# Patient Record
Sex: FEMALE | Employment: OTHER
[De-identification: names, ages, dates, MRNs, and addresses within clinical notes are randomized per-mention and may not be internally consistent; named-entity substitution may affect disease eponyms.]

---

## 2017-01-05 ENCOUNTER — RECORDS - HEALTHEAST (OUTPATIENT)
Dept: ADMINISTRATIVE | Facility: OTHER | Age: 82
End: 2017-01-05

## 2017-01-06 ENCOUNTER — COMMUNICATION - HEALTHEAST (OUTPATIENT)
Dept: INTERNAL MEDICINE | Facility: CLINIC | Age: 82
End: 2017-01-06

## 2017-01-06 ENCOUNTER — AMBULATORY - HEALTHEAST (OUTPATIENT)
Dept: INTERNAL MEDICINE | Facility: CLINIC | Age: 82
End: 2017-01-06

## 2017-01-11 ENCOUNTER — COMMUNICATION - HEALTHEAST (OUTPATIENT)
Dept: INTERNAL MEDICINE | Facility: CLINIC | Age: 82
End: 2017-01-11

## 2017-01-13 ENCOUNTER — RECORDS - HEALTHEAST (OUTPATIENT)
Dept: ADMINISTRATIVE | Facility: OTHER | Age: 82
End: 2017-01-13

## 2017-01-19 ENCOUNTER — RECORDS - HEALTHEAST (OUTPATIENT)
Dept: ADMINISTRATIVE | Facility: OTHER | Age: 82
End: 2017-01-19

## 2017-01-25 ENCOUNTER — AMBULATORY - HEALTHEAST (OUTPATIENT)
Dept: INTERNAL MEDICINE | Facility: CLINIC | Age: 82
End: 2017-01-25

## 2017-01-25 ENCOUNTER — COMMUNICATION - HEALTHEAST (OUTPATIENT)
Dept: INTERNAL MEDICINE | Facility: CLINIC | Age: 82
End: 2017-01-25

## 2017-01-25 DIAGNOSIS — R01.1 HEART MURMUR: ICD-10-CM

## 2017-01-30 ENCOUNTER — COMMUNICATION - HEALTHEAST (OUTPATIENT)
Dept: SCHEDULING | Facility: CLINIC | Age: 82
End: 2017-01-30

## 2017-01-30 ENCOUNTER — AMBULATORY - HEALTHEAST (OUTPATIENT)
Dept: INTERNAL MEDICINE | Facility: CLINIC | Age: 82
End: 2017-01-30

## 2017-02-02 ENCOUNTER — OFFICE VISIT - HEALTHEAST (OUTPATIENT)
Dept: CARDIOLOGY | Facility: CLINIC | Age: 82
End: 2017-02-02

## 2017-02-02 DIAGNOSIS — R06.09 DYSPNEA ON EXERTION: ICD-10-CM

## 2017-02-02 DIAGNOSIS — M79.89 PAIN AND SWELLING OF LEFT LOWER LEG: ICD-10-CM

## 2017-02-02 DIAGNOSIS — I71.40 ABDOMINAL AORTIC ANEURYSM (AAA) WITHOUT RUPTURE (H): ICD-10-CM

## 2017-02-02 DIAGNOSIS — J44.9 COPD (CHRONIC OBSTRUCTIVE PULMONARY DISEASE) (H): ICD-10-CM

## 2017-02-02 DIAGNOSIS — I73.9 PERIPHERAL ARTERIAL DISEASE (H): ICD-10-CM

## 2017-02-02 DIAGNOSIS — M79.662 PAIN AND SWELLING OF LEFT LOWER LEG: ICD-10-CM

## 2017-02-02 LAB
ATRIAL RATE - MUSE: 96 BPM
DIASTOLIC BLOOD PRESSURE - MUSE: NORMAL MMHG
INTERPRETATION ECG - MUSE: NORMAL
P AXIS - MUSE: 66 DEGREES
PR INTERVAL - MUSE: 118 MS
QRS DURATION - MUSE: 70 MS
QT - MUSE: 316 MS
QTC - MUSE: 399 MS
R AXIS - MUSE: 47 DEGREES
SYSTOLIC BLOOD PRESSURE - MUSE: NORMAL MMHG
T AXIS - MUSE: 54 DEGREES
VENTRICULAR RATE- MUSE: 96 BPM

## 2017-02-02 ASSESSMENT — MIFFLIN-ST. JEOR: SCORE: 1148.75

## 2017-02-03 ENCOUNTER — HOSPITAL ENCOUNTER (OUTPATIENT)
Dept: ULTRASOUND IMAGING | Facility: CLINIC | Age: 82
Discharge: HOME | End: 2017-02-03
Attending: INTERNAL MEDICINE

## 2017-02-03 DIAGNOSIS — J44.9 COPD (CHRONIC OBSTRUCTIVE PULMONARY DISEASE) (H): ICD-10-CM

## 2017-02-03 DIAGNOSIS — M79.662 PAIN AND SWELLING OF LEFT LOWER LEG: ICD-10-CM

## 2017-02-03 DIAGNOSIS — I73.9 PERIPHERAL ARTERIAL DISEASE (H): ICD-10-CM

## 2017-02-03 DIAGNOSIS — R06.09 OTHER FORMS OF DYSPNEA: ICD-10-CM

## 2017-02-03 DIAGNOSIS — M79.89 PAIN AND SWELLING OF LEFT LOWER LEG: ICD-10-CM

## 2017-02-03 DIAGNOSIS — I71.40 ABDOMINAL AORTIC ANEURYSM (AAA) WITHOUT RUPTURE (H): ICD-10-CM

## 2017-02-03 DIAGNOSIS — R06.09 DYSPNEA ON EXERTION: ICD-10-CM

## 2017-02-08 ENCOUNTER — COMMUNICATION - HEALTHEAST (OUTPATIENT)
Dept: INTERNAL MEDICINE | Facility: CLINIC | Age: 82
End: 2017-02-08

## 2017-02-09 ENCOUNTER — OFFICE VISIT - HEALTHEAST (OUTPATIENT)
Dept: INTERNAL MEDICINE | Facility: CLINIC | Age: 82
End: 2017-02-09

## 2017-02-09 ENCOUNTER — RECORDS - HEALTHEAST (OUTPATIENT)
Dept: GENERAL RADIOLOGY | Facility: CLINIC | Age: 82
End: 2017-02-09

## 2017-02-09 DIAGNOSIS — I48.91 ATRIAL FIBRILLATION WITH RAPID VENTRICULAR RESPONSE (H): ICD-10-CM

## 2017-02-09 DIAGNOSIS — I73.9 PERIPHERAL ARTERIAL DISEASE (H): ICD-10-CM

## 2017-02-09 DIAGNOSIS — J44.9 COPD (CHRONIC OBSTRUCTIVE PULMONARY DISEASE) (H): ICD-10-CM

## 2017-02-09 DIAGNOSIS — F25.0 SCHIZOAFFECTIVE DISORDER, BIPOLAR TYPE (H): ICD-10-CM

## 2017-02-09 DIAGNOSIS — R05.9 COUGH: ICD-10-CM

## 2017-02-09 DIAGNOSIS — I49.9 IRREGULAR HEART BEAT: ICD-10-CM

## 2017-02-09 ASSESSMENT — MIFFLIN-ST. JEOR: SCORE: 1135.14

## 2017-02-10 LAB
ATRIAL RATE - MUSE: 375 BPM
DIASTOLIC BLOOD PRESSURE - MUSE: NORMAL MMHG
INTERPRETATION ECG - MUSE: NORMAL
P AXIS - MUSE: NORMAL DEGREES
PR INTERVAL - MUSE: NORMAL MS
QRS DURATION - MUSE: 68 MS
QT - MUSE: 312 MS
QTC - MUSE: 418 MS
R AXIS - MUSE: -2 DEGREES
SYSTOLIC BLOOD PRESSURE - MUSE: NORMAL MMHG
T AXIS - MUSE: 28 DEGREES
VENTRICULAR RATE- MUSE: 108 BPM

## 2017-02-13 ENCOUNTER — AMBULATORY - HEALTHEAST (OUTPATIENT)
Dept: CARDIOLOGY | Facility: CLINIC | Age: 82
End: 2017-02-13

## 2017-02-13 DIAGNOSIS — R07.9 CHEST PAIN: ICD-10-CM

## 2017-02-20 ENCOUNTER — COMMUNICATION - HEALTHEAST (OUTPATIENT)
Dept: CARDIOLOGY | Facility: CLINIC | Age: 82
End: 2017-02-20

## 2017-02-22 ENCOUNTER — OFFICE VISIT - HEALTHEAST (OUTPATIENT)
Dept: INTERNAL MEDICINE | Facility: CLINIC | Age: 82
End: 2017-02-22

## 2017-02-22 DIAGNOSIS — J44.9 COPD (CHRONIC OBSTRUCTIVE PULMONARY DISEASE) (H): ICD-10-CM

## 2017-02-22 ASSESSMENT — MIFFLIN-ST. JEOR: SCORE: 1113.6

## 2017-02-23 ENCOUNTER — HOSPITAL ENCOUNTER (OUTPATIENT)
Dept: CARDIOLOGY | Facility: CLINIC | Age: 82
Discharge: HOME | End: 2017-02-23
Attending: INTERNAL MEDICINE

## 2017-02-23 ENCOUNTER — HOSPITAL ENCOUNTER (OUTPATIENT)
Dept: NUCLEAR MEDICINE | Facility: CLINIC | Age: 82
Discharge: HOME | End: 2017-02-23
Attending: INTERNAL MEDICINE

## 2017-02-23 DIAGNOSIS — R07.9 CHEST PAIN: ICD-10-CM

## 2017-02-23 LAB
CV STRESS CURRENT BP HE: NORMAL
CV STRESS CURRENT HR HE: 100
CV STRESS CURRENT HR HE: 101
CV STRESS CURRENT HR HE: 103
CV STRESS CURRENT HR HE: 104
CV STRESS CURRENT HR HE: 105
CV STRESS CURRENT HR HE: 107
CV STRESS CURRENT HR HE: 107
CV STRESS CURRENT HR HE: 109
CV STRESS CURRENT HR HE: 112
CV STRESS CURRENT HR HE: 112
CV STRESS CURRENT HR HE: 113
CV STRESS CURRENT HR HE: 114
CV STRESS CURRENT HR HE: 93
CV STRESS CURRENT HR HE: 94
CV STRESS CURRENT HR HE: 99
CV STRESS DEVIATION TIME HE: NORMAL
CV STRESS ECHO PERCENT HR HE: NORMAL
CV STRESS EXERCISE STAGE HE: NORMAL
CV STRESS FINAL RESTING BP HE: NORMAL
CV STRESS FINAL RESTING HR HE: 99
CV STRESS MAX HR HE: 114
CV STRESS MAX TREADMILL GRADE HE: 0
CV STRESS MAX TREADMILL SPEED HE: 0
CV STRESS PEAK DIA BP HE: NORMAL
CV STRESS PEAK SYS BP HE: NORMAL
CV STRESS PHASE HE: NORMAL
CV STRESS PROTOCOL HE: NORMAL
CV STRESS RESTING PT POSITION HE: NORMAL
CV STRESS ST DEVIATION AMOUNT HE: NORMAL
CV STRESS ST DEVIATION ELEVATION HE: NORMAL
CV STRESS ST EVELATION AMOUNT HE: NORMAL
CV STRESS TEST TYPE HE: NORMAL
CV STRESS TOTAL STAGE TIME MIN 1 HE: NORMAL
NUC STRESS EJECTION FRACTION: 70 %
STRESS ECHO BASELINE BP: NORMAL
STRESS ECHO BASELINE HR: 90
STRESS ECHO CALCULATED PERCENT HR: 81 %
STRESS ECHO LAST STRESS BP: NORMAL
STRESS ECHO LAST STRESS HR: 112

## 2017-03-02 ENCOUNTER — COMMUNICATION - HEALTHEAST (OUTPATIENT)
Dept: INTERNAL MEDICINE | Facility: CLINIC | Age: 82
End: 2017-03-02

## 2017-03-10 ENCOUNTER — OFFICE VISIT - HEALTHEAST (OUTPATIENT)
Dept: INTERNAL MEDICINE | Facility: CLINIC | Age: 82
End: 2017-03-10

## 2017-03-10 DIAGNOSIS — J44.9 COPD (CHRONIC OBSTRUCTIVE PULMONARY DISEASE) (H): ICD-10-CM

## 2017-03-10 ASSESSMENT — MIFFLIN-ST. JEOR: SCORE: 1118.14

## 2017-03-13 ENCOUNTER — OFFICE VISIT - HEALTHEAST (OUTPATIENT)
Dept: PULMONOLOGY | Facility: OTHER | Age: 82
End: 2017-03-13

## 2017-03-13 DIAGNOSIS — J96.11 CHRONIC RESPIRATORY FAILURE WITH HYPOXIA (H): ICD-10-CM

## 2017-03-13 DIAGNOSIS — I50.32 CHRONIC DIASTOLIC HEART FAILURE (H): ICD-10-CM

## 2017-03-13 DIAGNOSIS — J44.1 CHRONIC OBSTRUCTIVE PULMONARY DISEASE WITH ACUTE EXACERBATION (H): ICD-10-CM

## 2017-03-13 ASSESSMENT — MIFFLIN-ST. JEOR: SCORE: 1131.74

## 2017-03-18 ENCOUNTER — COMMUNICATION - HEALTHEAST (OUTPATIENT)
Dept: INTERNAL MEDICINE | Facility: CLINIC | Age: 82
End: 2017-03-18

## 2017-03-20 ENCOUNTER — COMMUNICATION - HEALTHEAST (OUTPATIENT)
Dept: INTERNAL MEDICINE | Facility: CLINIC | Age: 82
End: 2017-03-20

## 2017-03-27 ENCOUNTER — OFFICE VISIT - HEALTHEAST (OUTPATIENT)
Dept: INTERNAL MEDICINE | Facility: CLINIC | Age: 82
End: 2017-03-27

## 2017-03-27 DIAGNOSIS — I50.40 COMBINED SYSTOLIC AND DIASTOLIC CONGESTIVE HEART FAILURE, UNSPECIFIED CONGESTIVE HEART FAILURE CHRONICITY: ICD-10-CM

## 2017-03-27 ASSESSMENT — MIFFLIN-ST. JEOR: SCORE: 1136.28

## 2017-03-28 ENCOUNTER — RECORDS - HEALTHEAST (OUTPATIENT)
Dept: ADMINISTRATIVE | Facility: OTHER | Age: 82
End: 2017-03-28

## 2017-03-29 ENCOUNTER — RECORDS - HEALTHEAST (OUTPATIENT)
Dept: ADMINISTRATIVE | Facility: OTHER | Age: 82
End: 2017-03-29

## 2017-04-05 ENCOUNTER — OFFICE VISIT - HEALTHEAST (OUTPATIENT)
Dept: INTERNAL MEDICINE | Facility: CLINIC | Age: 82
End: 2017-04-05

## 2017-04-05 DIAGNOSIS — I15.2 HYPERTENSION DUE TO ENDOCRINE DISORDER: ICD-10-CM

## 2017-04-05 DIAGNOSIS — I50.9 CHF (CONGESTIVE HEART FAILURE) (H): ICD-10-CM

## 2017-04-05 DIAGNOSIS — J44.1 CHRONIC OBSTRUCTIVE PULMONARY DISEASE WITH ACUTE EXACERBATION (H): ICD-10-CM

## 2017-04-05 DIAGNOSIS — L03.90 CELLULITIS: ICD-10-CM

## 2017-04-05 ASSESSMENT — MIFFLIN-ST. JEOR: SCORE: 1149.89

## 2017-04-06 ENCOUNTER — RECORDS - HEALTHEAST (OUTPATIENT)
Dept: ADMINISTRATIVE | Facility: OTHER | Age: 82
End: 2017-04-06

## 2017-04-07 ENCOUNTER — COMMUNICATION - HEALTHEAST (OUTPATIENT)
Dept: INTERNAL MEDICINE | Facility: CLINIC | Age: 82
End: 2017-04-07

## 2017-04-07 ENCOUNTER — OFFICE VISIT - HEALTHEAST (OUTPATIENT)
Dept: PULMONOLOGY | Facility: OTHER | Age: 82
End: 2017-04-07

## 2017-04-07 DIAGNOSIS — J96.11 CHRONIC RESPIRATORY FAILURE WITH HYPOXIA (H): ICD-10-CM

## 2017-04-07 DIAGNOSIS — J41.0 SIMPLE CHRONIC BRONCHITIS (H): ICD-10-CM

## 2017-04-10 ENCOUNTER — RECORDS - HEALTHEAST (OUTPATIENT)
Dept: ADMINISTRATIVE | Facility: OTHER | Age: 82
End: 2017-04-10

## 2017-04-11 ENCOUNTER — RECORDS - HEALTHEAST (OUTPATIENT)
Dept: ADMINISTRATIVE | Facility: OTHER | Age: 82
End: 2017-04-11

## 2017-04-17 ENCOUNTER — COMMUNICATION - HEALTHEAST (OUTPATIENT)
Dept: INTERNAL MEDICINE | Facility: CLINIC | Age: 82
End: 2017-04-17

## 2017-04-27 ENCOUNTER — OFFICE VISIT - HEALTHEAST (OUTPATIENT)
Dept: INTERNAL MEDICINE | Facility: CLINIC | Age: 82
End: 2017-04-27

## 2017-04-27 DIAGNOSIS — J42 CHRONIC BRONCHITIS, UNSPECIFIED CHRONIC BRONCHITIS TYPE (H): ICD-10-CM

## 2017-04-27 ASSESSMENT — MIFFLIN-ST. JEOR: SCORE: 1154.42

## 2017-05-01 ENCOUNTER — COMMUNICATION - HEALTHEAST (OUTPATIENT)
Dept: INTERNAL MEDICINE | Facility: CLINIC | Age: 82
End: 2017-05-01

## 2017-05-03 ENCOUNTER — OFFICE VISIT - HEALTHEAST (OUTPATIENT)
Dept: CARDIOLOGY | Facility: CLINIC | Age: 82
End: 2017-05-03

## 2017-05-03 DIAGNOSIS — Z79.4 TYPE 2 DIABETES MELLITUS WITHOUT COMPLICATION, WITH LONG-TERM CURRENT USE OF INSULIN (H): ICD-10-CM

## 2017-05-03 DIAGNOSIS — E11.9 TYPE 2 DIABETES MELLITUS WITHOUT COMPLICATION, WITH LONG-TERM CURRENT USE OF INSULIN (H): ICD-10-CM

## 2017-05-03 DIAGNOSIS — I73.9 PERIPHERAL ARTERIAL DISEASE (H): ICD-10-CM

## 2017-05-03 DIAGNOSIS — I50.32 CHRONIC DIASTOLIC HEART FAILURE (H): ICD-10-CM

## 2017-05-03 DIAGNOSIS — I10 ESSENTIAL HYPERTENSION: ICD-10-CM

## 2017-05-03 DIAGNOSIS — J96.11 CHRONIC RESPIRATORY FAILURE WITH HYPOXIA (H): ICD-10-CM

## 2017-05-03 DIAGNOSIS — J41.0 SIMPLE CHRONIC BRONCHITIS (H): ICD-10-CM

## 2017-05-03 DIAGNOSIS — F25.0 SCHIZOAFFECTIVE DISORDER, BIPOLAR TYPE (H): ICD-10-CM

## 2017-05-03 ASSESSMENT — MIFFLIN-ST. JEOR: SCORE: 1145.35

## 2017-05-05 ENCOUNTER — RECORDS - HEALTHEAST (OUTPATIENT)
Dept: ADMINISTRATIVE | Facility: OTHER | Age: 82
End: 2017-05-05

## 2017-05-18 ENCOUNTER — COMMUNICATION - HEALTHEAST (OUTPATIENT)
Dept: INTERNAL MEDICINE | Facility: CLINIC | Age: 82
End: 2017-05-18

## 2017-06-06 ENCOUNTER — COMMUNICATION - HEALTHEAST (OUTPATIENT)
Dept: INTERNAL MEDICINE | Facility: CLINIC | Age: 82
End: 2017-06-06

## 2017-06-07 ENCOUNTER — COMMUNICATION - HEALTHEAST (OUTPATIENT)
Dept: INTERNAL MEDICINE | Facility: CLINIC | Age: 82
End: 2017-06-07

## 2017-06-07 ENCOUNTER — RECORDS - HEALTHEAST (OUTPATIENT)
Dept: ADMINISTRATIVE | Facility: OTHER | Age: 82
End: 2017-06-07

## 2017-06-08 ENCOUNTER — COMMUNICATION - HEALTHEAST (OUTPATIENT)
Dept: INTERNAL MEDICINE | Facility: CLINIC | Age: 82
End: 2017-06-08

## 2017-06-19 ENCOUNTER — COMMUNICATION - HEALTHEAST (OUTPATIENT)
Dept: CARDIOLOGY | Facility: CLINIC | Age: 82
End: 2017-06-19

## 2017-06-19 DIAGNOSIS — E78.5 HYPERLIPIDEMIA: ICD-10-CM

## 2017-06-27 ENCOUNTER — OFFICE VISIT - HEALTHEAST (OUTPATIENT)
Dept: INTERNAL MEDICINE | Facility: CLINIC | Age: 82
End: 2017-06-27

## 2017-06-27 ENCOUNTER — COMMUNICATION - HEALTHEAST (OUTPATIENT)
Dept: INTERNAL MEDICINE | Facility: CLINIC | Age: 82
End: 2017-06-27

## 2017-06-27 DIAGNOSIS — I50.9 CONGESTIVE HEART FAILURE, UNSPECIFIED CONGESTIVE HEART FAILURE CHRONICITY, UNSPECIFIED CONGESTIVE HEART FAILURE TYPE: ICD-10-CM

## 2017-06-27 LAB
CHOLEST SERPL-MCNC: 132 MG/DL
FASTING STATUS PATIENT QL REPORTED: YES
HBA1C MFR BLD: 6.4 % (ref 3.5–6)
HDLC SERPL-MCNC: 63 MG/DL
LDLC SERPL CALC-MCNC: 55 MG/DL
TRIGL SERPL-MCNC: 70 MG/DL

## 2017-06-27 ASSESSMENT — MIFFLIN-ST. JEOR: SCORE: 1113.6

## 2017-07-13 ENCOUNTER — HOSPITAL ENCOUNTER (OUTPATIENT)
Dept: MAMMOGRAPHY | Facility: CLINIC | Age: 82
Discharge: HOME | End: 2017-07-13
Attending: INTERNAL MEDICINE

## 2017-07-13 DIAGNOSIS — Z12.31 VISIT FOR SCREENING MAMMOGRAM: ICD-10-CM

## 2017-07-25 ENCOUNTER — COMMUNICATION - HEALTHEAST (OUTPATIENT)
Dept: INTERNAL MEDICINE | Facility: CLINIC | Age: 82
End: 2017-07-25

## 2017-07-28 ENCOUNTER — OFFICE VISIT - HEALTHEAST (OUTPATIENT)
Dept: INTERNAL MEDICINE | Facility: CLINIC | Age: 82
End: 2017-07-28

## 2017-07-28 DIAGNOSIS — Z00.00 ROUTINE GENERAL MEDICAL EXAMINATION AT A HEALTH CARE FACILITY: ICD-10-CM

## 2017-07-28 LAB
CHOLEST SERPL-MCNC: 138 MG/DL
FASTING STATUS PATIENT QL REPORTED: YES
HBA1C MFR BLD: 6.6 % (ref 3.5–6)
HDLC SERPL-MCNC: 64 MG/DL
LDLC SERPL CALC-MCNC: 59 MG/DL
TRIGL SERPL-MCNC: 77 MG/DL

## 2017-07-28 ASSESSMENT — MIFFLIN-ST. JEOR: SCORE: 1099.99

## 2017-07-31 ENCOUNTER — COMMUNICATION - HEALTHEAST (OUTPATIENT)
Dept: INTERNAL MEDICINE | Facility: CLINIC | Age: 82
End: 2017-07-31

## 2017-08-21 ENCOUNTER — COMMUNICATION - HEALTHEAST (OUTPATIENT)
Dept: INTERNAL MEDICINE | Facility: CLINIC | Age: 82
End: 2017-08-21

## 2017-08-29 ENCOUNTER — OFFICE VISIT - HEALTHEAST (OUTPATIENT)
Dept: INTERNAL MEDICINE | Facility: CLINIC | Age: 82
End: 2017-08-29

## 2017-08-29 DIAGNOSIS — R60.9 EDEMA: ICD-10-CM

## 2017-08-29 ASSESSMENT — MIFFLIN-ST. JEOR: SCORE: 1136.28

## 2017-09-13 ENCOUNTER — COMMUNICATION - HEALTHEAST (OUTPATIENT)
Dept: INTERNAL MEDICINE | Facility: CLINIC | Age: 82
End: 2017-09-13

## 2017-09-19 ENCOUNTER — RECORDS - HEALTHEAST (OUTPATIENT)
Dept: ADMINISTRATIVE | Facility: OTHER | Age: 82
End: 2017-09-19

## 2017-09-25 ENCOUNTER — HOSPITAL ENCOUNTER (OUTPATIENT)
Dept: CT IMAGING | Facility: CLINIC | Age: 82
Discharge: HOME | End: 2017-09-25
Attending: PHYSICIAN ASSISTANT

## 2017-09-25 DIAGNOSIS — M96.0 PSEUDARTHROSIS AFTER FUSION OR ARTHRODESIS: ICD-10-CM

## 2017-09-25 DIAGNOSIS — M54.50 LOW BACK PAIN: ICD-10-CM

## 2017-09-27 ENCOUNTER — OFFICE VISIT - HEALTHEAST (OUTPATIENT)
Dept: INTERNAL MEDICINE | Facility: CLINIC | Age: 82
End: 2017-09-27

## 2017-09-27 DIAGNOSIS — J41.0 SIMPLE CHRONIC BRONCHITIS (H): ICD-10-CM

## 2017-09-27 ASSESSMENT — MIFFLIN-ST. JEOR: SCORE: 1168.21

## 2017-10-12 ENCOUNTER — RECORDS - HEALTHEAST (OUTPATIENT)
Dept: ADMINISTRATIVE | Facility: OTHER | Age: 82
End: 2017-10-12

## 2017-10-26 ENCOUNTER — COMMUNICATION - HEALTHEAST (OUTPATIENT)
Dept: INTERNAL MEDICINE | Facility: CLINIC | Age: 82
End: 2017-10-26

## 2017-11-16 ENCOUNTER — OFFICE VISIT - HEALTHEAST (OUTPATIENT)
Dept: INTERNAL MEDICINE | Facility: CLINIC | Age: 82
End: 2017-11-16

## 2017-11-16 DIAGNOSIS — J44.9 COPD (CHRONIC OBSTRUCTIVE PULMONARY DISEASE) (H): ICD-10-CM

## 2017-11-16 ASSESSMENT — MIFFLIN-ST. JEOR: SCORE: 1140.82

## 2017-11-20 ENCOUNTER — COMMUNICATION - HEALTHEAST (OUTPATIENT)
Dept: INTERNAL MEDICINE | Facility: CLINIC | Age: 82
End: 2017-11-20

## 2017-11-29 ENCOUNTER — COMMUNICATION - HEALTHEAST (OUTPATIENT)
Dept: PULMONOLOGY | Facility: OTHER | Age: 82
End: 2017-11-29

## 2017-11-29 DIAGNOSIS — J41.0 SIMPLE CHRONIC BRONCHITIS (H): ICD-10-CM

## 2017-12-18 ENCOUNTER — COMMUNICATION - HEALTHEAST (OUTPATIENT)
Dept: PULMONOLOGY | Facility: OTHER | Age: 82
End: 2017-12-18

## 2017-12-18 DIAGNOSIS — J41.0 SIMPLE CHRONIC BRONCHITIS (H): ICD-10-CM

## 2017-12-20 ENCOUNTER — OFFICE VISIT - HEALTHEAST (OUTPATIENT)
Dept: PULMONOLOGY | Facility: OTHER | Age: 82
End: 2017-12-20

## 2017-12-20 DIAGNOSIS — J96.11 CHRONIC RESPIRATORY FAILURE WITH HYPOXIA (H): ICD-10-CM

## 2017-12-20 DIAGNOSIS — J44.1 COPD EXACERBATION (H): ICD-10-CM

## 2017-12-20 DIAGNOSIS — I50.32 CHRONIC DIASTOLIC HEART FAILURE (H): ICD-10-CM

## 2018-01-22 ENCOUNTER — OFFICE VISIT - HEALTHEAST (OUTPATIENT)
Dept: PULMONOLOGY | Facility: OTHER | Age: 83
End: 2018-01-22

## 2018-01-22 DIAGNOSIS — J41.0 SIMPLE CHRONIC BRONCHITIS (H): ICD-10-CM

## 2018-01-22 DIAGNOSIS — R06.09 DYSPNEA ON EXERTION: ICD-10-CM

## 2018-01-22 DIAGNOSIS — J96.11 CHRONIC RESPIRATORY FAILURE WITH HYPOXIA (H): ICD-10-CM

## 2018-01-23 ENCOUNTER — COMMUNICATION - HEALTHEAST (OUTPATIENT)
Dept: PULMONOLOGY | Facility: OTHER | Age: 83
End: 2018-01-23

## 2018-01-23 DIAGNOSIS — J44.9 COPD (CHRONIC OBSTRUCTIVE PULMONARY DISEASE) (H): ICD-10-CM

## 2018-01-29 ENCOUNTER — AMBULATORY - HEALTHEAST (OUTPATIENT)
Dept: PULMONOLOGY | Facility: OTHER | Age: 83
End: 2018-01-29

## 2018-02-01 ENCOUNTER — RECORDS - HEALTHEAST (OUTPATIENT)
Dept: ADMINISTRATIVE | Facility: OTHER | Age: 83
End: 2018-02-01

## 2018-02-01 ENCOUNTER — OFFICE VISIT - HEALTHEAST (OUTPATIENT)
Dept: INTERNAL MEDICINE | Facility: CLINIC | Age: 83
End: 2018-02-01

## 2018-02-01 DIAGNOSIS — E11.9 DIABETES MELLITUS, TYPE 2 (H): ICD-10-CM

## 2018-02-01 ASSESSMENT — MIFFLIN-ST. JEOR: SCORE: 1140.82

## 2018-02-07 ENCOUNTER — COMMUNICATION - HEALTHEAST (OUTPATIENT)
Dept: INTERNAL MEDICINE | Facility: CLINIC | Age: 83
End: 2018-02-07

## 2018-02-09 ENCOUNTER — COMMUNICATION - HEALTHEAST (OUTPATIENT)
Dept: INTERNAL MEDICINE | Facility: CLINIC | Age: 83
End: 2018-02-09

## 2018-02-13 ENCOUNTER — COMMUNICATION - HEALTHEAST (OUTPATIENT)
Dept: INTERNAL MEDICINE | Facility: CLINIC | Age: 83
End: 2018-02-13

## 2018-02-19 ENCOUNTER — COMMUNICATION - HEALTHEAST (OUTPATIENT)
Dept: PULMONOLOGY | Facility: OTHER | Age: 83
End: 2018-02-19

## 2018-02-19 DIAGNOSIS — J41.0 SIMPLE CHRONIC BRONCHITIS (H): ICD-10-CM

## 2018-02-27 ENCOUNTER — RECORDS - HEALTHEAST (OUTPATIENT)
Dept: ADMINISTRATIVE | Facility: OTHER | Age: 83
End: 2018-02-27

## 2018-02-28 ENCOUNTER — RECORDS - HEALTHEAST (OUTPATIENT)
Dept: ADMINISTRATIVE | Facility: OTHER | Age: 83
End: 2018-02-28

## 2018-02-28 ENCOUNTER — AMBULATORY - HEALTHEAST (OUTPATIENT)
Dept: CARDIOLOGY | Facility: CLINIC | Age: 83
End: 2018-02-28

## 2018-03-01 ENCOUNTER — COMMUNICATION - HEALTHEAST (OUTPATIENT)
Dept: INTERNAL MEDICINE | Facility: CLINIC | Age: 83
End: 2018-03-01

## 2018-03-05 ENCOUNTER — AMBULATORY - HEALTHEAST (OUTPATIENT)
Dept: PULMONOLOGY | Facility: OTHER | Age: 83
End: 2018-03-05

## 2018-03-05 ENCOUNTER — OFFICE VISIT - HEALTHEAST (OUTPATIENT)
Dept: CARDIOLOGY | Facility: CLINIC | Age: 83
End: 2018-03-05

## 2018-03-05 DIAGNOSIS — J96.11 CHRONIC RESPIRATORY FAILURE WITH HYPOXIA (H): ICD-10-CM

## 2018-03-05 DIAGNOSIS — Z79.4 TYPE 2 DIABETES MELLITUS WITHOUT COMPLICATION, WITH LONG-TERM CURRENT USE OF INSULIN (H): ICD-10-CM

## 2018-03-05 DIAGNOSIS — J41.0 SIMPLE CHRONIC BRONCHITIS (H): ICD-10-CM

## 2018-03-05 DIAGNOSIS — E11.9 TYPE 2 DIABETES MELLITUS WITHOUT COMPLICATION, WITH LONG-TERM CURRENT USE OF INSULIN (H): ICD-10-CM

## 2018-03-05 DIAGNOSIS — I27.20 PULMONARY HYPERTENSION (H): ICD-10-CM

## 2018-03-05 DIAGNOSIS — I50.32 CHRONIC DIASTOLIC HEART FAILURE (H): ICD-10-CM

## 2018-03-05 DIAGNOSIS — I10 ESSENTIAL HYPERTENSION: ICD-10-CM

## 2018-03-05 LAB
ANION GAP SERPL CALCULATED.3IONS-SCNC: 10 MMOL/L (ref 5–18)
ATRIAL RATE - MUSE: 96 BPM
BNP SERPL-MCNC: 51 PG/ML (ref 0–155)
BUN SERPL-MCNC: 10 MG/DL (ref 8–28)
CALCIUM SERPL-MCNC: 9 MG/DL (ref 8.5–10.5)
CHLORIDE BLD-SCNC: 100 MMOL/L (ref 98–107)
CO2 SERPL-SCNC: 30 MMOL/L (ref 22–31)
CREAT SERPL-MCNC: 0.67 MG/DL (ref 0.6–1.1)
DIASTOLIC BLOOD PRESSURE - MUSE: NORMAL MMHG
GFR SERPL CREATININE-BSD FRML MDRD: >60 ML/MIN/1.73M2
GLUCOSE BLD-MCNC: 109 MG/DL (ref 70–125)
INTERPRETATION ECG - MUSE: NORMAL
P AXIS - MUSE: 61 DEGREES
POTASSIUM BLD-SCNC: 4 MMOL/L (ref 3.5–5)
PR INTERVAL - MUSE: 128 MS
QRS DURATION - MUSE: 74 MS
QT - MUSE: 336 MS
QTC - MUSE: 424 MS
R AXIS - MUSE: 20 DEGREES
SODIUM SERPL-SCNC: 140 MMOL/L (ref 136–145)
SYSTOLIC BLOOD PRESSURE - MUSE: NORMAL MMHG
T AXIS - MUSE: 18 DEGREES
VENTRICULAR RATE- MUSE: 96 BPM

## 2018-03-05 ASSESSMENT — MIFFLIN-ST. JEOR: SCORE: 1179.36

## 2018-03-08 ENCOUNTER — OFFICE VISIT - HEALTHEAST (OUTPATIENT)
Dept: INTERNAL MEDICINE | Facility: CLINIC | Age: 83
End: 2018-03-08

## 2018-03-08 ENCOUNTER — HOSPITAL ENCOUNTER (OUTPATIENT)
Dept: CT IMAGING | Facility: CLINIC | Age: 83
Discharge: HOME | End: 2018-03-08
Attending: INTERNAL MEDICINE

## 2018-03-08 ENCOUNTER — COMMUNICATION - HEALTHEAST (OUTPATIENT)
Dept: INTERNAL MEDICINE | Facility: CLINIC | Age: 83
End: 2018-03-08

## 2018-03-08 DIAGNOSIS — E11.9 DIABETES MELLITUS, TYPE 2 (H): ICD-10-CM

## 2018-03-08 DIAGNOSIS — R60.1 ANASARCA: ICD-10-CM

## 2018-03-08 LAB
ALBUMIN SERPL-MCNC: 3.2 G/DL (ref 3.5–5)
ALBUMIN UR-MCNC: NEGATIVE MG/DL
ALP SERPL-CCNC: 102 U/L (ref 45–120)
ALT SERPL W P-5'-P-CCNC: 14 U/L (ref 0–45)
ANION GAP SERPL CALCULATED.3IONS-SCNC: 10 MMOL/L (ref 5–18)
APPEARANCE UR: CLEAR
AST SERPL W P-5'-P-CCNC: 22 U/L (ref 0–40)
BILIRUB SERPL-MCNC: 0.4 MG/DL (ref 0–1)
BILIRUB UR QL STRIP: NEGATIVE
BUN SERPL-MCNC: 14 MG/DL (ref 8–28)
CALCIUM SERPL-MCNC: 8.7 MG/DL (ref 8.5–10.5)
CHLORIDE BLD-SCNC: 106 MMOL/L (ref 98–107)
CHOLEST SERPL-MCNC: 142 MG/DL
CO2 SERPL-SCNC: 27 MMOL/L (ref 22–31)
COLOR UR AUTO: YELLOW
CREAT SERPL-MCNC: 0.66 MG/DL (ref 0.6–1.1)
CREAT UR-MCNC: 83.6 MG/DL
FASTING STATUS PATIENT QL REPORTED: NORMAL
GFR SERPL CREATININE-BSD FRML MDRD: >60 ML/MIN/1.73M2
GLUCOSE BLD-MCNC: 95 MG/DL (ref 70–125)
GLUCOSE UR STRIP-MCNC: NEGATIVE MG/DL
HBA1C MFR BLD: 6.5 % (ref 3.5–6)
HDLC SERPL-MCNC: 71 MG/DL
HGB UR QL STRIP: NEGATIVE
KETONES UR STRIP-MCNC: NEGATIVE MG/DL
LDLC SERPL CALC-MCNC: 52 MG/DL
LEUKOCYTE ESTERASE UR QL STRIP: NEGATIVE
MICROALBUMIN UR-MCNC: 0.5 MG/DL (ref 0–1.99)
MICROALBUMIN/CREAT UR: 6 MG/G
NITRATE UR QL: NEGATIVE
PH UR STRIP: 7 [PH] (ref 5–8)
POTASSIUM BLD-SCNC: 3.5 MMOL/L (ref 3.5–5)
PROT SERPL-MCNC: 6.3 G/DL (ref 6–8)
SODIUM SERPL-SCNC: 143 MMOL/L (ref 136–145)
SP GR UR STRIP: 1.02 (ref 1–1.03)
TRIGL SERPL-MCNC: 95 MG/DL
UROBILINOGEN UR STRIP-ACNC: NORMAL

## 2018-03-08 ASSESSMENT — MIFFLIN-ST. JEOR: SCORE: 1170.29

## 2018-03-12 ENCOUNTER — HOSPITAL ENCOUNTER (OUTPATIENT)
Dept: CARDIOLOGY | Facility: CLINIC | Age: 83
Discharge: HOME | End: 2018-03-12
Attending: INTERNAL MEDICINE

## 2018-03-12 DIAGNOSIS — I50.32 CHRONIC DIASTOLIC HEART FAILURE (H): ICD-10-CM

## 2018-03-12 DIAGNOSIS — I27.20 PULMONARY HYPERTENSION (H): ICD-10-CM

## 2018-03-12 LAB
AORTIC ROOT: 2.3 CM
BSA FOR ECHO PROCEDURE: 1.82 M2
CV BLOOD PRESSURE: NORMAL MMHG
CV ECHO HEIGHT: 62 IN
CV ECHO WEIGHT: 168 LBS
DOP CALC LVOT AREA: 2.54 CM2
DOP CALC LVOT DIAMETER: 1.8 CM
DOP CALC LVOT PEAK VEL: 101 CM/S
DOP CALC LVOT STROKE VOLUME: 53.7 CM3
DOP CALCLVOT PEAK VEL VTI: 21.1 CM
EJECTION FRACTION: 67 % (ref 55–75)
FRACTIONAL SHORTENING: 32.8 % (ref 28–44)
INTERVENTRICULAR SEPTUM IN END DIASTOLE: 1.08 CM (ref 0.6–0.9)
IVS/PW RATIO: 1.2
LA AREA 1: 16.4 CM2
LA AREA 2: 14.6 CM2
LEFT ATRIUM LENGTH: 4.74 CM
LEFT ATRIUM SIZE: 3.3 CM
LEFT ATRIUM TO AORTIC ROOT RATIO: 1.43 NO UNITS
LEFT ATRIUM VOLUME INDEX: 23.6 ML/M2
LEFT ATRIUM VOLUME: 42.9 ML
LEFT VENTRICLE CARDIAC INDEX: 2.8 L/MIN/M2
LEFT VENTRICLE CARDIAC OUTPUT: 5.2 L/MIN
LEFT VENTRICLE DIASTOLIC VOLUME INDEX: 25.3 CM3/M2 (ref 34–74)
LEFT VENTRICLE DIASTOLIC VOLUME: 46 CM3 (ref 46–106)
LEFT VENTRICLE HEART RATE: 96 BPM
LEFT VENTRICLE MASS INDEX: 86.6 G/M2
LEFT VENTRICLE SYSTOLIC VOLUME INDEX: 8.2 CM3/M2 (ref 11–31)
LEFT VENTRICLE SYSTOLIC VOLUME: 15 CM3 (ref 14–42)
LEFT VENTRICULAR INTERNAL DIMENSION IN DIASTOLE: 4.67 CM (ref 3.8–5.2)
LEFT VENTRICULAR INTERNAL DIMENSION IN SYSTOLE: 3.14 CM (ref 2.2–3.5)
LEFT VENTRICULAR MASS: 157.7 G
LEFT VENTRICULAR OUTFLOW TRACT MEAN GRADIENT: 2 MMHG
LEFT VENTRICULAR OUTFLOW TRACT MEAN VELOCITY: 68.3 CM/S
LEFT VENTRICULAR OUTFLOW TRACT PEAK GRADIENT: 4 MMHG
LEFT VENTRICULAR POSTERIOR WALL IN END DIASTOLE: 0.87 CM (ref 0.6–0.9)
LV STROKE VOLUME INDEX: 29.5 ML/M2
MITRAL VALVE E/A RATIO: 1
MV AVERAGE E/E' RATIO: 12.3 CM/S
MV DECELERATION TIME: 165 MS
MV E'TISSUE VEL-LAT: 7.7 CM/S
MV E'TISSUE VEL-MED: 5.85 CM/S
MV LATERAL E/E' RATIO: 10.8
MV MEDIAL E/E' RATIO: 14.3
MV PEAK A VELOCITY: 82.4 CM/S
MV PEAK E VELOCITY: 83.4 CM/S
NUC REST DIASTOLIC VOLUME INDEX: 2688 LBS
NUC REST SYSTOLIC VOLUME INDEX: 62 IN
RIGHT VENTRICULAR INTERNAL DIMENSION IN DYSTOLE: 3.06 CM
TRICUSPID REGURGITATION PEAK PRESSURE GRADIENT: 45.2 MMHG
TRICUSPID VALVE ANULAR PLANE SYSTOLIC EXCURSION: 1.9 CM
TRICUSPID VALVE PEAK REGURGITANT VELOCITY: 336 CM/S

## 2018-03-12 ASSESSMENT — MIFFLIN-ST. JEOR: SCORE: 1170.29

## 2018-03-13 ENCOUNTER — COMMUNICATION - HEALTHEAST (OUTPATIENT)
Dept: SURGERY | Facility: CLINIC | Age: 83
End: 2018-03-13

## 2018-03-13 ENCOUNTER — COMMUNICATION - HEALTHEAST (OUTPATIENT)
Dept: PULMONOLOGY | Facility: OTHER | Age: 83
End: 2018-03-13

## 2018-03-13 ENCOUNTER — COMMUNICATION - HEALTHEAST (OUTPATIENT)
Dept: CARDIOLOGY | Facility: CLINIC | Age: 83
End: 2018-03-13

## 2018-03-13 DIAGNOSIS — E78.5 HYPERLIPIDEMIA: ICD-10-CM

## 2018-03-13 DIAGNOSIS — J44.1 CHRONIC OBSTRUCTIVE PULMONARY DISEASE WITH ACUTE EXACERBATION (H): ICD-10-CM

## 2018-03-22 ENCOUNTER — COMMUNICATION - HEALTHEAST (OUTPATIENT)
Dept: INTERNAL MEDICINE | Facility: CLINIC | Age: 83
End: 2018-03-22

## 2018-04-05 ENCOUNTER — OFFICE VISIT - HEALTHEAST (OUTPATIENT)
Dept: INTERNAL MEDICINE | Facility: CLINIC | Age: 83
End: 2018-04-05

## 2018-04-05 DIAGNOSIS — E11.9 DIABETES MELLITUS, TYPE 2 (H): ICD-10-CM

## 2018-04-05 ASSESSMENT — MIFFLIN-ST. JEOR: SCORE: 1170.29

## 2018-04-14 ENCOUNTER — COMMUNICATION - HEALTHEAST (OUTPATIENT)
Dept: SURGERY | Facility: CLINIC | Age: 83
End: 2018-04-14

## 2018-04-16 ENCOUNTER — COMMUNICATION - HEALTHEAST (OUTPATIENT)
Dept: PULMONOLOGY | Facility: OTHER | Age: 83
End: 2018-04-16

## 2018-04-16 DIAGNOSIS — J41.0 SIMPLE CHRONIC BRONCHITIS (H): ICD-10-CM

## 2018-04-23 ENCOUNTER — OFFICE VISIT - HEALTHEAST (OUTPATIENT)
Dept: PODIATRY | Facility: CLINIC | Age: 83
End: 2018-04-23

## 2018-04-23 DIAGNOSIS — L57.0 KERATOMA: ICD-10-CM

## 2018-04-23 DIAGNOSIS — I89.0 LYMPHEDEMA: ICD-10-CM

## 2018-04-23 ASSESSMENT — MIFFLIN-ST. JEOR: SCORE: 1170.29

## 2018-04-26 ENCOUNTER — COMMUNICATION - HEALTHEAST (OUTPATIENT)
Dept: PULMONOLOGY | Facility: OTHER | Age: 83
End: 2018-04-26

## 2018-05-02 ENCOUNTER — COMMUNICATION - HEALTHEAST (OUTPATIENT)
Dept: INTERNAL MEDICINE | Facility: CLINIC | Age: 83
End: 2018-05-02

## 2018-05-02 ENCOUNTER — OFFICE VISIT - HEALTHEAST (OUTPATIENT)
Dept: INTERNAL MEDICINE | Facility: CLINIC | Age: 83
End: 2018-05-02

## 2018-05-02 ENCOUNTER — COMMUNICATION - HEALTHEAST (OUTPATIENT)
Dept: PULMONOLOGY | Facility: OTHER | Age: 83
End: 2018-05-02

## 2018-05-02 DIAGNOSIS — J96.11 CHRONIC RESPIRATORY FAILURE WITH HYPOXIA (H): ICD-10-CM

## 2018-05-02 DIAGNOSIS — E11.9 DIET-CONTROLLED TYPE 2 DIABETES MELLITUS (H): ICD-10-CM

## 2018-05-02 DIAGNOSIS — J44.1 CHRONIC OBSTRUCTIVE PULMONARY DISEASE WITH ACUTE EXACERBATION (H): ICD-10-CM

## 2018-05-02 DIAGNOSIS — J44.1 COPD EXACERBATION (H): ICD-10-CM

## 2018-05-02 DIAGNOSIS — I27.20 PULMONARY HYPERTENSION (H): ICD-10-CM

## 2018-05-02 DIAGNOSIS — J41.0 SIMPLE CHRONIC BRONCHITIS (H): ICD-10-CM

## 2018-05-02 LAB
FASTING STATUS PATIENT QL REPORTED: NO
GLUCOSE BLD-MCNC: 197 MG/DL (ref 74–125)

## 2018-05-02 ASSESSMENT — MIFFLIN-ST. JEOR: SCORE: 1172.57

## 2018-05-10 ENCOUNTER — COMMUNICATION - HEALTHEAST (OUTPATIENT)
Dept: INTERNAL MEDICINE | Facility: CLINIC | Age: 83
End: 2018-05-10

## 2018-05-16 ENCOUNTER — COMMUNICATION - HEALTHEAST (OUTPATIENT)
Dept: INTERNAL MEDICINE | Facility: CLINIC | Age: 83
End: 2018-05-16

## 2018-05-17 ENCOUNTER — COMMUNICATION - HEALTHEAST (OUTPATIENT)
Dept: INTERNAL MEDICINE | Facility: CLINIC | Age: 83
End: 2018-05-17

## 2018-05-21 ENCOUNTER — OFFICE VISIT - HEALTHEAST (OUTPATIENT)
Dept: INTERNAL MEDICINE | Facility: CLINIC | Age: 83
End: 2018-05-21

## 2018-05-21 DIAGNOSIS — E11.9 DIABETES MELLITUS, TYPE 2 (H): ICD-10-CM

## 2018-05-21 LAB
CHOLEST SERPL-MCNC: 133 MG/DL
FASTING STATUS PATIENT QL REPORTED: YES
FASTING STATUS PATIENT QL REPORTED: YES
GLUCOSE BLD-MCNC: 97 MG/DL (ref 70–125)
HBA1C MFR BLD: 7 % (ref 3.5–6)
HDLC SERPL-MCNC: 66 MG/DL
LDLC SERPL CALC-MCNC: 56 MG/DL
POTASSIUM BLD-SCNC: 4.2 MMOL/L (ref 3.5–5)
TRIGL SERPL-MCNC: 57 MG/DL

## 2018-05-21 ASSESSMENT — MIFFLIN-ST. JEOR: SCORE: 1131.74

## 2018-05-22 ENCOUNTER — COMMUNICATION - HEALTHEAST (OUTPATIENT)
Dept: INTERNAL MEDICINE | Facility: CLINIC | Age: 83
End: 2018-05-22

## 2018-06-01 ENCOUNTER — COMMUNICATION - HEALTHEAST (OUTPATIENT)
Dept: PULMONOLOGY | Facility: OTHER | Age: 83
End: 2018-06-01

## 2018-06-01 DIAGNOSIS — J41.0 SIMPLE CHRONIC BRONCHITIS (H): ICD-10-CM

## 2018-06-04 ENCOUNTER — OFFICE VISIT - HEALTHEAST (OUTPATIENT)
Dept: PULMONOLOGY | Facility: OTHER | Age: 83
End: 2018-06-04

## 2018-06-04 DIAGNOSIS — I27.20 PULMONARY HYPERTENSION (H): ICD-10-CM

## 2018-06-04 DIAGNOSIS — J44.1 COPD EXACERBATION (H): ICD-10-CM

## 2018-06-04 DIAGNOSIS — J96.21 ACUTE AND CHRONIC RESPIRATORY FAILURE WITH HYPOXIA (H): ICD-10-CM

## 2018-06-04 DIAGNOSIS — J41.0 SIMPLE CHRONIC BRONCHITIS (H): ICD-10-CM

## 2018-06-18 ENCOUNTER — COMMUNICATION - HEALTHEAST (OUTPATIENT)
Dept: INTERNAL MEDICINE | Facility: CLINIC | Age: 83
End: 2018-06-18

## 2018-06-19 ENCOUNTER — COMMUNICATION - HEALTHEAST (OUTPATIENT)
Dept: PULMONOLOGY | Facility: OTHER | Age: 83
End: 2018-06-19

## 2018-06-19 DIAGNOSIS — J44.9 COPD (CHRONIC OBSTRUCTIVE PULMONARY DISEASE) (H): ICD-10-CM

## 2018-06-23 ENCOUNTER — OFFICE VISIT - HEALTHEAST (OUTPATIENT)
Dept: FAMILY MEDICINE | Facility: CLINIC | Age: 83
End: 2018-06-23

## 2018-06-23 DIAGNOSIS — R31.9 HEMATURIA: ICD-10-CM

## 2018-06-23 DIAGNOSIS — N39.0 URINARY TRACT INFECTION: ICD-10-CM

## 2018-06-23 LAB
ALBUMIN UR-MCNC: ABNORMAL MG/DL
APPEARANCE UR: ABNORMAL
BACTERIA #/AREA URNS HPF: ABNORMAL HPF
BILIRUB UR QL STRIP: NEGATIVE
COLOR UR AUTO: YELLOW
GLUCOSE UR STRIP-MCNC: NEGATIVE MG/DL
HGB UR QL STRIP: ABNORMAL
KETONES UR STRIP-MCNC: NEGATIVE MG/DL
LEUKOCYTE ESTERASE UR QL STRIP: ABNORMAL
NITRATE UR QL: NEGATIVE
PH UR STRIP: 8.5 [PH] (ref 5–8)
RBC #/AREA URNS AUTO: >100 HPF
SP GR UR STRIP: 1.01 (ref 1–1.03)
SQUAMOUS #/AREA URNS AUTO: ABNORMAL LPF
UROBILINOGEN UR STRIP-ACNC: ABNORMAL
WBC #/AREA URNS AUTO: >100 HPF

## 2018-06-25 ENCOUNTER — HOSPITAL ENCOUNTER (OUTPATIENT)
Dept: RADIOLOGY | Facility: CLINIC | Age: 83
Discharge: HOME | End: 2018-06-25
Attending: INTERNAL MEDICINE

## 2018-06-25 ENCOUNTER — OFFICE VISIT - HEALTHEAST (OUTPATIENT)
Dept: INTERNAL MEDICINE | Facility: CLINIC | Age: 83
End: 2018-06-25

## 2018-06-25 DIAGNOSIS — J44.9 COPD (CHRONIC OBSTRUCTIVE PULMONARY DISEASE) (H): ICD-10-CM

## 2018-06-25 DIAGNOSIS — E11.9 DIABETES MELLITUS, TYPE 2 (H): ICD-10-CM

## 2018-06-25 DIAGNOSIS — R10.31 ABDOMINAL PAIN, RIGHT LOWER QUADRANT: ICD-10-CM

## 2018-06-25 LAB
BACTERIA SPEC CULT: ABNORMAL
CHOLEST SERPL-MCNC: 132 MG/DL
FASTING STATUS PATIENT QL REPORTED: YES
FASTING STATUS PATIENT QL REPORTED: YES
GLUCOSE BLD-MCNC: 103 MG/DL (ref 70–125)
HBA1C MFR BLD: 7.2 % (ref 3.5–6)
HDLC SERPL-MCNC: 62 MG/DL
LDLC SERPL CALC-MCNC: 56 MG/DL
TRIGL SERPL-MCNC: 71 MG/DL

## 2018-06-25 ASSESSMENT — MIFFLIN-ST. JEOR: SCORE: 1127.21

## 2018-06-26 ENCOUNTER — COMMUNICATION - HEALTHEAST (OUTPATIENT)
Dept: INTERNAL MEDICINE | Facility: CLINIC | Age: 83
End: 2018-06-26

## 2018-07-03 ENCOUNTER — OFFICE VISIT - HEALTHEAST (OUTPATIENT)
Dept: INTERNAL MEDICINE | Facility: CLINIC | Age: 83
End: 2018-07-03

## 2018-07-03 DIAGNOSIS — J44.9 CHRONIC OBSTRUCTIVE PULMONARY DISEASE, UNSPECIFIED COPD TYPE (H): ICD-10-CM

## 2018-07-03 ASSESSMENT — MIFFLIN-ST. JEOR: SCORE: 1131.74

## 2018-07-16 ENCOUNTER — COMMUNICATION - HEALTHEAST (OUTPATIENT)
Dept: INTERNAL MEDICINE | Facility: CLINIC | Age: 83
End: 2018-07-16

## 2018-07-16 ENCOUNTER — HOSPITAL ENCOUNTER (OUTPATIENT)
Dept: MAMMOGRAPHY | Facility: CLINIC | Age: 83
Discharge: HOME | End: 2018-07-16
Attending: INTERNAL MEDICINE

## 2018-07-16 DIAGNOSIS — Z12.31 VISIT FOR SCREENING MAMMOGRAM: ICD-10-CM

## 2018-07-18 ENCOUNTER — COMMUNICATION - HEALTHEAST (OUTPATIENT)
Dept: INTERNAL MEDICINE | Facility: CLINIC | Age: 83
End: 2018-07-18

## 2018-07-23 ENCOUNTER — COMMUNICATION - HEALTHEAST (OUTPATIENT)
Dept: INTERNAL MEDICINE | Facility: CLINIC | Age: 83
End: 2018-07-23

## 2018-07-23 ENCOUNTER — COMMUNICATION - HEALTHEAST (OUTPATIENT)
Dept: SCHEDULING | Facility: CLINIC | Age: 83
End: 2018-07-23

## 2018-07-24 ENCOUNTER — COMMUNICATION - HEALTHEAST (OUTPATIENT)
Dept: INTERNAL MEDICINE | Facility: CLINIC | Age: 83
End: 2018-07-24

## 2018-07-24 DIAGNOSIS — R60.9 EDEMA: ICD-10-CM

## 2018-07-27 ENCOUNTER — AMBULATORY - HEALTHEAST (OUTPATIENT)
Dept: INTERNAL MEDICINE | Facility: CLINIC | Age: 83
End: 2018-07-27

## 2018-07-30 ENCOUNTER — AMBULATORY - HEALTHEAST (OUTPATIENT)
Dept: INTERNAL MEDICINE | Facility: CLINIC | Age: 83
End: 2018-07-30

## 2018-07-30 ENCOUNTER — OFFICE VISIT - HEALTHEAST (OUTPATIENT)
Dept: INTERNAL MEDICINE | Facility: CLINIC | Age: 83
End: 2018-07-30

## 2018-07-30 ENCOUNTER — RECORDS - HEALTHEAST (OUTPATIENT)
Dept: GENERAL RADIOLOGY | Facility: CLINIC | Age: 83
End: 2018-07-30

## 2018-07-30 DIAGNOSIS — Z00.00 ROUTINE GENERAL MEDICAL EXAMINATION AT A HEALTH CARE FACILITY: ICD-10-CM

## 2018-07-30 DIAGNOSIS — M16.9 OSTEOARTHRITIS OF HIP: ICD-10-CM

## 2018-07-30 DIAGNOSIS — M25.551 HIP PAIN, RIGHT: ICD-10-CM

## 2018-07-30 DIAGNOSIS — M25.551 PAIN IN RIGHT HIP: ICD-10-CM

## 2018-07-30 DIAGNOSIS — L03.90 CELLULITIS: ICD-10-CM

## 2018-07-30 LAB
ALBUMIN SERPL-MCNC: 3.4 G/DL (ref 3.5–5)
ALBUMIN UR-MCNC: NEGATIVE MG/DL
ALP SERPL-CCNC: 94 U/L (ref 45–120)
ALT SERPL W P-5'-P-CCNC: 12 U/L (ref 0–45)
ANION GAP SERPL CALCULATED.3IONS-SCNC: 11 MMOL/L (ref 5–18)
APPEARANCE UR: CLEAR
AST SERPL W P-5'-P-CCNC: 20 U/L (ref 0–40)
BILIRUB SERPL-MCNC: 0.3 MG/DL (ref 0–1)
BILIRUB UR QL STRIP: NEGATIVE
BUN SERPL-MCNC: 11 MG/DL (ref 8–28)
CALCIUM SERPL-MCNC: 8.9 MG/DL (ref 8.5–10.5)
CHLORIDE BLD-SCNC: 99 MMOL/L (ref 98–107)
CHOLEST SERPL-MCNC: 140 MG/DL
CO2 SERPL-SCNC: 26 MMOL/L (ref 22–31)
COLOR UR AUTO: YELLOW
CREAT SERPL-MCNC: 0.6 MG/DL (ref 0.6–1.1)
ERYTHROCYTE [DISTWIDTH] IN BLOOD BY AUTOMATED COUNT: 15 % (ref 11–14.5)
FASTING STATUS PATIENT QL REPORTED: YES
GFR SERPL CREATININE-BSD FRML MDRD: >60 ML/MIN/1.73M2
GLUCOSE BLD-MCNC: 108 MG/DL (ref 70–125)
GLUCOSE UR STRIP-MCNC: NEGATIVE MG/DL
HBA1C MFR BLD: 6.9 % (ref 3.5–6)
HCT VFR BLD AUTO: 36.4 % (ref 35–47)
HDLC SERPL-MCNC: 60 MG/DL
HGB BLD-MCNC: 11.6 G/DL (ref 12–16)
HGB UR QL STRIP: NEGATIVE
KETONES UR STRIP-MCNC: NEGATIVE MG/DL
LDLC SERPL CALC-MCNC: 67 MG/DL
LEUKOCYTE ESTERASE UR QL STRIP: NEGATIVE
MCH RBC QN AUTO: 24.7 PG (ref 27–34)
MCHC RBC AUTO-ENTMCNC: 31.9 G/DL (ref 32–36)
MCV RBC AUTO: 77 FL (ref 80–100)
NITRATE UR QL: NEGATIVE
PH UR STRIP: 7.5 [PH] (ref 5–8)
PLATELET # BLD AUTO: 294 THOU/UL (ref 140–440)
PMV BLD AUTO: 7.9 FL (ref 7–10)
POTASSIUM BLD-SCNC: 4.1 MMOL/L (ref 3.5–5)
PROT SERPL-MCNC: 6.2 G/DL (ref 6–8)
RBC # BLD AUTO: 4.7 MILL/UL (ref 3.8–5.4)
SODIUM SERPL-SCNC: 136 MMOL/L (ref 136–145)
SP GR UR STRIP: 1.01 (ref 1–1.03)
TRIGL SERPL-MCNC: 65 MG/DL
TSH SERPL DL<=0.005 MIU/L-ACNC: 1.92 UIU/ML (ref 0.3–5)
UROBILINOGEN UR STRIP-ACNC: NORMAL
WBC: 5.7 THOU/UL (ref 4–11)

## 2018-07-30 ASSESSMENT — MIFFLIN-ST. JEOR: SCORE: 1149.89

## 2018-07-31 ENCOUNTER — COMMUNICATION - HEALTHEAST (OUTPATIENT)
Dept: INTERNAL MEDICINE | Facility: CLINIC | Age: 83
End: 2018-07-31

## 2018-08-06 ENCOUNTER — RECORDS - HEALTHEAST (OUTPATIENT)
Dept: ADMINISTRATIVE | Facility: OTHER | Age: 83
End: 2018-08-06

## 2018-08-14 ENCOUNTER — AMBULATORY - HEALTHEAST (OUTPATIENT)
Dept: LAB | Facility: CLINIC | Age: 83
End: 2018-08-14

## 2018-08-14 ENCOUNTER — OFFICE VISIT - HEALTHEAST (OUTPATIENT)
Dept: INFECTIOUS DISEASES | Facility: CLINIC | Age: 83
End: 2018-08-14

## 2018-08-14 DIAGNOSIS — I87.2 STASIS DERMATITIS OF BOTH LEGS: ICD-10-CM

## 2018-08-14 LAB
ANION GAP SERPL CALCULATED.3IONS-SCNC: 10 MMOL/L (ref 5–18)
BUN SERPL-MCNC: 15 MG/DL (ref 8–28)
C REACTIVE PROTEIN LHE: 0.9 MG/DL (ref 0–0.8)
CALCIUM SERPL-MCNC: 8.6 MG/DL (ref 8.5–10.5)
CHLORIDE BLD-SCNC: 93 MMOL/L (ref 98–107)
CO2 SERPL-SCNC: 26 MMOL/L (ref 22–31)
CREAT SERPL-MCNC: 0.7 MG/DL (ref 0.6–1.1)
GFR SERPL CREATININE-BSD FRML MDRD: >60 ML/MIN/1.73M2
GLUCOSE BLD-MCNC: 87 MG/DL (ref 70–125)
POTASSIUM BLD-SCNC: 5.3 MMOL/L (ref 3.5–5)
SODIUM SERPL-SCNC: 129 MMOL/L (ref 136–145)

## 2018-08-14 ASSESSMENT — MIFFLIN-ST. JEOR: SCORE: 1140.82

## 2018-08-15 ENCOUNTER — COMMUNICATION - HEALTHEAST (OUTPATIENT)
Dept: INTERNAL MEDICINE | Facility: CLINIC | Age: 83
End: 2018-08-15

## 2018-08-27 ENCOUNTER — COMMUNICATION - HEALTHEAST (OUTPATIENT)
Dept: INTERNAL MEDICINE | Facility: CLINIC | Age: 83
End: 2018-08-27

## 2018-08-27 ENCOUNTER — AMBULATORY - HEALTHEAST (OUTPATIENT)
Dept: INTERNAL MEDICINE | Facility: CLINIC | Age: 83
End: 2018-08-27

## 2018-08-27 ENCOUNTER — AMBULATORY - HEALTHEAST (OUTPATIENT)
Dept: LAB | Facility: CLINIC | Age: 83
End: 2018-08-27

## 2018-08-27 DIAGNOSIS — R35.0 URINARY FREQUENCY: ICD-10-CM

## 2018-08-27 LAB
ALBUMIN UR-MCNC: NEGATIVE MG/DL
APPEARANCE UR: ABNORMAL
BACTERIA #/AREA URNS HPF: ABNORMAL HPF
BILIRUB UR QL STRIP: NEGATIVE
COLOR UR AUTO: YELLOW
GLUCOSE UR STRIP-MCNC: NEGATIVE MG/DL
HGB UR QL STRIP: ABNORMAL
KETONES UR STRIP-MCNC: NEGATIVE MG/DL
LEUKOCYTE ESTERASE UR QL STRIP: ABNORMAL
NITRATE UR QL: NEGATIVE
PH UR STRIP: 7 [PH] (ref 5–8)
RBC #/AREA URNS AUTO: ABNORMAL HPF
SP GR UR STRIP: 1.01 (ref 1–1.03)
SQUAMOUS #/AREA URNS AUTO: ABNORMAL LPF
UROBILINOGEN UR STRIP-ACNC: ABNORMAL
WBC #/AREA URNS AUTO: ABNORMAL HPF
WBC CLUMPS #/AREA URNS HPF: PRESENT /[HPF]

## 2018-08-28 ENCOUNTER — COMMUNICATION - HEALTHEAST (OUTPATIENT)
Dept: INTERNAL MEDICINE | Facility: CLINIC | Age: 83
End: 2018-08-28

## 2018-08-29 ENCOUNTER — COMMUNICATION - HEALTHEAST (OUTPATIENT)
Dept: INTERNAL MEDICINE | Facility: CLINIC | Age: 83
End: 2018-08-29

## 2018-08-29 LAB — BACTERIA SPEC CULT: ABNORMAL

## 2018-08-30 ENCOUNTER — OFFICE VISIT - HEALTHEAST (OUTPATIENT)
Dept: INTERNAL MEDICINE | Facility: CLINIC | Age: 83
End: 2018-08-30

## 2018-08-30 DIAGNOSIS — J44.9 CHRONIC OBSTRUCTIVE PULMONARY DISEASE, UNSPECIFIED COPD TYPE (H): ICD-10-CM

## 2018-08-30 ASSESSMENT — MIFFLIN-ST. JEOR: SCORE: 1158.96

## 2018-09-15 ENCOUNTER — COMMUNICATION - HEALTHEAST (OUTPATIENT)
Dept: INTERNAL MEDICINE | Facility: CLINIC | Age: 83
End: 2018-09-15

## 2018-09-20 ENCOUNTER — OFFICE VISIT - HEALTHEAST (OUTPATIENT)
Dept: PULMONOLOGY | Facility: OTHER | Age: 83
End: 2018-09-20

## 2018-09-20 DIAGNOSIS — J44.9 COPD (CHRONIC OBSTRUCTIVE PULMONARY DISEASE) (H): ICD-10-CM

## 2018-09-28 ENCOUNTER — COMMUNICATION - HEALTHEAST (OUTPATIENT)
Dept: CARDIOLOGY | Facility: CLINIC | Age: 83
End: 2018-09-28

## 2018-10-01 ENCOUNTER — COMMUNICATION - HEALTHEAST (OUTPATIENT)
Dept: PULMONOLOGY | Facility: OTHER | Age: 83
End: 2018-10-01

## 2018-10-01 DIAGNOSIS — J44.9 COPD (CHRONIC OBSTRUCTIVE PULMONARY DISEASE) (H): ICD-10-CM

## 2018-10-02 ENCOUNTER — COMMUNICATION - HEALTHEAST (OUTPATIENT)
Dept: PULMONOLOGY | Facility: OTHER | Age: 83
End: 2018-10-02

## 2018-10-02 DIAGNOSIS — J44.9 COPD (CHRONIC OBSTRUCTIVE PULMONARY DISEASE) (H): ICD-10-CM

## 2018-10-08 ENCOUNTER — OFFICE VISIT - HEALTHEAST (OUTPATIENT)
Dept: VASCULAR SURGERY | Facility: CLINIC | Age: 83
End: 2018-10-08

## 2018-10-08 ENCOUNTER — COMMUNICATION - HEALTHEAST (OUTPATIENT)
Dept: INTERNAL MEDICINE | Facility: CLINIC | Age: 83
End: 2018-10-08

## 2018-10-08 DIAGNOSIS — J44.9 COPD (CHRONIC OBSTRUCTIVE PULMONARY DISEASE) (H): ICD-10-CM

## 2018-10-08 DIAGNOSIS — Z79.4 TYPE 2 DIABETES MELLITUS WITHOUT COMPLICATION, WITH LONG-TERM CURRENT USE OF INSULIN (H): ICD-10-CM

## 2018-10-08 DIAGNOSIS — E87.70 FLUID OVERLOAD: ICD-10-CM

## 2018-10-08 DIAGNOSIS — R60.9 DEPENDENT EDEMA: ICD-10-CM

## 2018-10-08 DIAGNOSIS — I73.9 PAD (PERIPHERAL ARTERY DISEASE) (H): ICD-10-CM

## 2018-10-08 DIAGNOSIS — I87.303 VENOUS HYPERTENSION OF BOTH LOWER EXTREMITIES: ICD-10-CM

## 2018-10-08 DIAGNOSIS — E11.9 TYPE 2 DIABETES MELLITUS WITHOUT COMPLICATION, WITH LONG-TERM CURRENT USE OF INSULIN (H): ICD-10-CM

## 2018-10-08 DIAGNOSIS — I89.0 SECONDARY LYMPHEDEMA: ICD-10-CM

## 2018-10-08 DIAGNOSIS — I50.9 HEART FAILURE (H): ICD-10-CM

## 2018-10-09 ENCOUNTER — COMMUNICATION - HEALTHEAST (OUTPATIENT)
Dept: VASCULAR SURGERY | Facility: CLINIC | Age: 83
End: 2018-10-09

## 2018-10-09 ENCOUNTER — AMBULATORY - HEALTHEAST (OUTPATIENT)
Dept: VASCULAR SURGERY | Facility: CLINIC | Age: 83
End: 2018-10-09

## 2018-10-11 ENCOUNTER — OFFICE VISIT - HEALTHEAST (OUTPATIENT)
Dept: INTERNAL MEDICINE | Facility: CLINIC | Age: 83
End: 2018-10-11

## 2018-10-11 DIAGNOSIS — Z23 NEED FOR INFLUENZA VACCINATION: ICD-10-CM

## 2018-10-11 DIAGNOSIS — E11.9 DIABETES MELLITUS, TYPE 2 (H): ICD-10-CM

## 2018-10-11 ASSESSMENT — MIFFLIN-ST. JEOR: SCORE: 1149.89

## 2018-10-12 ENCOUNTER — RECORDS - HEALTHEAST (OUTPATIENT)
Dept: VASCULAR ULTRASOUND | Facility: CLINIC | Age: 83
End: 2018-10-12

## 2018-10-12 ENCOUNTER — OFFICE VISIT - HEALTHEAST (OUTPATIENT)
Dept: VASCULAR SURGERY | Facility: CLINIC | Age: 83
End: 2018-10-12

## 2018-10-12 DIAGNOSIS — Z79.4 LONG TERM (CURRENT) USE OF INSULIN (H): ICD-10-CM

## 2018-10-12 DIAGNOSIS — I89.0 SECONDARY LYMPHEDEMA: ICD-10-CM

## 2018-10-12 DIAGNOSIS — I73.9 PERIPHERAL VASCULAR DISEASE, UNSPECIFIED (H): ICD-10-CM

## 2018-10-12 DIAGNOSIS — J44.9 CHRONIC OBSTRUCTIVE PULMONARY DISEASE, UNSPECIFIED (H): ICD-10-CM

## 2018-10-12 DIAGNOSIS — E87.70 FLUID OVERLOAD: ICD-10-CM

## 2018-10-12 DIAGNOSIS — R60.9 DEPENDENT EDEMA: ICD-10-CM

## 2018-10-12 DIAGNOSIS — I89.0 LYMPHEDEMA, NOT ELSEWHERE CLASSIFIED: ICD-10-CM

## 2018-10-12 DIAGNOSIS — E87.70 FLUID OVERLOAD, UNSPECIFIED: ICD-10-CM

## 2018-10-12 DIAGNOSIS — I87.303 CHRONIC VENOUS HYPERTENSION (IDIOPATHIC) WITHOUT COMPLICATIONS OF BILATERAL LOWER EXTREMITY: ICD-10-CM

## 2018-10-12 DIAGNOSIS — Z79.4 TYPE 2 DIABETES MELLITUS WITHOUT COMPLICATION, WITH LONG-TERM CURRENT USE OF INSULIN (H): ICD-10-CM

## 2018-10-12 DIAGNOSIS — I50.9 HEART FAILURE, UNSPECIFIED (H): ICD-10-CM

## 2018-10-12 DIAGNOSIS — E11.9 TYPE 2 DIABETES MELLITUS WITHOUT COMPLICATIONS (H): ICD-10-CM

## 2018-10-12 DIAGNOSIS — R60.9 EDEMA, UNSPECIFIED: ICD-10-CM

## 2018-10-12 DIAGNOSIS — I87.303 VENOUS HYPERTENSION OF BOTH LOWER EXTREMITIES: ICD-10-CM

## 2018-10-12 DIAGNOSIS — I50.9 HEART FAILURE (H): ICD-10-CM

## 2018-10-12 DIAGNOSIS — E11.9 TYPE 2 DIABETES MELLITUS WITHOUT COMPLICATION, WITH LONG-TERM CURRENT USE OF INSULIN (H): ICD-10-CM

## 2018-10-15 ENCOUNTER — COMMUNICATION - HEALTHEAST (OUTPATIENT)
Dept: INTERNAL MEDICINE | Facility: CLINIC | Age: 83
End: 2018-10-15

## 2018-10-16 ENCOUNTER — AMBULATORY - HEALTHEAST (OUTPATIENT)
Dept: VASCULAR SURGERY | Facility: CLINIC | Age: 83
End: 2018-10-16

## 2018-10-16 DIAGNOSIS — R60.9 DEPENDENT EDEMA: ICD-10-CM

## 2018-10-22 ENCOUNTER — OFFICE VISIT - HEALTHEAST (OUTPATIENT)
Dept: VASCULAR SURGERY | Facility: CLINIC | Age: 83
End: 2018-10-22

## 2018-10-22 DIAGNOSIS — I87.303 VENOUS HYPERTENSION OF BOTH LOWER EXTREMITIES: ICD-10-CM

## 2018-10-22 DIAGNOSIS — I89.0 SECONDARY LYMPHEDEMA: ICD-10-CM

## 2018-10-22 DIAGNOSIS — R60.9 DEPENDENT EDEMA: ICD-10-CM

## 2018-10-22 DIAGNOSIS — E11.9 TYPE 2 DIABETES MELLITUS WITHOUT COMPLICATION, WITH LONG-TERM CURRENT USE OF INSULIN (H): ICD-10-CM

## 2018-10-22 DIAGNOSIS — I50.32 CHRONIC DIASTOLIC HEART FAILURE (H): ICD-10-CM

## 2018-10-22 DIAGNOSIS — E87.70 FLUID OVERLOAD: ICD-10-CM

## 2018-10-22 DIAGNOSIS — Z79.4 TYPE 2 DIABETES MELLITUS WITHOUT COMPLICATION, WITH LONG-TERM CURRENT USE OF INSULIN (H): ICD-10-CM

## 2018-10-22 ASSESSMENT — MIFFLIN-ST. JEOR: SCORE: 1130.6

## 2018-10-23 ENCOUNTER — COMMUNICATION - HEALTHEAST (OUTPATIENT)
Dept: VASCULAR SURGERY | Facility: CLINIC | Age: 83
End: 2018-10-23

## 2018-10-26 ENCOUNTER — OFFICE VISIT - HEALTHEAST (OUTPATIENT)
Dept: PHYSICAL THERAPY | Facility: REHABILITATION | Age: 83
End: 2018-10-26

## 2018-10-26 DIAGNOSIS — R29.818 DIFFICULTY BALANCING: ICD-10-CM

## 2018-10-26 DIAGNOSIS — I10 ESSENTIAL HYPERTENSION: ICD-10-CM

## 2018-10-26 DIAGNOSIS — F33.9 RECURRENT MAJOR DEPRESSIVE EPISODES (H): ICD-10-CM

## 2018-10-26 DIAGNOSIS — R26.9 ABNORMALITY OF GAIT: ICD-10-CM

## 2018-10-26 DIAGNOSIS — I27.20 PULMONARY HYPERTENSION (H): ICD-10-CM

## 2018-10-26 DIAGNOSIS — Q07.00 ARNOLD-CHIARI MALFORMATION (H): ICD-10-CM

## 2018-10-26 DIAGNOSIS — I89.0 LYMPHEDEMA: ICD-10-CM

## 2018-10-26 DIAGNOSIS — I73.9 PERIPHERAL ARTERIAL DISEASE (H): ICD-10-CM

## 2018-10-26 DIAGNOSIS — R60.9 EDEMA: ICD-10-CM

## 2018-10-26 DIAGNOSIS — E11.9 DIET-CONTROLLED TYPE 2 DIABETES MELLITUS (H): ICD-10-CM

## 2018-10-26 DIAGNOSIS — M62.81 MUSCLE WEAKNESS (GENERALIZED): ICD-10-CM

## 2018-10-26 DIAGNOSIS — J44.9 CHRONIC OBSTRUCTIVE PULMONARY DISEASE, UNSPECIFIED COPD TYPE (H): ICD-10-CM

## 2018-10-26 DIAGNOSIS — I50.32 CHRONIC DIASTOLIC HEART FAILURE (H): ICD-10-CM

## 2018-10-26 DIAGNOSIS — F25.0 SCHIZOAFFECTIVE DISORDER, BIPOLAR TYPE (H): ICD-10-CM

## 2018-10-29 ENCOUNTER — COMMUNICATION - HEALTHEAST (OUTPATIENT)
Dept: PULMONOLOGY | Facility: OTHER | Age: 83
End: 2018-10-29

## 2018-10-29 DIAGNOSIS — J44.1 COPD EXACERBATION (H): ICD-10-CM

## 2018-10-30 ENCOUNTER — OFFICE VISIT - HEALTHEAST (OUTPATIENT)
Dept: PHYSICAL THERAPY | Facility: REHABILITATION | Age: 83
End: 2018-10-30

## 2018-10-30 DIAGNOSIS — F25.0 SCHIZOAFFECTIVE DISORDER, BIPOLAR TYPE (H): ICD-10-CM

## 2018-10-30 DIAGNOSIS — R26.9 ABNORMALITY OF GAIT: ICD-10-CM

## 2018-10-30 DIAGNOSIS — R60.9 EDEMA: ICD-10-CM

## 2018-10-30 DIAGNOSIS — I50.32 CHRONIC DIASTOLIC HEART FAILURE (H): ICD-10-CM

## 2018-10-30 DIAGNOSIS — I10 ESSENTIAL HYPERTENSION: ICD-10-CM

## 2018-10-30 DIAGNOSIS — I89.0 LYMPHEDEMA: ICD-10-CM

## 2018-10-30 DIAGNOSIS — I27.20 PULMONARY HYPERTENSION (H): ICD-10-CM

## 2018-10-30 DIAGNOSIS — M62.81 MUSCLE WEAKNESS (GENERALIZED): ICD-10-CM

## 2018-10-30 DIAGNOSIS — I73.9 PERIPHERAL ARTERIAL DISEASE (H): ICD-10-CM

## 2018-10-30 DIAGNOSIS — R29.818 DIFFICULTY BALANCING: ICD-10-CM

## 2018-10-30 DIAGNOSIS — Q07.00 ARNOLD-CHIARI MALFORMATION (H): ICD-10-CM

## 2018-10-30 DIAGNOSIS — J44.9 CHRONIC OBSTRUCTIVE PULMONARY DISEASE, UNSPECIFIED COPD TYPE (H): ICD-10-CM

## 2018-10-30 DIAGNOSIS — E11.9 DIET-CONTROLLED TYPE 2 DIABETES MELLITUS (H): ICD-10-CM

## 2018-10-30 DIAGNOSIS — F33.9 RECURRENT MAJOR DEPRESSIVE EPISODES (H): ICD-10-CM

## 2018-11-02 ENCOUNTER — OFFICE VISIT - HEALTHEAST (OUTPATIENT)
Dept: PHYSICAL THERAPY | Facility: REHABILITATION | Age: 83
End: 2018-11-02

## 2018-11-02 DIAGNOSIS — Q07.00 ARNOLD-CHIARI MALFORMATION (H): ICD-10-CM

## 2018-11-02 DIAGNOSIS — F25.0 SCHIZOAFFECTIVE DISORDER, BIPOLAR TYPE (H): ICD-10-CM

## 2018-11-02 DIAGNOSIS — I73.9 PERIPHERAL ARTERIAL DISEASE (H): ICD-10-CM

## 2018-11-02 DIAGNOSIS — I27.20 PULMONARY HYPERTENSION (H): ICD-10-CM

## 2018-11-02 DIAGNOSIS — R60.9 EDEMA: ICD-10-CM

## 2018-11-02 DIAGNOSIS — M62.81 MUSCLE WEAKNESS (GENERALIZED): ICD-10-CM

## 2018-11-02 DIAGNOSIS — I50.32 CHRONIC DIASTOLIC HEART FAILURE (H): ICD-10-CM

## 2018-11-02 DIAGNOSIS — F33.9 RECURRENT MAJOR DEPRESSIVE EPISODES (H): ICD-10-CM

## 2018-11-02 DIAGNOSIS — R26.9 ABNORMALITY OF GAIT: ICD-10-CM

## 2018-11-02 DIAGNOSIS — E11.9 DIET-CONTROLLED TYPE 2 DIABETES MELLITUS (H): ICD-10-CM

## 2018-11-02 DIAGNOSIS — I89.0 LYMPHEDEMA: ICD-10-CM

## 2018-11-02 DIAGNOSIS — R29.818 DIFFICULTY BALANCING: ICD-10-CM

## 2018-11-02 DIAGNOSIS — J44.9 CHRONIC OBSTRUCTIVE PULMONARY DISEASE, UNSPECIFIED COPD TYPE (H): ICD-10-CM

## 2018-11-02 DIAGNOSIS — I10 ESSENTIAL HYPERTENSION: ICD-10-CM

## 2018-11-06 ENCOUNTER — OFFICE VISIT - HEALTHEAST (OUTPATIENT)
Dept: PHYSICAL THERAPY | Facility: REHABILITATION | Age: 83
End: 2018-11-06

## 2018-11-06 DIAGNOSIS — I50.32 CHRONIC DIASTOLIC HEART FAILURE (H): ICD-10-CM

## 2018-11-06 DIAGNOSIS — M62.81 MUSCLE WEAKNESS (GENERALIZED): ICD-10-CM

## 2018-11-06 DIAGNOSIS — R60.9 EDEMA: ICD-10-CM

## 2018-11-06 DIAGNOSIS — R29.818 DIFFICULTY BALANCING: ICD-10-CM

## 2018-11-06 DIAGNOSIS — I73.9 PERIPHERAL ARTERIAL DISEASE (H): ICD-10-CM

## 2018-11-06 DIAGNOSIS — I27.20 PULMONARY HYPERTENSION (H): ICD-10-CM

## 2018-11-06 DIAGNOSIS — E11.9 DIET-CONTROLLED TYPE 2 DIABETES MELLITUS (H): ICD-10-CM

## 2018-11-06 DIAGNOSIS — R26.9 ABNORMALITY OF GAIT: ICD-10-CM

## 2018-11-06 DIAGNOSIS — I10 ESSENTIAL HYPERTENSION: ICD-10-CM

## 2018-11-06 DIAGNOSIS — F25.0 SCHIZOAFFECTIVE DISORDER, BIPOLAR TYPE (H): ICD-10-CM

## 2018-11-06 DIAGNOSIS — J44.9 CHRONIC OBSTRUCTIVE PULMONARY DISEASE, UNSPECIFIED COPD TYPE (H): ICD-10-CM

## 2018-11-06 DIAGNOSIS — I89.0 LYMPHEDEMA: ICD-10-CM

## 2018-11-06 DIAGNOSIS — Q07.00 ARNOLD-CHIARI MALFORMATION (H): ICD-10-CM

## 2018-11-06 DIAGNOSIS — F33.9 RECURRENT MAJOR DEPRESSIVE EPISODES (H): ICD-10-CM

## 2018-11-08 ENCOUNTER — COMMUNICATION - HEALTHEAST (OUTPATIENT)
Dept: PULMONOLOGY | Facility: OTHER | Age: 83
End: 2018-11-08

## 2018-11-08 ENCOUNTER — OFFICE VISIT - HEALTHEAST (OUTPATIENT)
Dept: FAMILY MEDICINE | Facility: CLINIC | Age: 83
End: 2018-11-08

## 2018-11-08 DIAGNOSIS — M79.669 CALF PAIN, UNSPECIFIED LATERALITY: ICD-10-CM

## 2018-11-08 DIAGNOSIS — R06.02 SOB (SHORTNESS OF BREATH): ICD-10-CM

## 2018-11-08 DIAGNOSIS — R00.0 TACHYCARDIA: ICD-10-CM

## 2018-11-11 ENCOUNTER — HOME CARE/HOSPICE - HEALTHEAST (OUTPATIENT)
Dept: HOME HEALTH SERVICES | Facility: HOME HEALTH | Age: 83
End: 2018-11-11

## 2018-11-12 ENCOUNTER — PATIENT OUTREACH (OUTPATIENT)
Dept: CARE COORDINATION | Facility: CLINIC | Age: 83
End: 2018-11-12

## 2018-11-12 ENCOUNTER — COMMUNICATION - HEALTHEAST (OUTPATIENT)
Dept: SCHEDULING | Facility: CLINIC | Age: 83
End: 2018-11-12

## 2018-11-12 NOTE — PROGRESS NOTES
Clinic Care Coordination Contact  Care Coordination Communication    Referral Source: IP Report    Clinical Data: Patient was hospitalized at   [facility]                                                                         Admission Date: 11/8/2018   Discharge Provider: Karlos Ruiz Discharge Date: 11/11/2018   Diet: diabetic diet and low fat, low cholesterol diet    Code Status: Full Code   Activity: activity as tolerated and Continuous oxygen 2-3 L           Condition at Discharge: Good      REASON FOR PRESENTATION(See Admission Note for Details)   Cough, shortness of breath, fever and left leg pain     PRINCIPAL & ACTIVE DISCHARGE DIAGNOSES      Community-acquired pneumonia left upper and lower lobes  Acute on chronic hypoxemic respiratory failure  DVT of axillary vein, acute left (H)  PAF (paroxysmal atrial fibrillation) (H)  Primary osteoarthritis of both knees  Acute on chronic diastolic heart failure (H)  Acute leukocytosis  Hypotension  Moderate centrilobular emphysema  Pulmonary hypertension  Chronic bilateral lymphedema  Peripheral arterial disease  Type 2 diabetes  Schizoaffective bipolar disorder  GERD      Home Care Contact:              Home Care Agency: [facility]              Contact name () and phone number: not assigned yet              Care Coordination contacted home care: No              Anticipated start of care date: TBD    Patient Contact:               Introduced self and role of care coordination.               Discharge instructions were reviewed with patient/caregiver.               Do you have any questions about your medications? no              Home care has contacted patient: No              Patient questions/concerns: none  Patient is doing well. Sleeping and eating with no concerns.  Her calf is sore where she has the blood clot.  Her breathing has improved. Using oxygen 24-7. Is coughing to clear the congestion.  No other concerns.  Is waiting to hear from  home care.   Plan: RN/SW Care Coordinator will await notification from home care staff informing RN/SW Care Coordinator of patients discharge plans/needs. RN/SW Care Coordinator will review chart and outreach to home care every 4 weeks and as needed.      Stephania Romeo,   [facility]  [email or web address]  [phone]

## 2018-11-13 ENCOUNTER — HOME CARE/HOSPICE - HEALTHEAST (OUTPATIENT)
Dept: HOME HEALTH SERVICES | Facility: HOME HEALTH | Age: 83
End: 2018-11-13

## 2018-11-13 ENCOUNTER — COMMUNICATION - HEALTHEAST (OUTPATIENT)
Dept: INTERNAL MEDICINE | Facility: CLINIC | Age: 83
End: 2018-11-13

## 2018-11-13 ENCOUNTER — COMMUNICATION - HEALTHEAST (OUTPATIENT)
Dept: CARE COORDINATION | Facility: CLINIC | Age: 83
End: 2018-11-13

## 2018-11-13 ENCOUNTER — AMBULATORY - HEALTHEAST (OUTPATIENT)
Dept: INTERNAL MEDICINE | Facility: CLINIC | Age: 83
End: 2018-11-13

## 2018-11-13 ENCOUNTER — OFFICE VISIT - HEALTHEAST (OUTPATIENT)
Dept: PHYSICAL THERAPY | Facility: REHABILITATION | Age: 83
End: 2018-11-13

## 2018-11-13 ENCOUNTER — AMBULATORY - HEALTHEAST (OUTPATIENT)
Dept: CARE COORDINATION | Facility: CLINIC | Age: 83
End: 2018-11-13

## 2018-11-13 DIAGNOSIS — F25.0 SCHIZOAFFECTIVE DISORDER, BIPOLAR TYPE (H): ICD-10-CM

## 2018-11-13 DIAGNOSIS — J44.9 CHRONIC OBSTRUCTIVE PULMONARY DISEASE, UNSPECIFIED COPD TYPE (H): ICD-10-CM

## 2018-11-13 DIAGNOSIS — R60.9 EDEMA: ICD-10-CM

## 2018-11-13 DIAGNOSIS — R26.9 ABNORMALITY OF GAIT: ICD-10-CM

## 2018-11-13 DIAGNOSIS — M62.81 MUSCLE WEAKNESS (GENERALIZED): ICD-10-CM

## 2018-11-13 DIAGNOSIS — E11.9 TYPE 2 DIABETES MELLITUS (H): ICD-10-CM

## 2018-11-13 DIAGNOSIS — I50.32 CHRONIC DIASTOLIC HEART FAILURE (H): ICD-10-CM

## 2018-11-13 DIAGNOSIS — E11.9 DIET-CONTROLLED TYPE 2 DIABETES MELLITUS (H): ICD-10-CM

## 2018-11-13 DIAGNOSIS — J44.9 COPD (CHRONIC OBSTRUCTIVE PULMONARY DISEASE) (H): ICD-10-CM

## 2018-11-13 DIAGNOSIS — I27.20 PULMONARY HYPERTENSION (H): ICD-10-CM

## 2018-11-13 DIAGNOSIS — Q07.00 ARNOLD-CHIARI MALFORMATION (H): ICD-10-CM

## 2018-11-13 DIAGNOSIS — I89.0 LYMPHEDEMA: ICD-10-CM

## 2018-11-13 DIAGNOSIS — F33.9 RECURRENT MAJOR DEPRESSIVE EPISODES (H): ICD-10-CM

## 2018-11-13 DIAGNOSIS — I73.9 PERIPHERAL ARTERIAL DISEASE (H): ICD-10-CM

## 2018-11-13 DIAGNOSIS — R29.818 DIFFICULTY BALANCING: ICD-10-CM

## 2018-11-13 DIAGNOSIS — I10 ESSENTIAL HYPERTENSION: ICD-10-CM

## 2018-11-14 ENCOUNTER — OFFICE VISIT - HEALTHEAST (OUTPATIENT)
Dept: INTERNAL MEDICINE | Facility: CLINIC | Age: 83
End: 2018-11-14

## 2018-11-14 ENCOUNTER — COMMUNICATION - HEALTHEAST (OUTPATIENT)
Dept: NURSING | Facility: CLINIC | Age: 83
End: 2018-11-14

## 2018-11-14 DIAGNOSIS — D63.8 ANEMIA, CHRONIC DISEASE: ICD-10-CM

## 2018-11-14 DIAGNOSIS — J44.9 CHRONIC OBSTRUCTIVE PULMONARY DISEASE, UNSPECIFIED COPD TYPE (H): ICD-10-CM

## 2018-11-14 DIAGNOSIS — I48.0 PAF (PAROXYSMAL ATRIAL FIBRILLATION) (H): ICD-10-CM

## 2018-11-14 DIAGNOSIS — J18.9 PNEUMONIA OF LEFT LUNG DUE TO INFECTIOUS ORGANISM, UNSPECIFIED PART OF LUNG: ICD-10-CM

## 2018-11-14 DIAGNOSIS — I82.492 DEEP VEIN THROMBOSIS (DVT) OF OTHER VEIN OF LEFT LOWER EXTREMITY, UNSPECIFIED CHRONICITY (H): ICD-10-CM

## 2018-11-14 ASSESSMENT — MIFFLIN-ST. JEOR: SCORE: 1172.57

## 2018-11-15 ENCOUNTER — HOME CARE/HOSPICE - HEALTHEAST (OUTPATIENT)
Dept: HOME HEALTH SERVICES | Facility: HOME HEALTH | Age: 83
End: 2018-11-15

## 2018-11-16 ENCOUNTER — OFFICE VISIT - HEALTHEAST (OUTPATIENT)
Dept: PHYSICAL THERAPY | Facility: REHABILITATION | Age: 83
End: 2018-11-16

## 2018-11-16 DIAGNOSIS — E11.9 DIET-CONTROLLED TYPE 2 DIABETES MELLITUS (H): ICD-10-CM

## 2018-11-16 DIAGNOSIS — R26.9 ABNORMALITY OF GAIT: ICD-10-CM

## 2018-11-16 DIAGNOSIS — Q07.00 ARNOLD-CHIARI MALFORMATION (H): ICD-10-CM

## 2018-11-16 DIAGNOSIS — J44.9 CHRONIC OBSTRUCTIVE PULMONARY DISEASE, UNSPECIFIED COPD TYPE (H): ICD-10-CM

## 2018-11-16 DIAGNOSIS — F33.9 RECURRENT MAJOR DEPRESSIVE EPISODES (H): ICD-10-CM

## 2018-11-16 DIAGNOSIS — R29.818 DIFFICULTY BALANCING: ICD-10-CM

## 2018-11-16 DIAGNOSIS — F25.0 SCHIZOAFFECTIVE DISORDER, BIPOLAR TYPE (H): ICD-10-CM

## 2018-11-16 DIAGNOSIS — M62.81 MUSCLE WEAKNESS (GENERALIZED): ICD-10-CM

## 2018-11-16 DIAGNOSIS — I27.20 PULMONARY HYPERTENSION (H): ICD-10-CM

## 2018-11-16 DIAGNOSIS — I89.0 LYMPHEDEMA: ICD-10-CM

## 2018-11-17 ENCOUNTER — HOME CARE/HOSPICE - HEALTHEAST (OUTPATIENT)
Dept: HOME HEALTH SERVICES | Facility: HOME HEALTH | Age: 83
End: 2018-11-17

## 2018-11-17 ENCOUNTER — COMMUNICATION - HEALTHEAST (OUTPATIENT)
Dept: CARE COORDINATION | Facility: CLINIC | Age: 83
End: 2018-11-17

## 2018-11-19 ENCOUNTER — COMMUNICATION - HEALTHEAST (OUTPATIENT)
Dept: INTERNAL MEDICINE | Facility: CLINIC | Age: 83
End: 2018-11-19

## 2018-11-19 ENCOUNTER — HOME CARE/HOSPICE - HEALTHEAST (OUTPATIENT)
Dept: HOME HEALTH SERVICES | Facility: HOME HEALTH | Age: 83
End: 2018-11-19

## 2018-11-19 ENCOUNTER — COMMUNICATION - HEALTHEAST (OUTPATIENT)
Dept: HOME HEALTH SERVICES | Facility: HOME HEALTH | Age: 83
End: 2018-11-19

## 2018-11-19 ENCOUNTER — AMBULATORY - HEALTHEAST (OUTPATIENT)
Dept: NURSING | Facility: CLINIC | Age: 83
End: 2018-11-19

## 2018-11-20 ENCOUNTER — HOME CARE/HOSPICE - HEALTHEAST (OUTPATIENT)
Dept: HOME HEALTH SERVICES | Facility: HOME HEALTH | Age: 83
End: 2018-11-20

## 2018-11-21 ENCOUNTER — COMMUNICATION - HEALTHEAST (OUTPATIENT)
Dept: INTERNAL MEDICINE | Facility: CLINIC | Age: 83
End: 2018-11-21

## 2018-11-21 ENCOUNTER — HOME CARE/HOSPICE - HEALTHEAST (OUTPATIENT)
Dept: HOME HEALTH SERVICES | Facility: HOME HEALTH | Age: 83
End: 2018-11-21

## 2018-11-23 ENCOUNTER — HOME CARE/HOSPICE - HEALTHEAST (OUTPATIENT)
Dept: HOME HEALTH SERVICES | Facility: HOME HEALTH | Age: 83
End: 2018-11-23

## 2018-12-03 ENCOUNTER — OFFICE VISIT - HEALTHEAST (OUTPATIENT)
Dept: GERIATRICS | Facility: CLINIC | Age: 83
End: 2018-12-03

## 2018-12-03 DIAGNOSIS — Z71.89 ADVANCED DIRECTIVES, COUNSELING/DISCUSSION: ICD-10-CM

## 2018-12-03 DIAGNOSIS — J18.9 PNEUMONIA OF LEFT LUNG DUE TO INFECTIOUS ORGANISM, UNSPECIFIED PART OF LUNG: ICD-10-CM

## 2018-12-03 DIAGNOSIS — L03.116 CELLULITIS OF LEFT LOWER EXTREMITY: ICD-10-CM

## 2018-12-04 ENCOUNTER — OFFICE VISIT - HEALTHEAST (OUTPATIENT)
Dept: GERIATRICS | Facility: CLINIC | Age: 83
End: 2018-12-04

## 2018-12-04 ENCOUNTER — COMMUNICATION - HEALTHEAST (OUTPATIENT)
Dept: SCHEDULING | Facility: CLINIC | Age: 83
End: 2018-12-04

## 2018-12-04 ENCOUNTER — PATIENT OUTREACH (OUTPATIENT)
Dept: CARE COORDINATION | Facility: CLINIC | Age: 83
End: 2018-12-04

## 2018-12-04 DIAGNOSIS — J18.9 PNEUMONIA OF LEFT LUNG DUE TO INFECTIOUS ORGANISM, UNSPECIFIED PART OF LUNG: ICD-10-CM

## 2018-12-04 DIAGNOSIS — I50.33 ACUTE ON CHRONIC DIASTOLIC HEART FAILURE (H): ICD-10-CM

## 2018-12-04 DIAGNOSIS — F25.0 SCHIZOAFFECTIVE DISORDER, BIPOLAR TYPE (H): ICD-10-CM

## 2018-12-04 DIAGNOSIS — L03.116 CELLULITIS OF LEFT LOWER EXTREMITY: ICD-10-CM

## 2018-12-04 DIAGNOSIS — J44.9 CHRONIC OBSTRUCTIVE PULMONARY DISEASE, UNSPECIFIED COPD TYPE (H): ICD-10-CM

## 2018-12-04 DIAGNOSIS — I48.20 CHRONIC ATRIAL FIBRILLATION (H): ICD-10-CM

## 2018-12-04 NOTE — PROGRESS NOTES
Clinic Care Coordination Contact    Situation: Patient chart reviewed by care coordinator.    Background: Patient was open to [facility] with CC following for discharge  Did chart review and patient is in TCU at [facility] following hospitalization.  From chart review: 1935     MRN: [id number]     CODE STATUS:  FULL CODE     FACILITY: [facility] [[id number]]        CHIEF COMPLAIN/REASON FOR VISIT:      Chief Complaint   Patient presents with     Review Of Multiple Medical Conditions         HISTORY OF PRESENT ILLNESS: Adalgisa Solano is a 83 y.o. female being seen for review of multiple medical conditions. She comes to the TCU from [facility] where she was a pt from 11/ 23 to 11/30 2018.PMH of left lower extremity DVT, ahe  presented with left leg erythema and was diagnosed with cellulitis.  She was  started on vancomycin and ceftriaxone which she later descalated to ceftriaxone by ID. She developed Afib RVR and acute diastolic CHF exacerbation in this admission and Cardizem dose increased from 180 to 120 mg two times a day. She developed acute shortness of breath with mildly elevated BNP and mild interstitial edema in CXR. Received IV Lasix and her symptoms significantly improved. She is on   Rivaroxabon  20 mg daily for anticoagulation. Currently finnishing oral keflex for cellulitus. Legs are edematous with dusky ana laura color, no scaley skin or open areas to legs. She does however, have a drying 50cent size area to the back of her left thigh. Reports falling asleep on her heating pad at home causing burn. Also with H/O Schizoaffective disorder, see med list, on seroquel as well as abilify, stable behaviors, staff reports a bit demanding.Uses 02, was 02 dependant at time.Seen in room with her spouse this am.       Assessment: Will ask [facility] for notification of discharge from TCU so CC can contact at that time.     Plan/Recommendations: Will do chart review in three weeks or will call  if notified of discharge from TCU.     Stephania Romeo,   [facility]  [email or web address]  [phone]

## 2018-12-04 NOTE — LETTER
[facility]   To:   [facility]          Please give to facility    From:  Stephania SMITH  Care Coordinator [phone]   [facility]   P: [phone]  [email or web address]   Patient Name:  Adalgisa Solano YOB: 1935   Admit date: 11-      *Information Needed:  Please contact me when the patient will discharge (or if they will move to long term care)- include the discharge date, disposition, and main diagnosis   - If the patient is discharged with home care services, please provide the name of the agency    Phone or Email with information  Thank you, Stephania

## 2018-12-10 ENCOUNTER — OFFICE VISIT - HEALTHEAST (OUTPATIENT)
Dept: GERIATRICS | Facility: CLINIC | Age: 83
End: 2018-12-10

## 2018-12-10 DIAGNOSIS — B37.2 CANDIDAL SKIN INFECTION: ICD-10-CM

## 2018-12-13 ENCOUNTER — OFFICE VISIT - HEALTHEAST (OUTPATIENT)
Dept: GERIATRICS | Facility: CLINIC | Age: 83
End: 2018-12-13

## 2018-12-13 DIAGNOSIS — I48.20 CHRONIC ATRIAL FIBRILLATION (H): ICD-10-CM

## 2018-12-13 DIAGNOSIS — E11.9 DIET-CONTROLLED TYPE 2 DIABETES MELLITUS (H): ICD-10-CM

## 2018-12-13 DIAGNOSIS — J96.11 CHRONIC RESPIRATORY FAILURE WITH HYPOXIA (H): ICD-10-CM

## 2018-12-13 DIAGNOSIS — I50.33 ACUTE ON CHRONIC DIASTOLIC HEART FAILURE (H): ICD-10-CM

## 2018-12-14 ENCOUNTER — RECORDS - HEALTHEAST (OUTPATIENT)
Dept: LAB | Facility: CLINIC | Age: 83
End: 2018-12-14

## 2018-12-14 ENCOUNTER — HOSPITAL ENCOUNTER (OUTPATIENT)
Dept: CT IMAGING | Facility: CLINIC | Age: 83
Discharge: HOME | End: 2018-12-14
Attending: INTERNAL MEDICINE

## 2018-12-14 ENCOUNTER — COMMUNICATION - HEALTHEAST (OUTPATIENT)
Dept: INTERNAL MEDICINE | Facility: CLINIC | Age: 83
End: 2018-12-14

## 2018-12-14 DIAGNOSIS — J18.9 PNEUMONIA OF LEFT LUNG DUE TO INFECTIOUS ORGANISM, UNSPECIFIED PART OF LUNG: ICD-10-CM

## 2018-12-17 ENCOUNTER — OFFICE VISIT - HEALTHEAST (OUTPATIENT)
Dept: GERIATRICS | Facility: CLINIC | Age: 83
End: 2018-12-17

## 2018-12-17 ENCOUNTER — RECORDS - HEALTHEAST (OUTPATIENT)
Dept: ADMINISTRATIVE | Facility: OTHER | Age: 83
End: 2018-12-17

## 2018-12-17 DIAGNOSIS — L03.116 CELLULITIS OF LEFT LOWER EXTREMITY: ICD-10-CM

## 2018-12-17 DIAGNOSIS — M17.12 PRIMARY OSTEOARTHRITIS OF LEFT KNEE: ICD-10-CM

## 2018-12-17 LAB
ANION GAP SERPL CALCULATED.3IONS-SCNC: 10 MMOL/L (ref 5–18)
BUN SERPL-MCNC: 10 MG/DL (ref 8–28)
CALCIUM SERPL-MCNC: 8.7 MG/DL (ref 8.5–10.5)
CHLORIDE BLD-SCNC: 89 MMOL/L (ref 98–107)
CO2 SERPL-SCNC: 27 MMOL/L (ref 22–31)
CREAT SERPL-MCNC: 0.58 MG/DL (ref 0.6–1.1)
GFR SERPL CREATININE-BSD FRML MDRD: >60 ML/MIN/1.73M2
GLUCOSE BLD-MCNC: 115 MG/DL (ref 70–125)
POTASSIUM BLD-SCNC: 3.9 MMOL/L (ref 3.5–5)
SODIUM SERPL-SCNC: 126 MMOL/L (ref 136–145)

## 2018-12-18 ENCOUNTER — RECORDS - HEALTHEAST (OUTPATIENT)
Dept: LAB | Facility: CLINIC | Age: 83
End: 2018-12-18

## 2018-12-18 ENCOUNTER — OFFICE VISIT - HEALTHEAST (OUTPATIENT)
Dept: GERIATRICS | Facility: CLINIC | Age: 83
End: 2018-12-18

## 2018-12-18 DIAGNOSIS — I48.20 CHRONIC ATRIAL FIBRILLATION (H): ICD-10-CM

## 2018-12-18 DIAGNOSIS — J96.11 CHRONIC RESPIRATORY FAILURE WITH HYPOXIA (H): ICD-10-CM

## 2018-12-18 DIAGNOSIS — Z79.4 TYPE 2 DIABETES MELLITUS WITHOUT COMPLICATION, WITH LONG-TERM CURRENT USE OF INSULIN (H): ICD-10-CM

## 2018-12-18 DIAGNOSIS — J18.9 PNEUMONIA OF LEFT LUNG DUE TO INFECTIOUS ORGANISM, UNSPECIFIED PART OF LUNG: ICD-10-CM

## 2018-12-18 DIAGNOSIS — I50.33 ACUTE ON CHRONIC DIASTOLIC HEART FAILURE (H): ICD-10-CM

## 2018-12-18 DIAGNOSIS — J41.0 SIMPLE CHRONIC BRONCHITIS (H): ICD-10-CM

## 2018-12-18 DIAGNOSIS — M17.0 PRIMARY OSTEOARTHRITIS OF BOTH KNEES: ICD-10-CM

## 2018-12-18 DIAGNOSIS — E11.9 TYPE 2 DIABETES MELLITUS WITHOUT COMPLICATION, WITH LONG-TERM CURRENT USE OF INSULIN (H): ICD-10-CM

## 2018-12-19 ENCOUNTER — RECORDS - HEALTHEAST (OUTPATIENT)
Dept: ADMINISTRATIVE | Facility: OTHER | Age: 83
End: 2018-12-19

## 2018-12-19 ENCOUNTER — COMMUNICATION - HEALTHEAST (OUTPATIENT)
Dept: INTERNAL MEDICINE | Facility: CLINIC | Age: 83
End: 2018-12-19

## 2018-12-19 LAB
ANION GAP SERPL CALCULATED.3IONS-SCNC: 12 MMOL/L (ref 5–18)
BUN SERPL-MCNC: 8 MG/DL (ref 8–28)
CALCIUM SERPL-MCNC: 9.3 MG/DL (ref 8.5–10.5)
CHLORIDE BLD-SCNC: 85 MMOL/L (ref 98–107)
CO2 SERPL-SCNC: 28 MMOL/L (ref 22–31)
CREAT SERPL-MCNC: 0.61 MG/DL (ref 0.6–1.1)
GFR SERPL CREATININE-BSD FRML MDRD: >60 ML/MIN/1.73M2
GLUCOSE BLD-MCNC: 102 MG/DL (ref 70–125)
POTASSIUM BLD-SCNC: 4.3 MMOL/L (ref 3.5–5)
SODIUM SERPL-SCNC: 125 MMOL/L (ref 136–145)

## 2018-12-20 ENCOUNTER — COMMUNICATION - HEALTHEAST (OUTPATIENT)
Dept: INTERNAL MEDICINE | Facility: CLINIC | Age: 83
End: 2018-12-20

## 2018-12-20 ENCOUNTER — PATIENT OUTREACH (OUTPATIENT)
Dept: CARE COORDINATION | Facility: CLINIC | Age: 83
End: 2018-12-20

## 2018-12-20 ENCOUNTER — COMMUNICATION - HEALTHEAST (OUTPATIENT)
Dept: GERIATRICS | Facility: CLINIC | Age: 83
End: 2018-12-20

## 2018-12-20 ENCOUNTER — AMBULATORY - HEALTHEAST (OUTPATIENT)
Dept: GERIATRICS | Facility: CLINIC | Age: 83
End: 2018-12-20

## 2018-12-20 DIAGNOSIS — J41.0 SIMPLE CHRONIC BRONCHITIS (H): ICD-10-CM

## 2018-12-20 DIAGNOSIS — J44.1 COPD EXACERBATION (H): ICD-10-CM

## 2018-12-20 DIAGNOSIS — I82.A12 DVT OF AXILLARY VEIN, ACUTE LEFT (H): ICD-10-CM

## 2018-12-20 DIAGNOSIS — I50.33 ACUTE ON CHRONIC DIASTOLIC HEART FAILURE (H): ICD-10-CM

## 2018-12-20 DIAGNOSIS — J44.9 COPD (CHRONIC OBSTRUCTIVE PULMONARY DISEASE) (H): ICD-10-CM

## 2018-12-20 DIAGNOSIS — I48.0 PAF (PAROXYSMAL ATRIAL FIBRILLATION) (H): ICD-10-CM

## 2018-12-21 ENCOUNTER — HOME CARE/HOSPICE - HEALTHEAST (OUTPATIENT)
Dept: HOME HEALTH SERVICES | Facility: HOME HEALTH | Age: 83
End: 2018-12-21

## 2018-12-21 ENCOUNTER — COMMUNICATION - HEALTHEAST (OUTPATIENT)
Dept: SCHEDULING | Facility: CLINIC | Age: 83
End: 2018-12-21

## 2018-12-21 ASSESSMENT — MIFFLIN-ST. JEOR
SCORE: 1118.14

## 2018-12-21 NOTE — PROGRESS NOTES
Clinic Care Coordination Contact  Care Coordination Communication    Referral Source: IP Report  Per chart review.  CC was following while patient was open to home care. She then went to hospital and discharged to TCU. Been discharged to home with home care.  Today she presented at [facility] for leg pain where she currently is.     Clinical Data: Facility:   [facility] [[id number]]   Code Status: FULL CODE  PCP: Shai Ellington MD   Phone: [phone]   Fax: [phone]        CHIEF COMPLAINT/REASON FOR VISIT:      Chief Complaint   Patient presents with     Discharge Summary         HISTORY COURSE:  Patient examined in the presence of her .  She has underlying history of COPD with hypoxic respiratory failure.  Currently stable no cough no congestion but remains on oxygen weaning attempt has been unsuccessful in the TCU and she will discharged home on her home oxygen regimen.  Has chronic lymphedema which has been refractory to treatment.  Recently switched from Lasix to Bumex  Lymphedema seems to have improved somewhat  She continues to have chronic back pain and hip pain which is limiting her ability to move she is moving over the walker.  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  Constipation remains a chronic issue with her.  Apparently she had a blood clot in her leg and her cellulitis has resolved she had a Doppler ultrasound done in the ER which revealed that the blood clot has resolved to she was told to follow-up with her regular physician to discuss follow-up issues  Discharge labs also show significant hyponatremia with a  sodium of 126  Medication review done with patient and .  We are suspecting that hyponatremia most likely induced by her psychotropic medications       Plan: CC will get hospital discharge notification and will assess needs at that time.    Stephania Romeo,   [facility]  [email or web address]  [phone]

## 2018-12-23 ASSESSMENT — MIFFLIN-ST. JEOR
SCORE: 1109.06
SCORE: 1109.06

## 2018-12-24 ASSESSMENT — MIFFLIN-ST. JEOR
SCORE: 1099.99
SCORE: 1099.99

## 2018-12-27 ENCOUNTER — AMBULATORY - HEALTHEAST (OUTPATIENT)
Dept: INTERNAL MEDICINE | Facility: CLINIC | Age: 83
End: 2018-12-27

## 2018-12-27 ENCOUNTER — COMMUNICATION - HEALTHEAST (OUTPATIENT)
Dept: INTERNAL MEDICINE | Facility: CLINIC | Age: 83
End: 2018-12-27

## 2018-12-30 ASSESSMENT — MIFFLIN-ST. JEOR
SCORE: 1102.26
SCORE: 1102.26

## 2018-12-31 ENCOUNTER — ANESTHESIA - HEALTHEAST (OUTPATIENT)
Dept: SURGERY | Facility: CLINIC | Age: 83
End: 2018-12-31

## 2018-12-31 ENCOUNTER — SURGERY - HEALTHEAST (OUTPATIENT)
Dept: SURGERY | Facility: CLINIC | Age: 83
End: 2018-12-31

## 2018-12-31 ASSESSMENT — MIFFLIN-ST. JEOR
SCORE: 1104.53
SCORE: 1104.53

## 2019-01-01 ASSESSMENT — MIFFLIN-ST. JEOR
SCORE: 1038.3
SCORE: 1038.3

## 2019-01-02 ENCOUNTER — ANESTHESIA - HEALTHEAST (OUTPATIENT)
Dept: SURGERY | Facility: CLINIC | Age: 84
End: 2019-01-02

## 2019-01-02 ENCOUNTER — SURGERY - HEALTHEAST (OUTPATIENT)
Dept: SURGERY | Facility: CLINIC | Age: 84
End: 2019-01-02

## 2019-01-02 ASSESSMENT — MIFFLIN-ST. JEOR
SCORE: 1045.56
SCORE: 1045.56

## 2019-01-03 ASSESSMENT — MIFFLIN-ST. JEOR
SCORE: 956.2
SCORE: 956.2

## 2019-01-04 ASSESSMENT — MIFFLIN-ST. JEOR
SCORE: 1084.12
SCORE: 1084.12

## 2019-01-05 ASSESSMENT — MIFFLIN-ST. JEOR
SCORE: 1078.67
SCORE: 1078.67

## 2019-01-07 ENCOUNTER — OFFICE VISIT - HEALTHEAST (OUTPATIENT)
Dept: GERIATRICS | Facility: CLINIC | Age: 84
End: 2019-01-07

## 2019-01-07 ENCOUNTER — PATIENT OUTREACH (OUTPATIENT)
Dept: CARE COORDINATION | Facility: CLINIC | Age: 84
End: 2019-01-07

## 2019-01-07 ENCOUNTER — COMMUNICATION - HEALTHEAST (OUTPATIENT)
Dept: SCHEDULING | Facility: CLINIC | Age: 84
End: 2019-01-07

## 2019-01-07 DIAGNOSIS — M62.81 GENERALIZED MUSCLE WEAKNESS: ICD-10-CM

## 2019-01-07 DIAGNOSIS — I48.20 CHRONIC ATRIAL FIBRILLATION (H): ICD-10-CM

## 2019-01-07 DIAGNOSIS — E11.9 DIET-CONTROLLED TYPE 2 DIABETES MELLITUS (H): ICD-10-CM

## 2019-01-07 DIAGNOSIS — S72.002A CLOSED FRACTURE OF NECK OF LEFT FEMUR, INITIAL ENCOUNTER (H): ICD-10-CM

## 2019-01-07 DIAGNOSIS — J96.22 ACUTE AND CHRONIC RESPIRATORY FAILURE WITH HYPERCAPNIA (H): ICD-10-CM

## 2019-01-07 DIAGNOSIS — F25.0 SCHIZOAFFECTIVE DISORDER, BIPOLAR TYPE (H): ICD-10-CM

## 2019-01-07 DIAGNOSIS — K21.9 GASTROESOPHAGEAL REFLUX DISEASE, ESOPHAGITIS PRESENCE NOT SPECIFIED: ICD-10-CM

## 2019-01-07 DIAGNOSIS — I10 ESSENTIAL HYPERTENSION: ICD-10-CM

## 2019-01-07 DIAGNOSIS — I50.41 ACUTE COMBINED SYSTOLIC AND DIASTOLIC CHF, NYHA CLASS 1 (H): ICD-10-CM

## 2019-01-07 DIAGNOSIS — I48.19 PERSISTENT ATRIAL FIBRILLATION (H): ICD-10-CM

## 2019-01-07 NOTE — PROGRESS NOTES
Clinic Care Coordination Contact  Care Coordination Transition Communication    Referral Source: IP Report    Clinical Data:   Discharge Date: 1/5/2019    Diet: Low sodium Code Status: DNR   Activity: Per rehab recommendations        Condition at Discharge: Stable      REASON FOR PRESENTATION(See Admission Note for Details)   Hip pain     PRINCIPAL DISCHARGE DIAGNOSIS   Left hip fracture/collapse of avascular necrosis  Acute on chronic respiratory failure with hypoxia  Acute on chronic congestive heart failure    Transition to Facility:              [facility]            Plan: RN/SW Care Coordinator will await notification from facility staff informing RN/SW Care Coordinator of patient's discharge plans/needs. RN/SW Care Coordinator will review chart and outreach to facility staff every 4 weeks and as needed.     Stephania Romeo,   [facility]  [email or web address]  [phone]

## 2019-01-08 ENCOUNTER — COMMUNICATION - HEALTHEAST (OUTPATIENT)
Dept: GERIATRICS | Facility: CLINIC | Age: 84
End: 2019-01-08

## 2019-01-08 ENCOUNTER — RECORDS - HEALTHEAST (OUTPATIENT)
Dept: LAB | Facility: CLINIC | Age: 84
End: 2019-01-08

## 2019-01-08 LAB
ANION GAP SERPL CALCULATED.3IONS-SCNC: 10 MMOL/L (ref 5–18)
BASOPHILS # BLD AUTO: 0 THOU/UL (ref 0–0.2)
BASOPHILS NFR BLD AUTO: 0 % (ref 0–2)
BUN SERPL-MCNC: 10 MG/DL (ref 8–28)
CALCIUM SERPL-MCNC: 8.4 MG/DL (ref 8.5–10.5)
CHLORIDE BLD-SCNC: 97 MMOL/L (ref 98–107)
CO2 SERPL-SCNC: 28 MMOL/L (ref 22–31)
CREAT SERPL-MCNC: 0.58 MG/DL (ref 0.6–1.1)
EOSINOPHIL # BLD AUTO: 0.1 THOU/UL (ref 0–0.4)
EOSINOPHIL NFR BLD AUTO: 0 % (ref 0–6)
ERYTHROCYTE [DISTWIDTH] IN BLOOD BY AUTOMATED COUNT: 19.1 % (ref 11–14.5)
GFR SERPL CREATININE-BSD FRML MDRD: >60 ML/MIN/1.73M2
GLUCOSE BLD-MCNC: 154 MG/DL (ref 70–125)
HCT VFR BLD AUTO: 27.9 % (ref 35–47)
HGB BLD-MCNC: 8.9 G/DL (ref 12–16)
LYMPHOCYTES # BLD AUTO: 1 THOU/UL (ref 0.8–4.4)
LYMPHOCYTES NFR BLD AUTO: 8 % (ref 20–40)
MAGNESIUM SERPL-MCNC: 1.7 MG/DL (ref 1.8–2.6)
MCH RBC QN AUTO: 25.9 PG (ref 27–34)
MCHC RBC AUTO-ENTMCNC: 31.9 G/DL (ref 32–36)
MCV RBC AUTO: 81 FL (ref 80–100)
MONOCYTES # BLD AUTO: 0.9 THOU/UL (ref 0–0.9)
MONOCYTES NFR BLD AUTO: 7 % (ref 2–10)
NEUTROPHILS # BLD AUTO: 10.2 THOU/UL (ref 2–7.7)
NEUTROPHILS NFR BLD AUTO: 85 % (ref 50–70)
PLATELET # BLD AUTO: 504 THOU/UL (ref 140–440)
PMV BLD AUTO: 10.3 FL (ref 8.5–12.5)
POTASSIUM BLD-SCNC: 3.9 MMOL/L (ref 3.5–5)
RBC # BLD AUTO: 3.43 MILL/UL (ref 3.8–5.4)
SODIUM SERPL-SCNC: 135 MMOL/L (ref 136–145)
WBC: 12.2 THOU/UL (ref 4–11)

## 2019-01-09 ENCOUNTER — RECORDS - HEALTHEAST (OUTPATIENT)
Dept: LAB | Facility: CLINIC | Age: 84
End: 2019-01-09

## 2019-01-09 ENCOUNTER — OFFICE VISIT - HEALTHEAST (OUTPATIENT)
Dept: GERIATRICS | Facility: CLINIC | Age: 84
End: 2019-01-09

## 2019-01-09 DIAGNOSIS — F25.0 SCHIZOAFFECTIVE DISORDER, BIPOLAR TYPE (H): ICD-10-CM

## 2019-01-09 DIAGNOSIS — I50.33 ACUTE ON CHRONIC DIASTOLIC CONGESTIVE HEART FAILURE (H): ICD-10-CM

## 2019-01-09 DIAGNOSIS — F33.9 RECURRENT MAJOR DEPRESSIVE EPISODES (H): ICD-10-CM

## 2019-01-09 DIAGNOSIS — I10 ESSENTIAL HYPERTENSION: ICD-10-CM

## 2019-01-09 DIAGNOSIS — J96.22 ACUTE AND CHRONIC RESPIRATORY FAILURE WITH HYPERCAPNIA (H): ICD-10-CM

## 2019-01-09 DIAGNOSIS — J44.1 COPD EXACERBATION (H): ICD-10-CM

## 2019-01-09 DIAGNOSIS — M62.81 GENERALIZED MUSCLE WEAKNESS: ICD-10-CM

## 2019-01-09 DIAGNOSIS — I48.20 CHRONIC ATRIAL FIBRILLATION (H): ICD-10-CM

## 2019-01-10 ENCOUNTER — COMMUNICATION - HEALTHEAST (OUTPATIENT)
Dept: GERIATRICS | Facility: CLINIC | Age: 84
End: 2019-01-10

## 2019-01-10 LAB
BASOPHILS # BLD AUTO: 0.1 THOU/UL (ref 0–0.2)
BASOPHILS NFR BLD AUTO: 0 % (ref 0–2)
EOSINOPHIL # BLD AUTO: 0.2 THOU/UL (ref 0–0.4)
EOSINOPHIL NFR BLD AUTO: 2 % (ref 0–6)
ERYTHROCYTE [DISTWIDTH] IN BLOOD BY AUTOMATED COUNT: 19.9 % (ref 11–14.5)
HCT VFR BLD AUTO: 28.4 % (ref 35–47)
HGB BLD-MCNC: 8.9 G/DL (ref 12–16)
LYMPHOCYTES # BLD AUTO: 1.5 THOU/UL (ref 0.8–4.4)
LYMPHOCYTES NFR BLD AUTO: 10 % (ref 20–40)
MCH RBC QN AUTO: 25.7 PG (ref 27–34)
MCHC RBC AUTO-ENTMCNC: 31.3 G/DL (ref 32–36)
MCV RBC AUTO: 82 FL (ref 80–100)
MONOCYTES # BLD AUTO: 1.2 THOU/UL (ref 0–0.9)
MONOCYTES NFR BLD AUTO: 8 % (ref 2–10)
NEUTROPHILS # BLD AUTO: 11.6 THOU/UL (ref 2–7.7)
NEUTROPHILS NFR BLD AUTO: 80 % (ref 50–70)
PLATELET # BLD AUTO: 544 THOU/UL (ref 140–440)
PMV BLD AUTO: 10 FL (ref 8.5–12.5)
RBC # BLD AUTO: 3.46 MILL/UL (ref 3.8–5.4)
WBC: 14.7 THOU/UL (ref 4–11)

## 2019-01-11 ENCOUNTER — OFFICE VISIT - HEALTHEAST (OUTPATIENT)
Dept: GERIATRICS | Facility: CLINIC | Age: 84
End: 2019-01-11

## 2019-01-11 ENCOUNTER — COMMUNICATION - HEALTHEAST (OUTPATIENT)
Dept: GERIATRICS | Facility: CLINIC | Age: 84
End: 2019-01-11

## 2019-01-11 ENCOUNTER — RECORDS - HEALTHEAST (OUTPATIENT)
Dept: LAB | Facility: CLINIC | Age: 84
End: 2019-01-11

## 2019-01-11 DIAGNOSIS — I50.32 CHRONIC DIASTOLIC CONGESTIVE HEART FAILURE (H): ICD-10-CM

## 2019-01-11 DIAGNOSIS — J44.1 COPD EXACERBATION (H): ICD-10-CM

## 2019-01-11 DIAGNOSIS — J18.9 PNEUMONIA OF LEFT LUNG DUE TO INFECTIOUS ORGANISM, UNSPECIFIED PART OF LUNG: ICD-10-CM

## 2019-01-11 DIAGNOSIS — E11.9 TYPE 2 DIABETES MELLITUS WITHOUT COMPLICATION, WITH LONG-TERM CURRENT USE OF INSULIN (H): ICD-10-CM

## 2019-01-11 DIAGNOSIS — J96.11 CHRONIC RESPIRATORY FAILURE WITH HYPOXIA (H): ICD-10-CM

## 2019-01-11 DIAGNOSIS — M62.81 GENERALIZED MUSCLE WEAKNESS: ICD-10-CM

## 2019-01-11 DIAGNOSIS — F25.0 SCHIZOAFFECTIVE DISORDER, BIPOLAR TYPE (H): ICD-10-CM

## 2019-01-11 DIAGNOSIS — S72.002A CLOSED FRACTURE OF NECK OF LEFT FEMUR, INITIAL ENCOUNTER (H): ICD-10-CM

## 2019-01-11 DIAGNOSIS — Z96.642 STATUS POST TOTAL HIP REPLACEMENT, LEFT: ICD-10-CM

## 2019-01-11 DIAGNOSIS — F33.9 RECURRENT MAJOR DEPRESSIVE EPISODES (H): ICD-10-CM

## 2019-01-11 DIAGNOSIS — I10 ESSENTIAL HYPERTENSION: ICD-10-CM

## 2019-01-11 DIAGNOSIS — I48.20 CHRONIC ATRIAL FIBRILLATION (H): ICD-10-CM

## 2019-01-11 DIAGNOSIS — Z79.4 TYPE 2 DIABETES MELLITUS WITHOUT COMPLICATION, WITH LONG-TERM CURRENT USE OF INSULIN (H): ICD-10-CM

## 2019-01-11 LAB
ALBUMIN UR-MCNC: NEGATIVE MG/DL
APPEARANCE UR: CLEAR
BILIRUB UR QL STRIP: NEGATIVE
COLOR UR AUTO: NORMAL
GLUCOSE UR STRIP-MCNC: NEGATIVE MG/DL
HGB UR QL STRIP: NEGATIVE
KETONES UR STRIP-MCNC: NEGATIVE MG/DL
LEUKOCYTE ESTERASE UR QL STRIP: NEGATIVE
NITRATE UR QL: NEGATIVE
PH UR STRIP: 6.5 [PH] (ref 4.5–8)
SP GR UR STRIP: 1 (ref 1–1.03)
UROBILINOGEN UR STRIP-ACNC: NORMAL

## 2019-01-12 LAB — BACTERIA SPEC CULT: NO GROWTH

## 2019-01-14 ENCOUNTER — RECORDS - HEALTHEAST (OUTPATIENT)
Dept: LAB | Facility: CLINIC | Age: 84
End: 2019-01-14

## 2019-01-14 ENCOUNTER — COMMUNICATION - HEALTHEAST (OUTPATIENT)
Dept: GERIATRICS | Facility: CLINIC | Age: 84
End: 2019-01-14

## 2019-01-14 LAB
BASOPHILS # BLD AUTO: 0.1 THOU/UL (ref 0–0.2)
BASOPHILS NFR BLD AUTO: 1 % (ref 0–2)
EOSINOPHIL # BLD AUTO: 0.2 THOU/UL (ref 0–0.4)
EOSINOPHIL NFR BLD AUTO: 2 % (ref 0–6)
ERYTHROCYTE [DISTWIDTH] IN BLOOD BY AUTOMATED COUNT: 20.5 % (ref 11–14.5)
HCT VFR BLD AUTO: 26 % (ref 35–47)
HGB BLD-MCNC: 7.9 G/DL (ref 12–16)
LYMPHOCYTES # BLD AUTO: 1.5 THOU/UL (ref 0.8–4.4)
LYMPHOCYTES NFR BLD AUTO: 17 % (ref 20–40)
MCH RBC QN AUTO: 25.2 PG (ref 27–34)
MCHC RBC AUTO-ENTMCNC: 30.4 G/DL (ref 32–36)
MCV RBC AUTO: 83 FL (ref 80–100)
MONOCYTES # BLD AUTO: 0.6 THOU/UL (ref 0–0.9)
MONOCYTES NFR BLD AUTO: 6 % (ref 2–10)
NEUTROPHILS # BLD AUTO: 6.7 THOU/UL (ref 2–7.7)
NEUTROPHILS NFR BLD AUTO: 75 % (ref 50–70)
OVALOCYTES: ABNORMAL
PLAT MORPH BLD: NORMAL
PLATELET # BLD AUTO: 436 THOU/UL (ref 140–440)
PMV BLD AUTO: 9.7 FL (ref 8.5–12.5)
POLYCHROMASIA BLD QL SMEAR: ABNORMAL
RBC # BLD AUTO: 3.14 MILL/UL (ref 3.8–5.4)
TARGETS BLD QL SMEAR: ABNORMAL
WBC: 9.1 THOU/UL (ref 4–11)

## 2019-01-15 ENCOUNTER — OFFICE VISIT - HEALTHEAST (OUTPATIENT)
Dept: GERIATRICS | Facility: CLINIC | Age: 84
End: 2019-01-15

## 2019-01-15 DIAGNOSIS — J96.11 CHRONIC RESPIRATORY FAILURE WITH HYPOXIA (H): ICD-10-CM

## 2019-01-15 DIAGNOSIS — Z96.642 STATUS POST TOTAL HIP REPLACEMENT, LEFT: ICD-10-CM

## 2019-01-15 DIAGNOSIS — J20.9 ACUTE BRONCHITIS, UNSPECIFIED ORGANISM: ICD-10-CM

## 2019-01-15 DIAGNOSIS — I48.19 PERSISTENT ATRIAL FIBRILLATION (H): ICD-10-CM

## 2019-01-15 DIAGNOSIS — M62.81 GENERALIZED MUSCLE WEAKNESS: ICD-10-CM

## 2019-01-15 DIAGNOSIS — E11.9 TYPE 2 DIABETES MELLITUS WITHOUT COMPLICATION, WITH LONG-TERM CURRENT USE OF INSULIN (H): ICD-10-CM

## 2019-01-15 DIAGNOSIS — J44.9 CHRONIC OBSTRUCTIVE PULMONARY DISEASE, UNSPECIFIED COPD TYPE (H): ICD-10-CM

## 2019-01-15 DIAGNOSIS — Z79.4 TYPE 2 DIABETES MELLITUS WITHOUT COMPLICATION, WITH LONG-TERM CURRENT USE OF INSULIN (H): ICD-10-CM

## 2019-01-15 DIAGNOSIS — F25.0 SCHIZOAFFECTIVE DISORDER, BIPOLAR TYPE (H): ICD-10-CM

## 2019-01-15 DIAGNOSIS — I50.32 CHRONIC DIASTOLIC CONGESTIVE HEART FAILURE (H): ICD-10-CM

## 2019-01-15 DIAGNOSIS — D62 ACUTE BLOOD LOSS ANEMIA: ICD-10-CM

## 2019-01-15 DIAGNOSIS — F33.9 RECURRENT MAJOR DEPRESSIVE EPISODES (H): ICD-10-CM

## 2019-01-15 DIAGNOSIS — S72.002A CLOSED FRACTURE OF NECK OF LEFT FEMUR, INITIAL ENCOUNTER (H): ICD-10-CM

## 2019-01-15 DIAGNOSIS — J44.1 COPD EXACERBATION (H): ICD-10-CM

## 2019-01-16 ENCOUNTER — RECORDS - HEALTHEAST (OUTPATIENT)
Dept: LAB | Facility: CLINIC | Age: 84
End: 2019-01-16

## 2019-01-17 ENCOUNTER — OFFICE VISIT - HEALTHEAST (OUTPATIENT)
Dept: GERIATRICS | Facility: CLINIC | Age: 84
End: 2019-01-17

## 2019-01-17 ENCOUNTER — RECORDS - HEALTHEAST (OUTPATIENT)
Dept: LAB | Facility: CLINIC | Age: 84
End: 2019-01-17

## 2019-01-17 ENCOUNTER — COMMUNICATION - HEALTHEAST (OUTPATIENT)
Dept: GERIATRICS | Facility: CLINIC | Age: 84
End: 2019-01-17

## 2019-01-17 DIAGNOSIS — E11.9 DIET-CONTROLLED TYPE 2 DIABETES MELLITUS (H): ICD-10-CM

## 2019-01-17 DIAGNOSIS — J41.0 SIMPLE CHRONIC BRONCHITIS (H): ICD-10-CM

## 2019-01-17 DIAGNOSIS — E11.9 TYPE 2 DIABETES MELLITUS WITHOUT COMPLICATION, WITH LONG-TERM CURRENT USE OF INSULIN (H): ICD-10-CM

## 2019-01-17 DIAGNOSIS — D62 ACUTE BLOOD LOSS ANEMIA: ICD-10-CM

## 2019-01-17 DIAGNOSIS — I48.19 PERSISTENT ATRIAL FIBRILLATION (H): ICD-10-CM

## 2019-01-17 DIAGNOSIS — J96.11 CHRONIC RESPIRATORY FAILURE WITH HYPOXIA (H): ICD-10-CM

## 2019-01-17 DIAGNOSIS — Z96.642 STATUS POST TOTAL HIP REPLACEMENT, LEFT: ICD-10-CM

## 2019-01-17 DIAGNOSIS — M62.81 GENERALIZED MUSCLE WEAKNESS: ICD-10-CM

## 2019-01-17 DIAGNOSIS — I50.41 ACUTE COMBINED SYSTOLIC AND DIASTOLIC CHF, NYHA CLASS 1 (H): ICD-10-CM

## 2019-01-17 DIAGNOSIS — Z79.4 TYPE 2 DIABETES MELLITUS WITHOUT COMPLICATION, WITH LONG-TERM CURRENT USE OF INSULIN (H): ICD-10-CM

## 2019-01-17 DIAGNOSIS — I48.20 CHRONIC ATRIAL FIBRILLATION (H): ICD-10-CM

## 2019-01-17 DIAGNOSIS — J20.9 ACUTE BRONCHITIS, UNSPECIFIED ORGANISM: ICD-10-CM

## 2019-01-17 LAB
HGB BLD-MCNC: 8.9 G/DL (ref 12–16)
INR PPP: 2.22 (ref 0.9–1.1)

## 2019-01-21 ENCOUNTER — HOME CARE/HOSPICE - HEALTHEAST (OUTPATIENT)
Dept: HOME HEALTH SERVICES | Facility: HOME HEALTH | Age: 84
End: 2019-01-21

## 2019-01-23 ENCOUNTER — COMMUNICATION - HEALTHEAST (OUTPATIENT)
Dept: GERIATRICS | Facility: CLINIC | Age: 84
End: 2019-01-23

## 2019-01-23 ENCOUNTER — AMBULATORY - HEALTHEAST (OUTPATIENT)
Dept: GERIATRICS | Facility: CLINIC | Age: 84
End: 2019-01-23

## 2019-01-24 ENCOUNTER — HOME CARE/HOSPICE - HEALTHEAST (OUTPATIENT)
Dept: HOME HEALTH SERVICES | Facility: HOME HEALTH | Age: 84
End: 2019-01-24

## 2019-01-24 ENCOUNTER — COMMUNICATION - HEALTHEAST (OUTPATIENT)
Dept: CARE COORDINATION | Facility: CLINIC | Age: 84
End: 2019-01-24

## 2019-01-28 ENCOUNTER — HOME CARE/HOSPICE - HEALTHEAST (OUTPATIENT)
Dept: HOME HEALTH SERVICES | Facility: HOME HEALTH | Age: 84
End: 2019-01-28

## 2019-01-28 ENCOUNTER — PATIENT OUTREACH (OUTPATIENT)
Dept: CARE COORDINATION | Facility: CLINIC | Age: 84
End: 2019-01-28

## 2019-01-29 ENCOUNTER — HOME CARE/HOSPICE - HEALTHEAST (OUTPATIENT)
Dept: HOME HEALTH SERVICES | Facility: HOME HEALTH | Age: 84
End: 2019-01-29

## 2019-01-29 ENCOUNTER — COMMUNICATION - HEALTHEAST (OUTPATIENT)
Dept: SCHEDULING | Facility: CLINIC | Age: 84
End: 2019-01-29

## 2019-01-29 NOTE — PROGRESS NOTES
Clinic Care Coordination Contact    Clinic Care Coordination Contact  OUTREACH    Referral Information:  Referral Source: Home Care    Primary Diagnosis: Orthopedic    Chief Complaint   Patient presents with     Clinic Care Coordination - Post Hospital     DC'd from [facility] on 1/22/19 to home with home care      Chart Review Please       Clinical Concerns:  Current Medical Concerns:  Doing ok since graduating from home care. Using cane outside of the house and walker inside.  Is doing her exercises that she learned during home care. She has not needed her oxygen for the last four days. Her oxygen level has been at 91 or 93. She does have oxygen at home if needed.    She has been having symptoms of A fib. Her heart rate was 142 then goes down to 110 and this has been occurring over the past two weeks.  She is unsure if she has a cardiologist.  CC will route to PCP for assessment.      She may want to get a new walker and will discuss at her PCP appointment in May.  The one she uses is one she bought at an estate sale. It has a seat, but the wheels are hard and it is large.     Has many appointments that make it hard for her to do her hobbies.  Her  assists her.     Current Behavioral Concerns: none identified.     Education Provided to patient: Reviewed role of care coordinator.   Pain  Pain (GOAL):: No  Health Maintenance Reviewed: Due/Overdue       Medication Management:  No concerns noted.      Functional Status:  Dependent ADLs:: Bathing, Ambulation-walker  Dependent IADLs:: Cleaning, Cooking, Laundry, Shopping, Meal Preparation, Medication Management, Transportation  Bed or wheelchair confined:: No  Mobility Status: Independent w/Device  Fallen 2 or more times in the past year?: No  Any fall with injury in the past year?: No    Living Situation:  Current living arrangement:: I live in a private home with spouse  Type of residence:: Private home - stairs    Diet/Exercise/Sleep:  Diet::  Diabetic diet, Low cholesterol, Low saturated fat  Inadequate nutrition (GOAL):: No  Food Insecurity: No  Tube Feeding: No  Exercise:: Currently not exercising  Inadequate activity/exercise (GOAL):: No  Significant changes in sleep pattern (GOAL): No    Transportation:  Transportation concerns (GOAL):: No  Transportation means:: Regular car, Family     Psychosocial:  Mental health DX:: Yes  Mental health DX how managed:: Medication  Mental health management concern (GOAL):: No  Informal Support system:: Family, Spouse     Financial/Insurance:   Financial/Insurance concerns (GOAL):: No  UCARE for Seniors, no financial concerns     Resources and Interventions:  Current Resources:   List of home care services:: Skilled Nursing, Home Health Aid, Physicial Therapy, Occupational Therapy( HC);   Community Resources: Home Care([facility])  Supplies used at home:: Oxygen Tubing/Supplies  Equipment Currently Used at Home: walker, rolling, tub bench    Advance Care Plan/Directive  Advanced Care Plans/Directives on file:: Yes  Type Advanced Care Plans/Directives: Advanced Directive - On File, POLST    Referrals Placed: None    Patient/Caregiver understanding: Patient is trying to do as much as she can around the house to help out so her  doesn't get overwhelmed.  CC will route encounter to PCP for review.  Routed to clinic SW to consider enrolling patient in  care guide services.     Outreach Frequency: monthly    Plan: No further outreach by CC.  Patient to call clinic for further assistance.    Stephania Romeo,   [facility]  [email or web address]  [phone]            Clinic Care Coordination Contact  [facility]/Voicemail    Referral Source: Home Care- Graduated from  Home Care on 2-22. Has had PCP OV and OV with  vascular since then.   Clinical Data: Care Coordinator Outreach  Outreach attempted x 1.  Left message on voicemail with call back information and requested return  call.  Plan:  Care Coordinator will try to reach patient again in 5-10 business days.  Social Autumn Redman  [facility]  [email or web address]  [phone]      Clinic Care Coordination Contact    Situation: Patient chart reviewed by care coordinator.    Background: Patient was discharged from hospital to home with home care. Had PCP office visit yesterday and has restarted on  home care on 1-29.    Assessment: Patient received post hospital call from [facility] and had PCP office visit. No need for CC outreach at this time.     Plan/Recommendations: CC will call when discharged from home care.   Notified  at  asking for contact when discharging.     Social Autumn Redman  [facility]  [email or web address]  [phone]    Clinic Care Coordination Contact    Situation: Patient chart reviewed by care coordinator.    Background: patient discharged from TCU to home with home care through .      Assessment: Was routed encounter to contact. Patient is currently in hospital with cellulitis.     Plan/Recommendations: Will call when notified of hospital discharge.     Social Autumn Redman  [facility]  [email or web address]  [phone]

## 2019-02-03 ENCOUNTER — HOME CARE/HOSPICE - HEALTHEAST (OUTPATIENT)
Dept: HOME HEALTH SERVICES | Facility: HOME HEALTH | Age: 84
End: 2019-02-03

## 2019-02-04 ENCOUNTER — COMMUNICATION - HEALTHEAST (OUTPATIENT)
Dept: CARE COORDINATION | Facility: CLINIC | Age: 84
End: 2019-02-04

## 2019-02-04 ENCOUNTER — OFFICE VISIT - HEALTHEAST (OUTPATIENT)
Dept: INTERNAL MEDICINE | Facility: CLINIC | Age: 84
End: 2019-02-04

## 2019-02-04 ENCOUNTER — HOME CARE/HOSPICE - HEALTHEAST (OUTPATIENT)
Dept: HOME HEALTH SERVICES | Facility: HOME HEALTH | Age: 84
End: 2019-02-04

## 2019-02-04 ENCOUNTER — COMMUNICATION - HEALTHEAST (OUTPATIENT)
Dept: HOME HEALTH SERVICES | Facility: HOME HEALTH | Age: 84
End: 2019-02-04

## 2019-02-04 DIAGNOSIS — J44.1 COPD EXACERBATION (H): ICD-10-CM

## 2019-02-04 LAB
ALBUMIN SERPL-MCNC: 3.2 G/DL (ref 3.5–5)
ALBUMIN UR-MCNC: NEGATIVE MG/DL
ALP SERPL-CCNC: 140 U/L (ref 45–120)
ALT SERPL W P-5'-P-CCNC: <9 U/L (ref 0–45)
ANION GAP SERPL CALCULATED.3IONS-SCNC: 12 MMOL/L (ref 5–18)
APPEARANCE UR: CLEAR
AST SERPL W P-5'-P-CCNC: 14 U/L (ref 0–40)
BILIRUB SERPL-MCNC: 0.4 MG/DL (ref 0–1)
BILIRUB UR QL STRIP: NEGATIVE
BUN SERPL-MCNC: 7 MG/DL (ref 8–28)
CALCIUM SERPL-MCNC: 8.8 MG/DL (ref 8.5–10.5)
CHLORIDE BLD-SCNC: 100 MMOL/L (ref 98–107)
CO2 SERPL-SCNC: 26 MMOL/L (ref 22–31)
COLOR UR AUTO: YELLOW
CREAT SERPL-MCNC: 0.7 MG/DL (ref 0.6–1.1)
ERYTHROCYTE [DISTWIDTH] IN BLOOD BY AUTOMATED COUNT: 18.9 % (ref 11–14.5)
GFR SERPL CREATININE-BSD FRML MDRD: >60 ML/MIN/1.73M2
GLUCOSE BLD-MCNC: 136 MG/DL (ref 70–125)
GLUCOSE UR STRIP-MCNC: NEGATIVE MG/DL
HCT VFR BLD AUTO: 34 % (ref 35–47)
HGB BLD-MCNC: 11.1 G/DL (ref 12–16)
HGB UR QL STRIP: NEGATIVE
KETONES UR STRIP-MCNC: NEGATIVE MG/DL
LEUKOCYTE ESTERASE UR QL STRIP: NEGATIVE
MCH RBC QN AUTO: 26.4 PG (ref 27–34)
MCHC RBC AUTO-ENTMCNC: 32.7 G/DL (ref 32–36)
MCV RBC AUTO: 81 FL (ref 80–100)
NITRATE UR QL: NEGATIVE
PH UR STRIP: 7 [PH] (ref 5–8)
PLATELET # BLD AUTO: 411 THOU/UL (ref 140–440)
PMV BLD AUTO: 7.4 FL (ref 7–10)
POTASSIUM BLD-SCNC: 4 MMOL/L (ref 3.5–5)
PROT SERPL-MCNC: 6.3 G/DL (ref 6–8)
RBC # BLD AUTO: 4.2 MILL/UL (ref 3.8–5.4)
SODIUM SERPL-SCNC: 138 MMOL/L (ref 136–145)
SP GR UR STRIP: 1.01 (ref 1–1.03)
UROBILINOGEN UR STRIP-ACNC: NORMAL
WBC: 7.8 THOU/UL (ref 4–11)

## 2019-02-04 ASSESSMENT — MIFFLIN-ST. JEOR: SCORE: 1099.99

## 2019-02-05 ENCOUNTER — HOME CARE/HOSPICE - HEALTHEAST (OUTPATIENT)
Dept: HOME HEALTH SERVICES | Facility: HOME HEALTH | Age: 84
End: 2019-02-05

## 2019-02-05 ENCOUNTER — COMMUNICATION - HEALTHEAST (OUTPATIENT)
Dept: INTERNAL MEDICINE | Facility: CLINIC | Age: 84
End: 2019-02-05

## 2019-02-05 ASSESSMENT — ACTIVITIES OF DAILY LIVING (ADL): DEPENDENT_IADLS:: CLEANING;COOKING;LAUNDRY;SHOPPING;MEAL PREPARATION;MEDICATION MANAGEMENT;TRANSPORTATION

## 2019-02-05 ASSESSMENT — MIFFLIN-ST. JEOR: SCORE: 1081.85

## 2019-02-06 ENCOUNTER — HOME CARE/HOSPICE - HEALTHEAST (OUTPATIENT)
Dept: HOME HEALTH SERVICES | Facility: HOME HEALTH | Age: 84
End: 2019-02-06

## 2019-02-06 ENCOUNTER — COMMUNICATION - HEALTHEAST (OUTPATIENT)
Dept: INTERNAL MEDICINE | Facility: CLINIC | Age: 84
End: 2019-02-06

## 2019-02-06 ENCOUNTER — COMMUNICATION - HEALTHEAST (OUTPATIENT)
Dept: HOME HEALTH SERVICES | Facility: HOME HEALTH | Age: 84
End: 2019-02-06

## 2019-02-08 ENCOUNTER — HOME CARE/HOSPICE - HEALTHEAST (OUTPATIENT)
Dept: HOME HEALTH SERVICES | Facility: HOME HEALTH | Age: 84
End: 2019-02-08

## 2019-02-11 ENCOUNTER — HOME CARE/HOSPICE - HEALTHEAST (OUTPATIENT)
Dept: HOME HEALTH SERVICES | Facility: HOME HEALTH | Age: 84
End: 2019-02-11

## 2019-02-13 ENCOUNTER — HOME CARE/HOSPICE - HEALTHEAST (OUTPATIENT)
Dept: HOME HEALTH SERVICES | Facility: HOME HEALTH | Age: 84
End: 2019-02-13

## 2019-02-14 ENCOUNTER — HOME CARE/HOSPICE - HEALTHEAST (OUTPATIENT)
Dept: HOME HEALTH SERVICES | Facility: HOME HEALTH | Age: 84
End: 2019-02-14

## 2019-02-18 ENCOUNTER — HOME CARE/HOSPICE - HEALTHEAST (OUTPATIENT)
Dept: HOME HEALTH SERVICES | Facility: HOME HEALTH | Age: 84
End: 2019-02-18

## 2019-02-19 ENCOUNTER — HOME CARE/HOSPICE - HEALTHEAST (OUTPATIENT)
Dept: HOME HEALTH SERVICES | Facility: HOME HEALTH | Age: 84
End: 2019-02-19

## 2019-02-21 ENCOUNTER — COMMUNICATION - HEALTHEAST (OUTPATIENT)
Dept: CARE COORDINATION | Facility: CLINIC | Age: 84
End: 2019-02-21

## 2019-02-21 ENCOUNTER — HOME CARE/HOSPICE - HEALTHEAST (OUTPATIENT)
Dept: HOME HEALTH SERVICES | Facility: HOME HEALTH | Age: 84
End: 2019-02-21

## 2019-02-22 ENCOUNTER — COMMUNICATION - HEALTHEAST (OUTPATIENT)
Dept: INTERNAL MEDICINE | Facility: CLINIC | Age: 84
End: 2019-02-22

## 2019-02-22 ENCOUNTER — AMBULATORY - HEALTHEAST (OUTPATIENT)
Dept: INTERNAL MEDICINE | Facility: CLINIC | Age: 84
End: 2019-02-22

## 2019-02-22 ENCOUNTER — HOME CARE/HOSPICE - HEALTHEAST (OUTPATIENT)
Dept: HOME HEALTH SERVICES | Facility: HOME HEALTH | Age: 84
End: 2019-02-22

## 2019-02-27 ENCOUNTER — COMMUNICATION - HEALTHEAST (OUTPATIENT)
Dept: INTERNAL MEDICINE | Facility: CLINIC | Age: 84
End: 2019-02-27

## 2019-03-01 ENCOUNTER — COMMUNICATION - HEALTHEAST (OUTPATIENT)
Dept: INTERNAL MEDICINE | Facility: CLINIC | Age: 84
End: 2019-03-01

## 2019-03-04 ENCOUNTER — OFFICE VISIT - HEALTHEAST (OUTPATIENT)
Dept: VASCULAR SURGERY | Facility: CLINIC | Age: 84
End: 2019-03-04

## 2019-03-04 ENCOUNTER — OFFICE VISIT - HEALTHEAST (OUTPATIENT)
Dept: PULMONOLOGY | Facility: OTHER | Age: 84
End: 2019-03-04

## 2019-03-04 DIAGNOSIS — E11.9 TYPE 2 DIABETES MELLITUS WITHOUT COMPLICATION, WITH LONG-TERM CURRENT USE OF INSULIN (H): ICD-10-CM

## 2019-03-04 DIAGNOSIS — R60.9 DEPENDENT EDEMA: ICD-10-CM

## 2019-03-04 DIAGNOSIS — I89.0 SECONDARY LYMPHEDEMA: ICD-10-CM

## 2019-03-04 DIAGNOSIS — I50.9 HEART FAILURE, UNSPECIFIED HF CHRONICITY, UNSPECIFIED HEART FAILURE TYPE (H): ICD-10-CM

## 2019-03-04 DIAGNOSIS — I50.32 CHRONIC DIASTOLIC HEART FAILURE (H): ICD-10-CM

## 2019-03-04 DIAGNOSIS — J44.1 COPD EXACERBATION (H): ICD-10-CM

## 2019-03-04 DIAGNOSIS — I87.303 VENOUS HYPERTENSION OF BOTH LOWER EXTREMITIES: ICD-10-CM

## 2019-03-04 DIAGNOSIS — Z86.718 HISTORY OF DVT (DEEP VEIN THROMBOSIS): ICD-10-CM

## 2019-03-04 DIAGNOSIS — J44.9 CHRONIC OBSTRUCTIVE PULMONARY DISEASE, UNSPECIFIED COPD TYPE (H): ICD-10-CM

## 2019-03-04 DIAGNOSIS — Z79.4 TYPE 2 DIABETES MELLITUS WITHOUT COMPLICATION, WITH LONG-TERM CURRENT USE OF INSULIN (H): ICD-10-CM

## 2019-03-04 DIAGNOSIS — J41.0 SIMPLE CHRONIC BRONCHITIS (H): ICD-10-CM

## 2019-03-08 ENCOUNTER — AMBULATORY - HEALTHEAST (OUTPATIENT)
Dept: INTERNAL MEDICINE | Facility: CLINIC | Age: 84
End: 2019-03-08

## 2019-03-08 ENCOUNTER — COMMUNICATION - HEALTHEAST (OUTPATIENT)
Dept: SCHEDULING | Facility: CLINIC | Age: 84
End: 2019-03-08

## 2019-03-08 DIAGNOSIS — R00.0 TACHYCARDIA: ICD-10-CM

## 2019-03-08 DIAGNOSIS — I49.9 IRREGULAR CARDIAC RHYTHM: ICD-10-CM

## 2019-03-08 ASSESSMENT — ACTIVITIES OF DAILY LIVING (ADL): DEPENDENT_IADLS:: CLEANING;COOKING;LAUNDRY;SHOPPING;MEAL PREPARATION;MEDICATION MANAGEMENT;TRANSPORTATION

## 2019-03-11 ENCOUNTER — COMMUNICATION - HEALTHEAST (OUTPATIENT)
Dept: INTERNAL MEDICINE | Facility: CLINIC | Age: 84
End: 2019-03-11

## 2019-03-11 DIAGNOSIS — I50.33 ACUTE ON CHRONIC DIASTOLIC HEART FAILURE (H): ICD-10-CM

## 2019-03-12 ENCOUNTER — COMMUNICATION - HEALTHEAST (OUTPATIENT)
Dept: INTERNAL MEDICINE | Facility: CLINIC | Age: 84
End: 2019-03-12

## 2019-03-15 ENCOUNTER — OFFICE VISIT - HEALTHEAST (OUTPATIENT)
Dept: VASCULAR SURGERY | Facility: CLINIC | Age: 84
End: 2019-03-15

## 2019-03-15 DIAGNOSIS — I89.0 SECONDARY LYMPHEDEMA: ICD-10-CM

## 2019-03-15 DIAGNOSIS — E11.9 TYPE 2 DIABETES MELLITUS WITHOUT COMPLICATION, WITH LONG-TERM CURRENT USE OF INSULIN (H): ICD-10-CM

## 2019-03-15 DIAGNOSIS — Z79.4 TYPE 2 DIABETES MELLITUS WITHOUT COMPLICATION, WITH LONG-TERM CURRENT USE OF INSULIN (H): ICD-10-CM

## 2019-03-15 DIAGNOSIS — I50.32 CHRONIC DIASTOLIC HEART FAILURE (H): ICD-10-CM

## 2019-03-15 DIAGNOSIS — R60.9 DEPENDENT EDEMA: ICD-10-CM

## 2019-03-15 DIAGNOSIS — Z86.718 HISTORY OF DVT (DEEP VEIN THROMBOSIS): ICD-10-CM

## 2019-03-15 DIAGNOSIS — E87.70 HYPERVOLEMIA, UNSPECIFIED HYPERVOLEMIA TYPE: ICD-10-CM

## 2019-03-15 DIAGNOSIS — I87.303 VENOUS HYPERTENSION OF BOTH LOWER EXTREMITIES: ICD-10-CM

## 2019-03-15 ASSESSMENT — MIFFLIN-ST. JEOR: SCORE: 1041.02

## 2019-03-25 ENCOUNTER — AMBULATORY - HEALTHEAST (OUTPATIENT)
Dept: INTERNAL MEDICINE | Facility: CLINIC | Age: 84
End: 2019-03-25

## 2019-03-25 ENCOUNTER — COMMUNICATION - HEALTHEAST (OUTPATIENT)
Dept: INTERNAL MEDICINE | Facility: CLINIC | Age: 84
End: 2019-03-25

## 2019-03-25 DIAGNOSIS — E53.8 VITAMIN B12 DEFICIENCY (NON ANEMIC): ICD-10-CM

## 2019-03-26 ENCOUNTER — AMBULATORY - HEALTHEAST (OUTPATIENT)
Dept: NURSING | Facility: CLINIC | Age: 84
End: 2019-03-26

## 2019-03-26 ENCOUNTER — COMMUNICATION - HEALTHEAST (OUTPATIENT)
Dept: INTERNAL MEDICINE | Facility: CLINIC | Age: 84
End: 2019-03-26

## 2019-03-26 ENCOUNTER — COMMUNICATION - HEALTHEAST (OUTPATIENT)
Dept: MEDSURG UNIT | Facility: CLINIC | Age: 84
End: 2019-03-26

## 2019-03-26 DIAGNOSIS — I50.32 CHRONIC DIASTOLIC CONGESTIVE HEART FAILURE (H): ICD-10-CM

## 2019-04-03 ENCOUNTER — COMMUNICATION - HEALTHEAST (OUTPATIENT)
Dept: PULMONOLOGY | Facility: OTHER | Age: 84
End: 2019-04-03

## 2019-04-03 DIAGNOSIS — J44.9 COPD (CHRONIC OBSTRUCTIVE PULMONARY DISEASE) (H): ICD-10-CM

## 2019-04-08 ENCOUNTER — OFFICE VISIT - HEALTHEAST (OUTPATIENT)
Dept: CARDIOLOGY | Facility: CLINIC | Age: 84
End: 2019-04-08

## 2019-04-08 DIAGNOSIS — I50.32 CHRONIC DIASTOLIC CONGESTIVE HEART FAILURE (H): ICD-10-CM

## 2019-04-08 DIAGNOSIS — J41.0 SIMPLE CHRONIC BRONCHITIS (H): ICD-10-CM

## 2019-04-08 DIAGNOSIS — L03.116 CELLULITIS OF LEFT LOWER EXTREMITY: ICD-10-CM

## 2019-04-08 DIAGNOSIS — I27.20 PULMONARY HYPERTENSION (H): ICD-10-CM

## 2019-04-08 DIAGNOSIS — I82.A12 DVT OF AXILLARY VEIN, ACUTE LEFT (H): ICD-10-CM

## 2019-04-08 DIAGNOSIS — I10 ESSENTIAL HYPERTENSION: ICD-10-CM

## 2019-04-08 DIAGNOSIS — I48.20 CHRONIC ATRIAL FIBRILLATION (H): ICD-10-CM

## 2019-04-08 DIAGNOSIS — S72.002A FRACTURE OF FEMORAL NECK, LEFT (H): ICD-10-CM

## 2019-04-08 LAB
BNP SERPL-MCNC: 131 PG/ML (ref 0–159)
HGB BLD-MCNC: 13.7 G/DL (ref 12–16)
POTASSIUM BLD-SCNC: 4.2 MMOL/L (ref 3.5–5)

## 2019-04-08 ASSESSMENT — MIFFLIN-ST. JEOR: SCORE: 1054.63

## 2019-04-10 ENCOUNTER — COMMUNICATION - HEALTHEAST (OUTPATIENT)
Dept: INTERNAL MEDICINE | Facility: CLINIC | Age: 84
End: 2019-04-10

## 2019-04-10 DIAGNOSIS — Z00.00 ROUTINE GENERAL MEDICAL EXAMINATION AT A HEALTH CARE FACILITY: ICD-10-CM

## 2019-04-12 ENCOUNTER — COMMUNICATION - HEALTHEAST (OUTPATIENT)
Dept: INTERNAL MEDICINE | Facility: CLINIC | Age: 84
End: 2019-04-12

## 2019-04-12 DIAGNOSIS — I50.33 ACUTE ON CHRONIC DIASTOLIC HEART FAILURE (H): ICD-10-CM

## 2019-04-16 ENCOUNTER — AMBULATORY - HEALTHEAST (OUTPATIENT)
Dept: PULMONOLOGY | Facility: OTHER | Age: 84
End: 2019-04-16

## 2019-04-16 ENCOUNTER — COMMUNICATION - HEALTHEAST (OUTPATIENT)
Dept: PULMONOLOGY | Facility: OTHER | Age: 84
End: 2019-04-16

## 2019-04-16 ENCOUNTER — OFFICE VISIT - HEALTHEAST (OUTPATIENT)
Dept: PULMONOLOGY | Facility: OTHER | Age: 84
End: 2019-04-16

## 2019-04-16 ENCOUNTER — HOSPITAL ENCOUNTER (OUTPATIENT)
Dept: RADIOLOGY | Facility: HOSPITAL | Age: 84
Discharge: HOME | End: 2019-04-16
Attending: INTERNAL MEDICINE

## 2019-04-16 DIAGNOSIS — J44.1 COPD EXACERBATION (H): ICD-10-CM

## 2019-04-16 DIAGNOSIS — J47.1 BRONCHIECTASIS WITH (ACUTE) EXACERBATION (H): ICD-10-CM

## 2019-04-16 LAB
BASOPHILS # BLD AUTO: 0.1 THOU/UL (ref 0–0.2)
BASOPHILS NFR BLD AUTO: 0 % (ref 0–2)
EOSINOPHIL # BLD AUTO: 0.2 THOU/UL (ref 0–0.4)
EOSINOPHIL NFR BLD AUTO: 2 % (ref 0–6)
ERYTHROCYTE [DISTWIDTH] IN BLOOD BY AUTOMATED COUNT: 16.4 % (ref 11–14.5)
HCT VFR BLD AUTO: 39.3 % (ref 35–47)
HGB BLD-MCNC: 12.6 G/DL (ref 12–16)
IGA SERPL-MCNC: 121 MG/DL (ref 65–400)
IGA SERPL-MCNC: 873 MG/DL
IGM SERPL-MCNC: 46 MG/DL (ref 60–280)
LYMPHOCYTES # BLD AUTO: 1.3 THOU/UL (ref 0.8–4.4)
LYMPHOCYTES NFR BLD AUTO: 10 % (ref 20–40)
MCH RBC QN AUTO: 26.9 PG (ref 27–34)
MCHC RBC AUTO-ENTMCNC: 32.1 G/DL (ref 32–36)
MCV RBC AUTO: 84 FL (ref 80–100)
MONOCYTES # BLD AUTO: 0.9 THOU/UL (ref 0–0.9)
MONOCYTES NFR BLD AUTO: 7 % (ref 2–10)
NEUTROPHILS # BLD AUTO: 10.3 THOU/UL (ref 2–7.7)
NEUTROPHILS NFR BLD AUTO: 81 % (ref 50–70)
PLATELET # BLD AUTO: 255 THOU/UL (ref 140–440)
PMV BLD AUTO: 10.6 FL (ref 8.5–12.5)
RBC # BLD AUTO: 4.68 MILL/UL (ref 3.8–5.4)
RHEUMATOID FACT SERPL-ACNC: <15 IU/ML (ref 0–30)
WBC: 12.9 THOU/UL (ref 4–11)

## 2019-04-16 ASSESSMENT — MIFFLIN-ST. JEOR: SCORE: 1050.1

## 2019-04-17 ENCOUNTER — AMBULATORY - HEALTHEAST (OUTPATIENT)
Dept: LAB | Facility: CLINIC | Age: 84
End: 2019-04-17

## 2019-04-17 DIAGNOSIS — J47.1 BRONCHIECTASIS WITH (ACUTE) EXACERBATION (H): ICD-10-CM

## 2019-04-18 ENCOUNTER — HOSPITAL ENCOUNTER (OUTPATIENT)
Dept: RESPIRATORY THERAPY | Facility: CLINIC | Age: 84
Discharge: HOME | End: 2019-04-18
Attending: INTERNAL MEDICINE

## 2019-04-19 LAB
IGG SERPL-MCNC: 781 MG/DL (ref 695–1620)
IGG1 SER-MCNC: 480 MG/DL (ref 300–856)
IGG2 SER-MCNC: 271 MG/DL (ref 158–761)
IGG3 SER-MCNC: 27 MG/DL (ref 24–192)
IGG4 SER-MCNC: 47 MG/DL (ref 11–86)

## 2019-04-20 LAB
BACTERIA SPEC CULT: ABNORMAL
BACTERIA SPEC CULT: ABNORMAL
GRAM STAIN RESULT: ABNORMAL

## 2019-04-25 ENCOUNTER — OFFICE VISIT - HEALTHEAST (OUTPATIENT)
Dept: PULMONOLOGY | Facility: OTHER | Age: 84
End: 2019-04-25

## 2019-04-25 DIAGNOSIS — J47.1 BRONCHIECTASIS WITH ACUTE EXACERBATION (H): ICD-10-CM

## 2019-04-25 DIAGNOSIS — J15.1 PNEUMONIA DUE TO PSEUDOMONAS SPECIES, UNSPECIFIED LATERALITY, UNSPECIFIED PART OF LUNG (H): ICD-10-CM

## 2019-04-26 ENCOUNTER — AMBULATORY - HEALTHEAST (OUTPATIENT)
Dept: NURSING | Facility: CLINIC | Age: 84
End: 2019-04-26

## 2019-05-06 ENCOUNTER — OFFICE VISIT - HEALTHEAST (OUTPATIENT)
Dept: INTERNAL MEDICINE | Facility: CLINIC | Age: 84
End: 2019-05-06

## 2019-05-06 DIAGNOSIS — J44.9 CHRONIC OBSTRUCTIVE PULMONARY DISEASE, UNSPECIFIED COPD TYPE (H): ICD-10-CM

## 2019-05-06 ASSESSMENT — MIFFLIN-ST. JEOR: SCORE: 1031.95

## 2019-05-07 ENCOUNTER — OFFICE VISIT - HEALTHEAST (OUTPATIENT)
Dept: FAMILY MEDICINE | Facility: CLINIC | Age: 84
End: 2019-05-07

## 2019-05-07 ENCOUNTER — COMMUNICATION - HEALTHEAST (OUTPATIENT)
Dept: SCHEDULING | Facility: CLINIC | Age: 84
End: 2019-05-07

## 2019-05-07 DIAGNOSIS — A46 ERYSIPELAS: ICD-10-CM

## 2019-05-08 LAB
BACTERIA SPEC CULT: ABNORMAL
BACTERIA SPEC CULT: ABNORMAL

## 2019-05-09 ENCOUNTER — COMMUNICATION - HEALTHEAST (OUTPATIENT)
Dept: INTERNAL MEDICINE | Facility: CLINIC | Age: 84
End: 2019-05-09

## 2019-05-09 DIAGNOSIS — I50.33 ACUTE ON CHRONIC DIASTOLIC HEART FAILURE (H): ICD-10-CM

## 2019-05-09 DIAGNOSIS — Z00.00 ROUTINE GENERAL MEDICAL EXAMINATION AT A HEALTH CARE FACILITY: ICD-10-CM

## 2019-05-14 ENCOUNTER — OFFICE VISIT - HEALTHEAST (OUTPATIENT)
Dept: INFECTIOUS DISEASES | Facility: CLINIC | Age: 84
End: 2019-05-14

## 2019-05-14 DIAGNOSIS — J44.0 CHRONIC OBSTRUCTIVE PULMONARY DISEASE WITH ACUTE LOWER RESPIRATORY INFECTION (H): ICD-10-CM

## 2019-05-14 ASSESSMENT — MIFFLIN-ST. JEOR: SCORE: 1044.2

## 2019-05-21 ENCOUNTER — COMMUNICATION - HEALTHEAST (OUTPATIENT)
Dept: INTERNAL MEDICINE | Facility: CLINIC | Age: 84
End: 2019-05-21

## 2019-05-21 DIAGNOSIS — I50.33 ACUTE ON CHRONIC DIASTOLIC HEART FAILURE (H): ICD-10-CM

## 2019-05-24 ENCOUNTER — COMMUNICATION - HEALTHEAST (OUTPATIENT)
Dept: INTERNAL MEDICINE | Facility: CLINIC | Age: 84
End: 2019-05-24

## 2019-05-24 ENCOUNTER — AMBULATORY - HEALTHEAST (OUTPATIENT)
Dept: INTERNAL MEDICINE | Facility: CLINIC | Age: 84
End: 2019-05-24

## 2019-05-24 ENCOUNTER — AMBULATORY - HEALTHEAST (OUTPATIENT)
Dept: NURSING | Facility: CLINIC | Age: 84
End: 2019-05-24

## 2019-05-24 DIAGNOSIS — E53.8 VITAMIN B12 DEFICIENCY (NON ANEMIC): ICD-10-CM

## 2019-05-28 ENCOUNTER — COMMUNICATION - HEALTHEAST (OUTPATIENT)
Dept: PULMONOLOGY | Facility: OTHER | Age: 84
End: 2019-05-28

## 2019-05-28 DIAGNOSIS — J47.1 BRONCHIECTASIS WITH ACUTE EXACERBATION (H): ICD-10-CM

## 2019-05-29 LAB
ACID FAST STN SPEC QL: NORMAL
BACTERIA SPEC CULT: NORMAL

## 2019-06-14 ENCOUNTER — OFFICE VISIT - HEALTHEAST (OUTPATIENT)
Dept: PULMONOLOGY | Facility: OTHER | Age: 84
End: 2019-06-14

## 2019-06-14 ENCOUNTER — RECORDS - HEALTHEAST (OUTPATIENT)
Dept: ADMINISTRATIVE | Facility: OTHER | Age: 84
End: 2019-06-14

## 2019-06-14 DIAGNOSIS — J47.9 BRONCHIECTASIS WITHOUT ACUTE EXACERBATION (H): ICD-10-CM

## 2019-06-14 DIAGNOSIS — J15.1 PNEUMONIA DUE TO PSEUDOMONAS SPECIES, UNSPECIFIED LATERALITY, UNSPECIFIED PART OF LUNG (H): ICD-10-CM

## 2019-06-14 ASSESSMENT — MIFFLIN-ST. JEOR: SCORE: 1041.02

## 2019-06-17 ENCOUNTER — COMMUNICATION - HEALTHEAST (OUTPATIENT)
Dept: INTERNAL MEDICINE | Facility: CLINIC | Age: 84
End: 2019-06-17

## 2019-06-17 DIAGNOSIS — Z00.00 ROUTINE GENERAL MEDICAL EXAMINATION AT A HEALTH CARE FACILITY: ICD-10-CM

## 2019-06-24 ENCOUNTER — AMBULATORY - HEALTHEAST (OUTPATIENT)
Dept: NURSING | Facility: CLINIC | Age: 84
End: 2019-06-24

## 2019-07-02 ENCOUNTER — RECORDS - HEALTHEAST (OUTPATIENT)
Dept: ADMINISTRATIVE | Facility: OTHER | Age: 84
End: 2019-07-02

## 2019-07-15 ENCOUNTER — COMMUNICATION - HEALTHEAST (OUTPATIENT)
Dept: PULMONOLOGY | Facility: OTHER | Age: 84
End: 2019-07-15

## 2019-07-18 ENCOUNTER — COMMUNICATION - HEALTHEAST (OUTPATIENT)
Dept: INTERNAL MEDICINE | Facility: CLINIC | Age: 84
End: 2019-07-18

## 2019-07-18 DIAGNOSIS — Z00.00 ROUTINE GENERAL MEDICAL EXAMINATION AT A HEALTH CARE FACILITY: ICD-10-CM

## 2019-07-24 ENCOUNTER — AMBULATORY - HEALTHEAST (OUTPATIENT)
Dept: NURSING | Facility: CLINIC | Age: 84
End: 2019-07-24

## 2019-07-26 ENCOUNTER — COMMUNICATION - HEALTHEAST (OUTPATIENT)
Dept: INTENSIVE CARE | Facility: CLINIC | Age: 84
End: 2019-07-26

## 2019-07-26 DIAGNOSIS — I48.0 PAF (PAROXYSMAL ATRIAL FIBRILLATION) (H): ICD-10-CM

## 2019-07-26 DIAGNOSIS — I50.33 ACUTE ON CHRONIC DIASTOLIC HEART FAILURE (H): ICD-10-CM

## 2019-08-02 ENCOUNTER — COMMUNICATION - HEALTHEAST (OUTPATIENT)
Dept: INTERNAL MEDICINE | Facility: CLINIC | Age: 84
End: 2019-08-02

## 2019-08-06 ENCOUNTER — OFFICE VISIT - HEALTHEAST (OUTPATIENT)
Dept: INTERNAL MEDICINE | Facility: CLINIC | Age: 84
End: 2019-08-06

## 2019-08-06 DIAGNOSIS — I10 ESSENTIAL HYPERTENSION: ICD-10-CM

## 2019-08-06 DIAGNOSIS — Q07.00 ARNOLD-CHIARI MALFORMATION (H): ICD-10-CM

## 2019-08-06 DIAGNOSIS — M87.00 AVASCULAR NECROSIS OF BONE (H): ICD-10-CM

## 2019-08-06 ASSESSMENT — MIFFLIN-ST. JEOR: SCORE: 1004.74

## 2019-08-08 ENCOUNTER — COMMUNICATION - HEALTHEAST (OUTPATIENT)
Dept: INTERNAL MEDICINE | Facility: CLINIC | Age: 84
End: 2019-08-08

## 2019-08-08 DIAGNOSIS — J44.9 COPD (CHRONIC OBSTRUCTIVE PULMONARY DISEASE) (H): ICD-10-CM

## 2019-08-13 ENCOUNTER — RECORDS - HEALTHEAST (OUTPATIENT)
Dept: ADMINISTRATIVE | Facility: OTHER | Age: 84
End: 2019-08-13

## 2019-08-14 ENCOUNTER — COMMUNICATION - HEALTHEAST (OUTPATIENT)
Dept: INTERNAL MEDICINE | Facility: CLINIC | Age: 84
End: 2019-08-14

## 2019-08-19 ENCOUNTER — COMMUNICATION - HEALTHEAST (OUTPATIENT)
Dept: INTERNAL MEDICINE | Facility: CLINIC | Age: 84
End: 2019-08-19

## 2019-08-19 DIAGNOSIS — Z00.00 ROUTINE GENERAL MEDICAL EXAMINATION AT A HEALTH CARE FACILITY: ICD-10-CM

## 2019-08-20 ASSESSMENT — MIFFLIN-ST. JEOR: SCORE: 1054.63

## 2019-08-21 ENCOUNTER — HOSPITAL ENCOUNTER (OUTPATIENT)
Dept: MAMMOGRAPHY | Facility: CLINIC | Age: 84
Discharge: HOME | End: 2019-08-21
Attending: INTERNAL MEDICINE

## 2019-08-21 DIAGNOSIS — Z12.31 VISIT FOR SCREENING MAMMOGRAM: ICD-10-CM

## 2019-08-22 ENCOUNTER — OFFICE VISIT - HEALTHEAST (OUTPATIENT)
Dept: INTERNAL MEDICINE | Facility: CLINIC | Age: 84
End: 2019-08-22

## 2019-08-22 DIAGNOSIS — Z01.818 PREOPERATIVE EXAMINATION: ICD-10-CM

## 2019-08-22 LAB
ALBUMIN SERPL-MCNC: 3.3 G/DL (ref 3.5–5)
ALBUMIN UR-MCNC: NEGATIVE MG/DL
ALP SERPL-CCNC: 112 U/L (ref 45–120)
ALT SERPL W P-5'-P-CCNC: 15 U/L (ref 0–45)
ANION GAP SERPL CALCULATED.3IONS-SCNC: 10 MMOL/L (ref 5–18)
APPEARANCE UR: CLEAR
AST SERPL W P-5'-P-CCNC: 17 U/L (ref 0–40)
BACTERIA #/AREA URNS HPF: ABNORMAL HPF
BILIRUB SERPL-MCNC: 0.3 MG/DL (ref 0–1)
BILIRUB UR QL STRIP: NEGATIVE
BUN SERPL-MCNC: 16 MG/DL (ref 8–28)
CALCIUM SERPL-MCNC: 9.1 MG/DL (ref 8.5–10.5)
CHLORIDE BLD-SCNC: 103 MMOL/L (ref 98–107)
CO2 SERPL-SCNC: 28 MMOL/L (ref 22–31)
COLOR UR AUTO: YELLOW
CREAT SERPL-MCNC: 0.82 MG/DL (ref 0.6–1.1)
ERYTHROCYTE [DISTWIDTH] IN BLOOD BY AUTOMATED COUNT: 13.3 % (ref 11–14.5)
GFR SERPL CREATININE-BSD FRML MDRD: >60 ML/MIN/1.73M2
GLUCOSE BLD-MCNC: 105 MG/DL (ref 70–125)
GLUCOSE UR STRIP-MCNC: NEGATIVE MG/DL
HCT VFR BLD AUTO: 43.5 % (ref 35–47)
HGB BLD-MCNC: 14.4 G/DL (ref 12–16)
HGB UR QL STRIP: ABNORMAL
KETONES UR STRIP-MCNC: NEGATIVE MG/DL
LEUKOCYTE ESTERASE UR QL STRIP: ABNORMAL
MCH RBC QN AUTO: 29 PG (ref 27–34)
MCHC RBC AUTO-ENTMCNC: 33 G/DL (ref 32–36)
MCV RBC AUTO: 88 FL (ref 80–100)
MUCOUS THREADS #/AREA URNS LPF: ABNORMAL LPF
NITRATE UR QL: NEGATIVE
PH UR STRIP: 7 [PH] (ref 5–8)
PLATELET # BLD AUTO: 286 THOU/UL (ref 140–440)
PMV BLD AUTO: 8.3 FL (ref 7–10)
POTASSIUM BLD-SCNC: 4.5 MMOL/L (ref 3.5–5)
PROT SERPL-MCNC: 6.5 G/DL (ref 6–8)
RBC # BLD AUTO: 4.96 MILL/UL (ref 3.8–5.4)
RBC #/AREA URNS AUTO: ABNORMAL HPF
SODIUM SERPL-SCNC: 141 MMOL/L (ref 136–145)
SP GR UR STRIP: 1.01 (ref 1–1.03)
SQUAMOUS #/AREA URNS AUTO: ABNORMAL LPF
UROBILINOGEN UR STRIP-ACNC: ABNORMAL
WBC #/AREA URNS AUTO: ABNORMAL HPF
WBC: 10 THOU/UL (ref 4–11)

## 2019-08-22 ASSESSMENT — MIFFLIN-ST. JEOR: SCORE: 1009.27

## 2019-08-23 ENCOUNTER — AMBULATORY - HEALTHEAST (OUTPATIENT)
Dept: NURSING | Facility: CLINIC | Age: 84
End: 2019-08-23

## 2019-08-23 ENCOUNTER — COMMUNICATION - HEALTHEAST (OUTPATIENT)
Dept: INTERNAL MEDICINE | Facility: CLINIC | Age: 84
End: 2019-08-23

## 2019-08-23 LAB
ATRIAL RATE - MUSE: 88 BPM
BACTERIA SPEC CULT: NO GROWTH
DIASTOLIC BLOOD PRESSURE - MUSE: NORMAL MMHG
INTERPRETATION ECG - MUSE: NORMAL
P AXIS - MUSE: NORMAL DEGREES
PR INTERVAL - MUSE: NORMAL MS
QRS DURATION - MUSE: 74 MS
QT - MUSE: 372 MS
QTC - MUSE: 445 MS
R AXIS - MUSE: 7 DEGREES
SYSTOLIC BLOOD PRESSURE - MUSE: NORMAL MMHG
T AXIS - MUSE: 0 DEGREES
VENTRICULAR RATE- MUSE: 86 BPM

## 2019-08-27 ENCOUNTER — RECORDS - HEALTHEAST (OUTPATIENT)
Dept: ADMINISTRATIVE | Facility: OTHER | Age: 84
End: 2019-08-27

## 2019-08-27 ENCOUNTER — COMMUNICATION - HEALTHEAST (OUTPATIENT)
Dept: INTERNAL MEDICINE | Facility: CLINIC | Age: 84
End: 2019-08-27

## 2019-08-27 DIAGNOSIS — I48.0 PAF (PAROXYSMAL ATRIAL FIBRILLATION) (H): ICD-10-CM

## 2019-08-27 DIAGNOSIS — I50.33 ACUTE ON CHRONIC DIASTOLIC HEART FAILURE (H): ICD-10-CM

## 2019-08-29 ENCOUNTER — SURGERY - HEALTHEAST (OUTPATIENT)
Dept: SURGERY | Facility: CLINIC | Age: 84
End: 2019-08-29

## 2019-08-29 ENCOUNTER — ANESTHESIA - HEALTHEAST (OUTPATIENT)
Dept: SURGERY | Facility: CLINIC | Age: 84
End: 2019-08-29

## 2019-08-29 ASSESSMENT — MIFFLIN-ST. JEOR: SCORE: 1009.27

## 2019-08-30 ENCOUNTER — COMMUNICATION - HEALTHEAST (OUTPATIENT)
Dept: PULMONOLOGY | Facility: OTHER | Age: 84
End: 2019-08-30

## 2019-08-30 DIAGNOSIS — J47.1 BRONCHIECTASIS WITH (ACUTE) EXACERBATION (H): ICD-10-CM

## 2019-09-11 LAB
ATRIAL RATE - MUSE: 97 BPM
DIASTOLIC BLOOD PRESSURE - MUSE: 74 MMHG
INTERPRETATION ECG - MUSE: NORMAL
P AXIS - MUSE: NORMAL DEGREES
PR INTERVAL - MUSE: NORMAL MS
QRS DURATION - MUSE: 66 MS
QT - MUSE: 326 MS
QTC - MUSE: 436 MS
R AXIS - MUSE: 75 DEGREES
SYSTOLIC BLOOD PRESSURE - MUSE: 156 MMHG
T AXIS - MUSE: 74 DEGREES
VENTRICULAR RATE- MUSE: 108 BPM

## 2019-09-12 ENCOUNTER — COMMUNICATION - HEALTHEAST (OUTPATIENT)
Dept: INTERNAL MEDICINE | Facility: CLINIC | Age: 84
End: 2019-09-12

## 2019-09-12 ENCOUNTER — COMMUNICATION - HEALTHEAST (OUTPATIENT)
Dept: CARE COORDINATION | Facility: CLINIC | Age: 84
End: 2019-09-12

## 2019-09-12 ASSESSMENT — MIFFLIN-ST. JEOR: SCORE: 1022.88

## 2019-09-16 ENCOUNTER — ANESTHESIA - HEALTHEAST (OUTPATIENT)
Dept: SURGERY | Facility: CLINIC | Age: 84
End: 2019-09-16

## 2019-09-16 ENCOUNTER — SURGERY - HEALTHEAST (OUTPATIENT)
Dept: SURGERY | Facility: CLINIC | Age: 84
End: 2019-09-16

## 2019-09-16 ASSESSMENT — MIFFLIN-ST. JEOR
SCORE: 1009.56
SCORE: 1009.56

## 2019-09-19 ENCOUNTER — COMMUNICATION - HEALTHEAST (OUTPATIENT)
Dept: PULMONOLOGY | Facility: OTHER | Age: 84
End: 2019-09-19

## 2019-09-19 ENCOUNTER — COMMUNICATION - HEALTHEAST (OUTPATIENT)
Dept: CARE COORDINATION | Facility: CLINIC | Age: 84
End: 2019-09-19

## 2019-09-19 ENCOUNTER — AMBULATORY - HEALTHEAST (OUTPATIENT)
Dept: CARE COORDINATION | Facility: CLINIC | Age: 84
End: 2019-09-19

## 2019-09-19 DIAGNOSIS — G89.4 CHRONIC PAIN SYNDROME: ICD-10-CM

## 2019-09-19 DIAGNOSIS — J44.9 CHRONIC OBSTRUCTIVE PULMONARY DISEASE (H): ICD-10-CM

## 2019-09-19 DIAGNOSIS — J44.9 COPD (CHRONIC OBSTRUCTIVE PULMONARY DISEASE) (H): ICD-10-CM

## 2019-09-19 DIAGNOSIS — I50.32 CHRONIC DIASTOLIC CONGESTIVE HEART FAILURE (H): ICD-10-CM

## 2019-09-23 ENCOUNTER — COMMUNICATION - HEALTHEAST (OUTPATIENT)
Dept: SCHEDULING | Facility: CLINIC | Age: 84
End: 2019-09-23

## 2019-09-23 ENCOUNTER — COMMUNICATION - HEALTHEAST (OUTPATIENT)
Dept: NURSING | Facility: CLINIC | Age: 84
End: 2019-09-23

## 2019-09-24 ENCOUNTER — AMBULATORY - HEALTHEAST (OUTPATIENT)
Dept: NURSING | Facility: CLINIC | Age: 84
End: 2019-09-24

## 2019-09-24 DIAGNOSIS — E53.8 VITAMIN B 12 DEFICIENCY: ICD-10-CM

## 2019-09-29 ENCOUNTER — COMMUNICATION - HEALTHEAST (OUTPATIENT)
Dept: INTERNAL MEDICINE | Facility: CLINIC | Age: 84
End: 2019-09-29

## 2019-09-29 DIAGNOSIS — Z00.00 ROUTINE GENERAL MEDICAL EXAMINATION AT A HEALTH CARE FACILITY: ICD-10-CM

## 2019-09-30 ENCOUNTER — COMMUNICATION - HEALTHEAST (OUTPATIENT)
Dept: INTERNAL MEDICINE | Facility: CLINIC | Age: 84
End: 2019-09-30

## 2019-09-30 ENCOUNTER — OFFICE VISIT - HEALTHEAST (OUTPATIENT)
Dept: INTERNAL MEDICINE | Facility: CLINIC | Age: 84
End: 2019-09-30

## 2019-09-30 DIAGNOSIS — I10 ESSENTIAL HYPERTENSION: ICD-10-CM

## 2019-09-30 LAB — HGB BLD-MCNC: 12.6 G/DL (ref 12–16)

## 2019-09-30 ASSESSMENT — MIFFLIN-ST. JEOR: SCORE: 1050.1

## 2019-10-01 ENCOUNTER — RECORDS - HEALTHEAST (OUTPATIENT)
Dept: ADMINISTRATIVE | Facility: OTHER | Age: 84
End: 2019-10-01

## 2019-10-04 ENCOUNTER — HOME CARE/HOSPICE - HEALTHEAST (OUTPATIENT)
Dept: HOME HEALTH SERVICES | Facility: HOME HEALTH | Age: 84
End: 2019-10-04

## 2019-10-06 ENCOUNTER — HOME CARE/HOSPICE - HEALTHEAST (OUTPATIENT)
Dept: HOME HEALTH SERVICES | Facility: HOME HEALTH | Age: 84
End: 2019-10-06

## 2019-10-07 ENCOUNTER — COMMUNICATION - HEALTHEAST (OUTPATIENT)
Dept: HOME HEALTH SERVICES | Facility: HOME HEALTH | Age: 84
End: 2019-10-07

## 2019-10-08 ENCOUNTER — RECORDS - HEALTHEAST (OUTPATIENT)
Dept: ADMINISTRATIVE | Facility: OTHER | Age: 84
End: 2019-10-08

## 2019-10-08 ENCOUNTER — COMMUNICATION - HEALTHEAST (OUTPATIENT)
Dept: CARE COORDINATION | Facility: CLINIC | Age: 84
End: 2019-10-08

## 2019-10-09 ENCOUNTER — OFFICE VISIT - HEALTHEAST (OUTPATIENT)
Dept: INTERNAL MEDICINE | Facility: CLINIC | Age: 84
End: 2019-10-09

## 2019-10-09 ENCOUNTER — HOME CARE/HOSPICE - HEALTHEAST (OUTPATIENT)
Dept: HOME HEALTH SERVICES | Facility: HOME HEALTH | Age: 84
End: 2019-10-09

## 2019-10-09 DIAGNOSIS — J18.9 PNEUMONIA DUE TO INFECTIOUS ORGANISM, UNSPECIFIED LATERALITY, UNSPECIFIED PART OF LUNG: ICD-10-CM

## 2019-10-24 ENCOUNTER — AMBULATORY - HEALTHEAST (OUTPATIENT)
Dept: INTERNAL MEDICINE | Facility: CLINIC | Age: 84
End: 2019-10-24

## 2019-10-24 ENCOUNTER — AMBULATORY - HEALTHEAST (OUTPATIENT)
Dept: NURSING | Facility: CLINIC | Age: 84
End: 2019-10-24

## 2019-10-24 DIAGNOSIS — E53.8 VITAMIN B12 DEFICIENCY (NON ANEMIC): ICD-10-CM

## 2019-10-25 ENCOUNTER — OFFICE VISIT - HEALTHEAST (OUTPATIENT)
Dept: PULMONOLOGY | Facility: OTHER | Age: 84
End: 2019-10-25

## 2019-10-25 DIAGNOSIS — J01.10 ACUTE NON-RECURRENT FRONTAL SINUSITIS: ICD-10-CM

## 2019-10-25 DIAGNOSIS — J41.0 SIMPLE CHRONIC BRONCHITIS (H): ICD-10-CM

## 2019-10-25 DIAGNOSIS — J96.11 CHRONIC RESPIRATORY FAILURE WITH HYPOXIA (H): ICD-10-CM

## 2019-10-30 ENCOUNTER — COMMUNICATION - HEALTHEAST (OUTPATIENT)
Dept: INTERNAL MEDICINE | Facility: CLINIC | Age: 84
End: 2019-10-30

## 2019-10-30 DIAGNOSIS — Z00.00 ROUTINE GENERAL MEDICAL EXAMINATION AT A HEALTH CARE FACILITY: ICD-10-CM

## 2019-11-06 ENCOUNTER — RECORDS - HEALTHEAST (OUTPATIENT)
Dept: ADMINISTRATIVE | Facility: OTHER | Age: 84
End: 2019-11-06

## 2019-11-25 ENCOUNTER — AMBULATORY - HEALTHEAST (OUTPATIENT)
Dept: NURSING | Facility: CLINIC | Age: 84
End: 2019-11-25

## 2019-12-09 ENCOUNTER — COMMUNICATION - HEALTHEAST (OUTPATIENT)
Dept: INTERNAL MEDICINE | Facility: CLINIC | Age: 84
End: 2019-12-09

## 2019-12-09 DIAGNOSIS — Z00.00 ROUTINE GENERAL MEDICAL EXAMINATION AT A HEALTH CARE FACILITY: ICD-10-CM

## 2019-12-10 ENCOUNTER — RECORDS - HEALTHEAST (OUTPATIENT)
Dept: ADMINISTRATIVE | Facility: OTHER | Age: 84
End: 2019-12-10

## 2019-12-11 ENCOUNTER — OFFICE VISIT - HEALTHEAST (OUTPATIENT)
Dept: PULMONOLOGY | Facility: OTHER | Age: 84
End: 2019-12-11

## 2019-12-11 DIAGNOSIS — J47.1 BRONCHIECTASIS WITH ACUTE EXACERBATION (H): ICD-10-CM

## 2019-12-11 DIAGNOSIS — J41.0 SIMPLE CHRONIC BRONCHITIS (H): ICD-10-CM

## 2019-12-23 ENCOUNTER — OFFICE VISIT - HEALTHEAST (OUTPATIENT)
Dept: INTERNAL MEDICINE | Facility: CLINIC | Age: 84
End: 2019-12-23

## 2019-12-23 DIAGNOSIS — J44.9 CHRONIC OBSTRUCTIVE PULMONARY DISEASE, UNSPECIFIED COPD TYPE (H): ICD-10-CM

## 2019-12-23 DIAGNOSIS — J47.1 BRONCHIECTASIS WITH ACUTE EXACERBATION (H): ICD-10-CM

## 2019-12-23 DIAGNOSIS — I10 ESSENTIAL HYPERTENSION: ICD-10-CM

## 2019-12-23 LAB
ALBUMIN SERPL-MCNC: 3.5 G/DL (ref 3.5–5)
ALP SERPL-CCNC: 104 U/L (ref 45–120)
ALT SERPL W P-5'-P-CCNC: 12 U/L (ref 0–45)
ANION GAP SERPL CALCULATED.3IONS-SCNC: 8 MMOL/L (ref 5–18)
AST SERPL W P-5'-P-CCNC: 15 U/L (ref 0–40)
BILIRUB SERPL-MCNC: 0.8 MG/DL (ref 0–1)
BUN SERPL-MCNC: 12 MG/DL (ref 8–28)
CALCIUM SERPL-MCNC: 9.1 MG/DL (ref 8.5–10.5)
CHLORIDE BLD-SCNC: 99 MMOL/L (ref 98–107)
CHOLEST SERPL-MCNC: 134 MG/DL
CO2 SERPL-SCNC: 30 MMOL/L (ref 22–31)
CREAT SERPL-MCNC: 0.73 MG/DL (ref 0.6–1.1)
FASTING STATUS PATIENT QL REPORTED: YES
GFR SERPL CREATININE-BSD FRML MDRD: >60 ML/MIN/1.73M2
GLUCOSE BLD-MCNC: 133 MG/DL (ref 70–125)
HDLC SERPL-MCNC: 57 MG/DL
IGM SERPL-MCNC: 57 MG/DL (ref 60–280)
LDLC SERPL CALC-MCNC: 63 MG/DL
POTASSIUM BLD-SCNC: 4.2 MMOL/L (ref 3.5–5)
PROT SERPL-MCNC: 6.5 G/DL (ref 6–8)
SODIUM SERPL-SCNC: 137 MMOL/L (ref 136–145)
TRIGL SERPL-MCNC: 70 MG/DL

## 2019-12-23 ASSESSMENT — PATIENT HEALTH QUESTIONNAIRE - PHQ9: SUM OF ALL RESPONSES TO PHQ QUESTIONS 1-9: 0

## 2019-12-24 ENCOUNTER — COMMUNICATION - HEALTHEAST (OUTPATIENT)
Dept: INTERNAL MEDICINE | Facility: CLINIC | Age: 84
End: 2019-12-24

## 2019-12-27 ENCOUNTER — AMBULATORY - HEALTHEAST (OUTPATIENT)
Dept: NURSING | Facility: CLINIC | Age: 84
End: 2019-12-27

## 2019-12-30 ENCOUNTER — COMMUNICATION - HEALTHEAST (OUTPATIENT)
Dept: INTERNAL MEDICINE | Facility: CLINIC | Age: 84
End: 2019-12-30

## 2019-12-30 DIAGNOSIS — J44.9 COPD (CHRONIC OBSTRUCTIVE PULMONARY DISEASE) (H): ICD-10-CM

## 2020-01-06 ENCOUNTER — COMMUNICATION - HEALTHEAST (OUTPATIENT)
Dept: INTERNAL MEDICINE | Facility: CLINIC | Age: 85
End: 2020-01-06

## 2020-01-06 DIAGNOSIS — Z00.00 ROUTINE GENERAL MEDICAL EXAMINATION AT A HEALTH CARE FACILITY: ICD-10-CM

## 2020-01-09 ENCOUNTER — COMMUNICATION - HEALTHEAST (OUTPATIENT)
Dept: PULMONOLOGY | Facility: OTHER | Age: 85
End: 2020-01-09

## 2020-01-09 DIAGNOSIS — J41.0 SIMPLE CHRONIC BRONCHITIS (H): ICD-10-CM

## 2020-01-16 ENCOUNTER — OFFICE VISIT - HEALTHEAST (OUTPATIENT)
Dept: PULMONOLOGY | Facility: OTHER | Age: 85
End: 2020-01-16

## 2020-01-16 DIAGNOSIS — J41.0 SIMPLE CHRONIC BRONCHITIS (H): ICD-10-CM

## 2020-01-16 DIAGNOSIS — J47.1 BRONCHIECTASIS WITH ACUTE EXACERBATION (H): ICD-10-CM

## 2020-01-24 ENCOUNTER — COMMUNICATION - HEALTHEAST (OUTPATIENT)
Dept: VASCULAR SURGERY | Facility: CLINIC | Age: 85
End: 2020-01-24

## 2020-01-26 ENCOUNTER — COMMUNICATION - HEALTHEAST (OUTPATIENT)
Dept: INTERNAL MEDICINE | Facility: CLINIC | Age: 85
End: 2020-01-26

## 2020-01-26 DIAGNOSIS — Z00.00 ROUTINE GENERAL MEDICAL EXAMINATION AT A HEALTH CARE FACILITY: ICD-10-CM

## 2020-01-27 ENCOUNTER — AMBULATORY - HEALTHEAST (OUTPATIENT)
Dept: NURSING | Facility: CLINIC | Age: 85
End: 2020-01-27

## 2020-01-27 ENCOUNTER — OFFICE VISIT - HEALTHEAST (OUTPATIENT)
Dept: INTERNAL MEDICINE | Facility: CLINIC | Age: 85
End: 2020-01-27

## 2020-01-27 ENCOUNTER — AMBULATORY - HEALTHEAST (OUTPATIENT)
Dept: CARE COORDINATION | Facility: CLINIC | Age: 85
End: 2020-01-27

## 2020-01-27 ENCOUNTER — COMMUNICATION - HEALTHEAST (OUTPATIENT)
Dept: NURSING | Facility: CLINIC | Age: 85
End: 2020-01-27

## 2020-01-27 DIAGNOSIS — I48.20 CHRONIC ATRIAL FIBRILLATION (H): ICD-10-CM

## 2020-01-27 DIAGNOSIS — F33.9 RECURRENT MAJOR DEPRESSIVE EPISODES (H): ICD-10-CM

## 2020-01-27 DIAGNOSIS — J44.9 CHRONIC OBSTRUCTIVE PULMONARY DISEASE (H): ICD-10-CM

## 2020-01-27 DIAGNOSIS — I10 HYPERTENSION: ICD-10-CM

## 2020-01-27 DIAGNOSIS — I50.32 CHRONIC DIASTOLIC CONGESTIVE HEART FAILURE (H): ICD-10-CM

## 2020-01-27 DIAGNOSIS — I10 ESSENTIAL HYPERTENSION: ICD-10-CM

## 2020-01-27 ASSESSMENT — MIFFLIN-ST. JEOR: SCORE: 1102.25

## 2020-01-29 ENCOUNTER — COMMUNICATION - HEALTHEAST (OUTPATIENT)
Dept: PULMONOLOGY | Facility: OTHER | Age: 85
End: 2020-01-29

## 2020-01-30 ENCOUNTER — COMMUNICATION - HEALTHEAST (OUTPATIENT)
Dept: NURSING | Facility: CLINIC | Age: 85
End: 2020-01-30

## 2020-01-31 ENCOUNTER — COMMUNICATION - HEALTHEAST (OUTPATIENT)
Dept: INTERNAL MEDICINE | Facility: CLINIC | Age: 85
End: 2020-01-31

## 2020-01-31 ENCOUNTER — AMBULATORY - HEALTHEAST (OUTPATIENT)
Dept: INTERNAL MEDICINE | Facility: CLINIC | Age: 85
End: 2020-01-31

## 2020-01-31 DIAGNOSIS — S80.12XA HEMATOMA OF LEFT LOWER EXTREMITY: ICD-10-CM

## 2020-02-10 ENCOUNTER — OFFICE VISIT - HEALTHEAST (OUTPATIENT)
Dept: GERIATRICS | Facility: CLINIC | Age: 85
End: 2020-02-10

## 2020-02-10 DIAGNOSIS — J96.11 CHRONIC RESPIRATORY FAILURE WITH HYPOXIA (H): ICD-10-CM

## 2020-02-10 DIAGNOSIS — F25.0 SCHIZOAFFECTIVE DISORDER, BIPOLAR TYPE (H): ICD-10-CM

## 2020-02-10 DIAGNOSIS — J41.1 MUCOPURULENT CHRONIC BRONCHITIS (H): ICD-10-CM

## 2020-02-10 DIAGNOSIS — I27.20 PULMONARY HYPERTENSION (H): ICD-10-CM

## 2020-02-10 DIAGNOSIS — M62.81 GENERALIZED MUSCLE WEAKNESS: ICD-10-CM

## 2020-02-10 DIAGNOSIS — I48.20 CHRONIC ATRIAL FIBRILLATION (H): ICD-10-CM

## 2020-02-10 DIAGNOSIS — E78.5 DYSLIPIDEMIA: ICD-10-CM

## 2020-02-10 DIAGNOSIS — I50.32 CHRONIC DIASTOLIC CONGESTIVE HEART FAILURE (H): ICD-10-CM

## 2020-02-10 DIAGNOSIS — L03.116 CELLULITIS OF LEFT LOWER EXTREMITY: ICD-10-CM

## 2020-02-10 DIAGNOSIS — J47.1 BRONCHIECTASIS WITH ACUTE EXACERBATION (H): ICD-10-CM

## 2020-02-10 DIAGNOSIS — M81.0 AGE-RELATED OSTEOPOROSIS WITHOUT CURRENT PATHOLOGICAL FRACTURE: ICD-10-CM

## 2020-02-10 DIAGNOSIS — T14.8XXA TRAUMATIC HEMATOMA: ICD-10-CM

## 2020-02-10 DIAGNOSIS — E11.9 TYPE 2 DIABETES MELLITUS WITHOUT COMPLICATION, WITH LONG-TERM CURRENT USE OF INSULIN (H): ICD-10-CM

## 2020-02-10 DIAGNOSIS — K21.9 GASTROESOPHAGEAL REFLUX DISEASE, ESOPHAGITIS PRESENCE NOT SPECIFIED: ICD-10-CM

## 2020-02-10 DIAGNOSIS — K59.01 SLOW TRANSIT CONSTIPATION: ICD-10-CM

## 2020-02-10 DIAGNOSIS — Z79.4 TYPE 2 DIABETES MELLITUS WITHOUT COMPLICATION, WITH LONG-TERM CURRENT USE OF INSULIN (H): ICD-10-CM

## 2020-02-10 DIAGNOSIS — E83.42 HYPOMAGNESEMIA: ICD-10-CM

## 2020-02-12 ENCOUNTER — RECORDS - HEALTHEAST (OUTPATIENT)
Dept: LAB | Facility: CLINIC | Age: 85
End: 2020-02-12

## 2020-02-12 ENCOUNTER — COMMUNICATION - HEALTHEAST (OUTPATIENT)
Dept: GERIATRICS | Facility: CLINIC | Age: 85
End: 2020-02-12

## 2020-02-12 LAB
ANION GAP SERPL CALCULATED.3IONS-SCNC: 7 MMOL/L (ref 5–18)
BASOPHILS # BLD AUTO: 0 THOU/UL (ref 0–0.2)
BASOPHILS NFR BLD AUTO: 0 % (ref 0–2)
BUN SERPL-MCNC: 13 MG/DL (ref 8–28)
CALCIUM SERPL-MCNC: 8.5 MG/DL (ref 8.5–10.5)
CHLORIDE BLD-SCNC: 92 MMOL/L (ref 98–107)
CO2 SERPL-SCNC: 35 MMOL/L (ref 22–31)
CREAT SERPL-MCNC: 0.66 MG/DL (ref 0.6–1.1)
EOSINOPHIL # BLD AUTO: 0.2 THOU/UL (ref 0–0.4)
EOSINOPHIL NFR BLD AUTO: 2 % (ref 0–6)
ERYTHROCYTE [DISTWIDTH] IN BLOOD BY AUTOMATED COUNT: 16.4 % (ref 11–14.5)
GFR SERPL CREATININE-BSD FRML MDRD: >60 ML/MIN/1.73M2
GLUCOSE BLD-MCNC: 92 MG/DL (ref 70–125)
HCT VFR BLD AUTO: 38.6 % (ref 35–47)
HGB BLD-MCNC: 12.1 G/DL (ref 12–16)
LYMPHOCYTES # BLD AUTO: 1.6 THOU/UL (ref 0.8–4.4)
LYMPHOCYTES NFR BLD AUTO: 12 % (ref 20–40)
MAGNESIUM SERPL-MCNC: 2 MG/DL (ref 1.8–2.6)
MCH RBC QN AUTO: 28.8 PG (ref 27–34)
MCHC RBC AUTO-ENTMCNC: 31.3 G/DL (ref 32–36)
MCV RBC AUTO: 92 FL (ref 80–100)
MONOCYTES # BLD AUTO: 1.1 THOU/UL (ref 0–0.9)
MONOCYTES NFR BLD AUTO: 9 % (ref 2–10)
NEUTROPHILS # BLD AUTO: 9.5 THOU/UL (ref 2–7.7)
NEUTROPHILS NFR BLD AUTO: 76 % (ref 50–70)
PLATELET # BLD AUTO: 376 THOU/UL (ref 140–440)
PMV BLD AUTO: 10 FL (ref 8.5–12.5)
POTASSIUM BLD-SCNC: 4.3 MMOL/L (ref 3.5–5)
RBC # BLD AUTO: 4.2 MILL/UL (ref 3.8–5.4)
SODIUM SERPL-SCNC: 134 MMOL/L (ref 136–145)
VIT B12 SERPL-MCNC: 958 PG/ML (ref 213–816)
WBC: 12.5 THOU/UL (ref 4–11)

## 2020-02-14 ENCOUNTER — OFFICE VISIT - HEALTHEAST (OUTPATIENT)
Dept: GERIATRICS | Facility: CLINIC | Age: 85
End: 2020-02-14

## 2020-02-14 DIAGNOSIS — M62.81 GENERALIZED MUSCLE WEAKNESS: ICD-10-CM

## 2020-02-14 DIAGNOSIS — J47.1 BRONCHIECTASIS WITH ACUTE EXACERBATION (H): ICD-10-CM

## 2020-02-14 DIAGNOSIS — J41.1 MUCOPURULENT CHRONIC BRONCHITIS (H): ICD-10-CM

## 2020-02-14 DIAGNOSIS — E11.9 TYPE 2 DIABETES MELLITUS WITHOUT COMPLICATION, WITH LONG-TERM CURRENT USE OF INSULIN (H): ICD-10-CM

## 2020-02-14 DIAGNOSIS — I50.32 CHRONIC DIASTOLIC CONGESTIVE HEART FAILURE (H): ICD-10-CM

## 2020-02-14 DIAGNOSIS — Z79.4 TYPE 2 DIABETES MELLITUS WITHOUT COMPLICATION, WITH LONG-TERM CURRENT USE OF INSULIN (H): ICD-10-CM

## 2020-02-14 DIAGNOSIS — F33.9 RECURRENT MAJOR DEPRESSIVE EPISODES (H): ICD-10-CM

## 2020-02-14 DIAGNOSIS — L03.116 CELLULITIS OF LEFT LOWER EXTREMITY: ICD-10-CM

## 2020-02-14 DIAGNOSIS — I48.20 CHRONIC ATRIAL FIBRILLATION (H): ICD-10-CM

## 2020-02-14 DIAGNOSIS — I10 ESSENTIAL HYPERTENSION: ICD-10-CM

## 2020-02-18 ENCOUNTER — OFFICE VISIT - HEALTHEAST (OUTPATIENT)
Dept: GERIATRICS | Facility: CLINIC | Age: 85
End: 2020-02-18

## 2020-02-18 DIAGNOSIS — F33.9 RECURRENT MAJOR DEPRESSIVE EPISODES (H): ICD-10-CM

## 2020-02-18 DIAGNOSIS — E11.9 TYPE 2 DIABETES MELLITUS WITHOUT COMPLICATION, WITH LONG-TERM CURRENT USE OF INSULIN (H): ICD-10-CM

## 2020-02-18 DIAGNOSIS — I48.20 CHRONIC ATRIAL FIBRILLATION (H): ICD-10-CM

## 2020-02-18 DIAGNOSIS — J41.1 MUCOPURULENT CHRONIC BRONCHITIS (H): ICD-10-CM

## 2020-02-18 DIAGNOSIS — I50.32 CHRONIC DIASTOLIC CONGESTIVE HEART FAILURE (H): ICD-10-CM

## 2020-02-18 DIAGNOSIS — T14.8XXA TRAUMATIC HEMATOMA: ICD-10-CM

## 2020-02-18 DIAGNOSIS — Z79.4 TYPE 2 DIABETES MELLITUS WITHOUT COMPLICATION, WITH LONG-TERM CURRENT USE OF INSULIN (H): ICD-10-CM

## 2020-02-18 DIAGNOSIS — M62.81 GENERALIZED MUSCLE WEAKNESS: ICD-10-CM

## 2020-02-18 DIAGNOSIS — J96.11 CHRONIC RESPIRATORY FAILURE WITH HYPOXIA (H): ICD-10-CM

## 2020-02-18 DIAGNOSIS — I10 ESSENTIAL HYPERTENSION: ICD-10-CM

## 2020-02-18 DIAGNOSIS — F25.0 SCHIZOAFFECTIVE DISORDER, BIPOLAR TYPE (H): ICD-10-CM

## 2020-02-19 ENCOUNTER — RECORDS - HEALTHEAST (OUTPATIENT)
Dept: LAB | Facility: CLINIC | Age: 85
End: 2020-02-19

## 2020-02-20 ENCOUNTER — OFFICE VISIT - HEALTHEAST (OUTPATIENT)
Dept: GERIATRICS | Facility: CLINIC | Age: 85
End: 2020-02-20

## 2020-02-20 ENCOUNTER — COMMUNICATION - HEALTHEAST (OUTPATIENT)
Dept: INTERNAL MEDICINE | Facility: CLINIC | Age: 85
End: 2020-02-20

## 2020-02-20 DIAGNOSIS — G89.4 CHRONIC PAIN SYNDROME: ICD-10-CM

## 2020-02-20 DIAGNOSIS — E11.9 TYPE 2 DIABETES MELLITUS WITHOUT COMPLICATION, WITH LONG-TERM CURRENT USE OF INSULIN (H): ICD-10-CM

## 2020-02-20 DIAGNOSIS — M62.81 GENERALIZED MUSCLE WEAKNESS: ICD-10-CM

## 2020-02-20 DIAGNOSIS — Z00.00 ROUTINE GENERAL MEDICAL EXAMINATION AT A HEALTH CARE FACILITY: ICD-10-CM

## 2020-02-20 DIAGNOSIS — I50.41 ACUTE COMBINED SYSTOLIC AND DIASTOLIC CHF, NYHA CLASS 1 (H): ICD-10-CM

## 2020-02-20 DIAGNOSIS — Z79.4 TYPE 2 DIABETES MELLITUS WITHOUT COMPLICATION, WITH LONG-TERM CURRENT USE OF INSULIN (H): ICD-10-CM

## 2020-02-20 DIAGNOSIS — I48.91 ATRIAL FIBRILLATION WITH RVR (H): ICD-10-CM

## 2020-02-20 DIAGNOSIS — T14.8XXA TRAUMATIC HEMATOMA: ICD-10-CM

## 2020-02-20 DIAGNOSIS — K21.9 GASTROESOPHAGEAL REFLUX DISEASE, ESOPHAGITIS PRESENCE NOT SPECIFIED: ICD-10-CM

## 2020-02-20 DIAGNOSIS — I50.32 CHRONIC DIASTOLIC CONGESTIVE HEART FAILURE (H): ICD-10-CM

## 2020-02-20 DIAGNOSIS — F25.0 SCHIZOAFFECTIVE DISORDER, BIPOLAR TYPE (H): ICD-10-CM

## 2020-02-20 DIAGNOSIS — F33.9 RECURRENT MAJOR DEPRESSIVE EPISODES (H): ICD-10-CM

## 2020-02-20 DIAGNOSIS — J47.1 BRONCHIECTASIS WITH ACUTE EXACERBATION (H): ICD-10-CM

## 2020-02-20 DIAGNOSIS — J96.11 CHRONIC RESPIRATORY FAILURE WITH HYPOXIA (H): ICD-10-CM

## 2020-02-20 DIAGNOSIS — L03.116 CELLULITIS OF LEFT LOWER EXTREMITY: ICD-10-CM

## 2020-02-21 ENCOUNTER — COMMUNICATION - HEALTHEAST (OUTPATIENT)
Dept: GERIATRICS | Facility: CLINIC | Age: 85
End: 2020-02-21

## 2020-02-21 ENCOUNTER — AMBULATORY - HEALTHEAST (OUTPATIENT)
Dept: GERIATRICS | Facility: CLINIC | Age: 85
End: 2020-02-21

## 2020-02-21 LAB
GAMMA INTERFERON BACKGROUND BLD IA-ACNC: 0.02 IU/ML
M TB IFN-G BLD-IMP: NEGATIVE
MITOGEN IGNF BCKGRD COR BLD-ACNC: 0 IU/ML
MITOGEN IGNF BCKGRD COR BLD-ACNC: 0.01 IU/ML
QTF INTERPRETATION: NORMAL
QTF MITOGEN - NIL: 1.46 IU/ML

## 2020-02-24 ENCOUNTER — COMMUNICATION - HEALTHEAST (OUTPATIENT)
Dept: INTERNAL MEDICINE | Facility: CLINIC | Age: 85
End: 2020-02-24

## 2020-02-24 ENCOUNTER — OFFICE VISIT - HEALTHEAST (OUTPATIENT)
Dept: INTERNAL MEDICINE | Facility: CLINIC | Age: 85
End: 2020-02-24

## 2020-02-24 DIAGNOSIS — J44.9 CHRONIC OBSTRUCTIVE PULMONARY DISEASE, UNSPECIFIED COPD TYPE (H): ICD-10-CM

## 2020-02-24 ASSESSMENT — MIFFLIN-ST. JEOR: SCORE: 1070.5

## 2020-02-26 ENCOUNTER — RECORDS - HEALTHEAST (OUTPATIENT)
Dept: ADMINISTRATIVE | Facility: OTHER | Age: 85
End: 2020-02-26

## 2020-02-26 ENCOUNTER — COMMUNICATION - HEALTHEAST (OUTPATIENT)
Dept: SCHEDULING | Facility: CLINIC | Age: 85
End: 2020-02-26

## 2020-03-03 ENCOUNTER — AMBULATORY - HEALTHEAST (OUTPATIENT)
Dept: INTERNAL MEDICINE | Facility: CLINIC | Age: 85
End: 2020-03-03

## 2020-03-03 ENCOUNTER — COMMUNICATION - HEALTHEAST (OUTPATIENT)
Dept: SCHEDULING | Facility: CLINIC | Age: 85
End: 2020-03-03

## 2020-03-03 ENCOUNTER — COMMUNICATION - HEALTHEAST (OUTPATIENT)
Dept: INTERNAL MEDICINE | Facility: CLINIC | Age: 85
End: 2020-03-03

## 2020-03-03 DIAGNOSIS — E53.8 VITAMIN B12 DEFICIENCY (NON ANEMIC): ICD-10-CM

## 2020-03-03 DIAGNOSIS — Z00.00 ROUTINE GENERAL MEDICAL EXAMINATION AT A HEALTH CARE FACILITY: ICD-10-CM

## 2020-03-10 ENCOUNTER — COMMUNICATION - HEALTHEAST (OUTPATIENT)
Dept: MEDSURG UNIT | Facility: CLINIC | Age: 85
End: 2020-03-10

## 2020-03-10 DIAGNOSIS — Z00.00 ROUTINE GENERAL MEDICAL EXAMINATION AT A HEALTH CARE FACILITY: ICD-10-CM

## 2020-03-12 ENCOUNTER — COMMUNICATION - HEALTHEAST (OUTPATIENT)
Dept: INTERNAL MEDICINE | Facility: CLINIC | Age: 85
End: 2020-03-12

## 2020-03-13 ENCOUNTER — AMBULATORY - HEALTHEAST (OUTPATIENT)
Dept: NURSING | Facility: CLINIC | Age: 85
End: 2020-03-13

## 2020-03-14 ENCOUNTER — COMMUNICATION - HEALTHEAST (OUTPATIENT)
Dept: INTERNAL MEDICINE | Facility: CLINIC | Age: 85
End: 2020-03-14

## 2020-03-14 DIAGNOSIS — E87.70 HYPERVOLEMIA, UNSPECIFIED HYPERVOLEMIA TYPE: ICD-10-CM

## 2020-03-16 ENCOUNTER — RECORDS - HEALTHEAST (OUTPATIENT)
Dept: ADMINISTRATIVE | Facility: OTHER | Age: 85
End: 2020-03-16

## 2020-03-17 ENCOUNTER — COMMUNICATION - HEALTHEAST (OUTPATIENT)
Dept: PULMONOLOGY | Facility: OTHER | Age: 85
End: 2020-03-17

## 2020-03-17 DIAGNOSIS — J44.1 COPD EXACERBATION (H): ICD-10-CM

## 2020-03-19 ENCOUNTER — COMMUNICATION - HEALTHEAST (OUTPATIENT)
Dept: INTERNAL MEDICINE | Facility: CLINIC | Age: 85
End: 2020-03-19

## 2020-03-20 ENCOUNTER — COMMUNICATION - HEALTHEAST (OUTPATIENT)
Dept: INTERNAL MEDICINE | Facility: CLINIC | Age: 85
End: 2020-03-20

## 2020-03-20 ENCOUNTER — OFFICE VISIT - HEALTHEAST (OUTPATIENT)
Dept: INTERNAL MEDICINE | Facility: CLINIC | Age: 85
End: 2020-03-20

## 2020-03-20 ENCOUNTER — COMMUNICATION - HEALTHEAST (OUTPATIENT)
Dept: CARDIOLOGY | Facility: CLINIC | Age: 85
End: 2020-03-20

## 2020-03-20 DIAGNOSIS — J44.9 CHRONIC OBSTRUCTIVE PULMONARY DISEASE, UNSPECIFIED COPD TYPE (H): ICD-10-CM

## 2020-03-23 ENCOUNTER — RECORDS - HEALTHEAST (OUTPATIENT)
Dept: ADMINISTRATIVE | Facility: OTHER | Age: 85
End: 2020-03-23

## 2020-03-27 ENCOUNTER — RECORDS - HEALTHEAST (OUTPATIENT)
Dept: ADMINISTRATIVE | Facility: OTHER | Age: 85
End: 2020-03-27

## 2020-04-11 ENCOUNTER — COMMUNICATION - HEALTHEAST (OUTPATIENT)
Dept: INTERNAL MEDICINE | Facility: CLINIC | Age: 85
End: 2020-04-11

## 2020-04-11 DIAGNOSIS — Z00.00 ROUTINE GENERAL MEDICAL EXAMINATION AT A HEALTH CARE FACILITY: ICD-10-CM

## 2020-04-19 ENCOUNTER — COMMUNICATION - HEALTHEAST (OUTPATIENT)
Dept: INTERNAL MEDICINE | Facility: CLINIC | Age: 85
End: 2020-04-19

## 2020-04-19 DIAGNOSIS — J96.11 CHRONIC RESPIRATORY FAILURE WITH HYPOXIA (H): ICD-10-CM

## 2020-04-20 ENCOUNTER — AMBULATORY - HEALTHEAST (OUTPATIENT)
Dept: NURSING | Facility: CLINIC | Age: 85
End: 2020-04-20

## 2020-04-27 ENCOUNTER — OFFICE VISIT - HEALTHEAST (OUTPATIENT)
Dept: INTERNAL MEDICINE | Facility: CLINIC | Age: 85
End: 2020-04-27

## 2020-04-27 DIAGNOSIS — J44.9 CHRONIC OBSTRUCTIVE PULMONARY DISEASE, UNSPECIFIED COPD TYPE (H): ICD-10-CM

## 2020-05-01 ENCOUNTER — OFFICE VISIT - HEALTHEAST (OUTPATIENT)
Dept: PULMONOLOGY | Facility: OTHER | Age: 85
End: 2020-05-01

## 2020-05-01 DIAGNOSIS — J47.9 BRONCHIECTASIS WITHOUT COMPLICATION (H): ICD-10-CM

## 2020-05-01 ASSESSMENT — MIFFLIN-ST. JEOR: SCORE: 1061.43

## 2020-05-12 ENCOUNTER — OFFICE VISIT - HEALTHEAST (OUTPATIENT)
Dept: INTERNAL MEDICINE | Facility: CLINIC | Age: 85
End: 2020-05-12

## 2020-05-12 ENCOUNTER — COMMUNICATION - HEALTHEAST (OUTPATIENT)
Dept: INTERNAL MEDICINE | Facility: CLINIC | Age: 85
End: 2020-05-12

## 2020-05-12 DIAGNOSIS — L03.90 CELLULITIS, UNSPECIFIED CELLULITIS SITE: ICD-10-CM

## 2020-05-17 ENCOUNTER — COMMUNICATION - HEALTHEAST (OUTPATIENT)
Dept: INTERNAL MEDICINE | Facility: CLINIC | Age: 85
End: 2020-05-17

## 2020-05-17 DIAGNOSIS — Z00.00 ROUTINE GENERAL MEDICAL EXAMINATION AT A HEALTH CARE FACILITY: ICD-10-CM

## 2020-05-18 ENCOUNTER — AMBULATORY - HEALTHEAST (OUTPATIENT)
Dept: NURSING | Facility: CLINIC | Age: 85
End: 2020-05-18

## 2020-05-22 ENCOUNTER — COMMUNICATION - HEALTHEAST (OUTPATIENT)
Dept: SCHEDULING | Facility: CLINIC | Age: 85
End: 2020-05-22

## 2020-05-27 ENCOUNTER — COMMUNICATION - HEALTHEAST (OUTPATIENT)
Dept: PULMONOLOGY | Facility: OTHER | Age: 85
End: 2020-05-27

## 2020-05-27 DIAGNOSIS — J44.1 COPD EXACERBATION (H): ICD-10-CM

## 2020-05-28 ENCOUNTER — RECORDS - HEALTHEAST (OUTPATIENT)
Dept: ADMINISTRATIVE | Facility: OTHER | Age: 85
End: 2020-05-28

## 2020-06-10 ENCOUNTER — COMMUNICATION - HEALTHEAST (OUTPATIENT)
Dept: INTERNAL MEDICINE | Facility: CLINIC | Age: 85
End: 2020-06-10

## 2020-06-10 DIAGNOSIS — I50.33 ACUTE ON CHRONIC DIASTOLIC HEART FAILURE (H): ICD-10-CM

## 2020-06-10 DIAGNOSIS — K21.9 GASTROESOPHAGEAL REFLUX DISEASE, ESOPHAGITIS PRESENCE NOT SPECIFIED: ICD-10-CM

## 2020-06-12 ENCOUNTER — RECORDS - HEALTHEAST (OUTPATIENT)
Dept: ADMINISTRATIVE | Facility: OTHER | Age: 85
End: 2020-06-12

## 2020-06-18 ENCOUNTER — COMMUNICATION - HEALTHEAST (OUTPATIENT)
Dept: INTERNAL MEDICINE | Facility: CLINIC | Age: 85
End: 2020-06-18

## 2020-06-18 DIAGNOSIS — Z00.00 ROUTINE GENERAL MEDICAL EXAMINATION AT A HEALTH CARE FACILITY: ICD-10-CM

## 2020-06-22 ENCOUNTER — AMBULATORY - HEALTHEAST (OUTPATIENT)
Dept: NURSING | Facility: CLINIC | Age: 85
End: 2020-06-22

## 2020-06-26 ENCOUNTER — COMMUNICATION - HEALTHEAST (OUTPATIENT)
Dept: PULMONOLOGY | Facility: OTHER | Age: 85
End: 2020-06-26

## 2020-06-26 DIAGNOSIS — J44.1 COPD EXACERBATION (H): ICD-10-CM

## 2020-06-29 ENCOUNTER — HOSPITAL ENCOUNTER (OUTPATIENT)
Dept: RADIOLOGY | Facility: CLINIC | Age: 85
Discharge: HOME | End: 2020-06-29
Attending: INTERNAL MEDICINE

## 2020-06-29 ENCOUNTER — OFFICE VISIT - HEALTHEAST (OUTPATIENT)
Dept: INTERNAL MEDICINE | Facility: CLINIC | Age: 85
End: 2020-06-29

## 2020-06-29 DIAGNOSIS — J44.9 CHRONIC OBSTRUCTIVE PULMONARY DISEASE, UNSPECIFIED COPD TYPE (H): ICD-10-CM

## 2020-06-29 DIAGNOSIS — E11.69 TYPE 2 DIABETES MELLITUS WITH OTHER SPECIFIED COMPLICATION, WITHOUT LONG-TERM CURRENT USE OF INSULIN (H): ICD-10-CM

## 2020-06-29 LAB
CHOLEST SERPL-MCNC: 133 MG/DL
FASTING STATUS PATIENT QL REPORTED: YES
FASTING STATUS PATIENT QL REPORTED: YES
GLUCOSE BLD-MCNC: 124 MG/DL (ref 70–125)
HBA1C MFR BLD: 7 % (ref 3.5–6)
HDLC SERPL-MCNC: 60 MG/DL
LDLC SERPL CALC-MCNC: 62 MG/DL
TRIGL SERPL-MCNC: 56 MG/DL

## 2020-06-29 ASSESSMENT — MIFFLIN-ST. JEOR: SCORE: 1097.72

## 2020-06-30 ENCOUNTER — COMMUNICATION - HEALTHEAST (OUTPATIENT)
Dept: INTERNAL MEDICINE | Facility: CLINIC | Age: 85
End: 2020-06-30

## 2020-07-01 ENCOUNTER — RECORDS - HEALTHEAST (OUTPATIENT)
Dept: ADMINISTRATIVE | Facility: OTHER | Age: 85
End: 2020-07-01

## 2020-07-18 ENCOUNTER — COMMUNICATION - HEALTHEAST (OUTPATIENT)
Dept: INTERNAL MEDICINE | Facility: CLINIC | Age: 85
End: 2020-07-18

## 2020-07-18 DIAGNOSIS — Z00.00 ROUTINE GENERAL MEDICAL EXAMINATION AT A HEALTH CARE FACILITY: ICD-10-CM

## 2020-07-23 ENCOUNTER — AMBULATORY - HEALTHEAST (OUTPATIENT)
Dept: NURSING | Facility: CLINIC | Age: 85
End: 2020-07-23

## 2020-08-24 ENCOUNTER — AMBULATORY - HEALTHEAST (OUTPATIENT)
Dept: NURSING | Facility: CLINIC | Age: 85
End: 2020-08-24

## 2020-08-26 ENCOUNTER — COMMUNICATION - HEALTHEAST (OUTPATIENT)
Dept: INTERNAL MEDICINE | Facility: CLINIC | Age: 85
End: 2020-08-26

## 2020-08-26 DIAGNOSIS — Z00.00 ROUTINE GENERAL MEDICAL EXAMINATION AT A HEALTH CARE FACILITY: ICD-10-CM

## 2020-09-02 ENCOUNTER — OFFICE VISIT - HEALTHEAST (OUTPATIENT)
Dept: PULMONOLOGY | Facility: OTHER | Age: 85
End: 2020-09-02

## 2020-09-02 DIAGNOSIS — J96.11 CHRONIC RESPIRATORY FAILURE WITH HYPOXIA (H): ICD-10-CM

## 2020-09-02 DIAGNOSIS — J41.1 MUCOPURULENT CHRONIC BRONCHITIS (H): ICD-10-CM

## 2020-09-02 ASSESSMENT — MIFFLIN-ST. JEOR: SCORE: 1065.97

## 2020-09-09 ENCOUNTER — COMMUNICATION - HEALTHEAST (OUTPATIENT)
Dept: INTERNAL MEDICINE | Facility: CLINIC | Age: 85
End: 2020-09-09

## 2020-09-09 DIAGNOSIS — I50.33 ACUTE ON CHRONIC DIASTOLIC HEART FAILURE (H): ICD-10-CM

## 2020-09-09 DIAGNOSIS — I48.0 PAF (PAROXYSMAL ATRIAL FIBRILLATION) (H): ICD-10-CM

## 2020-09-21 ENCOUNTER — RECORDS - HEALTHEAST (OUTPATIENT)
Dept: ADMINISTRATIVE | Facility: OTHER | Age: 85
End: 2020-09-21

## 2020-09-24 ENCOUNTER — HOSPITAL ENCOUNTER (OUTPATIENT)
Dept: MAMMOGRAPHY | Facility: CLINIC | Age: 85
Discharge: HOME | End: 2020-09-24
Attending: INTERNAL MEDICINE

## 2020-09-24 ENCOUNTER — AMBULATORY - HEALTHEAST (OUTPATIENT)
Dept: NURSING | Facility: CLINIC | Age: 85
End: 2020-09-24

## 2020-09-24 DIAGNOSIS — Z12.31 VISIT FOR SCREENING MAMMOGRAM: ICD-10-CM

## 2020-09-27 ENCOUNTER — COMMUNICATION - HEALTHEAST (OUTPATIENT)
Dept: INTERNAL MEDICINE | Facility: CLINIC | Age: 85
End: 2020-09-27

## 2020-09-27 DIAGNOSIS — Z00.00 ROUTINE GENERAL MEDICAL EXAMINATION AT A HEALTH CARE FACILITY: ICD-10-CM

## 2020-09-28 ENCOUNTER — COMMUNICATION - HEALTHEAST (OUTPATIENT)
Dept: INTERNAL MEDICINE | Facility: CLINIC | Age: 85
End: 2020-09-28

## 2020-09-28 DIAGNOSIS — J96.11 CHRONIC RESPIRATORY FAILURE WITH HYPOXIA (H): ICD-10-CM

## 2020-10-16 ENCOUNTER — COMMUNICATION - HEALTHEAST (OUTPATIENT)
Dept: PULMONOLOGY | Facility: OTHER | Age: 85
End: 2020-10-16

## 2020-10-20 ENCOUNTER — AMBULATORY - HEALTHEAST (OUTPATIENT)
Dept: PULMONOLOGY | Facility: OTHER | Age: 85
End: 2020-10-20

## 2020-10-20 DIAGNOSIS — J41.1 MUCOPURULENT CHRONIC BRONCHITIS (H): ICD-10-CM

## 2020-10-20 DIAGNOSIS — J44.1 COPD EXACERBATION (H): ICD-10-CM

## 2020-10-23 ENCOUNTER — AMBULATORY - HEALTHEAST (OUTPATIENT)
Dept: NURSING | Facility: CLINIC | Age: 85
End: 2020-10-23

## 2020-10-27 ENCOUNTER — COMMUNICATION - HEALTHEAST (OUTPATIENT)
Dept: PULMONOLOGY | Facility: OTHER | Age: 85
End: 2020-10-27

## 2020-10-27 DIAGNOSIS — J41.0 SIMPLE CHRONIC BRONCHITIS (H): ICD-10-CM

## 2020-10-29 ENCOUNTER — OFFICE VISIT - HEALTHEAST (OUTPATIENT)
Dept: INTERNAL MEDICINE | Facility: CLINIC | Age: 85
End: 2020-10-29

## 2020-10-29 ENCOUNTER — OFFICE VISIT - HEALTHEAST (OUTPATIENT)
Dept: CARDIOLOGY | Facility: CLINIC | Age: 85
End: 2020-10-29

## 2020-10-29 DIAGNOSIS — Z79.4 TYPE 2 DIABETES MELLITUS WITHOUT COMPLICATION, WITH LONG-TERM CURRENT USE OF INSULIN (H): ICD-10-CM

## 2020-10-29 DIAGNOSIS — I50.41 ACUTE COMBINED SYSTOLIC AND DIASTOLIC CHF, NYHA CLASS 1 (H): ICD-10-CM

## 2020-10-29 DIAGNOSIS — I10 ESSENTIAL HYPERTENSION: ICD-10-CM

## 2020-10-29 DIAGNOSIS — Z00.00 ROUTINE GENERAL MEDICAL EXAMINATION AT A HEALTH CARE FACILITY: ICD-10-CM

## 2020-10-29 DIAGNOSIS — E11.9 TYPE 2 DIABETES MELLITUS WITHOUT COMPLICATION, WITH LONG-TERM CURRENT USE OF INSULIN (H): ICD-10-CM

## 2020-10-29 DIAGNOSIS — E78.5 DYSLIPIDEMIA: ICD-10-CM

## 2020-10-29 DIAGNOSIS — I48.20 CHRONIC ATRIAL FIBRILLATION (H): ICD-10-CM

## 2020-10-29 DIAGNOSIS — I48.91 ATRIAL FIBRILLATION WITH RVR (H): ICD-10-CM

## 2020-10-29 DIAGNOSIS — I27.20 PULMONARY HYPERTENSION (H): ICD-10-CM

## 2020-10-29 ASSESSMENT — MIFFLIN-ST. JEOR
SCORE: 1084.11
SCORE: 1084.11

## 2020-11-12 ENCOUNTER — AMBULATORY - HEALTHEAST (OUTPATIENT)
Dept: PULMONOLOGY | Facility: OTHER | Age: 85
End: 2020-11-12

## 2020-11-12 DIAGNOSIS — J41.1 MUCOPURULENT CHRONIC BRONCHITIS (H): ICD-10-CM

## 2020-11-19 ENCOUNTER — COMMUNICATION - HEALTHEAST (OUTPATIENT)
Dept: PULMONOLOGY | Facility: OTHER | Age: 85
End: 2020-11-19

## 2020-11-19 DIAGNOSIS — J41.1 MUCOPURULENT CHRONIC BRONCHITIS (H): ICD-10-CM

## 2020-11-24 ENCOUNTER — AMBULATORY - HEALTHEAST (OUTPATIENT)
Dept: NURSING | Facility: CLINIC | Age: 85
End: 2020-11-24

## 2020-12-04 ENCOUNTER — COMMUNICATION - HEALTHEAST (OUTPATIENT)
Dept: INTERNAL MEDICINE | Facility: CLINIC | Age: 85
End: 2020-12-04

## 2020-12-04 ENCOUNTER — AMBULATORY - HEALTHEAST (OUTPATIENT)
Dept: INTERNAL MEDICINE | Facility: CLINIC | Age: 85
End: 2020-12-04

## 2020-12-04 DIAGNOSIS — D62 ANEMIA DUE TO BLOOD LOSS, ACUTE: ICD-10-CM

## 2020-12-07 ENCOUNTER — HOSPITAL ENCOUNTER (OUTPATIENT)
Dept: CARDIOLOGY | Facility: CLINIC | Age: 85
Discharge: HOME | End: 2020-12-07
Attending: INTERNAL MEDICINE

## 2020-12-07 DIAGNOSIS — I48.91 ATRIAL FIBRILLATION WITH RVR (H): ICD-10-CM

## 2020-12-07 LAB
AORTIC ROOT: 2.6 CM
BSA FOR ECHO PROCEDURE: 1.72 M2
CV ECHO HEIGHT: 62 IN
CV ECHO WEIGHT: 149 LBS
DOP CALC LVOT AREA: 2.83 CM2
DOP CALC LVOT DIAMETER: 1.9 CM
EJECTION FRACTION: 63 % (ref 55–75)
FRACTIONAL SHORTENING: 34.9 % (ref 28–44)
INTERVENTRICULAR SEPTUM IN END DIASTOLE: 1.06 CM (ref 0.6–0.9)
IVS/PW RATIO: 1.1
LA AREA 1: 27 CM2
LA AREA 2: 22.1 CM2
LEFT ATRIUM LENGTH: 6.56 CM
LEFT ATRIUM SIZE: 3.9 CM
LEFT ATRIUM TO AORTIC ROOT RATIO: 1.44 NO UNITS
LEFT ATRIUM VOLUME INDEX: 45 ML/M2
LEFT ATRIUM VOLUME: 77.3 ML
LEFT VENTRICLE DIASTOLIC VOLUME INDEX: 15.7 CM3/M2 (ref 29–61)
LEFT VENTRICLE DIASTOLIC VOLUME: 27 CM3 (ref 46–106)
LEFT VENTRICLE MASS INDEX: 74.4 G/M2
LEFT VENTRICLE SYSTOLIC VOLUME INDEX: 5.8 CM3/M2 (ref 8–24)
LEFT VENTRICLE SYSTOLIC VOLUME: 10 CM3 (ref 14–42)
LEFT VENTRICULAR INTERNAL DIMENSION IN DIASTOLE: 3.95 CM (ref 3.8–5.2)
LEFT VENTRICULAR INTERNAL DIMENSION IN SYSTOLE: 2.57 CM (ref 2.2–3.5)
LEFT VENTRICULAR MASS: 128 G
LEFT VENTRICULAR POSTERIOR WALL IN END DIASTOLE: 0.98 CM (ref 0.6–0.9)
NUC REST DIASTOLIC VOLUME INDEX: 2384 LBS
NUC REST SYSTOLIC VOLUME INDEX: 62 IN
TRICUSPID REGURGITATION PEAK PRESSURE GRADIENT: 49.6 MMHG
TRICUSPID VALVE PEAK REGURGITANT VELOCITY: 352 CM/S

## 2020-12-07 ASSESSMENT — MIFFLIN-ST. JEOR: SCORE: 1084.11

## 2020-12-21 ENCOUNTER — COMMUNICATION - HEALTHEAST (OUTPATIENT)
Dept: PULMONOLOGY | Facility: OTHER | Age: 85
End: 2020-12-21

## 2020-12-21 DIAGNOSIS — J41.1 MUCOPURULENT CHRONIC BRONCHITIS (H): ICD-10-CM

## 2020-12-22 ENCOUNTER — COMMUNICATION - HEALTHEAST (OUTPATIENT)
Dept: INTERNAL MEDICINE | Facility: CLINIC | Age: 85
End: 2020-12-22

## 2020-12-22 ENCOUNTER — AMBULATORY - HEALTHEAST (OUTPATIENT)
Dept: NURSING | Facility: CLINIC | Age: 85
End: 2020-12-22

## 2020-12-22 DIAGNOSIS — D62 ANEMIA DUE TO BLOOD LOSS, ACUTE: ICD-10-CM

## 2020-12-26 ENCOUNTER — COMMUNICATION - HEALTHEAST (OUTPATIENT)
Dept: PULMONOLOGY | Facility: OTHER | Age: 85
End: 2020-12-26

## 2020-12-26 DIAGNOSIS — J32.9 SINUSITIS: ICD-10-CM

## 2020-12-28 ENCOUNTER — OFFICE VISIT - HEALTHEAST (OUTPATIENT)
Dept: INTERNAL MEDICINE | Facility: CLINIC | Age: 85
End: 2020-12-28

## 2020-12-28 ENCOUNTER — COMMUNICATION - HEALTHEAST (OUTPATIENT)
Dept: INTERNAL MEDICINE | Facility: CLINIC | Age: 85
End: 2020-12-28

## 2020-12-28 DIAGNOSIS — Z00.00 ROUTINE GENERAL MEDICAL EXAMINATION AT A HEALTH CARE FACILITY: ICD-10-CM

## 2020-12-28 LAB
ALBUMIN SERPL-MCNC: 3.8 G/DL (ref 3.5–5)
ALBUMIN UR-MCNC: NEGATIVE MG/DL
ALP SERPL-CCNC: 99 U/L (ref 45–120)
ALT SERPL W P-5'-P-CCNC: 19 U/L (ref 0–45)
ANION GAP SERPL CALCULATED.3IONS-SCNC: 10 MMOL/L (ref 5–18)
APPEARANCE UR: CLEAR
AST SERPL W P-5'-P-CCNC: 18 U/L (ref 0–40)
BILIRUB SERPL-MCNC: 0.6 MG/DL (ref 0–1)
BILIRUB UR QL STRIP: NEGATIVE
BUN SERPL-MCNC: 15 MG/DL (ref 8–28)
CALCIUM SERPL-MCNC: 8.7 MG/DL (ref 8.5–10.5)
CHLORIDE BLD-SCNC: 95 MMOL/L (ref 98–107)
CHOLEST SERPL-MCNC: 134 MG/DL
CO2 SERPL-SCNC: 31 MMOL/L (ref 22–31)
COLOR UR AUTO: YELLOW
CREAT SERPL-MCNC: 0.72 MG/DL (ref 0.6–1.1)
ERYTHROCYTE [DISTWIDTH] IN BLOOD BY AUTOMATED COUNT: 12.2 % (ref 11–14.5)
FASTING STATUS PATIENT QL REPORTED: NORMAL
GFR SERPL CREATININE-BSD FRML MDRD: >60 ML/MIN/1.73M2
GLUCOSE BLD-MCNC: 188 MG/DL (ref 70–125)
GLUCOSE UR STRIP-MCNC: NEGATIVE MG/DL
HCT VFR BLD AUTO: 47.1 % (ref 35–47)
HDLC SERPL-MCNC: 73 MG/DL
HGB BLD-MCNC: 15.5 G/DL (ref 12–16)
HGB UR QL STRIP: NEGATIVE
KETONES UR STRIP-MCNC: NEGATIVE MG/DL
LDLC SERPL CALC-MCNC: 52 MG/DL
LEUKOCYTE ESTERASE UR QL STRIP: NEGATIVE
MCH RBC QN AUTO: 30.1 PG (ref 27–34)
MCHC RBC AUTO-ENTMCNC: 33 G/DL (ref 32–36)
MCV RBC AUTO: 91 FL (ref 80–100)
NITRATE UR QL: NEGATIVE
PH UR STRIP: 6.5 [PH] (ref 5–8)
PLATELET # BLD AUTO: 223 THOU/UL (ref 140–440)
PMV BLD AUTO: 8.5 FL (ref 7–10)
POTASSIUM BLD-SCNC: 4.6 MMOL/L (ref 3.5–5)
PROT SERPL-MCNC: 6.5 G/DL (ref 6–8)
RBC # BLD AUTO: 5.16 MILL/UL (ref 3.8–5.4)
SODIUM SERPL-SCNC: 136 MMOL/L (ref 136–145)
SP GR UR STRIP: 1.01 (ref 1–1.03)
TRIGL SERPL-MCNC: 46 MG/DL
TSH SERPL DL<=0.005 MIU/L-ACNC: 0.8 UIU/ML (ref 0.3–5)
UROBILINOGEN UR STRIP-ACNC: NORMAL
WBC: 11.4 THOU/UL (ref 4–11)

## 2020-12-28 ASSESSMENT — PATIENT HEALTH QUESTIONNAIRE - PHQ9: SUM OF ALL RESPONSES TO PHQ QUESTIONS 1-9: 0

## 2020-12-28 ASSESSMENT — MIFFLIN-ST. JEOR: SCORE: 1084.11

## 2020-12-30 ENCOUNTER — OFFICE VISIT - HEALTHEAST (OUTPATIENT)
Dept: PULMONOLOGY | Facility: OTHER | Age: 85
End: 2020-12-30

## 2020-12-30 DIAGNOSIS — J47.1 BRONCHIECTASIS WITH ACUTE EXACERBATION (H): ICD-10-CM

## 2020-12-30 DIAGNOSIS — J96.11 CHRONIC RESPIRATORY FAILURE WITH HYPOXIA (H): ICD-10-CM

## 2020-12-30 DIAGNOSIS — J41.1 MUCOPURULENT CHRONIC BRONCHITIS (H): ICD-10-CM

## 2020-12-30 ASSESSMENT — MIFFLIN-ST. JEOR: SCORE: 1075.04

## 2021-01-01 ENCOUNTER — OFFICE VISIT (OUTPATIENT)
Dept: INTERNAL MEDICINE | Facility: CLINIC | Age: 86
End: 2021-01-01
Payer: COMMERCIAL

## 2021-01-01 ENCOUNTER — VIRTUAL VISIT (OUTPATIENT)
Dept: PULMONOLOGY | Facility: OTHER | Age: 86
End: 2021-01-01
Payer: COMMERCIAL

## 2021-01-01 ENCOUNTER — ANESTHESIA (OUTPATIENT)
Dept: SURGERY | Facility: HOSPITAL | Age: 86
End: 2021-01-01
Payer: COMMERCIAL

## 2021-01-01 ENCOUNTER — HOSPITAL ENCOUNTER (EMERGENCY)
Facility: CLINIC | Age: 86
Discharge: HOME | End: 2021-08-07
Attending: EMERGENCY MEDICINE | Admitting: EMERGENCY MEDICINE
Payer: COMMERCIAL

## 2021-01-01 ENCOUNTER — AMBULATORY - HEALTHEAST (OUTPATIENT)
Dept: FAMILY MEDICINE | Facility: CLINIC | Age: 86
End: 2021-01-01

## 2021-01-01 ENCOUNTER — TELEPHONE (OUTPATIENT)
Dept: INTERNAL MEDICINE | Facility: CLINIC | Age: 86
End: 2021-01-01

## 2021-01-01 ENCOUNTER — TRANSFERRED RECORDS (OUTPATIENT)
Dept: HEALTH INFORMATION MANAGEMENT | Facility: CLINIC | Age: 86
End: 2021-01-01

## 2021-01-01 ENCOUNTER — HOSPITAL ENCOUNTER (OUTPATIENT)
Dept: NUCLEAR MEDICINE | Facility: CLINIC | Age: 86
End: 2021-12-22
Attending: INTERNAL MEDICINE
Payer: COMMERCIAL

## 2021-01-01 ENCOUNTER — RECORDS - HEALTHEAST (OUTPATIENT)
Dept: ADMINISTRATIVE | Facility: CLINIC | Age: 86
End: 2021-01-01

## 2021-01-01 ENCOUNTER — TELEPHONE (OUTPATIENT)
Dept: EDUCATION SERVICES | Facility: CLINIC | Age: 86
End: 2021-01-01

## 2021-01-01 ENCOUNTER — HOSPITAL ENCOUNTER (OUTPATIENT)
Dept: MAMMOGRAPHY | Facility: CLINIC | Age: 86
Discharge: HOME | End: 2021-10-27
Attending: INTERNAL MEDICINE | Admitting: INTERNAL MEDICINE
Payer: COMMERCIAL

## 2021-01-01 ENCOUNTER — APPOINTMENT (OUTPATIENT)
Dept: PHYSICAL THERAPY | Facility: CLINIC | Age: 86
DRG: 190 | End: 2021-01-01
Payer: COMMERCIAL

## 2021-01-01 ENCOUNTER — NURSE TRIAGE (OUTPATIENT)
Dept: NURSING | Facility: CLINIC | Age: 86
End: 2021-01-01

## 2021-01-01 ENCOUNTER — TELEPHONE (OUTPATIENT)
Dept: INTERNAL MEDICINE | Facility: CLINIC | Age: 86
End: 2021-01-01
Payer: COMMERCIAL

## 2021-01-01 ENCOUNTER — TELEPHONE (OUTPATIENT)
Dept: PULMONOLOGY | Facility: OTHER | Age: 86
End: 2021-01-01

## 2021-01-01 ENCOUNTER — LAB (OUTPATIENT)
Dept: LAB | Facility: CLINIC | Age: 86
End: 2021-01-01
Attending: SURGERY
Payer: COMMERCIAL

## 2021-01-01 ENCOUNTER — HEALTH MAINTENANCE LETTER (OUTPATIENT)
Age: 86
End: 2021-01-01

## 2021-01-01 ENCOUNTER — PATIENT OUTREACH (OUTPATIENT)
Dept: CARE COORDINATION | Facility: CLINIC | Age: 86
End: 2021-01-01
Payer: COMMERCIAL

## 2021-01-01 ENCOUNTER — APPOINTMENT (OUTPATIENT)
Dept: OCCUPATIONAL THERAPY | Facility: CLINIC | Age: 86
DRG: 190 | End: 2021-01-01
Payer: COMMERCIAL

## 2021-01-01 ENCOUNTER — APPOINTMENT (OUTPATIENT)
Dept: RADIOLOGY | Facility: CLINIC | Age: 86
DRG: 190 | End: 2021-01-01
Attending: EMERGENCY MEDICINE
Payer: COMMERCIAL

## 2021-01-01 ENCOUNTER — APPOINTMENT (OUTPATIENT)
Dept: RADIOLOGY | Facility: CLINIC | Age: 86
End: 2021-01-01
Payer: COMMERCIAL

## 2021-01-01 ENCOUNTER — HOSPITAL ENCOUNTER (OUTPATIENT)
Facility: HOSPITAL | Age: 86
Discharge: HOME | End: 2021-09-21
Attending: SURGERY | Admitting: SURGERY
Payer: COMMERCIAL

## 2021-01-01 ENCOUNTER — TELEPHONE (OUTPATIENT)
Dept: CARDIOLOGY | Facility: CLINIC | Age: 86
End: 2021-01-01
Payer: COMMERCIAL

## 2021-01-01 ENCOUNTER — RECORDS - HEALTHEAST (OUTPATIENT)
Dept: INTERNAL MEDICINE | Facility: CLINIC | Age: 86
End: 2021-01-01

## 2021-01-01 ENCOUNTER — OFFICE VISIT (OUTPATIENT)
Dept: PULMONOLOGY | Facility: OTHER | Age: 86
End: 2021-01-01
Payer: COMMERCIAL

## 2021-01-01 ENCOUNTER — OFFICE VISIT (OUTPATIENT)
Dept: CARDIOLOGY | Facility: CLINIC | Age: 86
End: 2021-01-01
Attending: INTERNAL MEDICINE
Payer: COMMERCIAL

## 2021-01-01 ENCOUNTER — COMMUNICATION - HEALTHEAST (OUTPATIENT)
Dept: SCHEDULING | Facility: CLINIC | Age: 86
End: 2021-01-01

## 2021-01-01 ENCOUNTER — HOSPITAL ENCOUNTER (INPATIENT)
Facility: CLINIC | Age: 86
LOS: 2 days | Discharge: HOME | DRG: 190 | End: 2021-11-05
Attending: EMERGENCY MEDICINE | Admitting: FAMILY MEDICINE
Payer: COMMERCIAL

## 2021-01-01 ENCOUNTER — ALLIED HEALTH/NURSE VISIT (OUTPATIENT)
Dept: EDUCATION SERVICES | Facility: CLINIC | Age: 86
End: 2021-01-01
Payer: COMMERCIAL

## 2021-01-01 ENCOUNTER — APPOINTMENT (OUTPATIENT)
Dept: ULTRASOUND IMAGING | Facility: CLINIC | Age: 86
DRG: 190 | End: 2021-01-01
Payer: COMMERCIAL

## 2021-01-01 ENCOUNTER — HOSPITAL ENCOUNTER (INPATIENT)
Facility: CLINIC | Age: 86
LOS: 2 days | Discharge: HOME | DRG: 190 | End: 2021-10-10
Attending: FAMILY MEDICINE | Admitting: FAMILY MEDICINE
Payer: COMMERCIAL

## 2021-01-01 ENCOUNTER — TELEPHONE (OUTPATIENT)
Dept: CARDIOLOGY | Facility: CLINIC | Age: 86
End: 2021-01-01

## 2021-01-01 ENCOUNTER — VIRTUAL VISIT (OUTPATIENT)
Dept: EDUCATION SERVICES | Facility: CLINIC | Age: 86
End: 2021-01-01
Attending: INTERNAL MEDICINE
Payer: COMMERCIAL

## 2021-01-01 ENCOUNTER — ANESTHESIA EVENT (OUTPATIENT)
Dept: SURGERY | Facility: HOSPITAL | Age: 86
End: 2021-01-01
Payer: COMMERCIAL

## 2021-01-01 ENCOUNTER — IMMUNIZATION (OUTPATIENT)
Dept: NURSING | Facility: CLINIC | Age: 86
End: 2021-01-01
Payer: COMMERCIAL

## 2021-01-01 ENCOUNTER — HOSPITAL ENCOUNTER (EMERGENCY)
Facility: CLINIC | Age: 86
Discharge: HOME | End: 2021-08-15
Admitting: EMERGENCY MEDICINE
Payer: COMMERCIAL

## 2021-01-01 ENCOUNTER — HOSPITAL ENCOUNTER (OUTPATIENT)
Dept: ULTRASOUND IMAGING | Facility: CLINIC | Age: 86
Discharge: HOME | End: 2021-07-14
Attending: NURSE PRACTITIONER | Admitting: NURSE PRACTITIONER
Payer: COMMERCIAL

## 2021-01-01 ENCOUNTER — NURSE TRIAGE (OUTPATIENT)
Dept: NURSING | Facility: CLINIC | Age: 86
End: 2021-01-01
Payer: COMMERCIAL

## 2021-01-01 ENCOUNTER — APPOINTMENT (OUTPATIENT)
Dept: CT IMAGING | Facility: CLINIC | Age: 86
DRG: 190 | End: 2021-01-01
Attending: EMERGENCY MEDICINE
Payer: COMMERCIAL

## 2021-01-01 VITALS
DIASTOLIC BLOOD PRESSURE: 72 MMHG | TEMPERATURE: 98 F | SYSTOLIC BLOOD PRESSURE: 119 MMHG | HEART RATE: 95 BPM | OXYGEN SATURATION: 95 % | BODY MASS INDEX: 27.07 KG/M2 | WEIGHT: 148 LBS | RESPIRATION RATE: 22 BRPM

## 2021-01-01 VITALS
DIASTOLIC BLOOD PRESSURE: 64 MMHG | HEIGHT: 62 IN | WEIGHT: 140 LBS | OXYGEN SATURATION: 90 % | HEART RATE: 99 BPM | BODY MASS INDEX: 25.76 KG/M2 | SYSTOLIC BLOOD PRESSURE: 110 MMHG

## 2021-01-01 VITALS
DIASTOLIC BLOOD PRESSURE: 67 MMHG | BODY MASS INDEX: 28.15 KG/M2 | OXYGEN SATURATION: 94 % | HEART RATE: 74 BPM | TEMPERATURE: 98.7 F | WEIGHT: 149 LBS | SYSTOLIC BLOOD PRESSURE: 103 MMHG | RESPIRATION RATE: 18 BRPM

## 2021-01-01 VITALS
TEMPERATURE: 98.1 F | HEIGHT: 62 IN | SYSTOLIC BLOOD PRESSURE: 142 MMHG | RESPIRATION RATE: 22 BRPM | WEIGHT: 150 LBS | OXYGEN SATURATION: 90 % | HEART RATE: 107 BPM | DIASTOLIC BLOOD PRESSURE: 88 MMHG | BODY MASS INDEX: 27.6 KG/M2

## 2021-01-01 VITALS
BODY MASS INDEX: 26.58 KG/M2 | TEMPERATURE: 97 F | SYSTOLIC BLOOD PRESSURE: 107 MMHG | HEART RATE: 76 BPM | OXYGEN SATURATION: 95 % | WEIGHT: 144.44 LBS | RESPIRATION RATE: 18 BRPM | HEIGHT: 62 IN | DIASTOLIC BLOOD PRESSURE: 58 MMHG

## 2021-01-01 VITALS
TEMPERATURE: 98.1 F | WEIGHT: 147 LBS | DIASTOLIC BLOOD PRESSURE: 61 MMHG | OXYGEN SATURATION: 95 % | RESPIRATION RATE: 23 BRPM | BODY MASS INDEX: 26.89 KG/M2 | HEART RATE: 91 BPM | SYSTOLIC BLOOD PRESSURE: 123 MMHG

## 2021-01-01 VITALS — RESPIRATION RATE: 20 BRPM | HEART RATE: 88 BPM | DIASTOLIC BLOOD PRESSURE: 58 MMHG | SYSTOLIC BLOOD PRESSURE: 106 MMHG

## 2021-01-01 VITALS
OXYGEN SATURATION: 93 % | SYSTOLIC BLOOD PRESSURE: 118 MMHG | BODY MASS INDEX: 26.13 KG/M2 | HEIGHT: 62 IN | DIASTOLIC BLOOD PRESSURE: 58 MMHG | WEIGHT: 142 LBS | HEART RATE: 91 BPM

## 2021-01-01 VITALS
BODY MASS INDEX: 28.13 KG/M2 | DIASTOLIC BLOOD PRESSURE: 56 MMHG | OXYGEN SATURATION: 90 % | HEIGHT: 61 IN | HEART RATE: 84 BPM | SYSTOLIC BLOOD PRESSURE: 110 MMHG | WEIGHT: 149 LBS

## 2021-01-01 VITALS
HEART RATE: 75 BPM | BODY MASS INDEX: 27.47 KG/M2 | SYSTOLIC BLOOD PRESSURE: 110 MMHG | DIASTOLIC BLOOD PRESSURE: 60 MMHG | OXYGEN SATURATION: 96 % | WEIGHT: 147.8 LBS

## 2021-01-01 VITALS
HEIGHT: 62 IN | SYSTOLIC BLOOD PRESSURE: 110 MMHG | OXYGEN SATURATION: 93 % | HEART RATE: 90 BPM | WEIGHT: 142 LBS | DIASTOLIC BLOOD PRESSURE: 58 MMHG | BODY MASS INDEX: 26.13 KG/M2

## 2021-01-01 VITALS
TEMPERATURE: 97 F | HEART RATE: 88 BPM | SYSTOLIC BLOOD PRESSURE: 120 MMHG | HEIGHT: 62 IN | DIASTOLIC BLOOD PRESSURE: 60 MMHG | WEIGHT: 153 LBS | BODY MASS INDEX: 28.16 KG/M2

## 2021-01-01 VITALS
BODY MASS INDEX: 27.42 KG/M2 | SYSTOLIC BLOOD PRESSURE: 106 MMHG | OXYGEN SATURATION: 94 % | DIASTOLIC BLOOD PRESSURE: 60 MMHG | WEIGHT: 149 LBS | HEART RATE: 90 BPM | HEIGHT: 62 IN

## 2021-01-01 VITALS
DIASTOLIC BLOOD PRESSURE: 77 MMHG | SYSTOLIC BLOOD PRESSURE: 132 MMHG | RESPIRATION RATE: 14 BRPM | HEART RATE: 97 BPM | WEIGHT: 149.25 LBS | BODY MASS INDEX: 27.3 KG/M2

## 2021-01-01 DIAGNOSIS — R07.89 ATYPICAL CHEST PAIN: ICD-10-CM

## 2021-01-01 DIAGNOSIS — J44.1 CHRONIC OBSTRUCTIVE PULMONARY DISEASE WITH ACUTE EXACERBATION (H): ICD-10-CM

## 2021-01-01 DIAGNOSIS — J42 CHRONIC BRONCHITIS (H): Primary | ICD-10-CM

## 2021-01-01 DIAGNOSIS — I10 ESSENTIAL HYPERTENSION: Primary | ICD-10-CM

## 2021-01-01 DIAGNOSIS — N30.00 ACUTE CYSTITIS WITHOUT HEMATURIA: ICD-10-CM

## 2021-01-01 DIAGNOSIS — E11.8 TYPE 2 DIABETES MELLITUS WITH COMPLICATION, WITHOUT LONG-TERM CURRENT USE OF INSULIN (H): Primary | ICD-10-CM

## 2021-01-01 DIAGNOSIS — R09.02 HYPOXIA: ICD-10-CM

## 2021-01-01 DIAGNOSIS — R06.09 DYSPNEA ON EXERTION: ICD-10-CM

## 2021-01-01 DIAGNOSIS — J44.1 COPD EXACERBATION (H): Primary | ICD-10-CM

## 2021-01-01 DIAGNOSIS — Z11.59 ENCOUNTER FOR SCREENING FOR OTHER VIRAL DISEASES: ICD-10-CM

## 2021-01-01 DIAGNOSIS — I50.41 ACUTE COMBINED SYSTOLIC AND DIASTOLIC CHF, NYHA CLASS 1 (H): ICD-10-CM

## 2021-01-01 DIAGNOSIS — M79.89 LEG SWELLING: ICD-10-CM

## 2021-01-01 DIAGNOSIS — J44.1 COPD EXACERBATION (H): ICD-10-CM

## 2021-01-01 DIAGNOSIS — R07.89 ATYPICAL CHEST PAIN: Primary | ICD-10-CM

## 2021-01-01 DIAGNOSIS — J96.21 ACUTE ON CHRONIC RESPIRATORY FAILURE WITH HYPOXIA (H): Primary | ICD-10-CM

## 2021-01-01 DIAGNOSIS — I10 ESSENTIAL HYPERTENSION: ICD-10-CM

## 2021-01-01 DIAGNOSIS — N64.59 OTHER SIGN AND SYMPTOM IN BREAST: ICD-10-CM

## 2021-01-01 DIAGNOSIS — K43.9 SPIGELIAN HERNIA: Primary | ICD-10-CM

## 2021-01-01 DIAGNOSIS — E87.1 HYPONATREMIA: ICD-10-CM

## 2021-01-01 DIAGNOSIS — I10 PRIMARY HYPERTENSION: ICD-10-CM

## 2021-01-01 DIAGNOSIS — R33.8 ACUTE URINARY RETENTION: ICD-10-CM

## 2021-01-01 DIAGNOSIS — R07.9 CHEST PAIN ON EXERTION: Primary | ICD-10-CM

## 2021-01-01 DIAGNOSIS — J43.1 PANLOBULAR EMPHYSEMA (H): ICD-10-CM

## 2021-01-01 DIAGNOSIS — E11.9 TYPE 2 DIABETES MELLITUS WITHOUT COMPLICATION, WITHOUT LONG-TERM CURRENT USE OF INSULIN (H): Primary | ICD-10-CM

## 2021-01-01 DIAGNOSIS — Z71.89 OTHER SPECIFIED COUNSELING: ICD-10-CM

## 2021-01-01 DIAGNOSIS — I50.41 ACUTE COMBINED SYSTOLIC AND DIASTOLIC CHF, NYHA CLASS 1 (H): Primary | ICD-10-CM

## 2021-01-01 DIAGNOSIS — I27.20 PULMONARY HYPERTENSION (H): ICD-10-CM

## 2021-01-01 DIAGNOSIS — E11.8 TYPE 2 DIABETES MELLITUS WITH COMPLICATION, WITHOUT LONG-TERM CURRENT USE OF INSULIN (H): ICD-10-CM

## 2021-01-01 DIAGNOSIS — J47.9 BRONCHIECTASIS (H): Primary | ICD-10-CM

## 2021-01-01 DIAGNOSIS — J47.1 BRONCHIECTASIS WITH ACUTE EXACERBATION (H): Primary | ICD-10-CM

## 2021-01-01 DIAGNOSIS — E11.9 TYPE 2 DIABETES MELLITUS WITHOUT COMPLICATION, WITHOUT LONG-TERM CURRENT USE OF INSULIN (H): ICD-10-CM

## 2021-01-01 DIAGNOSIS — R06.00 DYSPNEA, UNSPECIFIED TYPE: ICD-10-CM

## 2021-01-01 DIAGNOSIS — I50.32 CHRONIC HEART FAILURE WITH PRESERVED EJECTION FRACTION (H): Primary | ICD-10-CM

## 2021-01-01 DIAGNOSIS — I50.32 CHRONIC HEART FAILURE WITH PRESERVED EJECTION FRACTION (H): ICD-10-CM

## 2021-01-01 DIAGNOSIS — Z12.31 VISIT FOR SCREENING MAMMOGRAM: ICD-10-CM

## 2021-01-01 DIAGNOSIS — I48.91 ATRIAL FIBRILLATION WITH RVR (H): ICD-10-CM

## 2021-01-01 DIAGNOSIS — J96.11 CHRONIC RESPIRATORY FAILURE WITH HYPOXIA (H): ICD-10-CM

## 2021-01-01 DIAGNOSIS — J42 CHRONIC BRONCHITIS (H): ICD-10-CM

## 2021-01-01 DIAGNOSIS — I48.91 ATRIAL FIBRILLATION WITH RVR (H): Primary | ICD-10-CM

## 2021-01-01 DIAGNOSIS — Z01.818 PREOPERATIVE EXAMINATION: Primary | ICD-10-CM

## 2021-01-01 LAB
ALBUMIN SERPL-MCNC: 3.2 G/DL (ref 3.5–5)
ALBUMIN SERPL-MCNC: 3.3 G/DL (ref 3.5–5)
ALBUMIN SERPL-MCNC: 3.4 G/DL (ref 3.5–5)
ALBUMIN UR-MCNC: NEGATIVE MG/DL
ALBUMIN UR-MCNC: NEGATIVE MG/DL
ALP SERPL-CCNC: 72 U/L (ref 45–120)
ALP SERPL-CCNC: 82 U/L (ref 45–120)
ALP SERPL-CCNC: 88 U/L (ref 45–120)
ALT SERPL W P-5'-P-CCNC: 12 U/L (ref 0–45)
ALT SERPL W P-5'-P-CCNC: 15 U/L (ref 0–45)
ALT SERPL W P-5'-P-CCNC: 22 U/L (ref 0–45)
ANION GAP SERPL CALCULATED.3IONS-SCNC: 10 MMOL/L (ref 5–18)
ANION GAP SERPL CALCULATED.3IONS-SCNC: 11 MMOL/L (ref 5–18)
ANION GAP SERPL CALCULATED.3IONS-SCNC: 12 MMOL/L (ref 5–18)
ANION GAP SERPL CALCULATED.3IONS-SCNC: 5 MMOL/L (ref 5–18)
ANION GAP SERPL CALCULATED.3IONS-SCNC: 7 MMOL/L (ref 5–18)
ANION GAP SERPL CALCULATED.3IONS-SCNC: 7 MMOL/L (ref 5–18)
ANION GAP SERPL CALCULATED.3IONS-SCNC: 8 MMOL/L (ref 5–18)
ANION GAP SERPL CALCULATED.3IONS-SCNC: 9 MMOL/L (ref 5–18)
APPEARANCE UR: CLEAR
APPEARANCE UR: CLEAR
AST SERPL W P-5'-P-CCNC: 16 U/L (ref 0–40)
AST SERPL W P-5'-P-CCNC: 19 U/L (ref 0–40)
AST SERPL W P-5'-P-CCNC: 22 U/L (ref 0–40)
ATRIAL RATE - MUSE: 110 BPM
ATRIAL RATE - MUSE: 288 BPM
ATRIAL RATE - MUSE: 416 BPM
ATRIAL RATE - MUSE: 87 BPM
BACTERIA #/AREA URNS HPF: ABNORMAL /HPF
BACTERIA BLD CULT: NO GROWTH
BACTERIA BLD CULT: NO GROWTH
BACTERIA SPT CULT: ABNORMAL
BACTERIA UR CULT: ABNORMAL
BASE EXCESS BLDV CALC-SCNC: 14 MMOL/L
BASOPHILS # BLD AUTO: 0 10E3/UL (ref 0–0.2)
BASOPHILS # BLD AUTO: 0.1 10E3/UL (ref 0–0.2)
BASOPHILS NFR BLD AUTO: 0 %
BASOPHILS NFR BLD AUTO: 1 %
BILIRUB DIRECT SERPL-MCNC: 0.3 MG/DL
BILIRUB SERPL-MCNC: 0.5 MG/DL (ref 0–1)
BILIRUB SERPL-MCNC: 0.8 MG/DL (ref 0–1)
BILIRUB SERPL-MCNC: 1.2 MG/DL (ref 0–1)
BILIRUB UR QL STRIP: NEGATIVE
BILIRUB UR QL STRIP: NEGATIVE
BNP SERPL-MCNC: 117 PG/ML (ref 0–167)
BNP SERPL-MCNC: 153 PG/ML (ref 0–167)
BNP SERPL-MCNC: 274 PG/ML (ref 0–167)
BUN SERPL-MCNC: 10 MG/DL (ref 8–28)
BUN SERPL-MCNC: 10 MG/DL (ref 8–28)
BUN SERPL-MCNC: 11 MG/DL (ref 8–28)
BUN SERPL-MCNC: 12 MG/DL (ref 8–28)
BUN SERPL-MCNC: 13 MG/DL (ref 8–28)
BUN SERPL-MCNC: 14 MG/DL (ref 8–28)
BUN SERPL-MCNC: 15 MG/DL (ref 8–28)
BUN SERPL-MCNC: 18 MG/DL (ref 8–28)
C REACTIVE PROTEIN LHE: 10.5 MG/DL (ref 0–0.8)
C REACTIVE PROTEIN LHE: 12.3 MG/DL (ref 0–0.8)
CALCIUM SERPL-MCNC: 8.3 MG/DL (ref 8.5–10.5)
CALCIUM SERPL-MCNC: 8.6 MG/DL (ref 8.5–10.5)
CALCIUM SERPL-MCNC: 8.8 MG/DL (ref 8.5–10.5)
CALCIUM SERPL-MCNC: 8.8 MG/DL (ref 8.5–10.5)
CALCIUM SERPL-MCNC: 9 MG/DL (ref 8.5–10.5)
CHLORIDE BLD-SCNC: 101 MMOL/L (ref 98–107)
CHLORIDE BLD-SCNC: 91 MMOL/L (ref 98–107)
CHLORIDE BLD-SCNC: 94 MMOL/L (ref 98–107)
CHLORIDE BLD-SCNC: 94 MMOL/L (ref 98–107)
CHLORIDE BLD-SCNC: 95 MMOL/L (ref 98–107)
CHLORIDE BLD-SCNC: 95 MMOL/L (ref 98–107)
CHLORIDE BLD-SCNC: 96 MMOL/L (ref 98–107)
CHLORIDE BLD-SCNC: 96 MMOL/L (ref 98–107)
CHOLEST SERPL-MCNC: 137 MG/DL
CHOLEST SERPL-MCNC: 145 MG/DL
CO2 SERPL-SCNC: 24 MMOL/L (ref 22–31)
CO2 SERPL-SCNC: 27 MMOL/L (ref 22–31)
CO2 SERPL-SCNC: 29 MMOL/L (ref 22–31)
CO2 SERPL-SCNC: 32 MMOL/L (ref 22–31)
CO2 SERPL-SCNC: 33 MMOL/L (ref 22–31)
CO2 SERPL-SCNC: 37 MMOL/L (ref 22–31)
COLOR UR AUTO: ABNORMAL
COLOR UR AUTO: COLORLESS
CREAT SERPL-MCNC: 0.62 MG/DL (ref 0.6–1.1)
CREAT SERPL-MCNC: 0.63 MG/DL (ref 0.6–1.1)
CREAT SERPL-MCNC: 0.64 MG/DL (ref 0.6–1.1)
CREAT SERPL-MCNC: 0.67 MG/DL (ref 0.6–1.1)
CREAT SERPL-MCNC: 0.7 MG/DL (ref 0.6–1.1)
CREAT SERPL-MCNC: 0.74 MG/DL (ref 0.6–1.1)
CREAT SERPL-MCNC: 0.75 MG/DL (ref 0.6–1.1)
CREAT SERPL-MCNC: 0.75 MG/DL (ref 0.6–1.1)
DIASTOLIC BLOOD PRESSURE - MUSE: 61 MMHG
DIASTOLIC BLOOD PRESSURE - MUSE: 63 MMHG
DIASTOLIC BLOOD PRESSURE - MUSE: NORMAL MMHG
DIASTOLIC BLOOD PRESSURE - MUSE: NORMAL MMHG
EOSINOPHIL # BLD AUTO: 0 10E3/UL (ref 0–0.7)
EOSINOPHIL # BLD AUTO: 0.1 10E3/UL (ref 0–0.7)
EOSINOPHIL # BLD AUTO: 0.2 10E3/UL (ref 0–0.7)
EOSINOPHIL # BLD AUTO: 0.4 10E3/UL (ref 0–0.7)
EOSINOPHIL NFR BLD AUTO: 0 %
EOSINOPHIL NFR BLD AUTO: 1 %
EOSINOPHIL NFR BLD AUTO: 2 %
EOSINOPHIL NFR BLD AUTO: 4 %
ERYTHROCYTE [DISTWIDTH] IN BLOOD BY AUTOMATED COUNT: 15.2 % (ref 10–15)
ERYTHROCYTE [DISTWIDTH] IN BLOOD BY AUTOMATED COUNT: 15.3 % (ref 10–15)
ERYTHROCYTE [DISTWIDTH] IN BLOOD BY AUTOMATED COUNT: 15.4 % (ref 10–15)
ERYTHROCYTE [DISTWIDTH] IN BLOOD BY AUTOMATED COUNT: 15.4 % (ref 10–15)
ERYTHROCYTE [DISTWIDTH] IN BLOOD BY AUTOMATED COUNT: 15.5 % (ref 10–15)
ERYTHROCYTE [DISTWIDTH] IN BLOOD BY AUTOMATED COUNT: 15.6 % (ref 10–15)
ERYTHROCYTE [DISTWIDTH] IN BLOOD BY AUTOMATED COUNT: 15.9 % (ref 10–15)
ERYTHROCYTE [DISTWIDTH] IN BLOOD BY AUTOMATED COUNT: 16 % (ref 10–15)
FASTING STATUS PATIENT QL REPORTED: ABNORMAL
FASTING STATUS PATIENT QL REPORTED: NORMAL
FASTING STATUS PATIENT QL REPORTED: NORMAL
FASTING STATUS PATIENT QL REPORTED: YES
FLUAV AG SPEC QL IA: NEGATIVE
FLUBV AG SPEC QL IA: NEGATIVE
GFR SERPL CREATININE-BSD FRML MDRD: 74 ML/MIN/1.73M2
GFR SERPL CREATININE-BSD FRML MDRD: 74 ML/MIN/1.73M2
GFR SERPL CREATININE-BSD FRML MDRD: 75 ML/MIN/1.73M2
GFR SERPL CREATININE-BSD FRML MDRD: 80 ML/MIN/1.73M2
GFR SERPL CREATININE-BSD FRML MDRD: 82 ML/MIN/1.73M2
GFR SERPL CREATININE-BSD FRML MDRD: 83 ML/MIN/1.73M2
GFR SERPL CREATININE-BSD FRML MDRD: 83 ML/MIN/1.73M2
GFR SERPL CREATININE-BSD FRML MDRD: 84 ML/MIN/1.73M2
GLUCOSE BLD-MCNC: 100 MG/DL (ref 70–125)
GLUCOSE BLD-MCNC: 102 MG/DL (ref 70–125)
GLUCOSE BLD-MCNC: 124 MG/DL (ref 70–125)
GLUCOSE BLD-MCNC: 124 MG/DL (ref 70–125)
GLUCOSE BLD-MCNC: 126 MG/DL (ref 70–125)
GLUCOSE BLD-MCNC: 138 MG/DL (ref 70–125)
GLUCOSE BLD-MCNC: 139 MG/DL (ref 70–125)
GLUCOSE BLD-MCNC: 139 MG/DL (ref 70–125)
GLUCOSE BLD-MCNC: 165 MG/DL (ref 70–125)
GLUCOSE BLD-MCNC: 335 MG/DL (ref 70–125)
GLUCOSE BLDC GLUCOMTR-MCNC: 131 MG/DL (ref 70–99)
GLUCOSE BLDC GLUCOMTR-MCNC: 132 MG/DL (ref 70–99)
GLUCOSE BLDC GLUCOMTR-MCNC: 135 MG/DL (ref 70–99)
GLUCOSE BLDC GLUCOMTR-MCNC: 140 MG/DL (ref 70–99)
GLUCOSE BLDC GLUCOMTR-MCNC: 163 MG/DL (ref 70–99)
GLUCOSE BLDC GLUCOMTR-MCNC: 178 MG/DL (ref 70–99)
GLUCOSE BLDC GLUCOMTR-MCNC: 187 MG/DL (ref 70–99)
GLUCOSE BLDC GLUCOMTR-MCNC: 199 MG/DL (ref 70–99)
GLUCOSE BLDC GLUCOMTR-MCNC: 202 MG/DL (ref 70–99)
GLUCOSE BLDC GLUCOMTR-MCNC: 223 MG/DL (ref 70–99)
GLUCOSE BLDC GLUCOMTR-MCNC: 226 MG/DL (ref 70–99)
GLUCOSE BLDC GLUCOMTR-MCNC: 241 MG/DL (ref 70–99)
GLUCOSE BLDC GLUCOMTR-MCNC: 248 MG/DL (ref 70–99)
GLUCOSE BLDC GLUCOMTR-MCNC: 253 MG/DL (ref 70–99)
GLUCOSE UR STRIP-MCNC: NEGATIVE MG/DL
GLUCOSE UR STRIP-MCNC: NEGATIVE MG/DL
GRAM STAIN RESULT: ABNORMAL
HBA1C MFR BLD: 6.7 % (ref 0–5.6)
HBA1C MFR BLD: 7.1 %
HCO3 BLDV-SCNC: 33 MMOL/L (ref 24–30)
HCT VFR BLD AUTO: 38.7 % (ref 35–47)
HCT VFR BLD AUTO: 41.2 % (ref 35–47)
HCT VFR BLD AUTO: 42 % (ref 35–47)
HCT VFR BLD AUTO: 42.5 % (ref 35–47)
HCT VFR BLD AUTO: 43.2 % (ref 35–47)
HCT VFR BLD AUTO: 43.4 % (ref 35–47)
HCT VFR BLD AUTO: 44.2 % (ref 35–47)
HCT VFR BLD AUTO: 48.2 % (ref 35–47)
HDLC SERPL-MCNC: 65 MG/DL
HDLC SERPL-MCNC: 66 MG/DL
HGB BLD-MCNC: 12.5 G/DL (ref 11.7–15.7)
HGB BLD-MCNC: 13.7 G/DL (ref 11.7–15.7)
HGB BLD-MCNC: 13.7 G/DL (ref 11.7–15.7)
HGB BLD-MCNC: 13.9 G/DL (ref 11.7–15.7)
HGB BLD-MCNC: 14 G/DL (ref 11.7–15.7)
HGB BLD-MCNC: 14.1 G/DL (ref 11.7–15.7)
HGB BLD-MCNC: 14.5 G/DL (ref 11.7–15.7)
HGB BLD-MCNC: 15.4 G/DL (ref 11.7–15.7)
HGB UR QL STRIP: ABNORMAL
HGB UR QL STRIP: NEGATIVE
HOLD SPECIMEN: NORMAL
IMM GRANULOCYTES # BLD: 0 10E3/UL
IMM GRANULOCYTES # BLD: 0.1 10E3/UL
IMM GRANULOCYTES NFR BLD: 0 %
IMM GRANULOCYTES NFR BLD: 0 %
IMM GRANULOCYTES NFR BLD: 1 %
IMM GRANULOCYTES NFR BLD: 1 %
INR PPP: 3.68 (ref 0.85–1.15)
INTERPRETATION ECG - MUSE: NORMAL
KETONES UR STRIP-MCNC: NEGATIVE MG/DL
KETONES UR STRIP-MCNC: NEGATIVE MG/DL
LACTATE SERPL-SCNC: 0.7 MMOL/L (ref 0.7–2)
LACTATE SERPL-SCNC: 1.1 MMOL/L (ref 0.7–2)
LACTATE SERPL-SCNC: 1.6 MMOL/L (ref 0.7–2)
LDLC SERPL CALC-MCNC: 58 MG/DL
LDLC SERPL CALC-MCNC: 67 MG/DL
LEUKOCYTE ESTERASE UR QL STRIP: ABNORMAL
LEUKOCYTE ESTERASE UR QL STRIP: NEGATIVE
LYMPHOCYTES # BLD AUTO: 0.4 10E3/UL (ref 0.8–5.3)
LYMPHOCYTES # BLD AUTO: 0.6 10E3/UL (ref 0.8–5.3)
LYMPHOCYTES # BLD AUTO: 0.8 10E3/UL (ref 0.8–5.3)
LYMPHOCYTES # BLD AUTO: 1.1 10E3/UL (ref 0.8–5.3)
LYMPHOCYTES NFR BLD AUTO: 10 %
LYMPHOCYTES NFR BLD AUTO: 4 %
LYMPHOCYTES NFR BLD AUTO: 4 %
LYMPHOCYTES NFR BLD AUTO: 7 %
MAGNESIUM SERPL-MCNC: 1.5 MG/DL (ref 1.8–2.6)
MAGNESIUM SERPL-MCNC: 1.8 MG/DL (ref 1.8–2.6)
MAGNESIUM SERPL-MCNC: 1.9 MG/DL (ref 1.8–2.6)
MCH RBC QN AUTO: 28.3 PG (ref 26.5–33)
MCH RBC QN AUTO: 28.4 PG (ref 26.5–33)
MCH RBC QN AUTO: 28.5 PG (ref 26.5–33)
MCH RBC QN AUTO: 28.7 PG (ref 26.5–33)
MCH RBC QN AUTO: 28.7 PG (ref 26.5–33)
MCH RBC QN AUTO: 28.8 PG (ref 26.5–33)
MCH RBC QN AUTO: 28.8 PG (ref 26.5–33)
MCH RBC QN AUTO: 28.9 PG (ref 26.5–33)
MCHC RBC AUTO-ENTMCNC: 32 G/DL (ref 31.5–36.5)
MCHC RBC AUTO-ENTMCNC: 32.3 G/DL (ref 31.5–36.5)
MCHC RBC AUTO-ENTMCNC: 32.3 G/DL (ref 31.5–36.5)
MCHC RBC AUTO-ENTMCNC: 32.6 G/DL (ref 31.5–36.5)
MCHC RBC AUTO-ENTMCNC: 32.6 G/DL (ref 31.5–36.5)
MCHC RBC AUTO-ENTMCNC: 32.7 G/DL (ref 31.5–36.5)
MCHC RBC AUTO-ENTMCNC: 32.8 G/DL (ref 31.5–36.5)
MCHC RBC AUTO-ENTMCNC: 33.3 G/DL (ref 31.5–36.5)
MCV RBC AUTO: 86 FL (ref 78–100)
MCV RBC AUTO: 87 FL (ref 78–100)
MCV RBC AUTO: 88 FL (ref 78–100)
MCV RBC AUTO: 89 FL (ref 78–100)
MCV RBC AUTO: 90 FL (ref 78–100)
MONOCYTES # BLD AUTO: 0.2 10E3/UL (ref 0–1.3)
MONOCYTES # BLD AUTO: 0.8 10E3/UL (ref 0–1.3)
MONOCYTES # BLD AUTO: 0.8 10E3/UL (ref 0–1.3)
MONOCYTES # BLD AUTO: 0.9 10E3/UL (ref 0–1.3)
MONOCYTES NFR BLD AUTO: 2 %
MONOCYTES NFR BLD AUTO: 7 %
NEUTROPHILS # BLD AUTO: 10.6 10E3/UL (ref 1.6–8.3)
NEUTROPHILS # BLD AUTO: 10.7 10E3/UL (ref 1.6–8.3)
NEUTROPHILS # BLD AUTO: 8.6 10E3/UL (ref 1.6–8.3)
NEUTROPHILS # BLD AUTO: 9.6 10E3/UL (ref 1.6–8.3)
NEUTROPHILS NFR BLD AUTO: 77 %
NEUTROPHILS NFR BLD AUTO: 85 %
NEUTROPHILS NFR BLD AUTO: 87 %
NEUTROPHILS NFR BLD AUTO: 93 %
NITRATE UR QL: NEGATIVE
NITRATE UR QL: NEGATIVE
NRBC # BLD AUTO: 0 10E3/UL
NRBC BLD AUTO-RTO: 0 /100
OXYHGB MFR BLDV: 47.9 % (ref 70–75)
P AXIS - MUSE: 82 DEGREES
P AXIS - MUSE: NORMAL DEGREES
PCO2 BLDV: 75 MM HG (ref 35–50)
PH BLDV: 7.33 [PH] (ref 7.35–7.45)
PH UR STRIP: 6.5 [PH] (ref 5–7)
PH UR STRIP: 7.5 [PH] (ref 5–7)
PLATELET # BLD AUTO: 188 10E3/UL (ref 150–450)
PLATELET # BLD AUTO: 188 10E3/UL (ref 150–450)
PLATELET # BLD AUTO: 190 10E3/UL (ref 150–450)
PLATELET # BLD AUTO: 197 10E3/UL (ref 150–450)
PLATELET # BLD AUTO: 261 10E3/UL (ref 150–450)
PLATELET # BLD AUTO: 272 10E3/UL (ref 150–450)
PLATELET # BLD AUTO: 275 10E3/UL (ref 150–450)
PLATELET # BLD AUTO: 275 10E3/UL (ref 150–450)
PO2 BLDV: 29 MM HG (ref 25–47)
POTASSIUM BLD-SCNC: 3.8 MMOL/L (ref 3.5–5)
POTASSIUM BLD-SCNC: 3.9 MMOL/L (ref 3.5–5)
POTASSIUM BLD-SCNC: 4.4 MMOL/L (ref 3.5–5)
POTASSIUM BLD-SCNC: 4.5 MMOL/L (ref 3.5–5)
POTASSIUM BLD-SCNC: 4.5 MMOL/L (ref 3.5–5)
POTASSIUM BLD-SCNC: 4.7 MMOL/L (ref 3.5–5)
POTASSIUM BLD-SCNC: 4.7 MMOL/L (ref 3.5–5)
POTASSIUM BLD-SCNC: 4.9 MMOL/L (ref 3.5–5)
PR INTERVAL - MUSE: 208 MS
PR INTERVAL - MUSE: NORMAL MS
PROCALCITONIN SERPL-MCNC: 0.02 NG/ML (ref 0–0.49)
PROCALCITONIN SERPL-MCNC: 0.1 NG/ML (ref 0–0.49)
PROT SERPL-MCNC: 5.6 G/DL (ref 6–8)
PROT SERPL-MCNC: 6.1 G/DL (ref 6–8)
PROT SERPL-MCNC: 6.6 G/DL (ref 6–8)
QRS DURATION - MUSE: 68 MS
QRS DURATION - MUSE: 68 MS
QRS DURATION - MUSE: 70 MS
QRS DURATION - MUSE: 70 MS
QT - MUSE: 302 MS
QT - MUSE: 348 MS
QT - MUSE: 352 MS
QT - MUSE: 374 MS
QTC - MUSE: 412 MS
QTC - MUSE: 418 MS
QTC - MUSE: 428 MS
QTC - MUSE: 434 MS
R AXIS - MUSE: 102 DEGREES
R AXIS - MUSE: 103 DEGREES
R AXIS - MUSE: 105 DEGREES
R AXIS - MUSE: 109 DEGREES
RBC # BLD AUTO: 4.41 10E6/UL (ref 3.8–5.2)
RBC # BLD AUTO: 4.75 10E6/UL (ref 3.8–5.2)
RBC # BLD AUTO: 4.78 10E6/UL (ref 3.8–5.2)
RBC # BLD AUTO: 4.84 10E6/UL (ref 3.8–5.2)
RBC # BLD AUTO: 4.84 10E6/UL (ref 3.8–5.2)
RBC # BLD AUTO: 4.96 10E6/UL (ref 3.8–5.2)
RBC # BLD AUTO: 5.04 10E6/UL (ref 3.8–5.2)
RBC # BLD AUTO: 5.4 10E6/UL (ref 3.8–5.2)
RBC URINE: 0 /HPF
RBC URINE: 3 /HPF
SAO2 % BLDV: 48.9 % (ref 70–75)
SARS-COV-2 RNA RESP QL NAA+PROBE: NEGATIVE
SODIUM SERPL-SCNC: 130 MMOL/L (ref 136–145)
SODIUM SERPL-SCNC: 132 MMOL/L (ref 136–145)
SODIUM SERPL-SCNC: 132 MMOL/L (ref 136–145)
SODIUM SERPL-SCNC: 133 MMOL/L (ref 136–145)
SODIUM SERPL-SCNC: 135 MMOL/L (ref 136–145)
SP GR UR STRIP: 1 (ref 1–1.03)
SP GR UR STRIP: 1.01 (ref 1–1.03)
SQUAMOUS EPITHELIAL: 4 /HPF
STRESS ECHO TARGET HR: 136
SYSTOLIC BLOOD PRESSURE - MUSE: 114 MMHG
SYSTOLIC BLOOD PRESSURE - MUSE: 123 MMHG
SYSTOLIC BLOOD PRESSURE - MUSE: NORMAL MMHG
SYSTOLIC BLOOD PRESSURE - MUSE: NORMAL MMHG
T AXIS - MUSE: 1 DEGREES
T AXIS - MUSE: 13 DEGREES
T AXIS - MUSE: 25 DEGREES
T AXIS - MUSE: 35 DEGREES
TRIGL SERPL-MCNC: 64 MG/DL
TRIGL SERPL-MCNC: 65 MG/DL
TROPONIN I SERPL-MCNC: 0.01 NG/ML (ref 0–0.29)
TROPONIN I SERPL-MCNC: 0.01 NG/ML (ref 0–0.29)
TROPONIN I SERPL-MCNC: <0.01 NG/ML (ref 0–0.29)
TSH SERPL DL<=0.005 MIU/L-ACNC: 1.41 UIU/ML (ref 0.3–5)
UROBILINOGEN UR STRIP-MCNC: <2 MG/DL
UROBILINOGEN UR STRIP-MCNC: <2 MG/DL
VENTRICULAR RATE- MUSE: 112 BPM
VENTRICULAR RATE- MUSE: 81 BPM
VENTRICULAR RATE- MUSE: 87 BPM
VENTRICULAR RATE- MUSE: 89 BPM
WBC # BLD AUTO: 10.5 10E3/UL (ref 4–11)
WBC # BLD AUTO: 11 10E3/UL (ref 4–11)
WBC # BLD AUTO: 11.4 10E3/UL (ref 4–11)
WBC # BLD AUTO: 11.5 10E3/UL (ref 4–11)
WBC # BLD AUTO: 12.4 10E3/UL (ref 4–11)
WBC # BLD AUTO: 12.4 10E3/UL (ref 4–11)
WBC # BLD AUTO: 13.1 10E3/UL (ref 4–11)
WBC # BLD AUTO: 7.5 10E3/UL (ref 4–11)
WBC URINE: 105 /HPF
WBC URINE: <1 /HPF

## 2021-01-01 PROCEDURE — A9500 TC99M SESTAMIBI: HCPCS | Performed by: INTERNAL MEDICINE

## 2021-01-01 PROCEDURE — 51702 INSERT TEMP BLADDER CATH: CPT

## 2021-01-01 PROCEDURE — 93005 ELECTROCARDIOGRAM TRACING: CPT | Performed by: FAMILY MEDICINE

## 2021-01-01 PROCEDURE — 250N000009 HC RX 250: Performed by: STUDENT IN AN ORGANIZED HEALTH CARE EDUCATION/TRAINING PROGRAM

## 2021-01-01 PROCEDURE — 36415 COLL VENOUS BLD VENIPUNCTURE: CPT | Performed by: INTERNAL MEDICINE

## 2021-01-01 PROCEDURE — 272N000001 HC OR GENERAL SUPPLY STERILE: Performed by: SURGERY

## 2021-01-01 PROCEDURE — 80061 LIPID PANEL: CPT | Performed by: INTERNAL MEDICINE

## 2021-01-01 PROCEDURE — 36415 COLL VENOUS BLD VENIPUNCTURE: CPT | Performed by: EMERGENCY MEDICINE

## 2021-01-01 PROCEDURE — 99238 HOSP IP/OBS DSCHRG MGMT 30/<: CPT | Mod: GC

## 2021-01-01 PROCEDURE — 80053 COMPREHEN METABOLIC PANEL: CPT | Performed by: INTERNAL MEDICINE

## 2021-01-01 PROCEDURE — 0013A PR COVID VAC MODERNA 100 MCG/0.5 ML IM: CPT

## 2021-01-01 PROCEDURE — 83036 HEMOGLOBIN GLYCOSYLATED A1C: CPT | Performed by: INTERNAL MEDICINE

## 2021-01-01 PROCEDURE — 250N000009 HC RX 250: Performed by: FAMILY MEDICINE

## 2021-01-01 PROCEDURE — 258N000003 HC RX IP 258 OP 636: Performed by: ANESTHESIOLOGY

## 2021-01-01 PROCEDURE — 99223 1ST HOSP IP/OBS HIGH 75: CPT | Mod: GC | Performed by: STUDENT IN AN ORGANIZED HEALTH CARE EDUCATION/TRAINING PROGRAM

## 2021-01-01 PROCEDURE — 250N000013 HC RX MED GY IP 250 OP 250 PS 637: Performed by: STUDENT IN AN ORGANIZED HEALTH CARE EDUCATION/TRAINING PROGRAM

## 2021-01-01 PROCEDURE — 80048 BASIC METABOLIC PNL TOTAL CA: CPT | Performed by: STUDENT IN AN ORGANIZED HEALTH CARE EDUCATION/TRAINING PROGRAM

## 2021-01-01 PROCEDURE — 999N000157 HC STATISTIC RCP TIME EA 10 MIN

## 2021-01-01 PROCEDURE — 250N000009 HC RX 250: Performed by: PHYSICIAN ASSISTANT

## 2021-01-01 PROCEDURE — 71275 CT ANGIOGRAPHY CHEST: CPT

## 2021-01-01 PROCEDURE — 84443 ASSAY THYROID STIM HORMONE: CPT | Performed by: PHYSICIAN ASSISTANT

## 2021-01-01 PROCEDURE — 93005 ELECTROCARDIOGRAM TRACING: CPT

## 2021-01-01 PROCEDURE — 80048 BASIC METABOLIC PNL TOTAL CA: CPT | Performed by: EMERGENCY MEDICINE

## 2021-01-01 PROCEDURE — 85610 PROTHROMBIN TIME: CPT | Performed by: EMERGENCY MEDICINE

## 2021-01-01 PROCEDURE — 250N000011 HC RX IP 250 OP 636

## 2021-01-01 PROCEDURE — 86141 C-REACTIVE PROTEIN HS: CPT | Performed by: STUDENT IN AN ORGANIZED HEALTH CARE EDUCATION/TRAINING PROGRAM

## 2021-01-01 PROCEDURE — 83605 ASSAY OF LACTIC ACID: CPT | Performed by: FAMILY MEDICINE

## 2021-01-01 PROCEDURE — 77067 SCR MAMMO BI INCL CAD: CPT

## 2021-01-01 PROCEDURE — 36415 COLL VENOUS BLD VENIPUNCTURE: CPT | Performed by: STUDENT IN AN ORGANIZED HEALTH CARE EDUCATION/TRAINING PROGRAM

## 2021-01-01 PROCEDURE — 83880 ASSAY OF NATRIURETIC PEPTIDE: CPT | Performed by: EMERGENCY MEDICINE

## 2021-01-01 PROCEDURE — 83605 ASSAY OF LACTIC ACID: CPT | Performed by: STUDENT IN AN ORGANIZED HEALTH CARE EDUCATION/TRAINING PROGRAM

## 2021-01-01 PROCEDURE — 85018 HEMOGLOBIN: CPT | Performed by: STUDENT IN AN ORGANIZED HEALTH CARE EDUCATION/TRAINING PROGRAM

## 2021-01-01 PROCEDURE — 83735 ASSAY OF MAGNESIUM: CPT | Performed by: EMERGENCY MEDICINE

## 2021-01-01 PROCEDURE — 94640 AIRWAY INHALATION TREATMENT: CPT

## 2021-01-01 PROCEDURE — 78451 HT MUSCLE IMAGE SPECT SING: CPT

## 2021-01-01 PROCEDURE — 87635 SARS-COV-2 COVID-19 AMP PRB: CPT | Performed by: EMERGENCY MEDICINE

## 2021-01-01 PROCEDURE — 93005 ELECTROCARDIOGRAM TRACING: CPT | Performed by: EMERGENCY MEDICINE

## 2021-01-01 PROCEDURE — 250N000013 HC RX MED GY IP 250 OP 250 PS 637

## 2021-01-01 PROCEDURE — 99232 SBSQ HOSP IP/OBS MODERATE 35: CPT | Mod: GC | Performed by: STUDENT IN AN ORGANIZED HEALTH CARE EDUCATION/TRAINING PROGRAM

## 2021-01-01 PROCEDURE — 250N000013 HC RX MED GY IP 250 OP 250 PS 637: Performed by: ANESTHESIOLOGY

## 2021-01-01 PROCEDURE — 210N000002 HC R&B HEART CARE

## 2021-01-01 PROCEDURE — 96365 THER/PROPH/DIAG IV INF INIT: CPT

## 2021-01-01 PROCEDURE — 360N000075 HC SURGERY LEVEL 2, PER MIN: Performed by: SURGERY

## 2021-01-01 PROCEDURE — 370N000017 HC ANESTHESIA TECHNICAL FEE, PER MIN: Performed by: SURGERY

## 2021-01-01 PROCEDURE — G0108 DIAB MANAGE TRN  PER INDIV: HCPCS | Mod: AE | Performed by: FAMILY MEDICINE

## 2021-01-01 PROCEDURE — 97165 OT EVAL LOW COMPLEX 30 MIN: CPT | Mod: GO

## 2021-01-01 PROCEDURE — G0008 ADMIN INFLUENZA VIRUS VAC: HCPCS | Performed by: FAMILY MEDICINE

## 2021-01-01 PROCEDURE — 36415 COLL VENOUS BLD VENIPUNCTURE: CPT | Performed by: FAMILY MEDICINE

## 2021-01-01 PROCEDURE — 94799 UNLISTED PULMONARY SVC/PX: CPT

## 2021-01-01 PROCEDURE — 250N000009 HC RX 250: Performed by: EMERGENCY MEDICINE

## 2021-01-01 PROCEDURE — 250N000013 HC RX MED GY IP 250 OP 250 PS 637: Performed by: EMERGENCY MEDICINE

## 2021-01-01 PROCEDURE — 250N000011 HC RX IP 250 OP 636: Performed by: EMERGENCY MEDICINE

## 2021-01-01 PROCEDURE — 87086 URINE CULTURE/COLONY COUNT: CPT | Performed by: EMERGENCY MEDICINE

## 2021-01-01 PROCEDURE — 97110 THERAPEUTIC EXERCISES: CPT | Mod: GO

## 2021-01-01 PROCEDURE — 250N000011 HC RX IP 250 OP 636: Performed by: STUDENT IN AN ORGANIZED HEALTH CARE EDUCATION/TRAINING PROGRAM

## 2021-01-01 PROCEDURE — 94640 AIRWAY INHALATION TREATMENT: CPT | Mod: 76

## 2021-01-01 PROCEDURE — 82962 GLUCOSE BLOOD TEST: CPT

## 2021-01-01 PROCEDURE — 87804 INFLUENZA ASSAY W/OPTIC: CPT | Performed by: STUDENT IN AN ORGANIZED HEALTH CARE EDUCATION/TRAINING PROGRAM

## 2021-01-01 PROCEDURE — 250N000011 HC RX IP 250 OP 636: Performed by: ANESTHESIOLOGY

## 2021-01-01 PROCEDURE — 83880 ASSAY OF NATRIURETIC PEPTIDE: CPT | Performed by: PHYSICIAN ASSISTANT

## 2021-01-01 PROCEDURE — 96375 TX/PRO/DX INJ NEW DRUG ADDON: CPT | Mod: 59

## 2021-01-01 PROCEDURE — 99214 OFFICE O/P EST MOD 30 MIN: CPT | Performed by: INTERNAL MEDICINE

## 2021-01-01 PROCEDURE — C9803 HOPD COVID-19 SPEC COLLECT: HCPCS

## 2021-01-01 PROCEDURE — 99238 HOSP IP/OBS DSCHRG MGMT 30/<: CPT | Mod: GC | Performed by: FAMILY MEDICINE

## 2021-01-01 PROCEDURE — 97110 THERAPEUTIC EXERCISES: CPT | Mod: GP

## 2021-01-01 PROCEDURE — 96374 THER/PROPH/DIAG INJ IV PUSH: CPT

## 2021-01-01 PROCEDURE — 250N000009 HC RX 250

## 2021-01-01 PROCEDURE — 96375 TX/PRO/DX INJ NEW DRUG ADDON: CPT

## 2021-01-01 PROCEDURE — 82040 ASSAY OF SERUM ALBUMIN: CPT | Performed by: STUDENT IN AN ORGANIZED HEALTH CARE EDUCATION/TRAINING PROGRAM

## 2021-01-01 PROCEDURE — 93970 EXTREMITY STUDY: CPT

## 2021-01-01 PROCEDURE — 85025 COMPLETE CBC W/AUTO DIFF WBC: CPT | Performed by: EMERGENCY MEDICINE

## 2021-01-01 PROCEDURE — 78451 HT MUSCLE IMAGE SPECT SING: CPT | Mod: 26 | Performed by: INTERNAL MEDICINE

## 2021-01-01 PROCEDURE — 36415 COLL VENOUS BLD VENIPUNCTURE: CPT | Performed by: PHYSICIAN ASSISTANT

## 2021-01-01 PROCEDURE — 83605 ASSAY OF LACTIC ACID: CPT | Performed by: EMERGENCY MEDICINE

## 2021-01-01 PROCEDURE — 250N000012 HC RX MED GY IP 250 OP 636 PS 637: Performed by: STUDENT IN AN ORGANIZED HEALTH CARE EDUCATION/TRAINING PROGRAM

## 2021-01-01 PROCEDURE — 82040 ASSAY OF SERUM ALBUMIN: CPT | Performed by: EMERGENCY MEDICINE

## 2021-01-01 PROCEDURE — G0378 HOSPITAL OBSERVATION PER HR: HCPCS

## 2021-01-01 PROCEDURE — 250N000011 HC RX IP 250 OP 636: Performed by: FAMILY MEDICINE

## 2021-01-01 PROCEDURE — U0003 INFECTIOUS AGENT DETECTION BY NUCLEIC ACID (DNA OR RNA); SEVERE ACUTE RESPIRATORY SYNDROME CORONAVIRUS 2 (SARS-COV-2) (CORONAVIRUS DISEASE [COVID-19]), AMPLIFIED PROBE TECHNIQUE, MAKING USE OF HIGH THROUGHPUT TECHNOLOGIES AS DESCRIBED BY CMS-2020-01-R: HCPCS

## 2021-01-01 PROCEDURE — 71046 X-RAY EXAM CHEST 2 VIEWS: CPT

## 2021-01-01 PROCEDURE — U0005 INFEC AGEN DETEC AMPLI PROBE: HCPCS

## 2021-01-01 PROCEDURE — 99214 OFFICE O/P EST MOD 30 MIN: CPT | Mod: 95 | Performed by: INTERNAL MEDICINE

## 2021-01-01 PROCEDURE — 258N000003 HC RX IP 258 OP 636: Performed by: STUDENT IN AN ORGANIZED HEALTH CARE EDUCATION/TRAINING PROGRAM

## 2021-01-01 PROCEDURE — 250N000011 HC RX IP 250 OP 636: Performed by: PHYSICIAN ASSISTANT

## 2021-01-01 PROCEDURE — 84145 PROCALCITONIN (PCT): CPT | Performed by: FAMILY MEDICINE

## 2021-01-01 PROCEDURE — 85027 COMPLETE CBC AUTOMATED: CPT | Performed by: STUDENT IN AN ORGANIZED HEALTH CARE EDUCATION/TRAINING PROGRAM

## 2021-01-01 PROCEDURE — 120N000001 HC R&B MED SURG/OB

## 2021-01-01 PROCEDURE — 250N000013 HC RX MED GY IP 250 OP 250 PS 637: Performed by: FAMILY MEDICINE

## 2021-01-01 PROCEDURE — 81001 URINALYSIS AUTO W/SCOPE: CPT | Performed by: EMERGENCY MEDICINE

## 2021-01-01 PROCEDURE — 93018 CV STRESS TEST I&R ONLY: CPT | Performed by: INTERNAL MEDICINE

## 2021-01-01 PROCEDURE — 82947 ASSAY GLUCOSE BLOOD QUANT: CPT | Performed by: INTERNAL MEDICINE

## 2021-01-01 PROCEDURE — 99284 EMERGENCY DEPT VISIT MOD MDM: CPT | Mod: 25

## 2021-01-01 PROCEDURE — 83735 ASSAY OF MAGNESIUM: CPT | Performed by: PHYSICIAN ASSISTANT

## 2021-01-01 PROCEDURE — 97535 SELF CARE MNGMENT TRAINING: CPT | Mod: GO

## 2021-01-01 PROCEDURE — 87205 SMEAR GRAM STAIN: CPT | Performed by: STUDENT IN AN ORGANIZED HEALTH CARE EDUCATION/TRAINING PROGRAM

## 2021-01-01 PROCEDURE — C1781 MESH (IMPLANTABLE): HCPCS | Performed by: SURGERY

## 2021-01-01 PROCEDURE — 71045 X-RAY EXAM CHEST 1 VIEW: CPT

## 2021-01-01 PROCEDURE — 99214 OFFICE O/P EST MOD 30 MIN: CPT | Performed by: NURSE PRACTITIONER

## 2021-01-01 PROCEDURE — 710N000012 HC RECOVERY PHASE 2, PER MINUTE: Performed by: SURGERY

## 2021-01-01 PROCEDURE — 84484 ASSAY OF TROPONIN QUANT: CPT | Performed by: EMERGENCY MEDICINE

## 2021-01-01 PROCEDURE — 97162 PT EVAL MOD COMPLEX 30 MIN: CPT | Mod: GP

## 2021-01-01 PROCEDURE — 82805 BLOOD GASES W/O2 SATURATION: CPT | Performed by: PHYSICIAN ASSISTANT

## 2021-01-01 PROCEDURE — 83036 HEMOGLOBIN GLYCOSYLATED A1C: CPT | Performed by: STUDENT IN AN ORGANIZED HEALTH CARE EDUCATION/TRAINING PROGRAM

## 2021-01-01 PROCEDURE — 84484 ASSAY OF TROPONIN QUANT: CPT | Performed by: PHYSICIAN ASSISTANT

## 2021-01-01 PROCEDURE — 250N000009 HC RX 250: Performed by: ANESTHESIOLOGY

## 2021-01-01 PROCEDURE — 85027 COMPLETE CBC AUTOMATED: CPT | Performed by: INTERNAL MEDICINE

## 2021-01-01 PROCEDURE — 99215 OFFICE O/P EST HI 40 MIN: CPT | Performed by: INTERNAL MEDICINE

## 2021-01-01 PROCEDURE — 84145 PROCALCITONIN (PCT): CPT | Performed by: STUDENT IN AN ORGANIZED HEALTH CARE EDUCATION/TRAINING PROGRAM

## 2021-01-01 PROCEDURE — 99285 EMERGENCY DEPT VISIT HI MDM: CPT | Mod: 25

## 2021-01-01 PROCEDURE — 250N000011 HC RX IP 250 OP 636: Performed by: SURGERY

## 2021-01-01 PROCEDURE — 91301 PR COVID VAC MODERNA 100 MCG/0.5 ML IM: CPT

## 2021-01-01 PROCEDURE — 87181 SC STD AGAR DILUTION PER AGT: CPT | Performed by: STUDENT IN AN ORGANIZED HEALTH CARE EDUCATION/TRAINING PROGRAM

## 2021-01-01 PROCEDURE — 82374 ASSAY BLOOD CARBON DIOXIDE: CPT | Performed by: EMERGENCY MEDICINE

## 2021-01-01 PROCEDURE — 90662 IIV NO PRSV INCREASED AG IM: CPT | Performed by: FAMILY MEDICINE

## 2021-01-01 PROCEDURE — 87040 BLOOD CULTURE FOR BACTERIA: CPT | Performed by: EMERGENCY MEDICINE

## 2021-01-01 PROCEDURE — 51798 US URINE CAPACITY MEASURE: CPT

## 2021-01-01 PROCEDURE — 93005 ELECTROCARDIOGRAM TRACING: CPT | Performed by: PHYSICIAN ASSISTANT

## 2021-01-01 PROCEDURE — 999N000141 HC STATISTIC PRE-PROCEDURE NURSING ASSESSMENT: Performed by: SURGERY

## 2021-01-01 PROCEDURE — 97116 GAIT TRAINING THERAPY: CPT | Mod: GP

## 2021-01-01 PROCEDURE — 97530 THERAPEUTIC ACTIVITIES: CPT | Mod: GP

## 2021-01-01 PROCEDURE — 96372 THER/PROPH/DIAG INJ SC/IM: CPT | Performed by: STUDENT IN AN ORGANIZED HEALTH CARE EDUCATION/TRAINING PROGRAM

## 2021-01-01 PROCEDURE — 343N000001 HC RX 343: Performed by: INTERNAL MEDICINE

## 2021-01-01 PROCEDURE — 99222 1ST HOSP IP/OBS MODERATE 55: CPT | Mod: AI | Performed by: STUDENT IN AN ORGANIZED HEALTH CARE EDUCATION/TRAINING PROGRAM

## 2021-01-01 PROCEDURE — 87635 SARS-COV-2 COVID-19 AMP PRB: CPT | Performed by: PHYSICIAN ASSISTANT

## 2021-01-01 PROCEDURE — U0005 INFEC AGEN DETEC AMPLI PROBE: HCPCS | Performed by: EMERGENCY MEDICINE

## 2021-01-01 PROCEDURE — 94667 MNPJ CHEST WALL 1ST: CPT

## 2021-01-01 PROCEDURE — 250N000009 HC RX 250: Performed by: SURGERY

## 2021-01-01 PROCEDURE — 96376 TX/PRO/DX INJ SAME DRUG ADON: CPT

## 2021-01-01 PROCEDURE — 93971 EXTREMITY STUDY: CPT | Mod: LT

## 2021-01-01 PROCEDURE — 97802 MEDICAL NUTRITION INDIV IN: CPT | Mod: 95 | Performed by: DIETITIAN, REGISTERED

## 2021-01-01 DEVICE — BARD MESH, 2" X 4" (5 CM X 10 CM)
Type: IMPLANTABLE DEVICE | Site: ABDOMEN | Status: FUNCTIONAL
Brand: BARD

## 2021-01-01 RX ORDER — METHYLPREDNISOLONE SODIUM SUCCINATE 125 MG/2ML
125 INJECTION, POWDER, LYOPHILIZED, FOR SOLUTION INTRAMUSCULAR; INTRAVENOUS ONCE
Status: COMPLETED | OUTPATIENT
Start: 2021-01-01 | End: 2021-01-01

## 2021-01-01 RX ORDER — SPIRONOLACTONE 25 MG
12.5 TABLET ORAL EVERY MORNING
Status: DISCONTINUED | OUTPATIENT
Start: 2021-01-01 | End: 2021-01-01 | Stop reason: HOSPADM

## 2021-01-01 RX ORDER — ATORVASTATIN CALCIUM 10 MG/1
20 TABLET, FILM COATED ORAL EVERY EVENING
Status: DISCONTINUED | OUTPATIENT
Start: 2021-01-01 | End: 2021-01-01 | Stop reason: HOSPADM

## 2021-01-01 RX ORDER — DEXMEDETOMIDINE HYDROCHLORIDE 4 UG/ML
0.2-0.7 INJECTION, SOLUTION INTRAVENOUS CONTINUOUS
Status: DISCONTINUED | OUTPATIENT
Start: 2021-01-01 | End: 2021-01-01 | Stop reason: HOSPADM

## 2021-01-01 RX ORDER — PREDNISONE 20 MG/1
40 TABLET ORAL DAILY
Status: DISCONTINUED | OUTPATIENT
Start: 2021-01-01 | End: 2021-01-01

## 2021-01-01 RX ORDER — DOXYCYCLINE HYCLATE 50 MG/1
100 CAPSULE ORAL 2 TIMES DAILY
Status: DISCONTINUED | OUTPATIENT
Start: 2021-01-01 | End: 2021-01-01 | Stop reason: HOSPADM

## 2021-01-01 RX ORDER — ONDANSETRON 2 MG/ML
4 INJECTION INTRAMUSCULAR; INTRAVENOUS EVERY 30 MIN PRN
Status: DISCONTINUED | OUTPATIENT
Start: 2021-01-01 | End: 2021-01-01 | Stop reason: HOSPADM

## 2021-01-01 RX ORDER — CLOBETASOL PROPIONATE 0.5 MG/G
1 CREAM TOPICAL 2 TIMES DAILY PRN
COMMUNITY
Start: 2021-06-08 | End: 2022-01-01

## 2021-01-01 RX ORDER — DEXMEDETOMIDINE HYDROCHLORIDE 4 UG/ML
INJECTION, SOLUTION INTRAVENOUS
Status: COMPLETED
Start: 2021-01-01 | End: 2021-01-01

## 2021-01-01 RX ORDER — ALBUTEROL SULFATE 90 UG/1
2 AEROSOL, METERED RESPIRATORY (INHALATION) EVERY 4 HOURS PRN
Status: DISCONTINUED | OUTPATIENT
Start: 2021-01-01 | End: 2021-01-01 | Stop reason: HOSPADM

## 2021-01-01 RX ORDER — BACITRACIN ZINC 500 [USP'U]/G
OINTMENT TOPICAL
Status: ON HOLD | COMMUNITY
End: 2021-01-01

## 2021-01-01 RX ORDER — PROPOFOL 10 MG/ML
INJECTION, EMULSION INTRAVENOUS CONTINUOUS PRN
Status: DISCONTINUED | OUTPATIENT
Start: 2021-01-01 | End: 2021-01-01

## 2021-01-01 RX ORDER — ALBUTEROL SULFATE 5 MG/ML
2.5 SOLUTION RESPIRATORY (INHALATION) EVERY 6 HOURS PRN
Status: DISCONTINUED | OUTPATIENT
Start: 2021-01-01 | End: 2021-01-01

## 2021-01-01 RX ORDER — POLYETHYLENE GLYCOL 3350 17 G/17G
17 POWDER, FOR SOLUTION ORAL DAILY
Status: DISCONTINUED | OUTPATIENT
Start: 2021-01-01 | End: 2021-01-01 | Stop reason: HOSPADM

## 2021-01-01 RX ORDER — DILTIAZEM HYDROCHLORIDE 120 MG/1
120 CAPSULE, EXTENDED RELEASE ORAL EVERY MORNING
Qty: 90 CAPSULE | Refills: 1 | Status: SHIPPED | OUTPATIENT
Start: 2021-01-01 | End: 2022-01-01

## 2021-01-01 RX ORDER — PROCHLORPERAZINE 25 MG
12.5 SUPPOSITORY, RECTAL RECTAL EVERY 12 HOURS PRN
Status: DISCONTINUED | OUTPATIENT
Start: 2021-01-01 | End: 2021-01-01 | Stop reason: HOSPADM

## 2021-01-01 RX ORDER — ARIPIPRAZOLE 5 MG/1
5 TABLET ORAL EVERY EVENING
Status: DISCONTINUED | OUTPATIENT
Start: 2021-01-01 | End: 2021-01-01 | Stop reason: HOSPADM

## 2021-01-01 RX ORDER — ALBUTEROL SULFATE 0.83 MG/ML
2.5 SOLUTION RESPIRATORY (INHALATION) 4 TIMES DAILY PRN
Status: DISCONTINUED | OUTPATIENT
Start: 2021-01-01 | End: 2021-01-01

## 2021-01-01 RX ORDER — PREDNISONE 20 MG/1
40 TABLET ORAL DAILY
Status: DISCONTINUED | OUTPATIENT
Start: 2021-01-01 | End: 2021-01-01 | Stop reason: HOSPADM

## 2021-01-01 RX ORDER — SODIUM CHLORIDE FOR INHALATION 3 %
4 VIAL, NEBULIZER (ML) INHALATION 2 TIMES DAILY
Qty: 240 ML | Refills: 11 | Status: SHIPPED | OUTPATIENT
Start: 2021-01-01 | End: 2022-01-01

## 2021-01-01 RX ORDER — BUDESONIDE, GLYCOPYRROLATE, AND FORMOTEROL FUMARATE 160; 9; 4.8 UG/1; UG/1; UG/1
2 AEROSOL, METERED RESPIRATORY (INHALATION) 2 TIMES DAILY
Qty: 17.7 G | Refills: 3 | Status: ON HOLD | OUTPATIENT
Start: 2021-01-01 | End: 2022-01-01

## 2021-01-01 RX ORDER — NICOTINE POLACRILEX 4 MG
15-30 LOZENGE BUCCAL
Status: DISCONTINUED | OUTPATIENT
Start: 2021-01-01 | End: 2021-01-01 | Stop reason: HOSPADM

## 2021-01-01 RX ORDER — PREDNISONE 10 MG/1
TABLET ORAL
Qty: 15 TABLET | Refills: 0 | Status: ON HOLD | OUTPATIENT
Start: 2021-01-01 | End: 2021-01-01

## 2021-01-01 RX ORDER — PREDNISONE 10 MG/1
10 TABLET ORAL DAILY
Qty: 5 TABLET | Refills: 0 | Status: SHIPPED | OUTPATIENT
Start: 2021-01-01 | End: 2021-01-01

## 2021-01-01 RX ORDER — PANTOPRAZOLE SODIUM 20 MG/1
40 TABLET, DELAYED RELEASE ORAL EVERY MORNING
Status: DISCONTINUED | OUTPATIENT
Start: 2021-01-01 | End: 2021-01-01 | Stop reason: HOSPADM

## 2021-01-01 RX ORDER — ALBUTEROL SULFATE 0.83 MG/ML
SOLUTION RESPIRATORY (INHALATION)
Qty: 600 ML | Refills: 0 | Status: SHIPPED | OUTPATIENT
Start: 2021-01-01 | End: 2022-01-01

## 2021-01-01 RX ORDER — GABAPENTIN 300 MG/1
600 CAPSULE ORAL EVERY EVENING
Status: DISCONTINUED | OUTPATIENT
Start: 2021-01-01 | End: 2021-01-01 | Stop reason: HOSPADM

## 2021-01-01 RX ORDER — LANOLIN ALCOHOL/MO/W.PET/CERES
3 CREAM (GRAM) TOPICAL AT BEDTIME
Status: DISCONTINUED | OUTPATIENT
Start: 2021-01-01 | End: 2021-01-01 | Stop reason: HOSPADM

## 2021-01-01 RX ORDER — ACETAMINOPHEN 500 MG
500-1000 TABLET ORAL EVERY 6 HOURS PRN
Status: DISCONTINUED | OUTPATIENT
Start: 2021-01-01 | End: 2021-01-01 | Stop reason: HOSPADM

## 2021-01-01 RX ORDER — PREDNISONE 20 MG/1
40 TABLET ORAL DAILY
Qty: 6 TABLET | Refills: 0 | Status: SHIPPED | OUTPATIENT
Start: 2021-01-01 | End: 2021-01-01

## 2021-01-01 RX ORDER — ACETAMINOPHEN 325 MG/1
650 TABLET ORAL EVERY 4 HOURS PRN
Status: DISCONTINUED | OUTPATIENT
Start: 2021-01-01 | End: 2021-01-01 | Stop reason: HOSPADM

## 2021-01-01 RX ORDER — DILTIAZEM HYDROCHLORIDE 120 MG/1
120 CAPSULE, EXTENDED RELEASE ORAL EVERY MORNING
Status: DISCONTINUED | OUTPATIENT
Start: 2021-01-01 | End: 2021-01-01

## 2021-01-01 RX ORDER — FUROSEMIDE 20 MG
40 TABLET ORAL EVERY MORNING
COMMUNITY
End: 2021-01-01

## 2021-01-01 RX ORDER — LIDOCAINE 40 MG/G
CREAM TOPICAL
Status: DISCONTINUED | OUTPATIENT
Start: 2021-01-01 | End: 2021-01-01 | Stop reason: HOSPADM

## 2021-01-01 RX ORDER — BENZONATATE 100 MG/1
100 CAPSULE ORAL ONCE
Status: COMPLETED | OUTPATIENT
Start: 2021-01-01 | End: 2021-01-01

## 2021-01-01 RX ORDER — ACETAMINOPHEN 325 MG/1
975 TABLET ORAL EVERY 8 HOURS PRN
Status: DISCONTINUED | OUTPATIENT
Start: 2021-01-01 | End: 2021-01-01 | Stop reason: HOSPADM

## 2021-01-01 RX ORDER — FUROSEMIDE 20 MG
TABLET ORAL
Qty: 180 TABLET | Refills: 3 | Status: ON HOLD | OUTPATIENT
Start: 2021-01-01 | End: 2022-01-01

## 2021-01-01 RX ORDER — FUROSEMIDE 10 MG/ML
40 INJECTION INTRAMUSCULAR; INTRAVENOUS ONCE
Status: COMPLETED | OUTPATIENT
Start: 2021-01-01 | End: 2021-01-01

## 2021-01-01 RX ORDER — FENTANYL CITRATE 50 UG/ML
25 INJECTION, SOLUTION INTRAMUSCULAR; INTRAVENOUS EVERY 5 MIN PRN
Status: DISCONTINUED | OUTPATIENT
Start: 2021-01-01 | End: 2021-01-01 | Stop reason: HOSPADM

## 2021-01-01 RX ORDER — SPIRONOLACTONE 25 MG/1
TABLET ORAL
Qty: 45 TABLET | Refills: 3 | Status: SHIPPED | OUTPATIENT
Start: 2021-01-01

## 2021-01-01 RX ORDER — LIDOCAINE 4 G/G
1 PATCH TOPICAL
Status: ON HOLD | COMMUNITY
Start: 2021-06-14 | End: 2021-01-01

## 2021-01-01 RX ORDER — LANOLIN ALCOHOL/MO/W.PET/CERES
1 CREAM (GRAM) TOPICAL 2 TIMES DAILY
COMMUNITY
End: 2022-01-01

## 2021-01-01 RX ORDER — PREDNISONE 20 MG/1
20 TABLET ORAL DAILY
Qty: 5 TABLET | Refills: 0 | Status: SHIPPED | OUTPATIENT
Start: 2021-01-01 | End: 2021-01-01

## 2021-01-01 RX ORDER — ACETYLCYSTEINE 200 MG/ML
2 SOLUTION ORAL; RESPIRATORY (INHALATION) 4 TIMES DAILY
Status: DISCONTINUED | OUTPATIENT
Start: 2021-01-01 | End: 2021-01-01 | Stop reason: HOSPADM

## 2021-01-01 RX ORDER — DILTIAZEM HYDROCHLORIDE 120 MG/1
120 CAPSULE, EXTENDED RELEASE ORAL EVERY MORNING
Qty: 90 CAPSULE | Refills: 11 | Status: SHIPPED | OUTPATIENT
Start: 2021-01-01 | End: 2021-01-01

## 2021-01-01 RX ORDER — AZITHROMYCIN 500 MG/1
500 TABLET, FILM COATED ORAL DAILY
Qty: 3 TABLET | Refills: 0 | Status: SHIPPED | OUTPATIENT
Start: 2021-01-01 | End: 2021-01-01

## 2021-01-01 RX ORDER — PREDNISONE 10 MG/1
TABLET ORAL
Qty: 15 TABLET | Refills: 0 | Status: SHIPPED | OUTPATIENT
Start: 2021-01-01 | End: 2022-01-01

## 2021-01-01 RX ORDER — BLOOD-GLUCOSE METER
1 EACH MISCELLANEOUS DAILY
Qty: 1 KIT | Refills: 0 | Status: SHIPPED | OUTPATIENT
Start: 2021-01-01 | End: 2021-01-01

## 2021-01-01 RX ORDER — FUROSEMIDE 10 MG/ML
20 INJECTION INTRAMUSCULAR; INTRAVENOUS ONCE
Status: COMPLETED | OUTPATIENT
Start: 2021-01-01 | End: 2021-01-01

## 2021-01-01 RX ORDER — IPRATROPIUM BROMIDE AND ALBUTEROL SULFATE 2.5; .5 MG/3ML; MG/3ML
3 SOLUTION RESPIRATORY (INHALATION) ONCE
Status: COMPLETED | OUTPATIENT
Start: 2021-01-01 | End: 2021-01-01

## 2021-01-01 RX ORDER — CEFDINIR 300 MG/1
300 CAPSULE ORAL 2 TIMES DAILY
Qty: 14 CAPSULE | Refills: 0 | Status: SHIPPED | OUTPATIENT
Start: 2021-01-01 | End: 2021-01-01

## 2021-01-01 RX ORDER — BENZONATATE 100 MG/1
100 CAPSULE ORAL 3 TIMES DAILY PRN
Status: DISCONTINUED | OUTPATIENT
Start: 2021-01-01 | End: 2021-01-01 | Stop reason: HOSPADM

## 2021-01-01 RX ORDER — FUROSEMIDE 20 MG
40 TABLET ORAL EVERY MORNING
Qty: 60 TABLET | Refills: 11 | Status: SHIPPED | OUTPATIENT
Start: 2021-01-01 | End: 2021-01-01 | Stop reason: DRUGHIGH

## 2021-01-01 RX ORDER — ONDANSETRON 4 MG/1
4 TABLET, ORALLY DISINTEGRATING ORAL EVERY 30 MIN PRN
Status: DISCONTINUED | OUTPATIENT
Start: 2021-01-01 | End: 2021-01-01 | Stop reason: HOSPADM

## 2021-01-01 RX ORDER — UREA 10 %
500 LOTION (ML) TOPICAL EVERY MORNING
COMMUNITY

## 2021-01-01 RX ORDER — ATORVASTATIN CALCIUM 20 MG/1
20 TABLET, FILM COATED ORAL EVERY EVENING
COMMUNITY
Start: 2021-01-01 | End: 2021-01-01

## 2021-01-01 RX ORDER — SODIUM CHLORIDE SOLN NEBU 3% 3 %
NEBU SOLN INHALATION
Qty: 360 ML | Refills: 0 | OUTPATIENT
Start: 2021-01-01

## 2021-01-01 RX ORDER — GABAPENTIN 300 MG/1
600 CAPSULE ORAL EVERY EVENING
COMMUNITY
End: 2022-01-01

## 2021-01-01 RX ORDER — LANCETS 33 GAUGE
1 EACH MISCELLANEOUS DAILY
Qty: 100 EACH | Refills: 4 | Status: SHIPPED | OUTPATIENT
Start: 2021-01-01 | End: 2021-01-01

## 2021-01-01 RX ORDER — FLASH GLUCOSE SCANNING READER
EACH MISCELLANEOUS
Qty: 1 EACH | Refills: 0 | Status: SHIPPED | OUTPATIENT
Start: 2021-01-01 | End: 2021-01-01

## 2021-01-01 RX ORDER — MAGNESIUM OXIDE 400 MG/1
400 TABLET ORAL ONCE
Status: COMPLETED | OUTPATIENT
Start: 2021-01-01 | End: 2021-01-01

## 2021-01-01 RX ORDER — NALOXONE HYDROCHLORIDE 0.4 MG/ML
0.4 INJECTION, SOLUTION INTRAMUSCULAR; INTRAVENOUS; SUBCUTANEOUS
Status: DISCONTINUED | OUTPATIENT
Start: 2021-01-01 | End: 2021-01-01 | Stop reason: HOSPADM

## 2021-01-01 RX ORDER — CEPHALEXIN 500 MG/1
500 CAPSULE ORAL 4 TIMES DAILY
Qty: 20 CAPSULE | Refills: 0 | Status: SHIPPED | OUTPATIENT
Start: 2021-01-01 | End: 2021-01-01

## 2021-01-01 RX ORDER — DILTIAZEM HYDROCHLORIDE 120 MG/1
120 CAPSULE, EXTENDED RELEASE ORAL EVERY MORNING
COMMUNITY
Start: 2021-02-02 | End: 2021-01-01

## 2021-01-01 RX ORDER — DOXYCYCLINE 100 MG/1
100 CAPSULE ORAL 2 TIMES DAILY
Qty: 14 CAPSULE | Refills: 0 | Status: SHIPPED | OUTPATIENT
Start: 2021-01-01 | End: 2021-01-01

## 2021-01-01 RX ORDER — CLINDAMYCIN PHOSPHATE 900 MG/50ML
900 INJECTION, SOLUTION INTRAVENOUS
Status: DISCONTINUED | OUTPATIENT
Start: 2021-01-01 | End: 2021-01-01 | Stop reason: HOSPADM

## 2021-01-01 RX ORDER — NALOXONE HYDROCHLORIDE 0.4 MG/ML
0.2 INJECTION, SOLUTION INTRAMUSCULAR; INTRAVENOUS; SUBCUTANEOUS
Status: DISCONTINUED | OUTPATIENT
Start: 2021-01-01 | End: 2021-01-01 | Stop reason: HOSPADM

## 2021-01-01 RX ORDER — SODIUM CHLORIDE, SODIUM LACTATE, POTASSIUM CHLORIDE, CALCIUM CHLORIDE 600; 310; 30; 20 MG/100ML; MG/100ML; MG/100ML; MG/100ML
INJECTION, SOLUTION INTRAVENOUS CONTINUOUS
Status: DISCONTINUED | OUTPATIENT
Start: 2021-01-01 | End: 2021-01-01 | Stop reason: HOSPADM

## 2021-01-01 RX ORDER — IOPAMIDOL 755 MG/ML
100 INJECTION, SOLUTION INTRAVASCULAR ONCE
Status: COMPLETED | OUTPATIENT
Start: 2021-01-01 | End: 2021-01-01

## 2021-01-01 RX ORDER — ATORVASTATIN CALCIUM 20 MG/1
TABLET, FILM COATED ORAL
Qty: 90 TABLET | Refills: 3 | Status: SHIPPED | OUTPATIENT
Start: 2021-01-01

## 2021-01-01 RX ORDER — LIDOCAINE HYDROCHLORIDE 20 MG/ML
INJECTION, SOLUTION INFILTRATION; PERINEURAL PRN
Status: DISCONTINUED | OUTPATIENT
Start: 2021-01-01 | End: 2021-01-01

## 2021-01-01 RX ORDER — PREDNISONE 10 MG/1
TABLET ORAL
Qty: 15 TABLET | Refills: 0 | Status: SHIPPED | OUTPATIENT
Start: 2021-01-01 | End: 2021-01-01

## 2021-01-01 RX ORDER — ALBUTEROL SULFATE 0.83 MG/ML
2.5 SOLUTION RESPIRATORY (INHALATION) 4 TIMES DAILY PRN
COMMUNITY
Start: 2021-03-12 | End: 2021-01-01

## 2021-01-01 RX ORDER — VITAMIN B COMPLEX
1 TABLET ORAL EVERY MORNING
COMMUNITY

## 2021-01-01 RX ORDER — FLASH GLUCOSE SENSOR
1 KIT MISCELLANEOUS
Qty: 2 EACH | Refills: 5 | Status: SHIPPED | OUTPATIENT
Start: 2021-01-01 | End: 2021-01-01

## 2021-01-01 RX ORDER — FUROSEMIDE 20 MG
20 TABLET ORAL DAILY
COMMUNITY
End: 2021-01-01

## 2021-01-01 RX ORDER — HYDROMORPHONE HCL IN WATER/PF 6 MG/30 ML
.2-.4 PATIENT CONTROLLED ANALGESIA SYRINGE INTRAVENOUS EVERY 4 HOURS PRN
Status: DISCONTINUED | OUTPATIENT
Start: 2021-01-01 | End: 2021-01-01

## 2021-01-01 RX ORDER — DILTIAZEM HYDROCHLORIDE 120 MG/1
120 CAPSULE, EXTENDED RELEASE ORAL EVERY MORNING
Qty: 90 CAPSULE | Refills: 1 | Status: SHIPPED | OUTPATIENT
Start: 2021-01-01 | End: 2021-01-01

## 2021-01-01 RX ORDER — POLYETHYLENE GLYCOL 3350 17 G/17G
17 POWDER, FOR SOLUTION ORAL DAILY PRN
Status: DISCONTINUED | OUTPATIENT
Start: 2021-01-01 | End: 2021-01-01 | Stop reason: HOSPADM

## 2021-01-01 RX ORDER — IPRATROPIUM BROMIDE AND ALBUTEROL SULFATE 2.5; .5 MG/3ML; MG/3ML
3 SOLUTION RESPIRATORY (INHALATION)
Status: DISCONTINUED | OUTPATIENT
Start: 2021-01-01 | End: 2021-01-01 | Stop reason: HOSPADM

## 2021-01-01 RX ORDER — ACETYLCYSTEINE 200 MG/ML
2 SOLUTION ORAL; RESPIRATORY (INHALATION) EVERY 4 HOURS
Status: DISCONTINUED | OUTPATIENT
Start: 2021-01-01 | End: 2021-01-01

## 2021-01-01 RX ORDER — ALBUTEROL SULFATE 0.83 MG/ML
2.5 SOLUTION RESPIRATORY (INHALATION) EVERY 4 HOURS PRN
Status: DISCONTINUED | OUTPATIENT
Start: 2021-01-01 | End: 2021-01-01 | Stop reason: HOSPADM

## 2021-01-01 RX ORDER — ARIPIPRAZOLE 5 MG/1
TABLET ORAL
Qty: 90 TABLET | Refills: 3 | Status: SHIPPED | OUTPATIENT
Start: 2021-01-01

## 2021-01-01 RX ORDER — ARIPIPRAZOLE 5 MG/1
5 TABLET ORAL EVERY EVENING
COMMUNITY
Start: 2020-10-19 | End: 2021-01-01

## 2021-01-01 RX ORDER — IPRATROPIUM BROMIDE 21 UG/1
1 SPRAY, METERED NASAL EVERY MORNING
COMMUNITY
Start: 2021-05-30 | End: 2022-01-01

## 2021-01-01 RX ORDER — ONDANSETRON 2 MG/ML
INJECTION INTRAMUSCULAR; INTRAVENOUS PRN
Status: DISCONTINUED | OUTPATIENT
Start: 2021-01-01 | End: 2021-01-01

## 2021-01-01 RX ORDER — DEXTROSE MONOHYDRATE 25 G/50ML
25-50 INJECTION, SOLUTION INTRAVENOUS
Status: DISCONTINUED | OUTPATIENT
Start: 2021-01-01 | End: 2021-01-01 | Stop reason: HOSPADM

## 2021-01-01 RX ORDER — AZITHROMYCIN 500 MG/1
500 TABLET, FILM COATED ORAL DAILY
Status: DISCONTINUED | OUTPATIENT
Start: 2021-01-01 | End: 2021-01-01 | Stop reason: HOSPADM

## 2021-01-01 RX ORDER — ONDANSETRON 2 MG/ML
4 INJECTION INTRAMUSCULAR; INTRAVENOUS EVERY 6 HOURS PRN
Status: DISCONTINUED | OUTPATIENT
Start: 2021-01-01 | End: 2021-01-01 | Stop reason: HOSPADM

## 2021-01-01 RX ORDER — ALBUTEROL SULFATE 90 UG/1
2 AEROSOL, METERED RESPIRATORY (INHALATION) 4 TIMES DAILY PRN
Status: DISCONTINUED | OUTPATIENT
Start: 2021-01-01 | End: 2021-01-01

## 2021-01-01 RX ORDER — AZITHROMYCIN 250 MG/1
TABLET, FILM COATED ORAL
COMMUNITY
Start: 2021-01-01 | End: 2021-01-01

## 2021-01-01 RX ORDER — OXYCODONE HYDROCHLORIDE 5 MG/1
5 TABLET ORAL EVERY 4 HOURS PRN
Status: DISCONTINUED | OUTPATIENT
Start: 2021-01-01 | End: 2021-01-01 | Stop reason: HOSPADM

## 2021-01-01 RX ORDER — IPRATROPIUM BROMIDE AND ALBUTEROL SULFATE 2.5; .5 MG/3ML; MG/3ML
3 SOLUTION RESPIRATORY (INHALATION) EVERY 4 HOURS PRN
Status: DISCONTINUED | OUTPATIENT
Start: 2021-01-01 | End: 2021-01-01 | Stop reason: HOSPADM

## 2021-01-01 RX ORDER — FUROSEMIDE 40 MG
40 TABLET ORAL EVERY MORNING
Status: DISCONTINUED | OUTPATIENT
Start: 2021-01-01 | End: 2021-01-01 | Stop reason: HOSPADM

## 2021-01-01 RX ORDER — DILTIAZEM HYDROCHLORIDE 120 MG/1
120 CAPSULE, EXTENDED RELEASE ORAL EVERY MORNING
Status: DISCONTINUED | OUTPATIENT
Start: 2021-01-01 | End: 2021-01-01 | Stop reason: HOSPADM

## 2021-01-01 RX ORDER — IPRATROPIUM BROMIDE AND ALBUTEROL SULFATE 2.5; .5 MG/3ML; MG/3ML
3 SOLUTION RESPIRATORY (INHALATION)
Status: DISCONTINUED | OUTPATIENT
Start: 2021-01-01 | End: 2021-01-01

## 2021-01-01 RX ORDER — CLINDAMYCIN PHOSPHATE 900 MG/50ML
900 INJECTION, SOLUTION INTRAVENOUS SEE ADMIN INSTRUCTIONS
Status: DISCONTINUED | OUTPATIENT
Start: 2021-01-01 | End: 2021-01-01 | Stop reason: HOSPADM

## 2021-01-01 RX ORDER — ALBUTEROL SULFATE 0.83 MG/ML
5 SOLUTION RESPIRATORY (INHALATION) ONCE
Status: COMPLETED | OUTPATIENT
Start: 2021-01-01 | End: 2021-01-01

## 2021-01-01 RX ORDER — ACETAMINOPHEN 325 MG/1
975 TABLET ORAL ONCE
Status: COMPLETED | OUTPATIENT
Start: 2021-01-01 | End: 2021-01-01

## 2021-01-01 RX ORDER — CEFTRIAXONE 2 G/1
2 INJECTION, POWDER, FOR SOLUTION INTRAMUSCULAR; INTRAVENOUS EVERY 24 HOURS
Status: DISCONTINUED | OUTPATIENT
Start: 2021-01-01 | End: 2021-01-01

## 2021-01-01 RX ORDER — SPIRONOLACTONE 25 MG/1
12.5 TABLET ORAL EVERY MORNING
COMMUNITY
Start: 2021-02-02 | End: 2021-01-01

## 2021-01-01 RX ORDER — CEFTRIAXONE 1 G/1
1 INJECTION, POWDER, FOR SOLUTION INTRAMUSCULAR; INTRAVENOUS ONCE
Status: COMPLETED | OUTPATIENT
Start: 2021-01-01 | End: 2021-01-01

## 2021-01-01 RX ORDER — DOXYCYCLINE 100 MG/1
100 CAPSULE ORAL 2 TIMES DAILY
Qty: 14 CAPSULE | Refills: 0 | Status: SHIPPED | OUTPATIENT
Start: 2021-01-01 | End: 2022-01-01

## 2021-01-01 RX ORDER — PROCHLORPERAZINE MALEATE 5 MG
5 TABLET ORAL EVERY 6 HOURS PRN
Status: DISCONTINUED | OUTPATIENT
Start: 2021-01-01 | End: 2021-01-01 | Stop reason: HOSPADM

## 2021-01-01 RX ORDER — HYDROCODONE BITARTRATE AND ACETAMINOPHEN 5; 325 MG/1; MG/1
1 TABLET ORAL EVERY 6 HOURS PRN
Qty: 18 TABLET | Refills: 0 | Status: SHIPPED | OUTPATIENT
Start: 2021-01-01 | End: 2021-01-01

## 2021-01-01 RX ORDER — IPRATROPIUM BROMIDE 21 UG/1
1 SPRAY, METERED NASAL EVERY MORNING
Status: DISCONTINUED | OUTPATIENT
Start: 2021-01-01 | End: 2021-01-01 | Stop reason: HOSPADM

## 2021-01-01 RX ORDER — ACETAMINOPHEN 500 MG
500-1000 TABLET ORAL EVERY 6 HOURS PRN
COMMUNITY

## 2021-01-01 RX ORDER — FENTANYL CITRATE 50 UG/ML
INJECTION, SOLUTION INTRAMUSCULAR; INTRAVENOUS PRN
Status: DISCONTINUED | OUTPATIENT
Start: 2021-01-01 | End: 2021-01-01

## 2021-01-01 RX ORDER — AZITHROMYCIN 500 MG/5ML
500 INJECTION, POWDER, LYOPHILIZED, FOR SOLUTION INTRAVENOUS EVERY 24 HOURS
Status: COMPLETED | OUTPATIENT
Start: 2021-01-01 | End: 2021-01-01

## 2021-01-01 RX ORDER — FUROSEMIDE 10 MG/ML
40 INJECTION INTRAMUSCULAR; INTRAVENOUS ONCE
Status: DISCONTINUED | OUTPATIENT
Start: 2021-01-01 | End: 2021-01-01

## 2021-01-01 RX ORDER — ONDANSETRON 4 MG/1
4 TABLET, ORALLY DISINTEGRATING ORAL EVERY 6 HOURS PRN
Status: DISCONTINUED | OUTPATIENT
Start: 2021-01-01 | End: 2021-01-01 | Stop reason: HOSPADM

## 2021-01-01 RX ORDER — PREDNISONE 20 MG/1
TABLET ORAL
Qty: 8 TABLET | Refills: 0 | Status: SHIPPED | OUTPATIENT
Start: 2021-01-01 | End: 2021-01-01

## 2021-01-01 RX ORDER — ALBUTEROL SULFATE 90 UG/1
2 AEROSOL, METERED RESPIRATORY (INHALATION) EVERY 4 HOURS PRN
COMMUNITY
Start: 2020-11-12 | End: 2022-01-01

## 2021-01-01 RX ORDER — BLOOD SUGAR DIAGNOSTIC
STRIP MISCELLANEOUS
Qty: 50 STRIP | Refills: 11 | Status: SHIPPED | OUTPATIENT
Start: 2021-01-01 | End: 2021-01-01

## 2021-01-01 RX ORDER — ACETAMINOPHEN 325 MG/1
325 TABLET ORAL EVERY 4 HOURS PRN
Status: DISCONTINUED | OUTPATIENT
Start: 2021-01-01 | End: 2021-01-01 | Stop reason: ALTCHOICE

## 2021-01-01 RX ORDER — LIDOCAINE HYDROCHLORIDE AND EPINEPHRINE 10; 10 MG/ML; UG/ML
INJECTION, SOLUTION INFILTRATION; PERINEURAL PRN
Status: DISCONTINUED | OUTPATIENT
Start: 2021-01-01 | End: 2021-01-01 | Stop reason: HOSPADM

## 2021-01-01 RX ORDER — CLOBETASOL PROPIONATE 0.5 MG/G
1 CREAM TOPICAL 2 TIMES DAILY
Status: DISCONTINUED | OUTPATIENT
Start: 2021-01-01 | End: 2021-01-01 | Stop reason: HOSPADM

## 2021-01-01 RX ADMIN — PANTOPRAZOLE SODIUM 40 MG: 20 TABLET, DELAYED RELEASE ORAL at 08:27

## 2021-01-01 RX ADMIN — IPRATROPIUM BROMIDE AND ALBUTEROL SULFATE 3 ML: 2.5; .5 SOLUTION RESPIRATORY (INHALATION) at 09:58

## 2021-01-01 RX ADMIN — CLINDAMYCIN PHOSPHATE 900 MG: 900 INJECTION, SOLUTION INTRAVENOUS at 09:51

## 2021-01-01 RX ADMIN — IPRATROPIUM BROMIDE AND ALBUTEROL SULFATE 3 ML: .5; 3 SOLUTION RESPIRATORY (INHALATION) at 17:01

## 2021-01-01 RX ADMIN — ACETAMINOPHEN 975 MG: 325 TABLET ORAL at 00:59

## 2021-01-01 RX ADMIN — BENZOCAINE AND MENTHOL 1 LOZENGE: 15; 3.6 LOZENGE ORAL at 04:16

## 2021-01-01 RX ADMIN — FUROSEMIDE 20 MG: 10 INJECTION, SOLUTION INTRAMUSCULAR; INTRAVENOUS at 18:22

## 2021-01-01 RX ADMIN — SPIRONOLACTONE 12.5 MG: 25 TABLET ORAL at 10:04

## 2021-01-01 RX ADMIN — SPIRONOLACTONE 12.5 MG: 25 TABLET ORAL at 08:26

## 2021-01-01 RX ADMIN — IPRATROPIUM BROMIDE AND ALBUTEROL SULFATE 3 ML: 2.5; .5 SOLUTION RESPIRATORY (INHALATION) at 14:30

## 2021-01-01 RX ADMIN — IPRATROPIUM BROMIDE 1 SPRAY: 21 SPRAY, METERED NASAL at 08:28

## 2021-01-01 RX ADMIN — IPRATROPIUM BROMIDE AND ALBUTEROL SULFATE 3 ML: 2.5; .5 SOLUTION RESPIRATORY (INHALATION) at 23:48

## 2021-01-01 RX ADMIN — FUROSEMIDE 40 MG: 10 INJECTION, SOLUTION INTRAVENOUS at 10:04

## 2021-01-01 RX ADMIN — PREDNISONE 40 MG: 20 TABLET ORAL at 08:26

## 2021-01-01 RX ADMIN — METHYLPREDNISOLONE SODIUM SUCCINATE 125 MG: 125 INJECTION, POWDER, FOR SOLUTION INTRAMUSCULAR; INTRAVENOUS at 23:06

## 2021-01-01 RX ADMIN — GABAPENTIN 600 MG: 300 CAPSULE ORAL at 22:21

## 2021-01-01 RX ADMIN — DOXYCYCLINE HYCLATE 100 MG: 50 CAPSULE ORAL at 20:02

## 2021-01-01 RX ADMIN — POLYETHYLENE GLYCOL 3350 17 G: 17 POWDER, FOR SOLUTION ORAL at 08:36

## 2021-01-01 RX ADMIN — Medication 1 TABLET: at 09:42

## 2021-01-01 RX ADMIN — BENZONATATE 100 MG: 100 CAPSULE ORAL at 23:10

## 2021-01-01 RX ADMIN — MAGNESIUM OXIDE TAB 400 MG (241.3 MG ELEMENTAL MG) 400 MG: 400 (241.3 MG) TAB at 01:29

## 2021-01-01 RX ADMIN — SPIRONOLACTONE 12.5 MG: 25 TABLET ORAL at 08:40

## 2021-01-01 RX ADMIN — GABAPENTIN 600 MG: 300 CAPSULE ORAL at 20:19

## 2021-01-01 RX ADMIN — POLYETHYLENE GLYCOL 3350 17 G: 17 POWDER, FOR SOLUTION ORAL at 08:27

## 2021-01-01 RX ADMIN — ALBUTEROL SULFATE 5 MG: 2.5 SOLUTION RESPIRATORY (INHALATION) at 01:59

## 2021-01-01 RX ADMIN — INSULIN ASPART 1 UNITS: 100 INJECTION, SOLUTION INTRAVENOUS; SUBCUTANEOUS at 10:00

## 2021-01-01 RX ADMIN — DOXYCYCLINE HYCLATE 100 MG: 50 CAPSULE ORAL at 08:26

## 2021-01-01 RX ADMIN — ARIPIPRAZOLE 5 MG: 5 TABLET ORAL at 20:02

## 2021-01-01 RX ADMIN — PANTOPRAZOLE SODIUM 40 MG: 20 TABLET, DELAYED RELEASE ORAL at 08:40

## 2021-01-01 RX ADMIN — GABAPENTIN 600 MG: 300 CAPSULE ORAL at 21:07

## 2021-01-01 RX ADMIN — CEFTRIAXONE SODIUM 2 G: 2 INJECTION, POWDER, FOR SOLUTION INTRAMUSCULAR; INTRAVENOUS at 22:58

## 2021-01-01 RX ADMIN — ACETAMINOPHEN 1000 MG: 500 TABLET, FILM COATED ORAL at 14:02

## 2021-01-01 RX ADMIN — SODIUM CHLORIDE, POTASSIUM CHLORIDE, SODIUM LACTATE AND CALCIUM CHLORIDE: 600; 310; 30; 20 INJECTION, SOLUTION INTRAVENOUS at 09:52

## 2021-01-01 RX ADMIN — PANTOPRAZOLE SODIUM 40 MG: 20 TABLET, DELAYED RELEASE ORAL at 08:30

## 2021-01-01 RX ADMIN — AZITHROMYCIN MONOHYDRATE 500 MG: 500 TABLET ORAL at 16:24

## 2021-01-01 RX ADMIN — RIVAROXABAN 20 MG: 20 TABLET, FILM COATED ORAL at 18:03

## 2021-01-01 RX ADMIN — IPRATROPIUM BROMIDE AND ALBUTEROL SULFATE 3 ML: 2.5; .5 SOLUTION RESPIRATORY (INHALATION) at 17:18

## 2021-01-01 RX ADMIN — IPRATROPIUM BROMIDE AND ALBUTEROL SULFATE 3 ML: .5; 3 SOLUTION RESPIRATORY (INHALATION) at 18:43

## 2021-01-01 RX ADMIN — INSULIN ASPART 2 UNITS: 100 INJECTION, SOLUTION INTRAVENOUS; SUBCUTANEOUS at 22:22

## 2021-01-01 RX ADMIN — GUAIFENESIN 10 ML: 100 SOLUTION ORAL at 23:10

## 2021-01-01 RX ADMIN — PANTOPRAZOLE SODIUM 40 MG: 20 TABLET, DELAYED RELEASE ORAL at 10:25

## 2021-01-01 RX ADMIN — LIDOCAINE HYDROCHLORIDE 60 MG: 20 INJECTION, SOLUTION INFILTRATION; PERINEURAL at 10:25

## 2021-01-01 RX ADMIN — POLYETHYLENE GLYCOL 3350 17 G: 17 POWDER, FOR SOLUTION ORAL at 08:32

## 2021-01-01 RX ADMIN — DOXYCYCLINE HYCLATE 100 MG: 50 CAPSULE ORAL at 21:07

## 2021-01-01 RX ADMIN — Medication 1 TABLET: at 08:39

## 2021-01-01 RX ADMIN — IPRATROPIUM BROMIDE 1 SPRAY: 21 SPRAY, METERED NASAL at 08:32

## 2021-01-01 RX ADMIN — CLOBETASOL PROPIONATE 1 G: 0.5 CREAM TOPICAL at 10:24

## 2021-01-01 RX ADMIN — INSULIN ASPART 1 UNITS: 100 INJECTION, SOLUTION INTRAVENOUS; SUBCUTANEOUS at 01:04

## 2021-01-01 RX ADMIN — BENZONATATE 100 MG: 100 CAPSULE ORAL at 00:58

## 2021-01-01 RX ADMIN — ACETAMINOPHEN 975 MG: 325 TABLET ORAL at 15:35

## 2021-01-01 RX ADMIN — ACETAMINOPHEN 1000 MG: 500 TABLET, FILM COATED ORAL at 06:45

## 2021-01-01 RX ADMIN — IOPAMIDOL 75 ML: 755 INJECTION, SOLUTION INTRAVENOUS at 20:04

## 2021-01-01 RX ADMIN — DILTIAZEM HYDROCHLORIDE 120 MG: 120 CAPSULE, EXTENDED RELEASE ORAL at 09:46

## 2021-01-01 RX ADMIN — IPRATROPIUM BROMIDE AND ALBUTEROL SULFATE 3 ML: .5; 3 SOLUTION RESPIRATORY (INHALATION) at 15:47

## 2021-01-01 RX ADMIN — RIVAROXABAN 20 MG: 10 TABLET, FILM COATED ORAL at 17:30

## 2021-01-01 RX ADMIN — ACETAMINOPHEN 975 MG: 325 TABLET ORAL at 08:33

## 2021-01-01 RX ADMIN — AZITHROMYCIN 250 MG: 500 INJECTION, POWDER, LYOPHILIZED, FOR SOLUTION INTRAVENOUS at 00:04

## 2021-01-01 RX ADMIN — PANTOPRAZOLE SODIUM 40 MG: 20 TABLET, DELAYED RELEASE ORAL at 09:42

## 2021-01-01 RX ADMIN — IPRATROPIUM BROMIDE 1 SPRAY: 21 SPRAY, METERED NASAL at 12:29

## 2021-01-01 RX ADMIN — Medication 4 MCG: at 10:37

## 2021-01-01 RX ADMIN — AZITHROMYCIN 500 MG: 500 INJECTION, POWDER, LYOPHILIZED, FOR SOLUTION INTRAVENOUS at 00:58

## 2021-01-01 RX ADMIN — PREDNISONE 40 MG: 20 TABLET ORAL at 08:30

## 2021-01-01 RX ADMIN — ACETYLCYSTEINE 2 ML: 200 SOLUTION ORAL; RESPIRATORY (INHALATION) at 14:57

## 2021-01-01 RX ADMIN — CEFTRIAXONE 1 G: 1 INJECTION, POWDER, FOR SOLUTION INTRAMUSCULAR; INTRAVENOUS at 23:06

## 2021-01-01 RX ADMIN — ACETAMINOPHEN 1000 MG: 500 TABLET, FILM COATED ORAL at 09:52

## 2021-01-01 RX ADMIN — DOXYCYCLINE HYCLATE 100 MG: 50 CAPSULE ORAL at 08:30

## 2021-01-01 RX ADMIN — PROPOFOL 125 MCG/KG/MIN: 10 INJECTION, EMULSION INTRAVENOUS at 10:25

## 2021-01-01 RX ADMIN — ARIPIPRAZOLE 5 MG: 5 TABLET ORAL at 21:07

## 2021-01-01 RX ADMIN — RIVAROXABAN 20 MG: 10 TABLET, FILM COATED ORAL at 17:00

## 2021-01-01 RX ADMIN — INSULIN ASPART 1 UNITS: 100 INJECTION, SOLUTION INTRAVENOUS; SUBCUTANEOUS at 18:39

## 2021-01-01 RX ADMIN — PREDNISONE 40 MG: 20 TABLET ORAL at 12:57

## 2021-01-01 RX ADMIN — ONDANSETRON 4 MG: 2 INJECTION INTRAMUSCULAR; INTRAVENOUS at 11:14

## 2021-01-01 RX ADMIN — IPRATROPIUM BROMIDE AND ALBUTEROL SULFATE 3 ML: 2.5; .5 SOLUTION RESPIRATORY (INHALATION) at 10:16

## 2021-01-01 RX ADMIN — INFLUENZA A VIRUS A/VICTORIA/2570/2019 IVR-215 (H1N1) ANTIGEN (FORMALDEHYDE INACTIVATED), INFLUENZA A VIRUS A/TASMANIA/503/2020 IVR-221 (H3N2) ANTIGEN (FORMALDEHYDE INACTIVATED), INFLUENZA B VIRUS B/PHUKET/3073/2013 ANTIGEN (FORMALDEHYDE INACTIVATED), AND INFLUENZA B VIRUS B/WASHINGTON/02/2019 ANTIGEN (FORMALDEHYDE INACTIVATED) 0.7 ML: 60; 60; 60; 60 INJECTION, SUSPENSION INTRAMUSCULAR at 09:46

## 2021-01-01 RX ADMIN — ATORVASTATIN CALCIUM 20 MG: 10 TABLET, FILM COATED ORAL at 20:02

## 2021-01-01 RX ADMIN — Medication 3 MG: at 00:58

## 2021-01-01 RX ADMIN — SPIRONOLACTONE 12.5 MG: 25 TABLET ORAL at 10:26

## 2021-01-01 RX ADMIN — ACETYLCYSTEINE 2 ML: 200 SOLUTION ORAL; RESPIRATORY (INHALATION) at 19:38

## 2021-01-01 RX ADMIN — RIVAROXABAN 20 MG: 20 TABLET, FILM COATED ORAL at 04:30

## 2021-01-01 RX ADMIN — ACETAMINOPHEN 650 MG: 325 TABLET ORAL at 12:33

## 2021-01-01 RX ADMIN — ATORVASTATIN CALCIUM 20 MG: 10 TABLET, FILM COATED ORAL at 21:07

## 2021-01-01 RX ADMIN — FUROSEMIDE 40 MG: 40 TABLET ORAL at 09:42

## 2021-01-01 RX ADMIN — ACETAMINOPHEN 1000 MG: 500 TABLET, FILM COATED ORAL at 20:19

## 2021-01-01 RX ADMIN — IPRATROPIUM BROMIDE AND ALBUTEROL SULFATE 3 ML: .5; 3 SOLUTION RESPIRATORY (INHALATION) at 07:37

## 2021-01-01 RX ADMIN — FUROSEMIDE 40 MG: 10 INJECTION, SOLUTION INTRAVENOUS at 11:48

## 2021-01-01 RX ADMIN — DILTIAZEM HYDROCHLORIDE 120 MG: 120 CAPSULE, EXTENDED RELEASE ORAL at 10:04

## 2021-01-01 RX ADMIN — ACETYLCYSTEINE 2 ML: 200 SOLUTION ORAL; RESPIRATORY (INHALATION) at 15:47

## 2021-01-01 RX ADMIN — DEXMEDETOMIDINE HYDROCHLORIDE 0.1 MCG/KG/HR: 4 INJECTION, SOLUTION INTRAVENOUS at 10:37

## 2021-01-01 RX ADMIN — SPIRONOLACTONE 12.5 MG: 25 TABLET ORAL at 08:35

## 2021-01-01 RX ADMIN — IPRATROPIUM BROMIDE AND ALBUTEROL SULFATE 3 ML: .5; 2.5 SOLUTION RESPIRATORY (INHALATION) at 19:38

## 2021-01-01 RX ADMIN — PREDNISONE 40 MG: 20 TABLET ORAL at 14:13

## 2021-01-01 RX ADMIN — FENTANYL CITRATE 50 MCG: 50 INJECTION, SOLUTION INTRAMUSCULAR; INTRAVENOUS at 10:25

## 2021-01-01 RX ADMIN — DOXYCYCLINE HYCLATE 100 MG: 50 CAPSULE ORAL at 08:34

## 2021-01-01 RX ADMIN — METHYLPREDNISOLONE SODIUM SUCCINATE 125 MG: 125 INJECTION, POWDER, FOR SOLUTION INTRAMUSCULAR; INTRAVENOUS at 02:01

## 2021-01-01 RX ADMIN — GUAIFENESIN 10 ML: 100 SOLUTION ORAL at 05:45

## 2021-01-01 RX ADMIN — METHYLPREDNISOLONE SODIUM SUCCINATE 125 MG: 125 INJECTION, POWDER, FOR SOLUTION INTRAMUSCULAR; INTRAVENOUS at 17:58

## 2021-01-01 RX ADMIN — ONDANSETRON 4 MG: 2 INJECTION INTRAMUSCULAR; INTRAVENOUS at 23:56

## 2021-01-01 RX ADMIN — IPRATROPIUM BROMIDE 1 SPRAY: 21 SPRAY, METERED NASAL at 09:42

## 2021-01-01 RX ADMIN — DILTIAZEM HYDROCHLORIDE 120 MG: 120 CAPSULE, EXTENDED RELEASE ORAL at 10:26

## 2021-01-01 RX ADMIN — INSULIN ASPART 2 UNITS: 100 INJECTION, SOLUTION INTRAVENOUS; SUBCUTANEOUS at 13:16

## 2021-01-01 RX ADMIN — Medication 3 MG: at 23:09

## 2021-01-01 RX ADMIN — CLOBETASOL PROPIONATE 1 G: 0.5 CREAM TOPICAL at 09:47

## 2021-01-01 RX ADMIN — ARIPIPRAZOLE 5 MG: 5 TABLET ORAL at 20:19

## 2021-01-01 RX ADMIN — FUROSEMIDE 40 MG: 40 TABLET ORAL at 10:26

## 2021-01-01 RX ADMIN — FUROSEMIDE 40 MG: 10 INJECTION, SOLUTION INTRAMUSCULAR; INTRAVENOUS at 03:54

## 2021-01-01 RX ADMIN — IPRATROPIUM BROMIDE AND ALBUTEROL SULFATE 3 ML: .5; 2.5 SOLUTION RESPIRATORY (INHALATION) at 14:56

## 2021-01-01 RX ADMIN — Medication 8.8 MCI.: at 09:31

## 2021-01-01 RX ADMIN — ATORVASTATIN CALCIUM 20 MG: 10 TABLET, FILM COATED ORAL at 20:19

## 2021-01-01 RX ADMIN — ACETAMINOPHEN 975 MG: 325 TABLET ORAL at 22:09

## 2021-01-01 RX ADMIN — DILTIAZEM HYDROCHLORIDE 120 MG: 120 CAPSULE, EXTENDED RELEASE ORAL at 08:40

## 2021-01-01 ASSESSMENT — ACTIVITIES OF DAILY LIVING (ADL)
ADLS_ACUITY_SCORE: 6
ADLS_ACUITY_SCORE: 6
DIFFICULTY_EATING/SWALLOWING: OTHER (SEE COMMENTS)
ADLS_ACUITY_SCORE: 6
ADLS_ACUITY_SCORE: 6
ADLS_ACUITY_SCORE: 4
ADLS_ACUITY_SCORE: 6
WALKING_OR_CLIMBING_STAIRS_DIFFICULTY: YES
ADLS_ACUITY_SCORE: 6
ADLS_ACUITY_SCORE: 6
WEAR_GLASSES_OR_BLIND: NO
ADLS_ACUITY_SCORE: 6
WERE_AUXILIARY_AIDS_OFFERED?: NO
ADLS_ACUITY_SCORE: 6
ADLS_ACUITY_SCORE: 6
HEARING_DIFFICULTY_OR_DEAF: YES
ADLS_ACUITY_SCORE: 6
DRESSING/BATHING_DIFFICULTY: NO
PATIENT'S_PREFERRED_MEANS_OF_COMMUNICATION: VERBAL
ADLS_ACUITY_SCORE: 6
CONCENTRATING,_REMEMBERING_OR_MAKING_DECISIONS_DIFFICULTY: NO
USE_OF_HEARING_ASSISTIVE_DEVICES: BILATERAL HEARING AIDS
ADLS_ACUITY_SCORE: 6
DIFFICULTY_COMMUNICATING: OTHER (SEE COMMENTS)
ADLS_ACUITY_SCORE: 6
ADLS_ACUITY_SCORE: 6
WHICH_OF_THE_ABOVE_FUNCTIONAL_RISKS_HAD_A_RECENT_ONSET_OR_CHANGE?: AMBULATION;TRANSFERRING
FALL_HISTORY_WITHIN_LAST_SIX_MONTHS: NO
ADLS_ACUITY_SCORE: 6
DOING_ERRANDS_INDEPENDENTLY_DIFFICULTY: OTHER (SEE COMMENTS)
ADLS_ACUITY_SCORE: 4
ADLS_ACUITY_SCORE: 6
ADLS_ACUITY_SCORE: 6
ADLS_ACUITY_SCORE: 4
ADLS_ACUITY_SCORE: 6
DESCRIBE_HEARING_LOSS: BILATERAL HEARING LOSS
ADLS_ACUITY_SCORE: 6
ADLS_ACUITY_SCORE: 6
EQUIPMENT_CURRENTLY_USED_AT_HOME: CANE, STRAIGHT;WALKER, STANDARD
ADLS_ACUITY_SCORE: 6
DEPENDENT_IADLS:: CLEANING;COOKING;LAUNDRY;SHOPPING;MEAL PREPARATION;MEDICATION MANAGEMENT;TRANSPORTATION
ADLS_ACUITY_SCORE: 6
ADLS_ACUITY_SCORE: 4
ADLS_ACUITY_SCORE: 6
TOILETING_ISSUES: NO;YES

## 2021-01-01 ASSESSMENT — ENCOUNTER SYMPTOMS
WEAKNESS: 0
FATIGUE: 0
ABDOMINAL PAIN: 0
CHILLS: 1
FEVER: 0
MYALGIAS: 1
DIFFICULTY URINATING: 1
FLANK PAIN: 0
VOMITING: 0
SHORTNESS OF BREATH: 1
DYSURIA: 0
HEADACHES: 0
CHILLS: 0
COUGH: 0
CONFUSION: 0
DYSRHYTHMIAS: 1
DYSURIA: 0
FEVER: 0
WEAKNESS: 0
ABDOMINAL PAIN: 0
SHORTNESS OF BREATH: 1
BACK PAIN: 1
SORE THROAT: 0
COUGH: 1
HEMATURIA: 0
VOMITING: 0
NAUSEA: 0

## 2021-01-01 ASSESSMENT — COPD QUESTIONNAIRES: COPD: 1

## 2021-01-01 ASSESSMENT — MIFFLIN-ST. JEOR
SCORE: 1047.36
SCORE: 1076.17
SCORE: 1080.71
SCORE: 1063.41
SCORE: 1030.35
SCORE: 1068.24
SCORE: 1039.42

## 2021-01-01 ASSESSMENT — PATIENT HEALTH QUESTIONNAIRE - PHQ9: SUM OF ALL RESPONSES TO PHQ QUESTIONS 1-9: 0

## 2021-01-01 ASSESSMENT — LIFESTYLE VARIABLES: TOBACCO_USE: 1

## 2021-01-04 ENCOUNTER — COMMUNICATION - HEALTHEAST (OUTPATIENT)
Dept: INTERNAL MEDICINE | Facility: CLINIC | Age: 86
End: 2021-01-04

## 2021-01-04 DIAGNOSIS — J96.11 CHRONIC RESPIRATORY FAILURE WITH HYPOXIA (H): ICD-10-CM

## 2021-02-01 ENCOUNTER — COMMUNICATION - HEALTHEAST (OUTPATIENT)
Dept: ADMINISTRATIVE | Facility: CLINIC | Age: 86
End: 2021-02-01

## 2021-02-01 ENCOUNTER — AMBULATORY - HEALTHEAST (OUTPATIENT)
Dept: PULMONOLOGY | Facility: OTHER | Age: 86
End: 2021-02-01

## 2021-02-01 DIAGNOSIS — J41.0 SIMPLE CHRONIC BRONCHITIS (H): ICD-10-CM

## 2021-02-02 ENCOUNTER — COMMUNICATION - HEALTHEAST (OUTPATIENT)
Dept: INTERNAL MEDICINE | Facility: CLINIC | Age: 86
End: 2021-02-02

## 2021-02-02 DIAGNOSIS — I48.0 PAF (PAROXYSMAL ATRIAL FIBRILLATION) (H): ICD-10-CM

## 2021-02-02 DIAGNOSIS — I50.33 ACUTE ON CHRONIC DIASTOLIC HEART FAILURE (H): ICD-10-CM

## 2021-02-02 DIAGNOSIS — K21.9 GASTROESOPHAGEAL REFLUX DISEASE WITHOUT ESOPHAGITIS: ICD-10-CM

## 2021-02-02 DIAGNOSIS — Z00.00 ROUTINE GENERAL MEDICAL EXAMINATION AT A HEALTH CARE FACILITY: ICD-10-CM

## 2021-02-02 DIAGNOSIS — E87.70 HYPERVOLEMIA, UNSPECIFIED HYPERVOLEMIA TYPE: ICD-10-CM

## 2021-02-08 ENCOUNTER — COMMUNICATION - HEALTHEAST (OUTPATIENT)
Dept: ADMINISTRATIVE | Facility: CLINIC | Age: 86
End: 2021-02-08

## 2021-02-08 DIAGNOSIS — E88.810 METABOLIC SYNDROME: ICD-10-CM

## 2021-02-23 ENCOUNTER — OFFICE VISIT - HEALTHEAST (OUTPATIENT)
Dept: INTERNAL MEDICINE | Facility: CLINIC | Age: 86
End: 2021-02-23

## 2021-02-23 DIAGNOSIS — E11.69 TYPE 2 DIABETES MELLITUS WITH OTHER SPECIFIED COMPLICATION, WITHOUT LONG-TERM CURRENT USE OF INSULIN (H): ICD-10-CM

## 2021-02-23 LAB
CHOLEST SERPL-MCNC: 127 MG/DL
FASTING STATUS PATIENT QL REPORTED: NORMAL
FASTING STATUS PATIENT QL REPORTED: NORMAL
GLUCOSE BLD-MCNC: 96 MG/DL (ref 70–125)
HBA1C MFR BLD: 6.6 %
HDLC SERPL-MCNC: 68 MG/DL
LDLC SERPL CALC-MCNC: 50 MG/DL
TRIGL SERPL-MCNC: 44 MG/DL

## 2021-02-23 ASSESSMENT — MIFFLIN-ST. JEOR: SCORE: 1084.11

## 2021-02-25 ENCOUNTER — COMMUNICATION - HEALTHEAST (OUTPATIENT)
Dept: INTERNAL MEDICINE | Facility: CLINIC | Age: 86
End: 2021-02-25

## 2021-03-02 ENCOUNTER — COMMUNICATION - HEALTHEAST (OUTPATIENT)
Dept: CARDIOLOGY | Facility: CLINIC | Age: 86
End: 2021-03-02

## 2021-03-02 DIAGNOSIS — Z00.00 ROUTINE GENERAL MEDICAL EXAMINATION AT A HEALTH CARE FACILITY: ICD-10-CM

## 2021-03-07 ENCOUNTER — HEALTH MAINTENANCE LETTER (OUTPATIENT)
Age: 86
End: 2021-03-07

## 2021-03-12 ENCOUNTER — COMMUNICATION - HEALTHEAST (OUTPATIENT)
Dept: INTERNAL MEDICINE | Facility: CLINIC | Age: 86
End: 2021-03-12

## 2021-03-12 DIAGNOSIS — Z00.00 ROUTINE GENERAL MEDICAL EXAMINATION AT A HEALTH CARE FACILITY: ICD-10-CM

## 2021-03-22 ENCOUNTER — COMMUNICATION - HEALTHEAST (OUTPATIENT)
Dept: PULMONOLOGY | Facility: OTHER | Age: 86
End: 2021-03-22

## 2021-03-22 DIAGNOSIS — J41.1 MUCOPURULENT CHRONIC BRONCHITIS (H): ICD-10-CM

## 2021-03-28 ENCOUNTER — RECORDS - HEALTHEAST (OUTPATIENT)
Dept: ADMINISTRATIVE | Facility: OTHER | Age: 86
End: 2021-03-28

## 2021-03-31 ENCOUNTER — OFFICE VISIT - HEALTHEAST (OUTPATIENT)
Dept: PULMONOLOGY | Facility: OTHER | Age: 86
End: 2021-03-31

## 2021-03-31 DIAGNOSIS — J96.11 CHRONIC RESPIRATORY FAILURE WITH HYPOXIA (H): ICD-10-CM

## 2021-03-31 DIAGNOSIS — J41.1 MUCOPURULENT CHRONIC BRONCHITIS (H): ICD-10-CM

## 2021-03-31 ASSESSMENT — MIFFLIN-ST. JEOR: SCORE: 1084.11

## 2021-04-19 ENCOUNTER — COMMUNICATION - HEALTHEAST (OUTPATIENT)
Dept: INTERNAL MEDICINE | Facility: CLINIC | Age: 86
End: 2021-04-19

## 2021-04-19 DIAGNOSIS — J96.11 CHRONIC RESPIRATORY FAILURE WITH HYPOXIA (H): ICD-10-CM

## 2021-05-24 ENCOUNTER — RECORDS - HEALTHEAST (OUTPATIENT)
Dept: ADMINISTRATIVE | Facility: CLINIC | Age: 86
End: 2021-05-24

## 2021-05-25 ENCOUNTER — RECORDS - HEALTHEAST (OUTPATIENT)
Dept: ADMINISTRATIVE | Facility: CLINIC | Age: 86
End: 2021-05-25

## 2021-05-26 ENCOUNTER — RECORDS - HEALTHEAST (OUTPATIENT)
Dept: ADMINISTRATIVE | Facility: CLINIC | Age: 86
End: 2021-05-26

## 2021-05-26 VITALS — HEART RATE: 91 BPM

## 2021-05-26 VITALS — SYSTOLIC BLOOD PRESSURE: 105 MMHG | DIASTOLIC BLOOD PRESSURE: 74 MMHG

## 2021-05-26 ASSESSMENT — PATIENT HEALTH QUESTIONNAIRE - PHQ9: SUM OF ALL RESPONSES TO PHQ QUESTIONS 1-9: 0

## 2021-05-26 NOTE — PROGRESS NOTES
[facility] Daily Progress / Discharge Summary    Patient Name: Adalgisa Solano  Date: 3/22/2019  Referral Diagnosis: LE lymphedema  Referring provider: Mandy Torrez NP  Visit Diagnosis:     ICD-10-CM    1. Chronic obstructive pulmonary disease, unspecified COPD type (H) J44.9    2. Schizoaffective disorder, bipolar type (H) F25.0    3. Diet-controlled type 2 diabetes mellitus (H) E11.9    4. Muscle weakness (generalized) M62.81    5. Lymphedema I89.0    6. Pulmonary hypertension (H) I27.20    7. Recurrent major depressive episodes (H) F33.9    8. Abnormality of gait R26.9    9. Arnold-Chiari malformation (H) Q07.00    10. Difficulty balancing R29.818          Assessment:   patient seen 6 visits prior to transitioning to Home Care.  Discharged to Home Care.    Goal Status:  Goals progressing but not met as transitioning to Home Care  No Data Recorded  No Data Recorded    Plan / Patient Education         Subjective:           Objective:         Mora Osorio  3/22/2019

## 2021-05-27 VITALS — DIASTOLIC BLOOD PRESSURE: 72 MMHG | SYSTOLIC BLOOD PRESSURE: 122 MMHG

## 2021-05-27 ASSESSMENT — PATIENT HEALTH QUESTIONNAIRE - PHQ9: SUM OF ALL RESPONSES TO PHQ QUESTIONS 1-9: 0

## 2021-05-27 NOTE — TELEPHONE ENCOUNTER
Call from Adalgisa. States she has developed a dry cough and feels weaker than normal.  Would like to start her action plan.    Will prescribe doxycycline 100mg two times a day x 7 days and prednisone [id number], then 20x 5 days.

## 2021-05-27 NOTE — TELEPHONE ENCOUNTER
Call from laith. States she completed her action plan and is not feeling any better.  Continues to have cough with thick yellow phlegm, very tired, and states her tongue is red and sore, mouth is very dry.    Reviewed with Dr. Woo. Will have patient get cxr and be seen in clinic today at 1:30pm.

## 2021-05-27 NOTE — TELEPHONE ENCOUNTER
Per the message below from Dr. Ellington, order for B12 injection has been placed in a separate encounter for Dr. Ellington to review.  Dr. Ellington's assistant, Ivana, is checking to see when the last injection was given.  Silvia AZAR CMA/NEL....................1:26 PM

## 2021-05-27 NOTE — PROGRESS NOTES
[facility] Follow-up Note    Assessment & Plan        1. Chronic diastolic congestive heart failure (H) -acute on chronic with preserved ejection fraction of 70%, no signs or symptoms on exam currently today.  However she does have increased shortness of breath which could be due to viral bronchitis but I will check a BNP, do expect to be up some but is significantly elevated will need to change diuretics.  I will check a potassium level today as well since she is on Aldactone as well as potassium replacement.   2. Chronic atrial fibrillation (H) -permanent, asymptomatic and not valvular and on chronic anticoagulation with Xarelto given her CHADS 2 VASC score of 4.   3. Essential hypertension -under good control currently.   4. Pulmonary hypertension (H) -mild to moderate with estimated pressure of 54 mmHg.  Most likely WHO class III.   5. Simple chronic bronchitis (H) -on antibiotics and steroids and defer to primary.   6. DVT of axillary vein, acute left (H) -on chronic anticoagulation with Xarelto in any event.   7. Fracture of femoral neck, left (H) -status post hip surgery and doing fairly well.   8. Cellulitis of left lower extremity -chronic lymphedema which is actually looking very good and being followed by vascular clinic.     Plan  1.  Check potassium and address Aldactone if needed.  Check hemoglobin and address if abnormal and check BNP and if significantly elevated increase diuretic.  2.  Follow-up with me in 1 year or sooner if needed.    Subjective  CC: 83-year-old white female being seen in post hospital discharge follow-up today.  Since I seen her a year ago she has been hospitalized several times for cellulitis, left hip fracture, heart failure, atrial fibrillation, DVT.  She is living independently with her , doing a little bit of laundry at home, admits to some shortness of breath with a cough productive of dark sputum lately, denies any chest discomfort, palpitations,  PND, orthopnea or worsening peripheral edema.    Medications  Current Outpatient Medications   Medication Sig Note     acetaminophen (TYLENOL) 500 MG tablet Take 2 tablets (1,000 mg total) by mouth 3 (three) times a day. (Patient taking differently: Take 1,000 mg by mouth as needed.       )      albuterol (PROVENTIL) 2.5 mg /3 mL (0.083 %) nebulizer solution 3 ML INHALATION FOUR TIMES A DAY AS NEEDED USE BEFORE NEBULIZED SALINE. FOR SHORTNESS OF BREATH.      ARIPiprazole (ABILIFY) 5 MG tablet TAKE 1 TABLET EVERY NIGHT AT BEDTIME      aspirin 81 MG EC tablet Take 81 mg by mouth daily.      atorvastatin (LIPITOR) 20 MG tablet TAKE 1 TABLET (20 MG TOTAL) BY MOUTH BEDTIME.      calcium carbonate-vitamin D3 (CALCIUM WITH VITAMIN D) 600 mg(1,500mg) -400 unit per tablet Take 1 tablet by mouth 2 (two) times a day.      cholecalciferol, vitamin D3, 1,000 unit tablet Take 2,000 Units by mouth daily.       cyanocobalamin 1,000 mcg/mL injection Inject 1 mL (1,000 mcg total) into the shoulder, thigh, or buttocks every 30 (thirty) days.      dextromethorphan-guaiFENesin (ROBITUSSIN-DM)  mg/5 mL liquid Take 5 mL by mouth every 4 (four) hours as needed.      diltiazem (CARDIZEM CD) 120 MG 24 hr capsule TAKE 1 CAPSULE (120 MG TOTAL) BY MOUTH DAILY. 3/4/2019: Not sure which dose she should be taking     doxycycline (VIBRAMYCIN) 100 MG capsule Take 1 capsule (100 mg total) by mouth 2 (two) times a day for 7 days.      ferrous sulfate 325 (65 FE) MG tablet Take 1 tablet (325 mg total) by mouth daily with breakfast.      fluticasone-salmeterol (ADVAIR DISKUS) 250-50 mcg/dose DISKUS Inhale 1 puff 2 (two) times a day.      furosemide (LASIX) 40 MG tablet TAKE 1 TABLET (40 MG TOTAL) BY MOUTH 2 (TWO) TIMES A DAY AT 9AM AND 6PM.      magnesium oxide (MAG-OX) 400 mg (241.3 mg magnesium) tablet Take 1 tablet (400 mg total) by mouth daily.      omeprazole (PRILOSEC) 20 MG capsule Take 20 mg by mouth daily before breakfast.       "polyethylene glycol (MIRALAX) 17 gram packet Take 17 g by mouth daily.      potassium chloride (KLOR-CON) 10 MEQ CR tablet TAKE 1 TABLET (10 MEQ TOTAL) BY MOUTH DAILY.      predniSONE (DELTASONE) 20 MG tablet Take 2 tabs (40mg total) daily x 5 days, then 1 tab daily x 5 days..      PROAIR HFA 90 mcg/actuation inhaler INHALE 2 PUFFS EVERY 4 (FOUR) HOURS AS NEEDED FOR WHEEZING.      QUEtiapine (SEROQUEL) 25 MG tablet TAKE 2 TABLETS AT BEDTIME=50MG (Patient taking differently: TAKE 1 TABLETS AT BEDTIME=50MG)      sodium chloride 3 % nebulizer solution TAKE 4 ML BY NEBULIZATION 2 (TWO) TIMES A DAY. USING AFTER ALBUTEROL NEB. (Patient taking differently: Take 4 mL by nebulization 3 (three) times a day. Using after albuterol neb.      )      spironolactone (ALDACTONE) 25 MG tablet Take 0.5 tablets (12.5 mg total) by mouth daily.      tiotropium (SPIRIVA WITH HANDIHALER) 18 mcg inhalation capsule USE 1 CAPSULE VIA INHALER 1 X A DAY      XARELTO 20 mg Tab TAKE 1 TABLET (20 MG TOTAL) BY MOUTH DAILY WITH SUPPER.        Objective  /68 (Patient Site: Right Arm, Patient Position: Sitting, Cuff Size: Adult Large)   Pulse 100   Resp 18   Ht 5' 1\" (1.549 m)   Wt 146 lb (66.2 kg)   LMP  (LMP Unknown)   BMI 27.59 kg/m      General Appearance:    Alert, cooperative, no distress, appears stated age   Head:    Normocephalic, without obvious abnormality, atraumatic   Throat:   Lips, mucosa, and tongue normal; teeth and gums normal   Neck:   Supple, symmetrical, trachea midline, no adenopathy;        thyroid:  No enlargement/tenderness/nodules; no carotid    bruit or JVD   Back:     Symmetric, no curvature, ROM normal, no CVA tenderness   Lungs:    Diffuse expiratory wheezes bilaterally, respirations unlabored   Chest wall:    No tenderness or deformity   Heart:   Irregularly irregular S1 and S2 normal, no murmur, rub   or gallop   Abdomen:     Soft, non-tender, bowel sounds active all four quadrants,     no masses, no " organomegaly   Extremities:   Normal, atraumatic, no cyanosis, 1/4 bipedal pitting edema   Pulses:   2+ and symmetric all extremities   Skin:   Skin color, texture, turgor normal, no rashes or lesions     Results    Lab Results personally reviewed   Lab Results   Component Value Date    CHOL 140 07/30/2018    CHOL 132 06/25/2018     Lab Results   Component Value Date    HDL 60 07/30/2018    HDL 62 06/25/2018     Lab Results   Component Value Date    LDLCALC 67 07/30/2018    LDLCALC 56 06/25/2018     Lab Results   Component Value Date    TRIG 65 07/30/2018    TRIG 71 06/25/2018     Lab Results   Component Value Date    WBC 7.8 02/04/2019    HGB 11.1 (L) 02/04/2019    HCT 34.0 (L) 02/04/2019     02/04/2019     Lab Results   Component Value Date    CREATININE 0.70 02/04/2019    BUN 7 (L) 02/04/2019     02/04/2019    K 4.0 02/04/2019    CO2 26 02/04/2019     Review of Systems:   General: Weight Loss  Eyes: Visual Distubance  Ears/Nose/Throat: WNL  Lungs: Shortness of Breath  Heart: Shortness of Breath with activity, Irregular Heartbeat, Leg Swelling  Stomach: Constipation  Bladder: WNL  Muscle/Joints: Joint Pain, Muscle Weakness  Skin: WNL  Nervous System: WNL  Mental Health: WNL     Blood: Easy Bruising, Easy Bleeding

## 2021-05-27 NOTE — PATIENT INSTRUCTIONS - [facility]
Ms Adalgisa Solano,  I enjoyed visiting with you again today.  I am glad to hear you are doing as well as you are following all your medical issues recently but I am concerned with the worsening shortness of breath currently.  Per our conversation I will check the blood work looking to see if your potassium is good as well as if you are retaining water and contact you appropriately.  I will plan on seeing you if there are any issues sooner but plan in 1 year.  Francis Marquez

## 2021-05-27 NOTE — TELEPHONE ENCOUNTER
RN cannot approve Refill Request    RN can NOT refill this medication med is not covered by policy/route to provider. Last office visit: 2/4/2019 Shai Ellington MD Last Physical: 7/30/2018 Last MTM visit: Visit date not found Last visit same specialty: 2/4/2019 Shai Ellington MD.  Next visit within 3 mo: Visit date not found  Next physical within 3 mo: Visit date not found      April Byers, [facility] Triage/Med Refill 4/10/2019    Requested Prescriptions   Pending Prescriptions Disp Refills     XARELTO 20 mg Tab [Pharmacy Med Name: XARELTO 20MG] 30 tablet 0     Sig: TAKE 1 TABLET (20 MG TOTAL) BY MOUTH DAILY WITH SUPPER.       Apixaban/Rivaroxaban/Dabigatran Refill Protocol Failed - 4/10/2019  6:06 PM        Failed - Route to appropriate pool/provider     Last Anticoagulation Summary:           Passed - Renal function test in last year     Creatinine   Date Value Ref Range Status   02/04/2019 0.70 0.60 - 1.10 mg/dL Final             Passed - PCP or prescribing provider visit in past 12 months       Last office visit with prescriber/PCP: 2/4/2019 Shai Ellington MD OR same dept: 2/4/2019 Shai Ellington MD OR same specialty: 2/4/2019 Shai Ellington MD  Last physical: 7/30/2018 Last MTM visit: Visit date not found   Next visit within 3 mo: Visit date not found  Next physical within 3 mo: Visit date not found  Prescriber OR PCP: Shai Ellington MD  Last diagnosis associated with med order: There are no diagnoses linked to this encounter.  If protocol passes may refill for 12 months if within 3 months of last provider visit (or a total of 15 months).

## 2021-05-27 NOTE — TELEPHONE ENCOUNTER
Orders already chart- patient notified she should be getting injections every 30 days, may continue to go to [facility] if that's most convenient for her.

## 2021-05-27 NOTE — TELEPHONE ENCOUNTER
RN cannot approve Refill Request    RN can NOT refill this medication medication not on med list       . Last office visit: 2/4/2019 Shai Ellington MD Last Physical: 7/30/2018 Last MTM visit: Visit date not found Last visit same specialty: Visit date not found.  Next visit within 3 mo: Visit date not found  Next physical within 3 mo: Visit date not found      Shreya Martinez, [facility] Connection Triage/Med Refill 3/26/2019    Requested Prescriptions   Pending Prescriptions Disp Refills     furosemide (LASIX) 40 MG tablet [Pharmacy Med Name: FUROSEMIDE 40MG] 60 tablet 0     Sig: TAKE 1 TABLET (40 MG TOTAL) BY MOUTH 2 (TWO) TIMES A DAY AT 9AM AND 6PM.    There is no refill protocol information for this order

## 2021-05-27 NOTE — TELEPHONE ENCOUNTER
Patient came into the clinic to day as a walk in asking if she could get on the [facility] Schedule for a B-12 injection.     I see an order in, but an unusable order that was never used, could you please verify that b-12 in Needed, when the last dose was and sign orders for me. She will be getting on the schedule for tomorrow for her injection.     Thank you,     Anabel Chaves CMA  10:55 AM  3/25/2019

## 2021-05-27 NOTE — PROGRESS NOTES
Assessment and Plan:83 year old female with a history of severe COPD (FEV1 0.89 L, 51%; DLCO 53%), pulmonary HTN, HFpEF, DM, GERD, atrial fibrillation and DVT on anticoagulation, schizoaffective disorder, PAD, HTN, dyslipidemia, admitted  Jan 2019 with acute hypoxemic respiratory failure with evidence of pulmonary edema, Klebsiella and PSDs PNA (per 12/26 sputum Cx), and AECOPD.  She also appears to have some evidence of bronchiectasis per my review of her December 2018 CT scan.    4/16/2019: Patient began having increased cough productive for yellow sputum for the past couple of weeks this month.  She tried doxycycline and prednisone without relief.  She has not obtained her pulmonary function tests yet due to the current symptoms.  Chest x-ray checked today was negative for pneumonia I suspect that she is having a bronchiectasis exacerbation.  She is currently using a flutter valve about twice a day and hypertonic saline nebs twice daily.    Bronchiectasis:  -Increase flutter valve use to 4 times daily for now  -I prescribed Z-Bijan with 1 refill if necessary based on 12/26 2018 sputum cultures which show Pseudomonas that is pansensitive and Klebsiella that is resistant to penicillin and intermediately resistant to tetracycline  -Bronchiectasis workup labs sent from clinic today  -Patient to bring specimen cup home for sputum culture, not currently able to produce any in clinic    COPD:  -PFTs ordered last visit, post-poned 2/2 acute illnes  -Continue Spiriva, Advair, and albuterol as needed  -Continue home oxygen, adjust as needed for goal oxygen saturation of 88-92% (chronic CO2 retainer)     Cough and suspected nocturnal reflux aspiration:  -Continue omeprazole  -elevate her head of bed with a wedge pillow on her back  -Avoid for food or drink 3 hours before bedtime  -Avoid reflux trigger foods such as alcohol caffeine and spicy foods     Return to clinic as scheduled next week to follow-up on the  above        CCx: Increased shortness of breath past couple of months    HPI: Patient presented to clinic today for urgent follow-up after experiencing shortness of breath with increased cough productive for the yellow sputum mild with the past couple weeks, refractory to prednisone burst and course of doxycycline.  She denies fevers or chills.  She reports feeling congested.  She continues to use a flutter valve twice daily, hypertonic saline nebs twice daily, and albuterol nebs 3 times daily in addition to her other COPD medications    ROS:  A review of 12 organ systems was performed with pertinent positives and negatives noted in the HPI.      Current Meds:  Current Outpatient Medications   Medication Sig     acetaminophen (TYLENOL) 500 MG tablet Take 2 tablets (1,000 mg total) by mouth 3 (three) times a day. (Patient taking differently: Take 1,000 mg by mouth as needed.       )     albuterol (PROVENTIL) 2.5 mg /3 mL (0.083 %) nebulizer solution 3 ML INHALATION FOUR TIMES A DAY AS NEEDED USE BEFORE NEBULIZED SALINE. FOR SHORTNESS OF BREATH.     ARIPiprazole (ABILIFY) 5 MG tablet TAKE 1 TABLET EVERY NIGHT AT BEDTIME     aspirin 81 MG EC tablet Take 81 mg by mouth daily.     atorvastatin (LIPITOR) 20 MG tablet TAKE 1 TABLET (20 MG TOTAL) BY MOUTH BEDTIME.     azithromycin (ZITHROMAX) 250 MG tablet Take 1 tablet (250 mg total) by mouth daily for 5 days. Take 500 mg (2 x 250 mg tablets) on day 1 followed by 250 mg (1 tablet) on days 2-5.     calcium carbonate-vitamin D3 (CALCIUM WITH VITAMIN D) 600 mg(1,500mg) -400 unit per tablet Take 1 tablet by mouth 2 (two) times a day.     cholecalciferol, vitamin D3, 1,000 unit tablet Take 2,000 Units by mouth daily.      cyanocobalamin 1,000 mcg/mL injection Inject 1 mL (1,000 mcg total) into the shoulder, thigh, or buttocks every 30 (thirty) days.     dextromethorphan-guaiFENesin (ROBITUSSIN-DM)  mg/5 mL liquid Take 5 mL by mouth every 4 (four) hours as needed.      diltiazem (CARDIZEM CD) 120 MG 24 hr capsule TAKE 1 CAPSULE (120 MG TOTAL) BY MOUTH DAILY.     ferrous sulfate 325 (65 FE) MG tablet Take 1 tablet (325 mg total) by mouth daily with breakfast.     fluticasone-salmeterol (ADVAIR DISKUS) 250-50 mcg/dose DISKUS Inhale 1 puff 2 (two) times a day.     furosemide (LASIX) 40 MG tablet TAKE 1 TABLET (40 MG TOTAL) BY MOUTH 2 (TWO) TIMES A DAY AT 9AM AND 6PM.     magnesium oxide (MAG-OX) 400 mg (241.3 mg magnesium) tablet Take 1 tablet (400 mg total) by mouth daily.     omeprazole (PRILOSEC) 20 MG capsule Take 20 mg by mouth daily before breakfast.     polyethylene glycol (MIRALAX) 17 gram packet Take 17 g by mouth daily.     potassium chloride (KLOR-CON) 10 MEQ CR tablet TAKE 1 TABLET (10 MEQ TOTAL) BY MOUTH DAILY.     PROAIR HFA 90 mcg/actuation inhaler INHALE 2 PUFFS EVERY 4 (FOUR) HOURS AS NEEDED FOR WHEEZING.     QUEtiapine (SEROQUEL) 25 MG tablet TAKE 2 TABLETS AT BEDTIME=50MG (Patient taking differently: TAKE 1 TABLETS AT BEDTIME=50MG)     sodium chloride 3 % nebulizer solution TAKE 4 ML BY NEBULIZATION 2 (TWO) TIMES A DAY. USING AFTER ALBUTEROL NEB. (Patient taking differently: Take 4 mL by nebulization 3 (three) times a day. Using after albuterol neb.      )     spironolactone (ALDACTONE) 25 MG tablet Take 0.5 tablets (12.5 mg total) by mouth daily.     tiotropium (SPIRIVA WITH HANDIHALER) 18 mcg inhalation capsule USE 1 CAPSULE VIA INHALER 1 X A DAY     XARELTO 20 mg Tab TAKE 1 TABLET (20 MG TOTAL) BY MOUTH DAILY WITH SUPPER.       Labs:  No results found for this or any previous visit (from the past 72 hour(s)).    I have personally reviewed all pertinent imaging studies and PFT results unless otherwise noted.    Imaging studies:  Xr Chest 2 Views    Result Date: 4/16/2019  EXAM: XR CHEST 2 VIEWS LOCATION: [facility] DATE/TIME: 4/16/2019 1:00 PM INDICATION: sob COMPARISON: 01/28/2019 FINDINGS: The lungs are clear. There is no pleural effusion or  "pneumothorax. The cardiomediastinal silhouette is normal. The limited visualized portions of the upper abdomen are grossly normal. Note is made of a left humeral head bone island.     The lungs are clear.        Physical Exam:  /72   Pulse 87   Ht 5' 1\" (1.549 m)   Wt 145 lb (65.8 kg)   LMP  (LMP Unknown)   SpO2 90%   Breastfeeding? No   BMI 27.40 kg/m    General - Well nourished  Ears/Mouth -  OP pink moist, no thrush  Neck - no cervical lymphadenopathy  Lungs -right greater than left bibasilar rhonchi, no wheezes  CVS - regular rhythm with no murmurs, rubs or gallups  Abdomen - soft, NT, ND, NABS  Ext - no cyanosis, clubbing or edema  Skin - no rash  Psychology - alert and oriented, answers appropriate        Electronically signed by:    Sinan Woo MD  [facility]  "

## 2021-05-27 NOTE — TELEPHONE ENCOUNTER
Yes the vitamin B12 injection is needed 1000 mcg IM please check with Ivana my assistant as to when it was last given and many thanks.

## 2021-05-27 NOTE — TELEPHONE ENCOUNTER
Medication Request  Medication name: Vitamin B12 inj  Pharmacy Name and Location:  [facility]  Reason for request:  Pt questions how often she is getting Vitamin B12 inj , and does she come in clinic to get them, please place orders in Epic an chart & advise patient.   When did you use medication last?:  Today- Pt received injection today at [facility]  Okay to leave a detailed message: yes

## 2021-05-27 NOTE — PATIENT INSTRUCTIONS - HE
Increase flutter valve to 4 times daily for now    Take Z-pack (refill if needed)    If able to obtain sputum, send for culture    Blood tests today

## 2021-05-28 ENCOUNTER — COMMUNICATION - HEALTHEAST (OUTPATIENT)
Dept: SCHEDULING | Facility: CLINIC | Age: 86
End: 2021-05-28

## 2021-05-28 ENCOUNTER — RECORDS - HEALTHEAST (OUTPATIENT)
Dept: ADMINISTRATIVE | Facility: CLINIC | Age: 86
End: 2021-05-28

## 2021-05-28 ENCOUNTER — OFFICE VISIT - HEALTHEAST (OUTPATIENT)
Dept: FAMILY MEDICINE | Facility: CLINIC | Age: 86
End: 2021-05-28

## 2021-05-28 DIAGNOSIS — S81.811A LACERATION OF LEG, RIGHT, INITIAL ENCOUNTER: ICD-10-CM

## 2021-05-28 NOTE — PROGRESS NOTES
Office Visit - Follow up    Adalgisa Solano   83 y.o. female    Date of Visit: 5/6/2019    Chief Complaint   Patient presents with     COPD     Hypertension     Hip Pain       Subjective: COPD.    With hypertension and right hip pain.  Patient plans to see Dr. Kan Quesada for right hip pain may be related to osteoarthritis or avascular necrosis prior history of prednisone use for COPD and asthmatic bronchitis.    In the remote past she had been a heavy smoker no longer smokes now.    On April 25, 2019 she saw Dr. CHARITO segura for COPD and a deep-seated pneumonia.  Ultimately she plans to see Dr. Allen from infectious disease for pneumonia the patient is having pulmonary function tests on June 14, 2019.  She has just recently completed a 10-day course of ciprofloxacin.  Her O2 sats are 93% prior to getting pneumonia again her O2 sats on room air were 97% she is been off oxygen supplemental for 2 months.  She has been seen by her cardiologist Dr. Conrado Marquez from [facility] division of cardiology on April 8, 2019.    No blood in stool urine or sputum.  Medication list reviewed well-tolerated normal effects.    Allergies include amoxicillin levofloxacin penicillin she could tolerate the ciprofloxacin without side effect despite the fact she is sensitive to another quinolone specifically levofloxacin.  Her med list is lengthy and it was reviewed and reconciled today.    ROS: A comprehensive review of systems was performed and was otherwise negative    Medications:  Prior to Admission medications    Medication Sig Start Date End Date Taking? Authorizing Provider   acetaminophen (TYLENOL) 500 MG tablet Take 2 tablets (1,000 mg total) by mouth 3 (three) times a day.  Patient taking differently: Take 1,000 mg by mouth as needed.        1/3/19  Yes Mariana Toure PA-C   albuterol (PROVENTIL) 2.5 mg /3 mL (0.083 %) nebulizer solution 3 ML INHALATION FOUR TIMES A DAY AS NEEDED USE BEFORE NEBULIZED  SALINE. FOR SHORTNESS OF BREATH. 12/27/18  Yes Shai Ellington MD   ARIPiprazole (ABILIFY) 5 MG tablet TAKE 1 TABLET EVERY NIGHT AT BEDTIME 12/27/18  Yes Shai Ellington MD   atorvastatin (LIPITOR) 20 MG tablet TAKE 1 TABLET (20 MG TOTAL) BY MOUTH BEDTIME. 12/27/18  Yes Shai Ellington MD   calcium carbonate-vitamin D3 (CALCIUM WITH VITAMIN D) 600 mg(1,500mg) -400 unit per tablet Take 1 tablet by mouth 2 (two) times a day.   Yes PROVIDER, HISTORICAL   cholecalciferol, vitamin D3, 1,000 unit tablet Take 2,000 Units by mouth daily.    Yes PROVIDER, HISTORICAL   dextromethorphan-guaiFENesin (ROBITUSSIN-DM)  mg/5 mL liquid Take 5 mL by mouth every 4 (four) hours as needed. 2/1/19  Yes Jak Cardona MD   diltiazem (CARDIZEM CD) 120 MG 24 hr capsule TAKE 1 CAPSULE (120 MG TOTAL) BY MOUTH DAILY. 4/12/19  Yes Shai Ellington MD   ferrous sulfate 325 (65 FE) MG tablet Take 1 tablet (325 mg total) by mouth daily with breakfast. 2/1/19  Yes Jak Cardona MD   fluticasone-salmeterol (ADVAIR DISKUS) 250-50 mcg/dose DISKUS Inhale 1 puff 2 (two) times a day. 3/4/19  Yes Reece Woo MD   furosemide (LASIX) 40 MG tablet TAKE 1 TABLET (40 MG TOTAL) BY MOUTH 2 (TWO) TIMES A DAY AT 9AM AND 6PM. 3/26/19  Yes Shai Ellington MD   magnesium oxide (MAG-OX) 400 mg (241.3 mg magnesium) tablet Take 1 tablet (400 mg total) by mouth daily. 1/5/19  Yes Nilesh Dunn,    omeprazole (PRILOSEC) 20 MG capsule Take 20 mg by mouth daily before breakfast.   Yes PROVIDER, HISTORICAL   polyethylene glycol (MIRALAX) 17 gram packet Take 17 g by mouth daily.   Yes PROVIDER, HISTORICAL   potassium chloride (KLOR-CON) 10 MEQ CR tablet TAKE 1 TABLET (10 MEQ TOTAL) BY MOUTH DAILY. 4/12/19  Yes Shai Ellingotn MD   QUEtiapine (SEROQUEL) 25 MG tablet TAKE 2 TABLETS AT BEDTIME=50MG  Patient taking differently: TAKE 1 TABLETS AT BEDTIME=50MG 12/27/18  Yes Shai Ellington MD    spironolactone (ALDACTONE) 25 MG tablet Take 0.5 tablets (12.5 mg total) by mouth daily. 2/2/19  Yes Jak Cardona MD   tiotropium (SPIRIVA WITH HANDIHALER) 18 mcg inhalation capsule USE 1 CAPSULE VIA INHALER 1 X A DAY 3/4/19  Yes Reece Woo MD   XARELTO 20 mg Tab TAKE 1 TABLET (20 MG TOTAL) BY MOUTH DAILY WITH SUPPER. 4/11/19  Yes Shai Ellington MD   aspirin 81 MG EC tablet Take 81 mg by mouth daily.    PROVIDER, HISTORICAL   ciprofloxacin HCl (CIPRO) 500 MG tablet Take 1.5 tablets (750 mg total) by mouth 2 (two) times a day for 10 days. 4/25/19 5/5/19  Reece Woo MD   cyanocobalamin 1,000 mcg/mL injection Inject 1 mL (1,000 mcg total) into the shoulder, thigh, or buttocks every 30 (thirty) days. 3/2/19   Jak Cardona MD   OXYGEN-AIR DELIVERY SYSTEMS MIS Use 1-2 L As Directed as needed. [facility]    PROVIDER, HISTORICAL   PROAIR HFA 90 mcg/actuation inhaler INHALE 2 PUFFS EVERY 4 (FOUR) HOURS AS NEEDED FOR WHEEZING. 12/27/18   Shai Ellington MD   sodium chloride 3 % nebulizer solution TAKE 4 ML BY NEBULIZATION 2 (TWO) TIMES A DAY. USING AFTER ALBUTEROL NEB.  Patient taking differently: Take 4 mL by nebulization 3 (three) times a day. Using after albuterol neb.       12/27/18   Shai Ellington MD   azithromycin (ZITHROMAX) 250 MG tablet Take 250 mg by mouth. Take 500 mg (2 x 250 mg tablets) on day 1 followed by 250 mg (1 tablet) on days 2-5.  She is currently on day 4  5/6/19  PROVIDER, HISTORICAL       Allergies:   Allergies   Allergen Reactions     Amoxicillin Itching and Rash     Levofloxacin Itching and Rash     Penicillins Itching and Rash     Has tolerated ceftriaxone.       Immunizations:   Immunization History   Administered Date(s) Administered     DT (pediatric) 03/29/2004     Influenza high dose, seasonal 10/20/2015, 10/09/2016, 11/16/2017, 10/11/2018     Influenza,seasonal quad, PF, 36+MOS 11/20/2014     Pneumo Conj 13-V (2010&after) 10/20/2015      Pneumo Polysac 23-V 10/18/1999, 08/01/2005     Td,adult,historic,unspecified 03/29/2004     Tdap 05/14/2013       Exam Chest clear to auscultation and percussion.  Heart tones regular rhythm without murmur rub or gallop.  Abdomen soft nontender no organomegaly.  No peritoneal signs.  Extremities free of edema cyanosis or clubbing.  Neck veins nondistended no thyromegaly or scleral icterus noted, carotids full.  Skin warm and dry easily conversant good spirited.  Normal intelligence.  Neurologically intact no gross localizing findings.  Few scattered sibilant rhonchi on expiration and inspiration posterior and anteriorly but no rales audible no harsh rhonchi or upper airway sounds.    O2 sats 93% respiratory rate 20 unlabored pulse 96 and regular blood pressure was 100/58.  BMI elevated at 27.  Weight down 15 pounds from previous.  There is no leg edema but her lower extremities were tender.  There is no neck vein distention.    Assessment and Plan  COPD with complicating pneumonia followed by Dr. DOE Oglesby from pulmonary medicine as well as Dr. Allen from infectious disease.    Irregular heart tones ventricular response rate 96 recently seen by Dr. Marquez prior history of atrial fibrillation noted.    Right hip pain rule out avascular necrosis from steroid use +4 osteoarthritis bursitis.    Hypertension controlled..    Multiple drug allergies including amoxicillin and levofloxacin and penicillin.  Recently tolerated 10-day course of ciprofloxacin despite it being a quinolone and well-tolerated.    Time: total time spent with the patient was 25 minutes of which >50% was spent in counseling and coordination of care    The following high BMI interventions were performed this visit: encouragement to exercise    Shai Ellington MD    Patient Active Problem List   Diagnosis     Recurrent major depressive episodes (H)     Chronic obstructive pulmonary disease (H)     Lump Or Mass In Breast     Peripheral  arterial disease (H)     Type 2 diabetes mellitus without complication (H)     Hypertension     Schizoaffective disorder, bipolar type (H)     Arnold-Chiari malformation (H)     Chronic diastolic congestive heart failure (H)     Chronic respiratory failure with hypoxia (H)     Pulmonary hypertension (H)     DVT of axillary vein, acute left (H)     Primary osteoarthritis of both knees     Chronic atrial fibrillation (H)     Gastroesophageal reflux disease, esophagitis presence not specified     Stasis dermatitis of both legs     Candidal skin infection     Encounter for palliative care     Generalized muscle weakness     Drug-induced constipation     Chronic pain syndrome     Acute combined systolic and diastolic CHF, NYHA class 1 (H)     Fracture of femoral neck, left (H)     Hip pain, left     Slow transit constipation     Status post total hip replacement, left     Acute blood loss anemia     Acute bronchitis     Edema     Volume overload     Cellulitis of left lower extremity

## 2021-05-28 NOTE — TELEPHONE ENCOUNTER
Refill Approved    Rx renewed per Medication Renewal Policy. Medication was last renewed on 4/12/19.    Shreya Martinez, [facility] Triage/Med Refill 5/9/2019     Requested Prescriptions   Pending Prescriptions Disp Refills     potassium chloride (KLOR-CON) 10 MEQ CR tablet [Pharmacy Med Name: POTASSIUM CHLORIDE 10MEQ ER] 30 tablet 0     Sig: TAKE 1 TABLET (10 MEQ TOTAL) BY MOUTH DAILY.       Potassium Supplements Refill Protocol Passed - 5/9/2019 10:20 AM        Passed - PCP or prescribing provider visit in past 12 months       Last office visit with prescriber/PCP: 5/6/2019 Shai Ellington MD OR same dept: 5/6/2019 Shai Ellington MD OR same specialty: 5/6/2019 Shai Ellington MD  Last physical: 7/30/2018 Last MTM visit: Visit date not found   Next visit within 3 mo: Visit date not found  Next physical within 3 mo: Visit date not found  Prescriber OR PCP: Shai Ellington MD  Last diagnosis associated with med order: 1. Acute on chronic diastolic heart failure (H)  - potassium chloride (KLOR-CON) 10 MEQ CR tablet [Pharmacy Med Name: POTASSIUM CHLORIDE 10MEQ ER]; TAKE 1 TABLET (10 MEQ TOTAL) BY MOUTH DAILY.  Dispense: 30 tablet; Refill: 0    2. Routine general medical examination at a health care facility  - XARELTO 20 mg Tab [Pharmacy Med Name: XARELTO 20MG]; TAKE 1 TABLET (20 MG TOTAL) BY MOUTH DAILY WITH SUPPER.  Dispense: 30 tablet; Refill: 0    If protocol passes may refill for 12 months if within 3 months of last provider visit (or a total of 15 months).             Passed - Potassium level in last 12 months     Lab Results   Component Value Date    Potassium 4.2 04/08/2019             XARELTO 20 mg Tab [Pharmacy Med Name: XARELTO 20MG] 30 tablet 0     Sig: TAKE 1 TABLET (20 MG TOTAL) BY MOUTH DAILY WITH SUPPER.       Apixaban/Rivaroxaban/Dabigatran Refill Protocol Failed - 5/9/2019 10:20 AM        Failed - Route to appropriate pool/provider     Last Anticoagulation Summary:            Passed - Renal function test in last year     Creatinine   Date Value Ref Range Status   02/04/2019 0.70 0.60 - 1.10 mg/dL Final             Passed - PCP or prescribing provider visit in past 12 months       Last office visit with prescriber/PCP: 5/6/2019 Shai Ellington MD OR same dept: 5/6/2019 Shai Ellington MD OR same specialty: 5/6/2019 Shai Ellington MD  Last physical: 7/30/2018 Last MTM visit: Visit date not found   Next visit within 3 mo: Visit date not found  Next physical within 3 mo: Visit date not found  Prescriber OR PCP: Shai Ellington MD  Last diagnosis associated with med order: 1. Acute on chronic diastolic heart failure (H)  - potassium chloride (KLOR-CON) 10 MEQ CR tablet [Pharmacy Med Name: POTASSIUM CHLORIDE 10MEQ ER]; TAKE 1 TABLET (10 MEQ TOTAL) BY MOUTH DAILY.  Dispense: 30 tablet; Refill: 0    2. Routine general medical examination at a health care facility  - XARELTO 20 mg Tab [Pharmacy Med Name: XARELTO 20MG]; TAKE 1 TABLET (20 MG TOTAL) BY MOUTH DAILY WITH SUPPER.  Dispense: 30 tablet; Refill: 0    If protocol passes may refill for 12 months if within 3 months of last provider visit (or a total of 15 months).

## 2021-05-28 NOTE — PATIENT INSTRUCTIONS - HE
I don't think that there is a good way to prevent or eradicate the pseudomonas infection.     Moisés Allen

## 2021-05-28 NOTE — PROGRESS NOTES
"Infectious Disease Progress Note    ASSESSMENT: 1. Bad COPD but minimal bronchiectasis on CT 12/18  2. 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 Not surprising  3. IgG levels fine. Minimal decrement in IgM is not clinically significant.  4. Has L TKA 1/19 and did ok. Hopes to have R TKA later this year.    PLAN: Once people with COPD get Pseudomonas in LRT there is no good way to eradicate it. It is most tenacious once attached.  Recurrent courses of ciprol usually leads to resistance  At high risk for tendon rupture on cipro givne prior steroids/ frail skin and muscles.  I recommend no specific Rx.  Consider Kemal nebs every other month if she has frequent exacerbations but no generally too helpful.    Thanks,  Moisés Allen MD    ______________________________________________________________________    Subjective: COPD doing ok now.   On inhalers/ nebs, intermittent steroids.    Review of systems:  Patient denies fever, chills, cough, sputum, vomiting, diarrhea, dysuria, urgency, flank pain, headache, stiff neck, rash, swollen glands or pain at IV sites.  SH/FH/ Habits/ PMH reviewed and unchanged.  Objective:   /74   Temp 97.7  F (36.5  C) (Oral)   Ht 5' 1\" (1.549 m)   Wt 143 lb 11.2 oz (65.2 kg)   LMP  (LMP Unknown)   BMI 27.15 kg/m      Current Outpatient Medications on File Prior to Visit   Medication Sig Dispense Refill     acetaminophen (TYLENOL) 500 MG tablet Take 2 tablets (1,000 mg total) by mouth 3 (three) times a day. (Patient taking differently: Take 1,000 mg by mouth as needed.       )  0     albuterol (PROVENTIL) 2.5 mg /3 mL (0.083 %) nebulizer solution 3 ML INHALATION FOUR TIMES A DAY AS NEEDED USE BEFORE NEBULIZED SALINE. FOR SHORTNESS OF BREATH. 300 mL 11     ARIPiprazole (ABILIFY) 5 MG tablet TAKE 1 TABLET EVERY NIGHT AT BEDTIME 90 tablet 1     atorvastatin (LIPITOR) 20 MG tablet TAKE 1 TABLET (20 MG TOTAL) BY MOUTH BEDTIME. 90 tablet 3     calcium " carbonate-vitamin D3 (CALCIUM WITH VITAMIN D) 600 mg(1,500mg) -400 unit per tablet Take 1 tablet by mouth 2 (two) times a day.       cholecalciferol, vitamin D3, 1,000 unit tablet Take 2,000 Units by mouth daily.        cyanocobalamin 1,000 mcg/mL injection Inject 1 mL (1,000 mcg total) into the shoulder, thigh, or buttocks every 30 (thirty) days.  0     dextromethorphan-guaiFENesin (ROBITUSSIN-DM)  mg/5 mL liquid Take 5 mL by mouth every 4 (four) hours as needed.  0     diltiazem (CARDIZEM CD) 120 MG 24 hr capsule TAKE 1 CAPSULE (120 MG TOTAL) BY MOUTH DAILY. 30 capsule 0     ferrous sulfate 325 (65 FE) MG tablet Take 1 tablet (325 mg total) by mouth daily with breakfast.  0     fluticasone-salmeterol (ADVAIR DISKUS) 250-50 mcg/dose DISKUS Inhale 1 puff 2 (two) times a day. 60 each 11     furosemide (LASIX) 40 MG tablet TAKE 1 TABLET (40 MG TOTAL) BY MOUTH 2 (TWO) TIMES A DAY AT 9AM AND 6PM. 60 tablet 11     magnesium oxide (MAG-OX) 400 mg (241.3 mg magnesium) tablet Take 1 tablet (400 mg total) by mouth daily.  0     omeprazole (PRILOSEC) 20 MG capsule Take 20 mg by mouth daily before breakfast.       polyethylene glycol (MIRALAX) 17 gram packet Take 17 g by mouth daily.       potassium chloride (KLOR-CON) 10 MEQ CR tablet TAKE 1 TABLET (10 MEQ TOTAL) BY MOUTH DAILY. 90 tablet 3     PROAIR HFA 90 mcg/actuation inhaler INHALE 2 PUFFS EVERY 4 (FOUR) HOURS AS NEEDED FOR WHEEZING. 8.5 g 12     QUEtiapine (SEROQUEL) 25 MG tablet TAKE 2 TABLETS AT BEDTIME=50MG (Patient taking differently: TAKE 1 TABLETS AT BEDTIME=50MG) 180 tablet 1     sodium chloride 3 % nebulizer solution TAKE 4 ML BY NEBULIZATION 2 (TWO) TIMES A DAY. USING AFTER ALBUTEROL NEB. (Patient taking differently: Take 4 mL by nebulization 3 (three) times a day. Using after albuterol neb.      ) 360 mL 3     spironolactone (ALDACTONE) 25 MG tablet Take 0.5 tablets (12.5 mg total) by mouth daily. 15 tablet 0     tiotropium (SPIRIVA WITH HANDIHALER) 18  mcg inhalation capsule USE 1 CAPSULE VIA INHALER 1 X A DAY 90 capsule 11     XARELTO 20 mg Tab TAKE 1 TABLET (20 MG TOTAL) BY MOUTH DAILY WITH SUPPER. 30 tablet 0     aspirin 81 MG EC tablet Take 81 mg by mouth daily.       OXYGEN-AIR DELIVERY SYSTEMS [facility] [place] [facility] Use 1-2 L As Directed as needed. [facility]       Current Facility-Administered Medications on File Prior to Visit   Medication Dose Route Frequency Provider Last Rate Last Dose     cyanocobalamin injection 1,000 mcg  1,000 mcg Intramuscular Q30 Days Shai Ellington MD   1,000 mcg at 04/26/19 1359         Exam:pursed lip breathing  Respiratory: normal respiratory patter, no rales or rubs.: scattered rhochi. Wheezes.  ENT: TMs clear, oropharynx without exudate or thrush  CV: normal heart tones. No rub, murmur or S3. No JVD.  Abdmomen: soft, normal bowel sounds, non-tender, no masses or   organomegaly    Back: No spine or CVA tenderness  Neurologic: oriented x 3. Cranial nerves 2-12 intact. No focal weakness or sensory loss.   Skin: no rashes or petechiae  Nodes: no cervical, axillary or inguinal adenopathy                Lab Results   Component Value Date    ALT <9 02/04/2019    AST 14 02/04/2019    ALKPHOS 140 (H) 02/04/2019    BILITOT 0.4 02/04/2019       Imaging:CT chest 12/18 with minimal bronchectaiss.    Microbiology:sputum with candida a expected and pseudomonas in 12/18 and 4/19.    Patient Active Problem List   Diagnosis     Recurrent major depressive episodes (H)     Chronic obstructive pulmonary disease (H)     Lump Or Mass In Breast     Peripheral arterial disease (H)     Type 2 diabetes mellitus without complication (H)     Hypertension     Schizoaffective disorder, bipolar type (H)     Arnold-Chiari malformation (H)     Chronic diastolic congestive heart failure (H)     Chronic respiratory failure with hypoxia (H)     Pulmonary hypertension (H)     DVT of axillary vein, acute left (H)     Primary osteoarthritis of both knees     Cellulitis      Chronic atrial fibrillation (H)     Gastroesophageal reflux disease, esophagitis presence not specified     Stasis dermatitis of both legs     Candidal skin infection     Encounter for palliative care     Generalized muscle weakness     Drug-induced constipation     Chronic pain syndrome     Acute combined systolic and diastolic CHF, NYHA class 1 (H)     Fracture of femoral neck, left (H)     Hip pain, left     Slow transit constipation     Status post total hip replacement, left     Acute blood loss anemia     Acute bronchitis     Edema     Volume overload     Cellulitis of left lower extremity     Erysipelas

## 2021-05-28 NOTE — PROGRESS NOTES
"[facility]    CHIEF COMPLAINT/REASON FOR VISIT:  Chief Complaint   Patient presents with     other     inflammation of navel       HISTORY:      HPI: Adalgisa is a 83 y.o. female who  has a past medical history of Acute and chronic respiratory failure with hypoxia (H), Arm skin lesion, right, Arnold-Chiari malformation (H) (1998), Atrial fibrillation (H) (02/2017), Breast cyst, Breast lump, Cellulitis of left lower extremity (1/28/2019), Chronic diastolic heart failure (H), Chronic respiratory failure with hypoxia (H) (3/13/2017), COPD (chronic obstructive pulmonary disease) (H), COPD exacerbation (H), Depression, Diet-controlled type 2 diabetes mellitus (H), GERD (gastroesophageal reflux disease), Hyperlipidemia, Hypertension, Lumbar spinal stenosis, Peripheral arterial disease (H) (10/28/2015), Pulmonary hypertension (H) (3/5/2018), Recurrent major depressive episodes (H), Schizoaffective disorder, bipolar type (H) (6/11/2015), and Type 2 diabetes mellitus without complication (H) (8/2/2016). She also has no past medical history of History of anesthesia complications, History of transfusion, PONV (postoperative nausea and vomiting), or Sleep apnea. Adalgisa states that her health is fair at this time. She has been working with her doctor because she has a \"deep seated\" pneumonia on top of COPD. She is going to work with an infectious diseases doctor next week. Adalgisa was at her doctor's office just yesterday but last night she was taking her clothes off to get ready for bed. She could smell this terrible odor. She looked down at her umbilicus and states that it was seeping and draining. There was a yellow discharge coming from the umbilicus. She states that there has been a lump there for quite some time. It has been somewhat itchy, but otherwise no issues. Denies redness, streaking, warmth, bleeding. Adalgisa denies any other concerns including fevers/chills, cough or cold symptoms, " headaches, vision changes, chest pain/pressure, difficulty breathing, SOB, abdominal pain, nausea, vomiting, diarrhea, dysuria, increasing weakness, increasing pain.     Past Medical History:   Diagnosis Date     Acute and chronic respiratory failure with hypoxia (H)      Arm skin lesion, right      Arnold-Chiari malformation (H)      Atrial fibrillation (H) 2017     Breast cyst      Breast lump      Cellulitis of left lower extremity 2019     Chronic diastolic heart failure (H)     diastolic chf     Chronic respiratory failure with hypoxia (H) 3/13/2017     COPD (chronic obstructive pulmonary disease) (H)      COPD exacerbation (H)      Depression      Diet-controlled type 2 diabetes mellitus (H)      GERD (gastroesophageal reflux disease)      Hyperlipidemia      Hypertension      Lumbar spinal stenosis      Peripheral arterial disease (H) 10/28/2015     Pulmonary hypertension (H) 3/5/2018     Recurrent major depressive episodes (H)     Created by Conversion  Replacement Utility updated for latest IMO load     Schizoaffective disorder, bipolar type (H) 2015     Type 2 diabetes mellitus without complication (H) 2016             Family History   Problem Relation Age of Onset     Heart attack Mother      Heart attack Father      Social History     Socioeconomic History     Marital status:      Spouse name: None     Number of children: None     Years of education: None     Highest education level: None   Occupational History     None   Social Needs     Financial resource strain: None     Food insecurity:     Worry: None     Inability: None     Transportation needs:     Medical: None     Non-medical: None   Tobacco Use     Smoking status: Former Smoker     Last attempt to quit: 2006     Years since quittin.3     Smokeless tobacco: Never Used     Tobacco comment: quit    Substance and Sexual Activity     Alcohol use: No     Drug use: No     Sexual activity: None   Lifestyle      Physical activity:     Days per week: None     Minutes per session: None     Stress: None   Relationships     Social connections:     Talks on phone: None     Gets together: None     Attends Voodoo service: None     Active member of club or organization: None     Attends meetings of clubs or organizations: None     Relationship status: None     Intimate partner violence:     Fear of current or ex partner: None     Emotionally abused: None     Physically abused: None     Forced sexual activity: None   Other Topics Concern     None   Social History Narrative     None       REVIEW OF SYSTEM:  Per HPI    PHYSICAL EXAM:   General appearance: alert, appears stated age and cooperative  HEENT: Head is normocephalic with normal hair distribution. No evidence of trauma. Ears: Without lesions or deformity. No acute purulent discharge. Eyes: Conjunctivae pink with no scleral icterus or erythema. Nose: Normal mucosa and septum. Oropharnyx: mmm, no lesions present.  Lungs: respirations without effort  Extremities: extremities normal, atraumatic, no cyanosis or edema  Pulses: 2+ and symmetric  Skin: Skin color, texture, turgor normal. No rashes or lesions. Slight erythema to inner umbilicus.   Neurologic: Grossly normal   Psych: interacts well with caregivers, exhibits logical thought processes and connections, pleasant      LABS:   None.    ASSESSMENT:      ICD-10-CM    1. Erysipelas A46        PLAN:    Erysipelas  -Keep skin clean and dry.   -Clean umbilicus with soapy water and dry daily.   -Can use hydrogen peroxide to clean.   -Apply bacitracin or neosporin as needed for comfort.   -Follow up as needed.     Electronically signed by: Amairani Britton CNP

## 2021-05-28 NOTE — PROGRESS NOTES
Assessment and Plan:  83 year old female with a history of severe COPD (FEV1 0.89 L, 51%; DLCO 53%), pulmonary HTN, HFpEF, DM, GERD, atrial fibrillation and DVT on anticoagulation, schizoaffective disorder, PAD, HTN, dyslipidemia, admitted  Jan 2019 with acute hypoxemic respiratory failure with evidence of pulmonary edema, Klebsiella and PSDs PNA (per 12/26 sputum Cx), and AECOPD.  She also appears to have some evidence of bronchiectasis per my review of her December 2018 CT scan.     4/16/2019: Patient began having increased cough productive for yellow sputum for the past couple of weeks this month.  She tried doxycycline and prednisone without relief.  She has not obtained her pulmonary function tests yet due to the current symptoms.  Chest x-ray checked today was negative for pneumonia I suspect that she is having a bronchiectasis exacerbation.  She is currently using a flutter valve about twice a day and hypertonic saline nebs twice daily.    4/17/2009 sputum culture: Pansensitive Pseudomonas.  Preliminary fungal culture positive for Candida     Bronchiectasis exacerbation with recurrent Pseudomonas on cultures, possible colonization:  Since last week she has had some improvement which I believe is most likely due to increased flutter valve use.  She completed a course of azithromycin without significant improvement and is now already on a second Z-Bijan course.  -Continue flutter valve use to 4 times daily for now  -Change azithromycin to ciprofloxacin for 10 days.  The patient was cautioned to monitor for allergic reactions and stop the antibiotic immediately, take Benadryl, and present to a clinic or urgent care if she develops itching, rash, or other allergic symptoms.  She had a reaction with itching and a rash while hospitalized in 1999 and IV Levaquin at that time.  -Infectious disease clinic referral requested to evaluate for possible eradication or suppression of Pseudomonas with inhaled  antibiotic  -Bronchiectasis work-up labs normal aside from a low IgM which may be due to acute illness, will repeat in 2 months       COPD:  -PFTs  postponed due to acute illnesses, will obtain in 2 months  -Continue Spiriva, Advair, and albuterol as needed  -Continue home oxygen, adjust as needed for goal oxygen saturation of 88-92% (chronic CO2 retainer)     Cough and suspected nocturnal reflux aspiration:  -Continue omeprazole  -elevate her head of bed with a wedge pillow on her back  -Avoid for food or drink 3 hours before bedtime  -Avoid reflux trigger foods such as alcohol caffeine and spicy foods      Follow-up in our clinic in 2 months with PFTs    CCx: Shortness of breath cough    HPI: The patient completed a course of azithromycin after her last clinic visit a week ago without significant improvement.  She immediately started on a second course which she is currently taking.  She also has been increasing her flutter valve use to 4 times daily with a productive cough.  She has in recent days started noting some improvement in her dyspnea on exertion.  She denies hemoptysis, fevers or chills, or shortness of breath at rest    ROS:  A review of 12 organ systems was performed with pertinent positives and negatives noted in the HPI.      Current Meds:  Current Outpatient Medications   Medication Sig     acetaminophen (TYLENOL) 500 MG tablet Take 2 tablets (1,000 mg total) by mouth 3 (three) times a day. (Patient taking differently: Take 1,000 mg by mouth as needed.       )     albuterol (PROVENTIL) 2.5 mg /3 mL (0.083 %) nebulizer solution 3 ML INHALATION FOUR TIMES A DAY AS NEEDED USE BEFORE NEBULIZED SALINE. FOR SHORTNESS OF BREATH.     ARIPiprazole (ABILIFY) 5 MG tablet TAKE 1 TABLET EVERY NIGHT AT BEDTIME     aspirin 81 MG EC tablet Take 81 mg by mouth daily.     atorvastatin (LIPITOR) 20 MG tablet TAKE 1 TABLET (20 MG TOTAL) BY MOUTH BEDTIME.     azithromycin (ZITHROMAX) 250 MG tablet Take 250 mg by mouth.  Take 500 mg (2 x 250 mg tablets) on day 1 followed by 250 mg (1 tablet) on days 2-5.  She is currently on day 4     calcium carbonate-vitamin D3 (CALCIUM WITH VITAMIN D) 600 mg(1,500mg) -400 unit per tablet Take 1 tablet by mouth 2 (two) times a day.     cholecalciferol, vitamin D3, 1,000 unit tablet Take 2,000 Units by mouth daily.      cyanocobalamin 1,000 mcg/mL injection Inject 1 mL (1,000 mcg total) into the shoulder, thigh, or buttocks every 30 (thirty) days.     dextromethorphan-guaiFENesin (ROBITUSSIN-DM)  mg/5 mL liquid Take 5 mL by mouth every 4 (four) hours as needed.     diltiazem (CARDIZEM CD) 120 MG 24 hr capsule TAKE 1 CAPSULE (120 MG TOTAL) BY MOUTH DAILY.     ferrous sulfate 325 (65 FE) MG tablet Take 1 tablet (325 mg total) by mouth daily with breakfast.     fluticasone-salmeterol (ADVAIR DISKUS) 250-50 mcg/dose DISKUS Inhale 1 puff 2 (two) times a day.     furosemide (LASIX) 40 MG tablet TAKE 1 TABLET (40 MG TOTAL) BY MOUTH 2 (TWO) TIMES A DAY AT 9AM AND 6PM.     magnesium oxide (MAG-OX) 400 mg (241.3 mg magnesium) tablet Take 1 tablet (400 mg total) by mouth daily.     omeprazole (PRILOSEC) 20 MG capsule Take 20 mg by mouth daily before breakfast.     OXYGEN-AIR DELIVERY SYSTEMS [facility] Use 1-2 L As Directed as needed. [facility]     polyethylene glycol (MIRALAX) 17 gram packet Take 17 g by mouth daily.     potassium chloride (KLOR-CON) 10 MEQ CR tablet TAKE 1 TABLET (10 MEQ TOTAL) BY MOUTH DAILY.     PROAIR HFA 90 mcg/actuation inhaler INHALE 2 PUFFS EVERY 4 (FOUR) HOURS AS NEEDED FOR WHEEZING.     QUEtiapine (SEROQUEL) 25 MG tablet TAKE 2 TABLETS AT BEDTIME=50MG (Patient taking differently: TAKE 1 TABLETS AT BEDTIME=50MG)     sodium chloride 3 % nebulizer solution TAKE 4 ML BY NEBULIZATION 2 (TWO) TIMES A DAY. USING AFTER ALBUTEROL NEB. (Patient taking differently: Take 4 mL by nebulization 3 (three) times a day. Using after albuterol neb.      )     spironolactone (ALDACTONE) 25 MG tablet Take  0.5 tablets (12.5 mg total) by mouth daily.     tiotropium (SPIRIVA WITH HANDIHALER) 18 mcg inhalation capsule USE 1 CAPSULE VIA INHALER 1 X A DAY     XARELTO 20 mg Tab TAKE 1 TABLET (20 MG TOTAL) BY MOUTH DAILY WITH SUPPER.     ciprofloxacin HCl (CIPRO) 500 MG tablet Take 1.5 tablets (750 mg total) by mouth 2 (two) times a day for 10 days.       Labs:  No results found for this or any previous visit (from the past 72 hour(s)).    I have personally reviewed all pertinent imaging studies and PFT results unless otherwise noted.    Imaging studies:  No results found.      Physical Exam:  BP 96/62   Pulse 88   Resp 24   Wt 143 lb (64.9 kg)   LMP  (LMP Unknown)   SpO2 93% Comment: RA  BMI 27.02 kg/m    General - Well nourished  Ears/Mouth -  OP pink moist, no thrush  Neck - no cervical lymphadenopathy  Lungs - Clear to ausculation bilaterally   CVS - regular rhythm with no murmurs, rubs or gallups  Abdomen - soft, NT, ND, NABS  Ext - no cyanosis, clubbing or edema  Skin - no rash  Psychology - alert and oriented, answers appropriate        Electronically signed by:    Sinan Woo MD  [facility]

## 2021-05-28 NOTE — TELEPHONE ENCOUNTER
"Patient calling stating \"I have in an infection in my navel, I saw Dr. Piedra yesterday and it didn't come up but last night there is a discharge and there is a funny color and it smells funny\"    \"It's in my navel, kind of a yellowish-red/orange color to the skin, the drainage is kind of yellowish, I noticed it last night.\"    \"I have been putting hydrogen peroxide on it, it looks a little better\"    Denies fever, denies pain, \"its just itchy\"    Per protocol, patient should be seen in the office today. Care advice given, patient agreeable with the plan and will go to [facility] In clinic.     Reason for Disposition    Pus or cloudy fluid draining from wound    Protocols used: WOUND INFECTION-A-OH      "

## 2021-05-28 NOTE — TELEPHONE ENCOUNTER
RN cannot approve Refill Request    RN can NOT refill this medication med is not covered by policy/route to provider.       Shreya Martinez, [facility] Triage/Med Refill 5/9/2019    Requested Prescriptions   Pending Prescriptions Disp Refills     XARELTO 20 mg Tab [Pharmacy Med Name: XARELTO 20MG] 30 tablet 0     Sig: TAKE 1 TABLET (20 MG TOTAL) BY MOUTH DAILY WITH SUPPER.       Apixaban/Rivaroxaban/Dabigatran Refill Protocol Failed - 5/9/2019 10:20 AM        Failed - Route to appropriate pool/provider     Last Anticoagulation Summary:           Passed - Renal function test in last year     Creatinine   Date Value Ref Range Status   02/04/2019 0.70 0.60 - 1.10 mg/dL Final             Passed - PCP or prescribing provider visit in past 12 months       Last office visit with prescriber/PCP: 5/6/2019 Shai Ellington MD OR same dept: 5/6/2019 Shai Ellington MD OR same specialty: 5/6/2019 Shai Ellington MD  Last physical: 7/30/2018 Last MTM visit: Visit date not found   Next visit within 3 mo: Visit date not found  Next physical within 3 mo: Visit date not found  Prescriber OR PCP: Shai Ellington MD  Last diagnosis associated with med order: 1. Acute on chronic diastolic heart failure (H)  - potassium chloride (KLOR-CON) 10 MEQ CR tablet; TAKE 1 TABLET (10 MEQ TOTAL) BY MOUTH DAILY.  Dispense: 90 tablet; Refill: 3    2. Routine general medical examination at a health care facility  - XARELTO 20 mg Tab [Pharmacy Med Name: XARELTO 20MG]; TAKE 1 TABLET (20 MG TOTAL) BY MOUTH DAILY WITH SUPPER.  Dispense: 30 tablet; Refill: 0    If protocol passes may refill for 12 months if within 3 months of last provider visit (or a total of 15 months).           Signed Prescriptions Disp Refills    potassium chloride (KLOR-CON) 10 MEQ CR tablet 90 tablet 3     Sig: TAKE 1 TABLET (10 MEQ TOTAL) BY MOUTH DAILY.       Potassium Supplements Refill Protocol Passed - 5/9/2019 10:20 AM        Passed - PCP or prescribing  provider visit in past 12 months       Last office visit with prescriber/PCP: 5/6/2019 Shai Ellington MD OR same dept: 5/6/2019 Shai Ellington MD OR same specialty: 5/6/2019 Shai Ellington MD  Last physical: 7/30/2018 Last MTM visit: Visit date not found   Next visit within 3 mo: Visit date not found  Next physical within 3 mo: Visit date not found  Prescriber OR PCP: Shai Ellington MD  Last diagnosis associated with med order: 1. Acute on chronic diastolic heart failure (H)  - potassium chloride (KLOR-CON) 10 MEQ CR tablet; TAKE 1 TABLET (10 MEQ TOTAL) BY MOUTH DAILY.  Dispense: 90 tablet; Refill: 3    2. Routine general medical examination at a health care facility  - XARELTO 20 mg Tab [Pharmacy Med Name: XARELTO 20MG]; TAKE 1 TABLET (20 MG TOTAL) BY MOUTH DAILY WITH SUPPER.  Dispense: 30 tablet; Refill: 0    If protocol passes may refill for 12 months if within 3 months of last provider visit (or a total of 15 months).             Passed - Potassium level in last 12 months     Lab Results   Component Value Date    Potassium 4.2 04/08/2019

## 2021-05-28 NOTE — PATIENT INSTRUCTIONS - HE
Stop azithromycin    10 day course of twice daily Ciprofloxacin for pseudomonas pnuemonia    ** if you develop itching, rash, or other allergy symptoms, stop immediately, take Benadryl and have someone bring you to a doctor's office or urgent care     Continue flutter valve 4 times daily for now    Infectious Disease clinic referral to evaluate for pseudomonas eradication or suppression with nebulized antibiotics    Return to clinic in 2 months with repeat IgM blood test and PFTs then

## 2021-05-29 ENCOUNTER — RECORDS - HEALTHEAST (OUTPATIENT)
Dept: ADMINISTRATIVE | Facility: CLINIC | Age: 86
End: 2021-05-29

## 2021-05-29 NOTE — TELEPHONE ENCOUNTER
Call from Adalgisa.  Having another exacerbation of her bronchiectasis. Last took Cipro for 10 days but does not feel that did anything for her symptoms. Feels the doxycycline worked the best for her.    Will prescribe prednisone 40mg daily x 5 days and doxycycline 100mg two times a day x 7 days.    Patient instructed that if this medication is not helping her feel better, she will need to be seen in  Urgent care/ED for evaluation.

## 2021-05-29 NOTE — TELEPHONE ENCOUNTER
Refill Approved    Rx renewed per Medication Renewal Policy. Medication was last renewed on 4/12/19.    Shreya Martinez, [facility] Triage/Med Refill 5/22/2019     Requested Prescriptions   Pending Prescriptions Disp Refills     omeprazole (PRILOSEC) 20 MG capsule [Pharmacy Med Name: OMEPRAZOLE 20MG] 100 capsule 0     Sig: TAKE 1 CAPSULE DAILY.       GI Medications Refill Protocol Passed - 5/21/2019 11:16 AM        Passed - PCP or prescribing provider visit in last 12 or next 3 months.     Last office visit with prescriber/PCP: 5/6/2019 Shai Ellington MD OR same dept: 5/6/2019 Shai Ellington MD OR same specialty: 5/6/2019 Shai Ellington MD  Last physical: 7/30/2018 Last MTM visit: Visit date not found   Next visit within 3 mo: Visit date not found  Next physical within 3 mo: Visit date not found  Prescriber OR PCP: Shai Ellington MD  Last diagnosis associated with med order: 1. Acute on chronic diastolic heart failure (H)  - diltiazem (CARDIZEM CD) 120 MG 24 hr capsule [Pharmacy Med Name: DILTIAZEM HCL  MG]; TAKE 1 CAPSULE (120 MG TOTAL) BY MOUTH DAILY.  Dispense: 30 capsule; Refill: 0    If protocol passes may refill for 12 months if within 3 months of last provider visit (or a total of 15 months).             diltiazem (CARDIZEM CD) 120 MG 24 hr capsule [Pharmacy Med Name: DILTIAZEM HCL  MG] 30 capsule 0     Sig: TAKE 1 CAPSULE (120 MG TOTAL) BY MOUTH DAILY.       Calcium-Channel Blockers Protocol Passed - 5/21/2019 11:16 AM        Passed - PCP or prescribing provider visit in past 12 months or next 3 months     Last office visit with prescriber/PCP: 5/6/2019 Shai Ellington MD OR same dept: 5/6/2019 Shai Ellington MD OR same specialty: 5/6/2019 Shai Ellington MD  Last physical: 7/30/2018 Last MTM visit: Visit date not found   Next visit within 3 mo: Visit date not found  Next physical within 3 mo: Visit date not found  Prescriber OR PCP: Shai Ellington  MD  Last diagnosis associated with med order: 1. Acute on chronic diastolic heart failure (H)  - diltiazem (CARDIZEM CD) 120 MG 24 hr capsule [Pharmacy Med Name: DILTIAZEM HCL  MG]; TAKE 1 CAPSULE (120 MG TOTAL) BY MOUTH DAILY.  Dispense: 30 capsule; Refill: 0    If protocol passes may refill for 12 months if within 3 months of last provider visit (or a total of 15 months).             Passed - Blood pressure filed in past 12 months     BP Readings from Last 1 Encounters:   05/14/19 114/74

## 2021-05-29 NOTE — TELEPHONE ENCOUNTER
RN cannot approve Refill Request    RN can NOT refill this medication historical medication requested      Shreyadani Martinez, [facility] Triage/Med Refill 5/22/2019    Requested Prescriptions   Pending Prescriptions Disp Refills     omeprazole (PRILOSEC) 20 MG capsule [Pharmacy Med Name: OMEPRAZOLE 20MG] 90 capsule 3     Sig: TAKE 1 CAPSULE DAILY.       GI Medications Refill Protocol Passed - 5/21/2019 11:16 AM        Passed - PCP or prescribing provider visit in last 12 or next 3 months.     Last office visit with prescriber/PCP: 5/6/2019 Shai Ellington MD OR same dept: 5/6/2019 Shai Ellington MD OR same specialty: 5/6/2019 Shai Ellington MD  Last physical: 7/30/2018 Last MTM visit: Visit date not found   Next visit within 3 mo: Visit date not found  Next physical within 3 mo: Visit date not found  Prescriber OR PCP: Shai Ellington MD  Last diagnosis associated with med order: 1. Acute on chronic diastolic heart failure (H)  - diltiazem (CARDIZEM CD) 120 MG 24 hr capsule; TAKE 1 CAPSULE (120 MG TOTAL) BY MOUTH DAILY.  Dispense: 90 capsule; Refill: 3    If protocol passes may refill for 12 months if within 3 months of last provider visit (or a total of 15 months).           Signed Prescriptions Disp Refills    diltiazem (CARDIZEM CD) 120 MG 24 hr capsule 90 capsule 3     Sig: TAKE 1 CAPSULE (120 MG TOTAL) BY MOUTH DAILY.       Calcium-Channel Blockers Protocol Passed - 5/21/2019 11:16 AM        Passed - PCP or prescribing provider visit in past 12 months or next 3 months     Last office visit with prescriber/PCP: 5/6/2019 Shai Ellington MD OR same dept: 5/6/2019 Shai Ellington MD OR same specialty: 5/6/2019 Shai Ellington MD  Last physical: 7/30/2018 Last MTM visit: Visit date not found   Next visit within 3 mo: Visit date not found  Next physical within 3 mo: Visit date not found  Prescriber OR PCP: Shai Ellington MD  Last diagnosis associated with med order: 1. Acute  on chronic diastolic heart failure (H)  - diltiazem (CARDIZEM CD) 120 MG 24 hr capsule; TAKE 1 CAPSULE (120 MG TOTAL) BY MOUTH DAILY.  Dispense: 90 capsule; Refill: 3    If protocol passes may refill for 12 months if within 3 months of last provider visit (or a total of 15 months).             Passed - Blood pressure filed in past 12 months     BP Readings from Last 1 Encounters:   05/14/19 114/74

## 2021-05-29 NOTE — PATIENT INSTRUCTIONS - HE
Start tobramycin nebs two times a day for 30 days every other month    Continue all other treatments/interventions    Return to pulmonary clinic in 6 months

## 2021-05-29 NOTE — TELEPHONE ENCOUNTER
Current order is from April-June- did you want to extend order?    Her next OV here is on 08/05/19

## 2021-05-29 NOTE — TELEPHONE ENCOUNTER
Orders being requested: Standing order to get B12 vaccines  Reason service is needed/diagnosis: please add  When are orders needed by: asap  Where to send Orders: in chart  Okay to leave detailed message?  Yes

## 2021-05-29 NOTE — PROGRESS NOTES
Assessment:  83 year old female with a history of severe COPD (FEV1 0.89 L, 51%; DLCO 53%), pulmonary HTN, HFpEF, DM, GERD, atrial fibrillation and DVT on anticoagulation, schizoaffective disorder, PAD, HTN, dyslipidemia, admitted Jan 2019 with acute hypoxemic respiratory failure with evidence of pulmonary edema, Klebsiella and PSDs PNA (per 12/26 sputum Cx), and AECOPD.  She also has some mild bronchiectasis per review of her December 2018 CT scan with pseudomonas colonization as evidenced on 2 sputum cultures. She has had recurrent exacerbations of COPD/bronchiectasis, the most recent being in May 2019 that was treated with doxycyline & prednisone as an outpatient.       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      -Continue flutter valve use 3-4 times daily     -Infectious disease clinic input appreciate, do not recommend attempting PSDs eradication. Will start every other month tobramycin nebs per their recs to see if it can help reduce the frequency of her exacerbations    -Bronchiectasis work-up labs normal aside from a low IgM, likely due to acute illness at the time     COPD:    -FEV1 stable however DLCO notably decreased from 9.5/53% pred -> 7.05/39% pred between 4/2017 PFTs ([place]) and here today, likely 2/2 recurrent exacerbations & bronchiectasis w/PSDs colonization -> consider future repeat to eval for stabilization if exacerbations can be reduced  -Continue Spiriva, Advair, and albuterol as needed  -Continue home oxygen, adjust as needed for goal oxygen saturation of 88-92% (chronic CO2 retainer)     Cough and suspected nocturnal reflux aspiration:  -Continue omeprazole  -elevate her head of bed with a wedge pillow on her back  -Avoid for food or drink 3 hours before bedtime  -Avoid reflux trigger foods such as alcohol caffeine and spicy foods        Follow-up in our clinic in 6 months        CCx:  "shortness of breath, cough    HPI: Patient states that her breathing feels \"pretty good.\"  She is continuing to use the flutter valve 3-4 times daily which is keeping her sputum production down.  She has not needed oxygen since February.  She did have an exacerbation of shortness of breath in late May which was treated as an outpatient with doxycycline and prednisone.    ROS:  A review of 12 organ systems was performed with pertinent positives and negatives noted in the HPI.      Current Meds:  Current Outpatient Medications   Medication Sig     gabapentin (NEURONTIN) 300 MG capsule Take 300 mg by mouth 2 (two) times a day.     triamcinolone (KENALOG) 0.1 % cream Apply topically 2 (two) times a day.     acetaminophen (TYLENOL) 500 MG tablet Take 2 tablets (1,000 mg total) by mouth 3 (three) times a day. (Patient taking differently: Take 1,000 mg by mouth as needed.       )     albuterol (PROVENTIL) 2.5 mg /3 mL (0.083 %) nebulizer solution 3 ML INHALATION FOUR TIMES A DAY AS NEEDED USE BEFORE NEBULIZED SALINE. FOR SHORTNESS OF BREATH.     ARIPiprazole (ABILIFY) 5 MG tablet TAKE 1 TABLET EVERY NIGHT AT BEDTIME     aspirin 81 MG EC tablet Take 81 mg by mouth daily.     atorvastatin (LIPITOR) 20 MG tablet TAKE 1 TABLET (20 MG TOTAL) BY MOUTH BEDTIME.     calcium carbonate-vitamin D3 (CALCIUM WITH VITAMIN D) 600 mg(1,500mg) -400 unit per tablet Take 1 tablet by mouth 2 (two) times a day.     cholecalciferol, vitamin D3, 1,000 unit tablet Take 2,000 Units by mouth daily.      cyanocobalamin 1,000 mcg/mL injection Inject 1 mL (1,000 mcg total) into the shoulder, thigh, or buttocks every 30 (thirty) days.     dextromethorphan-guaiFENesin (ROBITUSSIN-DM)  mg/5 mL liquid Take 5 mL by mouth every 4 (four) hours as needed.     diltiazem (CARDIZEM CD) 120 MG 24 hr capsule TAKE 1 CAPSULE (120 MG TOTAL) BY MOUTH DAILY.     ferrous sulfate 325 (65 FE) MG tablet Take 1 tablet (325 mg total) by mouth daily with breakfast.     " "fluticasone-salmeterol (ADVAIR DISKUS) 250-50 mcg/dose DISKUS Inhale 1 puff 2 (two) times a day.     furosemide (LASIX) 40 MG tablet TAKE 1 TABLET (40 MG TOTAL) BY MOUTH 2 (TWO) TIMES A DAY AT 9AM AND 6PM.     magnesium oxide (MAG-OX) 400 mg (241.3 mg magnesium) tablet Take 1 tablet (400 mg total) by mouth daily.     omeprazole (PRILOSEC) 20 MG capsule TAKE 1 CAPSULE DAILY.     OXYGEN-AIR DELIVERY SYSTEMS MIS Use 1-2 L As Directed as needed. [employer]     polyethylene glycol (MIRALAX) 17 gram packet Take 17 g by mouth daily.     potassium chloride (KLOR-CON) 10 MEQ CR tablet TAKE 1 TABLET (10 MEQ TOTAL) BY MOUTH DAILY.     predniSONE (DELTASONE) 20 MG tablet Take 2 tabs (40mg total) daily x 5 days.     PROAIR HFA 90 mcg/actuation inhaler INHALE 2 PUFFS EVERY 4 (FOUR) HOURS AS NEEDED FOR WHEEZING.     QUEtiapine (SEROQUEL) 25 MG tablet TAKE 2 TABLETS AT BEDTIME=50MG (Patient taking differently: TAKE 1 TABLETS AT BEDTIME=50MG)     sodium chloride 3 % nebulizer solution TAKE 4 ML BY NEBULIZATION 2 (TWO) TIMES A DAY. USING AFTER ALBUTEROL NEB. (Patient taking differently: Take 4 mL by nebulization 3 (three) times a day. Using after albuterol neb.      )     spironolactone (ALDACTONE) 25 MG tablet Take 0.5 tablets (12.5 mg total) by mouth daily.     tiotropium (SPIRIVA WITH HANDIHALER) 18 mcg inhalation capsule USE 1 CAPSULE VIA INHALER 1 X A DAY     tobramycin, PF, (CALLUM) 300 mg/5 mL nebulizer solution Take 5 mL (300 mg total) by nebulization 2 (two) times a day. Two times a day every other month     XARELTO 20 mg Tab TAKE 1 TABLET (20 MG TOTAL) BY MOUTH DAILY WITH SUPPER.       Labs:  Recent Results (from the past 72 hour(s))   POCT hemoglobin   Result Value Ref Range    Hgb 12.6 7.0 g/dL       I have personally reviewed all pertinent imaging studies and PFT results unless otherwise noted.    Imaging studies:  No results found.      Physical Exam:  /62   Pulse 66   Resp 10   Ht 5' 1\" (1.549 m)   Wt 143 lb " (64.9 kg)   LMP  (LMP Unknown)   SpO2 93%   Breastfeeding? No   BMI 27.02 kg/m    General - Well nourished  Ears/Mouth -  OP pink moist, no thrush  Neck - no cervical lymphadenopathy  Lungs -decreased lung sounds throughout, no wheezes  CVS - regular rhythm with no murmurs, rubs or gallups  Abdomen - soft, NT, ND, NABS  Ext - no cyanosis, clubbing or edema  Skin - no rash  Psychology - alert and oriented, answers appropriate        Electronically signed by:    Sinan Woo MD  [facility]monary [facility] Critical [facility] Medicine

## 2021-05-30 ENCOUNTER — RECORDS - HEALTHEAST (OUTPATIENT)
Dept: ADMINISTRATIVE | Facility: CLINIC | Age: 86
End: 2021-05-30

## 2021-05-30 VITALS — HEIGHT: 61 IN | WEIGHT: 168 LBS | BODY MASS INDEX: 31.72 KG/M2

## 2021-05-30 VITALS — BODY MASS INDEX: 29.81 KG/M2 | WEIGHT: 162 LBS | HEIGHT: 62 IN

## 2021-05-30 VITALS — BODY MASS INDEX: 30.86 KG/M2 | WEIGHT: 163.3 LBS

## 2021-05-30 VITALS — HEIGHT: 61 IN | BODY MASS INDEX: 30.02 KG/M2 | WEIGHT: 159 LBS

## 2021-05-30 VITALS — WEIGHT: 163 LBS | BODY MASS INDEX: 30.78 KG/M2 | HEIGHT: 61 IN

## 2021-05-30 VITALS — HEIGHT: 61 IN | WEIGHT: 160 LBS | BODY MASS INDEX: 30.21 KG/M2

## 2021-05-30 VITALS — HEIGHT: 61 IN | WEIGHT: 164 LBS | BODY MASS INDEX: 30.96 KG/M2

## 2021-05-30 VITALS — WEIGHT: 165 LBS | HEIGHT: 62 IN | BODY MASS INDEX: 30.36 KG/M2

## 2021-05-30 VITALS — WEIGHT: 167 LBS | HEIGHT: 61 IN | BODY MASS INDEX: 31.53 KG/M2

## 2021-05-30 NOTE — TELEPHONE ENCOUNTER
RN cannot approve Refill Request    RN can NOT refill this medication med is not covered by policy/route to provider. Last office visit: 5/6/2019 Shai Ellington MD Last Physical: 7/30/2018 Last MTM visit: Visit date not found Last visit same specialty: Visit date not found.  Next visit within 3 mo: Visit date not found  Next physical within 3 mo: Visit date not found      Lizett Santiago, [facility] Triage/Med Refill 7/29/2019    Requested Prescriptions   Pending Prescriptions Disp Refills     furosemide (LASIX) 20 MG tablet [Pharmacy Med Name: FUROSEMIDE 20MG] 60 tablet 0     Sig: TAKE 1 TABLET (20 MG TOTAL) BY MOUTH 2 (TWO) TIMES A DAY AT 9AM AND 6PM.       There is no refill protocol information for this order

## 2021-05-30 NOTE — TELEPHONE ENCOUNTER
"Call from Adalgisa. States she is scheduled for surgery. Having a rt hip replacement.  Her surgeon asked her to call the pulmonologist and give them a \"heads up\" that this would be happening.    Adalgisa asking if she is ok to proceed with this surgery???      "

## 2021-05-30 NOTE — TELEPHONE ENCOUNTER
RN cannot approve Refill Request    RN can NOT refill this medication med is not covered by policy/route to provider.     Shreya Martinez, [facility] Triage/Med Refill 7/18/2019    Requested Prescriptions   Pending Prescriptions Disp Refills     XARELTO 20 mg tablet [Pharmacy Med Name: XARELTO 20MG] 30 tablet 0     Sig: TAKE 1 TABLET (20 MG TOTAL) BY MOUTH DAILY WITH SUPPER.       Apixaban/Rivaroxaban/Dabigatran Refill Protocol Failed - 7/18/2019 12:02 PM        Failed - Route to appropriate pool/provider     Last Anticoagulation Summary:           Passed - Renal function test in last year     Creatinine   Date Value Ref Range Status   02/04/2019 0.70 0.60 - 1.10 mg/dL Final             Passed - PCP or prescribing provider visit in past 12 months       Last office visit with prescriber/PCP: 5/6/2019 Shai Ellington MD OR same dept: 5/6/2019 Shai Ellington MD OR same specialty: 5/6/2019 Shai Ellington MD  Last physical: 7/30/2018 Last MTM visit: Visit date not found   Next visit within 3 mo: Visit date not found  Next physical within 3 mo: Visit date not found  Prescriber OR PCP: Shai Ellington MD  Last diagnosis associated with med order: 1. Routine general medical examination at a health care facility  - XARELTO 20 mg tablet [Pharmacy Med Name: XARELTO 20MG]; TAKE 1 TABLET (20 MG TOTAL) BY MOUTH DAILY WITH SUPPER.  Dispense: 30 tablet; Refill: 0    If protocol passes may refill for 12 months if within 3 months of last provider visit (or a total of 15 months).

## 2021-05-30 NOTE — TELEPHONE ENCOUNTER
Called and spoke with Adalgisa.     Per Dr. Woo:  She is of course at increased risk due to her underlying lung disease and frequent exacerbations, but not a contraindication if her symptoms are at baseline. She should have hasmukh-operative oxygen as well as q4h duo & metanebs, and continue her tobramycin nebs if they are bring taken that month. Pulmonary consultation during hospitalization should also be considered.

## 2021-05-31 ENCOUNTER — RECORDS - HEALTHEAST (OUTPATIENT)
Dept: ADMINISTRATIVE | Facility: CLINIC | Age: 86
End: 2021-05-31

## 2021-05-31 VITALS — BODY MASS INDEX: 31.15 KG/M2 | HEIGHT: 61 IN | WEIGHT: 165 LBS

## 2021-05-31 VITALS — WEIGHT: 166 LBS | BODY MASS INDEX: 31.34 KG/M2 | HEIGHT: 61 IN

## 2021-05-31 VITALS — BODY MASS INDEX: 31.89 KG/M2 | WEIGHT: 168.8 LBS

## 2021-05-31 VITALS — HEIGHT: 61 IN | BODY MASS INDEX: 30.96 KG/M2 | WEIGHT: 164 LBS

## 2021-05-31 VITALS — BODY MASS INDEX: 29.45 KG/M2 | HEIGHT: 61 IN | WEIGHT: 156 LBS

## 2021-05-31 VITALS — BODY MASS INDEX: 32.29 KG/M2 | WEIGHT: 171.04 LBS | HEIGHT: 61 IN

## 2021-05-31 VITALS — WEIGHT: 159 LBS | BODY MASS INDEX: 30.02 KG/M2 | HEIGHT: 61 IN

## 2021-05-31 VITALS — WEIGHT: 164.9 LBS | BODY MASS INDEX: 31.16 KG/M2

## 2021-05-31 NOTE — TELEPHONE ENCOUNTER
Did you want her to have any fasting labs?     Last lipids done July 2018    Lipid [place]       Ref Range & Units 7/30/18 0937    Cholesterol <=199 mg/dL 140     Triglycerides <=149 mg/dL 65     HDL Cholesterol >=50 mg/dL 60     LDL Calculated <=129 mg/dL 67     Patient Fasting > 8hrs?  Yes    Resulting Agency

## 2021-05-31 NOTE — PROGRESS NOTES
Office Visit - Physical    Adalgisa Solano   83 y.o. female    Date of Visit: 8/22/2019    Chief Complaint   Patient presents with     Pre-op Exam     Excision right arm lipoma  Dr. Andreas Holly [facility] 8/29/19    Right total hip arthroplasty Dr. Kan Quesada at [facility] 9/16/2019       Subjective: Preoperative examination in anticipation of surgery on August 29, 2019 for #1 excision right arm lipoma Dr. Andreas Luna [facility] on August 29, 2019 and right total hip arthroplasty on September 16, 2019 by Dr. Kan Quesada at [facility].    Disabling arthritis right hip prior successful left total hip arthroplasty by Dr. Lackey using a spinal block patient has underlying COPD and multiple drug allergies including amoxicillin levofloxacin and penicillin.    Currently a non-smoker and does not use alcohol.    ROS: A comprehensive review of systems was performed and was otherwise negative    Medications:   Prior to Admission medications    Medication Sig Start Date End Date Taking? Authorizing Provider   acetaminophen (TYLENOL) 500 MG tablet Take 2 tablets (1,000 mg total) by mouth 3 (three) times a day.  Patient taking differently: Take 1,000 mg by mouth as needed.        1/3/19  Yes Mariana Toure PA-C   albuterol (PROVENTIL) 2.5 mg /3 mL (0.083 %) nebulizer solution 3 ML INHALATION FOUR TIMES A DAY AS NEEDED USE BEFORE NEBULIZED SALINE. FOR SHORTNESS OF BREATH. 12/27/18  Yes Shai Ellington MD   ARIPiprazole (ABILIFY) 5 MG tablet TAKE 1 TABLET EVERY NIGHT AT BEDTIME 12/27/18  Yes Shai Ellington MD   atorvastatin (LIPITOR) 20 MG tablet TAKE 1 TABLET (20 MG TOTAL) BY MOUTH BEDTIME. 12/27/18  Yes Shai Ellington MD   calcium carbonate-vitamin D3 (CALCIUM WITH VITAMIN D) 600 mg(1,500mg) -400 unit per tablet Take 1 tablet by mouth 2 (two) times a day.   Yes PROVIDER, HISTORICAL   cholecalciferol, vitamin D3, 1,000 unit tablet Take 2,000 Units by mouth daily.    Yes PROVIDER, HISTORICAL    diltiazem (CARDIZEM CD) 120 MG 24 hr capsule TAKE 1 CAPSULE (120 MG TOTAL) BY MOUTH DAILY. 5/22/19  Yes Shai Ellington MD   ferrous sulfate 325 (65 FE) MG tablet Take 1 tablet (325 mg total) by mouth daily with breakfast. 2/1/19  Yes Jak Cardona MD   fluticasone-salmeterol (ADVAIR DISKUS) 250-50 mcg/dose DISKUS Inhale 1 puff 2 (two) times a day. 3/4/19  Yes Reece Woo MD   furosemide (LASIX) 20 MG tablet TAKE 1 TABLET (20 MG TOTAL) BY MOUTH 2 (TWO) TIMES A DAY AT 9AM AND 6PM. 7/29/19  Yes Shai Ellington MD   gabapentin (NEURONTIN) 300 MG capsule Take 300 mg by mouth 2 (two) times a day.   Yes PROVIDER, HISTORICAL   magnesium oxide (MAG-OX) 400 mg (241.3 mg magnesium) tablet Take 1 tablet (400 mg total) by mouth daily. 1/5/19  Yes Nilesh Dunn,    naltrexone (DEPADE) 50 mg tablet Take 25 mg by mouth 2 (two) times a day. 7/10/19  Yes PROVIDER, HISTORICAL   omeprazole (PRILOSEC) 20 MG capsule TAKE 1 CAPSULE DAILY. 5/22/19  Yes Shai Ellington MD   OXYGEN-AIR DELIVERY SYSTEMS MISC Use 1-2 L As Directed as needed. [employer]   Yes PROVIDER, HISTORICAL   polyethylene glycol (MIRALAX) 17 gram packet Take 17 g by mouth daily.   Yes PROVIDER, HISTORICAL   potassium chloride (KLOR-CON) 10 MEQ CR tablet TAKE 1 TABLET (10 MEQ TOTAL) BY MOUTH DAILY. 5/9/19  Yes Shai Ellington MD   PROAIR HFA 90 mcg/actuation inhaler INHALE 2 PUFFS EVERY 4 (FOUR) HOURS AS NEEDED FOR WHEEZING. 12/27/18  Yes Shai Ellington MD   QUEtiapine (SEROQUEL) 25 MG tablet TAKE 2 TABLETS AT BEDTIME=50MG  Patient taking differently: TAKE 1 TABLETS AT BEDTIME=50MG 12/27/18  Yes Shai Ellington MD   sodium chloride 3 % nebulizer solution Take 4 mL by nebulization 3 (three) times a day. Using after albuterol neb. 8/8/19  Yes Shai Ellington MD   spironolactone (ALDACTONE) 25 MG tablet Take 0.5 tablets (12.5 mg total) by mouth daily. 2/2/19  Yes Jak Cardona MD   tiotropium (SPIRIVA  WITH HANDIHALER) 18 mcg inhalation capsule USE 1 CAPSULE VIA INHALER 1 X A DAY 3/4/19  Yes Reece Woo MD   XARELTO 20 mg tablet TAKE 1 TABLET (20 MG TOTAL) BY MOUTH DAILY WITH SUPPER. 8/19/19  Yes Shai Ellington MD   clotrimazole-betamethasone (LOTRISONE) cream  8/20/19   PROVIDER, HISTORICAL   cyanocobalamin 1,000 mcg/mL injection Inject 1 mL (1,000 mcg total) into the shoulder, thigh, or buttocks every 30 (thirty) days. 3/2/19   Jak Cardona MD   dextromethorphan-guaiFENesin (ROBITUSSIN-DM)  mg/5 mL liquid Take 5 mL by mouth every 4 (four) hours as needed. 2/1/19   Jak Cardona MD   mometasone (ELOCON) 0.1 % cream  6/19/19   PROVIDER, HISTORICAL   triamcinolone (KENALOG) 0.1 % cream Apply topically 2 (two) times a day.    PROVIDER, HISTORICAL       Allergies:  Allergies   Allergen Reactions     Amoxicillin Itching and Rash     Levofloxacin Itching and Rash     Penicillins Itching and Rash     Has tolerated ceftriaxone.       Immunizations:   Immunization History   Administered Date(s) Administered     DT (pediatric) 03/29/2004     Influenza high dose, seasonal 10/20/2015, 10/09/2016, 11/16/2017, 10/11/2018     Influenza,seasonal quad, PF, 36+MOS 11/20/2014     Pneumo Conj 13-V (2010&after) 10/20/2015     Pneumo Polysac 23-V 10/18/1999, 08/01/2005     Td,adult,historic,unspecified 03/29/2004     Tdap 05/14/2013       Health Maintenance: Immunizations reviewed and up-to-date.  No recent trauma to either hip disabled from her right hip pain now uses a cane.    Past Medical History: COPD smoking-related currently a non-smoker.  Mammogram done July 16, 2018 allCLEAR.    Colonoscopy normal Dr. Bustos and colorectal surgery group March 14, 2013.    Intermittent asthmatic bronchitis flareups.  Azithromycin in the form of Z-Bijan has been helpful in the past.    History of hyperlipidemia but no history of MI or stroke no history of xanthoma or xanthelasma.    Prior history of anxiety  depressive reaction currently asymptomatic on Abilify and Seroquel    Vitamin B12 deficiency.  Pernicious anemia currently receiving cyanocobalamin 1000 mcg IM by injection monthly.    Past Surgical History: Left total knee arthroplasty 2016 with Dr. Kan Quesada of [facility] presiding.    Percutaneous interventional radiology treatment for PAD lower extremity.  Prior history of Arnold-Chiari malformation with neurosurgical intervention for widening of the foramen magnum without postoperative complications.    Hemiarthroplasty right knee.    Prior hand surgery.        Family History: Mother  heart disease age 80.    Father  of heart disease age 87.     twice first   of an abdominal aortic aneurysm rupture.  3 children all by .    Second  very supportive of this patient he is also patient of this examiner has been treated for prostate cancer.    Social History: Disabled from arthritis especially involving the right hip uses a cane for ambulation no recent falls.  Retired.    Exam Chest clear to auscultation and percussion.  Heart tones regular rhythm without murmur rub or gallop.  Abdomen soft nontender no organomegaly.  No peritoneal signs.  Extremities free of edema cyanosis or clubbing.  Neck veins nondistended no thyromegaly or scleral icterus noted, carotids full.  Skin warm and dry easily conversant good spirited.  Normal intelligence.  Neurologically intact no gross localizing findings.    110/58 pulse 88 respirations 20 unlabored O2 sats 93% on room air.  Weight up 1 pound BMI is good at 26.    Electrocardiogram showed atrial fibrillation and flutter previously seen by Dr. Conrado Marquez from [facility] Department of cardiology.  Rate is controlled at 86 no acute changes noted on electrocardiogram.    Assessment and Plan  Medically acceptable risk for anticipated surgery of right total hip arthroplasty as well as right arm lipoma  resected preoperatively check hemogram plus comprehensive metabolic profile urinalysis and EKG as noted above.  Multiple drug allergies including amoxicillin levofloxacin and penicillin.  Atrial fibrillation with controlled ventricular response rate.  84.  Currently on Xarelto for anti-clot measures.     Total time spent with the patient today was 40 minutes of which greater than 50% was spent in counseling and coordination of care.    Addendum the patient was recently hospitalized from September 10-11, 2019 for a bowel obstruction or ileus that has resolved.  I spoke to the patient by phone today and she said  she is ready for upcoming surgery without any new complaints and feels well from an intestinal standpoint with no signs or symptoms of intestinal obstruction.  Shai Ellington MD    Patient Active Problem List   Diagnosis     Recurrent major depressive episodes (H)     Chronic obstructive pulmonary disease (H)     Lump Or Mass In Breast     Peripheral arterial disease (H)     Type 2 diabetes mellitus without complication (H)     Hypertension     Schizoaffective disorder, bipolar type (H)     Arnold-Chiari malformation (H)     Chronic diastolic congestive heart failure (H)     Chronic respiratory failure with hypoxia (H)     Pulmonary hypertension (H)     DVT of axillary vein, acute left (H)     Primary osteoarthritis of both knees     Cellulitis     Chronic atrial fibrillation (H)     Gastroesophageal reflux disease, esophagitis presence not specified     Stasis dermatitis of both legs     Candidal skin infection     Encounter for palliative care     Generalized muscle weakness     Drug-induced constipation     Chronic pain syndrome     Acute combined systolic and diastolic CHF, NYHA class 1 (H)     Fracture of femoral neck, left (H)     Hip pain, left     Slow transit constipation     Status post total hip replacement, left     Acute blood loss anemia     Acute bronchitis     Edema     Volume overload      Cellulitis of left lower extremity     Erysipelas     Avascular necrosis of bone (H)

## 2021-05-31 NOTE — TELEPHONE ENCOUNTER
Orders being requested: labs  Reason service is needed/diagnosis: wondering if she can have fasting labs done prior to her pre-op appointment on 08/22/2019 at 2:40pm. States she's having labs done for pcp.  When are orders needed by: this Friday the latest. And please call her to schedule fasting lab appointment when pcp approves it. She prefers Tuesday - 08/20/2019   Where to send Orders: primary clinic  Okay to leave detailed message?  Yes

## 2021-05-31 NOTE — ANESTHESIA POSTPROCEDURE EVALUATION
Patient: Adalgisa Solano  #1 EXCISION RIGHT ARM LIPOMA  Anesthesia type: MAC    Patient location: Phase II Recovery  Last vitals:   Vitals Value Taken Time   /58 8/29/2019 11:45 AM   Temp 36.3  C (97.4  F) 8/29/2019 11:14 AM   Pulse 90 8/29/2019 11:52 AM   Resp 16 8/29/2019 11:45 AM   SpO2 89 % 8/29/2019 11:52 AM   Vitals shown include unvalidated device data.  Post vital signs: stable  Level of consciousness: awake and responds to simple questions  Post-anesthesia pain: pain controlled  Post-anesthesia nausea and vomiting: no  Pulmonary: unassisted, return to baseline  Cardiovascular: stable and blood pressure at baseline  Hydration: adequate  Anesthetic events: no    QCDR Measures:  ASA# 11 - Pham-op Cardiac Arrest: ASA11B - Patient did NOT experience unanticipated cardiac arrest  ASA# 12 - Pham-op Mortality Rate: ASA12B - Patient did NOT die  ASA# 13 - PACU Re-Intubation Rate: NA - No ETT / LMA used for case  ASA# 10 - Composite Anes Safety: ASA10A - No serious adverse event    Additional Notes:

## 2021-05-31 NOTE — TELEPHONE ENCOUNTER
Spoke with the patient and relayed message below from Dr. Ellington.  She verbalized understanding and is now scheduled to see Dr. Ellington next week Tuesday, 8/6/19 at 1 pm.  Patient had no further questions at this time.  Silvia AZAR CMA/NEL....................3:01 PM

## 2021-05-31 NOTE — ANESTHESIA CARE TRANSFER NOTE
Last vitals:   Vitals:    08/29/19 1114   BP: 96/60   Pulse: 90   Resp: 14   Temp:    SpO2: 97%     Patient's level of consciousness is awake  Spontaneous respirations: yes  Maintains airway independently: yes  Dentition unchanged: yes  Oropharynx: oropharynx clear of all foreign objects    QCDR Measures:  ASA# 20 - Surgical Safety Checklist: WHO surgical safety checklist completed prior to induction    PQRS# 430 - Adult PONV Prevention: 4558F - Pt received => 2 anti-emetic agents (different classes) preop & intraop  ASA# 8 - Peds PONV Prevention: NA - Not pediatric patient, not GA or 2 or more risk factors NOT present  PQRS# 424 - Pham-op Temp Management: NA - MAC anesthesia or case < 60 minutes  PQRS# 426 - PACU Transfer Protocol: - Transfer of care checklist used  ASA# 14 - Acute Post-op Pain: NA - Patient under age 10y or did not go to PACU

## 2021-05-31 NOTE — PROGRESS NOTES
Office Visit - Follow up    Adalgisa Solano   83 y.o. female    Date of Visit: 8/6/2019    Chief Complaint   Patient presents with     Arm Pain     right upper - lump       Subjective: Lump on arm upper arm where previous lipoma was removed 2 years ago by Dr. Andreas Luna.  It is now tender and has recurred.  The patient is allergic to amoxicillin levofloxacin and penicillin she has a prior history of COPD as well as Arnold-Chiari malformation with neurosurgical intervention.  As well as chronic atrial fibrillation which is rate control.  94.  And on Xarelto 20 mg daily for anti-clot effect.    No blood in stool or urine no hemoptysis medication list reviewed and reconciled.  Allergies to amoxicillin penicillin and levofloxacin noted.    ROS: A comprehensive review of systems was performed and was otherwise negative    Medications:  Prior to Admission medications    Medication Sig Start Date End Date Taking? Authorizing Provider   acetaminophen (TYLENOL) 500 MG tablet Take 2 tablets (1,000 mg total) by mouth 3 (three) times a day.  Patient taking differently: Take 1,000 mg by mouth as needed.        1/3/19  Yes Mariana Toure PA-C   albuterol (PROVENTIL) 2.5 mg /3 mL (0.083 %) nebulizer solution 3 ML INHALATION FOUR TIMES A DAY AS NEEDED USE BEFORE NEBULIZED SALINE. FOR SHORTNESS OF BREATH. 12/27/18  Yes Shai Ellington MD   ARIPiprazole (ABILIFY) 5 MG tablet TAKE 1 TABLET EVERY NIGHT AT BEDTIME 12/27/18  Yes Shai Ellington MD   atorvastatin (LIPITOR) 20 MG tablet TAKE 1 TABLET (20 MG TOTAL) BY MOUTH BEDTIME. 12/27/18  Yes Shai Ellington MD   calcium carbonate-vitamin D3 (CALCIUM WITH VITAMIN D) 600 mg(1,500mg) -400 unit per tablet Take 1 tablet by mouth 2 (two) times a day.   Yes PROVIDER, HISTORICAL   cholecalciferol, vitamin D3, 1,000 unit tablet Take 2,000 Units by mouth daily.    Yes PROVIDER, HISTORICAL   dextromethorphan-guaiFENesin (ROBITUSSIN-DM)  mg/5 mL liquid Take 5 mL  by mouth every 4 (four) hours as needed. 2/1/19  Yes Jak Cardona MD   diltiazem (CARDIZEM CD) 120 MG 24 hr capsule TAKE 1 CAPSULE (120 MG TOTAL) BY MOUTH DAILY. 5/22/19  Yes Shai Ellington MD   ferrous sulfate 325 (65 FE) MG tablet Take 1 tablet (325 mg total) by mouth daily with breakfast. 2/1/19  Yes Jak Cardona MD   fluticasone-salmeterol (ADVAIR DISKUS) 250-50 mcg/dose DISKUS Inhale 1 puff 2 (two) times a day. 3/4/19  Yes Reece Woo MD   furosemide (LASIX) 20 MG tablet TAKE 1 TABLET (20 MG TOTAL) BY MOUTH 2 (TWO) TIMES A DAY AT 9AM AND 6PM. 7/29/19  Yes Shai Ellington MD   gabapentin (NEURONTIN) 300 MG capsule Take 300 mg by mouth 2 (two) times a day.   Yes PROVIDER, HISTORICAL   magnesium oxide (MAG-OX) 400 mg (241.3 mg magnesium) tablet Take 1 tablet (400 mg total) by mouth daily. 1/5/19  Yes Nilesh Dunn,    omeprazole (PRILOSEC) 20 MG capsule TAKE 1 CAPSULE DAILY. 5/22/19  Yes Shai Ellington MD   OXYGEN-AIR DELIVERY SYSTEMS MISC Use 1-2 L As Directed as needed. [facility]   Yes PROVIDER, HISTORICAL   polyethylene glycol (MIRALAX) 17 gram packet Take 17 g by mouth daily.   Yes PROVIDER, HISTORICAL   potassium chloride (KLOR-CON) 10 MEQ CR tablet TAKE 1 TABLET (10 MEQ TOTAL) BY MOUTH DAILY. 5/9/19  Yes Shai Ellington MD   PROAIR HFA 90 mcg/actuation inhaler INHALE 2 PUFFS EVERY 4 (FOUR) HOURS AS NEEDED FOR WHEEZING. 12/27/18  Yes Shai Ellington MD   QUEtiapine (SEROQUEL) 25 MG tablet TAKE 2 TABLETS AT BEDTIME=50MG  Patient taking differently: TAKE 1 TABLETS AT BEDTIME=50MG 12/27/18  Yes Shai Ellington MD   sodium chloride 3 % nebulizer solution TAKE 4 ML BY NEBULIZATION 2 (TWO) TIMES A DAY. USING AFTER ALBUTEROL NEB.  Patient taking differently: Take 4 mL by nebulization 3 (three) times a day. Using after albuterol neb.       12/27/18  Yes Shai Ellington MD   spironolactone (ALDACTONE) 25 MG tablet Take 0.5 tablets (12.5 mg  total) by mouth daily. 2/2/19  Yes Jak Cardona MD   tiotropium (SPIRIVA WITH HANDIHALER) 18 mcg inhalation capsule USE 1 CAPSULE VIA INHALER 1 X A DAY 3/4/19  Yes Reece Woo MD   triamcinolone (KENALOG) 0.1 % cream Apply topically 2 (two) times a day.   Yes PROVIDER, HISTORICAL   XARELTO 20 mg tablet TAKE 1 TABLET (20 MG TOTAL) BY MOUTH DAILY WITH SUPPER. 7/18/19  Yes Shai Ellington MD   cyanocobalamin 1,000 mcg/mL injection Inject 1 mL (1,000 mcg total) into the shoulder, thigh, or buttocks every 30 (thirty) days. 3/2/19   Jak Cardona MD   naltrexone (DEPADE) 50 mg tablet Take 25 mg by mouth 2 (two) times a day. 7/10/19   PROVIDER, HISTORICAL   aspirin 81 MG EC tablet Take 81 mg by mouth daily.  8/6/19  PROVIDER, HISTORICAL   furosemide (LASIX) 40 MG tablet TAKE 1 TABLET (40 MG TOTAL) BY MOUTH 2 (TWO) TIMES A DAY AT 9AM AND 6PM. 3/26/19 8/6/19  Shai Ellington MD   predniSONE (DELTASONE) 20 MG tablet Take 2 tabs (40mg total) daily x 5 days. 5/28/19 8/6/19  Reece Woo MD       Allergies:   Allergies   Allergen Reactions     Amoxicillin Itching and Rash     Levofloxacin Itching and Rash     Penicillins Itching and Rash     Has tolerated ceftriaxone.       Immunizations:   Immunization History   Administered Date(s) Administered     DT (pediatric) 03/29/2004     Influenza high dose, seasonal 10/20/2015, 10/09/2016, 11/16/2017, 10/11/2018     Influenza,seasonal quad, PF, 36+MOS 11/20/2014     Pneumo Conj 13-V (2010&after) 10/20/2015     Pneumo Polysac 23-V 10/18/1999, 08/01/2005     Td,adult,historic,unspecified 03/29/2004     Tdap 05/14/2013       Exam Chest clear to auscultation and percussion.  Heart tones regular rhythm without murmur rub or gallop.  Abdomen soft nontender no organomegaly.  No peritoneal signs.  Extremities free of edema cyanosis or clubbing.  Neck veins nondistended no thyromegaly or scleral icterus noted, carotids full.  Skin warm and dry  easily conversant good spirited.  Normal intelligence.  Neurologically intact no gross localizing findings.  Scattered rales decreased breath sounds lipoma right upper extremity lateral aspect near the site of the prior incision for previous lipoma removed 2 years ago this area is not tender but has a consistency of a lipoma patient reassured.    108/60 pulse 94 regular O2 sats 91% respiratory rate 22 and unlabored BMI is 25.515 weight down 8 pounds from previous.  Support stockings in place no leg edema no neck vein distention.    Assessment and Plan  Lipoma right arm needs surgical resection because it is tender and recurrent.  Suggest Dr. Andreas Luna at [phone].    Multiple drug allergies including amoxicillin levofloxacin and penicillin.    Redundant: See colonoscopy report March 14, 2013 Dr. Bustos.  Negative mammogram July 16, 2018.    History of Arnold-Chiari malformation status post neurosurgical intervention.    Atrial fibrillation rate controlled.  94 on Xarelto 20 mg daily for anti-clot.    Time: total time spent with the patient was 25 minutes of which >50% was spent in counseling and coordination of care    The following high BMI interventions were performed this visit: encouragement to exercise    Shai Ellington MD    Patient Active Problem List   Diagnosis     Recurrent major depressive episodes (H)     Chronic obstructive pulmonary disease (H)     Lump Or Mass In Breast     Peripheral arterial disease (H)     Type 2 diabetes mellitus without complication (H)     Hypertension     Schizoaffective disorder, bipolar type (H)     Arnold-Chiari malformation (H)     Chronic diastolic congestive heart failure (H)     Chronic respiratory failure with hypoxia (H)     Pulmonary hypertension (H)     DVT of axillary vein, acute left (H)     Primary osteoarthritis of both knees     Cellulitis     Chronic atrial fibrillation (H)     Gastroesophageal reflux disease, esophagitis presence not specified      Stasis dermatitis of both legs     Candidal skin infection     Encounter for palliative care     Generalized muscle weakness     Drug-induced constipation     Chronic pain syndrome     Acute combined systolic and diastolic CHF, NYHA class 1 (H)     Fracture of femoral neck, left (H)     Hip pain, left     Slow transit constipation     Status post total hip replacement, left     Acute blood loss anemia     Acute bronchitis     Edema     Volume overload     Cellulitis of left lower extremity     Erysipelas     Avascular necrosis of bone (H)

## 2021-05-31 NOTE — TELEPHONE ENCOUNTER
Refill Approved    Rx renewed per Medication Renewal Policy. Medication was last renewed on 7/29/2019.  Last office visit: 8/22/2019 with PCP Dr TRACY Byers, [facility] Triage/Med Refill 8/28/2019     Requested Prescriptions   Pending Prescriptions Disp Refills     furosemide (LASIX) 20 MG tablet [Pharmacy Med Name: FUROSEMIDE 20MG] 60 tablet 0     Sig: TAKE 1 TABLET (20 MG TOTAL) BY MOUTH 2 (TWO) TIMES A DAY AT 9AM AND 6PM.       Diuretics/Combination Diuretics Refill Protocol  Passed - 8/27/2019  6:49 PM        Passed - Visit with PCP or prescribing provider visit in past 12 months     Last office visit with prescriber/PCP: 8/6/2019 Shai Ellington MD OR same dept: 8/6/2019 Shai Ellington MD OR same specialty: 8/6/2019 Shai Ellington MD  Last physical: 8/22/2019 Last MTM visit: Visit date not found   Next visit within 3 mo: Visit date not found  Next physical within 3 mo: Visit date not found  Prescriber OR PCP: Shai Ellington MD  Last diagnosis associated with med order: 1. Acute on chronic diastolic heart failure (H)  - furosemide (LASIX) 20 MG tablet [Pharmacy Med Name: FUROSEMIDE 20MG]; TAKE 1 TABLET (20 MG TOTAL) BY MOUTH 2 (TWO) TIMES A DAY AT 9AM AND 6PM.  Dispense: 60 tablet; Refill: 0    2. PAF (paroxysmal atrial fibrillation) (H)  - furosemide (LASIX) 20 MG tablet [Pharmacy Med Name: FUROSEMIDE 20MG]; TAKE 1 TABLET (20 MG TOTAL) BY MOUTH 2 (TWO) TIMES A DAY AT 9AM AND 6PM.  Dispense: 60 tablet; Refill: 0    If protocol passes may refill for 12 months if within 3 months of last provider visit (or a total of 15 months).             Passed - Serum Potassium in past 12 months      Lab Results   Component Value Date    Potassium 4.5 08/22/2019             Passed - Serum Sodium in past 12 months      Lab Results   Component Value Date    Sodium 141 08/22/2019             Passed - Blood pressure on file in past 12 months     BP Readings from Last 1 Encounters:    08/22/19 110/58             Passed - Serum Creatinine in past 12 months      Creatinine   Date Value Ref Range Status   08/22/2019 0.82 0.60 - 1.10 mg/dL Final

## 2021-05-31 NOTE — TELEPHONE ENCOUNTER
RN cannot approve Refill Request    RN can NOT refill this medication med is not covered by policy/route to provider. Last office visit: 8/6/2019 Shai Ellington MD Last Physical: 7/30/2018 Last MTM visit: Visit date not found Last visit same specialty: 8/6/2019 Shai Ellington MD.  Next visit within 3 mo: Visit date not found  Next physical within 3 mo: Visit date not found      Anabel Greer, [facility] Triage/Med Refill 8/8/2019    Requested Prescriptions   Pending Prescriptions Disp Refills     sodium chloride 3 % nebulizer solution [Pharmacy Med Name: SODIUM CHLOR NEB 3%] 360 mL 3     Sig: TAKE 4 ML BY NEBULIZATION 2 (TWO) TIMES A DAY. USING AFTER ALBUTEROL NEB.       There is no refill protocol information for this order

## 2021-05-31 NOTE — TELEPHONE ENCOUNTER
RN cannot approve Refill Request    RN can NOT refill this medication Protocol failed and NO refill given. Last office visit: 8/6/2019 Shai Ellington MD Last Physical: 7/30/2018 Last MTM visit: Visit date not found Last visit same specialty: 8/6/2019 Shai Ellington MD.  Next visit within 3 mo: Visit date not found  Next physical within 3 mo: Visit date not found      Luly Silva, [facility] Triage/Med Refill 8/19/2019    Requested Prescriptions   Pending Prescriptions Disp Refills     XARELTO 20 mg tablet [Pharmacy Med Name: XARELTO 20MG] 30 tablet 0     Sig: TAKE 1 TABLET (20 MG TOTAL) BY MOUTH DAILY WITH SUPPER.       Apixaban/Rivaroxaban/Dabigatran Refill Protocol Failed - 8/19/2019 10:28 AM        Failed - Route to appropriate pool/provider     Last Anticoagulation Summary:           Passed - Renal function test in last year     Creatinine   Date Value Ref Range Status   02/04/2019 0.70 0.60 - 1.10 mg/dL Final             Passed - PCP or prescribing provider visit in past 12 months       Last office visit with prescriber/PCP: 8/6/2019 Shai Ellington MD OR same dept: 8/6/2019 Shai Ellington MD OR same specialty: 8/6/2019 Shai Ellington MD  Last physical: 7/30/2018 Last MTM visit: Visit date not found   Next visit within 3 mo: Visit date not found  Next physical within 3 mo: Visit date not found  Prescriber OR PCP: Shai Ellington MD  Last diagnosis associated with med order: 1. Routine general medical examination at a health care facility  - XARELTO 20 mg tablet [Pharmacy Med Name: XARELTO 20MG]; TAKE 1 TABLET (20 MG TOTAL) BY MOUTH DAILY WITH SUPPER.  Dispense: 30 tablet; Refill: 0    If protocol passes may refill for 12 months if within 3 months of last provider visit (or a total of 15 months).

## 2021-05-31 NOTE — TELEPHONE ENCOUNTER
Call from Adalgisa. She has a bad cold and has now gone to her chest.  Asking to start her emergency medications. She is scheduled for surgery (hip) sept 16 and wants to get this cleared up before that.  No fever. Coughing up some phlegm but it is difficult to get up.  Taking all prescribed inhalers and using flutter valve.  Also, incidentally has noticed a 4 pound weight gain over the past week or so.  She is not sure why this is?.  No swelling noted in her extremities.     Will prescribe last action plan:  Prednisone 40mg daily x 5 days and doxycycline  100mg two times a day x 7 days.  Instructed to continue to monitor her weight daily and if still increasing, she should contact dr. Ellington.

## 2021-05-31 NOTE — TELEPHONE ENCOUNTER
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  This lump is a little painful where the other lump was not.  She is asking if she should go right to surgeon or see Dr. Fermin first?  Date of last appointment with primary care: 5/6/19  Okay to leave a detailed message: Yes

## 2021-06-01 ENCOUNTER — RECORDS - HEALTHEAST (OUTPATIENT)
Dept: CARE COORDINATION | Facility: CLINIC | Age: 86
End: 2021-06-01

## 2021-06-01 VITALS — HEIGHT: 62 IN | BODY MASS INDEX: 30.91 KG/M2 | WEIGHT: 168 LBS

## 2021-06-01 VITALS — BODY MASS INDEX: 30.74 KG/M2 | WEIGHT: 162.7 LBS

## 2021-06-01 VITALS — WEIGHT: 165 LBS | BODY MASS INDEX: 31.15 KG/M2 | HEIGHT: 61 IN

## 2021-06-01 VITALS — HEIGHT: 61 IN | WEIGHT: 172 LBS | BODY MASS INDEX: 32.47 KG/M2

## 2021-06-01 VITALS — WEIGHT: 168 LBS | HEIGHT: 62 IN | BODY MASS INDEX: 30.91 KG/M2

## 2021-06-01 VITALS — WEIGHT: 163 LBS | BODY MASS INDEX: 30.78 KG/M2 | HEIGHT: 61 IN

## 2021-06-01 VITALS — WEIGHT: 163 LBS | HEIGHT: 61 IN | BODY MASS INDEX: 30.78 KG/M2

## 2021-06-01 VITALS — HEIGHT: 61 IN | BODY MASS INDEX: 31.53 KG/M2 | WEIGHT: 167 LBS

## 2021-06-01 VITALS — HEIGHT: 62 IN | BODY MASS INDEX: 31.28 KG/M2 | WEIGHT: 170 LBS

## 2021-06-01 VITALS — WEIGHT: 165 LBS | HEIGHT: 61 IN | BODY MASS INDEX: 31.15 KG/M2

## 2021-06-01 VITALS — BODY MASS INDEX: 31.8 KG/M2 | WEIGHT: 168.3 LBS

## 2021-06-01 VITALS — WEIGHT: 168 LBS | BODY MASS INDEX: 30.91 KG/M2 | HEIGHT: 62 IN

## 2021-06-01 VITALS — HEIGHT: 61 IN | WEIGHT: 162 LBS | BODY MASS INDEX: 30.58 KG/M2

## 2021-06-01 NOTE — PROGRESS NOTES
"TCM DISCHARGE FOLLOW UP CALL    Discharge Date:  9/18/2019  Reason for hospital stay (discharge diagnosis)::  Right REGINA  Are you feeling better, the same or worse since your discharge?:  Patient is feeling worse  Why are you feeling worse?:  Pt states her hip pain is managed w/tramadol, a little sore but up walking around and doing her exercises \"a little bit.\"  States feels worse as she's having a COPD exacerbation and spoke w/pulm today - started doxycycline BID x 7 days and prednisone 40 mg x 5 days, this afternoon, per COPD action plan.  States she feels worse because of her COPD exacerbation, not her hip.  She is doing her nebs as prescribed, which help.  She is having BM's.    Do you feel like you have a plan in the event of a health emergency?: Yes (Call 911, or call pulmonologist, ortho)    As part of your discharge plan, were  home care services ordered for you?: No    Did you receive any new medications, or was there a change to your medications?: Yes    Are you taking those medications, or do you have any established regiment?:  RN reviewed discharge medications w/pt.  Pt states she is taking tramadol 1 tab q 6 hours, and stopped potassium chloride, as instructed.  Do you have any follow up visits scheduled with your PCP or Specialist?:  Yes, with Specialist  Who are you seeing and when is it scheduled?:  Ortho 10/1/19  Sorry to hear you're not doing better. Your PCP would like to see you for a follow-up visit. Can we help set that up for your today?: Yes    (RN) Patient transferred to [facility]? **If immediate concerns (e.g. patient is feeling worse and/or not taking new medications), send in basket message to PCP with quick summary of concern.: No (RN scheduled INF w/Dr. Ellington on 9/30/19)        Swati Ram RN Care Manager, Population Health     "

## 2021-06-01 NOTE — ANESTHESIA PREPROCEDURE EVALUATION
Anesthesia Evaluation      Patient summary reviewed     Airway   Mallampati: II  Neck ROM: full   Pulmonary    (+) COPD severe, shortness of breath, wheezes, rales, a smoker                         Cardiovascular   (+) hypertension well controlled, dysrhythmias, CHF, , PVD    Rhythm: irregular  Rate: normal,         Neuro/Psych    (+) depression, anxiety/panic attacks,     Endo/Other    (+) diabetes mellitus type 2, arthritis,      GI/Hepatic/Renal    (+) GERD well controlled,             Dental                         Anesthesia Plan  Planned anesthetic: spinal  Ketamine, magnesium  ASA 3     Anesthetic plan and risks discussed with: patient and spouse    Post-op plan: routine recovery  DNR/DNI status   DNR/DNI status discussed with patient and spouse.  Plan is for suspension of DNR

## 2021-06-01 NOTE — TELEPHONE ENCOUNTER
Probably best for me to see the patient again before anticipated surgery.  There are openings for me to see patients on the 23rd and 24 September this month.  Please call patient and check with Ivana my assistant as to best day and time for another preop check.  In light of recent hospitalization.  I probably should see the patient again before surgery in light of her recent hospitalization for small bowel obstruction or intestinal ileus.

## 2021-06-01 NOTE — TELEPHONE ENCOUNTER
Sounds as if this is probably par for the course and would push fluids increase hydration and use as of arthritis strength Tylenol 2 tablets 3 times a day on a scheduled basis for example 2 tablets at 8 AM 2 tablets at 2 PM and 2 tablets at 8 PM daily for the next 7 to 15 days to see if this seems to help with hydration and push activity as much as she is able to tolerate.  Please call patient for me.  Considering its only 1 week out from her surgery I believe she is doing quite well and none of the symptoms to me at this time are alarming.  She may also wish to confer with her surgeon.  Who did the surgery

## 2021-06-01 NOTE — ANESTHESIA PROCEDURE NOTES
Spinal Block    Patient location during procedure: OR  Start time: 9/16/2019 10:42 AM  End time: 9/16/2019 10:44 AM  Reason for block: primary anesthetic    Staffing:  Performing  Anesthesiologist: Romeo Leslie MD    Preanesthetic Checklist  Completed: patient identified, risks, benefits, and alternatives discussed, timeout performed, consent obtained, airway assessed, oxygen available, suction available, emergency drugs available and hand hygiene performed  Spinal Block  Patient position: sitting  Prep: ChloraPrep and site prepped and draped  Patient monitoring: heart rate, cardiac monitor, continuous pulse ox and blood pressure  Approach: midline  Location: L3-4  Injection technique: single-shot  Needle type: pencil-tip   Needle gauge: 24 G

## 2021-06-01 NOTE — PROGRESS NOTES
Post Discharge Medication Reconciliation Status: discharge medications reconciled, continue medications without change   Office Visit - Follow up    Adalgisa Solano   83 y.o. female    Date of Visit: 9/30/2019    Chief Complaint   Patient presents with     Hospital Visit Follow Up       Subjective: Hypertension with recent only having had 2 surgeries specifically right lateral shoulder likely lipoma removed a large by Dr. Andreas Luna around September 6, 2019 no postprocedure complications later in the month of September on 16 September had right total hip arthroplasty with Dr. Kan Quesada.    Patient tolerated both surgeries quite well she does use nasal cannula oxygen for COPD emphysema with asthmatic component and prior history of asthmatic bronchitis.    She denies paroxysmal nocturnal dyspnea she uses supplemental nasal cannula oxygen.  Dyspnea on exertion but sleeps in a recliner head is elevated there is some edema her legs are wrapped from chronic lymphedema.  No chest pain per se.    No blood in stool or urine medication list reviewed reconciled in the chart she is allergic to amoxicillin levofloxacin and penicillin.    Sensitive with tramadol with nausea and vomiting but no rash.    ROS: A comprehensive review of systems was performed and was otherwise negative    Medications:  Prior to Admission medications    Medication Sig Start Date End Date Taking? Authorizing Provider   acetaminophen (TYLENOL) 500 MG tablet Take 1,000 mg by mouth every 6 (six) hours as needed for pain.   Yes PROVIDER, HISTORICAL   albuterol (PROVENTIL) 2.5 mg /3 mL (0.083 %) nebulizer solution 3 ML INHALATION FOUR TIMES A DAY AS NEEDED USE BEFORE NEBULIZED SALINE. FOR SHORTNESS OF BREATH. 12/27/18  Yes Shai Ellington MD   ARIPiprazole (ABILIFY) 5 MG tablet TAKE 1 TABLET EVERY NIGHT AT BEDTIME 12/27/18  Yes Shai Ellington MD   atorvastatin (LIPITOR) 20 MG tablet TAKE 1 TABLET (20 MG TOTAL) BY MOUTH BEDTIME. 12/27/18   Yes Shai Ellington MD   calcium carbonate-vitamin D3 (CALCIUM WITH VITAMIN D) 600 mg(1,500mg) -400 unit per tablet Take 1 tablet by mouth 2 (two) times a day.   Yes PROVIDER, HISTORICAL   cholecalciferol, vitamin D3, 1,000 unit tablet Take 2,000 Units by mouth daily.    Yes PROVIDER, HISTORICAL   diltiazem (CARDIZEM CD) 120 MG 24 hr capsule TAKE 1 CAPSULE (120 MG TOTAL) BY MOUTH DAILY. 5/22/19  Yes Shai Ellington MD   ferrous sulfate 325 (65 FE) MG tablet Take 1 tablet (325 mg total) by mouth daily with breakfast. 2/1/19  Yes Jak Cardona MD   fluticasone-salmeterol (ADVAIR DISKUS) 250-50 mcg/dose DISKUS Inhale 1 puff 2 (two) times a day. 3/4/19  Yes Reece Woo MD   furosemide (LASIX) 20 MG tablet Take 1 tablet (20 mg total) by mouth 2 (two) times a day at 9am and 6pm. 8/28/19  Yes Shai Ellington MD   gabapentin (NEURONTIN) 300 MG capsule Take 600 mg by mouth at bedtime.          Yes PROVIDER, HISTORICAL   magnesium oxide (MAG-OX) 400 mg (241.3 mg magnesium) tablet Take 1 tablet (400 mg total) by mouth daily. 1/5/19  Yes Nilesh Dunn,    omeprazole (PRILOSEC) 20 MG capsule TAKE 1 CAPSULE DAILY. 5/22/19  Yes Shai Ellington MD   OXYGEN-AIR DELIVERY SYSTEMS [facility] Use 1-2 L As Directed as needed. [employer]   Yes PROVIDER, HISTORICAL   polyethylene glycol (MIRALAX) 17 gram packet Take 17 g by mouth daily.          Yes PROVIDER, HISTORICAL   PROAIR HFA 90 mcg/actuation inhaler INHALE 2 PUFFS EVERY 4 (FOUR) HOURS AS NEEDED FOR WHEEZING. 12/27/18  Yes Shai Ellington MD   QUEtiapine (SEROQUEL) 25 MG tablet Take 25 mg by mouth at bedtime.   Yes PROVIDER, HISTORICAL   sodium chloride 3 % nebulizer solution Take 4 mL by nebulization 3 (three) times a day. Using after albuterol neb.  Patient taking differently: Take 4 mL by nebulization 2 (two) times a day. Using after albuterol neb.       8/8/19  Yes Shai Ellington MD   spironolactone (ALDACTONE) 25 MG tablet  Take 0.5 tablets (12.5 mg total) by mouth daily. 2/2/19  Yes Jak Cardona MD   tiotropium (SPIRIVA WITH HANDIHALER) 18 mcg inhalation capsule USE 1 CAPSULE VIA INHALER 1 X A DAY 3/4/19  Yes Reece Woo MD   XARELTO 20 mg tablet TAKE 1 TABLET (20 MG TOTAL) BY MOUTH DAILY WITH SUPPER. 9/30/19  Yes Shai Ellington MD   cyanocobalamin 1,000 mcg/mL injection Inject 1 mL (1,000 mcg total) into the shoulder, thigh, or buttocks every 30 (thirty) days. 3/2/19   Jak Cardona MD   dextromethorphan-guaiFENesin (ROBITUSSIN-DM)  mg/5 mL liquid Take 5 mL by mouth every 4 (four) hours as needed. 2/1/19   Jak Cardona MD   emollient base (VANICREAM TOP) Apply 1 application topically daily as needed. Uses when takes of compression socks    PROVIDER, HISTORICAL   ketoconazole (NIZORAL) 2 % cream Apply 1 application topically 2 (two) times a day. To toe fungus    PROVIDER, HISTORICAL   predniSONE (DELTASONE) 20 MG tablet Take 2 tabs (40mg total) daily x 5 days. 9/19/19   Jun Juarez MD   senna-docusate (PERICOLACE) 8.6-50 mg tablet Take 1 tablet by mouth 2 (two) times a day as needed for constipation. 9/18/19   Parker Gordon PA-C   tobramycin, PF, (CALLUM) 300 mg/5 mL nebulizer solution Take 300 mg by nebulization 2 (two) times a day. Take for one month on and off for 1 month    PROVIDER, HISTORICAL   triamcinolone (KENALOG) 0.1 % cream Apply 1 application topically 2 (two) times a day as needed. To legs          PROVIDER, HISTORICAL   urea (CARMOL) 40 % Crea Apply 1 application topically 2 (two) times a day. To bottom of feet    PROVIDER, HISTORICAL   traMADol (ULTRAM) 50 mg tablet Take 1 tablet (50 mg total) by mouth every 6 (six) hours as needed for pain. 9/18/19 9/30/19  Parker Gordon PA-C       Allergies:   Allergies   Allergen Reactions     Amoxicillin Itching and Rash     Levofloxacin Itching and Rash     Penicillins Itching and Rash     Has tolerated ceftriaxone.        Immunizations:   Immunization History   Administered Date(s) Administered     DT (pediatric) 03/29/2004     Influenza high dose,seasonal,PF, 65+ yrs 10/20/2015, 10/09/2016, 11/16/2017, 10/11/2018     Influenza,seasonal quad, PF, =/> 6months 11/20/2014     Pneumo Conj 13-V (2010&after) 10/20/2015     Pneumo Polysac 23-V 10/18/1999, 08/01/2005     Td,adult,historic,unspecified 03/29/2004     Tdap 05/14/2013       Exam Chest clear to auscultation and percussion.  Heart tones regular rhythm without murmur rub or gallop.  Abdomen soft nontender no organomegaly.  No peritoneal signs.  Extremities free of edema cyanosis or clubbing.  Neck veins nondistended no thyromegaly or scleral icterus noted, carotids full.  Skin warm and dry easily conversant good spirited.  Normal intelligence.  Neurologically intact no gross localizing findings.  Right hip incision and right shoulder incisions were reviewed and are healing.    110/60 pulse 91-1 07 and irregular.  O2 sats 95% the patient's respiratory rate was 20 nonlabored.  History of chronic atrial fibrillation with rate controlled see above 91-1 07 the patient is maintained on Xarelto 20 mg daily for atrial fibrillation anti-clot protection.        Assessment and Plan  Atrial fibrillation with controlled ventricular response rate 91-1 07.  Continue Xarelto 20 mg daily.    Hypertension controlled.  110/60 check hemoglobin level today.    Diabetes mellitus type 2 with fasting office visit due in 3 months time will check then A1c blood sugar plus lipid panel.    Recent right total hip arthroplasty Dr. Kan Quesada successful no postprocedure complications.    Lipoma removed right lateral shoulder Dr. Andreas Luna with no postprocedure complications.  Wounds in both areas healing well.    Multiple drug allergies including amoxicillin levofloxacin penicillin.    COPD with long history of recurrent asthmatic bronchitis continue supplemental oxygen and other meds as  listed.    Bipolar schizoaffective disorder continue Abilify plus Seroquel same.  Return to clinic 3 months time fasting office visit with labs as alluded to above.    Time: total time spent with the patient was 25 minutes of which >50% was spent in counseling and coordination of care    The following high BMI interventions were performed this visit: encouragement to exercise    Shai Ellington MD    Patient Active Problem List   Diagnosis     Recurrent major depressive episodes (H)     Chronic obstructive pulmonary disease (H)     Lump Or Mass In Breast     Peripheral arterial disease (H)     Type 2 diabetes mellitus without complication (H)     Hypertension     Schizoaffective disorder, bipolar type (H)     Arnold-Chiari malformation (H)     Chronic diastolic congestive heart failure (H)     Chronic respiratory failure with hypoxia (H)     Pulmonary hypertension (H)     DVT of axillary vein, acute left (H)     Primary osteoarthritis of both knees     Cellulitis     Chronic atrial fibrillation (H)     Gastroesophageal reflux disease, esophagitis presence not specified     Stasis dermatitis of both legs     Candidal skin infection     Encounter for palliative care     Generalized muscle weakness     Drug-induced constipation     Chronic pain syndrome     Acute combined systolic and diastolic CHF, NYHA class 1 (H)     Fracture of femoral neck, left (H)     Hip pain, left     Slow transit constipation     Status post total hip replacement, left     Acute blood loss anemia     Acute bronchitis     Edema     Volume overload     Cellulitis of left lower extremity     Erysipelas     Avascular necrosis of bone (H)     Abdominal pain     SBO (small bowel obstruction) (H)     Status post right hip replacement     Chronic a-fib (H)

## 2021-06-01 NOTE — ANESTHESIA CARE TRANSFER NOTE
Last vitals:   Vitals:    09/16/19 1240   BP: 106/53   Pulse: 95   Resp: 18   Temp: 36.5  C (97.7  F)   SpO2: 98%     Patient's level of consciousness is drowsy  Spontaneous respirations: yes  Maintains airway independently: yes  Dentition unchanged: yes  Oropharynx: oropharynx clear of all foreign objects    QCDR Measures:  ASA# 20 - Surgical Safety Checklist: WHO surgical safety checklist completed prior to induction    PQRS# 430 - Adult PONV Prevention: 4558F - Pt received => 2 anti-emetic agents (different classes) preop & intraop  ASA# 8 - Peds PONV Prevention: NA - Not pediatric patient, not GA or 2 or more risk factors NOT present  PQRS# 424 - Pham-op Temp Management: 4559F - At least one body temp DOCUMENTED => 35.5C or 95.9F within required timeframe  PQRS# 426 - PACU Transfer Protocol: - Transfer of care checklist used  ASA# 14 - Acute Post-op Pain: ASA14B - Patient did NOT experience pain >= 7 out of 10

## 2021-06-01 NOTE — TELEPHONE ENCOUNTER
Provider Communication  Who is calling:  Ignacio Cooper NP / [facility]   Facility in which provider is associated:  [facility]  Reason for call:  The patient is scheduled for surgery on 9/16/2019 for right total hip replacement. Dr. Ellington did pre op on 8/22/2019. The patient was admitted on 9/10/2019 for abdominal pain and Ignacio Cooper NP / [facility] would like to know if the patient is still OK for surgery. Ignacio Cooper NP / [facility] would like a statement saying reviewed recent hospital visit and OK to proceed or provide further recommendations.  Urgency for return call:  end of day  Okay to leave detailed message?:  Yes with Ignacio Cooper NP / [facility] at [phone] or a staff message.

## 2021-06-01 NOTE — ANESTHESIA POSTPROCEDURE EVALUATION
Patient: Adalgisa Solano  RIGHT TOTAL HIP ARTHROPLASTY  Anesthesia type: spinal    Patient location: PACU  Last vitals:   Vitals Value Taken Time   /52 9/16/2019  1:40 PM   Temp 36.4  C (97.5  F) 9/16/2019  1:20 PM   Pulse 92 9/16/2019  1:40 PM   Resp 19 9/16/2019  1:40 PM   SpO2 95 % 9/16/2019  1:40 PM     Post vital signs: stable  Level of consciousness: awake and responds to simple questions  Post-anesthesia pain: pain controlled  Post-anesthesia nausea and vomiting: no  Pulmonary: unassisted, return to baseline  Cardiovascular: stable and blood pressure at baseline  Hydration: adequate  Anesthetic events: no    QCDR Measures:  ASA# 11 - Pham-op Cardiac Arrest: ASA11B - Patient did NOT experience unanticipated cardiac arrest  ASA# 12 - Pham-op Mortality Rate: ASA12B - Patient did NOT die  ASA# 13 - PACU Re-Intubation Rate: NA - No ETT / LMA used for case  ASA# 10 - Composite Anes Safety: ASA10A - No serious adverse event    Additional Notes:

## 2021-06-01 NOTE — TELEPHONE ENCOUNTER
RN cannot approve Refill Request    RN can NOT refill this medication med is not covered by policy/route to provider. Last office visit: 8/6/2019 Shai Ellington MD Last Physical: 8/22/2019 Last MTM visit: Visit date not found Last visit same specialty: 8/6/2019 Shai Ellington MD.  Next visit within 3 mo: Visit date not found  Next physical within 3 mo: Visit date not found      April HERIBERTO Byers, [facility] Triage/Med Refill 9/29/2019    Requested Prescriptions   Pending Prescriptions Disp Refills     XARELTO 20 mg tablet [Pharmacy Med Name: XARELTO 20MG] 30 tablet 0     Sig: TAKE 1 TABLET (20 MG TOTAL) BY MOUTH DAILY WITH SUPPER.       Apixaban/Rivaroxaban/Dabigatran Refill Protocol Failed - 9/29/2019  2:20 PM        Failed - Route to appropriate pool/provider     Last Anticoagulation Summary:           Passed - Renal function test in last year     Creatinine   Date Value Ref Range Status   09/17/2019 0.79 0.60 - 1.10 mg/dL Final             Passed - PCP or prescribing provider visit in past 12 months       Last office visit with prescriber/PCP: 8/6/2019 Shai Ellington MD OR same dept: 8/6/2019 Shai Ellington MD OR same specialty: 8/6/2019 Shai Ellington MD  Last physical: 8/22/2019 Last MTM visit: Visit date not found   Next visit within 3 mo: Visit date not found  Next physical within 3 mo: Visit date not found  Prescriber OR PCP: Shai Ellington MD  Last diagnosis associated with med order: 1. Routine general medical examination at a health care facility  - XARELTO 20 mg tablet [Pharmacy Med Name: XARELTO 20MG]; TAKE 1 TABLET (20 MG TOTAL) BY MOUTH DAILY WITH SUPPER.  Dispense: 30 tablet; Refill: 0    If protocol passes may refill for 12 months if within 3 months of last provider visit (or a total of 15 months).

## 2021-06-01 NOTE — PROGRESS NOTES
The clinic Community Health Worker cakked the patient today at the request of the PCP to discuss possible Clinic Care Coordination enrollment.  The service was described to the patient and immediate needs were discussed.  The patient declined enrollement at this time.  The PCP is encouraged to refer in the future if the patient's needs change.

## 2021-06-01 NOTE — TELEPHONE ENCOUNTER
"Call from laith. Just discharged yesterday from hospital after hip replacement. Today having \"flare\" of her COPD. Asking for medications to be started from her Action Plan.    Will send Rx for prednisone 40mg daily x 5 days and doxycycline 100mg two times a day x 7 days.  "

## 2021-06-01 NOTE — TELEPHONE ENCOUNTER
[facility] called and spoke with patient. She has been enrolled. The below message was read to patient and she understood.    Alaina Ramírez, CMA

## 2021-06-01 NOTE — TELEPHONE ENCOUNTER
Dr. Ellington,  Please review message below and advise if the patient is still cleared for surgery based on her recent hospitalization on 09/10/19.  Thank you.  Silvia AZAR CMA/NEL....................10:16 AM

## 2021-06-01 NOTE — TELEPHONE ENCOUNTER
Pt called in states she had surgery a week ago.  Pt states she has weakness since she had surgery.  Pt states the weakness started after the surgery,  Date of the surgery 9/162019.  Pt was knee replacement.  The Pt states the anesthesia was spinal.  No pain.  No fever.  No vomit.  No bleeding.  No painful urination, has little diarrhea.  Pt states she was told that she has anemia.  Pt Hgb on 9/18/19 was 10.6.  Pt states she is doing her normal activity without problem.  Care advice given per protocol.  Patient agrees with care advice given.   Agreed to call back if he has additional symptoms or questions.  The Pt request the PCP to send the message about her symptom.  Pt ask if this is normal after the surgery.    Please advise       Reason for Disposition    [1] Caller has NON-URGENT question AND [2] triager unable to answer question    Protocols used: POST-OP SYMPTOMS AND [id number]

## 2021-06-02 ENCOUNTER — RECORDS - HEALTHEAST (OUTPATIENT)
Dept: ADMINISTRATIVE | Facility: CLINIC | Age: 86
End: 2021-06-02

## 2021-06-02 VITALS — WEIGHT: 146 LBS | BODY MASS INDEX: 27.56 KG/M2 | HEIGHT: 61 IN

## 2021-06-02 VITALS — WEIGHT: 156 LBS | HEIGHT: 61 IN | BODY MASS INDEX: 29.45 KG/M2

## 2021-06-02 VITALS — WEIGHT: 157 LBS | BODY MASS INDEX: 29.66 KG/M2

## 2021-06-02 VITALS — WEIGHT: 160 LBS | BODY MASS INDEX: 29.99 KG/M2

## 2021-06-02 VITALS — WEIGHT: 172 LBS | BODY MASS INDEX: 32.47 KG/M2 | HEIGHT: 61 IN

## 2021-06-02 VITALS — HEIGHT: 61 IN | WEIGHT: 169 LBS | BODY MASS INDEX: 31.91 KG/M2

## 2021-06-02 VITALS — WEIGHT: 160 LBS | BODY MASS INDEX: 30.23 KG/M2

## 2021-06-02 VITALS — BODY MASS INDEX: 29.66 KG/M2 | WEIGHT: 157 LBS

## 2021-06-02 VITALS — HEIGHT: 61 IN | BODY MASS INDEX: 31.53 KG/M2 | WEIGHT: 167 LBS

## 2021-06-02 VITALS — BODY MASS INDEX: 27 KG/M2 | HEIGHT: 61 IN | WEIGHT: 143 LBS

## 2021-06-02 VITALS
WEIGHT: 151.3 LBS | WEIGHT: 151.3 LBS | BODY MASS INDEX: 28.57 KG/M2 | BODY MASS INDEX: 28.57 KG/M2 | HEIGHT: 61 IN | HEIGHT: 61 IN

## 2021-06-02 VITALS — BODY MASS INDEX: 29.63 KG/M2 | WEIGHT: 161 LBS | HEIGHT: 62 IN

## 2021-06-02 VITALS — BODY MASS INDEX: 30.49 KG/M2 | WEIGHT: 164 LBS

## 2021-06-02 VITALS — WEIGHT: 173.3 LBS | BODY MASS INDEX: 32.74 KG/M2

## 2021-06-02 VITALS — WEIGHT: 155 LBS | BODY MASS INDEX: 29.29 KG/M2

## 2021-06-02 VITALS — BODY MASS INDEX: 28.91 KG/M2 | WEIGHT: 153 LBS

## 2021-06-02 VITALS — WEIGHT: 177 LBS | BODY MASS INDEX: 33.44 KG/M2

## 2021-06-02 VITALS — HEIGHT: 61 IN | BODY MASS INDEX: 28.7 KG/M2 | WEIGHT: 152 LBS

## 2021-06-02 VITALS — BODY MASS INDEX: 28.63 KG/M2 | WEIGHT: 151.5 LBS

## 2021-06-02 NOTE — PROGRESS NOTES
Assessment/Plan:        Diagnoses and all orders for this visit:    Chronic respiratory failure with hypoxia (H)    Simple chronic bronchitis (H)  -     fluticasone-umeclidinium-vilanterol (TRELEGY ELLIPTA) 100-62.5-25 mcg DsDv inhaler; Inhale 1 Inhalation daily.  Dispense: 60 each; Refill: 11  -     predniSONE (DELTASONE) 10 mg tablet; Take 10 mg by mouth daily.  Dispense: 5 tablet; Refill: 0    Acute non-recurrent frontal sinusitis  -     predniSONE (DELTASONE) 10 mg tablet; Take 10 mg by mouth daily.  Dispense: 5 tablet; Refill: 0  -     ipratropium (ATROVENT) 0.03 % nasal spray; 1 spray each nostril once per day  Dispense: 30 mL; Refill: 12    83-year-old female with chronic bronchitis, bronchiectasis, respiratory failure with hypoxia here for hospital follow-up with acute sinus stuffiness.  This is leading to a lot of postnasal drip going down into her chest.    See patient instructions.  We will consolidate her inhaler therapy, treat proactively for her sinusitis.  I acknowledged the issues with osteoporosis and prednisone so we will use a very low dose.  She is pretty frail and will be very important to keep her out of the hospital- this is the indication for using steroids for sinusitis as it is making her chest congestion worse.  Her hypoxia has improved which is good news.    May have to consider vest therapy in the future.  She is currently using her flutter valve.  One new problem  2 old problems          Subjective:    Patient ID: Adalgisa Solano is a 83 y.o. female.  83-year-old female here for follow-up of hospital stay for pneumonia and COPD exacerbation.  No clear causative factor.  She is had many hospitalizations for a variety of causes.  She is now feeling better with antibiotics, steroids and time.  She is still having some symptoms localizing to her chest.  No pain.  She is having some sinus stuffiness.  It is started since he left the hospital.  She can breathe her nose but is having a  lot of postnasal drip.  Some sinus fullness, no pain.    She needed oxygen after hospital stay which has improved since then.          Review of Systems 12 point review systems is negative except as above.  /62   Pulse (!) 104   Resp 24   Wt 144 lb 8 oz (65.5 kg)   LMP  (LMP Unknown)   SpO2 91% Comment: RA  BMI 27.08 kg/m      Objective:    Physical Exam   Constitutional: She is oriented to person, place, and time. She appears well-developed and well-nourished. No distress.   HENT:   Head: Normocephalic and atraumatic.   Eyes: Pupils are equal, round, and reactive to light. Conjunctivae are normal. Right eye exhibits no discharge. Left eye exhibits no discharge. No scleral icterus.   Cardiovascular: Normal rate, regular rhythm and normal heart sounds. Exam reveals no gallop.   No murmur heard.  Pulmonary/Chest: Effort normal. No respiratory distress. She has wheezes. She has rales.   Abdominal: Soft. Musculoskeletal:         General: Edema (Stable to last visit) present. No deformity.     Neurological: She is alert and oriented to person, place, and time. No cranial nerve deficit. Coordination normal.   Skin: Skin is warm and dry. She is not diaphoretic. No erythema.   Psychiatric: She has a normal mood and affect. Her behavior is normal. Thought content normal.   Nursing note and vitals reviewed.          Current Outpatient Medications on File Prior to Visit   Medication Sig Dispense Refill     acetaminophen (TYLENOL) 500 MG tablet Take 1,000 mg by mouth every 6 (six) hours as needed for pain.       albuterol (PROVENTIL) 2.5 mg /3 mL (0.083 %) nebulizer solution 3 ML INHALATION FOUR TIMES A DAY AS NEEDED USE BEFORE NEBULIZED SALINE. FOR SHORTNESS OF BREATH. 300 mL 11     ARIPiprazole (ABILIFY) 5 MG tablet TAKE 1 TABLET EVERY NIGHT AT BEDTIME 90 tablet 1     atorvastatin (LIPITOR) 20 MG tablet TAKE 1 TABLET (20 MG TOTAL) BY MOUTH BEDTIME. 90 tablet 3     calcium carbonate-vitamin D3 (CALCIUM WITH VITAMIN  D) 600 mg(1,500mg) -400 unit per tablet Take 1 tablet by mouth 2 (two) times a day.       cholecalciferol, vitamin D3, 1,000 unit tablet Take 2,000 Units by mouth daily.        cyanocobalamin 1,000 mcg/mL injection Inject 1 mL (1,000 mcg total) into the shoulder, thigh, or buttocks every 30 (thirty) days.  0     diltiazem (CARDIZEM CD) 120 MG 24 hr capsule TAKE 1 CAPSULE (120 MG TOTAL) BY MOUTH DAILY. 90 capsule 3     emollient base (VANICREAM TOP) Apply 1 application topically daily as needed. Uses when takes of compression socks       ferrous sulfate 325 (65 FE) MG tablet Take 1 tablet (325 mg total) by mouth daily with breakfast.  0     fluticasone-salmeterol (ADVAIR DISKUS) 250-50 mcg/dose DISKUS Inhale 1 puff 2 (two) times a day. 60 each 11     furosemide (LASIX) 20 MG tablet Take 1 tablet (20 mg total) by mouth 2 (two) times a day at 9am and 6pm. 180 tablet 3     gabapentin (NEURONTIN) 300 MG capsule Take 600 mg by mouth at bedtime.              ketoconazole (NIZORAL) 2 % cream Apply 1 application topically 2 (two) times a day. To toe fungus       magnesium oxide (MAG-OX) 400 mg (241.3 mg magnesium) tablet Take 1 tablet (400 mg total) by mouth daily.  0     omeprazole (PRILOSEC) 20 MG capsule TAKE 1 CAPSULE DAILY. 90 capsule 3     OXYGEN-AIR DELIVERY SYSTEMS [facility] Use 1-2 L As Directed as needed. [employer]       polyethylene glycol (MIRALAX) 17 gram packet Take 17 g by mouth daily.              PROAIR HFA 90 mcg/actuation inhaler INHALE 2 PUFFS EVERY 4 (FOUR) HOURS AS NEEDED FOR WHEEZING. 8.5 g 12     senna-docusate (PERICOLACE) 8.6-50 mg tablet Take 1 tablet by mouth 2 (two) times a day as needed for constipation.  0     sodium chloride 3 % nebulizer solution Take 4 mL by nebulization 3 (three) times a day. Using after albuterol neb. (Patient taking differently: Take 4 mL by nebulization 2 (two) times a day. Using after albuterol neb.      ) 360 mL 5     spironolactone (ALDACTONE) 25 MG tablet Take 0.5 tablets  (12.5 mg total) by mouth daily. 15 tablet 0     tiotropium (SPIRIVA WITH HANDIHALER) 18 mcg inhalation capsule USE 1 CAPSULE VIA INHALER 1 X A DAY 90 capsule 11     triamcinolone (KENALOG) 0.1 % cream Apply 1 application topically 2 (two) times a day as needed. To legs             urea (CARMOL) 40 % Crea Apply 1 application topically 2 (two) times a day. To bottom of feet       XARELTO 20 mg tablet TAKE 1 TABLET (20 MG TOTAL) BY MOUTH DAILY WITH SUPPER. 30 tablet 0     tobramycin, PF, (CALLUM) 300 mg/5 mL nebulizer solution Take 300 mg by nebulization 2 (two) times a day. Take for one month on and off for 1 month       Current Facility-Administered Medications on File Prior to Visit   Medication Dose Route Frequency Provider Last Rate Last Dose     cyanocobalamin injection 1,000 mcg  1,000 mcg Intramuscular Q30 Days Shai Ellington MD   1,000 mcg at 10/24/19 1025     /62   Pulse (!) 104   Resp 24   Wt 144 lb 8 oz (65.5 kg)   LMP  (LMP Unknown)   SpO2 91% Comment: RA  BMI 27.08 kg/m      Medical History  Active Ambulatory (Non-Hospital) Problems    Diagnosis     Atrial fibrillation with RVR (H)     Status post right hip replacement     SBO (small bowel obstruction) (H)     Abdominal pain     Avascular necrosis of bone (H)     Slow transit constipation     Acute combined systolic and diastolic CHF, NYHA class 1 (H)     Generalized muscle weakness     Drug-induced constipation     Chronic pain syndrome     Stasis dermatitis of both legs     Chronic atrial fibrillation     Gastroesophageal reflux disease, esophagitis presence not specified     Primary osteoarthritis of both knees     Pulmonary hypertension (H)     Chronic respiratory failure with hypoxia (H)     Chronic diastolic congestive heart failure (H)     Type 2 diabetes mellitus without complication (H)     Hypertension     Peripheral arterial disease (H)     Schizoaffective disorder, bipolar type (H)     Recurrent major depressive episodes (H)      Chronic obstructive pulmonary disease (H)     Arnold-Chiari malformation (H)     Past Medical History:   Diagnosis Date     Acute and chronic respiratory failure with hypoxia (H)      Acute blood loss anemia 1/15/2019     Acute bronchitis 1/15/2019     Anemia      Anxiety      Arm skin lesion, right      Arnold-Chiari malformation (H) 1998     Arthritis      Atrial fibrillation (H) 02/2017     Avascular necrosis of bone (H)      Breast cyst      Breast lump      Candidal skin infection      Cellulitis of left lower extremity 1/28/2019     CHF (congestive heart failure) (H)      Chronic bronchitis (H)      Chronic diastolic heart failure (H)      Chronic pain syndrome      Chronic respiratory failure with hypoxia (H) 3/13/2017     COPD (chronic obstructive pulmonary disease) (H)      COPD exacerbation (H)      Depression      Diet-controlled type 2 diabetes mellitus (H)      Drug induced constipation      DVT of axillary vein, acute (H)      DVT of axillary vein, acute left (H)      Edema      Encounter for palliative care      Erysipelas      Fracture of femoral neck, left (H)      Generalized muscle weakness      GERD (gastroesophageal reflux disease)      History of blood clots      Hyperlipidemia      Hypertension      Left hip pain      Lumbar spinal stenosis      PAD (peripheral artery disease) (H)      Peripheral arterial disease (H) 10/28/2015     Psoriasis      Pulmonary hypertension (H) 3/5/2018     Recurrent major depressive episodes (H)      Schizoaffective disorder, bipolar type (H) 6/11/2015     Slow transit constipation      Stasis dermatitis of both legs      Status post total hip replacement, 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     Type 2 diabetes mellitus without complication (H) 8/2/2016     Vitamin B12 deficiency      Volume overload         Surgical History  She  has a past surgical history that includes Breast cyst aspiration (Left, 1994); Cholecystectomy; Lumbar fusion (N/A, 11/17/2014); Back surgery;  Laparoscopic incisional / umbilical / ventral hernia repair (Left, 3/27/2015);  section (X3); Joint replacement (Right); Eye surgery; pr total knee arthroplasty (Left, 10/7/2016); Cervical spine surgery; pr total hip arthroplasty (Left, 2019); pr femoral fx, open tx (Left, 2019); Hand surgery; Colonoscopy; IR for PAD LOWER EXTREMITY; Arm skin lesion biopsy / excision (Right, 2019); and pr total hip arthroplasty (Right, 2019).         Social history reviewed: Lives at home.  Here with her .   Allergies  Allergies   Allergen Reactions     Tramadol Nausea Only     Amoxicillin Itching and Rash     Levofloxacin Itching and Rash     Penicillins Itching and Rash     Has tolerated ceftriaxone.    Family history reviewed: no changes from previous                          Data Review - imaging, labs, and ekgs listed below were reviewed by me.  Chest XRay and chest CT images and EKG tracings interpreted personally.   CT chest reviewed and interpreted by me: Bronchiectasis present.  No pulmonary embolism.  Multiple areas of infiltrate consistent with infection.    Labs reviewed: Sputum culture with Klebsiella pneumonia-pansensitive    EKG results reviewed: Atrial fibrillation with RVR.

## 2021-06-02 NOTE — TELEPHONE ENCOUNTER
Patient is requesting home care start for Wednesday 10/9 rather than today. Please give VO if okay with delay in start per pt request.     Thanks,  Katherine Benton, PT

## 2021-06-02 NOTE — PROGRESS NOTES
TCM DISCHARGE FOLLOW UP CALL    Discharge Date:  10/6/2019  Reason for hospital stay (discharge diagnosis)::  HCAP  Are you feeling better, the same or worse since your discharge?:  Patient is feeling better (Denies chills, worsening SOB, CP, cough. O2 on cont at 1.5L. Sputum is yellowish/white. Nebs help wheezing. )  Do you feel like you have a plan in the event of a health emergency?: Yes ()    As part of your discharge plan, were  home care services ordered for you?: Yes    Have you seen them yet, or are they scheduled to visit?: No (Pt declined home care)    Did you receive any new medications, or was there a change to your medications?: Yes    Are you taking those medications, or do you have any established regiment?:  Pt is taking Cefdinir BID  Do you have any follow up visits scheduled with your PCP or Specialist?:  Yes, with PCP and Yes, with Specialist  (RN) Is PCP appt scheduled soon enough (within 14 days of discharge date)?: Yes (10/9)    Who are you seeing and when is it scheduled?:  Pulm 10/25

## 2021-06-02 NOTE — PATIENT INSTRUCTIONS - HE
Medication Plan    Stop  Advair  Spiriva    Start  Trelegy once daily    Prednisone 10 mg daily for 5 days    Atrovent nasal spray once daily until your sinuses improve    I will speak with my colleagues about the vest.    Aerobika 2-4 times as you can.    Oxygen Plan  Goal is 89% or greater at rest.  If it is above this, you don't need.

## 2021-06-02 NOTE — PROGRESS NOTES
Hospital discharge follow up call to pt, no answer.  Left VM message reminding pt of appt on 10/9. RN will attempt call back at another time.

## 2021-06-02 NOTE — PROGRESS NOTES
[facility] Follow Up Note    Adalgisa Solano   83 y.o. female    Date of Visit: 10/9/2019    Chief Complaint   Patient presents with     Hospital Visit Follow Up     Subjective  This is an 83-year-old patient of Dr. Shai Ellington.  She comes in today to follow-up on recent hospitalization with a diagnosis of pneumonia.  She was discharged on October 6.  She is still on her antibiotic.  She is feeling better since discharge with clearing of the cough and gradual improvement in strength.  Breathing is pretty much back to baseline.  She does have COPD and is on oxygen on a regular basis.  She has multiple other medical issues and these have apparently been stable.  She is here today with her .  She is on her usual regimen of oxygen.    ROS A comprehensive review of systems was performed and was otherwise negative    Medications, allergies, and problem list were reviewed and updated    Exam  General Appearance:   Initial blood pressure is 132/60.  O2 saturation is 95%.  Weight is 142 pounds and BMI is 26.61.    Lungs are clear today.    Heart is in an stable rhythm with a rate in the 80s.    No new edema.    The patient is alert and oriented x3.      Assessment/Plan  1. Pneumonia due to infectious organism, unspecified laterality, unspecified part of lung       Pneumonia.  Doing well since discharge.  I discussed this with the patient and her .  She will complete her antibiotic and follow-up with her own physician as previously scheduled.  She will come back if she has any recurring symptoms.      Karlos Salguero MD      Current Outpatient Medications on File Prior to Visit   Medication Sig     acetaminophen (TYLENOL) 500 MG tablet Take 1,000 mg by mouth every 6 (six) hours as needed for pain.     albuterol (PROVENTIL) 2.5 mg /3 mL (0.083 %) nebulizer solution 3 ML INHALATION FOUR TIMES A DAY AS NEEDED USE BEFORE NEBULIZED SALINE. FOR SHORTNESS OF BREATH.     ARIPiprazole  (ABILIFY) 5 MG tablet TAKE 1 TABLET EVERY NIGHT AT BEDTIME     atorvastatin (LIPITOR) 20 MG tablet TAKE 1 TABLET (20 MG TOTAL) BY MOUTH BEDTIME.     calcium carbonate-vitamin D3 (CALCIUM WITH VITAMIN D) 600 mg(1,500mg) -400 unit per tablet Take 1 tablet by mouth 2 (two) times a day.     cefdinir (OMNICEF) 300 MG capsule Take 1 capsule (300 mg total) by mouth 2 (two) times a day for 5 days.     cholecalciferol, vitamin D3, 1,000 unit tablet Take 2,000 Units by mouth daily.      cyanocobalamin 1,000 mcg/mL injection Inject 1 mL (1,000 mcg total) into the shoulder, thigh, or buttocks every 30 (thirty) days.     diltiazem (CARDIZEM CD) 120 MG 24 hr capsule TAKE 1 CAPSULE (120 MG TOTAL) BY MOUTH DAILY.     emollient base (VANICREAM TOP) Apply 1 application topically daily as needed. Uses when takes of compression socks     ferrous sulfate 325 (65 FE) MG tablet Take 1 tablet (325 mg total) by mouth daily with breakfast.     fluticasone-salmeterol (ADVAIR DISKUS) 250-50 mcg/dose DISKUS Inhale 1 puff 2 (two) times a day.     furosemide (LASIX) 20 MG tablet Take 1 tablet (20 mg total) by mouth 2 (two) times a day at 9am and 6pm.     gabapentin (NEURONTIN) 300 MG capsule Take 600 mg by mouth at bedtime.            ketoconazole (NIZORAL) 2 % cream Apply 1 application topically 2 (two) times a day. To toe fungus     magnesium oxide (MAG-OX) 400 mg (241.3 mg magnesium) tablet Take 1 tablet (400 mg total) by mouth daily.     omeprazole (PRILOSEC) 20 MG capsule TAKE 1 CAPSULE DAILY.     OXYGEN-AIR DELIVERY SYSTEMS [facility] Use 1-2 L As Directed as needed. [employer]     polyethylene glycol (MIRALAX) 17 gram packet Take 17 g by mouth daily.            PROAIR HFA 90 mcg/actuation inhaler INHALE 2 PUFFS EVERY 4 (FOUR) HOURS AS NEEDED FOR WHEEZING.     senna-docusate (PERICOLACE) 8.6-50 mg tablet Take 1 tablet by mouth 2 (two) times a day as needed for constipation.     sodium chloride 3 % nebulizer solution Take 4 mL by nebulization 3  (three) times a day. Using after albuterol neb. (Patient taking differently: Take 4 mL by nebulization 2 (two) times a day. Using after albuterol neb.      )     spironolactone (ALDACTONE) 25 MG tablet Take 0.5 tablets (12.5 mg total) by mouth daily.     tiotropium (SPIRIVA WITH HANDIHALER) 18 mcg inhalation capsule USE 1 CAPSULE VIA INHALER 1 X A DAY     tobramycin, PF, (CALLUM) 300 mg/5 mL nebulizer solution Take 300 mg by nebulization 2 (two) times a day. Take for one month on and off for 1 month     triamcinolone (KENALOG) 0.1 % cream Apply 1 application topically 2 (two) times a day as needed. To legs           urea (CARMOL) 40 % Crea Apply 1 application topically 2 (two) times a day. To bottom of feet     XARELTO 20 mg tablet TAKE 1 TABLET (20 MG TOTAL) BY MOUTH DAILY WITH SUPPER.     No current facility-administered medications on file prior to visit.      Allergies   Allergen Reactions     Tramadol Nausea Only     Amoxicillin Itching and Rash     Levofloxacin Itching and Rash     Penicillins Itching and Rash     Has tolerated ceftriaxone.     Social History     Tobacco Use     Smoking status: Former Smoker     Last attempt to quit: 2006     Years since quittin.7     Smokeless tobacco: Never Used     Tobacco comment: quit 2006   Substance Use Topics     Alcohol use: No     Drug use: No

## 2021-06-02 NOTE — TELEPHONE ENCOUNTER
RN cannot approve Refill Request    RN can NOT refill this medication med is not covered by policy/route to provider. Last office visit: 9/30/2019 Shai Ellington MD Last Physical: 8/22/2019 Last MTM visit: Visit date not found Last visit same specialty: 10/9/2019 Karlos Salguero MD.  Next visit within 3 mo: Visit date not found  Next physical within 3 mo: Visit date not found      Lizett Santiago, [facility] Triage/Med Refill 10/30/2019    Requested Prescriptions   Pending Prescriptions Disp Refills     XARELTO 20 mg tablet [Pharmacy Med Name: XARELTO 20MG] 30 tablet 0     Sig: TAKE 1 TABLET (20 MG TOTAL) BY MOUTH DAILY WITH SUPPER.       Apixaban/Rivaroxaban/Dabigatran Refill Protocol Failed - 10/30/2019  2:05 PM        Failed - Route to appropriate pool/provider     Last Anticoagulation Summary:           Passed - Renal function test in last year     Creatinine   Date Value Ref Range Status   10/04/2019 0.65 0.60 - 1.10 mg/dL Final             Passed - PCP or prescribing provider visit in past 12 months       Last office visit with prescriber/PCP: 9/30/2019 Shai Ellington MD OR same dept: 10/9/2019 Karlos Salguero MD OR same specialty: 10/9/2019 Karlos Salguero MD  Last physical: 8/22/2019 Last MTM visit: Visit date not found   Next visit within 3 mo: Visit date not found  Next physical within 3 mo: Visit date not found  Prescriber OR PCP: Shai Ellington MD  Last diagnosis associated with med order: 1. Routine general medical examination at a health care facility  - XARELTO 20 mg tablet [Pharmacy Med Name: XARELTO 20MG]; TAKE 1 TABLET (20 MG TOTAL) BY MOUTH DAILY WITH SUPPER.  Dispense: 30 tablet; Refill: 0    If protocol passes may refill for 12 months if within 3 months of last provider visit (or a total of 15 months).

## 2021-06-03 VITALS
DIASTOLIC BLOOD PRESSURE: 62 MMHG | BODY MASS INDEX: 27.08 KG/M2 | HEART RATE: 104 BPM | SYSTOLIC BLOOD PRESSURE: 108 MMHG | OXYGEN SATURATION: 91 % | RESPIRATION RATE: 24 BRPM | WEIGHT: 144.5 LBS

## 2021-06-03 VITALS
DIASTOLIC BLOOD PRESSURE: 60 MMHG | OXYGEN SATURATION: 95 % | HEART RATE: 81 BPM | BODY MASS INDEX: 26.61 KG/M2 | SYSTOLIC BLOOD PRESSURE: 132 MMHG | WEIGHT: 142 LBS

## 2021-06-03 VITALS — HEIGHT: 61 IN | BODY MASS INDEX: 27.38 KG/M2 | WEIGHT: 145 LBS

## 2021-06-03 VITALS — BODY MASS INDEX: 27.4 KG/M2 | WEIGHT: 145 LBS

## 2021-06-03 VITALS — HEIGHT: 61 IN | WEIGHT: 136.06 LBS | BODY MASS INDEX: 25.69 KG/M2

## 2021-06-03 VITALS
OXYGEN SATURATION: 95 % | HEART RATE: 98 BPM | BODY MASS INDEX: 27.38 KG/M2 | WEIGHT: 145 LBS | SYSTOLIC BLOOD PRESSURE: 110 MMHG | HEIGHT: 61 IN | DIASTOLIC BLOOD PRESSURE: 60 MMHG

## 2021-06-03 VITALS — WEIGHT: 135 LBS | BODY MASS INDEX: 25.49 KG/M2 | HEIGHT: 61 IN

## 2021-06-03 VITALS — WEIGHT: 136 LBS | HEIGHT: 61 IN | BODY MASS INDEX: 25.68 KG/M2

## 2021-06-03 VITALS — BODY MASS INDEX: 27.13 KG/M2 | WEIGHT: 143.7 LBS | HEIGHT: 61 IN

## 2021-06-03 VITALS — HEIGHT: 61 IN | BODY MASS INDEX: 26.62 KG/M2 | WEIGHT: 141 LBS

## 2021-06-03 VITALS — WEIGHT: 143 LBS | BODY MASS INDEX: 27 KG/M2 | HEIGHT: 61 IN

## 2021-06-03 VITALS — BODY MASS INDEX: 25.68 KG/M2 | HEIGHT: 61 IN | WEIGHT: 136 LBS

## 2021-06-03 VITALS — WEIGHT: 143 LBS | BODY MASS INDEX: 27.02 KG/M2

## 2021-06-04 VITALS
TEMPERATURE: 97.5 F | SYSTOLIC BLOOD PRESSURE: 126 MMHG | WEIGHT: 149 LBS | BODY MASS INDEX: 28.15 KG/M2 | HEART RATE: 95 BPM | RESPIRATION RATE: 18 BRPM | OXYGEN SATURATION: 94 % | DIASTOLIC BLOOD PRESSURE: 61 MMHG

## 2021-06-04 VITALS
RESPIRATION RATE: 20 BRPM | BODY MASS INDEX: 30.8 KG/M2 | SYSTOLIC BLOOD PRESSURE: 142 MMHG | WEIGHT: 163 LBS | HEART RATE: 96 BPM | DIASTOLIC BLOOD PRESSURE: 60 MMHG | TEMPERATURE: 98.2 F | OXYGEN SATURATION: 96 %

## 2021-06-04 VITALS
DIASTOLIC BLOOD PRESSURE: 60 MMHG | WEIGHT: 146 LBS | HEART RATE: 80 BPM | SYSTOLIC BLOOD PRESSURE: 110 MMHG | OXYGEN SATURATION: 95 % | HEIGHT: 62 IN | BODY MASS INDEX: 26.87 KG/M2

## 2021-06-04 VITALS
BODY MASS INDEX: 29.1 KG/M2 | WEIGHT: 154 LBS | OXYGEN SATURATION: 93 % | HEART RATE: 106 BPM | RESPIRATION RATE: 16 BRPM | DIASTOLIC BLOOD PRESSURE: 63 MMHG | SYSTOLIC BLOOD PRESSURE: 126 MMHG | TEMPERATURE: 98.3 F

## 2021-06-04 VITALS
DIASTOLIC BLOOD PRESSURE: 52 MMHG | HEART RATE: 76 BPM | WEIGHT: 153 LBS | OXYGEN SATURATION: 94 % | SYSTOLIC BLOOD PRESSURE: 100 MMHG | BODY MASS INDEX: 28.16 KG/M2 | HEIGHT: 62 IN

## 2021-06-04 VITALS
HEART RATE: 88 BPM | BODY MASS INDEX: 28.15 KG/M2 | OXYGEN SATURATION: 92 % | RESPIRATION RATE: 24 BRPM | DIASTOLIC BLOOD PRESSURE: 56 MMHG | SYSTOLIC BLOOD PRESSURE: 106 MMHG | WEIGHT: 150.2 LBS

## 2021-06-04 VITALS
SYSTOLIC BLOOD PRESSURE: 108 MMHG | HEART RATE: 100 BPM | WEIGHT: 149.7 LBS | OXYGEN SATURATION: 93 % | RESPIRATION RATE: 24 BRPM | BODY MASS INDEX: 28.06 KG/M2 | DIASTOLIC BLOOD PRESSURE: 60 MMHG

## 2021-06-04 VITALS
BODY MASS INDEX: 26.5 KG/M2 | SYSTOLIC BLOOD PRESSURE: 108 MMHG | DIASTOLIC BLOOD PRESSURE: 70 MMHG | HEIGHT: 62 IN | HEART RATE: 75 BPM | WEIGHT: 144 LBS

## 2021-06-04 VITALS
HEIGHT: 62 IN | SYSTOLIC BLOOD PRESSURE: 134 MMHG | BODY MASS INDEX: 27.97 KG/M2 | WEIGHT: 152 LBS | DIASTOLIC BLOOD PRESSURE: 78 MMHG | OXYGEN SATURATION: 87 % | HEART RATE: 98 BPM

## 2021-06-04 VITALS
OXYGEN SATURATION: 90 % | DIASTOLIC BLOOD PRESSURE: 68 MMHG | WEIGHT: 143 LBS | BODY MASS INDEX: 26.8 KG/M2 | HEART RATE: 90 BPM | SYSTOLIC BLOOD PRESSURE: 124 MMHG

## 2021-06-04 VITALS — WEIGHT: 145 LBS | HEIGHT: 62 IN | BODY MASS INDEX: 26.68 KG/M2

## 2021-06-04 VITALS
WEIGHT: 162 LBS | TEMPERATURE: 97.5 F | OXYGEN SATURATION: 93 % | RESPIRATION RATE: 18 BRPM | DIASTOLIC BLOOD PRESSURE: 59 MMHG | HEART RATE: 104 BPM | BODY MASS INDEX: 30.61 KG/M2 | SYSTOLIC BLOOD PRESSURE: 118 MMHG

## 2021-06-04 NOTE — TELEPHONE ENCOUNTER
RN cannot approve Refill Request    RN can NOT refill this medication med is not covered by policy/route to provider. Last office visit: 9/30/2019 Shai Ellington MD Last Physical: 8/22/2019 Last MTM visit: Visit date not found Last visit same specialty: 10/9/2019 Karlos Salguero MD.  Next visit within 3 mo: Visit date not found  Next physical within 3 mo: Visit date not found      Lizett Santiago, [facility] Triage/Med Refill 12/10/2019    Requested Prescriptions   Pending Prescriptions Disp Refills     XARELTO 20 mg tablet [Pharmacy Med Name: XARELTO 20MG] 30 tablet 0     Sig: TAKE 1 TABLET (20 MG TOTAL) BY MOUTH DAILY WITH SUPPER.       Apixaban/Rivaroxaban/Dabigatran Refill Protocol Failed - 12/9/2019  3:53 PM        Failed - Route to appropriate pool/provider     Last Anticoagulation Summary:           Passed - Renal function test in last year     Creatinine   Date Value Ref Range Status   10/04/2019 0.65 0.60 - 1.10 mg/dL Final             Passed - PCP or prescribing provider visit in past 12 months       Last office visit with prescriber/PCP: 9/30/2019 Shai Ellington MD OR same dept: 10/9/2019 Karlos Salguero MD OR same specialty: 10/9/2019 Karlos Salguero MD  Last physical: 8/22/2019 Last MTM visit: Visit date not found   Next visit within 3 mo: Visit date not found  Next physical within 3 mo: Visit date not found  Prescriber OR PCP: Shai Ellington MD  Last diagnosis associated with med order: 1. Routine general medical examination at a health care facility  - XARELTO 20 mg tablet [Pharmacy Med Name: XARELTO 20MG]; TAKE 1 TABLET (20 MG TOTAL) BY MOUTH DAILY WITH SUPPER.  Dispense: 30 tablet; Refill: 0    If protocol passes may refill for 12 months if within 3 months of last provider visit (or a total of 15 months).

## 2021-06-04 NOTE — PROGRESS NOTES
Office Visit - Follow up    Adalgisa Solano   84 y.o. female    Date of Visit: 12/23/2019    Chief Complaint   Patient presents with     COPD     trying to wean off of her oxygen     Hypertension     Follow-up     fasting       Subjective: Hypertension with COPD and chronic atrial fibrillation.    The patient denies any new problems today she actually feels better she is 84 years of age and has had many years of COPD starting off as asthmatic bronchitis.  She is able to walk now without supplemental oxygen.  Her legs are sore from chronic lymphedema but the edema is down she avoids salt in her diet.    No chest pain slightly short of breath.    No blood in stool urine or sputum.  Medication list reviewed well-tolerated normal effects and reconciled.  Multiple drug allergies are noted including tramadol amoxicillin levofloxacin and penicillin.    She does not use alcohol nor does she smoke.    ROS: A comprehensive review of systems was performed and was otherwise negative    Medications:  Prior to Admission medications    Medication Sig Start Date End Date Taking? Authorizing Provider   acetaminophen (TYLENOL) 500 MG tablet Take 1,000 mg by mouth every 6 (six) hours as needed for pain.   Yes PROVIDER, HISTORICAL   albuterol (PROVENTIL) 2.5 mg /3 mL (0.083 %) nebulizer solution 3 ML INHALATION FOUR TIMES A DAY AS NEEDED USE BEFORE NEBULIZED SALINE. FOR SHORTNESS OF BREATH. 12/27/18  Yes Shai Ellington MD   ARIPiprazole (ABILIFY) 5 MG tablet TAKE 1 TABLET EVERY NIGHT AT BEDTIME 12/27/18  Yes Shai Ellington MD   atorvastatin (LIPITOR) 20 MG tablet TAKE 1 TABLET (20 MG TOTAL) BY MOUTH BEDTIME. 12/27/18  Yes Shai Ellington MD   azithromycin (ZITHROMAX) 250 MG tablet Take 1 tablet (250 mg total) by mouth 3 (three) times a week. 12/11/19 12/10/20 Yes Jun Juarez MD   calcium carbonate-vitamin D3 (CALCIUM WITH VITAMIN D) 600 mg(1,500mg) -400 unit per tablet Take 1 tablet by mouth 2 (two)  times a day.   Yes PROVIDER, HISTORICAL   cholecalciferol, vitamin D3, 1,000 unit tablet Take 2,000 Units by mouth daily.    Yes PROVIDER, HISTORICAL   cyanocobalamin 1,000 mcg/mL injection Inject 1 mL (1,000 mcg total) into the shoulder, thigh, or buttocks every 30 (thirty) days. 3/2/19  Yes Jak Cardona MD   diltiazem (CARDIZEM CD) 120 MG 24 hr capsule TAKE 1 CAPSULE (120 MG TOTAL) BY MOUTH DAILY. 5/22/19  Yes Shai Ellington MD   emollient base (VANICREAM TOP) Apply 1 application topically daily as needed. Uses when takes of compression socks   Yes PROVIDER, HISTORICAL   ferrous sulfate 325 (65 FE) MG tablet Take 1 tablet (325 mg total) by mouth daily with breakfast. 2/1/19  Yes Jak Cardona MD   fluticasone-umeclidinium-vilanterol (TRELEGY ELLIPTA) 100-62.5-25 mcg DsDv inhaler Inhale 1 Inhalation daily. 10/25/19  Yes Jun Juarez MD   furosemide (LASIX) 20 MG tablet Take 1 tablet (20 mg total) by mouth 2 (two) times a day at 9am and 6pm. 8/28/19  Yes Shai Ellington MD   gabapentin (NEURONTIN) 300 MG capsule Take 600 mg by mouth at bedtime.          Yes PROVIDER, HISTORICAL   magnesium oxide (MAG-OX) 400 mg (241.3 mg magnesium) tablet Take 1 tablet (400 mg total) by mouth daily. 1/5/19  Yes Nilesh Dunn,    omeprazole (PRILOSEC) 20 MG capsule TAKE 1 CAPSULE DAILY. 5/22/19  Yes Shai Ellington MD   OXYGEN-AIR DELIVERY SYSTEMS [facility] Use 1-2 L As Directed as needed. [facility]   Yes PROVIDER, HISTORICAL   polyethylene glycol (MIRALAX) 17 gram packet Take 17 g by mouth daily.          Yes PROVIDER, HISTORICAL   predniSONE (DELTASONE) 10 mg tablet Take 2 tabs daily for 5 days then 1 tab daily for 5 days. 12/11/19  Yes Jun Juarez MD   PROAIR HFA 90 mcg/actuation inhaler INHALE 2 PUFFS EVERY 4 (FOUR) HOURS AS NEEDED FOR WHEEZING. 12/27/18  Yes Shai Ellington MD   sodium chloride 3 % nebulizer solution Take 4 mL by nebulization 3 (three) times a day.  Using after albuterol neb.  Patient taking differently: Take 4 mL by nebulization 2 (two) times a day. Using after albuterol neb.       8/8/19  Yes Shai Ellington MD   spironolactone (ALDACTONE) 25 MG tablet Take 0.5 tablets (12.5 mg total) by mouth daily. 2/2/19  Yes Jak Cardona MD   triamcinolone (KENALOG) 0.1 % cream Apply 1 application topically 2 (two) times a day as needed. To legs         Yes PROVIDER, HISTORICAL   urea (CARMOL) 40 % Crea Apply 1 application topically 2 (two) times a day. To bottom of feet   Yes PROVIDER, HISTORICAL   XARELTO 20 mg tablet TAKE 1 TABLET (20 MG TOTAL) BY MOUTH DAILY WITH SUPPER. 12/10/19  Yes Shai Ellington MD       Allergies:   Allergies   Allergen Reactions     Tramadol Nausea Only     Amoxicillin Itching and Rash     Levofloxacin Itching and Rash     Penicillins Itching and Rash     Has tolerated ceftriaxone.       Immunizations:   Immunization History   Administered Date(s) Administered     DT (pediatric) 03/29/2004     Influenza high dose,seasonal,PF, 65+ yrs 10/20/2015, 10/09/2016, 11/16/2017, 10/11/2018     Influenza,seasonal quad, PF, =/> 6months 11/20/2014     Pneumo Conj 13-V (2010&after) 10/20/2015     Pneumo Polysac 23-V 10/18/1999, 08/01/2005     Td,adult,historic,unspecified 03/29/2004     Tdap 05/14/2013       Exam Chest clear to auscultation and percussion.  Heart tones regular rhythm without murmur rub or gallop.  Abdomen soft nontender no organomegaly.  No peritoneal signs.  Extremities free of edema cyanosis or clubbing.  Neck veins nondistended no thyromegaly or scleral icterus noted, carotids full.  Skin warm and dry easily conversant good spirited.  Normal intelligence.  Neurologically intact no gross localizing findings.    Weight 143 pounds down 7 pounds from last visit no leg edema no neck vein distention pulse 90 O2 sats 88% later 90% no oxygen.  Blood pressure 124/68 her color is better she is not in acute distress there is no  tachypnea no central acrocyanosis noted.    Assessment and Plan  Hypertension with COPD and chronic atrial fibrillation.  Multiple drug allergies.    Hypertension is controlled and stable.  124/68.    COPD is improved no leg edema no neck vein distention no evidence for right heart failure stable.    Atrial fibrillation with controlled ventricular response rate 90.  Stable.    Check comprehensive metabolic profile today patient is on high risk medication use including furosemide plus atorvastatin.  Lipid panel is pending linked to hypertension and chronic multiple disease states.  Seemingly stable and improved.    Time: total time spent with the patient was 25 minutes of which >50% was spent in counseling and coordination of care    The following high BMI interventions were performed this visit: encouragement to exercise    Shai Ellington MD    Patient Active Problem List   Diagnosis     Recurrent major depressive episodes (H)     Chronic obstructive pulmonary disease (H)     Peripheral arterial disease (H)     Type 2 diabetes mellitus without complication (H)     Hypertension     Schizoaffective disorder, bipolar type (H)     Arnold-Chiari malformation (H)     Chronic diastolic congestive heart failure (H)     Chronic respiratory failure with hypoxia (H)     Pulmonary hypertension (H)     Primary osteoarthritis of both knees     Chronic atrial fibrillation     Gastroesophageal reflux disease, esophagitis presence not specified     Stasis dermatitis of both legs     Generalized muscle weakness     Drug-induced constipation     Chronic pain syndrome     Acute combined systolic and diastolic CHF, NYHA class 1 (H)     Slow transit constipation     Avascular necrosis of bone (H)     Abdominal pain     SBO (small bowel obstruction) (H)     Status post right hip replacement     Atrial fibrillation with RVR (H)

## 2021-06-04 NOTE — TELEPHONE ENCOUNTER
Refill Approved    Rx renewed per Medication Renewal Policy. Medication was last renewed on 12/27/18.    Shreya Martinez, [facility] Triage/Med Refill 1/2/2020     Requested Prescriptions   Pending Prescriptions Disp Refills     PROAIR HFA 90 mcg/actuation inhaler [Pharmacy Med Name: PROAIR HFA AER] 8.5 g 12     Sig: INHALE 2 PUFFS EVERY 4 (FOUR) HOURS AS NEEDED FOR WHEEZING.       Albuterol/Levalbuterol Refill Protocol Passed - 12/30/2019  1:07 PM        Passed - PCP or prescribing provider visit in last year     Last office visit with prescriber/PCP: 12/23/2019 Shai Ellington MD OR same dept: 12/23/2019 Shai Ellington MD OR same specialty: 12/23/2019 Shai Ellington MD Last physical: 8/22/2019       Next appt within 3 mo: Visit date not found  Next physical within 3 mo: Visit date not found  Prescriber OR PCP: Shai Ellington MD  Last diagnosis associated with med order: 1. COPD (chronic obstructive pulmonary disease) (H)  - PROAIR HFA 90 mcg/actuation inhaler [Pharmacy Med Name: PROAIR HFA AER]; INHALE 2 PUFFS EVERY 4 (FOUR) HOURS AS NEEDED FOR WHEEZING.  Dispense: 8.5 g; Refill: 12    If protocol passes may refill for 6 months if within 3 months of last provider visit (or a total of 9 months). If patient requesting >1 inhaler per month refill x 6 months and have patient make appointment with provider.

## 2021-06-04 NOTE — PROGRESS NOTES
Assessment/Plan:        Diagnoses and all orders for this visit:    Simple chronic bronchitis (H)  -     predniSONE (DELTASONE) 10 mg tablet; Take 2 tabs daily for 5 days then 1 tab daily for 5 days.  Dispense: 15 tablet; Refill: 0  -     azithromycin (ZITHROMAX) 250 MG tablet; Take 1 tablet (250 mg total) by mouth 3 (three) times a week.  Dispense: 12 tablet; Refill: 11    Bronchiectasis with acute exacerbation (H)    83-year-old female with chronic bronchitis, bronchiectasis, respiratory failure with hypoxia here for bronchiectasis/bronchitis exacerbation.    Prednisone taper  Azithromycin 3 times weekly  Will have to consider vest therapy.    Patient has daily productive cough lasting greater than 6 months.  No skilled caregiver available to provide manual CPT.  Aerobika failed to effectively mobilize secretions.  CT performed on 10/3/2019 confirms Bronchiectasis.             Subjective:    Patient ID: Adalgisa Solano is a 84 y.o. female.  83-year-old female here for follow-up of chronic bronchitis and bronchiectasis.  She has had 5 exacerbations in the last year and has been in the hospital multiple times.  She is having an exacerbation.  It started 2 days ago.  Cold went down into her chest. No other clear provocative factor. Symptoms have worsened.  Also bringing up more sputum and wheezing. No fevers or hemoptysis.  She is back on her oxygen.          Review of Systems 12 point review systems is negative except as above.  /56   Pulse 88   Resp 24   Wt 150 lb 3.2 oz (68.1 kg)   LMP  (LMP Unknown)   SpO2 92% Comment: 2 L pulsing  BMI 28.15 kg/m      Objective:    Physical Exam   Constitutional: She is oriented to person, place, and time. She appears well-developed and well-nourished. No distress.   HENT:   Head: Normocephalic and atraumatic.   Eyes: Pupils are equal, round, and reactive to light. Conjunctivae are normal. Right eye exhibits no discharge. Left eye exhibits no discharge. No  scleral icterus.   Cardiovascular: Normal rate, regular rhythm and normal heart sounds. Exam reveals no gallop.   No murmur heard.  Pulmonary/Chest: Effort normal. No respiratory distress. She has wheezes. She has rales.   Abdominal: Soft.   Musculoskeletal:         General: Edema (Stable to last visit) present. No deformity.   Neurological: She is alert and oriented to person, place, and time. No cranial nerve deficit. Coordination normal.   Skin: Skin is warm and dry. She is not diaphoretic. No erythema.   Psychiatric: She has a normal mood and affect. Her behavior is normal. Thought content normal.   Nursing note and vitals reviewed.          Current Outpatient Medications on File Prior to Visit   Medication Sig Dispense Refill     acetaminophen (TYLENOL) 500 MG tablet Take 1,000 mg by mouth every 6 (six) hours as needed for pain.       albuterol (PROVENTIL) 2.5 mg /3 mL (0.083 %) nebulizer solution 3 ML INHALATION FOUR TIMES A DAY AS NEEDED USE BEFORE NEBULIZED SALINE. FOR SHORTNESS OF BREATH. 300 mL 11     ARIPiprazole (ABILIFY) 5 MG tablet TAKE 1 TABLET EVERY NIGHT AT BEDTIME 90 tablet 1     atorvastatin (LIPITOR) 20 MG tablet TAKE 1 TABLET (20 MG TOTAL) BY MOUTH BEDTIME. 90 tablet 3     calcium carbonate-vitamin D3 (CALCIUM WITH VITAMIN D) 600 mg(1,500mg) -400 unit per tablet Take 1 tablet by mouth 2 (two) times a day.       cholecalciferol, vitamin D3, 1,000 unit tablet Take 2,000 Units by mouth daily.        cyanocobalamin 1,000 mcg/mL injection Inject 1 mL (1,000 mcg total) into the shoulder, thigh, or buttocks every 30 (thirty) days.  0     diltiazem (CARDIZEM CD) 120 MG 24 hr capsule TAKE 1 CAPSULE (120 MG TOTAL) BY MOUTH DAILY. 90 capsule 3     emollient base (VANICREAM TOP) Apply 1 application topically daily as needed. Uses when takes of compression socks       ferrous sulfate 325 (65 FE) MG tablet Take 1 tablet (325 mg total) by mouth daily with breakfast.  0     fluticasone-umeclidinium-vilanterol  (TRELEGY ELLIPTA) 100-62.5-25 mcg DsDv inhaler Inhale 1 Inhalation daily. 60 each 11     furosemide (LASIX) 20 MG tablet Take 1 tablet (20 mg total) by mouth 2 (two) times a day at 9am and 6pm. 180 tablet 3     gabapentin (NEURONTIN) 300 MG capsule Take 600 mg by mouth at bedtime.              magnesium oxide (MAG-OX) 400 mg (241.3 mg magnesium) tablet Take 1 tablet (400 mg total) by mouth daily.  0     omeprazole (PRILOSEC) 20 MG capsule TAKE 1 CAPSULE DAILY. 90 capsule 3     OXYGEN-AIR DELIVERY SYSTEMS [facility] Use 1-2 L As Directed as needed. [facility]       polyethylene glycol (MIRALAX) 17 gram packet Take 17 g by mouth daily.              PROAIR HFA 90 mcg/actuation inhaler INHALE 2 PUFFS EVERY 4 (FOUR) HOURS AS NEEDED FOR WHEEZING. 8.5 g 12     sodium chloride 3 % nebulizer solution Take 4 mL by nebulization 3 (three) times a day. Using after albuterol neb. (Patient taking differently: Take 4 mL by nebulization 2 (two) times a day. Using after albuterol neb.      ) 360 mL 5     spironolactone (ALDACTONE) 25 MG tablet Take 0.5 tablets (12.5 mg total) by mouth daily. 15 tablet 0     triamcinolone (KENALOG) 0.1 % cream Apply 1 application topically 2 (two) times a day as needed. To legs             urea (CARMOL) 40 % Crea Apply 1 application topically 2 (two) times a day. To bottom of feet       XARELTO 20 mg tablet TAKE 1 TABLET (20 MG TOTAL) BY MOUTH DAILY WITH SUPPER. 30 tablet 0     [DISCONTINUED] ipratropium (ATROVENT) 0.03 % nasal spray 1 spray each nostril once per day 30 mL 12     [DISCONTINUED] ketoconazole (NIZORAL) 2 % cream Apply 1 application topically 2 (two) times a day. To toe fungus       [DISCONTINUED] predniSONE (DELTASONE) 10 mg tablet Take 10 mg by mouth daily. 5 tablet 0     [DISCONTINUED] senna-docusate (PERICOLACE) 8.6-50 mg tablet Take 1 tablet by mouth 2 (two) times a day as needed for constipation.  0     [DISCONTINUED] tobramycin, PF, (CALLUM) 300 mg/5 mL nebulizer solution Take 300 mg by  nebulization 2 (two) times a day. Take for one month on and off for 1 month       Current Facility-Administered Medications on File Prior to Visit   Medication Dose Route Frequency Provider Last Rate Last Dose     cyanocobalamin injection 1,000 mcg  1,000 mcg Intramuscular Q30 Days Shai Ellington MD   1,000 mcg at 11/25/19 1006     /56   Pulse 88   Resp 24   Wt 150 lb 3.2 oz (68.1 kg)   LMP  (LMP Unknown)   SpO2 92% Comment: 2 L pulsing  BMI 28.15 kg/m      Medical History  Active Ambulatory (Non-Hospital) Problems    Diagnosis     Atrial fibrillation with RVR (H)     Status post right hip replacement     SBO (small bowel obstruction) (H)     Abdominal pain     Avascular necrosis of bone (H)     Slow transit constipation     Acute combined systolic and diastolic CHF, NYHA class 1 (H)     Generalized muscle weakness     Drug-induced constipation     Chronic pain syndrome     Stasis dermatitis of both legs     Chronic atrial fibrillation     Gastroesophageal reflux disease, esophagitis presence not specified     Primary osteoarthritis of both knees     Pulmonary hypertension (H)     Chronic respiratory failure with hypoxia (H)     Chronic diastolic congestive heart failure (H)     Type 2 diabetes mellitus without complication (H)     Hypertension     Peripheral arterial disease (H)     Schizoaffective disorder, bipolar type (H)     Recurrent major depressive episodes (H)     Chronic obstructive pulmonary disease (H)     Arnold-Chiari malformation (H)     Past Medical History:   Diagnosis Date     Acute and chronic respiratory failure with hypoxia (H)      Acute blood loss anemia 1/15/2019     Acute bronchitis 1/15/2019     Anemia      Anxiety      Arm skin lesion, right      Arnold-Chiari malformation (H) 1998     Arthritis      Atrial fibrillation (H) 02/2017     Avascular necrosis of bone (H)      Breast cyst      Breast lump      Candidal skin infection      Cellulitis of left lower extremity  2019     CHF (congestive heart failure) (H)      Chronic bronchitis (H)      Chronic diastolic heart failure (H)      Chronic pain syndrome      Chronic respiratory failure with hypoxia (H) 3/13/2017     COPD (chronic obstructive pulmonary disease) (H)      COPD exacerbation (H)      Depression      Diet-controlled type 2 diabetes mellitus (H)      Drug induced constipation      DVT of axillary vein, acute (H)      DVT of axillary vein, acute left (H)      Edema      Encounter for palliative care      Erysipelas      Fracture of femoral neck, left (H)      Generalized muscle weakness      GERD (gastroesophageal reflux disease)      History of blood clots      Hyperlipidemia      Hypertension      Left hip pain      Lumbar spinal stenosis      PAD (peripheral artery disease) (H)      Peripheral arterial disease (H) 10/28/2015     Psoriasis      Pulmonary hypertension (H) 3/5/2018     Recurrent major depressive episodes (H)      Schizoaffective disorder, bipolar type (H) 2015     Slow transit constipation      Stasis dermatitis of both legs      Status post total hip replacement, 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     Type 2 diabetes mellitus without complication (H) 2016     Vitamin B12 deficiency      Volume overload         Surgical History  She  has a past surgical history that includes Breast cyst aspiration (Left, ); Cholecystectomy; Lumbar fusion (N/A, 2014); Back surgery; Laparoscopic incisional / umbilical / ventral hernia repair (Left, 3/27/2015);  section (X3); Joint replacement (Right); Eye surgery; pr total knee arthroplasty (Left, 10/7/2016); Cervical spine surgery; pr total hip arthroplasty (Left, 2019); pr femoral fx, open tx (Left, 2019); Hand surgery; Colonoscopy; IR for PAD LOWER EXTREMITY; Arm skin lesion biopsy / excision (Right, 2019); and pr total hip arthroplasty (Right, 2019).         Social history reviewed: Lives at home.  Here with her .    Allergies  Allergies   Allergen Reactions     Tramadol Nausea Only     Amoxicillin Itching and Rash     Levofloxacin Itching and Rash     Penicillins Itching and Rash     Has tolerated ceftriaxone.    Family history reviewed: no changes from previous                          Data Review - imaging, labs, and ekgs listed below were reviewed by me.  Chest XRay and chest CT images and EKG tracings interpreted personally.   CT chest reviewed and interpreted by me: Bronchiectasis present.  No pulmonary embolism.  Multiple areas of infiltrate consistent with infection.

## 2021-06-05 ENCOUNTER — RECORDS - HEALTHEAST (OUTPATIENT)
Dept: MRI IMAGING | Facility: CLINIC | Age: 86
End: 2021-06-05

## 2021-06-05 VITALS
WEIGHT: 149 LBS | DIASTOLIC BLOOD PRESSURE: 60 MMHG | BODY MASS INDEX: 27.42 KG/M2 | HEART RATE: 76 BPM | HEIGHT: 62 IN | TEMPERATURE: 97.2 F | SYSTOLIC BLOOD PRESSURE: 116 MMHG | OXYGEN SATURATION: 88 %

## 2021-06-05 VITALS
DIASTOLIC BLOOD PRESSURE: 68 MMHG | HEART RATE: 90 BPM | HEIGHT: 62 IN | WEIGHT: 149 LBS | OXYGEN SATURATION: 93 % | BODY MASS INDEX: 27.42 KG/M2 | SYSTOLIC BLOOD PRESSURE: 134 MMHG

## 2021-06-05 VITALS
WEIGHT: 149 LBS | DIASTOLIC BLOOD PRESSURE: 68 MMHG | BODY MASS INDEX: 27.42 KG/M2 | RESPIRATION RATE: 14 BRPM | SYSTOLIC BLOOD PRESSURE: 108 MMHG | HEART RATE: 88 BPM | HEIGHT: 62 IN

## 2021-06-05 VITALS
SYSTOLIC BLOOD PRESSURE: 120 MMHG | HEART RATE: 98 BPM | HEIGHT: 62 IN | OXYGEN SATURATION: 92 % | BODY MASS INDEX: 27.42 KG/M2 | DIASTOLIC BLOOD PRESSURE: 68 MMHG | WEIGHT: 149 LBS | TEMPERATURE: 96.8 F

## 2021-06-05 VITALS — HEIGHT: 62 IN | BODY MASS INDEX: 27.05 KG/M2 | WEIGHT: 147 LBS

## 2021-06-05 VITALS — BODY MASS INDEX: 27.42 KG/M2 | WEIGHT: 149 LBS | HEIGHT: 62 IN

## 2021-06-05 VITALS — WEIGHT: 149 LBS | BODY MASS INDEX: 27.42 KG/M2 | HEIGHT: 62 IN

## 2021-06-05 DIAGNOSIS — M54.12 BRACHIAL NEURITIS: ICD-10-CM

## 2021-06-05 NOTE — TELEPHONE ENCOUNTER
RN cannot approve Refill Request    RN can NOT refill this medication No associated diagnosis. Provider please review. . Last office visit: 12/23/2019 hSai Ellington MD Last Physical: 8/22/2019 Last MTM visit: Visit date not found Last visit same specialty: 12/23/2019 Shai Ellington MD.  Next visit within 3 mo: Visit date not found  Next physical within 3 mo: Visit date not found      Catherine Rodrigues, [facility] Triage/Med Refill 1/26/2020    Requested Prescriptions   Pending Prescriptions Disp Refills     atorvastatin (LIPITOR) 20 MG tablet [Pharmacy Med Name: ATORVASTATIN 20MG] 90 tablet 3     Sig: TAKE 1 TABLET (20 MG TOTAL) BY MOUTH BEDTIME.       Statins Refill Protocol (Hmg CoA Reductase Inhibitors) Passed - 1/26/2020 10:20 AM        Passed - PCP or prescribing provider visit in past 12 months      Last office visit with prescriber/PCP: 12/23/2019 Shai Ellington MD OR same dept: 12/23/2019 Shai Ellington MD OR same specialty: 12/23/2019 Shai Ellington MD  Last physical: 8/22/2019 Last MTM visit: Visit date not found   Next visit within 3 mo: Visit date not found  Next physical within 3 mo: Visit date not found  Prescriber OR PCP: Shai Ellington MD  Last diagnosis associated with med order: There are no diagnoses linked to this encounter.  If protocol passes may refill for 12 months if within 3 months of last provider visit (or a total of 15 months).

## 2021-06-05 NOTE — PROGRESS NOTES
[facility]    DATE: 2/10/2020    NAME:  Adalgisa Solano             :  1935  MRN: [id number]  CODE STATUS:  Full code    VISIT TYPE: Problem Visit (hospital f/u)     FACILITY:  [facility] [[id number]]       CHIEF COMPLAIN/REASON FOR VISIT:    Chief Complaint   Patient presents with     Problem Visit     hospital f/u               HISTORY OF PRESENT ILLNESS: Adalgisa Solano is a 84 y.o. female who was admitted - for fall and developed large, painful, hematoma of left leg. She was unable to bear weight on her leg. Her xarelto was held and surgery consulted. They attempted aspiration but were unsuccessful. MRI confirmed hematoma and also treated for cellulitis with keflex. Her blood cultures were negative. She was also treated for acute on chronic COPD with prednisone therapy, percussion therapy. She was treated for hyponatremia with lasix. Her xarelto was restarted after being held or 7 days. She was discharged to TCU for further rehab.  She has PMH of h/o DVT, persistent atrial fibrillation,  COPD, normocytic anemia, Type 2 DM, HTN, Depression, schizoaffective disorder, HLD, neuropathy, GERD, chronic respiratory failure on 1LNC, CHF, avascular necrosis of Left hip, chronic lymphedema. Prior to this she lived with her .     Today Ms. Solano is seen in her room alone today for hospital follow up today. She reports that she was having trouble urinating yesterday. She asked them to scan her bladder and says there was not much in there. She was concerned because she did urinate much yesterday and thought she was retaining but she guesses she just didn't have a lot to go. She did urinate more during the night and so far today so thinks this is better. She is not having any burning or pain or anything. She is not having any fevers or chills. Her bowels are not moving well either. She is not sure why. She reports she acquired the  hematoma on her leg because she and her  were falling and he accidentally kicked her leg. She says her pain in her leg is overall well controlled but did need to ask for a pain pill during the night. She says it is much worse when she is active though. The redness is somewhat improved and they did outline it at the hospital ad this is improving. She does get shortness of breath at times but feels this is about her baseline. She is using her vest twice a day but she says the nurses reported not having an order for this. She says she does it for about 10-20min. Her cough is ok and does bring stuff up at times. She wears her oxygen as needed at home, so usually 1-2 L with activity and she has a portable. She says she does not want her miralax scheduled because she wants to be able to decide if she wants it. She does not do duonebs at home she does albuterol twice a day followed by hypertonic saline. However after further review of meds and discussion she decides to leave the duonebs 4 times a day as they are working. She would like the saline nebs if needed though for congestion. She is using the oxycodone occasionally but trying to cut back. She has not tried ice or heat on her leg and it is very sensitive to touch. We discussed the benefits of heat and ice for pain and sensation as well as the heat would help the blood be broken down and reabsorbed. She says she is willing to try this. We did discuss her bowel regimen and will change per her request. She does have some swelling in her right leg as well as the significant swelling in left and we discussed wearing a compression stocking on the right and then as improved and can tolerate may consider ace wrap on left. We discussed extending her keflex as she is to finish tomorrow and still has erythema and warmth to the leg, although it is improving there are still signs of cellulitis. We also discussed that her code status was DNR last time she was at [facility]  but she does clarify today she wants to be full code.     REVIEW OF SYSTEMS:  PROBLEMS AND REVIEW OF SYSTEMS:   Review of Systems  Today on ROS:   Currently, no fever, chills, or rigors. Decreased vision and hearing. Denies any chest pain, headaches, palpitations, lightheadedness, dizziness. Appetite is good. Denies any GERD symptoms. Denies any difficulty with swallowing, nausea, or vomiting.  positive for chronic o2 1-2 LNC at home as needed, chest percussion therapy, left leg pain, shortness of breath, congested cough, urinary hesitancy, constipation      Allergies   Allergen Reactions     Tramadol Nausea Only     Amoxicillin Itching and Rash     Levofloxacin Itching and Rash     Penicillins Itching and Rash     Has tolerated ceftriaxone.     Current Outpatient Medications   Medication Sig     senna (SENOKOT) 8.6 mg tablet Take 1 tablet by mouth 2 (two) times a day as needed for constipation.     sodium chloride 3 % nebulizer solution Take 3 mL by nebulization as needed.     acetaminophen (TYLENOL) 500 MG tablet Take 1,000 mg by mouth every 6 (six) hours as needed for pain.     ARIPiprazole (ABILIFY) 5 MG tablet TAKE 1 TABLET EVERY NIGHT AT BEDTIME     atorvastatin (LIPITOR) 20 MG tablet TAKE 1 TABLET (20 MG TOTAL) BY MOUTH BEDTIME.     azithromycin (ZITHROMAX) 250 MG tablet Take 250 mg by mouth every third day.     benzonatate (TESSALON) 100 MG capsule Take 1 capsule (100 mg total) by mouth 3 (three) times a day as needed for cough.     calcium carbonate-vitamin D3 (CALCIUM WITH VITAMIN D) 600 mg(1,500mg) -400 unit per tablet Take 1 tablet by mouth 2 (two) times a day.     cephalexin (KEFLEX) 500 MG capsule Take 1 capsule (500 mg total) by mouth 3 (three) times a day at 6am, 2pm and 10pm for 4 days. (Patient taking differently: Take 500 mg by mouth 3 (three) times a day at 6am, 2pm and 10pm. Extended until 2/13)     cholecalciferol, vitamin D3, 1,000 unit tablet Take 2,000 Units by mouth daily.       cyanocobalamin 1,000 mcg/mL injection Inject 1 mL (1,000 mcg total) into the shoulder, thigh, or buttocks every 30 (thirty) days.     diltiazem (CARDIZEM CD) 120 MG 24 hr capsule TAKE 1 CAPSULE (120 MG TOTAL) BY MOUTH DAILY.     emollient base (VANICREAM TOP) Apply 1 application topically daily as needed. Uses when takes of compression socks     ferrous sulfate 325 (65 FE) MG tablet Take 1 tablet (325 mg total) by mouth daily with breakfast.     fluticasone-umeclidinium-vilanterol (TRELEGY ELLIPTA) 100-62.5-25 mcg DsDv inhaler Inhale 1 Inhalation daily.     furosemide (LASIX) 20 MG tablet Take 1 tablet (20 mg total) by mouth 2 (two) times a day at 9am and 6pm.     gabapentin (NEURONTIN) 300 MG capsule Take 600 mg by mouth at bedtime.            ipratropium (ATROVENT) 0.03 % nasal spray 1 spray into each nostril daily.     ipratropium-albuterol (DUO-NEB) 0.5-2.5 mg/3 mL nebulizer Take 3 mL by nebulization 4 (four) times a day.     magnesium oxide (MAG-OX) 400 mg (241.3 mg magnesium) tablet Take 1 tablet (400 mg total) by mouth daily.     omeprazole (PRILOSEC) 20 MG capsule Take 20 mg by mouth daily before breakfast.     oxyCODONE (ROXICODONE) 5 MG immediate release tablet Take 1 tablet (5 mg total) by mouth every 6 (six) hours as needed for pain.     OXYGEN-AIR DELIVERY SYSTEMS [facility] Use 1-2 L As Directed as needed. [employer]     polyethylene glycol (MIRALAX) 17 gram packet Take 17 g by mouth daily as needed.      predniSONE (DELTASONE) 10 mg tablet Take 3 tabs daily for 3 days, then 2 tabs daily for 3 days, then 1 tab daily until gone.     PROAIR HFA 90 mcg/actuation inhaler INHALE 2 PUFFS EVERY 4 (FOUR) HOURS AS NEEDED FOR WHEEZING.     rivaroxaban ANTICOAGULANT (XARELTO) 20 mg tablet Take 1 tablet (20 mg total) by mouth daily with supper.     spironolactone (ALDACTONE) 25 MG tablet Take 0.5 tablets (12.5 mg total) by mouth daily.     triamcinolone (KENALOG) 0.1 % cream Apply 1 application topically 2 (two) times a  day as needed. To legs           urea (CARMOL) 40 % Crea Apply 1 application topically 2 (two) times a day as needed. To bottom of feet      Past Medical History:    Past Medical History:   Diagnosis Date     Acute and chronic respiratory failure with hypoxia (H)      Acute blood loss anemia 1/15/2019     Acute bronchitis 1/15/2019     Anemia     pernicious anemia     Anxiety      Arm skin lesion, right      Arnold-Chiari malformation (H) 1998     Arthritis      Atrial fibrillation (H) 02/2017     Avascular necrosis of bone (H)      Breast cyst      Breast lump      Candidal skin infection      Cellulitis of left lower extremity 1/28/2019     CHF (congestive heart failure) (H)     diastolic and systolic     Chronic bronchitis (H)     & acute     Chronic diastolic heart failure (H)     diastolic chf     Chronic pain syndrome      Chronic respiratory failure with hypoxia (H) 3/13/2017     COPD (chronic obstructive pulmonary disease) (H)      COPD exacerbation (H)      Depression      Diet-controlled type 2 diabetes mellitus (H)      Drug induced constipation      DVT of axillary vein, acute (H)     left     DVT of axillary vein, acute left (H)      Edema      Encounter for palliative care      Erysipelas      Fracture of femoral neck, left (H)      Generalized muscle weakness      GERD (gastroesophageal reflux disease)      History of blood clots      Hyperlipidemia      Hypertension      Left hip pain      Lumbar spinal stenosis      PAD (peripheral artery disease) (H)      Peripheral arterial disease (H) 10/28/2015     Psoriasis     arms      Pulmonary hypertension (H) 3/5/2018     Recurrent major depressive episodes (H)     Created by Conversion  Replacement Utility updated for latest IMO load     Schizoaffective disorder, bipolar type (H) 6/11/2015     Slow transit constipation      Stasis dermatitis of both legs      Status post total hip replacement, 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     Type 2 diabetes mellitus without  complication (H) 8/2/2016     Vitamin B12 deficiency      Volume overload            PHYSICAL EXAMINATION  Vitals:    02/09/20 1926   BP: 118/59   Pulse: (!) 104   Resp: 18   Temp: 97.5  F (36.4  C)   SpO2: 93%   Weight: 162 lb (73.5 kg)       Today on physical exam:     GENERAL: Awake, Alert, oriented x3, not in any form of acute distress, answers questions appropriately, follows simple commands, conversant  HEENT: Head is normocephalic with normal hair distribution. No evidence of trauma. Ears: No acute purulent discharge. Eyes: Conjunctivae pink with no scleral jaundice. Nose: Normal mucosa and septum. NECK: Supple with no cervical or supraclavicular lymphadenopathy. Trachea is midline.   CHEST: No tenderness or deformity, no crepitus  LUNG: dim with scattered rhonchi, few expiratory wheezes bilaterally to auscultation. Congested cough, 2LNC, chest percussion therapy vest in place  BACK: No kyphosis of the thoracic spine. Symmetric, no curvature, ROM normal, no CVA tenderness, no spinal tenderness   CVS: irregularly irregular rhythm, there are no murmurs, rubs, gallops, or heaves,  2+ pulses symmetric in all extremities.  ABDOMEN: Rounded and soft, nontender to palpation, no masses, no organomegaly, good bowel sounds, no rebound or guarding, no peritoneal signs.   EXTREMITIES: 3+ LLE pitting leg edema with large hematoma present surrounded by erythema, warmth, no open areas, no drainage, smaller in size than outlined markings, 1-2+ rle  SKIN: Warm and dry  NEUROLOGICAL: The patient is oriented to person, place and time. weakness            LABS:   Recent Results (from the past 168 hour(s))   POCT Glucose   Result Value Ref Range    Glucose 128 70 - 139 mg/dL   Sodium, Random Urine   Result Value Ref Range    Sodium, Urine <20 mmol/L   Creatinine, Urine, Random   Result Value Ref Range    Creatinine, Urine 30.1 mg/dL   Osmolality, Random, Urine   Result Value Ref Range    Osmolality, Urine 201 (L) 300 - 900  mOsm/kg   POCT Glucose   Result Value Ref Range    Glucose 165 (H) 70 - 139 mg/dL   POCT Glucose   Result Value Ref Range    Glucose 202 (H) 70 - 139 mg/dL   Magnesium   Result Value Ref Range    Magnesium 1.9 1.8 - 2.6 mg/dL   HM2(CBC w/o Differential)   Result Value Ref Range    WBC 8.8 4.0 - 11.0 thou/uL    RBC 3.59 (L) 3.80 - 5.40 mill/uL    Hemoglobin 10.1 (L) 12.0 - 16.0 g/dL    Hematocrit 31.3 (L) 35.0 - 47.0 %    MCV 87 80 - 100 fL    MCH 28.1 27.0 - 34.0 pg    MCHC 32.3 32.0 - 36.0 g/dL    RDW 14.6 (H) 11.0 - 14.5 %    Platelets 322 140 - 440 thou/uL    MPV 9.5 8.5 - 12.5 fL   Basic Metabolic Panel   Result Value Ref Range    Sodium 132 (L) 136 - 145 mmol/L    Potassium 4.7 3.5 - 5.0 mmol/L    Chloride 97 (L) 98 - 107 mmol/L    CO2 25 22 - 31 mmol/L    Anion Gap, Calculation 10 5 - 18 mmol/L    Glucose 138 (H) 70 - 125 mg/dL    Calcium 8.3 (L) 8.5 - 10.5 mg/dL    BUN 17 8 - 28 mg/dL    Creatinine 0.71 0.60 - 1.10 mg/dL    GFR MDRD Af Amer >60 >60 mL/min/1.73m2    GFR MDRD Non Af Amer >60 >60 mL/min/1.73m2   POCT Glucose   Result Value Ref Range    Glucose 127 70 - 139 mg/dL   POCT Glucose   Result Value Ref Range    Glucose 167 (H) 70 - 139 mg/dL   POCT Glucose   Result Value Ref Range    Glucose 213 (H) 70 - 139 mg/dL   POCT Glucose   Result Value Ref Range    Glucose 234 (H) 70 - 139 mg/dL   Basic Metabolic Panel   Result Value Ref Range    Sodium 132 (L) 136 - 145 mmol/L    Potassium 4.7 3.5 - 5.0 mmol/L    Chloride 97 (L) 98 - 107 mmol/L    CO2 30 22 - 31 mmol/L    Anion Gap, Calculation 5 5 - 18 mmol/L    Glucose 138 (H) 70 - 125 mg/dL    Calcium 8.3 (L) 8.5 - 10.5 mg/dL    BUN 16 8 - 28 mg/dL    Creatinine 0.70 0.60 - 1.10 mg/dL    GFR MDRD Af Amer >60 >60 mL/min/1.73m2    GFR MDRD Non Af Amer >60 >60 mL/min/1.73m2   HM2(CBC w/o Differential)   Result Value Ref Range    WBC 7.9 4.0 - 11.0 thou/uL    RBC 3.50 (L) 3.80 - 5.40 mill/uL    Hemoglobin 9.9 (L) 12.0 - 16.0 g/dL    Hematocrit 30.5 (L) 35.0 -  47.0 %    MCV 87 80 - 100 fL    MCH 28.3 27.0 - 34.0 pg    MCHC 32.5 32.0 - 36.0 g/dL    RDW 14.8 (H) 11.0 - 14.5 %    Platelets 317 140 - 440 thou/uL    MPV 9.5 8.5 - 12.5 fL   POCT Glucose   Result Value Ref Range    Glucose 115 70 - 139 mg/dL   POCT Glucose   Result Value Ref Range    Glucose 128 70 - 139 mg/dL   POCT Glucose   Result Value Ref Range    Glucose 223 (H) 70 - 139 mg/dL   POCT Glucose   Result Value Ref Range    Glucose 195 (H) 70 - 139 mg/dL   Basic Metabolic Panel   Result Value Ref Range    Sodium 137 136 - 145 mmol/L    Potassium 4.4 3.5 - 5.0 mmol/L    Chloride 99 98 - 107 mmol/L    CO2 29 22 - 31 mmol/L    Anion Gap, Calculation 9 5 - 18 mmol/L    Glucose 116 70 - 125 mg/dL    Calcium 8.5 8.5 - 10.5 mg/dL    BUN 15 8 - 28 mg/dL    Creatinine 0.69 0.60 - 1.10 mg/dL    GFR MDRD Af Amer >60 >60 mL/min/1.73m2    GFR MDRD Non Af Amer >60 >60 mL/min/1.73m2   POCT Glucose   Result Value Ref Range    Glucose 102 70 - 139 mg/dL   POCT Glucose   Result Value Ref Range    Glucose 131 70 - 139 mg/dL     Results for orders placed or performed during the hospital encounter of 01/31/20   Basic Metabolic Panel   Result Value Ref Range    Sodium 137 136 - 145 mmol/L    Potassium 4.4 3.5 - 5.0 mmol/L    Chloride 99 98 - 107 mmol/L    CO2 29 22 - 31 mmol/L    Anion Gap, Calculation 9 5 - 18 mmol/L    Glucose 116 70 - 125 mg/dL    Calcium 8.5 8.5 - 10.5 mg/dL    BUN 15 8 - 28 mg/dL    Creatinine 0.69 0.60 - 1.10 mg/dL    GFR MDRD Af Amer >60 >60 mL/min/1.73m2    GFR MDRD Non Af Amer >60 >60 mL/min/1.73m2         Lab Results   Component Value Date    WBC 7.9 02/06/2020    HGB 9.9 (L) 02/06/2020    HCT 30.5 (L) 02/06/2020    MCV 87 02/06/2020     02/06/2020       Lab Results   Component Value Date    [id number] 337 01/31/2019     Lab Results   Component Value Date    HGBA1C 6.8 (H) 11/09/2018     Lab Results   Component Value Date    INR 1.14 (H) 01/31/2020    INR 2.36 (H) 01/26/2020    INR 1.09 09/16/2019      Vitamin D, Total (25-Hydroxy)   Date Value Ref Range Status   01/04/2019 39.6 30.0 - 80.0 ng/mL Final     Lab Results   Component Value Date    TSH 0.71 02/04/2020           ASSESSMENT/PLAN:    Traumatic hematoma LLE, LLE cellulitis: on keflex until 2/11, will extend until 2/13 and evaluate later this week for further extension. Erythema and warmth improved from markings but continues to show signs of cellulitis. No open areas or drainage. Pain controlled on tylenol, oxycodone. Discussed with nursing to use tylenol as 1st line, oxycodone as 2nd line, and to encourage warm packs, ice prn. Patient Declined to schedule at this time. Educated on importance of these adjunct therapies. Elevate left leg as much as possible. hm1 on 2/12. Applying triamcinolone cream topically to lle.   Acute on chronic hypoxic respiratory failure, Bronchiectasis, COPD: On trelegy daily, duonebs four times a day (albuterol two times a day and hypertonic saline two times a day at home), proair prn, will add hypertonic saline prn. Prednisone taper, benzonatate three times a day prn. Wears o2 prn at home 1-2LNC with activity and at night. Shortness of breath and cough at baseline. Ordered chest percussion therapy vest two times a day for 20min set at 40 for pressure per home settings.  Follows with pulmonology. On azithromycin q72h prophylactically. Ordered IS q1h while awake. Discussed with nursing clarifications on o2 and chest percussion therapy and when to notify provider of worsening condition.   Chronic diastolic and systolic CHF, Pulmonary hypertension: Daily [id number]. On lasix two times a day, spironolactone. Shortness of breath at baseline, 1-2+ rle, 3+ lle. Will order david hose to rle, once improved lle will recommend ace wrapping. Keep elevated when possible. Low sodium diet, no fluid restriction. Monitor weights.   H/o DVT: On xarelto-held for one week for hematoma but now resumed. Will monitor hemoglobin and size of  hematoma with resumption.   HLD: On lipitor.   Chronic Atrial Fibrillation: Rate controlled 90-100s. On cardizem, xarelto. Some rates >100, usually after neb treatments or cpt. Monitor for now, denies chest pain or palpitations.   Constipation: On miralax daily but changed to daily prn per her request, will add senna to two times a day prn.   Iron deficiency anemia, acute blood loss anemia:   On iron daily, hg ordered for 2/12. Hg 9.9 on 2/6.   GERD: On omeprazole. No recent concerns.   Hypokalemia: previously on kcl, no supplement on med orders now. On 2/7 k was 4.4. ordered bmp for 2/12.   Hypomagnesemia: On mag ox. Mg level 1.9 on 2/5. No recent concerns, no need to recheck on chronically.   Schizoaffective disorder, bipolar type: On abilify. Follows with psych. No concerns today. No longer see seroquel on med list, monitor for behavioral concerns.   Osteoporosis: On calcium, vitamin d.   Type 2 DM: controlled with diet. Monitoring on labs and stable.   Vitamin b 12 deficiency: on b12 supplement.   Chronic pain: on tylenol, oxycodone prn, gabapentin. Stable today.     Per therapy eval today    Electronically signed by: Faby Ku NP    Total floor/unit time spent 45 min with >50% time spent on counseling and coordination of care. Counseling regarding copd management, hematoma and cellulitis managemenet. Coordinated care with nursing for copd management, pain management.

## 2021-06-05 NOTE — TELEPHONE ENCOUNTER
CHW called patient regarding CCC enrollment she didn't feel she needed CCC support at this time.     Patient did state that since removing the wrap that the orthopedic doctor put on which was too tight she has experiences some swelling. She states that it is now up to mid thigh level and she is concerned because she is off of her Xerelto at this time due to fall.   She is also asking if she would benefit from seeing Vascular Clinic, she does not have a wound just the swelling.    Please advise

## 2021-06-05 NOTE — PROGRESS NOTES
The clinic Community Health Worker talked with the patient today at the request of the PCP to discuss possible Clinic Care Coordination enrollment.  The service was described to the patient and immediate needs were discussed.  The patient declined enrollement at this time.  The PCP is encouraged to refer in the future if the patient's needs change.

## 2021-06-05 NOTE — TELEPHONE ENCOUNTER
Referral Request  Type of referral: vascular clinic   Who s requesting: Patient  Why the request: patient states for her legs .  Have you been seen for this request: N/A  Does patient have a preference on a group/provider? None   Okay to leave a detailed message?  No

## 2021-06-05 NOTE — PROGRESS NOTES
Office Visit - Follow up    Adalgisa Solano   84 y.o. female    Date of Visit: 1/27/2020    Chief Complaint   Patient presents with     Fall     ER follow up  1/24/2020 and 1/26/2020  Hematoma left lower extrem.     Medication Question Or Clarification     Xarelto  when to resume       Subjective: Hypertension.    Follow-up after emergency room visit January 24, 2020 and January 26, 2020 for a hematoma left lower leg anterolateral aspect after she fell with her  on icy steps outside their home.  He felt he was leading away his leg kicked her in the shin on the left side causing a hematoma Xarelto was on board now on hold.  The hematoma on her leg expanded.  She was seen and evaluated in the emergency room twice on 24 January 2020 and also on 26 January 2020 the working diagnosis traumatic hematoma left lower extremity.  CT scan was done as well as x-ray were no fractures.  Orthopedics were consulted Dr. Kan Llanos presdewey.    No blood in stool no blood in urine denies chest pain shortness of breath medication list reviewed well-tolerated normal effects reconciled.  Allergies include tramadol amoxicillin levofloxacin and penicillin.  She is a non-smoker he does not abuse alcohol.    ROS: A comprehensive review of systems was performed and was otherwise negative    Medications:  Prior to Admission medications    Medication Sig Start Date End Date Taking? Authorizing Provider   acetaminophen (TYLENOL) 500 MG tablet Take 1,000 mg by mouth every 6 (six) hours as needed for pain.   Yes PROVIDER, HISTORICAL   albuterol (PROVENTIL) 2.5 mg /3 mL (0.083 %) nebulizer solution 3 ML INHALATION FOUR TIMES A DAY AS NEEDED USE BEFORE NEBULIZED SALINE. FOR SHORTNESS OF BREATH. 12/27/18  Yes Shai Ellington MD   ARIPiprazole (ABILIFY) 5 MG tablet TAKE 1 TABLET EVERY NIGHT AT BEDTIME 12/27/18  Yes Shai Ellington MD   atorvastatin (LIPITOR) 20 MG tablet TAKE 1 TABLET (20 MG TOTAL) BY MOUTH BEDTIME. 1/27/20   Yes Shai Ellington MD   azithromycin (ZITHROMAX) 250 MG tablet Take 1 tablet (250 mg total) by mouth 3 (three) times a week. 12/11/19 12/10/20 Yes Jun Juarez MD   calcium carbonate-vitamin D3 (CALCIUM WITH VITAMIN D) 600 mg(1,500mg) -400 unit per tablet Take 1 tablet by mouth 2 (two) times a day.   Yes PROVIDER, HISTORICAL   cholecalciferol, vitamin D3, 1,000 unit tablet Take 2,000 Units by mouth daily.    Yes PROVIDER, HISTORICAL   diltiazem (CARDIZEM CD) 120 MG 24 hr capsule TAKE 1 CAPSULE (120 MG TOTAL) BY MOUTH DAILY. 5/22/19  Yes Shai Ellington MD   ferrous sulfate 325 (65 FE) MG tablet Take 1 tablet (325 mg total) by mouth daily with breakfast. 2/1/19  Yes Jak Cardona MD   fluticasone-umeclidinium-vilanterol (TRELEGY ELLIPTA) 100-62.5-25 mcg DsDv inhaler Inhale 1 Inhalation daily. 10/25/19  Yes Jun Juarez MD   furosemide (LASIX) 20 MG tablet Take 1 tablet (20 mg total) by mouth 2 (two) times a day at 9am and 6pm. 8/28/19  Yes Shai Ellington MD   gabapentin (NEURONTIN) 300 MG capsule Take 600 mg by mouth at bedtime.          Yes PROVIDER, HISTORICAL   magnesium oxide (MAG-OX) 400 mg (241.3 mg magnesium) tablet Take 1 tablet (400 mg total) by mouth daily. 1/5/19  Yes Nilesh Dunn,    omeprazole (PRILOSEC) 20 MG capsule TAKE 1 CAPSULE DAILY. 5/22/19  Yes Shai Ellington MD   oxyCODONE (ROXICODONE) 5 MG immediate release tablet Take 1 tablet (5 mg total) by mouth every 6 (six) hours as needed for pain. 1/26/20  Yes Karlos Gagnon, PAFidelinaC   OXYGEN-AIR DELIVERY SYSTEMS [facility]ISC Use 1-2 L As Directed as needed. [facility]   Yes PROVIDER, HISTORICAL   polyethylene glycol (MIRALAX) 17 gram packet Take 17 g by mouth daily.          Yes PROVIDER, HISTORICAL   PROAIR HFA 90 mcg/actuation inhaler INHALE 2 PUFFS EVERY 4 (FOUR) HOURS AS NEEDED FOR WHEEZING. 1/2/20  Yes Shai Ellington MD   sodium chloride 3 % nebulizer solution Take 4 mL by nebulization 3  (three) times a day. Using after albuterol neb.  Patient taking differently: Take 4 mL by nebulization 2 (two) times a day. Using after albuterol neb.       8/8/19  Yes Shai Ellington MD   spironolactone (ALDACTONE) 25 MG tablet Take 0.5 tablets (12.5 mg total) by mouth daily. 2/2/19  Yes Jak Cardona MD   oxyCODONE (ROXICODONE) 5 MG immediate release tablet Take 1 tablet (5 mg total) by mouth every 6 (six) hours as needed for pain. 1/24/20 1/27/20 Yes Bárbara Dobbins MD   cyanocobalamin 1,000 mcg/mL injection Inject 1 mL (1,000 mcg total) into the shoulder, thigh, or buttocks every 30 (thirty) days. 3/2/19   Jak Cardona MD   emollient base (VANICREAM TOP) Apply 1 application topically daily as needed. Uses when takes of compression socks    PROVIDER, HISTORICAL   predniSONE (DELTASONE) 10 mg tablet Take 2 tabs daily for 5 days then 1 tab daily for 5 days. 1/9/20   Jun Juarez MD   triamcinolone (KENALOG) 0.1 % cream Apply 1 application topically 2 (two) times a day as needed. To legs          PROVIDER, HISTORICAL   urea (CARMOL) 40 % Crea Apply 1 application topically 2 (two) times a day. To bottom of feet    PROVIDER, HISTORICAL   XARELTO 20 mg tablet TAKE 1 TABLET (20 MG TOTAL) BY MOUTH DAILY WITH SUPPER. 1/7/20   Shai Ellington MD   atorvastatin (LIPITOR) 20 MG tablet TAKE 1 TABLET (20 MG TOTAL) BY MOUTH BEDTIME. 12/27/18 1/27/20  Shai Ellington MD       Allergies:   Allergies   Allergen Reactions     Tramadol Nausea Only     Amoxicillin Itching and Rash     Levofloxacin Itching and Rash     Penicillins Itching and Rash     Has tolerated ceftriaxone.       Immunizations:   Immunization History   Administered Date(s) Administered     DT (pediatric) 03/29/2004     Influenza high dose,seasonal,PF, 65+ yrs 10/20/2015, 10/09/2016, 11/16/2017, 10/11/2018     Influenza,seasonal quad, PF, =/> 6months 11/20/2014     Pneumo Conj 13-V (2010&after) 10/20/2015     Pneumo  Polysac 23-V 10/18/1999, 08/01/2005     Td,adult,historic,unspecified 03/29/2004     Tdap 05/14/2013       Exam Chest clear to auscultation and percussion.  Heart tones regular rhythm without murmur rub or gallop.  Abdomen soft nontender no organomegaly.  No peritoneal signs.  Extremities free of edema cyanosis or clubbing.  Neck veins nondistended no thyromegaly or scleral icterus noted, carotids full.  Skin warm and dry easily conversant good spirited.  Normal intelligence.  Neurologically intact no gross localizing findings.  Not in acute distress conversant.  BMI elevated at 28 weight 253 pounds.    100/52 pulse 76 with 1 L flowing 94% O2 sats.  No central acrocyanosis no tachypnea no clubbing.  Accompanied today by her  Anabel who is very supportive.    Assessment and Plan  Hypertension controlled.  100/52 stable.    Traumatic hematoma left lower extremity left anterior lateral aspect of the tibia area on Xarelto currently on hold okay to resume Xarelto if not actively bleeding and leg is not expanding it is decreasing in size and the hematoma is being resorbed okay to restart Xarelto 10 days from initial injury on February 5, 2020.    Multiple drug allergies as listed above reviewed.    COPD with bronchial breath sounds scattered no rales.  Stable.    History of chronic atrial fibrillation on Xarelto because of the hematoma left leg in the non-reversibility of Xarelto will restart Xarelto in 10 days from initial injury on approximately February 5, 2020 watch for expansion and swelling in the left lower extremity closely.  If bleeding occurs stop Xarelto.    Time: total time spent with the patient was 25 minutes of which >50% was spent in counseling and coordination of care    The following high BMI interventions were performed this visit: encouragement to exercise    Shai Ellington MD    Patient Active Problem List   Diagnosis     Recurrent major depressive episodes (H)     Chronic obstructive  pulmonary disease (H)     Peripheral arterial disease (H)     Type 2 diabetes mellitus without complication (H)     Hypertension     Schizoaffective disorder, bipolar type (H)     Arnold-Chiari malformation (H)     Chronic diastolic congestive heart failure (H)     Chronic respiratory failure with hypoxia (H)     Pulmonary hypertension (H)     Primary osteoarthritis of both knees     Chronic atrial fibrillation     Gastroesophageal reflux disease, esophagitis presence not specified     Stasis dermatitis of both legs     Generalized muscle weakness     Drug-induced constipation     Chronic pain syndrome     Acute combined systolic and diastolic CHF, NYHA class 1 (H)     Slow transit constipation     Avascular necrosis of bone (H)     Abdominal pain     SBO (small bowel obstruction) (H)     Status post right hip replacement     Atrial fibrillation with RVR (H)     Bronchiectasis with acute exacerbation (H)

## 2021-06-05 NOTE — TELEPHONE ENCOUNTER
"Call from Adalgisa.  States she is having some second thoughts re: using the vest.  Her  is concerned about the rods and screws in her back.  Thinking that this may knock them lose?  Asking for Dr. Juarez to call to discuss.    Per Dr. Juarez:  \"overall risk is very low if she starts slowly and gently.  If there is concern she can hold off from using\".      She stated she would give the vest a try.  "

## 2021-06-05 NOTE — PROGRESS NOTES
Scheduled Follow Up Call: Attempt 1 Community Health Worker called and left a message for the patient. If the patient is returning my call, please transfer the patient to Lyle at ext. [phone].

## 2021-06-05 NOTE — TELEPHONE ENCOUNTER
Spoke with the patient and relayed message below from Dr. Ellington.  She verbalized understanding and had no further questions at this time.    Referral has been placed for Dr. Ellington to review per message below.  Silvia AZAR, CELINE/CMT....................3:44 PM

## 2021-06-05 NOTE — TELEPHONE ENCOUNTER
Pt called stating that she fell on her steps this morning and she has a huge lump on her L leg. She has a hx of lymphedema. Pt has not been seen since 3/2019. Writer informed her to contact PCP or go to the ED. She agreed with this plan.

## 2021-06-05 NOTE — TELEPHONE ENCOUNTER
Call from Adalgisa. States she is having a COPD exacerbation and needs to be put back on her medications.    Has been taking Azithromycin M-W-F.     Will prescribe action plan of prednisone 20mg daily x 5 days, then 10mg daily x 5 days.  Doxycycline 100mg two times a day x 7 days. She will stop her azithromycin while taking the doxy and then resume M-W-F when completed.  Adalgisa has scheduled office visit with Dr. Juarez next week.

## 2021-06-05 NOTE — PROGRESS NOTES
Assessment/Plan:        Diagnoses and all orders for this visit:    Simple chronic bronchitis (H)    Bronchiectasis with acute exacerbation (H)    83-year-old female with chronic bronchitis, bronchiectasis, respiratory failure with hypoxia here for bronchiectasis/bronchitis exacerbation.    Complete prednisone  Azithromycin 3 times weekly  Vest therapy  Continue inhalers    Patient has daily productive cough lasting greater than 6 months.  No skilled caregiver available to provide manual CPT.  Aerobika failed to effectively mobilize secretions.  CT performed on 10/3/2019 confirms Bronchiectasis.             Subjective:    Patient ID: Adalgisa Solano is a 84 y.o. female.  83-year-old female here for follow-up of chronic bronchitis and bronchiectasis.  She has had 5 exacerbations in the last year and has been in the hospital multiple times.  She is having an exacerbation.  It started 7 days ago.  No clear provocative factor. Symptoms have improved with treatment.  Also bringing up more sputum and wheezing. No fevers or hemoptysis. She is not on oxygen now.    She is having some post nasal drip.  These symptoms do not seem to go along with her respiratory issues.          Review of Systems 1Constitutional positive for fatigue, GI negative for reflux  /60   Pulse 100   Resp 24   Wt 149 lb 11.2 oz (67.9 kg)   LMP  (LMP Unknown)   SpO2 93% Comment: 1 L pulsing  BMI 28.06 kg/m      Objective:    Physical Exam   Constitutional: She is oriented to person, place, and time. She appears well-developed and well-nourished. No distress.   HENT:   Head: Normocephalic and atraumatic.   Eyes: Pupils are equal, round, and reactive to light. Conjunctivae are normal. Right eye exhibits no discharge. Left eye exhibits no discharge. No scleral icterus.   Cardiovascular: Normal rate, regular rhythm and normal heart sounds. Exam reveals no gallop.   No murmur heard.  Pulmonary/Chest: Effort normal. No respiratory distress.  She has wheezes. She has rales.   Abdominal: Soft.   Musculoskeletal:         General: Edema (Stable to last visit) present. No deformity.   Neurological: She is alert and oriented to person, place, and time. No cranial nerve deficit. Coordination normal.   Skin: Skin is warm and dry. She is not diaphoretic. No erythema.   Psychiatric: She has a normal mood and affect. Her behavior is normal. Thought content normal.   Nursing note and vitals reviewed.          Current Outpatient Medications on File Prior to Visit   Medication Sig Dispense Refill     acetaminophen (TYLENOL) 500 MG tablet Take 1,000 mg by mouth every 6 (six) hours as needed for pain.       albuterol (PROVENTIL) 2.5 mg /3 mL (0.083 %) nebulizer solution 3 ML INHALATION FOUR TIMES A DAY AS NEEDED USE BEFORE NEBULIZED SALINE. FOR SHORTNESS OF BREATH. 300 mL 11     ARIPiprazole (ABILIFY) 5 MG tablet TAKE 1 TABLET EVERY NIGHT AT BEDTIME 90 tablet 1     atorvastatin (LIPITOR) 20 MG tablet TAKE 1 TABLET (20 MG TOTAL) BY MOUTH BEDTIME. 90 tablet 3     azithromycin (ZITHROMAX) 250 MG tablet Take 1 tablet (250 mg total) by mouth 3 (three) times a week. 12 tablet 11     calcium carbonate-vitamin D3 (CALCIUM WITH VITAMIN D) 600 mg(1,500mg) -400 unit per tablet Take 1 tablet by mouth 2 (two) times a day.       cholecalciferol, vitamin D3, 1,000 unit tablet Take 2,000 Units by mouth daily.        cyanocobalamin 1,000 mcg/mL injection Inject 1 mL (1,000 mcg total) into the shoulder, thigh, or buttocks every 30 (thirty) days.  0     diltiazem (CARDIZEM CD) 120 MG 24 hr capsule TAKE 1 CAPSULE (120 MG TOTAL) BY MOUTH DAILY. 90 capsule 3     doxycycline (VIBRAMYCIN) 100 MG capsule Take 1 capsule (100 mg total) by mouth 2 (two) times a day for 7 days. 14 capsule 0     emollient base (VANICREAM TOP) Apply 1 application topically daily as needed. Uses when takes of compression socks       ferrous sulfate 325 (65 FE) MG tablet Take 1 tablet (325 mg total) by mouth daily  with breakfast.  0     fluticasone-umeclidinium-vilanterol (TRELEGY ELLIPTA) 100-62.5-25 mcg DsDv inhaler Inhale 1 Inhalation daily. 60 each 11     furosemide (LASIX) 20 MG tablet Take 1 tablet (20 mg total) by mouth 2 (two) times a day at 9am and 6pm. 180 tablet 3     gabapentin (NEURONTIN) 300 MG capsule Take 600 mg by mouth at bedtime.              magnesium oxide (MAG-OX) 400 mg (241.3 mg magnesium) tablet Take 1 tablet (400 mg total) by mouth daily.  0     omeprazole (PRILOSEC) 20 MG capsule TAKE 1 CAPSULE DAILY. 90 capsule 3     OXYGEN-AIR DELIVERY SYSTEMS [facility] 1-2 L As Directed as needed. [facility]       polyethylene glycol (MIRALAX) 17 gram packet Take 17 g by mouth daily.              predniSONE (DELTASONE) 10 mg tablet Take 2 tabs daily for 5 days then 1 tab daily for 5 days. 15 tablet 0     PROAIR HFA 90 mcg/actuation inhaler INHALE 2 PUFFS EVERY 4 (FOUR) HOURS AS NEEDED FOR WHEEZING. 8.5 g 5     sodium chloride 3 % nebulizer solution Take 4 mL by nebulization 3 (three) times a day. Using after albuterol neb. (Patient taking differently: Take 4 mL by nebulization 2 (two) times a day. Using after albuterol neb.      ) 360 mL 5     spironolactone (ALDACTONE) 25 MG tablet Take 0.5 tablets (12.5 mg total) by mouth daily. 15 tablet 0     triamcinolone (KENALOG) 0.1 % cream Apply 1 application topically 2 (two) times a day as needed. To legs             urea (CARMOL) 40 % Crea Apply 1 application topically 2 (two) times a day. To bottom of feet       XARELTO 20 mg tablet TAKE 1 TABLET (20 MG TOTAL) BY MOUTH DAILY WITH SUPPER. 30 tablet 0     Current Facility-Administered Medications on File Prior to Visit   Medication Dose Route Frequency Provider Last Rate Last Dose     cyanocobalamin injection 1,000 mcg  1,000 mcg Intramuscular Q30 Days Shai Ellington MD   1,000 mcg at 12/27/19 1004     /60   Pulse 100   Resp 24   Wt 149 lb 11.2 oz (67.9 kg)   LMP  (LMP Unknown)   SpO2 93% Comment: 1 L  pulsing  BMI 28.06 kg/m      Medical History  Active Ambulatory (Non-Hospital) Problems    Diagnosis     Atrial fibrillation with RVR (H)     Status post right hip replacement     SBO (small bowel obstruction) (H)     Abdominal pain     Avascular necrosis of bone (H)     Slow transit constipation     Acute combined systolic and diastolic CHF, NYHA class 1 (H)     Generalized muscle weakness     Drug-induced constipation     Chronic pain syndrome     Stasis dermatitis of both legs     Chronic atrial fibrillation     Gastroesophageal reflux disease, esophagitis presence not specified     Primary osteoarthritis of both knees     Pulmonary hypertension (H)     Chronic respiratory failure with hypoxia (H)     Chronic diastolic congestive heart failure (H)     Type 2 diabetes mellitus without complication (H)     Hypertension     Peripheral arterial disease (H)     Schizoaffective disorder, bipolar type (H)     Recurrent major depressive episodes (H)     Chronic obstructive pulmonary disease (H)     Arnold-Chiari malformation (H)     Past Medical History:   Diagnosis Date     Acute and chronic respiratory failure with hypoxia (H)      Acute blood loss anemia 1/15/2019     Acute bronchitis 1/15/2019     Anemia      Anxiety      Arm skin lesion, right      Arnold-Chiari malformation (H) 1998     Arthritis      Atrial fibrillation (H) 02/2017     Avascular necrosis of bone (H)      Breast cyst      Breast lump      Candidal skin infection      Cellulitis of left lower extremity 1/28/2019     CHF (congestive heart failure) (H)      Chronic bronchitis (H)      Chronic diastolic heart failure (H)      Chronic pain syndrome      Chronic respiratory failure with hypoxia (H) 3/13/2017     COPD (chronic obstructive pulmonary disease) (H)      COPD exacerbation (H)      Depression      Diet-controlled type 2 diabetes mellitus (H)      Drug induced constipation      DVT of axillary vein, acute (H)      DVT of axillary vein, acute  left (H)      Edema      Encounter for palliative care      Erysipelas      Fracture of femoral neck, left (H)      Generalized muscle weakness      GERD (gastroesophageal reflux disease)      History of blood clots      Hyperlipidemia      Hypertension      Left hip pain      Lumbar spinal stenosis      PAD (peripheral artery disease) (H)      Peripheral arterial disease (H) 10/28/2015     Psoriasis      Pulmonary hypertension (H) 3/5/2018     Recurrent major depressive episodes (H)      Schizoaffective disorder, bipolar type (H) 2015     Slow transit constipation      Stasis dermatitis of both legs      Status post total hip replacement, 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     Type 2 diabetes mellitus without complication (H) 2016     Vitamin B12 deficiency      Volume overload         Surgical History  She  has a past surgical history that includes Breast cyst aspiration (Left, ); Cholecystectomy; Lumbar fusion (N/A, 2014); Back surgery; Laparoscopic incisional / umbilical / ventral hernia repair (Left, 3/27/2015);  section (X3); Joint replacement (Right); Eye surgery; pr total knee arthroplasty (Left, 10/7/2016); Cervical spine surgery; pr total hip arthroplasty (Left, 2019); pr femoral fx, open tx (Left, 2019); Hand surgery; Colonoscopy; IR for PAD LOWER EXTREMITY; Arm skin lesion biopsy / excision (Right, 2019); and pr total hip arthroplasty (Right, 2019).         Social history reviewed: Lives at home.  Here with her .   Allergies  Allergies   Allergen Reactions     Tramadol Nausea Only     Amoxicillin Itching and Rash     Levofloxacin Itching and Rash     Penicillins Itching and Rash     Has tolerated ceftriaxone.    Family history reviewed: no changes from previous                          Data Review - none new

## 2021-06-05 NOTE — TELEPHONE ENCOUNTER
RN cannot approve Refill Request    RN can NOT refill this medication med is not covered by policy/route to provider. Last office visit: 12/23/2019 Shai Ellington MD Last Physical: 8/22/2019 Last MTM visit: Visit date not found Last visit same specialty: 12/23/2019 Shai Ellington MD.  Next visit within 3 mo: Visit date not found  Next physical within 3 mo: Visit date not found      Lizett Santiago, [facility] Triage/Med Refill 1/7/2020    Requested Prescriptions   Pending Prescriptions Disp Refills     XARELTO 20 mg tablet [Pharmacy Med Name: XARELTO 20MG] 30 tablet 0     Sig: TAKE 1 TABLET (20 MG TOTAL) BY MOUTH DAILY WITH SUPPER.       Apixaban/Rivaroxaban/Dabigatran Refill Protocol Failed - 1/6/2020 11:15 AM        Failed - Route to appropriate pool/provider     Last Anticoagulation Summary:           Passed - Renal function test in last year     Creatinine   Date Value Ref Range Status   12/23/2019 0.73 0.60 - 1.10 mg/dL Final             Passed - PCP or prescribing provider visit in past 12 months       Last office visit with prescriber/PCP: 12/23/2019 Shai Ellington MD OR same dept: 12/23/2019 Shai Ellington MD OR same specialty: 12/23/2019 Shai Ellington MD  Last physical: 8/22/2019 Last MTM visit: Visit date not found   Next visit within 3 mo: Visit date not found  Next physical within 3 mo: Visit date not found  Prescriber OR PCP: Shai Ellington MD  Last diagnosis associated with med order: 1. Routine general medical examination at a health care facility  - XARELTO 20 mg tablet [Pharmacy Med Name: XARELTO 20MG]; TAKE 1 TABLET (20 MG TOTAL) BY MOUTH DAILY WITH SUPPER.  Dispense: 30 tablet; Refill: 0    If protocol passes may refill for 12 months if within 3 months of last provider visit (or a total of 15 months).

## 2021-06-06 NOTE — TELEPHONE ENCOUNTER
Okay for cephalexin 500 mg capsules number 40 capsules take 1 capsule 4 times a day with 1 additional refill.  Please call patient and pharmacy.  Please asked patient to keep leg elevated as much as possible push fluids rest and use for pain or discomfort arthritis strength Tylenol 2 tablets 3 times a day.  Thanks for calling patient and her pharmacy.

## 2021-06-06 NOTE — TELEPHONE ENCOUNTER
[facility] Nurse Triage Telephone Note      Provider: STEVEN Velasquez  Facility: [facility]    Facility Type: TCU    Caller: Anastasia  Call Back Number:  [phone]    Allergies: Tramadol; Amoxicillin; Levofloxacin; and Penicillins    Reason for call: Nurse reporting Heme 1, BMP, Mg, and B12 levels.  Notable meds:  B12 1000mcg IM Q month, Prednisone taper, magnesium oxide 400mg daily.  Patient is supposed to get B12 injection at the end of February.      Verbal Order/Direction given by Provider: Hold B12 injection for February, but may resume in March.  Check Heme 1 on 2/17/20.      Provider giving order: STEVEN Velasquez    Verbal order given to: Yakelin Santiago RN

## 2021-06-06 NOTE — TELEPHONE ENCOUNTER
RN cannot approve Refill Request    RN can NOT refill this medication medication not on med list.      Shreya Martinez, [facility] Triage/Med Refill 2/24/2020    Requested Prescriptions   Pending Prescriptions Disp Refills     albuterol (PROVENTIL) 2.5 mg /3 mL (0.083 %) nebulizer solution [Pharmacy Med Name: ALBUTEROL SULFATE (2.5 MG/3ML) 0.083%] 300 mL 10     Sig: 3 ML INHALATION FOUR TIMES A DAY AS NEEDED USE BEFORE NEBULIZED SALINE. FOR SHORTNESS OF BREATH.       Albuterol/Levalbuterol Refill Protocol Passed - 2/20/2020  5:33 PM        Passed - PCP or prescribing provider visit in last year     Last office visit with prescriber/PCP: 1/27/2020 Shai Ellington MD OR same dept: 1/27/2020 Shai Ellington MD OR same specialty: 1/27/2020 Shai Ellington MD Last physical: 8/22/2019       Next appt within 3 mo: Visit date not found  Next physical within 3 mo: Visit date not found  Prescriber OR PCP: Shai Ellington MD  Last diagnosis associated with med order: There are no diagnoses linked to this encounter.  If protocol passes may refill for 6 months if within 3 months of last provider visit (or a total of 9 months). If patient requesting >1 inhaler per month refill x 6 months and have patient make appointment with provider.

## 2021-06-06 NOTE — TELEPHONE ENCOUNTER
RN cannot approve Refill Request    RN can NOT refill this medication med is not covered by policy/route to provider     . Last office visit: 2/24/2020 Shai Ellington MD Last Physical: 8/22/2019 Last MTM visit: Visit date not found Last visit same specialty: Visit date not found.  Next visit within 3 mo: Visit date not found  Next physical within 3 mo: Visit date not found      Shreya Martinez, [facility] Triage/Med Refill 3/14/2020    Requested Prescriptions   Pending Prescriptions Disp Refills     XARELTO 20 mg tablet [Pharmacy Med Name: XARELTO 20MG] 30 tablet 0     Sig: TAKE 1 TABLET (20 MG TOTAL) BY MOUTH DAILY WITH SUPPER.       There is no refill protocol information for this order

## 2021-06-06 NOTE — TELEPHONE ENCOUNTER
Refill Approved    Rx renewed per Medication Renewal Policy. Medication was last renewed on 2/2/19.    Shreya Martinez, [facility] Triage/Med Refill 3/16/2020     Requested Prescriptions   Pending Prescriptions Disp Refills     spironolactone (ALDACTONE) 25 MG tablet [Pharmacy Med Name: SPIRONOLACT 25MG] 45 tablet PRN     Sig: TAKE 1/2 TABLET (12.5 MG TOTAL) BY MOUTH DAILY.       Diuretics/Combination Diuretics Refill Protocol  Passed - 3/14/2020  1:11 PM        Passed - Visit with PCP or prescribing provider visit in past 12 months     Last office visit with prescriber/PCP: 2/24/2020 Shai Ellington MD OR same dept: 2/24/2020 Shai Ellington MD OR same specialty: 2/24/2020 Shai Ellington MD  Last physical: 8/22/2019 Last MTM visit: Visit date not found   Next visit within 3 mo: Visit date not found  Next physical within 3 mo: Visit date not found  Prescriber OR PCP: Shai Ellington MD  Last diagnosis associated with med order: 1. Hypervolemia, unspecified hypervolemia type  - spironolactone (ALDACTONE) 25 MG tablet [Pharmacy Med Name: SPIRONOLACT 25MG]; TAKE 1/2 TABLET (12.5 MG TOTAL) BY MOUTH DAILY.  Dispense: 45 tablet; Refill: PRN    If protocol passes may refill for 12 months if within 3 months of last provider visit (or a total of 15 months).             Passed - Serum Potassium in past 12 months      Lab Results   Component Value Date    Potassium 4.3 02/12/2020             Passed - Serum Sodium in past 12 months      Lab Results   Component Value Date    Sodium 134 (L) 02/12/2020    Sodium, Urine <20 02/04/2020             Passed - Blood pressure on file in past 12 months     BP Readings from Last 1 Encounters:   02/24/20 110/60             Passed - Serum Creatinine in past 12 months      Creatinine   Date Value Ref Range Status   02/12/2020 0.66 0.60 - 1.10 mg/dL Final

## 2021-06-06 NOTE — TELEPHONE ENCOUNTER
Call from Adalgisa. States her sats have been dipping to 83% on RA.  When she puts her oxygen on at 1/2 LPM her sats come up to 95%.  She feeling more short of breath, but no other symptoms.  No fevers.  Is feeling like this is just starting and wants to get a jump on things before it gets worse.    Will start her action plan: prednisone 20mg daily x 5 days, then 10mg daily x 5 days and doxycycline 100mg two times a day x 7 days.

## 2021-06-06 NOTE — TELEPHONE ENCOUNTER
Dr. Ellington,  Patient was last seen in clinic on 2/24/2020 and had her last B12 injection at the [facility] at 1/27/2020.  If you would like the patient to continue with the B12 injections, the patient will need an order.  Please advise.  Thank you.  Silvia AZAR, CELINE/CMT....................11:10 AM

## 2021-06-06 NOTE — TELEPHONE ENCOUNTER
Spoke with the patient and relayed message below from Dr. Ellington.  Patient verbalized understanding and had no further questions at this time.      Order has been set up for Dr. Ellington to review per message below.  Silvia AZAR, CELINE/CMT....................11:59 AM

## 2021-06-06 NOTE — TELEPHONE ENCOUNTER
Orders being requested: B12  Reason service is needed/diagnosis: Calling as was not advised of next injection due.  Usually gets every month.  Please add order to patients chart and call her back so she can schedule this if the plan os still to get every month.  When are orders needed by: ASAP  Where to send Orders: Put in patient's chart please  Okay to leave detailed message?  Yes

## 2021-06-06 NOTE — TELEPHONE ENCOUNTER
Orders being requested: Physical therapy  1 times a week for 1 week, 2 times a week for 3 weeks. For strengthening both legs gait balance transfers and  stairs training.   Reason service is needed/diagnosis: hematoma of left lower leg  When are orders needed by: asap  Where to send Orders: Phone:  [phone]  Okay to leave detailed message?  Yes

## 2021-06-06 NOTE — PROGRESS NOTES
[facility]    DATE: 2020    NAME:  Adalgisa Solano             :  1935  MRN: [id number]  CODE STATUS:  Full code    VISIT TYPE: Review Of Multiple Medical Conditions     FACILITY:  [facility] [[id number]]       CHIEF COMPLAIN/REASON FOR VISIT:    Chief Complaint   Patient presents with     Review Of Multiple Medical Conditions               HISTORY OF PRESENT ILLNESS: Adalgisa Solano is a 84 y.o. female who was admitted - for fall and developed large, painful, hematoma of left leg. She was unable to bear weight on her leg. Her xarelto was held and surgery consulted. They attempted aspiration but were unsuccessful. MRI confirmed hematoma and also treated for cellulitis with keflex. Her blood cultures were negative. She was also treated for acute on chronic COPD with prednisone therapy, percussion therapy. She was treated for hyponatremia with lasix. Her xarelto was restarted after being held or 7 days. She was discharged to TCU for further rehab.  She has PMH of h/o DVT, persistent atrial fibrillation,  COPD, normocytic anemia, Type 2 DM, HTN, Depression, schizoaffective disorder, HLD, neuropathy, GERD, chronic respiratory failure on 1LNC, CHF, avascular necrosis of Left hip, chronic lymphedema. Prior to this she lived with her .     Today Ms. Solano is seen for follow up visit today. Per nursing staff prednisone order  but requesting clarification if needed to be extended. Staff also requesting oxycodone script and nystatin powder for rash in abdominal fold. On exam she is seen in her wheelchair. She says her leg is doing much better and pain is subsiding. She does not want to take the oxycodone anymore. She feels her pain is well controlled without it. She says her bowels are fine. She does not feel she has been wheezing recently and her breathing seems better. She does keep coughing up some yellow stuff at  times and is asking to make sure she is on her antibiotic every 3 days. She says she has a care conference tomorrow and is hoping to return home on thursday. She says they had her scheduled for Saturday so it would only be a couple days early. She denies fevers or other concerns today.     REVIEW OF SYSTEMS:  PROBLEMS AND REVIEW OF SYSTEMS:   Review of Systems  Today on ROS:   Currently, no fever, chills, or rigors. Decreased vision and hearing. Denies any chest pain, headaches, palpitations, lightheadedness, dizziness. Appetite is good. Denies any GERD symptoms. Denies any difficulty with swallowing, nausea, or vomiting.  positive for chronic o2 1-2 LNC at home as needed, chest percussion therapy, left leg pain improving, shortness of breath, congested cough improving, urinary hesitancy, constipation improved      Allergies   Allergen Reactions     Tramadol Nausea Only     Amoxicillin Itching and Rash     Levofloxacin Itching and Rash     Penicillins Itching and Rash     Has tolerated ceftriaxone.     Current Outpatient Medications   Medication Sig     acetaminophen (TYLENOL) 500 MG tablet Take 1,000 mg by mouth every 6 (six) hours as needed for pain.     ARIPiprazole (ABILIFY) 5 MG tablet TAKE 1 TABLET EVERY NIGHT AT BEDTIME     atorvastatin (LIPITOR) 20 MG tablet TAKE 1 TABLET (20 MG TOTAL) BY MOUTH BEDTIME.     azithromycin (ZITHROMAX) 250 MG tablet Take 250 mg by mouth every third day.     benzonatate (TESSALON) 100 MG capsule Take 1 capsule (100 mg total) by mouth 3 (three) times a day as needed for cough.     calcium carbonate-vitamin D3 (CALCIUM WITH VITAMIN D) 600 mg(1,500mg) -400 unit per tablet Take 1 tablet by mouth 2 (two) times a day.     cholecalciferol, vitamin D3, 1,000 unit tablet Take 2,000 Units by mouth daily.      cyanocobalamin 1,000 mcg/mL injection Inject 1 mL (1,000 mcg total) into the shoulder, thigh, or buttocks every 30 (thirty) days.     diltiazem (CARDIZEM CD) 120 MG 24 hr capsule TAKE  1 CAPSULE (120 MG TOTAL) BY MOUTH DAILY.     emollient base (VANICREAM TOP) Apply 1 application topically daily as needed. Uses when takes of compression socks     ferrous sulfate 325 (65 FE) MG tablet Take 1 tablet (325 mg total) by mouth daily with breakfast.     fluticasone-umeclidinium-vilanterol (TRELEGY ELLIPTA) 100-62.5-25 mcg DsDv inhaler Inhale 1 Inhalation daily.     furosemide (LASIX) 20 MG tablet Take 1 tablet (20 mg total) by mouth 2 (two) times a day at 9am and 6pm.     gabapentin (NEURONTIN) 300 MG capsule Take 600 mg by mouth at bedtime.            ipratropium (ATROVENT) 0.03 % nasal spray 1 spray into each nostril daily.     ipratropium-albuterol (DUO-NEB) 0.5-2.5 mg/3 mL nebulizer Take 3 mL by nebulization 4 (four) times a day.     magnesium oxide (MAG-OX) 400 mg (241.3 mg magnesium) tablet Take 1 tablet (400 mg total) by mouth daily.     omeprazole (PRILOSEC) 20 MG capsule Take 20 mg by mouth daily before breakfast.     OXYGEN-AIR DELIVERY SYSTEMS [facility] [place] 1-2 L As Directed as needed. [employer]     polyethylene glycol (MIRALAX) 17 gram packet Take 17 g by mouth daily as needed.      PROAIR HFA 90 mcg/actuation inhaler INHALE 2 PUFFS EVERY 4 (FOUR) HOURS AS NEEDED FOR WHEEZING.     rivaroxaban ANTICOAGULANT (XARELTO) 20 mg tablet Take 1 tablet (20 mg total) by mouth daily with supper.     senna (SENOKOT) 8.6 mg tablet Take 1 tablet by mouth 2 (two) times a day as needed for constipation.     sodium chloride 3 % nebulizer solution Take 3 mL by nebulization as needed.     spironolactone (ALDACTONE) 25 MG tablet Take 0.5 tablets (12.5 mg total) by mouth daily.     triamcinolone (KENALOG) 0.1 % cream Apply 1 application topically 2 (two) times a day as needed. To legs           urea (CARMOL) 40 % Crea Apply 1 application topically 2 (two) times a day as needed. To bottom of feet      Past Medical History:    Past Medical History:   Diagnosis Date     Acute and chronic respiratory failure with hypoxia (H)       Acute blood loss anemia 1/15/2019     Acute bronchitis 1/15/2019     Anemia     pernicious anemia     Anxiety      Arm skin lesion, right      Arnold-Chiari malformation (H) 1998     Arthritis      Atrial fibrillation (H) 02/2017     Avascular necrosis of bone (H)      Breast cyst      Breast lump      Candidal skin infection      Cellulitis of left lower extremity 1/28/2019     CHF (congestive heart failure) (H)     diastolic and systolic     Chronic bronchitis (H)     & acute     Chronic diastolic heart failure (H)     diastolic chf     Chronic pain syndrome      Chronic respiratory failure with hypoxia (H) 3/13/2017     COPD (chronic obstructive pulmonary disease) (H)      COPD exacerbation (H)      Depression      Diet-controlled type 2 diabetes mellitus (H)      Drug induced constipation      DVT of axillary vein, acute (H)     left     DVT of axillary vein, acute left (H)      Edema      Encounter for palliative care      Erysipelas      Fracture of femoral neck, left (H)      Generalized muscle weakness      GERD (gastroesophageal reflux disease)      History of blood clots      Hyperlipidemia      Hypertension      Left hip pain      Lumbar spinal stenosis      PAD (peripheral artery disease) (H)      Peripheral arterial disease (H) 10/28/2015     Psoriasis     arms      Pulmonary hypertension (H) 3/5/2018     Recurrent major depressive episodes (H)     Created by Conversion  Replacement Utility updated for latest IMO load     Schizoaffective disorder, bipolar type (H) 6/11/2015     Slow transit constipation      Stasis dermatitis of both legs      Status post total hip replacement, 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 No evidence of trauma. Ears: No acute purulent discharge. Eyes: Conjunctivae pink with no scleral jaundice. Nose: Normal mucosa and septum. NECK: Supple with no cervical or supraclavicular lymphadenopathy. Trachea is midline.   CHEST: No tenderness or deformity, no crepitus  LUNG: dim with scattered rhonchi bilaterally to auscultation. Congested cough improving, 2LNC intermittently, chest percussion therapy vest in place  BACK: No kyphosis of the thoracic spine. Symmetric, no curvature, ROM normal, no CVA tenderness, no spinal tenderness   CVS: irregularly irregular rhythm, there are no murmurs, rubs, gallops, or heaves,  2+ pulses symmetric in all extremities.  ABDOMEN: Rounded and soft, nontender to palpation, no masses, no organomegaly, good bowel sounds, no rebound or guarding, no peritoneal signs.   EXTREMITIES: 1-2+ LLE pitting leg edema with small-moderate sized hematoma present surrounded by erythema, warmth, no open areas, no drainage, smaller in size than outlined markings, 1+ rle  SKIN: Warm and dry  NEUROLOGICAL: The patient is oriented to person, place and time.             LABS:   Recent Results (from the past 168 hour(s))   Basic Metabolic Panel   Result Value Ref Range    Sodium 134 (L) 136 - 145 mmol/L    Potassium 4.3 3.5 - 5.0 mmol/L    Chloride 92 (L) 98 - 107 mmol/L    CO2 35 (H) 22 - 31 mmol/L    Anion Gap, Calculation 7 5 - 18 mmol/L    Glucose 92 70 - 125 mg/dL    Calcium 8.5 8.5 - 10.5 mg/dL    BUN 13 8 - 28 mg/dL    Creatinine 0.66 0.60 - 1.10 mg/dL    GFR MDRD Af Amer >60 >60 mL/min/1.73m2    GFR MDRD Non Af Amer >60 >60 mL/min/1.73m2   HM1 (CBC with Diff)   Result Value Ref Range    WBC 12.5 (H) 4.0 - 11.0 thou/uL    RBC 4.20 3.80 - 5.40 mill/uL    Hemoglobin 12.1 12.0 - 16.0 g/dL     Hematocrit 38.6 35.0 - 47.0 %    MCV 92 80 - 100 fL    MCH 28.8 27.0 - 34.0 pg    MCHC 31.3 (L) 32.0 - 36.0 g/dL    RDW 16.4 (H) 11.0 - 14.5 %    Platelets 376 140 - 440 thou/uL    MPV 10.0 8.5 - 12.5 fL    Neutrophils % 76 (H) 50 - 70 %    Lymphocytes % 12 (L) 20 - 40 %    Monocytes % 9 2 - 10 %    Eosinophils % 2 0 - 6 %    Basophils % 0 0 - 2 %    Neutrophils Absolute 9.5 (H) 2.0 - 7.7 thou/uL    Lymphocytes Absolute 1.6 0.8 - 4.4 thou/uL    Monocytes Absolute 1.1 (H) 0.0 - 0.9 thou/uL    Eosinophils Absolute 0.2 0.0 - 0.4 thou/uL    Basophils Absolute 0.0 0.0 - 0.2 thou/uL   Magnesium   Result Value Ref Range    Magnesium 2.0 1.8 - 2.6 mg/dL   Vitamin B12   Result Value Ref Range    Vitamin B-12 958 (H) 213 - 816 pg/mL     Results for orders placed or performed in visit on 02/12/20   Basic Metabolic Panel   Result Value Ref Range    Sodium 134 (L) 136 - 145 mmol/L    Potassium 4.3 3.5 - 5.0 mmol/L    Chloride 92 (L) 98 - 107 mmol/L    CO2 35 (H) 22 - 31 mmol/L    Anion Gap, Calculation 7 5 - 18 mmol/L    Glucose 92 70 - 125 mg/dL    Calcium 8.5 8.5 - 10.5 mg/dL    BUN 13 8 - 28 mg/dL    Creatinine 0.66 0.60 - 1.10 mg/dL    GFR MDRD Af Amer >60 >60 mL/min/1.73m2    GFR MDRD Non Af Amer >60 >60 mL/min/1.73m2         Lab Results   Component Value Date    WBC 12.5 (H) 02/12/2020    HGB 12.1 02/12/2020    HCT 38.6 02/12/2020    MCV 92 02/12/2020     02/12/2020       Lab Results   Component Value Date    [id number] 958 (H) 02/12/2020     Lab Results   Component Value Date    HGBA1C 6.8 (H) 11/09/2018     Lab Results   Component Value Date    INR 1.14 (H) 01/31/2020    INR 2.36 (H) 01/26/2020    INR 1.09 09/16/2019     Vitamin D, Total (25-Hydroxy)   Date Value Ref Range Status   01/04/2019 39.6 30.0 - 80.0 ng/mL Final     Lab Results   Component Value Date    TSH 0.71 02/04/2020           ASSESSMENT/PLAN:    Traumatic hematoma LLE, LLE cellulitis: completed keflex on 2/13. Erythema, edema and pain improving  gradually. No open areas or drainage. Pain controlled on tylenol, requesting to stop oxycodone will dc today. encouraged warm packs, ice prn. Elevate left leg as much as possible.  triamcinolone cream topically to lle. Wearing gentle compression tubigrips on lle, compression stocking to rle. Much improved and planning to return home soon.   Acute on chronic hypoxic respiratory failure, Bronchiectasis, COPD: On trelegy daily, duonebs four times a day (albuterol two times a day and hypertonic saline two times a day at home), proair prn, will add hypertonic saline prn. Prednisone taper completed. benzonatate three times a day prn. Wears o2 prn at home 1-2LNC with activity and at night. chest percussion therapy vest two times a day for 20min set at 40 for pressure per home settings.  Follows with pulmonology. On azithromycin q72h prophylactically.  IS q1h while awake. Shortness of breath and cough improving.  No wheezing no further prednisone needed.   Chronic diastolic and systolic CHF, Pulmonary hypertension: Daily [id number]. On lasix two times a day, spironolactone. Shortness of breath at baseline, 1+ rle, 2+ lle. Compression stocking rle, tg shape for LLE. Keep elevated when possible. Low sodium diet, no fluid restriction. Monitor weights.   H/o DVT: On xarelto.   HLD: On lipitor.   Chronic Atrial Fibrillation: Rate controlled 70-100s. On cardizem, xarelto. Monitor for now, denies chest pain or palpitations. No recent concerns. Sometimes in 100s after nebs but overall controlled.   Constipation: On miralax daily prn, senna to two times a day prn.   Iron deficiency anemia, acute blood loss anemia:   On iron daily, hg ordered for 2/12. Hg 9.9 on 2/6. Hg now back to wnl 12.1 on 2/12.   GERD: On omeprazole. No recent concerns.   Hypokalemia: previously on kcl, no supplement on med orders now. On 2/7 k was 4.4. ordered bmp for 2/12-stable  Hypomagnesemia: On mag ox. Mg level 1.9 on 2/5. No recent  concerns, no need to recheck on chronically.   Schizoaffective disorder, bipolar type: On abilify. Follows with psych. No concerns today. No longer see seroquel on med list, monitor for behavioral concerns.   Osteoporosis: On calcium, vitamin d.   Type 2 DM: controlled with diet. Monitoring on labs and stable.   Vitamin b 12 deficiency: on b12 supplement.   Chronic pain: on tylenol, oxycodone prn, gabapentin. Stable today.   Candidal intertrigo: ordered nystatin powder two times a day until resolved.     Per therapy firm LCD 2/21, care conference tomorrow 2/19. Requesting to discharge on 2/20. Mod ind for toileting, sba for dressing, max for teds, ambulates 200 feet with sba, slums 25/30. Returning to [facility] with spouse.     Electronically signed by: Faby Ku NP

## 2021-06-06 NOTE — PROGRESS NOTES
Office Visit - Follow up    Adalgisa Solano   84 y.o. female    Date of Visit: 2/24/2020    Chief Complaint   Patient presents with     Hospital Visit Follow Up     1/31-2/7/2020  then TCU till Thursday 2/20/20       Subjective: PD.    Hospital follow-up from January 31 through February 7, 2024 left lower extremity cellulitis after a fall.    Also acute exacerbation of COPD.  An oscillating vibratory vest is really helped her.  She was seen then after hospitalization at the [facility].  Discharge ultimately on 20 February 2020.  Getting home care not working on balance.  Expectorating more sputum and feels better it seems brighter more alert.    We answered questions today regarding vitamin B12 whether should be administered as an injection 1000 mcg every month or as a pill oral form 1000 mcg daily.  Either would be permissible for me for the patient to use but the sure is way to get vitamin B12 into her system is intramuscular 1000 mcg vitamin B12 for cyanocobalamin every month.  She agrees.   here and is very supportive and agrees with plan.    No blood in sputum stool or urine no chest pain shortness of breath medication list reviewed reconciled in the chart she is a non-smoker she has allergies to tramadol amoxicillin levofloxacin and penicillin she does not use alcohol to excess.  Medication list reviewed reconciled.    ROS: A comprehensive review of systems was performed and was otherwise negative    Medications:  Prior to Admission medications    Medication Sig Start Date End Date Taking? Authorizing Provider   ARIPiprazole (ABILIFY) 5 MG tablet TAKE 1 TABLET EVERY NIGHT AT BEDTIME 12/27/18  Yes Shai Ellington MD   atorvastatin (LIPITOR) 20 MG tablet TAKE 1 TABLET (20 MG TOTAL) BY MOUTH BEDTIME. 1/27/20  Yes Shai Ellington MD   azithromycin (ZITHROMAX) 250 MG tablet Take 250 mg by mouth every third day.   Yes PROVIDER, HISTORICAL   calcium carbonate-vitamin D3 (CALCIUM WITH  VITAMIN D) 600 mg(1,500mg) -400 unit per tablet Take 1 tablet by mouth 2 (two) times a day.   Yes PROVIDER, HISTORICAL   cholecalciferol, vitamin D3, 1,000 unit tablet Take 2,000 Units by mouth daily.    Yes PROVIDER, HISTORICAL   diltiazem (CARDIZEM CD) 120 MG 24 hr capsule TAKE 1 CAPSULE (120 MG TOTAL) BY MOUTH DAILY. 5/22/19  Yes Shai Ellington MD   ferrous sulfate 325 (65 FE) MG tablet Take 1 tablet (325 mg total) by mouth daily with breakfast. 2/1/19  Yes Jak Cardona MD   fluticasone-umeclidinium-vilanterol (TRELEGY ELLIPTA) 100-62.5-25 mcg DsDv inhaler Inhale 1 Inhalation daily. 10/25/19  Yes Jun Juarez MD   furosemide (LASIX) 20 MG tablet Take 1 tablet (20 mg total) by mouth 2 (two) times a day at 9am and 6pm. 8/28/19  Yes Shai Ellington MD   gabapentin (NEURONTIN) 300 MG capsule Take 600 mg by mouth at bedtime.          Yes PROVIDER, HISTORICAL   ipratropium (ATROVENT) 0.03 % nasal spray 1 spray into each nostril daily.   Yes PROVIDER, HISTORICAL   ipratropium-albuterol (DUO-NEB) 0.5-2.5 mg/3 mL nebulizer Take 3 mL by nebulization 4 (four) times a day. 2/7/20  Yes Nilesh Dunn, DO   magnesium oxide (MAG-OX) 400 mg (241.3 mg magnesium) tablet Take 1 tablet (400 mg total) by mouth daily. 1/5/19  Yes Nilesh Dunn, DO   omeprazole (PRILOSEC) 20 MG capsule Take 20 mg by mouth daily before breakfast.   Yes PROVIDER, HISTORICAL   OXYGEN-AIR DELIVERY SYSTEMS [facility] [place] 1-2 L As Directed as needed. [employer]   Yes PROVIDER, HISTORICAL   polyethylene glycol (MIRALAX) 17 gram packet Take 17 g by mouth daily as needed.    Yes PROVIDER, HISTORICAL   rivaroxaban ANTICOAGULANT (XARELTO) 20 mg tablet Take 1 tablet (20 mg total) by mouth daily with supper. 2/8/20  Yes Nilesh Dunn, DO   sodium chloride 3 % nebulizer solution Take 3 mL by nebulization as needed.   Yes PROVIDER, HISTORICAL   spironolactone (ALDACTONE) 25 MG tablet Take 0.5 tablets (12.5 mg total)  by mouth daily. 2/2/19  Yes Jak Cardona MD   urea (CARMOL) 40 % Crea Apply 1 application topically 2 (two) times a day as needed. To bottom of feet    Yes PROVIDER, HISTORICAL   acetaminophen (TYLENOL) 500 MG tablet Take 1,000 mg by mouth every 6 (six) hours as needed for pain.    PROVIDER, HISTORICAL   albuterol (PROVENTIL) 2.5 mg /3 mL (0.083 %) nebulizer solution 3 ML INHALATION FOUR TIMES A DAY AS NEEDED USE BEFORE NEBULIZED SALINE. FOR SHORTNESS OF BREATH. 2/24/20   Shai Ellington MD   cyanocobalamin 1,000 mcg/mL injection Inject 1 mL (1,000 mcg total) into the shoulder, thigh, or buttocks every 30 (thirty) days. 3/2/19   Jak Cardona MD   emollient base (VANICREAM TOP) Apply 1 application topically daily as needed. Uses when takes of compression socks    PROVIDER, HISTORICAL   PROAIR HFA 90 mcg/actuation inhaler INHALE 2 PUFFS EVERY 4 (FOUR) HOURS AS NEEDED FOR WHEEZING. 1/2/20   Shai Ellington MD   senna (SENOKOT) 8.6 mg tablet Take 1 tablet by mouth 2 (two) times a day as needed for constipation.    PROVIDER, HISTORICAL   triamcinolone (KENALOG) 0.1 % cream Apply 1 application topically 2 (two) times a day as needed. To legs          PROVIDER, HISTORICAL   benzonatate (TESSALON) 100 MG capsule Take 1 capsule (100 mg total) by mouth 3 (three) times a day as needed for cough. 2/7/20 2/24/20  Nilesh Dunn,        Allergies:   Allergies   Allergen Reactions     Tramadol Nausea Only     Amoxicillin Itching and Rash     Levofloxacin Itching and Rash     Penicillins Itching and Rash     Has tolerated ceftriaxone.       Immunizations:   Immunization History   Administered Date(s) Administered     DT (pediatric) 03/29/2004     Influenza high dose,seasonal,PF, 65+ yrs 10/20/2015, 10/09/2016, 11/16/2017, 10/11/2018     Influenza, inj, historic,unspecified 11/01/2019     Influenza,seasonal quad, PF, =/> 6months 11/20/2014     Pneumo Conj 13-V (2010&after) 10/20/2015     Pneumo  Polysac 23-V 10/18/1999, 08/01/2005     Td,adult,historic,unspecified 03/29/2004     Tdap 05/14/2013       Exam Chest clear to auscultation and percussion.  Heart tones regular rhythm without murmur rub or gallop.  Abdomen soft nontender no organomegaly.  No peritoneal signs.  Extremities free of edema cyanosis or clubbing.  Neck veins nondistended no thyromegaly or scleral icterus noted, carotids full.  Skin warm and dry easily conversant good spirited.  Normal intelligence.  Neurologically intact no gross localizing findings.  Brighter more alert appears stronger in the face weight down 7 pounds.  BMI 27 110/60 pulse 80 range 73-93 respiratory rate 20 and unlabored O2 sats room air 95%.    No central or acrocyanosis noted.  No clubbing no tachypnea.  No edema no neck vein distention left leg still swollen with resolving cellulitis infrapatellar around the entire left lower leg there is no lymphangitis and she is not toxic appearing or acutely ill.  Not encephalopathic answers all questions easily easily conversant good spirited smiling.    Assessment and Plan  COPD with recent exacerbation improved.  Helped most by the oscillating vibratory vest.  Continue same.    Cellulitis left lower extremity slow to resolve no evidence for lymphangitis or progression.    Hypertension controlled.  110/60 stable.    Diabetes mellitus type 2 needs fasting labs with next office visit in 1 to 4 months time.    Chronic atrial fibrillation with rate controlled variable 73-93 average 80 continue Cardizem CD for rate control along with Xarelto 20 mg daily.  RTC in 3 to 4 months time same meds and cares.    Time: total time spent with the patient was 25 minutes of which >50% was spent in counseling and coordination of care    The following high BMI interventions were performed this visit: encouragement to exercise    Shai Ellington MD    Patient Active Problem List   Diagnosis     Recurrent major depressive episodes (H)      Chronic obstructive pulmonary disease (H)     Peripheral arterial disease (H)     Type 2 diabetes mellitus without complication (H)     Hypertension     Schizoaffective disorder, bipolar type (H)     Arnold-Chiari malformation (H)     Chronic diastolic congestive heart failure (H)     Chronic respiratory failure with hypoxia (H)     Pulmonary hypertension (H)     Primary osteoarthritis of both knees     Chronic atrial fibrillation     Gastroesophageal reflux disease, esophagitis presence not specified     Stasis dermatitis of both legs     Generalized muscle weakness     Drug-induced constipation     Chronic pain syndrome     Acute combined systolic and diastolic CHF, NYHA class 1 (H)     Slow transit constipation     Cellulitis of left lower extremity     Atrial fibrillation with RVR (H)     Bronchiectasis with acute exacerbation (H)     Traumatic hematoma     Dyslipidemia     Hypomagnesemia     Osteoporosis

## 2021-06-06 NOTE — PROGRESS NOTES
[facility] Patient Outreach:     Discharge Date::  2/20/20      Reason for TCU stay (discharge diagnosis)::  Fall with left leg hematoma, acute on chronic COPD      Are you feeling better, the same or worse since your discharge?:  Patient is feeling better          As part of your discharge plan, did they discuss home care with you?: Yes        Have your seen them yet, or are they scheduled to visit?: Yes                Do you have any follow up visits scheduled with your PCP or Specialist?:  Yes, with PCP (also saw ortho 2/26/20.  )      (RN) Is it scheduled soon enough (3-5 days)?: Yes

## 2021-06-06 NOTE — TELEPHONE ENCOUNTER
Left detailed message for Tong with [employer], relaying message below from Dr. Ellington.  Asked that he had no further questions at this time.  Silvia AZAR, CELINE/CMT....................11:44 AM

## 2021-06-06 NOTE — PROGRESS NOTES
[facility]    DATE: 2020    NAME:  Adalgisa Solano             :  1935  MRN: [id number]  CODE STATUS:  Full code    VISIT TYPE: Review Of Multiple Medical Conditions     FACILITY:  [facility] [[id number]]       CHIEF COMPLAIN/REASON FOR VISIT:    Chief Complaint   Patient presents with     Review Of Multiple Medical Conditions               HISTORY OF PRESENT ILLNESS: Adalgisa Solano is a 84 y.o. female who was admitted - for fall and developed large, painful, hematoma of left leg. She was unable to bear weight on her leg. Her xarelto was held and surgery consulted. They attempted aspiration but were unsuccessful. MRI confirmed hematoma and also treated for cellulitis with keflex. Her blood cultures were negative. She was also treated for acute on chronic COPD with prednisone therapy, percussion therapy. She was treated for hyponatremia with lasix. Her xarelto was restarted after being held or 7 days. She was discharged to TCU for further rehab.  She has PMH of h/o DVT, persistent atrial fibrillation,  COPD, normocytic anemia, Type 2 DM, HTN, Depression, schizoaffective disorder, HLD, neuropathy, GERD, chronic respiratory failure on 1LNC, CHF, avascular necrosis of Left hip, chronic lymphedema. Prior to this she lived with her .     Today Ms. Solano is seen for follow up visit today. She says her left leg swelling and size of the hematoma is going down. She is asking for clarification if she finished her antibiotic yet. She doesn't recall them giving it to her today. She also is wondering how her labs were yesterday. She says her pain is still unbearable at times and still needing to use the oxycodone at times. She is trying to wean off though as she can. She says she is eating fine. Her breathing is doing ok. She is using her vest and thinks she is getting some stuff up with that. She does not feel her breathing is  worse and has not had any fevers recently. We discussed she completed her keflex yesterday and labs are stable. If she develops further redness, swelling, warmth or fever to let staff know. She says therapy is going ok. She does say she has a compression stocking for her right leg and then is trying a sleeve sock on her left leg. She wore it the last two days and it was not too painful. She denies any other new concerns today.     REVIEW OF SYSTEMS:  PROBLEMS AND REVIEW OF SYSTEMS:   Review of Systems  Today on ROS:   Currently, no fever, chills, or rigors. Decreased vision and hearing. Denies any chest pain, headaches, palpitations, lightheadedness, dizziness. Appetite is good. Denies any GERD symptoms. Denies any difficulty with swallowing, nausea, or vomiting.  positive for chronic o2 1-2 LNC at home as needed, chest percussion therapy, left leg pain, shortness of breath, congested cough, urinary hesitancy, constipation improved      Allergies   Allergen Reactions     Tramadol Nausea Only     Amoxicillin Itching and Rash     Levofloxacin Itching and Rash     Penicillins Itching and Rash     Has tolerated ceftriaxone.     Current Outpatient Medications   Medication Sig     acetaminophen (TYLENOL) 500 MG tablet Take 1,000 mg by mouth every 6 (six) hours as needed for pain.     ARIPiprazole (ABILIFY) 5 MG tablet TAKE 1 TABLET EVERY NIGHT AT BEDTIME     atorvastatin (LIPITOR) 20 MG tablet TAKE 1 TABLET (20 MG TOTAL) BY MOUTH BEDTIME.     azithromycin (ZITHROMAX) 250 MG tablet Take 250 mg by mouth every third day.     benzonatate (TESSALON) 100 MG capsule Take 1 capsule (100 mg total) by mouth 3 (three) times a day as needed for cough.     calcium carbonate-vitamin D3 (CALCIUM WITH VITAMIN D) 600 mg(1,500mg) -400 unit per tablet Take 1 tablet by mouth 2 (two) times a day.     cholecalciferol, vitamin D3, 1,000 unit tablet Take 2,000 Units by mouth daily.      cyanocobalamin 1,000 mcg/mL injection Inject 1 mL (1,000  mcg total) into the shoulder, thigh, or buttocks every 30 (thirty) days.     diltiazem (CARDIZEM CD) 120 MG 24 hr capsule TAKE 1 CAPSULE (120 MG TOTAL) BY MOUTH DAILY.     emollient base (VANICREAM TOP) Apply 1 application topically daily as needed. Uses when takes of compression socks     ferrous sulfate 325 (65 FE) MG tablet Take 1 tablet (325 mg total) by mouth daily with breakfast.     fluticasone-umeclidinium-vilanterol (TRELEGY ELLIPTA) 100-62.5-25 mcg DsDv inhaler Inhale 1 Inhalation daily.     furosemide (LASIX) 20 MG tablet Take 1 tablet (20 mg total) by mouth 2 (two) times a day at 9am and 6pm.     gabapentin (NEURONTIN) 300 MG capsule Take 600 mg by mouth at bedtime.            ipratropium (ATROVENT) 0.03 % nasal spray 1 spray into each nostril daily.     ipratropium-albuterol (DUO-NEB) 0.5-2.5 mg/3 mL nebulizer Take 3 mL by nebulization 4 (four) times a day.     magnesium oxide (MAG-OX) 400 mg (241.3 mg magnesium) tablet Take 1 tablet (400 mg total) by mouth daily.     omeprazole (PRILOSEC) 20 MG capsule Take 20 mg by mouth daily before breakfast.     oxyCODONE (ROXICODONE) 5 MG immediate release tablet Take 1 tablet (5 mg total) by mouth every 6 (six) hours as needed for pain.     OXYGEN-AIR DELIVERY SYSTEMS [facility] [place]2 [place] As Directed as needed. [employer]     polyethylene glycol (MIRALAX) 17 gram packet Take 17 g by mouth daily as needed.      predniSONE (DELTASONE) 10 mg tablet Take 3 tabs daily for 3 days, then 2 tabs daily for 3 days, then 1 tab daily until gone.     PROAIR HFA 90 mcg/actuation inhaler INHALE 2 PUFFS EVERY 4 (FOUR) HOURS AS NEEDED FOR WHEEZING.     rivaroxaban ANTICOAGULANT (XARELTO) 20 mg tablet Take 1 tablet (20 mg total) by mouth daily with supper.     senna (SENOKOT) 8.6 mg tablet Take 1 tablet by mouth 2 (two) times a day as needed for constipation.     sodium chloride 3 % nebulizer solution Take 3 mL by nebulization as needed.     spironolactone (ALDACTONE) 25 MG tablet Take 0.5  tablets (12.5 mg total) by mouth daily.     triamcinolone (KENALOG) 0.1 % cream Apply 1 application topically 2 (two) times a day as needed. To legs           urea (CARMOL) 40 % Crea Apply 1 application topically 2 (two) times a day as needed. To bottom of feet      Past Medical History:    Past Medical History:   Diagnosis Date     Acute and chronic respiratory failure with hypoxia (H)      Acute blood loss anemia 1/15/2019     Acute bronchitis 1/15/2019     Anemia     pernicious anemia     Anxiety      Arm skin lesion, right      Arnold-Chiari malformation (H) 1998     Arthritis      Atrial fibrillation (H) 02/2017     Avascular necrosis of bone (H)      Breast cyst      Breast lump      Candidal skin infection      Cellulitis of left lower extremity 1/28/2019     CHF (congestive heart failure) (H)     diastolic and systolic     Chronic bronchitis (H)     & acute     Chronic diastolic heart failure (H)     diastolic chf     Chronic pain syndrome      Chronic respiratory failure with hypoxia (H) 3/13/2017     COPD (chronic obstructive pulmonary disease) (H)      COPD exacerbation (H)      Depression      Diet-controlled type 2 diabetes mellitus (H)      Drug induced constipation      DVT of axillary vein, acute (H)     left     DVT of axillary vein, acute left (H)      Edema      Encounter for palliative care      Erysipelas      Fracture of femoral neck, left (H)      Generalized muscle weakness      GERD (gastroesophageal reflux disease)      History of blood clots      Hyperlipidemia      Hypertension      Left hip pain      Lumbar spinal stenosis      PAD (peripheral artery disease) (H)      Peripheral arterial disease (H) 10/28/2015     Psoriasis     arms      Pulmonary hypertension (H) 3/5/2018     Recurrent major depressive episodes (H)     Created by Conversion  Replacement Utility updated for latest IMO load     Schizoaffective disorder, bipolar type (H) 6/11/2015     Slow transit constipation      Stasis  dermatitis of both legs      Status post total hip replacement, 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 No evidence of trauma. Ears: No acute purulent discharge. Eyes: Conjunctivae pink with no scleral jaundice. Nose: Normal mucosa and septum. NECK: Supple with no cervical or supraclavicular lymphadenopathy. Trachea is midline.   CHEST: No tenderness or deformity, no crepitus  LUNG: dim with scattered rhonchi, few expiratory wheezes bilaterally to auscultation. Congested cough improving, 2LNC, chest percussion therapy vest in place  BACK: No kyphosis of the thoracic spine. Symmetric, no curvature, ROM normal, no CVA tenderness, no spinal tenderness   CVS: irregularly irregular rhythm, there are no murmurs, rubs, gallops, or heaves,  2+ pulses symmetric in all extremities.  ABDOMEN: Rounded and soft, nontender to palpation, no masses, no organomegaly, good bowel sounds, no rebound or guarding, no peritoneal signs.   EXTREMITIES: 2+ LLE pitting leg edema with moderate sized hematoma present surrounded by erythema, warmth, no open areas, no drainage, smaller in size than outlined markings, 1-2+ rle  SKIN: Warm and dry  NEUROLOGICAL: The patient is oriented to person, place and time.             LABS:   Recent Results (from the past 168 hour(s))   Basic Metabolic Panel   Result Value Ref Range    Sodium 134 (L) 136 - 145 mmol/L    Potassium 4.3 3.5 - 5.0 mmol/L    Chloride 92 (L) 98 - 107 mmol/L    CO2 35 (H) 22 - 31 mmol/L    Anion Gap, Calculation  7 5 - 18 mmol/L    Glucose 92 70 - 125 mg/dL    Calcium 8.5 8.5 - 10.5 mg/dL    BUN 13 8 - 28 mg/dL    Creatinine 0.66 0.60 - 1.10 mg/dL    GFR MDRD Af Amer >60 >60 mL/min/1.73m2    GFR MDRD Non Af Amer >60 >60 mL/min/1.73m2   HM1 (CBC with Diff)   Result Value Ref Range    WBC 12.5 (H) 4.0 - 11.0 thou/uL    RBC 4.20 3.80 - 5.40 mill/uL    Hemoglobin 12.1 12.0 - 16.0 g/dL    Hematocrit 38.6 35.0 - 47.0 %    MCV 92 80 - 100 fL    MCH 28.8 27.0 - 34.0 pg    MCHC 31.3 (L) 32.0 - 36.0 g/dL    RDW 16.4 (H) 11.0 - 14.5 %    Platelets 376 140 - 440 thou/uL    MPV 10.0 8.5 - 12.5 fL    Neutrophils % 76 (H) 50 - 70 %    Lymphocytes % 12 (L) 20 - 40 %    Monocytes % 9 2 - 10 %    Eosinophils % 2 0 - 6 %    Basophils % 0 0 - 2 %    Neutrophils Absolute 9.5 (H) 2.0 - 7.7 thou/uL    Lymphocytes Absolute 1.6 0.8 - 4.4 thou/uL    Monocytes Absolute 1.1 (H) 0.0 - 0.9 thou/uL    Eosinophils Absolute 0.2 0.0 - 0.4 thou/uL    Basophils Absolute 0.0 0.0 - 0.2 thou/uL   Magnesium   Result Value Ref Range    Magnesium 2.0 1.8 - 2.6 mg/dL   Vitamin B12   Result Value Ref Range    Vitamin B-12 958 (H) 213 - 816 pg/mL     Results for orders placed or performed in visit on 02/12/20   Basic Metabolic Panel   Result Value Ref Range    Sodium 134 (L) 136 - 145 mmol/L    Potassium 4.3 3.5 - 5.0 mmol/L    Chloride 92 (L) 98 - 107 mmol/L    CO2 35 (H) 22 - 31 mmol/L    Anion Gap, Calculation 7 5 - 18 mmol/L    Glucose 92 70 - 125 mg/dL    Calcium 8.5 8.5 - 10.5 mg/dL    BUN 13 8 - 28 mg/dL    Creatinine 0.66 0.60 - 1.10 mg/dL    GFR MDRD Af Amer >60 >60 mL/min/1.73m2    GFR MDRD Non Af Amer >60 >60 mL/min/1.73m2         Lab Results   Component Value Date    WBC 12.5 (H) 02/12/2020    HGB 12.1 02/12/2020    HCT 38.6 02/12/2020    MCV 92 02/12/2020     02/12/2020       Lab Results   Component Value Date    ZKZXTKQP18 958 (H) 02/12/2020     Lab Results   Component Value Date    HGBA1C 6.8 (H) 11/09/2018     Lab Results   Component Value Date     INR 1.14 (H) 01/31/2020    INR 2.36 (H) 01/26/2020    INR 1.09 09/16/2019     Vitamin D, Total (25-Hydroxy)   Date Value Ref Range Status   01/04/2019 39.6 30.0 - 80.0 ng/mL Final     Lab Results   Component Value Date    TSH 0.71 02/04/2020           ASSESSMENT/PLAN:    Traumatic hematoma LLE, LLE cellulitis: completed keflex on 2/13. Erythema, edema and pain improving gradually. No open areas or drainage. Pain controlled on tylenol, oxycodone prn. encouraged warm packs, ice prn. Elevate left leg as much as possible.  triamcinolone cream topically to lle. Wearing gentle compression tubigrips on lle, compression stocking to rle. Continue to notify if develops temp >100.5, develops worsening erythema, edema. Still has wbc 12.5, may be s/t prednisone taper. Monitor for further signs of infection.   Acute on chronic hypoxic respiratory failure, Bronchiectasis, COPD: On trelegy daily, duonebs four times a day (albuterol two times a day and hypertonic saline two times a day at home), proair prn, will add hypertonic saline prn. Prednisone taper, benzonatate three times a day prn. Wears o2 prn at home 1-2LNC with activity and at night. chest percussion therapy vest two times a day for 20min set at 40 for pressure per home settings.  Follows with pulmonology. On azithromycin q72h prophylactically.  IS q1h while awake. Shortness of breath and cough improving.    Chronic diastolic and systolic CHF, Pulmonary hypertension: Daily [phone]. On lasix two times a day, spironolactone. Shortness of breath at baseline, 1+ rle, 2+ lle. Compression stocking rle, tg shape for LLE. Keep elevated when possible. Low sodium diet, no fluid restriction. Monitor weights.   H/o DVT: On xarelto. .   HLD: On lipitor.   Chronic Atrial Fibrillation: Rate controlled 90s. On cardizem, xarelto. Monitor for now, denies chest pain or palpitations. No recent concerns.   Constipation: On miralax daily prn, senna to two times a day prn.    Iron deficiency anemia, acute blood loss anemia:   On iron daily, hg ordered for 2/12. Hg 9.9 on 2/6. Hg now back to wnl 12.1 on 2/12.   GERD: On omeprazole. No recent concerns.   Hypokalemia: previously on kcl, no supplement on med orders now. On 2/7 k was 4.4. ordered bmp for 2/12-  Hypomagnesemia: On mag ox. Mg level 1.9 on 2/5. No recent concerns, no need to recheck on chronically.   Schizoaffective disorder, bipolar type: On abilify. Follows with psych. No concerns today. No longer see seroquel on med list, monitor for behavioral concerns.   Osteoporosis: On calcium, vitamin d.   Type 2 DM: controlled with diet. Monitoring on labs and stable.   Vitamin b 12 deficiency: on b12 supplement.   Chronic pain: on tylenol, oxycodone prn, gabapentin. Stable today.     Per therapy soft LCD 2/21     Electronically signed by: Faby Ku NP

## 2021-06-06 NOTE — TELEPHONE ENCOUNTER
Orders being requested: continue skilled nursing visits. 1 x a week for 3 weeks  Reason service is needed/diagnosis: left lower leg cellulitis  When are orders needed by: asap  Where to send Orders: Phone:  [phone]  Okay to leave detailed message?  Yes

## 2021-06-06 NOTE — PROGRESS NOTES
[facility]    DATE: 2020    NAME:  Adalgisa Solano             :  1935  MRN: [id number]  CODE STATUS:  Full code    VISIT TYPE: Discharge Summary     FACILITY:  [facility] [[id number]]       CHIEF COMPLAIN/REASON FOR VISIT:    Chief Complaint   Patient presents with     Discharge Summary               HISTORY OF PRESENT ILLNESS: Adalgisa Solano is a 84 y.o. female who was admitted - for fall and developed large, painful, hematoma of left leg. She was unable to bear weight on her leg. Her xarelto was held and surgery consulted. They attempted aspiration but were unsuccessful. MRI confirmed hematoma and also treated for cellulitis with keflex. Her blood cultures were negative. She was also treated for acute on chronic COPD with prednisone therapy, percussion therapy. She was treated for hyponatremia with lasix. Her xarelto was restarted after being held or 7 days. She was discharged to TCU for further rehab.  She has PMH of h/o DVT, persistent atrial fibrillation,  COPD, normocytic anemia, Type 2 DM, HTN, Depression, schizoaffective disorder, HLD, neuropathy, GERD, chronic respiratory failure on 1LNC, CHF, avascular necrosis of Left hip, chronic lymphedema. Prior to this she lived with her .     TCU course:   Ms. Solano has made progress with therapy and is ambulating 200 feet with stand by assist. She is stand by for dressing except for her david hose. She is independent for toileting. She has progressed well in therapy. Her pain is much improved and has been weaned off the oxycodone. She is well controlled on the tylenol. Her leg swelling is improving and left leg hematoma improving in size. She completed prednisone taper and antibiotics. Her respiratory status has returned to baseline. Her weights were down overall since admission but this was felt to be related to improved hematoma and edema. Her hemoglobin is much  improved now 9-12. Her other labs were stable. She will be returning home today with  PT, OT, CHIRAG, RN. She will f/u with PCP in 5-7 days. She will f/u with PCP in 5-7 days.     REVIEW OF SYSTEMS:  PROBLEMS AND REVIEW OF SYSTEMS:   Review of Systems  Today on ROS:   Currently, no fever, chills, or rigors. Decreased vision and hearing. Denies any chest pain, headaches, palpitations, lightheadedness, dizziness. Appetite is good. Denies any GERD symptoms. Denies any difficulty with swallowing, nausea, or vomiting.  positive for chronic o2 1-2 LNC at home as needed, chest percussion therapy, left leg pain much improved, shortness of breath, congested cough improving, urinary hesitancy, no constipation      Allergies   Allergen Reactions     Tramadol Nausea Only     Amoxicillin Itching and Rash     Levofloxacin Itching and Rash     Penicillins Itching and Rash     Has tolerated ceftriaxone.     Current Outpatient Medications   Medication Sig     acetaminophen (TYLENOL) 500 MG tablet Take 1,000 mg by mouth every 6 (six) hours as needed for pain.     ARIPiprazole (ABILIFY) 5 MG tablet TAKE 1 TABLET EVERY NIGHT AT BEDTIME     atorvastatin (LIPITOR) 20 MG tablet TAKE 1 TABLET (20 MG TOTAL) BY MOUTH BEDTIME.     azithromycin (ZITHROMAX) 250 MG tablet Take 250 mg by mouth every third day.     benzonatate (TESSALON) 100 MG capsule Take 1 capsule (100 mg total) by mouth 3 (three) times a day as needed for cough.     calcium carbonate-vitamin D3 (CALCIUM WITH VITAMIN D) 600 mg(1,500mg) -400 unit per tablet Take 1 tablet by mouth 2 (two) times a day.     cholecalciferol, vitamin D3, 1,000 unit tablet Take 2,000 Units by mouth daily.      cyanocobalamin 1,000 mcg/mL injection Inject 1 mL (1,000 mcg total) into the shoulder, thigh, or buttocks every 30 (thirty) days.     diltiazem (CARDIZEM CD) 120 MG 24 hr capsule TAKE 1 CAPSULE (120 MG TOTAL) BY MOUTH DAILY.     emollient base (VANICREAM TOP) Apply 1 application topically daily  as needed. Uses when takes of compression socks     ferrous sulfate 325 (65 FE) MG tablet Take 1 tablet (325 mg total) by mouth daily with breakfast.     fluticasone-umeclidinium-vilanterol (TRELEGY ELLIPTA) 100-62.5-25 mcg DsDv inhaler Inhale 1 Inhalation daily.     furosemide (LASIX) 20 MG tablet Take 1 tablet (20 mg total) by mouth 2 (two) times a day at 9am and 6pm.     gabapentin (NEURONTIN) 300 MG capsule Take 600 mg by mouth at bedtime.            ipratropium (ATROVENT) 0.03 % nasal spray 1 spray into each nostril daily.     ipratropium-albuterol (DUO-NEB) 0.5-2.5 mg/3 mL nebulizer Take 3 mL by nebulization 4 (four) times a day.     magnesium oxide (MAG-OX) 400 mg (241.3 mg magnesium) tablet Take 1 tablet (400 mg total) by mouth daily.     omeprazole (PRILOSEC) 20 MG capsule Take 20 mg by mouth daily before breakfast.     OXYGEN-AIR DELIVERY SYSTEMS [facility] Use 1-2 L As Directed as needed. [employer]     polyethylene glycol (MIRALAX) 17 gram packet Take 17 g by mouth daily as needed.      PROAIR HFA 90 mcg/actuation inhaler INHALE 2 PUFFS EVERY 4 (FOUR) HOURS AS NEEDED FOR WHEEZING.     rivaroxaban ANTICOAGULANT (XARELTO) 20 mg tablet Take 1 tablet (20 mg total) by mouth daily with supper.     senna (SENOKOT) 8.6 mg tablet Take 1 tablet by mouth 2 (two) times a day as needed for constipation.     sodium chloride 3 % nebulizer solution Take 3 mL by nebulization as needed.     spironolactone (ALDACTONE) 25 MG tablet Take 0.5 tablets (12.5 mg total) by mouth daily.     triamcinolone (KENALOG) 0.1 % cream Apply 1 application topically 2 (two) times a day as needed. To legs           urea (CARMOL) 40 % Crea Apply 1 application topically 2 (two) times a day as needed. To bottom of feet      Past Medical History:    Past Medical History:   Diagnosis Date     Acute and chronic respiratory failure with hypoxia (H)      Acute blood loss anemia 1/15/2019     Acute bronchitis 1/15/2019     Anemia     pernicious anemia      Anxiety      Arm skin lesion, right      Arnold-Chiari malformation (H) 1998     Arthritis      Atrial fibrillation (H) 02/2017     Avascular necrosis of bone (H)      Breast cyst      Breast lump      Candidal skin infection      Cellulitis of left lower extremity 1/28/2019     CHF (congestive heart failure) (H)     diastolic and systolic     Chronic bronchitis (H)     & acute     Chronic diastolic heart failure (H)     diastolic chf     Chronic pain syndrome      Chronic respiratory failure with hypoxia (H) 3/13/2017     COPD (chronic obstructive pulmonary disease) (H)      COPD exacerbation (H)      Depression      Diet-controlled type 2 diabetes mellitus (H)      Drug induced constipation      DVT of axillary vein, acute (H)     left     DVT of axillary vein, acute left (H)      Edema      Encounter for palliative care      Erysipelas      Fracture of femoral neck, left (H)      Generalized muscle weakness      GERD (gastroesophageal reflux disease)      History of blood clots      Hyperlipidemia      Hypertension      Left hip pain      Lumbar spinal stenosis      PAD (peripheral artery disease) (H)      Peripheral arterial disease (H) 10/28/2015     Psoriasis     arms      Pulmonary hypertension (H) 3/5/2018     Recurrent major depressive episodes (H)     Created by Conversion  Replacement Utility updated for latest IMO load     Schizoaffective disorder, bipolar type (H) 6/11/2015     Slow transit constipation      Stasis dermatitis of both legs      Status post total hip replacement, 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     Type 2 diabetes mellitus without complication (H) 8/2/2016     Vitamin B12 deficiency      Volume overload            PHYSICAL EXAMINATION  Vitals:    02/20/20 0700   BP: 126/61   Pulse: 95   Resp: 18   Temp: 97.5  F (36.4  C)   SpO2: 94%   Weight: 149 lb (67.6 kg)       Today on physical exam:     GENERAL: Awake, Alert, oriented x3, not in any form of acute distress, answers questions appropriately, follows  simple commands, conversant  HEENT: Head is normocephalic with normal hair distribution. No evidence of trauma. Ears: No acute purulent discharge. Eyes: Conjunctivae pink with no scleral jaundice. Nose: Normal mucosa and septum. NECK: Supple with no cervical or supraclavicular lymphadenopathy. Trachea is midline.   CHEST: No tenderness or deformity, no crepitus  LUNG: dim but clear today bilaterally to auscultation. Congested cough improving, 2LNC intermittently, chest percussion therapy vest in place  BACK: No kyphosis of the thoracic spine. Symmetric, no curvature, ROM normal, no CVA tenderness, no spinal tenderness   CVS: irregularly irregular rhythm, there are no murmurs, rubs, gallops, or heaves,  2+ pulses symmetric in all extremities.  ABDOMEN: Rounded and soft, nontender to palpation, no masses, no organomegaly, good bowel sounds, no rebound or guarding, no peritoneal signs.   EXTREMITIES: 1-2+ LLE pitting leg edema with small-moderate sized hematoma present surrounded by erythema, warmth, no open areas, no drainage, smaller in size than outlined markings, 1+ rle  SKIN: Warm and dry  NEUROLOGICAL: The patient is oriented to person, place and time.             LABS:   No results found for this or any previous visit (from the past 168 hour(s)).  Results for orders placed or performed in visit on 02/12/20   Basic Metabolic Panel   Result Value Ref Range    Sodium 134 (L) 136 - 145 mmol/L    Potassium 4.3 3.5 - 5.0 mmol/L    Chloride 92 (L) 98 - 107 mmol/L    CO2 35 (H) 22 - 31 mmol/L    Anion Gap, Calculation 7 5 - 18 mmol/L    Glucose 92 70 - 125 mg/dL    Calcium 8.5 8.5 - 10.5 mg/dL    BUN 13 8 - 28 mg/dL    Creatinine 0.66 0.60 - 1.10 mg/dL    GFR MDRD Af Amer >60 >60 mL/min/1.73m2    GFR MDRD Non Af Amer >60 >60 mL/min/1.73m2         Lab Results   Component Value Date    WBC 12.5 (H) 02/12/2020    HGB 12.1 02/12/2020    HCT 38.6 02/12/2020    MCV 92 02/12/2020     02/12/2020       Lab Results    Component Value Date    [id number] 958 (H) 02/12/2020     Lab Results   Component Value Date    HGBA1C 6.8 (H) 11/09/2018     Lab Results   Component Value Date    INR 1.14 (H) 01/31/2020    INR 2.36 (H) 01/26/2020    INR 1.09 09/16/2019     Vitamin D, Total (25-Hydroxy)   Date Value Ref Range Status   01/04/2019 39.6 30.0 - 80.0 ng/mL Final     Lab Results   Component Value Date    TSH 0.71 02/04/2020           ASSESSMENT/PLAN:    Traumatic hematoma LLE, LLE cellulitis: completed keflex on 2/13. Erythema, edema and pain improving gradually. No open areas or drainage. Pain controlled on tylenol. Elevate left leg as much as possible.  triamcinolone cream topically to lle. Wearing gentle compression tubigrips on lle, compression stocking to rle. Returning home soon and encouraged to follow up with primary care provider. Explained when to notify primary care provider.   Acute on chronic hypoxic respiratory failure, Bronchiectasis, COPD: On trelegy daily, duonebs four times a day (albuterol two times a day and hypertonic saline two times a day at home), proair prn,  hypertonic saline prn. Prednisone taper completed. benzonatate three times a day prn. Wears o2 prn at home 1-2LNC with activity and at night. chest percussion therapy vest two times a day for 20min set at 40 for pressure per home settings.  Follows with pulmonology. On azithromycin q72h prophylactically.  IS q1h while awake. Shortness of breath and cough improving.  Stable today, lungs dim but clear.   Chronic diastolic and systolic CHF, Pulmonary hypertension: Daily [id number]. On lasix two times a day, spironolactone. Shortness of breath at baseline, 1+ rle, 2+ lle. Compression stocking rle, tg shape for LLE. Keep elevated when possible. Low sodium diet, no fluid restriction. Weights trending down but felt to be due to significant increase in edema and hematoma since admission.   H/o DVT: On xarelto.   HLD: On lipitor.   Chronic  Atrial Fibrillation: Rate controlled 70-90s. On cardizem, xarelto. Monitor for now, denies chest pain or palpitations. No recent concerns.   Constipation: On miralax daily prn, senna to two times a day prn.   Iron deficiency anemia, acute blood loss anemia:   On iron daily, hg ordered for 2/12. Hg 9.9 on 2/6. Hg now back to wnl 12.1 on 2/12.   GERD: On omeprazole. No recent concerns.   Hypokalemia: previously on kcl, no supplement on med orders now. On 2/7 k was 4.4. ordered bmp for 2/12-stable  Hypomagnesemia: On mag ox. Mg level 1.9 on 2/5. No recent concerns, no need to recheck on chronically.   Schizoaffective disorder, bipolar type: On abilify. Follows with psych. No concerns today. No longer see seroquel on med list, monitor for behavioral concerns.   Osteoporosis: On calcium, vitamin d.   Type 2 DM: controlled with diet. Monitoring on labs and stable.   Vitamin b 12 deficiency: on b12 supplement.   Chronic pain: on tylenol, gabapentin. Stable today.   Candidal intertrigo: nystatin powder two times a day until resolved.         Electronically signed by: Faby Ku NP     Total floor/unit time spent 35 min with 25 min spent on counseling and coordination of care. Counseling regarding discharge instructions, med orders for discharge, primary care follow up, when to notify primary care provider of worsening condition. Coordinated care with nursing, therapy,  for discharge planning, home health services, assisted living orders, primary care follow up.        Please evaluate Adalgisa Solano for admission to Home Health.    Face to Face Attestation and Initial Plan of Care    The face-to-face encounter occurred on date: 2/20/20  Face to Face encounter was with: Faby Ku    Please provide brief clinical summary of reason for visit and need for home care. Deconditioning after hospital course for copd exacerbation, traumatic hematoma    Please identify which of the following home health  "disciplines the patient will need AND describe the skilled services that you would like the home health agency to perform: SKILLED NURSING (RN): complex med management, PHYSICAL THERAPY: strength training and gait training, OCCUPATIONAL THERAPY: ADLs and home safety and HOME HEALTH AIDE    Homebound Status (describe the functional limitations that support this patient is confined to his/her home. Medicaid recipients are not required to be homebound.):assistive device needed:  4WW    Name of physician who will be responsible for the ongoing home health plan of care (CMS requires the referring physician to provide the specific name of the community physician instead of a title, such as \"PCP\"): Shai Ellington MD    Requested Start of Care Date: Within 48 hours    Other information to assist the home health agency in developing the initial Plan of Care:    I certify that services are/were furnished while this patient was under the care of a physician and that a physician or an allowed non-physician practitioner (NPP), had a face-to-face encounter that meets the physician face-to-face encounter requirements. The encounter was in whole, or in part, related to the primary reason for home health. The patient is confined to his/her home and needs intermittent skilled nursing, physical therapy, speech-language pathology, or the continued need for occupational therapy. A plan of care has been established by a physician and is periodically reviewed by a physician.      Post Discharge Medication Reconciliation Status: discharge medications reconciled, continue medications without change                       "

## 2021-06-06 NOTE — TELEPHONE ENCOUNTER
Called and relayed all below orders to Shaylee. Prescription set up and sent to PCP to sign.     Alaina Rmaírez, CMA

## 2021-06-06 NOTE — TELEPHONE ENCOUNTER
"Saw Dr. Ellington on Monday. Calling with update.   Pt states she has a hematoma on her left leg.   Saw orthopedic provider today and he was concerned about cellulitis in left leg.   States she has redness that starts 4 inches down from her knee.  Swollen from hematoma. Pt states she was at [facility] and dx with the hematoma.   Pt states redness is decreasing, area is getting smaller.  Wound is slightly warm to the touch. No opening, no drainage. No change in the last few days.   Swelling is going down, not getting worse. No worsening sx since Monday, states she is doing better. \"I am doing quite well\"   Has a nurse coming on Friday, states she will have the nurse evaluate it on Friday as well.     Rn will route update to Dr. Ellignton.    Catherine Rodrigues, RN   [facility] RN Triage    Reason for Disposition    [1] Follow-up call from patient regarding patient's clinical status AND [2] information NON-URGENT    Protocols used: PCP CALL - NO TRIAGE-A-      "

## 2021-06-06 NOTE — TELEPHONE ENCOUNTER
Please enter an order for me for monthly B12 injections for this patient and the doses 1000 mcg IM monthly indefinitely and the diagnosis is pernicious anemia.  Please call patient and pharmacy and set this up and I will cosign.

## 2021-06-06 NOTE — TELEPHONE ENCOUNTER
Symptom  Describe your symptoms:     Cellulitis Flare up Left Leg  Warm  Swollen  Worsening symptoms.    Any pain: yes    New/Ongoing: Ongoing     How long have you been having symptoms: 5  Day(s)    Have you been seen for this:  No     Appointment offered?:   Patient declines and Homecare     Triage offered?: No, caller not with patient     Home remedies tried:   Tubie   Elevate leg    Requested Pharmacy:   [facility] - [place], [place] - [address]     Okay to leave a detailed message?   Yes     Requesting a script be sent for Keflex or advise if a appointment is needed, recently seen 02/24/2020, however her leg wasn't examined at time of visit.

## 2021-06-06 NOTE — TELEPHONE ENCOUNTER
[facility] from Dr. [facility].  Asked that she call with any further questions.  Silvia AZAR, CELINE/NEL....................1:18 PM

## 2021-06-06 NOTE — TELEPHONE ENCOUNTER
Dr. Ellington,  Please review the following: Drug interaction noted between Cardizem CD and Xarelto.  Patient taking both these drugs are at a higher risk of having increased bleeding.   Does the doctor want patient on both medications?    Okay for orders requested below?  Silvia AZAR CMA/NEL....................11:50 AM

## 2021-06-06 NOTE — TELEPHONE ENCOUNTER
Orders being requested: Skilled Nursing, once a week for three weeks  Reason service is needed/diagnosis: General assessment and education  When are orders needed by: End of this day  Where to send Orders: Phone:  Verbal order is ok  Okay to leave detailed message?  Yes      Second issue:    Drug interaction noted between Cardizem CD and Xarelto.  Patient taking both these drugs are at a higher risk of having increased bleeding.   Does the doctor want patient on both medications?

## 2021-06-07 ENCOUNTER — COMMUNICATION - HEALTHEAST (OUTPATIENT)
Dept: PULMONOLOGY | Facility: OTHER | Age: 86
End: 2021-06-07

## 2021-06-07 ENCOUNTER — COMMUNICATION - HEALTHEAST (OUTPATIENT)
Dept: SCHEDULING | Facility: CLINIC | Age: 86
End: 2021-06-07

## 2021-06-07 NOTE — TELEPHONE ENCOUNTER
RN cannot approve Refill Request    RN can NOT refill this medication med is not covered by policy/route to provider. Last office visit: 2/24/2020 Shai Ellington MD Last Physical: 8/22/2019 Last MTM visit: Visit date not found Last visit same specialty: 2/24/2020 Shai Ellington MD.  Next visit within 3 mo: Visit date not found  Next physical within 3 mo: Visit date not found      April Byers, [facility] Triage/Med Refill 4/19/2020    Requested Prescriptions   Pending Prescriptions Disp Refills     NEBUSAL 3 % nebulizer solution [Pharmacy Med Name: NEBUSAL NEB 3%] 360 mL 4     Sig: TAKE 4 ML BY NEBULIZATION 3 (THREE) TIMES A DAY. USING AFTER ALBUTEROL NEB.       There is no refill protocol information for this order

## 2021-06-07 NOTE — PROGRESS NOTES
"Adalgisa Solano is a 84 y.o. female who is being evaluated via a billable telephone visit.      The patient has been notified of following:     \"This telephone visit will be conducted via a call between you and your physician/provider. We have found that certain health care needs can be provided without the need for a physical exam.  This service lets us provide the care you need with a short phone conversation.  If a prescription is necessary we can send it directly to your pharmacy.  If lab work is needed we can place an order for that and you can then stop by our lab to have the test done at a later time.    Telephone visits are billed at different rates depending on your insurance coverage. During this emergency period, for some insurers they may be billed the same as an in-person visit.  Please reach out to your insurance provider with any questions.    If during the course of the call the physician/provider feels a telephone visit is not appropriate, you will not be charged for this service.\"    Patient has given verbal consent to a Telephone visit? Yes    Patient would like to receive their AVS by AVS Preference: Mail a copy.    Additional provider notes:   She is doing very well.  Vest every day and inhaler, azithromycin  She did have an exacerbation 6 weeks ago, unclear cause.    We discussed covid precautions.    Follow up 4 months    Phone call duration: 5 minutes  Jun Juarez MD  "

## 2021-06-07 NOTE — PROGRESS NOTES
"Adalgisa Solano is a 84 y.o. female who is being evaluated via a billable telephone visit.      The patient has been notified of following:     \"This telephone visit will be conducted via a call between you and your physician/provider. We have found that certain health care needs can be provided without the need for a physical exam.  This service lets us provide the care you need with a short phone conversation.  If a prescription is necessary we can send it directly to your pharmacy.  If lab work is needed we can place an order for that and you can then stop by our lab to have the test done at a later time.    If during the course of the call the physician/provider feels a telephone visit is not appropriate, you will not be charged for this service.\"         Adalgisa Solano complains of    Chief Complaint   Patient presents with     Follow-up     Questions about heart rate and home PT        I have reviewed and updated the patient's Past Medical History, Social History, Family History and Medication List.    ALLERGIES  Tramadol; Amoxicillin; Levofloxacin; and Penicillins    Additional provider notes: Patient is concerned regarding increased heart rate with flaring of the pulmonary vast.    She has underlying COPD.    The patient denies any fever chills or night sweats.  She is interesting to know if she can play cards with neighbors next door who are not sick or ill with a fever or any symptoms off coronavirus infection.    Her  has had a low-grade temp to 99 degrees yesterday then it normalized today.  Otherwise he feels well.    The patient herself feels well she does wear a pulmonary vest and after wearing it she wonders if the heart rate goes up yesterday the highest heart rate was 148 bpm today this morning 84 bpm.  The patient otherwise is feeling quite well she denies any blood in the stool urine or sputum.    Medication list reviewed and reconciled.    She is a non-smoker she does not use " alcohol to excess.  Previously she had been seen by Dr. Conrado Marquez from [facility] Department of cardiology and I suggested she may want to contact him regarding tachycardia.    I also encouraged her to continue using a pulmonary vest to enhance pulmonary toilet and to use inpatient or in-house services from home health care for pulmonary rehabilitation and pulmonary physical therapy like percussion drainage.  At least once a day for the pulmonary vessels and percussion drainage with an outside source coming in as long as they are not ill.  I advised her to avoid contact and being in groups of greater than 10.    Assessment/Plan:  COPD stable.  Advise continuation of pulmonary vest and percussion drainage with home health care services outside as long as they are well.    Avoid contact with people who are sick and avoid being in groups of greater than 10.  For the evaluation of tachycardia I suggested she evaluate this and contact Dr. Conrado Marquez from the division of cardiology [facility] here in [place].  All other meds and cares same stable.  The patient was invited to call back for telephone visit any time or to come to the office once this covid-19 has subsided.      Phone call duration:  11 minutes    Mica Talavera CMA

## 2021-06-07 NOTE — TELEPHONE ENCOUNTER
"Who is calling:  Chantelmarli  Reason for Call:  She has some \"contra-indication\" on some of patient's current medications.    Doxycycline interaction with Calcium with Vitamin D  Doxycycline interaction with Ferrous Sulfate  Doxycycline interaction with Magnesium Oxide    The interaction is there could be a decrease in the doxycycline effectiveness.      Does the doctor want any medication changes?    Date of last appointment with primary care: Today  Okay to leave a detailed message: Yes      "

## 2021-06-07 NOTE — TELEPHONE ENCOUNTER
----- Message from Hyun Alba sent at 3/20/2020  9:22 AM CDT -----      Caller: Patient  Primary cardiologist: Dr. Marquez  Detailed reason for call: Patient stated that her heart rate has been going up, she stated that at one point it was up to 148. Her baseline was 111-117 but now it is down to 80. Please advise    Best phone number: [phone]  Best time to contact: today  Ok to leave a detailed message? yes  Device? No    Additional Info:          Called back patient to address concerns. She has been dealing with a COPD exacerbation recently and is on doxycycline, nebs and steroids. She noted yesterday that when checking her O2 sats on her pulse oximeter, her HR has been bouncing around between . She notices it especially with activity. She also endorses shortness of breath with activity and sounded hoarse with conversation. She denies palpitations or typical chest pains but does endorse some chest discomfort due to coughing. She stated that she had stopped taking her nebs due to her high HR. Advised her to please take her nebs to help her breathing as ordered and directed by the doctor who ordered them. Expressed that his HR response is likely due to due to COPD exacerbation and the medications she is on for this. Her HR has been between 70-80 this morning and she confirms she is taking her Diltiazem 120 mg 24 hr capsule and her Xarelto for her chronic Afib. Informed her that message would be sent to Dr. Marquez but he is out of the office this week and will get back to me as he is able. Advised patient to seek out medical attention in the ER if HR remains consistently about 120-130 and she has symptoms of shortness of breath, palpitations or chest pains. She verbalized understanding. -[facility]        Adalgisa Schwab calls in with higher HR yesterday throughout the day on her pulse oximeter. She is stable today 70-80's bpm. She is on steroids, nebs and doxycycline. I explained it is likely in  response to her current COPD exacerbation and the medications she is taking. Advised her to be well, take her medications as prescribed and to seek out medical attention if HR is consistently elevated over 120-130 and she has symptoms of shortness of breath, chest pain or palpitations. Any other recommendations besides what I have outlined above for Adalgisa?   Thank you!  Mal

## 2021-06-07 NOTE — PROGRESS NOTES
Patient consents to receive outdoor care: Yes    Upon arrival, patient instructed to proceed to designated location, place vehicle in park, turn off, and remove keys  and Patient receiving an immunization or injection. Instructed patient to notify healthcare personnel if they are having an adverse reaction.    If we are unable to safely and ergonomically able to provide care- is the patient able to safely able to get out of car and transfer to a chair? Yes      Patient would like to receive their AVS Not needed. .    
n/a

## 2021-06-07 NOTE — TELEPHONE ENCOUNTER
===View-only below this line===  ----- Message -----  From: Anabel Marquez MD  Sent: 3/20/2020   7:26 PM CDT  To: Yolanda Juarez RN    Agree,and if no better next week to ed.        Called Adalgisa and updated on response from [facility]. She states that she has been diagnosed with bronchitis and is being treated for that. She does not have a temperature so she is trying not to be too concerned. She states that her HR has been stable and under 100. She will call back if she has any issues with afib with RVR or other cardiac concerns. -[facility]

## 2021-06-07 NOTE — PROGRESS NOTES
"Adalgisa Solano is a 84 y.o. female who is being evaluated via a billable telephone visit.      The patient has been notified of following:     \"This telephone visit will be conducted via a call between you and your physician/provider. We have found that certain health care needs can be provided without the need for a physical exam.  This service lets us provide the care you need with a short phone conversation.  If a prescription is necessary we can send it directly to your pharmacy.  If lab work is needed we can place an order for that and you can then stop by our lab to have the test done at a later time.    Telephone visits are billed at different rates depending on your insurance coverage. During this emergency period, for some insurers they may be billed the same as an in-person visit.  Please reach out to your insurance provider with any questions.    If during the course of the call the physician/provider feels a telephone visit is not appropriate, you will not be charged for this service.\"    Patient has given verbal consent to a Telephone visit? Yes    Patient would like to receive their AVS by snail mail    Additional provider notes: COPD.    O2 sats this morning 97% using her thumb.    Breathing easy.  No fever chills night sweats no signs or symptoms of coronavirus infection.    No blood in stool urine or sputum medication list reviewed well-tolerated normal effects reconciled.    Allergies tramadol amoxicillin levofloxacin and penicillin.    The patient previously had been a smoker no longer a smoker prior history of asthmatic bronchitis recurrent.  Seen previously by pulmonary medicine specialist.    Hypertension controlled outside readings reviewed.  History of atrial fibrillation as well and patient is taking diltiazem for this plus Xarelto.  The patient has had no thromboembolic complications of late.  Thought omeprazole was related to ranitidine so quit it but totally different drugs and " omeprazole or Prilosec would be safer acid reflux although she is currently asymptomatic in this regard then I would not take it either I said.    The patient also discussed her leg edema weight is down 4 more pounds using a loop diuretic furosemide with good results.  Denies paroxysmal nocturnal dyspnea no leg edema.    Assessment/Plan:  COPD stable.    Multiple drug allergies.    Dyspepsia okay for omeprazole do not use Zantac.    Congestive heart failure with weight down 4 more pounds no leg edema denies PND.  Stable RTC with telephone visit 1 month.      Phone call duration:  11 minutes    Leana Batista, [facility]

## 2021-06-07 NOTE — TELEPHONE ENCOUNTER
FYI - Status Update  Who is Calling: Home Care  Update: elevated pulse varies between  other vitals were within parameters incidentally started on doxycycline 100mg two times a day for 7 days and prednisone 10mg 2 tabs for 5 days, 1 tablet for 5 days thereafter   Okay to leave a detailed message?:  Yes

## 2021-06-07 NOTE — TELEPHONE ENCOUNTER
RN cannot approve Refill Request    RN can NOT refill this medication med is not covered by policy/route to provider. Last office visit: 2/24/2020 Shai Ellington MD Last Physical: 8/22/2019 Last MTM visit: Visit date not found Last visit same specialty: 2/24/2020 Shai Ellington MD.  Next visit within 3 mo: Visit date not found  Next physical within 3 mo: Visit date not found      Allie Coe, [facility] Triage/Med Refill 4/12/2020    Requested Prescriptions   Pending Prescriptions Disp Refills     XARELTO 20 mg tablet [Pharmacy Med Name: XARELTO 20MG] 30 tablet 0     Sig: TAKE 1 TABLET (20 MG TOTAL) BY MOUTH DAILY WITH SUPPER.       Apixaban/Rivaroxaban/Dabigatran Refill Protocol Failed - 4/11/2020  9:07 AM        Failed - Route to appropriate pool/provider     Last Anticoagulation Summary:           Passed - Renal function test in last year     Creatinine   Date Value Ref Range Status   02/12/2020 0.66 0.60 - 1.10 mg/dL Final             Passed - PCP or prescribing provider visit in past 12 months       Last office visit with prescriber/PCP: 2/24/2020 Shai Ellington MD OR same dept: Visit date not found OR same specialty: 2/24/2020 Shai Ellington MD  Last physical: 8/22/2019 Last MTM visit: Visit date not found   Next visit within 3 mo: Visit date not found  Next physical within 3 mo: Visit date not found  Prescriber OR PCP: Shai Ellington MD  Last diagnosis associated with med order: 1. Routine general medical examination at a health care facility  - XARELTO 20 mg tablet [Pharmacy Med Name: XARELTO 20MG]; TAKE 1 TABLET (20 MG TOTAL) BY MOUTH DAILY WITH SUPPER.  Dispense: 30 tablet; Refill: 0    If protocol passes may refill for 12 months if within 3 months of last provider visit (or a total of 15 months).

## 2021-06-08 ENCOUNTER — OFFICE VISIT - HEALTHEAST (OUTPATIENT)
Dept: INTERNAL MEDICINE | Facility: CLINIC | Age: 86
End: 2021-06-08

## 2021-06-08 DIAGNOSIS — J44.9 CHRONIC OBSTRUCTIVE PULMONARY DISEASE, UNSPECIFIED COPD TYPE (H): ICD-10-CM

## 2021-06-08 ASSESSMENT — PATIENT HEALTH QUESTIONNAIRE - PHQ9: SUM OF ALL RESPONSES TO PHQ QUESTIONS 1-9: 0

## 2021-06-08 ASSESSMENT — MIFFLIN-ST. JEOR: SCORE: 1102.25

## 2021-06-08 NOTE — TELEPHONE ENCOUNTER
Call from Adalgisa. States she has been having decreased sats lately.  Has been using her oxygen at 1 LPM and her sats have been dipping to 80-81% range.  She can get them back up fairly quickly with some deep breathing to mid 90% range.  Has had some increase shortness of breath, coughing up clear phlegm, using her vest and neb treatments which do help her. No fevers    Will prescribe her action plan to start today.  Also instructed that she could increase her oxygen as needed to help her maintain her oxygen levels >88%.

## 2021-06-08 NOTE — TELEPHONE ENCOUNTER
Patient calling.    She would like to leave a message for Dr. Ellington.  She is reporting the infection in   Her leg   Is 100% healed after taking Keflex for 10 days.    She also reports she saw Dr. Vences   At [facility] yesterday, and he told patient she does not have cellulitis, but has Lymphedema.    BUTCH Donnelly RN  [facility] Triage/refill nurse

## 2021-06-08 NOTE — TELEPHONE ENCOUNTER
RN cannot approve Refill Request    RN can NOT refill this medication med is not covered by policy/route to provider. Last office visit: 2/24/2020 Shai Ellington MD Last Physical: 8/22/2019 Last MTM visit: Visit date not found Last visit same specialty: 2/24/2020 Shai Ellington MD.  Next visit within 3 mo: Visit date not found  Next physical within 3 mo: Visit date not found      April Byers, [facility] Triage/Med Refill 5/17/2020    Requested Prescriptions   Pending Prescriptions Disp Refills     XARELTO 20 mg tablet [Pharmacy Med Name: XARELTO 20MG] 30 tablet 0     Sig: TAKE 1 TABLET (20 MG TOTAL) BY MOUTH DAILY WITH SUPPER.       Apixaban/Rivaroxaban/Dabigatran Refill Protocol Failed - 5/17/2020  8:38 AM        Failed - Route to appropriate pool/provider     Last Anticoagulation Summary:           Passed - Renal function test in last year     Creatinine   Date Value Ref Range Status   02/12/2020 0.66 0.60 - 1.10 mg/dL Final             Passed - PCP or prescribing provider visit in past 12 months       Last office visit with prescriber/PCP: 2/24/2020 Shai Ellington MD OR same dept: Visit date not found OR same specialty: 2/24/2020 Shai Ellington MD  Last physical: 8/22/2019 Last MTM visit: Visit date not found   Next visit within 3 mo: Visit date not found  Next physical within 3 mo: Visit date not found  Prescriber OR PCP: Shai Ellington MD  Last diagnosis associated with med order: 1. Routine general medical examination at a health care facility  - XARELTO 20 mg tablet [Pharmacy Med Name: XARELTO 20MG]; TAKE 1 TABLET (20 MG TOTAL) BY MOUTH DAILY WITH SUPPER.  Dispense: 30 tablet; Refill: 0    If protocol passes may refill for 12 months if within 3 months of last provider visit (or a total of 15 months).

## 2021-06-08 NOTE — PROGRESS NOTES
"Adalgisa Solano is a 84 y.o. female who is being evaluated via a billable telephone visit.      The patient has been notified of following:     \"This telephone visit will be conducted via a call between you and your physician/provider. We have found that certain health care needs can be provided without the need for a physical exam.  This service lets us provide the care you need with a short phone conversation.  If a prescription is necessary we can send it directly to your pharmacy.  If lab work is needed we can place an order for that and you can then stop by our lab to have the test done at a later time.    Telephone visits are billed at different rates depending on your insurance coverage. During this emergency period, for some insurers they may be billed the same as an in-person visit.  Please reach out to your insurance provider with any questions.    If during the course of the call the physician/provider feels a telephone visit is not appropriate, you will not be charged for this service.\"    Patient has given verbal consent to a Telephone visit? Yes    What phone number would you like to be contacted at? [phone]    Patient would like to receive their AVS by snail mail    Additional provider notes: Cellulitis left lower extremity recurrent.    Despite allergy to amoxicillin and penicillin she can take cephalexin quite easily and quite well and has done so previously without any untoward effects.    There is a rash in her left leg with some isolated papules similar to when she has had cellulitis previously no sign of shingles or painful rash.  Known sign of pruritus are nonpruritic.    No blood in stool or urine denies chest pain she is chronically short of breath.  COPD with history of asthmatic bronchitis.    Medication list reviewed reconciled in the chart generally well-tolerated.    Allergies are multiple including tramadol amoxicillin levofloxacin and penicillin.  No recent antecedent injury " trauma or bite to the left leg.    Assessment/Plan:  Cellulitis left lower extremity recurrent.  Cephalexin 500 mg 4 times a day for 10 days.    Multiple drug allergies including penicillin amoxicillin levofloxacin and tramadol.    COPD with chronic shortness of breath stable.      Phone call duration:  11 minutes    Leana Batista CMA

## 2021-06-08 NOTE — PROGRESS NOTES
[facility] Heart Care Note    Assessment/Plan:    Adalgisa Solano is a 81 y.o. old female with:  1. Dyspnea and tachycardia since knee surgery in setting of worsening of chronic bronchitis, on asp 325mg bid and swelling and pain in left leg.  We discussed several possible sources for her symptoms: COPD; GERD with reflux into lungs on asp bid; DVT/PE; diastolic heart failure (noted normal LVEF on recent echo).  Will start with ECG, blood tests and usg of lower extremities.  She will reach out to orthopedist regarding when can lower aspirin  2. Possible infrarenal aortic aneurysm - repeat USG   3. PAD - stable on asp and atorvastatin, no claudication        Dr. Chris Sarah  [facility] HEART [facility]  [phone]  ______________________________________________________________________    Subjective:    I had the opportunity to see Adalgisa Solano at the [facility] Heart Care Clinic. Adalgisa Solano is a 81 y.o. female with a known history of PAD s/p left common iliac PTA 1/16 and COPD on oxygen, recent knee surgery in 10/16 with swelling slowly improving here due to abn echo.  Pt on two aspirin a day recommended by orthopedic surgeon after knee surgery.  Since the surgery her HR has been high above 100 to 120, and requires more oxygen.  This prompted the echo revealing normal RV/LV function (could not see all the wall of the heart).  No chest pain or pressure, unless can't breath on heavy exertion.  Can not lay flat due to dyspnea and sleeps in a chair or recliner due to cough and dyspnea on laying flat, but when she has bronchitis.   Incarerated bowel in August 2016 required extensive surgery.  She had severe pain in her left knee prompted surgery.  Her left leg swelling has improved but still present, pain is present in left lower leg and thigh.      ______________________________________________________________________      Review of Systems:   General: WNL  Eyes: WNL  Ears/Nose/Throat: Hearing  Loss  Lungs: Cough, Shortness of Breath, Snoring, Wheezing  Heart: Shortness of Breath with activity, Irregular Heartbeat, Leg Swelling  Stomach: WNL  Bladder: WNL  Muscle/Joints: WNL  Skin: Rash  Nervous System: WNL  Mental Health: WNL     Blood: WNL    Problem List:  Patient Active Problem List   Diagnosis     Budd-Chiari Syndrome     Cataract     Urine Tests Nonspecific Abnormal Findings     Abdominal Pain     Hyperlipidemia     Recurrent major depressive episodes     Pneumonia     Chronic obstructive pulmonary disease     Penicillin Reaction     Reactions To Sulfa Drugs     Osteopenia     Lump Or Mass In Breast     Lumbar radiculopathy     Edema     Hernia     Lumbago     S/P inguinal hernia repair     Claudication of left lower extremity     Peripheral arterial disease     Incarcerated ventral hernia     Simple chronic bronchitis     Type 2 diabetes mellitus without complication     Hypertension     Schizoaffective disorder, bipolar type     Hypertension 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 140/90     Hematuria     Osteoarthritis of left knee     Medical History:  Past Medical History:   Diagnosis Date     Arm skin lesion, right      Arnold-Chiari malformation      Breast lump      Claudication of left lower extremity      COPD (chronic obstructive pulmonary disease)      Depression      GERD (gastroesophageal reflux disease)      Hyperlipidemia      Lumbago      Lumbar spinal stenosis      Osteopenia      Osteopenia      PAD (peripheral artery disease)      Shortness of breath      Simple chronic bronchitis 2016     Surgical History:  Past Surgical History:   Procedure Laterality Date     BACK SURGERY       BREAST CYST ASPIRATION Left       SECTION       CHOLECYSTECTOMY       EYE SURGERY      bilateral cataracts     femoral angiogram Left 10/28/15     JOINT REPLACEMENT      knee - partial     LAPAROSCOPIC INCISIONAL / UMBILICAL / VENTRAL HERNIA REPAIR Left  3/27/2015    Procedure: ATTEMPTED LAPAROSCOPIC ABDOMINA/FLANK INCISION REPAIR;  Surgeon: Jose Lakhani MD;  Location: [facility] OR;  Service:      LUMBAR FUSION N/A 11/17/2014    Procedure: POSTERIOR FUSION / DECOMPRESSION L3-S1 WITH PELVIC FIXATION BILATERAL ;  Surgeon: Rajan Mares MD;  Location: [facility] OR;  Service:      posterior cervical repair of budd chiari malformation       MI TOTAL KNEE ARTHROPLASTY Left 10/7/2016    Procedure: TOTAL KNEE ARTHROPLASTY, LEFT;  Surgeon: Kan Quesada MD;  Location: [facility] OR;  Service: Orthopedics     Social History:  No pertinent social hx related to patient's chief complaint other than above in subjective        Family History:  No pertinent family hx related to patient's chief complaint other than above in subjective      Allergies:  Allergies   Allergen Reactions     Amoxicillin Itching and Rash     Levofloxacin Itching and Rash     Penicillins Itching and Rash     Sulfa (Sulfonamide Antibiotics) Itching and Rash       Medications:  Current Outpatient Prescriptions   Medication Sig Dispense Refill     acetaminophen (TYLENOL) 325 MG tablet Take 650 mg by mouth every 6 (six) hours as needed for pain.       albuterol (PROVENTIL HFA;VENTOLIN HFA) 90 mcg/actuation inhaler Inhale 2 puffs every 6 (six) hours as needed for wheezing.       ARIPiprazole (ABILIFY) 5 MG tablet Take 5 mg by mouth bedtime.       aspirin 325 MG tablet Take 1 tablet (325 mg total) by mouth 2 (two) times a day with meals.  0     atorvastatin (LIPITOR) 20 MG tablet Take 1 tablet (20 mg total) by mouth bedtime. 90 tablet 1     azithromycin (ZITHROMAX Z-LUPE) 250 MG tablet Take 2 tablets (500 mg) on  Day 1,  followed by 1 tablet (250 mg) once daily on Days 2 through 5. 6 tablet 0     calcium carbonate-vitamin D3 (CALCIUM WITH VITAMIN D) 600 mg(1,500mg) -400 unit per tablet Take 1 tablet by mouth 2 (two) times a day.       cholecalciferol, vitamin D3, 1,000 unit tablet Take 2,000  "Units by mouth daily.        clarithromycin (BIAXIN) 500 MG tablet Take 500 mg by mouth once as needed. If  Needed prior to dental procedure       furosemide (LASIX) 20 MG tablet Take 1 tablet (20 mg total) by mouth daily. 30 tablet 0     ipratropium-albuterol (DUONEB) 0.5-2.5 mg/3 mL nebulizer Inhale 3 mL 4 (four) times a day as needed (chronic bronchitis).        omeprazole (PRILOSEC) 20 MG capsule TAKE 1 CAPSULE DAILY. 100 capsule 0     peg 400-hypromellose-glycerin (VISINE TEARS) 1-0.2-0.2 % Drop Administer 1 drop to both eyes as needed.       predniSONE (DELTASONE) 10 MG tablet Take 3 tablets (30 mg total) by mouth daily for 5 days. 15 tablet 0     psyllium (METAMUCIL) 3.4 gram packet Take 1 packet by mouth daily. Takes 1 teaspoon at home       QUEtiapine (SEROQUEL) 25 MG tablet Take 50 mg by mouth bedtime.        SPIRIVA WITH HANDIHALER 18 mcg inhalation capsule use 1 capsule via inhaler 1 x a day 90 capsule 0     SYMBICORT 160-4.5 mcg/actuation inhaler 2 puffs 2 (two) times a day.       fluocinonide (LIDEX) 0.05 % cream Apply 1 application topically 3 (three) times a day.       fluticasone-salmeterol (ADVAIR) 500-50 mcg/dose DISKUS Inhale 1 puff 2 (two) times a day.       tiotropium (SPIRIVA) 18 mcg inhalation capsule Place 18 mcg into inhaler and inhale daily.       No current facility-administered medications for this visit.        Objective:   Vital signs:  Visit Vitals     /66 (Patient Site: Left Arm, Patient Position: Sitting, Cuff Size: Adult Large)     Pulse 100     Resp 16     Ht 5' 1.5\" (1.562 m)     Wt 165 lb (74.8 kg)     BMI 30.67 kg/m2         Physical Exam:    GENERAL APPEARANCE: Alert, cooperative and in no acute distress.  HEENT: No scleral icterus. No Xanthelasma. Oral mucuos membranes pink and moist.  NECK: JVP<6cm. No Hepatojugular reflux. Thyroid not visualized  CHEST: clear to auscultation  CARDIOVASCULAR: S1, S2 without murmur ,clicks or rubs. Brachial, radial and posterior tibial " pulses are intact and symetric. No carotid bruits noted.    ABDOMEN: Nontender. BS+. No bruits.  EXTREMITIES: No cyanosis, clubbing or edema.    Lab Results:  LIPIDS:  Lab Results   Component Value Date    CHOL 136 06/09/2016    CHOL 144 06/01/2015    CHOL 141 05/29/2014     Lab Results   Component Value Date    HDL 69 06/09/2016    HDL 67 06/01/2015    HDL 72 05/29/2014     Lab Results   Component Value Date    LDLCALC 56 06/09/2016    LDLCALC 67 06/01/2015    LDLCALC 61.4 05/29/2014     Lab Results   Component Value Date    TRIG 54 06/09/2016    TRIG 51 06/01/2015    TRIG 38 05/29/2014     No components found for: CHOLHDL    BMP:  Lab Results   Component Value Date    CREATININE 0.59 (L) 10/09/2016    BUN 12 09/13/2016     09/13/2016    K 5.0 10/08/2016    CL 99 09/13/2016    CO2 31 09/13/2016     echo   Normal left ventricular size. Normal left ventricular systolic function. Apical segments are   not well seen. Cannot exclude left ventricular   thrombus. Normal left ventricular wall thickness. The ejection fraction is visually estimated   at 65%.   The right ventricle is normal in size and function.   Moderate left atrial dilatation.   Mild right atrial dilatation.   Mild mitral regurgitation.   Mild tricuspid regurgitation    CTA   IMPRESSION:   CONCLUSION:  1. No pulmonary embolus.  2. No acute airspace opacities.  3. Mildly enlarged right hilar lymph node. This is nonspecific and may be reactive in nature.    Chris Sarah MD  [facility]  [phone]

## 2021-06-08 NOTE — PROGRESS NOTES
[facility] Clinic Follow Up Note    Adalgisa Solano   81 y.o. female    Date of Visit: 2/9/2017    Chief Complaint   Patient presents with     Cough     dark yellow phlegm, SOB, tired, wheezing     Subjective  Adalgisa is a patient Dr. Shai Ellington, who is presenting to clinic with worsening weakness and shortness of breath with cough.    She has severe underlying COPD, oxygen dependent on Symbicort, Spiriva and DuoNeb nebs at home.    Approximately one month ago she she developed a cough fatigue and slightly increasing shortness of breath.  She thought she caught a cold from other family members that also had a cold that she was exposed to.  She had increasing cough and shortness of breath over 2 weeks, eventually called on February 1 for Z-Bijan and a course of prednisone, just mild improvement with that.  She has been off these medications for the past 4 days, and is now worsening.    She's been using her nebulizer more often.    Cough is minimally productive, no hemoptysis.  No fever.  Significant fatigue and shortness of breath even at rest.  Wearing oxygen at home at 2 L.    Coming in today she was 89-90% on 2 L, put on 3 L and was 95% sat.    No chest pain.  No palpitations.  She denies a history of atrial fibrillation.    She does have peripheral vascular disease and is on aspirin and Lipitor.  I did not see documentation of ischemic heart disease.    She had a cardiac echo last month that showed ejection fraction of 65% and mild mitral regurgitation.    Earlier this month she had a lower extremity ultrasound was negative for DVT.    October of last year she had a chest CT scan that was negative for nodule or mass.  Last chest x-ray was November of last year without clear infiltrate.    Patient states that she's been told she's had crackles in her right lower lobe previous lung infection in the past.    Patient also has psychiatric diagnosis with a diagnosis of bipolar disorder on  multiple psychiatric medications.  She also is multiple allergies including Levaquin and amoxicillin and sulfa.    Previous smoker.    PMHx:    Past Medical History:   Diagnosis Date     Arm skin lesion, right      Arnold-Chiari malformation      Breast lump      Claudication of left lower extremity      COPD (chronic obstructive pulmonary disease)      Depression      GERD (gastroesophageal reflux disease)      Hyperlipidemia      Lumbago      Lumbar spinal stenosis      Osteopenia      Osteopenia      PAD (peripheral artery disease)      Shortness of breath      Simple chronic bronchitis 2016     PSHx:    Past Surgical History:   Procedure Laterality Date     BACK SURGERY       BREAST CYST ASPIRATION Left       SECTION       CHOLECYSTECTOMY       EYE SURGERY      bilateral cataracts     femoral angiogram Left 10/28/15     JOINT REPLACEMENT      knee - partial     LAPAROSCOPIC INCISIONAL / UMBILICAL / VENTRAL HERNIA REPAIR Left 3/27/2015    Procedure: ATTEMPTED LAPAROSCOPIC ABDOMINA/FLANK INCISION REPAIR;  Surgeon: Jose Lakhani MD;  Location: [facility];  Service:      LUMBAR FUSION N/A 2014    Procedure: POSTERIOR FUSION / DECOMPRESSION L3-S1 WITH PELVIC FIXATION BILATERAL ;  Surgeon: Rajan Mares MD;  Location: [facility] OR;  Service:      posterior cervical repair of budd chiari malformation       ME TOTAL KNEE ARTHROPLASTY Left 10/7/2016    Procedure: TOTAL KNEE ARTHROPLASTY, LEFT;  Surgeon: Kan Quesada MD;  Location: [facility];  Service: Orthopedics     Immunizations:   Immunization History   Administered Date(s) Administered     DT (pediatric) 2004     Influenza high dose, seasonal 10/09/2016     Influenza,seasonal quad, PF, 36+MOS 2014     Pneumo Polysac 23-V 10/18/1999, 2005     Td, historic 2004     Tdap 2013       ROS A comprehensive review of systems was performed and was otherwise negative    Medications, allergies,  "and problem list were reviewed and updated    Exam  Visit Vitals     /72     Pulse (!) 111     Temp 98.7  F (37.1  C)     Ht 5' 1.5\" (1.562 m)     Wt 162 lb (73.5 kg)     SpO2 95%     BMI 30.11 kg/m2     Frail elderly woman.  No cyanosis.  She is alert and oriented ×3.  Appears globally weak, but can climb onto the exam table.  Mild pharyngitis without exudate.  No oral lesions.  No JVD.  No cervical or subclavicular adenopathy.  Lungs show decreased breath sounds throughout, there is some coarser crackles in the right base.  No significant wheezing, but reduced respiratory excursion.  Heart is tachycardic and irregular did not hear murmur or rubs.  Abdomen is nontender, mildly overweight.  Trace to +1 lower extremity edema bilaterally, somewhat more on the left.    She has the scar from her left total knee replacement from October of last year.  No calf tenderness.    Assessment/Plan  1. Irregular heart beat  New-onset atrial fibrillation with rapid ventricular rate in a patient with a COPD exacerbation.    No history of stroke.  I discussed risk of stroke with atrial fibrillation.  I also discussed risk of bleeding with anticoagulation.  I would anticipate need for anticoagulation.  - Electrocardiogram Perform and Read    Patient will be admitted to the hospital, anticipate need for IV diltiazem and telemetry monitoring.    2. Cough  Consistent with COPD exacerbation, likely precipitated by a cold one month ago.  She did not respond well to azithromycin and prednisone last week.    I reviewed chest x-ray images myself.  There was some haziness in the right base, possible infiltrate versus old scarring.  Mildly hyperinflated lungs.  Some increased vascular markings, possibly suggestive of some vascular congestion.    - XR Chest PA and Lateral; Future    3. COPD (chronic obstructive pulmonary disease)  COPD exacerbation.  Anticipate need for steroids.    Continue duo nebs, Symbicort and Spiriva.  " Oxygen.    Her drug allergies will make antibiotic selection difficult, could retry azithromycin.  Could consider ceftriaxone with warnings about possible cross reaction with a penicillin allergy.    She is listed as full code.    4. Atrial fibrillation with rapid ventricular response  As above    I did discuss case with Dr. Trent Wagner in the ER.  Patient brought to the ER.    5. Peripheral arterial disease  Aspirin and Lipitor.    6. Schizoaffective disorder, bipolar type  Appears stable and not in exacerbation.  Multiple psychiatric medications.    No Follow-up on file.   There are no Patient Instructions on file for this visit.  Alberto Whitaker MD  Total time with patient over 40 minutes and over 50% coord care.  Time all face to face.      Current Outpatient Prescriptions   Medication Sig Dispense Refill     acetaminophen (TYLENOL) 325 MG tablet Take 650 mg by mouth every 6 (six) hours as needed for pain.       albuterol (PROVENTIL HFA;VENTOLIN HFA) 90 mcg/actuation inhaler Inhale 2 puffs every 6 (six) hours as needed for wheezing.       ARIPiprazole (ABILIFY) 5 MG tablet Take 5 mg by mouth bedtime.       aspirin 81 MG EC tablet Take 81 mg by mouth daily.       atorvastatin (LIPITOR) 20 MG tablet Take 1 tablet (20 mg total) by mouth bedtime. 90 tablet 1     calcium carbonate-vitamin D3 (CALCIUM WITH VITAMIN D) 600 mg(1,500mg) -400 unit per tablet Take 1 tablet by mouth 2 (two) times a day.       cholecalciferol, vitamin D3, 1,000 unit tablet Take 2,000 Units by mouth daily.        clarithromycin (BIAXIN) 500 MG tablet Take 500 mg by mouth once as needed. If  Needed prior to dental procedure       furosemide (LASIX) 20 MG tablet Take 1 tablet (20 mg total) by mouth daily. 30 tablet 0     ipratropium-albuterol (DUONEB) 0.5-2.5 mg/3 mL nebulizer Inhale 3 mL 4 (four) times a day as needed (chronic bronchitis).        omeprazole (PRILOSEC) 20 MG capsule TAKE 1 CAPSULE DAILY. 100 capsule 0     peg  400-hypromellose-glycerin (VISINE TEARS) 1-0.2-0.2 % Drop Administer 1 drop to both eyes as needed.       psyllium (METAMUCIL) 3.4 gram packet Take 1 packet by mouth daily. Takes 1 teaspoon at home       QUEtiapine (SEROQUEL) 25 MG tablet Take 50 mg by mouth bedtime.        SPIRIVA WITH HANDIHALER 18 mcg inhalation capsule use 1 capsule via inhaler 1 x a day 90 capsule 0     SYMBICORT 160-4.5 mcg/actuation inhaler 2 puffs 2 (two) times a day.       fluocinonide (LIDEX) 0.05 % cream Apply 1 application topically 3 (three) times a day.       No current facility-administered medications for this visit.      Allergies   Allergen Reactions     Amoxicillin Itching and Rash     Levofloxacin Itching and Rash     Penicillins Itching and Rash     Sulfa (Sulfonamide Antibiotics) Itching and Rash     Social History   Substance Use Topics     Smoking status: Former Smoker     Quit date: 1/1/2006     Smokeless tobacco: Never Used      Comment: quit 2006     Alcohol use No

## 2021-06-08 NOTE — TELEPHONE ENCOUNTER
Refills Approved x 2    Rx renewed per Medication Renewal Policy. Medication was last renewed on 02/01/2020 for omeprazole and 05/22/2019 for diltiazem.  Last office visit was on 05/12/2020 with PCP.    Chloé Gannon, [facility] Triage/Med Refill 6/11/2020     Requested Prescriptions   Pending Prescriptions Disp Refills     omeprazole (PRILOSEC) 20 MG capsule [Pharmacy Med Name: OMEPRAZOLE 20MG] 90 capsule 2     Sig: TAKE 1 CAPSULE DAILY.       GI Medications Refill Protocol Passed - 6/10/2020  9:29 AM        Passed - PCP or prescribing provider visit in last 12 or next 3 months.     Last office visit with prescriber/PCP: 2/24/2020 Shai Ellington MD OR same dept: 2/24/2020 Shai Ellington MD OR same specialty: 2/24/2020 Shai Ellington MD  Last physical: 8/22/2019 Last MTM visit: Visit date not found   Next visit within 3 mo: Visit date not found  Next physical within 3 mo: Visit date not found  Prescriber OR PCP: Shai Ellington MD  Last diagnosis associated with med order: 1. Acute on chronic diastolic heart failure (H)  - diltiazem (CARDIZEM CD) 120 MG 24 hr capsule [Pharmacy Med Name: DILTIAZEM 120MG ER]; TAKE 1 CAPSULE (120 MG TOTAL) BY MOUTH DAILY.  Dispense: 90 capsule; Refill: 2    If protocol passes may refill for 12 months if within 3 months of last provider visit (or a total of 15 months).                diltiazem (CARDIZEM CD) 120 MG 24 hr capsule [Pharmacy Med Name: DILTIAZEM 120MG ER] 90 capsule 2     Sig: TAKE 1 CAPSULE (120 MG TOTAL) BY MOUTH DAILY.       Calcium-Channel Blockers Protocol Passed - 6/10/2020  9:29 AM        Passed - PCP or prescribing provider visit in past 12 months or next 3 months     Last office visit with prescriber/PCP: 2/24/2020 Shai Ellington MD OR same dept: 2/24/2020 Shai Ellington MD OR same specialty: 2/24/2020 Shai Ellington MD  Last physical: 8/22/2019 Last MTM visit: Visit date not found   Next visit within 3 mo: Visit date  not found  Next physical within 3 mo: Visit date not found  Prescriber OR PCP: Shai Ellington MD  Last diagnosis associated with med order: 1. Acute on chronic diastolic heart failure (H)  - diltiazem (CARDIZEM CD) 120 MG 24 hr capsule [Pharmacy Med Name: DILTIAZEM 120MG ER]; TAKE 1 CAPSULE (120 MG TOTAL) BY MOUTH DAILY.  Dispense: 90 capsule; Refill: 2    If protocol passes may refill for 12 months if within 3 months of last provider visit (or a total of 15 months).             Passed - Blood pressure filed in past 12 months     BP Readings from Last 1 Encounters:   05/12/20 105/74

## 2021-06-08 NOTE — TELEPHONE ENCOUNTER
Who is calling:  Patient  Reason for Call:  Patient has a reoccurring rash on her leg, symptoms of cellulitis, and would like to get a prescription for this. Patient did apply Triamcinolon 1% on leg this morning. Patient would like care team to contact her this afternoon. Patient us unavailable between 10am -12 pm.    Date of last appointment with primary care: NA  Okay to leave a detailed message: No

## 2021-06-08 NOTE — PROGRESS NOTES
Patient consents to receive outdoor care: Yes    Upon arrival, patient instructed to proceed to designated location, place vehicle in park, turn off, and remove keys  and Patient receiving an immunization or injection. Instructed patient to notify healthcare personnel if they are having an adverse reaction.    If we are unable to safely and ergonomically able to provide care- is the patient able to safely able to get out of car and transfer to a chair? Yes    Patient would like to receive their AVS not needed.

## 2021-06-09 NOTE — TELEPHONE ENCOUNTER
RN cannot approve Refill Request    RN can NOT refill this medication med is not covered by policy/route to provider. Last office visit: 6/29/2020 Shai Ellington MD Last Physical: 8/22/2019 Last MTM visit: Visit date not found Last visit same specialty: 6/29/2020 Shai Ellington MD.  Next visit within 3 mo: Visit date not found  Next physical within 3 mo: Visit date not found      Allie Coe, [facility] Triage/Med Refill 7/18/2020    Requested Prescriptions   Pending Prescriptions Disp Refills     XARELTO 20 mg tablet [Pharmacy Med Name: XARELTO 20MG] 30 tablet 0     Sig: TAKE 1 TABLET (20 MG TOTAL) BY MOUTH DAILY WITH SUPPER.       Apixaban/Rivaroxaban/Dabigatran Refill Protocol Failed - 7/18/2020  9:16 AM        Failed - Route to appropriate pool/provider     Last Anticoagulation Summary:           Passed - Renal function test in last year     Creatinine   Date Value Ref Range Status   02/12/2020 0.66 0.60 - 1.10 mg/dL Final             Passed - PCP or prescribing provider visit in past 12 months       Last office visit with prescriber/PCP: 6/29/2020 Shai Ellington MD OR same dept: 6/29/2020 Shai Ellington MD OR same specialty: 6/29/2020 Shai Ellington MD  Last physical: 8/22/2019 Last MTM visit: Visit date not found   Next visit within 3 mo: Visit date not found  Next physical within 3 mo: Visit date not found  Prescriber OR PCP: Shai Ellington MD  Last diagnosis associated with med order: 1. Routine general medical examination at a health care facility  - XARELTO 20 mg tablet [Pharmacy Med Name: XARELTO 20MG]; TAKE 1 TABLET (20 MG TOTAL) BY MOUTH DAILY WITH SUPPER.  Dispense: 30 tablet; Refill: 0    If protocol passes may refill for 12 months if within 3 months of last provider visit (or a total of 15 months).

## 2021-06-09 NOTE — PROGRESS NOTES
Office Visit - Follow up    Adalgisa Solano   81 y.o. female    Date of Visit: 2/22/2017    Chief Complaint   Patient presents with     Hospital Visit Follow Up     A. [facility]     COPD     Medication Questions     Aspirin dose       Subjective: D.  Follow-up atrial fibrillation questions regarding aspirin dosage best dose 81 mg daily.  History of atrial fibrillation with rapid ventricular response pulse not to the 84 blood pressure excellent see below 122/66.  He seen today in hospital follow-up for atrial fibrillation with rapid ventricular response.    Some disagreements with her  posing an added stress to the patient.    No blood in stool or urine or sputum medication list reviewed generally well-tolerated breathing easier.  Long history of smoking and resultant COPD with asthmatic bronchitis.  Symptoms have subsided.    ROS: A comprehensive review of systems was performed and was otherwise negative    Medications:  Prior to Admission medications    Medication Sig Start Date End Date Taking? Authorizing Provider   acetaminophen (TYLENOL) 325 MG tablet Take 650 mg by mouth every 6 (six) hours as needed for pain.   Yes PROVIDER, HISTORICAL   albuterol (PROVENTIL HFA;VENTOLIN HFA) 90 mcg/actuation inhaler Inhale 2 puffs every 6 (six) hours as needed for wheezing.   Yes PROVIDER, HISTORICAL   ARIPiprazole (ABILIFY) 5 MG tablet Take 5 mg by mouth bedtime.   Yes PROVIDER, HISTORICAL   aspirin 81 MG EC tablet Take 81 mg by mouth daily.   Yes PROVIDER, HISTORICAL   calcium carbonate-vitamin D3 (CALCIUM WITH VITAMIN D) 600 mg(1,500mg) -400 unit per tablet Take 1 tablet by mouth 2 (two) times a day.   Yes PROVIDER, HISTORICAL   cholecalciferol, vitamin D3, 1,000 unit tablet Take 2,000 Units by mouth daily.    Yes PROVIDER, HISTORICAL   diltiazem (CARDIZEM CD) 120 MG 24 hr capsule Take 1 capsule (120 mg total) by mouth daily. 2/14/17  Yes Shai Ellington MD   furosemide (LASIX) 20 MG tablet Take 1 tablet (20  mg total) by mouth daily. 2/8/17  Yes Shai Ellington MD   ipratropium-albuterol (DUONEB) 0.5-2.5 mg/3 mL nebulizer Inhale 3 mL 4 (four) times a day as needed (chronic bronchitis).  11/30/15  Yes PROVIDER, HISTORICAL   omeprazole (PRILOSEC) 20 MG capsule TAKE 1 CAPSULE DAILY. 2/8/17  Yes Shai Ellington MD   QUEtiapine (SEROQUEL) 25 MG tablet Take 25-50 mg by mouth bedtime.  8/10/16  Yes PROVIDER, HISTORICAL   SYMBICORT 160-4.5 mcg/actuation inhaler Inhale 2 puffs 2 (two) times a day.  1/25/17  Yes PROVIDER, HISTORICAL   tiotropium (SPIRIVA) 18 mcg inhalation capsule Place 18 mcg into inhaler and inhale daily.   Yes PROVIDER, HISTORICAL   atorvastatin (LIPITOR) 20 MG tablet Take 1 tablet (20 mg total) by mouth bedtime. 12/12/16   Chris Sarah MD   clarithromycin (BIAXIN) 500 MG tablet Take 500 mg by mouth once as needed. If  Needed prior to dental procedure    PROVIDER, HISTORICAL   peg 400-hypromellose-glycerin (VISINE TEARS) 1-0.2-0.2 % Drop Administer 1 drop to both eyes as needed.    PROVIDER, HISTORICAL   predniSONE (DELTASONE) 10 MG tablet Take 3 tablets (30 mg total) by mouth daily with breakfast for 10 days. 2/14/17 2/24/17  Shai Ellington MD   predniSONE (DELTASONE) 20 MG tablet Take 1 tablet (20 mg total) by mouth daily with breakfast for 10 days. 2/16/17 2/26/17  Shai Ellington MD   psyllium (METAMUCIL) 3.4 gram packet Take 1 packet by mouth daily. Takes 1 teaspoon at home    PROVIDER, HISTORICAL   predniSONE (DELTASONE) 10 MG tablet Take 1 tablet (10 mg total) by mouth daily with breakfast for 10 days. 2/19/17 2/22/17  Shai Ellington MD       Allergies:   Allergies   Allergen Reactions     Amoxicillin Itching and Rash     Levofloxacin Itching and Rash     Penicillins Itching and Rash     Sulfa (Sulfonamide Antibiotics) Itching and Rash       Immunizations:   Immunization History   Administered Date(s) Administered     DT (pediatric) 03/29/2004     Influenza high dose,  seasonal 10/09/2016     Influenza,seasonal quad, PF, 36+MOS 11/20/2014     Pneumo Polysac 23-V 10/18/1999, 08/01/2005     Td, historic 03/29/2004     Tdap 05/14/2013       Exam Chest clear to auscultation and percussion.  Heart tones regular rhythm without murmur rub or gallop.  Abdomen soft nontender no organomegaly.  No peritoneal signs.  Extremities free of edema cyanosis or clubbing.  Neck veins nondistended no thyromegaly or scleral icterus noted, carotids full.  Skin warm and dry easily conversant good spirited.  Normal intelligence.  Neurologically intact no gross localizing findings.    Assessment and Plan  COPD with recent flare better off antibiotics and prednisone now as needed prednisone antibiotics okay for patient to self initiate.    Atrial fibrillation current recommended aspirin dose 81 mg daily.    Hypertension control.    Multiple drug allergies.  Amoxicillin levofloxacin penicillin sulfa    At it emotional stress dealing with  today.    Time: total time spent with the patient was 25 minutes of which >50% was spent in counseling and coordination of care    The following high BMI interventions were performed this visit: encouragement to exercise    Shai Ellington MD    Patient Active Problem List   Diagnosis     Budd-Chiari syndrome     Cataract     Urine Tests Nonspecific Abnormal Findings     Abdominal Pain     Hyperlipidemia     Recurrent major depressive episodes     Pneumonia     Chronic obstructive pulmonary disease     Penicillin Reaction     Reactions To Sulfa Drugs     Osteopenia     Lump Or Mass In Breast     Lumbar radiculopathy     Edema     Hernia     Lumbago     S/P inguinal hernia repair     Claudication of left lower extremity     Peripheral arterial disease     Incarcerated ventral hernia     Simple chronic bronchitis     Type 2 diabetes mellitus without complication     Hypertension     Schizoaffective disorder, bipolar type     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

## 2021-06-09 NOTE — PROGRESS NOTES
[facility] Follow Up Note    Adalgisa Solano   81 y.o. female    Date of Visit: 4/5/2017    Chief Complaint   Patient presents with     Hospital Visit Follow Up     was at [facility]  This is a 81-year-old lady who is a patient of Dr. Shai Ellington.  She saw him last week and I have reviewed that note.  Subsequent to that she was seen at the [facility] and admitted to the hospital with an exacerbation of her shortness of breath.  I have had an opportunity to review there discharge summary.  They felt that her respiratory failure was due primarily to an exacerbation of her chronic lung disease.  They also thought that there was some minor congestive heart failure.  In addition she has had some ongoing cellulitis in the lower extremities and is been on 2 antibiotics.  She finished her doxycycline today and has more time on her cephalexin.  She was also on prednisone and is in the process of tapering off of this.  She does tell me she is feeling better.  Her breathing is improved and the swelling in the legs is diminished.  She is having no chest pain.  She is a little more energetic and is able to walk with her portable oxygen.  She reports no other new changes.  Again, I have reviewed the medical records.    ROS A comprehensive review of systems was performed and was otherwise negative    Medications, allergies, and problem list were reviewed and updated    Exam  General Appearance:   On examination today her blood pressure is 132/60.  Weight is 167 pounds and height is 61 inches.  BMI is 31.55.    No neck vein distention.    Lungs: Some scattered wheezes but breath sounds are good and she is exchanging air well.  She is on oxygen.    Heart rhythm is stable with a rate of 88 and no ectopy.    There is still some erythema in the lower extremities but skin temperature is normal.  She still has 1-2+ edema in both lower extremities.  They are not tender to touch.    The patient  is alert and oriented ×3.      Assessment/Plan  1. Chronic obstructive pulmonary disease with acute exacerbation     2. Hypertension due to endocrine disorder     3. Cellulitis     4. CHF (congestive heart failure)       Recent exacerbation of COPD with hospitalization at [place].  She will continue her current combination of nebulizer and inhaler treatments.  She will continue to taper the prednisone.  She has a follow-up with male in about 2 weeks.    The cellulitis appears to be responding to the antibiotics.  She will finish these.    Blood pressure is under good control continue current medication.    No indication of any continuing congestive heart failure.    She does have a follow-up with Dr. Mitchell in about 3 weeks.  Total time of this office visit was 25 minutes with greater than 50% of the time spent in care coordination and patient counseling.  Body Mass Index was not assessed due to The patient was in for hospital follow-up..    Karlos Salguero MD      Current Outpatient Prescriptions on File Prior to Visit   Medication Sig     albuterol (PROVENTIL HFA;VENTOLIN HFA) 90 mcg/actuation inhaler Inhale 2 puffs every 6 (six) hours as needed for wheezing or shortness of breath.      albuterol (PROVENTIL) 2.5 mg /3 mL (0.083 %) nebulizer solution Take 3 mL (2.5 mg total) by nebulization every 6 (six) hours as needed for wheezing.     ARIPiprazole (ABILIFY) 5 MG tablet Take 5 mg by mouth bedtime.     aspirin 81 MG EC tablet Take 81 mg by mouth daily.     atorvastatin (LIPITOR) 20 MG tablet Take 1 tablet (20 mg total) by mouth bedtime.     calcium carbonate-vitamin D3 (CALCIUM WITH VITAMIN D) 600 mg(1,500mg) -400 unit per tablet Take 1 tablet by mouth 2 (two) times a day.     cephalexin (KEFLEX) 500 MG capsule Take 1 capsule (500 mg total) by mouth 4 (four) times a day for 10 days.     cholecalciferol, vitamin D3, 1,000 unit tablet Take 2,000 Units by mouth daily.      diltiazem (CARDIZEM CD) 120 MG 24 hr  capsule Take 1 capsule (120 mg total) by mouth daily.     fluticasone-salmeterol (ADVAIR DISKUS) 100-50 mcg/dose DISKUS Inhale 1 puff 2 (two) times a day.     ipratropium-albuterol (DUONEB) 0.5-2.5 mg/3 mL nebulizer Inhale 3 mL 4 (four) times a day as needed (chronic bronchitis).      omeprazole (PRILOSEC) 20 MG capsule TAKE 1 CAPSULE DAILY.     peg 400-hypromellose-glycerin (VISINE TEARS) 1-0.2-0.2 % Drop Administer 1 drop to both eyes as needed.     psyllium (METAMUCIL) 3.4 gram packet Take 1 packet by mouth daily. Takes 1 teaspoon at home     QUEtiapine (SEROQUEL) 25 MG tablet Take 25-50 mg by mouth bedtime.      SPIRIVA WITH HANDIHALER 18 mcg inhalation capsule use 1 capsule via inhaler 1 x a day     triamcinolone (KENALOG) 0.1 % cream Apply 1 application topically as needed.     acetaminophen (TYLENOL) 325 MG tablet Take 650 mg by mouth every 6 (six) hours as needed for pain.     clarithromycin (BIAXIN) 500 MG tablet Take 500 mg by mouth once as needed. If  Needed prior to dental procedure     furosemide (LASIX) 20 MG tablet Take 1 tablet (20 mg total) by mouth daily. (Patient taking differently: Take 2 tablet (20 mg total) by mouth daily.)     guaiFENesin-dextromethorphan (MUCINEX DM) 600-30 mg Tb12 Take 1 tablet by mouth daily.     No current facility-administered medications on file prior to visit.      Allergies   Allergen Reactions     Amoxicillin Itching and Rash     Levofloxacin Itching and Rash     Penicillins Itching and Rash     Sulfa (Sulfonamide Antibiotics) Itching and Rash     Social History   Substance Use Topics     Smoking status: Former Smoker     Quit date: 1/1/2006     Smokeless tobacco: Never Used      Comment: quit 2006     Alcohol use No

## 2021-06-09 NOTE — PROGRESS NOTES
Patient oxygen saturation on RA at rest is 88%.    After ambulating 50ft on 1LPM oxygen saturation is 87%.  After ambulating 100ft on 2LPM oxygen saturation is 88%.  After ambulating 300ft on 3LPM oxygen saturation is 91%.    Patient is ambulatory within his/her home.     ...........................Sy Joe YANES 3/13/2017 10:05 AM

## 2021-06-09 NOTE — PROGRESS NOTES
Office Visit - Follow up    Adalgisa Solano   81 y.o. female    Date of Visit: 3/27/2017    Chief Complaint   Patient presents with     COPD     Shortness of Breath     Edema      also red       Subjective: COPD short of breath congestive heart failure.  Legs are swollen.  Left lower extremity is reddened more so than the right lower extremity the patient denies fever chills or night sweats.    Azithromycin in the form of Z-Bijan does not help her.    Her windpipe cannot be clear to mucus.  There had questions regarding [facility] consultation which I endorsed at [phone].  Prior hospitalizations here at [facility] at least twice within the last 30-60 days.  The patient denies paroxysmal nocturnal dyspnea no chest pain.  Medication list reviewed generally well-tolerated there are allergies to amoxicillin levofloxacin penicillin and sulfa.  With penicillin a rash that developed no anaphylactoid reactions.    No blood in stool or urine or sputum.  Accompanied by her  Anabel today.    ROS: A comprehensive review of systems was performed and was otherwise negative    Medications:  Prior to Admission medications    Medication Sig Start Date End Date Taking? Authorizing Provider   albuterol (PROVENTIL) 2.5 mg /3 mL (0.083 %) nebulizer solution Take 3 mL (2.5 mg total) by nebulization every 6 (six) hours as needed for wheezing. 3/13/17  Yes Jun Juarez MD   ARIPiprazole (ABILIFY) 5 MG tablet Take 5 mg by mouth bedtime.   Yes PROVIDER, HISTORICAL   aspirin 81 MG EC tablet Take 81 mg by mouth daily.   Yes PROVIDER, HISTORICAL   atorvastatin (LIPITOR) 20 MG tablet Take 1 tablet (20 mg total) by mouth bedtime. 12/12/16  Yes Chris Sarah MD   calcium carbonate-vitamin D3 (CALCIUM WITH VITAMIN D) 600 mg(1,500mg) -400 unit per tablet Take 1 tablet by mouth 2 (two) times a day.   Yes PROVIDER, HISTORICAL   cholecalciferol, vitamin D3, 1,000 unit tablet Take 2,000 Units by  mouth daily.    Yes PROVIDER, HISTORICAL   diltiazem (CARDIZEM CD) 120 MG 24 hr capsule Take 1 capsule (120 mg total) by mouth daily. 2/14/17  Yes Shai Ellington MD   fluticasone-salmeterol (ADVAIR DISKUS) 100-50 mcg/dose DISKUS Inhale 1 puff 2 (two) times a day. 3/13/17  Yes Jun Juarez MD   furosemide (LASIX) 20 MG tablet Take 1 tablet (20 mg total) by mouth daily. 3/18/17  Yes Shai Ellington MD   guaiFENesin-dextromethorphan (MUCINEX DM) 600-30 mg Tb12 Take 1 tablet by mouth daily.   Yes PROVIDER, HISTORICAL   ipratropium-albuterol (DUONEB) 0.5-2.5 mg/3 mL nebulizer Inhale 3 mL 4 (four) times a day as needed (chronic bronchitis).  11/30/15  Yes PROVIDER, HISTORICAL   omeprazole (PRILOSEC) 20 MG capsule TAKE 1 CAPSULE DAILY. 2/8/17  Yes Shai Ellington MD   QUEtiapine (SEROQUEL) 25 MG tablet Take 25-50 mg by mouth bedtime.  8/10/16  Yes PROVIDER, HISTORICAL   SPIRIVA WITH HANDIHALER 18 mcg inhalation capsule use 1 capsule via inhaler 1 x a day 3/18/17  Yes Shai Ellington MD   triamcinolone (KENALOG) 0.1 % cream Apply 1 application topically as needed.   Yes PROVIDER, HISTORICAL   acetaminophen (TYLENOL) 325 MG tablet Take 650 mg by mouth every 6 (six) hours as needed for pain.    PROVIDER, HISTORICAL   albuterol (PROVENTIL HFA;VENTOLIN HFA) 90 mcg/actuation inhaler Inhale 2 puffs every 6 (six) hours as needed for wheezing or shortness of breath.     PROVIDER, HISTORICAL   cephalexin (KEFLEX) 500 MG capsule Take 1 capsule (500 mg total) by mouth 4 (four) times a day for 10 days. 3/27/17 4/6/17  Shai Ellington MD   clarithromycin (BIAXIN) 500 MG tablet Take 500 mg by mouth once as needed. If  Needed prior to dental procedure    PROVIDER, HISTORICAL   peg 400-hypromellose-glycerin (VISINE TEARS) 1-0.2-0.2 % Drop Administer 1 drop to both eyes as needed.    PROVIDER, HISTORICAL   predniSONE (DELTASONE) 10 MG tablet Take 3 tablets (30 mg total) by mouth daily for 5 days. 3/27/17  4/1/17  Shai Ellington MD   psyllium (METAMUCIL) 3.4 gram packet Take 1 packet by mouth daily. Takes 1 teaspoon at home    PROVIDER, HISTORICAL   tiotropium (SPIRIVA) 18 mcg inhalation capsule Place 18 mcg into inhaler and inhale daily.  3/27/17  PROVIDER, HISTORICAL       Allergies:   Allergies   Allergen Reactions     Amoxicillin Itching and Rash     Levofloxacin Itching and Rash     Penicillins Itching and Rash     Sulfa (Sulfonamide Antibiotics) Itching and Rash       Immunizations:   Immunization History   Administered Date(s) Administered     DT (pediatric) 03/29/2004     Influenza high dose, seasonal 10/09/2016     Influenza,seasonal quad, PF, 36+MOS 11/20/2014     Pneumo Polysac 23-V 10/18/1999, 08/01/2005     Td, historic 03/29/2004     Tdap 05/14/2013       Exam lower extremities are both edematous the left side is quite reddened consistent with cellulitis the neck veins are nondistended the lungs revealed diminished breath sounds scattered rales dry heart tones regular rhythm abdomen benign she is obese BMI is 31 not in acute distress not toxic not acutely ill or cyanotic there is no tachypnea no central acrocyanosis no tachycardia.    Assessment and Plan  CHF was cellulitis.  Increase furosemide to 40 mg daily advised salt restriction and diet plus leg elevation in addition try prednisone 30 mg daily for 5 day.  Plus cephalexin 500 mg 4 times a day for 10 day.  Patient anticipates consultation at the [facility].    Multiple drug allergies including penicillin without anaphylactoid reaction to penicillin or any other drugs listed just rash.    Benign ultrasound of breasts done June 29, 2016 allCLEAR mammogram dated June 27, 2016 allCLEAR but incomplete ultrasound further substantiated benign nature of breast of bilateral breasts.    Time: total time spent with the patient was 25 minutes of which >50% was spent in counseling and coordination of care    The following high BMI interventions were  performed this visit: encouragement to exercise    Shai Ellington MD    Patient Active Problem List   Diagnosis     Recurrent major depressive episodes     Chronic obstructive pulmonary disease     Lump Or Mass In Breast     Peripheral arterial disease     Type 2 diabetes mellitus without complication     Hypertension     Schizoaffective disorder, bipolar type     Arnold-Chiari malformation     Chronic diastolic heart failure     Chronic respiratory failure with hypoxia

## 2021-06-09 NOTE — TELEPHONE ENCOUNTER
RN cannot approve Refill Request    RN can NOT refill this medication Protocol failed and NO refill given.       Shreya Martinez, [facility] Triage/Med Refill 6/18/2020    Requested Prescriptions   Pending Prescriptions Disp Refills     XARELTO 20 mg tablet [Pharmacy Med Name: XARELTO 20MG] 30 tablet 0     Sig: TAKE 1 TABLET (20 MG TOTAL) BY MOUTH DAILY WITH SUPPER.       Apixaban/Rivaroxaban/Dabigatran Refill Protocol Failed - 6/18/2020  4:30 PM        Failed - Route to appropriate pool/provider     Last Anticoagulation Summary:           Passed - Renal function test in last year     Creatinine   Date Value Ref Range Status   02/12/2020 0.66 0.60 - 1.10 mg/dL Final             Passed - PCP or prescribing provider visit in past 12 months       Last office visit with prescriber/PCP: 2/24/2020 Shai Ellington MD OR same dept: 2/24/2020 Shai Ellington MD OR same specialty: 2/24/2020 Shai Ellington MD  Last physical: 8/22/2019 Last MTM visit: Visit date not found   Next visit within 3 mo: Visit date not found  Next physical within 3 mo: Visit date not found  Prescriber OR PCP: Shai Ellington MD  Last diagnosis associated with med order: 1. Routine general medical examination at a health care facility  - XARELTO 20 mg tablet [Pharmacy Med Name: XARELTO 20MG]; TAKE 1 TABLET (20 MG TOTAL) BY MOUTH DAILY WITH SUPPER.  Dispense: 30 tablet; Refill: 0    If protocol passes may refill for 12 months if within 3 months of last provider visit (or a total of 15 months).

## 2021-06-09 NOTE — PROGRESS NOTES
Assessment/Plan:        Diagnoses and all orders for this visit:    Mucopurulent chronic bronchitis    Chronic diastolic heart failure    Chronic respiratory failure with hypoxia    81F with protracted COPD exacerbation in setting of afib, strep pneumo pneumonia, recent readmission.  It is a possibility that previous strep was only partially treated due to resistance but ceftin should cover this completely.  Her persistent sinus symptoms could be part of her flare.  With her chronic swelling it seems unlikely that heart failure is a driving issue, but she may end up needing a maintenance diuretic.    Medication Plan  Complete your antibiotic course.    STOP  symbicort  duonebs (this doubles up spiriva)    Restart  advair twice daily  Albuterol nebulizer up to 3-4 times daily as needed.        Subjective:    Patient ID: Adalgisa Solano is a 81 y.o. female.    HPI Comments: Chronic bronchitis - still coughing up sputum.  It has become less yellow than previous.  She has been taking her inhalers.  She is concerned that the symbicort doesn't seem to be working.  She prefers the advair diskus.  She had strep pneumonia in Feb and hasn't been able to clear things since.    Chronic diastolic heart failure - She has chronic leg swelling but no real changes recently or in setting of recent admission.  She does not take lasix. Her BNP was not elevated at past admission.    Afib with RVR - has been an issue in the past but according to the patient and her  was not the issues on this admission.    Chronic hypoxic respiratory failure - 2 lpm, continuous.  She reports being off oxygen while on ASA 2x daily post ortho surgery and then being back on oxygen afterwards.  Tolerating oxygen ok.  No runny nose, but fairly stuffed up        Review of Systems   Constitutional: Positive for activity change, chills and fatigue.   HENT: Positive for congestion, postnasal drip, rhinorrhea, sinus pressure and sneezing.    Eyes:  "Negative.    Respiratory: Positive for cough, chest tightness, shortness of breath and wheezing.    Cardiovascular: Positive for leg swelling.   Gastrointestinal: Negative.    Endocrine: Negative.    Genitourinary: Negative.    Musculoskeletal: Negative.    Skin: Negative.    Allergic/Immunologic: Negative.    Neurological: Positive for weakness.   Hematological: Negative.    Psychiatric/Behavioral: Negative.            Visit Vitals     /56     Pulse (!) 105     Ht 5' 1\" (1.549 m)     Wt 163 lb (73.9 kg)     SpO2 94%  Comment: 2LPM     BMI 30.8 kg/m2       Objective:    Physical Exam   Constitutional: She is oriented to person, place, and time. She appears well-developed and well-nourished. No distress.   HENT:   Head: Normocephalic and atraumatic.   Nose: Nose normal.   Mouth/Throat: Oropharynx is clear and moist. No oropharyngeal exudate.   Eyes: Conjunctivae are normal. Pupils are equal, round, and reactive to light. Right eye exhibits no discharge. Left eye exhibits no discharge. No scleral icterus.   Neck: Normal range of motion. No JVD present. No tracheal deviation present. No thyromegaly present.   Cardiovascular: Normal rate, regular rhythm and normal heart sounds.  Exam reveals no gallop.    No murmur heard.  Pulmonary/Chest: Effort normal and breath sounds normal. No stridor. No respiratory distress. She has no wheezes.   Abdominal: Soft. She exhibits no distension. There is no tenderness.   Musculoskeletal: She exhibits no edema or deformity.   Lymphadenopathy:     She has no cervical adenopathy.   Neurological: She is alert and oriented to person, place, and time. No cranial nerve deficit. Coordination normal.   Skin: Skin is warm and dry. She is not diaphoretic. No erythema.   Psychiatric: She has a normal mood and affect. Her behavior is normal. Thought content normal.   Nursing note and vitals reviewed.          Current Outpatient Prescriptions on File Prior to Visit   Medication Sig Dispense " Refill     acetaminophen (TYLENOL) 325 MG tablet Take 650 mg by mouth every 6 (six) hours as needed for pain.       albuterol (PROVENTIL HFA;VENTOLIN HFA) 90 mcg/actuation inhaler Inhale 2 puffs every 6 (six) hours as needed for wheezing or shortness of breath.        ARIPiprazole (ABILIFY) 5 MG tablet Take 5 mg by mouth bedtime.       aspirin 81 MG EC tablet Take 81 mg by mouth daily.       atorvastatin (LIPITOR) 20 MG tablet Take 1 tablet (20 mg total) by mouth bedtime. 90 tablet 1     calcium carbonate-vitamin D3 (CALCIUM WITH VITAMIN D) 600 mg(1,500mg) -400 unit per tablet Take 1 tablet by mouth 2 (two) times a day.       cefuroxime (CEFTIN) 500 MG tablet Take 1 tablet (500 mg total) by mouth 2 (two) times a day for 10 days. 14 tablet 0     cholecalciferol, vitamin D3, 1,000 unit tablet Take 2,000 Units by mouth daily.        clarithromycin (BIAXIN) 500 MG tablet Take 500 mg by mouth once as needed. If  Needed prior to dental procedure       diltiazem (CARDIZEM CD) 120 MG 24 hr capsule Take 1 capsule (120 mg total) by mouth daily. 30 capsule 12     furosemide (LASIX) 20 MG tablet Take 1 tablet (20 mg total) by mouth daily. 30 tablet 0     guaiFENesin-dextromethorphan (MUCINEX DM) 600-30 mg Tb12 Take 1 tablet by mouth daily.       ipratropium-albuterol (DUONEB) 0.5-2.5 mg/3 mL nebulizer Inhale 3 mL 4 (four) times a day as needed (chronic bronchitis).        omeprazole (PRILOSEC) 20 MG capsule TAKE 1 CAPSULE DAILY. 100 capsule 0     peg 400-hypromellose-glycerin (VISINE TEARS) 1-0.2-0.2 % Drop Administer 1 drop to both eyes as needed.       psyllium (METAMUCIL) 3.4 gram packet Take 1 packet by mouth daily. Takes 1 teaspoon at home       QUEtiapine (SEROQUEL) 25 MG tablet Take 25-50 mg by mouth bedtime.        SYMBICORT 160-4.5 mcg/actuation inhaler Inhale 2 puffs 2 (two) times a day.        tiotropium (SPIRIVA) 18 mcg inhalation capsule Place 18 mcg into inhaler and inhale daily.       triamcinolone (KENALOG) 0.1  % cream Apply 1 application topically as needed.       No current facility-administered medications on file prior to visit.      There were no vitals taken for this visit.    Medical History  Active Ambulatory (Non-Hospital) Problems    Diagnosis     COPD exacerbation     Chronic diastolic heart failure     Type 2 diabetes mellitus without complication     Hypertension     Peripheral arterial disease     Schizoaffective disorder, bipolar type     Lump Or Mass In Breast     Recurrent major depressive episodes     Chronic obstructive pulmonary disease     Arnold-Chiari malformation     Past Medical History:   Diagnosis Date     Arm skin lesion, right      Arnold-Chiari malformation      Breast lump      Chronic diastolic heart failure      COPD (chronic obstructive pulmonary disease)      Depression      GERD (gastroesophageal reflux disease)      Hyperlipidemia      Hypertension      Lumbar spinal stenosis      PAD (peripheral artery disease)         Surgical History  She  has a past surgical history that includes Breast cyst aspiration (Left, ); Cholecystectomy; Lumbar fusion (N/A, 2014); Back surgery; Laparoscopic incisional / umbilical / ventral hernia repair (Left, 3/27/2015);  section; Joint replacement; Eye surgery; total knee arthroplasty (Left, 10/7/2016); and Cervical spine surgery.       Social History  Reviewed, and she  reports that she quit smoking about 11 years ago. She has never used smokeless tobacco. She reports that she does not drink alcohol or use illicit drugs.       Allergies  Allergies   Allergen Reactions     Amoxicillin Itching and Rash     Levofloxacin Itching and Rash     Penicillins Itching and Rash     Sulfa (Sulfonamide Antibiotics) Itching and Rash    Family History  Reviewed, and family history includes Heart attack in her father and mother.                            Data Review - imaging, labs, and ekgs listed below were reviewed by me.  Chest XRay and chest CT  images and EKG tracings interpreted personally.     Past Labs  Admission on 03/03/2017, Discharged on 03/07/2017   Component Date Value     VENTRICULAR RATE 03/03/2017 109      ATRIAL RATE 03/03/2017 109      P-R INTERVAL 03/03/2017 120      QRS DURATION 03/03/2017 72      Q-T INTERVAL 03/03/2017 294      QTC CALCULATION (BEZET) 03/03/2017 395      P Axis 03/03/2017 69      R AXIS 03/03/2017 45      T AXIS 03/03/2017 40      MUSE DIAGNOSIS 03/03/2017                      Value:Sinus tachycardia  Possible Left atrial enlargement  Borderline ECG  No previous ECGs available  Confirmed by KARINA MEJÍA MD LOC:[facility] ([id number]) on 3/3/2017 2:32:08 PM       BNP 03/03/2017 86      Troponin I 03/03/2017 0.02      Sodium 03/03/2017 137      Potassium 03/03/2017 3.9      Chloride 03/03/2017 102      CO2 03/03/2017 29      Anion Gap, Calculation 03/03/2017 6      Glucose 03/03/2017 146*     Calcium 03/03/2017 8.4*     BUN 03/03/2017 8      Creatinine 03/03/2017 0.61      GFR MDRD Af Amer 03/03/2017 >60      GFR MDRD Non Af Amer 03/03/2017 >60      WBC 03/03/2017 9.3      RBC 03/03/2017 4.46      Hemoglobin 03/03/2017 11.5*     Hematocrit 03/03/2017 36.7      MCV 03/03/2017 82      MCH 03/03/2017 25.8*     MCHC 03/03/2017 31.3*     RDW 03/03/2017 16.8*     Platelets 03/03/2017 211      MPV 03/03/2017 10.1      Neutrophils % 03/03/2017 85*     Lymphocytes % 03/03/2017 7*     Monocytes % 03/03/2017 7      Eosinophils % 03/03/2017 1      Basophils % 03/03/2017 0      Neutrophils Absolute 03/03/2017 7.8*     Lymphocytes Absolute 03/03/2017 0.6*     Monocytes Absolute 03/03/2017 0.7      Eosinophils Absolute 03/03/2017 0.1      Basophils Absolute 03/03/2017 0.0      Glucose, POC 03/03/2017 215      Sodium 03/04/2017 138      Potassium 03/04/2017 3.8      Chloride 03/04/2017 104      CO2 03/04/2017 27      Anion Gap, Calculation 03/04/2017 7      Glucose 03/04/2017 132*     Calcium 03/04/2017 8.4*     BUN 03/04/2017 8      Creatinine  03/04/2017 0.52*     GFR MDRD Af Amer 03/04/2017 >60      GFR MDRD Non Af Amer 03/04/2017 >60      Hemoglobin 03/04/2017 10.5*     Glucose, POC 03/05/2017 133      WBC 03/07/2017 7.6      RBC 03/07/2017 4.59      Hemoglobin 03/07/2017 11.8*     Hematocrit 03/07/2017 36.5      MCV 03/07/2017 80      MCH 03/07/2017 25.7*     MCHC 03/07/2017 32.3      RDW 03/07/2017 16.1*     Platelets 03/07/2017 260      MPV 03/07/2017 10.0      Sodium 03/07/2017 137      Potassium 03/07/2017 4.3      Chloride 03/07/2017 102      CO2 03/07/2017 30      Anion Gap, Calculation 03/07/2017 5      Glucose 03/07/2017 133*     Calcium 03/07/2017 8.6      BUN 03/07/2017 11      Creatinine 03/07/2017 0.61      GFR MDRD Af Amer 03/07/2017 >60      GFR MDRD Non Af Amer 03/07/2017 >60    Hospital Outpatient Visit on 02/23/2017   Component Date Value     Pharmacologic Protocol  02/23/2017 Lexiscan      Test Type 02/23/2017 Pharmacological      Baseline HR 02/23/2017 90      Baseline BP 02/23/2017 150/79      Last Stress HR 02/23/2017 112      Last Stress BP 02/23/2017 101/80      PERCENT HR 02/23/2017 85%      ST Deviation Elevation 02/23/2017 aVL 0.2mm      Deviation Time 02/23/2017 II -0.6mm      ST Elevation Amount 02/23/2017 aVR 0.7mm      ST Deviation Amount he 02/23/2017 II -1.2mm      Final Resting BP 02/23/2017 145/76      Final Resting HR  02/23/2017 99      Max Treadmill Speed 02/23/2017 0.0      Max Treadmill Grade 02/23/2017 0.0      Peak Systolic BP 02/23/2017 150/79      Peak Diastolic BP 02/23/2017 101/80      Max HR 02/23/2017 114      Stress Phase 02/23/2017 Resting      Stress Resting Pt Positi* 02/23/2017 MANUAL EVENT      Current HR 02/23/2017 109      Current BP 02/23/2017 101/80      Stress Phase 02/23/2017 Stress      Stage Minute 02/23/2017 EXE 00:00      Exercise Stage 02/23/2017 STAGE 2      Current HR 02/23/2017 94      Current BP 02/23/2017 150/79      Stress Phase 02/23/2017 Stress      Stage Minute 02/23/2017 EXE  01:00      Exercise Stage 02/23/2017 STAGE 3      Current HR 02/23/2017 93      Current BP 02/23/2017 150/79      Stress Phase 02/23/2017 Stress      Stage Minute 02/23/2017 EXE 01:51      Exercise Stage 02/23/2017 STAGE 3      Current HR 02/23/2017 105      Current BP 02/23/2017 150/79      Stress Phase 02/23/2017 Stress      Stage Minute 02/23/2017 EXE 02:00      Exercise Stage 02/23/2017 STAGE 4      Current HR 02/23/2017 109      Current BP 02/23/2017 150/79      Stress Phase 02/23/2017 Stress      Stage Minute 02/23/2017 EXE 03:00      Exercise Stage 02/23/2017 STAGE 5      Current HR 02/23/2017 114      Current BP 02/23/2017 150/79      Stress Phase 02/23/2017 Stress      Stage Minute 02/23/2017 EXE 03:06      Exercise Stage 02/23/2017 STAGE 5      Current HR 02/23/2017 113      Current BP 02/23/2017 101/80      Stress Phase 02/23/2017 Stress      Stage Minute 02/23/2017 EXE 04:00      Exercise Stage 02/23/2017 STAGE 6      Current HR 02/23/2017 112      Current BP 02/23/2017 101/80      Stress Phase 02/23/2017 Stress      Stage Minute 02/23/2017 EXE 04:00      Exercise Stage 02/23/2017 STAGE 6      Current HR 02/23/2017 112      Current BP 02/23/2017 101/80      Stress Phase 02/23/2017 Recovery      Stage Minute 02/23/2017 REC 00:22      Exercise Stage 02/23/2017 Recovery      Current HR 02/23/2017 109      Current BP 02/23/2017 101/80      Stress Phase 02/23/2017 Recovery      Stage Minute 02/23/2017 REC 00:24      Exercise Stage 02/23/2017 Recovery      Current HR 02/23/2017 109      Current BP 02/23/2017 143/66      Stress Phase 02/23/2017 Recovery      Stage Minute 02/23/2017 REC 00:59      Exercise Stage 02/23/2017 Recovery      Current HR 02/23/2017 107      Current BP 02/23/2017 143/66      Stress Phase 02/23/2017 Recovery      Stage Minute 02/23/2017 REC 01:01      Exercise Stage 02/23/2017 Recovery      Current HR 02/23/2017 107      Current BP 02/23/2017 150/79      Stress Phase 02/23/2017 Recovery       Stage Minute 02/23/2017 REC 01:59      Exercise Stage 02/23/2017 Recovery      Current HR 02/23/2017 104      Current BP 02/23/2017 150/79      Stress Phase 02/23/2017 Recovery      Stage Minute 02/23/2017 REC 02:26      Exercise Stage 02/23/2017 Recovery      Current HR 02/23/2017 103      Current BP 02/23/2017 145/76      Stress Phase 02/23/2017 Recovery      Stage Minute 02/23/2017 REC 02:59      Exercise Stage 02/23/2017 Recovery      Current HR 02/23/2017 101      Current BP 02/23/2017 145/76      Stress Phase 02/23/2017 Recovery      Stage Minute 02/23/2017 REC 03:59      Exercise Stage 02/23/2017 Recovery      Current HR 02/23/2017 100      Current BP 02/23/2017 145/76      Stress Phase 02/23/2017 Recovery      Stage Minute 02/23/2017 REC 04:27      Exercise Stage 02/23/2017 Recovery      Current HR 02/23/2017 99      Current BP 02/23/2017 145/76      Calculated Percent HR 02/23/2017 81      Left Ventricular EF 02/23/2017 70    Admission on 02/09/2017, Discharged on 02/14/2017   No results displayed because visit has over 200 results.      Office Visit on 02/09/2017   Component Date Value     VENTRICULAR RATE 02/09/2017 108      ATRIAL RATE 02/09/2017 375      QRS DURATION 02/09/2017 68      Q-T INTERVAL 02/09/2017 312      QTC CALCULATION (BEZET) 02/09/2017 418      R AXIS 02/09/2017 -2      T AXIS 02/09/2017 28      MUSE DIAGNOSIS 02/09/2017                      Value:Atrial fibrillation with rapid ventricular response with premature ventricular or aberrantly conducted complexes  Abnormal ECG  When compared with ECG of 02-FEB-2017 10:11,  Atrial fibrillation has replaced Sinus rhythm  Confirmed by ANABEL MILLAN MD LOC:[facility] ([id number]) on 2/10/2017 2:56:14 PM     Office Visit on 02/02/2017   Component Date Value     Sodium 02/02/2017 136      Potassium 02/02/2017 4.2      Chloride 02/02/2017 103      CO2 02/02/2017 25      Anion Gap, Calculation 02/02/2017 8      Glucose 02/02/2017 151*     Calcium  02/02/2017 8.4*     BUN 02/02/2017 10      Creatinine 02/02/2017 0.68      GFR MDRD Af Amer 02/02/2017 >60      GFR MDRD Non Af Amer 02/02/2017 >60      D-Dimer, Quant 02/02/2017 1.86*     BNP 02/02/2017 99      VENTRICULAR RATE 02/02/2017 96      ATRIAL RATE 02/02/2017 96      P-R INTERVAL 02/02/2017 118      QRS DURATION 02/02/2017 70      Q-T INTERVAL 02/02/2017 316      QTC CALCULATION (BEZET) 02/02/2017 399      P Axis 02/02/2017 66      R AXIS 02/02/2017 47      T AXIS 02/02/2017 54      MUSE DIAGNOSIS 02/02/2017                      Value:Sinus rhythm with Premature atrial complexes with short OK  Possible Left atrial enlargement  Nonspecific ST and T wave abnormality  Abnormal ECG  When compared with ECG of 03-AUG-2016 12:39,  Premature atrial complexes are now Present  Confirmed by PURA WONG, COLEMAN LOC: ([id number]) on 2/2/2017 5:00:16 PM       WBC 02/02/2017 10.3      RBC 02/02/2017 4.33      Hemoglobin 02/02/2017 11.2*     Hematocrit 02/02/2017 35.3      MCV 02/02/2017 82      MCH 02/02/2017 25.9*     MCHC 02/02/2017 31.7*     RDW 02/02/2017 15.9*     Platelets 02/02/2017 241      MPV 02/02/2017 10.8      Neutrophils % 02/02/2017 84*     Lymphocytes % 02/02/2017 8*     Monocytes % 02/02/2017 7      Eosinophils % 02/02/2017 1      Basophils % 02/02/2017 0      Neutrophils Absolute 02/02/2017 8.6*     Lymphocytes Absolute 02/02/2017 0.8      Monocytes Absolute 02/02/2017 0.7      Eosinophils Absolute 02/02/2017 0.1      Basophils Absolute 02/02/2017 0.0        Past Imaging  Xr Chest Ap Portable    Result Date: 3/3/2017  XR CHEST AP PORTABLE 3/3/2017 10:38 AM INDICATION: Short of breath COMPARISON: 02/09/2017. FINDINGS: Again noted is mild diffuse interstitial prominence is has improved slightly from the prior study. There is no pneumothorax. The heart size is normal. There is no pneumothorax or pleural effusion. Probable emphysema in the upper lobes.    Xr Chest Ap Portable    Result Date: 2/9/2017  XR CHEST  AP PORTABLE 2/9/2017 5:29 PM INDICATION: dyspnea COMPARISON: 2/9/2017 FINDINGS: Slight increase in interstitial prominence suggests minimal edema. No focal pneumonia. Borderline cardiomegaly stable.    Xr Chest Pa And Lateral    Result Date: 2/10/2017  XR CHEST PA AND LATERAL2/9/2017 3:57 PMINDICATION: Cough for 1 month.  COPD.COMPARISON: 11/4/2016.FINDINGS: Mildly increased basilar opacities to prior which may be infectious/inflammatory given history of cough. Recommend 6-8 week follow-up to resolution. Additionally, there is perihilar airway thickening suggesting infectious/inflammatory airways disease or bronchitis. Stable mildly prominent cardiac silhouette. Aortic atherosclerosis. Surgical fixation of lumbar spine. Degenerative change of  the spine.This report was electronically interpreted by: Dr. Prasanth High DO ON 02/10/2017 at 07:23    Us Abdominal Aorta    Result Date: 2/3/2017  US ABDOMINAL AORTA 2/3/2017 9:06 AM INDICATION: Peripheral arterial disease, leg pain. Screening for abdominal aortic aneurysm. TECHNIQUE: Ultrasound using gray-scale, two-dimensional images. COMPARISON: CT abdomen and pelvis, 8/2/2016. FINDINGS: There is scattered atheromatous plaque in the abdominal aorta. MEASUREMENTS: AP and transverse diameters. Abdominal Aorta: Proximal: 2.4 x 2.4 cm Mid: 1.7 x 1.9 cm Distal: 1.4 x 1.4 cm Right Common Iliac Artery: 0.8 cm Left Common Iliac Artery: 0.8 cm     CONCLUSION: 1.  No evidence for abdominal aortic aneurysm.    Cta Chest Pe Run    Result Date: 2/9/2017  CTA CHEST PE RUN 2/9/2017 8:20 PM INDICATION: Chest pain, acute, PE suspected TECHNIQUE: Helical acquisition through the chest was performed during the arterial phase of contrast enhancement using IV contrast. 2D and 3D reconstructions were performed by the CT technologist. Dose reduction techniques were used. IV CONTRAST: Iohexol (Omni) 80mL COMPARISON: 10/9/2016 FINDINGS: ANGIOGRAM CHEST: Negative for pulmonary emboli. Negative  thoracic aorta for age. LUNGS AND PLEURA: Prominent diffuse emphysema. New inflammatory airspace nodules present predominantly within right lower lobe, and to a lesser extent, the left lower lobe and left lingula; findings are consistent with an atypical pneumonia. MEDIASTINUM: Negative, no significant lymphadenopathy. LIMITED UPPER ABDOMEN: Negative. MUSCULOSKELETAL: Degenerative spurring involving mid and inferior thoracic spine.     CONCLUSION: 1.  No pulmonary emboli identified. 2.  Atypical bilateral pneumonia.    Us Venous Legs Bilateral    Result Date: 2/3/2017  US VENOUS LEGS BILATERAL 2/3/2017 9:06 AM INDICATION: Leg pain and swelling. Recent left total knee arthroplasty. TECHNIQUE: Routine exam with compression, augmentation, and duplex utilizing 2D gray-scale imaging, Doppler interrogation with color-flow and spectral waveform analysis. COMPARISON: None. FINDINGS: The common femoral, femoral, popliteal, and segmentally visualized calf veins were evaluated. There is no evidence for lower extremity deep venous thrombosis. There is normal flow and compressibility in the femoral, popliteal, and posterior tibial veins bilaterally. The right and left common femoral veins demonstrate symmetric waveforms. No popliteal fossa fluid collections identified.     CONCLUSION: 1.  No evidence for lower extremity DVT.    Us Venous Leg Left    Result Date: 2/9/2017  US VENOUS LEG LEFT 2/9/2017 5:46 PM INDICATION: AFIB WITH RVR, LEG SWELLING TECHNIQUE: Routine exam without and with compression, augmentation, and duplex utilizing 2D gray-scale imaging, Doppler interrogation with color-flow and spectral waveform analysis. COMPARISON: None. FINDINGS: The common femoral, femoral, popliteal, and segmentally visualized calf veins were evaluated. The opposite CFV was also included in the evaluation. Left leg veins are negative for deep venous thrombosis. No popliteal cysts.     CONCLUSION: 1.  Left leg veins are negative for  DVT.    Nm Pharmacologic Stress Test    Result Date: 2/23/2017    The exercise nuclear stress test is negative for inducible myocardial ischemia or infarction.   The left ventricular ejection fraction is greater than 70%.   When compared to the images of 10/22/2014, there has been no significant change.

## 2021-06-09 NOTE — PROGRESS NOTES
Office Visit - Follow up    Adalgisa Solano   81 y.o. female    Date of Visit: 3/10/2017    Chief Complaint   Patient presents with     Hospital Visit Follow Up     COPD   SOB       Subjective: COPD hospital follow-up.    Upcoming appointment slated for March 27 should be kept for follow-up.  Patient is a non-smoker.  It was emphasized to her that losing weight may help pulmonary function current BMI is 30+.  The patient also should exercise more she feels short of breath she feels anxious she is on Abilify.    No blood in stool or urine or sputum.    Medication list is reviewed antibiotic specifically Ceftin seems to be helping she is on a 10 day course.    Ultrasound and mammographic study of the breasts done June 29, 2016 with right-sided ultrasound all clear benign.    Medication list reviewed generally well-tolerated she feels the septum is helping her on the 10 day course 500 mg twice a day no diarrhea.   upcoming  ROS: A comprehensive review of systems was performed and was otherwise negative    Medications:  Prior to Admission medications    Medication Sig Start Date End Date Taking? Authorizing Provider   acetaminophen (TYLENOL) 325 MG tablet Take 650 mg by mouth every 6 (six) hours as needed for pain.   Yes PROVIDER, HISTORICAL   ARIPiprazole (ABILIFY) 5 MG tablet Take 5 mg by mouth bedtime.   Yes PROVIDER, HISTORICAL   aspirin 81 MG EC tablet Take 81 mg by mouth daily.   Yes PROVIDER, HISTORICAL   atorvastatin (LIPITOR) 20 MG tablet Take 1 tablet (20 mg total) by mouth bedtime. 12/12/16  Yes Chris Sarah MD   calcium carbonate-vitamin D3 (CALCIUM WITH VITAMIN D) 600 mg(1,500mg) -400 unit per tablet Take 1 tablet by mouth 2 (two) times a day.   Yes PROVIDER, HISTORICAL   cefuroxime (CEFTIN) 500 MG tablet Take 1 tablet (500 mg total) by mouth 2 (two) times a day for 10 days. 3/7/17 3/17/17 Yes Shai Ellington MD   cholecalciferol, vitamin D3, 1,000 unit tablet Take 2,000 Units by mouth  daily.    Yes PROVIDER, HISTORICAL   diltiazem (CARDIZEM CD) 120 MG 24 hr capsule Take 1 capsule (120 mg total) by mouth daily. 2/14/17  Yes Shai Ellington MD   furosemide (LASIX) 20 MG tablet Take 1 tablet (20 mg total) by mouth daily. 2/8/17  Yes Shai Ellington MD   guaiFENesin-dextromethorphan (MUCINEX DM) 600-30 mg Tb12 Take 1 tablet by mouth daily.   Yes PROVIDER, HISTORICAL   ipratropium-albuterol (DUONEB) 0.5-2.5 mg/3 mL nebulizer Inhale 3 mL 4 (four) times a day as needed (chronic bronchitis).  11/30/15  Yes PROVIDER, HISTORICAL   omeprazole (PRILOSEC) 20 MG capsule TAKE 1 CAPSULE DAILY. 2/8/17  Yes Shai Ellington MD   peg 400-hypromellose-glycerin (VISINE TEARS) 1-0.2-0.2 % Drop Administer 1 drop to both eyes as needed.   Yes PROVIDER, HISTORICAL   psyllium (METAMUCIL) 3.4 gram packet Take 1 packet by mouth daily. Takes 1 teaspoon at home   Yes PROVIDER, HISTORICAL   QUEtiapine (SEROQUEL) 25 MG tablet Take 25-50 mg by mouth bedtime.  8/10/16  Yes PROVIDER, HISTORICAL   SYMBICORT 160-4.5 mcg/actuation inhaler Inhale 2 puffs 2 (two) times a day.  1/25/17  Yes PROVIDER, HISTORICAL   tiotropium (SPIRIVA) 18 mcg inhalation capsule Place 18 mcg into inhaler and inhale daily.   Yes PROVIDER, HISTORICAL   triamcinolone (KENALOG) 0.1 % cream Apply 1 application topically as needed.   Yes PROVIDER, HISTORICAL   albuterol (PROVENTIL HFA;VENTOLIN HFA) 90 mcg/actuation inhaler Inhale 2 puffs every 6 (six) hours as needed for wheezing or shortness of breath.     PROVIDER, HISTORICAL   clarithromycin (BIAXIN) 500 MG tablet Take 500 mg by mouth once as needed. If  Needed prior to dental procedure    PROVIDER, HISTORICAL       Allergies:   Allergies   Allergen Reactions     Amoxicillin Itching and Rash     Levofloxacin Itching and Rash     Penicillins Itching and Rash     Sulfa (Sulfonamide Antibiotics) Itching and Rash       Immunizations:   Immunization History   Administered Date(s) Administered     DT  (pediatric) 03/29/2004     Influenza high dose, seasonal 10/09/2016     Influenza,seasonal quad, PF, 36+MOS 11/20/2014     Pneumo Polysac 23-V 10/18/1999, 08/01/2005     Td, historic 03/29/2004     Tdap 05/14/2013       Exam chest revealed squeaky rales scattered throughout especially posteriorly diminished breath sounds heart tones regular rhythm abdomen benign obese extremities free of edema no acral cyanosis clubbing or edema noted skin warm and dry lymph negative neuro negative the patient's affect is slightly flat.    Allergies include penicillin levofloxacin and sulfa.    Assessment and Plan  COPD with multiple drug allergies and history of hypertension and schizoaffective disorder.  Also prior surgery for Arnold-Chiari malformation.  Diabetes mellitus type 2 without complication with next office visit we will check A1c plus lipid panel urine for microalbumin and fasting blood sugar on March 27, 2017.    Time: total time spent with the patient was 25 minutes of which >50% was spent in counseling and coordination of care    The following high BMI interventions were performed this visit: encouragement to exercise    Shai Ellington MD    Patient Active Problem List   Diagnosis     Recurrent major depressive episodes     Chronic obstructive pulmonary disease     Lump Or Mass In Breast     Peripheral arterial disease     Type 2 diabetes mellitus without complication     Hypertension     Schizoaffective disorder, bipolar type     COPD exacerbation     Arnold-Chiari malformation     Chronic diastolic heart failure

## 2021-06-09 NOTE — TELEPHONE ENCOUNTER
Call from Adalgisa.  States she has been having some increased shortness of breath last few days and her phlegm has really increased.  She is doing her exercises to get it out, but asking to start her action plan.  States the action plan really helps her when in this situation.    Also states bp has been low.  States yesterday her readings were 96/, 98/ , 103/.  Our last records show bp of 105/74 here.  Instructed that if she is concerned about the bp readings, she should contact Dr. Ellington.  States she has crescencio there on Monday and will continue to watch them.      Will send prescriptions for her action plan to her pharmacy.  Doxycycline x 7 days, prednisone 20mg x 5 days, 10mg x 5 days

## 2021-06-09 NOTE — PROGRESS NOTES
Assessment/Plan:        Diagnoses and all orders for this visit:    Simple chronic bronchitis    Chronic respiratory failure with hypoxia    81F with protracted COPD exacerbation in setting of afib, strep pneumo pneumonia, multiple readmissions despite treatment with broad-spectrum cephalosporin.  Now improved with treatment at [facility] with doxycycline and prednisone.    Her PFTs are still pending we have been waiting for her to recover from her acute illness.  There is a plan presently to get this done at [facility] and so she will ask them to send those results up.  She is currently doing her albuterol nebulizers on a scheduled basis.  She is hesitant to decrease these without discussion with [facility] first.    Medication Plan  Spiriva once daily  Advair twice daily  Albuterol inhaler or nebulizer as needed    Oxygen Plan - your oxygen level at rest is normal. Oxygen goal is >88% (finger number)  You do not need to wear oxygen at rest.  Continue 2 lpm with walking.     She will follow-up with me based on the results of her evaluation in [place].  I am unclear whether she is having an evaluation with the pulmonary clinic or simply a broad general testing session with consultation with an internal medicine doctor.  We discussed the importance of having a single primary pulmonologist directing care - either here or [facility] is fine - which ever works best for her.  She will follow-up with me after the session as needed.        Total time greater than 25 minutes, greater than 50% counseling and coordination of care.  Extensive moderate time spent discussing records and hospital stay at [facility].  Subjective:    Patient ID: Adalgisa Solano is a 81 y.o. female.    HPI Comments: 81-year-old female here for follow-up.  This is my second visit with her.  The last was approximately 3 weeks ago after a hospitalization.  At that point she had recently had Streptococcus pneumoniae pneumonia, had completed her antibiotics and was recovering.  " We had reconciled her inhalers to some degree.    Later in March she was admitted to [facility] for what appears to be an overnight stay.  She was treated with Lasix, steroids and doxycycline and feels like she is much more improved with that treatment than previous efforts at [facility].  She is returning to [facility] in several weeks for a \"battery of tests.\"    She reports that there were some nodules on her chest imaging.  Is available to me from [facility] I do not see this information.      ----From previous   chronic bronchitis - still coughing up sputum.  It has become less yellow than previous.  She has been taking her inhalers.  She is concerned that the symbicort doesn't seem to be working.  She prefers the advair diskus.  She had strep pneumonia in Feb and hasn't been able to clear things since.    Chronic diastolic heart failure - She has chronic leg swelling but no real changes recently or in setting of recent admission.  She does not take lasix. Her BNP was not elevated at past admission.    Afib with RVR - has been an issue in the past but according to the patient and her  was not the issues on this admission.    Chronic hypoxic respiratory failure - 2 lpm, continuous.  She reports being off oxygen while on ASA 2x daily post ortho surgery and then being back on oxygen afterwards.  Tolerating oxygen ok.  No runny nose, but fairly stuffed up        Review of Systems    review of systems positive for activity change chills fatigue congestion nasal discharge sinus pressure postnasal drip chest tightness cough shortness of breath stridor or wheezing.  Remainder of the 14 point review of systems was negative.        /60  Pulse 80  Resp 20  Wt 163 lb 4.8 oz (74.1 kg)  SpO2 95% Comment: 2 L pulsing  BMI 30.86 kg/m2    Objective:    Physical Exam   Constitutional: She is oriented to person, place, and time. She appears well-developed and well-nourished. No distress.   HENT:   Head: Normocephalic and " atraumatic.   Eyes: Conjunctivae are normal. Pupils are equal, round, and reactive to light. Right eye exhibits no discharge. Left eye exhibits no discharge. No scleral icterus.   Cardiovascular: Normal rate, regular rhythm and normal heart sounds.  Exam reveals no gallop.    No murmur heard.  Pulmonary/Chest: Effort normal and breath sounds normal. No respiratory distress. She has no wheezes.   Abdominal: Soft. She exhibits no distension. There is no tenderness.   Musculoskeletal: She exhibits edema. She exhibits no deformity.   Neurological: She is alert and oriented to person, place, and time. No cranial nerve deficit. Coordination normal.   Skin: Skin is warm and dry. She is not diaphoretic. No erythema.   Psychiatric: She has a normal mood and affect. Her behavior is normal. Thought content normal.   Nursing note and vitals reviewed.          Current Outpatient Prescriptions on File Prior to Visit   Medication Sig Dispense Refill     acetaminophen (TYLENOL) 325 MG tablet Take 650 mg by mouth every 6 (six) hours as needed for pain.       albuterol (PROVENTIL HFA;VENTOLIN HFA) 90 mcg/actuation inhaler Inhale 2 puffs every 6 (six) hours as needed for wheezing or shortness of breath.        albuterol (PROVENTIL) 2.5 mg /3 mL (0.083 %) nebulizer solution Take 3 mL (2.5 mg total) by nebulization every 6 (six) hours as needed for wheezing. 75 mL 12     ARIPiprazole (ABILIFY) 5 MG tablet Take 5 mg by mouth bedtime.       aspirin 81 MG EC tablet Take 81 mg by mouth daily.       atorvastatin (LIPITOR) 20 MG tablet Take 1 tablet (20 mg total) by mouth bedtime. 90 tablet 1     calcium carbonate-vitamin D3 (CALCIUM WITH VITAMIN D) 600 mg(1,500mg) -400 unit per tablet Take 1 tablet by mouth 2 (two) times a day.       cephalexin (KEFLEX) 500 MG capsule Take 1 capsule (500 mg total) by mouth 4 (four) times a day for 10 days. 40 capsule 0     cholecalciferol, vitamin D3, 1,000 unit tablet Take 2,000 Units by mouth daily.         clarithromycin (BIAXIN) 500 MG tablet Take 500 mg by mouth once as needed. If  Needed prior to dental procedure       diltiazem (CARDIZEM CD) 120 MG 24 hr capsule Take 1 capsule (120 mg total) by mouth daily. 30 capsule 12     fluticasone-salmeterol (ADVAIR DISKUS) 100-50 mcg/dose DISKUS Inhale 1 puff 2 (two) times a day. 1 each 11     guaiFENesin-dextromethorphan (MUCINEX DM) 600-30 mg Tb12 Take 1 tablet by mouth daily.       ipratropium-albuterol (DUONEB) 0.5-2.5 mg/3 mL nebulizer Inhale 3 mL 4 (four) times a day as needed (chronic bronchitis).        omeprazole (PRILOSEC) 20 MG capsule TAKE 1 CAPSULE DAILY. 100 capsule 0     peg 400-hypromellose-glycerin (VISINE TEARS) 1-0.2-0.2 % Drop Administer 1 drop to both eyes as needed.       psyllium (METAMUCIL) 3.4 gram packet Take 1 packet by mouth daily. Takes 1 teaspoon at home       QUEtiapine (SEROQUEL) 25 MG tablet Take 25-50 mg by mouth bedtime.        SPIRIVA WITH HANDIHALER 18 mcg inhalation capsule use 1 capsule via inhaler 1 x a day 90 capsule 0     triamcinolone (KENALOG) 0.1 % cream Apply 1 application topically as needed.       [DISCONTINUED] furosemide (LASIX) 20 MG tablet Take 1 tablet (20 mg total) by mouth daily. (Patient taking differently: Take 2 tablet (20 mg total) by mouth daily.) 30 tablet 0     No current facility-administered medications on file prior to visit.      /60  Pulse 80  Resp 20  Wt 163 lb 4.8 oz (74.1 kg)  SpO2 95% Comment: 2 L pulsing  BMI 30.86 kg/m2    Medical History  Active Ambulatory (Non-Hospital) Problems    Diagnosis     Chronic respiratory failure with hypoxia     Chronic diastolic heart failure     Type 2 diabetes mellitus without complication     Hypertension     Peripheral arterial disease     Schizoaffective disorder, bipolar type     Lump Or Mass In Breast     Recurrent major depressive episodes     Chronic obstructive pulmonary disease     Arnold-Chiari malformation     Past Medical History:   Diagnosis Date      Arm skin lesion, right      Arnold-Chiari malformation      Breast lump      Chronic diastolic heart failure      COPD (chronic obstructive pulmonary disease)      Depression      GERD (gastroesophageal reflux disease)      Hyperlipidemia      Hypertension      Lumbar spinal stenosis      PAD (peripheral artery disease)         Surgical History  She  has a past surgical history that includes Breast cyst aspiration (Left, ); Cholecystectomy; Lumbar fusion (N/A, 2014); Back surgery; Laparoscopic incisional / umbilical / ventral hernia repair (Left, 3/27/2015);  section; Joint replacement; Eye surgery; total knee arthroplasty (Left, 10/7/2016); and Cervical spine surgery.            Allergies  Allergies   Allergen Reactions     Amoxicillin Itching and Rash     Levofloxacin Itching and Rash     Penicillins Itching and Rash     Sulfa (Sulfonamide Antibiotics) Itching and Rash                              Data Review - imaging, labs, and ekgs listed below were reviewed by me.  Chest XRay and chest CT images and EKG tracings interpreted personally.       Past Imaging  Xr Chest Ap Portable    Result Date: 3/3/2017  XR CHEST AP PORTABLE 3/3/2017 10:38 AM INDICATION: Short of breath COMPARISON: 2017. FINDINGS: Again noted is mild diffuse interstitial prominence is has improved slightly from the prior study. There is no pneumothorax. The heart size is normal. There is no pneumothorax or pleural effusion. Probable emphysema in the upper lobes.    External records from male reviewed summarized below:  Admitted for dyspnea and hypoxic respiratory failure.  She responded to inpatient treatment with steroids and antibiotics, inhaler education and bronchial hygiene.  There is no change in her Lasix dose.  She was discharged the next day.

## 2021-06-09 NOTE — PROGRESS NOTES
Office Visit - Follow up    Adalgisa Solano   84 y.o. female    Date of Visit: 6/29/2020    Chief Complaint   Patient presents with     Follow-up       Subjective: Diabetes mellitus type 2.    Blood sugars reviewed stable.    COPD exacerbation on prednisone and doxycycline through Dr. Jun Juarez pulmonary medicine specialist.  The patient appreciates his care as to why.    Feels better.  O2 sats this morning at rest 87% after walking.    Dyspnea only with exertion.  No dyspnea at rest and denies paroxysmal nocturnal dyspnea.  Weight is down 12 pounds from previous and no evidence for leg edema no neck vein distention.    Short of breath with activity.    No blood in stool or urine or sputum medication list reviewed.  No rest dyspnea.  Medication list reviewed reconciled.    Allergies include tramadol amoxicillin levofloxacin and penicillin and levofloxacin or Levaquin.    Fasting for today's appointment for follow-up diabetes mellitus type 2 may be exacerbated by prednisone therapy for COPD exacerbation.        ROS: A comprehensive review of systems was performed and was otherwise negative    Medications:  Prior to Admission medications    Medication Sig Start Date End Date Taking? Authorizing Provider   acetaminophen (TYLENOL) 500 MG tablet Take 1,000 mg by mouth every 6 (six) hours as needed for pain.   Yes PROVIDER, HISTORICAL   albuterol (PROVENTIL) 2.5 mg /3 mL (0.083 %) nebulizer solution 3 ML INHALATION FOUR TIMES A DAY AS NEEDED USE BEFORE NEBULIZED SALINE. FOR SHORTNESS OF BREATH. 2/24/20  Yes Shai Ellington MD   ARIPiprazole (ABILIFY) 5 MG tablet TAKE 1 TABLET EVERY NIGHT AT BEDTIME 12/27/18  Yes Shai Ellington MD   atorvastatin (LIPITOR) 20 MG tablet TAKE 1 TABLET (20 MG TOTAL) BY MOUTH BEDTIME. 1/27/20  Yes Shai Ellington MD   azithromycin (ZITHROMAX) 250 MG tablet Take 250 mg by mouth every third day.   Yes PROVIDER, HISTORICAL   calcium carbonate-vitamin D3 (CALCIUM WITH  VITAMIN D) 600 mg(1,500mg) -400 unit per tablet Take 1 tablet by mouth 2 (two) times a day.   Yes PROVIDER, HISTORICAL   cholecalciferol, vitamin D3, 1,000 unit tablet Take 2,000 Units by mouth daily.    Yes PROVIDER, HISTORICAL   cyanocobalamin 1,000 mcg/mL injection Inject 1 mL (1,000 mcg total) into the shoulder, thigh, or buttocks every 30 (thirty) days. 3/2/19  Yes Jak Cardona MD   diltiazem (CARDIZEM CD) 120 MG 24 hr capsule TAKE 1 CAPSULE (120 MG TOTAL) BY MOUTH DAILY. 6/11/20  Yes Shai Ellington MD   doxycycline (VIBRAMYCIN) 100 MG capsule Take 1 capsule (100 mg total) by mouth 2 (two) times a day for 7 days. 6/26/20 7/3/20 Yes Jun Juarez MD   emollient base (VANICREAM TOP) Apply 1 application topically daily as needed. Uses when takes of compression socks   Yes PROVIDER, HISTORICAL   ferrous sulfate 325 (65 FE) MG tablet Take 1 tablet (325 mg total) by mouth daily with breakfast. 2/1/19  Yes Jak Cardona MD   fluticasone-umeclidinium-vilanterol (TRELEGY ELLIPTA) 100-62.5-25 mcg DsDv inhaler Inhale 1 Inhalation daily. 10/25/19  Yes Jun Juarez MD   furosemide (LASIX) 20 MG tablet Take 1 tablet (20 mg total) by mouth 2 (two) times a day at 9am and 6pm. 8/28/19  Yes Shai Ellington MD   gabapentin (NEURONTIN) 300 MG capsule Take 600 mg by mouth at bedtime.          Yes PROVIDER, HISTORICAL   ipratropium (ATROVENT) 0.03 % nasal spray 1 spray into each nostril daily.   Yes PROVIDER, HISTORICAL   ipratropium-albuterol (DUO-NEB) 0.5-2.5 mg/3 mL nebulizer Take 3 mL by nebulization 4 (four) times a day. 2/7/20  Yes Nilesh Dunn,    magnesium oxide (MAG-OX) 400 mg (241.3 mg magnesium) tablet Take 1 tablet (400 mg total) by mouth daily. 1/5/19  Yes Nilesh Dunn DO NEBUSAL 3 % nebulizer solution TAKE 4 ML BY NEBULIZATION 3 (THREE) TIMES A DAY. USING AFTER ALBUTEROL NEB. 4/20/20  Yes Jackie Crump MD   omeprazole (PRILOSEC) 20 MG capsule  TAKE 1 CAPSULE DAILY. 6/11/20  Yes Shai Ellington MD   OXYGEN-AIR DELIVERY SYSTEMS [facility] Use 1-2 L As Directed as needed. [employer]   Yes PROVIDER, HISTORICAL   polyethylene glycol (MIRALAX) 17 gram packet Take 17 g by mouth daily as needed.    Yes PROVIDER, HISTORICAL   predniSONE (DELTASONE) 10 mg tablet Take 2 tabs daily x 5 days, then 1 tab daily x 5 days. 6/26/20  Yes Jun Juarez MD   PROAIR HFA 90 mcg/actuation inhaler INHALE 2 PUFFS EVERY 4 (FOUR) HOURS AS NEEDED FOR WHEEZING. 1/2/20  Yes Shai Ellington MD   QUEtiapine (SEROQUEL) 25 MG tablet 1 tablet at bedtime. 4/29/20  Yes PROVIDER, HISTORICAL   spironolactone (ALDACTONE) 25 MG tablet TAKE 1/2 TABLET (12.5 MG TOTAL) BY MOUTH DAILY. 3/16/20  Yes Shai Ellington MD   triamcinolone (KENALOG) 0.1 % cream Apply 1 application topically 2 (two) times a day as needed. To legs         Yes PROVIDER, HISTORICAL   urea (CARMOL) 40 % Crea Apply 1 application topically 2 (two) times a day as needed. To bottom of feet    Yes PROVIDER, HISTORICAL   XARELTO 20 mg tablet TAKE 1 TABLET (20 MG TOTAL) BY MOUTH DAILY WITH SUPPER. 6/18/20  Yes Shai Ellington MD       Allergies:   Allergies   Allergen Reactions     Tramadol Nausea Only     Amoxicillin Itching and Rash     Levofloxacin Itching and Rash     Penicillins Itching and Rash     Has tolerated ceftriaxone.       Immunizations:   Immunization History   Administered Date(s) Administered     DT (pediatric) 03/29/2004     Influenza high dose,seasonal,PF, 65+ yrs 10/20/2015, 10/09/2016, 11/16/2017, 10/11/2018     Influenza, inj, historic,unspecified 11/01/2019     Influenza,seasonal quad, PF, =/> 6months 11/20/2014     Pneumo Conj 13-V (2010&after) 10/20/2015     Pneumo Polysac 23-V 10/18/1999, 08/01/2005     Td,adult,historic,unspecified 03/29/2004     Tdap 05/14/2013       Exam Chest clear to auscultation and percussion.  Heart tones regular rhythm without murmur rub or gallop.  Abdomen soft  nontender no organomegaly.  No peritoneal signs.  Extremities free of edema cyanosis or clubbing.  Neck veins nondistended no thyromegaly or scleral icterus noted, carotids full.  Skin warm and dry easily conversant good spirited.  Normal intelligence.  Neurologically intact no gross localizing findings.  Few scattered sibilant rhonchi noted on expiration.  Diffusely.  O2 sats 87% pulse 98 blood pressure excellent at 136/74 weight down 12 pounds from previous.  Not cachectic no leg edema no neck vein distention.  Nasal O2 running.  Portable machine.    Assessment and Plan  COPD and mild exacerbation on prednisone and doxycycline.  Chest x-ray today.  Followed by Dr. Jun Juarez pulmonary medicine specialist.  Urged close follow-up with him.    Diabetes mellitus type 2 fasting check A1c blood sugar lipid panel today.    Multiple drug allergies listed see above.    Hypertension controlled.    History of atrial fibrillation and congestive heart failure and bronchiectasis contributing to windedness.  Discussed reassured.  RTC 4 months time.    Time: total time spent with the patient was 25 minutes of which >50% was spent in counseling and coordination of care    The following high BMI interventions were performed this visit: encouragement to exercise    hSai Ellington MD    Patient Active Problem List   Diagnosis     Recurrent major depressive episodes (H)     Chronic obstructive pulmonary disease (H)     Peripheral arterial disease (H)     Type 2 diabetes mellitus without complication (H)     Hypertension     Schizoaffective disorder, bipolar type (H)     Arnold-Chiari malformation (H)     Chronic diastolic congestive heart failure (H)     Chronic respiratory failure with hypoxia (H)     Pulmonary hypertension (H)     Primary osteoarthritis of both knees     Chronic atrial fibrillation (H)     Gastroesophageal reflux disease, esophagitis presence not specified     Stasis dermatitis of both legs     Generalized  muscle weakness     Drug-induced constipation     Chronic pain syndrome     Acute combined systolic and diastolic CHF, NYHA class 1 (H)     Slow transit constipation     Cellulitis of left lower extremity     Atrial fibrillation with RVR (H)     Bronchiectasis without complication (H)     Traumatic hematoma     Dyslipidemia     Hypomagnesemia     Osteoporosis

## 2021-06-10 NOTE — PROGRESS NOTES
[facility] Follow-up Note    Assessment & Plan        1. Chronic diastolic heart failure -acute on chronic with preserved ejection fraction.  No significant signs or symptoms currently.  Continue current dose of furosemide and if retaining more water will need to increase dosage.     2. Chronic respiratory failure with hypoxia -due to obesity as well as COPD and being seen by [facility] pulmonary care as well as [facility] pulmonary medicine.     3. Schizoaffective disorder, bipolar type -defer to primary.     4. Essential hypertension-recheck blood pressure myself and it was under good control.     5. Type 2 diabetes mellitus without complication, with long-term current use of insulin -defer to primary.     6. Simple chronic bronchitis -on antibiotics and steroids.     7. Peripheral arterial disease -patient had intervention with balloon angioplasty of the left lower extremity. At risk for cardiovascular stenosis and a stress test as outpatient showed no ischemia, no scar good ejection fraction of 70% .   8. Atrial fibrillation with RVR-asymptomatic, paroxysmal, possibly persistent, and most likely not valvular based an echocardiogram. Her CHADS 2 VASC score is 3 based on hypertension and age and sex. Therefore started anticoagulation.  She converted to sinus rhythm and is currently off anticoagulation which was discontinued by primary on discharge from hospital in February.  Defer anticoagulation to primary.    Plan  1.Continue pulmonary workup with [place] and [facility] in [facility] lung care.  2.  Follow-up with me in 1 year or sooner if needed.  3.  Will inquire with primary as to why patient not on Coumadin.    Subjective  CC: 81-year-old white female being seen in post hospital discharge follow-up.  Since then seen her she was admitted for heart failure with preserved ejection fraction and has had further recurrent bronchitis.  He still short of breath with a cough productive of light  green sputum.  She does have bipedal edema.  There is no chest discomfort, PND, orthopnea, syncope or dizziness.    Medications  Current Outpatient Prescriptions   Medication Sig Note     acetaminophen (TYLENOL) 325 MG tablet Take 650 mg by mouth every 6 (six) hours as needed for pain.      albuterol (PROVENTIL HFA;VENTOLIN HFA) 90 mcg/actuation inhaler Inhale 2 puffs every 6 (six) hours as needed for wheezing or shortness of breath.       albuterol (PROVENTIL) 2.5 mg /3 mL (0.083 %) nebulizer solution Take 3 mL (2.5 mg total) by nebulization every 6 (six) hours as needed for wheezing.      ARIPiprazole (ABILIFY) 5 MG tablet Take 5 mg by mouth bedtime.      aspirin 81 MG EC tablet Take 81 mg by mouth daily.      atorvastatin (LIPITOR) 20 MG tablet Take 1 tablet (20 mg total) by mouth bedtime.      calcium carbonate-vitamin D3 (CALCIUM WITH VITAMIN D) 600 mg(1,500mg) -400 unit per tablet Take 1 tablet by mouth 2 (two) times a day.      cholecalciferol, vitamin D3, 1,000 unit tablet Take 2,000 Units by mouth daily.       clarithromycin (BIAXIN) 500 MG tablet Take 500 mg by mouth once as needed. If  Needed prior to dental procedure      diltiazem (CARDIZEM CD) 120 MG 24 hr capsule Take 1 capsule (120 mg total) by mouth daily.      doxycycline (VIBRA-TABS) 100 MG tablet Take 1 tablet (100 mg total) by mouth 2 (two) times a day for 10 days.      fluticasone-salmeterol (ADVAIR DISKUS) 100-50 mcg/dose DISKUS Inhale 1 puff 2 (two) times a day.      furosemide (LASIX) 20 MG tablet Take 1 tablet (20 mg total) by mouth daily.      guaiFENesin-dextromethorphan (MUCINEX DM) 600-30 mg Tb12 Take 1 tablet by mouth daily.      omeprazole (PRILOSEC) 20 MG capsule TAKE 1 CAPSULE DAILY.      peg 400-hypromellose-glycerin (VISINE TEARS) 1-0.2-0.2 % Drop Administer 1 drop to both eyes as needed.      predniSONE (DELTASONE) 10 mg tablet Take 1 tablet (10 mg total) by mouth daily for 7 days.      psyllium (METAMUCIL) 3.4 gram packet Take 1  "packet by mouth daily. Takes 1 teaspoon at home      QUEtiapine (SEROQUEL) 25 MG tablet Take 25-50 mg by mouth bedtime.       sodium chloride 3 % nebulizer solution 4 (four) times a day. 5/3/2017: Received from: External Pharmacy     tiotropium (SPIRIVA WITH HANDIHALER) 18 mcg inhalation capsule use 1 capsule via inhaler 1 x a day      triamcinolone (KENALOG) 0.1 % cream Apply 1 application topically as needed.        Objective  /74  Pulse 96  Resp 16  Ht 5' 1\" (1.549 m)  Wt 166 lb (75.3 kg)  BMI 31.37 kg/m2    General Appearance:    Alert, cooperative, no distress, appears stated age   Head:    Normocephalic, without obvious abnormality, atraumatic   Throat:   Lips, mucosa, and tongue normal; teeth and gums normal   Neck:   Supple, symmetrical, trachea midline, no adenopathy;        thyroid:  No enlargement/tenderness/nodules; no carotid    bruit or JVD   Back:     Symmetric, no curvature, ROM normal, no CVA tenderness   Lungs:     Clear to auscultation bilaterally, occasional wheeze, respirations unlabored   Chest wall:    No tenderness or deformity   Heart:    Regular rate and rhythm, S1 and S2 normal, no murmur, rub   or gallop   Abdomen:     Soft, non-tender, bowel sounds active all four quadrants,     no masses, no organomegaly   Extremities:   Normal, atraumatic, no cyanosis, 1/4 bipedal edema    Pulses:   2+ and symmetric all extremities   Skin:   Skin color, texture, turgor normal, no rashes or lesions     Results    Lab Results personally reviewed   Lab Results   Component Value Date    CHOL 136 06/09/2016    CHOL 144 06/01/2015     Lab Results   Component Value Date    HDL 69 06/09/2016    HDL 67 06/01/2015     Lab Results   Component Value Date    LDLCALC 56 06/09/2016    LDLCALC 67 06/01/2015     Lab Results   Component Value Date    TRIG 54 06/09/2016    TRIG 51 06/01/2015     Lab Results   Component Value Date    WBC 7.6 03/07/2017    HGB 11.8 (L) 03/07/2017    HCT 36.5 03/07/2017    PLT " 260 03/07/2017     Lab Results   Component Value Date    CREATININE 0.61 03/07/2017    BUN 11 03/07/2017     03/07/2017    K 4.3 03/07/2017    CO2 30 03/07/2017     Review of Systems:   General: Weight Gain  Eyes: WNL  Ears/Nose/Throat: WNL  Lungs: Cough, Shortness of Breath, Wheezing  Heart: Shortness of Breath with activity, Leg Swelling  Stomach: WNL  Bladder: WNL  Muscle/Joints: WNL  Skin: WNL  Nervous System: WNL  Mental Health: Anxiety     Blood: WNL

## 2021-06-10 NOTE — PROGRESS NOTES
Office Visit - Follow up    Adalgisa Solano   81 y.o. female    Date of Visit: 4/27/2017    Chief Complaint   Patient presents with     COPD     Hypertension     Nasal Congestion     head congestion       Subjective: COPD with hypertension and head congestion not fasting for today's appointment.    Discussed furosemide therapy needs refill of same.    Seen at the [facility] by the pulmonary section there Dr. Edmondson done on March 28, 2017 she will be returning there soon.  Diabetic workup is due as not most recently checked although she is not fasting today.    No blood in stool or urine.  Medication list reviewed generally well-tolerated.  The patient is 81 years of age and she will be rechecked in 2 months time presumably fasting for fasting labs.    No blood in stool or urine or sputum she feels she may be getting a head cold or upper respiratory infection.  Medication list reviewed generally well-tolerated.  No hemoptysis.  Not a smoker quit smoking in 2000-06-11 years prior.  COPD primary diagnosis.    ROS: A comprehensive review of systems was performed and was otherwise negative    Medications:  Prior to Admission medications    Medication Sig Start Date End Date Taking? Authorizing Provider   acetaminophen (TYLENOL) 325 MG tablet Take 650 mg by mouth every 6 (six) hours as needed for pain.   Yes PROVIDER, HISTORICAL   albuterol (PROVENTIL HFA;VENTOLIN HFA) 90 mcg/actuation inhaler Inhale 2 puffs every 6 (six) hours as needed for wheezing or shortness of breath.    Yes PROVIDER, HISTORICAL   albuterol (PROVENTIL) 2.5 mg /3 mL (0.083 %) nebulizer solution Take 3 mL (2.5 mg total) by nebulization every 6 (six) hours as needed for wheezing. 3/13/17  Yes Jun Juarez MD   ARIPiprazole (ABILIFY) 5 MG tablet Take 5 mg by mouth bedtime.   Yes PROVIDER, HISTORICAL   aspirin 81 MG EC tablet Take 81 mg by mouth daily.   Yes PROVIDER, HISTORICAL   atorvastatin (LIPITOR) 20 MG tablet Take 1 tablet (20 mg  total) by mouth bedtime. 12/12/16  Yes Chris Sarah MD   calcium carbonate-vitamin D3 (CALCIUM WITH VITAMIN D) 600 mg(1,500mg) -400 unit per tablet Take 1 tablet by mouth 2 (two) times a day.   Yes PROVIDER, HISTORICAL   cholecalciferol, vitamin D3, 1,000 unit tablet Take 2,000 Units by mouth daily.    Yes PROVIDER, HISTORICAL   diltiazem (CARDIZEM CD) 120 MG 24 hr capsule Take 1 capsule (120 mg total) by mouth daily. 2/14/17  Yes Shai Ellington MD   fluticasone-salmeterol (ADVAIR DISKUS) 100-50 mcg/dose DISKUS Inhale 1 puff 2 (two) times a day. 3/13/17  Yes Jun Juarez MD   furosemide (LASIX) 20 MG tablet Take 1 tablet (20 mg total) by mouth daily. 4/17/17  Yes Shai Ellington MD   ipratropium-albuterol (DUONEB) 0.5-2.5 mg/3 mL nebulizer Inhale 3 mL 4 (four) times a day as needed (chronic bronchitis).  11/30/15  Yes PROVIDER, HISTORICAL   omeprazole (PRILOSEC) 20 MG capsule TAKE 1 CAPSULE DAILY. 2/8/17  Yes Shai Ellington MD   peg 400-hypromellose-glycerin (VISINE TEARS) 1-0.2-0.2 % Drop Administer 1 drop to both eyes as needed.   Yes PROVIDER, HISTORICAL   psyllium (METAMUCIL) 3.4 gram packet Take 1 packet by mouth daily. Takes 1 teaspoon at home   Yes PROVIDER, HISTORICAL   QUEtiapine (SEROQUEL) 25 MG tablet Take 25-50 mg by mouth bedtime.  8/10/16  Yes PROVIDER, HISTORICAL   tiotropium (SPIRIVA WITH HANDIHALER) 18 mcg inhalation capsule use 1 capsule via inhaler 1 x a day 4/7/17  Yes Jun Juarez MD   clarithromycin (BIAXIN) 500 MG tablet Take 500 mg by mouth once as needed. If  Needed prior to dental procedure    PROVIDER, HISTORICAL   triamcinolone (KENALOG) 0.1 % cream Apply 1 application topically as needed.    PROVIDER, HISTORICAL   guaiFENesin-dextromethorphan (MUCINEX DM) 600-30 mg Tb12 Take 1 tablet by mouth daily.  4/27/17  PROVIDER, HISTORICAL       Allergies:   Allergies   Allergen Reactions     Amoxicillin Itching and Rash     Levofloxacin Itching and Rash      Penicillins Itching and Rash     Sulfa (Sulfonamide Antibiotics) Itching and Rash       Immunizations:   Immunization History   Administered Date(s) Administered     DT (pediatric) 03/29/2004     Influenza high dose, seasonal 10/09/2016     Influenza,seasonal quad, PF, 36+MOS 11/20/2014     Pneumo Polysac 23-V 10/18/1999, 08/01/2005     Td, historic 03/29/2004     Tdap 05/14/2013       Exam Chest clear to auscultation and percussion.  Heart tones regular rhythm without murmur rub or gallop.  Abdomen soft nontender no organomegaly.  No peritoneal signs.  Extremities free of edema cyanosis or clubbing.  Neck veins nondistended no thyromegaly or scleral icterus noted, carotids full.  Skin warm and dry easily conversant good spirited.  Normal intelligence.  Neurologically intact no gross localizing findings.  Scattered rhonchi and upper airway sounds anteriorly and posteriorly heard no rales.  No wheezing.  No central acrocyanosis no clubbing.  Not in acute distress not toxic or tachypneic.    Assessment and Plan  COPD with hypertension and obesity BMI 32.  Diabetes mellitus type 2 with multiple drug allergies including amoxicillin levofloxacin penicillin and sulfa.    Arnold-Chiari malformation treated surgically.  Neurosurgery.  Stable    See below for obesity to TC 2 months time for fasting office visit with labs.  Addendum mammogram negative with ultrasound right breast June 29, 2016.  Redundant colon seen on colonoscopy dated March 14, 2013 colorectal surgery group presiding Dr. Reyes.    Time: total time spent with the patient was 25 minutes of which >50% was spent in counseling and coordination of care    The following high BMI interventions were performed this visit: encouragement to exercise    Shai Ellington MD    Patient Active Problem List   Diagnosis     Recurrent major depressive episodes     Chronic obstructive pulmonary disease     Lump Or Mass In Breast     Peripheral arterial disease      Type 2 diabetes mellitus without complication     Hypertension     Schizoaffective disorder, bipolar type     Arnold-Chiari malformation     Chronic diastolic heart failure     Chronic respiratory failure with hypoxia

## 2021-06-11 ENCOUNTER — COMMUNICATION - HEALTHEAST (OUTPATIENT)
Dept: MULTI SPECIALTY CLINIC | Facility: CLINIC | Age: 86
End: 2021-06-11

## 2021-06-11 NOTE — PROGRESS NOTES
Office Visit - Follow up    Adalgisa Solano   81 y.o. female    Date of Visit: 6/27/2017    Chief Complaint   Patient presents with     COPD     Hypertension       Subjective: COPD CHF diabetes mellitus type 2.  If Lasix at higher dose 60 mg daily does not work the [facility] cardiology team suggested Spironolactone.  Potassium level is to be checked today and ordered.    Multiple drug allergies are noted including amoxicillin levofloxacin penicillin and sulfa.    Overall the patient feels better less short of breath her weight is down 7 pounds.  She avoids salt in her diet she denies paroxysmal nocturnal dyspnea.  The patient was advised to seek out cardiologist here locally in the [facility] part of our system [facility] to determine if she needs a more potent anticoagulant than aspirin low-dose 81 mg which she currently is on.    The patient denies blood in stool or urine no chest pain shortness of breath or paroxysmal nocturnal dyspnea.  She denies orthopnea.    Medication list reviewed generally well-tolerated.    ROS: A comprehensive review of systems was performed and was otherwise negative    Medications:  Prior to Admission medications    Medication Sig Start Date End Date Taking? Authorizing Provider   acetaminophen (TYLENOL) 325 MG tablet Take 650 mg by mouth every 6 (six) hours as needed for pain.   Yes PROVIDER, HISTORICAL   albuterol (PROVENTIL) 2.5 mg /3 mL (0.083 %) nebulizer solution Take 3 mL (2.5 mg total) by nebulization every 6 (six) hours as needed for wheezing. 3/13/17  Yes Jun Juarez MD   ARIPiprazole (ABILIFY) 5 MG tablet Take 5 mg by mouth bedtime.   Yes PROVIDER, HISTORICAL   aspirin 81 MG EC tablet Take 81 mg by mouth daily.   Yes PROVIDER, HISTORICAL   atorvastatin (LIPITOR) 20 MG tablet Take 1 tablet (20 mg total) by mouth bedtime. 6/19/17  Yes Chris Sarah MD   calcium carbonate-vitamin D3 (CALCIUM WITH VITAMIN D) 600 mg(1,500mg) -400 unit per tablet Take  1 tablet by mouth 2 (two) times a day.   Yes PROVIDER, HISTORICAL   cholecalciferol, vitamin D3, 1,000 unit tablet Take 2,000 Units by mouth daily.    Yes PROVIDER, HISTORICAL   diltiazem (CARDIZEM CD) 120 MG 24 hr capsule Take 1 capsule (120 mg total) by mouth daily. 2/14/17  Yes Shai Ellington MD   fluticasone-salmeterol (ADVAIR DISKUS) 100-50 mcg/dose DISKUS Inhale 1 puff 2 (two) times a day. 3/13/17  Yes Jun Juarez MD   furosemide (LASIX) 20 MG tablet Take 1 tablet (20 mg total) by mouth daily.  Patient taking differently: three tablets daily  total of 60mg daily 6/6/17  Yes Shai Ellington MD   omeprazole (PRILOSEC) 20 MG capsule TAKE 1 CAPSULE DAILY. 5/18/17  Yes Shai Ellington MD   psyllium (METAMUCIL) 3.4 gram packet Take 1 packet by mouth daily. Takes 1 teaspoon at home   Yes PROVIDER, HISTORICAL   QUEtiapine (SEROQUEL) 25 MG tablet Take 25-50 mg by mouth bedtime.  8/10/16  Yes PROVIDER, HISTORICAL   sodium chloride 3 % nebulizer solution 4 (four) times a day. 4/19/17  Yes PROVIDER, HISTORICAL   tiotropium (SPIRIVA WITH HANDIHALER) 18 mcg inhalation capsule use 1 capsule via inhaler 1 x a day 4/7/17  Yes Jun Juarez MD   albuterol (PROVENTIL HFA;VENTOLIN HFA) 90 mcg/actuation inhaler Inhale 2 puffs every 6 (six) hours as needed for wheezing or shortness of breath.     PROVIDER, HISTORICAL   clarithromycin (BIAXIN) 500 MG tablet Take 500 mg by mouth once as needed. If  Needed prior to dental procedure    PROVIDER, HISTORICAL   guaiFENesin-dextromethorphan (MUCINEX DM) 600-30 mg Tb12 Take 1 tablet by mouth daily.    PROVIDER, HISTORICAL   peg 400-hypromellose-glycerin (VISINE TEARS) 1-0.2-0.2 % Drop Administer 1 drop to both eyes as needed.    PROVIDER, HISTORICAL   potassium chloride (KLOR-CON) 10 MEQ CR tablet Take by mouth 3 (three) times a day. 6/19/17   PROVIDER, HISTORICAL   triamcinolone (KENALOG) 0.1 % cream Apply 1 application topically as needed.  6/27/17   PROVIDER, HISTORICAL       Allergies:   Allergies   Allergen Reactions     Amoxicillin Itching and Rash     Levofloxacin Itching and Rash     Penicillins Itching and Rash       Immunizations:   Immunization History   Administered Date(s) Administered     DT (pediatric) 03/29/2004     Influenza high dose, seasonal 10/09/2016     Influenza,seasonal quad, PF, 36+MOS 11/20/2014     Pneumo Polysac 23-V 10/18/1999, 08/01/2005     Td, historic 03/29/2004     Tdap 05/14/2013       Exam Chest clear to auscultation and percussion.  Heart tones regular rhythm without murmur rub or gallop.  Abdomen soft nontender no organomegaly.  No peritoneal signs.  Extremities free of edema cyanosis or clubbing.  Neck veins nondistended no thyromegaly or scleral icterus noted, carotids full.  Skin warm and dry easily conversant good spirited.  Normal intelligence.  Neurologically intact no gross localizing findings.    Assessment and Plan  Diabetes mellitus type 2 with COPD and CHF hypertension.  Check potassium level today plus A1c blood sugar urine for microalbumin and lipid panel.  RTC 1 month for the next 3 months.  Encourage salt restriction and diet.  Note also patient on potassium supplement 10 mEq each 3 times a day in addition to the new furosemide dose of 20 mg 3 daily.  Overall better.    Time: total time spent with the patient was 40 minutes of which >50% was spent in counseling and coordination of care    The following high BMI interventions were performed this visit: encouragement to exercise    Shai Ellington MD    Patient Active Problem List   Diagnosis     Recurrent major depressive episodes     Chronic obstructive pulmonary disease     Lump Or Mass In Breast     Peripheral arterial disease     Type 2 diabetes mellitus without complication     Hypertension     Schizoaffective disorder, bipolar type     Arnold-Chiari malformation     Chronic diastolic heart failure     Chronic respiratory failure with hypoxia

## 2021-06-11 NOTE — TELEPHONE ENCOUNTER
RN cannot approve Refill Request    RN can NOT refill this medication med is not covered by policy/route to provider. Last office visit: 6/29/2020 Shai Ellington MD Last Physical: 8/22/2019 Last MTM visit: Visit date not found Last visit same specialty: 6/29/2020 Shai Ellington MD.  Next visit within 3 mo: Visit date not found  Next physical within 3 mo: Visit date not found      Allie Coe, [facility] Triage/Med Refill 8/29/2020    Requested Prescriptions   Pending Prescriptions Disp Refills     XARELTO 20 mg tablet [Pharmacy Med Name: XARELTO 20MG] 30 tablet 0     Sig: TAKE 1 TABLET (20 MG TOTAL) BY MOUTH DAILY WITH SUPPER.       Apixaban/Rivaroxaban/Dabigatran Refill Protocol Failed - 8/26/2020  1:10 PM        Failed - Route to appropriate pool/provider     Last Anticoagulation Summary:           Passed - Renal function test in last year     Creatinine   Date Value Ref Range Status   02/12/2020 0.66 0.60 - 1.10 mg/dL Final             Passed - PCP or prescribing provider visit in past 12 months       Last office visit with prescriber/PCP: 6/29/2020 Shai Ellington MD OR same dept: 6/29/2020 Shai Ellington MD OR same specialty: 6/29/2020 Shai Ellington MD  Last physical: 8/22/2019 Last MTM visit: Visit date not found   Next visit within 3 mo: Visit date not found  Next physical within 3 mo: Visit date not found  Prescriber OR PCP: Shai Ellington MD  Last diagnosis associated with med order: 1. Routine general medical examination at a health care facility  - XARELTO 20 mg tablet [Pharmacy Med Name: XARELTO 20MG]; TAKE 1 TABLET (20 MG TOTAL) BY MOUTH DAILY WITH SUPPER.  Dispense: 30 tablet; Refill: 0    If protocol passes may refill for 12 months if within 3 months of last provider visit (or a total of 15 months).

## 2021-06-11 NOTE — PROGRESS NOTES
"Adalgisa Solano is a 84 y.o. female who is being evaluated via a billable telephone visit.      The patient has been notified of following:     \"This telephone visit will be conducted via a call between you and your physician/provider. We have found that certain health care needs can be provided without the need for a physical exam.  This service lets us provide the care you need with a short phone conversation.  If a prescription is necessary we can send it directly to your pharmacy.  If lab work is needed we can place an order for that and you can then stop by our lab to have the test done at a later time.    Telephone visits are billed at different rates depending on your insurance coverage.  Please reach out to your insurance provider with any questions.    If during the course of the call the physician/provider feels a telephone visit is not appropriate, you will not be charged for this service.\"    Patient has given verbal consent to a Telephone visit? Yes    What phone number would you like to be contacted at? [phone]    Patient would like to receive their AVS by AVS Preference: [place].    84F with history of copd and bronchiectasis.    Trelegy, vest, oxygen, graded exercises.  No heartburn or sinus issues.    She is having occasional exacerbations.  There is no clear provocative factor.  No evident seasonal issues, allergy issues, heartburn or reflux.  She feels stress may play a role.    Currently on triple inhaler therapy, vest therapy, sinus medications, suppressive azithromycin.    Overall she is okay with how she is doing.    Action plan pharmacy for her to keep at home.    I will do a remote visit with her in December.    7 minutes spent on this call.  "

## 2021-06-11 NOTE — TELEPHONE ENCOUNTER
Refill Approved    Rx renewed per Medication Renewal Policy. Medication was last renewed on 8/28/2019 with 3 refills.  Last office visit: 6/29/2020 with PCP Dr TRACY Ellington.      April Byers, [facility] Triage/Med Refill 9/10/2020     Requested Prescriptions   Pending Prescriptions Disp Refills     furosemide (LASIX) 20 MG tablet [Pharmacy Med Name: FUROSEMIDE 20MG] 180 tablet 2     Sig: TAKE 1 TABLET (20 MG TOTAL) BY MOUTH 2 (TWO) TIMES A DAY AT 9AM AND 6PM.       Diuretics/Combination Diuretics Refill Protocol  Passed - 9/9/2020  8:41 AM        Passed - Visit with PCP or prescribing provider visit in past 12 months     Last office visit with prescriber/PCP: 6/29/2020 Shai Ellington MD OR same dept: 6/29/2020 Shai Ellington MD OR same specialty: 6/29/2020 Shai Ellington MD  Last physical: 8/22/2019 Last MTM visit: Visit date not found   Next visit within 3 mo: Visit date not found  Next physical within 3 mo: Visit date not found  Prescriber OR PCP: Shai Ellington MD  Last diagnosis associated with med order: 1. Acute on chronic diastolic heart failure (H)  - furosemide (LASIX) 20 MG tablet [Pharmacy Med Name: FUROSEMIDE 20MG]; Take 1 tablet (20 mg total) by mouth 2 (two) times a day at 9am and 6pm.  Dispense: 180 tablet; Refill: 2    2. PAF (paroxysmal atrial fibrillation) (H)  - furosemide (LASIX) 20 MG tablet [Pharmacy Med Name: FUROSEMIDE 20MG]; Take 1 tablet (20 mg total) by mouth 2 (two) times a day at 9am and 6pm.  Dispense: 180 tablet; Refill: 2    If protocol passes may refill for 12 months if within 3 months of last provider visit (or a total of 15 months).             Passed - Serum Potassium in past 12 months      Lab Results   Component Value Date    Potassium 4.3 02/12/2020             Passed - Serum Sodium in past 12 months      Lab Results   Component Value Date    Sodium 134 (L) 02/12/2020    Sodium, Urine <20 02/04/2020             Passed - Blood pressure on file in  past 12 months     BP Readings from Last 1 Encounters:   06/29/20 134/78             Passed - Serum Creatinine in past 12 months      Creatinine   Date Value Ref Range Status   02/12/2020 0.66 0.60 - 1.10 mg/dL Final

## 2021-06-11 NOTE — TELEPHONE ENCOUNTER
RN cannot approve Refill Request    RN can NOT refill this medication med is not covered by policy/route to provider. Last office visit: 6/29/2020 Shai Ellington MD Last Physical: 8/22/2019 Last MTM visit: Visit date not found Last visit same specialty: 6/29/2020 Shai Ellington MD.  Next visit within 3 mo: Visit date not found  Next physical within 3 mo: Visit date not found      Allie Coe, [facility] Triage/Med Refill 9/27/2020    Requested Prescriptions   Pending Prescriptions Disp Refills     XARELTO 20 mg tablet [Pharmacy Med Name: XARELTO 20MG] 30 tablet 0     Sig: TAKE 1 TABLET (20 MG TOTAL) BY MOUTH DAILY WITH SUPPER.       Apixaban/Rivaroxaban/Dabigatran Refill Protocol Failed - 9/27/2020  8:53 AM        Failed - Route to appropriate pool/provider     Last Anticoagulation Summary:           Passed - Renal function test in last year     Creatinine   Date Value Ref Range Status   02/12/2020 0.66 0.60 - 1.10 mg/dL Final             Passed - PCP or prescribing provider visit in past 12 months       Last office visit with prescriber/PCP: 6/29/2020 Shai Ellington MD OR same dept: 6/29/2020 Shai Ellington MD OR same specialty: 6/29/2020 Shai Ellington MD  Last physical: 8/22/2019 Last MTM visit: Visit date not found   Next visit within 3 mo: Visit date not found  Next physical within 3 mo: Visit date not found  Prescriber OR PCP: Shai Ellington MD  Last diagnosis associated with med order: 1. Routine general medical examination at a health care facility  - XARELTO 20 mg tablet [Pharmacy Med Name: XARELTO 20MG]; TAKE 1 TABLET (20 MG TOTAL) BY MOUTH DAILY WITH SUPPER.  Dispense: 30 tablet; Refill: 0    If protocol passes may refill for 12 months if within 3 months of last provider visit (or a total of 15 months).

## 2021-06-11 NOTE — TELEPHONE ENCOUNTER
RN cannot approve Refill Request    RN can NOT refill this medication med is not covered by policy/route to provider. Last office visit: Visit date not found Last Physical: Visit date not found Last MTM visit: Visit date not found Last visit same specialty: 6/29/2020 Shai Ellington MD.  Next visit within 3 mo: Visit date not found  Next physical within 3 mo: Visit date not found      April Byers, [facility] Triage/Med Refill 9/29/2020    Requested Prescriptions   Pending Prescriptions Disp Refills     NEBUSAL 3 % nebulizer solution [Pharmacy Med Name: NEBUSAL NEB 3%] 360 mL 3     Sig: TAKE 4 ML BY NEBULIZATION 3 (THREE) TIMES A DAY. USING AFTER ALBUTEROL NEB.       There is no refill protocol information for this order

## 2021-06-12 ENCOUNTER — COMMUNICATION - HEALTHEAST (OUTPATIENT)
Dept: SCHEDULING | Facility: CLINIC | Age: 86
End: 2021-06-12

## 2021-06-12 NOTE — PATIENT INSTRUCTIONS - HE
Ms Adalgisa Solano,  I enjoyed visiting with you again today.  I am glad to hear you are doing well.  Per our conversation let us recheck the ultrasound of the heart.  I will plan on seeing you 1 year or sooner if needed.  Francis Marquez

## 2021-06-12 NOTE — PROGRESS NOTES
Office Visit - Follow up    Adalgisa Solano   84 y.o. female    Date of Visit: 10/29/2020    Chief Complaint   Patient presents with     Follow-up     4 mo       Subjective: Hypertension.    Diabetes mellitus type 2 not fasting refused lab testing today.    COPD awaiting for a visit from Dr. Jun Juarez her pulmonologist.    Psychiatry wants us to refill her Abilify 5 mg daily keeps her steady and even in her emotions and thoughts sleeping well.    How often epidural spinal injections every 3 to 4 months for chronic lumbar disc disease back brace prescription written for her today.    No blood in stool or urine no chest pain she is chronically short of breath she has had a history of being a heavy smoker for some period of time medication list reviewed reconciled in the chart she does not smoke currently she does not abuse alcohol.  Map last mammogram done September 24, 2020 allCLEAR.    ROS: A comprehensive review of systems was performed and was otherwise negative    Medications:  Prior to Admission medications    Medication Sig Start Date End Date Taking? Authorizing Provider   acetaminophen (TYLENOL) 500 MG tablet Take 1,000 mg by mouth every 6 (six) hours as needed for pain.   Yes PROVIDER, HISTORICAL   albuterol (PROVENTIL) 2.5 mg /3 mL (0.083 %) nebulizer solution 3 ML INHALATION FOUR TIMES A DAY AS NEEDED USE BEFORE NEBULIZED SALINE. FOR SHORTNESS OF BREATH. 2/24/20  Yes Shai Ellington MD   ARIPiprazole (ABILIFY) 5 MG tablet TAKE 1 TABLET EVERY NIGHT AT BEDTIME 12/27/18  Yes Shai Ellington MD   atorvastatin (LIPITOR) 20 MG tablet TAKE 1 TABLET (20 MG TOTAL) BY MOUTH BEDTIME. 1/27/20  Yes Shai Ellington MD   azithromycin (ZITHROMAX) 250 MG tablet Take 250 mg by mouth every third day.   Yes PROVIDER, HISTORICAL   betamethasone, augmented, (DIPROLENE-AF) 0.05 % cream 2 (two) times a day. 10/26/20  Yes PROVIDER, HISTORICAL   calcium carbonate-vitamin D3 (CALCIUM WITH VITAMIN D) 600  mg(1,500mg) -400 unit per tablet Take 1 tablet by mouth 2 (two) times a day.   Yes PROVIDER, HISTORICAL   cholecalciferol, vitamin D3, 1,000 unit tablet Take 2,000 Units by mouth daily.    Yes PROVIDER, HISTORICAL   cyanocobalamin 1,000 mcg/mL injection Inject 1 mL (1,000 mcg total) into the shoulder, thigh, or buttocks every 30 (thirty) days. 3/2/19  Yes Jak Cardona MD   diltiazem (CARDIZEM CD) 120 MG 24 hr capsule TAKE 1 CAPSULE (120 MG TOTAL) BY MOUTH DAILY. 6/11/20  Yes Shai Ellington MD   ferrous sulfate 325 (65 FE) MG tablet Take 1 tablet (325 mg total) by mouth daily with breakfast. 2/1/19  Yes Jak Cardona MD   furosemide (LASIX) 20 MG tablet TAKE 1 TABLET (20 MG TOTAL) BY MOUTH 2 (TWO) TIMES A DAY AT 9AM AND 6PM. 9/10/20  Yes Shai Ellington MD   gabapentin (NEURONTIN) 300 MG capsule Take 600 mg by mouth at bedtime.          Yes PROVIDER, HISTORICAL   ipratropium (ATROVENT) 0.03 % nasal spray 1 spray into each nostril daily.   Yes PROVIDER, HISTORICAL   ketoconazole (NIZORAL) 2 % cream 2 (two) times a day. 10/26/20  Yes PROVIDER, HISTORICAL   magnesium oxide (MAG-OX) 400 mg (241.3 mg magnesium) tablet Take 1 tablet (400 mg total) by mouth daily. 1/5/19  Yes Nilesh Dunn DO   NEBUSAL 3 % nebulizer solution TAKE 4 ML BY NEBULIZATION 3 (THREE) TIMES A DAY. USING AFTER ALBUTEROL NEB. 9/30/20  Yes Shai Ellington MD   omeprazole (PRILOSEC) 20 MG capsule TAKE 1 CAPSULE DAILY. 6/11/20  Yes Shai Ellington MD   OXYGEN-AIR DELIVERY SYSTEMS [facility] Use 1-2 L As Directed as needed. [employer]   Yes PROVIDER, HISTORICAL   polyethylene glycol (MIRALAX) 17 gram packet Take 17 g by mouth daily as needed.    Yes PROVIDER, HISTORICAL   PROAIR HFA 90 mcg/actuation inhaler INHALE 2 PUFFS EVERY 4 (FOUR) HOURS AS NEEDED FOR WHEEZING. 1/2/20  Yes Shai Ellington MD   QUEtiapine (SEROQUEL) 25 MG tablet 1 tablet at bedtime. 4/29/20  Yes PROVIDER, HISTORICAL   spironolactone (ALDACTONE)  25 MG tablet TAKE 1/2 TABLET (12.5 MG TOTAL) BY MOUTH DAILY. 3/16/20  Yes Shai Ellington MD   TRELEGY ELLIPTA 100-62.5-25 mcg DsDv inhaler INHALE 1 INHALATION DAILY. 10/27/20  Yes Jun Juarez MD   triamcinolone (KENALOG) 0.1 % cream Apply 1 application topically 2 (two) times a day as needed. To legs         Yes PROVIDER, HISTORICAL   urea (CARMOL) 40 % Crea Apply 1 application topically 2 (two) times a day as needed. To bottom of feet    Yes PROVIDER, HISTORICAL   XARELTO 20 mg tablet TAKE 1 TABLET (20 MG TOTAL) BY MOUTH DAILY WITH SUPPER. 9/28/20  Yes Shai Ellington MD   predniSONE (DELTASONE) 10 mg tablet Take two tablets daily for 5 days and then 1 tablet daily for 5 days.. 10/20/20   Jun Juarez MD       Allergies:   Allergies   Allergen Reactions     Tramadol Nausea Only     Amoxicillin Itching and Rash     Levofloxacin Itching and Rash     Penicillins Itching and Rash     Has tolerated ceftriaxone.       Immunizations:   Immunization History   Administered Date(s) Administered     DT (pediatric) 03/29/2004     INFLUENZA,SEASONAL QUAD, PF, =/> 6months 11/20/2014     Influenza high dose,seasonal,PF, 65+ yrs 10/20/2015, 10/09/2016, 11/16/2017, 10/11/2018     Influenza, inj, historic,unspecified 11/01/2019     Pneumo Conj 13-V (2010&after) 10/20/2015     Pneumo Polysac 23-V 10/18/1999, 08/01/2005     Td,adult,historic,unspecified 03/29/2004     Tdap 05/14/2013       Exam Chest clear to auscultation and percussion.  Heart tones regular rhythm without murmur rub or gallop.  Abdomen soft nontender no organomegaly.  No peritoneal signs.  Extremities free of edema cyanosis or clubbing.  Neck veins nondistended no thyromegaly or scleral icterus noted, carotids full.  Skin warm and dry easily conversant good spirited.  Normal intelligence.  Neurologically intact no gross localizing findings.    134/68 pulse 98 temperature 97.5 O2 sats room air 93% there are some coarse rhonchi heard  on chest auscultation anteriorly no rales.  Respiratory rate 20 BMI 27+.  Not in acute distress easily conversant good spirited.    Assessment and Plan  Diabetes mellitus type 2 not fasting today we will check A1c blood sugar lipid panel with next office visit in 4 months fasting.    Mental illness needs refill of Abilify okay with me 5 mg daily psychiatrist is retiring.    Chronic lumbar disc disease with degenerative disc disease and osteoarthritis.  Back brace prescription written and okay for epidural spinal injection every 3 to 4 months per [facility].    Overweight discussed BMI 27+.    COPD with coarse rhonchi relatively asymptomatic no recent COVID-19 infection encourage close follow-up with pulmonologist Dr. Jun Juarez.    Schizoaffective disorder Abilify to be continued psychiatrist is retiring.    Time: total time spent with the patient was 25 minutes of which >50% was spent in counseling and coordination of care    The following high BMI interventions were performed this visit: encouragement to exercise    Shai Ellington MD    Patient Active Problem List   Diagnosis     Recurrent major depressive episodes (H)     Chronic obstructive pulmonary disease (H)     Peripheral arterial disease (H)     Type 2 diabetes mellitus without complication (H)     Hypertension     Schizoaffective disorder, bipolar type (H)     Arnold-Chiari malformation (H)     Chronic diastolic congestive heart failure (H)     Chronic respiratory failure with hypoxia (H)     Pulmonary hypertension (H)     Primary osteoarthritis of both knees     Chronic atrial fibrillation (H)     Gastroesophageal reflux disease, esophagitis presence not specified     Stasis dermatitis of both legs     Generalized muscle weakness     Drug-induced constipation     Chronic pain syndrome     Acute combined systolic and diastolic CHF, NYHA class 1 (H)     Slow transit constipation     Cellulitis of left lower extremity      Atrial fibrillation with RVR (H)     Bronchiectasis without complication (H)     Traumatic hematoma     Dyslipidemia     Hypomagnesemia     Osteoporosis

## 2021-06-12 NOTE — PROGRESS NOTES
Office Visit - Follow up    Adalgisa Solano   81 y.o. female    Date of Visit: 8/29/2017    Chief Complaint   Patient presents with     Back Pain     lower left     COPD     Leg Swelling     more on left       Subjective: Edema lower extremities left side more prominently than right patient has had a left total knee arthroplasty with Dr. Kan landers [facility].  Low back pain left side hurts for 1-1/2-3 weeks.  Uses oxygen at night for underlying COPD.  History of asthmatic bronchitis COPD formerly a smoker.  Lower back pain on the left side no antecedent injury no associated rash no radiation to the leg like sciatica.  Symptoms of low back pain are subsiding.    She denies blood in stool or urine no fever chills night sweats.    Medication list reviewed generally well-tolerated.    Mammogram allCLEAR July 13, 2017 benign.    Normal colonoscopy with colorectal surgery group  and Juli date of exam for colonoscopy March 14, 2013.    ROS: A comprehensive review of systems was performed and was otherwise negative    Medications:  Prior to Admission medications    Medication Sig Start Date End Date Taking? Authorizing Provider   albuterol (PROVENTIL) 2.5 mg /3 mL (0.083 %) nebulizer solution Take 2.5 mg by nebulization 2 (two) times a day.   Yes PROVIDER, HISTORICAL   ARIPiprazole (ABILIFY) 5 MG tablet Take 5 mg by mouth bedtime.   Yes PROVIDER, HISTORICAL   aspirin 81 MG EC tablet Take 81 mg by mouth daily.   Yes PROVIDER, HISTORICAL   atorvastatin (LIPITOR) 20 MG tablet Take 1 tablet (20 mg total) by mouth bedtime. 6/19/17  Yes Chris Sarah MD   calcium carbonate-vitamin D3 (CALCIUM WITH VITAMIN D) 600 mg(1,500mg) -400 unit per tablet Take 1 tablet by mouth 2 (two) times a day.   Yes PROVIDER, HISTORICAL   cholecalciferol, vitamin D3, 1,000 unit tablet Take 1,000 Units by mouth daily.    Yes PROVIDER, HISTORICAL   diltiazem (CARDIZEM CD) 120 MG 24 hr capsule Take 1 capsule (120 mg  total) by mouth daily. 2/14/17  Yes Shai Ellington MD   fluticasone-salmeterol (ADVAIR DISKUS) 100-50 mcg/dose DISKUS Inhale 1 puff 2 (two) times a day. 3/13/17  Yes Jun Juarez MD   furosemide (LASIX) 20 MG tablet Take 60 mg by mouth daily.   Yes PROVIDER, HISTORICAL   omeprazole (PRILOSEC) 20 MG capsule TAKE 1 CAPSULE DAILY. 8/21/17  Yes Shai Ellington MD   potassium chloride (KLOR-CON) 10 MEQ CR tablet Take 10 mEq by mouth 3 (three) times a day.  6/19/17  Yes PROVIDER, HISTORICAL   psyllium (METAMUCIL) 3.4 gram packet Take 1 packet by mouth daily.    Yes PROVIDER, HISTORICAL   QUEtiapine (SEROQUEL) 25 MG tablet Take 50 mg by mouth at bedtime.  8/10/16  Yes PROVIDER, HISTORICAL   sodium chloride 3 % nebulizer solution Take 3 mL by nebulization 2 (two) times a day. Using after albuterol neb. 4/19/17  Yes PROVIDER, HISTORICAL   tiotropium (SPIRIVA) 18 mcg inhalation capsule Place 18 mcg into inhaler and inhale Daily after lunch.   Yes PROVIDER, HISTORICAL   triamcinolone (KENALOG) 0.1 % ointment Apply topically 2 (two) times a day.   Yes PROVIDER, HISTORICAL   acetaminophen (TYLENOL) 325 MG tablet Take 650 mg by mouth every 6 (six) hours as needed for pain.    PROVIDER, HISTORICAL   albuterol (PROVENTIL HFA;VENTOLIN HFA) 90 mcg/actuation inhaler Inhale 2 puffs every 6 (six) hours as needed for wheezing or shortness of breath.     PROVIDER, HISTORICAL   clarithromycin (BIAXIN) 500 MG tablet Take 500 mg by mouth once as needed. If  Needed prior to dental procedure    PROVIDER, HISTORICAL   doxycycline (VIBRA-TABS) 100 MG tablet Take 1 tablet (100 mg total) by mouth 2 (two) times a day for 10 days. 8/29/17 9/8/17  Shai Ellington MD   peg 400-hypromellose-glycerin (VISINE TEARS) 1-0.2-0.2 % Drop Administer 1 drop to both eyes as needed.    PROVIDER, HISTORICAL   azithromycin (ZITHROMAX) 500 MG tablet Take 1 tablet (500 mg total) by mouth daily. 7/24/17 8/29/17  Uma Lynn MD   predniSONE  (DELTASONE) 10 mg tablet Take 3 tablets (30 mg total) by mouth daily with breakfast. 30mg QD 2 days, 20mg QD 2 days, 10mg QD 2 days 7/24/17 8/29/17  Uma Lynn MD       Allergies:   Allergies   Allergen Reactions     Amoxicillin Itching and Rash     Levofloxacin Itching and Rash     Penicillins Itching and Rash       Immunizations:   Immunization History   Administered Date(s) Administered     DT (pediatric) 03/29/2004     Influenza high dose, seasonal 10/09/2016     Influenza,seasonal quad, PF, 36+MOS 11/20/2014     Pneumo Polysac 23-V 10/18/1999, 08/01/2005     Td, historic 03/29/2004     Tdap 05/14/2013       Exam Chest clear to auscultation and percussion.  Heart tones regular rhythm without murmur rub or gallop.  Abdomen soft nontender no organomegaly.  No peritoneal signs.  Extremities free of edema cyanosis or clubbing.  Neck veins nondistended no thyromegaly or scleral icterus noted, carotids full.  Skin warm and dry easily conversant good spirited.  Normal intelligence.  Neurologically intact no gross localizing findings.  Swollen both lower extremities and lower extremity distribution around the ankles more prominently on the left there is some minimal skin breakdown and shallow ulcers noted over the skin of the left leg.  There is a no evidence for lymphangitis she has not toxic appearing but there is evidence for cellulitis involving the left leg lower extremity aspect anterior lateral and medial.    Assessment and Plan  Edema.  Cellulitis.  Left lower extremity.  Suggest elevation salt restriction same meds including furosemide 60 mg daily plus add doxycycline 100 mg twice a day for 10 days.  Continue same recommendations topically for salves as per the recommendation of dermatology group.    Obesity BMI 31 see below.    Borderline hypoxemia O2 sats 91%.  Without chest pain shortness of breath hemoptysis or pleurisy.    Type 2 diabetes mellitus with hypertension and COPD with prior history of an  exacerbation diminished breath sounds and scattered sibilant squeaks noted.    Time: total time spent with the patient was 25 minutes of which >50% was spent in counseling and coordination of care    The following low BMI interventions were performed this visit: prescribed diet education and special diet education    Shai Ellington MD    Patient Active Problem List   Diagnosis     Recurrent major depressive episodes     Chronic obstructive pulmonary disease     Lump Or Mass In Breast     Peripheral arterial disease     Type 2 diabetes mellitus without complication     Hypertension     Schizoaffective disorder, bipolar type     Arnold-Chiari malformation     Chronic diastolic heart failure     Chronic respiratory failure with hypoxia     Pneumonia     COPD exacerbation     Chronic CHF     Respiratory failure

## 2021-06-12 NOTE — PROGRESS NOTES
Assessment and Plan:   General medical exam annual wellness visit.    Short of breath wheezing at times after hospital follow-up.  Short hospital day stay July 23 through 25 July 2017 for pneumonia.  Underlying COPD.  Previously had been a heavy smoker not of late.  Stay of hospitalization at [facility].  Feels better now.    Mammogram negative July 13, 2017 last colonoscopy allCLEAR with colorectal surgery group March 14, 2013 Dr. Ramsey presiding.    1. Routine general medical examination at a health care facility  General medical examination annual wellness visit.  History of type 2 diabetes mellitus  - Glycosylated Hemoglobin A1c  - Glucose  - Microalbumin, Random Urine  - Lipid Cascade  - Thyroid Stimulating Hormone (TSH)  - Hemoglobin      The patient's current medical problems were reviewed.    I have had an Advance Directives discussion with the patient.  The following health maintenance schedule was reviewed with the patient and provided in printed form in the after visit summary:   Health Maintenance   Topic Date Due     ASTHMA CONTROL TEST  1935     DIABETES FOLLOW-UP  1935     ASTHMA FOLLOW-UP  1937     DIABETES FOOT EXAM  11/07/1947     DIABETES OPHTHALMOLOGY EXAM  11/07/1947     DIABETES URINE MICROALBUMIN  11/07/1947     ZOSTER VACCINE  11/07/1997     PNEUMOCOCCAL CONJUGATE VACCINE FOR ADULTS (PCV13 OR PREVNAR)  11/07/2002     DXA SCAN  08/12/2016     INFLUENZA VACCINE RULE BASED (1) 08/01/2017     DIABETES HEMOGLOBIN A1C  01/22/2018     DEPRESSION FOLLOW UP  01/28/2018     FALL RISK ASSESSMENT  07/28/2018     ADVANCE DIRECTIVES DISCUSSED WITH PATIENT  01/27/2020     TD 18+ HE  05/14/2023     PNEUMOCOCCAL POLYSACCHARIDE VACCINE AGE 65 AND OVER  Completed        Subjective:   Chief Complaint: Adalgisa Solano is an 81 y.o. female here for an Annual Wellness visit.   HPI: 81-year-old female with recent hospital stay for pneumonia at [facility] for July 23 - July 25,  .  Feels better now.  History of type 2 diabetes mellitus.    Left total knee arthroplasty 2016 Dr. Kan landers presiding for osteoarthritis bone-on-bone.    Asthmatic bronchitis for many years off and on.    Currently a non-smoker previously been a heavy smoker.  No excess alcohol allergies include amoxicillin levofloxacin penicillin and sulfa.    Mammogram allCLEAR 2017 colonoscopy normal 2013 Dr. Bustos presiding colorectal surgery group.    Multiple drug allergies including amoxicillin levofloxacin penicillin and sulfa.    Immunizations reviewed and up-to-date.    Percutaneous interventional radiology treatment for peripheral artery disease lower extremity successful.    COPD with periodic exacerbations.  Asthmatic bronchitis.  Z-Bijan previously.    Hyperlipidemia.    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    Hemiarthroplasty right knee total knee arthroplasty done recently on the left side Dr. Omer daily presiding see above.    Prior hand surgery    Prior percutaneous intervention for peripheral artery disease.    Mother  age 80 heart disease.    Father  age 87 heart disease.  Patient has been  twice first   of abdominal aortic aneurysm rupture current  also patient of this examiner treated for prostate cancer.  3 children by  all well.    Enjoys outdoor living.    Review of Systems:    Please see above.  The rest of the review of systems are negative for all systems.    Patient Care Team:  Shai Ellington MD as PCP - General     Patient Active Problem List   Diagnosis     Recurrent major depressive episodes     Chronic obstructive pulmonary disease     Lump Or Mass In Breast     Peripheral arterial disease     Type 2 diabetes mellitus without complication     Hypertension     Schizoaffective disorder, bipolar type     Arnold-Chiari malformation     Chronic  diastolic heart failure     Chronic respiratory failure with hypoxia     Pneumonia     COPD exacerbation     Chronic CHF     Respiratory failure     Past Medical History:   Diagnosis Date     Arm skin lesion, right      Arnold-Chiari malformation      Breast lump      Chronic diastolic heart failure     diastolic chf     COPD (chronic obstructive pulmonary disease)      Depression      GERD (gastroesophageal reflux disease)      Hyperlipidemia      Hypertension      Lumbar spinal stenosis      PAD (peripheral artery disease)       Past Surgical History:   Procedure Laterality Date     BACK SURGERY       BREAST CYST ASPIRATION Left      CERVICAL SPINE SURGERY      for arnold chiari malformation      SECTION       CHOLECYSTECTOMY       EYE SURGERY      bilateral cataracts     JOINT REPLACEMENT      knee - partial     LAPAROSCOPIC INCISIONAL / UMBILICAL / VENTRAL HERNIA REPAIR Left 3/27/2015    Procedure: ATTEMPTED LAPAROSCOPIC ABDOMINA/FLANK INCISION REPAIR;  Surgeon: Jose Lakhani MD;  Location: [facility];  Service:      LUMBAR FUSION N/A 2014    Procedure: POSTERIOR FUSION / DECOMPRESSION L3-S1 WITH PELVIC FIXATION BILATERAL ;  Surgeon: Rajan Mares MD;  Location: [facility] Main OR;  Service:      NE TOTAL KNEE ARTHROPLASTY Left 10/7/2016    Procedure: TOTAL KNEE ARTHROPLASTY, LEFT;  Surgeon: Kan Quesada MD;  Location: [facility];  Service: Orthopedics      Family History   Problem Relation Age of Onset     Heart attack Mother      Heart attack Father       Social History     Social History     Marital status:      Spouse name: N/A     Number of children: N/A     Years of education: N/A     Occupational History     Not on file.     Social History Main Topics     Smoking status: Former Smoker     Quit date: 2006     Smokeless tobacco: Never Used      Comment: quit      Alcohol use No     Drug use: No     Sexual activity: Not on file     Other Topics  Concern     Not on file     Social History Narrative      Current Outpatient Prescriptions   Medication Sig Dispense Refill     albuterol (PROVENTIL) 2.5 mg /3 mL (0.083 %) nebulizer solution Take 2.5 mg by nebulization 2 (two) times a day.       ARIPiprazole (ABILIFY) 5 MG tablet Take 5 mg by mouth bedtime.       aspirin 81 MG EC tablet Take 81 mg by mouth daily.       atorvastatin (LIPITOR) 20 MG tablet Take 1 tablet (20 mg total) by mouth bedtime. 90 tablet 2     azithromycin (ZITHROMAX) 500 MG tablet Take 1 tablet (500 mg total) by mouth daily. 6 tablet 0     calcium carbonate-vitamin D3 (CALCIUM WITH VITAMIN D) 600 mg(1,500mg) -400 unit per tablet Take 1 tablet by mouth 2 (two) times a day.       cholecalciferol, vitamin D3, 1,000 unit tablet Take 1,000 Units by mouth daily.        diltiazem (CARDIZEM CD) 120 MG 24 hr capsule Take 1 capsule (120 mg total) by mouth daily. 30 capsule 12     fluticasone-salmeterol (ADVAIR DISKUS) 100-50 mcg/dose DISKUS Inhale 1 puff 2 (two) times a day. 1 each 11     furosemide (LASIX) 20 MG tablet Take 60 mg by mouth daily.       potassium chloride (KLOR-CON) 10 MEQ CR tablet Take 10 mEq by mouth 3 (three) times a day.        predniSONE (DELTASONE) 10 mg tablet Take 3 tablets (30 mg total) by mouth daily with breakfast. 30mg QD 2 days, 20mg QD 2 days, 10mg QD 2 days 12 tablet 0     psyllium (METAMUCIL) 3.4 gram packet Take 1 packet by mouth daily.        QUEtiapine (SEROQUEL) 25 MG tablet Take 50 mg by mouth at bedtime.        sodium chloride 3 % nebulizer solution Take 3 mL by nebulization 2 (two) times a day. Using after albuterol neb.       tiotropium (SPIRIVA) 18 mcg inhalation capsule Place 18 mcg into inhaler and inhale Daily after lunch.       acetaminophen (TYLENOL) 325 MG tablet Take 650 mg by mouth every 6 (six) hours as needed for pain.       albuterol (PROVENTIL HFA;VENTOLIN HFA) 90 mcg/actuation inhaler Inhale 2 puffs every 6 (six) hours as needed for wheezing or  "shortness of breath.        clarithromycin (BIAXIN) 500 MG tablet Take 500 mg by mouth once as needed. If  Needed prior to dental procedure       peg 400-hypromellose-glycerin (VISINE TEARS) 1-0.2-0.2 % Drop Administer 1 drop to both eyes as needed.       No current facility-administered medications for this visit.       Objective:   Vital Signs:   Visit Vitals     /75     Pulse 72     Ht 5' 1\" (1.549 m)     Wt 156 lb (70.8 kg)     SpO2 93%     BMI 29.48 kg/m2        VisionScreening:  No exam data present     PHYSICAL EXAM  Chest clear to auscultation and percussion.  Heart tones regular rhythm without murmur rub or gallop.  Abdomen soft nontender no organomegaly.  No peritoneal signs.  Extremities free of edema cyanosis or clubbing.  Neck veins nondistended no thyromegaly or scleral icterus noted, carotids full.  Skin warm and dry easily conversant good spirited.  Normal intelligence.  Neurologically intact no gross localizing findings.  Patient wished to defer breast pelvic and rectal exam no Pap smear.  The patient's examination was otherwise unremarkable centripetal obesity is noted with weight of 156 pounds down 2 pounds from last visit O2 sats 93% BMI elevated at 29.48 skin negative lymph negative neuro negative psych normal easily conversing good spirited neatly attired oral pharyngeal mucous membranes are dry good pulses noted in all 4 extremities mild redness noted left anterior tibial surface nothing to suggest cellulitis lymphangitis or deep vein thrombosis.  Rest of examination was negative including head eyes ears nose and throat carotids.  No thyromegaly.    Assessment Results 7/28/2017   Activities of Daily Living No help needed   Instrumental Activities of Daily Living No help needed   Get Up and Go Score Less than 12 seconds   Mini Cog Total Score 5   Some recent data might be hidden     A Mini-Cog score of 0-2 suggests the possibility of dementia, score of 3-5 suggests no " dementia    Identified Health Risks:     Patient's advanced directive was discussed and I am comfortable with the patient's wishes.

## 2021-06-13 NOTE — PROGRESS NOTES
Office Visit - Follow up    Adalgisa Solano   81 y.o. female    Date of Visit: 9/27/2017    Chief Complaint   Patient presents with     Follow-up       Subjective: COPD and exacerbation history of diabetes mellitus and hypertension.  Left lower extremity swelling with edema cellulitis.  Here in follow-up.  Recent CT scan lumbar spine showed issues with her back.  There may be a problem here with a andrea or support device.  Place surgically.    No blood in stool or urine or sputum.  Medication list reviewed generally well-tolerated.  Long history of COPD bronchitis with flares.  Asthmatic bronchitis.    Mammogram negative July 13, 2017.  Colonoscopy with Dr. Bustos of colorectal surgery group normal on March 14 2013 ROS: A comprehensive review of systems was performed and was otherwise negative    Medications:  Prior to Admission medications    Medication Sig Start Date End Date Taking? Authorizing Provider   acetaminophen (TYLENOL) 325 MG tablet Take 650 mg by mouth every 6 (six) hours as needed for pain.   Yes PROVIDER, HISTORICAL   albuterol (PROVENTIL) 2.5 mg /3 mL (0.083 %) nebulizer solution Take 2.5 mg by nebulization 2 (two) times a day.   Yes PROVIDER, HISTORICAL   ARIPiprazole (ABILIFY) 5 MG tablet Take 5 mg by mouth bedtime.   Yes PROVIDER, HISTORICAL   aspirin 81 MG EC tablet Take 81 mg by mouth daily.   Yes PROVIDER, HISTORICAL   atorvastatin (LIPITOR) 20 MG tablet Take 1 tablet (20 mg total) by mouth bedtime. 6/19/17  Yes Chris Sarah MD   calcium carbonate-vitamin D3 (CALCIUM WITH VITAMIN D) 600 mg(1,500mg) -400 unit per tablet Take 1 tablet by mouth 2 (two) times a day.   Yes PROVIDER, HISTORICAL   cholecalciferol, vitamin D3, 1,000 unit tablet Take 2,000 Units by mouth daily.    Yes PROVIDER, HISTORICAL   diltiazem (CARDIZEM CD) 120 MG 24 hr capsule Take 1 capsule (120 mg total) by mouth daily. 2/14/17  Yes Shai Ellington MD   fluticasone-salmeterol (ADVAIR DISKUS) 100-50 mcg/dose  DISKUS Inhale 1 puff 2 (two) times a day. 3/13/17  Yes Jun Juarez MD   furosemide (LASIX) 20 MG tablet Take 60 mg by mouth daily.   Yes PROVIDER, HISTORICAL   omeprazole (PRILOSEC) 20 MG capsule TAKE 1 CAPSULE DAILY. 8/21/17  Yes Shai Ellington MD   peg 400-hypromellose-glycerin (VISINE TEARS) 1-0.2-0.2 % Drop Administer 1 drop to both eyes as needed.   Yes PROVIDER, HISTORICAL   potassium chloride (KLOR-CON) 10 MEQ CR tablet Take 10 mEq by mouth 3 (three) times a day.  6/19/17  Yes PROVIDER, HISTORICAL   PROAIR HFA 90 mcg/actuation inhaler Inhale two (2) puff(s) every 4 to 6 hours as needed 9/13/17  Yes Shai Ellington MD   psyllium (METAMUCIL) 3.4 gram packet Take 1 packet by mouth daily.    Yes PROVIDER, HISTORICAL   QUEtiapine (SEROQUEL) 25 MG tablet Take 50 mg by mouth at bedtime.  8/10/16  Yes PROVIDER, HISTORICAL   tiotropium (SPIRIVA) 18 mcg inhalation capsule Place 18 mcg into inhaler and inhale Daily after lunch.   Yes PROVIDER, HISTORICAL   triamcinolone (KENALOG) 0.1 % ointment Apply topically 2 (two) times a day.   Yes PROVIDER, HISTORICAL   clarithromycin (BIAXIN) 500 MG tablet Take 500 mg by mouth once as needed. If  Needed prior to dental procedure    PROVIDER, HISTORICAL   predniSONE (DELTASONE) 10 mg tablet Take 3 tablets (30 mg total) by mouth daily for 5 days. 9/27/17 10/2/17  Shai Ellington MD   sodium chloride 3 % nebulizer solution Take 3 mL by nebulization 2 (two) times a day. Using after albuterol neb. 4/19/17   PROVIDER, HISTORICAL   sulfamethoxazole-trimethoprim (SEPTRA DS) 800-160 mg per tablet Take 1 tablet by mouth 2 (two) times a day for 10 days. 9/27/17 10/7/17  Shai Ellington MD       Allergies:   Allergies   Allergen Reactions     Amoxicillin Itching and Rash     Levofloxacin Itching and Rash     Penicillins Itching and Rash       Immunizations:   Immunization History   Administered Date(s) Administered     DT (pediatric) 03/29/2004     Influenza high  dose, seasonal 10/09/2016     Influenza,seasonal quad, PF, 36+MOS 11/20/2014     Pneumo Polysac 23-V 10/18/1999, 08/01/2005     Td, historic 03/29/2004     Tdap 05/14/2013       Exam scattered rhonchi anteriorly posteriorly especially noted on expiration.  No rales diminished breath sounds noted as well abdomen centripetal obesity extremities free of edema neck veins nondistended no thyromegaly or scleral icterus heart tones regular rhythm without murmur rub or gallop there is lower extremity edema consistent with lymphedema and superficial redness.  There is no evidence for lymphangitis or toxicity the patient does not appear acutely ill she does have a chronic wet sounding cough.    Assessment and Plan  COPD with mild flare.  Push fluids rest prednisone 30 mg daily for 5 days plus Septra DS 1 twice a day for 10 days no refills.    Cellulitis lower extremities with chronic lymphedema.    Low back pain followed by spine specialty group.    Hypertension control.    Diabetes mellitus type 2 related to insulin resistance and obesity.  We will check diabetic labs with next office visit fasting 1 month including A1c blood sugar urine for microalbumin and lipid panel.  Suggest salt restriction and diet plus leg elevation and support stockings on in the morning off at night.    Time: total time spent with the patient was 25 minutes of which >50% was spent in counseling and coordination of care    The following high BMI interventions were performed this visit: encouragement to exercise    Shai Ellington MD    Patient Active Problem List   Diagnosis     Recurrent major depressive episodes     Chronic obstructive pulmonary disease     Lump Or Mass In Breast     Peripheral arterial disease     Type 2 diabetes mellitus without complication     Hypertension     Schizoaffective disorder, bipolar type     Arnold-Chiari malformation     Chronic diastolic heart failure     Chronic respiratory failure with hypoxia     Pneumonia      COPD exacerbation     Chronic CHF     Respiratory failure

## 2021-06-13 NOTE — TELEPHONE ENCOUNTER
Phone call from Adalgisa.  Needs to start her action plan again.  States she did feel better after using her plan last month.  Just starting to feel poorly now again.  She is coughing with yellowish plegm, no fever, back on her oxygen again.    Will start her action plan again and schedule a visit with Dr. Juarez.

## 2021-06-13 NOTE — TELEPHONE ENCOUNTER
"Phone call from patient. Needs to start her COPD action plan.   States she has increased \"thickness\" in her chest.  Coughing up more phlegm - yellowish in color.  No fever. Has been using her oxygen all the time, so says it is difficult to tell if she is more short of breath. If she does not wear her oxygen, she is definitely short of breath.    Will start her action plan:  Doxycycline 100mg two times a day x 7 days and prednisone 20mg daily x 5 days, then 10 mg daily x 5 more days.  "

## 2021-06-14 ENCOUNTER — AMBULATORY - HEALTHEAST (OUTPATIENT)
Dept: CARE COORDINATION | Facility: CLINIC | Age: 86
End: 2021-06-14

## 2021-06-14 ENCOUNTER — COMMUNICATION - HEALTHEAST (OUTPATIENT)
Dept: NURSING | Facility: CLINIC | Age: 86
End: 2021-06-14

## 2021-06-14 DIAGNOSIS — J44.1 COPD EXACERBATION (H): ICD-10-CM

## 2021-06-14 NOTE — TELEPHONE ENCOUNTER
Pt rcvd letter from [facility] that RX prescription for home delivery if they order 90 day supply they will only need to play for 60 days.    Pt wants to make sure that it is okay with Dr. Ellington that she orders 90 day supply via [facility] insurance.

## 2021-06-14 NOTE — PROGRESS NOTES
"Adalgisa Solano is a 85 y.o. female who is being evaluated via a billable video visit.      The patient has been notified of following:     \"This video visit will be conducted via a call between you and your physician/provider. We have found that certain health care needs can be provided without the need for an in-person physical exam.  This service lets us provide the care you need with a video conversation.  If a prescription is necessary we can send it directly to your pharmacy.  If lab work is needed we can place an order for that and you can then stop by our lab to have the test done at a later time.    Video visits are billed at different rates depending on your insurance coverage. Please reach out to your insurance provider with any questions.    If during the course of the call the physician/provider feels a video visit is not appropriate, you will not be charged for this service.\"    Patient has given verbal consent to a Video visit? Yes  How would you like to obtain your AVS? AVS Preference: MyChart.  If dropped by the video visit, the video invitation should be sent to: Send to e-mail at: [email or web address]  Will anyone else be joining your video visit? No        Video Start Time: 10:00 AM    Additional provider notes: GENERAL: Healthy, alert and no distress  EYES: Eyes grossly normal to inspection. No discharge or erythema, or obvious scleral/conjunctival abnormalities.  HENT: normal voice  RESP: No audible wheeze, cough, or visible cyanosis.  No visible retractions or increased work of breathing.    NEURO: Cranial nerves grossly intact. Mentation and speech appropriate for age.  PSYCH: Mentation appears normal, affect normal/bright, judgement and insight intact, normal speech and appearance well-groomed      Video-Visit Details    Type of service:  Video Visit    Video End Time (time video stopped): 1015  Originating Location (pt. Location): Home    Distant Location (provider location):  [facility]" "[facility]     Platform used for Video Visit: Solo Juarez MD     Assessment/Plan:        Diagnoses and all orders for this visit:    Bronchiectasis with acute exacerbation (H)  -     doxycycline (VIBRAMYCIN) 100 MG capsule; Take 1 capsule (100 mg total) by mouth 2 (two) times a day for 7 days.  Dispense: 14 capsule; Refill: 1    Chronic respiratory failure with hypoxia (H)    Mucopurulent chronic bronchitis (H)  -     predniSONE (DELTASONE) 10 mg tablet; Take 2 tabs daily x 5 days, THEN 1 tab daily x 5 days.  Dispense: 15 tablet; Refill: 1  -     doxycycline (VIBRAMYCIN) 100 MG capsule; Take 1 capsule (100 mg total) by mouth 2 (two) times a day for 7 days.  Dispense: 14 capsule; Refill: 1    85-year-old female with chronic bronchitis, bronchiectasis, respiratory failure with hypoxia here after bronchiectasis/bronchitis exacerbation.  I think that she has a good chance for oxygen requirement improving to where she does not need it all day.    Complete prednisone  Vest therapy  Continue inhalers              Subjective:    Patient ID: Adalgisa Solano is a 85 y.o. female.  85-year-old female here for follow-up of chronic bronchitis and bronchiectasis.     Unfortunately she has had multiple exacerbations since our last visit.  No identifiable risk factors including sinus infections, reflux, allergy or weather exposures.  She feels that stress is her biggest risk factor.  Like many patients she has had a stressful year related to Covid as well as helping care for her .  She has had 3 exacerbations in the last 3 months.  They always improved with prednisone and antibiotics.    She is now on oxygen.  Requiring about a liter and a half to keep her saturations above 88%.  This is only been ongoing since her most recent exacerbation.          Review of Systems 1Constitutional positive for fatigue, GI negative for reflux  Ht 5' 2\" (1.575 m)   Wt 147 lb (66.7 kg)   LMP  (LMP " Unknown)   Breastfeeding No   BMI 26.89 kg/m              Current Outpatient Medications on File Prior to Visit   Medication Sig Dispense Refill     acetaminophen (TYLENOL) 500 MG tablet Take 1,000 mg by mouth every 6 (six) hours as needed for pain.       albuterol (PROAIR HFA;PROVENTIL HFA;VENTOLIN HFA) 90 mcg/actuation inhaler Inhale 2 puffs every 6 (six) hours as needed for wheezing. 3 Inhaler 3     albuterol (PROVENTIL) 2.5 mg /3 mL (0.083 %) nebulizer solution 3 ML INHALATION FOUR TIMES A DAY AS NEEDED USE BEFORE NEBULIZED SALINE. FOR SHORTNESS OF BREATH. 300 mL 10     ARIPiprazole (ABILIFY) 5 MG tablet TAKE 1 TABLET EVERY NIGHT AT BEDTIME 90 tablet 1     atorvastatin (LIPITOR) 20 MG tablet TAKE 1 TABLET (20 MG TOTAL) BY MOUTH BEDTIME. 90 tablet 3     betamethasone, augmented, (DIPROLENE-AF) 0.05 % cream 2 (two) times a day.       calcium carbonate-vitamin D3 (CALCIUM WITH VITAMIN D) 600 mg(1,500mg) -400 unit per tablet Take 1 tablet by mouth 2 (two) times a day.       cholecalciferol, vitamin D3, 1,000 unit tablet Take 2,000 Units by mouth daily.        cyanocobalamin 1,000 mcg/mL injection Inject 1 mL (1,000 mcg total) into the shoulder, thigh, or buttocks every 30 (thirty) days.  0     diltiazem (CARDIZEM CD) 120 MG 24 hr capsule TAKE 1 CAPSULE (120 MG TOTAL) BY MOUTH DAILY. 90 capsule 3     ferrous sulfate 325 (65 FE) MG tablet Take 1 tablet (325 mg total) by mouth daily with breakfast.  0     furosemide (LASIX) 20 MG tablet TAKE 1 TABLET (20 MG TOTAL) BY MOUTH 2 (TWO) TIMES A DAY AT 9AM AND 6PM. 180 tablet 3     gabapentin (NEURONTIN) 300 MG capsule Take 600 mg by mouth at bedtime.              ipratropium (ATROVENT) 0.03 % nasal spray 1 SPRAY EACH NOSTRIL ONCE PER DAY 30 mL 11     ketoconazole (NIZORAL) 2 % cream 2 (two) times a day.       NEBUSAL 3 % nebulizer solution TAKE 4 ML BY NEBULIZATION 3 (THREE) TIMES A DAY. USING AFTER ALBUTEROL NEB. 360 mL 3     omeprazole (PRILOSEC) 20 MG capsule TAKE 1  "CAPSULE DAILY. 90 capsule 3     OXYGEN-AIR DELIVERY SYSTEMS [facility] Use 1-2 L As Directed as needed. [employer]       polyethylene glycol (MIRALAX) 17 gram packet Take 17 g by mouth daily as needed.        predniSONE (DELTASONE) 10 mg tablet Take 2 tabs daily x 5 days, THEN 1 tab daily x 5 days. 15 tablet 0     PROAIR HFA 90 mcg/actuation inhaler INHALE 2 PUFFS EVERY 4 (FOUR) HOURS AS NEEDED FOR WHEEZING. 8.5 g 5     rivaroxaban ANTICOAGULANT (XARELTO) 20 mg tablet Take 1 tablet (20 mg total) by mouth daily with supper. 90 tablet 4     spironolactone (ALDACTONE) 25 MG tablet TAKE 1/2 TABLET (12.5 MG TOTAL) BY MOUTH DAILY. 45 tablet 3     TRELEGY ELLIPTA 100-62.5-25 mcg DsDv inhaler INHALE 1 INHALATION DAILY. 60 each 10     triamcinolone (KENALOG) 0.1 % cream Apply 1 application topically 2 (two) times a day as needed. To legs             urea (CARMOL) 40 % Crea Apply 1 application topically 2 (two) times a day as needed. To bottom of feet        Current Facility-Administered Medications on File Prior to Visit   Medication Dose Route Frequency Provider Last Rate Last Admin     cyanocobalamin injection 1,000 mcg  1,000 mcg Intramuscular Q30 Days Joce Whelan MD   1,000 mcg at 12/22/20 1026     Ht 5' 2\" (1.575 m)   Wt 147 lb (66.7 kg)   LMP  (LMP Unknown)   Breastfeeding No   BMI 26.89 kg/m      Medical History  Active Ambulatory (Non-Hospital) Problems    Diagnosis     Traumatic hematoma     Dyslipidemia     Hypomagnesemia     Osteoporosis     Bronchiectasis without complication (H)     Cellulitis of left lower extremity     Slow transit constipation     Acute combined systolic and diastolic CHF, NYHA class 1 (H)     Generalized muscle weakness     Drug-induced constipation     Chronic pain syndrome     Stasis dermatitis of both legs     Chronic atrial fibrillation (H)     Gastroesophageal reflux disease, esophagitis presence not specified     Primary osteoarthritis of both knees     Pulmonary hypertension (H) "     Chronic respiratory failure with hypoxia (H)     Chronic diastolic congestive heart failure (H)     Type 2 diabetes mellitus without complication (H)     Hypertension     Peripheral arterial disease (H)     Schizoaffective disorder, bipolar type (H)     Recurrent major depressive episodes (H)     Chronic obstructive pulmonary disease (H)     Arnold-Chiari malformation (H)     Past Medical History:   Diagnosis Date     Acute and chronic respiratory failure with hypoxia (H)      Acute blood loss anemia 1/15/2019     Acute bronchitis 1/15/2019     Anemia      Anxiety      Arm skin lesion, right      Arnold-Chiari malformation (H) 1998     Arthritis      Atrial fibrillation (H) 02/2017     Avascular necrosis of bone (H)      Breast cyst      Breast lump      Candidal skin infection      Cellulitis of left lower extremity 1/28/2019     CHF (congestive heart failure) (H)      Chronic bronchitis (H)      Chronic diastolic heart failure (H)      Chronic pain syndrome      Chronic respiratory failure with hypoxia (H) 3/13/2017     COPD (chronic obstructive pulmonary disease) (H)      COPD exacerbation (H)      Depression      Diet-controlled type 2 diabetes mellitus (H)      Drug induced constipation      DVT of axillary vein, acute (H)      DVT of axillary vein, acute left (H)      Edema      Encounter for palliative care      Erysipelas      Fracture of femoral neck, left (H)      Generalized muscle weakness      GERD (gastroesophageal reflux disease)      History of blood clots      Hyperlipidemia      Hypertension      Left hip pain      Lumbar spinal stenosis      PAD (peripheral artery disease) (H)      Peripheral arterial disease (H) 10/28/2015     Psoriasis      Pulmonary hypertension (H) 3/5/2018     Recurrent major depressive episodes (H)      Schizoaffective disorder, bipolar type (H) 6/11/2015     Slow transit constipation      Stasis dermatitis of both legs      Status post total hip replacement, 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     Type 2 diabetes mellitus without complication (H) 2016     Vitamin B12 deficiency      Volume overload         Surgical History  She  has a past surgical history that includes Breast cyst aspiration (Left, ); Cholecystectomy; Lumbar fusion (N/A, 2014); Back surgery; Laparoscopic incisional / umbilical / ventral hernia repair (Left, 3/27/2015);  section (X3); Joint replacement (Right); Eye surgery; pr total knee arthroplasty (Left, 10/7/2016); Cervical spine surgery; pr total hip arthroplasty (Left, 2019); pr femoral fx, open tx (Left, 2019); Hand surgery; Colonoscopy; IR for PAD LOWER EXTREMITY; Arm skin lesion biopsy / excision (Right, 2019); pr total hip arthroplasty (Right, 2019); and US Aspiration Hematoma Seroma or Fluid Collection (2020).       Social history: She lives at home with her .   Allergies  Allergies   Allergen Reactions     Tramadol Nausea Only     Amoxicillin Itching and Rash     Levofloxacin Itching and Rash     Penicillins Itching and Rash     Has tolerated ceftriaxone.    Family history reviewed: no changes from previous                          Data Review -labs reviewed.  Slightly elevated random glucose.  Normal CO2 level.  Cardiac tests reviewed: TTE.  Normal left and right ventricular function.  Suggestion of moderate pulmonary hypertension.  No change since 2018.

## 2021-06-14 NOTE — PROGRESS NOTES
Assessment and Plan:   Annual wellness visit  Patient has been advised of split billing requirements and indicates understanding: Yes  1. Routine general medical examination at a health care facility  Annual wellness visit and physical exam.  Not fasting today  - HM2(CBC w/o Differential)  - Comprehensive Metabolic Panel  - Lipid Cascade  - Thyroid Stimulating Hormone (TSH)  - Urinalysis-UC if Indicated     The patient's current medical problems were reviewed.    I have had an Advance Directives discussion with the patient.  The following health maintenance schedule was reviewed with the patient and provided in printed form in the after visit summary:   Health Maintenance   Topic Date Due     HEART FAILURE ACTION PLAN  1935     COPD ACTION PLAN  1935     DIABETIC EYE EXAM  1935     ZOSTER VACCINES (1 of 2) 11/07/1987     MICROALBUMIN  03/08/2019     DIABETIC FOOT EXAM  05/21/2019     INFLUENZA VACCINE RULE BASED (1) 08/01/2020     BMP  08/12/2020     A1C  12/29/2020     ALT  02/01/2021     CBC  02/12/2021     LIPID  06/29/2021     MEDICARE ANNUAL WELLNESS VISIT  12/28/2021     FALL RISK ASSESSMENT  12/28/2021     TD 18+ HE  05/14/2023     ADVANCE CARE PLANNING  09/11/2024     DEXA SCAN  08/12/2029     TSH  Completed     SPIROMETRY  Completed     Pneumococcal Vaccine: 65+ Years  Completed     DEPRESSION ACTION PLAN  Addressed     Pneumococcal Vaccine: Pediatrics (0 to 5 Years) and At-Risk Patients (6 to 64 Years)  Aged Out        Subjective:   Chief Complaint: Adalgisa Solano is an 85 y.o. female here for an Annual Wellness visit.   HPI: Annual wellness visit and physical exam for this 85-year-old female with chronic airways obstructive disease.  Asthma.    Multiple drug allergies including tramadol amoxicillin levofloxacin penicillin.    Currently a non-smoker.    No excess alcohol.  Mammogram allCLEAR September 24, 2020    Colonoscopy dated March 2013 allCLEAR.    Immunizations reviewed and  up-to-date.    Has had prior right hip and left hip arthroplasty with spinal block.    Removal of lipoma right arm successful without anesthetic complications.  Immunizations reviewed and up-to-date.  No anesthetic complications with any prior surgeries.    COPD smoking-related currently a non-smoker.    Intermittent asthmatic bronchitis flareups.  Z-Bijan helpful with short course of prednisone.    Hyperlipidemia by history but no history of target organ damage related to same.    Anxiety with depression treated with Abilify and Seroquel previously.    Pernicious anemia with monthly B12 injections given 1000 mcg IM.  Briefly discussed the equal potency of oral vitamin B12 1000 mcg by mouth daily OTC.    Left total hip arthroplasty 2016.    Percutaneous interventional radiology treatment for PAD.    Arnold-Chiari neurosurgical intervention 9 4 widening of the foramen magnum.  Without postoperative complications.    Hemiarthroplasty right knee prior right hand surgery.    Mother  heart disease 80.    Father  heart disease age 87.     twice first   abdominal aortic aneurysm rupture 3 children all by .  Second  very supportive.    Disabling arthritis has bothered her uses a cane.  Retired now.  No anesthetic complications with any prior surgery.  No family history of malignant hyperthermia associate with general anesthesia.    Review of Systems:    Please see above.  The rest of the review of systems are negative for all systems.    Patient Care Team:  Shai Ellington MD as PCP - General  Shai Ellington MD as Assigned PCP  Jun Juarez MD as Assigned Pulmonology Provider  Anabel Marquez MD as Assigned Heart and Vascular Provider     Patient Active Problem List   Diagnosis     Recurrent major depressive episodes (H)     Chronic obstructive pulmonary disease (H)     Peripheral arterial disease (H)     Type 2 diabetes mellitus without  complication (H)     Hypertension     Schizoaffective disorder, bipolar type (H)     Arnold-Chiari malformation (H)     Chronic diastolic congestive heart failure (H)     Chronic respiratory failure with hypoxia (H)     Pulmonary hypertension (H)     Primary osteoarthritis of both knees     Chronic atrial fibrillation (H)     Gastroesophageal reflux disease, esophagitis presence not specified     Stasis dermatitis of both legs     Generalized muscle weakness     Drug-induced constipation     Chronic pain syndrome     Acute combined systolic and diastolic CHF, NYHA class 1 (H)     Slow transit constipation     Cellulitis of left lower extremity     Bronchiectasis without complication (H)     Traumatic hematoma     Dyslipidemia     Hypomagnesemia     Osteoporosis     Past Medical History:   Diagnosis Date     Acute and chronic respiratory failure with hypoxia (H)      Acute blood loss anemia 1/15/2019     Acute bronchitis 1/15/2019     Anemia     pernicious anemia     Anxiety      Arm skin lesion, right      Arnold-Chiari malformation (H) 1998     Arthritis      Atrial fibrillation (H) 02/2017     Avascular necrosis of bone (H)      Breast cyst      Breast lump      Candidal skin infection      Cellulitis of left lower extremity 1/28/2019     CHF (congestive heart failure) (H)     diastolic and systolic     Chronic bronchitis (H)     & acute     Chronic diastolic heart failure (H)     diastolic chf     Chronic pain syndrome      Chronic respiratory failure with hypoxia (H) 3/13/2017     COPD (chronic obstructive pulmonary disease) (H)      COPD exacerbation (H)      Depression      Diet-controlled type 2 diabetes mellitus (H)      Drug induced constipation      DVT of axillary vein, acute (H)     left     DVT of axillary vein, acute left (H)      Edema      Encounter for palliative care      Erysipelas      Fracture of femoral neck, left (H)      Generalized muscle weakness      GERD (gastroesophageal reflux disease)       History of blood clots      Hyperlipidemia      Hypertension      Left hip pain      Lumbar spinal stenosis      PAD (peripheral artery disease) (H)      Peripheral arterial disease (H) 10/28/2015     Psoriasis     arms      Pulmonary hypertension (H) 3/5/2018     Recurrent major depressive episodes (H)     Created by Conversion  Replacement Utility updated for latest IMO load     Schizoaffective disorder, bipolar type (H) 2015     Slow transit constipation      Stasis dermatitis of both legs      Status post total hip replacement, 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     Type 2 diabetes mellitus without complication (H) 2016     Vitamin B12 deficiency      Volume overload       Past Surgical History:   Procedure Laterality Date     ARM SKIN LESION BIOPSY / EXCISION Right 2019    Procedure: EXCISION RIGHT ARM LIPOMA;  Surgeon: Andreas Holly MD;  Location: [facility] OR;  Service: General     BACK SURGERY       BREAST CYST ASPIRATION Left      CERVICAL SPINE SURGERY      for arnold chiari malformation      SECTION  X3     CHOLECYSTECTOMY       COLONOSCOPY       EYE SURGERY      bilateral cataracts     HAND SURGERY       IR for PAD LOWER EXTREMITY       JOINT REPLACEMENT Right     knee - partial     LAPAROSCOPIC INCISIONAL / UMBILICAL / VENTRAL HERNIA REPAIR Left 3/27/2015    Procedure: ATTEMPTED LAPAROSCOPIC ABDOMINA/FLANK INCISION REPAIR;  Surgeon: Jose Lakhani MD;  Location: [facility];  Service:      LUMBAR FUSION N/A 2014    Procedure: POSTERIOR FUSION / DECOMPRESSION L3-S1 WITH PELVIC FIXATION BILATERAL ;  Surgeon: Rajan Mares MD;  Location: [facility] OR;  Service:      SC FEMORAL FX, OPEN TX Left 2019    Procedure: OPEN REDUCTION INTERNAL FIXATION, FRACTURE LEFT HIP, PERIPROSTHETIC FRACTURE;  Surgeon: Kevin Lackey MD;  Location: [facility] OR;  Service: Orthopedics     SC TOTAL HIP ARTHROPLASTY Left 2019    Procedure: LEFT TOTAL HIP ARTHROPLASTY;   Surgeon: Kevin Lackey MD;  Location: [facility] OR;  Service: Orthopedics     KS TOTAL HIP ARTHROPLASTY Right 9/16/2019    Procedure: RIGHT TOTAL HIP ARTHROPLASTY;  Surgeon: Kan Quesada MD;  Location: [facility] OR;  Service: Orthopedics     KS TOTAL KNEE ARTHROPLASTY Left 10/7/2016    Procedure: TOTAL KNEE ARTHROPLASTY, LEFT;  Surgeon: Kan Quesada MD;  Location: [facility] OR;  Service: Orthopedics      ASPIRATION HEMATOMA SEROMA OR FLUID COLLECTION  2/6/2020      Family History   Problem Relation Age of Onset     Heart attack Mother      Heart attack Father       Social History     Socioeconomic History     Marital status:      Spouse name: Not on file     Number of children: Not on file     Years of education: Not on file     Highest education level: Not on file   Occupational History     Not on file   Social Needs     Financial resource strain: Not on file     Food insecurity     Worry: Not on file     Inability: Not on file     Transportation needs     Medical: Not on file     Non-medical: Not on file   Tobacco Use     Smoking status: Former Smoker     Quit date: 1/1/2006     Years since quitting: 15.0     Smokeless tobacco: Never Used     Tobacco comment: quit 2006   Substance and Sexual Activity     Alcohol use: No     Drug use: No     Sexual activity: Not on file   Lifestyle     Physical activity     Days per week: Not on file     Minutes per session: Not on file     Stress: Not on file   Relationships     Social connections     Talks on phone: Not on file     Gets together: Not on file     Attends Taoism service: Not on file     Active member of club or organization: Not on file     Attends meetings of clubs or organizations: Not on file     Relationship status: Not on file     Intimate partner violence     Fear of current or ex partner: Not on file     Emotionally abused: Not on file     Physically abused: Not on file     Forced sexual activity: Not on file   Other Topics  Concern     Not on file   Social History Narrative     Not on file      Current Outpatient Medications   Medication Sig Dispense Refill     albuterol (PROAIR HFA;PROVENTIL HFA;VENTOLIN HFA) 90 mcg/actuation inhaler Inhale 2 puffs every 6 (six) hours as needed for wheezing. 3 Inhaler 3     albuterol (PROVENTIL) 2.5 mg /3 mL (0.083 %) nebulizer solution 3 ML INHALATION FOUR TIMES A DAY AS NEEDED USE BEFORE NEBULIZED SALINE. FOR SHORTNESS OF BREATH. 300 mL 10     ARIPiprazole (ABILIFY) 5 MG tablet TAKE 1 TABLET EVERY NIGHT AT BEDTIME 90 tablet 1     atorvastatin (LIPITOR) 20 MG tablet TAKE 1 TABLET (20 MG TOTAL) BY MOUTH BEDTIME. 90 tablet 3     calcium carbonate-vitamin D3 (CALCIUM WITH VITAMIN D) 600 mg(1,500mg) -400 unit per tablet Take 1 tablet by mouth 2 (two) times a day.       cholecalciferol, vitamin D3, 1,000 unit tablet Take 2,000 Units by mouth daily.        cyanocobalamin 1,000 mcg/mL injection Inject 1 mL (1,000 mcg total) into the shoulder, thigh, or buttocks every 30 (thirty) days.  0     diltiazem (CARDIZEM CD) 120 MG 24 hr capsule TAKE 1 CAPSULE (120 MG TOTAL) BY MOUTH DAILY. 90 capsule 3     doxycycline (VIBRAMYCIN) 100 MG capsule Take 1 capsule (100 mg total) by mouth 2 (two) times a day for 7 days. 14 capsule 0     ferrous sulfate 325 (65 FE) MG tablet Take 1 tablet (325 mg total) by mouth daily with breakfast.  0     furosemide (LASIX) 20 MG tablet TAKE 1 TABLET (20 MG TOTAL) BY MOUTH 2 (TWO) TIMES A DAY AT 9AM AND 6PM. 180 tablet 3     gabapentin (NEURONTIN) 300 MG capsule Take 600 mg by mouth at bedtime.              ipratropium (ATROVENT) 0.03 % nasal spray 1 SPRAY EACH NOSTRIL ONCE PER DAY 30 mL 11     ketoconazole (NIZORAL) 2 % cream 2 (two) times a day.       NEBUSAL 3 % nebulizer solution TAKE 4 ML BY NEBULIZATION 3 (THREE) TIMES A DAY. USING AFTER ALBUTEROL NEB. 360 mL 3     omeprazole (PRILOSEC) 20 MG capsule TAKE 1 CAPSULE DAILY. 90 capsule 3     OXYGEN-AIR DELIVERY SYSTEMS [facility] Use 1-2 L  "As Directed as needed. Valeria       predniSONE (DELTASONE) 10 mg tablet Take 2 tabs daily x 5 days, THEN 1 tab daily x 5 days. 15 tablet 0     PROAIR HFA 90 mcg/actuation inhaler INHALE 2 PUFFS EVERY 4 (FOUR) HOURS AS NEEDED FOR WHEEZING. 8.5 g 5     rivaroxaban ANTICOAGULANT (XARELTO) 20 mg tablet Take 1 tablet (20 mg total) by mouth daily with supper. 90 tablet 4     spironolactone (ALDACTONE) 25 MG tablet TAKE 1/2 TABLET (12.5 MG TOTAL) BY MOUTH DAILY. 45 tablet 3     TRELEGY ELLIPTA 100-62.5-25 mcg DsDv inhaler INHALE 1 INHALATION DAILY. 60 each 10     urea (CARMOL) 40 % Crea Apply 1 application topically 2 (two) times a day as needed. To bottom of feet        acetaminophen (TYLENOL) 500 MG tablet Take 1,000 mg by mouth every 6 (six) hours as needed for pain.       betamethasone, augmented, (DIPROLENE-AF) 0.05 % cream 2 (two) times a day.       polyethylene glycol (MIRALAX) 17 gram packet Take 17 g by mouth daily as needed.        triamcinolone (KENALOG) 0.1 % cream Apply 1 application topically 2 (two) times a day as needed. To legs             Current Facility-Administered Medications   Medication Dose Route Frequency Provider Last Rate Last Admin     cyanocobalamin injection 1,000 mcg  1,000 mcg Intramuscular Q30 Days Joce Whelan MD   1,000 mcg at 12/22/20 1026      Objective:   Vital Signs:   Visit Vitals  /60 (Patient Site: Right Arm, Patient Position: Sitting)   Pulse 76   Temp 97.2  F (36.2  C)   Ht 5' 2\" (1.575 m)   Wt 149 lb (67.6 kg)   LMP  (LMP Unknown)   SpO2 (!) 88% Comment: on 2l   BMI 27.25 kg/m           VisionScreening:  No exam data present     PHYSICAL EXAM  Chest clear to auscultation and percussion.  Heart tones regular rhythm without murmur rub or gallop.  Abdomen soft nontender no organomegaly.  No peritoneal signs.  Extremities free of edema cyanosis or clubbing.  Neck veins nondistended no thyromegaly or scleral icterus noted, carotids full.  Skin warm and dry easily " conversant good spirited.  Normal intelligence.  Neurologically intact no gross localizing findings.  Few expiratory rhonchi noted on examination no rales no wheezes.  Not in acute distress not toxic easily conversant good spirited bilateral hearing aids in place.  Eyes ears nose mouth negative uses a cane for ambulation and continuous nasal cannula O2 set at 2 L/min with O2 sats today 88%.    Slight pallor noted easily conversant no leg edema or neck vein distention back straight no severe spine tenderness noted no rales audible on chest auscultation posteriorly.    62 inches tall 149 is the weight stable from last visit BMI 27+ blood pressure 116/60 pulse 76 regular O2 sats 88% on 2 L nasal cannula.  Temperature this morning 97.2 degrees.    Assessment Results 12/28/2020   Activities of Daily Living No help needed   Instrumental Activities of Daily Living 1 - Full function   Get Up and Go Score Less than 12 seconds   Mini Cog Total Score 5   Some recent data might be hidden     A Mini-Cog score of 0-2 suggests the possibility of dementia, score of 3-5 suggests no dementia    Identified Health Risks:     The patient was provided with suggestions to help her develop a healthy physical lifestyle.   The patient was counseled and encouraged to consider modifying their diet and eating habits. She was provided with information on recommended healthy diet options.  The patient reports that she has difficulty with instrumental activities of daily living.  She was provided with a list of local organizations that provide support services and advised to make a follow up appointment in 12 weeks to address this further.   The patient was provided with written information regarding signs of hearing loss.  Patient's advanced directive was discussed and I am comfortable with the patient's wishes.

## 2021-06-14 NOTE — PROGRESS NOTES
Assessment/Plan:        Diagnoses and all orders for this visit:    COPD exacerbation    Chronic diastolic heart failure    Chronic respiratory failure with hypoxia    82 year old female with a history of complicated COPD here with exacerbation.    Continue your antibiotics and prednisone until completed.    Call us if you have severe cough that needs a stronger cough suppressant OR if your symptoms come back after prednisone is done.    I don't think you will need longer antibiotics.    If you get in trouble over the weekend, just call [phone] and say I need to call you.  I'll be there 7am-7pm.    Continue taking diuretics and monitoring weight.    Continue using oxygen as needed.        Subjective:    Patient ID: Adalgisa Solano is a 82 y.o. female.    HPI Comments: 82F here with exacerbation of COPD.  It started last week.  Slowly worsened.  Cough, chest congestion. No clear provocative factor.  It has improved with antibiotics and prednisone.  Symptoms localize to the chest and throat.  Pertinent positives include a lost voice pertinent negatives include no hemoptysis or fever.  Because of worsening symptoms she contact our clinic and was started on prednisone and Bactrim.    She has some stress related to this because she has lots of holiday plans.    She continues to use oxygen as needed.             Review of Systems review of systems for constitutional positive for chills fatigue and fever.  GI positive for reflux.  The remainder of these 2 systems are negative.        /72  Pulse 96  Resp 24  Wt 164 lb 14.4 oz (74.8 kg)  LMP  (LMP Unknown)  SpO2 92% Comment: RA  BMI 31.16 kg/m2    Objective:    Physical Exam   Constitutional: She is oriented to person, place, and time. She appears well-developed and well-nourished. No distress.   HENT:   Head: Normocephalic and atraumatic.   Eyes: Conjunctivae are normal. Pupils are equal, round, and reactive to light. Right eye exhibits no discharge.  Left eye exhibits no discharge. No scleral icterus.   Cardiovascular: Normal rate, regular rhythm and normal heart sounds.  Exam reveals no gallop.    No murmur heard.  Pulmonary/Chest: Effort normal. No respiratory distress. She has wheezes.   Abdominal: Soft.   Musculoskeletal: She exhibits edema (Improved relative to last visit). She exhibits no deformity.   Neurological: She is alert and oriented to person, place, and time. No cranial nerve deficit. Coordination normal.   Skin: Skin is warm and dry. She is not diaphoretic. No erythema.   Psychiatric: She has a normal mood and affect. Her behavior is normal. Thought content normal.   Nursing note and vitals reviewed.          Current Outpatient Prescriptions on File Prior to Visit   Medication Sig Dispense Refill     acetaminophen (TYLENOL) 325 MG tablet Take 650 mg by mouth every 6 (six) hours as needed for pain.       albuterol (PROVENTIL) 2.5 mg /3 mL (0.083 %) nebulizer solution Take 2.5 mg by nebulization 2 (two) times a day.       ARIPiprazole (ABILIFY) 5 MG tablet Take 5 mg by mouth bedtime.       aspirin 81 MG EC tablet Take 81 mg by mouth daily.       atorvastatin (LIPITOR) 20 MG tablet Take 1 tablet (20 mg total) by mouth bedtime. 90 tablet 2     calcium carbonate-vitamin D3 (CALCIUM WITH VITAMIN D) 600 mg(1,500mg) -400 unit per tablet Take 1 tablet by mouth 2 (two) times a day.       cholecalciferol, vitamin D3, 1,000 unit tablet Take 2,000 Units by mouth daily.        diltiazem (CARDIZEM CD) 120 MG 24 hr capsule Take 1 capsule (120 mg total) by mouth daily. 30 capsule 12     fluticasone-salmeterol (ADVAIR DISKUS) 100-50 mcg/dose DISKUS Inhale 1 puff 2 (two) times a day. 1 each 11     furosemide (LASIX) 20 MG tablet Take 60 mg by mouth daily.       omeprazole (PRILOSEC) 20 MG capsule TAKE 1 CAPSULE DAILY. 100 capsule 0     peg 400-hypromellose-glycerin (VISINE TEARS) 1-0.2-0.2 % Drop Administer 1 drop to both eyes as needed.       potassium chloride  (KLOR-CON) 10 MEQ CR tablet Take 10 mEq by mouth 3 (three) times a day.        predniSONE (DELTASONE) 20 MG tablet Take 1 tablet (20 mg total) by mouth daily for 7 days. 7 tablet 0     PROAIR HFA 90 mcg/actuation inhaler Inhale two (2) puff(s) every 4 to 6 hours as needed 17 g 0     psyllium (METAMUCIL) 3.4 gram packet Take 1 packet by mouth daily.        QUEtiapine (SEROQUEL) 25 MG tablet Take 50 mg by mouth at bedtime.        sodium chloride 3 % nebulizer solution Take 3 mL by nebulization 2 (two) times a day. Using after albuterol neb.       sulfamethoxazole-trimethoprim (SEPTRA DS) 800-160 mg per tablet Take 1 tablet by mouth 2 (two) times a day for 7 days. 14 tablet 0     tiotropium (SPIRIVA) 18 mcg inhalation capsule Place 18 mcg into inhaler and inhale Daily after lunch.       triamcinolone (KENALOG) 0.1 % ointment Apply topically 2 (two) times a day.       No current facility-administered medications on file prior to visit.      /72  Pulse 96  Resp 24  Wt 164 lb 14.4 oz (74.8 kg)  LMP  (LMP Unknown)  SpO2 92% Comment: RA  BMI 31.16 kg/m2    Medical History  Active Ambulatory (Non-Hospital) Problems    Diagnosis     Respiratory failure     Pneumonia     COPD exacerbation     Chronic CHF     Chronic respiratory failure with hypoxia     Chronic diastolic heart failure     Type 2 diabetes mellitus without complication     Hypertension     Peripheral arterial disease     Schizoaffective disorder, bipolar type     Lump Or Mass In Breast     Recurrent major depressive episodes     Chronic obstructive pulmonary disease     Arnold-Chiari malformation     Past Medical History:   Diagnosis Date     Arm skin lesion, right      Arnold-Chiari malformation 1998     Breast lump      Chronic diastolic heart failure      COPD (chronic obstructive pulmonary disease)      Depression      GERD (gastroesophageal reflux disease)      Hyperlipidemia      Hypertension      Lumbar spinal stenosis      PAD (peripheral  artery disease)         Surgical History  She  has a past surgical history that includes Breast cyst aspiration (Left, ); Cholecystectomy; Lumbar fusion (N/A, 2014); Back surgery; Laparoscopic incisional / umbilical / ventral hernia repair (Left, 3/27/2015);  section; Joint replacement; Eye surgery; pr total knee arthroplasty (Left, 10/7/2016); and Cervical spine surgery.         Social history reviewed: She has holiday plans with her family.   Allergies  Allergies   Allergen Reactions     Amoxicillin Itching and Rash     Levofloxacin Itching and Rash     Penicillins Itching and Rash                              Data Review - imaging, labs, and ekgs listed below were reviewed by me.  Chest XRay and chest CT images and EKG tracings interpreted personally.       Past Imaging  No results found.

## 2021-06-14 NOTE — PROGRESS NOTES
Office Visit - Follow up    Adalgisa Solano   82 y.o. female    Date of Visit: 11/16/2017    Chief Complaint   Patient presents with     Cough     follow  up     Leg Swelling       Subjective: COPD with asthmatic bronchitis exacerbation.    Perhaps better after course of prednisone and sulfa as Septra DS plus Robitussin-DM.    Dependent edema lower extremity wears compressive stockings these seem to help with swelling.    Temperature today 97.9 O2 sats 89%.  Previously seen by pulmonologist who said that satisfactory O2 sats were between 88 and 92%.  The patient wears supplemental nasal O2 at home at night.    The patient denies blood in stool or urine or sputum medication list reviewed well-tolerated no ill effects.  Cough is less breathing is slightly better although she does hear some expiratory sounds like rhonchi.  Or whistling.    ROS: A comprehensive review of systems was performed and was otherwise negative    Medications:  Prior to Admission medications    Medication Sig Start Date End Date Taking? Authorizing Provider   acetaminophen (TYLENOL) 325 MG tablet Take 650 mg by mouth every 6 (six) hours as needed for pain.   Yes PROVIDER, HISTORICAL   ARIPiprazole (ABILIFY) 5 MG tablet Take 5 mg by mouth bedtime.   Yes PROVIDER, HISTORICAL   aspirin 81 MG EC tablet Take 81 mg by mouth daily.   Yes PROVIDER, HISTORICAL   atorvastatin (LIPITOR) 20 MG tablet Take 1 tablet (20 mg total) by mouth bedtime. 6/19/17  Yes Chris Sarah MD   calcium carbonate-vitamin D3 (CALCIUM WITH VITAMIN D) 600 mg(1,500mg) -400 unit per tablet Take 1 tablet by mouth 2 (two) times a day.   Yes PROVIDER, HISTORICAL   cholecalciferol, vitamin D3, 1,000 unit tablet Take 2,000 Units by mouth daily.    Yes PROVIDER, HISTORICAL   diltiazem (CARDIZEM CD) 120 MG 24 hr capsule Take 1 capsule (120 mg total) by mouth daily. 2/14/17  Yes Shai Ellington MD   fluticasone-salmeterol (ADVAIR DISKUS) 100-50 mcg/dose DISKUS Inhale 1  puff 2 (two) times a day. 3/13/17  Yes Jun Juarez MD   furosemide (LASIX) 20 MG tablet Take 60 mg by mouth daily.   Yes PROVIDER, HISTORICAL   omeprazole (PRILOSEC) 20 MG capsule TAKE 1 CAPSULE DAILY. 8/21/17  Yes Shai Ellington MD   potassium chloride (KLOR-CON) 10 MEQ CR tablet Take 10 mEq by mouth 3 (three) times a day.  6/19/17  Yes PROVIDER, HISTORICAL   psyllium (METAMUCIL) 3.4 gram packet Take 1 packet by mouth daily.    Yes PROVIDER, HISTORICAL   QUEtiapine (SEROQUEL) 25 MG tablet Take 50 mg by mouth at bedtime.  8/10/16  Yes PROVIDER, HISTORICAL   tiotropium (SPIRIVA) 18 mcg inhalation capsule Place 18 mcg into inhaler and inhale Daily after lunch.   Yes PROVIDER, HISTORICAL   triamcinolone (KENALOG) 0.1 % ointment Apply topically 2 (two) times a day.   Yes PROVIDER, HISTORICAL   albuterol (PROVENTIL) 2.5 mg /3 mL (0.083 %) nebulizer solution Take 2.5 mg by nebulization 2 (two) times a day.    PROVIDER, HISTORICAL   peg 400-hypromellose-glycerin (VISINE TEARS) 1-0.2-0.2 % Drop Administer 1 drop to both eyes as needed.    PROVIDER, HISTORICAL   PROAIR HFA 90 mcg/actuation inhaler Inhale two (2) puff(s) every 4 to 6 hours as needed 9/13/17   Shai Ellington MD   sodium chloride 3 % nebulizer solution Take 3 mL by nebulization 2 (two) times a day. Using after albuterol neb. 4/19/17   PROVIDER, HISTORICAL   clarithromycin (BIAXIN) 500 MG tablet Take 500 mg by mouth once as needed. If  Needed prior to dental procedure  11/16/17  PROVIDER, HISTORICAL   predniSONE (DELTASONE) 10 mg tablet Take 3 tablets (30 mg total) by mouth daily. 10/26/17 11/16/17  Joce Whelan MD       Allergies:   Allergies   Allergen Reactions     Amoxicillin Itching and Rash     Levofloxacin Itching and Rash     Penicillins Itching and Rash       Immunizations:   Immunization History   Administered Date(s) Administered     DT (pediatric) 03/29/2004     Influenza high dose, seasonal 10/09/2016      Influenza,seasonal quad, PF, 36+MOS 11/20/2014     Pneumo Polysac 23-V 10/18/1999, 08/01/2005     Td,adult,historic,unspecified 03/29/2004     Tdap 05/14/2013       Exam Chest clear to auscultation and percussion.  Heart tones regular rhythm without murmur rub or gallop.  Abdomen soft nontender no organomegaly.  No peritoneal signs.  Extremities free of edema cyanosis or clubbing.  Neck veins nondistended no thyromegaly or scleral icterus noted, carotids full.  Skin warm and dry easily conversant good spirited.  Normal intelligence.  Neurologically intact no gross localizing findings.  Sibilant rhonchi audible on expiration.  Breath sounds are abnormal no rales diminished breath sounds noted upper lung fields.  No shortness of breath at rest no neck vein distention there is dependent edema support stockings are in place color good.  O2 sats 89% temperature 97.9 F.    Assessment and Plan  COPD with exacerbation.  Discussed the ups and downs of this illness through time.  Patient understands from prior experience.  Flu shot today.  DC prednisone and Septra at this time.    Dependent edema.  Right heart failure questionable continue compressive stockings Lasix as is 60 mg daily and salt avoidance and diet with periods of rest.    Diabetes mellitus type 2 with next office visit in 1 month's time will check A1c plus blood sugar urine for microalbumin and a bit panel.    Multiple drug allergies including amoxicillin levofloxacin and penicillin.  RTC 1 month.    Time: total time spent with the patient was 25 minutes of which >50% was spent in counseling and coordination of care    The following high BMI interventions were performed this visit: encouragement to exercise    Shai Ellington MD    Patient Active Problem List   Diagnosis     Recurrent major depressive episodes     Chronic obstructive pulmonary disease     Lump Or Mass In Breast     Peripheral arterial disease     Type 2 diabetes mellitus without  complication     Hypertension     Schizoaffective disorder, bipolar type     Arnold-Chiari malformation     Chronic diastolic heart failure     Chronic respiratory failure with hypoxia     Pneumonia     COPD exacerbation     Chronic CHF     Respiratory failure

## 2021-06-15 NOTE — PROGRESS NOTES
Assessment/Plan:        Diagnoses and all orders for this visit:    Simple chronic bronchitis    Dyspnea on exertion    Chronic respiratory failure with hypoxia    82 year old female with a history of complicated COPD here with persistent shortness of breath after exacerbation.  Most likely this is related to deconditioning and loss of skeletal muscle mass in the setting of her COPD as well as less activity during her prolonged exacerbation.    Medication Plan  Albuterol neb twice daily  3% Sodium Chloride twice daily  Advair twice daily  Spiriva once daily    Just give me a call if you have any questions or need any refills.    Pulmonary Rehab is a program to help with strength and independence.  There is one at [facility].    Pulmonary rehabilitation on the [employer] website is a good place for information.    Continue oxygen and current prescription.              Subjective:    Patient ID: Adalgisa Solano is a 82 y.o. female.    HPI Comments: 82-year-old female here for follow-up of COPD.  Her exacerbation symptoms have improved.  However she still has persistent shortness of breath and is actually worsened.  Is worse with exertion.  Is better with rest.  Symptoms were noticeable over the last month or so.  No other provocative or palliative factors.  Symptoms localized to the chest.  Pertinent positives include some sputum production, greatest in the morning.  Pertinent negatives include no fever or hemoptysis.    She is taking her nebulizers regularly.  She feels like her 3% saline really helps.          Review of Systems review of systems positive for activity change congestion chest tightness cough shortness of breath wheezing anxiousness sleep disturbance easy bruising tremors joint pain excessive thirst cold intolerance leg swelling palpitations light sensitivity.  The remainder of 14 system review of systems negative.      /66  Pulse 96  Resp 20  Wt 168 lb  12.8 oz (76.6 kg)  LMP  (LMP Unknown)  SpO2 90% Comment: RA  BMI 31.89 kg/m2    Objective:    Physical Exam   Constitutional: She is oriented to person, place, and time. She appears well-developed and well-nourished. No distress.   HENT:   Head: Normocephalic and atraumatic.   Eyes: Conjunctivae are normal. Pupils are equal, round, and reactive to light. Right eye exhibits no discharge. Left eye exhibits no discharge. No scleral icterus.   Cardiovascular: Normal rate, regular rhythm and normal heart sounds.  Exam reveals no gallop.    No murmur heard.  Pulmonary/Chest: Effort normal. No respiratory distress. She has wheezes (Improved since last visit).   Abdominal: Soft.   Musculoskeletal: She exhibits edema (Stable to last visit). She exhibits no deformity.   Neurological: She is alert and oriented to person, place, and time. No cranial nerve deficit. Coordination normal.   Skin: Skin is warm and dry. She is not diaphoretic. No erythema.   Psychiatric: She has a normal mood and affect. Her behavior is normal. Thought content normal.   Nursing note and vitals reviewed.          Current Outpatient Prescriptions on File Prior to Visit   Medication Sig Dispense Refill     acetaminophen (TYLENOL) 325 MG tablet Take 650 mg by mouth every 6 (six) hours as needed for pain.       albuterol (PROVENTIL) 2.5 mg /3 mL (0.083 %) nebulizer solution Take 2.5 mg by nebulization 2 (two) times a day.       ARIPiprazole (ABILIFY) 5 MG tablet Take 5 mg by mouth bedtime.       aspirin 81 MG EC tablet Take 81 mg by mouth daily.       atorvastatin (LIPITOR) 20 MG tablet Take 1 tablet (20 mg total) by mouth bedtime. 90 tablet 2     calcium carbonate-vitamin D3 (CALCIUM WITH VITAMIN D) 600 mg(1,500mg) -400 unit per tablet Take 1 tablet by mouth 2 (two) times a day.       cholecalciferol, vitamin D3, 1,000 unit tablet Take 2,000 Units by mouth daily.        diltiazem (CARDIZEM CD) 120 MG 24 hr capsule Take 1 capsule (120 mg total) by  mouth daily. 30 capsule 12     fluticasone-salmeterol (ADVAIR DISKUS) 100-50 mcg/dose DISKUS Inhale 1 puff 2 (two) times a day. 1 each 11     furosemide (LASIX) 20 MG tablet Take 60 mg by mouth daily.       omeprazole (PRILOSEC) 20 MG capsule TAKE 1 CAPSULE DAILY. 100 capsule 0     peg 400-hypromellose-glycerin (VISINE TEARS) 1-0.2-0.2 % Drop Administer 1 drop to both eyes as needed.       potassium chloride (KLOR-CON) 10 MEQ CR tablet Take 10 mEq by mouth 3 (three) times a day.        PROAIR HFA 90 mcg/actuation inhaler Inhale two (2) puff(s) every 4 to 6 hours as needed 17 g 0     psyllium (METAMUCIL) 3.4 gram packet Take 1 packet by mouth daily.        QUEtiapine (SEROQUEL) 25 MG tablet Take 50 mg by mouth at bedtime.        sodium chloride 3 % nebulizer solution Take 3 mL by nebulization 2 (two) times a day. Using after albuterol neb.       tiotropium (SPIRIVA) 18 mcg inhalation capsule Place 18 mcg into inhaler and inhale Daily after lunch.       triamcinolone (KENALOG) 0.1 % ointment Apply topically 2 (two) times a day.       No current facility-administered medications on file prior to visit.      /66  Pulse 96  Resp 20  Wt 168 lb 12.8 oz (76.6 kg)  LMP  (LMP Unknown)  SpO2 90% Comment: RA  BMI 31.89 kg/m2    Medical History  Active Ambulatory (Non-Hospital) Problems    Diagnosis     Respiratory failure     Pneumonia     COPD exacerbation     Chronic CHF     Chronic respiratory failure with hypoxia     Chronic diastolic heart failure     Type 2 diabetes mellitus without complication     Hypertension     Peripheral arterial disease     Schizoaffective disorder, bipolar type     Lump Or Mass In Breast     Recurrent major depressive episodes     Chronic obstructive pulmonary disease     Arnold-Chiari malformation     Past Medical History:   Diagnosis Date     Arm skin lesion, right      Arnold-Chiari malformation 1998     Breast lump      Chronic diastolic heart failure      COPD (chronic obstructive  pulmonary disease)      Depression      GERD (gastroesophageal reflux disease)      Hyperlipidemia      Hypertension      Lumbar spinal stenosis      PAD (peripheral artery disease)         Surgical History  She  has a past surgical history that includes Breast cyst aspiration (Left, ); Cholecystectomy; Lumbar fusion (N/A, 2014); Back surgery; Laparoscopic incisional / umbilical / ventral hernia repair (Left, 3/27/2015);  section; Joint replacement; Eye surgery; pr total knee arthroplasty (Left, 10/7/2016); and Cervical spine surgery.         Social history reviewed: Lives at home.  Here with her .   Allergies  Allergies   Allergen Reactions     Amoxicillin Itching and Rash     Levofloxacin Itching and Rash     Penicillins Itching and Rash                              Data Review - imaging, labs, and ekgs listed below were reviewed by me.  Chest XRay and chest CT images and EKG tracings interpreted personally.       Past Imaging  No results found.

## 2021-06-15 NOTE — TELEPHONE ENCOUNTER
Adalgisa has an allergy to Levaquin so Dr. Ellington is prescribing Septra DS #14 one two times a day - patient notified Leana Batista, CMA

## 2021-06-15 NOTE — TELEPHONE ENCOUNTER
Dima for ciprofloxacin 500 mg tablets number 14 tablets take 1 tablet twice daily.  Please call patient and her pharmacy.

## 2021-06-15 NOTE — TELEPHONE ENCOUNTER
Pt is calling requesting     Yesterday she was unable to urinate.  She is certain that she has an infection.  When she is able to urinate it is very smelly and cloudy.    She states she has had infections before and knows her body requesting RX is sent to [email or web address]    Requesting is a call when RX is sent to [email or web address].

## 2021-06-15 NOTE — PROGRESS NOTES
"    Assessment & Plan     Diabetes mellitus type 2 latest blood sugar 188 on December 28, 2020 recheck blood sugar A1c lipid panel today otherwise stable.    Pernicious anemia with vitamin B12 deficiency.  Okay for 1000 mcg vitamin B12 orally daily.    No toxicity with beer C vitamins as they are water-soluble.    COPD O2 dependent denies any increased symptoms of chest pain or shortness of breath.    Hypertension controlled 120/68 no target organ damage related to same.    Review of external notes as documented in note  25 minutes spent on the date of the encounter doing chart review, patient visit and documentation        BMI:   Estimated body mass index is 27.25 kg/m  as calculated from the following:    Height as of this encounter: 5' 2\" (1.575 m).    Weight as of this encounter: 149 lb (67.6 kg).   I have had an Advance Directives discussion with the patient.    No follow-ups on file.    Shai Ellington MD  [facility]   Adalgisa Solano is 85 y.o. and presents today for the following health issues   HPI       Hypertension Follow-up      Do you check your blood pressure regularly outside of the clinic? Yes     Are you following a low salt diet? No    Are your blood pressures ever more than 140 on the top number (systolic) OR more       than 90 on the bottom number (diastolic), for example 140/90? No      How many servings of fruits and vegetables do you eat daily?  0-1    On average, how many sweetened beverages do you drink each day (Examples: soda, juice, sweet tea, etc.  Do NOT count diet or artificially sweetened beverages)?   1    How many days per week do you exercise enough to make your heart beat faster? 3 or less    How many minutes a day do you exercise enough to make your heart beat faster? 9 or less    How many days per week do you miss taking your medication? 0        Review of Systems  No blood in stool urine or sputum.  Medication list reviewed " "reconciled in the chart.      Objective    /68 (Patient Site: Right Arm, Patient Position: Sitting)   Pulse 98   Temp 96.8  F (36  C)   Ht 5' 2\" (1.575 m)   Wt 149 lb (67.6 kg)   LMP  (LMP Unknown)   SpO2 92% Comment: on 2L  BMI 27.25 kg/m    Body mass index is 27.25 kg/m .  Physical Exam  Chest clear to auscultation and percussion.  Heart tones regular rhythm without murmur rub or gallop.  Abdomen soft nontender no organomegaly.  No peritoneal signs.  Extremities free of edema cyanosis or clubbing.  Neck veins nondistended no thyromegaly or scleral icterus noted, carotids full.  Skin warm and dry easily conversant good spirited.  Normal intelligence.  Neurologically intact no gross localizing findings.  Diminished breath sounds on chest auscultation posteriorly with occasional scattered rhonchi no wheezes no rales.  Diminished breath sounds.  No carotid bruits noted no leg edema wear support stockings effectively.  Avoid salt leg elevation.  Nasal O2 running.    Vital signs are stable afebrile O2 sats 92% on 2 L nasal cannula.  Not in acute distress no tachypnea no central acrocyanosis mild pallor noted chronically ill slow in movement required assistance going from chair to exam table vice versa.              "

## 2021-06-15 NOTE — PROGRESS NOTES
Office Visit - Follow up    Adalgisa Solano   82 y.o. female    Date of Visit: 2/1/2018    Chief Complaint   Patient presents with     COPD     Hypertension     Leg Pain     tender to touch and tight in groin area when walking       Subjective: Diabetes mellitus type 2.    History of COPD and hypertension.  She gets discomfort in her groin when walking.  Seen by Dr. Juarez for airways obstructive disease and history of asthmatic bronchitis.  COPD.  Using a cane not able to wear compression stockings once a excuse out of this which was written today to [facility].  The patient's expense for compression stocking should be refunded.  The patient's legs are sore there is slightly pink or red the patient attributes her leg discomfort and swelling to chronic low back problems.  Through Dr. Mares her consulting orthopedic spine specialist he has advise removal of a andrea and repositioning the lower back.    No blood in stool or urine no chest pain short of breath with activity medication list reviewed well-tolerated.  Allergies include amoxicillin levofloxacin and penicillin.    ROS: A comprehensive review of systems was performed and was otherwise negative    Medications:  Prior to Admission medications    Medication Sig Start Date End Date Taking? Authorizing Provider   acetaminophen (TYLENOL) 325 MG tablet Take 650 mg by mouth every 6 (six) hours as needed for pain.   Yes PROVIDER, HISTORICAL   albuterol (PROVENTIL) 2.5 mg /3 mL (0.083 %) nebulizer solution Take 2.5 mg by nebulization 2 (two) times a day.   Yes PROVIDER, HISTORICAL   ARIPiprazole (ABILIFY) 5 MG tablet Take 5 mg by mouth bedtime.   Yes PROVIDER, HISTORICAL   aspirin 81 MG EC tablet Take 81 mg by mouth daily.   Yes PROVIDER, HISTORICAL   atorvastatin (LIPITOR) 20 MG tablet Take 1 tablet (20 mg total) by mouth bedtime. 6/19/17  Yes Chris Sarah MD   calcium carbonate-vitamin D3 (CALCIUM WITH VITAMIN D) 600 mg(1,500mg) -400 unit per tablet  Take 1 tablet by mouth 2 (two) times a day.   Yes PROVIDER, HISTORICAL   diltiazem (CARDIZEM CD) 120 MG 24 hr capsule Take 1 capsule (120 mg total) by mouth daily. 2/14/17  Yes Shai Ellington MD   fluticasone-salmeterol (ADVAIR DISKUS) 100-50 mcg/dose DISKUS Inhale 1 puff 2 (two) times a day. 3/13/17  Yes Jun Juarez MD   furosemide (LASIX) 20 MG tablet Take 60 mg by mouth daily.   Yes PROVIDER, HISTORICAL   omeprazole (PRILOSEC) 20 MG capsule TAKE 1 CAPSULE DAILY. 11/20/17  Yes Shai Ellington MD   potassium chloride (KLOR-CON) 10 MEQ CR tablet Take 10 mEq by mouth 3 (three) times a day.  6/19/17  Yes PROVIDER, HISTORICAL   PROAIR HFA 90 mcg/actuation inhaler Inhale two (2) puff(s) every 4 to 6 hours as needed 9/13/17  Yes Shai Ellington MD   sodium chloride 3 % nebulizer solution Take 3 mL by nebulization 2 (two) times a day. Using after albuterol neb. 4/19/17  Yes PROVIDER, HISTORICAL   tiotropium (SPIRIVA) 18 mcg inhalation capsule Place 18 mcg into inhaler and inhale Daily after lunch.   Yes PROVIDER, HISTORICAL   triamcinolone (KENALOG) 0.1 % ointment Apply topically 2 (two) times a day.   Yes PROVIDER, HISTORICAL   cholecalciferol, vitamin D3, 1,000 unit tablet Take 2,000 Units by mouth daily.     PROVIDER, HISTORICAL   psyllium (METAMUCIL) 3.4 gram packet Take 1 packet by mouth daily.     PROVIDER, HISTORICAL   QUEtiapine (SEROQUEL) 25 MG tablet Take 50 mg by mouth at bedtime.  8/10/16   PROVIDER, HISTORICAL   peg 400-hypromellose-glycerin (VISINE TEARS) 1-0.2-0.2 % Drop Administer 1 drop to both eyes as needed.  2/1/18  PROVIDER, HISTORICAL       Allergies:   Allergies   Allergen Reactions     Amoxicillin Itching and Rash     Levofloxacin Itching and Rash     Penicillins Itching and Rash       Immunizations:   Immunization History   Administered Date(s) Administered     DT (pediatric) 03/29/2004     Influenza high dose, seasonal 10/09/2016, 11/16/2017     Influenza,seasonal quad,  PF, 36+MOS 11/20/2014     Pneumo Polysac 23-V 10/18/1999, 08/01/2005     Td,adult,historic,unspecified 03/29/2004     Tdap 05/14/2013       Exam Chest clear to auscultation and percussion.  Heart tones regular rhythm without murmur rub or gallop.  Abdomen soft nontender no organomegaly.  No peritoneal signs.  Extremities free of edema cyanosis or clubbing.  Neck veins nondistended no thyromegaly or scleral icterus noted, carotids full.  Skin warm and dry easily conversant good spirited.  Normal intelligence.  Neurologically intact no gross localizing findings.  There is a reddish pink discoloration with swelling in both lower extremities slightly more red on the left compared to the right no neck vein distention no thyromegaly or scleral icterus noted    Assessment and Plan  Dependent edema lower extremities with diabetes mellitus type 2 and hypertension.  Chronic airways obstructive disease followed by Dr. Juarez.  If surgery is attempted in the lower back for removal or repositioning of a andrea by Dr. Mares the patient will need a preoperative examination by this examiner as well as Dr. Jun Juarez from pulmonary medicine.    Diabetes mellitus type 2 needs fasting office visit with labs in 1 month to include A1c blood sugar urine for microalbumin and lipid panel.    Obesity BMI 31+.  O2 sats today 95% room air.  Blood pressure well controlled.  122/70.  No history of target organ damage related to same.  No history of MI or stroke.  RTC 1 month.  Fasting office visit with labs.    Time: total time spent with the patient was 25 minutes of which >50% was spent in counseling and coordination of care    The following high BMI interventions were performed this visit: encouragement to exercise    Shai Ellington MD    Patient Active Problem List   Diagnosis     Recurrent major depressive episodes     Chronic obstructive pulmonary disease     Lump Or Mass In Breast     Peripheral arterial disease     Type 2  diabetes mellitus without complication     Hypertension     Schizoaffective disorder, bipolar type     Arnold-Chiari malformation     Chronic diastolic heart failure     Chronic respiratory failure with hypoxia

## 2021-06-15 NOTE — TELEPHONE ENCOUNTER
Refill Approved    Rx renewed per Medication Renewal Policy. Medication was last renewed on 2/24/20.    Rob Cm, [facility] Triage/Med Refill 3/12/2021     Requested Prescriptions   Pending Prescriptions Disp Refills     albuterol (PROVENTIL) 2.5 mg /3 mL (0.083 %) nebulizer solution [Pharmacy Med Name: ALBUTEROL SULFATE (2.5 MG/3ML) 0.083%] 300 mL 9     Sig: 3 ML INHALATION FOUR TIMES A DAY AS NEEDED USE BEFORE NEBULIZED SALINE. FOR SHORTNESS OF BREATH.       Albuterol/Levalbuterol Refill Protocol Passed - 3/12/2021 10:03 AM        Passed - PCP or prescribing provider visit in last year     Last office visit with prescriber/PCP: 2/23/2021 Shai Ellington MD OR same dept: 2/23/2021 Shai Ellington MD OR same specialty: 2/23/2021 Shai Ellington MD Last physical: 12/28/2020       Next appt within 3 mo: Visit date not found  Next physical within 3 mo: Visit date not found  Prescriber OR PCP: Shai Ellington MD  Last diagnosis associated with med order: 1. Routine general medical examination at a health care facility  - albuterol (PROVENTIL) 2.5 mg /3 mL (0.083 %) nebulizer solution [Pharmacy Med Name: ALBUTEROL SULFATE (2.5 MG/3ML) 0.083%]; 3 ML INHALATION FOUR TIMES A DAY AS NEEDED USE BEFORE NEBULIZED SALINE. FOR SHORTNESS OF BREATH.  Dispense: 300 mL; Refill: 9    If protocol passes may refill for 6 months if within 3 months of last provider visit (or a total of 9 months). If patient requesting >1 inhaler per month refill x 6 months and have patient make appointment with provider.

## 2021-06-15 NOTE — PROGRESS NOTES
Call from laith.  Wanted to let Dr. Juarez know that she found out from her insurance that pulmonary rehab would cost her a $15 copay each visit.  That is not affordable for her, so she will not be keeping these appointments.

## 2021-06-16 ENCOUNTER — RECORDS - HEALTHEAST (OUTPATIENT)
Dept: ADMINISTRATIVE | Facility: OTHER | Age: 86
End: 2021-06-16

## 2021-06-16 PROBLEM — L03.116 CELLULITIS OF LEFT LOWER EXTREMITY: Status: ACTIVE | Noted: 2019-01-28

## 2021-06-16 PROBLEM — K59.03 DRUG-INDUCED CONSTIPATION: Status: ACTIVE | Noted: 2018-12-21

## 2021-06-16 PROBLEM — J96.21 ACUTE ON CHRONIC RESPIRATORY FAILURE WITH HYPOXIA (H): Status: ACTIVE | Noted: 2021-06-11

## 2021-06-16 PROBLEM — I50.41 ACUTE COMBINED SYSTOLIC AND DIASTOLIC CHF, NYHA CLASS 1 (H): Status: ACTIVE | Noted: 2018-12-22

## 2021-06-16 PROBLEM — M81.0 OSTEOPOROSIS: Status: ACTIVE | Noted: 2020-02-11

## 2021-06-16 PROBLEM — G89.4 CHRONIC PAIN SYNDROME: Status: ACTIVE | Noted: 2018-12-21

## 2021-06-16 PROBLEM — M17.0 PRIMARY OSTEOARTHRITIS OF BOTH KNEES: Status: ACTIVE | Noted: 2018-11-11

## 2021-06-16 PROBLEM — M62.81 GENERALIZED MUSCLE WEAKNESS: Status: ACTIVE | Noted: 2018-12-21

## 2021-06-16 PROBLEM — T14.8XXA TRAUMATIC HEMATOMA: Status: ACTIVE | Noted: 2020-02-11

## 2021-06-16 NOTE — TELEPHONE ENCOUNTER
RN cannot approve Refill Request    RN can NOT refill this medication med is not covered by policy/route to provider. Last office visit: 2/23/2021 Shai Ellington MD Last Physical: 12/28/2020 Last MTM visit: Visit date not found Last visit same specialty: 2/23/2021 Shai Ellington MD.  Next visit within 3 mo: Visit date not found  Next physical within 3 mo: Visit date not found      Rob Cm, [facility] Triage/Med Refill 4/19/2021    Requested Prescriptions   Pending Prescriptions Disp Refills     NEBUSAL 3 % nebulizer solution [Pharmacy Med Name: NEBUSAL NEB 3%] 360 mL 1     Sig: TAKE 4 ML BY NEBULIZATION 3 (THREE) TIMES A DAY. USING AFTER ALBUTEROL NEB.       There is no refill protocol information for this order

## 2021-06-16 NOTE — PROGRESS NOTES
Adalgisa stopped into clinic after her heart clinic appointment asking for more medications.  She finished her action plan about one week ago and was feeling better, but now feeling she is getting worse. Coughing up thick yellow phlegm and somewhat more sob again.  Will repeat her prednisone (prednisone 20mg daily x 7 days). Instructed that if this is not helpful, will need to schedule office visit with Dr. Juarez.

## 2021-06-16 NOTE — TELEPHONE ENCOUNTER
"Phone call from Adalgisa. States she is having another exacerbation.  Has increased phlegm and shortness of breath.  No fever.  She is using her vest two times a day. Phlegm is light yellow in color. Also states she has some ankle swelling.  Encouraged to contact cardiology regarding the ankle swelling as she states she is on lasix for this prescribed by cards.  Will send in prescription for her \"action plan\" to get started for the shortness of breath and increased phlegm. States she has follow up appointment scheduled with  for the end of this month.  "

## 2021-06-16 NOTE — PROGRESS NOTES
Office Visit - Follow up    Adalgisa Solano   82 y.o. female    Date of Visit: 3/8/2018    Chief Complaint   Patient presents with     Edema     legs,ankles and feet     Shortness of Breath     Diabetes     fasting     Hypertension       Subjective: Diabetes mellitus type 2 and emergency room follow-up plus anasarca with leg pain.  Pain seems to be getting better COPD hypertension and diabetes.  Ultrasound of the heart is slated for Monday.  Swelling in the legs ankles and feet.  Short of breath has been seen by Dr. Juarez from pulmonary medicine and Dr. Marquez on March 5 from cardiology at [facility].  4 times a year she is on prednisone for COPD with exacerbation.  For 7 days she is use prednisone 20 mg daily.  She uses [facility].  No blood in stool or urine O2 sats 93% allergies include amoxicillin levofloxacin and penicillin.  We did discuss salt restriction and diet leg elevation support stockings she feels more distended in her abdomen as well.    ROS: A comprehensive review of systems was performed and was otherwise negative    Medications:  Prior to Admission medications    Medication Sig Start Date End Date Taking? Authorizing Provider   acetaminophen (TYLENOL) 325 MG tablet Take 650 mg by mouth every 6 (six) hours as needed for pain.   Yes PROVIDER, HISTORICAL   albuterol (PROVENTIL) 2.5 mg /3 mL (0.083 %) nebulizer solution Take 2.5 mg by nebulization 2 (two) times a day.   Yes PROVIDER, HISTORICAL   ARIPiprazole (ABILIFY) 5 MG tablet Take 5 mg by mouth bedtime.   Yes PROVIDER, HISTORICAL   aspirin 81 MG EC tablet Take 81 mg by mouth daily.   Yes PROVIDER, HISTORICAL   atorvastatin (LIPITOR) 20 MG tablet Take 1 tablet (20 mg total) by mouth bedtime. 6/19/17  Yes Chris Sarah MD   calcium carbonate-vitamin D3 (CALCIUM WITH VITAMIN D) 600 mg(1,500mg) -400 unit per tablet Take 1 tablet by mouth 2 (two) times a day.   Yes PROVIDER, HISTORICAL   cholecalciferol, vitamin D3, 1,000  unit tablet Take 2,000 Units by mouth daily.    Yes PROVIDER, HISTORICAL   diltiazem (CARDIZEM CD) 120 MG 24 hr capsule Take 1 capsule (120 mg total) by mouth daily. 2/14/17  Yes Shai Ellington MD   fluticasone-salmeterol (ADVAIR DISKUS) 100-50 mcg/dose DISKUS Inhale 1 puff 2 (two) times a day. 3/13/17  Yes Jun Juarez MD   furosemide (LASIX) 20 MG tablet Take 60 mg by mouth daily.   Yes PROVIDER, HISTORICAL   omeprazole (PRILOSEC) 20 MG capsule TAKE 1 CAPSULE DAILY. 2/13/18  Yes Shai Ellington MD   potassium chloride (KLOR-CON) 10 MEQ CR tablet Take 10 mEq by mouth 3 (three) times a day.  6/19/17  Yes PROVIDER, HISTORICAL   predniSONE (DELTASONE) 20 MG tablet Take 1 tablet (20 mg total) by mouth daily for 7 days. 3/5/18 3/12/18 Yes Jun Juarez MD   PROAIR HFA 90 mcg/actuation inhaler Inhale two (2) puff(s) every 4 to 6 hours as needed 3/1/18  Yes Shai Ellington MD   psyllium (METAMUCIL) 3.4 gram packet Take 1 packet by mouth daily.    Yes PROVIDER, HISTORICAL   QUEtiapine (SEROQUEL) 25 MG tablet Take 50 mg by mouth at bedtime.  8/10/16  Yes PROVIDER, HISTORICAL   sodium chloride 3 % nebulizer solution Take 3 mL by nebulization 2 (two) times a day. Using after albuterol neb. 4/19/17  Yes PROVIDER, HISTORICAL   tiotropium (SPIRIVA) 18 mcg inhalation capsule Place 18 mcg into inhaler and inhale Daily after lunch.   Yes PROVIDER, HISTORICAL   triamcinolone (KENALOG) 0.1 % ointment Apply topically 2 (two) times a day.    PROVIDER, HISTORICAL       Allergies:   Allergies   Allergen Reactions     Amoxicillin Itching and Rash     Levofloxacin Itching and Rash     Penicillins Itching and Rash       Immunizations:   Immunization History   Administered Date(s) Administered     DT (pediatric) 03/29/2004     Influenza high dose, seasonal 10/09/2016, 11/16/2017     Influenza,seasonal quad, PF, 36+MOS 11/20/2014     Pneumo Polysac 23-V 10/18/1999, 08/01/2005     Td,adult,historic,unspecified  03/29/2004     Tdap 05/14/2013       Exam Chest clear to auscultation and percussion.  Heart tones regular rhythm without murmur rub or gallop.  Abdomen soft nontender no organomegaly.  No peritoneal signs.  Extremities free of edema cyanosis or clubbing.  Neck veins nondistended no thyromegaly or scleral icterus noted, carotids full.  Skin warm and dry easily conversant good spirited.  Normal intelligence.  Neurologically intact no gross localizing findings.  Abdominal ascites noted leg edema noted no blood in stool or urine medication list reviewed.  Allergy to amoxicillin levofloxacin and penicillin.    Assessment and Plan  Diabetes mellitus type 2 check comprehensive metabolic profile plus urinalysis and A1c lipid panel urine for microalbumin today.    Anasarca check CT scan of abdomen.  Echocardiogram is pending may be related to cardiac versus renal versus hepatic.  Or low oncotic pressure.  Or use of medications that are known to exacerbate edema like prednisone and diltiazem discussed in detail with the patient patient may be a candidate for a different loop diuretic and/or Aldactone and or proximal and therapy given cautiously.    Obesity with insulin resistance BMI 31.    COPD may be right heart failure contributing to edema.    Multiple drug allergies including amoxicillin levofloxacin and penicillin RTC 1 month's time.  Reemphasized salt restriction and diet same meds including furosemide 60 mg daily for now.  Encourage close follow-up with cardiology and pulmonary medicine.  Discussed the situation with the patient thoroughly along with her  last with here and supportive.    Time: total time spent with the patient was 40 minutes of which >50% was spent in counseling and coordination of care    The following high BMI interventions were performed this visit: encouragement to exercise    Shai Ellington MD    Patient Active Problem List   Diagnosis     Recurrent major depressive episodes      Chronic obstructive pulmonary disease     Lump Or Mass In Breast     Peripheral arterial disease     Type 2 diabetes mellitus without complication     Hypertension     Schizoaffective disorder, bipolar type     Arnold-Chiari malformation     Chronic diastolic heart failure     Chronic respiratory failure with hypoxia     Pulmonary hypertension

## 2021-06-16 NOTE — PROGRESS NOTES
[facility] Follow-up Note    Assessment & Plan        1. Chronic diastolic heart failure -patient is here today with increased peripheral bipedal edema.  She has had this chronically, BNP is of a normal as has been echocardiogram in terms of right ventricular left ventricular wall motion and no significant tricuspid regurgitation.  There is mild pulmonary hypertension.  I will recheck BMP and renal profile today, I will also recheck echo.  I am not sure that I can blame peripheral edema on the basis of heart and if these tests look normal will defer to primary to make sure there are low liver abnormalities, significant proteinuria, or renal issues thus the reason to check renal profile.   2. Type 2 diabetes mellitus without complication, with long-term current use of insulin -defer to primary.   3. Simple chronic bronchitis -chronic and defer to primary.   4. Chronic respiratory failure with hypoxia -I assume COPD is causing some pulmonary hypertension.   5. 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  Defer to pulmonary.   6. Essential hypertension -under good control currently.     Plan  1.  Check renal profile and BNP today.  Addressed with heart failure meds if abnormal.  2.  Check echocardiogram looking for tricuspid regurgitation and pulmonic pressures and defer to pulmonary hypertension expert if found.  3.  Follow-up with me in 1 year or sooner if needed.    Subjective  CC: 82-year-old white female here for yearly follow-up today.  She is here mainly due to increased bipedal edema.  There is no shortness of breath, PND, orthopnea, syncope, dizziness, chest discomfort, palpitations.    Medications  Current Outpatient Prescriptions   Medication Sig     acetaminophen (TYLENOL) 325 MG tablet Take 650 mg by mouth every 6 (six) hours as needed for pain.     albuterol (PROVENTIL) 2.5 mg /3 mL (0.083 %) nebulizer solution Take 2.5 mg by nebulization 2 (two) times a day.     ARIPiprazole  "(ABILIFY) 5 MG tablet Take 5 mg by mouth bedtime.     aspirin 81 MG EC tablet Take 81 mg by mouth daily.     atorvastatin (LIPITOR) 20 MG tablet Take 1 tablet (20 mg total) by mouth bedtime.     calcium carbonate-vitamin D3 (CALCIUM WITH VITAMIN D) 600 mg(1,500mg) -400 unit per tablet Take 1 tablet by mouth 2 (two) times a day.     cholecalciferol, vitamin D3, 1,000 unit tablet Take 2,000 Units by mouth daily.      diltiazem (CARDIZEM CD) 120 MG 24 hr capsule Take 1 capsule (120 mg total) by mouth daily.     fluticasone-salmeterol (ADVAIR DISKUS) 100-50 mcg/dose DISKUS Inhale 1 puff 2 (two) times a day.     furosemide (LASIX) 20 MG tablet Take 60 mg by mouth daily.     omeprazole (PRILOSEC) 20 MG capsule TAKE 1 CAPSULE DAILY.     potassium chloride (KLOR-CON) 10 MEQ CR tablet Take 10 mEq by mouth 3 (three) times a day.      PROAIR HFA 90 mcg/actuation inhaler Inhale two (2) puff(s) every 4 to 6 hours as needed     psyllium (METAMUCIL) 3.4 gram packet Take 1 packet by mouth daily.      QUEtiapine (SEROQUEL) 25 MG tablet Take 50 mg by mouth at bedtime.      sodium chloride 3 % nebulizer solution Take 3 mL by nebulization 2 (two) times a day. Using after albuterol neb.     tiotropium (SPIRIVA) 18 mcg inhalation capsule Place 18 mcg into inhaler and inhale Daily after lunch.     triamcinolone (KENALOG) 0.1 % ointment Apply topically 2 (two) times a day.       Objective  /72 (Patient Site: Right Arm, Patient Position: Sitting, Cuff Size: Adult Regular)  Pulse 100  Resp 18  Ht 5' 2\" (1.575 m)  Wt 170 lb (77.1 kg)  LMP  (LMP Unknown)  BMI 31.09 kg/m2    General Appearance:    Alert, cooperative, no distress, appears stated age   Head:    Normocephalic, without obvious abnormality, atraumatic   Throat:   Lips, mucosa, and tongue normal; teeth and gums normal   Neck:   Supple, symmetrical, trachea midline, no adenopathy;        thyroid:  No enlargement/tenderness/nodules; no carotid    bruit or JVD   Back:     " Symmetric, no curvature, ROM normal, no CVA tenderness   Lungs:     Clear to auscultation bilaterally, respirations unlabored   Chest wall:    No tenderness or deformity   Heart:    Regular rate and rhythm, S1 and S2 normal, no murmur, rub   or gallop   Abdomen:     Soft, non-tender, bowel sounds active all four quadrants,     no masses, no organomegaly   Extremities:   Normal, atraumatic, no cyanosis, 1-2/4 bipedal pitting edema   Pulses:   2+ and symmetric all extremities   Skin:   Skin color, texture, turgor normal, no rashes or lesions     Results    Lab Results personally reviewed   Lab Results   Component Value Date    CHOL 138 07/28/2017    CHOL 132 06/27/2017     Lab Results   Component Value Date    HDL 64 07/28/2017    HDL 63 06/27/2017     Lab Results   Component Value Date    LDLCALC 59 07/28/2017    LDLCALC 55 06/27/2017     Lab Results   Component Value Date    TRIG 77 07/28/2017    TRIG 70 06/27/2017     Lab Results   Component Value Date    WBC 8.9 07/23/2017    HGB 13.3 07/28/2017    HCT 32.7 (L) 07/23/2017     07/24/2017     Lab Results   Component Value Date    CREATININE 0.67 07/23/2017    BUN 11 07/23/2017     07/23/2017    K 3.8 07/23/2017    CO2 26 07/23/2017     Reviewed electrocardiogram sinus tachycardia with possible left atrial enlargement and no acute changes.    Review of Systems:   General: Weight Gain  Eyes: Visual Distubance  Ears/Nose/Throat: WNL  Lungs: Cough, Shortness of Breath, Wheezing  Heart: Shortness of Breath with activity, Leg Swelling  Stomach: WNL  Bladder: WNL  Muscle/Joints: Joint Pain, Muscle Weakness, Muscle Pain  Skin: WNL  Nervous System: WNL  Mental Health: WNL     Blood: WNL

## 2021-06-16 NOTE — PROGRESS NOTES
"Adalgisa Solano is a 85 y.o. female who is being evaluated via a billable video visit.      How would you like to obtain your AVS? MyChart.  If dropped from the video visit, the video invitation should be resent by: Send to e-mail at: [email or web address]  Will anyone else be joining your video visit? No      Video Start Time: 955        Video-Visit Details    Type of service:  Video Visit    Video End Time (time video stopped): 1010  Originating Location (pt. Location): Home    Distant Location (provider location):  [facility]     Platform used for Video Visit: [facility]     Assessment and plan: 85-year-old woman with difficult to control bronchiectasis, chronic hypoxic respiratory failure. She is doing reasonably well and has been able to stay out of the hospital but unfortunately has had recurrent flares. Provocative factor is not clear. She has understandable concerns about multiple courses of prednisone.    The burden of time from her respiratory treatments is high.    Recommendations  Continue supplemental oxygen with goal saturation of 89% or greater at rest. Use with activity as needed to prevent shortness of breath  Action plan with prednisone 20 for 5 days and 10 for 5 days  Extend doxycycline course to 2 weeks    Continue Trelegy, nebulizers, vest. We discussed decreasing the length of her vest time or going down to once a day if she tolerates.    Chief complaint: Bronchiectasis    HPI: 85-year-old woman with bronchiectasis with recurrent flares, chronic hypoxic respiratory failure here for follow-up. She has had two flares since her last visit. She denies sinus issues, reflux or heartburn, other exposures that cause issues. She and her  are fully vaccinated and are socially distancing. She works around the house and stays active.    History reviewed    On exam  Ht 5' 2\" (1.575 m)   Wt 149 lb (67.6 kg)   LMP  (LMP Unknown)   BMI 27.25 kg/m    Calm  No anxiety  No " tremor  Normal sclera  Normal pattern of breathing, no extrinsic muscle use  Normal voice

## 2021-06-16 NOTE — TELEPHONE ENCOUNTER
Telephone Encounter by Gerda Lundy RN at 1/11/2019  1:42 PM     Author: Gerda Lundy RN Service: -- Author Type: Registered Nurse    Filed: 1/11/2019  1:44 PM Encounter Date: 1/11/2019 Status: Signed    : Gerda Lundy RN (Registered Nurse)       [facility] Nurse Triage Telephone Note      Provider: STEVEN Velaqsuez  Facility: [facility]    Facility Type: U    Caller: Rachel   Call Back Number:  [phone]    Allergies: Amoxicillin; Levofloxacin; and Penicillins    Reason for call:      Verbal Order/Direction given by Provider: d/t pt's increasing WBC Azithromycin 500mg x1 then 250mg daily for 4 doses     Provider giving order: STEVEN Velasquez    Verbal order given to: Rachel Lundy RN

## 2021-06-17 NOTE — PROGRESS NOTES
Office Visit - Follow up    Adalgisa Solano   82 y.o. female    Date of Visit: 4/5/2018    Chief Complaint   Patient presents with     Back Pain     Hypertension     Edema     follow up feet still swollen     COPD       Subjective: Diabetes mellitus type II with hypertension and COPD.    One-month follow-up extreme short of breath and edema lower extremities.  Not much help with furosemide for the edema.  Laboratory tests for diabetes mellitus type 2 last done March 8, 2018.    Epidural spinal injection lumbar spine for back pain.  Osteoporosis diagnosis.  Seen by therapist.  40 oh recommended.  The patient was advised regarding salt restriction diet leg elevation and support stockings.  She is reluctant to use support stockings as they are so hard to get on.    No blood in stool or urine no chest pain but chronically short of breath.  Medication list reviewed well-tolerated allergy to penicillin levofloxacin and and amoxicillin.  She is currently a non-smoker but previously heavy smoker.  COPD.    ROS: A comprehensive review of systems was performed and was otherwise negative    Medications:  Prior to Admission medications    Medication Sig Start Date End Date Taking? Authorizing Provider   acetaminophen (TYLENOL) 325 MG tablet Take 650 mg by mouth every 6 (six) hours as needed for pain.   Yes PROVIDER, HISTORICAL   ADVAIR DISKUS 100-50 mcg/dose DISKUS Inhale 1 puff 2 (two) times a day. 3/13/18  Yes Jun Juarez MD   albuterol (PROVENTIL) 2.5 mg /3 mL (0.083 %) nebulizer solution Take 2.5 mg by nebulization 2 (two) times a day.   Yes PROVIDER, HISTORICAL   ARIPiprazole (ABILIFY) 5 MG tablet Take 5 mg by mouth bedtime.   Yes PROVIDER, HISTORICAL   aspirin 81 MG EC tablet Take 81 mg by mouth daily.   Yes PROVIDER, HISTORICAL   atorvastatin (LIPITOR) 20 MG tablet Take 1 tablet (20 mg total) by mouth bedtime. 3/13/18  Yes Anabel Marquez MD   calcium carbonate-vitamin D3 (CALCIUM WITH VITAMIN D) 600  mg(1,500mg) -400 unit per tablet Take 1 tablet by mouth 2 (two) times a day.   Yes PROVIDER, HISTORICAL   cholecalciferol, vitamin D3, 1,000 unit tablet Take 2,000 Units by mouth daily.    Yes PROVIDER, HISTORICAL   diltiazem (CARDIZEM CD) 120 MG 24 hr capsule Take 1 capsule (120 mg total) by mouth daily. 3/13/18  Yes Shai Ellington MD   furosemide (LASIX) 20 MG tablet Take 60 mg by mouth daily.   Yes PROVIDER, HISTORICAL   omeprazole (PRILOSEC) 20 MG capsule TAKE 1 CAPSULE DAILY. 2/13/18  Yes Shai Ellington MD   potassium chloride (KLOR-CON) 10 MEQ CR tablet Take 10 mEq by mouth 3 (three) times a day.  6/19/17  Yes PROVIDER, HISTORICAL   psyllium (METAMUCIL) 3.4 gram packet Take 1 packet by mouth daily.    Yes PROVIDER, HISTORICAL   QUEtiapine (SEROQUEL) 25 MG tablet Take 50 mg by mouth at bedtime.  8/10/16  Yes PROVIDER, HISTORICAL   sodium chloride 3 % nebulizer solution Take 3 mL by nebulization 2 (two) times a day. Using after albuterol neb. 4/19/17  Yes PROVIDER, HISTORICAL   tiotropium (SPIRIVA) 18 mcg inhalation capsule Place 18 mcg into inhaler and inhale Daily after lunch.   Yes PROVIDER, HISTORICAL   PROAIR HFA 90 mcg/actuation inhaler Inhale two (2) puff(s) every 4 to 6 hours as needed 3/1/18   Shai Ellington MD   triamcinolone (KENALOG) 0.1 % ointment Apply topically 2 (two) times a day.    PROVIDER, HISTORICAL       Allergies:   Allergies   Allergen Reactions     Amoxicillin Itching and Rash     Levofloxacin Itching and Rash     Penicillins Itching and Rash       Immunizations:   Immunization History   Administered Date(s) Administered     DT (pediatric) 03/29/2004     Influenza high dose, seasonal 10/09/2016, 11/16/2017     Influenza,seasonal quad, PF, 36+MOS 11/20/2014     Pneumo Polysac 23-V 10/18/1999, 08/01/2005     Td,adult,historic,unspecified 03/29/2004     Tdap 05/14/2013       Exam Chest clear to auscultation and percussion.  Heart tones regular rhythm without murmur rub or  gallop.  Abdomen soft nontender no organomegaly.  No peritoneal signs.  Extremities free of edema cyanosis or clubbing.  Neck veins nondistended no thyromegaly or scleral icterus noted, carotids full.  Skin warm and dry easily conversant good spirited.  Normal intelligence.  Neurologically intact no gross localizing findings.  O2 sats today 92% pulse 91 blood pressure excellent 122/62.  BMI elevated at 31.  Weight stable 168 pounds.  No change from previous.    Assessment and Plan  Diabetes mellitus type 2 with insulin resistance secondary to obesity.  BMI 31.    Multiple drug allergies as listed including penicillin and levofloxacin and amoxicillin.    Epidural spinal injection.  Lumbar back pain.    Chronic lymphedema not responsive to furosemide therapy okay to stop.  Suggest leg elevation salt restriction and support stockings.    COPD smoking related.  Currently a non-smoker.  Chronically short of breath.    Hypertension controlled.  122/62 no history of MI or stroke.    Borderline hypoxemia.  Off oxygen currently.    Hyperlipidemia on statin therapy no history of MI or stroke no history of xanthoma or xanthelasma.    Return to clinic 6 weeks time for office visit with fasting labs to include A1c blood sugar urine for microalbumin and lipid panel.    Time: total time spent with the patient was 25 minutes of which >50% was spent in counseling and coordination of care    The following high BMI interventions were performed this visit: encouragement to exercise    Shai Ellington MD    Patient Active Problem List   Diagnosis     Recurrent major depressive episodes     Chronic obstructive pulmonary disease     Lump Or Mass In Breast     Peripheral arterial disease     Type 2 diabetes mellitus without complication     Hypertension     Schizoaffective disorder, bipolar type     Arnold-Chiari malformation     Chronic diastolic heart failure     Chronic respiratory failure with hypoxia     Pulmonary hypertension

## 2021-06-17 NOTE — PATIENT INSTRUCTIONS - HE
Patient Instructions by Amairani Britton CNP at 5/7/2019 12:00 PM     Author: Amairani Britton CNP Service: -- Author Type: Nurse Practitioner    Filed: 5/7/2019 12:16 PM Encounter Date: 5/7/2019 Status: Signed    : Amairani Britton CNP (Nurse Practitioner)       Patient Education     Cellulitis  Cellulitis is an infection of the deep layers of skin. A break in the skin, such as a cut or scratch, can let bacteria under the skin. If the bacteria get to deep layers of the skin, it can be serious. If not treated, cellulitis can get into the bloodstream and lymph nodes. The infection can then spread throughout the body. This causes serious illness.  Cellulitis causes the affected skin to become red, swollen, warm, and sore. The reddened areas have a visible border. An open sore may leak fluid (pus). You may have a fever, chills, and pain.  Cellulitis is treated with antibiotics taken for 7 to 10 days. An open sore may be cleaned and covered with cool wet gauze. Symptoms should get better 1 to 2 days after treatment is started. Make sure to take all the antibiotics for the full number of days until they are gone. Keep taking the medicine even if your symptoms go away.  Home care  Follow these tips:    Limit the use of the part of your body with cellulitis.     If the infection is on your leg, keep your leg raised while sitting. This will help to reduce swelling.    Take all of the antibiotic medicine exactly as directed until it is gone. Do not miss any doses, especially during the first 7 days. Dont stop taking the medicine when your symptoms get better.    Keep the affected area clean and dry.    Wash your hands with soap and warm water before and after touching your skin. Anyone else who touches your skin should also wash his or her hands. Don't share towels.  Follow-up care  Follow up with your healthcare provider, or as advised. If your infection does not go away on the first antibiotic, your healthcare  provider will prescribe a different one.  When to seek medical advice  Call your healthcare provider right away if any of these occur:    Red areas that spread    Swelling or pain that gets worse    Fluid leaking from the skin (pus)    Fever higher of 100.4  F (38.0  C) or higher after 2 days on antibiotics  Date Last Reviewed: 9/1/2016 2000-2017 [employer]. [address] [place] [address]. All rights reserved. This information is not intended as a substitute for professional medical care. Always follow your healthcare professional's instructions.

## 2021-06-17 NOTE — PROGRESS NOTES
Admission History & Physical  Adalgisa Solano, 1935, [id number]    [facility]  Shai Ellington MD, [phone]    Extended Emergency Contact Information  Primary Emergency Contact: Anabel Solano  Address: [address] [place] [address] [facility]  Home Phone: [phone]  Relation: Spouse  Secondary Emergency Contact: DurhamKarlos   [place]  Mobile Phone: [phone]  Relation: Child     Assessment and Plan:   Assessment: Keratoma left foot  Plan: Debrided the hyperkeratotic lesion.  I recommended over-the-counter inserts  Active Problems:    * No active hospital problems. *      Chief Complaint:  Painful callus both feet     HPI:    Adalgisa Solano is a 82 y.o. old female who presented to the clinic today complaining of a painful callus on the bottom of both feet particularly underneath the small toe.  She indicated that she has had this problem for several years.  She has tried shoe modification.  She stated that the pain is aggravated with weightbearing and ambulation.  She denies any redness, swelling, drainage or bleeding.  The pain is relieved with nonweightbearing.  History is provided by patient    Medical History  Active Ambulatory (Non-Hospital) Problems    Diagnosis     Pulmonary hypertension     Chronic respiratory failure with hypoxia     Chronic diastolic heart failure     Type 2 diabetes mellitus without complication     Hypertension     Peripheral arterial disease     Schizoaffective disorder, bipolar type     Lump Or Mass In Breast     Recurrent major depressive episodes     Chronic obstructive pulmonary disease     Arnold-Chiari malformation     Past Medical History:   Diagnosis Date     Arm skin lesion, right      Arnold-Chiari malformation 1998     Breast lump      Chronic diastolic heart failure      COPD (chronic obstructive pulmonary disease)      Depression      GERD (gastroesophageal reflux disease)       Hyperlipidemia      Hypertension      Lumbar spinal stenosis      PAD (peripheral artery disease)      Patient Active Problem List    Diagnosis Date Noted     Pulmonary hypertension 2018     Chronic respiratory failure with hypoxia 2017     Chronic diastolic heart failure      Type 2 diabetes mellitus without complication 2016     Hypertension 2016     Peripheral arterial disease 10/28/2015     Schizoaffective disorder, bipolar type 2015     Lump Or Mass In Breast      Recurrent major depressive episodes      Chronic obstructive pulmonary disease      Arnold-Chiari malformation 1998     Surgical History  She  has a past surgical history that includes Breast cyst aspiration (Left, ); Cholecystectomy; Lumbar fusion (N/A, 2014); Back surgery; Laparoscopic incisional / umbilical / ventral hernia repair (Left, 3/27/2015);  section; Joint replacement; Eye surgery; pr total knee arthroplasty (Left, 10/7/2016); and Cervical spine surgery.   Past Surgical History:   Procedure Laterality Date     BACK SURGERY       BREAST CYST ASPIRATION Left      CERVICAL SPINE SURGERY      for arnold chiari malformation      SECTION       CHOLECYSTECTOMY       EYE SURGERY      bilateral cataracts     JOINT REPLACEMENT      knee - partial     LAPAROSCOPIC INCISIONAL / UMBILICAL / VENTRAL HERNIA REPAIR Left 3/27/2015    Procedure: ATTEMPTED LAPAROSCOPIC ABDOMINA/FLANK INCISION REPAIR;  Surgeon: Jose Lakhani MD;  Location: [facility];  Service:      LUMBAR FUSION N/A 2014    Procedure: POSTERIOR FUSION / DECOMPRESSION L3-S1 WITH PELVIC FIXATION BILATERAL ;  Surgeon: Rajan Mares MD;  Location: [facility] OR;  Service:      KS TOTAL KNEE ARTHROPLASTY Left 10/7/2016    Procedure: TOTAL KNEE ARTHROPLASTY, LEFT;  Surgeon: Kan Quesada MD;  Location: [facility] OR;  Service: Orthopedics    Social History  Reviewed, and she  reports that she quit smoking  "about 12 years ago. She has never used smokeless tobacco. She reports that she does not drink alcohol or use illicit drugs.  Social History   Substance Use Topics     Smoking status: Former Smoker     Quit date: 1/1/2006     Smokeless tobacco: Never Used      Comment: quit 2006     Alcohol use No      Allergies  Allergies   Allergen Reactions     Amoxicillin Itching and Rash     Levofloxacin Itching and Rash     Penicillins Itching and Rash    Family History  Reviewed, and family history includes Heart attack in her father and mother.   Psychosocial Needs  Social History     Social History Narrative     Additional psychosocial needs reviewed per nursing assessment.       Prior to Admission Medications     (Not in a hospital admission)        Review of Systems - Negative     Pulse 99  Resp 16  Ht 5' 2\" (1.575 m)  Wt 168 lb (76.2 kg)  LMP  (LMP Unknown)  SpO2 (!) 87%  BMI 30.73 kg/m2      Objective findings: General: The patient is alert and in no acute distress     Integument: Nails bilateral feet are normal length. There is a small hyperkeratotic lesion sub-fifth metatarsal head left foot.       Vascular: DP and PT pulses +2/4 bilateral feet.  Capillary refill less than 2 seconds bilateral feet.  There is brawny edema both lower extremities.     Neurologic: Negative clonus, negative Babinski bilateral feet.     Musculoskeletal: Range of motion within normal limits bilateral feet.     Assessment: Keratoma left foot, chronic lymphedema both lower extremities     Plan: I debrided the small lesion.  I recommended the patient try over-the-counter shoe inserts to give her some moderate relief of the discomfort as result of the small hyperkeratotic lesion.  She is to return to the clinic as needed.  "

## 2021-06-17 NOTE — PROGRESS NOTES
OFFICE VISIT NOTE    Subjective:   Chief Complaint:  Hospital Visit Follow Up (feeling better besides developing hoariness in voice that started today )    82-year-old woman is in for follow-up regarding recent hospitalization for a COPD exacerbation.  She still taking prednisone and tapering it.  Current dosage is 30 mg daily.  She is a diabetic has been controlling her sugars in the past with diet alone.  He denies any polyuria or polydipsia.  Minor sore throat and dryness.  No pleurisy.  She feels each day she is a little better.    Current Outpatient Prescriptions   Medication Sig     acetaminophen (TYLENOL) 325 MG tablet Take 650 mg by mouth every 6 (six) hours as needed for pain.     ADVAIR DISKUS 100-50 mcg/dose DISKUS Inhale 1 puff 2 (two) times a day.     albuterol (PROAIR HFA;PROVENTIL HFA;VENTOLIN HFA) 90 mcg/actuation inhaler Inhale 2 puffs every 4 (four) hours as needed for wheezing.     albuterol (PROVENTIL) 2.5 mg /3 mL (0.083 %) nebulizer solution Take 2.5 mg by nebulization 2 (two) times a day.     ARIPiprazole (ABILIFY) 5 MG tablet Take 5 mg by mouth bedtime.     aspirin 81 MG EC tablet Take 81 mg by mouth daily.     atorvastatin (LIPITOR) 20 MG tablet Take 20 mg by mouth at bedtime.     calcium carbonate-vitamin D3 (CALCIUM WITH VITAMIN D) 600 mg(1,500mg) -400 unit per tablet Take 1 tablet by mouth 2 (two) times a day.     cholecalciferol, vitamin D3, 1,000 unit tablet Take 2,000 Units by mouth daily.      dextromethorphan-guaiFENesin (ROBITUSSIN-DM)  mg/5 mL liquid Take 10 mL by mouth 3 (three) times a day as needed.     diltiazem (CARDIZEM CD) 120 MG 24 hr capsule Take 120 mg by mouth daily.     fluticasone-salmeterol (ADVAIR) 100-50 mcg/dose DISKUS Inhale 1 puff 2 (two) times a day.     furosemide (LASIX) 20 MG tablet Take 20 mg by mouth daily.      omeprazole (PRILOSEC) 20 MG capsule Take 20 mg by mouth daily before breakfast.     potassium chloride (KLOR-CON) 10 MEQ CR tablet Take 10  "mEq by mouth 2 (two) times a day.      predniSONE (DELTASONE) 10 mg tablet Take 4 tab oral QDay x 2 days then 3 tab QDay x 3 days then 2 tab QDay x 3 days then 1 tab QDay x 3 days then stop.     psyllium (METAMUCIL) 3.4 gram packet Take 1 packet by mouth daily.      QUEtiapine (SEROQUEL) 25 MG tablet Take 50 mg by mouth at bedtime.      sodium chloride 3 % nebulizer solution Take 3 mL by nebulization 2 (two) times a day. Using after albuterol neb.     SPIRIVA WITH HANDIHALER 18 mcg inhalation capsule use 1 capsule via inhaler 1 x a day     triamcinolone (KENALOG) 0.1 % ointment Apply 1 application topically 2 (two) times a day as needed.        PSFHx: Tobacco Status:  She  reports that she quit smoking about 12 years ago. She has never used smokeless tobacco.    Review of Systems:  A comprehensive review of systems is negative except for the comments above    Objective:    /82 (Patient Site: Left Arm, Patient Position: Sitting, Cuff Size: Adult Regular)  Pulse 90  Ht 5' 1\" (1.549 m)  Wt 172 lb (78 kg)  LMP  (LMP Unknown)  SpO2 92%  BMI 32.5 kg/m2  GENERAL: No acute distress.  Blood pressure is 126/62.  Pulse is 84.  Oxygen saturation 92% on room air.  No cyanosis.  Respiratory rate is about 14-16 and regular.  There is no labored breathing.  1-2+ edema of the lower legs.  Lungs have some rhonchi and wheezing.  Right side is worse than left.  No signs of any consolidation.  Heart exam shows a heart rate in the 80s-90.  No S3 gallop.  No JVD.  No ascites.  Mentally sharp and alert.    Assessment & Plan   Adalgisa Solano is a 82 y.o. female.    COPD exacerbation.  Slowly improving.  She continues with her prednisone taper.  I will check a random sugar to make sure the sugar is not too high on the prednisone.  Otherwise, medications will be the same.  She has an appointment to see her primary physician, Dr. Ellington in about 3 weeks.    Diagnoses and all orders for this visit:    COPD " exacerbation    Diet-controlled type 2 diabetes mellitus  -     Glucose    Pulmonary hypertension        The following high BMI interventions were performed this visit: encouragement to exercise    Geoff Tinsley MD  Transcription using voice recognition software, may contain typographical errors.

## 2021-06-17 NOTE — PATIENT INSTRUCTIONS - HE
Patient Instructions by Mandy Torrez NP at 3/4/2019  1:40 PM     Author: Mandy Torrez NP Service: -- Author Type: Nurse Practitioner    Filed: 3/4/2019  2:17 PM Encounter Date: 3/4/2019 Status: Addendum    : Mandy Torrez NP (Nurse Practitioner)    Related Notes: Original Note by Mandy Torrez NP (Nurse Practitioner) filed at 3/4/2019  2:17 PM       1-2 times per day  Remove the wraps  Wash the legs with soap and water  Apply lotion  Apply tubular compression  Apply short stretch    Elevate periodically throughout the day    Continue to walk and exercise as tolerated    Continue taking diuretic as prescribed    When the swelling is back down we will get you sized for new compression stockings    Comprilan Wrapping Instructions(short stretch Bandaging)          1. Before bandaging, carefully      Apply lotion into the skin.   Avoid any open sores  You may be instructed to use cotton padding,stockinette, or tubigrip as a first layer.    2.  Begin with two turns of the        short-stretch bandage(like a [place])       (Comprilan ) foot     No bandage tension...Do not pull it tight.        3.  Take the bandage into a         extended figure  eight          4. The bandage runs in figures of       eight 2-3 times around the upper       ankle and the foot.      5. Bandage the rest of the lower leg      In a circular method up to the knee.  Between the individual turns of bandage is especially important. The bandage should  be smoothed down well and creasing avoided.      The Comprilan bandages should NOT be placed in the washer or dryer but washed by hand and hung to dry.  Compression bandages (and compression garments - see below) used in the management of lymphedema should be properly washed on a regular basis, so skin cells and oils wont become trapped in the fibers of the bandages and damage the integrity of the textile. Compression bandages may be machine or hand washed; machine wash is  generally the preferred method. Once the bandages go through the spin cycle they are easy to hang and will dry much faster.  Daily washing is recommended, especially if lotions or creams are being used. If the bandages are machine washed it is recommended to place the unrolled bandages in a mesh laundry bag in order to protect the fabric during the washing cycle (the gentle cycle should be utilized).  Bandages are best washed in warm water (between 108 - 140oF); if the bandages are very dirty, they may be boil-washed up to 203oF.  It is best to have more than one set of bandages - one to wear, one to wash. The sets should be applied alternately to allow the build in elasticity of the bandages to recover and to prolong their effectiveness.  Tips for hand washing procedures:  1. Start by filling a bowl, bucket, sink, or small tub with water.   2. The compression bandages should be dipped gently into the water to dampen.   3. Add a small amount of washing solution (see below).   4. Let the compression bandages soak for a few minutes.   5. For better cleaning, gently rub the fibers of the compression bandages together without stretching them excessively.   6. Then, empty the tub and refill with water - dip or rinse the clean compression bandages thoroughly to rid the bandages of residual salts and oils from perspiration.   7. Gently squeeze the compression bandages to remove excess water.   8. Refer to the drying options below

## 2021-06-18 NOTE — PATIENT INSTRUCTIONS - HE
Patient Instructions by Shai Ellington MD at 12/28/2020 11:20 AM     Author: Shai Ellington MD Service: -- Author Type: Physician    Filed: 12/28/2020 11:45 AM Encounter Date: 12/28/2020 Status: Signed    : Shai Ellington MD (Physician)         Patient Education     Your Health Risk Assessment indicates you feel you are not in good physical health.    A healthy lifestyle helps keep the body fit and the mind alert. It helps protect you from disease, helps you fight disease, and helps prevent chronic disease (disease that doesn't go away) from getting worse. This is important as you get older and begin to notice twinges in muscles and joints and a decline in the strength and stamina you once took for granted. A healthy lifestyle includes good healthcare, good nutrition, weight control, recreation, and regular exercise. Avoid harmful substances and do what you can to keep safe. Another part of a healthy lifestyle is stay mentally active and socially involved.    Good healthcare     Have a wellness visit every year.     If you have new symptoms, let us know right away. Don't wait until the next checkup.     Take medicines exactly as prescribed and keep your medicines in a safe place. Tell us if your medicine causes problems.   Healthy diet and weight control     Eat 3 or 4 small, nutritious, low-fat, high-fiber meals a day. Include a variety of fruits, vegetables, and whole-grain foods.     Make sure you get enough calcium in your diet. Calcium, vitamin D, and exercise help prevent osteoporosis (bone thinning).     If you live alone, try eating with others when you can. That way you get a good meal and have company while you eat it.     Try to keep a healthy weight. If you eat more calories than your body uses for energy, it will be stored as fat and you will gain weight.     Recreation   Recreation is not limited to sports and team events. It includes any activity that provides relaxation,  interest, enjoyment, and exercise. Recreation provides an outlet for physical, mental, and social energy. It can give a sense of worth and achievement. It can help you stay healthy.       Patient Education   Understanding USDA MyPlate  The USDA (US Department of Agriculture) has guidelines to help you make healthy food choices. These are called MyPlate. MyPlate shows the food groups that make up healthy meals using the image of a place setting. Before you eat, think about the healthiest choices for what to put onto your plate or into your cup or bowl. To learn more about building a healthy plate, visit www.choosemyplate.gov.       The Food Groups    Fruits: Any fruit or 100% fruit juice counts as part of the Fruit Group. Fruits may be fresh, canned, frozen, or dried, and may be whole, cut-up, or pureed. Make half your plate fruits and vegetables.    Vegetables: Any vegetable or 100% vegetable juice counts as a member of the Vegetable Group. Vegetables may be fresh, frozen, canned, or dried. They can be served raw or cooked and may be whole, cut-up, or mashed. Make half your plate fruits and vegetables.     Grains: All foods made from grains are part of the Grains Group. These include wheat, rice, oats, cornmeal, and barley such as bread, pasta, oatmeal, cereal, tortillas, and grits. Grains should be no more than a quarter of your plate. At least half of your grains should be whole grains.    Protein: This group includes meat, poultry, seafood, beans and peas, eggs, processed soy products (like tofu), nuts (including nut butters), and seeds. Make protein choices no more than a quarter of your plate. Meat and poultry choices should be lean or low fat.    Dairy: All fluid milk products and foods made from milk that contain calcium, like yogurt and cheese are part of the Dairy Group. (Foods that have little calcium, such as cream, butter, and cream cheese, are not part of the group.) Most dairy choices should be low-fat  or fat-free.    Oils: These are fats that are liquid at room temperature. They include canola, corn, olive, soybean, and sunflower oil. Foods that are mainly oil include mayonnaise, certain salad dressings, and soft margarines. You should have only 5 to 7 teaspoons of oils a day. You probably already get this much from the food you eat.  Use [employer] to Help Build Your Meals  The SuperTracker can help you plan and track your meals and activity. You can look up individual foods to see or compare their nutritional value. You can get guidelines for what and how much you should eat. You can compare your food choices. And you can assess personal physical activities and see ways you can improve. Go to www.[employer].gov/supertracker/.    4287-0952 [employer]. [address] [place] [address]. All rights reserved. This information is not intended as a substitute for professional medical care. Always follow your healthcare professional's instructions.           Patient Education   Instrumental Activities of Daily Living  Your Health Risk Assessment indicates you have difficulties with instrumental activities of daily living which include laundry, housekeeping, banking, shopping, using the telephone, food preparation, transportation, or taking your own medications. Please make a follow up appointment for us to address this issue in more detail.    [employer] has resources available on the following website: https://www.healthEachpal.org/caregivers.html     Also, here is a local agency that provides help with meals and other assistance:   [facility] Line: [phone]     Patient Education   Signs of Hearing Loss  Hearing loss is a problem shared by many people. In fact, it is one of the most common health conditions, particularly as people age. Most people over age 65 have some hearing loss, and by age 80, almost everyone does. Because hearing loss usually occurs slowly over the years, you may  not realize your hearing ability has gotten worse.       Have your hearing checked  Contact your [facility] care provider if you:    Have to strain to hear normal conversation.    Have to watch other peoples faces very carefully to follow what theyre saying.    Need to ask people to repeat what theyve said.    Often misunderstand what people are saying.    Turn the volume of the television or radio up so high that others complain.    Feel that people are mumbling when theyre talking to you.    Find that the effort to hear leaves you feeling tired and irritated.    Notice, when using the phone, that you hear better with 1 ear than the other.    1931-2724 [employer]. [address] [place] [address], [place] [address]. All rights reserved. This information is not intended as a substitute for professional medical care. Always follow your healthcare professional's instructions.           Advance Directive  Patients advance directive was discussed and I am comfortable with the patients wishes.  Patient Education   Personalized Prevention Plan  You are due for the preventive services outlined below.  Your care team is available to assist you in scheduling these services.  If you have already completed any of these items, please share that information with your care team to update in your medical record.  Health Maintenance   Topic Date Due   ? HEART FAILURE ACTION PLAN  1935   ? COPD ACTION PLAN  1935   ? DIABETIC EYE EXAM  1935   ? ZOSTER VACCINES (1 of 2) 11/07/1987   ? MICROALBUMIN  03/08/2019   ? DIABETIC FOOT EXAM  05/21/2019   ? INFLUENZA VACCINE RULE BASED (1) 08/01/2020   ? BMP  08/12/2020   ? A1C  12/29/2020   ? ALT  02/01/2021   ? CBC  02/12/2021   ? LIPID  06/29/2021   ? MEDICARE ANNUAL WELLNESS VISIT  12/28/2021   ? FALL RISK ASSESSMENT  12/28/2021   ? TD 18+ HE  05/14/2023   ? ADVANCE CARE PLANNING  09/11/2024   ? DEXA SCAN  08/12/2029   ? TSH  Completed   ? SPIROMETRY  Completed   ?  Pneumococcal Vaccine: 65+ Years  Completed   ? DEPRESSION ACTION PLAN  Addressed   ? Pneumococcal Vaccine: Pediatrics (0 to 5 Years) and At-Risk Patients (6 to 64 Years)  Aged Out

## 2021-06-18 NOTE — PROGRESS NOTES
Office Visit - Follow up    Adalgisa Solano   82 y.o. female    Date of Visit: 6/25/2018    Chief Complaint   Patient presents with     COPD     Groin Pain     right       Subjective: Diabetes mellitus type 2 with COPD and right groin pain abdominal pain.    Diabetes mellitus type 2.  Latest A1c blood sugar lipid panel reviewed.  She is fasting for today's appointment.  She denies polyuria or polydipsia no symptoms of peripheral neuropathy.    Right groin pain with extension to the anterior surface of her thigh anterior medial surface there is no definite bulge she wonders about a strain she knows that it interferes with walking and ambulation.    COPD worse.  Patient is already on antibiotics for urinary tract infection recently diagnosed at urgent care.  This is Ceftin R3 100 mg twice a day.  Prednisone dose 5 mg daily for 14 days.  The patient also uses albuterol nebulizer.    Mammogram allCLEAR benign July 13, 2017 colonoscopy normal March 14, 2013 with colorectal surgery group presiding Dr. Bustos.  No blood in stool or urine no melena medication list reviewed well-tolerated normal effects.    ROS: A comprehensive review of systems was performed and was otherwise negative    Medications:  Prior to Admission medications    Medication Sig Start Date End Date Taking? Authorizing Provider   acetaminophen (TYLENOL) 325 MG tablet Take 650 mg by mouth every 6 (six) hours as needed for pain.   Yes PROVIDER, HISTORICAL   albuterol (PROVENTIL) 2.5 mg /3 mL (0.083 %) nebulizer solution Take 2.5 mg by nebulization 2 (two) times a day.   Yes PROVIDER, HISTORICAL   ARIPiprazole (ABILIFY) 5 MG tablet Take 5 mg by mouth bedtime.   Yes PROVIDER, HISTORICAL   aspirin 81 MG EC tablet Take 81 mg by mouth daily.   Yes PROVIDER, HISTORICAL   atorvastatin (LIPITOR) 20 MG tablet Take 20 mg by mouth at bedtime.   Yes PROVIDER, HISTORICAL   calcium carbonate-vitamin D3 (CALCIUM WITH VITAMIN D) 600 mg(1,500mg) -400 unit per tablet  Take 1 tablet by mouth 2 (two) times a day.   Yes PROVIDER, HISTORICAL   cefdinir (OMNICEF) 300 MG capsule Take 1 capsule (300 mg total) by mouth 2 (two) times a day for 7 days. 6/23/18 6/30/18 Yes Johnathan Mcgee MD   cholecalciferol, vitamin D3, 1,000 unit tablet Take 2,000 Units by mouth daily.    Yes PROVIDER, HISTORICAL   dextromethorphan-guaiFENesin (ROBITUSSIN-DM)  mg/5 mL liquid Take 10 mL by mouth 3 (three) times a day as needed.   Yes PROVIDER, HISTORICAL   diltiazem (CARDIZEM CD) 120 MG 24 hr capsule Take 1 capsule (120 mg total) by mouth daily. 6/18/18  Yes Shai Ellington MD   fluticasone-salmeterol (ADVAIR DISKUS) 250-50 mcg/dose DISKUS Inhale 1 puff 2 (two) times a day. 6/4/18  Yes Jun Juarez MD   furosemide (LASIX) 20 MG tablet Take 20 mg by mouth daily.    Yes PROVIDER, HISTORICAL   gabapentin (NEURONTIN) 100 MG capsule Take 100 mg by mouth 3 (three) times a day. 5/21/18  Yes Shai Ellington MD   omeprazole (PRILOSEC) 20 MG capsule TAKE 1 CAPSULE DAILY. 5/17/18  Yes Shai Ellington MD   potassium chloride (KLOR-CON) 10 MEQ CR tablet Take 10 mEq by mouth daily.  6/19/17  Yes PROVIDER, HISTORICAL   predniSONE (DELTASONE) 5 MG tablet Take 1 tablet (5 mg total) by mouth daily for 14 days. 6/19/18 7/3/18 Yes Jun Juarez MD   psyllium (METAMUCIL) 3.4 gram packet Take 1 packet by mouth daily.    Yes PROVIDER, HISTORICAL   QUEtiapine (SEROQUEL) 25 MG tablet Take 50 mg by mouth at bedtime.  8/10/16  Yes PROVIDER, HISTORICAL   sodium chloride 3 % nebulizer solution Take 3 mL by nebulization 2 (two) times a day. Using after albuterol neb. 4/19/17  Yes PROVIDER, HISTORICAL   SPIRIVA WITH HANDIHALER 18 mcg inhalation capsule use 1 capsule via inhaler 1 x a day 5/2/18  Yes Jun Juarez MD   predniSONE (DELTASONE) 20 MG tablet Take 2 tabs daily for 5 days then 1 tab daily for 5 days. 6/4/18 6/25/18 Yes Jun Juarez MD   albuterol (PROAIR HFA;PROVENTIL  HFA;VENTOLIN HFA) 90 mcg/actuation inhaler Inhale 2 puffs every 4 (four) hours as needed for wheezing.    PROVIDER, HISTORICAL   triamcinolone (KENALOG) 0.1 % ointment Apply 1 application topically 2 (two) times a day as needed.     PROVIDER, HISTORICAL       Allergies:   Allergies   Allergen Reactions     Amoxicillin Itching and Rash     Levofloxacin Itching and Rash     Penicillins Itching and Rash       Immunizations:   Immunization History   Administered Date(s) Administered     DT (pediatric) 03/29/2004     Influenza high dose, seasonal 10/20/2015, 10/09/2016, 11/16/2017     Influenza,seasonal quad, PF, 36+MOS 11/20/2014     Pneumo Conj 13-V (2010&after) 10/20/2015     Pneumo Polysac 23-V 10/18/1999, 08/01/2005     Td,adult,historic,unspecified 03/29/2004     Tdap 05/14/2013       Exam Chest clear to auscultation and percussion.  Heart tones regular rhythm without murmur rub or gallop.  Abdomen soft nontender no organomegaly.  No peritoneal signs.  Extremities free of edema cyanosis or clubbing.  Neck veins nondistended no thyromegaly or scleral icterus noted, carotids full.  Skin warm and dry easily conversant good spirited.  Normal intelligence.  Neurologically intact no gross localizing findings.  Prescription for squeaky rales noted anteriorly and posteriorly chest.  The patient's O2 sats 91% blood pressure 120/60 pulse 93 and regular.  Weight down 6 pounds.  Legs are edematous.    Assessment and Plan  Diabetes mellitus type 2 check A1c blood sugar lipid panel today.    COPD with mild exacerbation continue same meds and cares.  Antibiotic prescribed recently for UTI Ceftin are 300 mg twice a day may be effective for any infective organisms in the bronchial tubes.  Already on prednisone and albuterol inhaler.  Patient recently seen by Dr. Juarez from pulmonary medicine on June 4 or 14, 2018.    Right groin pain may be inguinal hernia with entrapment or incarceration suggest general surgery consultation.   Last colonoscopy normal March 14, 2013.    Multiple drug allergies including penicillin levofloxacin and amoxicillin.  Ceftin R which patient currently is on his well-tolerated.  Urinary tract infection recently diagnosed June 23, 2018 on Ceftin R.  Continue same RTC 1 week.    Time: total time spent with the patient was 40 minutes of which >50% was spent in counseling and coordination of care    The following high BMI interventions were performed this visit: encouragement to exercise    Shai Ellington MD    Patient Active Problem List   Diagnosis     Recurrent major depressive episodes (H)     Chronic obstructive pulmonary disease (H)     Lump Or Mass In Breast     Peripheral arterial disease (H)     Type 2 diabetes mellitus without complication (H)     Hypertension     Schizoaffective disorder, bipolar type (H)     Arnold-Chiari malformation (H)     Chronic diastolic heart failure (H)     Chronic respiratory failure with hypoxia (H)     Pulmonary hypertension     COPD (chronic obstructive pulmonary disease) (H)     COPD exacerbation (H)     Acute and chronic respiratory failure with hypoxia (H)     Diet-controlled type 2 diabetes mellitus (H)

## 2021-06-18 NOTE — PROGRESS NOTES
CC: vaginal bleeding    HPI    Patient is here for having seen small blood in the toilet with urination yesterday. She noted 4 episodes. Other than feeling feverish, she denied pain with urination, increased urinary frequency nor urgency. NO abnormal vaginal discharge, vaginal itching nor burning. No new cough, chest pain, shortness of breath, abdominal pain. No hx of renal calculus. She uses 1 L home oxygen during the day, and 1-2 L at night. Currently she is using 1 L. She walked from the parking lot to clinic and said this kind of walking can drop her oxygen level. She said her breathing is at her baseline.     ROS: Pertinent ROS noted in HPI.     Allergies   Allergen Reactions     Amoxicillin Itching and Rash     Levofloxacin Itching and Rash     Penicillins Itching and Rash       Patient Active Problem List   Diagnosis     Recurrent major depressive episodes (H)     Chronic obstructive pulmonary disease (H)     Lump Or Mass In Breast     Peripheral arterial disease (H)     Type 2 diabetes mellitus without complication (H)     Hypertension     Schizoaffective disorder, bipolar type (H)     Arnold-Chiari malformation (H)     Chronic diastolic heart failure (H)     Chronic respiratory failure with hypoxia (H)     Pulmonary hypertension     COPD (chronic obstructive pulmonary disease) (H)     COPD exacerbation (H)     Acute and chronic respiratory failure with hypoxia (H)     Diet-controlled type 2 diabetes mellitus (H)       Family History   Problem Relation Age of Onset     Heart attack Mother      Heart attack Father        Social History     Social History     Marital status:      Spouse name: N/A     Number of children: N/A     Years of education: N/A     Occupational History     Not on file.     Social History Main Topics     Smoking status: Former Smoker     Quit date: 1/1/2006     Smokeless tobacco: Never Used      Comment: quit 2006     Alcohol use No     Drug use: No     Sexual activity: Not on  file     Other Topics Concern     Not on file     Social History Narrative     Past Surgical History:   Procedure Laterality Date     BACK SURGERY       BREAST CYST ASPIRATION Left      CERVICAL SPINE SURGERY      for arnold chiari malformation      SECTION       CHOLECYSTECTOMY       EYE SURGERY      bilateral cataracts     JOINT REPLACEMENT      knee - partial     LAPAROSCOPIC INCISIONAL / UMBILICAL / VENTRAL HERNIA REPAIR Left 3/27/2015    Procedure: ATTEMPTED LAPAROSCOPIC ABDOMINA/FLANK INCISION REPAIR;  Surgeon: Jose Lakhani MD;  Location: [facility];  Service:      LUMBAR FUSION N/A 2014    Procedure: POSTERIOR FUSION / DECOMPRESSION L3-S1 WITH PELVIC FIXATION BILATERAL ;  Surgeon: Rajan Mares MD;  Location: [facility] OR;  Service:      [place] TOTAL KNEE ARTHROPLASTY Left 10/7/2016    Procedure: TOTAL KNEE ARTHROPLASTY, LEFT;  Surgeon: Kan Quesada MD;  Location: [facility];  Service: Orthopedics         Objective:      Vitals:    18 1034 18 1049   BP: 106/60    Patient Site: Right Arm    Patient Position: Sitting    Cuff Size: Adult Regular    Pulse: (!) 111 (!) 112   Resp: 18    Temp: 99.4  F (37.4  C)    TempSrc: Oral    SpO2: (!) 83% (!) 89%   Weight: 162 lb 11.2 oz (73.8 kg)      Repeat pulse before discharge = 105    Gen: well appearing  Oropharynx:moist mucus membranes  CV: RRR, no M, R, G  Pulm: CTAB, normal effort  Abd: normal bowel sounds, soft, no pain, no mass  MKS: normal inspection of back, normal palpation of back, no CVA tenderness.  : normal external genitalia, no vaginal discharge, no vaginal bleeding, cervix unremarkable, exam done in presence of Chantel.    Recent Results (from the past 24 hour(s))   Urinalysis-UC if Indicated   Result Value Ref Range    Color, UA Yellow Colorless, Yellow, Straw, Light Yellow    Clarity, UA Cloudy (!) Clear    Glucose, UA Negative Negative    Bilirubin, UA Negative Negative    Ketones, UA Negative  Negative    Specific Mona, UA 1.015 1.005 - 1.030    Blood, UA Large (!) Negative    pH, UA 8.5 (H) 5.0 - 8.0    Protein,  mg/dL (!) Negative mg/dL    Urobilinogen, UA 0.2 E.U./dL 0.2 E.U./dL, 1.0 E.U./dL    Nitrite, UA Negative Negative    Leukocytes, UA Large (!) Negative    Bacteria, UA Moderate (!) None Seen hpf    RBC, UA >100 (!) None Seen, 0-2 hpf    WBC, UA >100 (!) None Seen, 0-5 hpf    Squam Epithel, UA None Seen None Seen, 0-5 lpf         Urinary tract infection - no evidence of pyelo on exam. Will treat outpatient, with advice for close f/u. She said she has an appt with PCP on Monday.  -     cefdinir (OMNICEF) 300 MG capsule; Take 1 capsule (300 mg total) by mouth 2 (two) times a day for 7 days.    Hematuria  -     Urinalysis-UC if Indicated  -     Culture, Urine        Close follow up as directed.

## 2021-06-18 NOTE — LETTER
Letter by Faby Ku NP at      Author: Faby Ku NP Service: -- Author Type: --    Filed:  Encounter Date: 2019 Status: (Other)         Patient: Adalgisa Solano   MR Number: [id number]   YOB: 1935   Date of Visit: 2019                 [facility]    DATE: 2019    NAME:  Adalgisa Solano             :  1935  MRN: [id number]  CODE STATUS:  DNR    VISIT TYPE: Problem Visit (leukocytosis)     FACILITY:  [facility] [[id number]]       CHIEF COMPLAIN/REASON FOR VISIT:    Chief Complaint   Patient presents with   ? Problem Visit     leukocytosis               HISTORY OF PRESENT ILLNESS: Adalgisa Solano is a 83 y.o. female who was admitted - for left leg pain and  unable to care for her at home. She was found to have left femoral neck fracture on CT. She was treated for acute on chronic CHF and severe cOPD exacerbation. She was diuresed with IV lasix and transitioned to PO. She was followed by pulmonology and cardiology for stabilization prior to surgery. Her xarelto was held and bridged with lovenox until surgery was performed on . Postop on  she had increased shortness of breath and weakness and lasix was increased. She was put on prednisone taper for copd exacerbation. She did have sputum cultures positive for pseudomonas and klebsiella during stay and completed doxycycline on . She was started on cefepime on  and completed 7 days. She also had acute hyponatremia that improved with fluid status stabilization. She has PMH of DVT 1 month prior to hospital stay on xarelto, PAF, COPD, chronic respiratory failure on 1LNC, CHF, avascular necrosis of Left hip, chronic lymphedema, recurrent pneumonias. Prior to this she lived with her .     Today Ms. Solano states she is still coughing a lot. She feels very congested and feels her shortness of breath is not improving much. She  denies any fevers or chills. She says she is not having any urinary symptoms or other signs of infection. She does say she gets pneumonia frequently. She is sleeping ok and has no other concerns. She says she just needs to keep getting her nebs on schedule and that helps a lot. She does feel like she might have pneumonia at this time with how congested she is. She is coughing up yellow phlegm. Her  Francis is at bedside and says she does get pneumonia frequently and her left side usually is worse than her right. He says they were wondering why she was having all these blood tests and urine tests. He says the nurse last night told them that she had an elevated red blood cell count and they didn't understand this. Discussed her lab results and orders from last few days and both were appreciative and all questions answered.     REVIEW OF SYSTEMS:  PROBLEMS AND REVIEW OF SYSTEMS:   Review of Systems  Today on ROS:   Currently, no fever, chills, or rigors. Decreased vision and hearing. Denies any chest pain, headaches, palpitations, lightheadedness, dizziness. Appetite is good. Denies any GERD symptoms. Denies any difficulty with swallowing, nausea, or vomiting.   Denies any urinary symptoms. No active bleeding. No rash. positive for chronic o2 1-2 LNC at home, left leg and back pain, shortness of breath, congested cough, lymphedema and wraps      Allergies   Allergen Reactions   ? Amoxicillin Itching and Rash   ? Levofloxacin Itching and Rash   ? Penicillins Itching and Rash     Has tolerated ceftriaxone.     Current Outpatient Medications   Medication Sig   ? acetaminophen (TYLENOL) 500 MG tablet Take 2 tablets (1,000 mg total) by mouth 3 (three) times a day.   ? ADVAIR DISKUS 250-50 mcg/dose DISKUS INHALE 1 PUFF 2 (TWO) TIMES A DAY.   ? albuterol (PROVENTIL) 2.5 mg /3 mL (0.083 %) nebulizer solution 3 ML INHALATION FOUR TIMES A DAY AS NEEDED USE BEFORE NEBULIZED SALINE. FOR SHORTNESS OF BREATH.   ? ARIPiprazole  (ABILIFY) 5 MG tablet TAKE 1 TABLET EVERY NIGHT AT BEDTIME   ? aspirin 81 MG EC tablet Take 81 mg by mouth daily.   ? atorvastatin (LIPITOR) 20 MG tablet TAKE 1 TABLET (20 MG TOTAL) BY MOUTH BEDTIME.   ? calcium carbonate-vitamin D3 (CALCIUM WITH VITAMIN D) 600 mg(1,500mg) -400 unit per tablet Take 1 tablet by mouth 2 (two) times a day.   ? cholecalciferol, vitamin D3, 1,000 unit tablet Take 2,000 Units by mouth daily.    ? diltiazem (CARDIZEM CD) 180 MG 24 hr capsule Take 1 capsule (180 mg total) by mouth 2 (two) times a day.   ? docusate sodium (COLACE) 100 MG capsule Take 200 mg by mouth daily.   ? ferrous sulfate 325 (65 FE) MG tablet Take 1 tablet by mouth daily with breakfast.   ? furosemide (LASIX) 40 MG tablet Take 1 tablet (40 mg total) by mouth 2 (two) times a day at 9am and 6pm.   ? guaiFENesin ER (MUCINEX) 600 mg 12 hr tablet Take 1,200 mg by mouth 2 (two) times a day.   ? HYDROmorphone (DILAUDID) 2 MG tablet Take 0.5-1 tablets (1-2 mg total) by mouth every 6 (six) hours as needed.   ? ipratropium-albuterol (DUO-NEB) 0.5-2.5 mg/3 mL nebulizer Take 3 mL by nebulization every 4 (four) hours as needed (shortness of breath and wheezing). (Patient taking differently: Take 3 mL by nebulization 3 (three) times a day.       )   ? lidocaine 4 % patch Place 1 patch on the skin daily. Remove and discard patch with 12 hours or as directed by MD.   ? magnesium oxide (MAG-OX) 400 mg (241.3 mg magnesium) tablet Take 1 tablet (400 mg total) by mouth daily.   ? omeprazole (PRILOSEC) 20 MG capsule Take 20 mg by mouth daily before breakfast.   ? polyethylene glycol (MIRALAX) 17 gram packet Take 17 g by mouth daily.   ? potassium chloride (KLOR-CON) 10 MEQ CR tablet TAKE 1 TABLET (10 MEQ TOTAL) BY MOUTH DAILY.   ? PROAIR HFA 90 mcg/actuation inhaler INHALE 2 PUFFS EVERY 4 (FOUR) HOURS AS NEEDED FOR WHEEZING.   ? QUEtiapine (SEROQUEL) 25 MG tablet TAKE 2 TABLETS AT BEDTIME=50MG   ? rivaroxaban 15 mg Tab Take 1 tablet (15 mg  total) by mouth 2 (two) times a day with meals for 21 days. Change to Xarelto 20 mg daily on 1/25/2019   ? sodium chloride 3 % nebulizer solution TAKE 4 ML BY NEBULIZATION 2 (TWO) TIMES A DAY. USING AFTER ALBUTEROL NEB. (Patient taking differently: Take 4 mL by nebulization 3 (three) times a day. Using after albuterol neb.      )   ? SPIRIVA WITH HANDIHALER 18 mcg inhalation capsule USE 1 CAPSULE VIA INHALER 1 X A DAY (Patient taking differently: daily prn)     Past Medical History:    Past Medical History:   Diagnosis Date   ? Acute and chronic respiratory failure with hypoxia (H)    ? Arm skin lesion, right    ? Arnold-Chiari malformation (H) 1998   ? Atrial fibrillation (H) 02/2017   ? Breast cyst    ? Breast lump    ? Chronic diastolic heart failure (H)     diastolic chf   ? Chronic respiratory failure with hypoxia (H) 3/13/2017   ? COPD (chronic obstructive pulmonary disease) (H)    ? COPD exacerbation (H)    ? Depression    ? Diet-controlled type 2 diabetes mellitus (H)    ? GERD (gastroesophageal reflux disease)    ? Hyperlipidemia    ? Hypertension    ? Lumbar spinal stenosis    ? Peripheral arterial disease (H) 10/28/2015   ? Pulmonary hypertension (H) 3/5/2018   ? Recurrent major depressive episodes (H)     Created by Conversion  Replacement Utility updated for latest IMO load   ? Schizoaffective disorder, bipolar type (H) 6/11/2015   ? Type 2 diabetes mellitus without complication (H) 8/2/2016           PHYSICAL EXAMINATION  Vitals:    01/10/19 2157   BP: 143/57   Pulse: (!) 108   Resp: 24   Temp: 97.7  F (36.5  C)   SpO2: 96%   Weight: 157 lb (71.2 kg)       Today on physical exam:     GENERAL: Awake, Alert, oriented x3, not in any form of acute distress, answers questions appropriately, follows simple commands, conversant  HEENT: Head is normocephalic with normal hair distribution. No evidence of trauma. Ears: No acute purulent discharge. Eyes: Conjunctivae pink with no scleral jaundice. Nose: Normal  mucosa and septum. NECK: Supple with no cervical or supraclavicular lymphadenopathy. Trachea is midline.   CHEST: No tenderness or deformity, no crepitus  LUNG: dim with scattered rhonchi, fine crackles to bases, Left worse than right to auscultation with good chest expansion. normal AP diameter. Congested cough, productive with yellow sputum  BACK: No kyphosis of the thoracic spine. Symmetric, no curvature, ROM normal, no CVA tenderness, no spinal tenderness   CVS: irregularly irregular rhythm, there are no murmurs, rubs, gallops, or heaves,  2+ pulses symmetric in all extremities.  ABDOMEN: Rounded and soft, nontender to palpation, no masses, no organomegaly, good bowel sounds, no rebound or guarding, no peritoneal signs.   EXTREMITIES: 2+ LLE pedal edema, 1+ rle, lymphedema wraps, left hip incision c/d/i  SKIN: Warm and dry  NEUROLOGICAL: The patient is oriented to person, place and time.             LABS:   Recent Results (from the past 168 hour(s))   POCT Glucose   Result Value Ref Range    Glucose,  mg/dL   POCT Glucose   Result Value Ref Range    Glucose,  mg/dL   Basic Metabolic Panel   Result Value Ref Range    Sodium 136 136 - 145 mmol/L    Potassium 3.9 3.5 - 5.0 mmol/L    Chloride 98 98 - 107 mmol/L    CO2 31 22 - 31 mmol/L    Anion Gap, Calculation 7 5 - 18 mmol/L    Glucose 119 70 - 125 mg/dL    Calcium 8.1 (L) 8.5 - 10.5 mg/dL    BUN 10 8 - 28 mg/dL    Creatinine 0.57 (L) 0.60 - 1.10 mg/dL    GFR MDRD Af Amer >60 >60 mL/min/1.73m2    GFR MDRD Non Af Amer >60 >60 mL/min/1.73m2   Magnesium   Result Value Ref Range    Magnesium 1.6 (L) 1.8 - 2.6 mg/dL   POCT Glucose   Result Value Ref Range    Glucose,  mg/dL   POCT Glucose   Result Value Ref Range    Glucose,  mg/dL   Crossmatch   Result Value Ref Range    Crossmatch Compatible     Blood Expiration Date [id number]     Unit Type A Pos     Unit Number [id number]     Status Released     Component Red Blood Cells      PRODUCT CODE A1034S60     Blood Type 6200     CODING SYSTEM [id number]    Basic Metabolic Panel   Result Value Ref Range    Sodium 135 (L) 136 - 145 mmol/L    Potassium 3.9 3.5 - 5.0 mmol/L    Chloride 97 (L) 98 - 107 mmol/L    CO2 28 22 - 31 mmol/L    Anion Gap, Calculation 10 5 - 18 mmol/L    Glucose 154 (H) 70 - 125 mg/dL    Calcium 8.4 (L) 8.5 - 10.5 mg/dL    BUN 10 8 - 28 mg/dL    Creatinine 0.58 (L) 0.60 - 1.10 mg/dL    GFR MDRD Af Amer >60 >60 mL/min/1.73m2    GFR MDRD Non Af Amer >60 >60 mL/min/1.73m2   Magnesium   Result Value Ref Range    Magnesium 1.7 (L) 1.8 - 2.6 mg/dL   HM1 (CBC with Diff)   Result Value Ref Range    WBC 12.2 (H) 4.0 - 11.0 thou/uL    RBC 3.43 (L) 3.80 - 5.40 mill/uL    Hemoglobin 8.9 (L) 12.0 - 16.0 g/dL    Hematocrit 27.9 (L) 35.0 - 47.0 %    MCV 81 80 - 100 fL    MCH 25.9 (L) 27.0 - 34.0 pg    MCHC 31.9 (L) 32.0 - 36.0 g/dL    RDW 19.1 (H) 11.0 - 14.5 %    Platelets 504 (H) 140 - 440 thou/uL    MPV 10.3 8.5 - 12.5 fL    Neutrophils % 85 (H) 50 - 70 %    Lymphocytes % 8 (L) 20 - 40 %    Monocytes % 7 2 - 10 %    Eosinophils % 0 0 - 6 %    Basophils % 0 0 - 2 %    Neutrophils Absolute 10.2 (H) 2.0 - 7.7 thou/uL    Lymphocytes Absolute 1.0 0.8 - 4.4 thou/uL    Monocytes Absolute 0.9 0.0 - 0.9 thou/uL    Eosinophils Absolute 0.1 0.0 - 0.4 thou/uL    Basophils Absolute 0.0 0.0 - 0.2 thou/uL   HM1 (CBC with Diff)   Result Value Ref Range    WBC 14.7 (H) 4.0 - 11.0 thou/uL    RBC 3.46 (L) 3.80 - 5.40 mill/uL    Hemoglobin 8.9 (L) 12.0 - 16.0 g/dL    Hematocrit 28.4 (L) 35.0 - 47.0 %    MCV 82 80 - 100 fL    MCH 25.7 (L) 27.0 - 34.0 pg    MCHC 31.3 (L) 32.0 - 36.0 g/dL    RDW 19.9 (H) 11.0 - 14.5 %    Platelets 544 (H) 140 - 440 thou/uL    MPV 10.0 8.5 - 12.5 fL    Neutrophils % 80 (H) 50 - 70 %    Lymphocytes % 10 (L) 20 - 40 %    Monocytes % 8 2 - 10 %    Eosinophils % 2 0 - 6 %    Basophils % 0 0 - 2 %    Neutrophils Absolute 11.6 (H) 2.0 - 7.7 thou/uL    Lymphocytes Absolute 1.5 0.8 - 4.4  thou/uL    Monocytes Absolute 1.2 (H) 0.0 - 0.9 thou/uL    Eosinophils Absolute 0.2 0.0 - 0.4 thou/uL    Basophils Absolute 0.1 0.0 - 0.2 thou/uL   Urinalysis   Result Value Ref Range    Color, UA Straw Colorless, Yellow, Straw, Light Yellow    Clarity, UA Clear Clear    Glucose, UA Negative Negative    Bilirubin, UA Negative Negative    Ketones, UA Negative Negative    Specific Gravity, UA 1.003 1.001 - 1.030    Blood, UA Negative Negative    pH, UA 6.5 4.5 - 8.0    Protein, UA Negative Negative mg/dL    Urobilinogen, UA <2.0 E.U./dL <2.0 E.U./dL, 2.0 E.U./dL    Nitrite, UA Negative Negative    Leukocytes, UA Negative Negative     Results for orders placed or performed in visit on 01/08/19   Basic Metabolic Panel   Result Value Ref Range    Sodium 135 (L) 136 - 145 mmol/L    Potassium 3.9 3.5 - 5.0 mmol/L    Chloride 97 (L) 98 - 107 mmol/L    CO2 28 22 - 31 mmol/L    Anion Gap, Calculation 10 5 - 18 mmol/L    Glucose 154 (H) 70 - 125 mg/dL    Calcium 8.4 (L) 8.5 - 10.5 mg/dL    BUN 10 8 - 28 mg/dL    Creatinine 0.58 (L) 0.60 - 1.10 mg/dL    GFR MDRD Af Amer >60 >60 mL/min/1.73m2    GFR MDRD Non Af Amer >60 >60 mL/min/1.73m2         Lab Results   Component Value Date    WBC 14.7 (H) 01/10/2019    HGB 8.9 (L) 01/10/2019    HCT 28.4 (L) 01/10/2019    MCV 82 01/10/2019     (H) 01/10/2019       No results found for: [id number]  Lab Results   Component Value Date    HGBA1C 6.8 (H) 11/09/2018     Lab Results   Component Value Date    INR 1.63 (H) 11/23/2018    INR 1.19 (H) 11/08/2018    INR 0.95 02/14/2017     Vitamin D, Total (25-Hydroxy)   Date Value Ref Range Status   01/04/2019 39.6 30.0 - 80.0 ng/mL Final     Lab Results   Component Value Date    TSH 1.33 11/08/2018           ASSESSMENT/PLAN:    1. Left femoral neck fracture, s/p REGINA: Incision c/d/i. Tylenol three times a day, lidoderm patch, dilaudid prn. Pain controlled. F/u with ortho Dr. Lackey 1/23. PT, OT. 2+ lle, 1+ rle. Lymphedema wraps.   2. COPD: On  advair two times a day, albuterol four times a day prn.  changed duonebs and 3% saline nebs to three times a day and prn on 1/7 but hypertonic saline order was missed, just started today. spiriva prn per her request. Educated her on long v short acting meds, but insisting cannot take spiriva while on duonebs. Prednisone taper. proair prn. Completed doxycycline on 12/28. mucinex x 7 days and will order cxr if temp >100.5. WBC 12.2 on 1/8, will recheck on 1/10-14.7. CXR today due to persistent congestion, rales, shortness of breath, cough, hypoxia, leukocytosis.   3. Chronic CHF: Daily [id number]. On lasix two times a day. Shortness of breath stable. Edema 2+ lle, 1+ rle, lymphedema wraps. Stable. Monitor.   4. H/o DVT: On xarelto.   5. HLD: On aspirin, lipitor  6. PAF: Rate controlled 80s. On cardizem, xarelto.   7. Constipation: On colace daily, miralax daily  8. Iron deficiency anemia: started iron for hg of 8.9 on 1/8. Will recheck on 1/10-8.9. Continue iron for now.   9. GERD: On omeprazole.   10. Hypokalemia: On kcl. K 3.9 on 1/8.   11. Hypomagnesemia: On mag ox. MG 1.7 on 1/8. Stable.   12. Schizoaffective disorder, bipolar type: On seroquel and abilify. Follows with psych.   13. Osteoporosis: On calcium, vitamin d.   14. Type 2 DM: controlled with diet. Monitoring on labs and stable.   15. Leukocytosis: WBC 11-12-14.6. UA ordered yesterday, CXR ordered today. Repeat hm1 on 1/14.     Per therapy soft LCD 1/21, mod for dressing, ambulates 40 feet with cga.     Electronically signed by: Faby Ku NP    Total 35  minutes of which 55% was spent in counseling and coordination of care of the above plan    Time spent in interview and examination of patient, review of available records, and discussion with nursing staff. Continue care plan, efforts at therapy, and monitor nutritional status.

## 2021-06-18 NOTE — LETTER
Letter by Trish Ferreira RN at      Author: Trish Ferreira RN Service: -- Author Type: --    Filed:  Encounter Date: 2/4/2019 Status: (Other)       Adalgisa Solano  [address] [facility] [address]      February 4, 2019       Dear Adalgisa Solano,  Thank you for your time in discussing our [employer] [facility] Monitoring Program today.  If you have questions about the program, or decide in the future you wish to participate, please call [phone] and one of the RN Care Managers will be happy to assist you.      Thank you,  Trish Ferreira, RN

## 2021-06-18 NOTE — LETTER
Letter by Faby Ku NP at      Author: Faby Ku NP Service: -- Author Type: --    Filed:  Encounter Date: 1/15/2019 Status: (Other)         Patient: Adalgisa Solano   MR Number: [id number]   YOB: 1935   Date of Visit: 1/15/2019                 [facility]    DATE: 1/15/2019    NAME:  Adalgisa Solano             :  1935  MRN: [id number]  CODE STATUS:  DNR    VISIT TYPE: Problem Visit (cough)     FACILITY:  [facility] [[id number]]       CHIEF COMPLAIN/REASON FOR VISIT:    Chief Complaint   Patient presents with   ? Problem Visit     cough               HISTORY OF PRESENT ILLNESS: Adalgisa Solano is a 83 y.o. female who was admitted - for left leg pain and  unable to care for her at home. She was found to have left femoral neck fracture on CT. She was treated for acute on chronic CHF and severe cOPD exacerbation. She was diuresed with IV lasix and transitioned to PO. She was followed by pulmonology and cardiology for stabilization prior to surgery. Her xarelto was held and bridged with lovenox until surgery was performed on . Postop on  she had increased shortness of breath and weakness and lasix was increased. She was put on prednisone taper for copd exacerbation. She did have sputum cultures positive for pseudomonas and klebsiella during stay and completed doxycycline on . She was started on cefepime on  and completed 7 days. She also had acute hyponatremia that improved with fluid status stabilization. She has PMH of DVT 1 month prior to hospital stay on xarelto, PAF, COPD, chronic respiratory failure on 1LNC, CHF, avascular necrosis of Left hip, chronic lymphedema, recurrent pneumonias. Prior to this she lived with her .     Today Ms. Solano states she is still having significant cough and congestion. She is using the nebulizers frequently and this helps some. She thinks she is  a little better since being on the azithromycin but says it usually takes a double dose of the azithromycin or doxycycline to clear her bronchitis. She says she saw the results of the chest xray and was glad it wasn't a pneumonia. She is on her chronic dose of oxygen and is not needing to increase it with therapy. She thinks therapy is going quite well and she feels she can move her left leg much more than before. Her pain is improved as well. She denies any concerns with her incision. She thinks the swelling in her hip is going down and the swelling in her lower legs is stable for her. She has her wraps on. Her bowels have not moved for a while and she thinks she needs more stool softeners. Her appetite is good and she is eating lots of protein. She requests updates on her labs results and plan for management. This was dicussed with her at length. She is agreeable to increasing iron for low hemoglobin, checking stools for blood, rechecking labs on Thursday. She has not noted in any blood in her stool but has not gone for a few days. She denies any blood in urine or sputum. She is not having any abdominal pain or nausea. She denies any dizziness or feeling fatigued from low hemoglobin.     REVIEW OF SYSTEMS:  PROBLEMS AND REVIEW OF SYSTEMS:   Review of Systems  Today on ROS:   Currently, no fever, chills, or rigors. Decreased vision and hearing. Denies any chest pain, headaches, palpitations, lightheadedness, dizziness. Appetite is good. Denies any GERD symptoms. Denies any difficulty with swallowing, nausea, or vomiting.   Denies any urinary symptoms. No active bleeding. No rash. positive for chronic o2 1-2 LNC at home, left leg and back pain, shortness of breath, congested cough-improving, lymphedema and wraps, constipation      Allergies   Allergen Reactions   ? Amoxicillin Itching and Rash   ? Levofloxacin Itching and Rash   ? Penicillins Itching and Rash     Has tolerated ceftriaxone.     Current Outpatient  Medications   Medication Sig   ? acetaminophen (TYLENOL) 500 MG tablet Take 2 tablets (1,000 mg total) by mouth 3 (three) times a day.   ? ADVAIR DISKUS 250-50 mcg/dose DISKUS INHALE 1 PUFF 2 (TWO) TIMES A DAY.   ? albuterol (PROVENTIL) 2.5 mg /3 mL (0.083 %) nebulizer solution 3 ML INHALATION FOUR TIMES A DAY AS NEEDED USE BEFORE NEBULIZED SALINE. FOR SHORTNESS OF BREATH.   ? ARIPiprazole (ABILIFY) 5 MG tablet TAKE 1 TABLET EVERY NIGHT AT BEDTIME   ? aspirin 81 MG EC tablet Take 81 mg by mouth daily.   ? atorvastatin (LIPITOR) 20 MG tablet TAKE 1 TABLET (20 MG TOTAL) BY MOUTH BEDTIME.   ? calcium carbonate-vitamin D3 (CALCIUM WITH VITAMIN D) 600 mg(1,500mg) -400 unit per tablet Take 1 tablet by mouth 2 (two) times a day.   ? cholecalciferol, vitamin D3, 1,000 unit tablet Take 2,000 Units by mouth daily.    ? diltiazem (CARDIZEM CD) 180 MG 24 hr capsule Take 1 capsule (180 mg total) by mouth 2 (two) times a day.   ? docusate sodium (COLACE) 100 MG capsule Take 200 mg by mouth daily.   ? ferrous sulfate 325 (65 FE) MG tablet Take 1 tablet by mouth 2 (two) times a day.          ? furosemide (LASIX) 40 MG tablet Take 1 tablet (40 mg total) by mouth 2 (two) times a day at 9am and 6pm.   ? guaiFENesin ER (MUCINEX) 600 mg 12 hr tablet Take 1,200 mg by mouth 2 (two) times a day.   ? HYDROmorphone (DILAUDID) 2 MG tablet Take 0.5-1 tablets (1-2 mg total) by mouth every 6 (six) hours as needed.   ? ipratropium-albuterol (DUO-NEB) 0.5-2.5 mg/3 mL nebulizer Take 3 mL by nebulization every 4 (four) hours as needed (shortness of breath and wheezing). (Patient taking differently: Take 3 mL by nebulization 3 (three) times a day.       )   ? lidocaine 4 % patch Place 1 patch on the skin daily. Remove and discard patch with 12 hours or as directed by MD.   ? magnesium oxide (MAG-OX) 400 mg (241.3 mg magnesium) tablet Take 1 tablet (400 mg total) by mouth daily.   ? omeprazole (PRILOSEC) 20 MG capsule Take 20 mg by mouth daily before  breakfast.   ? polyethylene glycol (MIRALAX) 17 gram packet Take 17 g by mouth daily.   ? potassium chloride (KLOR-CON) 10 MEQ CR tablet TAKE 1 TABLET (10 MEQ TOTAL) BY MOUTH DAILY.   ? PROAIR HFA 90 mcg/actuation inhaler INHALE 2 PUFFS EVERY 4 (FOUR) HOURS AS NEEDED FOR WHEEZING.   ? QUEtiapine (SEROQUEL) 25 MG tablet TAKE 2 TABLETS AT BEDTIME=50MG   ? rivaroxaban 15 mg Tab Take 1 tablet (15 mg total) by mouth 2 (two) times a day with meals for 21 days. Change to Xarelto 20 mg daily on 1/25/2019   ? sodium chloride 3 % nebulizer solution TAKE 4 ML BY NEBULIZATION 2 (TWO) TIMES A DAY. USING AFTER ALBUTEROL NEB. (Patient taking differently: Take 4 mL by nebulization 3 (three) times a day. Using after albuterol neb.      )   ? SPIRIVA WITH HANDIHALER 18 mcg inhalation capsule USE 1 CAPSULE VIA INHALER 1 X A DAY (Patient taking differently: daily prn)     Past Medical History:    Past Medical History:   Diagnosis Date   ? Acute and chronic respiratory failure with hypoxia (H)    ? Arm skin lesion, right    ? Arnold-Chiari malformation (H) 1998   ? Atrial fibrillation (H) 02/2017   ? Breast cyst    ? Breast lump    ? Chronic diastolic heart failure (H)     diastolic chf   ? Chronic respiratory failure with hypoxia (H) 3/13/2017   ? COPD (chronic obstructive pulmonary disease) (H)    ? COPD exacerbation (H)    ? Depression    ? Diet-controlled type 2 diabetes mellitus (H)    ? GERD (gastroesophageal reflux disease)    ? Hyperlipidemia    ? Hypertension    ? Lumbar spinal stenosis    ? Peripheral arterial disease (H) 10/28/2015   ? Pulmonary hypertension (H) 3/5/2018   ? Recurrent major depressive episodes (H)     Created by Conversion  Replacement Utility updated for latest IMO load   ? Schizoaffective disorder, bipolar type (H) 6/11/2015   ? Type 2 diabetes mellitus without complication (H) 8/2/2016           PHYSICAL EXAMINATION  Vitals:    01/14/19 2130   BP: 103/60   Pulse: 94   Resp: 24   Temp: 97.7  F (36.5  C)    SpO2: 96%   Weight: 155 lb (70.3 kg)       Today on physical exam:     GENERAL: Awake, Alert, oriented x3, not in any form of acute distress, answers questions appropriately, follows simple commands, conversant  HEENT: Head is normocephalic with normal hair distribution. No evidence of trauma. Ears: No acute purulent discharge. Eyes: Conjunctivae pink with no scleral jaundice. Nose: Normal mucosa and septum. NECK: Supple with no cervical or supraclavicular lymphadenopathy. Trachea is midline.   CHEST: No tenderness or deformity, no crepitus  LUNG: dim with scattered rhonchi, fine crackles to bases, Left worse than right to auscultation with good chest expansion. normal AP diameter. Congested cough, productive with yellow sputum  BACK: No kyphosis of the thoracic spine. Symmetric, no curvature, ROM normal, no CVA tenderness, no spinal tenderness   CVS: irregularly irregular rhythm, there are no murmurs, rubs, gallops, or heaves,  2+ pulses symmetric in all extremities.  ABDOMEN: Rounded and soft, nontender to palpation, no masses, no organomegaly, good bowel sounds, no rebound or guarding, no peritoneal signs.   EXTREMITIES: 2+ LLE pedal edema, 1-2+ rle, lymphedema wraps, left hip incision c/d/i  SKIN: Warm and dry  NEUROLOGICAL: The patient is oriented to person, place and time.             LABS:   Recent Results (from the past 168 hour(s))   HM1 (CBC with Diff)   Result Value Ref Range    WBC 14.7 (H) 4.0 - 11.0 thou/uL    RBC 3.46 (L) 3.80 - 5.40 mill/uL    Hemoglobin 8.9 (L) 12.0 - 16.0 g/dL    Hematocrit 28.4 (L) 35.0 - 47.0 %    MCV 82 80 - 100 fL    MCH 25.7 (L) 27.0 - 34.0 pg    MCHC 31.3 (L) 32.0 - 36.0 g/dL    RDW 19.9 (H) 11.0 - 14.5 %    Platelets 544 (H) 140 - 440 thou/uL    MPV 10.0 8.5 - 12.5 fL    Neutrophils % 80 (H) 50 - 70 %    Lymphocytes % 10 (L) 20 - 40 %    Monocytes % 8 2 - 10 %    Eosinophils % 2 0 - 6 %    Basophils % 0 0 - 2 %    Neutrophils Absolute 11.6 (H) 2.0 - 7.7 thou/uL     Lymphocytes Absolute 1.5 0.8 - 4.4 thou/uL    Monocytes Absolute 1.2 (H) 0.0 - 0.9 thou/uL    Eosinophils Absolute 0.2 0.0 - 0.4 thou/uL    Basophils Absolute 0.1 0.0 - 0.2 thou/uL   Urinalysis   Result Value Ref Range    Color, UA Straw Colorless, Yellow, Straw, Light Yellow    Clarity, UA Clear Clear    Glucose, UA Negative Negative    Bilirubin, UA Negative Negative    Ketones, UA Negative Negative    Specific Gravity, UA 1.003 1.001 - 1.030    Blood, UA Negative Negative    pH, UA 6.5 4.5 - 8.0    Protein, UA Negative Negative mg/dL    Urobilinogen, UA <2.0 E.U./dL <2.0 E.U./dL, 2.0 E.U./dL    Nitrite, UA Negative Negative    Leukocytes, UA Negative Negative   Culture, Urine   Result Value Ref Range    Culture No Growth    HM1 (CBC with Diff)   Result Value Ref Range    WBC 9.1 4.0 - 11.0 thou/uL    RBC 3.14 (L) 3.80 - 5.40 mill/uL    Hemoglobin 7.9 (L) 12.0 - 16.0 g/dL    Hematocrit 26.0 (L) 35.0 - 47.0 %    MCV 83 80 - 100 fL    MCH 25.2 (L) 27.0 - 34.0 pg    MCHC 30.4 (L) 32.0 - 36.0 g/dL    RDW 20.5 (H) 11.0 - 14.5 %    Platelets 436 140 - 440 thou/uL    MPV 9.7 8.5 - 12.5 fL    Neutrophils % 75 (H) 50 - 70 %    Lymphocytes % 17 (L) 20 - 40 %    Monocytes % 6 2 - 10 %    Eosinophils % 2 0 - 6 %    Basophils % 1 0 - 2 %    Neutrophils Absolute 6.7 2.0 - 7.7 thou/uL    Lymphocytes Absolute 1.5 0.8 - 4.4 thou/uL    Monocytes Absolute 0.6 0.0 - 0.9 thou/uL    Eosinophils Absolute 0.2 0.0 - 0.4 thou/uL    Basophils Absolute 0.1 0.0 - 0.2 thou/uL   Manual Differential   Result Value Ref Range    Platelet Estimate Normal Normal    Ovalocytes 1+ (!) Negative    Polychromasia 1+ (!) Negative    Target Cells 1+ (!) Negative     Results for orders placed or performed in visit on 01/08/19   Basic Metabolic Panel   Result Value Ref Range    Sodium 135 (L) 136 - 145 mmol/L    Potassium 3.9 3.5 - 5.0 mmol/L    Chloride 97 (L) 98 - 107 mmol/L    CO2 28 22 - 31 mmol/L    Anion Gap, Calculation 10 5 - 18 mmol/L    Glucose 154  (H) 70 - 125 mg/dL    Calcium 8.4 (L) 8.5 - 10.5 mg/dL    BUN 10 8 - 28 mg/dL    Creatinine 0.58 (L) 0.60 - 1.10 mg/dL    GFR MDRD Af Amer >60 >60 mL/min/1.73m2    GFR MDRD Non Af Amer >60 >60 mL/min/1.73m2         Lab Results   Component Value Date    WBC 9.1 01/14/2019    HGB 7.9 (L) 01/14/2019    HCT 26.0 (L) 01/14/2019    MCV 83 01/14/2019     01/14/2019       No results found for: [id number]  Lab Results   Component Value Date    HGBA1C 6.8 (H) 11/09/2018     Lab Results   Component Value Date    INR 1.63 (H) 11/23/2018    INR 1.19 (H) 11/08/2018    INR 0.95 02/14/2017     Vitamin D, Total (25-Hydroxy)   Date Value Ref Range Status   01/04/2019 39.6 30.0 - 80.0 ng/mL Final     Lab Results   Component Value Date    TSH 1.33 11/08/2018           ASSESSMENT/PLAN:    1. Left femoral neck fracture, s/p REGINA: Incision c/d/i. Tylenol three times a day, lidoderm patch, dilaudid prn. Pain controlled. F/u with ortho Dr. Lackey 1/23. PT, OT. 2+ lle, 1-2+ rle. Lymphedema wraps. Improved mobility. PT, OT.   2. COPD: On advair two times a day, albuterol four times a day prn.  duonebs and 3% saline nebs three times a day, saline was missed for first few days. spiriva prn per her request. Educated her on long v short acting meds, but insisting cannot take spiriva while on duonebs. Prednisone taper. proair prn. Completed doxycycline on 12/28. mucinex x 7 days and there was no improvement in congestion, dyspnea, cough. Leukocytosis so CXR done and showed Bronchitis changes but no infiltrate. Started on azithromycin, however minimal improvement. Will increase azithromycin to 500mg daily x 5 more days.    3. Chronic CHF: Daily [id number]. On lasix two times a day. Shortness of breath stable. Edema 2+ lle, 1+ rle, lymphedema wraps. Stable. Monitor.   4. H/o DVT: On xarelto.   5. HLD: On aspirin, lipitor  6. PAF: Rate controlled 90s. On cardizem, xarelto.   7. Constipation: On colace daily, miralax  daily-will change colace to senna docusate two times a day and additional miralax daily prn. Will need 3 stool samples.   8. Iron deficiency anemia: started iron for hg of 8.9 on 1/8. Will recheck on 1/10-8.9. Continue iron for now. Hg 7.9 on 1/14. Increased iron to two times a day and check on 1/17. Occult stools x 3. On xarelto, may need to consider holding for short time. No abdominal pain or overt bleeding. No blood in urine or sputum. Vitals are stable so no need for blood transfusion at this time.   9. GERD: On omeprazole.   10. Hypokalemia: On kcl. K 3.9 on 1/8.   11. Hypomagnesemia: On mag ox. MG 1.7 on 1/8. Stable.   12. Schizoaffective disorder, bipolar type: On seroquel and abilify. Follows with psych.   13. Osteoporosis: On calcium, vitamin d.   14. Type 2 DM: controlled with diet. Monitoring on labs and stable.   15. Leukocytosis: WBC 11-12-14.6-9.1. Improved since starting azithromycin.     Per therapy soft LCD 1/21,sba for dressing, sup for toileting, ambulates 150 feet with cga. 24 on slums.     Electronically signed by: Faby Ku NP    Total 35  minutes of which 55% was spent in counseling and coordination of care of the above plan    Time spent in interview and examination of patient, review of available records, and discussion with nursing staff. Continue care plan, efforts at therapy, and monitor nutritional status.

## 2021-06-18 NOTE — PROGRESS NOTES
Assessment/Plan:        Diagnoses and all orders for this visit:    COPD exacerbation  -     fluticasone-salmeterol (ADVAIR DISKUS) 250-50 mcg/dose DISKUS; Inhale 1 puff 2 (two) times a day.  Dispense: 60 each; Refill: 11    Simple chronic bronchitis  -     fluticasone-salmeterol (ADVAIR DISKUS) 250-50 mcg/dose DISKUS; Inhale 1 puff 2 (two) times a day.  Dispense: 60 each; Refill: 11  -     predniSONE (DELTASONE) 20 MG tablet; Take 2 tabs daily for 5 days then 1 tab daily for 5 days.  Dispense: 7 tablet; Refill: 0    Acute and chronic respiratory failure with hypoxia    Pulmonary hypertension      82 year old female with a history of complicated COPD here with multiple exacerbations.  Phenotypically this is different from the last one.  There is no clear trigger that I can intervene on.  She is on a reasonable inhaler regimen.  Given that she is having a tough spring, there is a possibility that she needs a little bit more inhaled steroid because of allergens.  Will increase her Advair dose.  Because of her persistent symptoms and wheezing even after 3 or 4 days on steroids I will increase her steroid pulse.  She is on more oxygen than usual.  She will continue oxygen at rest for now for goal saturations as below.    Comorbidities that can complicate her presentation include reflux, obstructive sleep apnea and sinusitis and there is no evidence of these worsening.    Her lower extremity edema is significant but it seems there has not been any change.  If she does not have any improvement despite her inhaler changes in her action plan I would be concerned about comorbid cardiac issues.  We would have to consider increasing her dose of Lasix again.  I agree with having her follow-up with cardiology when convenient.  If there is any consideration for left heart cath she should have a right heart cath during the procedure.    Medication Plan  Albuterol neb twice daily  3% Sodium Chloride 1-2 times daily  Advair twice  daily -250/50  Spiriva once daily    Oxygen Goals  89% or higher at rest.  Same with activity.  2 lpm at night is fine at night.    Medication Plan  Prednisone 2 tabs daily for 5 days then 1 tab daily for 5 days.  We increased the strength of your advair.          Subjective:    Patient ID: Adalgisa Solano is a 82 y.o. female.    HPI Comments:     82-year-old female with a history of complex COPD with recurrent exacerbations here for follow-up.  Since her last visit she had one hospitalization for exacerbation associated with fever.  She recently had recurrence of symptoms with some increased phlegm and shortness of breath.  She began her action plan on Friday and initially improved but now is feeling a bit worse.  No clear provocative factors- no respiratory infection, no history of allergy issues, no exposures.  Symptoms localized to her chest.  Pertinent positives include small amount of throat clearing and increased blowing her nose but minimal.  Pertinent negatives include no change in reflux symptoms treated with Prilosec, no apneic events, no significant snoring, no sinusitis.    She is on 1 L/min oxygen with exertion but started wearing it all the time.    She is very significant lower extremity edema which is chronic for her.  She was trialed on increased Lasix for a year and did not have any improvement.      Review of Systems review of systems positive for activity change congestion chest tightness cough shortness of breath wheezing  easy bruising tremors joint pain excessive thirst chills leg swelling palpitationsThe remainder of 12 system review of systems negative.      /68  Pulse 100  Resp 20  Wt 168 lb 4.8 oz (76.3 kg)  LMP  (LMP Unknown)  SpO2 92% Comment: 1 L pulsing  BMI 31.8 kg/m2    Objective:    Physical Exam   Constitutional: She is oriented to person, place, and time. She appears well-developed and well-nourished. No distress.   HENT:   Head: Normocephalic and atraumatic.    Eyes: Conjunctivae are normal. Pupils are equal, round, and reactive to light. Right eye exhibits no discharge. Left eye exhibits no discharge. No scleral icterus.   Cardiovascular: Normal rate, regular rhythm and normal heart sounds.  Exam reveals no gallop.    No murmur heard.  Pulmonary/Chest: Effort normal. No respiratory distress. She has wheezes. She has rales.   Abdominal: Soft.   Musculoskeletal: She exhibits edema (Stable to last visit). She exhibits no deformity.   Neurological: She is alert and oriented to person, place, and time. No cranial nerve deficit. Coordination normal.   Skin: Skin is warm and dry. She is not diaphoretic. No erythema.   Psychiatric: She has a normal mood and affect. Her behavior is normal. Thought content normal.   Nursing note and vitals reviewed.          Current Outpatient Prescriptions on File Prior to Visit   Medication Sig Dispense Refill     acetaminophen (TYLENOL) 325 MG tablet Take 650 mg by mouth every 6 (six) hours as needed for pain.       ADVAIR DISKUS 100-50 mcg/dose DISKUS Inhale 1 puff 2 (two) times a day. 60 each 11     albuterol (PROAIR HFA;PROVENTIL HFA;VENTOLIN HFA) 90 mcg/actuation inhaler Inhale 2 puffs every 4 (four) hours as needed for wheezing.       albuterol (PROVENTIL) 2.5 mg /3 mL (0.083 %) nebulizer solution Take 2.5 mg by nebulization 2 (two) times a day.       ARIPiprazole (ABILIFY) 5 MG tablet Take 5 mg by mouth bedtime.       aspirin 81 MG EC tablet Take 81 mg by mouth daily.       atorvastatin (LIPITOR) 20 MG tablet Take 20 mg by mouth at bedtime.       calcium carbonate-vitamin D3 (CALCIUM WITH VITAMIN D) 600 mg(1,500mg) -400 unit per tablet Take 1 tablet by mouth 2 (two) times a day.       cholecalciferol, vitamin D3, 1,000 unit tablet Take 2,000 Units by mouth daily.        dextromethorphan-guaiFENesin (ROBITUSSIN-DM)  mg/5 mL liquid Take 10 mL by mouth 3 (three) times a day as needed.       diltiazem (CARDIZEM CD) 120 MG 24 hr capsule  Take 1 capsule (120 mg total) by mouth daily. 30 capsule 0     doxycycline (VIBRAMYCIN) 100 MG capsule Take 1 capsule (100 mg total) by mouth 2 (two) times a day for 7 days. 14 capsule 0     furosemide (LASIX) 20 MG tablet Take 20 mg by mouth daily.        gabapentin (NEURONTIN) 100 MG capsule Take 100 mg by mouth 3 (three) times a day. 90 capsule 2     omeprazole (PRILOSEC) 20 MG capsule TAKE 1 CAPSULE DAILY. 100 capsule 0     potassium chloride (KLOR-CON) 10 MEQ CR tablet Take 10 mEq by mouth daily.        predniSONE (DELTASONE) 20 MG tablet Take 1 tablet (20 mg total) by mouth daily for 7 days. 7 tablet 0     psyllium (METAMUCIL) 3.4 gram packet Take 1 packet by mouth daily.        QUEtiapine (SEROQUEL) 25 MG tablet Take 50 mg by mouth at bedtime.        sodium chloride 3 % nebulizer solution Take 3 mL by nebulization 2 (two) times a day. Using after albuterol neb.       SPIRIVA WITH HANDIHALER 18 mcg inhalation capsule use 1 capsule via inhaler 1 x a day 90 capsule 11     triamcinolone (KENALOG) 0.1 % ointment Apply 1 application topically 2 (two) times a day as needed.        No current facility-administered medications on file prior to visit.      /68  Pulse 100  Resp 20  Wt 168 lb 4.8 oz (76.3 kg)  LMP  (LMP Unknown)  SpO2 92% Comment: 1 L pulsing  BMI 31.8 kg/m2    Medical History  Active Ambulatory (Non-Hospital) Problems    Diagnosis     COPD (chronic obstructive pulmonary disease)     COPD exacerbation     Acute and chronic respiratory failure with hypoxia     Diet-controlled type 2 diabetes mellitus     Pulmonary hypertension     Chronic respiratory failure with hypoxia     Chronic diastolic heart failure     Type 2 diabetes mellitus without complication     Hypertension     Peripheral arterial disease     Schizoaffective disorder, bipolar type     Lump Or Mass In Breast     Recurrent major depressive episodes     Chronic obstructive pulmonary disease     Arnold-Chiari malformation     Past  Medical History:   Diagnosis Date     Arm skin lesion, right      Arnold-Chiari malformation      Breast lump      Chronic diastolic heart failure      COPD (chronic obstructive pulmonary disease)      Depression      GERD (gastroesophageal reflux disease)      Hyperlipidemia      Hypertension      Lumbar spinal stenosis      PAD (peripheral artery disease)         Surgical History  She  has a past surgical history that includes Breast cyst aspiration (Left, ); Cholecystectomy; Lumbar fusion (N/A, 2014); Back surgery; Laparoscopic incisional / umbilical / ventral hernia repair (Left, 3/27/2015);  section; Joint replacement; Eye surgery; pr total knee arthroplasty (Left, 10/7/2016); and Cervical spine surgery.         Social history reviewed: Lives at home.  Here with her .   Allergies  Allergies   Allergen Reactions     Amoxicillin Itching and Rash     Levofloxacin Itching and Rash     Penicillins Itching and Rash    Family history reviewed and no changes from previous.  Cardiovascular disease in the family.                          Data Review - imaging, labs, and ekgs listed below were reviewed by me.  Chest XRay and chest CT images and EKG tracings interpreted personally.     BNP results reviewed, none recent.  Highest was in 2017 at 297.  Most recent chemistry panel with normal range bicarb.  BUN 11 creatinine 0.58  Most recent CBC with hemoglobin 10.4, white count 7.9, platelet 286.    Past Imaging chest x-ray from late April reviewed and interpreted by me.  No evidence of pleural effusion.  No significant infiltrates.  Xr Chest 2 Views    Result Date: 2018  XR CHEST 2 VIEWS 2018 4:39 PM INDICATION: sob and fever COMPARISON: CTA chest 2017. FINDINGS: Mild prominence of the interstitial markings appears to be a chronic finding. Mild nodular infiltrates of the lower lungs on CTA chest 2017 are not reliably visualized by radiographic technique. Heart size  is normal. No pleural effusion or pneumothorax. Chronic bone island in the left humeral head.    Echocardiogram result reviewed from March.  Normal RV function.  Normal LV function.  E to E prime between 10 and 11.

## 2021-06-18 NOTE — PROGRESS NOTES
Office Visit - Follow up    Adalgisa Solano   82 y.o. female    Date of Visit: 5/21/2018    Chief Complaint   Patient presents with     COPD     Constipation     Medication Question Or Clarification     Potasium dose     Foot Swelling     ankles     Extremity Weakness     right thigh     Herpes Zoster     ?       Subjective: Diabetes mellitus type 2.    Potassium level pending patient has shingles left foot.  Painful.  Wants to know about a cream.    Gabapentin will be tried 100 mg 3 times a day.    Ankle swelling weakness right thigh.  Constipated.  Hospital stay short of breath with COPD exacerbation April 26-28 reviewed.  Breathing slightly better.    No blood in stool or urine or sputum    Medication list reviewed generally well-tolerated.    Allergy amoxicillin levofloxacin and penicillin.    ROS: A comprehensive review of systems was performed and was otherwise negative    Medications:  Prior to Admission medications    Medication Sig Start Date End Date Taking? Authorizing Provider   acetaminophen (TYLENOL) 325 MG tablet Take 650 mg by mouth every 6 (six) hours as needed for pain.   Yes PROVIDER, HISTORICAL   ADVAIR DISKUS 100-50 mcg/dose DISKUS Inhale 1 puff 2 (two) times a day. 5/2/18  Yes Jun Juarez MD   albuterol (PROAIR HFA;PROVENTIL HFA;VENTOLIN HFA) 90 mcg/actuation inhaler Inhale 2 puffs every 4 (four) hours as needed for wheezing.   Yes PROVIDER, HISTORICAL   albuterol (PROVENTIL) 2.5 mg /3 mL (0.083 %) nebulizer solution Take 2.5 mg by nebulization 2 (two) times a day.   Yes PROVIDER, HISTORICAL   ARIPiprazole (ABILIFY) 5 MG tablet Take 5 mg by mouth bedtime.   Yes PROVIDER, HISTORICAL   aspirin 81 MG EC tablet Take 81 mg by mouth daily.   Yes PROVIDER, HISTORICAL   atorvastatin (LIPITOR) 20 MG tablet Take 20 mg by mouth at bedtime.   Yes PROVIDER, HISTORICAL   calcium carbonate-vitamin D3 (CALCIUM WITH VITAMIN D) 600 mg(1,500mg) -400 unit per tablet Take 1 tablet by mouth 2 (two)  times a day.   Yes PROVIDER, HISTORICAL   cholecalciferol, vitamin D3, 1,000 unit tablet Take 2,000 Units by mouth daily.    Yes PROVIDER, HISTORICAL   diltiazem (CARDIZEM CD) 120 MG 24 hr capsule Take 1 capsule (120 mg total) by mouth daily. 5/17/18  Yes Shai Ellington MD   furosemide (LASIX) 20 MG tablet Take 20 mg by mouth daily.    Yes PROVIDER, HISTORICAL   omeprazole (PRILOSEC) 20 MG capsule TAKE 1 CAPSULE DAILY. 5/17/18  Yes Shai Ellington MD   potassium chloride (KLOR-CON) 10 MEQ CR tablet Take 10 mEq by mouth daily.  6/19/17  Yes PROVIDER, HISTORICAL   psyllium (METAMUCIL) 3.4 gram packet Take 1 packet by mouth daily.    Yes PROVIDER, HISTORICAL   QUEtiapine (SEROQUEL) 25 MG tablet Take 50 mg by mouth at bedtime.  8/10/16  Yes PROVIDER, HISTORICAL   sodium chloride 3 % nebulizer solution Take 3 mL by nebulization 2 (two) times a day. Using after albuterol neb. 4/19/17  Yes PROVIDER, HISTORICAL   SPIRIVA WITH HANDIHALER 18 mcg inhalation capsule use 1 capsule via inhaler 1 x a day 5/2/18  Yes Jun Juarez MD   dextromethorphan-guaiFENesin (ROBITUSSIN-DM)  mg/5 mL liquid Take 10 mL by mouth 3 (three) times a day as needed.    PROVIDER, HISTORICAL   gabapentin (NEURONTIN) 100 MG capsule Take 100 mg by mouth 3 (three) times a day. 5/21/18   Shai Ellington MD   triamcinolone (KENALOG) 0.1 % ointment Apply 1 application topically 2 (two) times a day as needed.     PROVIDER, HISTORICAL   fluticasone-salmeterol (ADVAIR) 100-50 mcg/dose DISKUS Inhale 1 puff 2 (two) times a day.  5/21/18  PROVIDER, HISTORICAL   predniSONE (DELTASONE) 10 mg tablet Take 4 tab oral QDay x 2 days then 3 tab QDay x 3 days then 2 tab QDay x 3 days then 1 tab QDay x 3 days then stop. 4/28/18 5/21/18  Karlos Ruiz MD       Allergies:   Allergies   Allergen Reactions     Amoxicillin Itching and Rash     Levofloxacin Itching and Rash     Penicillins Itching and Rash       Immunizations:   Immunization  History   Administered Date(s) Administered     DT (pediatric) 03/29/2004     Influenza high dose, seasonal 10/09/2016, 11/16/2017     Influenza,seasonal quad, PF, 36+MOS 11/20/2014     Pneumo Polysac 23-V 10/18/1999, 08/01/2005     Td,adult,historic,unspecified 03/29/2004     Tdap 05/14/2013       Exam Chest clear to auscultation and percussion.  Heart tones regular rhythm without murmur rub or gallop.  Abdomen soft nontender no organomegaly.  No peritoneal signs.  Extremities free of edema cyanosis or clubbing.  Neck veins nondistended no thyromegaly or scleral icterus noted, carotids full.  Skin warm and dry easily conversant good spirited.  Normal intelligence.  Neurologically intact no gross localizing findings.  Scabbing over her left foot lateral aspect distribution L5-S1 nerve rootlet left side shingles.  No sign of secondary infection.  Mild redness noted left leg consistent with chronic cellulitis.  Venous insufficiency and dependent edema.    Assessment and Plan  Diabetes mellitus type 2.  Check potassium and blood sugar A1c and lipid panel today.    Shingles left L5-S1 nerve rootlet distribution.  With postherpetic neuralgia.  Trial of gabapentin 100 mg 3 times a day.    Multiple drug allergies including amoxicillin levofloxacin and penicillin.    COPD severe with intermittent hospital stays most recently April 26 2 the 28th 2018.  O2 sats today 92%.  Previously heavy smoker.    Hypertension controlled.    Time: total time spent with the patient was 25 minutes of which >50% was spent in counseling and coordination of care    The following high BMI interventions were performed this visit: encouragement to exercise    Shai Ellington MD    Patient Active Problem List   Diagnosis     Recurrent major depressive episodes     Chronic obstructive pulmonary disease     Lump Or Mass In Breast     Peripheral arterial disease     Type 2 diabetes mellitus without complication     Hypertension     Schizoaffective  disorder, bipolar type     Arnold-Chiari malformation     Chronic diastolic heart failure     Chronic respiratory failure with hypoxia     Pulmonary hypertension     COPD (chronic obstructive pulmonary disease)     COPD exacerbation     Acute and chronic respiratory failure with hypoxia     Diet-controlled type 2 diabetes mellitus

## 2021-06-18 NOTE — LETTER
Letter by Faby Ku NP at      Author: Faby Ku NP Service: -- Author Type: --    Filed:  Encounter Date: 2019 Status: (Other)         Patient: Adalgisa Solano   MR Number: [id number]   YOB: 1935   Date of Visit: 2019                 [facility]    DATE: 2019    NAME:  Adalgisa Solano             :  1935  MRN: [id number]  CODE STATUS:  DNR    VISIT TYPE: Problem Visit     FACILITY:  [facility] [[id number]]       CHIEF COMPLAIN/REASON FOR VISIT:    Chief Complaint   Patient presents with   ? Problem Visit               HISTORY OF PRESENT ILLNESS: Adalgisa Solano is a 83 y.o. female who was admitted - for left leg pain and  unable to care for her at home. She was found to have left femoral neck fracture on CT. She was treated for acute on chronic CHF and severe cOPD exacerbation. She was diuresed with IV lasix and transitioned to PO. She was followed by pulmonology and cardiology for stabilization prior to surgery. Her xarelto was held and bridged with lovenox until surgery was performed on . Postop on  she had increased shortness of breath and weakness and lasix was increased. She was put on prednisone taper for copd exacerbation. She did have sputum cultures positive for pseudomonas and klebsiella during stay and completed doxycycline on . She was started on cefepime on  and completed 7 days. She also had acute hyponatremia that improved with fluid status stabilization. She has PMH of DVT 1 month prior to hospital stay on xarelto, PAF, COPD, chronic respiratory failure on 1LNC, CHF, avascular necrosis of Left hip, chronic lymphedema, recurrent pneumonias. Prior to this she lived with her .     Today Ms. Solano states she feels her breathing is doing much better and pretty much back to baseline. She is on her home dose of oxygen and not needing to increase it with  activity. She says her cough is now much more dry and not having anything to cough up. She feels her shortness of breath is back to baseline. She says her leg is hurting more today than usual and thinks it might be from taking herself to the bathroom during the night without help. She says she was waiting a long time and finally just decided to go by herself. She did not fall but she did put pressure on that leg and now it is a little sore. She says otherwise her bowels are moving fine. She thinks she only needs nebs three times a day now. She is looking forward to going home on Tuesday.     REVIEW OF SYSTEMS:  PROBLEMS AND REVIEW OF SYSTEMS:   Review of Systems  Today on ROS:   Currently, no fever, chills, or rigors. Decreased vision and hearing. Denies any chest pain, headaches, palpitations, lightheadedness, dizziness. Appetite is good. Denies any GERD symptoms. Denies any difficulty with swallowing, nausea, or vomiting.   Denies any urinary symptoms. No active bleeding. No rash. positive for chronic o2 1-2 LNC at home, left leg and back pain, shortness of breath, dry cough, lymphedema and wraps      Allergies   Allergen Reactions   ? Amoxicillin Itching and Rash   ? Levofloxacin Itching and Rash   ? Penicillins Itching and Rash     Has tolerated ceftriaxone.     Current Outpatient Medications   Medication Sig   ? acetaminophen (TYLENOL) 500 MG tablet Take 2 tablets (1,000 mg total) by mouth 3 (three) times a day.   ? ADVAIR DISKUS 250-50 mcg/dose DISKUS INHALE 1 PUFF 2 (TWO) TIMES A DAY.   ? albuterol (PROVENTIL) 2.5 mg /3 mL (0.083 %) nebulizer solution 3 ML INHALATION FOUR TIMES A DAY AS NEEDED USE BEFORE NEBULIZED SALINE. FOR SHORTNESS OF BREATH.   ? ARIPiprazole (ABILIFY) 5 MG tablet TAKE 1 TABLET EVERY NIGHT AT BEDTIME   ? aspirin 81 MG EC tablet Take 81 mg by mouth daily.   ? atorvastatin (LIPITOR) 20 MG tablet TAKE 1 TABLET (20 MG TOTAL) BY MOUTH BEDTIME.   ? calcium carbonate-vitamin D3 (CALCIUM WITH  VITAMIN D) 600 mg(1,500mg) -400 unit per tablet Take 1 tablet by mouth 2 (two) times a day.   ? cholecalciferol, vitamin D3, 1,000 unit tablet Take 2,000 Units by mouth daily.    ? diltiazem (CARDIZEM CD) 180 MG 24 hr capsule Take 1 capsule (180 mg total) by mouth 2 (two) times a day.   ? docusate sodium (COLACE) 100 MG capsule Take 200 mg by mouth daily.   ? ferrous sulfate 325 (65 FE) MG tablet Take 1 tablet by mouth 2 (two) times a day.          ? furosemide (LASIX) 40 MG tablet Take 1 tablet (40 mg total) by mouth 2 (two) times a day at 9am and 6pm.   ? HYDROmorphone (DILAUDID) 2 MG tablet Take 0.5-1 tablets (1-2 mg total) by mouth every 6 (six) hours as needed.   ? ipratropium-albuterol (DUO-NEB) 0.5-2.5 mg/3 mL nebulizer Take 3 mL by nebulization every 4 (four) hours as needed (shortness of breath and wheezing). (Patient taking differently: Take 3 mL by nebulization 3 (three) times a day.       )   ? lidocaine 4 % patch Place 1 patch on the skin daily. Remove and discard patch with 12 hours or as directed by MD.   ? magnesium oxide (MAG-OX) 400 mg (241.3 mg magnesium) tablet Take 1 tablet (400 mg total) by mouth daily.   ? omeprazole (PRILOSEC) 20 MG capsule Take 20 mg by mouth daily before breakfast.   ? polyethylene glycol (MIRALAX) 17 gram packet Take 17 g by mouth daily.   ? potassium chloride (KLOR-CON) 10 MEQ CR tablet TAKE 1 TABLET (10 MEQ TOTAL) BY MOUTH DAILY.   ? PROAIR HFA 90 mcg/actuation inhaler INHALE 2 PUFFS EVERY 4 (FOUR) HOURS AS NEEDED FOR WHEEZING.   ? QUEtiapine (SEROQUEL) 25 MG tablet TAKE 2 TABLETS AT BEDTIME=50MG   ? rivaroxaban 15 mg Tab Take 1 tablet (15 mg total) by mouth 2 (two) times a day with meals for 21 days. Change to Xarelto 20 mg daily on 1/25/2019   ? sodium chloride 3 % nebulizer solution TAKE 4 ML BY NEBULIZATION 2 (TWO) TIMES A DAY. USING AFTER ALBUTEROL NEB. (Patient taking differently: Take 4 mL by nebulization 3 (three) times a day. Using after albuterol neb.      )    ? SPIRIVA WITH HANDIHALER 18 mcg inhalation capsule USE 1 CAPSULE VIA INHALER 1 X A DAY (Patient taking differently: daily prn)     Past Medical History:    Past Medical History:   Diagnosis Date   ? Acute and chronic respiratory failure with hypoxia (H)    ? Arm skin lesion, right    ? Arnold-Chiari malformation (H) 1998   ? Atrial fibrillation (H) 02/2017   ? Breast cyst    ? Breast lump    ? Chronic diastolic heart failure (H)     diastolic chf   ? Chronic respiratory failure with hypoxia (H) 3/13/2017   ? COPD (chronic obstructive pulmonary disease) (H)    ? COPD exacerbation (H)    ? Depression    ? Diet-controlled type 2 diabetes mellitus (H)    ? GERD (gastroesophageal reflux disease)    ? Hyperlipidemia    ? Hypertension    ? Lumbar spinal stenosis    ? Peripheral arterial disease (H) 10/28/2015   ? Pulmonary hypertension (H) 3/5/2018   ? Recurrent major depressive episodes (H)     Created by Conversion  Replacement Utility updated for latest IMO load   ? Schizoaffective disorder, bipolar type (H) 6/11/2015   ? Type 2 diabetes mellitus without complication (H) 8/2/2016           PHYSICAL EXAMINATION  Vitals:    01/16/19 2047   BP: 129/65   Pulse: 99   Resp: 22   Temp: 96.7  F (35.9  C)   SpO2: 93%   Weight: 160 lb (72.6 kg)       Today on physical exam:     GENERAL: Awake, Alert, oriented x3, not in any form of acute distress, answers questions appropriately, follows simple commands, conversant  HEENT: Head is normocephalic with normal hair distribution. No evidence of trauma. Ears: No acute purulent discharge. Eyes: Conjunctivae pink with no scleral jaundice. Nose: Normal mucosa and septum. NECK: Supple with no cervical or supraclavicular lymphadenopathy. Trachea is midline.   CHEST: No tenderness or deformity, no crepitus  LUNG: dim with slight expiratory wheeze but no crackles or rhonchi today to auscultation with good chest expansion. normal AP diameter. Dry cough  BACK: No kyphosis of the thoracic  spine. Symmetric, no curvature, ROM normal, no CVA tenderness, no spinal tenderness   CVS: irregularly irregular rhythm, there are no murmurs, rubs, gallops, or heaves,  2+ pulses symmetric in all extremities.  ABDOMEN: Rounded and soft, nontender to palpation, no masses, no organomegaly, good bowel sounds, no rebound or guarding, no peritoneal signs.   EXTREMITIES: 2+ LLE pedal edema, 1-2+ rle, lymphedema wraps, left hip incision c/d/i  SKIN: Warm and dry  NEUROLOGICAL: The patient is oriented to person, place and time.             LABS:   Recent Results (from the past 168 hour(s))   Urinalysis   Result Value Ref Range    Color, UA Straw Colorless, Yellow, Straw, Light Yellow    Clarity, UA Clear Clear    Glucose, UA Negative Negative    Bilirubin, UA Negative Negative    Ketones, UA Negative Negative    Specific Gravity, UA 1.003 1.001 - 1.030    Blood, UA Negative Negative    pH, UA 6.5 4.5 - 8.0    Protein, UA Negative Negative mg/dL    Urobilinogen, UA <2.0 E.U./dL <2.0 E.U./dL, 2.0 E.U./dL    Nitrite, UA Negative Negative    Leukocytes, UA Negative Negative   Culture, Urine   Result Value Ref Range    Culture No Growth    HM1 (CBC with Diff)   Result Value Ref Range    WBC 9.1 4.0 - 11.0 thou/uL    RBC 3.14 (L) 3.80 - 5.40 mill/uL    Hemoglobin 7.9 (L) 12.0 - 16.0 g/dL    Hematocrit 26.0 (L) 35.0 - 47.0 %    MCV 83 80 - 100 fL    MCH 25.2 (L) 27.0 - 34.0 pg    MCHC 30.4 (L) 32.0 - 36.0 g/dL    RDW 20.5 (H) 11.0 - 14.5 %    Platelets 436 140 - 440 thou/uL    MPV 9.7 8.5 - 12.5 fL    Neutrophils % 75 (H) 50 - 70 %    Lymphocytes % 17 (L) 20 - 40 %    Monocytes % 6 2 - 10 %    Eosinophils % 2 0 - 6 %    Basophils % 1 0 - 2 %    Neutrophils Absolute 6.7 2.0 - 7.7 thou/uL    Lymphocytes Absolute 1.5 0.8 - 4.4 thou/uL    Monocytes Absolute 0.6 0.0 - 0.9 thou/uL    Eosinophils Absolute 0.2 0.0 - 0.4 thou/uL    Basophils Absolute 0.1 0.0 - 0.2 thou/uL   Manual Differential   Result Value Ref Range    Platelet Estimate  Normal Normal    Ovalocytes 1+ (!) Negative    Polychromasia 1+ (!) Negative    Target Cells 1+ (!) Negative   INR   Result Value Ref Range    INR 2.22 (H) 0.90 - 1.10     Results for orders placed or performed in visit on 01/08/19   Basic Metabolic Panel   Result Value Ref Range    Sodium 135 (L) 136 - 145 mmol/L    Potassium 3.9 3.5 - 5.0 mmol/L    Chloride 97 (L) 98 - 107 mmol/L    CO2 28 22 - 31 mmol/L    Anion Gap, Calculation 10 5 - 18 mmol/L    Glucose 154 (H) 70 - 125 mg/dL    Calcium 8.4 (L) 8.5 - 10.5 mg/dL    BUN 10 8 - 28 mg/dL    Creatinine 0.58 (L) 0.60 - 1.10 mg/dL    GFR MDRD Af Amer >60 >60 mL/min/1.73m2    GFR MDRD Non Af Amer >60 >60 mL/min/1.73m2         Lab Results   Component Value Date    WBC 9.1 01/14/2019    HGB 7.9 (L) 01/14/2019    HCT 26.0 (L) 01/14/2019    MCV 83 01/14/2019     01/14/2019       No results found for: [id number]  Lab Results   Component Value Date    HGBA1C 6.8 (H) 11/09/2018     Lab Results   Component Value Date    INR 2.22 (H) 01/17/2019    INR 1.63 (H) 11/23/2018    INR 1.19 (H) 11/08/2018     Vitamin D, Total (25-Hydroxy)   Date Value Ref Range Status   01/04/2019 39.6 30.0 - 80.0 ng/mL Final     Lab Results   Component Value Date    TSH 1.33 11/08/2018           ASSESSMENT/PLAN:    1. Left femoral neck fracture, s/p REGINA: Incision c/d/i. Tylenol three times a day, lidoderm patch, dilaudid prn. Pain controlled. F/u with ortho Dr. Lackey 1/23. PT, OT. 2+ lle, 1-2+ rle. Lymphedema wraps. Improved mobility. PT, OT.   2. COPD: On advair two times a day, albuterol four times a day prn.  duonebs and 3% saline nebs three times a day, saline was missed for first few days. spiriva prn per her request. Educated her on long v short acting meds, but insisting cannot take spiriva while on duonebs. Prednisone taper. proair prn. Completed doxycycline on 12/28. Completed mucinex x 7 days. Leukocytosis so CXR done and showed Bronchitis changes but no infiltrate. Started on  azithromycin, but minor improvement so increased dose and now much improved and congestion cleared.    3. Chronic CHF: Daily [id number]. On lasix two times a day. Shortness of breath stable. Edema 2+ lle, 1+ rle, lymphedema wraps. Stable. Monitor.   4. H/o DVT: On xarelto.   5. HLD: On aspirin, lipitor  6. PAF: Rate controlled 90s. On cardizem, xarelto.   7. Constipation: On senna docusate two times a day and miralax daily with miralax daily prn.   8. Iron deficiency anemia: started iron for hg of 8.9 on 1/8. Will recheck on 1/10-8.9. Continue iron for now. Hg 7.9 on 1/14. Increased iron to two times a day and check on 1/17. Occult stools x 3. On xarelto, may need to consider holding for short time. No abdominal pain or overt bleeding. No blood in urine or sputum. Vitals are stable. Results pending on occult stools.    9. GERD: On omeprazole.   10. Hypokalemia: On kcl. K 3.9 on 1/8.   11. Hypomagnesemia: On mag ox. MG 1.7 on 1/8. Stable.   12. Schizoaffective disorder, bipolar type: On seroquel and abilify. Follows with psych.   13. Osteoporosis: On calcium, vitamin d.   14. Type 2 DM: controlled with diet. Monitoring on labs and stable.   15. Leukocytosis: WBC 11-12-14.6-9.1. Improved since starting azithromycin.     Per therapy firm LCD 1/21, sup/mod ind for dressing, sup for toileting, ambulates 150 feet with cga. 24 on slums.  PT, OT. Care conference today.     Electronically signed by: Faby Ku NP    Total 25  minutes of which 55% was spent in counseling and coordination of care of the above plan    Time spent in interview and examination of patient, review of available records, and discussion with nursing staff. Continue care plan, efforts at therapy, and monitor nutritional status.

## 2021-06-18 NOTE — LETTER
Letter by Shai Ellington MD at      Author: Shai Ellington MD Service: -- Author Type: --    Filed:  Encounter Date: 2/5/2019 Status: (Other)       Adalgisa Solano  [address]  [facility] [address]             February 5, 2019         Dear Ms. Solano,    Below are the results from your recent visit:    Resulted Orders   HM2(CBC w/o Differential)   Result Value Ref Range    WBC 7.8 4.0 - 11.0 thou/uL    RBC 4.20 3.80 - 5.40 mill/uL    Hemoglobin 11.1 (L) 12.0 - 16.0 g/dL    Hematocrit 34.0 (L) 35.0 - 47.0 %    MCV 81 80 - 100 fL    MCH 26.4 (L) 27.0 - 34.0 pg    MCHC 32.7 32.0 - 36.0 g/dL    RDW 18.9 (H) 11.0 - 14.5 %    Platelets 411 140 - 440 thou/uL    MPV 7.4 7.0 - 10.0 fL   Urinalysis-UC if Indicated   Result Value Ref Range    Color, UA Yellow Colorless, Yellow, Straw, Light Yellow    Clarity, UA Clear Clear    Glucose, UA Negative Negative    Bilirubin, UA Negative Negative    Ketones, UA Negative Negative    Specific Gravity, UA 1.015 1.005 - 1.030    Blood, UA Negative Negative    pH, UA 7.0 5.0 - 8.0    Protein, UA Negative Negative mg/dL    Urobilinogen, UA 0.2 E.U./dL 0.2 E.U./dL, 1.0 E.U./dL    Nitrite, UA Negative Negative    Leukocytes, UA Negative Negative    Narrative    Microscopic not indicated  UC not indicated       All very good results on the urinalysis with mild anemia but improved on the hemogram.  Normal white blood count and platelet count now noted    Please call with questions or contact us using [employer].    Sincerely,        Electronically signed by Shai Ellington MD

## 2021-06-18 NOTE — LETTER
Letter by Shai Ellington MD at      Author: Shai Ellington MD Service: -- Author Type: --    Filed:  Encounter Date: 2/5/2019 Status: (Other)       Adalgisa Solano  [address]  [facility], [place] [address]             February 5, 2019         Dear Ms. Solano,    Below are the results from your recent visit:    Resulted Orders   HM2(CBC w/o Differential)   Result Value Ref Range    WBC 7.8 4.0 - 11.0 thou/uL    RBC 4.20 3.80 - 5.40 mill/uL    Hemoglobin 11.1 (L) 12.0 - 16.0 g/dL    Hematocrit 34.0 (L) 35.0 - 47.0 %    MCV 81 80 - 100 fL    MCH 26.4 (L) 27.0 - 34.0 pg    MCHC 32.7 32.0 - 36.0 g/dL    RDW 18.9 (H) 11.0 - 14.5 %    Platelets 411 140 - 440 thou/uL    MPV 7.4 7.0 - 10.0 fL   Comprehensive Metabolic Panel   Result Value Ref Range    Sodium 138 136 - 145 mmol/L    Potassium 4.0 3.5 - 5.0 mmol/L    Chloride 100 98 - 107 mmol/L    CO2 26 22 - 31 mmol/L    Anion Gap, Calculation 12 5 - 18 mmol/L    Glucose 136 (H) 70 - 125 mg/dL    BUN 7 (L) 8 - 28 mg/dL    Creatinine 0.70 0.60 - 1.10 mg/dL    GFR MDRD Af Amer >60 >60 mL/min/1.73m2    GFR MDRD Non Af Amer >60 >60 mL/min/1.73m2    Bilirubin, Total 0.4 0.0 - 1.0 mg/dL    Calcium 8.8 8.5 - 10.5 mg/dL    Protein, Total 6.3 6.0 - 8.0 g/dL    Albumin 3.2 (L) 3.5 - 5.0 g/dL    Alkaline Phosphatase 140 (H) 45 - 120 U/L    AST 14 0 - 40 U/L    ALT <9 0 - 45 U/L    Narrative    Fasting Glucose reference range is 70-99 mg/dL per  American Diabetes Association (ADA) guidelines.   Urinalysis-UC if Indicated   Result Value Ref Range    Color, UA Yellow Colorless, Yellow, Straw, Light Yellow    Clarity, UA Clear Clear    Glucose, UA Negative Negative    Bilirubin, UA Negative Negative    Ketones, UA Negative Negative    Specific Gravity, UA 1.015 1.005 - 1.030    Blood, UA Negative Negative    pH, UA 7.0 5.0 - 8.0    Protein, UA Negative Negative mg/dL    Urobilinogen, UA 0.2 E.U./dL 0.2 E.U./dL, 1.0 E.U./dL    Nitrite, UA Negative Negative    Leukocytes, UA  Negative Negative    Narrative    Microscopic not indicated  UC not indicated       All very good results  ------  All very good results on the urinalysis with mild anemia but improved on the hemogram.  Normal white blood count and platelet count now noted    Please call with questions or contact us using [employer].    Sincerely,        Electronically signed by Shai Ellington MD

## 2021-06-18 NOTE — LETTER
Letter by Faby Ku NP at      Author: Faby Ku NP Service: -- Author Type: --    Filed:  Encounter Date: 2019 Status: (Other)         Patient: Adalgisa Solano   MR Number: [id number]   YOB: 1935   Date of Visit: 2019                 [facility]    DATE: 2019    NAME:  Adalgisa Solano             :  1935  MRN: [id number]  CODE STATUS:  DNR    VISIT TYPE: Problem Visit (hospital f/u)     FACILITY:  [facility] [[id number]]       CHIEF COMPLAIN/REASON FOR VISIT:    Chief Complaint   Patient presents with   ? Problem Visit     hospital f/u               HISTORY OF PRESENT ILLNESS: Adalgisa Solano is a 83 y.o. female who was admitted - for left leg pain and  unable to care for her at home. She was found to have left femoral neck fracture on CT. She was treated for acute on chronic CHF and severe cOPD exacerbation. She was diuresed with IV lasix and transitioned to PO. She was followed by pulmonology and cardiology for stabilization prior to surgery. Her xarelto was held and bridged with lovenox until surgery was performed on . Postop on  she had increased shortness of breath and weakness and lasix was increased. She was put on prednisone taper for copd exacerbation. She did have sputum cultures positive for pseudomonas and klebsiella during stay and completed doxycycline on . She was started on cefepime on  and completed 7 days. She also had acute hyponatremia that improved with fluid status stabilization. She has PMH of DVT 1 month prior to hospital stay on xarelto, PAF, COPD, chronic respiratory failure on 1LNC, CHF, avascular necrosis of Left hip, chronic lymphedema, recurrent pneumonias. Prior to this she lived with her .     Today Ms. Solano states she has some concerns about her meds. She says she takes miralax instead of metamucil and would like this changed. She  also doesn't take the spiriva if she is on the duonebs because it is the same medication and she thinks she will overdose on this. She does this at home as well. She reports that she also puts triamcinolone cream on her legs under her leg wraps to help with itching and preventing cellulitis. She says her primary told her to do this. She does lymphedema wraps on her legs at home, well her  does this for her. She would like to continue this here. She wants the duonebs and hypertonic saline nebs three times a day scheduled and then as needed. She says at home she wears oxygen 1-2 L all the time. She has a portable pack and has had that for about 2 years. She says she has had the oxygen for about 3-4 years. She did not ambulate with any sort of device prior to this hospital stay. She was completely independent. She hopes to return home the same way. She says she is using a walker now though. She feels her breathing is about back to baseline but still coughing more than normal for her. She thinks she will be fine though once she gets her nebs the way she wants them. She denies any fevers or chills at all. She did not sleep last night very well, but wants to see how it goes the next few nights. She is eating ok, but definitely not as good as she usually does at home. She denies any nausea, vomiting indigestion. She has not had a bm for a couple days but did have one in hospital that was soft, nearly liquid and now nothing. She does feel a little bloated. She thinks she will be fine once she gets back on her miralax. She does have pian in back and hip from surgery but she feels it is controlled with pain meds. She cannot see her incision so not sure if any issues with it. She denies any numbness or tingling in legs. She has leg soreness from walking but says she has the least amount of pain when she is resting in ed. She denies any side effects from the dilaudid.     REVIEW OF SYSTEMS:  PROBLEMS AND REVIEW OF  SYSTEMS:   Review of Systems  Today on ROS:   Currently, no fever, chills, or rigors. Decreased vision and hearing. Denies any chest pain, headaches, palpitations, lightheadedness, dizziness. Appetite is good. Denies any GERD symptoms. Denies any difficulty with swallowing, nausea, or vomiting.   Denies any urinary symptoms. No active bleeding. No rash.positive for chronic o2 1-2 LNC at home, left leg and back pain, shortness of breath, congested cough, constipation, lymphedema and wraps, insomnia      Allergies   Allergen Reactions   ? Amoxicillin Itching and Rash   ? Levofloxacin Itching and Rash   ? Penicillins Itching and Rash     Has tolerated ceftriaxone.     Current Outpatient Medications   Medication Sig   ? polyethylene glycol (MIRALAX) 17 gram packet Take 17 g by mouth daily.   ? acetaminophen (TYLENOL) 500 MG tablet Take 2 tablets (1,000 mg total) by mouth 3 (three) times a day.   ? ADVAIR DISKUS 250-50 mcg/dose DISKUS INHALE 1 PUFF 2 (TWO) TIMES A DAY.   ? albuterol (PROVENTIL) 2.5 mg /3 mL (0.083 %) nebulizer solution 3 ML INHALATION FOUR TIMES A DAY AS NEEDED USE BEFORE NEBULIZED SALINE. FOR SHORTNESS OF BREATH.   ? ARIPiprazole (ABILIFY) 5 MG tablet TAKE 1 TABLET EVERY NIGHT AT BEDTIME   ? aspirin 81 MG EC tablet Take 81 mg by mouth daily.   ? atorvastatin (LIPITOR) 20 MG tablet TAKE 1 TABLET (20 MG TOTAL) BY MOUTH BEDTIME.   ? calcium carbonate-vitamin D3 (CALCIUM WITH VITAMIN D) 600 mg(1,500mg) -400 unit per tablet Take 1 tablet by mouth 2 (two) times a day.   ? cholecalciferol, vitamin D3, 1,000 unit tablet Take 2,000 Units by mouth daily.    ? diltiazem (CARDIZEM CD) 180 MG 24 hr capsule Take 1 capsule (180 mg total) by mouth 2 (two) times a day.   ? docusate sodium (COLACE) 100 MG capsule Take 200 mg by mouth daily.   ? ferrous sulfate 325 (65 FE) MG tablet Take 1 tablet by mouth daily with breakfast.   ? furosemide (LASIX) 40 MG tablet Take 1 tablet (40 mg total) by mouth 2 (two) times a day at  9am and 6pm.   ? HYDROmorphone (DILAUDID) 2 MG tablet Take 0.5-1 tablets (1-2 mg total) by mouth every 6 (six) hours as needed.   ? ipratropium-albuterol (DUO-NEB) 0.5-2.5 mg/3 mL nebulizer Take 3 mL by nebulization every 4 (four) hours as needed (shortness of breath and wheezing). (Patient taking differently: Take 3 mL by nebulization 3 (three) times a day.       )   ? lidocaine 4 % patch Place 1 patch on the skin daily. Remove and discard patch with 12 hours or as directed by MD.   ? magnesium oxide (MAG-OX) 400 mg (241.3 mg magnesium) tablet Take 1 tablet (400 mg total) by mouth daily.   ? omeprazole (PRILOSEC) 20 MG capsule Take 20 mg by mouth daily before breakfast.   ? potassium chloride (KLOR-CON) 10 MEQ CR tablet TAKE 1 TABLET (10 MEQ TOTAL) BY MOUTH DAILY.   ? predniSONE (DELTASONE) 10 mg tablet Take 10 mg by mouth daily with breakfast for 3 days.   ? PROAIR HFA 90 mcg/actuation inhaler INHALE 2 PUFFS EVERY 4 (FOUR) HOURS AS NEEDED FOR WHEEZING.   ? QUEtiapine (SEROQUEL) 25 MG tablet TAKE 2 TABLETS AT BEDTIME=50MG   ? rivaroxaban 15 mg Tab Take 1 tablet (15 mg total) by mouth 2 (two) times a day with meals for 21 days. Change to Xarelto 20 mg daily on 1/25/2019   ? sodium chloride 3 % nebulizer solution TAKE 4 ML BY NEBULIZATION 2 (TWO) TIMES A DAY. USING AFTER ALBUTEROL NEB. (Patient taking differently: Take 4 mL by nebulization 3 (three) times a day. Using after albuterol neb.      )   ? SPIRIVA WITH HANDIHALER 18 mcg inhalation capsule USE 1 CAPSULE VIA INHALER 1 X A DAY (Patient taking differently: daily prn)     Past Medical History:    Past Medical History:   Diagnosis Date   ? Acute and chronic respiratory failure with hypoxia (H)    ? Arm skin lesion, right    ? Arnold-Chiari malformation (H) 1998   ? Atrial fibrillation (H) 02/2017   ? Breast cyst    ? Breast lump    ? Chronic diastolic heart failure (H)     diastolic chf   ? Chronic respiratory failure with hypoxia (H) 3/13/2017   ? COPD (chronic  obstructive pulmonary disease) (H)    ? COPD exacerbation (H)    ? Depression    ? Diet-controlled type 2 diabetes mellitus (H)    ? GERD (gastroesophageal reflux disease)    ? Hyperlipidemia    ? Hypertension    ? Lumbar spinal stenosis    ? Peripheral arterial disease (H) 10/28/2015   ? Pulmonary hypertension (H) 3/5/2018   ? Recurrent major depressive episodes (H)     Created by Conversion  Replacement Utility updated for latest IMO load   ? Schizoaffective disorder, bipolar type (H) 6/11/2015   ? Type 2 diabetes mellitus without complication (H) 8/2/2016           PHYSICAL EXAMINATION  Vitals:    01/06/19 2136   BP: 124/60   Pulse: 80   Resp: 22   Temp: 97.7  F (36.5  C)   SpO2: 94%   Weight: 157 lb (71.2 kg)       Today on physical exam:     GENERAL: Awake, Alert, oriented x3, not in any form of acute distress, answers questions appropriately, follows simple commands, conversant  HEENT: Head is normocephalic with normal hair distribution. No evidence of trauma. Ears: No acute purulent discharge. Eyes: Conjunctivae pink with no scleral jaundice. Nose: Normal mucosa and septum. NECK: Supple with no cervical or supraclavicular lymphadenopathy. Trachea is midline.   CHEST: No tenderness or deformity, no crepitus  LUNG: dim with scattered rhonchi to auscultation with good chest expansion. normal AP diameter. Congested cough  BACK: No kyphosis of the thoracic spine. Symmetric, no curvature, ROM normal, no CVA tenderness, no spinal tenderness   CVS: irregularly irregular rhythm, there are no murmurs, rubs, gallops, or heaves,  2+ pulses symmetric in all extremities.  ABDOMEN: Rounded and soft, nontender to palpation, mildly distended, no masses, no organomegaly, good bowel sounds, no rebound or guarding, no peritoneal signs.   EXTREMITIES: 2+ LLE pedal edema, 1+ rle, lymphedema wraps, left hip incision c/d/i  SKIN: Warm and dry  NEUROLOGICAL: The patient is oriented to person, place and time.             LABS:    Recent Results (from the past 168 hour(s))   POCT Glucose   Result Value Ref Range    Glucose,  mg/dL   POCT Glucose   Result Value Ref Range    Glucose,  mg/dL   Basic Metabolic Panel   Result Value Ref Range    Sodium 139 136 - 145 mmol/L    Potassium 3.8 3.5 - 5.0 mmol/L    Chloride 104 98 - 107 mmol/L    CO2 30 22 - 31 mmol/L    Anion Gap, Calculation 5 5 - 18 mmol/L    Glucose 125 70 - 125 mg/dL    Calcium 7.9 (L) 8.5 - 10.5 mg/dL    BUN 15 8 - 28 mg/dL    Creatinine 0.52 (L) 0.60 - 1.10 mg/dL    GFR MDRD Af Amer >60 >60 mL/min/1.73m2    GFR MDRD Non Af Amer >60 >60 mL/min/1.73m2   Hemoglobin - Daily x 2   Result Value Ref Range    Hemoglobin 9.7 (L) 12.0 - 16.0 g/dL   POCT Glucose   Result Value Ref Range    Glucose,  mg/dL   POCT Glucose   Result Value Ref Range    Glucose,  mg/dL   POCT Glucose   Result Value Ref Range    Glucose,  mg/dL   POCT Glucose   Result Value Ref Range    Glucose,  mg/dL   Crossmatch   Result Value Ref Range    Crossmatch Compatible     Blood Expiration Date [id number]     Unit Type A Pos     Unit Number [id number]     Status Transfused     Component Red Blood Cells     PRODUCT CODE H9315Y58     Issue Date and Time [id number]     Blood Type 6200     CODING SYSTEM [id number]    Basic Metabolic Panel   Result Value Ref Range    Sodium 136 136 - 145 mmol/L    Potassium 4.1 3.5 - 5.0 mmol/L    Chloride 101 98 - 107 mmol/L    CO2 31 22 - 31 mmol/L    Anion Gap, Calculation 4 (L) 5 - 18 mmol/L    Glucose 113 70 - 125 mg/dL    Calcium 7.9 (L) 8.5 - 10.5 mg/dL    BUN 15 8 - 28 mg/dL    Creatinine 0.48 (L) 0.60 - 1.10 mg/dL    GFR MDRD Af Amer >60 >60 mL/min/1.73m2    GFR MDRD Non Af Amer >60 >60 mL/min/1.73m2   Hemoglobin - Daily x 2   Result Value Ref Range    Hemoglobin 9.4 (L) 12.0 - 16.0 g/dL   Vitamin D, Total (25-Hydroxy)   Result Value Ref Range    Vitamin D, Total (25-Hydroxy) 39.6 30.0 - 80.0 ng/mL   POCT Glucose   Result Value Ref  Range    Glucose,  mg/dL   POCT Glucose   Result Value Ref Range    Glucose,  mg/dL   POCT Glucose   Result Value Ref Range    Glucose,  mg/dL   POCT Glucose   Result Value Ref Range    Glucose,  mg/dL   Basic Metabolic Panel   Result Value Ref Range    Sodium 136 136 - 145 mmol/L    Potassium 3.9 3.5 - 5.0 mmol/L    Chloride 98 98 - 107 mmol/L    CO2 31 22 - 31 mmol/L    Anion Gap, Calculation 7 5 - 18 mmol/L    Glucose 119 70 - 125 mg/dL    Calcium 8.1 (L) 8.5 - 10.5 mg/dL    BUN 10 8 - 28 mg/dL    Creatinine 0.57 (L) 0.60 - 1.10 mg/dL    GFR MDRD Af Amer >60 >60 mL/min/1.73m2    GFR MDRD Non Af Amer >60 >60 mL/min/1.73m2   Magnesium   Result Value Ref Range    Magnesium 1.6 (L) 1.8 - 2.6 mg/dL   POCT Glucose   Result Value Ref Range    Glucose,  mg/dL   POCT Glucose   Result Value Ref Range    Glucose,  mg/dL   Crossmatch   Result Value Ref Range    Crossmatch Compatible     Blood Expiration Date [id number]     Unit Type A Pos     Unit Number [id number]     Status Released     Component Red Blood Cells     PRODUCT CODE [id number]     Blood Type 6200     CODING SYSTEM [id number]    Basic Metabolic Panel   Result Value Ref Range    Sodium 135 (L) 136 - 145 mmol/L    Potassium 3.9 3.5 - 5.0 mmol/L    Chloride 97 (L) 98 - 107 mmol/L    CO2 28 22 - 31 mmol/L    Anion Gap, Calculation 10 5 - 18 mmol/L    Glucose 154 (H) 70 - 125 mg/dL    Calcium 8.4 (L) 8.5 - 10.5 mg/dL    BUN 10 8 - 28 mg/dL    Creatinine 0.58 (L) 0.60 - 1.10 mg/dL    GFR MDRD Af Amer >60 >60 mL/min/1.73m2    GFR MDRD Non Af Amer >60 >60 mL/min/1.73m2   Magnesium   Result Value Ref Range    Magnesium 1.7 (L) 1.8 - 2.6 mg/dL   HM1 (CBC with Diff)   Result Value Ref Range    WBC 12.2 (H) 4.0 - 11.0 thou/uL    RBC 3.43 (L) 3.80 - 5.40 mill/uL    Hemoglobin 8.9 (L) 12.0 - 16.0 g/dL    Hematocrit 27.9 (L) 35.0 - 47.0 %    MCV 81 80 - 100 fL    MCH 25.9 (L) 27.0 - 34.0 pg    MCHC 31.9 (L) 32.0 - 36.0 g/dL    RDW  19.1 (H) 11.0 - 14.5 %    Platelets 504 (H) 140 - 440 thou/uL    MPV 10.3 8.5 - 12.5 fL    Neutrophils % 85 (H) 50 - 70 %    Lymphocytes % 8 (L) 20 - 40 %    Monocytes % 7 2 - 10 %    Eosinophils % 0 0 - 6 %    Basophils % 0 0 - 2 %    Neutrophils Absolute 10.2 (H) 2.0 - 7.7 thou/uL    Lymphocytes Absolute 1.0 0.8 - 4.4 thou/uL    Monocytes Absolute 0.9 0.0 - 0.9 thou/uL    Eosinophils Absolute 0.1 0.0 - 0.4 thou/uL    Basophils Absolute 0.0 0.0 - 0.2 thou/uL     Results for orders placed or performed during the hospital encounter of 12/21/18   Basic Metabolic Panel   Result Value Ref Range    Sodium 136 136 - 145 mmol/L    Potassium 3.9 3.5 - 5.0 mmol/L    Chloride 98 98 - 107 mmol/L    CO2 31 22 - 31 mmol/L    Anion Gap, Calculation 7 5 - 18 mmol/L    Glucose 119 70 - 125 mg/dL    Calcium 8.1 (L) 8.5 - 10.5 mg/dL    BUN 10 8 - 28 mg/dL    Creatinine 0.57 (L) 0.60 - 1.10 mg/dL    GFR MDRD Af Amer >60 >60 mL/min/1.73m2    GFR MDRD Non Af Amer >60 >60 mL/min/1.73m2         Lab Results   Component Value Date    WBC 12.2 (H) 01/08/2019    HGB 8.9 (L) 01/08/2019    HCT 27.9 (L) 01/08/2019    MCV 81 01/08/2019     (H) 01/08/2019       No results found for: [id number]  Lab Results   Component Value Date    HGBA1C 6.8 (H) 11/09/2018     Lab Results   Component Value Date    INR 1.63 (H) 11/23/2018    INR 1.19 (H) 11/08/2018    INR 0.95 02/14/2017     Vitamin D, Total (25-Hydroxy)   Date Value Ref Range Status   01/04/2019 39.6 30.0 - 80.0 ng/mL Final     Lab Results   Component Value Date    TSH 1.33 11/08/2018           ASSESSMENT/PLAN:    1. Left femoral neck fracture, s/p REGINA: Incision c/d/i. Tylenol three times a day, lidoderm patch, dilaudid prn. Pain controlled. F/u with ortho Dr. Lackey in 2 weeks.   2. COPD: On advair two times a day, albuterol four times a day prn. Will change duonebs and 3% saline nebs to three times a day and prn per her request for congested cough. Changed spiriva to prn. Educated her on  long v short acting meds, but insisting cannot take spiriva while on duonebs. Prednisone taper. proair prn. Completed doxycycline on 12/28.   3. Chronic CHF: Daily [id number]. On lasix two times a day. Shortness of breath stable. Edema 2+ lle, 1+ rle, lymphedema wraps. Stable. Monitor.   4. H/o DVT: On xarelto.   5. HLD: On aspirin, lipitor  6. PAF: Rate controlled 80s. On cardizem, xarelto.   7. Constipation: On colace daily, will dc metamucil and add miralax per her request.   8. Iron deficiency anemia: On iron.   9. GERD: On omeprazole.   10. Hypokalemia: On kcl.   11. Hypomagnesemia: On mag ox.   12. Schizoaffective disorder, bipolar type: On seroquel and abilify. Follows with psych.   13. Osteoporosis: On calcium, vitamin d.     Bmp and cbc were ordered for today by hospital, mg level 1/9    Per therapy eval today  Electronically signed by: Faby Ku NP    Total 45  minutes of which 55% was spent in counseling and coordination of care of the above plan    Time spent in interview and examination of patient, review of available records, and discussion with nursing staff. Continue care plan, efforts at therapy, and monitor nutritional status.

## 2021-06-18 NOTE — LETTER
Letter by Faby Ku NP at      Author: Faby Ku NP Service: -- Author Type: --    Filed:  Encounter Date: 2019 Status: (Other)         Patient: Adalgisa Solano   MR Number: [id number]   YOB: 1935   Date of Visit: 2019                 [facility]    DATE: 2019    NAME:  Adalgisa Solano             :  1935  MRN: [id number]  CODE STATUS:  DNR    VISIT TYPE: Problem Visit     FACILITY:  [facility] [[id number]]       CHIEF COMPLAIN/REASON FOR VISIT:    Chief Complaint   Patient presents with   ? Problem Visit               HISTORY OF PRESENT ILLNESS: Adalgisa Solano is a 83 y.o. female who was admitted - for left leg pain and  unable to care for her at home. She was found to have left femoral neck fracture on CT. She was treated for acute on chronic CHF and severe cOPD exacerbation. She was diuresed with IV lasix and transitioned to PO. She was followed by pulmonology and cardiology for stabilization prior to surgery. Her xarelto was held and bridged with lovenox until surgery was performed on . Postop on  she had increased shortness of breath and weakness and lasix was increased. She was put on prednisone taper for copd exacerbation. She did have sputum cultures positive for pseudomonas and klebsiella during stay and completed doxycycline on . She was started on cefepime on  and completed 7 days. She also had acute hyponatremia that improved with fluid status stabilization. She has PMH of DVT 1 month prior to hospital stay on xarelto, PAF, COPD, chronic respiratory failure on 1LNC, CHF, avascular necrosis of Left hip, chronic lymphedema, recurrent pneumonias. Prior to this she lived with her .     Today Ms. Solano states she is still coughing a lot and very congested. She says she doesn't believe they have been giving her the hypertonic saline nebs and she is not sure  why. She says her bowels are moving again and she slept a little better last night. She denies any fevers just the persistent congestion and cough. She has not heard wheezing recently. She did have a heart rate in 110s yesterday but says this was after a breathing treatment. Per staff she remains very congested but state the hypertonic saline nebs are not ordered to be scheduled. Reviewed chart and discussed with nursing that were ordered three times a day on 1/7, order processing issue and will start today. Instructed nursing to notify if temp >100.5 and will do repeat chest xray. Discussed with laith extending prednisone and adding mucinex to help with congestion as her exacerbation is not resolving.     REVIEW OF SYSTEMS:  PROBLEMS AND REVIEW OF SYSTEMS:   Review of Systems  Today on ROS:   Currently, no fever, chills, or rigors. Decreased vision and hearing. Denies any chest pain, headaches, palpitations, lightheadedness, dizziness. Appetite is good. Denies any GERD symptoms. Denies any difficulty with swallowing, nausea, or vomiting.   Denies any urinary symptoms. No active bleeding. No rash.positive for chronic o2 1-2 LNC at home, left leg and back pain, shortness of breath, congested cough, lymphedema and wraps      Allergies   Allergen Reactions   ? Amoxicillin Itching and Rash   ? Levofloxacin Itching and Rash   ? Penicillins Itching and Rash     Has tolerated ceftriaxone.     Current Outpatient Medications   Medication Sig   ? guaiFENesin ER (MUCINEX) 600 mg 12 hr tablet Take 1,200 mg by mouth 2 (two) times a day.   ? acetaminophen (TYLENOL) 500 MG tablet Take 2 tablets (1,000 mg total) by mouth 3 (three) times a day.   ? ADVAIR DISKUS 250-50 mcg/dose DISKUS INHALE 1 PUFF 2 (TWO) TIMES A DAY.   ? albuterol (PROVENTIL) 2.5 mg /3 mL (0.083 %) nebulizer solution 3 ML INHALATION FOUR TIMES A DAY AS NEEDED USE BEFORE NEBULIZED SALINE. FOR SHORTNESS OF BREATH.   ? ARIPiprazole (ABILIFY) 5 MG tablet TAKE 1  TABLET EVERY NIGHT AT BEDTIME   ? aspirin 81 MG EC tablet Take 81 mg by mouth daily.   ? atorvastatin (LIPITOR) 20 MG tablet TAKE 1 TABLET (20 MG TOTAL) BY MOUTH BEDTIME.   ? calcium carbonate-vitamin D3 (CALCIUM WITH VITAMIN D) 600 mg(1,500mg) -400 unit per tablet Take 1 tablet by mouth 2 (two) times a day.   ? cholecalciferol, vitamin D3, 1,000 unit tablet Take 2,000 Units by mouth daily.    ? diltiazem (CARDIZEM CD) 180 MG 24 hr capsule Take 1 capsule (180 mg total) by mouth 2 (two) times a day.   ? docusate sodium (COLACE) 100 MG capsule Take 200 mg by mouth daily.   ? ferrous sulfate 325 (65 FE) MG tablet Take 1 tablet by mouth daily with breakfast.   ? furosemide (LASIX) 40 MG tablet Take 1 tablet (40 mg total) by mouth 2 (two) times a day at 9am and 6pm.   ? HYDROmorphone (DILAUDID) 2 MG tablet Take 0.5-1 tablets (1-2 mg total) by mouth every 6 (six) hours as needed.   ? ipratropium-albuterol (DUO-NEB) 0.5-2.5 mg/3 mL nebulizer Take 3 mL by nebulization every 4 (four) hours as needed (shortness of breath and wheezing). (Patient taking differently: Take 3 mL by nebulization 3 (three) times a day.       )   ? lidocaine 4 % patch Place 1 patch on the skin daily. Remove and discard patch with 12 hours or as directed by MD.   ? magnesium oxide (MAG-OX) 400 mg (241.3 mg magnesium) tablet Take 1 tablet (400 mg total) by mouth daily.   ? omeprazole (PRILOSEC) 20 MG capsule Take 20 mg by mouth daily before breakfast.   ? polyethylene glycol (MIRALAX) 17 gram packet Take 17 g by mouth daily.   ? potassium chloride (KLOR-CON) 10 MEQ CR tablet TAKE 1 TABLET (10 MEQ TOTAL) BY MOUTH DAILY.   ? predniSONE (DELTASONE) 10 mg tablet Take 10 mg by mouth daily with breakfast for 3 days.   ? PROAIR HFA 90 mcg/actuation inhaler INHALE 2 PUFFS EVERY 4 (FOUR) HOURS AS NEEDED FOR WHEEZING.   ? QUEtiapine (SEROQUEL) 25 MG tablet TAKE 2 TABLETS AT BEDTIME=50MG   ? rivaroxaban 15 mg Tab Take 1 tablet (15 mg total) by mouth 2 (two) times  a day with meals for 21 days. Change to Xarelto 20 mg daily on 1/25/2019   ? sodium chloride 3 % nebulizer solution TAKE 4 ML BY NEBULIZATION 2 (TWO) TIMES A DAY. USING AFTER ALBUTEROL NEB. (Patient taking differently: Take 4 mL by nebulization 3 (three) times a day. Using after albuterol neb.      )   ? SPIRIVA WITH HANDIHALER 18 mcg inhalation capsule USE 1 CAPSULE VIA INHALER 1 X A DAY (Patient taking differently: daily prn)     Past Medical History:    Past Medical History:   Diagnosis Date   ? Acute and chronic respiratory failure with hypoxia (H)    ? Arm skin lesion, right    ? Arnold-Chiari malformation (H) 1998   ? Atrial fibrillation (H) 02/2017   ? Breast cyst    ? Breast lump    ? Chronic diastolic heart failure (H)     diastolic chf   ? Chronic respiratory failure with hypoxia (H) 3/13/2017   ? COPD (chronic obstructive pulmonary disease) (H)    ? COPD exacerbation (H)    ? Depression    ? Diet-controlled type 2 diabetes mellitus (H)    ? GERD (gastroesophageal reflux disease)    ? Hyperlipidemia    ? Hypertension    ? Lumbar spinal stenosis    ? Peripheral arterial disease (H) 10/28/2015   ? Pulmonary hypertension (H) 3/5/2018   ? Recurrent major depressive episodes (H)     Created by Conversion  Replacement Utility updated for latest IMO load   ? Schizoaffective disorder, bipolar type (H) 6/11/2015   ? Type 2 diabetes mellitus without complication (H) 8/2/2016           PHYSICAL EXAMINATION  Vitals:    01/08/19 2139   BP: 141/80   Pulse: 98   Resp: 24   Temp: 97.2  F (36.2  C)   SpO2: 97%   Weight: 153 lb (69.4 kg)       Today on physical exam:     GENERAL: Awake, Alert, oriented x3, not in any form of acute distress, answers questions appropriately, follows simple commands, conversant  HEENT: Head is normocephalic with normal hair distribution. No evidence of trauma. Ears: No acute purulent discharge. Eyes: Conjunctivae pink with no scleral jaundice. Nose: Normal mucosa and septum. NECK: Supple with  no cervical or supraclavicular lymphadenopathy. Trachea is midline.   CHEST: No tenderness or deformity, no crepitus  LUNG: dim with scattered rhonchi, fine crackles to bases to auscultation with good chest expansion. normal AP diameter. Congested cough  BACK: No kyphosis of the thoracic spine. Symmetric, no curvature, ROM normal, no CVA tenderness, no spinal tenderness   CVS: irregularly irregular rhythm, there are no murmurs, rubs, gallops, or heaves,  2+ pulses symmetric in all extremities.  ABDOMEN: Rounded and soft, nontender to palpation, no masses, no organomegaly, good bowel sounds, no rebound or guarding, no peritoneal signs.   EXTREMITIES: 2+ LLE pedal edema, 1+ rle, lymphedema wraps, left hip incision c/d/i  SKIN: Warm and dry  NEUROLOGICAL: The patient is oriented to person, place and time.             LABS:   Recent Results (from the past 168 hour(s))   POCT Glucose   Result Value Ref Range    Glucose,  mg/dL   Basic Metabolic Panel   Result Value Ref Range    Sodium 139 136 - 145 mmol/L    Potassium 3.8 3.5 - 5.0 mmol/L    Chloride 104 98 - 107 mmol/L    CO2 30 22 - 31 mmol/L    Anion Gap, Calculation 5 5 - 18 mmol/L    Glucose 125 70 - 125 mg/dL    Calcium 7.9 (L) 8.5 - 10.5 mg/dL    BUN 15 8 - 28 mg/dL    Creatinine 0.52 (L) 0.60 - 1.10 mg/dL    GFR MDRD Af Amer >60 >60 mL/min/1.73m2    GFR MDRD Non Af Amer >60 >60 mL/min/1.73m2   Hemoglobin - Daily x 2   Result Value Ref Range    Hemoglobin 9.7 (L) 12.0 - 16.0 g/dL   POCT Glucose   Result Value Ref Range    Glucose,  mg/dL   POCT Glucose   Result Value Ref Range    Glucose,  mg/dL   POCT Glucose   Result Value Ref Range    Glucose,  mg/dL   POCT Glucose   Result Value Ref Range    Glucose,  mg/dL   Crossmatch   Result Value Ref Range    Crossmatch Compatible     Blood Expiration Date [id number]     Unit Type A Pos     Unit Number [id number]     Status Transfused     Component Red Blood Cells     PRODUCT CODE  J1174N88     Issue Date and Time [id number]     Blood Type 6200     CODING SYSTEM [id number]    Basic Metabolic Panel   Result Value Ref Range    Sodium 136 136 - 145 mmol/L    Potassium 4.1 3.5 - 5.0 mmol/L    Chloride 101 98 - 107 mmol/L    CO2 31 22 - 31 mmol/L    Anion Gap, Calculation 4 (L) 5 - 18 mmol/L    Glucose 113 70 - 125 mg/dL    Calcium 7.9 (L) 8.5 - 10.5 mg/dL    BUN 15 8 - 28 mg/dL    Creatinine 0.48 (L) 0.60 - 1.10 mg/dL    GFR MDRD Af Amer >60 >60 mL/min/1.73m2    GFR MDRD Non Af Amer >60 >60 mL/min/1.73m2   Hemoglobin - Daily x 2   Result Value Ref Range    Hemoglobin 9.4 (L) 12.0 - 16.0 g/dL   Vitamin D, Total (25-Hydroxy)   Result Value Ref Range    Vitamin D, Total (25-Hydroxy) 39.6 30.0 - 80.0 ng/mL   POCT Glucose   Result Value Ref Range    Glucose,  mg/dL   POCT Glucose   Result Value Ref Range    Glucose,  mg/dL   POCT Glucose   Result Value Ref Range    Glucose,  mg/dL   POCT Glucose   Result Value Ref Range    Glucose,  mg/dL   Basic Metabolic Panel   Result Value Ref Range    Sodium 136 136 - 145 mmol/L    Potassium 3.9 3.5 - 5.0 mmol/L    Chloride 98 98 - 107 mmol/L    CO2 31 22 - 31 mmol/L    Anion Gap, Calculation 7 5 - 18 mmol/L    Glucose 119 70 - 125 mg/dL    Calcium 8.1 (L) 8.5 - 10.5 mg/dL    BUN 10 8 - 28 mg/dL    Creatinine 0.57 (L) 0.60 - 1.10 mg/dL    GFR MDRD Af Amer >60 >60 mL/min/1.73m2    GFR MDRD Non Af Amer >60 >60 mL/min/1.73m2   Magnesium   Result Value Ref Range    Magnesium 1.6 (L) 1.8 - 2.6 mg/dL   POCT Glucose   Result Value Ref Range    Glucose,  mg/dL   POCT Glucose   Result Value Ref Range    Glucose,  mg/dL   Crossmatch   Result Value Ref Range    Crossmatch Compatible     Blood Expiration Date [id number]     Unit Type A Pos     Unit Number [id number]     Status Released     Component Red Blood Cells     PRODUCT CODE [id number]     Blood Type 6200     CODING SYSTEM [id number]    Basic Metabolic Panel   Result Value  Ref Range    Sodium 135 (L) 136 - 145 mmol/L    Potassium 3.9 3.5 - 5.0 mmol/L    Chloride 97 (L) 98 - 107 mmol/L    CO2 28 22 - 31 mmol/L    Anion Gap, Calculation 10 5 - 18 mmol/L    Glucose 154 (H) 70 - 125 mg/dL    Calcium 8.4 (L) 8.5 - 10.5 mg/dL    BUN 10 8 - 28 mg/dL    Creatinine 0.58 (L) 0.60 - 1.10 mg/dL    GFR MDRD Af Amer >60 >60 mL/min/1.73m2    GFR MDRD Non Af Amer >60 >60 mL/min/1.73m2   Magnesium   Result Value Ref Range    Magnesium 1.7 (L) 1.8 - 2.6 mg/dL   HM1 (CBC with Diff)   Result Value Ref Range    WBC 12.2 (H) 4.0 - 11.0 thou/uL    RBC 3.43 (L) 3.80 - 5.40 mill/uL    Hemoglobin 8.9 (L) 12.0 - 16.0 g/dL    Hematocrit 27.9 (L) 35.0 - 47.0 %    MCV 81 80 - 100 fL    MCH 25.9 (L) 27.0 - 34.0 pg    MCHC 31.9 (L) 32.0 - 36.0 g/dL    RDW 19.1 (H) 11.0 - 14.5 %    Platelets 504 (H) 140 - 440 thou/uL    MPV 10.3 8.5 - 12.5 fL    Neutrophils % 85 (H) 50 - 70 %    Lymphocytes % 8 (L) 20 - 40 %    Monocytes % 7 2 - 10 %    Eosinophils % 0 0 - 6 %    Basophils % 0 0 - 2 %    Neutrophils Absolute 10.2 (H) 2.0 - 7.7 thou/uL    Lymphocytes Absolute 1.0 0.8 - 4.4 thou/uL    Monocytes Absolute 0.9 0.0 - 0.9 thou/uL    Eosinophils Absolute 0.1 0.0 - 0.4 thou/uL    Basophils Absolute 0.0 0.0 - 0.2 thou/uL     Results for orders placed or performed in visit on 01/08/19   Basic Metabolic Panel   Result Value Ref Range    Sodium 135 (L) 136 - 145 mmol/L    Potassium 3.9 3.5 - 5.0 mmol/L    Chloride 97 (L) 98 - 107 mmol/L    CO2 28 22 - 31 mmol/L    Anion Gap, Calculation 10 5 - 18 mmol/L    Glucose 154 (H) 70 - 125 mg/dL    Calcium 8.4 (L) 8.5 - 10.5 mg/dL    BUN 10 8 - 28 mg/dL    Creatinine 0.58 (L) 0.60 - 1.10 mg/dL    GFR MDRD Af Amer >60 >60 mL/min/1.73m2    GFR MDRD Non Af Amer >60 >60 mL/min/1.73m2         Lab Results   Component Value Date    WBC 12.2 (H) 01/08/2019    HGB 8.9 (L) 01/08/2019    HCT 27.9 (L) 01/08/2019    MCV 81 01/08/2019     (H) 01/08/2019       No results found for:  [id number]  Lab Results   Component Value Date    HGBA1C 6.8 (H) 11/09/2018     Lab Results   Component Value Date    INR 1.63 (H) 11/23/2018    INR 1.19 (H) 11/08/2018    INR 0.95 02/14/2017     Vitamin D, Total (25-Hydroxy)   Date Value Ref Range Status   01/04/2019 39.6 30.0 - 80.0 ng/mL Final     Lab Results   Component Value Date    TSH 1.33 11/08/2018           ASSESSMENT/PLAN:    1. Left femoral neck fracture, s/p REGINA: Incision c/d/i. Tylenol three times a day, lidoderm patch, dilaudid prn. Pain controlled. F/u with ortho Dr. Lackey in 2 weeks. PT, OT. 2+ lle, 1+ rle. Lymphedema wraps.   2. COPD: On advair two times a day, albuterol four times a day prn.  changed duonebs and 3% saline nebs to three times a day and prn on 1/7 but hypertonic saline order was missed, just started today. spiriva prn per her request. Educated her on long v short acting meds, but insisting cannot take spiriva while on duonebs. Prednisone taper-extended another 5 days today. proair prn. Completed doxycycline on 12/28. Added mucinex x 7 days and will order cxr if temp >100.5. WBC 12.2 on 1/8, will recheck on 1/10.   3. Chronic CHF: Daily [id number]. On lasix two times a day. Shortness of breath stable. Edema 2+ lle, 1+ rle, lymphedema wraps. Stable. Monitor.   4. H/o DVT: On xarelto.   5. HLD: On aspirin, lipitor  6. PAF: Rate controlled 80s. On cardizem, xarelto.   7. Constipation: On colace daily, miralax daily  8. Iron deficiency anemia: started iron for hg of 8.9 on 1/8. Will recheck on 1/10.   9. GERD: On omeprazole.   10. Hypokalemia: On kcl. K 3.9 on 1/8.   11. Hypomagnesemia: On mag ox. MG 1.7 on 1/8. Stable.   12. Schizoaffective disorder, bipolar type: On seroquel and abilify. Follows with psych.   13. Osteoporosis: On calcium, vitamin d.   14. Type 2 DM: controlled with diet. Monitoring on labs and stable.     Per therapy soft LCD 1/21, mod for dressing, ambulates 40 feet with cga.     Electronically signed by:  Faby Ku NP    Total 35  minutes of which 55% was spent in counseling and coordination of care of the above plan    Time spent in interview and examination of patient, review of available records, and discussion with nursing staff. Continue care plan, efforts at therapy, and monitor nutritional status.

## 2021-06-19 NOTE — LETTER
Letter by Moisés Allen MD at      Author: Moisés Allen MD Service: -- Author Type: --    Filed:  Encounter Date: 5/14/2019 Status: (Other)         May 14, 2019     Shai Ellington MD  [address] [place] [address]    Patient: Adalgisa Solano   MR Number: [id number]   YOB: 1935   Date of Visit: 5/14/2019     Dear Dr. Chandana MD:    Thank you for referring Adalgisa Solano to me for evaluation. Below are the relevant portions of my assessment and plan of care.    If you have questions, please do not hesitate to call me. I look forward to following Adalgisa along with you.    Sincerely,        Moisés Allen MD        CC  Reece Woo MD    Progress Notes:

## 2021-06-19 NOTE — PROGRESS NOTES
Assessment and Plan:   Annual wellness visit.    1. Routine general medical examination at a health care facility  Annual wellness visit.  - HM2(CBC w/o Differential)  - Comprehensive Metabolic Panel  - Lipid Cascade  - Glycosylated Hemoglobin A1c  - Thyroid Stimulating Hormone (TSH)  - Urinalysis-UC if Indicated    2. Cellulitis  Problematic cellulitis right lower extremity for some period of time and left lower extremity as well worse on the left than the right today.  Infectious disease consultation pending  - Ambulatory referral to Infectious Disease    3. Hip pain, right  Severe pain right hip unable to get on exam table.  X-ray pending no antecedent trauma.  - XR Hip Right 2 or More VWS; Future     The patient's current medical problems were reviewed.    I have had an Advance Directives discussion with the patient.  The following health maintenance schedule was reviewed with the patient and provided in printed form in the after visit summary:   Health Maintenance   Topic Date Due     DIABETES OPHTHALMOLOGY EXAM  11/07/1945     ZOSTER VACCINE  11/07/1997     DXA SCAN  08/12/2016     INFLUENZA VACCINE RULE BASED (1) 08/01/2018     DEPRESSION FOLLOW UP  10/05/2018     DIABETES FOLLOW-UP  11/21/2018     DIABETES HEMOGLOBIN A1C  12/25/2018     DIABETES URINE MICROALBUMIN  03/08/2019     FALL RISK ASSESSMENT  04/05/2019     DIABETES FOOT EXAM  05/21/2019     ADVANCE DIRECTIVES DISCUSSED WITH PATIENT  07/28/2022     TD 18+ HE  05/14/2023     PNEUMOCOCCAL POLYSACCHARIDE VACCINE AGE 65 AND OVER  Completed     PNEUMOCOCCAL CONJUGATE VACCINE FOR ADULTS (PCV13 OR PREVNAR)  Completed        Subjective:   Chief Complaint: Adalgisa Solano is an 82 y.o. female here for an Annual Wellness visit.   HPI: Annual wellness visit.    Leg swelling plus especially left.  Right leg weakness and pain in right hip.  ID consult and x-ray of right hip pending.    82 years of age with history of type 2 diabetes.  Other multiple  medical problems see below.    Mammogram recently done 2018 [facility].  Colonoscopy normal with Dr. [facility] 2013.    Non-smoker no excess alcohol allergies listed include amoxicillin levofloxacin and penicillin.    History of type 2 diabetes and history of COPD with asthmatic bronchitis recurrent.  Azithromycin in the form of Z-Bijan helpful for the latter.    Left total knee arthroplasty done 2016 osteoarthritis severe Dr. Kan Quesada presiding.    Immunizations reviewed and up-to-date.    Percutaneous interventional radiology treatment for PAD lower extremity.    Hyperlipidemia and no history of MI or stroke.  No history of xanthoma or xanthelasma.    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    Hemiarthroplasty right knee.  Prior hand surgery.    Mother  age 80 heart disease.    Father  age 87 heart disease.    Patient has been  twice first first   of abdominal aortic aneurysm rupture.  Second  also patient of this examiner treated for prostate cancer.    3 children well all by .    Review of Systems:    Please see above.  The rest of the review of systems are negative for all systems.    Patient Care Team:  Shai Ellington MD as PCP - General     Patient Active Problem List   Diagnosis     Recurrent major depressive episodes (H)     Chronic obstructive pulmonary disease (H)     Lump Or Mass In Breast     Peripheral arterial disease (H)     Type 2 diabetes mellitus without complication (H)     Hypertension     Schizoaffective disorder, bipolar type (H)     Arnold-Chiari malformation (H)     Chronic diastolic heart failure (H)     Chronic respiratory failure with hypoxia (H)     Pulmonary hypertension     COPD (chronic obstructive pulmonary disease) (H)     COPD exacerbation (H)     Acute and chronic respiratory failure with hypoxia (H)     Diet-controlled type 2  diabetes mellitus (H)     Past Medical History:   Diagnosis Date     Arm skin lesion, right      Arnold-Chiari malformation (H)      Breast cyst      Breast lump      Chronic diastolic heart failure (H)     diastolic chf     COPD (chronic obstructive pulmonary disease) (H)      Depression      GERD (gastroesophageal reflux disease)      Hyperlipidemia      Hypertension      Lumbar spinal stenosis      PAD (peripheral artery disease) (H)       Past Surgical History:   Procedure Laterality Date     BACK SURGERY       BREAST CYST ASPIRATION Left      CERVICAL SPINE SURGERY      for arnold chiari malformation      SECTION       CHOLECYSTECTOMY       EYE SURGERY      bilateral cataracts     JOINT REPLACEMENT      knee - partial     LAPAROSCOPIC INCISIONAL / UMBILICAL / VENTRAL HERNIA REPAIR Left 3/27/2015    Procedure: ATTEMPTED LAPAROSCOPIC ABDOMINA/FLANK INCISION REPAIR;  Surgeon: Jose Lakhani MD;  Location: [facility];  Service:      LUMBAR FUSION N/A 2014    Procedure: POSTERIOR FUSION / DECOMPRESSION L3-S1 WITH PELVIC FIXATION BILATERAL ;  Surgeon: Rajan Mares MD;  Location: [facility] OR;  Service:      UT TOTAL KNEE ARTHROPLASTY Left 10/7/2016    Procedure: TOTAL KNEE ARTHROPLASTY, LEFT;  Surgeon: Kan Quesada MD;  Location: [facility] OR;  Service: Orthopedics      Family History   Problem Relation Age of Onset     Heart attack Mother      Heart attack Father       Social History     Social History     Marital status:      Spouse name: N/A     Number of children: N/A     Years of education: N/A     Occupational History     Not on file.     Social History Main Topics     Smoking status: Former Smoker     Quit date: 2006     Smokeless tobacco: Never Used      Comment: quit 2006     Alcohol use No     Drug use: No     Sexual activity: Not on file     Other Topics Concern     Not on file     Social History Narrative      Current Outpatient Prescriptions    Medication Sig Dispense Refill     acetaminophen (TYLENOL) 325 MG tablet Take 650 mg by mouth every 6 (six) hours as needed for pain.       albuterol (PROVENTIL) 2.5 mg /3 mL (0.083 %) nebulizer solution 3 ML INHALATION Four times a day As Needed Use before nebulized saline. For shortness of breath. 300 mL 0     ARIPiprazole (ABILIFY) 5 MG tablet Take 5 mg by mouth bedtime.       aspirin 81 MG EC tablet Take 81 mg by mouth daily.       atorvastatin (LIPITOR) 20 MG tablet Take 20 mg by mouth at bedtime.       calcium carbonate-vitamin D3 (CALCIUM WITH VITAMIN D) 600 mg(1,500mg) -400 unit per tablet Take 1 tablet by mouth 2 (two) times a day.       cholecalciferol, vitamin D3, 1,000 unit tablet Take 2,000 Units by mouth daily.        diltiazem (CARDIZEM CD) 120 MG 24 hr capsule Take 1 capsule (120 mg total) by mouth daily. 30 capsule 0     doxycycline (VIBRA-TABS) 100 MG tablet Take 1 tablet (100 mg total) by mouth 2 (two) times a day for 10 days. 20 tablet 1     fluticasone-salmeterol (ADVAIR DISKUS) 250-50 mcg/dose DISKUS Inhale 1 puff 2 (two) times a day. 60 each 11     furosemide (LASIX) 20 MG tablet Take three (3) tablets by mouth daily Increase 6/19/17 from 40mg daily. 270 tablet 0     omeprazole (PRILOSEC) 20 MG capsule TAKE 1 CAPSULE DAILY. 100 capsule 0     potassium chloride (KLOR-CON) 10 MEQ CR tablet Take one (1) tablet by mouth three times daily 300 tablet 0     psyllium (METAMUCIL) 3.4 gram packet Take 1 packet by mouth daily.        QUEtiapine (SEROQUEL) 25 MG tablet Take 50 mg by mouth at bedtime.        sodium chloride 3 % nebulizer solution Take 3 mL by nebulization 2 (two) times a day. Using after albuterol neb.       SPIRIVA WITH HANDIHALER 18 mcg inhalation capsule use 1 capsule via inhaler 1 x a day 90 capsule 11     triamcinolone (KENALOG) 0.1 % ointment Apply 1 application topically 2 (two) times a day as needed.        albuterol (PROAIR HFA;PROVENTIL HFA;VENTOLIN HFA) 90 mcg/actuation  "inhaler Inhale 2 puffs every 4 (four) hours as needed for wheezing.       dextromethorphan-guaiFENesin (ROBITUSSIN-DM)  mg/5 mL liquid Take 10 mL by mouth 3 (three) times a day as needed.       No current facility-administered medications for this visit.       Objective:   Vital Signs:   Visit Vitals     /62 (Patient Site: Right Arm, Patient Position: Sitting)     Pulse 89     Ht 5' 1\" (1.549 m)     Wt 167 lb (75.8 kg)     LMP  (LMP Unknown)     SpO2 94%     BMI 31.55 kg/m2        VisionScreening:  No exam data present     PHYSICAL EXAM  Chest clear to auscultation and percussion.  Heart tones regular rhythm without murmur rub or gallop.  Abdomen soft nontender no organomegaly.  No peritoneal signs.  Extremities free of edema cyanosis or clubbing.  Neck veins nondistended no thyromegaly or scleral icterus noted, carotids full.  Skin warm and dry easily conversant good spirited.  Normal intelligence.  Neurologically intact no gross localizing findings.  Painful right hip and groin area x-ray of same pending.  Prior history of osteoarthritis with both knee surgeries noted as alluded to above.  Skin negative lymph negative neuro negative psych normal the patient's skin does show erythema redness especially left lower extremity with swelling.  Problematic cellulitis for some period of time infectious disease consultation pending good pulses noted in all 4 extremities eyes ears nose throat negative normocephalic no carotid bruits abdomen centripetal obesity otherwise negative.  Nontender no peritoneal signs.  Bowel sounds present but hypoactive.    Assessment Results 7/30/2018   Activities of Daily Living 1 - Full function   Instrumental Activities of Daily Living 1 - Full function   Get Up and Go Score Less than 12 seconds   Mini Cog Total Score 5   Some recent data might be hidden     A Mini-Cog score of 0-2 suggests the possibility of dementia, score of 3-5 suggests no dementia    Identified Health Risks: "     The patient was provided with suggestions to help her develop a healthy lifestyle.   The patient reports that she has difficulty with activities of daily living. I have asked that the patient make a follow up appointment in 4 weeks where this issue will be further evaluated and addressed.  The patient reports that she has difficulty with instrumental activities of daily living.  She was provided with a list of local organizations that provide support services and advised to make a follow up appointment in 8 weeks to address this further.   Patient's advanced directive was discussed and I have discussed my concerns regarding the patient's wishes.

## 2021-06-19 NOTE — PROGRESS NOTES
Office Visit - Follow up    Adalgisa Solano   82 y.o. female    Date of Visit: 7/3/2018    Chief Complaint   Patient presents with     Follow-up     Still has pain in right leg / Still SOB, took last Prednisone today       Subjective: COPD one-week follow-up.    Diabetes mellitus type 2.  Seen by Dr. Ashford for groin pain.  Attributable to the back no hernia.    Extra dose of Lasix tried for a week breathing is slightly better.    No blood in stool or urine no blood in heme hemoptysis.  Medication list reviewed well-tolerated normal effects.  Weight up 1 pound 163 O2 sats today 92%.  Pulse 99 blood pressure 120/68.    ROS: A comprehensive review of systems was performed and was otherwise negative    Medications:  Prior to Admission medications    Medication Sig Start Date End Date Taking? Authorizing Provider   acetaminophen (TYLENOL) 325 MG tablet Take 650 mg by mouth every 6 (six) hours as needed for pain.   Yes PROVIDER, HISTORICAL   albuterol (PROAIR HFA;PROVENTIL HFA;VENTOLIN HFA) 90 mcg/actuation inhaler Inhale 2 puffs every 4 (four) hours as needed for wheezing.   Yes PROVIDER, HISTORICAL   albuterol (PROVENTIL) 2.5 mg /3 mL (0.083 %) nebulizer solution Take 2.5 mg by nebulization 2 (two) times a day.   Yes PROVIDER, HISTORICAL   ARIPiprazole (ABILIFY) 5 MG tablet Take 5 mg by mouth bedtime.   Yes PROVIDER, HISTORICAL   aspirin 81 MG EC tablet Take 81 mg by mouth daily.   Yes PROVIDER, HISTORICAL   atorvastatin (LIPITOR) 20 MG tablet Take 20 mg by mouth at bedtime.   Yes PROVIDER, HISTORICAL   calcium carbonate-vitamin D3 (CALCIUM WITH VITAMIN D) 600 mg(1,500mg) -400 unit per tablet Take 1 tablet by mouth 2 (two) times a day.   Yes PROVIDER, HISTORICAL   cholecalciferol, vitamin D3, 1,000 unit tablet Take 2,000 Units by mouth daily.    Yes PROVIDER, HISTORICAL   dextromethorphan-guaiFENesin (ROBITUSSIN-DM)  mg/5 mL liquid Take 10 mL by mouth 3 (three) times a day as needed.   Yes PROVIDER,  HISTORICAL   diltiazem (CARDIZEM CD) 120 MG 24 hr capsule Take 1 capsule (120 mg total) by mouth daily. 6/18/18  Yes Shai Ellington MD   fluticasone-salmeterol (ADVAIR DISKUS) 250-50 mcg/dose DISKUS Inhale 1 puff 2 (two) times a day. 6/4/18  Yes Jun Juarez MD   furosemide (LASIX) 20 MG tablet Take 20 mg by mouth daily.    Yes PROVIDER, HISTORICAL   gabapentin (NEURONTIN) 100 MG capsule Take 100 mg by mouth 3 (three) times a day. 5/21/18  Yes Shai Ellington MD   omeprazole (PRILOSEC) 20 MG capsule TAKE 1 CAPSULE DAILY. 5/17/18  Yes Shai Ellington MD   potassium chloride (KLOR-CON) 10 MEQ CR tablet Take 10 mEq by mouth daily.  6/19/17  Yes PROVIDER, HISTORICAL   psyllium (METAMUCIL) 3.4 gram packet Take 1 packet by mouth daily.    Yes PROVIDER, HISTORICAL   QUEtiapine (SEROQUEL) 25 MG tablet Take 50 mg by mouth at bedtime.  8/10/16  Yes PROVIDER, HISTORICAL   sodium chloride 3 % nebulizer solution Take 3 mL by nebulization 2 (two) times a day. Using after albuterol neb. 4/19/17  Yes PROVIDER, HISTORICAL   SPIRIVA WITH HANDIHALER 18 mcg inhalation capsule use 1 capsule via inhaler 1 x a day 5/2/18  Yes Jun Juarez MD   triamcinolone (KENALOG) 0.1 % ointment Apply 1 application topically 2 (two) times a day as needed.    Yes PROVIDER, HISTORICAL   predniSONE (DELTASONE) 5 MG tablet Take 1 tablet (5 mg total) by mouth daily for 14 days. 6/19/18 7/3/18  Jun Juarez MD       Allergies:   Allergies   Allergen Reactions     Amoxicillin Itching and Rash     Levofloxacin Itching and Rash     Penicillins Itching and Rash       Immunizations:   Immunization History   Administered Date(s) Administered     DT (pediatric) 03/29/2004     Influenza high dose, seasonal 10/20/2015, 10/09/2016, 11/16/2017     Influenza,seasonal quad, PF, 36+MOS 11/20/2014     Pneumo Conj 13-V (2010&after) 10/20/2015     Pneumo Polysac 23-V 10/18/1999, 08/01/2005     Td,adult,historic,unspecified  03/29/2004     Tdap 05/14/2013       Exam Chest clear to auscultation and percussion.  Heart tones regular rhythm without murmur rub or gallop.  Abdomen soft nontender no organomegaly.  No peritoneal signs.  Extremities free of edema cyanosis or clubbing.  Neck veins nondistended no thyromegaly or scleral icterus noted, carotids full.  Skin warm and dry easily conversant good spirited.  Normal intelligence.  Neurologically intact no gross localizing findings.  Few squeaky rales right base posteriorly breath sounds seem improved.  O2 sats 92% on room on nasal oxygen 2 L.  Pulse 99 blood pressure 120/68.  Respiratory rate 20 and unlabored slight pallor noted.  Not in acute distress no acrocyanosis or central cyanosis.    Multiple drug allergies including penicillin amoxicillin and levofloxacin.    Assessment and Plan  COPD severe followed by pulmonologist as well.  Suggest chest vest for vibration and loosening of morning secretions after nebulizer treatment.    Multiple drug allergies.    Hypertension control.  128/68.    Arnold Chiari malformation.  CNS.  Status post surgery for same.    Type 2 diabetes mellitus needs recheck A1c blood sugar lipid panel with next office visit.  Fasting    Time: total time spent with the patient was 25 minutes of which >50% was spent in counseling and coordination of care    The following high BMI interventions were performed this visit: encouragement to exercise    Shai Ellington MD    Patient Active Problem List   Diagnosis     Recurrent major depressive episodes (H)     Chronic obstructive pulmonary disease (H)     Lump Or Mass In Breast     Peripheral arterial disease (H)     Type 2 diabetes mellitus without complication (H)     Hypertension     Schizoaffective disorder, bipolar type (H)     Arnold-Chiari malformation (H)     Chronic diastolic heart failure (H)     Chronic respiratory failure with hypoxia (H)     Pulmonary hypertension     COPD (chronic obstructive pulmonary  disease) (H)     COPD exacerbation (H)     Acute and chronic respiratory failure with hypoxia (H)     Diet-controlled type 2 diabetes mellitus (H)

## 2021-06-19 NOTE — LETTER
Letter by Reece Woo MD at      Author: Reece Woo MD Service: -- Author Type: --    Filed:  Encounter Date: 6/14/2019 Status: (Other)         Shai Ellington MD  [address] [place] [address]                                  June 16, 2019    Patient: Adalgisa Solano   MR Number: [id number]   YOB: 1935   Date of Visit: 6/14/2019     Dear Dr. Chandana MD:    Thank you for referring Adalgisa Solano to me for evaluation. Below are the relevant portions of my assessment and plan of care.    If you have questions, please do not hesitate to call me. I look forward to following Adalgisa along with you.    Sincerely,        Reece Woo MD          CC  No Recipients  Reece Woo MD  6/16/2019  8:41 AM  Sign at close encounter  Assessment:  83 year old female with a history of severe COPD (FEV1 0.89 L, 51%; DLCO 53%), pulmonary HTN, HFpEF, DM, GERD, atrial fibrillation and DVT on anticoagulation, schizoaffective disorder, PAD, HTN, dyslipidemia, admitted Jan 2019 with acute hypoxemic respiratory failure with evidence of pulmonary edema, Klebsiella and PSDs PNA (per 12/26 sputum Cx), and AECOPD.  She also has some mild bronchiectasis per review of her December 2018 CT scan with pseudomonas colonization as evidenced on 2 sputum cultures. She has had recurrent exacerbations of COPD/bronchiectasis, the most recent being in May 2019 that was treated with doxycyline & prednisone as an outpatient.       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      -Continue flutter valve use 3-4 times daily     -Infectious disease clinic input appreciate, do not recommend attempting PSDs eradication. Will start every other month tobramycin nebs per their recs to see if it can help reduce the frequency of her exacerbations    -Bronchiectasis  "work-up labs normal aside from a low IgM, likely due to acute illness at the time     COPD:    -FEV1 stable however DLCO notably decreased from 9.5/53% pred -> 7.05/39% pred between 4/2017 PFTs ([place]) and here today, likely 2/2 recurrent exacerbations & bronchiectasis w/PSDs colonization -> consider future repeat to eval for stabilization if exacerbations can be reduced  -Continue Spiriva, Advair, and albuterol as needed  -Continue home oxygen, adjust as needed for goal oxygen saturation of 88-92% (chronic CO2 retainer)     Cough and suspected nocturnal reflux aspiration:  -Continue omeprazole  -elevate her head of bed with a wedge pillow on her back  -Avoid for food or drink 3 hours before bedtime  -Avoid reflux trigger foods such as alcohol caffeine and spicy foods        Follow-up in our clinic in 6 months        CCx: shortness of breath, cough    HPI: Patient states that her breathing feels \"pretty good.\"  She is continuing to use the flutter valve 3-4 times daily which is keeping her sputum production down.  She has not needed oxygen since February.  She did have an exacerbation of shortness of breath in late May which was treated as an outpatient with doxycycline and prednisone.    ROS:  A review of 12 organ systems was performed with pertinent positives and negatives noted in the HPI.      Current Meds:  Current Outpatient Medications   Medication Sig   ? gabapentin (NEURONTIN) 300 MG capsule Take 300 mg by mouth 2 (two) times a day.   ? triamcinolone (KENALOG) 0.1 % cream Apply topically 2 (two) times a day.   ? acetaminophen (TYLENOL) 500 MG tablet Take 2 tablets (1,000 mg total) by mouth 3 (three) times a day. (Patient taking differently: Take 1,000 mg by mouth as needed.       )   ? albuterol (PROVENTIL) 2.5 mg /3 mL (0.083 %) nebulizer solution 3 ML INHALATION FOUR TIMES A DAY AS NEEDED USE BEFORE NEBULIZED SALINE. FOR SHORTNESS OF BREATH.   ? ARIPiprazole (ABILIFY) 5 MG tablet TAKE 1 TABLET EVERY NIGHT " AT BEDTIME   ? aspirin 81 MG EC tablet Take 81 mg by mouth daily.   ? atorvastatin (LIPITOR) 20 MG tablet TAKE 1 TABLET (20 MG TOTAL) BY MOUTH BEDTIME.   ? calcium carbonate-vitamin D3 (CALCIUM WITH VITAMIN D) 600 mg(1,500mg) -400 unit per tablet Take 1 tablet by mouth 2 (two) times a day.   ? cholecalciferol, vitamin D3, 1,000 unit tablet Take 2,000 Units by mouth daily.    ? cyanocobalamin 1,000 mcg/mL injection Inject 1 mL (1,000 mcg total) into the shoulder, thigh, or buttocks every 30 (thirty) days.   ? dextromethorphan-guaiFENesin (ROBITUSSIN-DM)  mg/5 mL liquid Take 5 mL by mouth every 4 (four) hours as needed.   ? diltiazem (CARDIZEM CD) 120 MG 24 hr capsule TAKE 1 CAPSULE (120 MG TOTAL) BY MOUTH DAILY.   ? ferrous sulfate 325 (65 FE) MG tablet Take 1 tablet (325 mg total) by mouth daily with breakfast.   ? fluticasone-salmeterol (ADVAIR DISKUS) 250-50 mcg/dose DISKUS Inhale 1 puff 2 (two) times a day.   ? furosemide (LASIX) 40 MG tablet TAKE 1 TABLET (40 MG TOTAL) BY MOUTH 2 (TWO) TIMES A DAY AT 9AM AND 6PM.   ? magnesium oxide (MAG-OX) 400 mg (241.3 mg magnesium) tablet Take 1 tablet (400 mg total) by mouth daily.   ? omeprazole (PRILOSEC) 20 MG capsule TAKE 1 CAPSULE DAILY.   ? OXYGEN-AIR DELIVERY SYSTEMS [facility] Use 1-2 L As Directed as needed. [facility]   ? polyethylene glycol (MIRALAX) 17 gram packet Take 17 g by mouth daily.   ? potassium chloride (KLOR-CON) 10 MEQ CR tablet TAKE 1 TABLET (10 MEQ TOTAL) BY MOUTH DAILY.   ? predniSONE (DELTASONE) 20 MG tablet Take 2 tabs (40mg total) daily x 5 days.   ? PROAIR HFA 90 mcg/actuation inhaler INHALE 2 PUFFS EVERY 4 (FOUR) HOURS AS NEEDED FOR WHEEZING.   ? QUEtiapine (SEROQUEL) 25 MG tablet TAKE 2 TABLETS AT BEDTIME=50MG (Patient taking differently: TAKE 1 TABLETS AT BEDTIME=50MG)   ? sodium chloride 3 % nebulizer solution TAKE 4 ML BY NEBULIZATION 2 (TWO) TIMES A DAY. USING AFTER ALBUTEROL NEB. (Patient taking differently: Take 4 mL by nebulization 3  "(three) times a day. Using after albuterol neb.      )   ? spironolactone (ALDACTONE) 25 MG tablet Take 0.5 tablets (12.5 mg total) by mouth daily.   ? tiotropium (SPIRIVA WITH HANDIHALER) 18 mcg inhalation capsule USE 1 CAPSULE VIA INHALER 1 X A DAY   ? tobramycin, PF, (CALLUM) 300 mg/5 mL nebulizer solution Take 5 mL (300 mg total) by nebulization 2 (two) times a day. Two times a day every other month   ? XARELTO 20 mg Tab TAKE 1 TABLET (20 MG TOTAL) BY MOUTH DAILY WITH SUPPER.       Labs:  Recent Results (from the past 72 hour(s))   POCT hemoglobin   Result Value Ref Range    Hgb 12.6 7.0 g/dL       I have personally reviewed all pertinent imaging studies and PFT results unless otherwise noted.    Imaging studies:  No results found.      Physical Exam:  /62   Pulse 66   Resp 10   Ht 5' 1\" (1.549 m)   Wt 143 lb (64.9 kg)   LMP  (LMP Unknown)   SpO2 93%   Breastfeeding? No   BMI 27.02 kg/m     General - Well nourished  Ears/Mouth -  OP pink moist, no thrush  Neck - no cervical lymphadenopathy  Lungs -decreased lung sounds throughout, no wheezes  CVS - regular rhythm with no murmurs, rubs or gallups  Abdomen - soft, NT, ND, NABS  Ext - no cyanosis, clubbing or edema  Skin - no rash  Psychology - alert and oriented, answers appropriate        Electronically signed by:    Sinan Woo MD  [facility]       "

## 2021-06-19 NOTE — PROGRESS NOTES
Consultation - Infectious Disease  Adalgisa Solano,  1935, MRN [id number]    [facility]  Cellulitis [L03.90]    PCP: Shai Ellington MD, [phone]   Code status:  [unfilled]       Extended Emergency Contact Information  Primary Emergency Contact: Anabel Solano  Address: [address]  Home Phone: [phone]  Relation: Spouse  Secondary Emergency Contact: Karlos Durham   [place]  Mobile Phone: [phone]  Relation: Child       Assessment and Plan       Assessment:  1. COPD with some pulm hypertension  2. 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  3. Started Naprosyn 500 mg two times a day a week ago by Ortho  4. Severe leg edema on Lasix 20 mg three times a day but legs still very swollen  5. Chronic stasis dermatitis of lower 1/3 of legs x several years. I do not think this is cellulitis.    Recommendations:  1. Get a creatinine now to be sure not rising on Naprosyn.  2. Needs more Lasix to get edema down per descretion of Dr. Ellington.  3. Add 0.1 % TMC cream to red areas on legs two times a day - Rx sent in.  4. DC naprosyn and use Tylenolo for DJD if creat is up or Naprosyn aggravates fluid retention.    Thanks for asking me to participate in this patients care.  Moisés Allen       Chief Complaint Red legs       HPI   We have been requested by Dr Ellington  to evaluate Adalgisa SHANNON Alyciacarrie for cellulitis vs stasis dermatitis.  Red lower legs x years. Stable no fever. No response to doxy. Severe edema. On lasix 20 mg three times a day but not doing the job at controlling edema.  No fever  Has a host of medical problems. .     Medical History  Active Ambulatory (Non-Hospital) Problems    Diagnosis     COPD (chronic obstructive pulmonary disease) (H)     COPD exacerbation (H)     Acute and chronic respiratory failure with hypoxia (H)     Diet-controlled type 2 diabetes mellitus (H)     Pulmonary hypertension     Chronic  respiratory failure with hypoxia (H)     Chronic diastolic heart failure (H)     Type 2 diabetes mellitus without complication (H)     Hypertension     Peripheral arterial disease (H)     Schizoaffective disorder, bipolar type (H)     Lump Or Mass In Breast     Recurrent major depressive episodes (H)     Chronic obstructive pulmonary disease (H)     Arnold-Chiari malformation (H)     Past Medical History:   Diagnosis Date     Arm skin lesion, right      Arnold-Chiari malformation (H)      Breast cyst      Breast lump      Chronic diastolic heart failure (H)      COPD (chronic obstructive pulmonary disease) (H)      Depression      GERD (gastroesophageal reflux disease)      Hyperlipidemia      Hypertension      Lumbar spinal stenosis      PAD (peripheral artery disease) (H)      PMH reviewed, updated, w/o changes. Surgical History  She  has a past surgical history that includes Breast cyst aspiration (Left, ); Cholecystectomy; Lumbar fusion (N/A, 2014); Back surgery; Laparoscopic incisional / umbilical / ventral hernia repair (Left, 3/27/2015);  section; Joint replacement; Eye surgery; pr total knee arthroplasty (Left, 10/7/2016); and Cervical spine surgery.   Social History  Reviewed, and she  reports that she quit smoking about 12 years ago. She has never used smokeless tobacco. She reports that she does not drink alcohol or use illicit drugs.   Allergies  Allergies   Allergen Reactions     Amoxicillin Itching and Rash     Levofloxacin Itching and Rash     Penicillins Itching and Rash    Family History  Reviewed, and family history includes Heart attack in her father and mother.        Prior to Admission Medications     (Not in a hospital admission)       Review of Systems:  Review of systems:  Patient denies fever, chills, cough, sputum, vomiting, diarrhea, dysuria, urgency, flank pain, headache, stiff neck, rash, swollen glands or pain at IV sites.    Social hx, family hx reviewed and  "unchanged.   Physical Exam:  /70  Pulse 92  Temp 97.4  F (36.3  C)  Resp 9 Comment: 1 LTR o2  Ht 5' 1\" (1.549 m)  Wt 165 lb (74.8 kg)  LMP  (LMP Unknown)  SpO2 94%  BMI 31.18 kg/m2  Skin: no rash  Nodes: no cervical, axillary or inguinal adenopathy  Resp: distant BS. Using nasal O2 continuous   CV: remote heart tones. Increased P2.  Abd: very obese.  Legs: massive edema. Diffuse redness 1/3 way up shins but not warm. No pustules.  Neurologic: oriented x 3. Cranial nerves 2-12 intact. No focal weakness or sensory loss.          Pertinent Labs  Lab Results:personally reviewed.   Lab Results   Component Value Date     07/30/2018     04/26/2018     03/08/2018    K 4.1 07/30/2018    K 4.2 05/21/2018    K 4.1 04/26/2018    CO2 26 07/30/2018    CO2 25 04/26/2018    CO2 27 03/08/2018    BUN 11 07/30/2018    BUN 11 04/26/2018    BUN 14 03/08/2018    CREATININE 0.60 07/30/2018    CREATININE 0.58 (L) 04/26/2018    CREATININE 0.66 03/08/2018    CALCIUM 8.9 07/30/2018    CALCIUM 7.8 (L) 04/26/2018    CALCIUM 8.7 03/08/2018     Lab Results   Component Value Date    WBC 5.7 07/30/2018    WBC 7.9 04/26/2018    WBC 8.9 07/23/2017    WBC 7.0 06/01/2015    WBC 7.7 03/26/2015    WBC 7.3 11/10/2014    HGB 11.6 (L) 07/30/2018    HGB 10.4 (L) 04/26/2018    HGB 13.3 07/28/2017    HCT 36.4 07/30/2018    HCT 32.4 (L) 04/26/2018    HCT 32.7 (L) 07/23/2017    MCV 77 (L) 07/30/2018    MCV 79 (L) 04/26/2018    MCV 77 (L) 07/23/2017     07/30/2018     04/28/2018     04/26/2018     Lab Results   Component Value Date    ALT 12 07/30/2018    AST 20 07/30/2018    ALKPHOS 94 07/30/2018    BILITOT 0.3 07/30/2018        Pertinent Radiology  Radiology Results:echo: mild pulm HTN.  Doppler venous: no DVT. Edema seen.  Microbiology:none                 "

## 2021-06-19 NOTE — LETTER
Letter by Shai Ellington MD at      Author: Shai Ellington MD Service: -- Author Type: --    Filed:  Encounter Date: 8/23/2019 Status: (Other)         Adalgisa Solano  [address]  [facility] [address]             August 23, 2019         Dear Ms. Solano,    Below are the results from your recent visit:    Resulted Orders   HM2(CBC w/o Differential)   Result Value Ref Range    WBC 10.0 4.0 - 11.0 thou/uL    RBC 4.96 3.80 - 5.40 mill/uL    Hemoglobin 14.4 12.0 - 16.0 g/dL    Hematocrit 43.5 35.0 - 47.0 %    MCV 88 80 - 100 fL    MCH 29.0 27.0 - 34.0 pg    MCHC 33.0 32.0 - 36.0 g/dL    RDW 13.3 11.0 - 14.5 %    Platelets 286 140 - 440 thou/uL    MPV 8.3 7.0 - 10.0 fL   Comprehensive Metabolic Panel   Result Value Ref Range    Sodium 141 136 - 145 mmol/L    Potassium 4.5 3.5 - 5.0 mmol/L    Chloride 103 98 - 107 mmol/L    CO2 28 22 - 31 mmol/L    Anion Gap, Calculation 10 5 - 18 mmol/L    Glucose 105 70 - 125 mg/dL    BUN 16 8 - 28 mg/dL    Creatinine 0.82 0.60 - 1.10 mg/dL    GFR MDRD Af Amer >60 >60 mL/min/1.73m2    GFR MDRD Non Af Amer >60 >60 mL/min/1.73m2    Bilirubin, Total 0.3 0.0 - 1.0 mg/dL    Calcium 9.1 8.5 - 10.5 mg/dL    Protein, Total 6.5 6.0 - 8.0 g/dL    Albumin 3.3 (L) 3.5 - 5.0 g/dL    Alkaline Phosphatase 112 45 - 120 U/L    AST 17 0 - 40 U/L    ALT 15 0 - 45 U/L    Narrative    Fasting Glucose reference range is 70-99 mg/dL per  American Diabetes Association (ADA) guidelines.   Urinalysis-UC if Indicated   Result Value Ref Range    Color, UA Yellow Colorless, Yellow, Straw, Light Yellow    Clarity, UA Clear Clear    Glucose, UA Negative Negative    Bilirubin, UA Negative Negative    Ketones, UA Negative Negative    Specific Gravity, UA 1.010 1.005 - 1.030    Blood, UA Trace (!) Negative    pH, UA 7.0 5.0 - 8.0    Protein, UA Negative Negative mg/dL    Urobilinogen, UA 0.2 E.U./dL 0.2 E.U./dL, 1.0 E.U./dL    Nitrite, UA Negative Negative    Leukocytes, UA Small (!) Negative     Bacteria, UA Few (!) None Seen hpf    RBC, UA None Seen None Seen, 0-2 hpf    WBC, UA 0-5 None Seen, 0-5 hpf    Squam Epithel, UA 0-5 None Seen, 0-5 lpf    Mucus, UA Few (!) None Seen lpf    Narrative    Urine Culture ordered based on [facility] criteria       All very good results and preop    Please call with questions or contact us using [employer].    Sincerely,        Electronically signed by Shai Ellington MD

## 2021-06-19 NOTE — LETTER
Letter by Reece Woo MD at      Author: Reece Woo MD Service: -- Author Type: --    Filed:  Encounter Date: 6/14/2019 Status: (Other)         Geoff Tinsley MD  [address] [place] [id number]                                  June 16, 2019    Patient: Adalgisa Solano   MR Number: [id number]   YOB: 1935   Date of Visit: 6/14/2019     Dear Dr. Laureano MD:    Thank you for referring Adalgisa Solano to me for evaluation. Below are the relevant portions of my assessment and plan of care.    If you have questions, please do not hesitate to call me. I look forward to following Adalgisa along with you.    Sincerely,        Reece Woo MD          CC  MD Chilo Horn Theodore Joseph, MD  6/16/2019  8:41 AM  Sign at close encounter  Assessment:  83 year old female with a history of severe COPD (FEV1 0.89 L, 51%; DLCO 53%), pulmonary HTN, HFpEF, DM, GERD, atrial fibrillation and DVT on anticoagulation, schizoaffective disorder, PAD, HTN, dyslipidemia, admitted Jan 2019 with acute hypoxemic respiratory failure with evidence of pulmonary edema, Klebsiella and PSDs PNA (per 12/26 sputum Cx), and AECOPD.  She also has some mild bronchiectasis per review of her December 2018 CT scan with pseudomonas colonization as evidenced on 2 sputum cultures. She has had recurrent exacerbations of COPD/bronchiectasis, the most recent being in May 2019 that was treated with doxycyline & prednisone as an outpatient.       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      -Continue flutter valve use 3-4 times daily     -Infectious disease clinic input appreciate, do not recommend attempting PSDs eradication. Will start every other month tobramycin nebs per their recs to see if it can help reduce the frequency of her exacerbations     "-Bronchiectasis work-up labs normal aside from a low IgM, likely due to acute illness at the time     COPD:    -FEV1 stable however DLCO notably decreased from 9.5/53% pred -> 7.05/39% pred between 4/2017 PFTs ([place]) and here today, likely 2/2 recurrent exacerbations & bronchiectasis w/PSDs colonization -> consider future repeat to eval for stabilization if exacerbations can be reduced  -Continue Spiriva, Advair, and albuterol as needed  -Continue home oxygen, adjust as needed for goal oxygen saturation of 88-92% (chronic CO2 retainer)     Cough and suspected nocturnal reflux aspiration:  -Continue omeprazole  -elevate her head of bed with a wedge pillow on her back  -Avoid for food or drink 3 hours before bedtime  -Avoid reflux trigger foods such as alcohol caffeine and spicy foods        Follow-up in our clinic in 6 months        CCx: shortness of breath, cough    HPI: Patient states that her breathing feels \"pretty good.\"  She is continuing to use the flutter valve 3-4 times daily which is keeping her sputum production down.  She has not needed oxygen since February.  She did have an exacerbation of shortness of breath in late May which was treated as an outpatient with doxycycline and prednisone.    ROS:  A review of 12 organ systems was performed with pertinent positives and negatives noted in the HPI.      Current Meds:  Current Outpatient Medications   Medication Sig   ? gabapentin (NEURONTIN) 300 MG capsule Take 300 mg by mouth 2 (two) times a day.   ? triamcinolone (KENALOG) 0.1 % cream Apply topically 2 (two) times a day.   ? acetaminophen (TYLENOL) 500 MG tablet Take 2 tablets (1,000 mg total) by mouth 3 (three) times a day. (Patient taking differently: Take 1,000 mg by mouth as needed.       )   ? albuterol (PROVENTIL) 2.5 mg /3 mL (0.083 %) nebulizer solution 3 ML INHALATION FOUR TIMES A DAY AS NEEDED USE BEFORE NEBULIZED SALINE. FOR SHORTNESS OF BREATH.   ? ARIPiprazole (ABILIFY) 5 MG tablet TAKE 1 " TABLET EVERY NIGHT AT BEDTIME   ? aspirin 81 MG EC tablet Take 81 mg by mouth daily.   ? atorvastatin (LIPITOR) 20 MG tablet TAKE 1 TABLET (20 MG TOTAL) BY MOUTH BEDTIME.   ? calcium carbonate-vitamin D3 (CALCIUM WITH VITAMIN D) 600 mg(1,500mg) -400 unit per tablet Take 1 tablet by mouth 2 (two) times a day.   ? cholecalciferol, vitamin D3, 1,000 unit tablet Take 2,000 Units by mouth daily.    ? cyanocobalamin 1,000 mcg/mL injection Inject 1 mL (1,000 mcg total) into the shoulder, thigh, or buttocks every 30 (thirty) days.   ? dextromethorphan-guaiFENesin (ROBITUSSIN-DM)  mg/5 mL liquid Take 5 mL by mouth every 4 (four) hours as needed.   ? diltiazem (CARDIZEM CD) 120 MG 24 hr capsule TAKE 1 CAPSULE (120 MG TOTAL) BY MOUTH DAILY.   ? ferrous sulfate 325 (65 FE) MG tablet Take 1 tablet (325 mg total) by mouth daily with breakfast.   ? fluticasone-salmeterol (ADVAIR DISKUS) 250-50 mcg/dose DISKUS Inhale 1 puff 2 (two) times a day.   ? furosemide (LASIX) 40 MG tablet TAKE 1 TABLET (40 MG TOTAL) BY MOUTH 2 (TWO) TIMES A DAY AT 9AM AND 6PM.   ? magnesium oxide (MAG-OX) 400 mg (241.3 mg magnesium) tablet Take 1 tablet (400 mg total) by mouth daily.   ? omeprazole (PRILOSEC) 20 MG capsule TAKE 1 CAPSULE DAILY.   ? OXYGEN-[place]IR [place]IVERY SYSTEM[place] [facility]2 L As Directed as needed. [facility]   ? polyethylene glycol (MIRALAX) 17 gram packet Take 17 g by mouth daily.   ? potassium chloride (KLOR-CON) 10 MEQ CR tablet TAKE 1 TABLET (10 MEQ TOTAL) BY MOUTH DAILY.   ? predniSONE (DELTASONE) 20 MG tablet Take 2 tabs (40mg total) daily x 5 days.   ? PROAIR HFA 90 mcg/actuation inhaler INHALE 2 PUFFS EVERY 4 (FOUR) HOURS AS NEEDED FOR WHEEZING.   ? QUEtiapine (SEROQUEL) 25 MG tablet TAKE 2 TABLETS AT BEDTIME=50MG (Patient taking differently: TAKE 1 TABLETS AT BEDTIME=50MG)   ? sodium chloride 3 % nebulizer solution TAKE 4 ML BY NEBULIZATION 2 (TWO) TIMES A DAY. USING AFTER ALBUTEROL NEB. (Patient taking differently: Take 4 mL by  "nebulization 3 (three) times a day. Using after albuterol neb.      )   ? spironolactone (ALDACTONE) 25 MG tablet Take 0.5 tablets (12.5 mg total) by mouth daily.   ? tiotropium (SPIRIVA WITH HANDIHALER) 18 mcg inhalation capsule USE 1 CAPSULE VIA INHALER 1 X A DAY   ? tobramycin, PF, (CALLUM) 300 mg/5 mL nebulizer solution Take 5 mL (300 mg total) by nebulization 2 (two) times a day. Two times a day every other month   ? XARELTO 20 mg Tab TAKE 1 TABLET (20 MG TOTAL) BY MOUTH DAILY WITH SUPPER.       Labs:  Recent Results (from the past 72 hour(s))   POCT hemoglobin   Result Value Ref Range    Hgb 12.6 7.0 g/dL       I have personally reviewed all pertinent imaging studies and PFT results unless otherwise noted.    Imaging studies:  No results found.      Physical Exam:  /62   Pulse 66   Resp 10   Ht 5' 1\" (1.549 m)   Wt 143 lb (64.9 kg)   LMP  (LMP Unknown)   SpO2 93%   Breastfeeding? No   BMI 27.02 kg/m     General - Well nourished  Ears/Mouth -  OP pink moist, no thrush  Neck - no cervical lymphadenopathy  Lungs -decreased lung sounds throughout, no wheezes  CVS - regular rhythm with no murmurs, rubs or gallups  Abdomen - soft, NT, ND, NABS  Ext - no cyanosis, clubbing or edema  Skin - no rash  Psychology - alert and oriented, answers appropriate        Electronically signed by:    Sinan Woo MD  [facility] Pulmonary and Critical Care Medicine       "

## 2021-06-19 NOTE — LETTER
Letter by Reece Woo MD at      Author: Reece Woo MD Service: -- Author Type: --    Filed:  Encounter Date: 4/25/2019 Status: (Other)         Shai Ellington MD  [address]  [address] [place] [address]                                  April 25, 2019    Patient: Adalgisa Solano   MR Number: [id number]   YOB: 1935   Date of Visit: 4/25/2019     Dear Dr. Chandana MD:    Thank you for referring Adalgisa Solano to me for evaluation. Below are the relevant portions of my assessment and plan of care.    If you have questions, please do not hesitate to call me. I look forward to following Adalgisa along with you.    Sincerely,        Reece Woo MD          CC  MD Chilo Horn Theodore Joseph, MD  4/25/2019  5:16 PM  Sign at close encounter  Assessment and Plan:  83 year old female with a history of severe COPD (FEV1 0.89 L, 51%; DLCO 53%), pulmonary HTN, HFpEF, DM, GERD, atrial fibrillation and DVT on anticoagulation, schizoaffective disorder, PAD, HTN, dyslipidemia, admitted  Jan 2019 with acute hypoxemic respiratory failure with evidence of pulmonary edema, Klebsiella and PSDs PNA (per 12/26 sputum Cx), and AECOPD.  She also appears to have some evidence of bronchiectasis per my review of her December 2018 CT scan.     4/16/2019: Patient began having increased cough productive for yellow sputum for the past couple of weeks this month.  She tried doxycycline and prednisone without relief.  She has not obtained her pulmonary function tests yet due to the current symptoms.  Chest x-ray checked today was negative for pneumonia I suspect that she is having a bronchiectasis exacerbation.  She is currently using a flutter valve about twice a day and hypertonic saline nebs twice daily.    4/17/2009 sputum culture: Pansensitive Pseudomonas.  Preliminary fungal culture positive for Candida     Bronchiectasis exacerbation with recurrent  Pseudomonas on cultures, possible colonization:  Since last week she has had some improvement which I believe is most likely due to increased flutter valve use.  She completed a course of azithromycin without significant improvement and is now already on a second Z-Bijan course.  -Continue flutter valve use to 4 times daily for now  -Change azithromycin to ciprofloxacin for 10 days.  The patient was cautioned to monitor for allergic reactions and stop the antibiotic immediately, take Benadryl, and present to a clinic or urgent care if she develops itching, rash, or other allergic symptoms.  She had a reaction with itching and a rash while hospitalized in 1999 and IV Levaquin at that time.  -Infectious disease clinic referral requested to evaluate for possible eradication or suppression of Pseudomonas with inhaled antibiotic  -Bronchiectasis work-up labs normal aside from a low IgM which may be due to acute illness, will repeat in 2 months       COPD:  -PFTs   postponed due to acute illnesses, will obtain in 2 months  -Continue Spiriva, Advair, and albuterol as needed  -Continue home oxygen, adjust as needed for goal oxygen saturation of 88-92% (chronic CO2 retainer)     Cough and suspected nocturnal reflux aspiration:  -Continue omeprazole  -elevate her head of bed with a wedge pillow on her back  -Avoid for food or drink 3 hours before bedtime  -Avoid reflux trigger foods such as alcohol caffeine and spicy foods      Follow-up in our clinic in 2 months with PFTs    CCx: Shortness of breath cough    HPI: The patient completed a course of azithromycin after her last clinic visit a week ago without significant improvement.  She immediately started on a second course which she is currently taking.  She also has been increasing her flutter valve use to 4 times daily with a productive cough.  She has in recent days started noting some improvement in her dyspnea on exertion.  She denies hemoptysis, fevers or chills, or  shortness of breath at rest    ROS:  A review of 12 organ systems was performed with pertinent positives and negatives noted in the HPI.      Current Meds:  Current Outpatient Medications   Medication Sig   ? acetaminophen (TYLENOL) 500 MG tablet Take 2 tablets (1,000 mg total) by mouth 3 (three) times a day. (Patient taking differently: Take 1,000 mg by mouth as needed.       )   ? albuterol (PROVENTIL) 2.5 mg /3 mL (0.083 %) nebulizer solution 3 ML INHALATION FOUR TIMES A DAY AS NEEDED USE BEFORE NEBULIZED SALINE. FOR SHORTNESS OF BREATH.   ? ARIPiprazole (ABILIFY) 5 MG tablet TAKE 1 TABLET EVERY NIGHT AT BEDTIME   ? aspirin 81 MG EC tablet Take 81 mg by mouth daily.   ? atorvastatin (LIPITOR) 20 MG tablet TAKE 1 TABLET (20 MG TOTAL) BY MOUTH BEDTIME.   ? azithromycin (ZITHROMAX) 250 MG tablet Take 250 mg by mouth. Take 500 mg (2 x 250 mg tablets) on day 1 followed by 250 mg (1 tablet) on days 2-5.  She is currently on day 4   ? calcium carbonate-vitamin D3 (CALCIUM WITH VITAMIN D) 600 mg(1,500mg) -400 unit per tablet Take 1 tablet by mouth 2 (two) times a day.   ? cholecalciferol, vitamin D3, 1,000 unit tablet Take 2,000 Units by mouth daily.    ? cyanocobalamin 1,000 mcg/mL injection Inject 1 mL (1,000 mcg total) into the shoulder, thigh, or buttocks every 30 (thirty) days.   ? dextromethorphan-guaiFENesin (ROBITUSSIN-DM)  mg/5 mL liquid Take 5 mL by mouth every 4 (four) hours as needed.   ? diltiazem (CARDIZEM CD) 120 MG 24 hr capsule TAKE 1 CAPSULE (120 MG TOTAL) BY MOUTH DAILY.   ? ferrous sulfate 325 (65 FE) MG tablet Take 1 tablet (325 mg total) by mouth daily with breakfast.   ? fluticasone-salmeterol (ADVAIR DISKUS) 250-50 mcg/dose DISKUS Inhale 1 puff 2 (two) times a day.   ? furosemide (LASIX) 40 MG tablet TAKE 1 TABLET (40 MG TOTAL) BY MOUTH 2 (TWO) TIMES A DAY AT 9AM AND 6PM.   ? magnesium oxide (MAG-OX) 400 mg (241.3 mg magnesium) tablet Take 1 tablet (400 mg total) by mouth daily.   ?  omeprazole (PRILOSEC) 20 MG capsule Take 20 mg by mouth daily before breakfast.   ? OXYGEN-AIR DELIVERY SYSTEMS [facility] [place] 1-2 L As Directed as needed. [employer]   ? polyethylene glycol (MIRALAX) 17 gram packet Take 17 g by mouth daily.   ? potassium chloride (KLOR-CON) 10 MEQ CR tablet TAKE 1 TABLET (10 MEQ TOTAL) BY MOUTH DAILY.   ? PROAIR HFA 90 mcg/actuation inhaler INHALE 2 PUFFS EVERY 4 (FOUR) HOURS AS NEEDED FOR WHEEZING.   ? QUEtiapine (SEROQUEL) 25 MG tablet TAKE 2 TABLETS AT BEDTIME=50MG (Patient taking differently: TAKE 1 TABLETS AT BEDTIME=50MG)   ? sodium chloride 3 % nebulizer solution TAKE 4 ML BY NEBULIZATION 2 (TWO) TIMES A DAY. USING AFTER ALBUTEROL NEB. (Patient taking differently: Take 4 mL by nebulization 3 (three) times a day. Using after albuterol neb.      )   ? spironolactone (ALDACTONE) 25 MG tablet Take 0.5 tablets (12.5 mg total) by mouth daily.   ? tiotropium (SPIRIVA WITH HANDIHALER) 18 mcg inhalation capsule USE 1 CAPSULE VIA INHALER 1 X A DAY   ? XARELTO 20 mg Tab TAKE 1 TABLET (20 MG TOTAL) BY MOUTH DAILY WITH SUPPER.   ? ciprofloxacin HCl (CIPRO) 500 MG tablet Take 1.5 tablets (750 mg total) by mouth 2 (two) times a day for 10 days.       Labs:  No results found for this or any previous visit (from the past 72 hour(s)).    I have personally reviewed all pertinent imaging studies and PFT results unless otherwise noted.    Imaging studies:  No results found.      Physical Exam:  BP 96/62   Pulse 88   Resp 24   Wt 143 lb (64.9 kg)   LMP  (LMP Unknown)   SpO2 93% Comment: RA  BMI 27.02 kg/m     General - Well nourished  Ears/Mouth -  OP pink moist, no thrush  Neck - no cervical lymphadenopathy  Lungs - Clear to ausculation bilaterally   CVS - regular rhythm with no murmurs, rubs or gallups  Abdomen - soft, NT, ND, NABS  Ext - no cyanosis, clubbing or edema  Skin - no rash  Psychology - alert and oriented, answers appropriate        Electronically signed by:    Sinan Woo MD  [facility]  [facility] Pulmonary and Critical Care Medicine

## 2021-06-19 NOTE — LETTER
Letter by Shai Ellington MD at      Author: Shai Ellington MD Service: -- Author Type: --    Filed:  Encounter Date: 9/30/2019 Status: Signed         Adalgisa ORTEGA Russ  [address] [facility] [address]             September 30, 2019         Dear Ms. Solano,    Below are the results from your recent visit:    Resulted Orders   Hemoglobin   Result Value Ref Range    Hemoglobin 12.6 12.0 - 16.0 g/dL       All very good results    Please call with questions or contact us using [employer].    Sincerely,        Electronically signed by Shai Ellington MD

## 2021-06-20 ENCOUNTER — HEALTH MAINTENANCE LETTER (OUTPATIENT)
Age: 86
End: 2021-06-20

## 2021-06-20 NOTE — LETTER
Letter by Faby Ku NP at      Author: Faby Ku NP Service: -- Author Type: --    Filed:  Encounter Date: 2020 Status: (Other)         Patient: Adalgisa Solano   MR Number: [id number]   YOB: 1935   Date of Visit: 2020                 [facility]    DATE: 2020    NAME:  Adalgisa Solano             :  1935  MRN: [id number]  CODE STATUS:  Full code    VISIT TYPE: Review Of Multiple Medical Conditions     FACILITY:  [facility] [[id number]]       CHIEF COMPLAIN/REASON FOR VISIT:    Chief Complaint   Patient presents with   ? Review Of Multiple Medical Conditions               HISTORY OF PRESENT ILLNESS: Adalgisa Solano is a 84 y.o. female who was admitted - for fall and developed large, painful, hematoma of left leg. She was unable to bear weight on her leg. Her xarelto was held and surgery consulted. They attempted aspiration but were unsuccessful. MRI confirmed hematoma and also treated for cellulitis with keflex. Her blood cultures were negative. She was also treated for acute on chronic COPD with prednisone therapy, percussion therapy. She was treated for hyponatremia with lasix. Her xarelto was restarted after being held or 7 days. She was discharged to TCU for further rehab.  She has PMH of h/o DVT, persistent atrial fibrillation,  COPD, normocytic anemia, Type 2 DM, HTN, Depression, schizoaffective disorder, HLD, neuropathy, GERD, chronic respiratory failure on 1LNC, CHF, avascular necrosis of Left hip, chronic lymphedema. Prior to this she lived with her .     Today Ms. Solano is seen for follow up visit today. She says her left leg swelling and size of the hematoma is going down. She is asking for clarification if she finished her antibiotic yet. She doesn't recall them giving it to her today. She also is wondering how her labs were yesterday. She says her pain is still unbearable at  times and still needing to use the oxycodone at times. She is trying to wean off though as she can. She says she is eating fine. Her breathing is doing ok. She is using her vest and thinks she is getting some stuff up with that. She does not feel her breathing is worse and has not had any fevers recently. We discussed she completed her keflex yesterday and labs are stable. If she develops further redness, swelling, warmth or fever to let staff know. She says therapy is going ok. She does say she has a compression stocking for her right leg and then is trying a sleeve sock on her left leg. She wore it the last two days and it was not too painful. She denies any other new concerns today.     REVIEW OF SYSTEMS:  PROBLEMS AND REVIEW OF SYSTEMS:   Review of Systems  Today on ROS:   Currently, no fever, chills, or rigors. Decreased vision and hearing. Denies any chest pain, headaches, palpitations, lightheadedness, dizziness. Appetite is good. Denies any GERD symptoms. Denies any difficulty with swallowing, nausea, or vomiting.  positive for chronic o2 1-2 LNC at home as needed, chest percussion therapy, left leg pain, shortness of breath, congested cough, urinary hesitancy, constipation improved      Allergies   Allergen Reactions   ? Tramadol Nausea Only   ? Amoxicillin Itching and Rash   ? Levofloxacin Itching and Rash   ? Penicillins Itching and Rash     Has tolerated ceftriaxone.     Current Outpatient Medications   Medication Sig   ? acetaminophen (TYLENOL) 500 MG tablet Take 1,000 mg by mouth every 6 (six) hours as needed for pain.   ? ARIPiprazole (ABILIFY) 5 MG tablet TAKE 1 TABLET EVERY NIGHT AT BEDTIME   ? atorvastatin (LIPITOR) 20 MG tablet TAKE 1 TABLET (20 MG TOTAL) BY MOUTH BEDTIME.   ? azithromycin (ZITHROMAX) 250 MG tablet Take 250 mg by mouth every third day.   ? benzonatate (TESSALON) 100 MG capsule Take 1 capsule (100 mg total) by mouth 3 (three) times a day as needed for cough.   ? calcium  carbonate-vitamin D3 (CALCIUM WITH VITAMIN D) 600 mg(1,500mg) -400 unit per tablet Take 1 tablet by mouth 2 (two) times a day.   ? cholecalciferol, vitamin D3, 1,000 unit tablet Take 2,000 Units by mouth daily.    ? cyanocobalamin 1,000 mcg/mL injection Inject 1 mL (1,000 mcg total) into the shoulder, thigh, or buttocks every 30 (thirty) days.   ? diltiazem (CARDIZEM CD) 120 MG 24 hr capsule TAKE 1 CAPSULE (120 MG TOTAL) BY MOUTH DAILY.   ? emollient base (VANICREAM TOP) Apply 1 application topically daily as needed. Uses when takes of compression socks   ? ferrous sulfate 325 (65 FE) MG tablet Take 1 tablet (325 mg total) by mouth daily with breakfast.   ? fluticasone-umeclidinium-vilanterol (TRELEGY ELLIPTA) 100-62.5-25 mcg DsDv inhaler Inhale 1 Inhalation daily.   ? furosemide (LASIX) 20 MG tablet Take 1 tablet (20 mg total) by mouth 2 (two) times a day at 9am and 6pm.   ? gabapentin (NEURONTIN) 300 MG capsule Take 600 mg by mouth at bedtime.          ? ipratropium (ATROVENT) 0.03 % nasal spray 1 spray into each nostril daily.   ? ipratropium-albuterol (DUO-NEB) 0.5-2.5 mg/3 mL nebulizer Take 3 mL by nebulization 4 (four) times a day.   ? magnesium oxide (MAG-OX) 400 mg (241.3 mg magnesium) tablet Take 1 tablet (400 mg total) by mouth daily.   ? omeprazole (PRILOSEC) 20 MG capsule Take 20 mg by mouth daily before breakfast.   ? oxyCODONE (ROXICODONE) 5 MG immediate release tablet Take 1 tablet (5 mg total) by mouth every 6 (six) hours as needed for pain.   ? OXYGEN-AIR DELIVERY SYSTEMS [facility] Use 1-2 L As Directed as needed. [employer]   ? polyethylene glycol (MIRALAX) 17 gram packet Take 17 g by mouth daily as needed.    ? predniSONE (DELTASONE) 10 mg tablet Take 3 tabs daily for 3 days, then 2 tabs daily for 3 days, then 1 tab daily until gone.   ? PROAIR HFA 90 mcg/actuation inhaler INHALE 2 PUFFS EVERY 4 (FOUR) HOURS AS NEEDED FOR WHEEZING.   ? rivaroxaban ANTICOAGULANT (XARELTO) 20 mg tablet Take 1 tablet (20 mg  total) by mouth daily with supper.   ? senna (SENOKOT) 8.6 mg tablet Take 1 tablet by mouth 2 (two) times a day as needed for constipation.   ? sodium chloride 3 % nebulizer solution Take 3 mL by nebulization as needed.   ? spironolactone (ALDACTONE) 25 MG tablet Take 0.5 tablets (12.5 mg total) by mouth daily.   ? triamcinolone (KENALOG) 0.1 % cream Apply 1 application topically 2 (two) times a day as needed. To legs         ? urea (CARMOL) 40 % Crea Apply 1 application topically 2 (two) times a day as needed. To bottom of feet      Past Medical History:    Past Medical History:   Diagnosis Date   ? Acute and chronic respiratory failure with hypoxia (H)    ? Acute blood loss anemia 1/15/2019   ? Acute bronchitis 1/15/2019   ? Anemia     pernicious anemia   ? Anxiety    ? Arm skin lesion, right    ? Arnold-Chiari malformation (H) 1998   ? Arthritis    ? Atrial fibrillation (H) 02/2017   ? Avascular necrosis of bone (H)    ? Breast cyst    ? Breast lump    ? Candidal skin infection    ? Cellulitis of left lower extremity 1/28/2019   ? CHF (congestive heart failure) (H)     diastolic and systolic   ? Chronic bronchitis (H)     & acute   ? Chronic diastolic heart failure (H)     diastolic chf   ? Chronic pain syndrome    ? Chronic respiratory failure with hypoxia (H) 3/13/2017   ? COPD (chronic obstructive pulmonary disease) (H)    ? COPD exacerbation (H)    ? Depression    ? Diet-controlled type 2 diabetes mellitus (H)    ? Drug induced constipation    ? DVT of axillary vein, acute (H)     left   ? DVT of axillary vein, acute left (H)    ? Edema    ? Encounter for palliative care    ? Erysipelas    ? Fracture of femoral neck, left (H)    ? Generalized muscle weakness    ? GERD (gastroesophageal reflux disease)    ? History of blood clots    ? Hyperlipidemia    ? Hypertension    ? Left hip pain    ? Lumbar spinal stenosis    ? PAD (peripheral artery disease) (H)    ? Peripheral arterial disease (H) 10/28/2015   ?  Psoriasis     arms    ? Pulmonary hypertension (H) 3/5/2018   ? Recurrent major depressive episodes (H)     Created by Conversion  Replacement Utility updated for latest IMO load   ? Schizoaffective disorder, bipolar type (H) 6/11/2015   ? Slow transit constipation    ? Stasis dermatitis of both legs    ? 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 Type 2 diabetes mellitus without complication (H) 8/2/2016   ? Vitamin B12 deficiency    ? Volume overload            PHYSICAL EXAMINATION  Vitals:    02/13/20 2201   BP: 142/60   Pulse: 96   Resp: 20   Temp: 98.2  F (36.8  C)   SpO2: 96%   Weight: 163 lb (73.9 kg)       Today on physical exam:     GENERAL: Awake, Alert, oriented x3, not in any form of acute distress, answers questions appropriately, follows simple commands, conversant  HEENT: Head is normocephalic with normal hair distribution. No evidence of trauma. Ears: No acute purulent discharge. Eyes: Conjunctivae pink with no scleral jaundice. Nose: Normal mucosa and septum. NECK: Supple with no cervical or supraclavicular lymphadenopathy. Trachea is midline.   CHEST: No tenderness or deformity, no crepitus  LUNG: dim with scattered rhonchi, few expiratory wheezes bilaterally to auscultation. Congested cough improving, 2LNC, chest percussion therapy vest in place  BACK: No kyphosis of the thoracic spine. Symmetric, no curvature, ROM normal, no CVA tenderness, no spinal tenderness   CVS: irregularly irregular rhythm, there are no murmurs, rubs, gallops, or heaves,  2+ pulses symmetric in all extremities.  ABDOMEN: Rounded and soft, nontender to palpation, no masses, no organomegaly, good bowel sounds, no rebound or guarding, no peritoneal signs.   EXTREMITIES: 2+ LLE pitting leg edema with moderate sized hematoma present surrounded by erythema, warmth, no open areas, no drainage, smaller in size than outlined markings, 1-2+ rle  SKIN: Warm and dry  NEUROLOGICAL: The patient is oriented to person, place and  time.             LABS:   Recent Results (from the past 168 hour(s))   Basic Metabolic Panel   Result Value Ref Range    Sodium 134 (L) 136 - 145 mmol/L    Potassium 4.3 3.5 - 5.0 mmol/L    Chloride 92 (L) 98 - 107 mmol/L    CO2 35 (H) 22 - 31 mmol/L    Anion Gap, Calculation 7 5 - 18 mmol/L    Glucose 92 70 - 125 mg/dL    Calcium 8.5 8.5 - 10.5 mg/dL    BUN 13 8 - 28 mg/dL    Creatinine 0.66 0.60 - 1.10 mg/dL    GFR MDRD Af Amer >60 >60 mL/min/1.73m2    GFR MDRD Non Af Amer >60 >60 mL/min/1.73m2   HM1 (CBC with Diff)   Result Value Ref Range    WBC 12.5 (H) 4.0 - 11.0 thou/uL    RBC 4.20 3.80 - 5.40 mill/uL    Hemoglobin 12.1 12.0 - 16.0 g/dL    Hematocrit 38.6 35.0 - 47.0 %    MCV 92 80 - 100 fL    MCH 28.8 27.0 - 34.0 pg    MCHC 31.3 (L) 32.0 - 36.0 g/dL    RDW 16.4 (H) 11.0 - 14.5 %    Platelets 376 140 - 440 thou/uL    MPV 10.0 8.5 - 12.5 fL    Neutrophils % 76 (H) 50 - 70 %    Lymphocytes % 12 (L) 20 - 40 %    Monocytes % 9 2 - 10 %    Eosinophils % 2 0 - 6 %    Basophils % 0 0 - 2 %    Neutrophils Absolute 9.5 (H) 2.0 - 7.7 thou/uL    Lymphocytes Absolute 1.6 0.8 - 4.4 thou/uL    Monocytes Absolute 1.1 (H) 0.0 - 0.9 thou/uL    Eosinophils Absolute 0.2 0.0 - 0.4 thou/uL    Basophils Absolute 0.0 0.0 - 0.2 thou/uL   Magnesium   Result Value Ref Range    Magnesium 2.0 1.8 - 2.6 mg/dL   Vitamin B12   Result Value Ref Range    Vitamin B-12 958 (H) 213 - 816 pg/mL     Results for orders placed or performed in visit on 02/12/20   Basic Metabolic Panel   Result Value Ref Range    Sodium 134 (L) 136 - 145 mmol/L    Potassium 4.3 3.5 - 5.0 mmol/L    Chloride 92 (L) 98 - 107 mmol/L    CO2 35 (H) 22 - 31 mmol/L    Anion Gap, Calculation 7 5 - 18 mmol/L    Glucose 92 70 - 125 mg/dL    Calcium 8.5 8.5 - 10.5 mg/dL    BUN 13 8 - 28 mg/dL    Creatinine 0.66 0.60 - 1.10 mg/dL    GFR MDRD Af Amer >60 >60 mL/min/1.73m2    GFR MDRD Non Af Amer >60 >60 mL/min/1.73m2         Lab Results   Component Value Date    WBC 12.5 (H)  02/12/2020    HGB 12.1 02/12/2020    HCT 38.6 02/12/2020    MCV 92 02/12/2020     02/12/2020       Lab Results   Component Value Date    IGRYKIJA27 958 (H) 02/12/2020     Lab Results   Component Value Date    HGBA1C 6.8 (H) 11/09/2018     Lab Results   Component Value Date    INR 1.14 (H) 01/31/2020    INR 2.36 (H) 01/26/2020    INR 1.09 09/16/2019     Vitamin D, Total (25-Hydroxy)   Date Value Ref Range Status   01/04/2019 39.6 30.0 - 80.0 ng/mL Final     Lab Results   Component Value Date    TSH 0.71 02/04/2020           ASSESSMENT/PLAN:    Traumatic hematoma LLE, LLE cellulitis: completed keflex on 2/13. Erythema, edema and pain improving gradually. No open areas or drainage. Pain controlled on tylenol, oxycodone prn. encouraged warm packs, ice prn. Elevate left leg as much as possible.  triamcinolone cream topically to lle. Wearing gentle compression tubigrips on lle, compression stocking to rle. Continue to notify if develops temp >100.5, develops worsening erythema, edema. Still has wbc 12.5, may be s/t prednisone taper. Monitor for further signs of infection.   Acute on chronic hypoxic respiratory failure, Bronchiectasis, COPD: On trelegy daily, duonebs four times a day (albuterol two times a day and hypertonic saline two times a day at home), proair prn, will add hypertonic saline prn. Prednisone taper, benzonatate three times a day prn. Wears o2 prn at home 1-2LNC with activity and at night. chest percussion therapy vest two times a day for 20min set at 40 for pressure per home settings.  Follows with pulmonology. On azithromycin q72h prophylactically.  IS q1h while awake. Shortness of breath and cough improving.    Chronic diastolic and systolic CHF, Pulmonary hypertension: Daily [id number]. On lasix two times a day, spironolactone. Shortness of breath at baseline, 1+ rle, 2+ lle. Compression stocking rle, tg shape for LLE. Keep elevated when possible. Low sodium diet, no fluid  restriction. Monitor weights.   H/o DVT: On xarelto. .   HLD: On lipitor.   Chronic Atrial Fibrillation: Rate controlled 90s. On cardizem, xarelto. Monitor for now, denies chest pain or palpitations. No recent concerns.   Constipation: On miralax daily prn, senna to two times a day prn.   Iron deficiency anemia, acute blood loss anemia:   On iron daily, hg ordered for 2/12. Hg 9.9 on 2/6. Hg now back to wnl 12.1 on 2/12.   GERD: On omeprazole. No recent concerns.   Hypokalemia: previously on kcl, no supplement on med orders now. On 2/7 k was 4.4. ordered bmp for 2/12-  Hypomagnesemia: On mag ox. Mg level 1.9 on 2/5. No recent concerns, no need to recheck on chronically.   Schizoaffective disorder, bipolar type: On abilify. Follows with psych. No concerns today. No longer see seroquel on med list, monitor for behavioral concerns.   Osteoporosis: On calcium, vitamin d.   Type 2 DM: controlled with diet. Monitoring on labs and stable.   Vitamin b 12 deficiency: on b12 supplement.   Chronic pain: on tylenol, oxycodone prn, gabapentin. Stable today.     Per therapy soft LCD 2/21     Electronically signed by: Faby Ku NP

## 2021-06-20 NOTE — PROGRESS NOTES
[facility] Consult Note    Date of Service:  10/8/2018    Requesting Provider: Dr. Karine Hermosillo    Chief Complaint: BLE swelling and weeping    History of Present Illness: Adalgisa Solano is being seen at the [facility] today regarding BLE swelling and weeping. They arrive to the clinic today alone. The patient reports that she has had swelling in the legs for 1.5 years; this previously would wax and wane improving with elevation; now the swelling remains the same. This was around the same time as when she was hospitalized for CHF. She was started on Lasix 20mg daily 3-4 years ago; she was later seen at [facility] and they increased this to 60mg daily. She recently saw a dermatologist who told her that the lasix was not doing anything for her swelling and she stopped taking this 2-3 weeks ago. Has not noticed any weight increase but her breathing has gotten worse; she just saw pulmonology and was told this is a copd exacerbation; she was started on doxycyline and prednisone taper; will be done with these tomorrow. Was previously using urea based cream to the legs and heels as recommended by her dermatologist. Reports pain of 3/10 when laying flat in bed with legs elevated; if she sits and sleeps there is no pain; she is not sure if this is from her back issues (back surgery; broken andrea in the back) or her hips; currently using apap and naproxen for pain. Has used compression stockings 15-20mmHG as compression in the past; these are 5 months in age. Denies any fevers, chills, or generalized ill feeling; felt chilly with the copd exacerbation. Denies history of cancer. Sleeps in a bed with legs elevated. Lives in a house with her ;  is in good health; no pets. Former smoker; smoked for 50 year or more; quit 2006; smoked 1 ppd. Pt still has their uterus and ovaries, they deny any abnormal vaginal bleeding. Denies history of DVT,  cellulitis and vein procedures. Positive history of joint replacement bilateral knee replacements; left full; right partial. Last Echo was done 3/2018 LVEF 67%. Recent abdominal pelvic ct scan this was negative for proximal obstruction.  Last duplex u/s was done 7/2017 this was negative for DVT; she has had several u/s of the legs this is very painful for her all negative for DVT per her report. Had CTA with runoff done 2015; showed blockages in the left leg; had angioplasty; denies stenting. No recent ABIs.    [facility]  08/31/2015     EXAM: CTA ABDOMEN, PELVIS, AND BILATERAL LOWER EXTREMITY RUNOFF     INDICATION: Rule out thrombosis/embolism of the bilateral lower extremity arteries.     TECHNIQUE: Helical acquisition through the abdomen, pelvis, and bilateral lower extremities was performed during the arterial phase of contrast enhancement following the administration of intravenous contrast. 2D and 3D reconstructions performed by the   CT technologist.     COMPARISON: No pertinent comparison study is available for review.      CONTRAST: 120 mL Omni 350     FINDINGS:     CTA ABDOMEN AND PELVIS: The visualized distal segment of the ascending thoracic aortic has normal caliber with mild to moderate amount of calcified atherosclerotic disease with a mural thrombus/noncalcified atherosclerotic plaque along the medial wall of   the distal descending thoracic aorta. Mild to moderate atherosclerotic disease of the abdominal aorta with normal caliber. No evidence of aneurysm. Celiac artery and SMA are widely patent. Bilateral main renal arteries are patent. There are 2 accessory   left renal arteries, one is superior and the other one inferior to the origin of the left main renal artery. There is a pretty sizable superior right accessory renal artery. Focal high-grade stenosis of the origin of the left common iliac artery with   poststenotic dilatation. The left external iliac artery and internal iliac arteries  are widely patent. The right common iliac artery, external iliac artery and internal iliac arteries are patent.     CTA RIGHT LEG: The CFA, PFA, SFA, and popliteal arteries are patent. Normal three-vessel runoff.     CTA LEFT LEG: The CFA, PFA, SFA, and popliteal artery are patent. There is definite 2 vessel runoff in the left leg via the peroneal and posterior tibial arteries. The left anterior tibial artery opacifies to the level of the distal calf. However below   that the left anterior tibial artery could not be adequately assessed due to lack of contrast opacification.     ABDOMEN AND PELVIS: Liver and spleen are normal. Status post cholecystectomy. Bilateral adrenal glands are normal. Pancreas is normal. IVC has normal size and contour. No abdominal adenopathy. Urinary bladder is normal. Uterus is normal size. No adnexal   mass. No dilated loops of large or small bowel. No pericolonic fat stranding. No pelvic adenopathy.     LUNG BASES: Negative.     No destructive osseous lesions. Degenerative changes of the spine. Lower lumbar posterior fixation with hardware is noted.     IMPRESSION:   IMPRESSION:  1.  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 If the patient is symptomatic in the left lower extremity this can be further evaluated with DSA and treated endovascularly.  2.  Bilateral common femoral, SFA, and popliteal arteries are widely patent. Three-vessel runoff in the right leg. Two-vessel runoff in the left leg. The distal left anterior tibial artery is not adequately assessed due to lack of contrast opacification   at the ankle and possibly may be occluded distally.  Echo Complete   Order# [id number]   Reading physician: Deni Mon MD Ordering physician: Anabel Marquez MD Study date: 3/12/18   Patient Information   Patient Name MRN Sex              Age   Adalgisa Solano [id number] Female 1935 (82 y.o.)   Indications   Pulmonary hypertension   Dx:  Chronic diastolic heart failure (H) [I50.32 (ICD-10-CM)]; Pulmonary hypertension [I27.20 (ICD-10-CM)]   Summary     When compared to the previous study dated 2/11/2017, no significant change.    Left ventricle ejection fraction is normal. The calculated left ventricular ejection fraction is 67%.    Normal left ventricular size.    Normal right ventricular size and systolic function.    No hemodynamically significant valvular heart abnormalities.    Mild pulmonary hypertension present.     CT ABDOMEN PELVIS WO ORAL WO IV CONTRAST  3/8/2018 2:20 PM     INDICATION: Leg swelling and abdominal pain.  TECHNIQUE: Routine CT abdomen and pelvis without oral or IV contrast. Multiplanar reformation images (MPR). Dose reduction techniques were used.  COMPARISON: Chest CTA 02/09/2017, abdominal ultrasound 12/10/2012     FINDINGS:  LUNG BASES: Mild coronary artery calcifications.     ABDOMEN: Prior cholecystectomy. The pancreas is fatty replaced. There is a punctate calcification within the pancreatic head (series 2 axial image 39, coronal image 46) that is unchanged. On the coronal images this seems to be to the left of the distal   common bile duct. It measures at most  2 mm.     Noncontrast images of the liver, spleen, adrenal glands and left kidney are unremarkable. There is a 3 cm low dense lesion in the midportion of the right kidney with a density of 6 Hounsfield units suggestive of a cyst. Aorta is heavily calcified. Small   bowel loops are unremarkable.      PELVIS: Beam hardening artifact from the posterior spinal fusion apparatus. No distal ureteral or bladder calculi. No free fluid or mass. Pelvic organs are normal. Sigmoid and ascending colon are quite redundant. Notable amount retained stool in the   right and transverse colon. Appendix not identified.     MUSCULOSKELETAL: Bones are demineralized. L3-S1 posterior spinal fusion apparatus with transpedicular screws. No suspicious lesions.     IMPRESSION:    CONCLUSION:  1.  No abnormalities are seen to explain abdominal pain or leg swelling. Specifically, no evidence of a pelvic mass, free fluid or inflammatory changes.  2.  Quite redundant colon with a notable amount of retained stool in the right and transverse colon.  3.  Prior cholecystectomy with a punctate calcification within the pancreatic head. This is unchanged and on the coronal reformats seems to be within the pancreatic parenchyma rather than the distal common bile duct.  4.  Patient has mild coronary artery calcifications and a 3 cm right renal cyst.    US VENOUS LEGS BILATERAL  7/22/2017 3:01 PM     INDICATION: leg swelling  TECHNIQUE: Routine exam with compression, augmentation, and duplex utilizing 2D gray-scale imaging, Doppler interrogation with color-flow and spectral waveform analysis.  COMPARISON: None.     FINDINGS: The common femoral, femoral, popliteal, and segmentally visualized calf veins were evaluated.     Right leg veins are negative for deep venous thrombosis.  Left leg veins are negative for deep venous thrombosis.     No popliteal cysts.     IMPRESSION:   CONCLUSION:  1.  Bilateral leg veins are negative for DVT.    Review of Systems:   Constitutional:  negative  for fever, chills or night sweats  EENTM: negative for glasses;  negative [place]  GI:  negative for nausea/vomiting;  negative for constipation  negative diarrhea;  negative for fecal incontinence negative weight loss  :  negative dysuria, negative incontinence  MS: patient minimally ambulatory;  does use assistive devices; cane  Cardiac:  positive chf  Respiratory:  positive shortness of breath; +copd; +supplemental oxygen via nasal cannula at all times  Endocrine:  positive diabetes; does not check fs; diet controlled  Psych:  positive depression/anxiety    Past Medical History:    Past Medical History:   Diagnosis Date     Acute and chronic respiratory failure with hypoxia (H)      Arm skin lesion, right      Arnold-Chiari  malformation (H)      Breast cyst      Breast lump      Chronic diastolic heart failure (H)     diastolic chf     Chronic obstructive pulmonary disease (H)     Created by Conversion      Chronic respiratory failure with hypoxia (H) 3/13/2017     COPD (chronic obstructive pulmonary disease) (H)      COPD exacerbation (H)      Depression      Diet-controlled type 2 diabetes mellitus (H)      GERD (gastroesophageal reflux disease)      Hyperlipidemia      Hypertension      Lumbar spinal stenosis      Lump or mass in breast     Created by Conversion      PAD (peripheral artery disease) (H)      Peripheral arterial disease (H) 10/28/2015     Pulmonary hypertension (H) 3/5/2018     Recurrent major depressive episodes (H)     Created by Conversion  Replacement Utility updated for latest IMO load     Schizoaffective disorder, bipolar type (H) 2015     Type 2 diabetes mellitus without complication (H) 2016        Surgical History:   Past Surgical History:   Procedure Laterality Date     BACK SURGERY       BREAST CYST ASPIRATION Left      CERVICAL SPINE SURGERY      for arnold chiari malformation      SECTION       CHOLECYSTECTOMY       EYE SURGERY      bilateral cataracts     JOINT REPLACEMENT      knee - partial     LAPAROSCOPIC INCISIONAL / UMBILICAL / VENTRAL HERNIA REPAIR Left 3/27/2015    Procedure: ATTEMPTED LAPAROSCOPIC ABDOMINA/FLANK INCISION REPAIR;  Surgeon: Jose Lakhani MD;  Location: [facility];  Service:      LUMBAR FUSION N/A 2014    Procedure: POSTERIOR FUSION / DECOMPRESSION L3-S1 WITH PELVIC FIXATION BILATERAL ;  Surgeon: Rajan Mares MD;  Location: [facility] OR;  Service:      DC TOTAL KNEE ARTHROPLASTY Left 10/7/2016    Procedure: TOTAL KNEE ARTHROPLASTY, LEFT;  Surgeon: Kan Quesada MD;  Location: [facility];  Service: Orthopedics        Medications:    Current Outpatient Prescriptions:      acetaminophen (TYLENOL) 325 MG tablet, Take 650 mg by  mouth every 6 (six) hours as needed for pain., Disp: , Rfl:      albuterol (PROAIR HFA;PROVENTIL HFA;VENTOLIN HFA) 90 mcg/actuation inhaler, Inhale 2 puffs every 4 (four) hours as needed for wheezing., Disp: 1 Inhaler, Rfl: 12     albuterol (PROVENTIL) 2.5 mg /3 mL (0.083 %) nebulizer solution, 3 ML INHALATION Four times a day As Needed Use before nebulized saline. For shortness of breath., Disp: 300 mL, Rfl: 11     ARIPiprazole (ABILIFY) 5 MG tablet, Take 5 mg by mouth bedtime., Disp: , Rfl:      aspirin 81 MG EC tablet, Take 81 mg by mouth daily., Disp: , Rfl:      atorvastatin (LIPITOR) 20 MG tablet, Take 20 mg by mouth at bedtime., Disp: , Rfl:      calcium carbonate-vitamin D3 (CALCIUM WITH VITAMIN D) 600 mg(1,500mg) -400 unit per tablet, Take 1 tablet by mouth 2 (two) times a day., Disp: , Rfl:      cholecalciferol, vitamin D3, 1,000 unit tablet, Take 2,000 Units by mouth daily. , Disp: , Rfl:      diltiazem (CARDIZEM CD) 120 MG 24 hr capsule, Take 1 capsule (120 mg total) by mouth daily., Disp: 30 capsule, Rfl: 0     doxycycline (VIBRAMYCIN) 100 MG capsule, Take 1 capsule (100 mg total) by mouth 2 (two) times a day for 7 days., Disp: 14 capsule, Rfl: 0     fluticasone-salmeterol (ADVAIR DISKUS) 250-50 mcg/dose DISKUS, Inhale 1 puff 2 (two) times a day., Disp: 60 each, Rfl: 11     guaiFENesin ER (MUCINEX) 600 mg 12 hr tablet, Take 1,200 mg by mouth 2 (two) times a day., Disp: , Rfl:      naproxen (NAPROSYN) 500 MG tablet, Take 500 mg by mouth 2 (two) times a day with meals., Disp: , Rfl:      omeprazole (PRILOSEC) 20 MG capsule, TAKE 1 CAPSULE DAILY., Disp: 100 capsule, Rfl: 0     potassium chloride (KLOR-CON) 10 MEQ CR tablet, Take one (1) tablet by mouth three times daily (Patient taking differently: one tablet daily), Disp: 300 tablet, Rfl: 0     predniSONE (DELTASONE) 20 MG tablet, Take 2 tabs (40mg total) daily x 5 days, then 1 tab daily for 5 days, Disp: 15 tablet, Rfl: 0     psyllium (METAMUCIL) 3.4  gram packet, Take 1 packet by mouth daily. , Disp: , Rfl:      QUEtiapine (SEROQUEL) 25 MG tablet, Take 50 mg by mouth at bedtime. , Disp: , Rfl:      sodium chloride 3 % nebulizer solution, Take 4 mL by nebulization 2 (two) times a day. Using after albuterol neb., Disp: 360 mL, Rfl: 3     SPIRIVA WITH HANDIHALER 18 mcg inhalation capsule, use 1 capsule via inhaler 1 x a day, Disp: 90 capsule, Rfl: 11    Allergies:   Allergies   Allergen Reactions     Amoxicillin Itching and Rash     Levofloxacin Itching and Rash     Penicillins Itching and Rash       Family history:   Family History   Problem Relation Age of Onset     Heart attack Mother      Heart attack Father         Social History:   Social History     Social History     Marital status:      Spouse name: N/A     Number of children: N/A     Years of education: N/A     Occupational History     Not on file.     Social History Main Topics     Smoking status: Former Smoker     Quit date: 1/1/2006     Smokeless tobacco: Never Used      Comment: quit 2006     Alcohol use No     Drug use: No     Sexual activity: Not on file     Other Topics Concern     Not on file     Social History Narrative        Physical Exam  Vitals: Blood pressure 134/86, pulse 88, temperature 98.2  F (36.8  C), weight 177 lb (80.3 kg), not currently breastfeeding.  General: This is a 82 y.o. female who appears their reported age, not in acute distress  Head: normocephalic, Atraumatic; not wearing glasses; non-icteric sclera; no exudate; mild hearing loss   Respiratory: expiratory wheezes throughout all lung fields; unlabored breathing; no cough wearing supplemental oxygen via nasal cannula  Cardiac: Irregularly irregular, Rate and Rhythm, no murmurs appreciated   Skin: Uniformly warm and dry  Psych: Alert and oriented x4; calm and cooperative throughout exam  Abdomen: Normal bowel sounds. Soft, symmetric, no guarding or rigidity, nontender with palpation.  No organomegaly or masses  palpated.     Extremities: BLE with +3 pitting edema from the mid shin to the feet; no active weeping, the tissues are inflammed but not warm to touch and not painful; nails are painted; well trimmed; webbing clear; strength testing revealed 3/4 to BLEs.    Sensation: Intact to pinprick and light touch throughout lower extremities bilaterally     Peripheral Vascular: normal dorsalis pedis, posterior tibial pulses to bilateral feet , using a handheld doppler these were strong easily found and biphasic in nature.  Good capillary refill. No unusual venous distention. Positive for hemosiderin deposition or hyperpigmentation and fibrosis or scarring  Negative for spider veins and telangiectasias      Circumferential volume measures:    Vasc Edema 10/8/2018   Right just above MTP 23.5   Right Ankle 29   Right Widest Calf 41.5   Right Thigh Up 10cm 51.4   Left - just above MTP 22.8   Left Ankle 31.3   Left Widest Calf 43.4   Left Thigh Up 10cm 49       Ulceration(s)/Wound(s):          Laboratory studies:   No results found for: SEDRATE  Lab Results   Component Value Date    CREATININE 0.70 08/14/2018     Lab Results   Component Value Date    HGBA1C 6.9 (H) 07/30/2018     Lab Results   Component Value Date    BUN 15 08/14/2018     Lab Results   Component Value Date    ALBUMIN 3.4 (L) 07/30/2018     No results found for: [id number]          Impression:   Fluid overload  Dependent edema  Venous hypertension bilaterally  Venous insufficiency  Secondary lymphedema  Congestive heart failure  Leg pain with history of angioplasty to the LLE   Copd with supplemental oxygen dependence    Assessment/Plan:  1. Excisional debridement of all the ulcer(s) was not recommended today as there were no overt ulcerations present.     2. Edema. We spoke at length regarding their swelling, potential causes, and treatment options. Answered all questions. Their swelling is likely multifactorial including but not limited to the following: their  weight, dependency of the limbs for most hours of the day, reduced activity with reduced calf pump mechanism, congestive heart failure, venous hypertension, valvular incompetence, side effect of certain medications and history of joint replacements. I have left a message with Dr. Ellington's office regarding her self stopping the lasix; I feel that she needs to be back on this; will await his response and dosage; was originally on 20mg daily most recently was on 60mg daily.  I would like to first reduce their swelling down using 2 layer lite compression wraps due to her history of smoking and PAD and then transition them into compression garments; such as compression stockings or velcro wraps. Will change this weekly at the clinic until she can get seen at lymphedema therapy. Will update her IVONNE to ensure she is safe for 20-30mmHG compression and see if her leg pain is from claudication or back related. Will refer her to lymphedema therapy; to work on the tissue quality; reduce risk of infection and reduce swelling with massage and bandaging techniques; will then need new compression stockings once completed. The patient should continue to elevate their legs periodically throughout the day.       3. Treatment: lotion weekly at clinic until she can get seen at lymphedema therapy; no overt ulcerations    4. Offloading: na    5. Nutrition: weight stable last albumin done 3/2018 was just below markos; focus on protein; no nutritional labs today; will continue to monitor    6. Irregular heartbeat: possibly due to the fluid overload and chf exacerbation since being off the lasix; will let Dr. Ellington know about this; he returns to clinic tomorrow afternoon and is scheduled to see patient on Friday; pt was asymptomatic    Patient to return to clinic in 1 week(s) for re-evaluation. They were instructed to call the clinic sooner with any further questions or concerns. Answered all questions.    Mandy Torrez DNP, RN, CNP,  [facility]  [phone]      This note was electronically signed by Mandy Torrez

## 2021-06-20 NOTE — PROGRESS NOTES
Pt thought 2 layer wrap feel down and that her toes needed to be wrapped. 2 layer wrap was in place and did not need to be rewrapped.

## 2021-06-20 NOTE — LETTER
Letter by Shai Ellington MD at      Author: Shai Ellington MD Service: -- Author Type: --    Filed:  Encounter Date: 12/24/2019 Status: Signed         Adalgisa Solano  [address]  [facility] [address]             December 24, 2019         Dear Ms. Solano,    Below are the results from your recent visit:    Resulted Orders   Comprehensive Metabolic Panel   Result Value Ref Range    Sodium 137 136 - 145 mmol/L    Potassium 4.2 3.5 - 5.0 mmol/L    Chloride 99 98 - 107 mmol/L    CO2 30 22 - 31 mmol/L    Anion Gap, Calculation 8 5 - 18 mmol/L    Glucose 133 (H) 70 - 125 mg/dL    BUN 12 8 - 28 mg/dL    Creatinine 0.73 0.60 - 1.10 mg/dL    GFR MDRD Af Amer >60 >60 mL/min/1.73m2    GFR MDRD Non Af Amer >60 >60 mL/min/1.73m2    Bilirubin, Total 0.8 0.0 - 1.0 mg/dL    Calcium 9.1 8.5 - 10.5 mg/dL    Protein, Total 6.5 6.0 - 8.0 g/dL    Albumin 3.5 3.5 - 5.0 g/dL    Alkaline Phosphatase 104 45 - 120 U/L    AST 15 0 - 40 U/L    ALT 12 0 - 45 U/L    Narrative    Fasting Glucose reference range is 70-99 mg/dL per  American Diabetes Association (ADA) guidelines.   Lipid Cascade   Result Value Ref Range    Cholesterol 134 <=199 mg/dL    Triglycerides 70 <=149 mg/dL    HDL Cholesterol 57 >=50 mg/dL    LDL Calculated 63 <=129 mg/dL    Patient Fasting > 8hrs? Yes         All very good results and lets do fasting blood sugar and A1c w/ next office visit fasting     Please call with questions or contact us using [employer].    Sincerely,        Electronically signed by Shai Ellington MD

## 2021-06-20 NOTE — PROGRESS NOTES
Assessment/Plan:        There are no diagnoses linked to this encounter.  82 year old female with a history of COPD, pulmonary HTN, obesity, KAYLEE, GERD, PAD, arnold chiari malformation, depression, schizoaffective disorder amongst other medical issues, here for a follow-up visit and with ongoing LE Edema. Has not had an exacerbation since her last visit and her major complaint has been hip pain. For the present we would recommend;    1. COPD: Her symptoms have been stable since her last visit at which time her combination LABA/ICS dose was increased and she has continued to be compliant with her LAMA. Additionally, she has been compliant with her hypertonic saline nebs that she uses in combination with her flutter valve therapy.    Continue Fluticasone/Salmeterol 250/50 1 puff two times a day. She was reminded to gargle after using this.    Continue Tiotropium 2 puffs daily.    As needed albuterol for rescue. We have refilled a prescription for her Proair.    She also has albuterol nebs ordered as needed.    Continue supplemental oxygen to maintain saturations >88%. She remains in the low 90's at rest on RA and can keep this off when her saturations are thus.    Continue hypertonic saline with flutter valve for expectoration of secretions.    She had some questions about the [facility] with regards to stem cell transplant for lung function improvement. I explained to her that I had no direct experience with this place and that as best as I knew, stem cell transplant for lung regeneration was not mainstream therapy recommended by any guidelines. I could thus not recommend this to her.   2. LE Edema:Has been followed by Dr. Juarez for this. While this has not worsened significantly, there has been no significant change with diuretics. Her BNP has previously been unremarkable and a TTE in March 2018 did not reveal any significant new abnormalities. I recommended that she be evaluated at the Lymphedema clinic  and as such have placed a consult.  3. Follow-up:3 months.     Karine Hermosillo  Pulmonary and Critical Care  [id number]    Subjective:    Patient ID: Aadlgisa Solano is a 82 y.o. female.    HPI Comments:     82-year-old female with a history of complex COPD with recurrent exacerbations here for follow-up. She usually follows with Dr. Juarez and had a recent follow-up after an exacerbation earlier this summer. Her dose of Advair was increased at that visit and she states that since then, her symptoms have been stable. The major impediment to her activity has been her hip pain which has worsened.  She continues compliance with her Advair, spiriva and hypertonic saline nebs that she uses in conjunction with a flutter valve. She also wondered if it would be better for her to use a vest instead. Additionally, since her last exacerbation, her COPD have been well controlled. She almost never uses her rescue inhaler and states that her oxygen saturations at rest remain in the low 90's. She does use her supplemental oxygen with activity as her saturations drop to the low 80's. Her lower extremity edema is unchanged (despite titrating her diuretics) and she denies fevers, chills, night sweats, weight loss, chest pain or chest tightness.   Her CAT today is 3+4+4+4+4+5+4+1=28      Review of Systems   Pertinent positives alluded to in the HPI. Remainder of 10 point ROS is negative.         /66  Pulse 88  Resp 28  Wt 173 lb 4.8 oz (78.6 kg)  LMP  (LMP Unknown)  SpO2 97% Comment: 1 L pulsing  BMI 32.74 kg/m2    Objective:    Physical Exam   Constitutional: She is oriented to person, place, and time. She appears well-developed and well-nourished. No distress.   HENT:   Head: Normocephalic and atraumatic.   Eyes: Conjunctivae are normal. Pupils are equal, round, and reactive to light. Right eye exhibits no discharge. Left eye exhibits no discharge. No scleral icterus.   Cardiovascular: Normal rate, regular rhythm and  normal heart sounds.  Exam reveals no gallop.    No murmur heard.  Pulmonary/Chest: Effort normal. No respiratory distress. She has wheezes. She has rales.   Abdominal: Soft.   Musculoskeletal: She exhibits edema (Stable to last visit). She exhibits no deformity.   Neurological: She is alert and oriented to person, place, and time. No cranial nerve deficit. Coordination normal.   Skin: Skin is warm and dry. She is not diaphoretic. No erythema.   Psychiatric: She has a normal mood and affect. Her behavior is normal. Thought content normal.   Nursing note and vitals reviewed.          Current Outpatient Prescriptions on File Prior to Visit   Medication Sig Dispense Refill     acetaminophen (TYLENOL) 325 MG tablet Take 650 mg by mouth every 6 (six) hours as needed for pain.       albuterol (PROAIR HFA;PROVENTIL HFA;VENTOLIN HFA) 90 mcg/actuation inhaler Inhale 2 puffs every 4 (four) hours as needed for wheezing.       albuterol (PROVENTIL) 2.5 mg /3 mL (0.083 %) nebulizer solution 3 ML INHALATION Four times a day As Needed Use before nebulized saline. For shortness of breath. 300 mL 0     ARIPiprazole (ABILIFY) 5 MG tablet Take 5 mg by mouth bedtime.       aspirin 81 MG EC tablet Take 81 mg by mouth daily.       atorvastatin (LIPITOR) 20 MG tablet Take 20 mg by mouth at bedtime.       calcium carbonate-vitamin D3 (CALCIUM WITH VITAMIN D) 600 mg(1,500mg) -400 unit per tablet Take 1 tablet by mouth 2 (two) times a day.       cholecalciferol, vitamin D3, 1,000 unit tablet Take 2,000 Units by mouth daily.        diltiazem (CARDIZEM CD) 120 MG 24 hr capsule Take 1 capsule (120 mg total) by mouth daily. 30 capsule 0     fluticasone-salmeterol (ADVAIR DISKUS) 250-50 mcg/dose DISKUS Inhale 1 puff 2 (two) times a day. 60 each 11     furosemide (LASIX) 20 MG tablet Take three (3) tablets by mouth daily Increase 6/19/17 from 40mg daily. 270 tablet 0     guaiFENesin ER (MUCINEX) 600 mg 12 hr tablet Take 1,200 mg by mouth 2 (two)  times a day.       naproxen (NAPROSYN) 500 MG tablet Take 500 mg by mouth 2 (two) times a day with meals.       omeprazole (PRILOSEC) 20 MG capsule TAKE 1 CAPSULE DAILY. 100 capsule 0     potassium chloride (KLOR-CON) 10 MEQ CR tablet Take one (1) tablet by mouth three times daily (Patient taking differently: one tablet daily) 300 tablet 0     psyllium (METAMUCIL) 3.4 gram packet Take 1 packet by mouth daily.        QUEtiapine (SEROQUEL) 25 MG tablet Take 50 mg by mouth at bedtime.        sodium chloride 3 % nebulizer solution Take 3 mL by nebulization 2 (two) times a day. Using after albuterol neb.       SPIRIVA WITH HANDIHALER 18 mcg inhalation capsule use 1 capsule via inhaler 1 x a day 90 capsule 11     triamcinolone (KENALOG) 0.1 % cream APPLY TO RASH ON LOWER LEGS two times a day 60 g 5     No current facility-administered medications on file prior to visit.      /66  Pulse 88  Resp 28  Wt 173 lb 4.8 oz (78.6 kg)  LMP  (LMP Unknown)  SpO2 97% Comment: 1 L pulsing  BMI 32.74 kg/m2    Medical History  Active Ambulatory (Non-Hospital) Problems    Diagnosis     COPD (chronic obstructive pulmonary disease) (H)     COPD exacerbation (H)     Acute and chronic respiratory failure with hypoxia (H)     Diet-controlled type 2 diabetes mellitus (H)     Pulmonary hypertension     Chronic respiratory failure with hypoxia (H)     Chronic diastolic heart failure (H)     Type 2 diabetes mellitus without complication (H)     Hypertension     Peripheral arterial disease (H)     Schizoaffective disorder, bipolar type (H)     Lump Or Mass In Breast     Recurrent major depressive episodes (H)     Chronic obstructive pulmonary disease (H)     Arnold-Chiari malformation (H)     Past Medical History:   Diagnosis Date     Arm skin lesion, right      Arnold-Chiari malformation (H) 1998     Breast cyst      Breast lump      Chronic diastolic heart failure (H)      COPD (chronic obstructive pulmonary disease) (H)       Depression      GERD (gastroesophageal reflux disease)      Hyperlipidemia      Hypertension      Lumbar spinal stenosis      PAD (peripheral artery disease) (H)         Surgical History  She  has a past surgical history that includes Breast cyst aspiration (Left, ); Cholecystectomy; Lumbar fusion (N/A, 2014); Back surgery; Laparoscopic incisional / umbilical / ventral hernia repair (Left, 3/27/2015);  section; Joint replacement; Eye surgery; pr total knee arthroplasty (Left, 10/7/2016); and Cervical spine surgery.         Social history reviewed: Lives at home.  Here with her .   Allergies  Allergies   Allergen Reactions     Amoxicillin Itching and Rash     Levofloxacin Itching and Rash     Penicillins Itching and Rash    Family history reviewed and no changes from previous.  Cardiovascular disease in the family.                          Data Review - imaging, labs, and ekgs listed below were reviewed by me.  Chest XRay and chest CT images and EKG tracings interpreted personally.

## 2021-06-20 NOTE — LETTER
Letter by Faby Ku NP at      Author: Faby Ku NP Service: -- Author Type: --    Filed:  Encounter Date: 2020 Status: (Other)         Patient: Adalgisa Solano   MR Number: [id number]   YOB: 1935   Date of Visit: 2020                 [facility]    DATE: 2020    NAME:  Adalgisa Solano             :  1935  MRN: [id number]  CODE STATUS:  Full code    VISIT TYPE: Review Of Multiple Medical Conditions     FACILITY:  [facility] [[id number]]       CHIEF COMPLAIN/REASON FOR VISIT:    Chief Complaint   Patient presents with   ? Review Of Multiple Medical Conditions               HISTORY OF PRESENT ILLNESS: Adalgisa Solano is a 84 y.o. female who was admitted - for fall and developed large, painful, hematoma of left leg. She was unable to bear weight on her leg. Her xarelto was held and surgery consulted. They attempted aspiration but were unsuccessful. MRI confirmed hematoma and also treated for cellulitis with keflex. Her blood cultures were negative. She was also treated for acute on chronic COPD with prednisone therapy, percussion therapy. She was treated for hyponatremia with lasix. Her xarelto was restarted after being held or 7 days. She was discharged to TCU for further rehab.  She has PMH of h/o DVT, persistent atrial fibrillation,  COPD, normocytic anemia, Type 2 DM, HTN, Depression, schizoaffective disorder, HLD, neuropathy, GERD, chronic respiratory failure on 1LNC, CHF, avascular necrosis of Left hip, chronic lymphedema. Prior to this she lived with her .     Today Ms. Solano is seen for follow up visit today. Per nursing staff prednisone order  but requesting clarification if needed to be extended. Staff also requesting oxycodone script and nystatin powder for rash in abdominal fold. On exam she is seen in her wheelchair. She says her leg is doing much better and pain is  subsiding. She does not want to take the oxycodone anymore. She feels her pain is well controlled without it. She says her bowels are fine. She does not feel she has been wheezing recently and her breathing seems better. She does keep coughing up some yellow stuff at times and is asking to make sure she is on her antibiotic every 3 days. She says she has a care conference tomorrow and is hoping to return home on thursday. She says they had her scheduled for Saturday so it would only be a couple days early. She denies fevers or other concerns today.     REVIEW OF SYSTEMS:  PROBLEMS AND REVIEW OF SYSTEMS:   Review of Systems  Today on ROS:   Currently, no fever, chills, or rigors. Decreased vision and hearing. Denies any chest pain, headaches, palpitations, lightheadedness, dizziness. Appetite is good. Denies any GERD symptoms. Denies any difficulty with swallowing, nausea, or vomiting.  positive for chronic o2 1-2 LNC at home as needed, chest percussion therapy, left leg pain improving, shortness of breath, congested cough improving, urinary hesitancy, constipation improved      Allergies   Allergen Reactions   ? Tramadol Nausea Only   ? Amoxicillin Itching and Rash   ? Levofloxacin Itching and Rash   ? Penicillins Itching and Rash     Has tolerated ceftriaxone.     Current Outpatient Medications   Medication Sig   ? acetaminophen (TYLENOL) 500 MG tablet Take 1,000 mg by mouth every 6 (six) hours as needed for pain.   ? ARIPiprazole (ABILIFY) 5 MG tablet TAKE 1 TABLET EVERY NIGHT AT BEDTIME   ? atorvastatin (LIPITOR) 20 MG tablet TAKE 1 TABLET (20 MG TOTAL) BY MOUTH BEDTIME.   ? azithromycin (ZITHROMAX) 250 MG tablet Take 250 mg by mouth every third day.   ? benzonatate (TESSALON) 100 MG capsule Take 1 capsule (100 mg total) by mouth 3 (three) times a day as needed for cough.   ? calcium carbonate-vitamin D3 (CALCIUM WITH VITAMIN D) 600 mg(1,500mg) -400 unit per tablet Take 1 tablet by mouth 2 (two) times a day.   ?  cholecalciferol, vitamin D3, 1,000 unit tablet Take 2,000 Units by mouth daily.    ? cyanocobalamin 1,000 mcg/mL injection Inject 1 mL (1,000 mcg total) into the shoulder, thigh, or buttocks every 30 (thirty) days.   ? diltiazem (CARDIZEM CD) 120 MG 24 hr capsule TAKE 1 CAPSULE (120 MG TOTAL) BY MOUTH DAILY.   ? emollient base (VANICREAM TOP) Apply 1 application topically daily as needed. Uses when takes of compression socks   ? ferrous sulfate 325 (65 FE) MG tablet Take 1 tablet (325 mg total) by mouth daily with breakfast.   ? fluticasone-umeclidinium-vilanterol (TRELEGY ELLIPTA) 100-62.5-25 mcg DsDv inhaler Inhale 1 Inhalation daily.   ? furosemide (LASIX) 20 MG tablet Take 1 tablet (20 mg total) by mouth 2 (two) times a day at 9am and 6pm.   ? gabapentin (NEURONTIN) 300 MG capsule Take 600 mg by mouth at bedtime.          ? ipratropium (ATROVENT) 0.03 % nasal spray 1 spray into each nostril daily.   ? ipratropium-albuterol (DUO-NEB) 0.5-2.5 mg/3 mL nebulizer Take 3 mL by nebulization 4 (four) times a day.   ? magnesium oxide (MAG-OX) 400 mg (241.3 mg magnesium) tablet Take 1 tablet (400 mg total) by mouth daily.   ? omeprazole (PRILOSEC) 20 MG capsule Take 20 mg by mouth daily before breakfast.   ? OXYGEN-AIR DELIVERY SYSTEMS [place] Use 1-2 L As Directed as needed. [employer]   ? polyethylene glycol (MIRALAX) 17 gram packet Take 17 g by mouth daily as needed.    ? PROAIR HFA 90 mcg/actuation inhaler INHALE 2 PUFFS EVERY 4 (FOUR) HOURS AS NEEDED FOR WHEEZING.   ? rivaroxaban ANTICOAGULANT (XARELTO) 20 mg tablet Take 1 tablet (20 mg total) by mouth daily with supper.   ? senna (SENOKOT) 8.6 mg tablet Take 1 tablet by mouth 2 (two) times a day as needed for constipation.   ? sodium chloride 3 % nebulizer solution Take 3 mL by nebulization as needed.   ? spironolactone (ALDACTONE) 25 MG tablet Take 0.5 tablets (12.5 mg total) by mouth daily.   ? triamcinolone (KENALOG) 0.1 % cream Apply 1 application topically 2 (two)  times a day as needed. To legs         ? urea (CARMOL) 40 % Crea Apply 1 application topically 2 (two) times a day as needed. To bottom of feet      Past Medical History:    Past Medical History:   Diagnosis Date   ? Acute and chronic respiratory failure with hypoxia (H)    ? Acute blood loss anemia 1/15/2019   ? Acute bronchitis 1/15/2019   ? Anemia     pernicious anemia   ? Anxiety    ? Arm skin lesion, right    ? Arnold-Chiari malformation (H) 1998   ? Arthritis    ? Atrial fibrillation (H) 02/2017   ? Avascular necrosis of bone (H)    ? Breast cyst    ? Breast lump    ? Candidal skin infection    ? Cellulitis of left lower extremity 1/28/2019   ? CHF (congestive heart failure) (H)     diastolic and systolic   ? Chronic bronchitis (H)     & acute   ? Chronic diastolic heart failure (H)     diastolic chf   ? Chronic pain syndrome    ? Chronic respiratory failure with hypoxia (H) 3/13/2017   ? COPD (chronic obstructive pulmonary disease) (H)    ? COPD exacerbation (H)    ? Depression    ? Diet-controlled type 2 diabetes mellitus (H)    ? Drug induced constipation    ? DVT of axillary vein, acute (H)     left   ? DVT of axillary vein, acute left (H)    ? Edema    ? Encounter for palliative care    ? Erysipelas    ? Fracture of femoral neck, left (H)    ? Generalized muscle weakness    ? GERD (gastroesophageal reflux disease)    ? History of blood clots    ? Hyperlipidemia    ? Hypertension    ? Left hip pain    ? Lumbar spinal stenosis    ? PAD (peripheral artery disease) (H)    ? Peripheral arterial disease (H) 10/28/2015   ? Psoriasis     arms    ? Pulmonary hypertension (H) 3/5/2018   ? Recurrent major depressive episodes (H)     Created by Conversion  Replacement Utility updated for latest IMO load   ? Schizoaffective disorder, bipolar type (H) 6/11/2015   ? Slow transit constipation    ? Stasis dermatitis of both legs    ? 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 Type 2 diabetes mellitus without  complication (H) 8/2/2016   ? Vitamin B12 deficiency    ? Volume overload            PHYSICAL EXAMINATION  Vitals:    02/17/20 1845   BP: 126/63   Pulse: (!) 106   Resp: 16   Temp: 98.3  F (36.8  C)   SpO2: 93%   Weight: 154 lb (69.9 kg)       Today on physical exam:     GENERAL: Awake, Alert, oriented x3, not in any form of acute distress, answers questions appropriately, follows simple commands, conversant  HEENT: Head is normocephalic with normal hair distribution. No evidence of trauma. Ears: No acute purulent discharge. Eyes: Conjunctivae pink with no scleral jaundice. Nose: Normal mucosa and septum. NECK: Supple with no cervical or supraclavicular lymphadenopathy. Trachea is midline.   CHEST: No tenderness or deformity, no crepitus  LUNG: dim with scattered rhonchi bilaterally to auscultation. Congested cough improving, 2LNC intermittently, chest percussion therapy vest in place  BACK: No kyphosis of the thoracic spine. Symmetric, no curvature, ROM normal, no CVA tenderness, no spinal tenderness   CVS: irregularly irregular rhythm, there are no murmurs, rubs, gallops, or heaves,  2+ pulses symmetric in all extremities.  ABDOMEN: Rounded and soft, nontender to palpation, no masses, no organomegaly, good bowel sounds, no rebound or guarding, no peritoneal signs.   EXTREMITIES: 1-2+ LLE pitting leg edema with small-moderate sized hematoma present surrounded by erythema, warmth, no open areas, no drainage, smaller in size than outlined markings, 1+ rle  SKIN: Warm and dry  NEUROLOGICAL: The patient is oriented to person, place and time.             LABS:   Recent Results (from the past 168 hour(s))   Basic Metabolic Panel   Result Value Ref Range    Sodium 134 (L) 136 - 145 mmol/L    Potassium 4.3 3.5 - 5.0 mmol/L    Chloride 92 (L) 98 - 107 mmol/L    CO2 35 (H) 22 - 31 mmol/L    Anion Gap, Calculation 7 5 - 18 mmol/L    Glucose 92 70 - 125 mg/dL    Calcium 8.5 8.5 - 10.5 mg/dL    BUN 13 8 - 28 mg/dL    Creatinine  0.66 0.60 - 1.10 mg/dL    GFR MDRD Af Amer >60 >60 mL/min/1.73m2    GFR MDRD Non Af Amer >60 >60 mL/min/1.73m2   HM1 (CBC with Diff)   Result Value Ref Range    WBC 12.5 (H) 4.0 - 11.0 thou/uL    RBC 4.20 3.80 - 5.40 mill/uL    Hemoglobin 12.1 12.0 - 16.0 g/dL    Hematocrit 38.6 35.0 - 47.0 %    MCV 92 80 - 100 fL    MCH 28.8 27.0 - 34.0 pg    MCHC 31.3 (L) 32.0 - 36.0 g/dL    RDW 16.4 (H) 11.0 - 14.5 %    Platelets 376 140 - 440 thou/uL    MPV 10.0 8.5 - 12.5 fL    Neutrophils % 76 (H) 50 - 70 %    Lymphocytes % 12 (L) 20 - 40 %    Monocytes % 9 2 - 10 %    Eosinophils % 2 0 - 6 %    Basophils % 0 0 - 2 %    Neutrophils Absolute 9.5 (H) 2.0 - 7.7 thou/uL    Lymphocytes Absolute 1.6 0.8 - 4.4 thou/uL    Monocytes Absolute 1.1 (H) 0.0 - 0.9 thou/uL    Eosinophils Absolute 0.2 0.0 - 0.4 thou/uL    Basophils Absolute 0.0 0.0 - 0.2 thou/uL   Magnesium   Result Value Ref Range    Magnesium 2.0 1.8 - 2.6 mg/dL   Vitamin B12   Result Value Ref Range    Vitamin B-12 958 (H) 213 - 816 pg/mL     Results for orders placed or performed in visit on 02/12/20   Basic Metabolic Panel   Result Value Ref Range    Sodium 134 (L) 136 - 145 mmol/L    Potassium 4.3 3.5 - 5.0 mmol/L    Chloride 92 (L) 98 - 107 mmol/L    CO2 35 (H) 22 - 31 mmol/L    Anion Gap, Calculation 7 5 - 18 mmol/L    Glucose 92 70 - 125 mg/dL    Calcium 8.5 8.5 - 10.5 mg/dL    BUN 13 8 - 28 mg/dL    Creatinine 0.66 0.60 - 1.10 mg/dL    GFR MDRD Af Amer >60 >60 mL/min/1.73m2    GFR MDRD Non Af Amer >60 >60 mL/min/1.73m2         Lab Results   Component Value Date    WBC 12.5 (H) 02/12/2020    HGB 12.1 02/12/2020    HCT 38.6 02/12/2020    MCV 92 02/12/2020     02/12/2020       Lab Results   Component Value Date    [id number] 958 (H) 02/12/2020     Lab Results   Component Value Date    HGBA1C 6.8 (H) 11/09/2018     Lab Results   Component Value Date    INR 1.14 (H) 01/31/2020    INR 2.36 (H) 01/26/2020    INR 1.09 09/16/2019     Vitamin D, Total (25-Hydroxy)    Date Value Ref Range Status   01/04/2019 39.6 30.0 - 80.0 ng/mL Final     Lab Results   Component Value Date    TSH 0.71 02/04/2020           ASSESSMENT/PLAN:    Traumatic hematoma LLE, LLE cellulitis: completed keflex on 2/13. Erythema, edema and pain improving gradually. No open areas or drainage. Pain controlled on tylenol, requesting to stop oxycodone will dc today. encouraged warm packs, ice prn. Elevate left leg as much as possible.  triamcinolone cream topically to lle. Wearing gentle compression tubigrips on lle, compression stocking to rle. Much improved and planning to return home soon.   Acute on chronic hypoxic respiratory failure, Bronchiectasis, COPD: On trelegy daily, duonebs four times a day (albuterol two times a day and hypertonic saline two times a day at home), proair prn, will add hypertonic saline prn. Prednisone taper completed. benzonatate three times a day prn. Wears o2 prn at home 1-2LNC with activity and at night. chest percussion therapy vest two times a day for 20min set at 40 for pressure per home settings.  Follows with pulmonology. On azithromycin q72h prophylactically.  IS q1h while awake. Shortness of breath and cough improving.  No wheezing no further prednisone needed.   Chronic diastolic and systolic CHF, Pulmonary hypertension: Daily [id number]. On lasix two times a day, spironolactone. Shortness of breath at baseline, 1+ rle, 2+ lle. Compression stocking rle, tg shape for LLE. Keep elevated when possible. Low sodium diet, no fluid restriction. Monitor weights.   H/o DVT: On xarelto.   HLD: On lipitor.   Chronic Atrial Fibrillation: Rate controlled 70-100s. On cardizem, xarelto. Monitor for now, denies chest pain or palpitations. No recent concerns. Sometimes in 100s after nebs but overall controlled.   Constipation: On miralax daily prn, senna to two times a day prn.   Iron deficiency anemia, acute blood loss anemia:   On iron daily, hg ordered for 2/12. Hg  9.9 on 2/6. Hg now back to wnl 12.1 on 2/12.   GERD: On omeprazole. No recent concerns.   Hypokalemia: previously on kcl, no supplement on med orders now. On 2/7 k was 4.4. ordered bmp for 2/12-stable  Hypomagnesemia: On mag ox. Mg level 1.9 on 2/5. No recent concerns, no need to recheck on chronically.   Schizoaffective disorder, bipolar type: On abilify. Follows with psych. No concerns today. No longer see seroquel on med list, monitor for behavioral concerns.   Osteoporosis: On calcium, vitamin d.   Type 2 DM: controlled with diet. Monitoring on labs and stable.   Vitamin b 12 deficiency: on b12 supplement.   Chronic pain: on tylenol, oxycodone prn, gabapentin. Stable today.   Candidal intertrigo: ordered nystatin powder two times a day until resolved.     Per therapy firm LCD 2/21, care conference tomorrow 2/19. Requesting to discharge on 2/20. Mod ind for toileting, sba for dressing, max for teds, ambulates 200 feet with sba, slums 25/30. Returning to [facility] with spouse.     Electronically signed by: Faby Ku NP

## 2021-06-20 NOTE — PROGRESS NOTES
Office Visit - Follow up    Adalgisa Solano   82 y.o. female    Date of Visit: 8/30/2018    Chief Complaint   Patient presents with     COPD     Hypertension       Subjective: COPD and exacerbation.    Improved.  Multiple drug allergies noted including amoxicillin levofloxacin and penicillin.    Feels better overall.  Wants to go to a lung [place] okay by me.  Also seen being seen by pulmonary medicine specialist now [facility] in our system.  The patient notes less cough less short of breath no blood in stool urinary sputum medication list reviewed well-tolerated normal effects.  Weight up 4 pounds.    ROS: A comprehensive review of systems was performed and was otherwise negative    Medications:  Prior to Admission medications    Medication Sig Start Date End Date Taking? Authorizing Provider   albuterol (PROVENTIL) 2.5 mg /3 mL (0.083 %) nebulizer solution 3 ML INHALATION Four times a day As Needed Use before nebulized saline. For shortness of breath. 7/19/18  Yes Shai Ellington MD   ARIPiprazole (ABILIFY) 5 MG tablet Take 5 mg by mouth bedtime.   Yes PROVIDER, HISTORICAL   aspirin 81 MG EC tablet Take 81 mg by mouth daily.   Yes PROVIDER, HISTORICAL   atorvastatin (LIPITOR) 20 MG tablet Take 20 mg by mouth at bedtime.   Yes PROVIDER, HISTORICAL   calcium carbonate-vitamin D3 (CALCIUM WITH VITAMIN D) 600 mg(1,500mg) -400 unit per tablet Take 1 tablet by mouth 2 (two) times a day.   Yes PROVIDER, HISTORICAL   cholecalciferol, vitamin D3, 1,000 unit tablet Take 2,000 Units by mouth daily.    Yes PROVIDER, HISTORICAL   ciprofloxacin HCl (CIPRO) 250 MG tablet Take 1 tablet (250 mg total) by mouth 2 (two) times a day for 7 days. 8/29/18 9/5/18 Yes Shai Ellington MD   diltiazem (CARDIZEM CD) 120 MG 24 hr capsule Take 1 capsule (120 mg total) by mouth daily. 8/15/18  Yes Shai Ellington MD   fluticasone-salmeterol (ADVAIR DISKUS) 250-50 mcg/dose DISKUS Inhale 1 puff 2 (two) times a  day. 6/4/18  Yes Jun Juarez MD   furosemide (LASIX) 20 MG tablet Take three (3) tablets by mouth daily Increase 6/19/17 from 40mg daily. 7/16/18  Yes Shai Ellington MD   guaiFENesin ER (MUCINEX) 600 mg 12 hr tablet Take 1,200 mg by mouth 2 (two) times a day.   Yes PROVIDER, HISTORICAL   naproxen (NAPROSYN) 500 MG tablet Take 500 mg by mouth 2 (two) times a day with meals.   Yes PROVIDER, HISTORICAL   omeprazole (PRILOSEC) 20 MG capsule TAKE 1 CAPSULE DAILY. 8/15/18  Yes Shai Ellington MD   potassium chloride (KLOR-CON) 10 MEQ CR tablet Take one (1) tablet by mouth three times daily  Patient taking differently: one tablet daily 7/16/18  Yes Shai Ellington MD   psyllium (METAMUCIL) 3.4 gram packet Take 1 packet by mouth daily.    Yes PROVIDER, HISTORICAL   QUEtiapine (SEROQUEL) 25 MG tablet Take 50 mg by mouth at bedtime.  8/10/16  Yes PROVIDER, HISTORICAL   sodium chloride 3 % nebulizer solution Take 3 mL by nebulization 2 (two) times a day. Using after albuterol neb. 4/19/17  Yes PROVIDER, HISTORICAL   SPIRIVA WITH HANDIHALER 18 mcg inhalation capsule use 1 capsule via inhaler 1 x a day 5/2/18  Yes Jun Juarez MD   triamcinolone (KENALOG) 0.1 % cream APPLY TO RASH ON LOWER LEGS two times a day 8/14/18  Yes Moisés Allen MD   acetaminophen (TYLENOL) 325 MG tablet Take 650 mg by mouth every 6 (six) hours as needed for pain.    PROVIDER, HISTORICAL   albuterol (PROAIR HFA;PROVENTIL HFA;VENTOLIN HFA) 90 mcg/actuation inhaler Inhale 2 puffs every 4 (four) hours as needed for wheezing.    PROVIDER, HISTORICAL   triamcinolone (KENALOG) 0.1 % ointment Apply 1 application topically 2 (two) times a day as needed.   8/30/18  PROVIDER, HISTORICAL       Allergies:   Allergies   Allergen Reactions     Amoxicillin Itching and Rash     Levofloxacin Itching and Rash     Penicillins Itching and Rash       Immunizations:   Immunization History   Administered Date(s) Administered     DT  (pediatric) 03/29/2004     Influenza high dose, seasonal 10/20/2015, 10/09/2016, 11/16/2017     Influenza,seasonal quad, PF, 36+MOS 11/20/2014     Pneumo Conj 13-V (2010&after) 10/20/2015     Pneumo Polysac 23-V 10/18/1999, 08/01/2005     Td,adult,historic,unspecified 03/29/2004     Tdap 05/14/2013       Exam Chest clear to auscultation and percussion.  Heart tones regular rhythm without murmur rub or gallop.  Abdomen soft nontender no organomegaly.  No peritoneal signs.  Extremities free of edema cyanosis or clubbing.  Neck veins nondistended no thyromegaly or scleral icterus noted, carotids full.  Skin warm and dry easily conversant good spirited.  Normal intelligence.  Neurologically intact no gross localizing findings.  Upper airway sounds or rhonchi or audible.  O2 sats well on nasal prong oxygen 2 L 95% pulse 95 respiratory rate 24 unlabored blood pressure 132/74 BMI 32.  Weight up 4 pounds edema irritation and lower extremities less with topical creams.    Assessment and Plan  COPD and exacerbation improved.    Multiple drug allergies.    Obesity BMI 32.    Hypertension controlled.    Hypoxemia improved with nasal cannula O2 at 2 L/min.  Same meds and cares encourage close follow-up with pulmonary medicine specialist Dr. Juarez at all in our system and/or the lung [place].  RTC 6 weeks time flu vaccine then.    Time: total time spent with the patient was 25 minutes of which >50% was spent in counseling and coordination of care    The following high BMI interventions were performed this visit: encouragement to exercise    Shai Ellington MD    Patient Active Problem List   Diagnosis     Recurrent major depressive episodes (H)     Chronic obstructive pulmonary disease (H)     Lump Or Mass In Breast     Peripheral arterial disease (H)     Type 2 diabetes mellitus without complication (H)     Hypertension     Schizoaffective disorder, bipolar type (H)     Arnold-Chiari malformation (H)     Chronic diastolic  heart failure (H)     Chronic respiratory failure with hypoxia (H)     Pulmonary hypertension     COPD (chronic obstructive pulmonary disease) (H)     COPD exacerbation (H)     Acute and chronic respiratory failure with hypoxia (H)     Diet-controlled type 2 diabetes mellitus (H)

## 2021-06-20 NOTE — PROGRESS NOTES
Office Visit - Follow up    Adalgisa Solano   82 y.o. female    Date of Visit: 10/11/2018    Chief Complaint   Patient presents with     COPD     Hypertension       Subjective: Diabetes mellitus type 2 with underlying hypertension and COPD.    Left hip bothers her from the groin outward to the lateral aspect and the IT band.    Seen at [facility] [place] on October 8, 2018.  Ankle brachial indices study is planned for tomorrow.  The patient has been seen by [place] orthopedist Dr. Piersno.  Naprosyn was prescribed along with acetaminophen by him for presumed diagnosis of osteoarthritis left hip severe.  I did suggest that the only way to relieve pain in that left hip is to have a left total hip arthroplasty.  The patient denies hemoptysis no blood in stool or urine medication list reviewed well-tolerated.  Flu shot given left deltoid today.    Multiple drug allergies noted including amoxicillin levofloxacin and penicillin.    When I asked her what was the best thing we did for her to help her with her symptoms is lymphedema treatment with wraps daily also weight is down 6 pounds.    Mammogram allCLEAR July 16, 2018 last colonoscopy dated March 14, 2013.  The patient uses [facility].  Overall she feels well.  She was told that she may have an irregularity in her heart rhythm.  When she was at the [facility].    ROS: A comprehensive review of systems was performed and was otherwise negative    Medications:  Prior to Admission medications    Medication Sig Start Date End Date Taking? Authorizing Provider   acetaminophen (TYLENOL) 500 MG tablet Take 500 mg by mouth every 6 (six) hours as needed for pain (two tablets three times daily).   Yes PROVIDER, HISTORICAL   albuterol (PROAIR HFA;PROVENTIL HFA;VENTOLIN HFA) 90 mcg/actuation inhaler Inhale 2 puffs every 4 (four) hours as needed for wheezing. 9/20/18  Yes Karine Hermosillo MD   albuterol (PROVENTIL) 2.5 mg /3 mL (0.083 %) nebulizer  solution 3 ML INHALATION Four times a day As Needed Use before nebulized saline. For shortness of breath. 10/1/18  Yes Karine Hermosillo MD   ARIPiprazole (ABILIFY) 5 MG tablet Take 5 mg by mouth bedtime.   Yes PROVIDER, HISTORICAL   aspirin 81 MG EC tablet Take 81 mg by mouth daily.   Yes PROVIDER, HISTORICAL   atorvastatin (LIPITOR) 20 MG tablet Take 20 mg by mouth at bedtime.   Yes PROVIDER, HISTORICAL   calcium carbonate-vitamin D3 (CALCIUM WITH VITAMIN D) 600 mg(1,500mg) -400 unit per tablet Take 1 tablet by mouth 2 (two) times a day.   Yes PROVIDER, HISTORICAL   cholecalciferol, vitamin D3, 1,000 unit tablet Take 2,000 Units by mouth daily.    Yes PROVIDER, HISTORICAL   diltiazem (CARDIZEM CD) 120 MG 24 hr capsule Take 1 capsule (120 mg total) by mouth daily. 9/16/18  Yes Shai Ellington MD   fluticasone-salmeterol (ADVAIR DISKUS) 250-50 mcg/dose DISKUS Inhale 1 puff 2 (two) times a day. 6/4/18  Yes Jun Juarez MD   naproxen (NAPROSYN) 500 MG tablet Take 500 mg by mouth 2 (two) times a day with meals.   Yes PROVIDER, HISTORICAL   omeprazole (PRILOSEC) 20 MG capsule TAKE 1 CAPSULE DAILY. 8/15/18  Yes Shai Ellington MD   potassium chloride (KLOR-CON) 10 MEQ CR tablet Take one (1) tablet by mouth three times daily  Patient taking differently: one tablet three times daily 7/16/18  Yes Shai Ellington MD   psyllium (METAMUCIL) 3.4 gram packet Take 1 packet by mouth daily.    Yes PROVIDER, HISTORICAL   QUEtiapine (SEROQUEL) 25 MG tablet Take 50 mg by mouth at bedtime.  8/10/16  Yes PROVIDER, HISTORICAL   SPIRIVA WITH HANDIHALER 18 mcg inhalation capsule use 1 capsule via inhaler 1 x a day 5/2/18  Yes Jun Juarez MD   guaiFENesin ER (MUCINEX) 600 mg 12 hr tablet Take 1,200 mg by mouth 2 (two) times a day.    PROVIDER, HISTORICAL   predniSONE (DELTASONE) 20 MG tablet Take 2 tabs (40mg total) daily x 5 days, then 1 tab daily for 5 days 10/2/18   Karine Hermosillo MD   sodium  chloride 3 % nebulizer solution Take 4 mL by nebulization 2 (two) times a day. Using after albuterol neb. 10/1/18   Karine Hermosillo MD   acetaminophen (TYLENOL) 325 MG tablet Take 650 mg by mouth every 6 (six) hours as needed for pain.  10/11/18  PROVIDER, HISTORICAL       Allergies:   Allergies   Allergen Reactions     Amoxicillin Itching and Rash     Levofloxacin Itching and Rash     Penicillins Itching and Rash       Immunizations:   Immunization History   Administered Date(s) Administered     DT (pediatric) 03/29/2004     Influenza high dose, seasonal 10/20/2015, 10/09/2016, 11/16/2017, 10/11/2018     Influenza,seasonal quad, PF, 36+MOS 11/20/2014     Pneumo Conj 13-V (2010&after) 10/20/2015     Pneumo Polysac 23-V 10/18/1999, 08/01/2005     Td,adult,historic,unspecified 03/29/2004     Tdap 05/14/2013       Exam Chest clear to auscultation and percussion.  Heart tones regular rhythm without murmur rub or gallop.  Abdomen soft nontender no organomegaly.  No peritoneal signs.  Extremities free of edema cyanosis or clubbing.  Neck veins nondistended no thyromegaly or scleral icterus noted, carotids full.  Skin warm and dry easily conversant good spirited.  Normal intelligence.  Neurologically intact no gross localizing findings.  The patient has an irregularly irregular rhythm on aspirin no other anticoagulants and not on warfarin.  We did discuss.    Assessment and Plan  Diabetes mellitus type 2 with hypertension and COPD.  Multiple drug allergies noted including amoxicillin levofloxacin and penicillin.    Flu shot given today.    Obesity BMI 31+.    COPD with nasal O2 dependence.  O2 sats today 97% respiratory rate 18 and unlabored pulse 99 blood pressure 126/60.    Heart tones irregularly irregular sounds like atrial fibrillation I offered the patient cardiac consultation at this time and EKG patient declined RTC in 4-6 weeks time continue aspirin.  Rate seems controlled for ventricular response.   99.    Time: total time spent with the patient was 25 minutes of which >50% was spent in counseling and coordination of care    The following high BMI interventions were performed this visit: encouragement to exercise    Shai Ellington MD    Patient Active Problem List   Diagnosis     Recurrent major depressive episodes (H)     Chronic obstructive pulmonary disease (H)     Lump Or Mass In Breast     Peripheral arterial disease (H)     Type 2 diabetes mellitus without complication (H)     Hypertension     Schizoaffective disorder, bipolar type (H)     Arnold-Chiari malformation (H)     Chronic diastolic heart failure (H)     Chronic respiratory failure with hypoxia (H)     Pulmonary hypertension (H)     COPD (chronic obstructive pulmonary disease) (H)     COPD exacerbation (H)     Acute and chronic respiratory failure with hypoxia (H)     Diet-controlled type 2 diabetes mellitus (H)

## 2021-06-20 NOTE — LETTER
Letter by Faby Ku NP at      Author: Faby Ku NP Service: -- Author Type: --    Filed:  Encounter Date: 2/10/2020 Status: (Other)         Patient: Adalgisa Solano   MR Number: [id number]   YOB: 1935   Date of Visit: 2/10/2020                 [facility]    DATE: 2/10/2020    NAME:  Adalgisa Solano             :  1935  MRN: [id number]  CODE STATUS:  Full code    VISIT TYPE: Problem Visit (hospital f/u)     FACILITY:  [facility] [[id number]]       CHIEF COMPLAIN/REASON FOR VISIT:    Chief Complaint   Patient presents with   ? Problem Visit     hospital f/u               HISTORY OF PRESENT ILLNESS: Adalgisa Solano is a 84 y.o. female who was admitted - for fall and developed large, painful, hematoma of left leg. She was unable to bear weight on her leg. Her xarelto was held and surgery consulted. They attempted aspiration but were unsuccessful. MRI confirmed hematoma and also treated for cellulitis with keflex. Her blood cultures were negative. She was also treated for acute on chronic COPD with prednisone therapy, percussion therapy. She was treated for hyponatremia with lasix. Her xarelto was restarted after being held or 7 days. She was discharged to TCU for further rehab.  She has PMH of h/o DVT, persistent atrial fibrillation,  COPD, normocytic anemia, Type 2 DM, HTN, Depression, schizoaffective disorder, HLD, neuropathy, GERD, chronic respiratory failure on 1LNC, CHF, avascular necrosis of Left hip, chronic lymphedema. Prior to this she lived with her .     Today Ms. Solano is seen in her room alone today for hospital follow up today. She reports that she was having trouble urinating yesterday. She asked them to scan her bladder and says there was not much in there. She was concerned because she did urinate much yesterday and thought she was retaining but she guesses she just didn't have a lot to  go. She did urinate more during the night and so far today so thinks this is better. She is not having any burning or pain or anything. She is not having any fevers or chills. Her bowels are not moving well either. She is not sure why. She reports she acquired the hematoma on her leg because she and her  were falling and he accidentally kicked her leg. She says her pain in her leg is overall well controlled but did need to ask for a pain pill during the night. She says it is much worse when she is active though. The redness is somewhat improved and they did outline it at the hospital ad this is improving. She does get shortness of breath at times but feels this is about her baseline. She is using her vest twice a day but she says the nurses reported not having an order for this. She says she does it for about 10-20min. Her cough is ok and does bring stuff up at times. She wears her oxygen as needed at home, so usually 1-2 L with activity and she has a portable. She says she does not want her miralax scheduled because she wants to be able to decide if she wants it. She does not do duonebs at home she does albuterol twice a day followed by hypertonic saline. However after further review of meds and discussion she decides to leave the duonebs 4 times a day as they are working. She would like the saline nebs if needed though for congestion. She is using the oxycodone occasionally but trying to cut back. She has not tried ice or heat on her leg and it is very sensitive to touch. We discussed the benefits of heat and ice for pain and sensation as well as the heat would help the blood be broken down and reabsorbed. She says she is willing to try this. We did discuss her bowel regimen and will change per her request. She does have some swelling in her right leg as well as the significant swelling in left and we discussed wearing a compression stocking on the right and then as improved and can tolerate may consider  ace wrap on left. We discussed extending her keflex as she is to finish tomorrow and still has erythema and warmth to the leg, although it is improving there are still signs of cellulitis. We also discussed that her code status was DNR last time she was at [facility] but she does clarify today she wants to be full code.     REVIEW OF SYSTEMS:  PROBLEMS AND REVIEW OF SYSTEMS:   Review of Systems  Today on ROS:   Currently, no fever, chills, or rigors. Decreased vision and hearing. Denies any chest pain, headaches, palpitations, lightheadedness, dizziness. Appetite is good. Denies any GERD symptoms. Denies any difficulty with swallowing, nausea, or vomiting.  positive for chronic o2 1-2 LNC at home as needed, chest percussion therapy, left leg pain, shortness of breath, congested cough, urinary hesitancy, constipation      Allergies   Allergen Reactions   ? Tramadol Nausea Only   ? Amoxicillin Itching and Rash   ? Levofloxacin Itching and Rash   ? Penicillins Itching and Rash     Has tolerated ceftriaxone.     Current Outpatient Medications   Medication Sig   ? senna (SENOKOT) 8.6 mg tablet Take 1 tablet by mouth 2 (two) times a day as needed for constipation.   ? sodium chloride 3 % nebulizer solution Take 3 mL by nebulization as needed.   ? acetaminophen (TYLENOL) 500 MG tablet Take 1,000 mg by mouth every 6 (six) hours as needed for pain.   ? ARIPiprazole (ABILIFY) 5 MG tablet TAKE 1 TABLET EVERY NIGHT AT BEDTIME   ? atorvastatin (LIPITOR) 20 MG tablet TAKE 1 TABLET (20 MG TOTAL) BY MOUTH BEDTIME.   ? azithromycin (ZITHROMAX) 250 MG tablet Take 250 mg by mouth every third day.   ? benzonatate (TESSALON) 100 MG capsule Take 1 capsule (100 mg total) by mouth 3 (three) times a day as needed for cough.   ? calcium carbonate-vitamin D3 (CALCIUM WITH VITAMIN D) 600 mg(1,500mg) -400 unit per tablet Take 1 tablet by mouth 2 (two) times a day.   ? cephalexin (KEFLEX) 500 MG capsule Take 1 capsule (500 mg total) by mouth 3  (three) times a day at 6am, 2pm and 10pm for 4 days. (Patient taking differently: Take 500 mg by mouth 3 (three) times a day at 6am, 2pm and 10pm. Extended until 2/13)   ? cholecalciferol, vitamin D3, 1,000 unit tablet Take 2,000 Units by mouth daily.    ? cyanocobalamin 1,000 mcg/mL injection Inject 1 mL (1,000 mcg total) into the shoulder, thigh, or buttocks every 30 (thirty) days.   ? diltiazem (CARDIZEM CD) 120 MG 24 hr capsule TAKE 1 CAPSULE (120 MG TOTAL) BY MOUTH DAILY.   ? emollient base (VANICREAM TOP) Apply 1 application topically daily as needed. Uses when takes of compression socks   ? ferrous sulfate 325 (65 FE) MG tablet Take 1 tablet (325 mg total) by mouth daily with breakfast.   ? fluticasone-umeclidinium-vilanterol (TRELEGY ELLIPTA) 100-62.5-25 mcg DsDv inhaler Inhale 1 Inhalation daily.   ? furosemide (LASIX) 20 MG tablet Take 1 tablet (20 mg total) by mouth 2 (two) times a day at 9am and 6pm.   ? gabapentin (NEURONTIN) 300 MG capsule Take 600 mg by mouth at bedtime.          ? ipratropium (ATROVENT) 0.03 % nasal spray 1 spray into each nostril daily.   ? ipratropium-albuterol (DUO-NEB) 0.5-2.5 mg/3 mL nebulizer Take 3 mL by nebulization 4 (four) times a day.   ? magnesium oxide (MAG-OX) 400 mg (241.3 mg magnesium) tablet Take 1 tablet (400 mg total) by mouth daily.   ? omeprazole (PRILOSEC) 20 MG capsule Take 20 mg by mouth daily before breakfast.   ? oxyCODONE (ROXICODONE) 5 MG immediate release tablet Take 1 tablet (5 mg total) by mouth every 6 (six) hours as needed for pain.   ? OXYGEN-AIR DELIVERY SYSTEMS [facility] Use 1-2 L As Directed as needed. [employer]   ? polyethylene glycol (MIRALAX) 17 gram packet Take 17 g by mouth daily as needed.    ? predniSONE (DELTASONE) 10 mg tablet Take 3 tabs daily for 3 days, then 2 tabs daily for 3 days, then 1 tab daily until gone.   ? PROAIR HFA 90 mcg/actuation inhaler INHALE 2 PUFFS EVERY 4 (FOUR) HOURS AS NEEDED FOR WHEEZING.   ? rivaroxaban ANTICOAGULANT  (XARELTO) 20 mg tablet Take 1 tablet (20 mg total) by mouth daily with supper.   ? spironolactone (ALDACTONE) 25 MG tablet Take 0.5 tablets (12.5 mg total) by mouth daily.   ? triamcinolone (KENALOG) 0.1 % cream Apply 1 application topically 2 (two) times a day as needed. To legs         ? urea (CARMOL) 40 % Crea Apply 1 application topically 2 (two) times a day as needed. To bottom of feet      Past Medical History:    Past Medical History:   Diagnosis Date   ? Acute and chronic respiratory failure with hypoxia (H)    ? Acute blood loss anemia 1/15/2019   ? Acute bronchitis 1/15/2019   ? Anemia     pernicious anemia   ? Anxiety    ? Arm skin lesion, right    ? Arnold-Chiari malformation (H) 1998   ? Arthritis    ? Atrial fibrillation (H) 02/2017   ? Avascular necrosis of bone (H)    ? Breast cyst    ? Breast lump    ? Candidal skin infection    ? Cellulitis of left lower extremity 1/28/2019   ? CHF (congestive heart failure) (H)     diastolic and systolic   ? Chronic bronchitis (H)     & acute   ? Chronic diastolic heart failure (H)     diastolic chf   ? Chronic pain syndrome    ? Chronic respiratory failure with hypoxia (H) 3/13/2017   ? COPD (chronic obstructive pulmonary disease) (H)    ? COPD exacerbation (H)    ? Depression    ? Diet-controlled type 2 diabetes mellitus (H)    ? Drug induced constipation    ? DVT of axillary vein, acute (H)     left   ? DVT of axillary vein, acute left (H)    ? Edema    ? Encounter for palliative care    ? Erysipelas    ? Fracture of femoral neck, left (H)    ? Generalized muscle weakness    ? GERD (gastroesophageal reflux disease)    ? History of blood clots    ? Hyperlipidemia    ? Hypertension    ? Left hip pain    ? Lumbar spinal stenosis    ? PAD (peripheral artery disease) (H)    ? Peripheral arterial disease (H) 10/28/2015   ? Psoriasis     arms    ? Pulmonary hypertension (H) 3/5/2018   ? Recurrent major depressive episodes (H)     Created by Conversion  Replacement  Utility updated for latest IMO load   ? Schizoaffective disorder, bipolar type (H) 6/11/2015   ? Slow transit constipation    ? Stasis dermatitis of both legs    ? 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 Type 2 diabetes mellitus without complication (H) 8/2/2016   ? Vitamin B12 deficiency    ? Volume overload            PHYSICAL EXAMINATION  Vitals:    02/09/20 1926   BP: 118/59   Pulse: (!) 104   Resp: 18   Temp: 97.5  F (36.4  C)   SpO2: 93%   Weight: 162 lb (73.5 kg)       Today on physical exam:     GENERAL: Awake, Alert, oriented x3, not in any form of acute distress, answers questions appropriately, follows simple commands, conversant  HEENT: Head is normocephalic with normal hair distribution. No evidence of trauma. Ears: No acute purulent discharge. Eyes: Conjunctivae pink with no scleral jaundice. Nose: Normal mucosa and septum. NECK: Supple with no cervical or supraclavicular lymphadenopathy. Trachea is midline.   CHEST: No tenderness or deformity, no crepitus  LUNG: dim with scattered rhonchi, few expiratory wheezes bilaterally to auscultation. Congested cough, 2LNC, chest percussion therapy vest in place  BACK: No kyphosis of the thoracic spine. Symmetric, no curvature, ROM normal, no CVA tenderness, no spinal tenderness   CVS: irregularly irregular rhythm, there are no murmurs, rubs, gallops, or heaves,  2+ pulses symmetric in all extremities.  ABDOMEN: Rounded and soft, nontender to palpation, no masses, no organomegaly, good bowel sounds, no rebound or guarding, no peritoneal signs.   EXTREMITIES: 3+ LLE pitting leg edema with large hematoma present surrounded by erythema, warmth, no open areas, no drainage, smaller in size than outlined markings, 1-2+ rle  SKIN: Warm and dry  NEUROLOGICAL: The patient is oriented to person, place and time. weakness            LABS:   Recent Results (from the past 168 hour(s))   POCT Glucose   Result Value Ref Range    Glucose 128 70 - 139 mg/dL   Sodium,  Random Urine   Result Value Ref Range    Sodium, Urine <20 mmol/L   Creatinine, Urine, Random   Result Value Ref Range    Creatinine, Urine 30.1 mg/dL   Osmolality, Random, Urine   Result Value Ref Range    Osmolality, Urine 201 (L) 300 - 900 mOsm/kg   POCT Glucose   Result Value Ref Range    Glucose 165 (H) 70 - 139 mg/dL   POCT Glucose   Result Value Ref Range    Glucose 202 (H) 70 - 139 mg/dL   Magnesium   Result Value Ref Range    Magnesium 1.9 1.8 - 2.6 mg/dL   HM2(CBC w/o Differential)   Result Value Ref Range    WBC 8.8 4.0 - 11.0 thou/uL    RBC 3.59 (L) 3.80 - 5.40 mill/uL    Hemoglobin 10.1 (L) 12.0 - 16.0 g/dL    Hematocrit 31.3 (L) 35.0 - 47.0 %    MCV 87 80 - 100 fL    MCH 28.1 27.0 - 34.0 pg    MCHC 32.3 32.0 - 36.0 g/dL    RDW 14.6 (H) 11.0 - 14.5 %    Platelets 322 140 - 440 thou/uL    MPV 9.5 8.5 - 12.5 fL   Basic Metabolic Panel   Result Value Ref Range    Sodium 132 (L) 136 - 145 mmol/L    Potassium 4.7 3.5 - 5.0 mmol/L    Chloride 97 (L) 98 - 107 mmol/L    CO2 25 22 - 31 mmol/L    Anion Gap, Calculation 10 5 - 18 mmol/L    Glucose 138 (H) 70 - 125 mg/dL    Calcium 8.3 (L) 8.5 - 10.5 mg/dL    BUN 17 8 - 28 mg/dL    Creatinine 0.71 0.60 - 1.10 mg/dL    GFR MDRD Af Amer >60 >60 mL/min/1.73m2    GFR MDRD Non Af Amer >60 >60 mL/min/1.73m2   POCT Glucose   Result Value Ref Range    Glucose 127 70 - 139 mg/dL   POCT Glucose   Result Value Ref Range    Glucose 167 (H) 70 - 139 mg/dL   POCT Glucose   Result Value Ref Range    Glucose 213 (H) 70 - 139 mg/dL   POCT Glucose   Result Value Ref Range    Glucose 234 (H) 70 - 139 mg/dL   Basic Metabolic Panel   Result Value Ref Range    Sodium 132 (L) 136 - 145 mmol/L    Potassium 4.7 3.5 - 5.0 mmol/L    Chloride 97 (L) 98 - 107 mmol/L    CO2 30 22 - 31 mmol/L    Anion Gap, Calculation 5 5 - 18 mmol/L    Glucose 138 (H) 70 - 125 mg/dL    Calcium 8.3 (L) 8.5 - 10.5 mg/dL    BUN 16 8 - 28 mg/dL    Creatinine 0.70 0.60 - 1.10 mg/dL    GFR MDRD Af Amer >60 >60  mL/min/1.73m2    GFR MDRD Non Af Amer >60 >60 mL/min/1.73m2   HM2(CBC w/o Differential)   Result Value Ref Range    WBC 7.9 4.0 - 11.0 thou/uL    RBC 3.50 (L) 3.80 - 5.40 mill/uL    Hemoglobin 9.9 (L) 12.0 - 16.0 g/dL    Hematocrit 30.5 (L) 35.0 - 47.0 %    MCV 87 80 - 100 fL    MCH 28.3 27.0 - 34.0 pg    MCHC 32.5 32.0 - 36.0 g/dL    RDW 14.8 (H) 11.0 - 14.5 %    Platelets 317 140 - 440 thou/uL    MPV 9.5 8.5 - 12.5 fL   POCT Glucose   Result Value Ref Range    Glucose 115 70 - 139 mg/dL   POCT Glucose   Result Value Ref Range    Glucose 128 70 - 139 mg/dL   POCT Glucose   Result Value Ref Range    Glucose 223 (H) 70 - 139 mg/dL   POCT Glucose   Result Value Ref Range    Glucose 195 (H) 70 - 139 mg/dL   Basic Metabolic Panel   Result Value Ref Range    Sodium 137 136 - 145 mmol/L    Potassium 4.4 3.5 - 5.0 mmol/L    Chloride 99 98 - 107 mmol/L    CO2 29 22 - 31 mmol/L    Anion Gap, Calculation 9 5 - 18 mmol/L    Glucose 116 70 - 125 mg/dL    Calcium 8.5 8.5 - 10.5 mg/dL    BUN 15 8 - 28 mg/dL    Creatinine 0.69 0.60 - 1.10 mg/dL    GFR MDRD Af Amer >60 >60 mL/min/1.73m2    GFR MDRD Non Af Amer >60 >60 mL/min/1.73m2   POCT Glucose   Result Value Ref Range    Glucose 102 70 - 139 mg/dL   POCT Glucose   Result Value Ref Range    Glucose 131 70 - 139 mg/dL     Results for orders placed or performed during the hospital encounter of 01/31/20   Basic Metabolic Panel   Result Value Ref Range    Sodium 137 136 - 145 mmol/L    Potassium 4.4 3.5 - 5.0 mmol/L    Chloride 99 98 - 107 mmol/L    CO2 29 22 - 31 mmol/L    Anion Gap, Calculation 9 5 - 18 mmol/L    Glucose 116 70 - 125 mg/dL    Calcium 8.5 8.5 - 10.5 mg/dL    BUN 15 8 - 28 mg/dL    Creatinine 0.69 0.60 - 1.10 mg/dL    GFR MDRD Af Amer >60 >60 mL/min/1.73m2    GFR MDRD Non Af Amer >60 >60 mL/min/1.73m2         Lab Results   Component Value Date    WBC 7.9 02/06/2020    HGB 9.9 (L) 02/06/2020    HCT 30.5 (L) 02/06/2020    MCV 87 02/06/2020     02/06/2020        Lab Results   Component Value Date    HVPMNHAU26 337 01/31/2019     Lab Results   Component Value Date    HGBA1C 6.8 (H) 11/09/2018     Lab Results   Component Value Date    INR 1.14 (H) 01/31/2020    INR 2.36 (H) 01/26/2020    INR 1.09 09/16/2019     Vitamin D, Total (25-Hydroxy)   Date Value Ref Range Status   01/04/2019 39.6 30.0 - 80.0 ng/mL Final     Lab Results   Component Value Date    TSH 0.71 02/04/2020           ASSESSMENT/PLAN:    Traumatic hematoma LLE, LLE cellulitis: on keflex until 2/11, will extend until 2/13 and evaluate later this week for further extension. Erythema and warmth improved from markings but continues to show signs of cellulitis. No open areas or drainage. Pain controlled on tylenol, oxycodone. Discussed with nursing to use tylenol as 1st line, oxycodone as 2nd line, and to encourage warm packs, ice prn. Patient Declined to schedule at this time. Educated on importance of these adjunct therapies. Elevate left leg as much as possible. hm1 on 2/12. Applying triamcinolone cream topically to lle.   Acute on chronic hypoxic respiratory failure, Bronchiectasis, COPD: On trelegy daily, duonebs four times a day (albuterol two times a day and hypertonic saline two times a day at home), proair prn, will add hypertonic saline prn. Prednisone taper, benzonatate three times a day prn. Wears o2 prn at home 1-2LNC with activity and at night. Shortness of breath and cough at baseline. Ordered chest percussion therapy vest two times a day for 20min set at 40 for pressure per home settings.  Follows with pulmonology. On azithromycin q72h prophylactically. Ordered IS q1h while awake. Discussed with nursing clarifications on o2 and chest percussion therapy and when to notify provider of worsening condition.   Chronic diastolic and systolic CHF, Pulmonary hypertension: Daily [id number]. On lasix two times a day, spironolactone. Shortness of breath at baseline, 1-2+ rle, 3+ lle. Will order david  hose to rle, once improved lle will recommend ace wrapping. Keep elevated when possible. Low sodium diet, no fluid restriction. Monitor weights.   H/o DVT: On xarelto-held for one week for hematoma but now resumed. Will monitor hemoglobin and size of hematoma with resumption.   HLD: On lipitor.   Chronic Atrial Fibrillation: Rate controlled 90-100s. On cardizem, xarelto. Some rates >100, usually after neb treatments or cpt. Monitor for now, denies chest pain or palpitations.   Constipation: On miralax daily but changed to daily prn per her request, will add senna to two times a day prn.   Iron deficiency anemia, acute blood loss anemia:   On iron daily, hg ordered for 2/12. Hg 9.9 on 2/6.   GERD: On omeprazole. No recent concerns.   Hypokalemia: previously on kcl, no supplement on med orders now. On 2/7 k was 4.4. ordered bmp for 2/12.   Hypomagnesemia: On mag ox. Mg level 1.9 on 2/5. No recent concerns, no need to recheck on chronically.   Schizoaffective disorder, bipolar type: On abilify. Follows with psych. No concerns today. No longer see seroquel on med list, monitor for behavioral concerns.   Osteoporosis: On calcium, vitamin d.   Type 2 DM: controlled with diet. Monitoring on labs and stable.   Vitamin b 12 deficiency: on b12 supplement.   Chronic pain: on tylenol, oxycodone prn, gabapentin. Stable today.     Per therapy eval today    Electronically signed by: Faby Ku NP    Total floor/unit time spent 45 min with >50% time spent on counseling and coordination of care. Counseling regarding copd management, hematoma and cellulitis managemenet. Coordinated care with nursing for copd management, pain management.

## 2021-06-20 NOTE — LETTER
Letter by Faby Ku NP at      Author: Faby Ku NP Service: -- Author Type: --    Filed:  Encounter Date: 2020 Status: (Other)         Patient: Adalgisa Solano   MR Number: [id number]   YOB: 1935   Date of Visit: 2020                 [facility]    DATE: 2020    NAME:  Adalgisa Solano             :  1935  MRN: [id number]  CODE STATUS:  Full code    VISIT TYPE: Discharge Summary     FACILITY:  [facility] [[id number]]       CHIEF COMPLAIN/REASON FOR VISIT:    Chief Complaint   Patient presents with   ? Discharge Summary               HISTORY OF PRESENT ILLNESS: Adalgisa Solano is a 84 y.o. female who was admitted - for fall and developed large, painful, hematoma of left leg. She was unable to bear weight on her leg. Her xarelto was held and surgery consulted. They attempted aspiration but were unsuccessful. MRI confirmed hematoma and also treated for cellulitis with keflex. Her blood cultures were negative. She was also treated for acute on chronic COPD with prednisone therapy, percussion therapy. She was treated for hyponatremia with lasix. Her xarelto was restarted after being held or 7 days. She was discharged to TCU for further rehab.  She has PMH of h/o DVT, persistent atrial fibrillation,  COPD, normocytic anemia, Type 2 DM, HTN, Depression, schizoaffective disorder, HLD, neuropathy, GERD, chronic respiratory failure on 1LNC, CHF, avascular necrosis of Left hip, chronic lymphedema. Prior to this she lived with her .     TCU course:   Ms. Solano has made progress with therapy and is ambulating 200 feet with stand by assist. She is stand by for dressing except for her david hose. She is independent for toileting. She has progressed well in therapy. Her pain is much improved and has been weaned off the oxycodone. She is well controlled on the tylenol. Her leg swelling is improving and left  leg hematoma improving in size. She completed prednisone taper and antibiotics. Her respiratory status has returned to baseline. Her weights were down overall since admission but this was felt to be related to improved hematoma and edema. Her hemoglobin is much improved now 9-12. Her other labs were stable. She will be returning home today with  PT, OT, CHIRAG, RN. She will f/u with PCP in 5-7 days. She will f/u with PCP in 5-7 days.     REVIEW OF SYSTEMS:  PROBLEMS AND REVIEW OF SYSTEMS:   Review of Systems  Today on ROS:   Currently, no fever, chills, or rigors. Decreased vision and hearing. Denies any chest pain, headaches, palpitations, lightheadedness, dizziness. Appetite is good. Denies any GERD symptoms. Denies any difficulty with swallowing, nausea, or vomiting.  positive for chronic o2 1-2 LNC at home as needed, chest percussion therapy, left leg pain much improved, shortness of breath, congested cough improving, urinary hesitancy, no constipation      Allergies   Allergen Reactions   ? Tramadol Nausea Only   ? Amoxicillin Itching and Rash   ? Levofloxacin Itching and Rash   ? Penicillins Itching and Rash     Has tolerated ceftriaxone.     Current Outpatient Medications   Medication Sig   ? acetaminophen (TYLENOL) 500 MG tablet Take 1,000 mg by mouth every 6 (six) hours as needed for pain.   ? ARIPiprazole (ABILIFY) 5 MG tablet TAKE 1 TABLET EVERY NIGHT AT BEDTIME   ? atorvastatin (LIPITOR) 20 MG tablet TAKE 1 TABLET (20 MG TOTAL) BY MOUTH BEDTIME.   ? azithromycin (ZITHROMAX) 250 MG tablet Take 250 mg by mouth every third day.   ? benzonatate (TESSALON) 100 MG capsule Take 1 capsule (100 mg total) by mouth 3 (three) times a day as needed for cough.   ? calcium carbonate-vitamin D3 (CALCIUM WITH VITAMIN D) 600 mg(1,500mg) -400 unit per tablet Take 1 tablet by mouth 2 (two) times a day.   ? cholecalciferol, vitamin D3, 1,000 unit tablet Take 2,000 Units by mouth daily.    ? cyanocobalamin 1,000 mcg/mL  injection Inject 1 mL (1,000 mcg total) into the shoulder, thigh, or buttocks every 30 (thirty) days.   ? diltiazem (CARDIZEM CD) 120 MG 24 hr capsule TAKE 1 CAPSULE (120 MG TOTAL) BY MOUTH DAILY.   ? emollient base (VANICREAM TOP) Apply 1 application topically daily as needed. Uses when takes of compression socks   ? ferrous sulfate 325 (65 FE) MG tablet Take 1 tablet (325 mg total) by mouth daily with breakfast.   ? fluticasone-umeclidinium-vilanterol (TRELEGY ELLIPTA) 100-62.5-25 mcg DsDv inhaler Inhale 1 Inhalation daily.   ? furosemide (LASIX) 20 MG tablet Take 1 tablet (20 mg total) by mouth 2 (two) times a day at 9am and 6pm.   ? gabapentin (NEURONTIN) 300 MG capsule Take 600 mg by mouth at bedtime.          ? ipratropium (ATROVENT) 0.03 % nasal spray 1 spray into each nostril daily.   ? ipratropium-albuterol (DUO-NEB) 0.5-2.5 mg/3 mL nebulizer Take 3 mL by nebulization 4 (four) times a day.   ? magnesium oxide (MAG-OX) 400 mg (241.3 mg magnesium) tablet Take 1 tablet (400 mg total) by mouth daily.   ? omeprazole (PRILOSEC) 20 MG capsule Take 20 mg by mouth daily before breakfast.   ? OXYGEN-AIR DELIVERY SYSTEMS [facility] Use 1-2 L As Directed as needed. [employer]   ? polyethylene glycol (MIRALAX) 17 gram packet Take 17 g by mouth daily as needed.    ? PROAIR HFA 90 mcg/actuation inhaler INHALE 2 PUFFS EVERY 4 (FOUR) HOURS AS NEEDED FOR WHEEZING.   ? rivaroxaban ANTICOAGULANT (XARELTO) 20 mg tablet Take 1 tablet (20 mg total) by mouth daily with supper.   ? senna (SENOKOT) 8.6 mg tablet Take 1 tablet by mouth 2 (two) times a day as needed for constipation.   ? sodium chloride 3 % nebulizer solution Take 3 mL by nebulization as needed.   ? spironolactone (ALDACTONE) 25 MG tablet Take 0.5 tablets (12.5 mg total) by mouth daily.   ? triamcinolone (KENALOG) 0.1 % cream Apply 1 application topically 2 (two) times a day as needed. To legs         ? urea (CARMOL) 40 % Crea Apply 1 application topically 2 (two) times a day  as needed. To bottom of feet      Past Medical History:    Past Medical History:   Diagnosis Date   ? Acute and chronic respiratory failure with hypoxia (H)    ? Acute blood loss anemia 1/15/2019   ? Acute bronchitis 1/15/2019   ? Anemia     pernicious anemia   ? Anxiety    ? Arm skin lesion, right    ? Arnold-Chiari malformation (H) 1998   ? Arthritis    ? Atrial fibrillation (H) 02/2017   ? Avascular necrosis of bone (H)    ? Breast cyst    ? Breast lump    ? Candidal skin infection    ? Cellulitis of left lower extremity 1/28/2019   ? CHF (congestive heart failure) (H)     diastolic and systolic   ? Chronic bronchitis (H)     & acute   ? Chronic diastolic heart failure (H)     diastolic chf   ? Chronic pain syndrome    ? Chronic respiratory failure with hypoxia (H) 3/13/2017   ? COPD (chronic obstructive pulmonary disease) (H)    ? COPD exacerbation (H)    ? Depression    ? Diet-controlled type 2 diabetes mellitus (H)    ? Drug induced constipation    ? DVT of axillary vein, acute (H)     left   ? DVT of axillary vein, acute left (H)    ? Edema    ? Encounter for palliative care    ? Erysipelas    ? Fracture of femoral neck, left (H)    ? Generalized muscle weakness    ? GERD (gastroesophageal reflux disease)    ? History of blood clots    ? Hyperlipidemia    ? Hypertension    ? Left hip pain    ? Lumbar spinal stenosis    ? PAD (peripheral artery disease) (H)    ? Peripheral arterial disease (H) 10/28/2015   ? Psoriasis     arms    ? Pulmonary hypertension (H) 3/5/2018   ? Recurrent major depressive episodes (H)     Created by Conversion  Replacement Utility updated for latest IMO load   ? Schizoaffective disorder, bipolar type (H) 6/11/2015   ? Slow transit constipation    ? Stasis dermatitis of both legs    ? 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 Type 2 diabetes mellitus without complication (H) 8/2/2016   ? Vitamin B12 deficiency    ? Volume overload            PHYSICAL EXAMINATION  Vitals:     02/20/20 0700   BP: 126/61   Pulse: 95   Resp: 18   Temp: 97.5  F (36.4  C)   SpO2: 94%   Weight: 149 lb (67.6 kg)       Today on physical exam:     GENERAL: Awake, Alert, oriented x3, not in any form of acute distress, answers questions appropriately, follows simple commands, conversant  HEENT: Head is normocephalic with normal hair distribution. No evidence of trauma. Ears: No acute purulent discharge. Eyes: Conjunctivae pink with no scleral jaundice. Nose: Normal mucosa and septum. NECK: Supple with no cervical or supraclavicular lymphadenopathy. Trachea is midline.   CHEST: No tenderness or deformity, no crepitus  LUNG: dim but clear today bilaterally to auscultation. Congested cough improving, 2LNC intermittently, chest percussion therapy vest in place  BACK: No kyphosis of the thoracic spine. Symmetric, no curvature, ROM normal, no CVA tenderness, no spinal tenderness   CVS: irregularly irregular rhythm, there are no murmurs, rubs, gallops, or heaves,  2+ pulses symmetric in all extremities.  ABDOMEN: Rounded and soft, nontender to palpation, no masses, no organomegaly, good bowel sounds, no rebound or guarding, no peritoneal signs.   EXTREMITIES: 1-2+ LLE pitting leg edema with small-moderate sized hematoma present surrounded by erythema, warmth, no open areas, no drainage, smaller in size than outlined markings, 1+ rle  SKIN: Warm and dry  NEUROLOGICAL: The patient is oriented to person, place and time.             LABS:   No results found for this or any previous visit (from the past 168 hour(s)).  Results for orders placed or performed in visit on 02/12/20   Basic Metabolic Panel   Result Value Ref Range    Sodium 134 (L) 136 - 145 mmol/L    Potassium 4.3 3.5 - 5.0 mmol/L    Chloride 92 (L) 98 - 107 mmol/L    CO2 35 (H) 22 - 31 mmol/L    Anion Gap, Calculation 7 5 - 18 mmol/L    Glucose 92 70 - 125 mg/dL    Calcium 8.5 8.5 - 10.5 mg/dL    BUN 13 8 - 28 mg/dL    Creatinine 0.66 0.60 - 1.10 mg/dL    GFR MDRD  Af Amer >60 >60 mL/min/1.73m2    GFR MDRD Non Af Amer >60 >60 mL/min/1.73m2         Lab Results   Component Value Date    WBC 12.5 (H) 02/12/2020    HGB 12.1 02/12/2020    HCT 38.6 02/12/2020    MCV 92 02/12/2020     02/12/2020       Lab Results   Component Value Date    TLEFMFOU42 958 (H) 02/12/2020     Lab Results   Component Value Date    HGBA1C 6.8 (H) 11/09/2018     Lab Results   Component Value Date    INR 1.14 (H) 01/31/2020    INR 2.36 (H) 01/26/2020    INR 1.09 09/16/2019     Vitamin D, Total (25-Hydroxy)   Date Value Ref Range Status   01/04/2019 39.6 30.0 - 80.0 ng/mL Final     Lab Results   Component Value Date    TSH 0.71 02/04/2020           ASSESSMENT/PLAN:    Traumatic hematoma LLE, LLE cellulitis: completed keflex on 2/13. Erythema, edema and pain improving gradually. No open areas or drainage. Pain controlled on tylenol. Elevate left leg as much as possible.  triamcinolone cream topically to lle. Wearing gentle compression tubigrips on lle, compression stocking to rle. Returning home soon and encouraged to follow up with primary care provider. Explained when to notify primary care provider.   Acute on chronic hypoxic respiratory failure, Bronchiectasis, COPD: On trelegy daily, duonebs four times a day (albuterol two times a day and hypertonic saline two times a day at home), proair prn,  hypertonic saline prn. Prednisone taper completed. benzonatate three times a day prn. Wears o2 prn at home 1-2LNC with activity and at night. chest percussion therapy vest two times a day for 20min set at 40 for pressure per home settings.  Follows with pulmonology. On azithromycin q72h prophylactically.  IS q1h while awake. Shortness of breath and cough improving.  Stable today, lungs dim but clear.   Chronic diastolic and systolic CHF, Pulmonary hypertension: Daily [id number]. On lasix two times a day, spironolactone. Shortness of breath at baseline, 1+ rle, 2+ lle. Compression stocking  rle, tg shape for LLE. Keep elevated when possible. Low sodium diet, no fluid restriction. Weights trending down but felt to be due to significant increase in edema and hematoma since admission.   H/o DVT: On xarelto.   HLD: On lipitor.   Chronic Atrial Fibrillation: Rate controlled 70-90s. On cardizem, xarelto. Monitor for now, denies chest pain or palpitations. No recent concerns.   Constipation: On miralax daily prn, senna to two times a day prn.   Iron deficiency anemia, acute blood loss anemia:   On iron daily, hg ordered for 2/12. Hg 9.9 on 2/6. Hg now back to wnl 12.1 on 2/12.   GERD: On omeprazole. No recent concerns.   Hypokalemia: previously on kcl, no supplement on med orders now. On 2/7 k was 4.4. ordered bmp for 2/12-stable  Hypomagnesemia: On mag ox. Mg level 1.9 on 2/5. No recent concerns, no need to recheck on chronically.   Schizoaffective disorder, bipolar type: On abilify. Follows with psych. No concerns today. No longer see seroquel on med list, monitor for behavioral concerns.   Osteoporosis: On calcium, vitamin d.   Type 2 DM: controlled with diet. Monitoring on labs and stable.   Vitamin b 12 deficiency: on b12 supplement.   Chronic pain: on tylenol, gabapentin. Stable today.   Candidal intertrigo: nystatin powder two times a day until resolved.         Electronically signed by: Faby Ku NP     Total floor/unit time spent 35 min with 25 min spent on counseling and coordination of care. Counseling regarding discharge instructions, med orders for discharge, primary care follow up, when to notify primary care provider of worsening condition. Coordinated care with nursing, therapy,  for discharge planning, home health services, assisted living orders, primary care follow up.        Please evaluate Adalgisa Solano for admission to Home Health.    Face to Face Attestation and Initial Plan of Care    The face-to-face encounter occurred on date: 2/20/20  Face to Face  "encounter was with: Faby Ku    Please provide brief clinical summary of reason for visit and need for home care. Deconditioning after hospital course for copd exacerbation, traumatic hematoma    Please identify which of the following home health disciplines the patient will need AND describe the skilled services that you would like the home health agency to perform: SKILLED NURSING (RN): complex med management, PHYSICAL THERAPY: strength training and gait training, OCCUPATIONAL THERAPY: ADLs and home safety and HOME HEALTH AIDE    Homebound Status (describe the functional limitations that support this patient is confined to his/her home. Medicaid recipients are not required to be homebound.):assistive device needed:  4WW    Name of physician who will be responsible for the ongoing home health plan of care (CMS requires the referring physician to provide the specific name of the community physician instead of a title, such as \"PCP\"): Shai Ellington MD    Requested Start of Care Date: Within 48 hours    Other information to assist the home health agency in developing the initial Plan of Care:    I certify that services are/were furnished while this patient was under the care of a physician and that a physician or an allowed non-physician practitioner (NPP), had a face-to-face encounter that meets the physician face-to-face encounter requirements. The encounter was in whole, or in part, related to the primary reason for home health. The patient is confined to his/her home and needs intermittent skilled nursing, physical therapy, speech-language pathology, or the continued need for occupational therapy. A plan of care has been established by a physician and is periodically reviewed by a physician.      Post Discharge Medication Reconciliation Status: discharge medications reconciled, continue medications without change                              "

## 2021-06-20 NOTE — LETTER
Letter by Shai Ellington MD at      Author: Shai Ellington MD Service: -- Author Type: --    Filed:  Encounter Date: 6/30/2020 Status: (Other)         Adalgisa Solano  [address] [facility] [address]             June 30, 2020         Dear Ms. Solano,    Below are the results from your recent visit:    Resulted Orders   Glycosylated Hemoglobin A1c   Result Value Ref Range    Hemoglobin A1c 7.0 (H) 3.5 - 6.0 %   Glucose   Result Value Ref Range    Glucose 124 70 - 125 mg/dL    Patient Fasting > 8hrs? Yes     Narrative    Fasting Glucose reference range is 70-99 mg/dL per  American Diabetes Association (ADA) guidelines.   Lipid Cascade   Result Value Ref Range    Cholesterol 133 <=199 mg/dL    Triglycerides 56 <=149 mg/dL    HDL Cholesterol 60 >=50 mg/dL    LDL Calculated 62 <=129 mg/dL    Patient Fasting > 8hrs? Yes         All very good results     Please call with questions or contact us using [employer].    Sincerely,        Electronically signed by Shai Ellington MD

## 2021-06-21 NOTE — PROGRESS NOTES
[facility] Daily Progress     Patient Name: Adalgisa Solano  Date: 11/13/2018  Visit #: 5  PTA visit #:  4  Referral Diagnosis: [unfilled]  Referring provider: Mandy Torrez NP  Visit Diagnosis:     ICD-10-CM    1. Chronic obstructive pulmonary disease, unspecified COPD type (H) J44.9    2. Essential hypertension I10    3. Schizoaffective disorder, bipolar type (H) F25.0    4. Edema R60.9    5. Diet-controlled type 2 diabetes mellitus (H) E11.9    6. Muscle weakness (generalized) M62.81    7. Lymphedema I89.0    8. Pulmonary hypertension (H) I27.20    9. Recurrent major depressive episodes (H) F33.9    10. Abnormality of gait R26.9    11. Arnold-Chiari malformation (H) Q07.00    12. Difficulty balancing R29.818    13. Peripheral arterial disease (H) I73.9    14. Chronic diastolic heart failure (H) I50.32          Assessment:   Pt with recent DVT in L LE. (see subjective)   Patient is benefitting from skilled physical therapy and is making steady progress toward functional goals.  Patient is appropriate to continue with skilled physical therapy intervention, as indicated by initial plan of care.    Goal Status:  Pt. will demonstrate/verbalize independence in self-management of condition in : 12 weeks    Patient will have a decreased volume in : bilateral;by 5%;to decrease risk of infection;for better fit of clothing;for ease of movement;in 6 weeks  Patient will have decreased fibrosis, scar tissue for improved lymphatic mobilitiy : in 4 weeks  Pt will: decreased LLIS by 8 points to demonstrate decreased impact of lymphedema on function      Plan / Patient Education:     Continue with initial plan of care.  Progress with home program as tolerated.   Modify bandages as needed.  Pt instructed to leave the bandages on for 23/24 hours, remove then re wrap.  Pt instructed to remove the bandages if she experiences shortness of breath. She is to contact her pulmonary MD if this occurs.  Pt to see  her PCP (Dr. Costa' partner) tomorrow. Pt was instructed to make she she is released to continue lymphedema treatment.    Subjective:     Pain Ratin at night when trying to sleep.  Pt reports she was hospitalized for 4 days last week. She was released on . She reports she has a DVT in her L LE and has pneumonia and Atrial fib.  Pt did have her R LE wrapped when in the hospital.    Objective:     Caregiver present: Yes,  Les    Observation of swelling: unchanged increased on the L    Volume measurements taken:  No      Skin condition is:  Red, dry and flaky.Two small open areas on her L shin are healed. L LE red, shiney    Compression: Bandages on R LE, no compression on LE      Medication for infection:  Yes    O2-95%  HR-96 BPM (HR increases when going from supine to sit)  Treatment Today     TREATMENT MINUTES COMMENTS   Evaluation     Self-care/ Home management 30 Medical compression bandages: To R LE only due to DVT in L  LE   Applied Eucerin lotion  Ortho stockinette  2 rolls of Comprilan x pattern   Manual therapy  Not done today due to increased shortness of breath and recent hospitalization. Pt will see her internist tomorrow.    Neuromuscular Re-education     Therapeutic Activity     Therapeutic Exercises     Gait training     Modality__________________                Total 30    Blank areas are intentional and mean the treatment did not include these items.       Bertha Calderon,JAIME  2018

## 2021-06-21 NOTE — PROGRESS NOTES
Office Visit - Follow Up   Adalgisa Solano   83 y.o. female    Date of Visit: 11/14/2018    Chief Complaint   Patient presents with     Hospital Visit Follow Up     pt is looking for a referral for therapy of lymphadema-pt states that she has a blood clot in her left leg and wants to know when she can wrap it again-pt has been on blood thinner since last thursday-pt states that she has aFib-Patient would like an order for a CT chest to be done in 1 month        Assessment and Plan   1. PAF (paroxysmal atrial fibrillation) (H)  Now that she is on xarelto for DVT  I would recommend LiIfelong use for PAF. Still has irregular rhythm today  Pt agrees . No complications with it . Heart rate is high but her diltiazem was just increased to 180mg 2 days ago  . She is seeing cardiology in a few weeks and it could be increased if necessary to improve rate control then      2. Pneumonia of left lung due to infectious organism, unspecified part of lung  Patient is feeling much better is speaking in normal sentences.  Does not have labored breathing.  Can taper down her O2 to 2 L.  And over the next few weeks when she sees pulmonary they can see if she is eligible for discontinuing it during the day.  Although since it is helping her long-term it probably is a good idea to have continuous O2.  I do agree with the size of the infiltrates she had a repeat CT scan would be warranted to make sure there is no malignancy behind the infiltrates.  Can have another CT in 4-6 weeks.  - CT Chest With Contrast; Future    3. Deep vein thrombosis (DVT) of other vein of left lower extremity, unspecified chronicity (H)  She has considerable pain erythema and swelling of the left leg from the DVT.  I do not think there is any cellulitis.  Most of the skin changes are thrombophlebitic she can have the lymphedema wraps put on as that really helped her right side.  She would actually benefit from a compression stocking but it would probably  would have to be custom made.  For low back to the lymphedema clinic which she was previously seeing was made.  Advised to do leg elevation.  She is requesting something for pain for the phlebitis pain.  We will give her a limited amount of hydrocodone.  He can take that at night to help her sleep she does have a History of constipation.  NSAIDs are contraindicated in her because of her anticoagulation. She can add miralax to prevent constipation.   - Ambulatory referral to Vascular Center    4. Anemia, chronic disease  Always ran a little  Low but last level is a little lower . She cannot tolerate oral iron . Will add on some other labs . She should have hemogram rechecked in 3-4 weeks . I suspect anemia of chronic disease . It is too early to check today   - Methylmalonic Acid,Serum or Plasma (Vitamin B12 Status)  - Folate, Serum  - Iron  - Iron and Transferrin Iron Binding Capacity    5. Chronic obstructive pulmonary disease, unspecified COPD type (H)         History of Present Illness   This 83 y.o. old   Chief Complaint   Patient presents with     Hospital Visit Follow Up     pt is looking for a referral for therapy of lymphadema-pt states that she has a blood clot in her left leg and wants to know when she can wrap it again-pt has been on blood thinner since last thursday-pt states that she has aFib-Patient would like an order for a CT chest to be done in 1 month   83-year-old female past medical history significant for COPD, type 2 diabetes diet controlled, chronic diastolic heart failure, paroxysmal A. fib, pulmonary hypertension, chronic nocturnal oxygen 2-3 L, chronic lower extremity lymphedema, peripheral arterial disease, hypertension, GERD, constipation, schizoaffective or bipolar disorder and spinal stenosis.  Seen at vascular clinic 10/8/18 and patient had self stopped her Lasix 60 mg 3 times daily.  Advised to resume this.  Seen by primary 10/11 and was noted to have an irregular heart rhythm.  EKG  "and cardiac consultation were recommended but patient declined.  on 10/29 contacted [facility] pulmonary clinic complaining of a sore throat and productive cough.  Empirically started on doxycycline and prednisone taper.  11/3 seen in the [facility] ED for left lower extremity pain.  X-ray was negative for fracture.  Discharged home with pain medication.  11/6 she completed her doxycycline and steroid taper and the following day began to note increased shortness of breath. Seen in clinic 11/8 complaining of leg pain, shortness of breath, fever and tachycardia.  Directed to the emergency room. in the ED and chest CT PE run showed a left upper and lower lobe infiltrates in addition to her chronic emphysema.  No PE.  EKG showed A. fib with RVR and subsequent ultrasound the left lower extremity did show a DVT.  Cardiology was consulted.  Her chronic diltiazem dose was increased to 180 mg daily.  Echocardiogram was stable from previous changes.  Patient agreed to Xarelto for anticoagulation.     Patient was treated with ceftriaxone and a azithromycin.  Had gradual improvement in her respiratory status and was able to get down to her baseline 2-3 L of oxygen.    Discharged on Omnicef and a azithromycin.  Evaluated by PT OT and    Review of Systems: A comprehensive review of systems was negative except as noted.     Medications, Allergies and Problem List   Reviewed, reconciled and updated     Physical Exam   General Appearance:       /66   Pulse (!) 101   Ht 5' 1\" (1.549 m)   Wt 172 lb (78 kg)   LMP  (LMP Unknown)   SpO2 97% Comment: pt on 3L of O2  BMI 32.50 kg/m      General appearance - alert, well appearing, and in no distress  Mental status - alert, oriented to person, place, and time  Neck - supple, no significant adenopathy  Lymphatics - no palpable lymphadenopathy, no hepatosplenomegaly  Chest - clear to auscultation, symmetric air entry , bilaterla coarse rales   Heart - normal rate, irregular rhythm, " normal S1, S2, no murmurs, rubs, clicks or gallops  Musculoskeletal - no joint tenderness, deformity or swelling  Extremities - right in wraps , left calf and foot swollen , tender and has erythematous changes with no skin break down or significant warmth                                                                                      Additional Information   Current Outpatient Medications   Medication Sig Dispense Refill     acetaminophen (TYLENOL) 500 MG tablet Take 500 mg by mouth every 6 (six) hours as needed for pain (two tablets three times daily).       albuterol (PROAIR HFA;PROVENTIL HFA;VENTOLIN HFA) 90 mcg/actuation inhaler Inhale 2 puffs every 4 (four) hours as needed for wheezing. 1 Inhaler 12     albuterol (PROVENTIL) 2.5 mg /3 mL (0.083 %) nebulizer solution 3 ML INHALATION Four times a day As Needed Use before nebulized saline. For shortness of breath. (Patient taking differently: Take 2.5 mg by nebulization every 6 (six) hours as needed for wheezing. 3 ML INHALATION Four times a day As Needed Use before nebulized saline. For shortness of breath.) 300 mL 11     ARIPiprazole (ABILIFY) 5 MG tablet Take 5 mg by mouth bedtime.       aspirin 81 MG EC tablet Take 81 mg by mouth daily.       atorvastatin (LIPITOR) 20 MG tablet Take 20 mg by mouth at bedtime.       azithromycin (ZITHROMAX) 500 MG tablet Take 1 tablet (500 mg total) by mouth daily for 4 days. 4 tablet 0     calcium carbonate-vitamin D3 (CALCIUM WITH VITAMIN D) 600 mg(1,500mg) -400 unit per tablet Take 1 tablet by mouth 2 (two) times a day.       cefdinir (OMNICEF) 300 MG capsule Take 1 capsule (300 mg total) by mouth 2 (two) times a day for 7 days. 14 capsule 0     cholecalciferol, vitamin D3, 1,000 unit tablet Take 2,000 Units by mouth daily.        diltiazem (CARDIZEM CD) 180 MG 24 hr capsule Take 1 capsule (180 mg total) by mouth daily. 30 capsule 0     fluticasone-salmeterol (ADVAIR DISKUS) 250-50 mcg/dose DISKUS Inhale 1 puff 2 (two)  times a day. 60 each 11     furosemide (LASIX) 20 MG tablet Take 1 tablet (20 mg total) by mouth 2 (two) times a day at 9am and 6pm. 60 tablet 0     omeprazole (PRILOSEC) 20 MG capsule TAKE 1 CAPSULE DAILY. 100 capsule 0     OXYGEN-AIR DELIVERY SYSTEMS MISC 3 L into each nostril continuous. Pheumonia/ respiratory distress.       potassium chloride (K-DUR,KLOR-CON) 10 MEQ tablet Take 1 tablet (10 mEq total) by mouth daily. 30 tablet 0     psyllium (METAMUCIL) 3.4 gram packet Take 1 packet by mouth daily.        QUEtiapine (SEROQUEL) 25 MG tablet Take 50 mg by mouth at bedtime.        rivaroxaban 20 mg Tab Take 1 tablet (20 mg total) by mouth daily with supper. 30 tablet 0     sodium chloride 3 % nebulizer solution Take 4 mL by nebulization 2 (two) times a day. Using after albuterol neb. (Patient taking differently: Take 4 mL by nebulization 2 (two) times a day as needed. Using after albuterol neb.) 360 mL 3     tiotropium (SPIRIVA) 18 mcg inhalation capsule Place 18 mcg into inhaler and inhale daily.       No current facility-administered medications for this visit.      Allergies   Allergen Reactions     Amoxicillin Itching and Rash     Levofloxacin Itching and Rash     Penicillins Itching and Rash     Has tolerated ceftriaxone.     Social History     Tobacco Use     Smoking status: Former Smoker     Last attempt to quit: 2006     Years since quittin.8     Smokeless tobacco: Never Used     Tobacco comment: quit 2006   Substance Use Topics     Alcohol use: No     Drug use: No       Time: total time spent with the patient was 25 minutes of which >50% was spent in counseling and coordination of care     Misty Vieyra MD

## 2021-06-21 NOTE — PROGRESS NOTES
Follow up Vascular Visit       Date of Service:10/22/2018    Date Last Seen: 10/8/2018; 10/9/2018    Chief Complaint: BLE swelling     History:   Past Medical History:   Diagnosis Date     Acute and chronic respiratory failure with hypoxia (H)      Arm skin lesion, right      Arnold-Chiari malformation (H) 1998     Breast cyst      Breast lump      Chronic diastolic heart failure (H)     diastolic chf     Chronic obstructive pulmonary disease (H)     Created by Conversion      Chronic respiratory failure with hypoxia (H) 3/13/2017     COPD (chronic obstructive pulmonary disease) (H)      COPD exacerbation (H)      Depression      Diet-controlled type 2 diabetes mellitus (H)      GERD (gastroesophageal reflux disease)      Hyperlipidemia      Hypertension      Lumbar spinal stenosis      Lump or mass in breast     Created by Conversion      PAD (peripheral artery disease) (H)      Peripheral arterial disease (H) 10/28/2015     Pulmonary hypertension (H) 3/5/2018     Recurrent major depressive episodes (H)     Created by Conversion  Replacement Utility updated for latest IMO load     Schizoaffective disorder, bipolar type (H) 6/11/2015     Type 2 diabetes mellitus without complication (H) 8/2/2016       Pt [facility] with regards to their BLE swelling. Arrives alone today. Had nurse visit 7 days ago; contacted Dr. Ellington's office; got her restarted on her lasix, taking 60mg three times a day; has been on this for 11 days doing well with this.  We wrapped her legs with 2 layer tolerated well;and had her completed an IVONNE this was normal, results were previously d/w her and answered all questions. She is scheduled for lymphedema therapy in 5 days. Denies fevers, chills, numbness or tingling in the legs. Doing well. No cp, no shortness of breath.    Allergies: Amoxicillin; Levofloxacin; and Penicillins    Physical Exam:    /76  Pulse 88  Temp 97.6  F  "(36.4  C) (Oral)   Resp 16  Ht 5' 1.5\" (1.562 m) Comment: per pt report  Wt 161 lb (73 kg) Comment: per pt report  LMP  (LMP Unknown)  BMI 29.93 kg/m2    General:  Patient presents to clinic in no apparent distress.  Head: normocephalic atraumatic  Psychiatric:  Alert and oriented x3.   Respiratory: unlabored breathing; no cough  Integumentary:  Skin is uniformly warm, dry and pink.    Extremities: BLE with +1 pitting edema from the mid shin to the feet; this is improved; see measures below; no active weeping, the tissues are inflammed but not warm to touch and not painful; nails are painted; well trimmed; webbing clear; strength testing revealed 3/4 to BLEs.  Good capillary refill. No unusual venous distention. Positive for hemosiderin deposition or hyperpigmentation and fibrosis or scarring  Negative for spider veins and telangiectasias        Circumferential volume measures:    Vasc Edema 10/8/2018 10/12/2018 10/16/2018 10/22/2018   Right just above MTP 23.5 23.5 23 20.1   Right Ankle 29 26.4 24.5 22.5   Right Widest Calf 41.5 35.5 39 33.7   Right Thigh Up 10cm 51.4 47 - 47   Left - just above MTP 22.8 22.6 22.4 20.5   Left Ankle 31.3 29 26.5 23.9   Left Widest Calf 43.4 37.5 45 34.5   Left Thigh Up 10cm 49 46 - 46.5       Ulceration(s)/Wound(s):           Lab Values    No results found for: SEDRATE  Lab Results   Component Value Date    CREATININE 0.70 08/14/2018     Lab Results   Component Value Date    HGBA1C 6.9 (H) 07/30/2018     Lab Results   Component Value Date    BUN 15 08/14/2018     Lab Results   Component Value Date    ALBUMIN 3.4 (L) 07/30/2018     No results found for: [id number]          Impression:  1. Dependent edema     2. Secondary lymphedema     3. Venous hypertension of both lower extremities     4. Fluid overload     5. Type 2 diabetes mellitus without complication, with long-term current use of insulin (H)     6. Chronic diastolic heart failure (H)            Assessment/Plan:         "  1. Debridement: na           2. Edema: responding nicely to the wraps; IVONNE was normal; continue 2 layer regulars bilaterally today;  The compression wraps were applied today in clinic to both legs. Continue on the lasix as prescribed by her PCP. Will start lymphedema therapy next week. Will eventually get her into compression stockings; will have to order through [employer] [facility]; will give her a catalogue today; PT/OT can help her ordering when her measures have stabilized .           3.  Wound treatment: lotion           4. Nutrition: focus on protein           5. Offloading: na     Patient will follow up with me in 6 weeks for reevaluation. They were instructed to call the clinic sooner with any signs or symptoms of infection or any further questions/concerns. Answered all questions.    Mandy Torrez DNP, RN, CNP, [facility]  [phone]        This note was electronically signed by Mandy Torrez

## 2021-06-21 NOTE — PROGRESS NOTES
Assessment:       Shortness of breath  Tachycardia  Calf pain/swelling    Medical Decision Making  Initial concern for pulmonary embolism due to complaints of leg pain, shortness of breath, and tachycardia. Symptoms may also be due to pneumonia, ongoing COPD exacerbation, of acute on chronic congestive heart failure.      Plan:       1. Sent to [facility] ED for further workup of possible pulmonary embolism versus COPD exacerbation versus pneumonia versus acute over chronic congestive heart failure.  2. Spoke with provider from [facility] ED over the phone. Provided patient report and reason for transfer. Confirmed plan with the patient, and they agreed with plan. Answered all questions.  3. Explained to patient that we would normally call for EMS to transfer her to the emergency room in case of worsening condition on route. The patient and her  declined EMS and elected to drive over by private vehicle.      Subjective:       Adalgisa Solano is a 83 y.o. female with history of congestive heart failure and COPD, here for evaluation of shortness of breath. Onset was yesterday. Patient was recently treated for COPD exacerbation with doxycycline and a short course of oral steroids. Her medications were completed yesterday when her symptoms worsened rapidly. She has been using 1L of her at home oxygen tank all day with some improvement. She usually only wears her oxygen at night. Patient also notes that she was seen in the emergency room on 11/3 for acute leg pain. They did not perform an ultrasound to evaluate for possible DVT. Associated symptoms include a cough with green sputum production. Patient denies hemoptysis and history of DVT.    The following portions of the patient's history were reviewed and updated as appropriate: allergies, current medications and problem list.    Review of Systems  Pertinent items are noted in HPI.     Allergies  Allergies   Allergen Reactions     Amoxicillin Itching and  Rash     Levofloxacin Itching and Rash     Penicillins Itching and Rash     Family History   Problem Relation Age of Onset     Heart attack Mother      Heart attack Father      Social History     Social History     Marital status:      Spouse name: N/A     Number of children: N/A     Years of education: N/A     Social History Main Topics     Smoking status: Former Smoker     Quit date: 1/1/2006     Smokeless tobacco: Never Used      Comment: quit 2006     Alcohol use No     Drug use: No     Sexual activity: Not Asked     Other Topics Concern     None     Social History Narrative         Objective:       /60  Pulse (!) 120  Temp 98.9  F (37.2  C) (Oral)   Resp 24  Wt 164 lb (74.4 kg)  LMP  (LMP Unknown)  SpO2 92%  BMI 30.49 kg/m2  General appearance: pausing during sentences to take a breath, alert, appears stated age, cooperative, mild distress and non-toxic  Head: Normocephalic, without obvious abnormality, atraumatic  Ears: TM's intact with mucoid fluid and bulging bilaterally, no erythema; external ears normal  Nose: no discharge  Throat: lips, mucosa, and tongue normal; teeth and gums normal  Neck: no adenopathy and supple, symmetrical, trachea midline  Lungs: rhonchi heard bilaterally with experitory wheezing  Heart: tachycardia, normal S1 and S2

## 2021-06-21 NOTE — PROGRESS NOTES
[facility]   Lymphedema Initial Evaluation      Patient Name: Adalgisa Solano  Date of evaluation: 10/26/2018  Referral Diagnosis: PAD (peripheral artery disease) (H); lymphedema LE  Referring provider: Mandy Torrez NP  Visit Diagnosis:     ICD-10-CM    1. Lymphedema I89.0    2. Chronic obstructive pulmonary disease, unspecified COPD type (H) J44.9    3. Essential hypertension I10    4. Peripheral arterial disease (H) I73.9    5. Schizoaffective disorder, bipolar type (H) F25.0    6. Edema R60.9    7. Diet-controlled type 2 diabetes mellitus (H) E11.9    8. Muscle weakness (generalized) M62.81    9. Pulmonary hypertension (H) I27.20    10. Recurrent major depressive episodes (H) F33.9    11. Abnormality of gait R26.9    12. Arnold-Chiari malformation (H) Q07.00    13. Difficulty balancing R29.818    14. Chronic diastolic heart failure (H) I50.32        Assessment:      Pt. would be a good candidate for edema management and to develop a home management program.    Goals:   Pt. will demonstrate/verbalize independence in self-management of condition in : 12 weeks  Patient will have a decreased volume in : bilateral;by 5%;to decrease risk of infection;for better fit of clothing;for ease of movement;in 6 weeks  Patient will have decreased fibrosis, scar tissue for improved lymphatic mobilitiy : in 4 weeks  Pt will: decreased LLIS by 8 points to demonstrate decreased impact of lymphedema on function    Patient's expectations/goals are realistic.    Barriers to Learning or Achieving Goals:  Chronicity of the problem.  Co-morbidities or other medical factors.  h/o back pain  Age.    H/o hip pain, COPD with pulmonary HTN and oxygen dependence, h/o heart failure  H/o depression     Lymphedema Category:  VI - Stage 2 with Mod-High Functional Demand       Plan / Patient Instructions:      Plan of Care:   Authorization / Certification Start Date: 10/26/18  Authorization / Certification End Date:  19  Authorization / Certification Number of Visits: up to 15 visits  Communication with: Referral Source;Patient Caregiver  Patient Related Instruction: Nature of Condition;Treatment plan and rationale;Self Care instruction;Basis of treatment;Body mechanics;Posture;Precautions;Next steps;Expected outcome  Times per Week: 2-3x/week  Number of Weeks: 4-8 weeks  Number of Visits: up to 15  Discharge Planning: independent in home proram  Therapeutic Exercise: ROM;Stretching;Strengthening;Lymphedema  Neuromuscular Reeducation: posture;balance/proprioception;TNE;core  Manual Therapy: soft tissue mobilization;myofascial release;lymphatic drainage massage  Functional Training (ADL's): skin care;self care;lymphedema precautions;bandage/garment wear and care;ADL's  Gait Training: to decrease venous HTN    Plan for next visit: progress with instruction in self bandaging of B LEs may benefit from size E Tetragrip or may benefit from non elasticized stockinette and use of foam roll under short stretch bandages, may instruct  in bandaging, MLD and self MLD, exercises for edema, strengthening/conditioning and balance     Subjective:         Social information:   Living Situation:single family home  With    Occupation:retired   Work Status:NA   Equipment Available: None, SE cane and has 4 ww    History of Present Illness:    Adalgisa is a 82 y.o. female who presents to therapy today with complaints of worsening LE edema 3/2017 after CHF exacerbation and multiple surgeries. Date of onset/duration of symptoms is 3/2017. Onset was gradual. Symptoms are getting worse. She reports history of similar mild LE edema intermittent prior to 3/2017. She describes their previous level of function as limited with standing>4', siting>2'; walking >15'; ; h/o cellulitis > 1 year ago LEs    Pain Ratin-10/ worse  Also with h/o back pain and B hips due to arthritis      Functional limitations are described as occurring with:    patient with chronic back and B hip pain which also limits activity  ascending and descending stairs or curbs  balance  bending  lifting  sitting >4'  sleeping  standing >4'  walking >15'    Patient reports benefit from:  elevation and compression wraps       Objective:      Patient Outcome Measures :    Lymphedema Life Impact Score (%): 57   Scores range from %, where a score of 20% represents the least impact on the individual's life (minimal physical, psychosocial and functional concerns).     Right Lower Extremity Total Estimated Volume (cm3): 6583.35  Left Lower Extremity Total Estimated Volume (cm3): 6722.14  Lower Extremity Swelling Comparison (%): 2.11 %       Examination  1. Lymphedema     2. Chronic obstructive pulmonary disease, unspecified COPD type (H)     3. Essential hypertension     4. Peripheral arterial disease (H)     5. Schizoaffective disorder, bipolar type (H)     6. Edema     7. Diet-controlled type 2 diabetes mellitus (H)     8. Muscle weakness (generalized)     9. Pulmonary hypertension (H)     10. Recurrent major depressive episodes (H)     11. Abnormality of gait     12. Arnold-Chiari malformation (H)     13. Difficulty balancing     14. Chronic diastolic heart failure (H)       Involved side: Bilateral  Posture Observation:      General sitting posture is  fair.    Surgery: 2014 back surgery; 2017 L TKA; 2012; partial KA R  Precautions/Restrictions: suplimental O2  Involved area: Bilateral Leg  Edema: Pitting at grade, 2+, 3+, Moderate and Stemmers sign  positive  Fibrosis: moderate  Temperature: Normal  Sensation: Normal  Skin color: Reddened  Skin texture: Dry and flaky  Scars: B TKA scars  Wounds: 1.5cm x 1cm superficial wound with scant drainage L distal tibia  ROM:  WFL; tightness L>R ankle DF  Gait: slow pace    ABN signed for bandaging supplies:    Size F tetragrip  8cm short stretch x 2  10cm short stretch x 2      Treatment Today   10/26/2018  TREATMENT MINUTES COMMENTS    Evaluation 30    Self-care/ Home management 25 Removed 2 layer compression bandaging due to discomfort today.    insttructed patient to trial sleeping in bed without elevating legs instead of transitioning to chair with legs elevated  on stool    Initiated instruction in self GCB of legs: size F Tetra- followed by 8cm and 10cm and issued written instructions     Instructed in GCB precautions and trial continuous until return next session to further trial self GCB or have  assist.   Manual therapy  Long sweeping strokes of thighs proximally x 10x   Neuromuscular Re-education     Therapeutic Activity     Therapeutic Exercises 15 Initiated Edema exs for LE edema including diaphragmatic breathing, cervical rotation, shoulder rolls, trunk lateral side bends, alternating hip flexion, LAQs, APs all 5x each full sequence 1x/day but to do diaphragmatic breathing and APs throughout day.  Also instructed in safe progressive walking program using AD which allows improved gait   Gait training     Modality__________________                Total 70    Blank areas are intentional and mean the treatment did not include these items.         PT Evaluation Code: (Please list factors)  Patient History/Comorbidities: depression, chronic pain and multiple comorbidities including CHF, COPD, wounds  Examination: edema, fibrosis, wounds, errythema, decreased ROM/strength/balance  Clinical Presentation: worsening   Clinical Decision Making: moderate complexity    Patient History/  Comorbidities Examination  (body structures and functions, activity limitations, and/or participation restrictions) Clinical Presentation Clinical Decision Making (Complexity)   No documented Comorbidities or personal factors 1-2 Elements Stable and/or uncomplicated Low   1-2 documented comorbidities or personal factor 3 Elements Evolving clinical presentation with changing characteristics Moderate   3-4 documented comorbidities or personal factors 4 or  more Unstable and unpredictable High     ARE authorization submitted.    Mora Osorio  10/26/2018  9:39 AM

## 2021-06-21 NOTE — PROGRESS NOTES
TCM DISCHARGE FOLLOW UP CALL    Discharge Date:  11/11/2018  Reason for hospital stay (discharge diagnosis)::  CAP, DVT  Are you feeling better, the same or worse since your discharge?:  Patient is feeling better (feeling stronger, Still has MEYER. O2 at 3L. Productive cough, yellow/green. Denies fever)  Do you feel like you have a plan in the event of a health emergency?: Yes (would call Pulm for respiratory issues. )    (RN) Patient provided with information to call us in the event of a health emergency: Yes (Informed pt of 24 hr nurse triage availability)    As part of your discharge plan, were  home care services ordered for you?: Yes    Have you seen them yet, or are they scheduled to visit?: Yes    Do you have any follow up visits scheduled with your PCP or Specialist?:  Yes, with PCP  (RN) Is PCP appt scheduled soon enough (within 14 days of discharge date)?: Yes    RN NOTES::  Needs to get a new oximeter. Good family support

## 2021-06-21 NOTE — PROGRESS NOTES
[facility] Daily Progress     Patient Name: Adalgisa Solano  Date: 11/2/2018  Visit #: 3  PTA visit #:  2  Referral Diagnosis: [unfilled]  Referring provider: Mandy Torrez NP  Visit Diagnosis:     ICD-10-CM    1. Chronic obstructive pulmonary disease, unspecified COPD type (H) J44.9    2. Essential hypertension I10    3. Schizoaffective disorder, bipolar type (H) F25.0    4. Edema R60.9    5. Diet-controlled type 2 diabetes mellitus (H) E11.9    6. Muscle weakness (generalized) M62.81    7. Lymphedema I89.0    8. Pulmonary hypertension (H) I27.20    9. Recurrent major depressive episodes (H) F33.9    10. Abnormality of gait R26.9    11. Arnold-Chiari malformation (H) Q07.00    12. Difficulty balancing R29.818    13. Peripheral arterial disease (H) I73.9    14. Chronic diastolic heart failure (H) I50.32          Assessment:   Pt has been compliant with her home program.  Patient is benefitting from skilled physical therapy and is making steady progress toward functional goals.  Patient is appropriate to continue with skilled physical therapy intervention, as indicated by initial plan of care.    Goal Status:  Pt. will demonstrate/verbalize independence in self-management of condition in : 12 weeks  Patient will have a decreased volume in : bilateral;by 5%;to decrease risk of infection;for better fit of clothing;for ease of movement;in 6 weeks  Patient will have decreased fibrosis, scar tissue for improved lymphatic mobilitiy : in 4 weeks  Pt will: decreased LLIS by 8 points to demonstrate decreased impact of lymphedema on function    Plan / Patient Education:     Continue with initial plan of care.  Progress with home program as tolerated.   Modify bandages as needed.  Pt instructed to leave the bandages on for 23/24 hours, remove then re wrap.  Pt was instructed to watch the open area on her L LE for infection.    Subjective:   Pt reports she kept the bandages on for 23 hours, showered and  her  re wrapped.  Pt states the last 2 nights have been really good. She has been splitting the time between her bed and the chair.  Pt has been walking around her home with her SEC  Pt states her L LE is weak.  Pain Ratin at night when trying to sleep.        Objective:     Caregiver present: Yes,  Les    Observation of swelling: unchanged.     Volume measurements taken:  No      Skin condition is:  Red, dry and flaky.Two small open areas on L lower anterior shin    Compression: Bandages      Medication for infection:  No not for LEs    Treatment Today     TREATMENT MINUTES COMMENTS   Evaluation     Self-care/ Home management 25 Medical compression bandages:  Washed LEs  Applied Eucerin lotion  Ortho stockinette  2 rolls of Comprilan x pattern   Manual therapy 30 MLD to neck, B axilla, trunk/abdomen, B inguinals, and B LEs   Neuromuscular Re-education     Therapeutic Activity     Therapeutic Exercises     Gait training     Modality__________________                Total 55    Blank areas are intentional and mean the treatment did not include these items.       Bertha Calderon,JAIME  2018

## 2021-06-21 NOTE — PROGRESS NOTES
Phone assessment completed.  Patient had home care start over this weekend and will have RN, PT/OT for lymphedema therapy, HHA and SW.  Patient comfortable with the resources in the home for now. Discussed to have her PCP know when home care is done if there are more needs that [facility] can assist with.  Patient verbalized understanding.

## 2021-06-21 NOTE — PROGRESS NOTES
Follow up Vascular Visit       Date of Service:10/12/2018    Date Last Seen: 10/8/2018; 10/9/2018    Chief Complaint: BLE swelling     History:   Past Medical History:   Diagnosis Date     Acute and chronic respiratory failure with hypoxia (H)      Arm skin lesion, right      Arnold-Chiari malformation (H) 1998     Breast cyst      Breast lump      Chronic diastolic heart failure (H)     diastolic chf     Chronic obstructive pulmonary disease (H)     Created by Conversion      Chronic respiratory failure with hypoxia (H) 3/13/2017     COPD (chronic obstructive pulmonary disease) (H)      COPD exacerbation (H)      Depression      Diet-controlled type 2 diabetes mellitus (H)      GERD (gastroesophageal reflux disease)      Hyperlipidemia      Hypertension      Lumbar spinal stenosis      Lump or mass in breast     Created by Conversion      PAD (peripheral artery disease) (H)      Peripheral arterial disease (H) 10/28/2015     Pulmonary hypertension (H) 3/5/2018     Recurrent major depressive episodes (H)     Created by Conversion  Replacement Utility updated for latest IMO load     Schizoaffective disorder, bipolar type (H) 6/11/2015     Type 2 diabetes mellitus without complication (H) 8/2/2016       Pt returns to the [facility] with regards to their BLE swelling. Arrives with  Francis today. We saw her 5 days ago; contacted Dr. Ellington's office; got her restarted on her lasix, taking 60mg three times a day; has been on this for 4 days doing well with this. Noted increased urination just on the first day. We wrapped her legs with 2 layer lites and had her completed an IVONNE today; this was normal, results were d/w her and answered all questions. She is scheduled for lymphedema therapy in 2 weeks. Denies fevers, chills, numbness or tingling in the legs. Doing well. No cp, no shortness of breath.    Allergies: Amoxicillin; Levofloxacin; and Penicillins    Physical  Exam:    /76 (Patient Position: Sitting)  Pulse 82  Temp 97.9  F (36.6  C) (Oral)   Resp 18  LMP  (LMP Unknown)    General:  Patient presents to clinic in no apparent distress.  Head: normocephalic atraumatic  Psychiatric:  Alert and oriented x3.   Respiratory: unlabored breathing; no cough  Integumentary:  Skin is uniformly warm, dry and pink.    Extremities: BLE with +2 pitting edema from the mid shin to the feet; this is improved; see measures below; no active weeping, the tissues are inflammed but not warm to touch and not painful; nails are painted; well trimmed; webbing clear; strength testing revealed 3/4 to BLEs.  Good capillary refill. No unusual venous distention. Positive for hemosiderin deposition or hyperpigmentation and fibrosis or scarring  Negative for spider veins and telangiectasias        Circumferential volume measures:    Vasc Edema 10/8/2018 10/12/2018   Right just above MTP 23.5 23.5   Right Ankle 29 26.4   Right Widest Calf 41.5 35.5   Right Thigh Up 10cm 51.4 47   Left - just above MTP 22.8 22.6   Left Ankle 31.3 29   Left Widest Calf 43.4 37.5   Left Thigh Up 10cm 49 46       Ulceration(s)/Wound(s):           Lab Values    No results found for: SEDRATE  Lab Results   Component Value Date    CREATININE 0.70 08/14/2018     Lab Results   Component Value Date    HGBA1C 6.9 (H) 07/30/2018     Lab Results   Component Value Date    BUN 15 08/14/2018     Lab Results   Component Value Date    ALBUMIN 3.4 (L) 07/30/2018     No results found for: [id number]          Impression:  1. Secondary lymphedema     2. Venous hypertension of both lower extremities     3. Fluid overload     4. Heart failure (H)     5. Dependent edema     6. Type 2 diabetes mellitus without complication, with long-term current use of insulin (H)            Assessment/Plan:          1. Debridement: na           2. Edema: responding nicely to the wraps; IVONNE was normal; will go to 2 layer regulars bilaterally today; will  see in 10 days per pt request; explained to her that if the wraps shift down 1 inch she needs to call in sooner. The compression wraps were applied today in clinic to both legs. Continue on the lasix as prescribed by her PCP. Will start lymphedema therapy is a few weeks. Will eventually get her into compression stockings; will have to order through [facility].           3.  Wound treatment: lotion           4. Nutrition: focus on protein           5. Offloading: na     Patient will follow up with me in 2 weeks for reevaluation. They were instructed to call the clinic sooner with any signs or symptoms of infection or any further questions/concerns. Answered all questions.    Mandy Torrez DNP, RN, CNP, [facility]  [phone]        This note was electronically signed by Mandy Torrez

## 2021-06-21 NOTE — PROGRESS NOTES
[facility] Daily Progress     Patient Name: Adalgisa Solano  Date: 11/6/2018  Visit #: 4  PTA visit #:  3  Referral Diagnosis: [unfilled]  Referring provider: Mandy Torrez NP  Visit Diagnosis:     ICD-10-CM    1. Chronic obstructive pulmonary disease, unspecified COPD type (H) J44.9    2. Essential hypertension I10    3. Schizoaffective disorder, bipolar type (H) F25.0    4. Edema R60.9    5. Diet-controlled type 2 diabetes mellitus (H) E11.9    6. Muscle weakness (generalized) M62.81    7. Lymphedema I89.0    8. Pulmonary hypertension (H) I27.20    9. Recurrent major depressive episodes (H) F33.9    10. Abnormality of gait R26.9    11. Arnold-Chiari malformation (H) Q07.00    12. Difficulty balancing R29.818    13. Peripheral arterial disease (H) I73.9    14. Chronic diastolic heart failure (H) I50.32          Assessment:   Pt has been compliant with her home program.  helps with the bandaages.  Patient is benefitting from skilled physical therapy and is making steady progress toward functional goals.  Patient is appropriate to continue with skilled physical therapy intervention, as indicated by initial plan of care.    Goal Status:  Pt. will demonstrate/verbalize independence in self-management of condition in : 12 weeks  Patient will have a decreased volume in : bilateral;by 5%;to decrease risk of infection;for better fit of clothing;for ease of movement;in 6 weeks  Patient will have decreased fibrosis, scar tissue for improved lymphatic mobilitiy : in 4 weeks  Pt will: decreased LLIS by 8 points to demonstrate decreased impact of lymphedema on function    Plan / Patient Education:     Continue with initial plan of care.  Progress with home program as tolerated.   Modify bandages as needed.  Pt instructed to leave the bandages on for 23/24 hours, remove then re wrap.  Pt was instructed to watch the open area on her L LE for infection.  Pt instructed to remove the bandages if she  experiences shortness of breath. She is to contact her pulmonary MD if this occurs.    Subjective:     Pain Ratin at night when trying to sleep.  Pt reports she went to the ED for leg pain on Saturday. Pt states the pain starts  in her L groin and goes to her L knee. Pt states it's sore but not as bad today. This was diagnosed as multiple issues.    Pt's  has been re wrapping.   Pt reports that she has been having trouble with her breathing. Pt has been going without the oxygen.   Pt tried the new stocking which she feels is to tight.      Objective:     Caregiver present: Yes,  Les    Observation of swelling: unchanged.     Volume measurements taken:  No      Skin condition is:  Red, dry and flaky.Two small open areas on her L shin are healing. Pt states that area is tender.    Compression: Pt took bandages off early this morning, To clinic with Comperm G      Medication for infection:  No not for LEs    O2-92%  HR- BPM (HR increases when going from supine to sit)  Treatment Today     TREATMENT MINUTES COMMENTS   Evaluation     Self-care/ Home management 25 Medical compression bandages:  Washed LEs  Applied Eucerin lotion  Ortho stockinette  2 rolls of Comprilan x pattern   Manual therapy 30 MLD to neck, B axilla, trunk/abdomen, B inguinals, and B LEs   Neuromuscular Re-education     Therapeutic Activity     Therapeutic Exercises 5 Balance drill: NBOS at kitchen couter   Gait training     Modality__________________                Total 60    Blank areas are intentional and mean the treatment did not include these items.       Bertha Calderon,JAIME  2018

## 2021-06-21 NOTE — LETTER
Letter by Shai Ellington MD at      Author: Shai Ellington MD Service: -- Author Type: --    Filed:  Encounter Date: 2/25/2021 Status: (Other)         Adalgisa Solano  [address] [facility], [place], operated [facility] [id number]             February 25, 2021         Dear Ms. Solano,    Below are the results from your recent visit:    Resulted Orders   Glycosylated Hemoglobin A1c   Result Value Ref Range    Hemoglobin A1c 6.6 (H) <=5.6 %   Glucose   Result Value Ref Range    Glucose 96 70 - 125 mg/dL    Patient Fasting > 8hrs? Unknown     Narrative    Fasting Glucose reference range is 70-99 mg/dL per  American Diabetes Association (ADA) guidelines.   Lipid Cascade   Result Value Ref Range    Cholesterol 127 <=199 mg/dL    Triglycerides 44 <=149 mg/dL    HDL Cholesterol 68 >=50 mg/dL    LDL Calculated 50 <=129 mg/dL    Patient Fasting > 8hrs? Unknown        All very good results    Please call with questions or contact us using [email or web address].    Sincerely,        Electronically signed by Shai Ellington MD

## 2021-06-21 NOTE — PROGRESS NOTES
"[facility] Daily Progress     Patient Name: Adalgisa Solano  Date: 10/30/2018  Visit #: 2  PTA visit #:  1  Referral Diagnosis: [unfilled]  Referring provider: Mandy Torrez NP  Visit Diagnosis:     ICD-10-CM    1. Chronic obstructive pulmonary disease, unspecified COPD type (H) J44.9    2. Essential hypertension I10    3. Peripheral arterial disease (H) I73.9    4. Schizoaffective disorder, bipolar type (H) F25.0    5. Edema R60.9    6. Diet-controlled type 2 diabetes mellitus (H) E11.9    7. Muscle weakness (generalized) M62.81    8. Lymphedema I89.0    9. Pulmonary hypertension (H) I27.20    10. Recurrent major depressive episodes (H) F33.9    11. Abnormality of gait R26.9    12. Arnold-Chiari malformation (H) Q07.00    13. Difficulty balancing R29.818    14. Chronic diastolic heart failure (H) I50.32          Assessment:     Patient is benefitting from skilled physical therapy and is making steady progress toward functional goals.  Patient is appropriate to continue with skilled physical therapy intervention, as indicated by initial plan of care.    Goal Status:  Pt. will demonstrate/verbalize independence in self-management of condition in : 12 weeks  Patient will have a decreased volume in : bilateral;by 5%;to decrease risk of infection;for better fit of clothing;for ease of movement;in 6 weeks  Patient will have decreased fibrosis, scar tissue for improved lymphatic mobilitiy : in 4 weeks  Pt will: decreased LLIS by 8 points to demonstrate decreased impact of lymphedema on function    Plan / Patient Education:     Continue with initial plan of care.  Progress with home program as tolerated.   Modify bandages as needed.  Pt instructed to leave the bandages on for 23/24 hours, remove then re wrap.    Subjective:   \" I've done the exercises.\" Pt is also doing exercises from previous PT. Pt has been walking in her home with her cane.  Pt states the bandages have slipped. Pt kept the " bandages on over the weekend.  Pt states that it is difficult to sleep in her bed.    Pain Ratin at night when trying to sleep.        Objective:     Caregiver present: Yes,  Les    Observation of swelling: unchanged.     Volume measurements taken:  No      Skin condition is:  Red, dry and flaky.    Compression: Bandages      Medication for infection:  No     Treatment Today     TREATMENT MINUTES COMMENTS   Evaluation     Self-care/ Home management 25 Medical compression bandages:  Washed LEs  Applied Eucerin lotion  Ortho stockinette  2 rolls of Comprilan x pattern   Manual therapy 30 MLD to neck, B axilla, trunk/abdomen, B inguinals, and B LEs   Neuromuscular Re-education     Therapeutic Activity     Therapeutic Exercises     Gait training     Modality__________________                Total 55    Blank areas are intentional and mean the treatment did not include these items.       Bertha Calderon,JAIME  10/30/2018

## 2021-06-21 NOTE — PROGRESS NOTES
The Clinic Care Guide called the patient  today at the request of the PCP to discuss possible clinic care coordination enrollment. Clinic care coordination was described to the patient and immediate needs were discussed. The patient agreed to enrollment in clinic care coordination and future appointments were scheduled for an RN care coordination assessment and an enrollment visit with the primary care physician and care guide. The patient was provided with contact information for the clinic care guide.    PCAM date 11/19/18

## 2021-06-21 NOTE — PROGRESS NOTES
[facility] Daily Progress     Patient Name: Adalgisa Solano  Date: 11/16/2018  Visit #: 6  PTA visit #:  4  Initial Evalulation: 10/26/2018  Insurance:  [employer]: authorized for 16 visits through 12/17/2018  Referral Diagnosis: lymphedema  Referring provider: Mandy Torrez NP  Visit Diagnosis:     ICD-10-CM    1. Chronic obstructive pulmonary disease, unspecified COPD type (H) J44.9    2. Schizoaffective disorder, bipolar type (H) F25.0    3. Diet-controlled type 2 diabetes mellitus (H) E11.9    4. Muscle weakness (generalized) M62.81    5. Lymphedema I89.0    6. Pulmonary hypertension (H) I27.20    7. Recurrent major depressive episodes (H) F33.9    8. Abnormality of gait R26.9    9. Arnold-Chiari malformation (H) Q07.00    10. Difficulty balancing R29.818          Assessment:     Patient is benefitting from skilled physical therapy and is making steady progress toward functional goals.  Patient is appropriate to continue with skilled physical therapy intervention, as indicated by initial plan of care.    Goal Status:  Pt. will demonstrate/verbalize independence in self-management of condition in : 12 weeks    Patient will have a decreased volume in : bilateral;by 5%;to decrease risk of infection;for better fit of clothing;for ease of movement;in 6 weeks  Patient will have decreased fibrosis, scar tissue for improved lymphatic mobilitiy : in 4 weeks  Pt will: decreased LLIS by 8 points to demonstrate decreased impact of lymphedema on function      Plan / Patient Education:     Continue with initial plan of care.    Subjective:     Pain Rating: 3-8 L LE; increased with walking; seen by Dr. Waldrop and cleared for GCB of L LE s/p DVT; currently therapeutic    Patient stating she is scheduled for Home Care including PT and OT and would like to also receive lymphedema home care if provided.        Objective:     Instructed pt to follow up with home care lymphedema option and call to update.   Patiient would be appropriate to continue with OP PT to address lymphedema and deconditioning if no home therapy available.    Caregiver present: Yes,  Les     Observation of swelling:  increased on the L     Volume measurements taken:  No        Skin condition is:  Red, dry and flaky.Two small open areas on her L shin are healed. L LE red, shiney; R LE with superficial appearing scratch     Compression: Bandages on R LE, no compression on LE        Medication for infection:  Yes; for pneumonia       Treatment Today   11/16/2018  TREATMENT MINUTES COMMENTS   Evaluation     Self-care/ Home management 15 Educated pt and  in need to launder current short stretch bandages as noted very limited elasticity which limits effectiveness.  Further instructed pt and  in self GCB of B lower legs with goal of continuous GCB ok to leave on up to 2 days if feeling ok and not loosening.  Remove for skin cares/shower and laundering of bandages up to 1 hour.  Stockinette, 8cm short stretch spiraled 3x around feet then figure 8 up distal lower leg, 10cm short stretch figure 8 malleoli to below knees.   Manual therapy 30 Performed MLD trunk and B LEs stimulating all regional LNs and clearing through B ipsilateral inguinal axillary pathways especially clearing medial knee bundles and instructed pt in clearing of thighs.   Neuromuscular Re-education     Therapeutic Activity     Therapeutic Exercises 15 Instructed patient in standing minisquats x 10 and in sit<>stand exs x 10.  Educated in benefit of progressive walking program using 4ww with focus on upright trunk.  Further educated in diaphragmatic breathing and ankle pumps.   Gait training     Modality__________________                Total 60    Blank areas are intentional and mean the treatment did not include these items.       Mora Osorio  11/16/2018

## 2021-06-21 NOTE — LETTER
Letter by Shai Ellingotn MD at      Author: Shai Ellington MD Service: -- Author Type: --    Filed:  Encounter Date: 12/28/2020 Status: (Other)         Adalgisa Solano  [address]  [facility] [address]             December 28, 2020         Dear Ms. Solano,    Below are the results from your recent visit:    Resulted Orders   HM2(CBC w/o Differential)   Result Value Ref Range    WBC 11.4 (H) 4.0 - 11.0 thou/uL    RBC 5.16 3.80 - 5.40 mill/uL    Hemoglobin 15.5 12.0 - 16.0 g/dL    Hematocrit 47.1 (H) 35.0 - 47.0 %    MCV 91 80 - 100 fL    MCH 30.1 27.0 - 34.0 pg    MCHC 33.0 32.0 - 36.0 g/dL    RDW 12.2 11.0 - 14.5 %    Platelets 223 140 - 440 thou/uL    MPV 8.5 7.0 - 10.0 fL   Comprehensive Metabolic Panel   Result Value Ref Range    Sodium 136 136 - 145 mmol/L    Potassium 4.6 3.5 - 5.0 mmol/L    Chloride 95 (L) 98 - 107 mmol/L    CO2 31 22 - 31 mmol/L    Anion Gap, Calculation 10 5 - 18 mmol/L    Glucose 188 (H) 70 - 125 mg/dL    BUN 15 8 - 28 mg/dL    Creatinine 0.72 0.60 - 1.10 mg/dL    GFR MDRD Af Amer >60 >60 mL/min/1.73m2    GFR MDRD Non Af Amer >60 >60 mL/min/1.73m2    Bilirubin, Total 0.6 0.0 - 1.0 mg/dL    Calcium 8.7 8.5 - 10.5 mg/dL    Protein, Total 6.5 6.0 - 8.0 g/dL    Albumin 3.8 3.5 - 5.0 g/dL    Alkaline Phosphatase 99 45 - 120 U/L    AST 18 0 - 40 U/L    ALT 19 0 - 45 U/L    Narrative    Fasting Glucose reference range is 70-99 mg/dL per  American Diabetes Association (ADA) guidelines.   Lipid Cascade   Result Value Ref Range    Cholesterol 134 <=199 mg/dL    Triglycerides 46 <=149 mg/dL    HDL Cholesterol 73 >=50 mg/dL    LDL Calculated 52 <=129 mg/dL    Patient Fasting > 8hrs? Unknown    Thyroid Stimulating Hormone (TSH)   Result Value Ref Range    TSH 0.80 0.30 - 5.00 uIU/mL   Urinalysis-UC if Indicated   Result Value Ref Range    Color, UA Yellow Colorless, Yellow, Straw, Light Yellow    Clarity, UA Clear Clear    Glucose, UA Negative Negative    Bilirubin, UA Negative  Negative    Ketones, UA Negative Negative    Specific Gravity, UA 1.015 1.005 - 1.030    Blood, UA Negative Negative    pH, UA 6.5 5.0 - 8.0    Protein, UA Negative Negative mg/dL    Urobilinogen, UA 0.2 E.U./dL 0.2 E.U./dL, 1.0 E.U./dL    Nitrite, UA Negative Negative    Leukocytes, UA Negative Negative    Narrative    Microscopic not indicated  UC not indicated       All very good results but blood sugar is a bit too high.     Suggest fasting blood sugar with next office visit.     All other labs are quite good.    Please call with questions or contact us using [facility].    Sincerely,        Electronically signed by Shai Ellington MD

## 2021-06-21 NOTE — PROGRESS NOTES
Nurse Visit    Chief Complaint: Patient presents to clinic for assessment and treatment of their swelling    Dressing on Arrival: 2 layer     Patient came in today for 2 layer re-wrap per order from Mandy Torrez CNP. Wraps had fallen approximatley 2.5 inches. Patient was wrapped on Friday 10/12/18 and notice they had fallen last night 10/15/18. Patient rated pain 0 out of 10. Removed wraps and cleansed skin with Remedy skin cleanser and applied lotion to intact skin.  Re wrapped 2 layer compression wrap to bilateral legs. Patient tolerated well. Patient is understanding on when to call if the wraps fall down again.     Allergies:   Allergies   Allergen Reactions     Amoxicillin Itching and Rash     Levofloxacin Itching and Rash     Penicillins Itching and Rash       Medications:   Current Outpatient Prescriptions:      acetaminophen (TYLENOL) 500 MG tablet, Take 500 mg by mouth every 6 (six) hours as needed for pain (two tablets three times daily)., Disp: , Rfl:      albuterol (PROAIR HFA;PROVENTIL HFA;VENTOLIN HFA) 90 mcg/actuation inhaler, Inhale 2 puffs every 4 (four) hours as needed for wheezing., Disp: 1 Inhaler, Rfl: 12     albuterol (PROVENTIL) 2.5 mg /3 mL (0.083 %) nebulizer solution, 3 ML INHALATION Four times a day As Needed Use before nebulized saline. For shortness of breath., Disp: 300 mL, Rfl: 11     ARIPiprazole (ABILIFY) 5 MG tablet, Take 5 mg by mouth bedtime., Disp: , Rfl:      aspirin 81 MG EC tablet, Take 81 mg by mouth daily., Disp: , Rfl:      atorvastatin (LIPITOR) 20 MG tablet, Take 20 mg by mouth at bedtime., Disp: , Rfl:      calcium carbonate-vitamin D3 (CALCIUM WITH VITAMIN D) 600 mg(1,500mg) -400 unit per tablet, Take 1 tablet by mouth 2 (two) times a day., Disp: , Rfl:      cholecalciferol, vitamin D3, 1,000 unit tablet, Take 2,000 Units by mouth daily. , Disp: , Rfl:      diltiazem (CARDIZEM CD) 120 MG 24 hr capsule, Take 1 capsule (120 mg total) by mouth daily., Disp: 30 capsule,  Rfl: 0     fluticasone-salmeterol (ADVAIR DISKUS) 250-50 mcg/dose DISKUS, Inhale 1 puff 2 (two) times a day., Disp: 60 each, Rfl: 11     furosemide (LASIX) 20 MG tablet, Take three (3) tablets by mouth daily Increase 6/19/17 from 40mg daily., Disp: 270 tablet, Rfl: 0     guaiFENesin ER (MUCINEX) 600 mg 12 hr tablet, Take 1,200 mg by mouth 2 (two) times a day., Disp: , Rfl:      naproxen (NAPROSYN) 500 MG tablet, Take 500 mg by mouth 2 (two) times a day with meals., Disp: , Rfl:      omeprazole (PRILOSEC) 20 MG capsule, TAKE 1 CAPSULE DAILY., Disp: 100 capsule, Rfl: 0     potassium chloride (KLOR-CON) 10 MEQ CR tablet, Take one (1) tablet by mouth three times daily (Patient taking differently: one tablet three times daily), Disp: 300 tablet, Rfl: 0     predniSONE (DELTASONE) 20 MG tablet, Take 2 tabs (40mg total) daily x 5 days, then 1 tab daily for 5 days, Disp: 15 tablet, Rfl: 0     psyllium (METAMUCIL) 3.4 gram packet, Take 1 packet by mouth daily. , Disp: , Rfl:      QUEtiapine (SEROQUEL) 25 MG tablet, Take 50 mg by mouth at bedtime. , Disp: , Rfl:      sodium chloride 3 % nebulizer solution, Take 4 mL by nebulization 2 (two) times a day. Using after albuterol neb., Disp: 360 mL, Rfl: 3     SPIRIVA WITH HANDIHALER 18 mcg inhalation capsule, use 1 capsule via inhaler 1 x a day, Disp: 90 capsule, Rfl: 11    Vital Signs: /64  Pulse 80  Temp 98.2  F (36.8  C) (Oral)   Resp 16  LMP  (LMP Unknown)      Assessment:    General:  Patient presents to clinic in no apparent distress.  Psychiatric:  Alert and oriented .   Lower extremity:  edema is present.    Integumentary:  Skin is uniformly warm, dry and pink.    Wound size:     Undermining NA.    The periwoundskin is NA      Vasc Edema 10/8/2018 10/12/2018 10/16/2018   Right just above MTP 23.5 23.5 23   Right Ankle 29 26.4 24.5   Right Widest Calf 41.5 35.5 39   Right Thigh Up 10cm 51.4 47 -   Left - just above MTP 22.8 22.6 22.4   Left Ankle 31.3 29 26.5    Left Widest Calf 43.4 37.5 45   Left Thigh Up 10cm 49 46 -       Plan:         1. Patient will follow up on 1 week until therapy begins     Cleansed with: Remedy skin cleanser    Protected skin with: Remedy Skin Repair    Dressings Applied: NA    Compression Applied to the Left Le-Layer Coban Lite    Compression Applied to the Right Le-Layer Coban Lite    Offloading applied: None    Trial Products: no  Provider notified regarding concerns: no  Treatment Changes: no    Educational Barriers: none  Taught Regarding: Acitivity, Compliance, Compression and Hygiene  Teaching Method: Exaplanation and DC sheet

## 2021-06-22 NOTE — ANESTHESIA PROCEDURE NOTES
Spinal Block    Patient location during procedure: OR  Start time: 1/2/2019 5:31 PM  End time: 1/2/2019 5:33 PM  Reason for block: primary anesthetic    Staffing:  Performing  Anesthesiologist: Prasanth Pickens MD    Preanesthetic Checklist  Completed: patient identified, risks, benefits, and alternatives discussed, timeout performed, consent obtained, at patient's request, airway assessed, oxygen available, suction available, emergency drugs available and hand hygiene performed  Spinal Block  Patient position: sitting  Prep: ChloraPrep  Patient monitoring: blood pressure, continuous pulse ox, cardiac monitor and heart rate  Approach: left paramedian  Location: L3-4  Injection technique: single-shot  Needle type: pencil-tip   Needle gauge: 24 G      Additional Notes:  Chart reviewed and patient examined prior to procedure.    Procedure and its risks, including, but not limited to headache, nerve damage, bleeding, infection, back pain, low blood pressure, cardiorespiratory arrest, and block failure were discussed with patient.    Opportunity for questions was given, and informed consent was obtained prior to procedure.      Strict sterile technique was used throughout, including hand foam, Chloroprep, sterile drape, sterile gloves, mask, and hat.    Skin and subcutaneous tissue was infiltrated with 2 ml lidocaine 1%.  Procedure required 1 attempt(s). Uncomplicated.      Prasanth Pickens MD

## 2021-06-22 NOTE — ANESTHESIA PREPROCEDURE EVALUATION
Anesthesia Evaluation      Patient summary reviewed   No history of anesthetic complications     Airway   Mallampati: II  Neck ROM: full   Pulmonary - negative ROS and normal exam    breath sounds clear to auscultation  (+) pneumonia, COPD moderate, shortness of breath,                          Cardiovascular - negative ROS and normal exam  (+) hypertension, dysrhythmias, CHF, ,     Rhythm: irregular  Rate: abnormal,         Neuro/Psych - negative ROS     Endo/Other - negative ROS   (+) diabetes mellitus,      GI/Hepatic/Renal - negative ROS   (+) GERD well controlled,             Dental    (+) poor dentition                       Anesthesia Plan  Planned anesthetic: spinal    Chart reviewed. Patient examined.    Spinal block procedure and its risks, including, but not limited to headache, nerve damage, bleeding, infection, back pain, low blood pressure, cardiorespiratory arrest, and block failure were discussed fully with patient.  Opportunity for questions given.   Discussed DNR status. Plan to suspend DNR during periop period.  Informed consent obtained. Desires to proceed.  Routine sedation meds.       ASA 3     Anesthetic plan and risks discussed with: patient, spouse and child/children

## 2021-06-22 NOTE — TELEPHONE ENCOUNTER
Clinic Care Coordination Contact  Care Coordination Transition Communication    Referral Source: IP Report    Clinical Data:   Discharge Date: 1/5/2019    Diet: Low sodium Code Status: DNR   Activity: Per rehab recommendations        Condition at Discharge: Stable      REASON FOR PRESENTATION(See Admission Note for Details)   Hip pain     PRINCIPAL DISCHARGE DIAGNOSIS   Left hip fracture/collapse of avascular necrosis  Acute on chronic respiratory failure with hypoxia  Acute on chronic congestive heart failure    Transition to Facility:              [facility]            Plan: RN/SW Care Coordinator will await notification from facility staff informing RN/SW Care Coordinator of patient's discharge plans/needs. RN/SW Care Coordinator will review chart and outreach to facility staff every 4 weeks and as needed.     Stephania Romeo,   [facility]  [email or web address]  [phone]

## 2021-06-22 NOTE — PROGRESS NOTES
[facility]      NAME:  Adalgisa Solano             :  1935    MRN: [id number]    CODE STATUS:  FULL CODE    FACILITY: [facility] [[id number]]       CHIEF COMPLAIN/REASON FOR VISIT:  Chief Complaint   Patient presents with     Problem Visit       HISTORY OF PRESENT ILLNESS: Adalgisa Solano is a 83 y.o. female being seen for review of multiple medical conditions.She is scheduled to leave for ortho apt due to increased left knee pain and is having some pain this am. I have requested nursing get her pain medication, she is on oxycodone 5 mg po Q4, and this is a request to give early so she will be comfortable for transfer. She is seen finishing up with therapy this am, d/t pain had a session that was slow and not progressive today.  She comes to the TCU from [facility] where she was a pt from  to 2018.PMH of left lower extremity DVT, ahe  presented with left leg erythema and was diagnosed with cellulitis.  She was  started on vancomycin and ceftriaxone which she later descalated to ceftriaxone by ID. She developed Afib RVR and acute diastolic CHF exacerbation in this admission and Cardizem dose increased from 180 to 120 mg two times a day. She developed acute shortness of breath with mildly elevated BNP and mild interstitial edema in CXR. Received IV Lasix and her symptoms significantly improved. She is on   Rivaroxabon  20 mg daily for anticoagulation.  H/O Schizoaffective disorder, see med list, on seroquel as well as abilify, stable behaviors, staff reports a bit demanding.Uses 02, was 02 dependant at time, 02 sats stable.Seen while ambulating with therapy today.       Allergies   Allergen Reactions     Amoxicillin Itching and Rash     Levofloxacin Itching and Rash     Penicillins Itching and Rash     Has tolerated ceftriaxone.   :     Current Outpatient Medications   Medication Sig     acetaminophen (TYLENOL) 500 MG tablet Take 1,000 mg by mouth  every 6 (six) hours as needed for pain (two tablets three times daily) .           albuterol (PROAIR HFA;PROVENTIL HFA;VENTOLIN HFA) 90 mcg/actuation inhaler Inhale 2 puffs every 4 (four) hours as needed for wheezing.     albuterol (PROVENTIL) 2.5 mg /3 mL (0.083 %) nebulizer solution 3 ML INHALATION Four times a day As Needed Use before nebulized saline. For shortness of breath. (Patient taking differently: Take 2.5 mg by nebulization every 6 (six) hours as needed for wheezing. 3 ML INHALATION Four times a day As Needed Use before nebulized saline. For shortness of breath.)     ARIPiprazole (ABILIFY) 5 MG tablet Take 5 mg by mouth bedtime.     aspirin 81 MG EC tablet Take 81 mg by mouth daily.     atorvastatin (LIPITOR) 20 MG tablet Take 20 mg by mouth at bedtime.     bumetanide (BUMEX) 2 MG tablet Take 2 mg by mouth daily.     calcium carbonate-vitamin D3 (CALCIUM WITH VITAMIN D) 600 mg(1,500mg) -400 unit per tablet Take 1 tablet by mouth 2 (two) times a day.     cholecalciferol, vitamin D3, 1,000 unit tablet Take 2,000 Units by mouth daily.      diltiazem (CARDIZEM CD) 120 MG 24 hr capsule Take 1 capsule (120 mg total) by mouth 2 (two) times a day.     docusate sodium (COLACE) 100 MG capsule Take 200 mg by mouth daily.     fluticasone-salmeterol (ADVAIR DISKUS) 250-50 mcg/dose DISKUS Inhale 1 puff 2 (two) times a day.     HYDROcodone-acetaminophen 5-325 mg per tablet Take 1 tablet by mouth every 4 (four) hours as needed for pain.     omeprazole (PRILOSEC) 20 MG capsule TAKE 1 CAPSULE DAILY.     OXYGEN-AIR DELIVERY SYSTEMS MISC 3 L into each nostril continuous. Pheumonia/ respiratory distress.     potassium chloride (K-DUR,KLOR-CON) 10 MEQ tablet Take 1 tablet (10 mEq total) by mouth daily.     psyllium (METAMUCIL) 3.4 gram packet Take 1 packet by mouth daily.     QUEtiapine (SEROQUEL) 25 MG tablet Take 50 mg by mouth at bedtime.      rivaroxaban 20 mg Tab Take 1 tablet (20 mg total) by mouth daily with supper.      senna (SENOKOT) 8.6 mg tablet Take 1 tablet by mouth daily.     sodium chloride 3 % nebulizer solution Take 4 mL by nebulization 2 (two) times a day. Using after albuterol neb. (Patient taking differently: Take 4 mL by nebulization 2 (two) times a day as needed. Using after albuterol neb.)     tiotropium (SPIRIVA) 18 mcg inhalation capsule Place 18 mcg into inhaler and inhale daily.         REVIEW OF SYSTEMS:    Currently, no fever, chills, or rigors. Does not have any visual or hearing problems. Denies any chest pain, headaches, palpitations, lightheadedness, dizziness, shortness of breath, or cough. Appetite is good. Denies any GERD symptoms. Denies any difficulty with swallowing, nausea, or vomiting.  Denies any abdominal pain, diarrhea or constipation. Denies any urinary symptoms. No insomnia. No active bleeding. Reports groin rash.       PHYSICAL EXAMINATION:  Vitals:    12/17/18 1943   BP: 122/60   Pulse: 97   Temp: 98.2  F (36.8  C)   Weight: 160 lb (72.6 kg)         GENERAL: Awake, Alert, oriented x3, not in any form of acute distress, answers questions appropriately, follows simple commands, conversant  HEENT: Head is normocephalic with normal hair distribution. No evidence of trauma. Ears: No acute purulent discharge. Eyes: Conjunctivae pink with no scleral jaundice. Nose: Normal mucosa and septum.   CHEST: No tenderness or deformity, no crepitus  LUNG: Clear to auscultation with good chest expansion. There are no crackles or wheezes, normal AP diameter.  BACK: No kyphosis of the thoracic spine. Symmetric, no curvature, ROM normal, no CVA tenderness, no spinal tenderness   CVS: There is good S1  S2, there are no murmurs, rubs, gallops, or heaves, rhythm is irregular.  ABDOMEN: Globular and soft, nontender to palpation, non distended, no masses, no organomegaly, good bowel sounds, no rebound or guarding, no peritoneal signs.   EXTREMITIES: Atraumatic. Full range of motion on both upper and lower  extremities,left k nee painful there is no tenderness to palpation, + pedal edema, no cyanosis or clubbing, no calf tenderness, normal cap refill, no joint swelling.  SKIN: See HPI  NEUROLOGICAL: The patient is oriented to person, place and time. Strength and sensation are grossly intact. Face is symmetric.Calm, w/o behaviors.                    LABS:    Lab Results   Component Value Date    WBC 7.7 11/29/2018    HGB 9.6 (L) 11/29/2018    HCT 30.4 (L) 11/29/2018    MCV 80 11/29/2018     11/29/2018       Results for orders placed or performed in visit on 12/17/18   Basic Metabolic Panel   Result Value Ref Range    Sodium 126 (L) 136 - 145 mmol/L    Potassium 3.9 3.5 - 5.0 mmol/L    Chloride 89 (L) 98 - 107 mmol/L    CO2 27 22 - 31 mmol/L    Anion Gap, Calculation 10 5 - 18 mmol/L    Glucose 115 70 - 125 mg/dL    Calcium 8.7 8.5 - 10.5 mg/dL    BUN 10 8 - 28 mg/dL    Creatinine 0.58 (L) 0.60 - 1.10 mg/dL    GFR MDRD Af Amer >60 >60 mL/min/1.73m2    GFR MDRD Non Af Amer >60 >60 mL/min/1.73m2           Lab Results   Component Value Date    HGBA1C 6.8 (H) 11/09/2018     No results found for: [id number]  No results found for: [id number]    ASSESSMENT/PLAN:  1. Cellulitis of left lower extremity    2. Primary osteoarthritis of left knee      1. Admitted for cellulitis, resolving but still with leg wraps d/t edema, no erythema or drainage noted to legs.    2. Knee pain, left side: to see ortho in one hour, did ok extra prn oxycodone due to pain and apt, discomfort during transfer to apt.  MCS will follow throughout TCU stay and actively participate in the POC of treatment.    Electronically signed by:  Ivana Landrum CNP  This progress note was completed using Dragon software and there may be grammatical errors.      15 minutes spent of which greater than 50 % was face to face communication with the patient and spouse  about above plan of care
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
## 2021-06-22 NOTE — ANESTHESIA POSTPROCEDURE EVALUATION
Patient: Adalgisa Solano  LEFT TOTAL HIP ARTHROPLASTY  Anesthesia type: spinal    Patient location: PACU  Last vitals:   Vitals:    01/02/19 2250   BP: 121/71   Pulse: (!) 103   Resp: 18   Temp: 36.5  C (97.7  F)   SpO2: 95%     Post vital signs: stable  Level of consciousness: awake and responds to simple questions  Post-anesthesia pain: pain controlled  Post-anesthesia nausea and vomiting: no  Pulmonary: unassisted, return to baseline  Cardiovascular: stable and blood pressure at baseline  Hydration: adequate  Anesthetic events: no    QCDR Measures:  ASA# 11 - Pham-op Cardiac Arrest: ASA11B - Patient did NOT experience unanticipated cardiac arrest  ASA# 12 - Pham-op Mortality Rate: ASA12B - Patient did NOT die  ASA# 13 - PACU Re-Intubation Rate: ASA13B - Patient did NOT require a new airway mgmt  ASA# 10 - Composite Anes Safety: ASA10A - No serious adverse event    Additional Notes:

## 2021-06-22 NOTE — PROGRESS NOTES
[facility]    DATE: 2019    NAME:  Adalgisa Solano             :  1935  MRN: [id number]  CODE STATUS:  DNR    VISIT TYPE: Problem Visit (hospital f/u)     FACILITY:  [facility] [[id number]]       CHIEF COMPLAIN/REASON FOR VISIT:    Chief Complaint   Patient presents with     Problem Visit     hospital f/u               HISTORY OF PRESENT ILLNESS: Adalgisa Solano is a 83 y.o. female who was admitted - for left leg pain and  unable to care for her at home. She was found to have left femoral neck fracture on CT. She was treated for acute on chronic CHF and severe cOPD exacerbation. She was diuresed with IV lasix and transitioned to PO. She was followed by pulmonology and cardiology for stabilization prior to surgery. Her xarelto was held and bridged with lovenox until surgery was performed on . Postop on  she had increased shortness of breath and weakness and lasix was increased. She was put on prednisone taper for copd exacerbation. She did have sputum cultures positive for pseudomonas and klebsiella during stay and completed doxycycline on . She was started on cefepime on  and completed 7 days. She also had acute hyponatremia that improved with fluid status stabilization. She has PMH of DVT 1 month prior to hospital stay on xarelto, PAF, COPD, chronic respiratory failure on 1LNC, CHF, avascular necrosis of Left hip, chronic lymphedema, recurrent pneumonias. Prior to this she lived with her .     Today Ms. Solano states she has some concerns about her meds. She says she takes miralax instead of metamucil and would like this changed. She also doesn't take the spiriva if she is on the duonebs because it is the same medication and she thinks she will overdose on this. She does this at home as well. She reports that she also puts triamcinolone cream on her legs under her leg wraps to help with itching  and preventing cellulitis. She says her primary told her to do this. She does lymphedema wraps on her legs at home, well her  does this for her. She would like to continue this here. She wants the duonebs and hypertonic saline nebs three times a day scheduled and then as needed. She says at home she wears oxygen 1-2 L all the time. She has a portable pack and has had that for about 2 years. She says she has had the oxygen for about 3-4 years. She did not ambulate with any sort of device prior to this hospital stay. She was completely independent. She hopes to return home the same way. She says she is using a walker now though. She feels her breathing is about back to baseline but still coughing more than normal for her. She thinks she will be fine though once she gets her nebs the way she wants them. She denies any fevers or chills at all. She did not sleep last night very well, but wants to see how it goes the next few nights. She is eating ok, but definitely not as good as she usually does at home. She denies any nausea, vomiting indigestion. She has not had a bm for a couple days but did have one in hospital that was soft, nearly liquid and now nothing. She does feel a little bloated. She thinks she will be fine once she gets back on her miralax. She does have pian in back and hip from surgery but she feels it is controlled with pain meds. She cannot see her incision so not sure if any issues with it. She denies any numbness or tingling in legs. She has leg soreness from walking but says she has the least amount of pain when she is resting in ed. She denies any side effects from the dilaudid.     REVIEW OF SYSTEMS:  PROBLEMS AND REVIEW OF SYSTEMS:   Review of Systems  Today on ROS:   Currently, no fever, chills, or rigors. Decreased vision and hearing. Denies any chest pain, headaches, palpitations, lightheadedness, dizziness. Appetite is good. Denies any GERD symptoms. Denies any difficulty with  swallowing, nausea, or vomiting.   Denies any urinary symptoms. No active bleeding. No rash.positive for chronic o2 1-2 LNC at home, left leg and back pain, shortness of breath, congested cough, constipation, lymphedema and wraps, insomnia      Allergies   Allergen Reactions     Amoxicillin Itching and Rash     Levofloxacin Itching and Rash     Penicillins Itching and Rash     Has tolerated ceftriaxone.     Current Outpatient Medications   Medication Sig     polyethylene glycol (MIRALAX) 17 gram packet Take 17 g by mouth daily.     acetaminophen (TYLENOL) 500 MG tablet Take 2 tablets (1,000 mg total) by mouth 3 (three) times a day.     ADVAIR DISKUS 250-50 mcg/dose DISKUS INHALE 1 PUFF 2 (TWO) TIMES A DAY.     albuterol (PROVENTIL) 2.5 mg /3 mL (0.083 %) nebulizer solution 3 ML INHALATION FOUR TIMES A DAY AS NEEDED USE BEFORE NEBULIZED SALINE. FOR SHORTNESS OF BREATH.     ARIPiprazole (ABILIFY) 5 MG tablet TAKE 1 TABLET EVERY NIGHT AT BEDTIME     aspirin 81 MG EC tablet Take 81 mg by mouth daily.     atorvastatin (LIPITOR) 20 MG tablet TAKE 1 TABLET (20 MG TOTAL) BY MOUTH BEDTIME.     calcium carbonate-vitamin D3 (CALCIUM WITH VITAMIN D) 600 mg(1,500mg) -400 unit per tablet Take 1 tablet by mouth 2 (two) times a day.     cholecalciferol, vitamin D3, 1,000 unit tablet Take 2,000 Units by mouth daily.      diltiazem (CARDIZEM CD) 180 MG 24 hr capsule Take 1 capsule (180 mg total) by mouth 2 (two) times a day.     docusate sodium (COLACE) 100 MG capsule Take 200 mg by mouth daily.     ferrous sulfate 325 (65 FE) MG tablet Take 1 tablet by mouth daily with breakfast.     furosemide (LASIX) 40 MG tablet Take 1 tablet (40 mg total) by mouth 2 (two) times a day at 9am and 6pm.     HYDROmorphone (DILAUDID) 2 MG tablet Take 0.5-1 tablets (1-2 mg total) by mouth every 6 (six) hours as needed.     ipratropium-albuterol (DUO-NEB) 0.5-2.5 mg/3 mL nebulizer Take 3 mL by nebulization every 4 (four) hours as needed (shortness of  breath and wheezing). (Patient taking differently: Take 3 mL by nebulization 3 (three) times a day.       )     lidocaine 4 % patch Place 1 patch on the skin daily. Remove and discard patch with 12 hours or as directed by MD.     magnesium oxide (MAG-OX) 400 mg (241.3 mg magnesium) tablet Take 1 tablet (400 mg total) by mouth daily.     omeprazole (PRILOSEC) 20 MG capsule Take 20 mg by mouth daily before breakfast.     potassium chloride (KLOR-CON) 10 MEQ CR tablet TAKE 1 TABLET (10 MEQ TOTAL) BY MOUTH DAILY.     predniSONE (DELTASONE) 10 mg tablet Take 10 mg by mouth daily with breakfast for 3 days.     PROAIR HFA 90 mcg/actuation inhaler INHALE 2 PUFFS EVERY 4 (FOUR) HOURS AS NEEDED FOR WHEEZING.     QUEtiapine (SEROQUEL) 25 MG tablet TAKE 2 TABLETS AT BEDTIME=50MG     rivaroxaban 15 mg Tab Take 1 tablet (15 mg total) by mouth 2 (two) times a day with meals for 21 days. Change to Xarelto 20 mg daily on 1/25/2019     sodium chloride 3 % nebulizer solution TAKE 4 ML BY NEBULIZATION 2 (TWO) TIMES A DAY. USING AFTER ALBUTEROL NEB. (Patient taking differently: Take 4 mL by nebulization 3 (three) times a day. Using after albuterol neb.      )     SPIRIVA WITH HANDIHALER 18 mcg inhalation capsule USE 1 CAPSULE VIA INHALER 1 X A DAY (Patient taking differently: daily prn)     Past Medical History:    Past Medical History:   Diagnosis Date     Acute and chronic respiratory failure with hypoxia (H)      Arm skin lesion, right      Arnold-Chiari malformation (H) 1998     Atrial fibrillation (H) 02/2017     Breast cyst      Breast lump      Chronic diastolic heart failure (H)     diastolic chf     Chronic respiratory failure with hypoxia (H) 3/13/2017     COPD (chronic obstructive pulmonary disease) (H)      COPD exacerbation (H)      Depression      Diet-controlled type 2 diabetes mellitus (H)      GERD (gastroesophageal reflux disease)      Hyperlipidemia      Hypertension      Lumbar spinal stenosis      Peripheral  arterial disease (H) 10/28/2015     Pulmonary hypertension (H) 3/5/2018     Recurrent major depressive episodes (H)     Created by Conversion  Replacement Utility updated for latest IMO load     Schizoaffective disorder, bipolar type (H) 6/11/2015     Type 2 diabetes mellitus without complication (H) 8/2/2016           PHYSICAL EXAMINATION  Vitals:    01/06/19 2136   BP: 124/60   Pulse: 80   Resp: 22   Temp: 97.7  F (36.5  C)   SpO2: 94%   Weight: 157 lb (71.2 kg)       Today on physical exam:     GENERAL: Awake, Alert, oriented x3, not in any form of acute distress, answers questions appropriately, follows simple commands, conversant  HEENT: Head is normocephalic with normal hair distribution. No evidence of trauma. Ears: No acute purulent discharge. Eyes: Conjunctivae pink with no scleral jaundice. Nose: Normal mucosa and septum. NECK: Supple with no cervical or supraclavicular lymphadenopathy. Trachea is midline.   CHEST: No tenderness or deformity, no crepitus  LUNG: dim with scattered rhonchi to auscultation with good chest expansion. normal AP diameter. Congested cough  BACK: No kyphosis of the thoracic spine. Symmetric, no curvature, ROM normal, no CVA tenderness, no spinal tenderness   CVS: irregularly irregular rhythm, there are no murmurs, rubs, gallops, or heaves,  2+ pulses symmetric in all extremities.  ABDOMEN: Rounded and soft, nontender to palpation, mildly distended, no masses, no organomegaly, good bowel sounds, no rebound or guarding, no peritoneal signs.   EXTREMITIES: 2+ LLE pedal edema, 1+ rle, lymphedema wraps, left hip incision c/d/i  SKIN: Warm and dry  NEUROLOGICAL: The patient is oriented to person, place and time.             LABS:   Recent Results (from the past 168 hour(s))   POCT Glucose   Result Value Ref Range    Glucose,  mg/dL   POCT Glucose   Result Value Ref Range    Glucose,  mg/dL   Basic Metabolic Panel   Result Value Ref Range    Sodium 139 136 - 145 mmol/L     Potassium 3.8 3.5 - 5.0 mmol/L    Chloride 104 98 - 107 mmol/L    CO2 30 22 - 31 mmol/L    Anion Gap, Calculation 5 5 - 18 mmol/L    Glucose 125 70 - 125 mg/dL    Calcium 7.9 (L) 8.5 - 10.5 mg/dL    BUN 15 8 - 28 mg/dL    Creatinine 0.52 (L) 0.60 - 1.10 mg/dL    GFR MDRD Af Amer >60 >60 mL/min/1.73m2    GFR MDRD Non Af Amer >60 >60 mL/min/1.73m2   Hemoglobin - Daily x 2   Result Value Ref Range    Hemoglobin 9.7 (L) 12.0 - 16.0 g/dL   POCT Glucose   Result Value Ref Range    Glucose,  mg/dL   POCT Glucose   Result Value Ref Range    Glucose,  mg/dL   POCT Glucose   Result Value Ref Range    Glucose,  mg/dL   POCT Glucose   Result Value Ref Range    Glucose,  mg/dL   Crossmatch   Result Value Ref Range    Crossmatch Compatible     Blood Expiration Date [id number]     Unit Type A Pos     Unit Number [id number]     Status Transfused     Component Red Blood Cells     PRODUCT CODE F1391H05     Issue Date and Time [id number]     Blood Type 6200     CODING SYSTEM [id number]    Basic Metabolic Panel   Result Value Ref Range    Sodium 136 136 - 145 mmol/L    Potassium 4.1 3.5 - 5.0 mmol/L    Chloride 101 98 - 107 mmol/L    CO2 31 22 - 31 mmol/L    Anion Gap, Calculation 4 (L) 5 - 18 mmol/L    Glucose 113 70 - 125 mg/dL    Calcium 7.9 (L) 8.5 - 10.5 mg/dL    BUN 15 8 - 28 mg/dL    Creatinine 0.48 (L) 0.60 - 1.10 mg/dL    GFR MDRD Af Amer >60 >60 mL/min/1.73m2    GFR MDRD Non Af Amer >60 >60 mL/min/1.73m2   Hemoglobin - Daily x 2   Result Value Ref Range    Hemoglobin 9.4 (L) 12.0 - 16.0 g/dL   Vitamin D, Total (25-Hydroxy)   Result Value Ref Range    Vitamin D, Total (25-Hydroxy) 39.6 30.0 - 80.0 ng/mL   POCT Glucose   Result Value Ref Range    Glucose,  mg/dL   POCT Glucose   Result Value Ref Range    Glucose,  mg/dL   POCT Glucose   Result Value Ref Range    Glucose,  mg/dL   POCT Glucose   Result Value Ref Range    Glucose,  mg/dL   Basic Metabolic Panel    Result Value Ref Range    Sodium 136 136 - 145 mmol/L    Potassium 3.9 3.5 - 5.0 mmol/L    Chloride 98 98 - 107 mmol/L    CO2 31 22 - 31 mmol/L    Anion Gap, Calculation 7 5 - 18 mmol/L    Glucose 119 70 - 125 mg/dL    Calcium 8.1 (L) 8.5 - 10.5 mg/dL    BUN 10 8 - 28 mg/dL    Creatinine 0.57 (L) 0.60 - 1.10 mg/dL    GFR MDRD Af Amer >60 >60 mL/min/1.73m2    GFR MDRD Non Af Amer >60 >60 mL/min/1.73m2   Magnesium   Result Value Ref Range    Magnesium 1.6 (L) 1.8 - 2.6 mg/dL   POCT Glucose   Result Value Ref Range    Glucose,  mg/dL   POCT Glucose   Result Value Ref Range    Glucose,  mg/dL   Crossmatch   Result Value Ref Range    Crossmatch Compatible     Blood Expiration Date [id number]     Unit Type A Pos     Unit Number [id number]     Status Released     Component Red Blood Cells     PRODUCT CODE [id number]     Blood Type 6200     CODING SYSTEM [id number]    Basic Metabolic Panel   Result Value Ref Range    Sodium 135 (L) 136 - 145 mmol/L    Potassium 3.9 3.5 - 5.0 mmol/L    Chloride 97 (L) 98 - 107 mmol/L    CO2 28 22 - 31 mmol/L    Anion Gap, Calculation 10 5 - 18 mmol/L    Glucose 154 (H) 70 - 125 mg/dL    Calcium 8.4 (L) 8.5 - 10.5 mg/dL    BUN 10 8 - 28 mg/dL    Creatinine 0.58 (L) 0.60 - 1.10 mg/dL    GFR MDRD Af Amer >60 >60 mL/min/1.73m2    GFR MDRD Non Af Amer >60 >60 mL/min/1.73m2   Magnesium   Result Value Ref Range    Magnesium 1.7 (L) 1.8 - 2.6 mg/dL   HM1 (CBC with Diff)   Result Value Ref Range    WBC 12.2 (H) 4.0 - 11.0 thou/uL    RBC 3.43 (L) 3.80 - 5.40 mill/uL    Hemoglobin 8.9 (L) 12.0 - 16.0 g/dL    Hematocrit 27.9 (L) 35.0 - 47.0 %    MCV 81 80 - 100 fL    MCH 25.9 (L) 27.0 - 34.0 pg    MCHC 31.9 (L) 32.0 - 36.0 g/dL    RDW 19.1 (H) 11.0 - 14.5 %    Platelets 504 (H) 140 - 440 thou/uL    MPV 10.3 8.5 - 12.5 fL    Neutrophils % 85 (H) 50 - 70 %    Lymphocytes % 8 (L) 20 - 40 %    Monocytes % 7 2 - 10 %    Eosinophils % 0 0 - 6 %    Basophils % 0 0 - 2 %    Neutrophils  Absolute 10.2 (H) 2.0 - 7.7 thou/uL    Lymphocytes Absolute 1.0 0.8 - 4.4 thou/uL    Monocytes Absolute 0.9 0.0 - 0.9 thou/uL    Eosinophils Absolute 0.1 0.0 - 0.4 thou/uL    Basophils Absolute 0.0 0.0 - 0.2 thou/uL     Results for orders placed or performed during the hospital encounter of 12/21/18   Basic Metabolic Panel   Result Value Ref Range    Sodium 136 136 - 145 mmol/L    Potassium 3.9 3.5 - 5.0 mmol/L    Chloride 98 98 - 107 mmol/L    CO2 31 22 - 31 mmol/L    Anion Gap, Calculation 7 5 - 18 mmol/L    Glucose 119 70 - 125 mg/dL    Calcium 8.1 (L) 8.5 - 10.5 mg/dL    BUN 10 8 - 28 mg/dL    Creatinine 0.57 (L) 0.60 - 1.10 mg/dL    GFR MDRD Af Amer >60 >60 mL/min/1.73m2    GFR MDRD Non Af Amer >60 >60 mL/min/1.73m2         Lab Results   Component Value Date    WBC 12.2 (H) 01/08/2019    HGB 8.9 (L) 01/08/2019    HCT 27.9 (L) 01/08/2019    MCV 81 01/08/2019     (H) 01/08/2019       No results found for: [id number]  Lab Results   Component Value Date    HGBA1C 6.8 (H) 11/09/2018     Lab Results   Component Value Date    INR 1.63 (H) 11/23/2018    INR 1.19 (H) 11/08/2018    INR 0.95 02/14/2017     Vitamin D, Total (25-Hydroxy)   Date Value Ref Range Status   01/04/2019 39.6 30.0 - 80.0 ng/mL Final     Lab Results   Component Value Date    TSH 1.33 11/08/2018           ASSESSMENT/PLAN:    1. Left femoral neck fracture, s/p REGINA: Incision c/d/i. Tylenol three times a day, lidoderm patch, dilaudid prn. Pain controlled. F/u with ortho Dr. Lackey in 2 weeks.   2. COPD: On advair two times a day, albuterol four times a day prn. Will change duonebs and 3% saline nebs to three times a day and prn per her request for congested cough. Changed spiriva to prn. Educated her on long v short acting meds, but insisting cannot take spiriva while on duonebs. Prednisone taper. proair prn. Completed doxycycline on 12/28.   3. Chronic CHF: Daily [id number]. On lasix two times a day. Shortness of breath stable. Edema 2+  lle, 1+ rle, lymphedema wraps. Stable. Monitor.   4. H/o DVT: On xarelto.   5. HLD: On aspirin, lipitor  6. PAF: Rate controlled 80s. On cardizem, xarelto.   7. Constipation: On colace daily, will dc metamucil and add miralax per her request.   8. Iron deficiency anemia: On iron.   9. GERD: On omeprazole.   10. Hypokalemia: On kcl.   11. Hypomagnesemia: On mag ox.   12. Schizoaffective disorder, bipolar type: On seroquel and abilify. Follows with psych.   13. Osteoporosis: On calcium, vitamin d.     Bmp and cbc were ordered for today by hospital, mg level 1/9    Per therapy eval today  Electronically signed by: Faby Ku NP    Total 45  minutes of which 55% was spent in counseling and coordination of care of the above plan    Time spent in interview and examination of patient, review of available records, and discussion with nursing staff. Continue care plan, efforts at therapy, and monitor nutritional status.

## 2021-06-22 NOTE — ANESTHESIA CARE TRANSFER NOTE
Last vitals:   Vitals:    01/02/19 2059   BP: 149/67   Pulse: 86   Resp: 17   Temp: 36.8  C (98.2  F)   SpO2: 98%     Patient's level of consciousness is awake and drowsy  Spontaneous respirations: yes  Maintains airway independently: yes  Dentition unchanged: yes  Oropharynx: oropharynx clear of all foreign objects    QCDR Measures:  ASA# 20 - Surgical Safety Checklist: WHO surgical safety checklist completed prior to induction    PQRS# 430 - Adult PONV Prevention: [id number] - Pt did NOT receive => 2 anti-emetic agents  ASA# 8 - Peds PONV Prevention: NA - Not pediatric patient, not GA or 2 or more risk factors NOT present  PQRS# 424 - Pham-op Temp Management: 4559F - At least one body temp DOCUMENTED => 35.5C or 95.9F within required timeframe  PQRS# 426 - PACU Transfer Protocol: - Transfer of care checklist used  ASA# 14 - Acute Post-op Pain: ASA14B - Patient did NOT experience pain >= 7 out of 10

## 2021-06-22 NOTE — PROGRESS NOTES
[facility]    Facility:   [facility] [[id number]]   Code Status: FULL CODE  PCP: Shai Ellington MD   Phone: [phone]   Fax: [phone]      CHIEF COMPLAINT/REASON FOR VISIT:  Chief Complaint   Patient presents with     Discharge Summary       HISTORY COURSE:  Patient examined in the presence of her .  She has underlying history of COPD with hypoxic respiratory failure.  Currently stable no cough no congestion but remains on oxygen weaning attempt has been unsuccessful in the TCU and she will discharged home on her home oxygen regimen.  Has chronic lymphedema which has been refractory to treatment.  Recently switched from Lasix to Bumex  Lymphedema seems to have improved somewhat  She continues to have chronic back pain and hip pain which is limiting her ability to move she is moving over the walker.  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  Constipation remains a chronic issue with her.  Apparently she had a blood clot in her leg and her cellulitis has resolved she had a Doppler ultrasound done in the ER which revealed that the blood clot has resolved to she was told to follow-up with her regular physician to discuss follow-up issues  Discharge labs also show significant hyponatremia with a sodium of 126  Medication review done with patient and .  We are suspecting that hyponatremia most likely induced by her psychotropic medications    Review of Systems   Constitutional: Negative.  Negative for fever, chills,HAS  activity change, appetite change and fatigue.   HENT: Negative for congestion and facial  swelling.    Eyes: Negative for photophobia, redness and visual disturbance.   Respiratory: Negative for cough and chest tightness.    Cardiovascular: Negative for chest pain, palpitations and has leg swelling.   Gastrointestinal: Negative for nausea, diarrhea, constipation, blood in stool and abdominal distention.   Genitourinary: Negative.    Musculoskeletal: Negative.  Was having more hip pain and has chronic back pain  Skin: Negative.    Neurological: Negative for dizziness, tremors, syncope, weakness, light-headedness and headaches.   Hematological: Does not bruise/bleed easily.   Psychiatric/Behavioral: Negative.  anxious        Physical Exam   Weight is 160 pounds blood pressure 123/72 pulse 80  Constitutional: Oriented to person, place, and time and appears well-developed. On o2  HEENT:  Normocephalic and atraumatic.  Eyes: Conjunctivae and EOM are normal. Pupils are equal, round, and reactive to light. No discharge.  No scleral icterus. Nose normal. Mouth/Throat: Oropharynx is clear and moist. No oropharyngeal exudate.    NECK: Normal range of motion. Neck supple. No JVD present. No tracheal deviation present. No thyromegaly present.   CARDIOVASCULAR: Normal rate, regular rhythm and intact distal pulses.  Exam reveals no gallop and no friction rub.  Systolic murmur present.  PULMONARY: Effort normal and breath sounds normal. No respiratory distress.No Wheezing or rales.  ABDOMEN: Soft. Bowel sounds are normal. No distension and no mass.  There is no tenderness. There is no rebound and no guarding. No HSM.  MUSCULOSKELETAL: Normal range of motion. Has leg edema and no tenderness. Mild kyphosis, no tenderness.  LYMPH NODES: Has no cervical, supraclavicular, axillary and groin adenopathy.   NEUROLOGICAL: Alert and oriented to person, place, and time. No cranial nerve deficit.  Normal muscle tone. Coordination normal.   GENITOURINARY: Deferred exam.  SKIN: Skin is warm and dry. No rash noted. No erythema. No  pallor.   EXTREMITIES: No cyanosis, no clubbing, no edema. No Deformity.  PSYCHIATRIC: Normal mood, affect and behavior.    MEDICATION LIST:  Updated Medication list, printed and signed at discharge, please refer to that final medication list from the [facility] for accuracy.    Current Outpatient Medications   Medication Sig     acetaminophen (TYLENOL) 500 MG tablet Take 1,000 mg by mouth every 6 (six) hours as needed for pain (two tablets three times daily) .           albuterol (PROAIR HFA;PROVENTIL HFA;VENTOLIN HFA) 90 mcg/actuation inhaler Inhale 2 puffs every 4 (four) hours as needed for wheezing.     albuterol (PROVENTIL) 2.5 mg /3 mL (0.083 %) nebulizer solution 3 ML INHALATION Four times a day As Needed Use before nebulized saline. For shortness of breath. (Patient taking differently: Take 2.5 mg by nebulization every 6 (six) hours as needed for wheezing. 3 ML INHALATION Four times a day As Needed Use before nebulized saline. For shortness of breath.)     ARIPiprazole (ABILIFY) 5 MG tablet Take 5 mg by mouth bedtime.     aspirin 81 MG EC tablet Take 81 mg by mouth daily.     atorvastatin (LIPITOR) 20 MG tablet Take 20 mg by mouth at bedtime.     bumetanide (BUMEX) 2 MG tablet Take 2 mg by mouth daily.     calcium carbonate-vitamin D3 (CALCIUM WITH VITAMIN D) 600 mg(1,500mg) -400 unit per tablet Take 1 tablet by mouth 2 (two) times a day.     cholecalciferol, vitamin D3, 1,000 unit tablet Take 2,000 Units by mouth daily.      diltiazem (CARDIZEM CD) 120 MG 24 hr capsule Take 1 capsule (120 mg total) by mouth 2 (two) times a day.     docusate sodium (COLACE) 100 MG capsule Take 200 mg by mouth daily.     fluticasone-salmeterol (ADVAIR DISKUS) 250-50 mcg/dose DISKUS Inhale 1 puff 2 (two) times a day.     HYDROcodone-acetaminophen 5-325 mg per tablet Take 1 tablet by mouth every 4 (four) hours as needed for pain.     omeprazole (PRILOSEC) 20 MG capsule TAKE 1 CAPSULE DAILY.     OXYGEN-AIR DELIVERY  SYSTEMS MISC 3 L into each nostril continuous. Pheumonia/ respiratory distress.     potassium chloride (K-DUR,KLOR-CON) 10 MEQ tablet Take 1 tablet (10 mEq total) by mouth daily.     psyllium (METAMUCIL) 3.4 gram packet Take 1 packet by mouth daily.     QUEtiapine (SEROQUEL) 25 MG tablet Take 50 mg by mouth at bedtime.      rivaroxaban 20 mg Tab Take 1 tablet (20 mg total) by mouth daily with supper.     senna (SENOKOT) 8.6 mg tablet Take 1 tablet by mouth daily.     sodium chloride 3 % nebulizer solution Take 4 mL by nebulization 2 (two) times a day. Using after albuterol neb. (Patient taking differently: Take 4 mL by nebulization 2 (two) times a day as needed. Using after albuterol neb.)     tiotropium (SPIRIVA) 18 mcg inhalation capsule Place 18 mcg into inhaler and inhale daily.       DISCHARGE DIAGNOSIS:    ICD-10-CM    1. Chronic atrial fibrillation (H) I48.2    2. Pneumonia of left lung due to infectious organism, unspecified part of lung J18.9    3. Acute on chronic diastolic heart failure (H) I50.33    4. Chronic respiratory failure with hypoxia (H) J96.11    5. Type 2 diabetes mellitus without complication, with long-term current use of insulin (H) E11.9     Z79.4    6. Simple chronic bronchitis (H) J41.0    7. Primary osteoarthritis of both knees M17.0        MEDICAL EQUIPMENT NEEDS:  Walker ; home 02    DISCHARGE PLAN/FACE TO FACE:  I certify that services are/were furnished while this patient was under the care of a physician and that a physician or an allowed non-physician practitioner (NPP), had a face-to-face encounter that meets the physician face-to-face encounter requirements. The encounter was in whole, or in part, related to the primary reason for home health. The patient is confined to his/her home and needs intermittent skilled nursing, physical therapy, speech-language pathology, or the continued need for occupational therapy. A plan of care has been established by a physician and is  periodically reviewed by a physician.  Date of Face-to-Face Encounter: 12/18/18    I certify that, based on my findings, the following services are medically necessary home health services:  PT; OT;SW;RN;DOMINICKA    My clinical findings support the need for the above skilled services because: (Please write a brief narrative summary that describes what the RN, PT, SLP, or other services will be doing in the home. A list of diagnoses in this section does not meet the CMS requirements.)  WEAKNESS; HYPOXIA; HIP PAIN    This patient is homebound because: (Please write a brief narrative summary describing the functional limitations as to why this patient is homebound and specifically what makes this patient homebound.)  WEAKNESS    The patient is, or has been, under my care and I have initiated the establishment of the plan of care. This patient will be followed by a physician who will periodically review the plan of care.    Schedule follow up visit with primary care provider within 7 days to reestablish care.  Total time spent was 40 minutes, more than 25min of it was in face-to-face counseling regarding disease state, treatment, side effects, documentation, review of clinical data and coordination of care  Care plan and medication review done with patient and her .  Labs were revisited and I want to reweigh done on the patient as well as a recheck BMP for sodium is stable only then she can go home.   present at her bedside was updated about her current care plan also.  Nursing updated  Patient initially agreeable to have another BMP done in the TCU and then await this result before going home but now she does not want to wait so we are recommending she go home and follow-up closely with her primary care physician for recheck BMP.  She has also refused a weight check despite multiple requests knowing very well that she is on diuretics.    Electronically signed by: JESSICA Beckford

## 2021-06-22 NOTE — PROGRESS NOTES
[facility]      Code Status:  FULL CODE  Visit Type: H & P     Facility:  [facility] [[id number]]           History of Present Illness: Adalgisa Solano is a 83 y.o. female who was admitted to the TCU and examined in the presence of nursing.  Patient has an underlying history of chronic back pain along with chronic lymphedema who presented with lower extremity pain which has been getting worse.  She was admitted and it was noted that she had developed cellulitis on top of her chronic lymphedema in addition she was noted to have acute thrombus in her calf with thrombophlebitis cellulitis and fever no PE was noted.  She has chronic COPD and is on 3 L of oxygen she was noted to have left upper and left lower lobe infiltrates concerning for pneumonia.  She was admitted in the hospital and was seen by multiple specialist  She was started on broad-spectrum antibiotics initially and later on switched to ceftriaxone by infectious disease.  While in the hospital she developed A. fib with rapid ventricular response.  She also developed acute diastolic CHF exacerbation and she and her Cardizem dosage was increased to control her heart rates.  In addition she received IV Lasix for management of her congestive heart failure exacerbation.  Discharge to the TCU for strengthening and rehab a patient's verbal report she has chronic back pain issues and is on narcotics apparently she has a andrea in her spine and that has broken and she needs surgery but she is not a candidate in light of her ongoing hypoxic respiratory failure and medical instability.  She is not very happy with her stay in the TCU with multiple complaints which were addressed most of them related to her medications she is not sure if she is getting her meds on a timely fashion and she is getting the care she needs she was initially pushing for discharge home but now wants to stay.  Her pain pills were reviewed with her she  is on Tylenol in addition she also takes Norco every 4 hours as needed she does not want them scheduled for now    Past Medical History:   Diagnosis Date     Acute and chronic respiratory failure with hypoxia (H)      Arm skin lesion, right      Arnold-Chiari malformation (H)      Atrial fibrillation (H) 2017     Breast cyst      Breast lump      Chronic diastolic heart failure (H)     diastolic chf     Chronic respiratory failure with hypoxia (H) 3/13/2017     COPD (chronic obstructive pulmonary disease) (H)      COPD exacerbation (H)      Depression      Diet-controlled type 2 diabetes mellitus (H)      GERD (gastroesophageal reflux disease)      Hyperlipidemia      Hypertension      Lumbar spinal stenosis      Peripheral arterial disease (H) 10/28/2015     Pulmonary hypertension (H) 3/5/2018     Recurrent major depressive episodes (H)     Created by Conversion  Replacement Utility updated for latest IMO load     Schizoaffective disorder, bipolar type (H) 2015     Type 2 diabetes mellitus without complication (H) 2016     Past Surgical History:   Procedure Laterality Date     BACK SURGERY       BREAST CYST ASPIRATION Left      CERVICAL SPINE SURGERY      for arnold chiari malformation      SECTION       CHOLECYSTECTOMY       EYE SURGERY      bilateral cataracts     JOINT REPLACEMENT      knee - partial     LAPAROSCOPIC INCISIONAL / UMBILICAL / VENTRAL HERNIA REPAIR Left 3/27/2015    Procedure: ATTEMPTED LAPAROSCOPIC ABDOMINA/FLANK INCISION REPAIR;  Surgeon: Jose Lakhani MD;  Location: [facility] OR;  Service:      LUMBAR FUSION N/A 2014    Procedure: POSTERIOR FUSION / DECOMPRESSION L3-S1 WITH PELVIC FIXATION BILATERAL ;  Surgeon: Rajan Mares MD;  Location: [facility] Main OR;  Service:      NM TOTAL KNEE ARTHROPLASTY Left 10/7/2016    Procedure: TOTAL KNEE ARTHROPLASTY, LEFT;  Surgeon: Kan Quesada MD;  Location: [facility] OR;  Service: Orthopedics     Family  History   Problem Relation Age of Onset     Heart attack Mother      Heart attack Father      Social History     Socioeconomic History     Marital status:      Spouse name: Not on file     Number of children: Not on file     Years of education: Not on file     Highest education level: Not on file   Social Needs     Financial resource strain: Not on file     Food insecurity - worry: Not on file     Food insecurity - inability: Not on file     Transportation needs - medical: Not on file     Transportation needs - non-medical: Not on file   Occupational History     Not on file   Tobacco Use     Smoking status: Former Smoker     Last attempt to quit: 2006     Years since quittin.9     Smokeless tobacco: Never Used     Tobacco comment: quit 2006   Substance and Sexual Activity     Alcohol use: No     Drug use: No     Sexual activity: Not on file   Other Topics Concern     Not on file   Social History Narrative     Not on file   lives at home with   Current Outpatient Medications   Medication Sig Dispense Refill     acetaminophen (TYLENOL) 500 MG tablet Take 1,000 mg by mouth every 6 (six) hours as needed for pain (two tablets three times daily) .             albuterol (PROAIR HFA;PROVENTIL HFA;VENTOLIN HFA) 90 mcg/actuation inhaler Inhale 2 puffs every 4 (four) hours as needed for wheezing. 1 Inhaler 12     albuterol (PROVENTIL) 2.5 mg /3 mL (0.083 %) nebulizer solution 3 ML INHALATION Four times a day As Needed Use before nebulized saline. For shortness of breath. (Patient taking differently: Take 2.5 mg by nebulization every 6 (six) hours as needed for wheezing. 3 ML INHALATION Four times a day As Needed Use before nebulized saline. For shortness of breath.) 300 mL 11     ARIPiprazole (ABILIFY) 5 MG tablet Take 5 mg by mouth bedtime.       aspirin 81 MG EC tablet Take 81 mg by mouth daily.       atorvastatin (LIPITOR) 20 MG tablet Take 20 mg by mouth at bedtime.       calcium carbonate-vitamin D3  (CALCIUM WITH VITAMIN D) 600 mg(1,500mg) -400 unit per tablet Take 1 tablet by mouth 2 (two) times a day.       cephalexin (KEFLEX) 500 MG capsule Take 1 capsule (500 mg total) by mouth 3 (three) times a day for 6 days.  0     cholecalciferol, vitamin D3, 1,000 unit tablet Take 2,000 Units by mouth daily.        diltiazem (CARDIZEM CD) 120 MG 24 hr capsule Take 1 capsule (120 mg total) by mouth 2 (two) times a day.       docusate sodium (COLACE) 100 MG capsule Take 200 mg by mouth daily.       fluticasone-salmeterol (ADVAIR DISKUS) 250-50 mcg/dose DISKUS Inhale 1 puff 2 (two) times a day. 60 each 11     furosemide (LASIX) 20 MG tablet Take 1 tablet (20 mg total) by mouth 2 (two) times a day at 9am and 6pm. 60 tablet 0     HYDROcodone-acetaminophen 5-325 mg per tablet Take 1 tablet by mouth every 4 (four) hours as needed for pain. 20 tablet 0     omeprazole (PRILOSEC) 20 MG capsule TAKE 1 CAPSULE DAILY. 100 capsule 0     OXYGEN-AIR DELIVERY SYSTEMS MISC 3 L into each nostril continuous. Pheumonia/ respiratory distress.       potassium chloride (K-DUR,KLOR-CON) 10 MEQ tablet Take 1 tablet (10 mEq total) by mouth daily. 30 tablet 0     QUEtiapine (SEROQUEL) 25 MG tablet Take 50 mg by mouth at bedtime.        rivaroxaban 20 mg Tab Take 1 tablet (20 mg total) by mouth daily with supper. 30 tablet 0     senna (SENOKOT) 8.6 mg tablet Take 1 tablet by mouth daily.       sodium chloride 3 % nebulizer solution Take 4 mL by nebulization 2 (two) times a day. Using after albuterol neb. (Patient taking differently: Take 4 mL by nebulization 2 (two) times a day as needed. Using after albuterol neb.) 360 mL 3     tiotropium (SPIRIVA) 18 mcg inhalation capsule Place 18 mcg into inhaler and inhale daily.       triamcinolone (KENALOG) 0.1 % cream Apply to red area on lowe extremities. 30 g 0     No current facility-administered medications for this visit.      Allergies   Allergen Reactions     Amoxicillin Itching and Rash      Levofloxacin Itching and Rash     Penicillins Itching and Rash     Has tolerated ceftriaxone.         Review of Systems:    Constitutional: Negative.  Negative for fever, chills, she has activity change, appetite change and fatigue.   HENT: Negative for congestion and facial swelling.    Eyes: Negative for photophobia, redness and visual disturbance.   Respiratory: Negative for cough and chest tightness.    Cardiovascular: Negative for chest pain, palpitations and has b/l leg swelling.   Gastrointestinal: Negative for nausea, diarrhea, constipation, blood in stool and abdominal distention.   Genitourinary: Negative.    Musculoskeletal: Negative.  weak and has chronic back pain  Skin: Negative.    Neurological: Negative for dizziness, tremors, syncope, weakness, light-headedness and headaches.   Hematological: Does not bruise/bleed easily.   Psychiatric/Behavioral: Negative.    Blood pressure 124/62 pulse 72 temp 97; weight is 167 pounds    Physical Exam:    GENERAL: no acute distress. Cooperative in conversation. On O2 3l  HEENT: pupils are equal, round and reactive. Oral mucosa is moist and intact.  RESP:Chest symmetric. Regular respiratory rate. No stridor.coarse breath sounds  CVS: S1S2; irregular heart rate  ABD: Nondistended, soft.  EXTREMITIES: 2-3+ lower extremity edema. Erythema b/l le L>R  NEURO: non focal. Alert and oriented x3.   PSYCH: within normal limits. No depression or anxiety.  SKIN: warm dry intact     Labs:    Recent Results (from the past 240 hour(s))   Procalcitonin   Result Value Ref Range    Procalcitonin 0.20 0.00 - 0.49 ng/mL   BNP(B-type Natriuretic Peptide)   Result Value Ref Range     (H) 0 - 159 pg/mL   ECG 12 lead MUSE   Result Value Ref Range    SYSTOLIC BLOOD PRESSURE  mmHg    DIASTOLIC BLOOD PRESSURE  mmHg    VENTRICULAR RATE 88 BPM    ATRIAL RATE 105 BPM    P-R INTERVAL  ms    QRS DURATION 76 ms    Q-T INTERVAL 358 ms    QTC CALCULATION (BEZET) 433 ms    P Axis  degrees    R  AXIS 71 degrees    T AXIS 16 degrees    MUSE DIAGNOSIS       Atrial fibrillation  Low voltage QRS  Abnormal ECG  When compared with ECG of 23-NOV-2018 11:14,  Nonspecific T wave abnormality no longer evident in Lateral leads  Confirmed by RIRI LEE MD LOC: ([id number]) on 11/28/2018 3:36:32 PM     Troponin I   Result Value Ref Range    Troponin I <0.01 0.00 - 0.29 ng/mL   Renal Function Profile   Result Value Ref Range    Albumin 2.7 (L) 3.5 - 5.0 g/dL    Calcium 8.3 (L) 8.5 - 10.5 mg/dL    Phosphorus 3.3 2.5 - 4.5 mg/dL    Glucose 156 (H) 70 - 125 mg/dL    BUN 8 8 - 28 mg/dL    Creatinine 0.57 (L) 0.60 - 1.10 mg/dL    Sodium 138 136 - 145 mmol/L    Potassium 3.5 3.5 - 5.0 mmol/L    Chloride 100 98 - 107 mmol/L    CO2 31 22 - 31 mmol/L    Anion Gap, Calculation 7 5 - 18 mmol/L    GFR MDRD Af Amer >60 >60 mL/min/1.73m2    GFR MDRD Non Af Amer >60 >60 mL/min/1.73m2   HM1 (CBC with Diff)   Result Value Ref Range    WBC 3.2 (L) 4.0 - 11.0 thou/uL    RBC 3.88 3.80 - 5.40 mill/uL    Hemoglobin 9.8 (L) 12.0 - 16.0 g/dL    Hematocrit 30.9 (L) 35.0 - 47.0 %    MCV 80 80 - 100 fL    MCH 25.3 (L) 27.0 - 34.0 pg    MCHC 31.7 (L) 32.0 - 36.0 g/dL    RDW 16.9 (H) 11.0 - 14.5 %    Platelets 350 140 - 440 thou/uL    MPV 9.6 8.5 - 12.5 fL    Neutrophils % 79 (H) 50 - 70 %    Lymphocytes % 18 (L) 20 - 40 %    Monocytes % 3 2 - 10 %    Eosinophils % 0 0 - 6 %    Basophils % 0 0 - 2 %    Neutrophils Absolute 2.5 2.0 - 7.7 thou/uL    Lymphocytes Absolute 0.6 (L) 0.8 - 4.4 thou/uL    Monocytes Absolute 0.1 0.0 - 0.9 thou/uL    Eosinophils Absolute 0.0 0.0 - 0.4 thou/uL    Basophils Absolute 0.0 0.0 - 0.2 thou/uL   Procalcitonin   Result Value Ref Range    Procalcitonin 0.08 0.00 - 0.49 ng/mL   Renal Function Profile   Result Value Ref Range    Albumin 2.8 (L) 3.5 - 5.0 g/dL    Calcium 8.6 8.5 - 10.5 mg/dL    Phosphorus 3.0 2.5 - 4.5 mg/dL    Glucose 123 70 - 125 mg/dL    BUN 12 8 - 28 mg/dL    Creatinine 0.61 0.60 - 1.10 mg/dL    Sodium  140 136 - 145 mmol/L    Potassium 3.5 3.5 - 5.0 mmol/L    Chloride 102 98 - 107 mmol/L    CO2 33 (H) 22 - 31 mmol/L    Anion Gap, Calculation 5 5 - 18 mmol/L    GFR MDRD Af Amer >60 >60 mL/min/1.73m2    GFR MDRD Non Af Amer >60 >60 mL/min/1.73m2   HM1 (CBC with Diff)   Result Value Ref Range    WBC 7.7 4.0 - 11.0 thou/uL    RBC 3.82 3.80 - 5.40 mill/uL    Hemoglobin 9.6 (L) 12.0 - 16.0 g/dL    Hematocrit 30.4 (L) 35.0 - 47.0 %    MCV 80 80 - 100 fL    MCH 25.1 (L) 27.0 - 34.0 pg    MCHC 31.6 (L) 32.0 - 36.0 g/dL    RDW 16.7 (H) 11.0 - 14.5 %    Platelets 390 140 - 440 thou/uL    MPV 9.4 8.5 - 12.5 fL    Neutrophils % 72 (H) 50 - 70 %    Lymphocytes % 18 (L) 20 - 40 %    Monocytes % 10 2 - 10 %    Eosinophils % 0 0 - 6 %    Basophils % 0 0 - 2 %    Neutrophils Absolute 5.5 2.0 - 7.7 thou/uL    Lymphocytes Absolute 1.4 0.8 - 4.4 thou/uL    Monocytes Absolute 0.8 0.0 - 0.9 thou/uL    Eosinophils Absolute 0.0 0.0 - 0.4 thou/uL    Basophils Absolute 0.0 0.0 - 0.2 thou/uL     Xr Chest 1 View Portable    Result Date: 11/27/2018  XR CHEST 1 VIEW PORTABLE 11/27/2018 4:22 PM INDICATION: Sob COMPARISON: 1/23/2018 FINDINGS: Stress Mild diffuse interstitial prominence again identified and may relate to fibrosis or edema. There is new focal opacity at left lung base with left hemidiaphragm obscured consistent with left lower lobe pneumonia. Mild cardiomegaly unchanged. NOTE: ABNORMAL REPORT THE DICTATION ABOVE DESCRIBES AN ABNORMALITY FOR WHICH FOLLOW-UP IS NEEDED.     Xr Chest 1 View Portable    Result Date: 11/23/2018  XR CHEST 1 VIEW PORTABLE 11/23/2018 12:11 PM INDICATION: Fever COMPARISON: None. FINDINGS: There are increased interstitial markings most pronounced in the upper lobes that may represent chronic interstitial disease versus mild redistribution of pulmonary vasculature with pulmonary venous hypertension. No pneumothorax or pleural effusion. No acute airspace consolidation. Heart size is normal for portable  technique.    Cta Chest Pe Run    Result Date: 11/8/2018  CTA CHEST PE RUN 11/8/2018 3:58 PM INDICATION: Short of breath TECHNIQUE: Helical acquisition through the chest was performed during the arterial phase of contrast enhancement using IV contrast. 2D and 3D reconstructions were performed by the CT technologist. Dose reduction techniques were used. IV CONTRAST: Iohexol (Omni) 100 mL COMPARISON: CTA chest exam 02/09/2017 FINDINGS: ANGIOGRAM CHEST: Pulmonary arteries are normal caliber and negative for pulmonary emboli. Normal caliber thoracic aorta with no dissection or aneurysm. LUNGS AND PLEURA: Advanced centrilobular emphysema. Reticulonodular infiltrates within the left upper lobe have progressed. There is also a new airspace infiltrate within the left lower lobe. Infiltrate/atelectasis within the right lower lobe with minimal change. No effusions. MEDIASTINUM: Cardiomegaly. Atherosclerotic disease thoracic aorta. Right hilar adenopathy has not changed. Coronary artery calcifications. LIMITED UPPER ABDOMEN: Moderate stool within the visualized colon. MUSCULOSKELETAL: Hypertrophic changes thoracic spine.     CONCLUSION: 1.  No evidence for pulmonary emboli. 2.  Moderate centrilobular emphysema. 3.  New infiltrates within the left upper and left lower lobes concerning for pneumonia. 4.  Chronic reticulonodular infiltrates both lungs. 5.  Stable right hilar adenopathy.    Us Venous Leg Left    Result Date: 11/8/2018  US VENOUS LEG LEFT 11/8/2018 4:39 PM INDICATION: Left leg pain and swelling TECHNIQUE: Routine exam without and with compression, augmentation, and duplex utilizing 2D gray-scale imaging, Doppler interrogation with color-flow and spectral waveform analysis. COMPARISON: Bilateral lower extremity ultrasound exam 07/22/2017 FINDINGS: The common femoral, femoral, popliteal, and segmentally visualized calf veins were evaluated. The opposite CFV was also included in the evaluation. There is a thrombus  identified within a vein of the upper outer left calf, possibly the soleal vein versus an intramuscular branch communicating with the posterior tibial vein. The rest of the deep venous system is patent. No popliteal cysts.     CONCLUSION: 1.  Acute thrombus in the calf, possibly in the soleal vein versus intramuscular branch communicating with the posterior tibial vein. 2.  The rest of the deep venous system is patent. 3.  Results discussed with Dr. Bárbara Dobbins on 11/08/2018 at 5:50 PM.    Assessment/Plan:    Left lower extremity DVT  Sepsis/left lower extremity cellulitis currently was on ceftriaxone in the hospital and has been discharged on Keflex 3 times a day for 6 more days upon discharge seems to be improving slowly  Chronic lymphedema bilateral lower extremities  Acute on chronic diastolic CHF exacerbation given IV diuretics in the hospital monitor weights closely.  She has been discharged on Lasix 20 mg twice a day.  Monitor weights closely  Atrial fibrillation with a rapid ventricular response with heart rates which were elevated to 180s in the hospital.  Evaluated by cardiology and now on a higher dose of diltiazem.  She is currently discharged on diltiazem 120 mg twice daily monitor heart rates in the TCU.  History of chronic back pain not a surgical candidate as per her  I had a detailed discussion with her she is on Tylenol and Norco for pain.  Apparently saw her spine surgeon but not a candidate for surgery in light of an ongoing hypoxic respiratory failure she is looking at a pain pump but does not want to be on narcotics she does not want Tylenol schedule either.  She has had an injection done in her spine with not much improvement so she is not interested in pursuing that option either but this is what is limiting her ability to ambulate as per her.  Hypoxic respiratory failure currently on baseline 2-3 L of oxygen by nasal cannula  Dyslipidemia continue with her statin.  Normocytic anemia  monitor hemoglobins.  Bipolar disorder continue with her psych meds.  Debilitation discharge to the TCU for strengthening and rehab somewhat upset and emotional today because of multiple issues with her care not going as she requested it to be.  Currently requesting a full CODE STATUS and an early discharge home  Care plan was reviewed with the patient and her concerns were answered she was very upset initially and wanted to discharge home quickly  Subsequently has been came in and requested to meet with me in the presence of the patient his major concerns were also addressed including discharge planning  In addition talking about lymphedema treatments and wraps and weights were discussed.  He has been told conflicting advice about wrapping the legs versus leaving them unwrapped  Patient and  also were upset because they were told that she did not have a portable oxygen tank and she felt that she was isolated in her room which was distressing to her  We did talk about pain management issues she does not want any Tylenol or narcotics scheduled for her.  She told me she had a broken andrea in her back due to which she needs to have anterior and posterior fixation done on her spine  We did talk about hypoxia CHF and COPD making her a very poor candidate and she understands she does not want to go for a spine injection.  She does not want any aggressive narcotics given to her either.  We did talk about getting epidural injections and she has had that in the past but has not had a good response so she does not want to pursue that either  Were hoping therapy will help her with her back pain issues this has been limiting her ability to ambulate quite a bit  Total time spent was 50 minutes, more than half of it was in face-to-face counseling regarding disease state, treatment, side effects and management.  She was also upset that her Silvadene treatment is not being applied I did review notes and this has been  discontinued because her wound has healed.   also has brought several of her medications including her psych meds in the TCU.  They felt that her medications were not being delivered to her on time and he was advised to be very careful with medications and take some of them home with him    Electronically signed by: JESSICA Beckford  This progress note was completed using Dragon software and there may be grammatical errors.

## 2021-06-22 NOTE — PROGRESS NOTES
[facility] Patient Outreach:     Discharge Date::  12/19/18      Reason for TCU stay (discharge diagnosis)::  Chronic back pain, chronic lymphedema, cellulitis, pneumonia, COPD, CHF      Are you feeling better, the same or worse since your discharge?:  Patient is feeling the same          As part of your discharge plan, did they discuss home care with you?: Yes        Have your seen them yet, or are they scheduled to visit?: Yes                Do you have any follow up visits scheduled with your PCP or Specialist?:  Yes, with PCP      (RN) Is it scheduled soon enough (3-5 days)?: No        (RN) Is the patient okay with moving appointment up (if RN feels appropriate)?: No    I'm glad to hear you're doing well and we want you to continue to do well. Your PCP would like to see you for a follow-up visit. Can we help set that up for your today?: No (Patient has an appt with PCP on 12/28/18.  )        (RN) Provided patient the PCP's phone number to call if they have any questions or concerns?: No (Patient's  knows the number.  )

## 2021-06-22 NOTE — TELEPHONE ENCOUNTER
[facility] Nurse Triage Telephone Note      Provider: STEVEN Velasquez  Facility: [facility]    Facility Type: TCU    Caller: Kylee  Call Back Number:  [phone]    Allergies: Amoxicillin; Levofloxacin; and Penicillins    Reason for call: Lab results:    Mag 1.7 (1.6) on Mag Ox 400qd.,    Sodium 136 - 145 mmol/L 135 Abnormally low     Potassium 3.5 - 5.0 mmol/L 3.9    Chloride 98 - 107 mmol/L 97 Abnormally low     CO2 22 - 31 mmol/L 28    Anion Gap, Calculation 5 - 18 mmol/L 10    Glucose 70 - 125 mg/dL 154 Abnormally high     Calcium 8.5 - 10.5 mg/dL 8.4 Abnormally low     BUN 8 - 28 mg/dL 10    Creatinine 0.60 - 1.10 mg/dL 0.58 Abnormally low     GFR MDRD Af Amer >60 mL/min/1.73m2 >60    GFR MDRD Non Af Amer >60 mL/min/1.73m2 >60       WBC 12.2, Hgb 8.9, (9.4, 9.7), Plt 504    Verbal Order/Direction given by Provider: Fe Sulf 325mg daily, Next Heme 1 1/10.    Provider giving order: STEVEN Velasquez    Verbal order given to: Kylee Lackey RN

## 2021-06-22 NOTE — PROGRESS NOTES
[facility]      NAME:  Adalgisa Solano             :  1935    MRN: [id number]    CODE STATUS:  FULL CODE    FACILITY: ANSWER ROOMING ACTIVITY QUESTION       CHIEF COMPLAIN/REASON FOR VISIT:  No chief complaint on file.      HISTORY OF PRESENT ILLNESS: Adalgisa Solano is a 83 y.o. female being seen for review of multiple medical conditions. She comes to the TCU from [facility] where she was a pt from  to 2018.PMH of left lower extremity DVT, ahe  presented with left leg erythema and was diagnosed with cellulitis.  She was  started on vancomycin and ceftriaxone which she later descalated to ceftriaxone by ID. She developed Afib RVR and acute diastolic CHF exacerbation in this admission and Cardizem dose increased from 180 to 120 mg two times a day. She developed acute shortness of breath with mildly elevated BNP and mild interstitial edema in CXR. Received IV Lasix and her symptoms significantly improved. She is on   Rivaroxabon  20 mg daily for anticoagulation. Currently finnishing oral keflex for cellulitus. H/O Schizoaffective disorder, see med list, on seroquel as well as abilify, stable behaviors, staff reports a bit demanding.Uses 02, was 02 dependant at time.Seen while ambulating with therapy today. We stopped to discussed her groin rash , unable to assess as she is fully dressed. Per nursing staff groin is red, no drainage.        Allergies   Allergen Reactions     Amoxicillin Itching and Rash     Levofloxacin Itching and Rash     Penicillins Itching and Rash     Has tolerated ceftriaxone.   :     Current Outpatient Medications   Medication Sig     acetaminophen (TYLENOL) 500 MG tablet Take 1,000 mg by mouth every 6 (six) hours as needed for pain (two tablets three times daily) .           albuterol (PROAIR HFA;PROVENTIL HFA;VENTOLIN HFA) 90 mcg/actuation inhaler Inhale 2 puffs every 4 (four) hours as needed for wheezing.     albuterol (PROVENTIL) 2.5 mg  /3 mL (0.083 %) nebulizer solution 3 ML INHALATION Four times a day As Needed Use before nebulized saline. For shortness of breath. (Patient taking differently: Take 2.5 mg by nebulization every 6 (six) hours as needed for wheezing. 3 ML INHALATION Four times a day As Needed Use before nebulized saline. For shortness of breath.)     ARIPiprazole (ABILIFY) 5 MG tablet Take 5 mg by mouth bedtime.     aspirin 81 MG EC tablet Take 81 mg by mouth daily.     atorvastatin (LIPITOR) 20 MG tablet Take 20 mg by mouth at bedtime.     calcium carbonate-vitamin D3 (CALCIUM WITH VITAMIN D) 600 mg(1,500mg) -400 unit per tablet Take 1 tablet by mouth 2 (two) times a day.     cholecalciferol, vitamin D3, 1,000 unit tablet Take 2,000 Units by mouth daily.      diltiazem (CARDIZEM CD) 120 MG 24 hr capsule Take 1 capsule (120 mg total) by mouth 2 (two) times a day.     docusate sodium (COLACE) 100 MG capsule Take 200 mg by mouth daily.     fluticasone-salmeterol (ADVAIR DISKUS) 250-50 mcg/dose DISKUS Inhale 1 puff 2 (two) times a day.     furosemide (LASIX) 20 MG tablet Take 1 tablet (20 mg total) by mouth 2 (two) times a day at 9am and 6pm.     HYDROcodone-acetaminophen 5-325 mg per tablet Take 1 tablet by mouth every 4 (four) hours as needed for pain.     omeprazole (PRILOSEC) 20 MG capsule TAKE 1 CAPSULE DAILY.     OXYGEN-AIR DELIVERY SYSTEMS MISC 3 L into each nostril continuous. Pheumonia/ respiratory distress.     potassium chloride (K-DUR,KLOR-CON) 10 MEQ tablet Take 1 tablet (10 mEq total) by mouth daily.     QUEtiapine (SEROQUEL) 25 MG tablet Take 50 mg by mouth at bedtime.      rivaroxaban 20 mg Tab Take 1 tablet (20 mg total) by mouth daily with supper.     senna (SENOKOT) 8.6 mg tablet Take 1 tablet by mouth daily.     sodium chloride 3 % nebulizer solution Take 4 mL by nebulization 2 (two) times a day. Using after albuterol neb. (Patient taking differently: Take 4 mL by nebulization 2 (two) times a day as needed. Using  after albuterol neb.)     tiotropium (SPIRIVA) 18 mcg inhalation capsule Place 18 mcg into inhaler and inhale daily.     triamcinolone (KENALOG) 0.1 % cream Apply to red area on lowe extremities.         REVIEW OF SYSTEMS:    Currently, no fever, chills, or rigors. Does not have any visual or hearing problems. Denies any chest pain, headaches, palpitations, lightheadedness, dizziness, shortness of breath, or cough. Appetite is good. Denies any GERD symptoms. Denies any difficulty with swallowing, nausea, or vomiting.  Denies any abdominal pain, diarrhea or constipation. Denies any urinary symptoms. No insomnia. No active bleeding. Reports groin rash.       PHYSICAL EXAMINATION:  There were no vitals filed for this visit.      GENERAL: Awake, Alert, oriented x3, not in any form of acute distress, answers questions appropriately, follows simple commands, conversant  HEENT: Head is normocephalic with normal hair distribution. No evidence of trauma. Ears: No acute purulent discharge. Eyes: Conjunctivae pink with no scleral jaundice. Nose: Normal mucosa and septum.   CHEST: No tenderness or deformity, no crepitus  LUNG: Clear to auscultation with good chest expansion. There are no crackles or wheezes, normal AP diameter.  BACK: No kyphosis of the thoracic spine. Symmetric, no curvature, ROM normal, no CVA tenderness, no spinal tenderness   CVS: There is good S1  S2, there are no murmurs, rubs, gallops, or heaves, rhythm is irregular.  ABDOMEN: Globular and soft, nontender to palpation, non distended, no masses, no organomegaly, good bowel sounds, no rebound or guarding, no peritoneal signs.   EXTREMITIES: Atraumatic. Full range of motion on both upper and lower extremities, there is no tenderness to palpation, + pedal edema, no cyanosis or clubbing, no calf tenderness, normal cap refill, no joint swelling.  SKIN: See HPI  NEUROLOGICAL: The patient is oriented to person, place and time. Strength and sensation are grossly  intact. Face is symmetric.Calm, w/o behaviors.                    LABS:    Lab Results   Component Value Date    WBC 7.7 11/29/2018    HGB 9.6 (L) 11/29/2018    HCT 30.4 (L) 11/29/2018    MCV 80 11/29/2018     11/29/2018       Results for orders placed or performed during the hospital encounter of 11/23/18   Basic metabolic panel   Result Value Ref Range    Sodium 134 (L) 136 - 145 mmol/L    Potassium 3.5 3.5 - 5.0 mmol/L    Chloride 100 98 - 107 mmol/L    CO2 26 22 - 31 mmol/L    Anion Gap, Calculation 8 5 - 18 mmol/L    Glucose 124 70 - 125 mg/dL    Calcium 8.0 (L) 8.5 - 10.5 mg/dL    BUN 8 8 - 28 mg/dL    Creatinine 0.58 (L) 0.60 - 1.10 mg/dL    GFR MDRD Af Amer >60 >60 mL/min/1.73m2    GFR MDRD Non Af Amer >60 >60 mL/min/1.73m2           Lab Results   Component Value Date    HGBA1C 6.8 (H) 11/09/2018     No results found for: [id number]  No results found for: [id number]    ASSESSMENT/PLAN:  1. Candidal skin infection      1. Nystatin powder to groin after bathing two times a day x 7 days then may use prn.    MCS will follow throughout TCU stay and actively participate in the POC of treatment.    Electronically signed by:  Ivana Landrum CNP  This progress note was completed using Dragon software and there may be grammatical errors.      15 minutes spent of which greater than 50 % was face to face communication with the patient and spouse  about above plan of care

## 2021-06-22 NOTE — PROGRESS NOTES
[facility]      Code Status:  FULL CODE  Visit Type: Review Of Multiple Medical Conditions     Facility:  [facility] [[id number]]           History of Present Illness: Adalgisa Solano is a 83 y.o. female who was admitted to the TCU and examined today at her request.  She has chronic COPD on his 3 L of oxygen but now is on 2 L of oxygen.  She told me she plans to wean herself further at home.  Shortness of breath is at baseline  Noticing significant improvement in strength and feels she is ready to go home  She has redness in her legs which is improving however she is noticing a lot of edema and feels that is getting worse  She is not sure if she is ready for the wraps to be put back on her legs  She has a history of CHF and remains on diuretics she is not sure of them working for her.  Requesting better management  She is exhibiting some intermittent confusion I am not sure if this is hypoxia as per staff they gave her scheduled anticoagulant medication patient denied receiving it and insisted she get another dose staff is concerned if that she was given extra dosage  She continues to voice significant back pain.  I have again offered to schedule her narcotics or her Tylenol for her but she is adamant that she does not want them to be scheduled this is a major source of discontent for her as she feels her pills are not offered to her in a timely fashion    Past Medical History:   Diagnosis Date     Acute and chronic respiratory failure with hypoxia (H)      Arm skin lesion, right      Arnold-Chiari malformation (H) 1998     Atrial fibrillation (H) 02/2017     Breast cyst      Breast lump      Chronic diastolic heart failure (H)     diastolic chf     Chronic respiratory failure with hypoxia (H) 3/13/2017     COPD (chronic obstructive pulmonary disease) (H)      COPD exacerbation (H)      Depression      Diet-controlled type 2 diabetes mellitus (H)      GERD  (gastroesophageal reflux disease)      Hyperlipidemia      Hypertension      Lumbar spinal stenosis      Peripheral arterial disease (H) 10/28/2015     Pulmonary hypertension (H) 3/5/2018     Recurrent major depressive episodes (H)     Created by Conversion  Replacement Utility updated for latest IMO load     Schizoaffective disorder, bipolar type (H) 2015     Type 2 diabetes mellitus without complication (H) 2016     Past Surgical History:   Procedure Laterality Date     BACK SURGERY       BREAST CYST ASPIRATION Left      CERVICAL SPINE SURGERY      for arnold chiari malformation      SECTION       CHOLECYSTECTOMY       EYE SURGERY      bilateral cataracts     JOINT REPLACEMENT      knee - partial     LAPAROSCOPIC INCISIONAL / UMBILICAL / VENTRAL HERNIA REPAIR Left 3/27/2015    Procedure: ATTEMPTED LAPAROSCOPIC ABDOMINA/FLANK INCISION REPAIR;  Surgeon: Jose Lakhani MD;  Location: [facility] OR;  Service:      LUMBAR FUSION N/A 2014    Procedure: POSTERIOR FUSION / DECOMPRESSION L3-S1 WITH PELVIC FIXATION BILATERAL ;  Surgeon: Rajan Mares MD;  Location: [facility] OR;  Service:      HI TOTAL KNEE ARTHROPLASTY Left 10/7/2016    Procedure: TOTAL KNEE ARTHROPLASTY, LEFT;  Surgeon: Kan Quesada MD;  Location: [facility] OR;  Service: Orthopedics     Family History   Problem Relation Age of Onset     Heart attack Mother      Heart attack Father      Social History     Socioeconomic History     Marital status:      Spouse name: Not on file     Number of children: Not on file     Years of education: Not on file     Highest education level: Not on file   Social Needs     Financial resource strain: Not on file     Food insecurity - worry: Not on file     Food insecurity - inability: Not on file     Transportation needs - medical: Not on file     Transportation needs - non-medical: Not on file   Occupational History     Not on file   Tobacco Use     Smoking status:  Former Smoker     Last attempt to quit: 2006     Years since quittin.9     Smokeless tobacco: Never Used     Tobacco comment: quit 2006   Substance and Sexual Activity     Alcohol use: No     Drug use: No     Sexual activity: Not on file   Other Topics Concern     Not on file   Social History Narrative     Not on file   lives at home with   Current Outpatient Medications   Medication Sig Dispense Refill     acetaminophen (TYLENOL) 500 MG tablet Take 1,000 mg by mouth every 6 (six) hours as needed for pain (two tablets three times daily) .             albuterol (PROAIR HFA;PROVENTIL HFA;VENTOLIN HFA) 90 mcg/actuation inhaler Inhale 2 puffs every 4 (four) hours as needed for wheezing. 1 Inhaler 12     albuterol (PROVENTIL) 2.5 mg /3 mL (0.083 %) nebulizer solution 3 ML INHALATION Four times a day As Needed Use before nebulized saline. For shortness of breath. (Patient taking differently: Take 2.5 mg by nebulization every 6 (six) hours as needed for wheezing. 3 ML INHALATION Four times a day As Needed Use before nebulized saline. For shortness of breath.) 300 mL 11     ARIPiprazole (ABILIFY) 5 MG tablet Take 5 mg by mouth bedtime.       aspirin 81 MG EC tablet Take 81 mg by mouth daily.       atorvastatin (LIPITOR) 20 MG tablet Take 20 mg by mouth at bedtime.       calcium carbonate-vitamin D3 (CALCIUM WITH VITAMIN D) 600 mg(1,500mg) -400 unit per tablet Take 1 tablet by mouth 2 (two) times a day.       cholecalciferol, vitamin D3, 1,000 unit tablet Take 2,000 Units by mouth daily.        diltiazem (CARDIZEM CD) 120 MG 24 hr capsule Take 1 capsule (120 mg total) by mouth 2 (two) times a day.       docusate sodium (COLACE) 100 MG capsule Take 200 mg by mouth daily.       fluticasone-salmeterol (ADVAIR DISKUS) 250-50 mcg/dose DISKUS Inhale 1 puff 2 (two) times a day. 60 each 11     furosemide (LASIX) 20 MG tablet Take 1 tablet (20 mg total) by mouth 2 (two) times a day at 9am and 6pm. 60 tablet 0      HYDROcodone-acetaminophen 5-325 mg per tablet Take 1 tablet by mouth every 4 (four) hours as needed for pain. 20 tablet 0     omeprazole (PRILOSEC) 20 MG capsule TAKE 1 CAPSULE DAILY. 100 capsule 0     OXYGEN-AIR DELIVERY SYSTEMS MISC 3 L into each nostril continuous. Pheumonia/ respiratory distress.       potassium chloride (K-DUR,KLOR-CON) 10 MEQ tablet Take 1 tablet (10 mEq total) by mouth daily. 30 tablet 0     QUEtiapine (SEROQUEL) 25 MG tablet Take 50 mg by mouth at bedtime.        rivaroxaban 20 mg Tab Take 1 tablet (20 mg total) by mouth daily with supper. 30 tablet 0     senna (SENOKOT) 8.6 mg tablet Take 1 tablet by mouth daily.       sodium chloride 3 % nebulizer solution Take 4 mL by nebulization 2 (two) times a day. Using after albuterol neb. (Patient taking differently: Take 4 mL by nebulization 2 (two) times a day as needed. Using after albuterol neb.) 360 mL 3     tiotropium (SPIRIVA) 18 mcg inhalation capsule Place 18 mcg into inhaler and inhale daily.       triamcinolone (KENALOG) 0.1 % cream Apply to red area on lowe extremities. 30 g 0     No current facility-administered medications for this visit.      Allergies   Allergen Reactions     Amoxicillin Itching and Rash     Levofloxacin Itching and Rash     Penicillins Itching and Rash     Has tolerated ceftriaxone.         Review of Systems:    Constitutional: Negative.  Negative for fever, chills, she has activity change, appetite change and fatigue.   Remains on oxygen and wants to wean off  HENT: Negative for congestion and facial swelling.    Eyes: Negative for photophobia, redness and visual disturbance.   Respiratory: Negative for cough and chest tightness.    Cardiovascular: Negative for chest pain, palpitations and has b/l leg swelling. As per patient this has worsened significantly  Gastrointestinal: Negative for nausea, diarrhea, constipation, blood in stool and abdominal distention.   Genitourinary: Negative.    Musculoskeletal:  Negative.  weak and has chronic back pain.  Concerned about timely administration of meds  Skin: Negative.    Neurological: Negative for dizziness, tremors, syncope, weakness, light-headedness and headaches.   Hematological: Does not bruise/bleed easily.   Psychiatric/Behavioral: Negative.      Physical Exam:    Weight is 160 pounds; blood pressure 146/69 temp 98 pulse 82  GENERAL: no acute distress. Cooperative in conversation. On O2 3l  HEENT: pupils are equal, round and reactive. Oral mucosa is moist and intact.  RESP:Chest symmetric. Regular respiratory rate. No stridor.coarse breath sounds  CVS: S1S2; irregular heart rate  ABD: Nondistended, soft.  EXTREMITIES: 3+ lower extremity edema. Erythema b/l le L>R  NEURO: non focal. Alert and oriented x3.   PSYCH: within normal limits. No depression or anxiety.  SKIN: warm dry intact     Labs:   Results for orders placed or performed during the hospital encounter of 11/23/18   Basic metabolic panel   Result Value Ref Range    Sodium 134 (L) 136 - 145 mmol/L    Potassium 3.5 3.5 - 5.0 mmol/L    Chloride 100 98 - 107 mmol/L    CO2 26 22 - 31 mmol/L    Anion Gap, Calculation 8 5 - 18 mmol/L    Glucose 124 70 - 125 mg/dL    Calcium 8.0 (L) 8.5 - 10.5 mg/dL    BUN 8 8 - 28 mg/dL    Creatinine 0.58 (L) 0.60 - 1.10 mg/dL    GFR MDRD Af Amer >60 >60 mL/min/1.73m2    GFR MDRD Non Af Amer >60 >60 mL/min/1.73m2         Assessment/Plan:    Left lower extremity DVT  Sepsis/left lower extremity cellulitis currently resolved  Acute on chronic back pain.  Medication mismanagement with potential for toxicity due to doubling on her anticoagulant dose.  Hypoxic respiratory failure patient requesting a desire to be weaned down  Chronic back pain  Diabetes diet-controlled  Lymphedema with worsening concerns no recent weights available for review  Nursing and I had a detailed discussion about keeping track of her medications properly and letting patient also know when she has gotten her  medications or doubling down her medication does not happen again  It is suspected that she got a double dose of Xarelto  Staff advised to start weaning her I have counseled patient she should not start weaning herself off oxygen at her own discretion  Staff will initiate weaning and also educate her regarding need for oxygen  Re-weights requested as no recent weights available  She is on Lasix with not much improvement in lymphedema will discontinue her Lasix  Initiate Bumex 2 mg daily and monitor response to that  Staff advised to start wraps.  Recheck BMP  Due to malnourishment concerns will start her on protein powder supplementation daily also    Electronically signed by: JESSICA Beckford  This progress note was completed using Dragon software and there may be grammatical errors.

## 2021-06-22 NOTE — ANESTHESIA PREPROCEDURE EVALUATION
Anesthesia Evaluation   Pt receiving lovenox, 1 mg/kg SQ q 12 hours which requires 24 hour interval before neuraxial block is safe. She is not a good candidate for GA/ETT given her pulmonary status.   Anesthesia Plan   Discussed with Dr. Lackey and patient, and they both understand and agree to delay surgery until Wednesday.    Romeo Leslie MD

## 2021-06-23 NOTE — TELEPHONE ENCOUNTER
[facility] Nurse Triage Telephone Note      Provider: STEVEN Velasquez  Facility: [facility]    Facility Type: TCU    Caller: Kylee   Call Back Number:  [phone]    Allergies: Amoxicillin; Levofloxacin; and Penicillins    Reason for call: Hgb drawn today which is improving. INR drawn on accident, not on coumadin      Verbal Order/Direction given by Provider: NNO    Provider giving order: STEVEN Velasquez    Verbal order given to: Pm floor nurse      Gerda Lundy RN

## 2021-06-23 NOTE — PROGRESS NOTES
[facility] Patient Outreach:     Discharge Date::  1/22/19      Reason for TCU stay (discharge diagnosis)::  Left femoral neck fx, CHF, COPD exacerbation, paroxysmal A-fib, Hx of DVT      Are you feeling better, the same or worse since your discharge?:  Patient is feeling better          As part of your discharge plan, did they discuss home care with you?: Yes        Have your seen them yet, or are they scheduled to visit?: Yes                Do you have any follow up visits scheduled with your PCP or Specialist?:  Yes, with PCP      (RN) Is it scheduled soon enough (3-5 days)?: No        (RN) Is the patient okay with moving appointment up (if RN feels appropriate)?: No

## 2021-06-23 NOTE — TELEPHONE ENCOUNTER
[facility] Nurse Triage Telephone Note      Provider: STEVEN Velasquez  Facility: [facility]    Facility Type: TCU    Caller: Kylee  Call Back Number:  [phone]    Allergies: Amoxicillin; Levofloxacin; and Penicillins    Reason for call: Nurse calling to report Heme 1 results.  VSS.  No c/o dizziness, weakness, or lethargy.  Currently on ferrous sulfate 325mg daily.       Verbal Order/Direction given by Provider: Increase ferrous sulfate to 325mg two times a day.  Check Hgb on 1/17/19.      Provider giving order: STEVEN Velasquez    Verbal order given to: Grecia Santiago RN

## 2021-06-23 NOTE — PROGRESS NOTES
TCM DISCHARGE FOLLOW UP CALL    Discharge Date:  2/1/2019  Reason for hospital stay (discharge diagnosis)::  CHF, erythema LE  Are you feeling better, the same or worse since your discharge?:  Patient is feeling the same  Do you feel like you have a plan in the event of a health emergency?: Yes (Talks with her . She calls specialist for specific illness. Reminded pt not to wait too long to call if feeling ill)    As part of your discharge plan, were  home care services ordered for you?: Yes    Have you seen them yet, or are they scheduled to visit?: Yes    Do you have any follow up visits scheduled with your PCP or Specialist?:  Yes, with PCP (2/1)  (RN) Is PCP appt scheduled soon enough (within 14 days of discharge date)?: Yes    RN NOTES::  Offered enrollment in [employer] CHF tele-monitoring, pt declined. Reviewed S/S of worsening CHF and when to call the doctor. Pt has agreed to start weighing daily.

## 2021-06-23 NOTE — TELEPHONE ENCOUNTER
Request for Orders    Who s Requesting: Home Care Physical Therapist    Orders being requested: Home Care PT 3w3, Nursing Eval, OT eval    Where to send Orders: Please reply within Epic for verbal okay.

## 2021-06-23 NOTE — TELEPHONE ENCOUNTER
Dr. Ellington or covering MD:    Opened up Rose[facility] to home care today:  Requesting orders for:    Skilled nursing 2x per week for 3 weeks, and 2 PRN visits    PT and OT eval    HHA 2x per week for 3 weeks for ADL assist.      Please also review severe medication interactions ran in Epic:  Drug-Drug: XARELTO, rivaroxaban and diclofenac sodium   Concurrent use of apixaban(1), betrixaban(7), edoxaban(5), or rivaroxaban(2) with NSAIDs may result   Drug-Drug: XARELTO, diltiazem, rivaroxaban and diltiazem   Concurrent use of an agent that is both an inhibitor of P-gp and a moderate inhibitor of CY may result in elevated levels of and clinical effects of rivaroxaban, including an increased     Thanks,   Sandy Contreras RN

## 2021-06-23 NOTE — PROGRESS NOTES
[facility]    DATE: 2019    NAME:  Adalgisa Solano             :  1935  MRN: [id number]  CODE STATUS:  DNR    VISIT TYPE: Problem Visit     FACILITY:  [facility] [[id number]]       CHIEF COMPLAIN/REASON FOR VISIT:    Chief Complaint   Patient presents with     Problem Visit               HISTORY OF PRESENT ILLNESS: Adalgisa Solano is a 83 y.o. female who was admitted - for left leg pain and  unable to care for her at home. She was found to have left femoral neck fracture on CT. She was treated for acute on chronic CHF and severe cOPD exacerbation. She was diuresed with IV lasix and transitioned to PO. She was followed by pulmonology and cardiology for stabilization prior to surgery. Her xarelto was held and bridged with lovenox until surgery was performed on . Postop on  she had increased shortness of breath and weakness and lasix was increased. She was put on prednisone taper for copd exacerbation. She did have sputum cultures positive for pseudomonas and klebsiella during stay and completed doxycycline on . She was started on cefepime on  and completed 7 days. She also had acute hyponatremia that improved with fluid status stabilization. She has PMH of DVT 1 month prior to hospital stay on xarelto, PAF, COPD, chronic respiratory failure on 1LNC, CHF, avascular necrosis of Left hip, chronic lymphedema, recurrent pneumonias. Prior to this she lived with her .     Today Ms. Solano states she is still coughing a lot and very congested. She says she doesn't believe they have been giving her the hypertonic saline nebs and she is not sure why. She says her bowels are moving again and she slept a little better last night. She denies any fevers just the persistent congestion and cough. She has not heard wheezing recently. She did have a heart rate in 110s yesterday but says this was after a breathing  treatment. Per staff she remains very congested but state the hypertonic saline nebs are not ordered to be scheduled. Reviewed chart and discussed with nursing that were ordered three times a day on 1/7, order processing issue and will start today. Instructed nursing to notify if temp >100.5 and will do repeat chest xray. Discussed with laith extending prednisone and adding mucinex to help with congestion as her exacerbation is not resolving.     REVIEW OF SYSTEMS:  PROBLEMS AND REVIEW OF SYSTEMS:   Review of Systems  Today on ROS:   Currently, no fever, chills, or rigors. Decreased vision and hearing. Denies any chest pain, headaches, palpitations, lightheadedness, dizziness. Appetite is good. Denies any GERD symptoms. Denies any difficulty with swallowing, nausea, or vomiting.   Denies any urinary symptoms. No active bleeding. No rash.positive for chronic o2 1-2 LNC at home, left leg and back pain, shortness of breath, congested cough, lymphedema and wraps      Allergies   Allergen Reactions     Amoxicillin Itching and Rash     Levofloxacin Itching and Rash     Penicillins Itching and Rash     Has tolerated ceftriaxone.     Current Outpatient Medications   Medication Sig     guaiFENesin ER (MUCINEX) 600 mg 12 hr tablet Take 1,200 mg by mouth 2 (two) times a day.     acetaminophen (TYLENOL) 500 MG tablet Take 2 tablets (1,000 mg total) by mouth 3 (three) times a day.     ADVAIR DISKUS 250-50 mcg/dose DISKUS INHALE 1 PUFF 2 (TWO) TIMES A DAY.     albuterol (PROVENTIL) 2.5 mg /3 mL (0.083 %) nebulizer solution 3 ML INHALATION FOUR TIMES A DAY AS NEEDED USE BEFORE NEBULIZED SALINE. FOR SHORTNESS OF BREATH.     ARIPiprazole (ABILIFY) 5 MG tablet TAKE 1 TABLET EVERY NIGHT AT BEDTIME     aspirin 81 MG EC tablet Take 81 mg by mouth daily.     atorvastatin (LIPITOR) 20 MG tablet TAKE 1 TABLET (20 MG TOTAL) BY MOUTH BEDTIME.     calcium carbonate-vitamin D3 (CALCIUM WITH VITAMIN D) 600 mg(1,500mg) -400 unit per tablet  Take 1 tablet by mouth 2 (two) times a day.     cholecalciferol, vitamin D3, 1,000 unit tablet Take 2,000 Units by mouth daily.      diltiazem (CARDIZEM CD) 180 MG 24 hr capsule Take 1 capsule (180 mg total) by mouth 2 (two) times a day.     docusate sodium (COLACE) 100 MG capsule Take 200 mg by mouth daily.     ferrous sulfate 325 (65 FE) MG tablet Take 1 tablet by mouth daily with breakfast.     furosemide (LASIX) 40 MG tablet Take 1 tablet (40 mg total) by mouth 2 (two) times a day at 9am and 6pm.     HYDROmorphone (DILAUDID) 2 MG tablet Take 0.5-1 tablets (1-2 mg total) by mouth every 6 (six) hours as needed.     ipratropium-albuterol (DUO-NEB) 0.5-2.5 mg/3 mL nebulizer Take 3 mL by nebulization every 4 (four) hours as needed (shortness of breath and wheezing). (Patient taking differently: Take 3 mL by nebulization 3 (three) times a day.       )     lidocaine 4 % patch Place 1 patch on the skin daily. Remove and discard patch with 12 hours or as directed by MD.     magnesium oxide (MAG-OX) 400 mg (241.3 mg magnesium) tablet Take 1 tablet (400 mg total) by mouth daily.     omeprazole (PRILOSEC) 20 MG capsule Take 20 mg by mouth daily before breakfast.     polyethylene glycol (MIRALAX) 17 gram packet Take 17 g by mouth daily.     potassium chloride (KLOR-CON) 10 MEQ CR tablet TAKE 1 TABLET (10 MEQ TOTAL) BY MOUTH DAILY.     predniSONE (DELTASONE) 10 mg tablet Take 10 mg by mouth daily with breakfast for 3 days.     PROAIR HFA 90 mcg/actuation inhaler INHALE 2 PUFFS EVERY 4 (FOUR) HOURS AS NEEDED FOR WHEEZING.     QUEtiapine (SEROQUEL) 25 MG tablet TAKE 2 TABLETS AT BEDTIME=50MG     rivaroxaban 15 mg Tab Take 1 tablet (15 mg total) by mouth 2 (two) times a day with meals for 21 days. Change to Xarelto 20 mg daily on 1/25/2019     sodium chloride 3 % nebulizer solution TAKE 4 ML BY NEBULIZATION 2 (TWO) TIMES A DAY. USING AFTER ALBUTEROL NEB. (Patient taking differently: Take 4 mL by nebulization 3 (three) times a  day. Using after albuterol neb.      )     SPIRIVA WITH HANDIHALER 18 mcg inhalation capsule USE 1 CAPSULE VIA INHALER 1 X A DAY (Patient taking differently: daily prn)     Past Medical History:    Past Medical History:   Diagnosis Date     Acute and chronic respiratory failure with hypoxia (H)      Arm skin lesion, right      Arnold-Chiari malformation (H) 1998     Atrial fibrillation (H) 02/2017     Breast cyst      Breast lump      Chronic diastolic heart failure (H)     diastolic chf     Chronic respiratory failure with hypoxia (H) 3/13/2017     COPD (chronic obstructive pulmonary disease) (H)      COPD exacerbation (H)      Depression      Diet-controlled type 2 diabetes mellitus (H)      GERD (gastroesophageal reflux disease)      Hyperlipidemia      Hypertension      Lumbar spinal stenosis      Peripheral arterial disease (H) 10/28/2015     Pulmonary hypertension (H) 3/5/2018     Recurrent major depressive episodes (H)     Created by Conversion  Replacement Utility updated for latest IMO load     Schizoaffective disorder, bipolar type (H) 6/11/2015     Type 2 diabetes mellitus without complication (H) 8/2/2016           PHYSICAL EXAMINATION  Vitals:    01/08/19 2139   BP: 141/80   Pulse: 98   Resp: 24   Temp: 97.2  F (36.2  C)   SpO2: 97%   Weight: 153 lb (69.4 kg)       Today on physical exam:     GENERAL: Awake, Alert, oriented x3, not in any form of acute distress, answers questions appropriately, follows simple commands, conversant  HEENT: Head is normocephalic with normal hair distribution. No evidence of trauma. Ears: No acute purulent discharge. Eyes: Conjunctivae pink with no scleral jaundice. Nose: Normal mucosa and septum. NECK: Supple with no cervical or supraclavicular lymphadenopathy. Trachea is midline.   CHEST: No tenderness or deformity, no crepitus  LUNG: dim with scattered rhonchi, fine crackles to bases to auscultation with good chest expansion. normal AP diameter. Congested cough  BACK: No  kyphosis of the thoracic spine. Symmetric, no curvature, ROM normal, no CVA tenderness, no spinal tenderness   CVS: irregularly irregular rhythm, there are no murmurs, rubs, gallops, or heaves,  2+ pulses symmetric in all extremities.  ABDOMEN: Rounded and soft, nontender to palpation, no masses, no organomegaly, good bowel sounds, no rebound or guarding, no peritoneal signs.   EXTREMITIES: 2+ LLE pedal edema, 1+ rle, lymphedema wraps, left hip incision c/d/i  SKIN: Warm and dry  NEUROLOGICAL: The patient is oriented to person, place and time.             LABS:   Recent Results (from the past 168 hour(s))   POCT Glucose   Result Value Ref Range    Glucose,  mg/dL   Basic Metabolic Panel   Result Value Ref Range    Sodium 139 136 - 145 mmol/L    Potassium 3.8 3.5 - 5.0 mmol/L    Chloride 104 98 - 107 mmol/L    CO2 30 22 - 31 mmol/L    Anion Gap, Calculation 5 5 - 18 mmol/L    Glucose 125 70 - 125 mg/dL    Calcium 7.9 (L) 8.5 - 10.5 mg/dL    BUN 15 8 - 28 mg/dL    Creatinine 0.52 (L) 0.60 - 1.10 mg/dL    GFR MDRD Af Amer >60 >60 mL/min/1.73m2    GFR MDRD Non Af Amer >60 >60 mL/min/1.73m2   Hemoglobin - Daily x 2   Result Value Ref Range    Hemoglobin 9.7 (L) 12.0 - 16.0 g/dL   POCT Glucose   Result Value Ref Range    Glucose,  mg/dL   POCT Glucose   Result Value Ref Range    Glucose,  mg/dL   POCT Glucose   Result Value Ref Range    Glucose,  mg/dL   POCT Glucose   Result Value Ref Range    Glucose,  mg/dL   Crossmatch   Result Value Ref Range    Crossmatch Compatible     Blood Expiration Date [id number]     Unit Type A Pos     Unit Number [id number]     Status Transfused     Component Red Blood Cells     PRODUCT CODE R5991L26     Issue Date and Time [id number]     Blood Type 6200     CODING SYSTEM [id number]    Basic Metabolic Panel   Result Value Ref Range    Sodium 136 136 - 145 mmol/L    Potassium 4.1 3.5 - 5.0 mmol/L    Chloride 101 98 - 107 mmol/L    CO2 31 22 - 31  mmol/L    Anion Gap, Calculation 4 (L) 5 - 18 mmol/L    Glucose 113 70 - 125 mg/dL    Calcium 7.9 (L) 8.5 - 10.5 mg/dL    BUN 15 8 - 28 mg/dL    Creatinine 0.48 (L) 0.60 - 1.10 mg/dL    GFR MDRD Af Amer >60 >60 mL/min/1.73m2    GFR MDRD Non Af Amer >60 >60 mL/min/1.73m2   Hemoglobin - Daily x 2   Result Value Ref Range    Hemoglobin 9.4 (L) 12.0 - 16.0 g/dL   Vitamin D, Total (25-Hydroxy)   Result Value Ref Range    Vitamin D, Total (25-Hydroxy) 39.6 30.0 - 80.0 ng/mL   POCT Glucose   Result Value Ref Range    Glucose,  mg/dL   POCT Glucose   Result Value Ref Range    Glucose,  mg/dL   POCT Glucose   Result Value Ref Range    Glucose,  mg/dL   POCT Glucose   Result Value Ref Range    Glucose,  mg/dL   Basic Metabolic Panel   Result Value Ref Range    Sodium 136 136 - 145 mmol/L    Potassium 3.9 3.5 - 5.0 mmol/L    Chloride 98 98 - 107 mmol/L    CO2 31 22 - 31 mmol/L    Anion Gap, Calculation 7 5 - 18 mmol/L    Glucose 119 70 - 125 mg/dL    Calcium 8.1 (L) 8.5 - 10.5 mg/dL    BUN 10 8 - 28 mg/dL    Creatinine 0.57 (L) 0.60 - 1.10 mg/dL    GFR MDRD Af Amer >60 >60 mL/min/1.73m2    GFR MDRD Non Af Amer >60 >60 mL/min/1.73m2   Magnesium   Result Value Ref Range    Magnesium 1.6 (L) 1.8 - 2.6 mg/dL   POCT Glucose   Result Value Ref Range    Glucose,  mg/dL   POCT Glucose   Result Value Ref Range    Glucose,  mg/dL   Crossmatch   Result Value Ref Range    Crossmatch Compatible     Blood Expiration Date [id number]     Unit Type A Pos     Unit Number [id number]     Status Released     Component Red Blood Cells     PRODUCT CODE [id number]     Blood Type 6200     CODING SYSTEM [id number]    Basic Metabolic Panel   Result Value Ref Range    Sodium 135 (L) 136 - 145 mmol/L    Potassium 3.9 3.5 - 5.0 mmol/L    Chloride 97 (L) 98 - 107 mmol/L    CO2 28 22 - 31 mmol/L    Anion Gap, Calculation 10 5 - 18 mmol/L    Glucose 154 (H) 70 - 125 mg/dL    Calcium 8.4 (L) 8.5 - 10.5 mg/dL    BUN  10 8 - 28 mg/dL    Creatinine 0.58 (L) 0.60 - 1.10 mg/dL    GFR MDRD Af Amer >60 >60 mL/min/1.73m2    GFR MDRD Non Af Amer >60 >60 mL/min/1.73m2   Magnesium   Result Value Ref Range    Magnesium 1.7 (L) 1.8 - 2.6 mg/dL   HM1 (CBC with Diff)   Result Value Ref Range    WBC 12.2 (H) 4.0 - 11.0 thou/uL    RBC 3.43 (L) 3.80 - 5.40 mill/uL    Hemoglobin 8.9 (L) 12.0 - 16.0 g/dL    Hematocrit 27.9 (L) 35.0 - 47.0 %    MCV 81 80 - 100 fL    MCH 25.9 (L) 27.0 - 34.0 pg    MCHC 31.9 (L) 32.0 - 36.0 g/dL    RDW 19.1 (H) 11.0 - 14.5 %    Platelets 504 (H) 140 - 440 thou/uL    MPV 10.3 8.5 - 12.5 fL    Neutrophils % 85 (H) 50 - 70 %    Lymphocytes % 8 (L) 20 - 40 %    Monocytes % 7 2 - 10 %    Eosinophils % 0 0 - 6 %    Basophils % 0 0 - 2 %    Neutrophils Absolute 10.2 (H) 2.0 - 7.7 thou/uL    Lymphocytes Absolute 1.0 0.8 - 4.4 thou/uL    Monocytes Absolute 0.9 0.0 - 0.9 thou/uL    Eosinophils Absolute 0.1 0.0 - 0.4 thou/uL    Basophils Absolute 0.0 0.0 - 0.2 thou/uL     Results for orders placed or performed in visit on 01/08/19   Basic Metabolic Panel   Result Value Ref Range    Sodium 135 (L) 136 - 145 mmol/L    Potassium 3.9 3.5 - 5.0 mmol/L    Chloride 97 (L) 98 - 107 mmol/L    CO2 28 22 - 31 mmol/L    Anion Gap, Calculation 10 5 - 18 mmol/L    Glucose 154 (H) 70 - 125 mg/dL    Calcium 8.4 (L) 8.5 - 10.5 mg/dL    BUN 10 8 - 28 mg/dL    Creatinine 0.58 (L) 0.60 - 1.10 mg/dL    GFR MDRD Af Amer >60 >60 mL/min/1.73m2    GFR MDRD Non Af Amer >60 >60 mL/min/1.73m2         Lab Results   Component Value Date    WBC 12.2 (H) 01/08/2019    HGB 8.9 (L) 01/08/2019    HCT 27.9 (L) 01/08/2019    MCV 81 01/08/2019     (H) 01/08/2019       No results found for: [id number]  Lab Results   Component Value Date    HGBA1C 6.8 (H) 11/09/2018     Lab Results   Component Value Date    INR 1.63 (H) 11/23/2018    INR 1.19 (H) 11/08/2018    INR 0.95 02/14/2017     Vitamin D, Total (25-Hydroxy)   Date Value Ref Range Status   01/04/2019 39.6  30.0 - 80.0 ng/mL Final     Lab Results   Component Value Date    TSH 1.33 11/08/2018           ASSESSMENT/PLAN:    1. Left femoral neck fracture, s/p REGINA: Incision c/d/i. Tylenol three times a day, lidoderm patch, dilaudid prn. Pain controlled. F/u with ortho Dr. Lackey in 2 weeks. PT, OT. 2+ lle, 1+ rle. Lymphedema wraps.   2. COPD: On advair two times a day, albuterol four times a day prn.  changed duonebs and 3% saline nebs to three times a day and prn on 1/7 but hypertonic saline order was missed, just started today. spiriva prn per her request. Educated her on long v short acting meds, but insisting cannot take spiriva while on duonebs. Prednisone taper-extended another 5 days today. proair prn. Completed doxycycline on 12/28. Added mucinex x 7 days and will order cxr if temp >100.5. WBC 12.2 on 1/8, will recheck on 1/10.   3. Chronic CHF: Daily [id number]. On lasix two times a day. Shortness of breath stable. Edema 2+ lle, 1+ rle, lymphedema wraps. Stable. Monitor.   4. H/o DVT: On xarelto.   5. HLD: On aspirin, lipitor  6. PAF: Rate controlled 80s. On cardizem, xarelto.   7. Constipation: On colace daily, miralax daily  8. Iron deficiency anemia: started iron for hg of 8.9 on 1/8. Will recheck on 1/10.   9. GERD: On omeprazole.   10. Hypokalemia: On kcl. K 3.9 on 1/8.   11. Hypomagnesemia: On mag ox. MG 1.7 on 1/8. Stable.   12. Schizoaffective disorder, bipolar type: On seroquel and abilify. Follows with psych.   13. Osteoporosis: On calcium, vitamin d.   14. Type 2 DM: controlled with diet. Monitoring on labs and stable.     Per therapy soft LCD 1/21, mod for dressing, ambulates 40 feet with cga.     Electronically signed by: Faby Ku NP    Total 35  minutes of which 55% was spent in counseling and coordination of care of the above plan    Time spent in interview and examination of patient, review of available records, and discussion with nursing staff. Continue care plan, efforts at  therapy, and monitor nutritional status.

## 2021-06-23 NOTE — PROGRESS NOTES
[facility]    DATE: 2019    NAME:  Adalgisa Solano             :  1935  MRN: [id number]  CODE STATUS:  DNR    VISIT TYPE: Problem Visit     FACILITY:  [facility] [[id number]]       CHIEF COMPLAIN/REASON FOR VISIT:    Chief Complaint   Patient presents with     Problem Visit               HISTORY OF PRESENT ILLNESS: Adalgisa Solano is a 83 y.o. female who was admitted - for left leg pain and  unable to care for her at home. She was found to have left femoral neck fracture on CT. She was treated for acute on chronic CHF and severe cOPD exacerbation. She was diuresed with IV lasix and transitioned to PO. She was followed by pulmonology and cardiology for stabilization prior to surgery. Her xarelto was held and bridged with lovenox until surgery was performed on . Postop on  she had increased shortness of breath and weakness and lasix was increased. She was put on prednisone taper for copd exacerbation. She did have sputum cultures positive for pseudomonas and klebsiella during stay and completed doxycycline on . She was started on cefepime on  and completed 7 days. She also had acute hyponatremia that improved with fluid status stabilization. She has PMH of DVT 1 month prior to hospital stay on xarelto, PAF, COPD, chronic respiratory failure on 1LNC, CHF, avascular necrosis of Left hip, chronic lymphedema, recurrent pneumonias. Prior to this she lived with her .     Today Ms. Solano states she feels her breathing is doing much better and pretty much back to baseline. She is on her home dose of oxygen and not needing to increase it with activity. She says her cough is now much more dry and not having anything to cough up. She feels her shortness of breath is back to baseline. She says her leg is hurting more today than usual and thinks it might be from taking herself to the bathroom during the  night without help. She says she was waiting a long time and finally just decided to go by herself. She did not fall but she did put pressure on that leg and now it is a little sore. She says otherwise her bowels are moving fine. She thinks she only needs nebs three times a day now. She is looking forward to going home on Tuesday.     REVIEW OF SYSTEMS:  PROBLEMS AND REVIEW OF SYSTEMS:   Review of Systems  Today on ROS:   Currently, no fever, chills, or rigors. Decreased vision and hearing. Denies any chest pain, headaches, palpitations, lightheadedness, dizziness. Appetite is good. Denies any GERD symptoms. Denies any difficulty with swallowing, nausea, or vomiting.   Denies any urinary symptoms. No active bleeding. No rash. positive for chronic o2 1-2 LNC at home, left leg and back pain, shortness of breath, dry cough, lymphedema and wraps      Allergies   Allergen Reactions     Amoxicillin Itching and Rash     Levofloxacin Itching and Rash     Penicillins Itching and Rash     Has tolerated ceftriaxone.     Current Outpatient Medications   Medication Sig     acetaminophen (TYLENOL) 500 MG tablet Take 2 tablets (1,000 mg total) by mouth 3 (three) times a day.     ADVAIR DISKUS 250-50 mcg/dose DISKUS INHALE 1 PUFF 2 (TWO) TIMES A DAY.     albuterol (PROVENTIL) 2.5 mg /3 mL (0.083 %) nebulizer solution 3 ML INHALATION FOUR TIMES A DAY AS NEEDED USE BEFORE NEBULIZED SALINE. FOR SHORTNESS OF BREATH.     ARIPiprazole (ABILIFY) 5 MG tablet TAKE 1 TABLET EVERY NIGHT AT BEDTIME     aspirin 81 MG EC tablet Take 81 mg by mouth daily.     atorvastatin (LIPITOR) 20 MG tablet TAKE 1 TABLET (20 MG TOTAL) BY MOUTH BEDTIME.     calcium carbonate-vitamin D3 (CALCIUM WITH VITAMIN D) 600 mg(1,500mg) -400 unit per tablet Take 1 tablet by mouth 2 (two) times a day.     cholecalciferol, vitamin D3, 1,000 unit tablet Take 2,000 Units by mouth daily.      diltiazem (CARDIZEM CD) 180 MG 24 hr capsule Take 1 capsule (180 mg total) by mouth  2 (two) times a day.     docusate sodium (COLACE) 100 MG capsule Take 200 mg by mouth daily.     ferrous sulfate 325 (65 FE) MG tablet Take 1 tablet by mouth 2 (two) times a day.            furosemide (LASIX) 40 MG tablet Take 1 tablet (40 mg total) by mouth 2 (two) times a day at 9am and 6pm.     HYDROmorphone (DILAUDID) 2 MG tablet Take 0.5-1 tablets (1-2 mg total) by mouth every 6 (six) hours as needed.     ipratropium-albuterol (DUO-NEB) 0.5-2.5 mg/3 mL nebulizer Take 3 mL by nebulization every 4 (four) hours as needed (shortness of breath and wheezing). (Patient taking differently: Take 3 mL by nebulization 3 (three) times a day.       )     lidocaine 4 % patch Place 1 patch on the skin daily. Remove and discard patch with 12 hours or as directed by MD.     magnesium oxide (MAG-OX) 400 mg (241.3 mg magnesium) tablet Take 1 tablet (400 mg total) by mouth daily.     omeprazole (PRILOSEC) 20 MG capsule Take 20 mg by mouth daily before breakfast.     polyethylene glycol (MIRALAX) 17 gram packet Take 17 g by mouth daily.     potassium chloride (KLOR-CON) 10 MEQ CR tablet TAKE 1 TABLET (10 MEQ TOTAL) BY MOUTH DAILY.     PROAIR HFA 90 mcg/actuation inhaler INHALE 2 PUFFS EVERY 4 (FOUR) HOURS AS NEEDED FOR WHEEZING.     QUEtiapine (SEROQUEL) 25 MG tablet TAKE 2 TABLETS AT BEDTIME=50MG     rivaroxaban 15 mg Tab Take 1 tablet (15 mg total) by mouth 2 (two) times a day with meals for 21 days. Change to Xarelto 20 mg daily on 1/25/2019     sodium chloride 3 % nebulizer solution TAKE 4 ML BY NEBULIZATION 2 (TWO) TIMES A DAY. USING AFTER ALBUTEROL NEB. (Patient taking differently: Take 4 mL by nebulization 3 (three) times a day. Using after albuterol neb.      )     SPIRIVA WITH HANDIHALER 18 mcg inhalation capsule USE 1 CAPSULE VIA INHALER 1 X A DAY (Patient taking differently: daily prn)     Past Medical History:    Past Medical History:   Diagnosis Date     Acute and chronic respiratory failure with hypoxia (H)      Arm  skin lesion, right      Arnold-Chiari malformation (H) 1998     Atrial fibrillation (H) 02/2017     Breast cyst      Breast lump      Chronic diastolic heart failure (H)     diastolic chf     Chronic respiratory failure with hypoxia (H) 3/13/2017     COPD (chronic obstructive pulmonary disease) (H)      COPD exacerbation (H)      Depression      Diet-controlled type 2 diabetes mellitus (H)      GERD (gastroesophageal reflux disease)      Hyperlipidemia      Hypertension      Lumbar spinal stenosis      Peripheral arterial disease (H) 10/28/2015     Pulmonary hypertension (H) 3/5/2018     Recurrent major depressive episodes (H)     Created by Conversion  Replacement Utility updated for latest IMO load     Schizoaffective disorder, bipolar type (H) 6/11/2015     Type 2 diabetes mellitus without complication (H) 8/2/2016           PHYSICAL EXAMINATION  Vitals:    01/16/19 2047   BP: 129/65   Pulse: 99   Resp: 22   Temp: 96.7  F (35.9  C)   SpO2: 93%   Weight: 160 lb (72.6 kg)       Today on physical exam:     GENERAL: Awake, Alert, oriented x3, not in any form of acute distress, answers questions appropriately, follows simple commands, conversant  HEENT: Head is normocephalic with normal hair distribution. No evidence of trauma. Ears: No acute purulent discharge. Eyes: Conjunctivae pink with no scleral jaundice. Nose: Normal mucosa and septum. NECK: Supple with no cervical or supraclavicular lymphadenopathy. Trachea is midline.   CHEST: No tenderness or deformity, no crepitus  LUNG: dim with slight expiratory wheeze but no crackles or rhonchi today to auscultation with good chest expansion. normal AP diameter. Dry cough  BACK: No kyphosis of the thoracic spine. Symmetric, no curvature, ROM normal, no CVA tenderness, no spinal tenderness   CVS: irregularly irregular rhythm, there are no murmurs, rubs, gallops, or heaves,  2+ pulses symmetric in all extremities.  ABDOMEN: Rounded and soft, nontender to palpation, no  masses, no organomegaly, good bowel sounds, no rebound or guarding, no peritoneal signs.   EXTREMITIES: 2+ LLE pedal edema, 1-2+ rle, lymphedema wraps, left hip incision c/d/i  SKIN: Warm and dry  NEUROLOGICAL: The patient is oriented to person, place and time.             LABS:   Recent Results (from the past 168 hour(s))   Urinalysis   Result Value Ref Range    Color, UA Straw Colorless, Yellow, Straw, Light Yellow    Clarity, UA Clear Clear    Glucose, UA Negative Negative    Bilirubin, UA Negative Negative    Ketones, UA Negative Negative    Specific Gravity, UA 1.003 1.001 - 1.030    Blood, UA Negative Negative    pH, UA 6.5 4.5 - 8.0    Protein, UA Negative Negative mg/dL    Urobilinogen, UA <2.0 E.U./dL <2.0 E.U./dL, 2.0 E.U./dL    Nitrite, UA Negative Negative    Leukocytes, UA Negative Negative   Culture, Urine   Result Value Ref Range    Culture No Growth    HM1 (CBC with Diff)   Result Value Ref Range    WBC 9.1 4.0 - 11.0 thou/uL    RBC 3.14 (L) 3.80 - 5.40 mill/uL    Hemoglobin 7.9 (L) 12.0 - 16.0 g/dL    Hematocrit 26.0 (L) 35.0 - 47.0 %    MCV 83 80 - 100 fL    MCH 25.2 (L) 27.0 - 34.0 pg    MCHC 30.4 (L) 32.0 - 36.0 g/dL    RDW 20.5 (H) 11.0 - 14.5 %    Platelets 436 140 - 440 thou/uL    MPV 9.7 8.5 - 12.5 fL    Neutrophils % 75 (H) 50 - 70 %    Lymphocytes % 17 (L) 20 - 40 %    Monocytes % 6 2 - 10 %    Eosinophils % 2 0 - 6 %    Basophils % 1 0 - 2 %    Neutrophils Absolute 6.7 2.0 - 7.7 thou/uL    Lymphocytes Absolute 1.5 0.8 - 4.4 thou/uL    Monocytes Absolute 0.6 0.0 - 0.9 thou/uL    Eosinophils Absolute 0.2 0.0 - 0.4 thou/uL    Basophils Absolute 0.1 0.0 - 0.2 thou/uL   Manual Differential   Result Value Ref Range    Platelet Estimate Normal Normal    Ovalocytes 1+ (!) Negative    Polychromasia 1+ (!) Negative    Target Cells 1+ (!) Negative   INR   Result Value Ref Range    INR 2.22 (H) 0.90 - 1.10     Results for orders placed or performed in visit on 01/08/19   Basic Metabolic Panel   Result  Value Ref Range    Sodium 135 (L) 136 - 145 mmol/L    Potassium 3.9 3.5 - 5.0 mmol/L    Chloride 97 (L) 98 - 107 mmol/L    CO2 28 22 - 31 mmol/L    Anion Gap, Calculation 10 5 - 18 mmol/L    Glucose 154 (H) 70 - 125 mg/dL    Calcium 8.4 (L) 8.5 - 10.5 mg/dL    BUN 10 8 - 28 mg/dL    Creatinine 0.58 (L) 0.60 - 1.10 mg/dL    GFR MDRD Af Amer >60 >60 mL/min/1.73m2    GFR MDRD Non Af Amer >60 >60 mL/min/1.73m2         Lab Results   Component Value Date    WBC 9.1 01/14/2019    HGB 7.9 (L) 01/14/2019    HCT 26.0 (L) 01/14/2019    MCV 83 01/14/2019     01/14/2019       No results found for: [id number]  Lab Results   Component Value Date    HGBA1C 6.8 (H) 11/09/2018     Lab Results   Component Value Date    INR 2.22 (H) 01/17/2019    INR 1.63 (H) 11/23/2018    INR 1.19 (H) 11/08/2018     Vitamin D, Total (25-Hydroxy)   Date Value Ref Range Status   01/04/2019 39.6 30.0 - 80.0 ng/mL Final     Lab Results   Component Value Date    TSH 1.33 11/08/2018           ASSESSMENT/PLAN:    1. Left femoral neck fracture, s/p REGINA: Incision c/d/i. Tylenol three times a day, lidoderm patch, dilaudid prn. Pain controlled. F/u with ortho Dr. Lackey 1/23. PT, OT. 2+ lle, 1-2+ rle. Lymphedema wraps. Improved mobility. PT, OT.   2. COPD: On advair two times a day, albuterol four times a day prn.  duonebs and 3% saline nebs three times a day, saline was missed for first few days. spiriva prn per her request. Educated her on long v short acting meds, but insisting cannot take spiriva while on duonebs. Prednisone taper. proair prn. Completed doxycycline on 12/28. Completed mucinex x 7 days. Leukocytosis so CXR done and showed Bronchitis changes but no infiltrate. Started on azithromycin, but minor improvement so increased dose and now much improved and congestion cleared.    3. Chronic CHF: Daily [id number]. On lasix two times a day. Shortness of breath stable. Edema 2+ lle, 1+ rle, lymphedema wraps.  Stable. Monitor.   4. H/o DVT: On xarelto.   5. HLD: On aspirin, lipitor  6. PAF: Rate controlled 90s. On cardizem, xarelto.   7. Constipation: On senna docusate two times a day and miralax daily with miralax daily prn.   8. Iron deficiency anemia: started iron for hg of 8.9 on 1/8. Will recheck on 1/10-8.9. Continue iron for now. Hg 7.9 on 1/14. Increased iron to two times a day and check on 1/17. Occult stools x 3. On xarelto, may need to consider holding for short time. No abdominal pain or overt bleeding. No blood in urine or sputum. Vitals are stable. Results pending on occult stools.    9. GERD: On omeprazole.   10. Hypokalemia: On kcl. K 3.9 on 1/8.   11. Hypomagnesemia: On mag ox. MG 1.7 on 1/8. Stable.   12. Schizoaffective disorder, bipolar type: On seroquel and abilify. Follows with psych.   13. Osteoporosis: On calcium, vitamin d.   14. Type 2 DM: controlled with diet. Monitoring on labs and stable.   15. Leukocytosis: WBC 11-12-14.6-9.1. Improved since starting azithromycin.     Per therapy firm LCD 1/21, sup/mod ind for dressing, sup for toileting, ambulates 150 feet with cga. 24 on slums.  PT, OT. Care conference today.     Electronically signed by: Fayb Ku NP    Total 25  minutes of which 55% was spent in counseling and coordination of care of the above plan    Time spent in interview and examination of patient, review of available records, and discussion with nursing staff. Continue care plan, efforts at therapy, and monitor nutritional status.

## 2021-06-23 NOTE — TELEPHONE ENCOUNTER
Orders being requested: lift chair   Reason service is needed/diagnosis: recent hip replacement and lymphedema   When are orders needed by: now   Where to send Orders: Patient does not have a preference in DME company  Okay to leave detailed message?  Yes

## 2021-06-23 NOTE — PROGRESS NOTES
[facility]    DATE: 1/15/2019    NAME:  Adalgisa Solano             :  1935  MRN: [id number]  CODE STATUS:  DNR    VISIT TYPE: Problem Visit (cough)     FACILITY:  [facility] [[id number]]       CHIEF COMPLAIN/REASON FOR VISIT:    Chief Complaint   Patient presents with     Problem Visit     cough               HISTORY OF PRESENT ILLNESS: Adalgisa Solano is a 83 y.o. female who was admitted - for left leg pain and  unable to care for her at home. She was found to have left femoral neck fracture on CT. She was treated for acute on chronic CHF and severe cOPD exacerbation. She was diuresed with IV lasix and transitioned to PO. She was followed by pulmonology and cardiology for stabilization prior to surgery. Her xarelto was held and bridged with lovenox until surgery was performed on . Postop on  she had increased shortness of breath and weakness and lasix was increased. She was put on prednisone taper for copd exacerbation. She did have sputum cultures positive for pseudomonas and klebsiella during stay and completed doxycycline on . She was started on cefepime on  and completed 7 days. She also had acute hyponatremia that improved with fluid status stabilization. She has PMH of DVT 1 month prior to hospital stay on xarelto, PAF, COPD, chronic respiratory failure on 1LNC, CHF, avascular necrosis of Left hip, chronic lymphedema, recurrent pneumonias. Prior to this she lived with her .     Today Ms. Solano states she is still having significant cough and congestion. She is using the nebulizers frequently and this helps some. She thinks she is a little better since being on the azithromycin but says it usually takes a double dose of the azithromycin or doxycycline to clear her bronchitis. She says she saw the results of the chest xray and was glad it wasn't a pneumonia. She is on her chronic dose of  oxygen and is not needing to increase it with therapy. She thinks therapy is going quite well and she feels she can move her left leg much more than before. Her pain is improved as well. She denies any concerns with her incision. She thinks the swelling in her hip is going down and the swelling in her lower legs is stable for her. She has her wraps on. Her bowels have not moved for a while and she thinks she needs more stool softeners. Her appetite is good and she is eating lots of protein. She requests updates on her labs results and plan for management. This was dicussed with her at length. She is agreeable to increasing iron for low hemoglobin, checking stools for blood, rechecking labs on Thursday. She has not noted in any blood in her stool but has not gone for a few days. She denies any blood in urine or sputum. She is not having any abdominal pain or nausea. She denies any dizziness or feeling fatigued from low hemoglobin.     REVIEW OF SYSTEMS:  PROBLEMS AND REVIEW OF SYSTEMS:   Review of Systems  Today on ROS:   Currently, no fever, chills, or rigors. Decreased vision and hearing. Denies any chest pain, headaches, palpitations, lightheadedness, dizziness. Appetite is good. Denies any GERD symptoms. Denies any difficulty with swallowing, nausea, or vomiting.   Denies any urinary symptoms. No active bleeding. No rash. positive for chronic o2 1-2 LNC at home, left leg and back pain, shortness of breath, congested cough-improving, lymphedema and wraps, constipation      Allergies   Allergen Reactions     Amoxicillin Itching and Rash     Levofloxacin Itching and Rash     Penicillins Itching and Rash     Has tolerated ceftriaxone.     Current Outpatient Medications   Medication Sig     acetaminophen (TYLENOL) 500 MG tablet Take 2 tablets (1,000 mg total) by mouth 3 (three) times a day.     ADVAIR DISKUS 250-50 mcg/dose DISKUS INHALE 1 PUFF 2 (TWO) TIMES A DAY.     albuterol (PROVENTIL) 2.5 mg /3 mL (0.083 %)  nebulizer solution 3 ML INHALATION FOUR TIMES A DAY AS NEEDED USE BEFORE NEBULIZED SALINE. FOR SHORTNESS OF BREATH.     ARIPiprazole (ABILIFY) 5 MG tablet TAKE 1 TABLET EVERY NIGHT AT BEDTIME     aspirin 81 MG EC tablet Take 81 mg by mouth daily.     atorvastatin (LIPITOR) 20 MG tablet TAKE 1 TABLET (20 MG TOTAL) BY MOUTH BEDTIME.     calcium carbonate-vitamin D3 (CALCIUM WITH VITAMIN D) 600 mg(1,500mg) -400 unit per tablet Take 1 tablet by mouth 2 (two) times a day.     cholecalciferol, vitamin D3, 1,000 unit tablet Take 2,000 Units by mouth daily.      diltiazem (CARDIZEM CD) 180 MG 24 hr capsule Take 1 capsule (180 mg total) by mouth 2 (two) times a day.     docusate sodium (COLACE) 100 MG capsule Take 200 mg by mouth daily.     ferrous sulfate 325 (65 FE) MG tablet Take 1 tablet by mouth 2 (two) times a day.            furosemide (LASIX) 40 MG tablet Take 1 tablet (40 mg total) by mouth 2 (two) times a day at 9am and 6pm.     guaiFENesin ER (MUCINEX) 600 mg 12 hr tablet Take 1,200 mg by mouth 2 (two) times a day.     HYDROmorphone (DILAUDID) 2 MG tablet Take 0.5-1 tablets (1-2 mg total) by mouth every 6 (six) hours as needed.     ipratropium-albuterol (DUO-NEB) 0.5-2.5 mg/3 mL nebulizer Take 3 mL by nebulization every 4 (four) hours as needed (shortness of breath and wheezing). (Patient taking differently: Take 3 mL by nebulization 3 (three) times a day.       )     lidocaine 4 % patch Place 1 patch on the skin daily. Remove and discard patch with 12 hours or as directed by MD.     magnesium oxide (MAG-OX) 400 mg (241.3 mg magnesium) tablet Take 1 tablet (400 mg total) by mouth daily.     omeprazole (PRILOSEC) 20 MG capsule Take 20 mg by mouth daily before breakfast.     polyethylene glycol (MIRALAX) 17 gram packet Take 17 g by mouth daily.     potassium chloride (KLOR-CON) 10 MEQ CR tablet TAKE 1 TABLET (10 MEQ TOTAL) BY MOUTH DAILY.     PROAIR HFA 90 mcg/actuation inhaler INHALE 2 PUFFS EVERY 4 (FOUR) HOURS AS  NEEDED FOR WHEEZING.     QUEtiapine (SEROQUEL) 25 MG tablet TAKE 2 TABLETS AT BEDTIME=50MG     rivaroxaban 15 mg Tab Take 1 tablet (15 mg total) by mouth 2 (two) times a day with meals for 21 days. Change to Xarelto 20 mg daily on 1/25/2019     sodium chloride 3 % nebulizer solution TAKE 4 ML BY NEBULIZATION 2 (TWO) TIMES A DAY. USING AFTER ALBUTEROL NEB. (Patient taking differently: Take 4 mL by nebulization 3 (three) times a day. Using after albuterol neb.      )     SPIRIVA WITH HANDIHALER 18 mcg inhalation capsule USE 1 CAPSULE VIA INHALER 1 X A DAY (Patient taking differently: daily prn)     Past Medical History:    Past Medical History:   Diagnosis Date     Acute and chronic respiratory failure with hypoxia (H)      Arm skin lesion, right      Arnold-Chiari malformation (H) 1998     Atrial fibrillation (H) 02/2017     Breast cyst      Breast lump      Chronic diastolic heart failure (H)     diastolic chf     Chronic respiratory failure with hypoxia (H) 3/13/2017     COPD (chronic obstructive pulmonary disease) (H)      COPD exacerbation (H)      Depression      Diet-controlled type 2 diabetes mellitus (H)      GERD (gastroesophageal reflux disease)      Hyperlipidemia      Hypertension      Lumbar spinal stenosis      Peripheral arterial disease (H) 10/28/2015     Pulmonary hypertension (H) 3/5/2018     Recurrent major depressive episodes (H)     Created by Conversion  Replacement Utility updated for latest IMO load     Schizoaffective disorder, bipolar type (H) 6/11/2015     Type 2 diabetes mellitus without complication (H) 8/2/2016           PHYSICAL EXAMINATION  Vitals:    01/14/19 2130   BP: 103/60   Pulse: 94   Resp: 24   Temp: 97.7  F (36.5  C)   SpO2: 96%   Weight: 155 lb (70.3 kg)       Today on physical exam:     GENERAL: Awake, Alert, oriented x3, not in any form of acute distress, answers questions appropriately, follows simple commands, conversant  HEENT: Head is normocephalic with normal hair  distribution. No evidence of trauma. Ears: No acute purulent discharge. Eyes: Conjunctivae pink with no scleral jaundice. Nose: Normal mucosa and septum. NECK: Supple with no cervical or supraclavicular lymphadenopathy. Trachea is midline.   CHEST: No tenderness or deformity, no crepitus  LUNG: dim with scattered rhonchi, fine crackles to bases, Left worse than right to auscultation with good chest expansion. normal AP diameter. Congested cough, productive with yellow sputum  BACK: No kyphosis of the thoracic spine. Symmetric, no curvature, ROM normal, no CVA tenderness, no spinal tenderness   CVS: irregularly irregular rhythm, there are no murmurs, rubs, gallops, or heaves,  2+ pulses symmetric in all extremities.  ABDOMEN: Rounded and soft, nontender to palpation, no masses, no organomegaly, good bowel sounds, no rebound or guarding, no peritoneal signs.   EXTREMITIES: 2+ LLE pedal edema, 1-2+ rle, lymphedema wraps, left hip incision c/d/i  SKIN: Warm and dry  NEUROLOGICAL: The patient is oriented to person, place and time.             LABS:   Recent Results (from the past 168 hour(s))   HM1 (CBC with Diff)   Result Value Ref Range    WBC 14.7 (H) 4.0 - 11.0 thou/uL    RBC 3.46 (L) 3.80 - 5.40 mill/uL    Hemoglobin 8.9 (L) 12.0 - 16.0 g/dL    Hematocrit 28.4 (L) 35.0 - 47.0 %    MCV 82 80 - 100 fL    MCH 25.7 (L) 27.0 - 34.0 pg    MCHC 31.3 (L) 32.0 - 36.0 g/dL    RDW 19.9 (H) 11.0 - 14.5 %    Platelets 544 (H) 140 - 440 thou/uL    MPV 10.0 8.5 - 12.5 fL    Neutrophils % 80 (H) 50 - 70 %    Lymphocytes % 10 (L) 20 - 40 %    Monocytes % 8 2 - 10 %    Eosinophils % 2 0 - 6 %    Basophils % 0 0 - 2 %    Neutrophils Absolute 11.6 (H) 2.0 - 7.7 thou/uL    Lymphocytes Absolute 1.5 0.8 - 4.4 thou/uL    Monocytes Absolute 1.2 (H) 0.0 - 0.9 thou/uL    Eosinophils Absolute 0.2 0.0 - 0.4 thou/uL    Basophils Absolute 0.1 0.0 - 0.2 thou/uL   Urinalysis   Result Value Ref Range    Color, UA Straw Colorless, Yellow, Straw,  Light Yellow    Clarity, UA Clear Clear    Glucose, UA Negative Negative    Bilirubin, UA Negative Negative    Ketones, UA Negative Negative    Specific Gravity, UA 1.003 1.001 - 1.030    Blood, UA Negative Negative    pH, UA 6.5 4.5 - 8.0    Protein, UA Negative Negative mg/dL    Urobilinogen, UA <2.0 E.U./dL <2.0 E.U./dL, 2.0 E.U./dL    Nitrite, UA Negative Negative    Leukocytes, UA Negative Negative   Culture, Urine   Result Value Ref Range    Culture No Growth    HM1 (CBC with Diff)   Result Value Ref Range    WBC 9.1 4.0 - 11.0 thou/uL    RBC 3.14 (L) 3.80 - 5.40 mill/uL    Hemoglobin 7.9 (L) 12.0 - 16.0 g/dL    Hematocrit 26.0 (L) 35.0 - 47.0 %    MCV 83 80 - 100 fL    MCH 25.2 (L) 27.0 - 34.0 pg    MCHC 30.4 (L) 32.0 - 36.0 g/dL    RDW 20.5 (H) 11.0 - 14.5 %    Platelets 436 140 - 440 thou/uL    MPV 9.7 8.5 - 12.5 fL    Neutrophils % 75 (H) 50 - 70 %    Lymphocytes % 17 (L) 20 - 40 %    Monocytes % 6 2 - 10 %    Eosinophils % 2 0 - 6 %    Basophils % 1 0 - 2 %    Neutrophils Absolute 6.7 2.0 - 7.7 thou/uL    Lymphocytes Absolute 1.5 0.8 - 4.4 thou/uL    Monocytes Absolute 0.6 0.0 - 0.9 thou/uL    Eosinophils Absolute 0.2 0.0 - 0.4 thou/uL    Basophils Absolute 0.1 0.0 - 0.2 thou/uL   Manual Differential   Result Value Ref Range    Platelet Estimate Normal Normal    Ovalocytes 1+ (!) Negative    Polychromasia 1+ (!) Negative    Target Cells 1+ (!) Negative     Results for orders placed or performed in visit on 01/08/19   Basic Metabolic Panel   Result Value Ref Range    Sodium 135 (L) 136 - 145 mmol/L    Potassium 3.9 3.5 - 5.0 mmol/L    Chloride 97 (L) 98 - 107 mmol/L    CO2 28 22 - 31 mmol/L    Anion Gap, Calculation 10 5 - 18 mmol/L    Glucose 154 (H) 70 - 125 mg/dL    Calcium 8.4 (L) 8.5 - 10.5 mg/dL    BUN 10 8 - 28 mg/dL    Creatinine 0.58 (L) 0.60 - 1.10 mg/dL    GFR MDRD Af Amer >60 >60 mL/min/1.73m2    GFR MDRD Non Af Amer >60 >60 mL/min/1.73m2         Lab Results   Component Value Date    WBC 9.1  01/14/2019    HGB 7.9 (L) 01/14/2019    HCT 26.0 (L) 01/14/2019    MCV 83 01/14/2019     01/14/2019       No results found for: [id number]  Lab Results   Component Value Date    HGBA1C 6.8 (H) 11/09/2018     Lab Results   Component Value Date    INR 1.63 (H) 11/23/2018    INR 1.19 (H) 11/08/2018    INR 0.95 02/14/2017     Vitamin D, Total (25-Hydroxy)   Date Value Ref Range Status   01/04/2019 39.6 30.0 - 80.0 ng/mL Final     Lab Results   Component Value Date    TSH 1.33 11/08/2018           ASSESSMENT/PLAN:    1. Left femoral neck fracture, s/p REGINA: Incision c/d/i. Tylenol three times a day, lidoderm patch, dilaudid prn. Pain controlled. F/u with ortho Dr. Lackey 1/23. PT, OT. 2+ lle, 1-2+ rle. Lymphedema wraps. Improved mobility. PT, OT.   2. COPD: On advair two times a day, albuterol four times a day prn.  duonebs and 3% saline nebs three times a day, saline was missed for first few days. spiriva prn per her request. Educated her on long v short acting meds, but insisting cannot take spiriva while on duonebs. Prednisone taper. proair prn. Completed doxycycline on 12/28. mucinex x 7 days and there was no improvement in congestion, dyspnea, cough. Leukocytosis so CXR done and showed Bronchitis changes but no infiltrate. Started on azithromycin, however minimal improvement. Will increase azithromycin to 500mg daily x 5 more days.    3. Chronic CHF: Daily [id number]. On lasix two times a day. Shortness of breath stable. Edema 2+ lle, 1+ rle, lymphedema wraps. Stable. Monitor.   4. H/o DVT: On xarelto.   5. HLD: On aspirin, lipitor  6. PAF: Rate controlled 90s. On cardizem, xarelto.   7. Constipation: On colace daily, miralax daily-will change colace to senna docusate two times a day and additional miralax daily prn. Will need 3 stool samples.   8. Iron deficiency anemia: started iron for hg of 8.9 on 1/8. Will recheck on 1/10-8.9. Continue iron for now. Hg 7.9 on 1/14. Increased iron  to two times a day and check on 1/17. Occult stools x 3. On xarelto, may need to consider holding for short time. No abdominal pain or overt bleeding. No blood in urine or sputum. Vitals are stable so no need for blood transfusion at this time.   9. GERD: On omeprazole.   10. Hypokalemia: On kcl. K 3.9 on 1/8.   11. Hypomagnesemia: On mag ox. MG 1.7 on 1/8. Stable.   12. Schizoaffective disorder, bipolar type: On seroquel and abilify. Follows with psych.   13. Osteoporosis: On calcium, vitamin d.   14. Type 2 DM: controlled with diet. Monitoring on labs and stable.   15. Leukocytosis: WBC 11-12-14.6-9.1. Improved since starting azithromycin.     Per therapy soft LCD 1/21,sba for dressing, sup for toileting, ambulates 150 feet with cga. 24 on slums.     Electronically signed by: Faby Ku NP    Total 35  minutes of which 55% was spent in counseling and coordination of care of the above plan    Time spent in interview and examination of patient, review of available records, and discussion with nursing staff. Continue care plan, efforts at therapy, and monitor nutritional status.

## 2021-06-23 NOTE — TELEPHONE ENCOUNTER
Please check with the provider or doctor who wrote the Xarelto.  I do not believe I wrote the Xarelto.  Thank you for the information about the drug drug interactions and potential for harm here.

## 2021-06-23 NOTE — TELEPHONE ENCOUNTER
Request for Orders    Who s Requesting: Home Care Registered Nurse    Orders being requested:   Skilled nursing 2 x week for 3 weeks.  CP assess, pain, skin,  edema, s/sx of infection  3PRN for issues/concerns with medications, s/sx of infection, labs, oasis data collection    Where to send Orders: [facility]    Resuming care after hospitalization.      Feel free to call with any questions/concerns  Justyna Jay RN  [facility]  [phone]

## 2021-06-23 NOTE — TELEPHONE ENCOUNTER
Dima Murray for Orders requested below?  Please advise.  Thank you.  Silvia AZAR, CELINE/NEL....................8:02 AM

## 2021-06-23 NOTE — PROGRESS NOTES
[facility]    DATE: 2019    NAME:  Adalgisa Solano             :  1935  MRN: [id number]  CODE STATUS:  DNR    VISIT TYPE: Problem Visit (leukocytosis)     FACILITY:  [facility] [[id number]]       CHIEF COMPLAIN/REASON FOR VISIT:    Chief Complaint   Patient presents with     Problem Visit     leukocytosis               HISTORY OF PRESENT ILLNESS: Adalgisa Solano is a 83 y.o. female who was admitted - for left leg pain and  unable to care for her at home. She was found to have left femoral neck fracture on CT. She was treated for acute on chronic CHF and severe cOPD exacerbation. She was diuresed with IV lasix and transitioned to PO. She was followed by pulmonology and cardiology for stabilization prior to surgery. Her xarelto was held and bridged with lovenox until surgery was performed on . Postop on  she had increased shortness of breath and weakness and lasix was increased. She was put on prednisone taper for copd exacerbation. She did have sputum cultures positive for pseudomonas and klebsiella during stay and completed doxycycline on . She was started on cefepime on  and completed 7 days. She also had acute hyponatremia that improved with fluid status stabilization. She has PMH of DVT 1 month prior to hospital stay on xarelto, PAF, COPD, chronic respiratory failure on 1LNC, CHF, avascular necrosis of Left hip, chronic lymphedema, recurrent pneumonias. Prior to this she lived with her .     Today Ms. Solano states she is still coughing a lot. She feels very congested and feels her shortness of breath is not improving much. She denies any fevers or chills. She says she is not having any urinary symptoms or other signs of infection. She does say she gets pneumonia frequently. She is sleeping ok and has no other concerns. She says she just needs to keep getting her nebs on schedule and that  helps a lot. She does feel like she might have pneumonia at this time with how congested she is. She is coughing up yellow phlegm. Her  Francis is at bedside and says she does get pneumonia frequently and her left side usually is worse than her right. He says they were wondering why she was having all these blood tests and urine tests. He says the nurse last night told them that she had an elevated red blood cell count and they didn't understand this. Discussed her lab results and orders from last few days and both were appreciative and all questions answered.     REVIEW OF SYSTEMS:  PROBLEMS AND REVIEW OF SYSTEMS:   Review of Systems  Today on ROS:   Currently, no fever, chills, or rigors. Decreased vision and hearing. Denies any chest pain, headaches, palpitations, lightheadedness, dizziness. Appetite is good. Denies any GERD symptoms. Denies any difficulty with swallowing, nausea, or vomiting.   Denies any urinary symptoms. No active bleeding. No rash. positive for chronic o2 1-2 LNC at home, left leg and back pain, shortness of breath, congested cough, lymphedema and wraps      Allergies   Allergen Reactions     Amoxicillin Itching and Rash     Levofloxacin Itching and Rash     Penicillins Itching and Rash     Has tolerated ceftriaxone.     Current Outpatient Medications   Medication Sig     acetaminophen (TYLENOL) 500 MG tablet Take 2 tablets (1,000 mg total) by mouth 3 (three) times a day.     ADVAIR DISKUS 250-50 mcg/dose DISKUS INHALE 1 PUFF 2 (TWO) TIMES A DAY.     albuterol (PROVENTIL) 2.5 mg /3 mL (0.083 %) nebulizer solution 3 ML INHALATION FOUR TIMES A DAY AS NEEDED USE BEFORE NEBULIZED SALINE. FOR SHORTNESS OF BREATH.     ARIPiprazole (ABILIFY) 5 MG tablet TAKE 1 TABLET EVERY NIGHT AT BEDTIME     aspirin 81 MG EC tablet Take 81 mg by mouth daily.     atorvastatin (LIPITOR) 20 MG tablet TAKE 1 TABLET (20 MG TOTAL) BY MOUTH BEDTIME.     calcium carbonate-vitamin D3 (CALCIUM WITH VITAMIN D) 600  mg(1,500mg) -400 unit per tablet Take 1 tablet by mouth 2 (two) times a day.     cholecalciferol, vitamin D3, 1,000 unit tablet Take 2,000 Units by mouth daily.      diltiazem (CARDIZEM CD) 180 MG 24 hr capsule Take 1 capsule (180 mg total) by mouth 2 (two) times a day.     docusate sodium (COLACE) 100 MG capsule Take 200 mg by mouth daily.     ferrous sulfate 325 (65 FE) MG tablet Take 1 tablet by mouth daily with breakfast.     furosemide (LASIX) 40 MG tablet Take 1 tablet (40 mg total) by mouth 2 (two) times a day at 9am and 6pm.     guaiFENesin ER (MUCINEX) 600 mg 12 hr tablet Take 1,200 mg by mouth 2 (two) times a day.     HYDROmorphone (DILAUDID) 2 MG tablet Take 0.5-1 tablets (1-2 mg total) by mouth every 6 (six) hours as needed.     ipratropium-albuterol (DUO-NEB) 0.5-2.5 mg/3 mL nebulizer Take 3 mL by nebulization every 4 (four) hours as needed (shortness of breath and wheezing). (Patient taking differently: Take 3 mL by nebulization 3 (three) times a day.       )     lidocaine 4 % patch Place 1 patch on the skin daily. Remove and discard patch with 12 hours or as directed by MD.     magnesium oxide (MAG-OX) 400 mg (241.3 mg magnesium) tablet Take 1 tablet (400 mg total) by mouth daily.     omeprazole (PRILOSEC) 20 MG capsule Take 20 mg by mouth daily before breakfast.     polyethylene glycol (MIRALAX) 17 gram packet Take 17 g by mouth daily.     potassium chloride (KLOR-CON) 10 MEQ CR tablet TAKE 1 TABLET (10 MEQ TOTAL) BY MOUTH DAILY.     PROAIR HFA 90 mcg/actuation inhaler INHALE 2 PUFFS EVERY 4 (FOUR) HOURS AS NEEDED FOR WHEEZING.     QUEtiapine (SEROQUEL) 25 MG tablet TAKE 2 TABLETS AT BEDTIME=50MG     rivaroxaban 15 mg Tab Take 1 tablet (15 mg total) by mouth 2 (two) times a day with meals for 21 days. Change to Xarelto 20 mg daily on 1/25/2019     sodium chloride 3 % nebulizer solution TAKE 4 ML BY NEBULIZATION 2 (TWO) TIMES A DAY. USING AFTER ALBUTEROL NEB. (Patient taking differently: Take 4 mL by  nebulization 3 (three) times a day. Using after albuterol neb.      )     SPIRIVA WITH HANDIHALER 18 mcg inhalation capsule USE 1 CAPSULE VIA INHALER 1 X A DAY (Patient taking differently: daily prn)     Past Medical History:    Past Medical History:   Diagnosis Date     Acute and chronic respiratory failure with hypoxia (H)      Arm skin lesion, right      Arnold-Chiari malformation (H) 1998     Atrial fibrillation (H) 02/2017     Breast cyst      Breast lump      Chronic diastolic heart failure (H)     diastolic chf     Chronic respiratory failure with hypoxia (H) 3/13/2017     COPD (chronic obstructive pulmonary disease) (H)      COPD exacerbation (H)      Depression      Diet-controlled type 2 diabetes mellitus (H)      GERD (gastroesophageal reflux disease)      Hyperlipidemia      Hypertension      Lumbar spinal stenosis      Peripheral arterial disease (H) 10/28/2015     Pulmonary hypertension (H) 3/5/2018     Recurrent major depressive episodes (H)     Created by Conversion  Replacement Utility updated for latest IMO load     Schizoaffective disorder, bipolar type (H) 6/11/2015     Type 2 diabetes mellitus without complication (H) 8/2/2016           PHYSICAL EXAMINATION  Vitals:    01/10/19 2157   BP: 143/57   Pulse: (!) 108   Resp: 24   Temp: 97.7  F (36.5  C)   SpO2: 96%   Weight: 157 lb (71.2 kg)       Today on physical exam:     GENERAL: Awake, Alert, oriented x3, not in any form of acute distress, answers questions appropriately, follows simple commands, conversant  HEENT: Head is normocephalic with normal hair distribution. No evidence of trauma. Ears: No acute purulent discharge. Eyes: Conjunctivae pink with no scleral jaundice. Nose: Normal mucosa and septum. NECK: Supple with no cervical or supraclavicular lymphadenopathy. Trachea is midline.   CHEST: No tenderness or deformity, no crepitus  LUNG: dim with scattered rhonchi, fine crackles to bases, Left worse than right to auscultation with good  chest expansion. normal AP diameter. Congested cough, productive with yellow sputum  BACK: No kyphosis of the thoracic spine. Symmetric, no curvature, ROM normal, no CVA tenderness, no spinal tenderness   CVS: irregularly irregular rhythm, there are no murmurs, rubs, gallops, or heaves,  2+ pulses symmetric in all extremities.  ABDOMEN: Rounded and soft, nontender to palpation, no masses, no organomegaly, good bowel sounds, no rebound or guarding, no peritoneal signs.   EXTREMITIES: 2+ LLE pedal edema, 1+ rle, lymphedema wraps, left hip incision c/d/i  SKIN: Warm and dry  NEUROLOGICAL: The patient is oriented to person, place and time.             LABS:   Recent Results (from the past 168 hour(s))   POCT Glucose   Result Value Ref Range    Glucose,  mg/dL   POCT Glucose   Result Value Ref Range    Glucose,  mg/dL   Basic Metabolic Panel   Result Value Ref Range    Sodium 136 136 - 145 mmol/L    Potassium 3.9 3.5 - 5.0 mmol/L    Chloride 98 98 - 107 mmol/L    CO2 31 22 - 31 mmol/L    Anion Gap, Calculation 7 5 - 18 mmol/L    Glucose 119 70 - 125 mg/dL    Calcium 8.1 (L) 8.5 - 10.5 mg/dL    BUN 10 8 - 28 mg/dL    Creatinine 0.57 (L) 0.60 - 1.10 mg/dL    GFR MDRD Af Amer >60 >60 mL/min/1.73m2    GFR MDRD Non Af Amer >60 >60 mL/min/1.73m2   Magnesium   Result Value Ref Range    Magnesium 1.6 (L) 1.8 - 2.6 mg/dL   POCT Glucose   Result Value Ref Range    Glucose,  mg/dL   POCT Glucose   Result Value Ref Range    Glucose,  mg/dL   Crossmatch   Result Value Ref Range    Crossmatch Compatible     Blood Expiration Date [id number]     Unit Type A Pos     Unit Number [id number]     Status Released     Component Red Blood Cells     PRODUCT CODE E9950O04     Blood Type 6200     CODING SYSTEM [id number]    Basic Metabolic Panel   Result Value Ref Range    Sodium 135 (L) 136 - 145 mmol/L    Potassium 3.9 3.5 - 5.0 mmol/L    Chloride 97 (L) 98 - 107 mmol/L    CO2 28 22 - 31 mmol/L    Anion Gap,  Calculation 10 5 - 18 mmol/L    Glucose 154 (H) 70 - 125 mg/dL    Calcium 8.4 (L) 8.5 - 10.5 mg/dL    BUN 10 8 - 28 mg/dL    Creatinine 0.58 (L) 0.60 - 1.10 mg/dL    GFR MDRD Af Amer >60 >60 mL/min/1.73m2    GFR MDRD Non Af Amer >60 >60 mL/min/1.73m2   Magnesium   Result Value Ref Range    Magnesium 1.7 (L) 1.8 - 2.6 mg/dL   HM1 (CBC with Diff)   Result Value Ref Range    WBC 12.2 (H) 4.0 - 11.0 thou/uL    RBC 3.43 (L) 3.80 - 5.40 mill/uL    Hemoglobin 8.9 (L) 12.0 - 16.0 g/dL    Hematocrit 27.9 (L) 35.0 - 47.0 %    MCV 81 80 - 100 fL    MCH 25.9 (L) 27.0 - 34.0 pg    MCHC 31.9 (L) 32.0 - 36.0 g/dL    RDW 19.1 (H) 11.0 - 14.5 %    Platelets 504 (H) 140 - 440 thou/uL    MPV 10.3 8.5 - 12.5 fL    Neutrophils % 85 (H) 50 - 70 %    Lymphocytes % 8 (L) 20 - 40 %    Monocytes % 7 2 - 10 %    Eosinophils % 0 0 - 6 %    Basophils % 0 0 - 2 %    Neutrophils Absolute 10.2 (H) 2.0 - 7.7 thou/uL    Lymphocytes Absolute 1.0 0.8 - 4.4 thou/uL    Monocytes Absolute 0.9 0.0 - 0.9 thou/uL    Eosinophils Absolute 0.1 0.0 - 0.4 thou/uL    Basophils Absolute 0.0 0.0 - 0.2 thou/uL   HM1 (CBC with Diff)   Result Value Ref Range    WBC 14.7 (H) 4.0 - 11.0 thou/uL    RBC 3.46 (L) 3.80 - 5.40 mill/uL    Hemoglobin 8.9 (L) 12.0 - 16.0 g/dL    Hematocrit 28.4 (L) 35.0 - 47.0 %    MCV 82 80 - 100 fL    MCH 25.7 (L) 27.0 - 34.0 pg    MCHC 31.3 (L) 32.0 - 36.0 g/dL    RDW 19.9 (H) 11.0 - 14.5 %    Platelets 544 (H) 140 - 440 thou/uL    MPV 10.0 8.5 - 12.5 fL    Neutrophils % 80 (H) 50 - 70 %    Lymphocytes % 10 (L) 20 - 40 %    Monocytes % 8 2 - 10 %    Eosinophils % 2 0 - 6 %    Basophils % 0 0 - 2 %    Neutrophils Absolute 11.6 (H) 2.0 - 7.7 thou/uL    Lymphocytes Absolute 1.5 0.8 - 4.4 thou/uL    Monocytes Absolute 1.2 (H) 0.0 - 0.9 thou/uL    Eosinophils Absolute 0.2 0.0 - 0.4 thou/uL    Basophils Absolute 0.1 0.0 - 0.2 thou/uL   Urinalysis   Result Value Ref Range    Color, UA Straw Colorless, Yellow, Straw, Light Yellow    Clarity, UA  Clear Clear    Glucose, UA Negative Negative    Bilirubin, UA Negative Negative    Ketones, UA Negative Negative    Specific Gravity, UA 1.003 1.001 - 1.030    Blood, UA Negative Negative    pH, UA 6.5 4.5 - 8.0    Protein, UA Negative Negative mg/dL    Urobilinogen, UA <2.0 E.U./dL <2.0 E.U./dL, 2.0 E.U./dL    Nitrite, UA Negative Negative    Leukocytes, UA Negative Negative     Results for orders placed or performed in visit on 01/08/19   Basic Metabolic Panel   Result Value Ref Range    Sodium 135 (L) 136 - 145 mmol/L    Potassium 3.9 3.5 - 5.0 mmol/L    Chloride 97 (L) 98 - 107 mmol/L    CO2 28 22 - 31 mmol/L    Anion Gap, Calculation 10 5 - 18 mmol/L    Glucose 154 (H) 70 - 125 mg/dL    Calcium 8.4 (L) 8.5 - 10.5 mg/dL    BUN 10 8 - 28 mg/dL    Creatinine 0.58 (L) 0.60 - 1.10 mg/dL    GFR MDRD Af Amer >60 >60 mL/min/1.73m2    GFR MDRD Non Af Amer >60 >60 mL/min/1.73m2         Lab Results   Component Value Date    WBC 14.7 (H) 01/10/2019    HGB 8.9 (L) 01/10/2019    HCT 28.4 (L) 01/10/2019    MCV 82 01/10/2019     (H) 01/10/2019       No results found for: [id number]  Lab Results   Component Value Date    HGBA1C 6.8 (H) 11/09/2018     Lab Results   Component Value Date    INR 1.63 (H) 11/23/2018    INR 1.19 (H) 11/08/2018    INR 0.95 02/14/2017     Vitamin D, Total (25-Hydroxy)   Date Value Ref Range Status   01/04/2019 39.6 30.0 - 80.0 ng/mL Final     Lab Results   Component Value Date    TSH 1.33 11/08/2018           ASSESSMENT/PLAN:    1. Left femoral neck fracture, s/p REGINA: Incision c/d/i. Tylenol three times a day, lidoderm patch, dilaudid prn. Pain controlled. F/u with ortho Dr. Lackey 1/23. PT, OT. 2+ lle, 1+ rle. Lymphedema wraps.   2. COPD: On advair two times a day, albuterol four times a day prn.  changed duonebs and 3% saline nebs to three times a day and prn on 1/7 but hypertonic saline order was missed, just started today. spiriva prn per her request. Educated her on long v short acting meds,  but insisting cannot take spiriva while on duonebs. Prednisone taper. proair prn. Completed doxycycline on 12/28. mucinex x 7 days and will order cxr if temp >100.5. WBC 12.2 on 1/8, will recheck on 1/10-14.7. CXR today due to persistent congestion, rales, shortness of breath, cough, hypoxia, leukocytosis.   3. Chronic CHF: Daily [id number]. On lasix two times a day. Shortness of breath stable. Edema 2+ lle, 1+ rle, lymphedema wraps. Stable. Monitor.   4. H/o DVT: On xarelto.   5. HLD: On aspirin, lipitor  6. PAF: Rate controlled 80s. On cardizem, xarelto.   7. Constipation: On colace daily, miralax daily  8. Iron deficiency anemia: started iron for hg of 8.9 on 1/8. Will recheck on 1/10-8.9. Continue iron for now.   9. GERD: On omeprazole.   10. Hypokalemia: On kcl. K 3.9 on 1/8.   11. Hypomagnesemia: On mag ox. MG 1.7 on 1/8. Stable.   12. Schizoaffective disorder, bipolar type: On seroquel and abilify. Follows with psych.   13. Osteoporosis: On calcium, vitamin d.   14. Type 2 DM: controlled with diet. Monitoring on labs and stable.   15. Leukocytosis: WBC 11-12-14.6. UA ordered yesterday, CXR ordered today. Repeat hm1 on 1/14.     Per therapy soft LCD 1/21, mod for dressing, ambulates 40 feet with cga.     Electronically signed by: Faby Ku NP    Total 35  minutes of which 55% was spent in counseling and coordination of care of the above plan    Time spent in interview and examination of patient, review of available records, and discussion with nursing staff. Continue care plan, efforts at therapy, and monitor nutritional status.

## 2021-06-23 NOTE — TELEPHONE ENCOUNTER
Please also review severe medication interactions ran in Epic:  Drug-Drug: XARELTO, rivaroxaban and diclofenac sodium   Concurrent use of apixaban(1), betrixaban(7), edoxaban(5), or rivaroxaban(2) with NSAIDs may result   Drug-Drug: XARELTO, diltiazem, rivaroxaban and diltiazem   Concurrent use of an agent that is both an inhibitor of P-gp and a moderate inhibitor of CY may result in elevated levels of and clinical effects of rivaroxaban, including an increased

## 2021-06-23 NOTE — TELEPHONE ENCOUNTER
[facility] Nurse Triage Telephone Note      Provider: STEVEN Velasquez  Facility: [facility]    Facility Type: TCU    Caller: Grecia  Call Back Number:  [phone]    Allergies: Amoxicillin; Levofloxacin; and Penicillins    Reason for call: Pt had a CBC today showing an increased WBC of 14.7, hgb same at 8.9   No complaints at this time    Verbal Order/Direction given by Provider: straight cath UA/UC, Hem1 mondays.     Provider giving order: STEVEN Velasquez    Verbal order given to: Andreas Lundy RN

## 2021-06-23 NOTE — TELEPHONE ENCOUNTER
Clinic Care Coordination Contact    Situation: Patient chart reviewed by care coordinator.    Background: Patient was discharged from hospital to home with home care. Had PCP office visit yesterday and has restarted on  home care on 1-29.    Assessment: Patient received post hospital call from [facility] and had PCP office visit. No need for CC outreach at this time.     Plan/Recommendations: CC will call when discharged from home care.   Notified  at  asking for contact when discharging.     Social Autumn Redman  [facility]  [email or web address]  [phone]    Clinic Care Coordination Contact    Situation: Patient chart reviewed by care coordinator.    Background: patient discharged from TCU to home with home care through .      Assessment: Was routed encounter to contact. Patient is currently in hospital with cellulitis.     Plan/Recommendations: Will call when notified of hospital discharge.     Social Autumn Redman  [facility]  [email or web address]  [phone]

## 2021-06-23 NOTE — PROGRESS NOTES
Office Visit - Follow up    Adalgisa Solano   83 y.o. female    Date of Visit: 2/4/2019    Chief Complaint   Patient presents with     Hospital Visit Follow Up     Cellulitis     COPD       Subjective: COPD with exacerbation.    Recent hospital stay from January 28, 2019 till Friday last February 1, 2019.    Cellulitis left lower extremity with congestive heart failure and COPD exacerbation.  She has an appointment for hip follow-up on Wednesday, February 20, 2019.  Laboratory test today needed include hemogram plus basic metabolic profile.  She has had a left hip replacement she has both lower extremities wrapped she is in a wheelchair.  She feels stronger better than she has for a while.  She is accompanied today by her very supportive  Anabel.  She denies any blood in stool urine or sputum.  No chest pain she is chronically short of breath and has had asthmatic bronchitis COPD for some period of time she is no longer a smoker.  Her medication list is reviewed and generally well-tolerated.  Problem list reviewed in detail as well.  During her hospital stay she did have an episode of atrial fibrillation that complicated her illness.  She has appointments in follow-up at pulmonary medicine as well as cardiology.    ROS: A comprehensive review of systems was performed and was otherwise negative    Medications:  Prior to Admission medications    Medication Sig Start Date End Date Taking? Authorizing Provider   acetaminophen (TYLENOL) 500 MG tablet Take 2 tablets (1,000 mg total) by mouth 3 (three) times a day. 1/3/19  Yes Mariana Toure PA-C   ADVAIR DISKUS 250-50 mcg/dose DISKUS INHALE 1 PUFF 2 (TWO) TIMES A DAY. 12/27/18  Yes Shai Ellington MD   albuterol (PROVENTIL) 2.5 mg /3 mL (0.083 %) nebulizer solution 3 ML INHALATION FOUR TIMES A DAY AS NEEDED USE BEFORE NEBULIZED SALINE. FOR SHORTNESS OF BREATH. 12/27/18  Yes Shai Ellington MD   ARIPiprazole (ABILIFY) 5 MG tablet TAKE 1  TABLET EVERY NIGHT AT BEDTIME 12/27/18  Yes Shai Ellington MD   aspirin 81 MG EC tablet Take 81 mg by mouth daily.   Yes PROVIDER, HISTORICAL   atorvastatin (LIPITOR) 20 MG tablet TAKE 1 TABLET (20 MG TOTAL) BY MOUTH BEDTIME. 12/27/18  Yes Shai Ellington MD   calcium carbonate-vitamin D3 (CALCIUM WITH VITAMIN D) 600 mg(1,500mg) -400 unit per tablet Take 1 tablet by mouth 2 (two) times a day.   Yes PROVIDER, HISTORICAL   cephalexin (KEFLEX) 500 MG capsule Take 1 capsule (500 mg total) by mouth 4 (four) times a day for 10 days. 2/1/19 2/11/19 Yes Jak Cardona MD   cholecalciferol, vitamin D3, 1,000 unit tablet Take 2,000 Units by mouth daily.    Yes PROVIDER, HISTORICAL   diltiazem (CARDIZEM CD) 180 MG 24 hr capsule Take 1 capsule (180 mg total) by mouth 2 (two) times a day. 1/4/19  Yes Miracle Goncalves MD   ferrous sulfate 325 (65 FE) MG tablet Take 1 tablet (325 mg total) by mouth daily with breakfast. 2/1/19  Yes Jak Cardona MD   furosemide (LASIX) 20 MG tablet Take 40 mg by mouth 2 (two) times a day.   Yes PROVIDER, HISTORICAL   HYDROmorphone (DILAUDID) 2 MG tablet Take 0.5-1 tablets (1-2 mg total) by mouth every 6 (six) hours as needed. 1/3/19  Yes Mariana Toure PA-C   magnesium oxide (MAG-OX) 400 mg (241.3 mg magnesium) tablet Take 1 tablet (400 mg total) by mouth daily. 1/5/19  Yes Nilesh Dunn DO   omeprazole (PRILOSEC) 20 MG capsule Take 20 mg by mouth daily before breakfast.   Yes PROVIDER, HISTORICAL   polyethylene glycol (MIRALAX) 17 gram packet Take 17 g by mouth daily.   Yes PROVIDER, HISTORICAL   potassium chloride (KLOR-CON) 10 MEQ CR tablet TAKE 1 TABLET (10 MEQ TOTAL) BY MOUTH DAILY. 12/27/18  Yes Shai Ellington MD   PROAIR HFA 90 mcg/actuation inhaler INHALE 2 PUFFS EVERY 4 (FOUR) HOURS AS NEEDED FOR WHEEZING. 12/27/18  Yes Shai Ellington MD   QUEtiapine (SEROQUEL) 25 MG tablet TAKE 2 TABLETS AT BEDTIME=50MG 12/27/18  Yes Shai Ellington,  MD   rivaroxaban 20 mg Tab Take 20 mg by mouth daily with supper.   Yes PROVIDER, HISTORICAL   sodium chloride 3 % nebulizer solution TAKE 4 ML BY NEBULIZATION 2 (TWO) TIMES A DAY. USING AFTER ALBUTEROL NEB.  Patient taking differently: Take 4 mL by nebulization 3 (three) times a day. Using after albuterol neb.       12/27/18  Yes Shai Ellington MD   SPIRIVA WITH HANDIHALER 18 mcg inhalation capsule USE 1 CAPSULE VIA INHALER 1 X A DAY  Patient taking differently: daily prn 12/27/18  Yes Shai Ellington MD   spironolactone (ALDACTONE) 25 MG tablet Take 0.5 tablets (12.5 mg total) by mouth daily. 2/2/19  Yes Jak Cardona MD   cyanocobalamin 1,000 mcg/mL injection Inject 1 mL (1,000 mcg total) into the shoulder, thigh, or buttocks every 30 (thirty) days. 3/2/19   Jak Cardona MD   dextromethorphan-guaiFENesin (ROBITUSSIN-DM)  mg/5 mL liquid Take 5 mL by mouth every 4 (four) hours as needed. 2/1/19   Jak Cardona MD   docusate sodium (COLACE) 100 MG capsule Take 200 mg by mouth daily.    PROVIDER, HISTORICAL       Allergies:   Allergies   Allergen Reactions     Amoxicillin Itching and Rash     Levofloxacin Itching and Rash     Penicillins Itching and Rash     Has tolerated ceftriaxone.       Immunizations:   Immunization History   Administered Date(s) Administered     DT (pediatric) 03/29/2004     Influenza high dose, seasonal 10/20/2015, 10/09/2016, 11/16/2017, 10/11/2018     Influenza,seasonal quad, PF, 36+MOS 11/20/2014     Pneumo Conj 13-V (2010&after) 10/20/2015     Pneumo Polysac 23-V 10/18/1999, 08/01/2005     Td,adult,historic,unspecified 03/29/2004     Tdap 05/14/2013       Exam Chest clear to auscultation and percussion.  Heart tones regular rhythm without murmur rub or gallop.  Abdomen soft nontender no organomegaly.  No peritoneal signs.  Extremities free of edema cyanosis or clubbing.  Neck veins nondistended no thyromegaly or scleral icterus noted, carotids full.  Skin  warm and dry easily conversant good spirited.  Normal intelligence.  Neurologically intact no gross localizing findings.  Anteriorly and posteriorly on examination of her chest there are scattered sibilant rhonchi and wheezes.  She is not in acute distress not toxic.  There is minimal neck vein distention both lower extremities are wrapped she is wheelchair-bound and her extremities feel edematous although they are slightly tender to touch.    Nasal O2 is running and she complains of intermittent epistaxis.  The drying effect of oxygen was explained to the patient and her .  O2 sats today on 2 L nasal cannula 95% respiratory rate 22 and unlabored apical pulse 88 regular blood pressure excellent 122/68 BMI is 29.48 weight down 8 pounds from previous.  At 156.    Assessment and Plan  COPD in exacerbation recommend continued follow-up with pulmonary medicine staff positions.    CHF volume overload improved 8 pounds lighter.  Continue same meds and cares.  Reemphasized salt restriction and diet.  Linked to COPD and exacerbation check urinalysis plus hemogram and conference of metabolic profile.    Cellulitis left lower extremity improved.    Volume overload improved.  Weight down 8 pounds.    Recent hip replacement.    Atrial fibrillation by history.  Persistent meds and cares same encourage follow-up with cardiology as well RTC 3 months time or sooner as needed.  Old records and discharge summary reviewed from [facility] medicine discharge summary dated February 1, 2019.    Time: total time spent with the patient was 40 minutes of which >50% was spent in counseling and coordination of care    The following high BMI interventions were performed this visit: encouragement to exercise    Shai Ellington MD    Patient Active Problem List   Diagnosis     Recurrent major depressive episodes (H)     Chronic obstructive pulmonary disease (H)     Lump Or Mass In Breast     Peripheral arterial disease (H)      Type 2 diabetes mellitus without complication (H)     Hypertension     Schizoaffective disorder, bipolar type (H)     Arnold-Chiari malformation (H)     Chronic diastolic congestive heart failure (H)     Chronic respiratory failure with hypoxia (H)     Pulmonary hypertension (H)     COPD (chronic obstructive pulmonary disease) (H)     COPD exacerbation (H)     Diet-controlled type 2 diabetes mellitus (H)     DVT of axillary vein, acute left (H)     Persistent atrial fibrillation (H)     Primary osteoarthritis of both knees     Chronic atrial fibrillation (H)     Gastroesophageal reflux disease, esophagitis presence not specified     Stasis dermatitis of both legs     Candidal skin infection     Encounter for palliative care     Generalized muscle weakness     Drug-induced constipation     Chronic pain syndrome     Acute combined systolic and diastolic CHF, NYHA class 1 (H)     Fracture of femoral neck, left (H)     Hip pain, left     Slow transit constipation     Status post total hip replacement, left     Acute blood loss anemia     Acute bronchitis     Edema     Volume overload     Cellulitis of left lower extremity     Atrial fibrillation, unspecified type (H)

## 2021-06-24 NOTE — TELEPHONE ENCOUNTER
RN cannot approve Refill Request    RN can NOT refill this medication med is not covered by policy/route to provider. Last office visit: 2/4/2019 Shai Ellington MD Last Physical: 7/30/2018 Last MTM visit: Visit date not found Last visit same specialty: 2/4/2019 Shai Ellington MD.  Next visit within 3 mo: Visit date not found  Next physical within 3 mo: Visit date not found      Luly Silva, [facility] Triage/Med Refill 3/13/2019    Requested Prescriptions   Pending Prescriptions Disp Refills     XARELTO 20 mg Tab [Pharmacy Med Name: XARELTO 20MG] 30 tablet 0     Sig: TAKE 1 TABLET (20 MG TOTAL) BY MOUTH DAILY WITH SUPPER.    Apixaban/Rivaroxaban/Dabigatran Refill Protocol Failed - 3/12/2019  5:07 PM       Failed - Route to appropriate pool/provider    Last Anticoagulation Summary:          Passed - Renal function test in last year    Creatinine   Date Value Ref Range Status   02/04/2019 0.70 0.60 - 1.10 mg/dL Final            Passed - PCP or prescribing provider visit in past 12 months      Last office visit with prescriber/PCP: 2/4/2019 Shai Ellington MD OR same dept: 2/4/2019 Shai Ellington MD OR same specialty: 2/4/2019 Shai Ellington MD  Last physical: 7/30/2018 Last MTM visit: Visit date not found   Next visit within 3 mo: Visit date not found  Next physical within 3 mo: Visit date not found  Prescriber OR PCP: Shai Ellington MD  Last diagnosis associated with med order: There are no diagnoses linked to this encounter.  If protocol passes may refill for 12 months if within 3 months of last provider visit (or a total of 15 months).

## 2021-06-24 NOTE — TELEPHONE ENCOUNTER
Ask pt to see one of th excellent cardiologist at [facility] for evaluation and treatment of cardiac irregularity w/ tachycardia at [phone] and thanks for calling pt for me

## 2021-06-24 NOTE — TELEPHONE ENCOUNTER
Referral for cardiology has been placed per message below from Dr. Ellington.      Left detailed message for the patient relaying message below from Dr. Ellington.  Asked the patient to call if she has any further questions.  Silvia AZAR CMA/NEL....................4:14 PM

## 2021-06-24 NOTE — TELEPHONE ENCOUNTER
Pt is correct and my error and please call pt and ok to return to 1/2 tab of spironolatone 25mg or 12.5 mg daily  And thanks

## 2021-06-24 NOTE — TELEPHONE ENCOUNTER
Ok to notify patient to continue as she was? Looks like the home care nurses sent in the daily dose. Patient was started on this in a care facility.    Alaina Ramírez, CMA

## 2021-06-24 NOTE — PROGRESS NOTES
Assessment and Plan:  83 year old female with a history of severe COPD (FEV1 0.89 L, 51%; DLCO 53%), pulmonary HTN, HFpEF, DM, GERD, atrial fibrillation and DVT on anticoagulation, schizoaffective disorder, PAD, HTN, dyslipidemia, admitted  Jan 2019 with acute hypoxemic respiratory failure with evidence of pulmonary edema, Klebsiella and PSDs PNA (per 12/26 sputum Cx), and AECOPD.    Her breathing has now improved back to baseline, however she has an occasional cough productive for yellow sputum over the past month which is worse on lying down.  Her bed is flat at night concerning for reflux aspiration     COPD:  -Check PFTs  -Continue Spiriva, Advair, and albuterol as needed  -Continue home oxygen, adjust as needed for goal oxygen saturation of 88-92% (chronic CO2 retainer)    Cough and suspected nocturnal reflux aspiration:  -Continue omeprazole  -Patient to elevate her head of bed with a wedge pillow on her back  -Avoid for food or drink 3 hours before bedtime  -Avoid reflux trigger foods such as alcohol caffeine and spicy foods    Return to clinic in 2 months to follow-up on above    CCx: Follow-up hospitalization for pneumonia and COPD exacerbation    HPI: The patient states that her breathing has improved and return to baseline on her previous 2 L/min home O2 requirement.  She denies any fevers or chills, chest pain, wheezing or severe dyspnea.  She has however noted an occasional cough productive for yellow sputum in the past month.  It appears to be provoked when she lies on her flat bed at night.  She previously had heartburn, however it has been controlled since she started taking omeprazole    ROS:  A review of 12 organ systems was performed with pertinent positives and negatives noted in the HPI.      Current Meds:  Current Outpatient Medications   Medication Sig Note     acetaminophen (TYLENOL) 500 MG tablet Take 2 tablets (1,000 mg total) by mouth 3 (three) times a day.      albuterol (PROVENTIL) 2.5  mg /3 mL (0.083 %) nebulizer solution 3 ML INHALATION FOUR TIMES A DAY AS NEEDED USE BEFORE NEBULIZED SALINE. FOR SHORTNESS OF BREATH.      ARIPiprazole (ABILIFY) 5 MG tablet TAKE 1 TABLET EVERY NIGHT AT BEDTIME      aspirin 81 MG EC tablet Take 81 mg by mouth daily.      atorvastatin (LIPITOR) 20 MG tablet TAKE 1 TABLET (20 MG TOTAL) BY MOUTH BEDTIME.      calcium carbonate-vitamin D3 (CALCIUM WITH VITAMIN D) 600 mg(1,500mg) -400 unit per tablet Take 1 tablet by mouth 2 (two) times a day.      cholecalciferol, vitamin D3, 1,000 unit tablet Take 2,000 Units by mouth daily.       dextromethorphan-guaiFENesin (ROBITUSSIN-DM)  mg/5 mL liquid Take 5 mL by mouth every 4 (four) hours as needed.      ferrous sulfate 325 (65 FE) MG tablet Take 1 tablet (325 mg total) by mouth daily with breakfast.      fluticasone-salmeterol (ADVAIR DISKUS) 250-50 mcg/dose DISKUS Inhale 1 puff 2 (two) times a day.      furosemide (LASIX) 20 MG tablet Take 40 mg by mouth 2 (two) times a day.      HYDROmorphone (DILAUDID) 2 MG tablet Take 0.5-1 tablets (1-2 mg total) by mouth every 6 (six) hours as needed.      magnesium oxide (MAG-OX) 400 mg (241.3 mg magnesium) tablet Take 1 tablet (400 mg total) by mouth daily.      omeprazole (PRILOSEC) 20 MG capsule Take 20 mg by mouth daily before breakfast.      polyethylene glycol (MIRALAX) 17 gram packet Take 17 g by mouth daily.      potassium chloride (KLOR-CON) 10 MEQ CR tablet TAKE 1 TABLET (10 MEQ TOTAL) BY MOUTH DAILY.      PROAIR HFA 90 mcg/actuation inhaler INHALE 2 PUFFS EVERY 4 (FOUR) HOURS AS NEEDED FOR WHEEZING.      QUEtiapine (SEROQUEL) 25 MG tablet TAKE 2 TABLETS AT BEDTIME=50MG      rivaroxaban 20 mg Tab Take 20 mg by mouth daily with supper.      sodium chloride 3 % nebulizer solution TAKE 4 ML BY NEBULIZATION 2 (TWO) TIMES A DAY. USING AFTER ALBUTEROL NEB. (Patient taking differently: Take 4 mL by nebulization 3 (three) times a day. Using after albuterol neb.      )       spironolactone (ALDACTONE) 25 MG tablet Take 0.5 tablets (12.5 mg total) by mouth daily.      tiotropium (SPIRIVA WITH HANDIHALER) 18 mcg inhalation capsule USE 1 CAPSULE VIA INHALER 1 X A DAY      cyanocobalamin 1,000 mcg/mL injection Inject 1 mL (1,000 mcg total) into the shoulder, thigh, or buttocks every 30 (thirty) days.      diltiazem (CARDIZEM CD) 120 MG 24 hr capsule TAKE 1 CAPSULE (120 MG TOTAL) BY MOUTH DAILY. 3/4/2019: Not sure which dose she should be taking     diltiazem (CARDIZEM CD) 180 MG 24 hr capsule Take 1 capsule (180 mg total) by mouth 2 (two) times a day.        Labs:  No results found for this or any previous visit (from the past 72 hour(s)).    I have personally reviewed all pertinent imaging studies and PFT results unless otherwise noted.    Imaging studies:  Xr Chest 1 View Portable    Result Date: 1/28/2019  XR CHEST 1 VIEW PORTABLE 1/28/2019 4:20 PM INDICATION: Sob COMPARISON: 01/01/2019 and older studies. Chest CT 12/14/2018 FINDINGS: Heart is at the upper limits of normal. There is pulmonary vascular redistribution but no signs of failure. No effusions. Bone island within the left humeral head.        Physical Exam:  /56   Pulse 84   Resp 24   Wt 151 lb 8 oz (68.7 kg)   LMP  (LMP Unknown)   SpO2 97% Comment: 1 L pulsing  BMI 28.63 kg/m    General - Well nourished  Ears/Mouth -  OP pink moist, no thrush  Neck - no cervical lymphadenopathy  Lungs - Clear to ausculation bilaterally   CVS - regular rhythm with no murmurs, rubs or gallups  Abdomen - soft, NT, ND, NABS  Ext - no cyanosis, clubbing or edema  Skin - no rash  Psychology - alert and oriented, answers appropriate        Electronically signed by:    Sinan Woo MD  [facility] Pulmonary and Critical Care Medicine

## 2021-06-24 NOTE — TELEPHONE ENCOUNTER
Was out seeing Adalgisa in her home today, patient states her Spironolactone is needing a new script sent to her pharmacy ([facility]) as they do not have a script on file, I think she was sent [facility] from [facility] with this Rx. Can you send a Rx to her pharm for her.   Thanks,  Sandy Contreras, RN

## 2021-06-24 NOTE — PATIENT INSTRUCTIONS - HE
1) Place wedge pillow under back    2) Avoid food or drinking within 3 hours of bedtime (aside from minimum sips necessary for pills)    3) Continue omeprazole    4) Continue Spiriva once daily and Advair 250/50 two times a day, and albuterol neb or inhaler as needed    5) Adjust oxygen as needed for goal oxygen saturation 88-92%    Return in 2 months with new PFTs to follow-up on cough

## 2021-06-24 NOTE — TELEPHONE ENCOUNTER
Medication Question or Clarification  Who is calling: Patient  What medication are you calling about?: spironolactone  What dose do you take?: 25 mg  How often are you taking the medication?: Take 1 tablet (25 mg total) by mouth daily  Who prescribed the medication?: Shai Ellington MD   What is your question/concern?: Patient stated she was previously on 1/2 tab daily (12.5 mg) on the spironolactone before. Patient would like to know why Shai Ellington MD switched her to a whole pill (25 mg). Please call patient back about this.  Pharmacy: [facility]  Okay to leave a detailed message?: Yes [phone]  Site CMT - Please call the pharmacy to obtain any additional needed information.

## 2021-06-24 NOTE — TELEPHONE ENCOUNTER
Called and spoke with male and home number. He was notified to inform patient that he message will be addressed tomorrow when PCP returns to the office.    Alaina Ramírez, CMA

## 2021-06-24 NOTE — TELEPHONE ENCOUNTER
Stay with the diltiazem 120 mg twice daily which she already has and we will reassess the need for the higher dose with the next office visit in 3 months time.  Please call patient for me.

## 2021-06-24 NOTE — PROGRESS NOTES
Follow up Vascular Visit       Date of Service:3/4/2019    Date Last Seen: 10/8/2018; 10/9/2018    Chief Complaint: BLE swelling     History:   Past Medical History:   Diagnosis Date     Acute and chronic respiratory failure with hypoxia (H)      Arm skin lesion, right      Arnold-Chiari malformation (H) 1998     Atrial fibrillation (H) 02/2017     Breast cyst      Breast lump      Cellulitis of left lower extremity 1/28/2019     Chronic diastolic heart failure (H)     diastolic chf     Chronic respiratory failure with hypoxia (H) 3/13/2017     COPD (chronic obstructive pulmonary disease) (H)      COPD exacerbation (H)      Depression      Diet-controlled type 2 diabetes mellitus (H)      GERD (gastroesophageal reflux disease)      Hyperlipidemia      Hypertension      Lumbar spinal stenosis      Peripheral arterial disease (H) 10/28/2015     Pulmonary hypertension (H) 3/5/2018     Recurrent major depressive episodes (H)     Created by Conversion  Replacement Utility updated for latest IMO load     Schizoaffective disorder, bipolar type (H) 6/11/2015     Type 2 diabetes mellitus without complication (H) 8/2/2016       Pt returns to the [facility] with regards to their BLE swelling. Arrives with her  today. She has been lost to followup. Has not been seen for 6 months. Reports that she was in the hospital x3 with cellulitis, pna; afib; DVT; hip fracture which was surgically repaired; was previously d/c to rehab; now back at home. We previously wrapped her legs with 2 layer tolerated well;and had her completed an IVONNE this was normal, results were previously d/w her and answered all questions. Denies fevers, chills, numbness or tingling in the legs. Doing well. No cp, no shortness of breath. She has been wearing compression stockings which are several years old and in poor repair.     Allergies: Amoxicillin; Levofloxacin; and Penicillins    Physical Exam:    BP  120/66   Pulse 80   Temp 97.6  F (36.4  C)   LMP  (LMP Unknown)     General:  Patient presents to clinic in no apparent distress.  Head: normocephalic atraumatic  Psychiatric:  Alert and oriented x3.   Respiratory: unlabored breathing; no cough  Integumentary:  Skin is uniformly warm, dry and pink.    Extremities: BLE with +2 pitting edema from the mid shin to the feet; this is worsened when compared to her last measures; see measures below; no active weeping, the tissues are inflammed but not warm to touch and not painful; nails; well trimmed; webbing clear; strength testing revealed 3/4 to BLEs.  Good capillary refill. No unusual venous distention. Positive for hemosiderin deposition or hyperpigmentation and fibrosis or scarring  Negative for spider veins and telangiectasias        Circumferential volume measures:    Vasc Edema 10/8/2018 10/12/2018 10/16/2018 10/22/2018 3/4/2019   Right just above MTP 23.5 23.5 23 20.1 21.5   Right Ankle 29 26.4 24.5 22.5 25   Right Widest Calf 41.5 35.5 39 33.7 33.3   Right Thigh Up 10cm 51.4 47 - 47 -   Left - just above MTP 22.8 22.6 22.4 20.5 21.2   Left Ankle 31.3 29 26.5 23.9 26.1   Left Widest Calf 43.4 37.5 45 34.5 34.5   Left Thigh Up 10cm 49 46 - 46.5 -       Ulceration(s)/Wound(s):     Wound 01/28/19 Other wound type (comment) Leg Left (Active)       Incision 01/02/19 Surgical Hip Left (Active)        Lab Values    Lab Results   Component Value Date    SEDRATE 22 (H) 11/23/2018     Lab Results   Component Value Date    CREATININE 0.70 02/04/2019     Lab Results   Component Value Date    HGBA1C 6.8 (H) 11/09/2018     Lab Results   Component Value Date    BUN 7 (L) 02/04/2019     Lab Results   Component Value Date    ALBUMIN 3.2 (L) 02/04/2019     Vitamin D, Total (25-Hydroxy)   Date Value Ref Range Status   01/04/2019 39.6 30.0 - 80.0 ng/mL Final             Impression:  1. Dependent edema     2. Secondary lymphedema     3. Venous hypertension of both lower  extremities     4. Type 2 diabetes mellitus without complication, with long-term current use of insulin (H)     5. Chronic diastolic heart failure (H)     6. Heart failure, unspecified HF chronicity, unspecified heart failure type (H)     7. History of DVT (deep vein thrombosis)            Assessment/Plan:          1. Debridement: na           2. Edema: Will need to reduce her swelling down with tubular compression and short stretch; her  will do this. Will eventually get her into compression stockings; will have to order through [employer] walk[employer]; will give her a catalogue at next visit; elevation; diuretics; walking           3.  Wound treatment: lotion           4. Nutrition: focus on protein           5. Offloading: na     Patient will follow up with me in 2 weeks for reevaluation. They were instructed to call the clinic sooner with any signs or symptoms of infection or any further questions/concerns. Answered all questions.    Mandy Torrez DNP, RN, CNP, [facility]  [phone]        This note was electronically signed by Mandy Torrez

## 2021-06-25 ENCOUNTER — COMMUNICATION - HEALTHEAST (OUTPATIENT)
Dept: INTERNAL MEDICINE | Facility: CLINIC | Age: 86
End: 2021-06-25

## 2021-06-25 NOTE — TELEPHONE ENCOUNTER
Triage call:   Hit her right leg with the edge of the car door- skin tear, 1 inch by .5 inch L shaped on her shin   Bleeding stopped intially but keeps opening up   Injury happened on Wednesday   Initially cleaned out with peroxide and applied a dressing  Went to have her toe nails cut and the MD put a uniboot on her leg and in the evening she was bleeding again   Overnight she was sleeping well- woke up and got her compression stockings on- area opened up again today- larger side of the skin tear   Has a large band-aid on the area- stopped bleeding- while on the phone noted some seeping blood around the band-aid    Takes xarelto  States on her shin   Compression stocking is over the injured area   Denies pain at this time    Advised that patient should be seen in the clinic today- reviewed additional care advice with patient and she verbalizes understanding. Assisted in transferring to scheduling. No appointments in the clinic- patient will be seen in [facility] today. Declines additional questions.     Anabel Arceo RN BSBA [facility] Triage/Med Refill 5/28/2021 9:16 AM    COVID 19 Nurse Triage Plan/Patient Instructions    Please be aware that novel coronavirus (COVID-19) may be circulating in the community. If you develop symptoms such as fever, cough, or SOB or if you have concerns about the presence of another infection including coronavirus (COVID-19), please contact your health care provider or visit  [email or web address].    Disposition/Instructions    In-Person Visit with provider recommended. Reference Visit Selection Guide.    Thank you for taking steps to prevent the spread of this virus.  o Limit your contact with others.  o Wear a simple mask to cover your cough.  o Wash your hands well and often.    Resources    [facility]: About COVID-19: [email or web address]    CDC: What to Do If You're Sick: www.cdc.gov/coronavirus/2019-ncov/about/steps-when-sick.html    CDC: Ending  Home Isolation: www.cdc.gov/coronavirus/2019-ncov/hcp/disposition-in-home-patients.html     CDC: Caring for Someone: www.cdc.gov/coronavirus/2019-ncov/if-you-are-sick/care-for-someone.html     [facility]: Interim Guidance for Hospital Discharge to Home: www.health.[facility].[place]/diseases/coronavirus/hcp/hospdischarge.pdf    [facility] clinical trials (COVID-19 research studies): [email or web address]/umn-clinical-trials     Below are the COVID-19 hotlines at the [place] Department of Health ([facility]). Interpreters are available.   o For health questions: Call 235-830-4811 or 1-338.366.2608 (7 a.m. to 7 p.m.)  o For questions about schools and childcare: Call 150-159-2005 or 1-274.808.7859 (7 a.m. to 7 p.m.)          Reason for Disposition    Patient wants to be seen    Additional Information    Negative: Shock suspected (e.g., cold/pale/clammy skin, too weak to stand, low BP, rapid pulse)    Negative: Cut on the neck, chest, back, or abdomen that may go deep (e.g., stab wound or other suspicious penetrating injury)    Negative: Major bleeding (actively dripping or spurting) that can't be stopped    Negative: Amputation    Negative: Sounds like a life-threatening emergency to the triager    Negative: Animal bite and broken skin    Negative: Injury is a puncture wound    Negative: Splinter in the skin    Negative: Wound looks infected    Negative: Burn    Negative: High pressure injection injury (e.g., from paint gun, usually work-related)    Negative: Skin loss involves more than 10% of surface area (Note: the palm of the hand = 1%)    Negative: Skin is split open or gaping (length > 1/2 inch or 12 mm on the skin, 1/4 inch or 6 mm on the face)    Negative: Bleeding won't stop after 10 minutes of direct pressure (using correct technique)    Negative: Cut or scrape is very deep (e.g., can see bone or tendons)    Negative: Dirt in the wound and not removed after 15 minutes of scrubbing    Negative: Wound causes  numbness (i.e., loss of sensation)    Negative: Wound causes weakness (i.e., decreased ability to move hand, finger, toe)    Negative: Sounds like a serious injury to the triager    Negative: Looks infected (fever, spreading redness, pus, or red streak)    Negative: Raised bruise with size > 2 inches (5 cm) that is getting bigger    Negative: SEVERE pain (e.g., excruciating)    Negative: No prior tetanus shots (or is not fully vaccinated) and any wound (e.g., cut or scrape)    Negative: HIV positive or severe immunodeficiency (severely weak immune system) and DIRTY cut or scrape    Protocols used: SKIN INJURY-A-OH

## 2021-06-25 NOTE — TELEPHONE ENCOUNTER
Called to patient and advised her to provider would like for her to come back. She states that she wants message to go to provider she saw today to make sure that she needs to come back. States that [facility] is not too far from her and can go there too.     Please reach out to patient and advise on next steps

## 2021-06-25 NOTE — TELEPHONE ENCOUNTER
Adalgisa called with complaint of increased need for her O2, cough and shortness of breath. Said she has done about three rounds of prednisone and has not felt better. Could hear her breathing difficulty as we talked. Also complaint of pain in her right lung. Instructed to go to the Ed to be evaluated. She agreed and will go.

## 2021-06-25 NOTE — PROGRESS NOTES
Impression:  Right leg abrasion with ongoing bleeding due to anticoagulant, no evidence for infection, last tetanus was 8 years ago and this is not a clean injury    Plan:  Tetanus booster, I applied Surgicel with good control of the bleeding, keep the wound clean and dry, turn if redness swelling drainage or other problems      Chief Complaint:  Chief Complaint   Patient presents with     Leg Injury     left leg injury. Pt states injured her leg from the car door. x 2 days.          HPI:   Adalgisa Solano is a 85 y.o. female who presents to this clinic for the evaluation of right leg laceration.  Patient scraped her right lower leg on a car door 2 days ago.  She has had difficulty controlling the bleeding.  No redness or swelling.  The area is tender to the touch.  She is on a blood thinner.  She is on antibiotic for COPD.  No other injuries no other complaints.  Her last tetanus shot was in 2013      PMH:   Past Medical History:   Diagnosis Date     Acute and chronic respiratory failure with hypoxia (H)      Acute blood loss anemia 1/15/2019     Acute bronchitis 1/15/2019     Anemia     pernicious anemia     Anxiety      Arm skin lesion, right      Arnold-Chiari malformation (H) 1998     Arthritis      Atrial fibrillation (H) 02/2017     Avascular necrosis of bone (H)      Breast cyst      Breast lump      Candidal skin infection      Cellulitis of left lower extremity 1/28/2019     CHF (congestive heart failure) (H)     diastolic and systolic     Chronic bronchitis (H)     & acute     Chronic diastolic heart failure (H)     diastolic chf     Chronic pain syndrome      Chronic respiratory failure with hypoxia (H) 3/13/2017     COPD (chronic obstructive pulmonary disease) (H)      COPD exacerbation (H)      Depression      Diet-controlled type 2 diabetes mellitus (H)      Drug induced constipation      DVT of axillary vein, acute (H)     left     DVT of axillary vein, acute left (H)      Edema      Encounter  for palliative care      Erysipelas      Fracture of femoral neck, left (H)      Generalized muscle weakness      GERD (gastroesophageal reflux disease)      History of blood clots      Hyperlipidemia      Hypertension      Left hip pain      Lumbar spinal stenosis      PAD (peripheral artery disease) (H)      Peripheral arterial disease (H) 10/28/2015     Psoriasis     arms      Pulmonary hypertension (H) 3/5/2018     Recurrent major depressive episodes (H)     Created by Conversion  Replacement Utility updated for latest IMO load     Schizoaffective disorder, bipolar type (H) 2015     Slow transit constipation      Stasis dermatitis of both legs      Status post total hip replacement, 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     Type 2 diabetes mellitus without complication (H) 2016     Vitamin B12 deficiency      Volume overload      Past Surgical History:   Procedure Laterality Date     ARM SKIN LESION BIOPSY / EXCISION Right 2019    Procedure: EXCISION RIGHT ARM LIPOMA;  Surgeon: Andreas Holly MD;  Location: [facility];  Service: General     BACK SURGERY       BREAST CYST ASPIRATION Left      CERVICAL SPINE SURGERY      for arnold chiari malformation      SECTION  X3     CHOLECYSTECTOMY       COLONOSCOPY       EYE SURGERY      bilateral cataracts     HAND SURGERY       IR for PAD LOWER EXTREMITY       JOINT REPLACEMENT Right     knee - partial     LAPAROSCOPIC INCISIONAL / UMBILICAL / VENTRAL HERNIA REPAIR Left 3/27/2015    Procedure: ATTEMPTED LAPAROSCOPIC ABDOMINA/FLANK INCISION REPAIR;  Surgeon: oJse Lakhani MD;  Location: [facility];  Service:      LUMBAR FUSION N/A 2014    Procedure: POSTERIOR FUSION / DECOMPRESSION L3-S1 WITH PELVIC FIXATION BILATERAL ;  Surgeon: Rajan Mares MD;  Location: [facility];  Service:      NC FEMORAL FX, OPEN TX Left 2019    Procedure: OPEN REDUCTION INTERNAL FIXATION, FRACTURE LEFT HIP, PERIPROSTHETIC FRACTURE;  Surgeon: Kevin Lackey  MD Javier;  Location: [facility];  Service: Orthopedics     IL TOTAL HIP ARTHROPLASTY Left 1/2/2019    Procedure: LEFT TOTAL HIP ARTHROPLASTY;  Surgeon: Kevin Lackey MD;  Location: [facility];  Service: Orthopedics     IL TOTAL HIP ARTHROPLASTY Right 9/16/2019    Procedure: RIGHT TOTAL HIP ARTHROPLASTY;  Surgeon: Kan Quesada MD;  Location: [facility];  Service: Orthopedics     IL TOTAL KNEE ARTHROPLASTY Left 10/7/2016    Procedure: TOTAL KNEE ARTHROPLASTY, LEFT;  Surgeon: Kan Quesada MD;  Location: [facility];  Service: Orthopedics     US ASPIRATION HEMATOMA SEROMA OR FLUID COLLECTION  2/6/2020         ROS:  All other systems negative    Meds:    Current Outpatient Medications:      acetaminophen (TYLENOL) 500 MG tablet, Take 1,000 mg by mouth every 6 (six) hours as needed for pain., Disp: , Rfl:      albuterol (PROAIR HFA;PROVENTIL HFA;VENTOLIN HFA) 90 mcg/actuation inhaler, Inhale 2 puffs every 6 (six) hours as needed for wheezing., Disp: 3 Inhaler, Rfl: 3     albuterol (PROVENTIL) 2.5 mg /3 mL (0.083 %) nebulizer solution, 3 ML INHALATION FOUR TIMES A DAY AS NEEDED USE BEFORE NEBULIZED SALINE. FOR SHORTNESS OF BREATH., Disp: 600 mL, Rfl: 1     ARIPiprazole (ABILIFY) 5 MG tablet, Take 1 tablet (5 mg total) by mouth at bedtime., Disp: 90 tablet, Rfl: 3     atorvastatin (LIPITOR) 20 MG tablet, Take 1 tablet (20 mg total) by mouth at bedtime., Disp: 90 tablet, Rfl: 3     betamethasone, augmented, (DIPROLENE-AF) 0.05 % cream, 2 (two) times a day., Disp: , Rfl:      calcium carbonate-vitamin D3 (CALCIUM WITH VITAMIN D) 600 mg(1,500mg) -400 unit per tablet, Take 1 tablet by mouth 2 (two) times a day., Disp: , Rfl:      cholecalciferol, vitamin D3, 1,000 unit tablet, Take 2,000 Units by mouth daily. , Disp: , Rfl:      diltiazem (CARDIZEM CD) 120 MG 24 hr capsule, Take 1 capsule (120 mg total) by mouth daily., Disp: 90 capsule, Rfl: 3     ferrous sulfate 325 (65 FE) MG tablet, Take  1 tablet (325 mg total) by mouth daily with breakfast., Disp: , Rfl: 0     fluticasone-umeclidinium-vilanterol (TRELEGY ELLIPTA) 100-62.5-25 mcg DsDv inhaler, Inhale 1 Inhalation daily., Disp: 180 each, Rfl: 3     furosemide (LASIX) 20 MG tablet, Take 1 tablet (20 mg total) by mouth 2 (two) times a day at 9am and 6pm., Disp: 180 tablet, Rfl: 3     gabapentin (NEURONTIN) 300 MG capsule, Take 600 mg by mouth at bedtime.    , Disp: , Rfl:      ipratropium (ATROVENT) 0.03 % nasal spray, 1 SPRAY EACH NOSTRIL ONCE PER DAY, Disp: 30 mL, Rfl: 11     ketoconazole (NIZORAL) 2 % cream, 2 (two) times a day., Disp: , Rfl:      NEBUSAL 3 % nebulizer solution, TAKE 4 ML BY NEBULIZATION 3 (THREE) TIMES A DAY. USING AFTER ALBUTEROL NEB., Disp: 360 mL, Rfl: 1     omeprazole (PRILOSEC) 20 MG capsule, TAKE 1 CAPSULE DAILY., Disp: 90 capsule, Rfl: 3     OXYGEN-AIR DELIVERY SYSTEMS [facility], Use 1-2 L As Directed as needed. Valeria, Disp: , Rfl:      polyethylene glycol (MIRALAX) 17 gram packet, Take 17 g by mouth daily as needed. , Disp: , Rfl:      predniSONE (DELTASONE) 10 mg tablet, Take 2 tabs daily x 5 days, THEN 1 tab daily x 5 days., Disp: 15 tablet, Rfl: 2     rivaroxaban ANTICOAGULANT (XARELTO) 20 mg tablet, Take 1 tablet (20 mg total) by mouth daily with supper., Disp: 90 tablet, Rfl: 2     spironolactone (ALDACTONE) 25 MG tablet, TAKE 1/2 TABLET (12.5 MG TOTAL) BY MOUTH DAILY., Disp: 45 tablet, Rfl: 3     triamcinolone (KENALOG) 0.1 % cream, Apply 1 application topically 2 (two) times a day as needed. To legs   , Disp: , Rfl:      urea (CARMOL) 40 % Crea, Apply 1 application topically 2 (two) times a day as needed. To bottom of feet , Disp: , Rfl:     Current Facility-Administered Medications:      cyanocobalamin injection 1,000 mcg, 1,000 mcg, Intramuscular, Q30 Days, Joce Whelan MD, 1,000 mcg at 12/22/20 1026     diphth,pertus(acell),tetanus injection 0.5 mL (BOOSTRIX), 0.5 mL, Intramuscular, Once, Andreas Mcclure,  MD        Social:  Social History     Socioeconomic History     Marital status:      Spouse name: Not on file     Number of children: Not on file     Years of education: Not on file     Highest education level: Not on file   Occupational History     Not on file   Social Needs     Financial resource strain: Not on file     Food insecurity     Worry: Not on file     Inability: Not on file     Transportation needs     Medical: Not on file     Non-medical: Not on file   Tobacco Use     Smoking status: Former Smoker     Quit date: 1/1/2006     Years since quitting: 15.4     Smokeless tobacco: Never Used     Tobacco comment: quit 2006   Substance and Sexual Activity     Alcohol use: No     Drug use: No     Sexual activity: Not on file   Lifestyle     Physical activity     Days per week: Not on file     Minutes per session: Not on file     Stress: Not on file   Relationships     Social connections     Talks on phone: Not on file     Gets together: Not on file     Attends Taoism service: Not on file     Active member of club or organization: Not on file     Attends meetings of clubs or organizations: Not on file     Relationship status: Not on file     Intimate partner violence     Fear of current or ex partner: Not on file     Emotionally abused: Not on file     Physically abused: Not on file     Forced sexual activity: Not on file   Other Topics Concern     Not on file   Social History Narrative     Not on file         Physical Exam:  Vitals:    05/28/21 1022   BP: 132/77   Patient Site: Right Arm   Patient Position: Sitting   Cuff Size: Adult Regular   Pulse: 97   Resp: 14   Weight: 149 lb 4 oz (67.7 kg)      Right leg shows 1+ edema, there is an abrasion over the right shin that is 2.5 cm in length, no foreign body, the abrasion is oozing blood.      Results:    No results found for this or any previous visit (from the past 24 hour(s)).    No results found.      Andreas Mcclure MD

## 2021-06-25 NOTE — TELEPHONE ENCOUNTER
Triage call:    Caller: Patient    Saw provider today and it did clot, but is now bleeding again and is coming through the bandage and around it.  Area around it isn't lookin red or swollen.     Pt was advised of protocol recommendation/disposition of routing not to clinc provider for next steps today.       Luly Hicks RN 05/28/21 2:06 PM   [facility] Nurse Advisor

## 2021-06-25 NOTE — PATIENT INSTRUCTIONS - HE
"  We will apply double tubular compression bilaterally today  Resume wrapping with short stretch until your new stockings arrive    We would like you to purchase a pair of the [employer] brand stockings from [employer] 20-30 knee high. You can order them online or by calling the toll free number.  Your leg measurements are provided below and you will have to let the [employer] representative know these measurments to get the correct size for you .  Website: [email or web address]  Toll-free: [phone]  Hours: Mon-Thur 8:30am-8pm, Friday 8:30am-7pm, Sat 9am-4pm    Vasc Edema 10/12/2018 10/16/2018 10/22/2018 3/4/2019 3/15/2019   Right just above MTP 23.5 23 20.1 21.5 20.5   Right Ankle 26.4 24.5 22.5 25 21   Right Widest Calf 35.5 39 33.7 33.3 32.5   Right Thigh Up 10cm 47 - 47 - 43   Left - just above MTP 22.6 22.4 20.5 21.2 20.8   Left Ankle 29 26.5 23.9 26.1 22.5   Left Widest Calf 37.5 45 34.5 34.5 33   Left Thigh Up 10cm 46 - 46.5 - 42.5       Swelling in the legs can be caused by many reasons. No matter what the reason, treatment usually includes some type of compression. You should wear your compression socks as much as you can. Your compression should be put on first thing in the morning. Take the compression off at night. It is especially important to wear them with long periods of sitting/standing, long car rides or if you will be flying. Going without compression for even brief periods of time can be damaging to your legs and your health.  Compression socks should get replaced every 4-6 months. They do not need to be worn at night while in bed. Call us with any problems or questions. If you do a lot of standing, it is good to do calf raises to help keep the blood pumping. If you sit a lot at work, it is good to get up periodically to walk around. Elevation of the foot of your bed 4-6\" helps the blood return back to where it is needed.     Please call us if you have any questions [phone]    Thank " you for choosing [facility].      Continue to wear your compression stockings or velcro wraps every day; put them on first thing in the morning and remove at bedtime    Replace your compression stockings every 3-4 months; these garments will lose their elasticity and become ineffective    Replace velcro wraps every 1-2 years    Elevate your legs periodically throughout the day, 30-60 minutes 1-3 times per day    Apply lotion to your legs 1-2 times per day; some good name brands are Cetaphil, Sarna, Aveeno, VaniCream    Continue to walk and exercise    If you are taking a diuretic continue to do so at the direction of your primary care provider    Make an appointment at the vascular clinic again if you have worsening swelling, need a prescription for new compression garments; and/or develop new wounds

## 2021-06-25 NOTE — TELEPHONE ENCOUNTER
Call from Adalgisa stating that her legs are very swollen.  For past 2 days.   States breathing is OK, but she has not been able to take her oxygen off.     Reviewed with Dr. Juarez and instructed to call her PCP for possible adjustment of her Lasix.

## 2021-06-25 NOTE — TELEPHONE ENCOUNTER
Triage note    Caller name: Adalgisa  Relation to patient: self    Patient calling to report that she's noticing increased leg swelling bilaterally. Patient has lymphedema, hx of injury to the r-leg, and a hematoma on her left leg that is approx 1 year old.     Patient reports that the swelling has worsened over the last few days, it's located mostly below the knees, feels uncomfortable, and no fever. Patient was calling to see if she can get a prescription for Lasix.        Disposition: per protocol, patient to be seen today in office. Patient verbalized her understanding and agrees with plan. Warm transfer to scheduling.            COVID 19 Nurse Triage Plan/Patient Instructions    Please be aware that novel coronavirus (COVID-19) may be circulating in the community. If you develop symptoms such as fever, cough, or SOB or if you have concerns about the presence of another infection including coronavirus (COVID-19), please contact your health care provider or visit [email or web address].     Disposition/Instructions    In-Person Visit with provider recommended. Reference Visit Selection Guide.    Thank you for taking steps to prevent the spread of this virus.  o Limit your contact with others.  o Wear a simple mask to cover your cough.  o Wash your hands well and often.    Resources    [facility]: About COVID-19: [email or web address]    CDC: What to Do If You're Sick: www.cdc.gov/coronavirus/2019-ncov/about/steps-when-sick.html    CDC: Ending Home Isolation: www.cdc.gov/coronavirus/2019-ncov/hcp/disposition-in-home-patients.html     CDC: Caring for Someone: www.cdc.gov/coronavirus/2019-ncov/if-you-are-sick/care-for-someone.html     [facility]: Interim Guidance for Hospital Discharge to Home: www.health.[facility].mn.us/diseases/coronavirus/hcp/hospdischarge.pdf    [facility] clinical trials (COVID-19 research studies): [email or web address]-[email or web address]     Below are the COVID-19  hotlines at the [place] Department of Health ([facility]). Interpreters are available.   o For health questions: Call 751-169-7324 or 1-489.306.9024 (7 a.m. to 7 p.m.)  o For questions about schools and childcare: Call 233-691-5632 or 1-813.660.1934 (7 a.m. to 7 p.m.)   o   Reason for Disposition    MODERATE swelling of both ankles (e.g., swelling extends up to the knees) AND new onset or worsening    Additional Information    Negative: Difficulty breathing at rest    Negative: Entire foot is cool or blue in comparison to other side    Negative: SEVERE swelling (e.g., swelling extends above knee, entire leg is swollen, weeping fluid)    Negative: Thigh or calf pain and only 1 side and present > 1 hour    Negative: Thigh, calf, or ankle swelling in only one leg    Negative: Thigh, calf, or ankle swelling in both legs, but one side is definitely more swollen (Exception: longstanding difference between legs)    Negative: Cast on leg or ankle and has increasing pain    Negative: Can't walk or can barely stand (new onset)    Negative: Fever and red area (or area very tender to touch)    Negative: Patient sounds very sick or weak to the triager    Negative: Swelling of face, arm or hands (Exception: slight puffiness of fingers during hot weather)    Negative: Pregnant > 20 weeks and sudden weight gain (i.e., > 2 lbs, 1 kg in one week)    Protocols used: LEG SWELLING AND EDEMA-A-OH

## 2021-06-25 NOTE — PROGRESS NOTES
Follow up Vascular Visit       Date of Service:3/15/2019    Date Last Seen: 10/8/2018; 10/9/2018    Chief Complaint: BLE swelling     History:   Past Medical History:   Diagnosis Date     Acute and chronic respiratory failure with hypoxia (H)      Arm skin lesion, right      Arnold-Chiari malformation (H) 1998     Atrial fibrillation (H) 02/2017     Breast cyst      Breast lump      Cellulitis of left lower extremity 1/28/2019     Chronic diastolic heart failure (H)     diastolic chf     Chronic respiratory failure with hypoxia (H) 3/13/2017     COPD (chronic obstructive pulmonary disease) (H)      COPD exacerbation (H)      Depression      Diet-controlled type 2 diabetes mellitus (H)      GERD (gastroesophageal reflux disease)      Hyperlipidemia      Hypertension      Lumbar spinal stenosis      Peripheral arterial disease (H) 10/28/2015     Pulmonary hypertension (H) 3/5/2018     Recurrent major depressive episodes (H)     Created by Conversion  Replacement Utility updated for latest IMO load     Schizoaffective disorder, bipolar type (H) 6/11/2015     Type 2 diabetes mellitus without complication (H) 8/2/2016       Pt [facility] to the [facility] with regards to their BLE swelling. Arrives with her  today. She was lost to followup. Had not been seen previously for 6 months. Reports that she was in the hospital x3 with cellulitis, pna; afib; DVT; hip fracture which was surgically repaired; was previously d/c to rehab; now back at home. We had her  begin wrapping her legs with tubular compression and short stretch; rewrapping daily; this is going well; and had her completed an IVONNE this was normal, results were previously d/w her and answered all questions. Denies fevers, chills, numbness or tingling in the legs. Doing well. No cp, no shortness of breath. She has been wearing compression stockings which are several years old and in poor repair.  "    Allergies: Amoxicillin; Levofloxacin; and Penicillins    Physical Exam:    /64   Pulse 76   Temp 97.8  F (36.6  C) (Oral)   Resp 16   Ht 5' 1\" (1.549 m)   Wt 143 lb (64.9 kg)   LMP  (LMP Unknown)   BMI 27.02 kg/m      General:  Patient presents to clinic in no apparent distress.  Head: normocephalic atraumatic  Psychiatric:  Alert and oriented x3.   Respiratory: unlabored breathing; no cough  Integumentary:  Skin is uniformly warm, dry and pink.    Extremities: BLE with trace edema; measures are significantly improved today; see measures below; no active weeping, the tissues are no longer inflammed  not warm to touch and not painful; nails; well trimmed; webbing clear; strength testing revealed 3/4 to BLEs.  Good capillary refill. No unusual venous distention. Positive for hemosiderin deposition or hyperpigmentation and fibrosis or scarring  Negative for spider veins and telangiectasias        Circumferential volume measures:    Vasc Edema 10/12/2018 10/16/2018 10/22/2018 3/4/2019 3/15/2019   Right just above MTP 23.5 23 20.1 21.5 20.5   Right Ankle 26.4 24.5 22.5 25 21   Right Widest Calf 35.5 39 33.7 33.3 32.5   Right Thigh Up 10cm 47 - 47 - 43   Left - just above MTP 22.6 22.4 20.5 21.2 20.8   Left Ankle 29 26.5 23.9 26.1 22.5   Left Widest Calf 37.5 45 34.5 34.5 33   Left Thigh Up 10cm 46 - 46.5 - 42.5       Ulceration(s)/Wound(s):     Wound 01/28/19 Other wound type (comment) Leg Left (Active)       Incision 01/02/19 Surgical Hip Left (Active)        Lab Values    Lab Results   Component Value Date    SEDRATE 22 (H) 11/23/2018     Lab Results   Component Value Date    CREATININE 0.70 02/04/2019     Lab Results   Component Value Date    HGBA1C 6.8 (H) 11/09/2018     Lab Results   Component Value Date    BUN 7 (L) 02/04/2019     Lab Results   Component Value Date    ALBUMIN 3.2 (L) 02/04/2019     Vitamin D, Total (25-Hydroxy)   Date Value Ref Range Status   01/04/2019 39.6 30.0 - 80.0 ng/mL Final "             Impression:  1. Dependent edema     2. Secondary lymphedema     3. Venous hypertension of both lower extremities     4. Type 2 diabetes mellitus without complication, with long-term current use of insulin (H)     5. Chronic diastolic heart failure (H)     6. History of DVT (deep vein thrombosis)     7. Hypervolemia, unspecified hypervolemia type            Assessment/Plan:          1. Debridement: na           2. Edema: ready to transition into compression stockings; 20-30mmHG; will provide her with [employer] walker catalogue;  will assist with donning and donning; elevation; diuretics; walking           3.  Wound treatment: lotion           4. Nutrition: focus on protein           5. Offloading: na     Patient will follow up with me PRN for reevaluation. They were instructed to call the clinic sooner with any signs or symptoms of infection or any further questions/concerns. Answered all questions.    Mandy Torrez DNP, RN, CNP, [facility] [place]  [phone]        This note was electronically signed by Mandy Torrez

## 2021-06-26 NOTE — PROGRESS NOTES
"    Assessment & Plan     COPD with dependent edema right heart failure. Followed by Dr. Jun Juarez of pulmonary medicine. Increase furosemide to 60 mg daily currently on 40 mg daily. Suggest 40 mg of furosemide in the morning and 20 mg at lunch. Follow-up 2 months time for recheck. Reemphasized salt restriction leg elevation. She did suffer some minor trauma with bruising anterior tibial surface no sign of secondary infection or lymphangitis cellulitis. No julio ulceration. Subcutaneous small hematoma noted.    Stasis dermatitis both lower extremities. Clobetasol refilled.    Diabetes mellitus type 2 diabetic labs due end of June will visit patient done.    Review of external notes as documented in note  25 minutes spent on the date of the encounter doing chart review, patient visit and documentation        BMI:   Estimated body mass index is 27.98 kg/m  as calculated from the following:    Height as of this encounter: 5' 2\" (1.575 m).    Weight as of this encounter: 153 lb (69.4 kg).   I have had an Advance Directives discussion with the patient.    No follow-ups on file.    Shai Ellington MD  [facility]   Adalgisa Solano is 85 y.o. and presents today for the following health issues   HPI         Bilateral leg edema emergency room visit May 28, 2021 mariella. Wound bleeding right anterior tibial surface like a collection of blood subcutaneously. No sign of lymphangitis or cellulitis associated. Leg edema with some weeping and also some eczema on extensor surfaces?! Rule out psoriasis.    Review of Systems  No blood in stool or urine denies hemoptysis medication list reviewed reconciled.      Objective    /60 (Patient Site: Left Arm, Patient Position: Sitting)   Pulse 88   Temp 97  F (36.1  C)   Ht 5' 2\" (1.575 m)   Wt 153 lb (69.4 kg)   LMP  (LMP Unknown)   BMI 27.98 kg/m    Body mass index is 27.98 kg/m .  Physical Exam  Lower extremities were both " examined closely no sign of cellulitis lymphangitis. There is a collection of blood subcutaneously on the anterior tibial surface about group home down no associated cellulitis or lymphangitis there is no julio ulceration keep bandage and use good wound cleaning technique and hygiene for the skin area. Lungs clear no rales rhonchi or wheezes heart tones regular rhythm no carotid bruits. Abdomen soft benign nontender wheelchair-bound.

## 2021-06-26 NOTE — PROGRESS NOTES
"TCM DISCHARGE FOLLOW UP CALL     Tell me how you are doing now that you are home?\" Much better, down to 1 L of oxygen and O2 sat are at 92%. Did all of her own ADLs today including showered.      Discharge Instructions     Do you understand your discharge instructions?Yes      \"Has an appointment with your primary care provider been scheduled?\" Yes  \"If yes, when is that appointment?  6/24/21    Medications    \"Did you have any medication changes?   Yes   Do you have any concerns with obtaining the medications or questions about your medications that you would like a RN to review with you?  No  \"When you see the provider, I would recommend that you bring your medications with you.\"    Call Summary    \"What questions or concerns do you have about your recent visit and your follow-up care?\"No questions or concerns          \"If you have questions or things don't continue to improve, we encourage you contact us through the main clinic number [phone].  Even if the clinic is not open, triage nurses are available 24/7 to help you.                  "

## 2021-06-29 NOTE — PROGRESS NOTES
Progress Notes by Anabel Marquez MD at 10/29/2020  1:30 PM     Author: Anabel Marquez MD Service: -- Author Type: Physician    Filed: 10/29/2020  1:53 PM Encounter Date: 10/29/2020 Status: Signed    : Anabel Marquez MD (Physician)           [facility]  Heart Care Clinic Follow-up Note    Assessment & Plan        1. Chronic atrial fibrillation (H) -permanent, asymptomatic and not valvular and on chronic anticoagulation with Xarelto given her CHADS 2 VASC score of 4, this dose appropriate considering age, renal function as well as weight.   2. Type 2 diabetes mellitus without complication, with long-term current use of insulin (H) -so noted with hemoglobin A1c of 7.0 and defer to primary.   3. Pulmonary hypertension (H)  -mild to moderate with estimated pressure of 54 mmHg.  Most likely WHO class III.  We will recheck echo and if significantly elevated discuss further therapy.   4. Essential hypertension -under good control currently.   5. Dyslipidemia -very good numbers with total cholesterol 133 and LDL of 62 which is excellent.   6. Acute combined systolic and diastolic CHF, NYHA class 1 (H) -no significant signs or symptoms with ejection fraction of 70% from echo 2 years ago.  Given bipedal edema will recheck echo.  Currently on Aldactone as well as furosemide.     Plan  1.  Recheck echocardiogram looking at pulmonic pressures and ejection fraction and right-sided valve lesions.  If significant structural abnormalities found will address.  2.  Follow-up with me in 1 year or sooner if needed depending on results of echo.    Subjective  CC: 84-year-old white female here for a follow-up today.  She just saw Dr. Liam Niño this morning.  She has her baseline shortness of breath which has not worsened, she does occasionally feel a skipped heartbeat.  There is no PND, orthopnea, syncope, dizziness, sustained chest discomfort, sustained palpitations, or significant peripheral  edema.    Medications  Current Outpatient Medications   Medication Sig   ? acetaminophen (TYLENOL) 500 MG tablet Take 1,000 mg by mouth every 6 (six) hours as needed for pain.   ? albuterol (PROVENTIL) 2.5 mg /3 mL (0.083 %) nebulizer solution 3 ML INHALATION FOUR TIMES A DAY AS NEEDED USE BEFORE NEBULIZED SALINE. FOR SHORTNESS OF BREATH.   ? ARIPiprazole (ABILIFY) 5 MG tablet TAKE 1 TABLET EVERY NIGHT AT BEDTIME   ? atorvastatin (LIPITOR) 20 MG tablet TAKE 1 TABLET (20 MG TOTAL) BY MOUTH BEDTIME.   ? betamethasone, augmented, (DIPROLENE-AF) 0.05 % cream 2 (two) times a day.   ? calcium carbonate-vitamin D3 (CALCIUM WITH VITAMIN D) 600 mg(1,500mg) -400 unit per tablet Take 1 tablet by mouth 2 (two) times a day.   ? cholecalciferol, vitamin D3, 1,000 unit tablet Take 2,000 Units by mouth daily.    ? cyanocobalamin 1,000 mcg/mL injection Inject 1 mL (1,000 mcg total) into the shoulder, thigh, or buttocks every 30 (thirty) days.   ? diltiazem (CARDIZEM CD) 120 MG 24 hr capsule TAKE 1 CAPSULE (120 MG TOTAL) BY MOUTH DAILY.   ? ferrous sulfate 325 (65 FE) MG tablet Take 1 tablet (325 mg total) by mouth daily with breakfast.   ? furosemide (LASIX) 20 MG tablet TAKE 1 TABLET (20 MG TOTAL) BY MOUTH 2 (TWO) TIMES A DAY AT 9AM AND 6PM.   ? gabapentin (NEURONTIN) 300 MG capsule Take 600 mg by mouth at bedtime.          ? ipratropium (ATROVENT) 0.03 % nasal spray 1 spray into each nostril daily.   ? ketoconazole (NIZORAL) 2 % cream 2 (two) times a day.   ? magnesium oxide (MAG-OX) 400 mg (241.3 mg magnesium) tablet Take 1 tablet (400 mg total) by mouth daily.   ? NEBUSAL 3 % nebulizer solution TAKE 4 ML BY NEBULIZATION 3 (THREE) TIMES A DAY. USING AFTER ALBUTEROL NEB.   ? omeprazole (PRILOSEC) 20 MG capsule TAKE 1 CAPSULE DAILY.   ? OXYGEN-AIR DELIVERY SYSTEMS [facility] Use 1-2 L As Directed as needed. [employer]   ? polyethylene glycol (MIRALAX) 17 gram packet Take 17 g by mouth daily as needed.    ? PROAIR HFA 90 mcg/actuation  "inhaler INHALE 2 PUFFS EVERY 4 (FOUR) HOURS AS NEEDED FOR WHEEZING.   ? rivaroxaban ANTICOAGULANT (XARELTO) 20 mg tablet Take 1 tablet (20 mg total) by mouth daily with supper.   ? spironolactone (ALDACTONE) 25 MG tablet TAKE 1/2 TABLET (12.5 MG TOTAL) BY MOUTH DAILY.   ? TRELEGY ELLIPTA 100-62.5-25 mcg DsDv inhaler INHALE 1 INHALATION DAILY.   ? triamcinolone (KENALOG) 0.1 % cream Apply 1 application topically 2 (two) times a day as needed. To legs         ? urea (CARMOL) 40 % Crea Apply 1 application topically 2 (two) times a day as needed. To bottom of feet        Objective  /68 (Patient Site: Right Arm, Patient Position: Sitting, Cuff Size: Adult Regular)   Pulse 88   Resp 14   Ht 5' 2\" (1.575 m)   Wt 149 lb (67.6 kg)   LMP  (LMP Unknown)   BMI 27.25 kg/m      General Appearance:    Alert, cooperative, no distress, appears stated age   Head:    Normocephalic, without obvious abnormality, atraumatic   Throat:   Lips, mucosa, and tongue normal; teeth and gums normal   Neck:   Supple, symmetrical, trachea midline, no adenopathy;        thyroid:  No enlargement/tenderness/nodules; no carotid    bruit or JVD   Back:     Symmetric, no curvature, ROM normal, no CVA tenderness   Lungs:     Clear to auscultation bilaterally, respirations unlabored   Chest wall:    No tenderness or deformity   Heart:   Irregularly irregular, S1 and S2 normal, no murmur, rub   or gallop   Abdomen:     Soft, non-tender, bowel sounds active all four quadrants,     no masses, no organomegaly   Extremities:   Normal, atraumatic, no cyanosis, mild 1/4 bipedal edema   Pulses:   2+ and symmetric all extremities   Skin:   Skin color, texture, turgor normal, no rashes or lesions     Results    Lab Results personally reviewed   Lab Results   Component Value Date    CHOL 133 06/29/2020    CHOL 134 12/23/2019     Lab Results   Component Value Date    HDL 60 06/29/2020    HDL 57 12/23/2019     Lab Results   Component Value Date    LDLCALC " 62 06/29/2020    LDLCALC 63 12/23/2019     Lab Results   Component Value Date    TRIG 56 06/29/2020    TRIG 70 12/23/2019     Lab Results   Component Value Date    WBC 12.5 (H) 02/12/2020    HGB 12.1 02/12/2020    HCT 38.6 02/12/2020     02/12/2020     Lab Results   Component Value Date    CREATININE 0.66 02/12/2020    BUN 13 02/12/2020     (L) 02/12/2020    K 4.3 02/12/2020    CO2 35 (H) 02/12/2020     Review of Systems:   General: WNL  Eyes: WNL  Ears/Nose/Throat: Hearing Loss  Lungs: Cough, Shortness of Breath, Wheezing  Heart: Shortness of Breath with activity, Leg Swelling  Stomach: Constipation  Bladder: WNL  Muscle/Joints: Muscle Weakness, Joint Pain  Skin: WNL  Nervous System: WNL  Mental Health: WNL     Blood: WNL

## 2021-07-03 NOTE — ANESTHESIA PREPROCEDURE EVALUATION
Anesthesia Preprocedure Evaluation by Lizett Martin MD at 8/29/2019 10:07 AM     Author: Lizett Martin MD Service: -- Author Type: Physician    Filed: 8/29/2019 10:09 AM Date of Service: 8/29/2019 10:07 AM Status: Signed    : Lizett Martin MD (Physician)       Anesthesia Evaluation      Patient summary reviewed   No history of anesthetic complications     Airway   Mallampati: II  Neck ROM: full   Pulmonary - negative ROS and normal exam   (+) pneumonia, COPD (Oxygen dependent) severe, shortness of breath, decreased breath sounds, wheezes, rales,     PE comment: Diffuse expiratory wheezing bilaterally                         Cardiovascular - negative ROS and normal exam  Exercise tolerance: > or = 4 METS  (+) hypertension, dysrhythmias, CHF, ,     ECG reviewed (a-fib)  Rhythm: irregular  Rate: abnormal,         Neuro/Psych - negative ROS     Endo/Other - negative ROS   (+) diabetes mellitus,      GI/Hepatic/Renal - negative ROS   (+) GERD well controlled,        Other findings: Thin, fit, INAD on O2 at 2 lpm      Dental    (+) upper dentures and lower dentures                             Anesthesia Plan  Planned anesthetic: MAC  duoneb prior to OR today    ASA 3     Anesthetic plan and risks discussed with: patient  Anesthesia plan special considerations: antiemetics,

## 2021-07-03 NOTE — ADDENDUM NOTE
Addendum Note by Kristy Olivas RN at 8/30/2019  9:09 AM     Author: Kristy Olivas RN Service: -- Author Type: Registered Nurse    Filed: 8/30/2019  9:09 AM Encounter Date: 8/30/2019 Status: Signed    : Kristy Olivas RN (Registered Nurse)    Addended by: KRISTY OLIVAS on: 8/30/2019 09:09 AM        Modules accepted: Orders

## 2021-07-03 NOTE — ADDENDUM NOTE
Addendum Note by Zoya Casiano MLT at 11/14/2018 11:20 AM     Author: Zoya Casiano MLT Service: -- Author Type:     Filed: 11/15/2018  7:15 AM Encounter Date: 11/14/2018 Status: Signed    : Zoya Casiano MLT ()    Addended by: ZOYA CASIANO on: 11/15/2018 07:15 AM        Modules accepted: Orders

## 2021-07-03 NOTE — ADDENDUM NOTE
Addendum Note by Kristy Olivas RN at 1/23/2018  4:04 PM     Author: Kristy Olivas RN Service: -- Author Type: Registered Nurse    Filed: 1/23/2018  4:04 PM Encounter Date: 1/23/2018 Status: Signed    : Kristy Olivas RN (Registered Nurse)    Addended by: KRISTY OLIVAS on: 1/23/2018 04:04 PM        Modules accepted: Orders

## 2021-07-04 NOTE — PATIENT INSTRUCTIONS - HE
Patient Instructions by Andreas Mcclure MD at 5/28/2021 10:30 AM     Author: Andreas Mcclure MD Service: -- Author Type: Physician    Filed: 5/28/2021 10:37 AM Encounter Date: 5/28/2021 Status: Signed    : Andreas Mcclure MD (Physician)         Patient Education     Old Laceration: Not Sutured  A laceration is a cut through the skin. This will usually require stitches if it is deep. However, if a laceration remains open for too long, the risk of infection increases. In your case, too much time has passed before coming for treatment. The danger of infection from stitching at this time is too high. That is why your wound was not stitched.  If the wound is spread open, it will heal by filling in from the bottom and sides. A wound that is not stitched may take 1 to 4 weeks to heal, depending on the size of the opening. You will probably have a visible scar. You can discuss revision of the scar with your healthcare provider at a later time.  Home care  The following guidelines will help you care for your laceration at home:    Keep the wound clean and dry. If a bandage was applied and it becomes wet or dirty, replace it. Otherwise, leave it in place for the first 24 hours, then change it once a day or as directed.    The healthcare provider may prescribe an antibiotic cream or ointment to prevent infection.     If you are at high risk for infection, or the wound is very dirty, your provider may prescribe an oral antibiotic medicine to prevent infection. Don't stop using this medicine until you have finished it all, or the provider tells you to stop.    The healthcare provider may also prescribe medicine for pain. Follow instructions for taking these medicines.  Clean the wound daily:    After removing any bandage, wash the area with soap and water. Use a wet cotton swab to loosen and remove any blood or crust that forms.    Talk with your healthcare provider before applying any antibiotic ointment to the  wound. Reapply a fresh bandage.    You may remove the bandage to shower as usual after the first 24 hours, but don't soak the area in water. This means no tub baths or swimming until the wound heals or your provider tells you it's OK.  Don't do activities that may reinjure your wound.    Check the wound daily for signs of infection listed below.  Follow-up care  Follow up with your healthcare provider, or as advised.  When to seek medical advice  Call your healthcare provider right away if any of these occur:    Wound bleeding not controlled by direct pressure    Signs of infection, including increasing pain in the wound, increasing wound redness or swelling, or pus or bad odor coming from the wound    Fever of 100.4 F (38. C) or higher, or as directed by your healthcare provider    Wound edges reopen    Wound changes colors    Numbness around the wound     Decreased movement around the injured area  Date Last Reviewed: 7/1/2017 2000-2017 [employer]. [address] [place] [address], [place] [id number]. All rights reserved. This information is not intended as a substitute for professional medical care. Always follow your healthcare professional's instructions.

## 2021-07-05 NOTE — TELEPHONE ENCOUNTER
Telephone Encounter by Gricelda Martin RN at 7/5/2021 12:10 PM     Author: Gricelda Martin RN Service: -- Author Type: Registered Nurse    Filed: 7/5/2021 12:18 PM Encounter Date: 7/5/2021 Status: Signed    : Gricelda Martin RN (Registered Nurse)       Patient calling with rectal bleeding. Reports having some blood on the toilet paper following a bowel movement. Denies dizziness, black stool and vomiting. Patient is taking Coumadin. Advised per protocol to be seen in clinic with patient agreeable.     Gricelda Martin RN 07/05/21 12:17 PM  [facility] Triage Nurse Advisor      Reason for Disposition  ? Taking Coumadin (warfarin) or other strong blood thinner, or known bleeding disorder (e.g., thrombocytopenia)    Additional Information  ? Negative: Passed out (i.e., fainted, collapsed and was not responding)  ? Negative: Shock suspected (e.g., cold/pale/clammy skin, too weak to stand, low BP, rapid pulse)  ? Negative: Vomiting red blood or black (coffee ground) material  ? Negative: Sounds like a life-threatening emergency to the triager  ? Negative: Diarrhea is the main symptom  ? Negative: Rectal symptoms  ? Negative: SEVERE rectal bleeding (large blood clots; on and off, or constant bleeding)  ? Negative: SEVERE dizziness (e.g., unable to stand, requires support to walk, feels like passing out now)  ? Negative: MODERATE rectal bleeding (small blood clots, passing blood without stool, or toilet water turns red) more than once a day  ? Negative: Bloody, black, or tarry bowel movements (Exception: chronic-unchanged  black-grey bowel movements and is taking iron pills or Pepto-bismol)  ? Negative: High-risk adult (e.g., prior surgery on aorta, abdominal aortic aneurysm)  ? Negative: Rectal foreign body (inserted or swallowed)  ? Negative: SEVERE abdominal pain (e.g., excruciating)  ? Negative: Constant abdominal pain lasting > 2 hours  ? Negative: Pale skin (pallor) of new onset or worsening  ? Negative: Patient  sounds very sick or weak to the triager  ? Negative: MODERATE rectal bleeding (small blood clots, passing blood without stool, or toilet water turns red)    Protocols used: RECTAL BLEEDING-A-OH    COVID 19 Nurse Triage Plan/Patient Instructions    Please be aware that novel coronavirus (COVID-19) may be circulating in the community. If you develop symptoms such as fever, cough, or SOB or if you have concerns about the presence of another infection including coronavirus (COVID-19), please contact your health care provider or visit  [email or web address].    Disposition/Instructions    In-Person Visit with provider recommended. Reference Visit Selection Guide.    Thank you for taking steps to prevent the spread of this virus.  o Limit your contact with others.  o Wear a simple mask to cover your cough.  o Wash your hands well and often.    Resources    [facility]: About COVID-19: [email or web address]    CDC: What to Do If You're Sick: www.cdc.gov/coronavirus/2019-ncov/about/steps-when-sick.html    CDC: Ending Home Isolation: www.cdc.gov/coronavirus/2019-ncov/hcp/disposition-in-home-patients.html     CDC: Caring for Someone: www.cdc.gov/coronavirus/2019-ncov/if-you-are-sick/care-for-someone.html     [facility]: Interim Guidance for Hospital Discharge to Home: www.health.[facility].mn.us/diseases/coronavirus/hcp/hospdischarge.pdf    [facility] clinical trials (COVID-19 research studies): clinicalaffairs.[facility]-clinical-trials     Below are the COVID-19 hotlines at the [place] Department of Health ([facility]). Interpreters are available.   o For health questions: Call 666-709-9868 or 1-153.500.2225 (7 a.m. to 7 p.m.)  o For questions about schools and childcare: Call 152-605-0934 or 1-996.293.8215 (7 a.m. to 7 p.m.)

## 2021-07-07 NOTE — LETTER
Letter by Shai Ellington MD at      Author: Shai Ellington MD Service: -- Author Type: --    Filed:  Encounter Date: 6/25/2021 Status: (Other)         Adalgisa Solano  [address] [facility] [address]             June 25, 2021         Dear Ms. Solano,    Below are the results from your recent visit:    Resulted Orders   Glycosylated Hemoglobin A1c   Result Value Ref Range    Hemoglobin A1c 7.0 (H) <=5.6 %       All very good results.    Please call with questions or contact us using Auto I.[employer]..    Sincerely,        Electronically signed by Shai Ellington MD

## 2021-07-07 NOTE — LETTER
Letter by Shai Ellington MD at      Author: Shai Ellington MD Service: -- Author Type: --    Filed:  Encounter Date: 6/25/2021 Status: (Other)         Adalgisa Solano  [address]  [facility] [address]             June 25, 2021         Dear Ms. Solano,    Below are the results from your recent visit:    Resulted Orders   Glycosylated Hemoglobin A1c   Result Value Ref Range    Hemoglobin A1c 7.0 (H) <=5.6 %   Lipid Cascade   Result Value Ref Range    Cholesterol 131 <=199 mg/dL    Triglycerides 61 <=149 mg/dL    HDL Cholesterol 58 >=50 mg/dL    LDL Calculated 61 <=129 mg/dL    Patient Fasting > 8hrs? Yes    Comprehensive Metabolic Panel   Result Value Ref Range    Sodium 134 (L) 136 - 145 mmol/L    Potassium 4.7 3.5 - 5.0 mmol/L    Chloride 95 (L) 98 - 107 mmol/L    CO2 25 22 - 31 mmol/L    Anion Gap, Calculation 14 5 - 18 mmol/L    Glucose 122 70 - 125 mg/dL    BUN 11 8 - 28 mg/dL    Creatinine 0.69 0.60 - 1.10 mg/dL    GFR MDRD Af Amer >60 >60 mL/min/1.73m2    GFR MDRD Non Af Amer >60 >60 mL/min/1.73m2    Bilirubin, Total 1.0 0.0 - 1.0 mg/dL    Calcium 8.7 8.5 - 10.5 mg/dL    Protein, Total 5.9 (L) 6.0 - 8.0 g/dL    Albumin 3.5 3.5 - 5.0 g/dL    Alkaline Phosphatase 81 45 - 120 U/L    AST 21 0 - 40 U/L    ALT 19 0 - 45 U/L    Narrative    Fasting Glucose reference range is 70-99 mg/dL per  American Diabetes Association (ADA) guidelines.       All very good results.    Please call with questions or contact us using [email or web address].    Sincerely,        Electronically signed by Shai Ellington MD

## 2021-07-09 NOTE — TELEPHONE ENCOUNTER
Telephone Encounter by LARY SEVERINO at 7/9/2021  1:23 PM     Author: LARY SEVERINO Service: -- Author Type: Registered Nurse    Filed: 7/9/2021  1:23 PM Encounter Date: 7/9/2021 Status: Signed    : LARY SEVERINO, RN (Registered Nurse)       Left message to return call to clinic.     [facility] should be seen in clinic or [facility] as soon as possible.

## 2021-07-09 NOTE — TELEPHONE ENCOUNTER
Telephone Encounter by Shai Ellington MD at 7/9/2021 12:28 PM     Author: Shai Ellington MD Service: -- Author Type: Physician    Filed: 7/9/2021 12:29 PM Encounter Date: 7/9/2021 Status: Signed    : Shai Ellington MD (Physician)       Best for office visit in the clinic at [facility] or [facility]

## 2021-07-09 NOTE — TELEPHONE ENCOUNTER
Telephone Encounter by Anabel Arceo RN at 7/9/2021  9:24 AM     Author: Anabel Arceo RN Service: -- Author Type: Registered Nurse    Filed: 7/9/2021  9:42 AM Encounter Date: 7/9/2021 Status: Signed    : Anabel Arceo RN (Registered Nurse)       Triage call:   Woke up overnight- states she has a rash on her legs, red and very itchy  More rash on the left but also on right  Reports hot and swollen- states she also has lymphedema at baseline  Swelling is up to her knees  Redness is 1.5 inches below her knee  Denies pain  No fever at this time  History of heart failure   States she has cellulitis before and thinks she might have it again- last episode was over one year ago- symptoms are similar  States she is able to walk ok     Advised to be seen in the clinic today- reviewed additional care advice with patient and she verbalizes understanding. Assisted in transferring to scheduling.     Anabel Arceo RN BA [facility] Triage/Med Refill 7/9/2021 9:32 AM     COVID 19 Nurse Triage Plan/Patient Instructions    Please be aware that novel coronavirus (COVID-19) may be circulating in the community. If you develop symptoms such as fever, cough, or SOB or if you have concerns about the presence of another infection including coronavirus (COVID-19), please contact your health care provider or visit  [email or web address].    Disposition/Instructions    In-Person Visit with provider recommended. Reference Visit Selection Guide.    Thank you for taking steps to prevent the spread of this virus.  o Limit your contact with others.  o Wear a simple mask to cover your cough.  o Wash your hands well and often.    Resources    [facility]: About COVID-19: [email or web address]    CDC: What to Do If You're Sick: www.cdc.gov/coronavirus/2019-ncov/about/steps-when-sick.html    CDC: Ending Home Isolation: www.cdc.gov/coronavirus/2019-ncov/hcp/disposition-in-home-patients.html      CDC: Caring for Someone: www.cdc.gov/coronavirus/2019-ncov/if-you-are-sick/care-for-someone.html     [facility]: Interim Guidance for Hospital Discharge to Home: www.health.[facility].mn.us/diseases/coronavirus/hcp/hospdischarge.pdf    [facility] clinical trials (COVID-19 research studies): clinicalaffairs.[facility]/umn-clinical-trials     Below are the COVID-19 hotlines at the [place] Department of Health ([facility]). Interpreters are available.   o For health questions: Call 850-751-1914 or 1-503.104.9840 (7 a.m. to 7 p.m.)  o For questions about schools and childcare: Call 907-555-7157 or 1-118.644.1168 (7 a.m. to 7 p.m.)           Reason for Disposition  ? MODERATE swelling of both ankles (e.g., swelling extends up to the knees) AND new onset or worsening    Additional Information  ? Negative: Sounds like a life-threatening emergency to the triager  ? Negative: Chest pain  ? Negative: Small area of swelling and followed an insect bite to the area  ? Negative: Followed a knee injury  ? Negative: Ankle or foot injury  ? Negative: Pregnant with leg swelling or edema  ? Negative: Difficulty breathing at rest  ? Negative: Entire foot is cool or blue in comparison to other side  ? Negative: SEVERE swelling (e.g., swelling extends above knee, entire leg is swollen, weeping fluid)  ? Negative: Thigh or calf pain and only 1 side and present > 1 hour  ? Negative: Thigh, calf, or ankle swelling in only one leg  ? Negative: Thigh, calf, or ankle swelling in both legs, but one side is definitely more swollen (Exception: longstanding difference between legs)  ? Negative: Cast on leg or ankle and has increasing pain  ? Negative: Can't walk or can barely stand (new onset)  ? Negative: Fever and red area (or area very tender to touch)  ? Negative: Patient sounds very sick or weak to the triager  ? Negative: Swelling of face, arm or hands (Exception: slight puffiness of fingers during hot weather)  ? Negative: Pregnant > 20 weeks  and sudden weight gain (i.e., > 2 lbs, 1 kg in one week)    Protocols used: LEG SWELLING AND EDEMA-A-OH

## 2021-07-14 PROBLEM — J96.01 ACUTE RESPIRATORY FAILURE WITH HYPOXEMIA (H): Status: RESOLVED | Noted: 2017-02-10 | Resolved: 2017-03-03

## 2021-07-14 PROBLEM — Z51.5 ENCOUNTER FOR PALLIATIVE CARE: Status: RESOLVED | Noted: 2018-12-21 | Resolved: 2019-10-03

## 2021-07-14 PROBLEM — I50.33 ACUTE ON CHRONIC DIASTOLIC CONGESTIVE HEART FAILURE (H): Status: RESOLVED | Noted: 2018-11-11 | Resolved: 2019-01-15

## 2021-07-14 PROBLEM — J44.1 COPD EXACERBATION (H): Status: RESOLVED | Noted: 2017-03-03 | Resolved: 2017-03-13

## 2021-07-14 PROBLEM — I50.31 ACUTE DIASTOLIC CONGESTIVE HEART FAILURE (H): Status: RESOLVED | Noted: 2017-02-10 | Resolved: 2017-03-03

## 2021-08-03 PROBLEM — S72.002A FRACTURE OF FEMORAL NECK, LEFT (H): Status: RESOLVED | Noted: 2018-12-21 | Resolved: 2019-10-03

## 2021-08-03 PROBLEM — I48.91 ATRIAL FIBRILLATION WITH RVR (H): Status: RESOLVED | Noted: 2019-10-03 | Resolved: 2020-10-29

## 2021-08-03 PROBLEM — M87.00 AVASCULAR NECROSIS OF BONE (H): Status: RESOLVED | Noted: 2019-08-06 | Resolved: 2020-02-11

## 2021-08-03 PROBLEM — I48.91 ATRIAL FIBRILLATION WITH RVR (H): Status: RESOLVED | Noted: 2017-02-09 | Resolved: 2019-01-15

## 2021-08-07 NOTE — TELEPHONE ENCOUNTER
Pt  had a bladder infection 1.5 months ago, and she now has similar symptoms.    Pt reports that she has been having small voids, of only a few drops, and feels as if her bladder is still full.  She also states that her urine has a foul odor.  Onset of symptoms was yesterday.  Pt is afebrile, and denies any new lower back pain, or flan/abdominal pain. Pt denies any hematuria.    Per protocol, pt advised to go to the ED for evaluation.  Neisha Feliz RN 08/07/21 9:00 AM  [facility] Nurse Advisor    Reason for Disposition    [1] Unable to urinate (or only a few drops) > 4 hours AND     [2] bladder feels very full (e.g., palpable bladder or strong urge to urinate)    Protocols used: URINARY SYMPTOMS-A-AH    COVID 19 Nurse Triage Plan/Patient Instructions    Please be aware that novel coronavirus (COVID-19) may be circulating in the community. If you develop symptoms such as fever, cough, or SOB or if you have concerns about the presence of another infection including coronavirus (COVID-19), please contact your health care provider or visit [email or web address].     Disposition/Instructions    ED Visit recommended. Follow protocol based instructions.     Bring Your Own Device:  Please also bring your smart device(s) (smart phones, tablets, laptops) and their charging cables for your personal use and to communicate with your care team during your visit.    Thank you for taking steps to prevent the spread of this virus.  o Limit your contact with others.  o Wear a simple mask to cover your cough.  o Wash your hands well and often.    Resources    [facility]: About COVID-19: [email or web address]    CDC: What to Do If You're Sick: www.cdc.gov/coronavirus/2019-ncov/about/steps-when-sick.html    CDC: Ending Home Isolation: www.cdc.gov/coronavirus/2019-ncov/hcp/disposition-in-home-patients.html     CDC: Caring for Someone:  www.cdc.gov/coronavirus/2019-ncov/if-you-are-sick/care-for-someone.html     [facility]: Interim Guidance for Hospital Discharge to Home: www.health.[facility].mn.us/diseases/coronavirus/hcp/hospdischarge.pdf    [facility] clinical trials (COVID-19 research studies): clinicalaffairs.[facility]-clinical-trials     Below are the COVID-19 hotlines at the [place] Department of Health ([facility]). Interpreters are available.   o For health questions: Call 301-639-6967 or 1-255.148.9215 (7 a.m. to 7 p.m.)  o For questions about schools and childcare: Call 497-900-1853 or 1-947.920.4003 (7 a.m. to 7 p.m.)

## 2021-08-07 NOTE — ED TRIAGE NOTES
Since last night been having dysuria and foul smell urine when she can urinate.  Has been going through a COPD exacerbation and is on home O2 lately for it.  Is on Prednisone for that.

## 2021-08-11 NOTE — TELEPHONE ENCOUNTER
Phone call from Adalgisa. States she is in the midst of an exacerbation.  Started her prednisone that Dr. Juarez gave her and is on day 5,  Is continuing her saline nebs and albuterol 3x a day and is bringing up more phlegm.  sats are 91% when she is sitting (no oxygen) but fall with activities.  She has been wearing her oxygen at 3 LPM during this exacerbartion.   Also developed a UTI and was seen in the ED for this on 8/7 and started on Omnicef.  She has 2 more days left of this antibiotic.       Reviewed with Dr. Foster in clinic and will have her continue with her antibiotics and prednisone.  Complete what she has.  If condition deteriorates, she should go to the Ed for evaluation.  If no better in a couple of days, instructed her to call our office.        Also, patient states that her TRelegy is no longer going to be covered by her insurance.  She has a couple more inhalers left, but would like to have Dr. Juarez prescribe a substitute for this.

## 2021-08-11 NOTE — TELEPHONE ENCOUNTER
NEBUSAL 3 % nebulizer solution (Discontinued) 360 mL 1 4/19/2021 6/11/2021 No   Sig: TAKE 4 ML BY NEBULIZATION 3 (THREE) TIMES A DAY. USING AFTER ALBUTEROL NEB.   Sent to pharmacy as: NebuSal 3 % solution for nebulization (sodium chloride)   Reason for Discontinue: Reorder   E-Prescribing Status: Receipt confirmed by pharmacy (4/19/2021  4:40 PM CDT)   NEBUSAL 3 % nebulizer solution [[id number]]    Electronically signed by: Shai Ellington MD on 04/19/21 1637 Status: Discontinued   Ordering user: Shai Ellington MD 04/19/21 1637 Authorized by: Shai Ellington MD   Frequency:  04/19/21 - 06/11/21 Released by: Shai Ellington MD 04/19/21 1637   Discontinued by: Casandra Everett, PharmD 06/11/21 1228 [Reorder]   Diagnoses  Chronic respiratory failure with hypoxia (H) [J96.11]

## 2021-08-15 NOTE — DISCHARGE INSTRUCTIONS
Your symptoms are consistent with COPD.  Your lab work is overall reassuring.  Would recommend continuing with your vest to help your breathing.  We are also putting you on a few more days of steroids.    Strongly recommend following up with either your pulmonologist or your primary care doctor in the next couple days to make sure your breathing and symptoms are improving.  If you have worsening shortness of breath, confusion, difficulty getting up and ambulating, fevers or other worsening symptoms, return to the ER.

## 2021-08-15 NOTE — ED PROVIDER NOTES
ED PROVIDER NOTE    EMERGENCY DEPARTMENT ENCOUNTER      NAME: Adalgisa Solano  AGE: 85 year old female  YOB: 1935  MRN: [id number]  EVALUATION DATE & TIME: 8/15/2021  4:22 PM    PCP: Shai Ellington    ED PROVIDER: Kamryn Darling PA-C      No chief complaint on file.        FINAL IMPRESSION:  1. COPD exacerbation (H)          MEDICAL DECISION MAKING:    Pertinent Labs & Imaging studies reviewed. (See chart for details)    85-year-old female with a history of CHF, diabetes, schizoaffective disorder, COPD, HLD, chronic afib (on Xarelto), presenting with increased weakness.    Seen in ED on 8/7 (8 days ago) for urinary symptoms and retention. Diagnosed with cystitis and discharged on cefdinir.  Was not discharged with catheter.  Also recently treated for COPD exacerbation and has been wearing O2 at home.     Patient notes that she has just felt generally weak over the last few days.  This seemed to start near the end of the completion of her antibiotics for the UTI.  She has chronic shortness of breath and thinks she is around baseline.    Here initially was tachycardic but on my exam heart rate normal.  On examination she has coarse breath sounds with wheezing but has not taken a breathing treatment since this morning as she has been in the ER for several hours awaiting a bed.  Her urinary symptoms have resolved.  Her abdominal exam is benign.  She is neurologically intact.    Differential broad.  This could be just residual weakness from recent urinary tract infection and COPD exacerbation.  Also consider persistent UTI, pneumonia, metabolic imbalance, anemia, ACS.  Initiated work-up with EKG, chest x-ray, lab work.  Patient given a DuoNeb here and solumedrol.    Lab work reveal normal CBC.  She does have some CO2 retention but seems to be compensating well with a pH of 7.33.  Troponin and BNP were unremarkable.  Chest x-ray reveals findings consistent with emphysema and basilar fibrosis, no  signs of lung consolidation or infiltrate.  Her Covid was negative.  Her urine  is completely clear.    While she does have some CO2 retention, she is mentating appropriately and her oxygen saturation is really quite good on her 1 L of nasal cannula.  She was up and ambulated here without any difficulty.  She does note that she has stopped using her vest oscillator recently.    Discussed plan with patient. She would like to go home at this point and feels comfortable doing so rather than being admitted.  I clearly discussed if she has worsening weakness, sob, pain, confusion she needs to return to ED. Also encouraged f/u with her pulmonologist or pcp in the next couple days for rehceck.  She just finished a burst of 20mg prednisone, but I think with signs of persistent wheezing and COPD flare, we are going to discharge her with 40mg burst.  She did not want any further breathing treatments here - will do at home.     At the conclusion of the encounter I discussed the results of all of the tests and the disposition. The questions were answered. The patient or family acknowledged understanding and was agreeable with the care plan.       ED COURSE  4:35 PM  Met and evaluated patient. Discussed ED plan.  6:44 PM I rechecked and updated the patient.  The patient is feeling significantly improved. The patient is eager and comfortable with going home. I discussed the plan for discharge with the patient, and patient is agreeable. We discussed supportive cares at home and reasons for return to the ER including new or worsening symptoms - all questions and concerns addressed. Patient to be discharged by RN.      PPE: Provider wore gloves, N95 mask, eye protection, surgical cap, and paper mask.   MEDICATIONS GIVEN IN THE EMERGENCY:  Medications   ipratropium - albuterol 0.5 mg/2.5 mg/3 mL (DUONEB) neb solution 3 mL (3 mLs Nebulization Given 8/15/21 6822)   methylPREDNISolone sodium succinate (solu-MEDROL) injection 125 mg (125  "mg Intravenous Given 8/15/21 1028)       NEW PRESCRIPTIONS STARTED AT TODAY'S ER VISIT  Discharge Medication List as of 8/15/2021  7:09 PM      START taking these medications    Details   predniSONE (DELTASONE) 20 MG tablet Take two tablets (= 40mg) each day for 5 (five) days, Disp-8 tablet, R-0, Local Print                =================================================================    HPI    Patient information was obtained from: The patient and her     Use of Intrepreter: N/A         Adalgisa Solano is a 85 year old female with a pertient medical history of type 2 diabetes mellitus, hypertension, COPD, a-fib, and hyperlipidemia who presents to the ED via walk-in for evaluation of weakness.     The patient recently had a bladder infection and was prescribed a course of antibiotics that finished on 8/14. The patient currently endorses generalized weakness since yesterday (8/14) which she speculates came in conjunction with the finishing of her antibiotics. The patient is able to ambulate with her walker but is concerned about falling. The patient denies dizziness but states that dizziness is \"the next step.\" The patient has had a few headaches, and reports that she is able to urinate normally again and that there is no odor. Additionally, the patient recently was taking prednisone due to COPD exacerbation, which she notes helped her shortness of breath but the course finished \"at about same time as her antibiotics.\" Her shortness of breath is currently at her baseline and she is normally on 1 liter of oxygen at home. The patient is taking the blood thinner Xarelto. Denies fever, nausea, vomiting, diarrhea, blood or black in stool, chest pain and any other symptoms or complaints at this time.    Of note, the patient states the swelling in her bilateral legs seems to be improved. She has received the COVID-19 vaccine.     Per Chart Review:  8/7/2021 the patient presented to [facility] ED for evaluation " of acute cystitis. She had pyuria and bacteria in the urine and was prescribed cefdinir, 1 300-mg capsule 2x/day with a total of 14 capsules.      REVIEW OF SYSTEMS    Constitutional: Negative for fevers  Pulmonary: Negative for shortness of breath  Cardiac: Negative for chest pain  GI:Negative for nausea, vomiting, diarrhea  : Negative for urinary symptoms and hematuria and melena  Neuro: Negative for dizziness. Positive for weakness and headache   All other systems reviewed and are negative        PAST MEDICAL HISTORY:  Past Medical History:   Diagnosis Date     Acute and chronic respiratory failure with hypoxia (H)      Acute blood loss anemia 1/15/2019     Acute bronchitis 1/15/2019     Anemia     pernicious anemia     Anxiety      Arm skin lesion, right      Arnold-Chiari malformation (H) 1998     Arthritis      Atrial fibrillation (H) 02/2017     Avascular necrosis of bone (H)      Breast cyst      Breast lump      Candidal skin infection      Cellulitis of left lower extremity 1/28/2019     CHF (congestive heart failure) (H)     diastolic and systolic     Chronic bronchitis (H)     & acute     Chronic diastolic heart failure (H)     diastolic chf     Chronic pain syndrome      Chronic respiratory failure with hypoxia (H) 3/13/2017     COPD (chronic obstructive pulmonary disease) (H)      COPD exacerbation (H)      Depression      Diet-controlled type 2 diabetes mellitus (H)      Drug induced constipation      DVT of axillary vein, acute (H)     left     DVT of axillary vein, acute left (H)      Edema      Encounter for palliative care      Erysipelas      Fracture of femoral neck, left (H)      Generalized muscle weakness      GERD (gastroesophageal reflux disease)      History of blood clots      Hyperlipidemia      Hypertension      Left hip pain      Lumbar spinal stenosis      PAD (peripheral artery disease) (H)      Peripheral arterial disease (H) 10/28/2015     Psoriasis     arms      Pulmonary  hypertension (H) 3/5/2018     Recurrent major depressive episodes (H)     Created by Conversion  Replacement Utility updated for latest IMO load     Schizoaffective disorder, bipolar type (H) 2015     Slow transit constipation      Stasis dermatitis of both legs      Status post total hip replacement, 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     Type 2 diabetes mellitus without complication (H) 2016     Vitamin B12 deficiency      Volume overload        PAST SURGICAL HISTORY:  Past Surgical History:   Procedure Laterality Date     BACK SURGERY       BREAST CYST ASPIRATION Left      C OPEN FIXATN PROX END/NECK FEMUR FX Left 2019    Procedure: OPEN REDUCTION INTERNAL FIXATION, FRACTURE LEFT HIP, PERIPROSTHETIC FRACTURE;  Surgeon: Kevin Lackey MD;  Location: [facility] Main OR;  Service: Orthopedics     C TOTAL HIP ARTHROPLASTY Left 2019    Procedure: LEFT TOTAL HIP ARTHROPLASTY;  Surgeon: Kevin Lackey MD;  Location: [facility] Main OR;  Service: Orthopedics     C TOTAL HIP ARTHROPLASTY Right 2019    Procedure: RIGHT TOTAL HIP ARTHROPLASTY;  Surgeon: Kan Quesada MD;  Location: [facility] Main OR;  Service: Orthopedics     C TOTAL KNEE ARTHROPLASTY Left 10/7/2016    Procedure: TOTAL KNEE ARTHROPLASTY, LEFT;  Surgeon: Kan Quesada MD;  Location: [facility] Main OR;  Service: Orthopedics     CERVICAL SPINE SURGERY      for arnold chiari malformation      SECTION  X3     CHOLECYSTECTOMY       COLONOSCOPY       EXCISE LESION UPPER EXTREMITY Right 2019    Procedure: EXCISION RIGHT ARM LIPOMA;  Surgeon: Andreas Holly MD;  Location: [facility] Main OR;  Service: General     EYE SURGERY      bilateral cataracts     HAND SURGERY       JOINT REPLACEMENT Right     knee - partial     LAPAROSCOPIC HERNIORRHAPHY INCISIONAL Left 3/27/2015    Procedure: ATTEMPTED LAPAROSCOPIC ABDOMINA/FLANK INCISION REPAIR;  Surgeon: Jose Lakhani MD;  Location: [facility] Main OR;  Service:      LUMBAR FUSION N/A 2014     Procedure: POSTERIOR FUSION / DECOMPRESSION L3-S1 WITH PELVIC FIXATION BILATERAL ;  Surgeon: Rajan Mares MD;  Location: [facility];  Service:      OTHER SURGICAL HISTORY      IR for PAD LOWER EXTREMITY     US ASPIRATION HEMATOMA SEROMA OR FLUID COLLECTION  2/6/2020           CURRENT MEDICATIONS:    No current facility-administered medications for this encounter.    Current Outpatient Medications:      [START ON 8/16/2021] predniSONE (DELTASONE) 20 MG tablet, Take two tablets (= 40mg) each day for 5 (five) days, Disp: 8 tablet, Rfl: 0     cefdinir (OMNICEF) 300 MG capsule, Take 1 capsule (300 mg) by mouth 2 times daily, Disp: 14 capsule, Rfl: 0     sodium chloride (NEBUSAL) 3 % neb solution, Take 4 mLs by nebulization 2 times daily, Disp: 240 mL, Rfl: 11     TRELEGY ELLIPTA 100-62.5-25 MCG/INH oral inhaler, , Disp: , Rfl:     ALLERGIES:  Allergies   Allergen Reactions     Tramadol Nausea     Amoxicillin Itching and Rash     Levofloxacin Itching and Rash     Penicillins Itching and Rash     Has tolerated ceftriaxone.       FAMILY HISTORY:  Family History   Problem Relation Age of Onset     Coronary Artery Disease Mother      Coronary Artery Disease Father        SOCIAL HISTORY:   Smoking: The patient quit smoking in 2006  Alcohol: Uses on occasion but drinks a small amount.       VITALS:  Vitals:    08/15/21 1800 08/15/21 1830 08/15/21 1900 08/15/21 1915   BP: 123/63 121/58 123/61    Pulse: 92 86 93 91   Resp: 25 19 23    Temp:       TempSrc:       SpO2: 97% 98% 96% 95%   Weight:           PHYSICAL EXAM    General Appearance:  Alert, cooperative, no distress, appears stated age  HENT: Normocephalic without obvious deformity, atraumatic. Mucous membranes moist   Eyes: Conjunctiva clear, Lids normal. No discharge.   Respiratory: No distress. No rhonchi or stridor. Coarse breath sounds bilaterally with crackles and scattered expiratory wheezes. On 2 liters of oxygen by nasal cannula.    Cardiovascular: Irregular rhythm, normal rate no murmur. Normal cap refill. No peripheral edema  GI: Abdomen soft, nontender, normal bowel sounds  : No CVA tenderness  Musculoskeletal: Moving all extremities. Compression stockings in place.   Integument: Warm, dry, no rashes or lesions  Neurologic: Alert and orientated x3. No focal deficits.  Psych: Normal mood and affect        LAB:  Labs Ordered and Resulted from Time of ED Arrival Up to the Time of Departure from the ED   BASIC METABOLIC PANEL - Abnormal; Notable for the following components:       Result Value    Sodium 135 (*)     Chloride 91 (*)     Carbon Dioxide (CO2) 37 (*)     All other components within normal limits   CBC WITH PLATELETS AND DIFFERENTIAL - Abnormal; Notable for the following components:    RBC Count 5.40 (*)     Hematocrit 48.2 (*)     RDW 15.4 (*)     Absolute Neutrophils 8.6 (*)     Absolute Immature Granulocytes 0.1 (*)     All other components within normal limits   BLOOD GAS VENOUS - Abnormal; Notable for the following components:    pH Venous 7.33 (*)     pCO2 Venous 75 (*)     Bicarbonate Venous 33 (*)     Oxyhemoglobin Venous 47.9 (*)     O2 Sat, Venous 48.9 (*)     All other components within normal limits   ROUTINE UA WITH MICROSCOPIC REFLEX TO CULTURE - Normal    Narrative:     Urine Culture not indicated   TROPONIN I - Normal   B-TYPE NATRIURETIC PEPTIDE ( EAST ONLY) - Normal   MAGNESIUM - Normal   TSH WITH FREE T4 REFLEX - Normal   CBC WITH PLATELETS & DIFFERENTIAL    Narrative:     The following orders were created for panel order CBC with platelets + differential.  Procedure                               Abnormality         Status                     ---------                               -----------         ------                     CBC with platelets and d...[[id number]]  Abnormal            Final result                 Please view results for these tests on the individual orders.   COVID-19 VIRUS (CORONAVIRUS) BY  PCR   PERIPHERAL IV CATHETER   BLADDER SCAN   STRAIGHT CATH FOR URINE   CARDIAC CONTINUOUS MONITORING       RADIOLOGY:  Chest XR,  PA & LAT   Final Result   IMPRESSION:       Unchanged enlargement of the cardiac silhouette with a particular conspicuous left ventricular heart border. The vascular pedicle width is normal. There are atheromatous calcifications of the aorta, not increased.      There are interstitial opacities in the mid and lower lungs consistent with a combination of emphysema with surrounding interstitial thickening and mild basilar fibrosis. No superimposed lung consolidation or new focal volume loss.      Diaphragm curvature is preserved. No pleural fluid. No pneumothorax.          EKG:    Performed at: 15-AUG-2021 19:04    Impression: Atrial fibrillation. Rightward axis. Low voltage QRS. Septal infarct (cited on or before 11-JUN-2021). Abnormal ECG. When compared with ECG of 11-JUN-2021 10:21, no significant change was found.     Dr. Farrar and I have independently reviewed and interpreted the EKG(s) documented above.    PROCEDURES:   None      I, Yakelin Nina, am serving as a scribe to document services personally performed by Kamryn Darling PA-C based on my observation and the provider's statements to me. I, Kamryn Darling PA-C attest that Yakelin Nina is acting in a scribe capacity, has observed my performance of the services and has documented them in accordance with my direction.    Kamryn Darling PA-C   Emergency Medicine       Kamryn Darling PA-C  08/15/21 1356

## 2021-08-16 NOTE — TELEPHONE ENCOUNTER
Adalgisa called back and after speaking with the pharmacy, she found out that her Trelegy ellipta inhaler will be covered at least until the end of 2021.  She will continue to use this medication as long as she can.

## 2021-08-17 NOTE — TELEPHONE ENCOUNTER
Okay for cephalexin 500 mg tablets dispense number 20 tablets take 1 tablet 4 times a day and please asked patient to make a virtual visit sometime with me toward the end of this week.

## 2021-08-17 NOTE — TELEPHONE ENCOUNTER
Patient reports she was in ERD yesterday due to COPD.    Requesting anti biotic for cellulitis on left leg.    Declined offer to schedule ER follow up appt.    [facility] is the preferred pharmacy

## 2021-08-31 NOTE — LETTER
September 2, 2021      Adalgisa Solano  [address]  [facility], [place], [facility] [id number]        Dear ,    We are writing to inform you of your test results   .Preoperative examination with mild elevation in blood sugar.    Needs A1c and fasting blood sugar with next office visit fasting.  All other labs are quite good.      Resulted Orders   CBC with platelets   Result Value Ref Range    WBC Count 7.5 4.0 - 11.0 10e3/uL    RBC Count 4.84 3.80 - 5.20 10e6/uL    Hemoglobin 13.9 11.7 - 15.7 g/dL    Hematocrit 42.5 35.0 - 47.0 %    MCV 88 78 - 100 fL    MCH 28.7 26.5 - 33.0 pg    MCHC 32.7 31.5 - 36.5 g/dL    RDW 15.6 (H) 10.0 - 15.0 %    Platelet Count 188 150 - 450 10e3/uL   Comprehensive metabolic panel   Result Value Ref Range    Sodium 135 (L) 136 - 145 mmol/L    Potassium 4.4 3.5 - 5.0 mmol/L    Chloride 96 (L) 98 - 107 mmol/L    Carbon Dioxide (CO2) 29 22 - 31 mmol/L    Anion Gap 10 5 - 18 mmol/L    Urea Nitrogen 10 8 - 28 mg/dL    Creatinine 0.64 0.60 - 1.10 mg/dL    Calcium 8.6 8.5 - 10.5 mg/dL    Glucose 139 (H) 70 - 125 mg/dL    Alkaline Phosphatase 82 45 - 120 U/L    AST 19 0 - 40 U/L    ALT 15 0 - 45 U/L    Protein Total 5.6 (L) 6.0 - 8.0 g/dL    Albumin 3.2 (L) 3.5 - 5.0 g/dL    Bilirubin Total 1.2 (H) 0.0 - 1.0 mg/dL    GFR Estimate 83 >60 mL/min/1.73m2      Comment:      As of July 11, 2021, eGFR is calculated by the CKD-EPI creatinine equation, without race adjustment. eGFR can be influenced by muscle mass, exercise, and diet. The reported eGFR is an estimation only and is only applicable if the renal function is stable.       If you have any questions or concerns, please call the clinic at the number listed above.       Sincerely,      Shai Ellington MD

## 2021-08-31 NOTE — H&P (VIEW-ONLY)
Pre-Op Note:  Today's date: August 31, 2021    Adalgisa Solano is a 85 year old female who presents for a preoperative evaluation.    Surgical Information:  Surgery/Procedure: September 7, 2021 repair of incarcerated umbilical hernia.  Surgery Location: Ending  Surgeon: Dr. Andreas Luna  Surgery Date: September 7, 2021  Fax number for surgical facility: Unknown    Subjective:   85-year-old female accompanied by her  she is wheelchair-bound she is here for preoperative evaluation in anticipation of repair of incarcerated umbilical hernia.  Dr. Andreas Luna [facility] September 7, 2021.  Edema noted to lower extremities especially left side.    Shingles right buttocks x4 days.  O2 sats today on 1 L nasal cannula 90%.  The patient has underlying COPD.  Her bowel functions well she said no signs or symptoms of intestinal obstruction.  The patient has an incarcerated umbilical hernia that needs repair.  There is no nausea or vomiting no obstipation or constipation.    ROS:   10 point review of systems negative in its entirety.  Bowel appears to be functioning well.    Medications:     Current Outpatient Medications:      albuterol (PROAIR HFA/PROVENTIL HFA/VENTOLIN HFA) 108 (90 Base) MCG/ACT inhaler, Inhale 2 puffs into the lungs, Disp: , Rfl:      albuterol (PROVENTIL) (2.5 MG/3ML) 0.083% neb solution, 3 ML INHALATION FOUR TIMES A DAY AS NEEDED USE BEFORE NEBULIZED SALINE. FOR SHORTNESS OF BREATH., Disp: , Rfl:      ARIPiprazole (ABILIFY) 5 MG tablet, Take 5 mg by mouth, Disp: , Rfl:      atorvastatin (LIPITOR) 20 MG tablet, , Disp: , Rfl:      bacitracin 500 UNIT/GM external ointment, , Disp: , Rfl:      calcium carbonate 600 mg-vitamin D 400 units (CALTRATE) 600-400 MG-UNIT per tablet, Take 1 tablet by mouth, Disp: , Rfl:      cholecalciferol 25 MCG (1000 UT) TABS, Take 2,000 Units by mouth, Disp: , Rfl:      clobetasol (TEMOVATE) 0.05 % external cream, 1 APPLICATION TWICE A DAY TOPICALLY FOR 2  WEEKS, Disp: , Rfl:      cyanocobalamin (VITAMIN B-12) 500 MCG tablet, Take 500 mcg by mouth daily, Disp: , Rfl:      diltiazem ER (DILT-XR) 120 MG 24 hr capsule, Take 120 mg by mouth, Disp: , Rfl:      Fluticasone-Umeclidin-Vilanterol (TRELEGY ELLIPTA) 100-62.5-25 MCG/INH oral inhaler, , Disp: , Rfl:      furosemide (LASIX) 20 MG tablet, Take 40 mg by mouth, Disp: , Rfl:      gabapentin (NEURONTIN) 300 MG capsule, Take 600 mg by mouth, Disp: , Rfl:      ipratropium (ATROVENT) 0.03 % nasal spray, 1 SPRAY EACH NOSTRIL ONCE PER DAY, Disp: , Rfl:      omeprazole (PRILOSEC) 20 MG DR capsule, , Disp: , Rfl:      rivaroxaban ANTICOAGULANT (XARELTO) 20 MG TABS tablet, Take 20 mg by mouth, Disp: , Rfl:      sodium chloride (NEBUSAL) 3 % neb solution, Take 4 mLs by nebulization 2 times daily, Disp: 240 mL, Rfl: 11     spironolactone (ALDACTONE) 25 MG tablet, TAKE 1/2 TABLET (12.5 MG TOTAL) BY MOUTH DAILY., Disp: , Rfl:      TRELEGY ELLIPTA 100-62.5-25 MCG/INH oral inhaler, , Disp: , Rfl:      azithromycin (ZITHROMAX) 250 MG tablet, , Disp: , Rfl:      Lidocaine (LIDOCARE) 4 % Patch, Place 1 patch onto the skin (Patient not taking: Reported on 8/31/2021), Disp: , Rfl:      Allergies:  Allergies   Allergen Reactions     Tramadol Nausea     Amoxicillin Itching and Rash     Levofloxacin Itching and Rash     Penicillins Itching and Rash     Has tolerated ceftriaxone.       Immunizations:   Immunization History   Administered Date(s) Administered     COVID-19,PF,Moderna 03/12/2021     Immunizations reviewed and up-to-date.    Health Maintenance:   Health maintenance and immunizations are up-to-date she has received 2 Materna vaccines for COVID-19 prevention.  Other immunizations reviewed and up-to-date.  Colonoscopy dated March 2013 allCLEAR.    Mammogram allCLEAR September 24, 2020.    Non-smoker no excess alcohol.  Multiple drug allergies noted including tramadol amoxicillin levofloxacin and penicillin the latter causing a rash  with itching.    Past Medical History:    Extensive past medical history centering around COPD asthmatic bronchitis previously a smoker currently a non-smoker.    Prior right and left total hip arthroplasty after spinal block.    Left total knee arthroplasty October 7, 2016.    Removal right arm lipoma.  History of hyperlipidemia but no target organ damage related to same.    Anxiety with depression improved with Abilify and Seroquel previously treated.    Pernicious anemia monthly B12 injections 1000 mcg IM have been given with benefit.    PAD with percutaneous interventional radiology treatment successful.    Neurosurgery involving widening of the foramen magnum.  For Arnold-Chiari malformation.  No postoperative complications good result.    Hemiarthroplasty right knee and prior right hand surgery.    History of paroxysmal atrial fibrillation no history of thromboembolic complications related to same.  Current rate is controlled 84.  Appears regular on exam today.    No anesthetic complications with any prior surgeries.  Past Medical History:   Diagnosis Date     Acute and chronic respiratory failure with hypoxia (H)      Acute blood loss anemia 1/15/2019     Acute bronchitis 1/15/2019     Anemia     pernicious anemia     Anxiety      Arm skin lesion, right      Arnold-Chiari malformation (H) 1998     Arthritis      Atrial fibrillation (H) 02/2017     Avascular necrosis of bone (H)      Breast cyst      Breast lump      Candidal skin infection      Cellulitis of left lower extremity 1/28/2019     CHF (congestive heart failure) (H)     diastolic and systolic     Chronic bronchitis (H)     & acute     Chronic diastolic heart failure (H)     diastolic chf     Chronic pain syndrome      Chronic respiratory failure with hypoxia (H) 3/13/2017     COPD (chronic obstructive pulmonary disease) (H)      COPD exacerbation (H)      Depression      Diet-controlled type 2 diabetes mellitus (H)      Drug induced constipation       DVT of axillary vein, acute (H)     left     DVT of axillary vein, acute left (H)      Edema      Encounter for palliative care      Erysipelas      Fracture of femoral neck, left (H)      Generalized muscle weakness      GERD (gastroesophageal reflux disease)      History of blood clots      Hyperlipidemia      Hypertension      Left hip pain      Lumbar spinal stenosis      PAD (peripheral artery disease) (H)      Peripheral arterial disease (H) 10/28/2015     Psoriasis     arms      Pulmonary hypertension (H) 3/5/2018     Recurrent major depressive episodes (H)     Created by Conversion  Replacement Utility updated for latest IMO load     Schizoaffective disorder, bipolar type (H) 2015     Slow transit constipation      Stasis dermatitis of both legs      Status post total hip replacement, 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     Type 2 diabetes mellitus without complication (H) 2016     Vitamin B12 deficiency      Volume overload         Past Surgical History:   Past Surgical History:   Procedure Laterality Date     BACK SURGERY       BREAST CYST ASPIRATION Left      C OPEN FIXATN PROX END/NECK FEMUR FX Left 2019    Procedure: OPEN REDUCTION INTERNAL FIXATION, FRACTURE LEFT HIP, PERIPROSTHETIC FRACTURE;  Surgeon: Kevin Lackey MD;  Location: [facility] OR;  Service: Orthopedics     C TOTAL HIP ARTHROPLASTY Left 2019    Procedure: LEFT TOTAL HIP ARTHROPLASTY;  Surgeon: Kevin Lackey MD;  Location: [facility] OR;  Service: Orthopedics     C TOTAL HIP ARTHROPLASTY Right 2019    Procedure: RIGHT TOTAL HIP ARTHROPLASTY;  Surgeon: Kan Quesada MD;  Location: [facility] OR;  Service: Orthopedics     C TOTAL KNEE ARTHROPLASTY Left 10/7/2016    Procedure: TOTAL KNEE ARTHROPLASTY, LEFT;  Surgeon: Kan Quesada MD;  Location: [facility] OR;  Service: Orthopedics     CERVICAL SPINE SURGERY      for arnold chiari malformation      SECTION  X3     CHOLECYSTECTOMY       COLONOSCOPY    "    EXCISE LESION UPPER EXTREMITY Right 2019    Procedure: EXCISION RIGHT ARM LIPOMA;  Surgeon: Andreas Holly MD;  Location: [facility] OR;  Service: General     EYE SURGERY      bilateral cataracts     HAND SURGERY       JOINT REPLACEMENT Right     knee - partial     LAPAROSCOPIC HERNIORRHAPHY INCISIONAL Left 3/27/2015    Procedure: ATTEMPTED LAPAROSCOPIC ABDOMINA/FLANK INCISION REPAIR;  Surgeon: Jose Lakhani MD;  Location: [facility] OR;  Service:      LUMBAR FUSION N/A 2014    Procedure: POSTERIOR FUSION / DECOMPRESSION L3-S1 WITH PELVIC FIXATION BILATERAL ;  Surgeon: Rajan Mares MD;  Location: [facility] OR;  Service:      OTHER SURGICAL HISTORY      IR for PAD LOWER EXTREMITY     US ASPIRATION HEMATOMA SEROMA OR FLUID COLLECTION  2020        Family History:    Mother  heart disease age 80.    Father  heart disease age 87.   twice first   of abdominal aortic aneurysm rupture.  3 children all by .  Second  living well supportive.    The patient herself is wheelchair-bound most of the time nasal O2 dependent.  There is no family history of malignant hyperthermia associated with general anesthesia.  Family History   Problem Relation Age of Onset     Coronary Artery Disease Mother      Coronary Artery Disease Father          Exam:  Vitals:/56 (BP Location: Right arm, Patient Position: Sitting)   Pulse 84   Ht 1.549 m (5' 1\")   Wt 67.6 kg (149 lb)   SpO2 90%   BMI 28.15 kg/m    There is some edema noted left lower extremity her lungs are quite clear heart tones regular rhythm she describes having paroxysmal atrial fibrillation the rhythm today is regular.  The abdomen was benign bowel sounds were present mild abdominal distention adipose tissue neck veins nondistended no thyromegaly or carotid bruits slight pallor apprehensive about Covid 19 illness and pandemic and risk for operations in light of her underlying COPD and " the current status of COVID-19 in our community and world.  Tried to reassure her.  There is no leg tenderness no redness or warmth nothing to suggest cellulitis lymphangitis or deep vein thrombosis.    Assessment and Plan:  Medically acceptable risk for anticipated umbilical hernia incarceration surgery preoperatively will check hemogram and comprehensive metabolic profile.    Shai Ellington MD

## 2021-08-31 NOTE — PROGRESS NOTES
Pre-Op Note:  Today's date: August 31, 2021    Adalgisa Solano is a 85 year old female who presents for a preoperative evaluation.    Surgical Information:  Surgery/Procedure: September 7, 2021 repair of incarcerated umbilical hernia.  Surgery Location: Ending  Surgeon: Dr. Andreas Luna  Surgery Date: September 7, 2021  Fax number for surgical facility: Unknown    Subjective:   85-year-old female accompanied by her  she is wheelchair-bound she is here for preoperative evaluation in anticipation of repair of incarcerated umbilical hernia.  Dr. Andreas Luna [facility] September 7, 2021.  Edema noted to lower extremities especially left side.    Shingles right buttocks x4 days.  O2 sats today on 1 L nasal cannula 90%.  The patient has underlying COPD.  Her bowel functions well she said no signs or symptoms of intestinal obstruction.  The patient has an incarcerated umbilical hernia that needs repair.  There is no nausea or vomiting no obstipation or constipation.    ROS:   10 point review of systems negative in its entirety.  Bowel appears to be functioning well.    Medications:     Current Outpatient Medications:      albuterol (PROAIR HFA/PROVENTIL HFA/VENTOLIN HFA) 108 (90 Base) MCG/ACT inhaler, Inhale 2 puffs into the lungs, Disp: , Rfl:      albuterol (PROVENTIL) (2.5 MG/3ML) 0.083% neb solution, 3 ML INHALATION FOUR TIMES A DAY AS NEEDED USE BEFORE NEBULIZED SALINE. FOR SHORTNESS OF BREATH., Disp: , Rfl:      ARIPiprazole (ABILIFY) 5 MG tablet, Take 5 mg by mouth, Disp: , Rfl:      atorvastatin (LIPITOR) 20 MG tablet, , Disp: , Rfl:      bacitracin 500 UNIT/GM external ointment, , Disp: , Rfl:      calcium carbonate 600 mg-vitamin D 400 units (CALTRATE) 600-400 MG-UNIT per tablet, Take 1 tablet by mouth, Disp: , Rfl:      cholecalciferol 25 MCG (1000 UT) TABS, Take 2,000 Units by mouth, Disp: , Rfl:      clobetasol (TEMOVATE) 0.05 % external cream, 1 APPLICATION TWICE A DAY TOPICALLY FOR 2  WEEKS, Disp: , Rfl:      cyanocobalamin (VITAMIN B-12) 500 MCG tablet, Take 500 mcg by mouth daily, Disp: , Rfl:      diltiazem ER (DILT-XR) 120 MG 24 hr capsule, Take 120 mg by mouth, Disp: , Rfl:      Fluticasone-Umeclidin-Vilanterol (TRELEGY ELLIPTA) 100-62.5-25 MCG/INH oral inhaler, , Disp: , Rfl:      furosemide (LASIX) 20 MG tablet, Take 40 mg by mouth, Disp: , Rfl:      gabapentin (NEURONTIN) 300 MG capsule, Take 600 mg by mouth, Disp: , Rfl:      ipratropium (ATROVENT) 0.03 % nasal spray, 1 SPRAY EACH NOSTRIL ONCE PER DAY, Disp: , Rfl:      omeprazole (PRILOSEC) 20 MG DR capsule, , Disp: , Rfl:      rivaroxaban ANTICOAGULANT (XARELTO) 20 MG TABS tablet, Take 20 mg by mouth, Disp: , Rfl:      sodium chloride (NEBUSAL) 3 % neb solution, Take 4 mLs by nebulization 2 times daily, Disp: 240 mL, Rfl: 11     spironolactone (ALDACTONE) 25 MG tablet, TAKE 1/2 TABLET (12.5 MG TOTAL) BY MOUTH DAILY., Disp: , Rfl:      TRELEGY ELLIPTA 100-62.5-25 MCG/INH oral inhaler, , Disp: , Rfl:      azithromycin (ZITHROMAX) 250 MG tablet, , Disp: , Rfl:      Lidocaine (LIDOCARE) 4 % Patch, Place 1 patch onto the skin (Patient not taking: Reported on 8/31/2021), Disp: , Rfl:      Allergies:  Allergies   Allergen Reactions     Tramadol Nausea     Amoxicillin Itching and Rash     Levofloxacin Itching and Rash     Penicillins Itching and Rash     Has tolerated ceftriaxone.       Immunizations:   Immunization History   Administered Date(s) Administered     COVID-19,PF,Moderna 03/12/2021     Immunizations reviewed and up-to-date.    Health Maintenance:   Health maintenance and immunizations are up-to-date she has received 2 Materna vaccines for COVID-19 prevention.  Other immunizations reviewed and up-to-date.  Colonoscopy dated March 2013 allCLEAR.    Mammogram allCLEAR September 24, 2020.    Non-smoker no excess alcohol.  Multiple drug allergies noted including tramadol amoxicillin levofloxacin and penicillin the latter causing a rash  with itching.    Past Medical History:    Extensive past medical history centering around COPD asthmatic bronchitis previously a smoker currently a non-smoker.    Prior right and left total hip arthroplasty after spinal block.    Left total knee arthroplasty October 7, 2016.    Removal right arm lipoma.  History of hyperlipidemia but no target organ damage related to same.    Anxiety with depression improved with Abilify and Seroquel previously treated.    Pernicious anemia monthly B12 injections 1000 mcg IM have been given with benefit.    PAD with percutaneous interventional radiology treatment successful.    Neurosurgery involving widening of the foramen magnum.  For Arnold-Chiari malformation.  No postoperative complications good result.    Hemiarthroplasty right knee and prior right hand surgery.    History of paroxysmal atrial fibrillation no history of thromboembolic complications related to same.  Current rate is controlled 84.  Appears regular on exam today.    No anesthetic complications with any prior surgeries.  Past Medical History:   Diagnosis Date     Acute and chronic respiratory failure with hypoxia (H)      Acute blood loss anemia 1/15/2019     Acute bronchitis 1/15/2019     Anemia     pernicious anemia     Anxiety      Arm skin lesion, right      Arnold-Chiari malformation (H) 1998     Arthritis      Atrial fibrillation (H) 02/2017     Avascular necrosis of bone (H)      Breast cyst      Breast lump      Candidal skin infection      Cellulitis of left lower extremity 1/28/2019     CHF (congestive heart failure) (H)     diastolic and systolic     Chronic bronchitis (H)     & acute     Chronic diastolic heart failure (H)     diastolic chf     Chronic pain syndrome      Chronic respiratory failure with hypoxia (H) 3/13/2017     COPD (chronic obstructive pulmonary disease) (H)      COPD exacerbation (H)      Depression      Diet-controlled type 2 diabetes mellitus (H)      Drug induced constipation       DVT of axillary vein, acute (H)     left     DVT of axillary vein, acute left (H)      Edema      Encounter for palliative care      Erysipelas      Fracture of femoral neck, left (H)      Generalized muscle weakness      GERD (gastroesophageal reflux disease)      History of blood clots      Hyperlipidemia      Hypertension      Left hip pain      Lumbar spinal stenosis      PAD (peripheral artery disease) (H)      Peripheral arterial disease (H) 10/28/2015     Psoriasis     arms      Pulmonary hypertension (H) 3/5/2018     Recurrent major depressive episodes (H)     Created by Conversion  Replacement Utility updated for latest IMO load     Schizoaffective disorder, bipolar type (H) 2015     Slow transit constipation      Stasis dermatitis of both legs      Status post total hip replacement, 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     Type 2 diabetes mellitus without complication (H) 2016     Vitamin B12 deficiency      Volume overload         Past Surgical History:   Past Surgical History:   Procedure Laterality Date     BACK SURGERY       BREAST CYST ASPIRATION Left      C OPEN FIXATN PROX END/NECK FEMUR FX Left 2019    Procedure: OPEN REDUCTION INTERNAL FIXATION, FRACTURE LEFT HIP, PERIPROSTHETIC FRACTURE;  Surgeon: Kevin Lackey MD;  Location: [facility] OR;  Service: Orthopedics     C TOTAL HIP ARTHROPLASTY Left 2019    Procedure: LEFT TOTAL HIP ARTHROPLASTY;  Surgeon: Kevin Lackey MD;  Location: [facility] Main OR;  Service: Orthopedics     C TOTAL HIP ARTHROPLASTY Right 2019    Procedure: RIGHT TOTAL HIP ARTHROPLASTY;  Surgeon: Kan Quesada MD;  Location: [facility] OR;  Service: Orthopedics     C TOTAL KNEE ARTHROPLASTY Left 10/7/2016    Procedure: TOTAL KNEE ARTHROPLASTY, LEFT;  Surgeon: Kan Quesada MD;  Location: [facility] OR;  Service: Orthopedics     CERVICAL SPINE SURGERY      for arnold chiari malformation      SECTION  X3     CHOLECYSTECTOMY       COLONOSCOPY    "    EXCISE LESION UPPER EXTREMITY Right 2019    Procedure: EXCISION RIGHT ARM LIPOMA;  Surgeon: Andreas Holly MD;  Location: [facility] OR;  Service: General     EYE SURGERY      bilateral cataracts     HAND SURGERY       JOINT REPLACEMENT Right     knee - partial     LAPAROSCOPIC HERNIORRHAPHY INCISIONAL Left 3/27/2015    Procedure: ATTEMPTED LAPAROSCOPIC ABDOMINA/FLANK INCISION REPAIR;  Surgeon: Jose Lakhani MD;  Location: [facility] OR;  Service:      LUMBAR FUSION N/A 2014    Procedure: POSTERIOR FUSION / DECOMPRESSION L3-S1 WITH PELVIC FIXATION BILATERAL ;  Surgeon: Rajan Mares MD;  Location: [facility] OR;  Service:      OTHER SURGICAL HISTORY      IR for PAD LOWER EXTREMITY     US ASPIRATION HEMATOMA SEROMA OR FLUID COLLECTION  2020        Family History:    Mother  heart disease age 80.    Father  heart disease age 87.   twice first   of abdominal aortic aneurysm rupture.  3 children all by .  Second  living well supportive.    The patient herself is wheelchair-bound most of the time nasal O2 dependent.  There is no family history of malignant hyperthermia associated with general anesthesia.  Family History   Problem Relation Age of Onset     Coronary Artery Disease Mother      Coronary Artery Disease Father          Exam:  Vitals:/56 (BP Location: Right arm, Patient Position: Sitting)   Pulse 84   Ht 1.549 m (5' 1\")   Wt 67.6 kg (149 lb)   SpO2 90%   BMI 28.15 kg/m    There is some edema noted left lower extremity her lungs are quite clear heart tones regular rhythm she describes having paroxysmal atrial fibrillation the rhythm today is regular.  The abdomen was benign bowel sounds were present mild abdominal distention adipose tissue neck veins nondistended no thyromegaly or carotid bruits slight pallor apprehensive about Covid 19 illness and pandemic and risk for operations in light of her underlying COPD and " the current status of COVID-19 in our community and world.  Tried to reassure her.  There is no leg tenderness no redness or warmth nothing to suggest cellulitis lymphangitis or deep vein thrombosis.    Assessment and Plan:  Medically acceptable risk for anticipated umbilical hernia incarceration surgery preoperatively will check hemogram and comprehensive metabolic profile.    Shai Ellington MD

## 2021-09-01 NOTE — PROGRESS NOTES
Call from Adalgisa reporting an exacerbation. Increased shortness of breath, O2 at 89-90% on 2L. No fevers. Coughing up clear phlegm with vest and neb treatments. No exposure to COVID that she is aware of; feels like a typical exacerbation for her. Plan to start action plan-   Doxycycline 100mg BID x7 days   Prednisone 20mg x5 days, 10mg x5 days  Discussed red flags that would warrant trip to ED. Instructed to call back in a day or two if she is not feeling better.

## 2021-09-21 NOTE — OP NOTE
OPERATIVE REPORT    Adalgisa Solano   [facility]  Medical Record #:  [id number]  YOB: 1935  Age:  85 year old    PROCEDURE DATE:  9/21/2021    PREOPERATIVE DIAGNOSIS: 1.  Left abdominal wall hernia incarcerated 2.  Incarcerated umbilical hernia    POSTOPERATIVE DIAGNOSIS: 1.  Left abdominal wall incisional hernia incarcerated 2.  Incarcerated umbilical hernia    PROCEDURE: 1.  Repair of left incarcerated incisional hernia with reduction of contents repair with Marlex mesh 2.  Incarcerated buccal hernia repair with primary closure    OPERATING SURGEON:  Andreas Holly MD    ASSISTANT: Technician    ANESTHESIA: MAC    DESCRIPTION OF PROCEDURE: With the patient in supine position under intravenous sedative anesthesia the abdomen is prepped and draped in usual sterile fashion.  Risk benefits complications was reviewed preoperatively.  First infraumbilical incision was made after instillation 1/2% lidocaine with epinephrine for anesthesia and umbilical stalk is identified and isolated the incarcerated contents are reduced hernia defect closed transversely with interrupted 0 Ethibond suture.  Umbilical stalk was resecured with interrupted 2-0 Vicryl and wound closed with running 3-0 Vicryl running 4-0 Vicryl subcuticular suture and Dermabond.  A left lower transverse incision is then made skin subcutaneous tissue sharply incised in an area of previous surgical intervention is noted with Ethibond sutures in place.  This is in the vicinity of a spigelian hernia however the patient had previous incision here as well.  The hernia defect was identified in this being approximately 3 to 4 cm maximum dimension.  Of adhesions is accomplished without enterotomy.  Portion of Marlex mesh was placed and secured with interrupted 0 Prolene suture and the remaining wound closed with running 0 Prolene suture.  Wound is irrigated antibiotic solution closed with running 2-0 Vicryl and a running 3-0 Vicryl  subcuticular suture and Dermabond.  Estimated blood loss for both procedures was minimal.  There was no complications and the patient tolerated procedure well.  Sponge and counts are correct x2.    EBL:  [unfilled]    SPECIMENS:  * No specimens in log *    Andreas bajwa md  [facility]

## 2021-09-21 NOTE — ANESTHESIA POSTPROCEDURE EVALUATION
Patient: Adalgisa Solano    Procedure(s):  INCARCERATED UMBILICAL AND  SPIGELIAN HERNIA REPAIR    Diagnosis:Incarcerated umbilical hernia [K42.0]  Spigelian hernia [K43.9]  Diagnosis Additional Information: No value filed.    Anesthesia Type:  MAC    Note:  Disposition: Outpatient   Postop Pain Control: Uneventful            Sign Out: Well controlled pain   PONV: No   Neuro/Psych: Uneventful            Sign Out: Acceptable/Baseline neuro status   Airway/Respiratory: Uneventful            Sign Out: Acceptable/Baseline resp. status   CV/Hemodynamics: Uneventful            Sign Out: Acceptable CV status; No obvious hypovolemia; No obvious fluid overload   Other NRE: NONE   DID A NON-ROUTINE EVENT OCCUR? No           Last vitals:  Vitals Value Taken Time   /67 09/21/21 1300   Temp     Pulse 74 09/21/21 1300   Resp 18 09/21/21 1230   SpO2 94 % 09/21/21 1300   Vitals shown include unvalidated device data.    Electronically Signed By: Carola Ponce MD  September 21, 2021  1:01 PM

## 2021-09-21 NOTE — ANESTHESIA PREPROCEDURE EVALUATION
Anesthesia Pre-Procedure Evaluation    Patient: Adalgisa Solano   MRN: [id number] : 1935        Preoperative Diagnosis: Incarcerated umbilical hernia [K42.0]  Spigelian hernia [K43.9]   Procedure : Procedure(s):  INCARCERATED UMBILICAL AND  SPIGELIAN HERNIA REPAIR     Past Medical History:   Diagnosis Date     Acute and chronic respiratory failure with hypoxia (H)      Acute blood loss anemia 1/15/2019     Acute bronchitis 1/15/2019     Anemia     pernicious anemia     Anxiety      Arm skin lesion, right      Arnold-Chiari malformation (H)      Arthritis      Atrial fibrillation (H) 2017     Avascular necrosis of bone (H)      Breast cyst      Breast lump      Candidal skin infection      Cellulitis of left lower extremity 2019     CHF (congestive heart failure) (H)     diastolic and systolic     Chronic bronchitis (H)     & acute     Chronic diastolic heart failure (H)     diastolic chf     Chronic pain syndrome      Chronic respiratory failure with hypoxia (H) 3/13/2017     COPD (chronic obstructive pulmonary disease) (H)      COPD exacerbation (H)      Depression      Diet-controlled type 2 diabetes mellitus (H)      Drug induced constipation      DVT of axillary vein, acute (H)     left     DVT of axillary vein, acute left (H)      Edema      Encounter for palliative care      Erysipelas      Fracture of femoral neck, left (H)      Generalized muscle weakness      GERD (gastroesophageal reflux disease)      History of blood clots      Hyperlipidemia      Hypertension      Left hip pain      Lumbar spinal stenosis      PAD (peripheral artery disease) (H)      Peripheral arterial disease (H) 10/28/2015     Psoriasis     arms      Pulmonary hypertension (H) 3/5/2018     Recurrent major depressive episodes (H)     Created by Conversion  Replacement Utility updated for latest IMO load     Schizoaffective disorder, bipolar type (H) 2015     Slow transit constipation      Stasis dermatitis  of both legs      Status post total hip replacement, 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     Type 2 diabetes mellitus without complication (H) 2016     Vitamin B12 deficiency      Volume overload       Past Surgical History:   Procedure Laterality Date     BACK SURGERY       BREAST CYST ASPIRATION Left      C OPEN FIXATN PROX END/NECK FEMUR FX Left 2019    Procedure: OPEN REDUCTION INTERNAL FIXATION, FRACTURE LEFT HIP, PERIPROSTHETIC FRACTURE;  Surgeon: Kevin Lackey MD;  Location: [facility] Main OR;  Service: Orthopedics     C TOTAL HIP ARTHROPLASTY Left 2019    Procedure: LEFT TOTAL HIP ARTHROPLASTY;  Surgeon: Kevin Lackey MD;  Location: [facility] Main OR;  Service: Orthopedics     C TOTAL HIP ARTHROPLASTY Right 2019    Procedure: RIGHT TOTAL HIP ARTHROPLASTY;  Surgeon: Kan Quesada MD;  Location: [facility] OR;  Service: Orthopedics     C TOTAL KNEE ARTHROPLASTY Left 10/7/2016    Procedure: TOTAL KNEE ARTHROPLASTY, LEFT;  Surgeon: Kan Quesada MD;  Location: [facility] OR;  Service: Orthopedics     CERVICAL SPINE SURGERY      for arnold chiari malformation      SECTION  X3     CHOLECYSTECTOMY       COLONOSCOPY       EXCISE LESION UPPER EXTREMITY Right 2019    Procedure: EXCISION RIGHT ARM LIPOMA;  Surgeon: Andreas Holly MD;  Location: [facility];  Service: General     EYE SURGERY      bilateral cataracts     HAND SURGERY       JOINT REPLACEMENT Right     knee - partial     LAPAROSCOPIC HERNIORRHAPHY INCISIONAL Left 3/27/2015    Procedure: ATTEMPTED LAPAROSCOPIC ABDOMINA/FLANK INCISION REPAIR;  Surgeon: Jose Lakhani MD;  Location: [facility] OR;  Service:      LUMBAR FUSION N/A 2014    Procedure: POSTERIOR FUSION / DECOMPRESSION L3-S1 WITH PELVIC FIXATION BILATERAL ;  Surgeon: Rajan Mares MD;  Location: [facility];  Service:      OTHER SURGICAL HISTORY      IR for PAD LOWER EXTREMITY     US ASPIRATION HEMATOMA SEROMA OR FLUID COLLECTION   2/6/2020      Allergies   Allergen Reactions     Tramadol Nausea     Amoxicillin Itching and Rash     Levofloxacin Itching and Rash     Penicillins Itching and Rash     Has tolerated ceftriaxone.      Social History     Tobacco Use     Smoking status: Former Smoker     Quit date: 1/1/2006     Years since quitting: 15.7     Smokeless tobacco: Never Used     Tobacco comment: quit 2006   Substance Use Topics     Alcohol use: No      Wt Readings from Last 1 Encounters:   09/21/21 67.6 kg (149 lb)        Anesthesia Evaluation   Pt has had prior anesthetic.     No history of anesthetic complications       ROS/MED HX  ENT/Pulmonary:     (+) tobacco use, Past use, COPD (uses 1-2 L NC at home ), O2 dependent,  (-) asthma and sleep apnea   Neurologic: Comment: Arnold chiari malformation   (-) no CVA and no TIA   Cardiovascular:     (+) hypertension-Peripheral Vascular Disease----CHF dysrhythmias (On Xarelto), a-fib, pulmonary hypertension,     METS/Exercise Tolerance:  Comment: Echo 2020:  Narrative & Impression    1.Left ventricle ejection fraction is normal. The calculated left ventricular ejection fraction is 63%.    2.Normal right ventricular size and systolic function.    3.Moderate tricuspid and mitral regurgitation.    4.Moderate pulmonary hypertension suggested.    5.When compared to the previous study dated 11/9/2018, no significant change.   Hematologic:       Musculoskeletal:       GI/Hepatic: Comment: Per CT non obstructing umbilical/spigelian hernia    (+) GERD,     Renal/Genitourinary:       Endo:     (+) type II DM,  (-) obesity   Psychiatric/Substance Use:     (+) psychiatric history other (comment) (schizoaffective disorder)     Infectious Disease:       Malignancy:       Other:            Physical Exam    Airway        Mallampati: II   TM distance: > 3 FB   Neck ROM: limited   Mouth opening: > 3 cm    Respiratory Devices and Support     Nasal Canula 2  l/min.     Dental     Comment: Lower partial  2 lower  teeth that are permanent, not loose    (+) upper dentures      Cardiovascular          Rhythm and rate: irregular and normal     Pulmonary           breath sounds clear to auscultation       Other findings:   COVID negative 9/17    OUTSIDE LABS:  CBC:   Lab Results   Component Value Date    WBC 7.5 08/31/2021    WBC 11.0 08/15/2021    HGB 13.9 08/31/2021    HGB 15.4 08/15/2021    HCT 42.5 08/31/2021    HCT 48.2 (H) 08/15/2021     08/31/2021     08/15/2021     BMP:   Lab Results   Component Value Date     (L) 08/31/2021     (L) 08/15/2021    POTASSIUM 4.4 08/31/2021    POTASSIUM 3.8 08/15/2021    CHLORIDE 96 (L) 08/31/2021    CHLORIDE 91 (L) 08/15/2021    CO2 29 08/31/2021    CO2 37 (H) 08/15/2021    BUN 10 08/31/2021    BUN 12 08/15/2021    CR 0.64 08/31/2021    CR 0.70 08/15/2021     (H) 09/21/2021     (H) 08/31/2021     COAGS:   Lab Results   Component Value Date    PTT 38 (H) 06/11/2021    INR 2.07 (H) 06/11/2021     POC: No results found for: BGM, HCG, HCGS  HEPATIC:   Lab Results   Component Value Date    ALBUMIN 3.2 (L) 08/31/2021    PROTTOTAL 5.6 (L) 08/31/2021    ALT 15 08/31/2021    AST 19 08/31/2021    ALKPHOS 82 08/31/2021    BILITOTAL 1.2 (H) 08/31/2021     OTHER:   Lab Results   Component Value Date    PH 7.43 01/31/2020    LACT 0.9 01/31/2020    A1C 7.0 (H) 06/24/2021    DENISE 8.6 08/31/2021    PHOS 3.0 11/29/2018    MAG 1.9 08/15/2021    LIPASE <9 09/10/2019    TSH 1.41 08/15/2021    CRP 18.3 (H) 02/01/2020    SED 30 (H) 02/01/2020       Anesthesia Plan    ASA Status:  4   NPO Status:  NPO Appropriate    Anesthesia Type: MAC.              Consents    Anesthesia Plan(s) and associated risks, benefits, and realistic alternatives discussed. Questions answered and patient/representative(s) expressed understanding.     - Discussed with:  Patient         Postoperative Care    Pain management: IV analgesics.   PONV prophylaxis: Ondansetron (or other 5HT-3), Dexamethasone  or Solumedrol     Comments:    Maintain adequate ventilation - avoid hypoxia, hypercarbia   Avoid benzodiazepines, anticholinergics, diphenhydramine 2/2 to age and risk for post-op delirium              Carola Ponce MD

## 2021-09-21 NOTE — ANESTHESIA CARE TRANSFER NOTE
Patient: Adalgisa Solano    Procedure(s):  INCARCERATED UMBILICAL AND  SPIGELIAN HERNIA REPAIR    Diagnosis: Incarcerated umbilical hernia [K42.0]  Spigelian hernia [K43.9]  Diagnosis Additional Information: No value filed.    Anesthesia Type:   MAC     Note:    Oropharynx: oropharynx clear of all foreign objects and spontaneously breathing  Level of Consciousness: awake  Oxygen Supplementation: face mask  Level of Supplemental Oxygen (L/min / FiO2): 6  Independent Airway: airway patency satisfactory and stable  Dentition: dentition unchanged  Vital Signs Stable: post-procedure vital signs reviewed and stable  Report to RN Given: handoff report given  Patient transferred to: Phase II    Handoff Report: Identifed the Patient, Identified the Reponsible Provider, Reviewed the pertinent medical history, Discussed the surgical course, Reviewed Intra-OP anesthesia mangement and issues during anesthesia, Set expectations for post-procedure period and Allowed opportunity for questions and acknowledgement of understanding      Vitals:  Vitals Value Taken Time   BP     Temp     Pulse     Resp     SpO2         Electronically Signed By: MARLINE Nuno CRNA  September 21, 2021  11:39 AM

## 2021-10-06 NOTE — TELEPHONE ENCOUNTER
Phone call from Adalgisa.  Is starting to have an exacerbation--increased sob, increased coughing with light yellow colored phlegm.     Will send Rx for action plan:   Doxycycline 100mg bid x 7 days  Prednisone 20mg daily x 5 days, 10mg daily x 5 days.

## 2021-10-08 NOTE — H&P
Admission History and Physical   Adalgisa Solano,  1935, MRN [id number]    [unfilled]  COPD exacerbation (H) [J44.1]    PCP: Shai Ellington, [phone]   Code status:  No Order       Extended Emergency Contact Information  Primary Emergency Contact: Anabel Solano  Address: [address] [place] [address] [facility]  Home Phone: [phone]  Mobile Phone: [phone]  Relation: Spouse  Secondary Emergency Contact: Ratna Mcallister  Address: [address] [place], [place] [id number] [place] [address]  Home Phone: [phone]  Mobile Phone: [phone]  Relation: Daughter       Chief Complaint:  Shortness of breath     Assessment and Plan:   Adalgisa Solano is a 85 year old female with a past medical history of COPD, psoriasis, hypertension, thromboembolic disease, chronic pain who presented to the [facility] Emergency Department on the day of admission with complaints of shortness of breath.     COPD exacerbation:   Chronic COPD on Trelegy Ellipta, Atrovent and albuterol as needed, as well as 1 L chronic home oxygen.  Was prescribed 20 mg of prednisone with a low-dose taper and 100 mg of doxycycline twice daily by her PCP yesterday.  Status post DuoNebs, and 125 mg injection of Solu-Medrol in the ED.  -Continue DuoNebs 4 times daily as needed  -Supplemental O2 as needed  -Continue doxycycline  -Will increase prednisone to 40 mg starting on 10 in the AM   -A.m. team to review med rec and continue Trelegy Ellipta, Atrovent home inhalers    CHFpEF:   History of diastolic heart failure, BNP elevated to 274.  -Give 1 dose of IV Lasix 40 mg  -AM team to review med rec and continue PTA meds     Chronic atrial fibrillation  History of thromboembolic disease:   -PTA med list shows Xarelto and diltiazem, AM team to review formal med rec     Hypertension:   -Formal med rec in the a.m.     Hyperlipidemia:   -Formal med rec in the a.m.     Chronic pain syndrome:   -Formal  med rec in the a.m.     MDD  Schizophrenia  Bipolar disorder:   -Formal med rec in the a.m.     Type 2 diabetes:   Last A1c was 7.0 in June. Diet controlled at home  - Low resistance sliding scale with QID checks          FEN:  Fluids:none; Diet: regular   PPX: mechanical   Disposition: TBD  Status: obs    Patient discussed with attending physician, Dr. Richa Lai , who agrees with the plan.     Sajan Dallas MD PGY3 10/8/2021  [facility]  Pager: [phone] (from 8AM-5:30PM)  Please call the senior pager with any questions or concerns, 24/7: [phone].    HPI:    Adalgisa Solano is a 85 year old old female with a past medical history of COPD, thromboembolic disease chronic pain, hypertension, psoriasis who presented to the [facility] Emergency Department on the day of admission with complaints of shortness of breath.      Patient has COPD and is on chronic oxygen therapy (baseline 1 L) at home.  She also has CHF.  Patient has had difficulty breathing for the last day on top of chronic difficulty breathing and was prescribed doxycycline and prednisone by her PCP.  She took 1 dose of prednisone 20 mg and 1 dose of doxycycline and after not feeling much better came in here.  Patient has not had any fever but maybe some chills.  She states that she has had a productive cough over the last few days with clear opaque sputum.  She has chronic lymphedema and has swelling in her legs which has not changed significantly.  She did have some pain that extended into her left arm.  She came in wondering if she could be having complications from heart failure, or a heart attack, or a COPD exacerbation.    History is provided by patient, who is a reliable historian.      Emergency Department Course:    Upon presentation to the Emergency Department the patient's vital signs were    ED Triage Vitals   Enc Vitals Group      BP 10/07/21 2308 (!) 161/99     "  Pulse 10/07/21 2308 90      Resp 10/07/21 2345 23      Temp 10/07/21 2308 97.7  F (36.5  C)      Temp src 10/07/21 2308 Temporal      SpO2 10/07/21 2308 90 %      Weight 10/07/21 2308 68 kg (150 lb)      Height 10/07/21 2308 1.562 m (5' 1.5\")      Head Circumference --       Peak Flow --       Pain Score --       Pain Loc --       Pain Edu? --       Excl. in GC? --      Initial evaluation in the Emergency Department BNP elevated to 274, BMP with sodium 132, creatinine 0.74, lactic acid 1.1.  Troponin 0 0.01 WBCs 12.4.  INR 3.68.  X-ray showed no consolidations, no convincing evidence of pulmonary vascular congestion.  Patient was admitted to the medicine team in ED boarding for further workup and management.     Medical History  Past Medical History:   Diagnosis Date     Acute and chronic respiratory failure with hypoxia (H)      Acute blood loss anemia 1/15/2019     Acute bronchitis 1/15/2019     Anemia     pernicious anemia     Anxiety      Arm skin lesion, right      Arnold-Chiari malformation (H) 1998     Arthritis      Atrial fibrillation (H) 02/2017     Avascular necrosis of bone (H)      Breast cyst      Breast lump      Candidal skin infection      Cellulitis of left lower extremity 1/28/2019     CHF (congestive heart failure) (H)     diastolic and systolic     Chronic bronchitis (H)     & acute     Chronic diastolic heart failure (H)     diastolic chf     Chronic pain syndrome      Chronic respiratory failure with hypoxia (H) 3/13/2017     COPD (chronic obstructive pulmonary disease) (H)      COPD exacerbation (H)      Depression      Diet-controlled type 2 diabetes mellitus (H)      Drug induced constipation      DVT of axillary vein, acute (H)     left     DVT of axillary vein, acute left (H)      Edema      Encounter for palliative care      Erysipelas      Fracture of femoral neck, left (H)      Generalized muscle weakness      GERD (gastroesophageal reflux disease)      History of blood clots      " Hyperlipidemia      Hypertension      Left hip pain      Lumbar spinal stenosis      PAD (peripheral artery disease) (H)      Peripheral arterial disease (H) 10/28/2015     Psoriasis     arms      Pulmonary hypertension (H) 3/5/2018     Recurrent major depressive episodes (H)     Created by Conversion  Replacement Utility updated for latest IMO load     Schizoaffective disorder, bipolar type (H) 2015     Slow transit constipation      Stasis dermatitis of both legs      Status post total hip replacement, 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  Surgical History  She  has a past surgical history that includes Breast Cyst Aspiration (Left, ); Cholecystectomy; Lumbar Fusion (N/A, 2014); back surgery; Laparoscopic herniorrhaphy incisional (Left, 3/27/2015);  Section (X3); joint replacement (Right); Eye surgery; TOTAL KNEE ARTHROPLASTY (Left, 10/7/2016); Cervical Spine Surgery; TOTAL HIP ARTHROPLASTY (Left, 2019); OPEN FIXATN PROX END/NECK FEMUR FX (Left, 2019); Hand surgery; Colonoscopy; other surgical history; Excise lesion upper extremity (Right, 2019); TOTAL HIP ARTHROPLASTY (Right, 2019); Us Aspiration Hematoma Seroma Or Fluid Collection (2020); Herniorrhaphy umbilical (N/A, 2021); and Repair Spigelian Hernia (N/A, 2021).      Reviewed, changes/additions include none.  Social History & Habits  she  reports that she quit smoking about 15 years ago. She has never used smokeless tobacco. She reports that she does not drink alcohol and does not use drugs.    Reviewed, changes/additions include none.     Code Status: Full code  Marital Status:   Work History: Unknown  Surrogate decision maker/POA:  Anabel        Allergies  Allergies   Allergen Reactions     Tramadol Nausea     Amoxicillin Itching and Rash      Levofloxacin Itching and Rash     Penicillins Itching and Rash     Has tolerated ceftriaxone.    Family History  family history includes Coronary Artery Disease in her father and mother.     Psychosocial Needs  Social History     Social History Narrative     Not on file     Additional psychosocial needs reviewed per nursing assessment.       Prior to Admission Medications   (Not in a hospital admission)           Review of Systems:  A comprehensive review of systems was negative. Physical Exam:   Temp:  [97.7  F (36.5  C)] 97.7  F (36.5  C)  Pulse:  [86-92] 91  Resp:  [19-23] 20  BP: (116-161)/(61-99) 127/82  SpO2:  [90 %-97 %] 97 %  General appearance: alert, appears stated age and cooperative  Head: Normocephalic, without obvious abnormality, atraumatic  Eyes: PERRL, EOMI  Nose: Nares normal. Septum midline. Mucosa normal. No drainage or sinus tenderness.  Throat: lips, mucosa, and tongue normal; teeth and gums normal  Lungs: Crackles in the RLL, diffuse expiratory wheezing   Heart: RRR, no murmur  Abdomen: soft, non-tender; bowel sounds normal; no masses,  no organomegaly  Extremities: 1-2+ pitting edema to the ankles bilaterally. The L ankle is a bit erythematous  Pulses: 2+ and symmetric  Skin: Skin color, texture, turgor normal. No rashes or lesions  Neurologic: Grossly normal     Pertinent Labs  Lab Results: personally reviewed.   Results for orders placed or performed during the hospital encounter of 10/07/21   Chest XR,  PA & LAT     Status: None    Narrative    EXAM: CHEST 2 VIEWS  LOCATION: [facility]  DATE/TIME: 10/8/2021 12:49 AM    INDICATION: Shortness of breath.  COMPARISON: 08/15/2021    FINDINGS: A few linear opacities scattered in both lungs were present previously and likely represent scarring. The lungs are otherwise clear. Blunting of bilateral costophrenic angles that is only visualized on the posteroanterior projection image and   most likely relates to scarring.  Possible cardiomegaly. Atherosclerotic calcification in the thoracic aorta. Partial visualization of fusion hardware in the upper lumbar spine.      Impression    IMPRESSION: No convincing evidence of acute cardiopulmonary disease.    INR     Status: Abnormal   Result Value Ref Range    INR 3.68 (H) 0.85 - 1.15   Comprehensive metabolic panel     Status: Abnormal   Result Value Ref Range    Sodium 132 (L) 136 - 145 mmol/L    Potassium 4.7 3.5 - 5.0 mmol/L    Chloride 96 (L) 98 - 107 mmol/L    Carbon Dioxide (CO2) 24 22 - 31 mmol/L    Anion Gap 12 5 - 18 mmol/L    Urea Nitrogen 11 8 - 28 mg/dL    Creatinine 0.74 0.60 - 1.10 mg/dL    Calcium 8.8 8.5 - 10.5 mg/dL    Glucose 124 70 - 125 mg/dL    Alkaline Phosphatase 88 45 - 120 U/L    AST 16 0 - 40 U/L    ALT 12 0 - 45 U/L    Protein Total 6.6 6.0 - 8.0 g/dL    Albumin 3.4 (L) 3.5 - 5.0 g/dL    Bilirubin Total 0.5 0.0 - 1.0 mg/dL    GFR Estimate 75 >60 mL/min/1.73m2   Lactic acid whole blood     Status: Normal   Result Value Ref Range    Lactic Acid 1.1 0.7 - 2.0 mmol/L   Magnesium     Status: Abnormal   Result Value Ref Range    Magnesium 1.5 (L) 1.8 - 2.6 mg/dL   Troponin I     Status: Normal   Result Value Ref Range    Troponin I 0.01 0.00 - 0.29 ng/mL   B-Type Natriuretic Peptide ([facility] Only)     Status: Abnormal   Result Value Ref Range     (H) 0 - 167 pg/mL   Symptomatic COVID-19 Virus (Coronavirus) by PCR Nasopharyngeal     Status: Normal    Specimen: Nasopharyngeal; Swab   Result Value Ref Range    SARS CoV2 PCR Negative Negative    Narrative    Testing was performed using the lula  SARS-CoV-2 & Influenza A/B Assay on the lula  [employer]  System.  This test should be ordered for the detection of SARS-COV-2 in individuals who meet SARS-CoV-2 clinical and/or epidemiological criteria. Test performance is unknown in asymptomatic patients.  This test is for in vitro diagnostic use under the FDA EUA for laboratories certified under CLIA to perform moderate  and/or high complexity testing. This test has not been FDA cleared or approved.  A negative test does not rule out the presence of PCR inhibitors in the specimen or target RNA in concentration below the limit of detection for the assay. The possibility of a false negative should be considered if the patient's recent exposure or clinical presentation suggests COVID-19.  [facility] are certified under the Clinical Laboratory Improvement Amendments of 1988 (CLIA-88) as qualified to perform moderate and/or high complexity laboratory testing.   CBC with platelets and differential     Status: Abnormal   Result Value Ref Range    WBC Count 12.4 (H) 4.0 - 11.0 10e3/uL    RBC Count 4.75 3.80 - 5.20 10e6/uL    Hemoglobin 13.7 11.7 - 15.7 g/dL    Hematocrit 42.0 35.0 - 47.0 %    MCV 88 78 - 100 fL    MCH 28.8 26.5 - 33.0 pg    MCHC 32.6 31.5 - 36.5 g/dL    RDW 15.2 (H) 10.0 - 15.0 %    Platelet Count 275 150 - 450 10e3/uL    % Neutrophils 85 %    % Lymphocytes 7 %    % Monocytes 7 %    % Eosinophils 0 %    % Basophils 0 %    % Immature Granulocytes 1 %    NRBCs per 100 WBC 0 <1 /100    Absolute Neutrophils 10.6 (H) 1.6 - 8.3 10e3/uL    Absolute Lymphocytes 0.8 0.8 - 5.3 10e3/uL    Absolute Monocytes 0.8 0.0 - 1.3 10e3/uL    Absolute Eosinophils 0.0 0.0 - 0.7 10e3/uL    Absolute Basophils 0.0 0.0 - 0.2 10e3/uL    Absolute Immature Granulocytes 0.1 (H) <=0.0 10e3/uL    Absolute NRBCs 0.0 10e3/uL   Extra Red Top Tube     Status: None   Result Value Ref Range    Hold Specimen [facility]    ECG 12-LEAD WITH MUSE ([facility])     Status: None   Result Value Ref Range    Systolic Blood Pressure  mmHg    Diastolic Blood Pressure  mmHg    Ventricular Rate 87 BPM    Atrial Rate 87 BPM    AL Interval 208 ms    QRS Duration 70 ms     ms    QTc 418 ms    P Axis 82 degrees    R AXIS 102 degrees    T Axis 35 degrees    Interpretation ECG       Sinus rhythm with Premature atrial complexes  Rightward axis  Low voltage QRS  Cannot  rule out Anteroseptal infarct (cited on or before 11-JUN-2021)  Abnormal ECG  When compared with ECG of 15-AUG-2021 19:04,  Sinus rhythm has replaced Atrial fibrillation  Confirmed by SEE ED PROVIDER NOTE FOR, ECG INTERPRETATION ([id number]),  GRZEGORZ VASQUEZ ([id number]) on 10/8/2021 12:00:13 AM     CBC with platelets differential     Status: Abnormal    Narrative    The following orders were created for panel order CBC with platelets differential.  Procedure                               Abnormality         Status                     ---------                               -----------         ------                     CBC with platelets and d...[[id number]]  Abnormal            Final result                 Please view results for these tests on the individual orders.   [place] [facility] Draw     Status: None    Narrative    The following orders were created for panel order [place] [facility] Draw.  Procedure                               Abnormality         Status                     ---------                               -----------         ------                     Extra Red Top Tube[[id number]]                               Final result                 Please view results for these tests on the individual orders.        Pertinent Radiology:  Radiology Results: personally reviewed.   Chest XR,  PA & LAT    Result Date: 10/8/2021  EXAM: CHEST 2 VIEWS LOCATION: [facility] DATE/TIME: 10/8/2021 12:49 AM INDICATION: Shortness of breath. COMPARISON: 08/15/2021 FINDINGS: A few linear opacities scattered in both lungs were present previously and likely represent scarring. The lungs are otherwise clear. Blunting of bilateral costophrenic angles that is only visualized on the posteroanterior projection image and most likely relates to scarring. Possible cardiomegaly. Atherosclerotic calcification in the thoracic aorta. Partial visualization of fusion hardware in the upper lumbar spine.     IMPRESSION: No convincing  evidence of acute cardiopulmonary disease.       Cardiographics:   EKG Results: personally reviewed.   EKG: Sinus rhythm with PACs    This note was created with help of Dragon dictation system. Grammatical /typing errors are not intentional.    Sajan Dallas MD   10/8/2021

## 2021-10-08 NOTE — ED NOTES
purewik placed. While pt was removing her pants she desated to 84% on 2 L. O2 increased to 3 L and instructed pt to deep breathe and sats came up to 91%. She was very short breath.

## 2021-10-08 NOTE — PROGRESS NOTES
"BRIEF PROGRESS NOTE  Asessment/Plan:     Chest pain   H/o of GERD  Atrial fibrillation on xarelto   Paged by ED due to patient complaint of chest pain/pressure. Stat EKG showing Afib without specific ST-segment changes. On exam, patient endorses tenderness to palpation of epigastrium. Pressure relieved with lowering head of the bed.  History of GERD with PTA omeprazole. Positioning in bed could be contributing to chest pressure as well as GERD symptoms. Less concern for acute coronary syndrome at this time. Given this, will hold off on trending troponin.   -Stat EKG if chest pain  -refer to formal assessment plan for the day     Subjective:      Patient states \"I forgot to tell the doctor I had chest pain in the morning\".     She endorses constant chest pressure that is tolerable but states that she is \"very hungry\" right now.     Objective:  Vital signs stable:   General: alert, oriented, not in acute distress   Head-normocephalic, atraumatic  Chest wall: tenderness to palpation at epigastrium    lungs- lower lung base crackles  Heart-irregularly irregular heart rate    Lower extremity-stable left leg edema, erythema   Psych-normal affect     Labs- not indicated    Imaging:   No new imaging     EKG- atrial fibrillation, non-specific ST-segment changes    "

## 2021-10-08 NOTE — ED TRIAGE NOTES
Patient coming in for shortness of breath,   PMH of COPD  Increased work of breathing    S/P surgery 09/22/2021    PMH of blood clot and she is taking blood thinner

## 2021-10-08 NOTE — ED NOTES
Checked in with patient no longer having chest pain. Resident was in to talk to the patient. RN will continue to monitor

## 2021-10-08 NOTE — PROGRESS NOTES
Resident/Fellow Attestation   I, Zeb Mercado, was present with the medical/ASTR[employer] student who participated in the service and in the documentation of the note.  I have verified the history and personally performed the physical exam and medical decision making.  I agree with the assessment and plan of care as documented in the note.      Zeb Mercado MD  PGY1  Date of Service: 10/08/21    [facility]    Progress Note - BFM TeachingService        Date of Admission:  10/7/2021    Assessment & Plan   Adalgisa Solano is a 85 year old female with a past medical history of COPD, psoriasis, hypertension, thromboembolic disease, chronic pain who presented to the [facility] Emergency Department on the day of admission with complaints of shortness of breath.      Dyspnea  Hx of CHFpEF   History of diastolic heart failure, BNP elevated to 274. Given IV lasix 40 mg x1 which patient stated helped SOB. Lungs with mild bibasilar crackles. Lower extremity edema. Hx of chronic A fib and currently tachycardic. CXR shows cardiomegaly with no consolidations or major findings of cardiopulmonary disease.   Ddx: SOB most likely due to fluid overload in lungs in setting of CHF, A fib, and bibasilar crackles. She also improved after 1 dose IV lasix 40 mg. Also could be COPD exacerbation but didn't notice recent worsening of cough or sputum production. Did not feel improved after prednisone and doxycycline from PCP.   -Give another dose of IV lasix 40 mg  -F/u on clinical status this afternoon  -Consider another dose of IV lasix 40 mg if no improvements this afternoon  -Procal in the am to assess for signs of infection, consider stopping doxy and steroids if negative   -Hold PTA oral lasix for now  -Continue PTA diltiazem ER 120mg, consider increasing tomorrow for improved rate control  -Consider switching PTA oral furosemide to torsemide for better GI absorption and pharmokinetics upon discharge      COPD:    Chronic COPD on Trelegy Ellipta, Atrovent and albuterol as needed, as well as 1 L chronic home oxygen.  Was prescribed 20 mg of prednisone with a low-dose taper and 100 mg of doxycycline twice daily by her PCP but didn't feel improvement in SOB. Status post DuoNebs, and 125 mg injection of methylprednisolone in the ED.  -Continue DuoNebs 4 times daily as needed  -Supplemental O2 as needed, O2 goal 89-93%  -Continue doxycycline 100mg BID and prednisone 40 mg (next dose 10/9), discontinue both if procal negative  -Check procal in AM to assess for signs of infection   -Continue PTA Trelegy Ellipta, Atrovent home inhalers      Chronic atrial fibrillation  History of thromboembolic disease:   History of DVT and Afib, on rivaroxaban  -Continue PTA Xarelto and diltiazem      Chronic Left Lower Extremity edema  Hx of left leg hematoma after injury. Chronic pain, erythema, and tenderness that patient reports are improving. Has had a DVT in the past but is currently on anticoagulation with rivaroxaban.   -Line marking edge of erythematous border, monitor for worsening  -F/u outpatient if needed    Hypertension:   -Hold PTA lasix 20 mg daily for now  -Continue PTA spironolactone 25 mg daily     Hyperlipidemia:   -Continue PTA atorvastatin 20 mg daily     Chronic pain syndrome:   -Continue PTA gabapentin 600 mg nightly      MDD  Schizophrenia  Bipolar disorder:   -Continue PTA aripiprazole 5 mg nightly     Type 2 diabetes:   Last A1c was 7.0 in June. Diet controlled at home.   -low sliding scale insulin  -blood glucose checks q4h        Diet: Combination Diet Regular Diet Adult    DVT Prophylaxis: PTA rivaroxiban 20mg   Borjas Catheter: Not present  Fluids: None  Central Lines: None  Code Status: Full Code      Disposition Plan   Expected discharge: 1 day recommended to prior living arrangement once O2 needs back to baseline.     The patient's care was discussed with the resident Dr. Mercado.    [facility]  Student  BFM Teaching Service  [facility]  Securely message with the [employer] Web Console (learn more here)  Text page via [employer] Paging/Directory    ______________________________________________________________________    Interval History   Patient still feeling SOB but no changes in cough or sputum production. Her left lower leg is swollen, red, and tender. She had a hematoma in that area in 2020.     Data reviewed today: I reviewed all medications, new labs and imaging results over the last 24 hours.    Physical Exam   Vital Signs: Temp: 97.7  F (36.5  C) Temp src: Temporal BP: 119/65 Pulse: 109   Resp: 25 SpO2: 91 % O2 Device: Nasal cannula Oxygen Delivery: 3 LPM  Weight: 150 lbs 0 oz    Constitutional: awake, alert, cooperative, no apparent distress, and appears stated age  Respiratory: Mild respiratory distress with talking, no accessory muscle use, diffuse inspiratory and expiratory wheezes and mild bibasilar crackles   Cardiovascular: tachycardic, irregularly irregular rhythm  Pulse: 2 plus in upper extremities  Skin: ecchymosis scattered throughout extremities, healing scabs on the the lower extremities bilaterally, no open wounds seen  Musculoskeletal: bilateral lower extremity edema, left lower leg erythematous up to proximal shin and extremely tender to palpation  Neurologic: A&Ox3, able to move upper and lower extremities    Data   Recent Labs   Lab 10/08/21  0835 10/08/21  0354 10/07/21  2325   WBC  --   --  12.4*   HGB  --   --  13.7   MCV  --   --  88   PLT  --   --  275   INR  --   --  3.68*   NA  --   --  132*   POTASSIUM  --   --  4.7   CHLORIDE  --   --  96*   CO2  --   --  24   BUN  --   --  11   CR  --   --  0.74   ANIONGAP  --   --  12   DENISE  --   --  8.8   * 135* 124   ALBUMIN  --   --  3.4*   PROTTOTAL  --   --  6.6   BILITOTAL  --   --  0.5   ALKPHOS  --   --  88   ALT  --   --  12   AST  --   --  16     Recent Results (from the past 24 hour(s))   Chest XR,   PA & LAT    Narrative    EXAM: CHEST 2 VIEWS  LOCATION: [facility]  DATE/TIME: 10/8/2021 12:49 AM    INDICATION: Shortness of breath.  COMPARISON: 08/15/2021    FINDINGS: A few linear opacities scattered in both lungs were present previously and likely represent scarring. The lungs are otherwise clear. Blunting of bilateral costophrenic angles that is only visualized on the posteroanterior projection image and   most likely relates to scarring. Possible cardiomegaly. Atherosclerotic calcification in the thoracic aorta. Partial visualization of fusion hardware in the upper lumbar spine.      Impression    IMPRESSION: No convincing evidence of acute cardiopulmonary disease.      Medications       doxycycline hyclate  100 mg Oral BID     insulin aspart  1-4 Units Subcutaneous Q4H     polyethylene glycol  17 g Oral Daily     [START ON 10/9/2021] predniSONE  40 mg Oral Daily     sodium chloride (PF)  3 mL Intracatheter Q8H

## 2021-10-08 NOTE — ED NOTES
Patient complaining of 4/10 Chest pain. Does not radiate down arm or up jaw. Its a dull pain she states it feels better when she takes a deep breath. 12 Lead EKG done. Resident MD paged.

## 2021-10-08 NOTE — PHARMACY-ADMISSION MEDICATION HISTORY
Pharmacy Note - Admission Medication History    Pertinent Provider Information:     Patient started doxycycline 100mg BID x7d and prednisone taper x10d on Wednesday 10/6/21 for current illness. Today (6/8/21) is her third day of therapy for both.      ______________________________________________________________________    Prior To Admission (PTA) med list completed and updated in EMR.       PTA Med List   Medication Sig Note Last Dose    acetaminophen (TYLENOL) 500 MG tablet Take 500-1,000 mg by mouth every 6 hours as needed for mild pain  Past Week at Unknown time    albuterol (PROAIR HFA/PROVENTIL HFA/VENTOLIN HFA) 108 (90 Base) MCG/ACT inhaler Inhale 2 puffs into the lungs every 4 hours as needed   10/7/2021 at AM- has with her    albuterol (PROVENTIL) (2.5 MG/3ML) 0.083% neb solution Take 2.5 mg by nebulization 4 times daily as needed for shortness of breath / dyspnea Use before nebulized saline  10/7/2021 at Unknown time    ARIPiprazole (ABILIFY) 5 MG tablet Take 5 mg by mouth every evening   10/7/2021 at PM    atorvastatin (LIPITOR) 20 MG tablet Take 20 mg by mouth every evening   10/7/2021 at PM    calcium carbonate 600 mg-vitamin D 400 units (CALTRATE) 600-400 MG-UNIT per tablet Take 1 tablet by mouth every morning   10/7/2021 at AM    clobetasol (TEMOVATE) 0.05 % external cream Apply 1 applicator topically 2 times daily To legs and elbows  10/7/2021 at PM    cyanocobalamin (VITAMIN B-12) 500 MCG tablet Take 500 mcg by mouth every morning   10/7/2021 at AM    diltiazem ER (DILT-XR) 120 MG 24 hr capsule Take 120 mg by mouth every morning   10/7/2021 at AM    doxycycline hyclate (VIBRAMYCIN) 100 MG capsule Take 1 capsule (100 mg) by mouth 2 times daily for 7 days 10/8/2021: Started therapy 10/6/21 10/7/2021 at Unknown time    Fluticasone-Umeclidin-Vilanterol (TRELEGY ELLIPTA) 100-62.5-25 MCG/INH oral inhaler Inhale 1 puff into the lungs every morning Inhale after sodium chloride neb  10/7/2021 at AM     furosemide (LASIX) 20 MG tablet Take 40 mg by mouth every morning   10/7/2021 at AM    gabapentin (NEURONTIN) 300 MG capsule Take 600 mg by mouth every evening   10/7/2021 at PM    ipratropium (ATROVENT) 0.03 % nasal spray Spray 1 spray into both nostrils every morning   10/7/2021 at AM    mineral oil-white petrolatum (EUCERIN) CREA cream Apply 1 g topically 2 times daily Apply to legs and elbows prior to clobetasol  10/7/2021 at PM    omeprazole (PRILOSEC) 20 MG DR capsule Take 20 mg by mouth every morning   10/7/2021 at AM    predniSONE (DELTASONE) 10 MG tablet Take 2 tabs daily x 5 days, THEN 1 tab daily x 5 days 10/8/2021: Started therapy 10/6/21 10/7/2021 at AM    rivaroxaban ANTICOAGULANT (XARELTO) 20 MG TABS tablet Take 1 tablet (20 mg) by mouth daily (with dinner)  10/7/2021 at 1700    sodium chloride (NEBUSAL) 3 % neb solution Take 4 mLs by nebulization 2 times daily  10/7/2021 at PM    spironolactone (ALDACTONE) 25 MG tablet Take 12.5 mg by mouth every morning   10/7/2021 at AM    Vitamin D3 (VITAMIN D3) 25 mcg (1000 units) tablet Take 1 capsule by mouth every morning   10/7/2021 at AM       Information source(s): Patient and CareEverywhere/[facility]  Method of interview communication: in-person    Summary of Changes to PTA Med List  New: vitamin D, eucerin, tylenol  Discontinued: azithromycin, Trelegy (repeat)    Changed: All other medications to reflect time of day dosein    Patient was asked about OTC/herbal products specifically.  PTA med list reflects this.    In the past week, patient estimated taking medication this percent of the time:  greater than 90%.    Allergies were reviewed, assessed, and updated with the patient.      Medications currently not available for use during hospital stay. Family/Patient representative states they will bring Trelegy Ellipta, ipratropium, Eucerin and clobetasol to [facility]. Albuterol inhaler is with the patient.     The information provided in this note  is only as accurate as the sources available at the time of the update(s).    Thank you for the opportunity to participate in the care of this patient.    Eloisa Winkler  10/8/2021 8:34 AM

## 2021-10-08 NOTE — ED PROVIDER NOTES
EMERGENCY DEPARTMENT ENCOUNTER      NAME: Adalgisa Solano  AGE: 85 year old female  YOB: 1935  MRN: [id number]  EVALUATION DATE & TIME: No admission date for patient encounter.    PCP: Shai Ellington    ED PROVIDER: KRYSTIAN Hong    Chief Complaint   Patient presents with     Shortness of Breath     FINAL IMPRESSION:  1. COPD exacerbation (H)        ED COURSE & MEDICAL DECISION MAKING:    Pertinent Labs & Imaging studies reviewed. (See chart for details)  85 year old female presents to the Emergency Department for evaluation of shortness of breath.  Patient has a history of COPD.  Former tobacco abuse.  Typically on 1 L nasal cannula at home.  Presents the emergency department with increasing shortness of breath.  She had reached out to her primary care physician earlier this week and a prescription of doxycycline and prednisone was called in for empiric treatment.  She started those medications but has not had any significant improvement and in fact feels like her breathing is worsening.  In addition tonight she had some pain on the posterior lateral portion of her chest.  Patient is chronically anticoagulated.  Previous thromboembolic disease.  Takes Xarelto daily and has been taking that medication.  Has chronic lower extremity swelling.  I assessed the patient on in triage as she immediately did not have a bed available for placement.  She was noted to be mildly tachypneic but not in respiratory distress she had diffuse expiratory wheezes to clinical examination.  Electrocardiogram at arrival demonstrating some respiratory artifact but was sinus rhythm without ST segment changes to suggest acute ischemia.  We initiated treatment with DuoNeb's and increase her O2 supplementation as she was mildly hypoxic.  Currently laboratory testing and chest x-ray imaging pending at this time.  COVID-19 testing pending patient is vaccinated.  1:34 AM  Patient still with wheezing on examination.  She  continues to have an escalated oxygen requirement compared to her baseline.  I think in light of her persistent symptoms and her failure of appropriate outpatient management admission to the hospital on observation for COPD exacerbation is indicated.  No pneumonia on x-ray.  She is already on doxycycline prescribed by her primary care doctor.  Patient to be admitted to inpatient services.  Updated the patient on all of the test results and plan of care.  Her COVID-19 testing was negative.    11:11 PM I met with the patient to gather history and to perform my initial exam. I discussed the plan for care while in the Emergency Department. PPE (gown, gloves, glasses, surgical cap, N95 mask, surgical mask, and face shield) was worn during patient encounters.     Diagnosis discussed with and explained to the patient and/or associated parties to their satisfaction.  All questions were answered at this time and they are in agreement with this diagnosis, treatment, and plan. No further emergent ED workup warranted at this time and patient did accept admission to the hospital.        MEDICATIONS GIVEN IN THE EMERGENCY:  New Prescriptions    No medications on file       NEW PRESCRIPTIONS STARTED AT TODAY'S ER VISIT  Patient's Medications   New Prescriptions    No medications on file   Previous Medications    ALBUTEROL (PROAIR HFA/PROVENTIL HFA/VENTOLIN HFA) 108 (90 BASE) MCG/ACT INHALER    Inhale 2 puffs into the lungs    ALBUTEROL (PROVENTIL) (2.5 MG/3ML) 0.083% NEB SOLUTION    3 ML INHALATION FOUR TIMES A DAY AS NEEDED USE BEFORE NEBULIZED SALINE. FOR SHORTNESS OF BREATH.    ARIPIPRAZOLE (ABILIFY) 5 MG TABLET    Take 5 mg by mouth    ATORVASTATIN (LIPITOR) 20 MG TABLET        AZITHROMYCIN (ZITHROMAX) 250 MG TABLET        CALCIUM CARBONATE 600 MG-VITAMIN D 400 UNITS (CALTRATE) 600-400 MG-UNIT PER TABLET    Take 1 tablet by mouth    CLOBETASOL (TEMOVATE) 0.05 % EXTERNAL CREAM    1 APPLICATION TWICE A DAY TOPICALLY FOR 2 WEEKS     CYANOCOBALAMIN (VITAMIN B-12) 500 MCG TABLET    Take 500 mcg by mouth daily    DILTIAZEM ER (DILT-XR) 120 MG 24 HR CAPSULE    Take 120 mg by mouth    DOXYCYCLINE HYCLATE (VIBRAMYCIN) 100 MG CAPSULE    Take 1 capsule (100 mg) by mouth 2 times daily for 7 days    FLUTICASONE-UMECLIDIN-VILANTEROL (TRELEGY ELLIPTA) 100-62.5-25 MCG/INH ORAL INHALER        FUROSEMIDE (LASIX) 20 MG TABLET    Take 40 mg by mouth    GABAPENTIN (NEURONTIN) 300 MG CAPSULE    Take 600 mg by mouth    IPRATROPIUM (ATROVENT) 0.03 % NASAL SPRAY    1 SPRAY EACH NOSTRIL ONCE PER DAY    OMEPRAZOLE (PRILOSEC) 20 MG DR CAPSULE        PREDNISONE (DELTASONE) 10 MG TABLET    Take 2 tabs daily x 5 days, THEN 1 tab daily x 5 days    RIVAROXABAN ANTICOAGULANT (XARELTO) 20 MG TABS TABLET    Take 1 tablet (20 mg) by mouth daily (with dinner)    SODIUM CHLORIDE (NEBUSAL) 3 % NEB SOLUTION    Take 4 mLs by nebulization 2 times daily    SPIRONOLACTONE (ALDACTONE) 25 MG TABLET    TAKE 1/2 TABLET (12.5 MG TOTAL) BY MOUTH DAILY.    TRELEGY ELLIPTA 100-62.5-25 MCG/INH ORAL INHALER       Modified Medications    No medications on file   Discontinued Medications    No medications on file          =================================================================    HPI    Patient information was obtained from: the patient     Use of Intrepreter: N/A        Adalgisa Solano is a 85 year old with a pertinent history of acute bronchitis, chronic pain syndrome, hypertension, edema, DVT of axillary vein, COPD, and psoriasis who presents to this ED via walk-in for evaluation of shortness of breath.     The patient reports experiencing a painful sensation in the left side of her back directly behind her left arm beginning ~1900 today (107) prior to visit. She suffers from congestive heart failure and COPD which attributes to chronic shortness of breath; however, she says tonight feels different as she feels as though she is struggling to breath. Currently is on an oxygen  of 1 at home and suffers from lymphedema, which is worse in her left leg. She recently had a surgery in response to a hernia in her lower abdomen and is currently on antibiotics and blood thinners. Furthermore, she has felt some chills and is vaccinated for covid-19. The patient denies any other symptoms or complaints at this time.    Per chart review,   9/21/2021 The patient was discharged from [facility] following a procedure for a Spigelian Hernia.   8/15/2021 The patient reported to [facility] ED for evaluation of COPD exacerbation. Patient was discharged and advised to follow up with pulmonary specialist.       REVIEW OF SYSTEMS   Review of Systems   Constitutional: Positive for chills. Negative for fatigue and fever.   Respiratory: Positive for shortness of breath. Negative for cough.    Cardiovascular: Negative for chest pain.   Gastrointestinal: Negative for abdominal pain and vomiting.   Genitourinary: Negative for dysuria.   Musculoskeletal: Positive for myalgias (left side of her back behind her arm ).   Skin: Negative.    Neurological: Negative for syncope, weakness and headaches.   All other systems reviewed and are negative.      PAST MEDICAL HISTORY:  Past Medical History:   Diagnosis Date     Acute and chronic respiratory failure with hypoxia (H)      Acute blood loss anemia 1/15/2019     Acute bronchitis 1/15/2019     Anemia     pernicious anemia     Anxiety      Arm skin lesion, right      Arnold-Chiari malformation (H) 1998     Arthritis      Atrial fibrillation (H) 02/2017     Avascular necrosis of bone (H)      Breast cyst      Breast lump      Candidal skin infection      Cellulitis of left lower extremity 1/28/2019     CHF (congestive heart failure) (H)     diastolic and systolic     Chronic bronchitis (H)     & acute     Chronic diastolic heart failure (H)     diastolic chf     Chronic pain syndrome      Chronic respiratory failure with hypoxia (H) 3/13/2017     COPD (chronic  obstructive pulmonary disease) (H)      COPD exacerbation (H)      Depression      Diet-controlled type 2 diabetes mellitus (H)      Drug induced constipation      DVT of axillary vein, acute (H)     left     DVT of axillary vein, acute left (H)      Edema      Encounter for palliative care      Erysipelas      Fracture of femoral neck, left (H)      Generalized muscle weakness      GERD (gastroesophageal reflux disease)      History of blood clots      Hyperlipidemia      Hypertension      Left hip pain      Lumbar spinal stenosis      PAD (peripheral artery disease) (H)      Peripheral arterial disease (H) 10/28/2015     Psoriasis     arms      Pulmonary hypertension (H) 3/5/2018     Recurrent major depressive episodes (H)     Created by Conversion  Replacement Utility updated for latest IMO load     Schizoaffective disorder, bipolar type (H) 6/11/2015     Slow transit constipation      Stasis dermatitis of both legs      Status post total hip replacement, 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     Type 2 diabetes mellitus without complication (H) 8/2/2016     Vitamin B12 deficiency      Volume overload        PAST SURGICAL HISTORY:  Past Surgical History:   Procedure Laterality Date     BACK SURGERY       BREAST CYST ASPIRATION Left 1994     C OPEN FIXATN PROX END/NECK FEMUR FX Left 1/2/2019    Procedure: OPEN REDUCTION INTERNAL FIXATION, FRACTURE LEFT HIP, PERIPROSTHETIC FRACTURE;  Surgeon: Kevin Lackey MD;  Location: [facility];  Service: Orthopedics     C TOTAL HIP ARTHROPLASTY Left 1/2/2019    Procedure: LEFT TOTAL HIP ARTHROPLASTY;  Surgeon: Kevin Lackey MD;  Location: [facility] OR;  Service: Orthopedics     C TOTAL HIP ARTHROPLASTY Right 9/16/2019    Procedure: RIGHT TOTAL HIP ARTHROPLASTY;  Surgeon: Kan Quesada MD;  Location: [facility] OR;  Service: Orthopedics     C TOTAL KNEE ARTHROPLASTY Left 10/7/2016    Procedure: TOTAL KNEE ARTHROPLASTY, LEFT;  Surgeon: Kan Quesada MD;  Location: [facility] OR;  Service: Orthopedics     CERVICAL SPINE SURGERY      for arnold chiari malformation      SECTION  X3     CHOLECYSTECTOMY       COLONOSCOPY       EXCISE LESION UPPER EXTREMITY Right 2019    Procedure: EXCISION RIGHT ARM LIPOMA;  Surgeon: Andreas Holly MD;  Location: [facility] OR;  Service: General     EYE SURGERY      bilateral cataracts     HAND SURGERY       HERNIORRHAPHY UMBILICAL N/A 2021    Procedure: INCARCERATED UMBILICAL AND;  Surgeon: Andreas Holly MD;  Location: [facility]     JOINT REPLACEMENT Right     knee - partial     LAPAROSCOPIC HERNIORRHAPHY INCISIONAL Left 3/27/2015    Procedure: ATTEMPTED LAPAROSCOPIC ABDOMINA/FLANK INCISION REPAIR;  Surgeon: Jose Lakhani MD;  Location: [facility] OR;  Service:      LUMBAR FUSION N/A 2014    Procedure: POSTERIOR FUSION / DECOMPRESSION L3-S1 WITH PELVIC FIXATION BILATERAL ;  Surgeon: Rajan Mares MD;  Location: [facility] OR;  Service:      OTHER SURGICAL HISTORY      IR for PAD LOWER EXTREMITY     REPAIR SPIGELIAN HERNIA N/A 2021    Procedure: SPIGELIAN HERNIA REPAIR;  Surgeon: Andreas Holly MD;  Location: [facility]     US ASPIRATION HEMATOMA SEROMA OR FLUID COLLECTION  2020       ALLERGIES:  Allergies   Allergen Reactions     Tramadol Nausea     Amoxicillin Itching and Rash     Levofloxacin Itching and Rash     Penicillins Itching and Rash     Has tolerated ceftriaxone.       FAMILY HISTORY:  Family History   Problem Relation Age of Onset     Coronary Artery Disease Mother      Coronary Artery Disease Father        SOCIAL HISTORY:   Social History     Socioeconomic History     Marital status:      Spouse name: Not on file     Number of children: Not on file     Years of education: Not on file     Highest education level: Not on file   Occupational History     Not on file   Tobacco Use     Smoking status: Former Smoker     Quit date: 2006     Years since quitting:  "15.7     Smokeless tobacco: Never Used     Tobacco comment: quit 2006   Substance and Sexual Activity     Alcohol use: No     Drug use: No     Sexual activity: Not on file   Other Topics Concern     Parent/sibling w/ CABG, MI or angioplasty before 65F 55M? Not Asked   Social History Narrative     Not on file     Social Determinants of Health     Financial Resource Strain:      Difficulty of Paying Living Expenses:    Food Insecurity:      Worried About Running Out of Food in the Last Year:      Ran Out of Food in the Last Year:    Transportation Needs:      Lack of Transportation (Medical):      Lack of Transportation (Non-Medical):    Physical Activity:      Days of Exercise per Week:      Minutes of Exercise per Session:    Stress:      Feeling of Stress :    Social Connections:      Frequency of Communication with Friends and Family:      Frequency of Social Gatherings with Friends and Family:      Attends [employer] Services:      Active Member of Clubs or Organizations:      Attends Club or Organization Meetings:      Marital Status:    Intimate Partner Violence:      Fear of Current or Ex-Partner:      Emotionally Abused:      Physically Abused:      Sexually Abused:        VITALS:  Patient Vitals for the past 24 hrs:   BP Temp Temp src Pulse Resp SpO2 Height Weight   10/08/21 0015 127/82 -- -- 91 20 97 % -- --   10/08/21 0006 -- -- -- 86 22 96 % -- --   10/08/21 0000 116/61 -- -- 92 19 95 % -- --   10/07/21 2345 (!) 144/77 -- -- 86 23 96 % -- --   10/07/21 2308 (!) 161/99 97.7  F (36.5  C) Temporal 90 -- 90 % 1.562 m (5' 1.5\") 68 kg (150 lb)       PHYSICAL EXAM    Constitutional:   Elderly female appears to be in mild distress.  HENT:  Normocephalic, Atraumatic, Bilateral external ears normal, Oropharynx moist Nose normal.   Neck: Normal range of motion   Eyes: Conjunctiva normal, No discharge.   Respiratory:   Apneic, not in respiratory distress, scattered expiratory wheezes, decreased breath sounds at the " bases.  Cardiovascular:  Normal heart rate, Normal rhythm. No murmur appreciated.  Chest wall non-tender.  GI:  Soft, No tenderness, No masses, No flank tenderness.  No rebound or guarding.  : No CVA tenderness  Musculoskeletal:  Edema to the lower extremities left greater than right patient reports that this is at baseline and similar to her chronic edema.  Integument:  Warm, Dry, No erythema, No rash.    Neurologic:  Alert & oriented.  No focal deficits appreciated.    Nonambulatory at this time due to her hypoxia.  Psychiatric:  Affect normal, Judgment normal, Mood normal.     LAB:  All pertinent labs reviewed and interpreted.  Results for orders placed or performed during the hospital encounter of 10/07/21   Chest XR,  PA & LAT    Impression    IMPRESSION: No convincing evidence of acute cardiopulmonary disease.    Result Value Ref Range    INR 3.68 (H) 0.85 - 1.15   Comprehensive metabolic panel   Result Value Ref Range    Sodium 132 (L) 136 - 145 mmol/L    Potassium 4.7 3.5 - 5.0 mmol/L    Chloride 96 (L) 98 - 107 mmol/L    Carbon Dioxide (CO2) 24 22 - 31 mmol/L    Anion Gap 12 5 - 18 mmol/L    Urea Nitrogen 11 8 - 28 mg/dL    Creatinine 0.74 0.60 - 1.10 mg/dL    Calcium 8.8 8.5 - 10.5 mg/dL    Glucose 124 70 - 125 mg/dL    Alkaline Phosphatase 88 45 - 120 U/L    AST 16 0 - 40 U/L    ALT 12 0 - 45 U/L    Protein Total 6.6 6.0 - 8.0 g/dL    Albumin 3.4 (L) 3.5 - 5.0 g/dL    Bilirubin Total 0.5 0.0 - 1.0 mg/dL    GFR Estimate 75 >60 mL/min/1.73m2   Lactic acid whole blood   Result Value Ref Range    Lactic Acid 1.1 0.7 - 2.0 mmol/L   Result Value Ref Range    Magnesium 1.5 (L) 1.8 - 2.6 mg/dL   Result Value Ref Range    Troponin I 0.01 0.00 - 0.29 ng/mL   B-Type Natriuretic Peptide ([facility] Only)   Result Value Ref Range     (H) 0 - 167 pg/mL   Symptomatic COVID-19 Virus (Coronavirus) by PCR Nasopharyngeal    Specimen: Nasopharyngeal; Swab   Result Value Ref Range    SARS CoV2 PCR Negative Negative    CBC with platelets and differential   Result Value Ref Range    WBC Count 12.4 (H) 4.0 - 11.0 10e3/uL    RBC Count 4.75 3.80 - 5.20 10e6/uL    Hemoglobin 13.7 11.7 - 15.7 g/dL    Hematocrit 42.0 35.0 - 47.0 %    MCV 88 78 - 100 fL    MCH 28.8 26.5 - 33.0 pg    MCHC 32.6 31.5 - 36.5 g/dL    RDW 15.2 (H) 10.0 - 15.0 %    Platelet Count 275 150 - 450 10e3/uL    % Neutrophils 85 %    % Lymphocytes 7 %    % Monocytes 7 %    % Eosinophils 0 %    % Basophils 0 %    % Immature Granulocytes 1 %    NRBCs per 100 WBC 0 <1 /100    Absolute Neutrophils 10.6 (H) 1.6 - 8.3 10e3/uL    Absolute Lymphocytes 0.8 0.8 - 5.3 10e3/uL    Absolute Monocytes 0.8 0.0 - 1.3 10e3/uL    Absolute Eosinophils 0.0 0.0 - 0.7 10e3/uL    Absolute Basophils 0.0 0.0 - 0.2 10e3/uL    Absolute Immature Granulocytes 0.1 (H) <=0.0 10e3/uL    Absolute NRBCs 0.0 10e3/uL   Extra Red Top Tube   Result Value Ref Range    Hold Specimen [facility]    ECG 12-LEAD WITH MUSE (LHE)   Result Value Ref Range    Systolic Blood Pressure  mmHg    Diastolic Blood Pressure  mmHg    Ventricular Rate 87 BPM    Atrial Rate 87 BPM    SC Interval 208 ms    QRS Duration 70 ms     ms    QTc 418 ms    P Axis 82 degrees    R AXIS 102 degrees    T Axis 35 degrees    Interpretation ECG       Sinus rhythm with Premature atrial complexes  Rightward axis  Low voltage QRS  Cannot rule out Anteroseptal infarct (cited on or before 11-JUN-2021)  Abnormal ECG  When compared with ECG of 15-AUG-2021 19:04,  Sinus rhythm has replaced Atrial fibrillation  Confirmed by SEE ED PROVIDER NOTE FOR, ECG INTERPRETATION (4000),  GRZEGORZ VASQUEZ ([id number]) on 10/8/2021 12:00:13 AM       RADIOLOGY:  Reviewed all pertinent imaging. Please see official radiology report.  Chest XR,  PA & LAT   Preliminary Result   IMPRESSION: No convincing evidence of acute cardiopulmonary disease.         EKG:    Performed at: 2310  Sinus rhythm  Premature atrial complexes  Right axis  Respiratory artifact noted on the  EKG  ST segments appear nonischemic  Clinical impression: Sinus rhythm with premature atrial complexes nonischemic appearing EKG    I have independently reviewed and interpreted the EKG(s) documented above.    I, Trung Barton, am serving as a scribe to document services personally performed by Dr. Hong based on my observation and the provider's statements to me. I, Saravanan Hong MD attest that Trung Barton is acting in a scribe capacity, has observed my performance of the services and has documented them in accordance with my direction.    Saravanan Hong M.D.  Emergency Medicine  [facility] EMERGENCY ROOM  [facility] [address] [phone]  Dept: [phone]       Saravanan Hong MD  10/08/21 0135

## 2021-10-08 NOTE — PROGRESS NOTES
10/08/21 1800   RCAT Assessment   Reason for Assessment COPD   Pulmonary Status 3   Surgical Status 0   Chest X-ray 1   Respiratory Pattern 0   Mental Status 0   Breath Sounds 2   Cough Effectiveness 0   Level of Activity 1   O2 Required for SpO2>=92% 1   Acuity Level (points) 8   Acuity Level  4   Re-eval Interval Guideline Every 3 days   Re-evaluation Date 10/11/21   Clinical Indications/Symptoms   Aerosol Therapy Home regimen;Physician order;RCAT protocol     RCAT done, I am leaving the [facility] as scheduled at home.

## 2021-10-09 NOTE — PLAN OF CARE
VSS stable. Still requiring 3L O2. Desats with activity, recovers well. Some pain in L wrist, APAP helped. Lungs very wheezy this shift - nebs x2.     Problem: Gas Exchange Impaired  Goal: Optimal Gas Exchange  Outcome: No Change   3LO2 sats 90% and above. Activity desats to 85-7%. Very congested cough. Wheezy and coarse lung sounds.    Problem: Pain Acute  Goal: Acceptable Pain Control and Functional Ability  Outcome: Improving  Pain this AM in back, later in wrist in the afternoon. Manageable with APAP.

## 2021-10-09 NOTE — PROGRESS NOTES
[facility]    Progress Note - BFM TeachingService        Date of Admission:  10/7/2021    Assessment & Plan   Adalgisa Solano is a 85 year old female with a past medical history of COPD, psoriasis, hypertension, thromboembolic disease, chronic pain who presented to the [facility] Emergency Department on the day of admission with complaints of shortness of breath.      Dyspnea  COPD:   Patient presented due to increased shortness of breath which did not respond to outpatient COPD exacerbation treatment. Patient reporting increased productive cough with color change. Patient also with diffuse expiratory wheezing on exam, with intermitted audible wheezes at bedside consistent with COPD exacerbation. Currently still needing 3 LPM. Chronic COPD on Trelegy Ellipta, Atrovent and albuterol as needed, as well as 1 L chronic home oxygen (with increasing needs in the last few months).    -DuoNebs q4hr PRN  -Supplemental O2 as needed, O2 goal 89-93%  -Continue doxycycline 100mg BID and prednisone 40 mg (next dose 10/9)  -Continue PTA Trelegy Ellipta, Atrovent home inhalers    Hx of CHFpEF   History of diastolic heart failure, BNP elevated to 274. Given IV lasix 40 mg x1 which patient stated helped SOB. Lungs with mild bibasilar crackles. Lower extremity edema. Hx of chronic A fib and currently tachycardic. CXR shows cardiomegaly with no consolidations or major findings of cardiopulmonary disease. Mild CHF exacerbation likely also contributing to dyspnea.   -IV lasix 40 mg this AM  - recheck fluid status in AM  - hold PTA oral lasix for now  -Continue PTA diltiazem ER 120mg  -Consider switching PTA oral furosemide to torsemide for better GI absorption and pharmokinetics upon discharge      Chronic atrial fibrillation  History of thromboembolic disease:   History of DVT and Afib, on rivaroxaban  -Continue PTA Xarelto and diltiazem      Chronic Left Lower Extremity edema  Hx of left leg hematoma  after injury. Chronic pain, erythema, and tenderness that patient reports are improving. Has had a DVT in the past but is currently on anticoagulation with rivaroxaban.   -Line marking edge of erythematous border, monitor for worsening  -F/u outpatient if needed    Hypertension:   -Hold PTA lasix 20 mg daily for now  -Continue PTA spironolactone 25 mg daily     Hyperlipidemia:   -Continue PTA atorvastatin 20 mg daily     Chronic pain syndrome:   -Continue PTA gabapentin 600 mg nightly      MDD  Schizophrenia  Bipolar disorder:   -Continue PTA aripiprazole 5 mg nightly     Type 2 diabetes:   Last A1c was 7.0 in June. Diet controlled at home. Elevated BG likely due to steroid treatment.   -low sliding scale insulin  -blood glucose checks q4h        Diet: Combination Diet Regular Diet Adult    DVT Prophylaxis: PTA rivaroxiban 20mg   Borjas Catheter: Not present  Fluids: None  Central Lines: None  Code Status: Full Code      Disposition Plan   Expected discharge: 1 day recommended to prior living arrangement once O2 needs back to baseline.     The patient's care was discussed with the Attending Physician, Dr. Jarett Tsang.    Mora Varela MD, PGY-2  [facility]  October 9, 2021    ______________________________________________________________________    Interval History   Feels her breathing is baseline at rest, but still having significantly worsened SOB with activity. Having increased productive cough with yellow phlegm, changed from her normal clear colorless or white sputum. No chest pain. Back pain and arm pain improved with tylenol.       Data reviewed today: I reviewed all medications, new labs and imaging results over the last 24 hours.    Physical Exam   Vital Signs: Temp: 97.6  F (36.4  C) Temp src: Oral BP: (!) 141/78 Pulse: 91   Resp: 18 SpO2: (P) 94 % O2 Device: (P) Nasal cannula Oxygen Delivery: (P) 3 LPM  Weight: 150 lbs 0 oz    Constitutional: awake, alert, cooperative, no  apparent distress, and appears stated age  Respiratory: no respiratory distress with talking, able to talk in full sentences, no accessory muscle use, diffuseexpiratory wheezes, intermittent inspiratory wheezes, and mild bibasilar crackles. Occasional audible wheezing at bedside.  Cardiovascular: normal rate, irregularly irregular rhythm  Pulse: 2 plus in upper extremities  Neurologic: A&Ox3, able to move upper and lower extremities    Data   Recent Labs   Lab 10/09/21  1248 10/09/21  0704 10/09/21  0605 10/08/21  0354 10/07/21  2325   WBC  --  13.1*  --   --  12.4*   HGB  --  14.1  --   --  13.7   MCV  --  87  --   --  88   PLT  --  275  --   --  275   INR  --   --   --   --  3.68*   NA  --  135*  --   --  132*   POTASSIUM  --  4.7  --   --  4.7   CHLORIDE  --  95*  --   --  96*   CO2  --  32*  --   --  24   BUN  --  18  --   --  11   CR  --  0.75  --   --  0.74   ANIONGAP  --  8  --   --  12   DENISE  --  9.0  --   --  8.8   * 138* 140*   < > 124   ALBUMIN  --   --   --   --  3.4*   PROTTOTAL  --   --   --   --  6.6   BILITOTAL  --   --   --   --  0.5   ALKPHOS  --   --   --   --  88   ALT  --   --   --   --  12   AST  --   --   --   --  16    < > = values in this interval not displayed.     No results found for this or any previous visit (from the past 24 hour(s)).  Medications       ARIPiprazole  5 mg Oral QPM     atorvastatin  20 mg Oral QPM     clobetasol  1 applicator Topical BID     [Held by provider] diltiazem ER  120 mg Oral QAM     doxycycline hyclate  100 mg Oral BID     Fluticasone-Umeclidin-Vilanterol  1 puff Inhalation QAM     [Held by provider] furosemide  40 mg Oral QAM     gabapentin  600 mg Oral QPM     [START ON 10/10/2021] influenza vac high-dose quad  0.7 mL Intramuscular Prior to discharge     insulin aspart  1-4 Units Subcutaneous TID w/meals     ipratropium  1 spray Both Nostrils QAM     pantoprazole  40 mg Oral QAM     polyethylene glycol  17 g Oral Daily     predniSONE  40 mg Oral  Daily     rivaroxaban ANTICOAGULANT  20 mg Oral Daily with supper     sodium chloride (PF)  3 mL Intracatheter Q8H     spironolactone  12.5 mg Oral QAM

## 2021-10-09 NOTE — PROGRESS NOTES
"/68 (BP Location: Right arm)   Pulse 104   Temp 97.5  F (36.4  C) (Oral)   Resp 18   Ht 1.562 m (5' 1.5\")   Wt 68 kg (150 lb)   SpO2 94%   BMI 27.88 kg/m    Pt. On 3L NC.   Patient has received two prn duonebs. Patient has strong cough. Breath sounds are clear to course but has a good cough. RT continue to follow as needed.   "

## 2021-10-09 NOTE — PLAN OF CARE
Pt is oriented by four. Back pain treated with tylenol. Denies chest pain. Had mild intermittent nausea overnight. Gave crackers and some clarissa ail which assisted. On 3 L O2 to maintain sats 92-94. Crackles noted in lower lobes and mild expiratory wheezes in upper lobes. Congested cough.      Declined SCDs. HR irregular at baseline. Is on Xarelto. BG checks Q four hours.     Problem: Gas Exchange Impaired  Goal: Optimal Gas Exchange  Outcome: No Change     Problem: Nausea and Vomiting  Goal: Fluid and Electrolyte Balance  Outcome: Improving

## 2021-10-09 NOTE — PROGRESS NOTES
10/09/21 1310   Quick Adds   Type of Visit Initial Occupational Therapy Evaluation   Living Environment   People in home spouse   Current Living Arrangements house   Self-Care   Usual Activity Tolerance good   Equipment Currently Used at Home raised toilet seat;shower chair;grab bar, toilet;grab bar, tub/shower;cane, straight;walker, rolling  (4WW)   Instrumental Activities of Daily Living (IADL)   IADL Comments Indep with laundry, cook, clean, driving but spouse assists.   General Information   Onset of Illness/Injury or Date of Surgery 10/07/21   Patient/Family Therapy Goal Statement (OT) home in 24 hours   Additional Occupational Profile Info/Pertinent History of Current Problem low   Existing Precautions/Restrictions oxygen therapy device and L/min  (1LO2 at home)   Cognitive Status Examination   Orientation Status orientation to person, place and time   Sensory   Sensory Quick Adds No deficits were identified   Range of Motion Comprehensive   General Range of Motion no range of motion deficits identified   Strength Comprehensive (MMT)   General Manual Muscle Testing (MMT) Assessment no strength deficits identified   Coordination   Upper Extremity Coordination No deficits were identified   Bed Mobility   Bed Mobility   (declined, stated indep earlier today)   Transfers   Transfers toilet transfer   Toilet Transfer   [place] Level (Toilet Transfer) modified independence;verbal cues  (PLB)   Balance   Balance Assessment no deficits were identified   Activities of Daily Living   BADL Assessment lower body dressing   Lower Body Dressing Assessment   [place] Level (Lower Body Dressing) modified independence;verbal cues   Position (Lower Body Dressing) supported sitting;unsupported standing   Comment (Lower Body Dressing) cues for /PLB   Clinical Impression   Criteria for Skilled Therapeutic Interventions Met (OT) yes   OT Diagnosis decr activity tolerance for ADL.   OT Problem List-Impairments  impacting ADL activity tolerance impaired   Assessment of Occupational Performance 1-3 Performance Deficits   Identified Performance Deficits SOB/decr activity marisa for I/ADL   Planned Therapy Interventions (OT) ADL retraining;other (see comments)  (incr endurance/strength)   Clinical Decision Making Complexity (OT) low complexity   Therapy Frequency (OT) Daily   Predicted Duration of Therapy 1-2 days   Risk & Benefits of therapy have been explained patient;spouse/significant other   OT Discharge Planning    OT Discharge Recommendation (DC Rec) Home with assist   OT Rationale for DC Rec Pt may need assist with IADL/ADL at home.   Total Evaluation Time (Minutes)   Total Evaluation Time (Minutes) 5

## 2021-10-09 NOTE — PLAN OF CARE
Problem: Adult Inpatient Plan of Care  Goal: Absence of Hospital-Acquired Illness or Injury  Intervention: Identify and Manage Fall Risk  Recent Flowsheet Documentation  Taken 10/8/2021 2210 by Kamryn Perez RN  Safety Promotion/Fall Prevention: nonskid shoes/slippers when out of bed   Bed alarm on for safety

## 2021-10-09 NOTE — ED NOTES
Met patient in the room .  at bedside playing cards. Denies chest pain she does complain of left shoulder and back pain. Still needing oxygen at 3 liters.  Mk Del Castillo RN  10/8/2021  9:11 PM

## 2021-10-09 NOTE — PROGRESS NOTES
Patient's status depends on medical diagnosis and requires improvement in energy conservation/endurance rather than mobility, strength, balance. Pt's current mobility not declined from baseline.

## 2021-10-10 NOTE — PROGRESS NOTES
"BP (!) 142/88 (BP Location: Left arm)   Pulse 107   Temp 98.1  F (36.7  C) (Oral)   Resp 22   Ht 1.562 m (5' 1.5\")   Wt 68 kg (150 lb)   SpO2 90%   BMI 27.88 kg/m      Patient received one duoneb prn. Breath sounds are course/wh. Slight improvement. RT continue to follow.     Miriam RRT  "

## 2021-10-10 NOTE — PLAN OF CARE
Problem: Adult Inpatient Plan of Care  Goal: Patient-Specific Goal (Individualized)  Outcome: Improving  IVSL. 3 lap sites from previous surgery CDI. Purewick placed at HS. BG monitoring. Tolerating diet     Problem: Gas Exchange Impaired  Goal: Optimal Gas Exchange  Outcome: Improving  O2 at 3L via NC. Baseline 1L. Congested cough. Expiratory wheezing and coarse lung sounds. Pulse ox in place.      Problem: Pain Acute  Goal: Acceptable Pain Control and Functional Ability  Outcome: Improving   Declined.

## 2021-10-10 NOTE — PLAN OF CARE
Occupational Therapy Discharge Summary    Reason for therapy discharge:    Discharged to home.    Progress towards therapy goal(s). See goals on Care Plan in [facility] electronic health record for goal details.  Goals met    Therapy recommendation(s):    No further therapy is recommended.Pt can demonstrate techniques.    STEPHANIE Baeza, CLT, 10/10/2021

## 2021-10-10 NOTE — PLAN OF CARE
Peripheral IV removed. AVS reviewed and signed. Questions answered. Belongings returned. Pt discharged.      Pain this shift manageable with APAP. Pt was on O2 2-3 L. VSS.

## 2021-10-10 NOTE — PROVIDER NOTIFICATION
10/10/21 1346 10/10/21 1348 10/10/21 1349   Oxygen Therapy   SpO2 90 % (!) 88 % 90 %   O2 Device Nasal cannula Nasal cannula Nasal cannula   Oxygen Delivery 4 LPM  (At rest) 4 LPM  (With activity ) 5 LPM  (With activity )       Patient needs 4L at rest and 5L with activity. [facility]

## 2021-10-10 NOTE — DISCHARGE SUMMARY
Discharge Summary    Primary Care Physician:  Shai Ellington  Discharge Provider: Shani Trivedi MD   Consult/s: none  Admission Date: 10/7/2021.   Admission Diagnoses:   COPD exacerbation (H) [J44.1]   Discharge Date: 10/10/2021  Disposition: home  Condition at Discharge: stable  Code Status: Prior  Principal Diagnosis:  COPD exacerbation (H)  Discharge Diagnoses:    Principal Problem:    COPD exacerbation (H)    Reason for Admission:   Adalgisa Solano is a 85 year old female who presented on the day of admission with acute on chronic hypoxia and dyspnea.     Hospital Summary:   Adalgisa Solano is a 85 year old female admitted on 10/7/2021 with a history of COPD on 1L O2 at home and HFpEF. She had been started on doxycycline and PO prednisone a day earlier in the outpatient setting, but presented to the ED when her hypoxia/breathing continued to worsen.She presented with worsened hypoxia. Her exam was significant for both wheezes and inspiratory crackles with mildly elevated BNP. In the past she has had heart failure exacerbations with BNPs in the 200s that have improved with lasix, so she was given 40 IV lasix with good urine output and improvement of respiratory status. She was discharged on 3L, higher than her 1L home, to finish her doxycycline and prednisone and went back to her previous home dose of 40 lasix daily. She was also recommended to continue her nebulizers at home. She has outpatient follow up with her pulmonologist on Tuesday. She has a pulse oximeter at home, and we discussed O2 goals of 88-94% with freedom for her to turn down the O2 flow if she is higher than that.      Discharge Medications:       Review of your medicines      CONTINUE these medicines which may have CHANGED, or have new prescriptions. If we are uncertain of the size of tablets/capsules you have at home, strength may be listed as something that might have changed.      Dose / Directions   predniSONE  20 MG tablet  Commonly known as: DELTASONE  This may have changed:     medication strength    how much to take    how to take this    when to take this    additional instructions  Used for: Acute on chronic respiratory failure with hypoxia (H)      Dose: 40 mg  Take 2 tablets (40 mg) by mouth daily for 3 days  Quantity: 6 tablet  Refills: 0        CONTINUE these medicines which have NOT CHANGED      Dose / Directions   acetaminophen 500 MG tablet  Commonly known as: TYLENOL      Dose: 500-1,000 mg  Take 500-1,000 mg by mouth every 6 hours as needed for mild pain  Refills: 0     * albuterol 108 (90 Base) MCG/ACT inhaler  Commonly known as: PROAIR HFA/PROVENTIL HFA/VENTOLIN HFA      Dose: 2 puff  Inhale 2 puffs into the lungs every 4 hours as needed  Refills: 0     * albuterol (2.5 MG/3ML) 0.083% neb solution  Commonly known as: PROVENTIL      Dose: 2.5 mg  Take 2.5 mg by nebulization 4 times daily as needed for shortness of breath / dyspnea Use before nebulized saline  Refills: 0     ARIPiprazole 5 MG tablet  Commonly known as: ABILIFY      Dose: 5 mg  Take 5 mg by mouth every evening  Refills: 0     atorvastatin 20 MG tablet  Commonly known as: LIPITOR      Dose: 20 mg  Take 20 mg by mouth every evening  Refills: 0     calcium carbonate 600 mg-vitamin D 400 units 600-400 MG-UNIT per tablet  Commonly known as: CALTRATE      Dose: 1 tablet  Take 1 tablet by mouth every morning  Refills: 0     clobetasol 0.05 % external cream  Commonly known as: TEMOVATE  Indication: eczema      Dose: 1 applicator  Apply 1 applicator topically 2 times daily To legs and elbows  Refills: 0     cyanocobalamin 500 MCG tablet  Commonly known as: VITAMIN B-12      Dose: 500 mcg  Take 500 mcg by mouth every morning  Refills: 0     diltiazem  MG 24 hr capsule  Commonly known as: DILT-XR      Dose: 120 mg  Take 120 mg by mouth every morning  Refills: 0     doxycycline hyclate 100 MG capsule  Commonly known as: VIBRAMYCIN  Used for:  Chronic bronchitis (H)      Dose: 100 mg  Take 1 capsule (100 mg) by mouth 2 times daily for 7 days  Quantity: 14 capsule  Refills: 0     furosemide 20 MG tablet  Commonly known as: LASIX      Dose: 40 mg  Take 40 mg by mouth every morning  Refills: 0     gabapentin 300 MG capsule  Commonly known as: NEURONTIN      Dose: 600 mg  Take 600 mg by mouth every evening  Refills: 0     ipratropium 0.03 % nasal spray  Commonly known as: ATROVENT      Dose: 1 spray  Spray 1 spray into both nostrils every morning  Refills: 0     mineral oil-white petrolatum Crea cream      Dose: 1 g  Apply 1 g topically 2 times daily Apply to legs and elbows prior to clobetasol  Refills: 0     omeprazole 20 MG DR capsule  Commonly known as: priLOSEC      Dose: 20 mg  Take 20 mg by mouth every morning  Refills: 0     rivaroxaban ANTICOAGULANT 20 MG Tabs tablet  Commonly known as: XARELTO  Used for: Atrial fibrillation with RVR (H)      Dose: 20 mg  Take 1 tablet (20 mg) by mouth daily (with dinner)  Quantity: 90 tablet  Refills: 0     sodium chloride 3 % neb solution  Commonly known as: NEBUSAL  Used for: Bronchiectasis (H)      Dose: 4 mL  Take 4 mLs by nebulization 2 times daily  Quantity: 240 mL  Refills: 11     spironolactone 25 MG tablet  Commonly known as: ALDACTONE      Dose: 12.5 mg  Take 12.5 mg by mouth every morning  Refills: 0     Trelegy Ellipta 100-62.5-25 MCG/INH oral inhaler  Generic drug: Fluticasone-Umeclidin-Vilanterol      Dose: 1 puff  Inhale 1 puff into the lungs every morning Inhale after sodium chloride neb  Refills: 0     Vitamin D3 25 mcg (1000 units) tablet  Commonly known as: CHOLECALCIFEROL      Dose: 1 capsule  Take 1 capsule by mouth every morning  Refills: 0         * This list has 2 medication(s) that are the same as other medications prescribed for you. Read the directions carefully, and ask your doctor or other care provider to review them with you.               Where to get your medicines      These  "medications were sent to [place], [place] - [address]  [address] [place] [address]    Phone: [phone]     predniSONE 20 MG tablet           Changes to Home Medications and Reasoning:  None    Significant Laboratory Studies:   Recent Labs   Lab 10/10/21  0628 10/09/21  0704 10/07/21  2325   * 135* 132*   CO2 33* 32* 24   BUN 15 18 11   ALKPHOS  --   --  88   ALT  --   --  12   AST  --   --  16     Recent Labs   Lab 10/10/21  0628 10/09/21  0704 10/07/21  2325   WBC 11.4* 13.1* 12.4*   HGB 14.5 14.1 13.7   HCT 44.2 43.2 42.0    275 275     Significant Radiology Studies:    Chest XR,  PA & LAT    Result Date: 10/8/2021  EXAM: CHEST 2 VIEWS LOCATION: [facility] DATE/TIME: 10/8/2021 12:49 AM INDICATION: Shortness of breath. COMPARISON: 08/15/2021 FINDINGS: A few linear opacities scattered in both lungs were present previously and likely represent scarring. The lungs are otherwise clear. Blunting of bilateral costophrenic angles that is only visualized on the posteroanterior projection image and most likely relates to scarring. Possible cardiomegaly. Atherosclerotic calcification in the thoracic aorta. Partial visualization of fusion hardware in the upper lumbar spine.     IMPRESSION: No convincing evidence of acute cardiopulmonary disease.     Discharge Instructions:  Follow up contact phone number for patient:[phone] (   Follow up appointment with Primary Care Physician: Shai Ellington within 1-2 weeks  Follow up appointment with Specialist: pulmonology on Tuesday  Follow up test / procedure to do as outpatient: none   Diet: low sodium  Activity: as tolerated  Restrictions: none    The following tests have been done with results pending: none     Physical Exam:    SpO2:  [88 %-90 %] 90 %  BP (!) 142/88 (BP Location: Left arm)   Pulse 107   Temp 98.1  F (36.7  C) (Oral)   Resp 22   Ht 1.562 m (5' 1.5\")   Wt 68 kg (150 lb)   SpO2 90% "   BMI 27.88 kg/m     General: In no apparent distress, appears stated age  HEENT: Atraumatic normocephalic. Conjunctiva normal, pupils equal and reactive to light, extraocular movements normal. No nasal drainage. Mouth and throat mucosa moist and non-erythematous.   Cardiovascular: Heart rate and rhythm normal. Normal S1/S2 with no murmurs auscultated.   Respiratory: No respiratory distress. Lung sounds present bilaterally and mild inspiratory and expiratory wheezes (pre-nebulizer treatment)  Abdominal: Bowel sounds present. Abdomen soft and non-tender to palpation with no masses noted.  Extremities: No tenderness. Edema not present.  Skin: No rashes visible. No wounds or ulcers.      This note was created with help of Dragon dictation system. Grammatical/typing errors are not intentional.     Patient discussed with attending physician, Dr. Romeo Tsang , who agrees with the plan.      Shani Trivedi MD PGY3 10/10/2021  [facility]

## 2021-10-10 NOTE — PROGRESS NOTES
Faculty Attestation    I saw and examined the patient.    I discussed the case with the resident physician, Dr. Montemayor, and I assessed the patient on 10/10/2021.    I agree with the findings, assessment and plan.  Dyspnea getting better--approaching baseline.    Anticipate discharge later today on oral steroids/doxy for copd +duonebs at home--pt has close f/up w/ Dr Ashton on Tues.  Agree w/t continuing current dilt and furosemide on discharge.      Romeo Tsang MD

## 2021-10-10 NOTE — PROVIDER NOTIFICATION
Spoke with Shani ROWAN resident on the phone regarding compression socks. Advised if Pt passes walking trial they can just go home and use their compression socks. Otherwise resident would put in orders.

## 2021-10-18 NOTE — TELEPHONE ENCOUNTER
Reason for Call:  Same Day Appointment, Requested Provider:      PCP: Shai Ellington    Reason for visit: INF [facility] hospital follow up discharged 10/10 CHF    Duration of symptoms:     Have you been treated for this in the past? No    Additional comments:     Can we leave a detailed message on this number? YES    Phone number patient can be reached at: Home number on file [phone] (home)    Best Time:     Call taken on 10/18/2021 at 11:09 AM by Britt Parson

## 2021-10-21 NOTE — PROGRESS NOTES
Assessment/Plan:    Diabetes mellitus type 2.  Hypoglycemic feeling with shakiness before eating.  Relieved with food.  Check blood sugar today.    COPD with exacerbation and recent hospital stay October 7 through the 10th [facility].  Shakiness may be related to steroid therapy administered during in-hospital stay.    Chronic pain syndrome gabapentin dosing may change.    Acid reflux better with omeprazole dosing may change per pharmacy recommendation.    Hypoxemic respiratory failure on 3 L nasal cannula.  Wheelchair-bound accompanied by her  today very supportive.    Hypertension controlled 106/60.    Congestive heart failure by history no evidence for volume overload at this time denies PND shortness of breath at rest no leg edema no neck vein distention chest sounds quite clear reemphasized salt restriction and diet continuation of furosemide plus Aldactone.  RTC 2 months upon return to clinic return to clinic 2 months time fasting we will check fasting blood sugar.  Should be less than 126.    25 minutes spent on the date of the encounter doing chart review, patient visit and documentation     Subjective:  Adalgisa Solano is a 85 year old female presents for the following health issues hospital stay October 7 through the 10/20/2021 [facility].  COPD with exacerbation intravenous steroids may have been administered.  Causing shakiness.    Prior to eating feels shaky.  Wonders about hypoglycemia eating helps shakiness.  Reactive hypoglycemia?.    Hypoxemic respiratory failure acute now on 3 L nasal cannula.    Questions regarding gabapentin 300 mg 2 at bedtime and omeprazole to be continued or not per the recommendation of pharmacy formal report from pharmacy forthcoming.    ROS:  No blood in sputum stool or urine no chest pain or shortness of breath now medication list reviewed reconciled.  Reemphasized salt restriction and diet.    Objective:  /60 (BP Location: Right arm, Patient Position:  "Sitting)   Pulse 90   Ht 1.562 m (5' 1.5\")   Wt 67.6 kg (149 lb)   SpO2 94%   BMI 27.70 kg/m    Chest revealed scattered rhonchi no rales no wheezing no neck vein distention heart tones were irregular abdomen benign extremities free of edema wheelchair-bound nasal cannula running oxygen 3 L/min  at her side very supportive.  "

## 2021-10-21 NOTE — LETTER
October 22, 2021      Adalgisaalok Solano  [address] [facility] [address]        Dear ,    We are writing to inform you of your test results.    Looks like diabetes mellitus type 2 and would recommend Metformin 500 mg tablets number 60 tablets take 1 tablet twice daily and dispense #60 with 11 refills.  Please call patient and pharmacy.  Please asked patient to avoid sweets and simple sugars in her diet and make an appointment to see the diabetic educator.   Phone number [phone]   This looks like type 2 diabetes.       Resulted Orders   Glucose   Result Value Ref Range    Glucose 335 (H) 70 - 125 mg/dL    Patient Fasting > 8hrs? Unknown        If you have any questions or concerns, please call the clinic at the number listed above.       Sincerely,      Shai Ellington MD

## 2021-10-22 NOTE — TELEPHONE ENCOUNTER
Dr Ellington & Team    Patient would like to meet with a diabetes educator. A referral order is needed before we can schedule. Please place. Once ordered, our diabetes schedulers will contact her.    Patient can be reached @ [phone]

## 2021-10-26 NOTE — TELEPHONE ENCOUNTER
"Disp Refills Start End VIRI    albuterol (PROVENTIL) 2.5 mg /3 mL (0.083 %) nebulizer solution 600 mL 1 3/12/2021  No   Sig: 3 ML INHALATION FOUR TIMES A DAY AS NEEDED USE BEFORE NEBULIZED SALINE. FOR SHORTNESS OF BREATH.   Sent to pharmacy as: albuterol sulfate 2.5 mg/3 mL (0.083 %) solution for nebulization (PROVENTIL)   E-Prescribing Status: Receipt confirmed by pharmacy (3/12/2021  3:14 PM CST)       albuterol (PROVENTIL) 2.5 mg /3 mL (0.083 %) nebulizer solution [[id number]]    Electronically signed by: Rob Cm RN on 03/12/21 1514 Status: Active   Ordering user: Rob Cm RN 03/12/21 1514 Ordering provider: Shai Ellington MD   Authorized by: Shai Ellington MD   Frequency:  03/12/21 - Until Discontinued Released by: Rob Cm RN 03/12/21 1514   Diagnoses  Routine general medical examination at a health care facility [Z00.00]       Routing refill request to provider for review/approval because:  Acute dosing    Last Written Prescription Date:  3/12/21  Last Fill Quantity: 600 mL,  # refills: 1   Last office visit provider:  10/21/21     Requested Prescriptions   Pending Prescriptions Disp Refills     albuterol (PROVENTIL) (2.5 MG/3ML) 0.083% neb solution [Pharmacy Med Name: ALBUTEROL SULFATE (2.5 MG/3ML) 0.083%] 600 mL 0     Sig: 3 ML INHALATION FOUR TIMES A DAY AS NEEDED USE BEFORE NEBULIZED SALINE. FOR SHORTNESS OF BREATH.       Asthma Maintenance Inhalers - Anticholinergics Passed - 10/25/2021 11:03 AM        Passed - Patient is age 12 years or older        Passed - Recent (12 mo) or future (30 days) visit within the authorizing provider's specialty     Patient has had an office visit with the authorizing provider or a provider within the authorizing providers department within the previous 12 mos or has a future within next 30 days. See \"Patient Info\" tab in inbasket, or \"Choose Columns\" in Meds & Orders section of the refill encounter.              Passed - Medication is active on med " "list       Short-Acting Beta Agonist Inhalers Protocol  Passed - 10/25/2021 11:03 AM        Passed - Patient is age 12 or older        Passed - Recent (12 mo) or future (30 days) visit within the authorizing provider's specialty     Patient has had an office visit with the authorizing provider or a provider within the authorizing providers department within the previous 12 mos or has a future within next 30 days. See \"Patient Info\" tab in inbasket, or \"Choose Columns\" in Meds & Orders section of the refill encounter.              Passed - Medication is active on med list             Brook Simmons RN 10/26/21 3:06 PM  "

## 2021-10-26 NOTE — LETTER
"    10/26/2021         RE: Adalgisa Solano  [address]  [facility] [address]        Dear Colleague,    Thank you for referring your patient, Adalgisa Solano, to the [facility]. Please see a copy of my visit note below.    Type of Service: Telephone Visit/ 45 minutes    How would patient like to obtain AVS? MyChart     Diabetes Self-Management Education & Support    Presents for: Initial Assessment for new diagnosis    SUBJECTIVE/OBJECTIVE:  Presents for: Initial Assessment for new diagnosis  Accompanied by: Self  Diabetes education in the past 24mo: No  Focus of Visit: Monitoring, Taking Medication, Healthy Eating  Diabetes type: Type 2  Other concerns:: Language barrier  Cultural Influences/Ethnic Background:  Not  or       Diabetes Symptoms & Complications:  Fatigue: Yes       Patient Problem List and Family Medical History reviewed for relevant medical history, current medical status, and diabetes risk factors.    Vitals:  There were no vitals taken for this visit.  Estimated body mass index is 27.7 kg/m  as calculated from the following:    Height as of 10/21/21: 1.562 m (5' 1.5\").    Weight as of 10/21/21: 67.6 kg (149 lb).   Last 3 BP:   BP Readings from Last 3 Encounters:   10/21/21 106/60   10/12/21 110/60   10/10/21 (!) 142/88       History   Smoking Status     Former Smoker     Quit date: 1/1/2006   Smokeless Tobacco     Never Used     Comment: quit 2006       Labs:  Lab Results   Component Value Date    A1C 7.1 10/07/2021     Lab Results   Component Value Date     10/21/2021     Lab Results   Component Value Date    LDL 61 06/24/2021     Direct Measure HDL   Date Value Ref Range Status   06/24/2021 58 >=50 mg/dL Final   ]  GFR Estimate   Date Value Ref Range Status   10/10/2021 82 >60 mL/min/1.73m2 Final     Comment:     As of July 11, 2021, eGFR is calculated by the CKD-EPI creatinine equation, without race adjustment. eGFR can be influenced by " muscle mass, exercise, and diet. The reported eGFR is an estimation only and is only applicable if the renal function is stable.   06/24/2021 >60 >60 mL/min/1.73m2 Final     GFR Estimate If Black   Date Value Ref Range Status   06/24/2021 >60 >60 mL/min/1.73m2 Final     Lab Results   Component Value Date    CR 0.67 10/10/2021     No results found for: MICROALBUMIN    Healthy Eating:  Healthy Eating Assessed Today: Yes  Meal planning/habits: None  Meals include: Breakfast, Lunch, Dinner  Breakfast: cheerios/milk/2- 45 calorie pieces of bread, apple  Lunch: salad/dwayne eduard low calorie bread or a meat sandwich or chicken noodle soup  Dinner: porkroast/potatoes/carrots or pizza or pasta or grilled meats  Other: patient reported since finding out she had diabetes she has been reducing sugary beverages  Beverages: Water, Coffee, Soda    Being Active:  Being Active Assessed Today: No    Monitoring:  Monitoring Assessed Today: Yes (patient is not currently monitoring, but she would like to start)  Blood Glucose Meter: One Touch        Taking Medications:  Diabetes Medication(s)     Biguanides       metFORMIN (GLUCOPHAGE) 500 MG tablet    Take 1 tablet (500 mg) by mouth 2 times daily (with meals)          Taking Medication Assessed Today: Yes  Current Treatments: Oral Medication (taken by mouth)  Problems taking diabetes medications regularly?: No  Diabetes medication side effects?: No    Problem Solving:  Is the patient at risk for hypoglycemia?:  (patient having symtpoms of shakiness and fatigue, but she has not had a blood glucose meter to check her BG to rule out hypoglycemia)              Reducing Risks:  Diabetes Risks: Age over 45 years, Sedentary Lifestyle    Healthy Coping:  Emotional response to diabetes: Uncertain  Informal Support system:: Spouse, Children  Stage of change: PREPARATION (Decided to change - considering how)  Patient Activation Measure Survey Score:  No flowsheet data found.    Diabetes knowledge  and skills assessment:   Patient is knowledgeable in diabetes management concepts related to: all topic areas are new to patient    Patient needs further education on the following diabetes management concepts: Healthy Eating, Being Active, Monitoring and Reducing Risks    Based on learning assessment above, most appropriate setting for further diabetes education would be: Individual setting.      INTERVENTIONS:    Education provided today on:  AADE Self-Care Behaviors:  Healthy Eating: consistency in amount, composition, and timing of food intake, portion control and having healthy snacks between meals.   Monitoring: log and interpret results, individual blood glucose targets and frequency of monitoring  Taking Medication: when to take medications  S/s of hypoglycemia/    Opportunities for ongoing education and support in diabetes-self management were discussed.    Pt verbalized understanding of concepts discussed and recommendations provided today.       Education Materials Provided:  Living Healthy with Diabetes, Carbohydrate Counting, Hypoglycemia Signs and Symptoms and My Plate Planner      ASSESSMENT:  Initial education on DM for Kim for new diagnosis.  Patient reported that she had been already cutting back on sugary beverages since finding out she had diabetes. She reported having some shakiness and fatigue before meals, symptoms improved after eating. Patient is consuming three meals/day and hydrating with water.  She started taking Metformin last week.          Patient's most recent   Lab Results   Component Value Date    A1C 7.1 10/07/2021     goal for A1C per MD discretion    PLAN  1. Check blood sugar once daily, in the morning before eating.  2. Check blood sugar if you notice shakiness or fatigue.  3. Avoid all sugary beverages.  4. Add in protein to your morning and noon meal.  5. Add in snacks between your meals: cheese/crackers or toast with peanut butter or fruit with nuts or cottage cheese with  fruit.  6. Follow up with educator on 11/9/21.     See Patient Instructions for co-developed, patient-stated behavior change goals.  AVS printed and provided to patient today. See Follow-Up section for recommended follow-up.      Time Spent: 45 minutes  Encounter Type: Individual    Any diabetes medication dose changes were made via the CDE Protocol and Collaborative Practice Agreement with the patient's primary care provider. A copy of this encounter was shared with the provider.

## 2021-10-26 NOTE — PATIENT INSTRUCTIONS
1. Check blood sugar once daily, in the morning before eating.  2. Check blood sugar if you notice shakiness or fatigue.  3. Avoid all sugary beverages.  4. Add in protein to your morning and noon meal.  5. Add in snacks between your meals: cheese/crackers or toast with peanut butter or fruit with nuts or cottage cheese with fruit.  6. Follow up with educator on 11/9/21.

## 2021-10-26 NOTE — PROGRESS NOTES
"Type of Service: Telephone Visit/ 45 minutes    How would patient [facility] to [facility]? [facility]     Diabetes Self-Management Education & Support    Presents for: Initial Assessment for new diagnosis    SUBJECTIVE/OBJECTIVE:  Presents for: Initial Assessment for new diagnosis  Accompanied by: Self  Diabetes education in the past 24mo: No  Focus of Visit: Monitoring, Taking Medication, Healthy Eating  Diabetes type: Type 2  Other concerns:: Language barrier  Cultural Influences/Ethnic Background:  Not  or       Diabetes Symptoms & Complications:  Fatigue: Yes       Patient Problem List and Family Medical History reviewed for relevant medical history, current medical status, and diabetes risk factors.    Vitals:  There were no vitals taken for this visit.  Estimated body mass index is 27.7 kg/m  as calculated from the following:    Height as of 10/21/21: 1.562 m (5' 1.5\").    Weight as of 10/21/21: 67.6 kg (149 lb).   Last 3 BP:   BP Readings from Last 3 Encounters:   10/21/21 106/60   10/12/21 110/60   10/10/21 (!) 142/88       History   Smoking Status     Former Smoker     Quit date: 1/1/2006   Smokeless Tobacco     Never Used     Comment: quit 2006       Labs:  Lab Results   Component Value Date    A1C 7.1 10/07/2021     Lab Results   Component Value Date     10/21/2021     Lab Results   Component Value Date    LDL 61 06/24/2021     Direct Measure HDL   Date Value Ref Range Status   06/24/2021 58 >=50 mg/dL Final   ]  GFR Estimate   Date Value Ref Range Status   10/10/2021 82 >60 mL/min/1.73m2 Final     Comment:     As of July 11, 2021, eGFR is calculated by the CKD-EPI creatinine equation, without race adjustment. eGFR can be influenced by muscle mass, exercise, and diet. The reported eGFR is an estimation only and is only applicable if the renal function is stable.   06/24/2021 >60 >60 mL/min/1.73m2 Final     GFR Estimate If Black   Date Value Ref Range Status   06/24/2021 >60 >60 mL/min/1.73m2 " Final     Lab Results   Component Value Date    CR 0.67 10/10/2021     No results found for: MICROALBUMIN    Healthy Eating:  Healthy Eating Assessed Today: Yes  Meal planning/habits: None  Meals include: Breakfast, Lunch, Dinner  Breakfast: cheerios/milk/2- 45 calorie pieces of bread, apple  Lunch: salad/dwayne eduard low calorie bread or a meat sandwich or chicken noodle soup  Dinner: porkroast/potatoes/carrots or pizza or pasta or grilled meats  Other: patient reported since finding out she had diabetes she has been reducing sugary beverages  Beverages: Water, Coffee, Soda    Being Active:  Being Active Assessed Today: No    Monitoring:  Monitoring Assessed Today: Yes (patient is not currently monitoring, but she would like to start)  Blood Glucose Meter: One Touch        Taking Medications:  Diabetes Medication(s)     Biguanides       metFORMIN (GLUCOPHAGE) 500 MG tablet    Take 1 tablet (500 mg) by mouth 2 times daily (with meals)          Taking Medication Assessed Today: Yes  Current Treatments: Oral Medication (taken by mouth)  Problems taking diabetes medications regularly?: No  Diabetes medication side effects?: No    Problem Solving:  Is the patient at risk for hypoglycemia?:  (patient having symtpoms of shakiness and fatigue, but she has not had a blood glucose meter to check her BG to rule out hypoglycemia)              Reducing Risks:  Diabetes Risks: Age over 45 years, Sedentary Lifestyle    Healthy Coping:  Emotional response to diabetes: Uncertain  Informal Support system:: Spouse, Children  Stage of change: PREPARATION (Decided to change - considering how)  Patient Activation Measure Survey Score:  No flowsheet data found.    Diabetes knowledge and skills assessment:   Patient is knowledgeable in diabetes management concepts related to: all topic areas are new to patient    Patient needs further education on the following diabetes management concepts: Healthy Eating, Being Active, Monitoring and  Reducing Risks    Based on learning assessment above, most appropriate setting for further diabetes education would be: Individual setting.      INTERVENTIONS:    Education provided today on:  AADE Self-Care Behaviors:  Healthy Eating: consistency in amount, composition, and timing of food intake, portion control and having healthy snacks between meals.   Monitoring: log and interpret results, individual blood glucose targets and frequency of monitoring  Taking Medication: when to take medications  S/s of hypoglycemia/    Opportunities for ongoing education and support in diabetes-self management were discussed.    Pt verbalized understanding of concepts discussed and recommendations provided today.       Education Materials Provided:  Living Healthy with Diabetes, Carbohydrate Counting, Hypoglycemia Signs and Symptoms and My Plate Planner      ASSESSMENT:  Initial education on DM for Kim for new diagnosis.  Patient reported that she had been already cutting back on sugary beverages since finding out she had diabetes. She reported having some shakiness and fatigue before meals, symptoms improved after eating. Patient is consuming three meals/day and hydrating with water.  She started taking Metformin last week.          Patient's most recent   Lab Results   Component Value Date    A1C 7.1 10/07/2021     goal for A1C per MD discretion    PLAN  1. Check blood sugar once daily, in the morning before eating.  2. Check blood sugar if you notice shakiness or fatigue.  3. Avoid all sugary beverages.  4. Add in protein to your morning and noon meal.  5. Add in snacks between your meals: cheese/crackers or toast with peanut butter or fruit with nuts or cottage cheese with fruit.  6. Follow up with educator on 11/9/21.     See Patient Instructions for co-developed, patient-stated behavior change goals.  AVS printed and provided to patient today. See Follow-Up section for recommended follow-up.      Time Spent: 45  minutes  Encounter Type: Individual    Any diabetes medication dose changes were made via the CDE Protocol and Collaborative Practice Agreement with the patient's primary care provider. A copy of this encounter was shared with the provider.

## 2021-10-28 NOTE — TELEPHONE ENCOUNTER
Spoke w/ pt. She was given the one touch meter at visit w/ Chantel recently.   Pt states she poked herself 5x and finally got 1 reading, at 100.     It seems meter may have been turning off before pt placed blood on strip. Discussed.     Pt will call back if she continues to have trouble.

## 2021-10-28 NOTE — TELEPHONE ENCOUNTER
Patient called. She can't get her meter to work and would like a call back from a CDE.    Kim @ [phone]

## 2021-11-01 NOTE — TELEPHONE ENCOUNTER
DM ED Team,      Please call pt back @ [phone] to discuss meter issues. States that she cant get it to work.

## 2021-11-01 NOTE — TELEPHONE ENCOUNTER
Spoke w/ pt. This is the second time trying to troubleshoot over the phone. Would like pt to come into clinic. Pt has phone appt 11/9 with Chantel at [facility]. I'm not sure if we can make this into a in person visit. Advised pt with speak with Chantel tomorrow and get back to her. Pt verbalized understanding.

## 2021-11-02 NOTE — TELEPHONE ENCOUNTER
I left a voice mail for Kim regarding our appointment scheduled for 11/9/21.  I will change it to an office visit, so we can review the glucose meter.  Patient has been having difficulty using the meter.    Chantel Brown RD, [facility]

## 2021-11-03 PROBLEM — R09.02 HYPOXIA: Status: ACTIVE | Noted: 2021-01-01

## 2021-11-03 PROBLEM — R06.00 DYSPNEA, UNSPECIFIED TYPE: Status: ACTIVE | Noted: 2021-01-01

## 2021-11-03 NOTE — ED TRIAGE NOTES
Patient is here with low o2 sats. She is normally on 1 L oxygen and today had to increase to 3L. She does have COPD. She has a productive cough-yellow in color. She did a phan this morning with little relief. She does have left arm pain from elbow to wrist. She does have CHF and a fib, she is a flutter in triage.

## 2021-11-03 NOTE — TELEPHONE ENCOUNTER
"Phone call from Adalgisa asking to speak about \"funny symptoms\" .    When questioned, she states she was recently diagnosed with diabetes.  Has been having issues with her \"HR increasing and decreasing\".  She does have known Afib.  Has been coughing up some thick yellow phlegm (this is usual for her, but this seems to be somewhat more than usual)..  With walking into the kitchen she is more sob than normal.  She is on 3 LPM and with activities her sats are 96%.    Encouraged her to touch base with cardiology to see if they may want to investigate heart issues that have started.  States she is on diltiazem.  If needed, she will give a call back.   "

## 2021-11-03 NOTE — TELEPHONE ENCOUNTER
----- Message from Rain Rangel sent at 11/3/2021  3:11 PM CDT -----  Regarding: [facility] PT  General phone call:    Caller: ASH  Primary cardiologist: DAVID   Detailed reason for call: tHE NURSE IN PULMONARY SUGGESTED THAT SHE CALL US.  SHE DEVELOPED WORSE SOB LAST NIGHT.    Best phone number: [phone]  Best time to contact: ANY  Ok to leave a detailedmessage? YES  Device?NO     Additional Info:     Noted message and message from pulmonary. Pt was last seen by [facility] October 29th 2020; last echo in December 2020.         Called back patient to address her concerns. Pt reports multiple symptoms since discharge from [facility] early October. She states that last night, she had a particularly bad night of breathing. She felt more short of breath and has been coughing up yellow/green colored sputum. She is on 3 L O2 for her COPD and was recently in  for an exacerbation of this. She also reports weakness, nausea, and left elbow to forearm pains. She denies any radiation of the pain to other locations than her arm. She denies a fever. She states that she has been losing weight lately- so does not sound like a HF exacerbation. She is down from 149 lbs to 142 lbs. She also reports that her HR has been more rapid today- as high as 150 bpm and bouncing around. She has known Afib. Pt has numerous symptoms and overall just feels weak. She says she is not right. NO RAC until next week; [facility] is out of the office. Due to the nature of her symptoms and lack of RAC appts, referred her to ER for evaluation. Unsure if her symptoms are cardiac or could be infectious process with increased cough + productive sputum + tachycardia. She also noted her blood pressure was lower today- 101/58. Reiterated that she needs to be checked out for anything acute going on. She is due to see Dr. Marquez, so a message will be sent to schedulers to arrange this follow up when he has clinic availability. She will go to [facility]. -[facility]

## 2021-11-03 NOTE — ED PROVIDER NOTES
EMERGENCY DEPARTMENT ENCOUNTER      NAME: Adalgisa Solano  AGE: 85 year old female  YOB: 1935  MRN: [id number]  EVALUATION DATE & TIME: 11/3/2021  4:32 PM    PCP: Shai Ellington    ED PROVIDER: Nilesh Rivera D.O.      Chief Complaint   Patient presents with     Shortness of Breath         HPI    Patient information was obtained from: Patient    Use of : N/A       Adalgisa Solano is a 85 year old female with a history of COPD, hypertension, a-fib, type II diabetes mellitus, CHF, who presents shortness of breath.     For the past 24 hours, patient reports persistent progressive shortness of breath and oxygen requirement. Patient states she typically wears 1L O2 at home but has been requiring 4-5L. She mentions having a productive cough but denies fever. Some nausea earlier but has since subsided and denies vomiting. No lightheadedness, feeling like she is going to pass out, or diarrhea.     Patient mentions some chest tightness but denies any pain. She reports mild left forearm pain but denies recent trauma. Chronic lower extremity swelling (left greater that right) secondary to lymphedema that has been unchanged recently. She denies any other complaints at this time.    Sx: She denies smoking or alcohol consumption.       REVIEW OF SYSTEMS  Constitutional:  Denies fever, chills, weight loss or weakness  Eyes:  No pain, discharge, redness  HENT:  Denies sore throat, ear pain, congestion  Respiratory: Positive SOB and productive cough. No wheeze  Cardiovascular:  Positive chest tightness (no pain). No palpitations  GI:  Positive nausea (since improved) Denies abdominal pain, vomiting, diarrhea  : Denies dysuria, hematuria  Musculoskeletal:  Positive left forearm pain, chronic lower extremity swelling (left greater that right) Denies any new  joint pain, loss of function.  Skin:  Denies rash, pallor  Neurologic:  Denies headache, focal weakness or sensory changes  Lymph: Denies  swollen nodes    All other systems negative unless noted in HPI.    PAST MEDICAL HISTORY:  Past Medical History:   Diagnosis Date     Acute and chronic respiratory failure with hypoxia (H)      Acute blood loss anemia 1/15/2019     Acute bronchitis 1/15/2019     Anemia     pernicious anemia     Anxiety      Arm skin lesion, right      Arnold-Chiari malformation (H) 1998     Arthritis      Atrial fibrillation (H) 02/2017     Avascular necrosis of bone (H)      Breast cyst      Breast lump      Candidal skin infection      Cellulitis of left lower extremity 1/28/2019     CHF (congestive heart failure) (H)     diastolic and systolic     Chronic bronchitis (H)     & acute     Chronic diastolic heart failure (H)     diastolic chf     Chronic pain syndrome      Chronic respiratory failure with hypoxia (H) 3/13/2017     COPD (chronic obstructive pulmonary disease) (H)      COPD exacerbation (H)      Depression      Diet-controlled type 2 diabetes mellitus (H)      Drug induced constipation      DVT of axillary vein, acute (H)     left     DVT of axillary vein, acute left (H)      Edema      Encounter for palliative care      Erysipelas      Fracture of femoral neck, left (H)      Generalized muscle weakness      GERD (gastroesophageal reflux disease)      History of blood clots      Hyperlipidemia      Hypertension      Left hip pain      Lumbar spinal stenosis      PAD (peripheral artery disease) (H)      Peripheral arterial disease (H) 10/28/2015     Psoriasis     arms      Pulmonary hypertension (H) 3/5/2018     Recurrent major depressive episodes (H)     Created by Conversion  Replacement Utility updated for latest IMO load     Schizoaffective disorder, bipolar type (H) 6/11/2015     Slow transit constipation      Stasis dermatitis of both legs      Status post total hip replacement, 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     Type 2 diabetes mellitus without complication (H) 8/2/2016     Vitamin B12 deficiency      Volume overload        PAST  SURGICAL HISTORY:  Past Surgical History:   Procedure Laterality Date     BACK SURGERY       BREAST CYST ASPIRATION Left      C OPEN FIXATN PROX END/NECK FEMUR FX Left 2019    Procedure: OPEN REDUCTION INTERNAL FIXATION, FRACTURE LEFT HIP, PERIPROSTHETIC FRACTURE;  Surgeon: Kevin Lackey MD;  Location: [facility];  Service: Orthopedics     C TOTAL HIP ARTHROPLASTY Left 2019    Procedure: LEFT TOTAL HIP ARTHROPLASTY;  Surgeon: Kevin Lackey MD;  Location: [facility] OR;  Service: Orthopedics     C TOTAL HIP ARTHROPLASTY Right 2019    Procedure: RIGHT TOTAL HIP ARTHROPLASTY;  Surgeon: Kan Quesada MD;  Location: [facility] OR;  Service: Orthopedics     C TOTAL KNEE ARTHROPLASTY Left 10/7/2016    Procedure: TOTAL KNEE ARTHROPLASTY, LEFT;  Surgeon: Kan Quesada MD;  Location: [facility];  Service: Orthopedics     CERVICAL SPINE SURGERY      for arnold chiari malformation      SECTION  X3     CHOLECYSTECTOMY       COLONOSCOPY       EXCISE LESION UPPER EXTREMITY Right 2019    Procedure: EXCISION RIGHT ARM LIPOMA;  Surgeon: Andreas Holly MD;  Location: [facility];  Service: General     EYE SURGERY      bilateral cataracts     HAND SURGERY       HERNIORRHAPHY UMBILICAL N/A 2021    Procedure: INCARCERATED UMBILICAL AND;  Surgeon: Andreas Holly MD;  [place] [facility] [place]     JOINT REPLACEMENT Right     knee - partial     LAPAROSCOPIC HERNIORRHAPHY INCISIONAL Left 3/27/2015    Procedure: ATTEMPTED LAPAROSCOPIC ABDOMINA/FLANK INCISION REPAIR;  Surgeon: Jose Lakhani MD;  Location: [facility];  Service:      LUMBAR FUSION N/A 2014    Procedure: POSTERIOR FUSION / DECOMPRESSION L3-S1 WITH PELVIC FIXATION BILATERAL ;  Surgeon: Rajan Mares MD;  Location: [facility] [place];  Service:      OTHER SURGICAL HISTORY      IR for PAD LOWER EXTREMITY     REPAIR SPIGELIAN HERNIA N/A 2021    Procedure: SPIGELIAN HERNIA REPAIR;   Surgeon: Andreas Holly MD;  Location: [facility]      ASPIRATION HEMATOMA SEROMA OR FLUID COLLECTION  2/6/2020         CURRENT MEDICATIONS:    Current Facility-Administered Medications   Medication     cefTRIAXone (ROCEPHIN) 1 g vial to attach to  mL bag for ADULTS or NS 50 mL bag for PEDS     methylPREDNISolone sodium succinate (solu-MEDROL) injection 125 mg     Current Outpatient Medications   Medication     acetaminophen (TYLENOL) 500 MG tablet     albuterol (PROAIR HFA/PROVENTIL HFA/VENTOLIN HFA) 108 (90 Base) MCG/ACT inhaler     albuterol (PROVENTIL) (2.5 MG/3ML) 0.083% neb solution     ARIPiprazole (ABILIFY) 5 MG tablet     atorvastatin (LIPITOR) 20 MG tablet     calcium carbonate 600 mg-vitamin D 400 units (CALTRATE) 600-400 MG-UNIT per tablet     clobetasol (TEMOVATE) 0.05 % external cream     cyanocobalamin (VITAMIN B-12) 500 MCG tablet     diltiazem ER (DILT-XR) 120 MG 24 hr capsule     Fluticasone-Umeclidin-Vilanterol (TRELEGY ELLIPTA) 100-62.5-25 MCG/INH oral inhaler     furosemide (LASIX) 20 MG tablet     gabapentin (NEURONTIN) 300 MG capsule     ipratropium (ATROVENT) 0.03 % nasal spray     metFORMIN (GLUCOPHAGE) 500 MG tablet     mineral oil-white petrolatum (EUCERIN) CREA cream     omeprazole (PRILOSEC) 20 MG DR capsule     rivaroxaban ANTICOAGULANT (XARELTO) 20 MG TABS tablet     sodium chloride (NEBUSAL) 3 % neb solution     spironolactone (ALDACTONE) 25 MG tablet     Vitamin D3 (VITAMIN D3) 25 mcg (1000 units) tablet     Blood Glucose Monitoring Suppl (ONETOUCH VERIO FLEX SYSTEM) w/Device KIT     OneTouch Delica Lancets 33G MISC     ONETOUCH VERIO IQ test strip     OXYGEN-HELIUM IN         ALLERGIES:  Allergies   Allergen Reactions     Tramadol Nausea     Amoxicillin Itching and Rash     Levofloxacin Itching and Rash     Penicillins Itching and Rash     Has tolerated ceftriaxone.       FAMILY HISTORY:  Family History   Problem Relation Age of Onset     Coronary Artery Disease  Mother      Coronary Artery Disease Father        SOCIAL HISTORY:  Social History     Socioeconomic History     Marital status:      Spouse name: Not on file     Number of children: Not on file     Years of education: Not on file     Highest education level: Not on file   Occupational History     Not on file   Tobacco Use     Smoking status: Former Smoker     Quit date: 1/1/2006     Years since quitting: 15.8     Smokeless tobacco: Never Used     Tobacco comment: quit 2006   Substance and Sexual Activity     Alcohol use: No     Drug use: No     Sexual activity: Not on file   Other Topics Concern     Parent/sibling w/ CABG, MI or angioplasty before 65F 55M? Not Asked   Social History Narrative     Not on file     Social Determinants of Health     Financial Resource Strain:      Difficulty of Paying Living Expenses:    Food Insecurity:      Worried About Running Out of Food in the Last Year:      Ran Out of Food in the Last Year:    Transportation Needs:      Lack of Transportation (Medical):      Lack of Transportation (Non-Medical):    Physical Activity:      Days of Exercise per Week:      Minutes of Exercise per Session:    Stress:      Feeling of Stress :    Social Connections:      Frequency of Communication with Friends and Family:      Frequency of Social Gatherings with Friends and Family:      Attends Confucianist Services:      Active Member of Clubs or Organizations:      Attends Club or Organization Meetings:      Marital Status:    Intimate Partner Violence:      Fear of Current or Ex-Partner:      Emotionally Abused:      Physically Abused:      Sexually Abused:        VITALS:  Patient Vitals for the past 24 hrs:   BP Temp Temp src Pulse Resp SpO2 Weight   11/03/21 2130 -- -- -- 88 30 95 % --   11/03/21 1915 -- -- -- 102 28 93 % --   11/03/21 1815 119/58 -- -- 96 (!) 33 95 % --   11/03/21 1626 133/64 98.9  F (37.2  C) Temporal (!) 122 24 (!) 87 % 67.6 kg (149 lb)       PHYSICAL EXAM    VITAL SIGNS:  /58   Pulse 88   Temp 98.9  F (37.2  C) (Temporal)   Resp 30   Wt 67.6 kg (149 lb)   SpO2 95%   BMI 27.70 kg/m      General Appearance: Well-appearing, well-nourished, no acute distress   Head:  Normocephalic, without obvious abnormality, atraumatic  Eyes:  PERRL, conjunctiva/corneas clear, EOM's intact,  ENT:  Lips, mucosa, and tongue normal, membranes are moist without pallor  Neck:  Normal ROM, symmetrical, trachea midline    Cardio:  Borderline tachycardia and irregular rhythm, no murmur, rub or gallop, 2+ pulses symmetric in all extremities  Pulm: , Mild increased work of breathing, coarse diffuse breath sounds bilaterally  Abdomen:  Soft, non-tender, no rebound or guarding.  Musculoskeletal: Full ROM, no edema, no cyanosis, good ROM of major joints  Integument:  Warm, Dry, No erythema, No rash.    Neurologic:  Alert & oriented.  No focal deficits appreciated.  Ambulatory.  Psychiatric:  Affect normal, Judgment normal, Mood normal.      LABS  Results for orders placed or performed during the hospital encounter of 11/03/21 (from the past 24 hour(s))   CBC with platelets + differential    Narrative    The following orders were created for panel order CBC with platelets + differential.  Procedure                               Abnormality         Status                     ---------                               -----------         ------                     CBC with platelets and d...[[id number]]  Abnormal            Final result                 Please view results for these tests on the individual orders.   Basic metabolic panel   Result Value Ref Range    Sodium 132 (L) 136 - 145 mmol/L    Potassium 3.9 3.5 - 5.0 mmol/L    Chloride 94 (L) 98 - 107 mmol/L    Carbon Dioxide (CO2) 27 22 - 31 mmol/L    Anion Gap 11 5 - 18 mmol/L    Urea Nitrogen 13 8 - 28 mg/dL    Creatinine 0.75 0.60 - 1.10 mg/dL    Calcium 8.6 8.5 - 10.5 mg/dL    Glucose 126 (H) 70 - 125 mg/dL    GFR Estimate 74 >60 mL/min/1.73m2    Troponin I (now)   Result Value Ref Range    Troponin I <0.01 0.00 - 0.29 ng/mL   B-Type Natriuretic Peptide ([facility] Only)   Result Value Ref Range     0 - 167 pg/mL   CBC with platelets and differential   Result Value Ref Range    WBC Count 12.4 (H) 4.0 - 11.0 10e3/uL    RBC Count 4.78 3.80 - 5.20 10e6/uL    Hemoglobin 13.7 11.7 - 15.7 g/dL    Hematocrit 41.2 35.0 - 47.0 %    MCV 86 78 - 100 fL    MCH 28.7 26.5 - 33.0 pg    MCHC 33.3 31.5 - 36.5 g/dL    RDW 15.9 (H) 10.0 - 15.0 %    Platelet Count 188 150 - 450 10e3/uL    % Neutrophils 87 %    % Lymphocytes 4 %    % Monocytes 7 %    % Eosinophils 2 %    % Basophils 0 %    % Immature Granulocytes 0 %    NRBCs per 100 WBC 0 <1 /100    Absolute Neutrophils 10.7 (H) 1.6 - 8.3 10e3/uL    Absolute Lymphocytes 0.6 (L) 0.8 - 5.3 10e3/uL    Absolute Monocytes 0.9 0.0 - 1.3 10e3/uL    Absolute Eosinophils 0.2 0.0 - 0.7 10e3/uL    Absolute Basophils 0.0 0.0 - 0.2 10e3/uL    Absolute Immature Granulocytes 0.1 (H) <=0.0 10e3/uL    Absolute NRBCs 0.0 10e3/uL   XR Chest Port 1 View    Narrative    EXAM: XR CHEST PORT 1 VIEW  LOCATION: [facility]  DATE/TIME: 11/3/2021 5:26 PM    INDICATION: Shortness of breath  COMPARISON: 10/08/2021       Impression    IMPRESSION: No pleural fluid or pneumothorax. Reticular interstitial opacities in the lungs could be seen with edema or atypical infection. No confluent airspace opacities. The cardiomediastinal silhouette is at the upper limits of normal in size. There   is aortic calcification.    CT Chest Pulmonary Embolism w Contrast    Narrative    EXAM: CT CHEST PULMONARY EMBOLISM W CONTRAST  LOCATION: [facility]  DATE/TIME: 11/3/2021 7:42 PM    INDICATION: PE suspected, high prob. Shortness of breath.  COMPARISON: 10/03/2019.   TECHNIQUE: CT chest pulmonary angiogram during arterial phase injection of IV contrast. Multiplanar reformats and MIP reconstructions were performed. Dose  reduction techniques were used.   CONTRAST: Isovue-370 75mL    FINDINGS:  ANGIOGRAM CHEST: Pulmonary arteries are normal caliber and negative for pulmonary emboli. Thoracic aorta is negative for dissection. There is atherosclerotic disease.     LUNGS AND PLEURA: Emphysema is present. There are reticular interstitial opacities and intralobular reticular markings. Nodular opacities in the lungs have decreased from the comparison study. There is debris in the airways in the lower lungs.     MEDIASTINUM/AXILLAE: The heart is enlarged. The esophagus is mildly patulous.     CORONARY ARTERY CALCIFICATION: Mild.    UPPER ABDOMEN: Normal.    MUSCULOSKELETAL: There is surgical hardware in the spine on the  radiograph.       Impression    IMPRESSION:  1.  No PE.  2.  Bronchial wall thickening with debris in the airways. Findings could be seen with aspiration.  3.  Pulmonary nodules have decreased from the comparison study and may partly be infectious or inflammatory in nature.  4.  Emphysema.  5.  Reticular interstitial opacities could be seen with edema or nonspecific upper lung fibrosis.  6.  Cardiomegaly.          RADIOLOGY  CT Chest Pulmonary Embolism w Contrast   Final Result   IMPRESSION:   1.  No PE.   2.  Bronchial wall thickening with debris in the airways. Findings could be seen with aspiration.   3.  Pulmonary nodules have decreased from the comparison study and may partly be infectious or inflammatory in nature.   4.  Emphysema.   5.  Reticular interstitial opacities could be seen with edema or nonspecific upper lung fibrosis.   6.  Cardiomegaly.       XR Chest Port 1 View   Final Result   IMPRESSION: No pleural fluid or pneumothorax. Reticular interstitial opacities in the lungs could be seen with edema or atypical infection. No confluent airspace opacities. The cardiomediastinal silhouette is at the upper limits of normal in size. There    is aortic calcification.              EKG:    Rate: 112 bpm  Rhythm:  atrial fibrillation with RVR  Axis: Right  Interval: Normal  Conduction: Normal  QRS: Normal  ST: Normal  T-wave: Normal  QT: Not prolonged  Comparison EKG: no significant change compared to 2021  Impression:  No acute ischemic change   I have independently reviewed and interpreted today's EKG, pending Cardiologist read      FINAL IMPRESSION:  1. Hypoxia    2. Dyspnea, unspecified type          ED COURSE & MEDICAL DECISION MAKIN:53 PM I met with the patient to gather history and to perform my initial exam. I discussed the plan for care while in the Emergency Department. PPE worn during all patient interactions includes: N95 mask.   8:48 PM Checked in on and updated patient.   10:19 PM Spoke with Dr. Law, admitting resident MD.     Pertinent Labs & Imaging studies reviewed. (See chart for details)  85 year old female presents to the Emergency Department for evaluation of shortness of breath and cough.  Patient has been having increasing symptoms for the past several days.  Normally wears oxygen at home, but has a significantly increased oxygen requirement as she normally wears 1 at home and she is at 4.5 to keep above 90% in the emergency department.  She did have very coarse breath sounds bilaterally, and chest x-ray was concerning for the potential for some pulmonary edema however CT scan did not show the same.  Also there is no elevated BNP to support the idea of a CHF exacerbation.  Additionally there is no evidence of pneumonia or PE, and there was questionable evidence of potential aspiration but not a true aspiration pneumonia.  At this time, I am not quite clear on the actual cause of the patient's hypoxia, especially with the current appearance of her lungs.  DuoNeb's did not improve her symptoms and she was not wheezing, therefore I feel it is unlikely that it could be her COPD causing the symptoms, but cannot fully rule that out.  We did decide to treat her with Solu-Medrol and  Rocephin at this time, and the plan is for admission for further evaluation and management of her symptoms.  Patient is agreeable with the plan.    At the conclusion of the encounter I discussed the results of all of the tests and the disposition. The questions were answered. The patient or family acknowledged understanding and was agreeable with the care plan.      MEDICATIONS GIVEN IN THE EMERGENCY:  Medications   cefTRIAXone (ROCEPHIN) 1 g vial to attach to  mL bag for ADULTS or NS 50 mL bag for PEDS (has no administration in time range)   methylPREDNISolone sodium succinate (solu-MEDROL) injection 125 mg (has no administration in time range)   ipratropium - albuterol 0.5 mg/2.5 mg/3 mL (DUONEB) neb solution 3 mL (3 mLs Nebulization Given 11/3/21 1843)   iopamidol (ISOVUE-370) solution 100 mL (75 mLs Intravenous Given 11/3/21 2004)   acetaminophen (TYLENOL) tablet 975 mg (975 mg Oral Given 11/3/21 2209)       NEW PRESCRIPTIONS STARTED AT TODAY'S ER VISIT  New Prescriptions    No medications on file        Nilesh Rivera D.O.  Emergency Medicine  [facility] EMERGENCY ROOM  [address] [address]  [phone]  Dept: [phone]       Nilesh Rivera DO  11/03/21 6020

## 2021-11-04 PROBLEM — M79.602 PAIN OF LEFT UPPER EXTREMITY: Status: ACTIVE | Noted: 2021-01-01

## 2021-11-04 NOTE — PROGRESS NOTES
11/04/21 1030   Quick Adds   Type of Visit Initial PT Evaluation   Living Environment   People in home spouse   Current Living Arrangements house   Home Accessibility stairs to enter home;stairs within home   Number of Stairs, Main Entrance 2  (platform steps)   Number of Stairs, Within Home, Primary greater than 10 stairs   Stair Railings, Within Home, Primary railings safe and in good condition   Living Environment Comments Only uses basement stairs to get to computer about 1x/week   Self-Care   Equipment Currently Used at Home cane, straight;walker, rolling   Activity/Exercise/Self-Care Comment uses 4WW in house, cane in community   Disability/Function   Fall history within last six months no   General Information   Onset of Illness/Injury or Date of Surgery 11/03/21   Referring Physician Dr. Lorin Champagne   Patient/Family Therapy Goals Statement (PT) Move without SOB   Pertinent History of Current Problem (include personal factors and/or comorbidities that impact the POC) Hypoxia   Weight-Bearing Status - LLE full weight-bearing   Weight-Bearing Status - RLE full weight-bearing   Cognition   Orientation Status (Cognition) oriented x 4   Bed Mobility   Bed Mobility supine-sit;sit-supine;scooting/bridging   Scooting/Bridging [place] (Bed Mobility) supervision;verbal cues   Supine-Sit [place] (Bed Mobility) contact guard;verbal cues   Sit-Supine [place] (Bed Mobility) contact guard;verbal cues   Impairments Contributing to Impaired Bed Mobility decreased strength   Assistive Device (Bed Mobility) bed rails   Transfers   Transfers sit-stand transfer   Sit-Stand Transfer   Sit-Stand [place] (Transfers) contact guard;verbal cues   Gait/Stairs (Locomotion)   [place] Level (Gait) contact guard;verbal cues   Assistive Device (Gait)   (None, using commode handles for support)   Distance in Feet (Required for LE Total Joints) SPT right and left   Pattern (Gait) step-to   Maintains  Weight-bearing Status (Gait) able to maintain   Clinical Impression   Criteria for Skilled Therapeutic Intervention yes, treatment indicated   PT Diagnosis (PT) Impaired functional mobility   Influenced by the following impairments weakness, decreased endurance   Functional limitations due to impairments transfers, gait, stairs   Clinical Presentation Evolving/Changing   Clinical Presentation Rationale pt presents as medically diagnosed   Clinical Decision Making (Complexity) moderate complexity   Therapy Frequency (PT) Daily   Predicted Duration of Therapy Intervention (days/wks) 5 days   Planned Therapy Interventions (PT) gait training;home exercise program;bed mobility training;transfer training;strengthening;stair training   Anticipated Equipment Needs at Discharge (PT) walker, rolling;cane, straight   Risk & Benefits of therapy have been explained evaluation/treatment results reviewed;care plan/treatment goals reviewed;patient   PT Discharge Planning    PT Discharge Recommendation (DC Rec) home with home care physical therapy   PT Rationale for DC Rec Patient performs mobility with CGA to SBA   Total Evaluation Time   Total Evaluation Time (Minutes) 10

## 2021-11-04 NOTE — PROGRESS NOTES
Resident/Fellow Attestation   I, Luly Grier, was present with the medical/ASTRID student who participated in the service and in the documentation of the note.  I have verified the history and personally performed the physical exam and medical decision making.  I agree with the assessment and plan of care as documented in the note.      Luly Grier MD PGY3  [facility] Residency  11/4/2021, 5:42 PM      [facility]    Progress Note - Residency Teaching  Service        Date of Admission:  11/3/2021    Assessment & Plan      Adalgisa Solano is a 85 year old female admitted on 11/3/2021. She has a history of COPD, atrial fibrillation, type 2 diabetes, and HFpEF and is admitted for shortness of breath with increasing oxygen needs.      Acute on Chronic hypoxic respiratory failure   Differentials include viral pneumonia vs legionella pneumonia vs bacterial pneumonia vs CHF exacerbation vs viral URI vs non infectious causes of COPD exacerbation (allergens vs environmental toxins vs cold air) vs generalized deconditioning vs PE    -Negative COVID and influenza panel and procalcitonin   -CTA negative for PE   -CRP= 10.5  -GJV=735 and stable weight makes CHF exacerbation less likely.   -Blood and sputum cultures pending.  -Ordered legionella urine test given patient's concurrent hyponatremia   -Likely COPD exacerbation superimposed with emphysema progression  -IV Ceftriaxone for 24 hours; pending change after pulm consult   -IV azithromycin 250mg q24; pending change after pulm consult  -IV methylprednisolone given in ED switch to prednisone 40mg for five days  -Tessalon and robitussin prn   -Proair 2 puffs q4hr prn   -Consult pulmonology given readmission for hypoxia  -AM BMP, CBC, and CRP to trend possible infection     Deconditioning   Contributing to poor overall health  -PT evaluation places     Chronic lower extremity edema   -Continue furosemide 40mg  daily  -Continue spironolactone 12.5mg daily     Left upper extremity pain   -Negative upper extremity DVT ultrasound  -PT eval in place     LLE Hematoma   -Chronic; appeared a year ago after a fall   -Patient is on xeralto   -Address as outpatient     A. Fib   -Continue diltiazem, Xarelto     Type II Diabetes   -A1c: 7.1%   -Continue home metformin             Diet: Low Saturated Fat Na <2400 mg    DVT Prophylaxis: Xarelto   Borjas Catheter: Not present  Fluids: P.O.   Central Lines: None  Code Status: Full Code      Disposition Plan   Expected discharge: Possibly 11/05/2021 recommended to prior living arrangement once baseline oxygen requirements are reassessed or back to baseline.     The patient's care was discussed with the Attending Physician, Dr. Kennedy.    Yuko Gracia  Medical Student  Residency Teaching Service  [facility]  Securely message with the Vocera Web Console (learn more here)  Text page via [employer] Paging/Directory        ______________________________________________________________________    Interval History   Patient is seen bedside where she is awake. She continues to endorse cough and shortness of breath. She says the sputum is yellow. She states she did have an interval of feeling better between the prior hospitalization; two days ago is when the symptoms returned with a concurrent left arm pain. She tried increasing her oxygen at home but didn't have improvement.     She denies any fevers or vomiting. She does recall having chills recently and some nausea. However, the patient thinks the nausea could be attributed to the metformin she started a week ago.     Data reviewed today: I reviewed all medications, new labs and imaging results over the last 24 hours. I personally reviewed the chest CT image(s) showing                                                                       IMPRESSION:  1.  No PE.  2.  Bronchial wall thickening with debris in the airways.  Findings could be seen with aspiration.  3.  Pulmonary nodules have decreased from the comparison study and may partly be infectious or inflammatory in nature.  4.  Emphysema.  5.  Reticular interstitial opacities could be seen with edema or nonspecific upper lung fibrosis.  6.  Cardiomegaly.     Echo in 12/ 2020   1.Left ventricle ejection fraction is normal. The calculated left ventricular ejection fraction is 63%.    2.Normal right ventricular size and systolic function.    3.Moderate tricuspid and mitral regurgitation.    4.Moderate pulmonary hypertension suggested.    5.When compared to the previous study dated 11/9/2018, no significant change.    Physical Exam   Vital Signs: Temp: 97.8  F (36.6  C) Temp src: Oral BP: 116/66 Pulse: 94   Resp: (!) 39 SpO2: 97 % O2 Device: Nasal cannula Oxygen Delivery: 5 LPM  Weight: 149 lbs 0 oz  Constitutional: awake, alert, cooperative, no apparent distress, and appears stated age  Eyes: Lids and lashes normal, pupils equal, round and reactive to light, extra ocular muscles intact, sclera clear, conjunctiva normal  Hematologic / Lymphatic: Lower extremity lymphedema b/l  Respiratory: no increased work of breathing, good air exchange and crackles right base and left base  Cardiovascular: irregularly irregular rhythm and normal S1 and S2  GI: No scars, normal bowel sounds, soft, non-distended, non-tender, no masses palpated, no hepatosplenomegally  Skin: ecchymosis on right ankle(s).  Musculoskeletal: Pain to palpation of LE b/l.     Data   Recent Labs   Lab 11/04/21  0826 11/03/21  1736   WBC  --  12.4*   HGB  --  13.7   MCV  --  86   PLT  --  188   NA  --  132*   POTASSIUM  --  3.9   CHLORIDE  --  94*   CO2  --  27   BUN  --  13   CR  --  0.75   ANIONGAP  --  11   DENISE  --  8.6   * 126*   ALBUMIN  --  3.3*   PROTTOTAL  --  6.1   BILITOTAL  --  0.8   ALKPHOS  --  72   ALT  --  22   AST  --  22

## 2021-11-04 NOTE — PROGRESS NOTES
/66   Pulse 90   Temp 97.8  F (36.6  C) (Oral)   Resp (!) 39   Wt 67.6 kg (149 lb)   SpO2 93%   BMI 27.70 kg/m      Patient was given PEP therapy , patient uses one at home. Duonebs ordered 4 times a day, and mucomyst was changed to 4 times a day. Breath sounds are course and wheezy. Strong productive cough. Patient is having a difficult time with cough. On 4L NC. RT continue to follow.    Miriam OWEN

## 2021-11-04 NOTE — PLAN OF CARE
Pt eating, drinking, voiding and has active bowel sounds.  RN tried to get a urine sample but PT brought patient to the commode.    Problem: Gas Exchange Impaired  Goal: Optimal Gas Exchange  Outcome: No Change   Pt had an episode were she became very anxious and SOB and dropped to 85%.  She was bumped up to 6L NC for a few minutes and she covered back to the upper 90's quickly.   She is currently doing well on 5L.       Continue to monitor VS, labs, pain level, respiratory status and activity tolerance.

## 2021-11-04 NOTE — H&P
[facility]    History and Physical - Residency Teaching Service        Date of Admission:  11/3/2021    Assessment & Plan      Adalgisa Solano is a 85 year old female admitted on 11/3/2021. She has a history of COPD, atrial fibrillation, type 2 diabetes, and HFpEF and is admitted for shortness of breath with increasing oxygen needs.      Acute on chronic hypoxic respiratory failure  Atypical pneumonia vs CHF exacerbation  Patient requires 1 L oxygen at baseline and currently requiring 4 L to maintain O2 sats above 90%.  Also has new productive cough.  Admission labs notable for evaded CRP 10.5 mild leukocytosis of 12.4 with 87% neutrophils (prior to steroid dose).  Chest x-ray and CTA chest demonstrate reticular interstitial opacities, bronchial wall thickening, and cardiomegaly.  Recently hospitalized 10/7 to 10/10/2021 for COPD exacerbation which subsequently improved, before the symptoms recurred.  Presentation most likely secondary to atypical pneumonia or COPD exacerbation.  These imaging findings with symptoms could also be due to CHF exacerbation, however  is not very elevated and weight has been about the same at visits for the past 2 months.  Symptoms could also represent general deconditioning and worsening baseline function.    Admit inpatient    Oxygen supplementation, continuous pulse oximetry    IV ceftriaxone every 24 hours (first dose in ED)    IV azithromycin    IV methylprednisone given in ED    Consult pulmonology given readmission for pneumonia, h/o COPD, follows outpatient    Work-up infectious cause: Sputum culture, Procalcitonin, Influenza swab, Covid pending    Guaifenesin, Tessalon Perles as needed    Incentive spirometry every 2 hours RT    Deconditioning  Likely contributing to poor overall health    Physical therapy evaluate and treat.  Appreciate recommendations for discharge planning    Chronic lower extremity edema    PTA furosemide 40 mg  daily    PTA spironolactone 12.5 mg daily    Hyponatremia: Chronic, stable.  Na 132 admission    We will monitor throughout hospital stay    Left upper extremity pain  New onset LUE pain and patient with history of DVT    Left upper extremity DVT ultrasound    Physical therapy     LLE hematoma: Pt has large hematoma on Left lateral leg (see photo below).  Has been there for over a year and not healing.  Pt is on anticoagulation with xarelto for A fib and h/o DVT    Address as outpatient    Chronic medical conditions  COPD: Continue PTA inhalers  Atrial fibrillation: Continue PTA diltiazem, Xarelto  Type 2 diabetes: Newly diagnosed, diet controlled, at goal, A1c 7.1% 10/7/21.  Hold metformin       Diet: Low Saturated Fat Na <2400 mg  DVT Prophylaxis: DOAC  Borjas Catheter: Not present  Fluids: Oral  Central Lines: None  Code Status: Full Code  Disposition Plan   Expected discharge: 2-3 days recommended to prior living arrangement once antibiotic plan established and O2 use less than 1 liters/minute.     The patient's care was discussed with the Attending Physician, Dr. Geoff Clark.  Attending Dr. Lorin Champange will see patients during morning rounds.    Tigist Law MD  Residency Teaching Service  [facility]  Text page via [facility] Paging/Directory    ______________________________________________________________________    Chief Complaint   Chief Complaint   Patient presents with     Shortness of Breath     History is obtained from the patient as well as chart review    History of Present Illness   Adalgisa Solano is a 85 year old female who has a history of COPD, atrial fibrillation, type 2 diabetes, and HFpEF and is admitted for shortness of breath with increasing oxygen needs.    Patient was recently hospitalized 10/7 to 10/10/21 for COPD exacerbation and discharged with doxycycline with prednisone which she completed.  She was discharged with 3 L oxygen with plan to self titrate  down to her usual 1 L oxygen.  Patient reports she improved and oxygen needs decreased down to 1 L within a week and she was stable at this level for 2 weeks.  Starting a few days ago, however she noticed increasing shortness of breath.  She also developed a cough that is more persistent 2 days ago.  She notes that her cough is productive of yellow and green sputum and she is requiring 3 to 4 L O2.      Of note, while patient states that she normally requires 1 L of O2, she becomes short of breath with activity and does not normally increase her oxygen with physical activity.  As a result of her shortness of breath, she has been very inactive recently.    Kim complains of pain in her left forearm for the past couple of days and she is worried it might be another blood clot, as she had this before in the same arm.  She is on Xarelto for anticoagulation though, and takes all of her medications regularly.    Denies fever, chills, change in appetite, abdominal pain, nausea, vomiting, new or increased leg swelling (has some edema at baseline), or urinary symptoms.    Review of Systems     ROS: 10 point ROS neg other than the symptoms noted above in the HPI.    Past Medical History    Extensive past medical history is notable for COPD, DVT in left upper extremity, heart failure with preserved ejection fraction, hyperlipidemia, type 2 diabetes, depression, s/p hip replacement bilateral  --See EMR for more details    Past Surgical History   Extensive surgical history notable for cholecystectomy, , knee replacements, hip replacement, cervical spine surgery  --See EMR for more details    Social History   Kim spends most of her time at home and is not very active these days due to health conditions.  Weekends she plays cards with her  and family members.  Has not been able to go on many walks recently due to shortness of breath.  Reports that she quit smoking about 15 years ago. She has never used smokeless  tobacco. She reports that she does not drink alcohol and does not use drugs.    Family History   I have reviewed this patient's family history and updated it with pertinent information if needed.  Family History   Problem Relation Age of Onset     Coronary Artery Disease Mother      Coronary Artery Disease Father        Prior to Admission Medications      Medication Sig Last Dose   albuterol (PROAIR HFA/PROVENTIL HFA/VENTOLIN HFA) 108 (90 Base) MCG/ACT inhaler Inhale 2 puffs into the lungs every 4 hours as needed     albuterol (PROVENTIL) (2.5 MG/3ML) 0.083% neb solution 3 ML INHALATION FOUR TIMES A DAY AS NEEDED USE BEFORE NEBULIZED SALINE. FOR SHORTNESS OF BREATH.    ARIPiprazole (ABILIFY) 5 MG tablet Take 5 mg by mouth every evening  11/2/2021 at PM   atorvastatin (LIPITOR) 20 MG tablet Take 20 mg by mouth every evening  11/2/2021 at PM   calcium carbonate 600 mg-vitamin D 400 units (CALTRATE)  Take 1 tablet by mouth every morning  11/3/2021 at AM   diltiazem ER (DILT-XR) 120 MG 24 hr capsule Take 120 mg by mouth every morning  11/3/2021 at AM   Fluticasone-Umeclidin-Vilanterol (TRELEGY ELLIPTA) 100-62.5-25 MCG/INH oral inhaler Inhale 1 puff into the lungs every morning Inhale after sodium chloride neb 11/3/2021 at AM, bringing   furosemide (LASIX) 20 MG tablet Take 2 tablets (40 mg) by mouth every morning 11/3/2021 at AM   gabapentin (NEURONTIN) 300 MG capsule Take 600 mg by mouth every evening  11/2/2021 at PM   ipratropium (ATROVENT) 0.03 % nasal spray Spray 1 spray into both nostrils every morning  11/3/2021 at AM, bringing   metFORMIN (GLUCOPHAGE) 500 MG tablet Take 1 tablet (500 mg) by mouth 2 times daily (with meals) 11/3/2021 at AM   omeprazole (PRILOSEC) 20 MG DR capsule Take 20 mg by mouth every morning  11/3/2021 at AM   rivaroxaban ANTICOAGULANT (XARELTO) 20 MG TABS tablet Take 1 tablet (20 mg) by mouth daily (with dinner) 11/2/2021 at 1800   sodium chloride (NEBUSAL) 3 % neb solution Take 4 mLs by  nebulization 2 times daily 11/3/2021 at AM   spironolactone (ALDACTONE) 25 MG tablet Take 12.5 mg by mouth every morning  11/3/2021 at AM        Allergies   Allergies   Allergen Reactions     Tramadol Nausea     Amoxicillin Itching and Rash     Levofloxacin Itching and Rash     Penicillins Itching and Rash     Has tolerated ceftriaxone.       Physical Exam   Vital Signs: Temp: 98.9  F (37.2  C) Temp src: Temporal BP: 128/70 Pulse: 99   Resp: (!) 32 SpO2: 94 % O2 Device: Nasal cannula Oxygen Delivery: 5 LPM  Weight: 149 lbs 0 oz    General Appearance: Alert, oriented, cooperative, NAD, nasal cannula in place  Head: Normocephalic, atraumatic  Eyes:  conjunctiva/corneas clear, extraocular movements intact  Nose: Nares normal, septum midline, mucosa normal, no drainage  Throat: Lips, mucosa, and tongue normal; teeth and gums normal  Neck: Supple, symmetrical, trachea midline, no lymphadenopathy  Lungs: Rhonchi present.  No wheezing, good air movement.  Chest Wall: No tenderness or deformity  Heart: RRR, normal S1 and S2. No murmurs, rubs, or gallops  Back: Symmetric, no abnormal curvature, ROM normal, no CVA tenderness  Abdomen: Soft, non-tender, non-distended, bowel sounds present  Extremities: 1+ edema bilaterally.  Left leg with diffuse bruising and large hematoma (see photos below).  Onychomycosis of toenails.  Pulses:  2+ radial, dorsalis pedis  Skin: Normal color, texture, and turgor. No rashes or lesions noted.  Lymph nodes: Cervical, supraclavicular nodes normal  Neurologic: CN II-XII grossly intact. No focal deficits. Normal strength, sensation and reflexes throughout. Normal speech, no facial droop.            Data   Data reviewed today: I reviewed all medications, new labs and imaging results over the last 24 hours.     Recent Labs   Lab 11/03/21  1736   WBC 12.4*    87% neutrophils   HGB 13.7   MCV 86      *   POTASSIUM 3.9   CHLORIDE 94*   CO2 27   BUN 13   CR 0.75   ANIONGAP 11   DENISE 8.6   GLC  126*   ALBUMIN 3.3*   PROTTOTAL 6.1   BILITOTAL 0.8   ALKPHOS 72   ALT 22   AST 22       CRP 10.5    Recent Results (from the past 24 hour(s))   XR Chest Port 1 View    Narrative    EXAM: XR CHEST PORT 1 VIEW  LOCATION: [facility]  DATE/TIME: 11/3/2021 5:26 PM    INDICATION: Shortness of breath  COMPARISON: 10/08/2021       Impression    IMPRESSION: No pleural fluid or pneumothorax. Reticular interstitial opacities in the lungs could be seen with edema or atypical infection. No confluent airspace opacities. The cardiomediastinal silhouette is at the upper limits of normal in size. There   is aortic calcification.    CT Chest Pulmonary Embolism w Contrast    Narrative    EXAM: CT CHEST PULMONARY EMBOLISM W CONTRAST  LOCATION: [facility]  DATE/TIME: 11/3/2021 7:42 PM    INDICATION: PE suspected, high prob. Shortness of breath.  COMPARISON: 10/03/2019.   TECHNIQUE: CT chest pulmonary angiogram during arterial phase injection of IV contrast. Multiplanar reformats and MIP reconstructions were performed. Dose reduction techniques were used.   CONTRAST: Isovue-370 75mL    FINDINGS:  ANGIOGRAM CHEST: Pulmonary arteries are normal caliber and negative for pulmonary emboli. Thoracic aorta is negative for dissection. There is atherosclerotic disease.     LUNGS AND PLEURA: Emphysema is present. There are reticular interstitial opacities and intralobular reticular markings. Nodular opacities in the lungs have decreased from the comparison study. There is debris in the airways in the lower lungs.     MEDIASTINUM/AXILLAE: The heart is enlarged. The esophagus is mildly patulous.     CORONARY ARTERY CALCIFICATION: Mild.    UPPER ABDOMEN: Normal.    MUSCULOSKELETAL: There is surgical hardware in the spine on the  radiograph.       Impression    IMPRESSION:  1.  No PE.  2.  Bronchial wall thickening with debris in the airways. Findings could be seen with aspiration.  3.   Pulmonary nodules have decreased from the comparison study and may partly be infectious or inflammatory in nature.  4.  Emphysema.  5.  Reticular interstitial opacities could be seen with edema or nonspecific upper lung fibrosis.  6.  Cardiomegaly.

## 2021-11-04 NOTE — PHARMACY-ADMISSION MEDICATION HISTORY
Pharmacy Note - Admission Medication History    Pertinent Provider Information: N/A     ______________________________________________________________________    Prior To Admission (PTA) med list completed and updated in EMR.       PTA Med List   Medication Sig Last Dose     acetaminophen (TYLENOL) 500 MG tablet Take 500-1,000 mg by mouth every 6 hours as needed for mild pain Unknown at Unknown time     albuterol (PROAIR HFA/PROVENTIL HFA/VENTOLIN HFA) 108 (90 Base) MCG/ACT inhaler Inhale 2 puffs into the lungs every 4 hours as needed  Unknown at bringing     albuterol (PROVENTIL) (2.5 MG/3ML) 0.083% neb solution 3 ML INHALATION FOUR TIMES A DAY AS NEEDED USE BEFORE NEBULIZED SALINE. FOR SHORTNESS OF BREATH. Unknown at Unknown time     ARIPiprazole (ABILIFY) 5 MG tablet Take 5 mg by mouth every evening  11/2/2021 at PM     atorvastatin (LIPITOR) 20 MG tablet Take 20 mg by mouth every evening  11/2/2021 at PM     calcium carbonate 600 mg-vitamin D 400 units (CALTRATE) 600-400 MG-UNIT per tablet Take 1 tablet by mouth every morning  11/3/2021 at AM     clobetasol (TEMOVATE) 0.05 % external cream Apply 1 applicator topically 2 times daily as needed To legs and elbows Unknown at Unknown time     cyanocobalamin (VITAMIN B-12) 500 MCG tablet Take 500 mcg by mouth every morning  11/3/2021 at AM     diltiazem ER (DILT-XR) 120 MG 24 hr capsule Take 120 mg by mouth every morning  11/3/2021 at AM     Fluticasone-Umeclidin-Vilanterol (TRELEGY ELLIPTA) 100-62.5-25 MCG/INH oral inhaler Inhale 1 puff into the lungs every morning Inhale after sodium chloride neb 11/3/2021 at AM, bringing     furosemide (LASIX) 20 MG tablet Take 2 tablets (40 mg) by mouth every morning 11/3/2021 at AM     gabapentin (NEURONTIN) 300 MG capsule Take 600 mg by mouth every evening  11/2/2021 at PM     ipratropium (ATROVENT) 0.03 % nasal spray Spray 1 spray into both nostrils every morning  11/3/2021 at AM, bringing     metFORMIN (GLUCOPHAGE) 500 MG  tablet Take 1 tablet (500 mg) by mouth 2 times daily (with meals) 11/3/2021 at AM     mineral oil-white petrolatum (EUCERIN) CREA cream Apply 1 g topically 2 times daily Apply to legs and elbows prior to clobetasol 11/3/2021 at AM     omeprazole (PRILOSEC) 20 MG DR capsule Take 20 mg by mouth every morning  11/3/2021 at AM     rivaroxaban ANTICOAGULANT (XARELTO) 20 MG TABS tablet Take 1 tablet (20 mg) by mouth daily (with dinner) 11/2/2021 at 1800     sodium chloride (NEBUSAL) 3 % neb solution Take 4 mLs by nebulization 2 times daily 11/3/2021 at AM     spironolactone (ALDACTONE) 25 MG tablet Take 12.5 mg by mouth every morning  11/3/2021 at AM     Vitamin D3 (VITAMIN D3) 25 mcg (1000 units) tablet Take 1 capsule by mouth every morning  11/3/2021 at AM       Information source(s): Patient and Hospital records  Method of interview communication: phone    Summary of Changes to PTA Med List  New: N/A  Discontinued: N/A  Changed: clobetasol to prn    Patient was asked about OTC/herbal products specifically.  PTA med list reflects this.    In the past week, patient estimated taking medication this percent of the time:  greater than 90%.    Allergies were reviewed, assessed, and updated with the patient.      Medications currently not available for use during hospital stay. Family/Patient representative states they will bring albuterol, Trelegy, Atrovent to [facility].    The information provided in this note is only as accurate as the sources available at the time of the update(s).    Thank you for the opportunity to participate in the care of this patient.    Rivas Quezada [facility]  11/3/2021 7:04 PM

## 2021-11-05 PROBLEM — M79.602 PAIN OF LEFT UPPER EXTREMITY: Status: RESOLVED | Noted: 2021-01-01 | Resolved: 2021-01-01

## 2021-11-05 NOTE — PROGRESS NOTES
Resident/Fellow Attestation   I, Kamryn Begum, was present with the medical/ASTRID student who participated in the service and in the documentation of the note. I have verified the history and personally performed the physical exam and medical decision making. I agree with the assessment and plan of care as documented in the note.     Kamryn Begum MD PGY1  [facility] Medicine Resident  Date of Service (when I saw the patient): 11/05/21    [facility]    Progress Note - Family Medicine Teaching Service Service        Date of Admission:  11/3/2021    Assessment & Plan           Adalgisa Solano is a 85 year old female admitted on 11/3/2021. She has a history of COPD, atrial fibrillation, type 2 diabetes, and HFpEF and is admitted for shortness of breath with increasing oxygen needs.       Acute on Chronic hypoxic respiratory failure   Differentials include viral pneumonia vs legionella pneumonia vs bacterial pneumonia vs CHF exacerbation vs viral URI vs non infectious causes of COPD exacerbation (allergens vs environmental toxins vs cold air) vs generalized deconditioning vs PE     -Patient improved with steroids and antibiotics. Additionally, not showing clinical signs of CHF exacerbation. I would favor this is a COPD exacerbation rather than a CHF exacerbation. There is a likely superimposed chronic worsening of baseline COPD.   -Sputum culture grew primarily gram negative cocci   -Negative COVID and influenza panel and procalcitonin   -CTA negative for PE   -11/5 CRP is increased from 12.3 from 10.5   -Ordered legionella urine test given patient's concurrent hyponatremia  -500mg Azithromycin for five days (today is 3 of 5)  -Prednisone 40mg for five days  -Tessalon and robitussin prn   -Proair 2 puffs q4hr prn   -Duoneb 4x daily   -Wean oxygen today   -CRP worsening WBC improving; continue to trend with AM BMP, CBC, and CRP to trend     Deconditioning   Contributing to poor overall  health  -PT evaluation suggests home with home PT to improve strength      Chronic lower extremity edema   -Continue furosemide 40mg daily  -Continue spironolactone 12.5mg daily      Left upper extremity pain   -Negative upper extremity DVT ultrasound  -PT eval in place      LLE Hematoma   -Chronic; appeared a year ago after a fall   -Patient is on xeralto   -Address as outpatient      A. Fib   -Continue diltiazem, Xarelto      Type II Diabetes   -A1c: 7.1%   -Continue home metformin          Diet: Low Saturated Fat Na <2400 mg    DVT Prophylaxis: Xarelto   Borjas Catheter: Not present  Fluids: P.O.   Central Lines: None  Code Status: Full Code           Disposition Plan   Expected discharge: 11/06/2021   recommended to prior living arrangement once antibiotic plan established and patient returns to baseline oxygen.     The patient's care was discussed with the Attending Physician, Dr. Washington.    Yuko Gracia  Medical Student  Family Medicine Service  [facility]  Securely message with the Vocera Web Console (learn more here)  Text page via [employer] Paging/Directory    __________________________________________________    Interval History   Patient was seen where she was sitting in her chair and eating breakfast. She is feeling better from yesterday noting her energy improved and she feels happier. She did have an episode of nausea last night after duoneb. She did not vomit. Endorsing a cough with sputum, however, improved since yesterday. The color has switched from yellow to clear.     She denies any shortness of breath at rest of with exertions. Denies any chest pain or palpations.     Data reviewed today: I reviewed all medications, new labs and imaging results over the last 24 hours. I personally reviewed no images or EKG's today.    Physical Exam   Vital Signs: Temp: 97.4  F (36.3  C) Temp src: Oral BP: 109/62 Pulse: 93   Resp: 20 SpO2: 95 % O2 Device: Nasal cannula Oxygen Delivery: 3  LPM  Weight: 144 lbs 7 oz  Constitutional: awake, alert, cooperative, no apparent distress, and appears stated age  Respiratory: no increased work of breathing, good air exchange, no retractions and crackles right base, right middle lobe, left base and left middle lobe, wheeze right base, right middle lobe, left base and left middle lobe  Cardiovascular: normal apical pulses  and regularly irregular rhythm  GI: No scars, normal bowel sounds, soft, non-distended, non-tender, no masses palpated, no hepatosplenomegaly  Skin: ecchymosis on right ankle(s).  Musculoskeletal: Pain to palpation of LE b/l.     Data   Recent Labs   Lab 11/05/21  0415 11/04/21  0826 11/03/21  1736   WBC 11.5*  --  12.4*   HGB 12.5  --  13.7   MCV 88  --  86     --  188   *  --  132*   POTASSIUM 4.5  --  3.9   CHLORIDE 94*  --  94*   CO2 27  --  27   BUN 14  --  13   CR 0.63  --  0.75   ANIONGAP 9  --  11   DENISE 8.6  --  8.6   * 163* 126*   ALBUMIN  --   --  3.3*   PROTTOTAL  --   --  6.1   BILITOTAL  --   --  0.8   ALKPHOS  --   --  72   ALT  --   --  22   AST  --   --  22

## 2021-11-05 NOTE — DISCHARGE SUMMARY
Resident/Fellow Attestation   I, Kamryn Begum, was present with the medical/ASTRID student who participated in the service and in the documentation of the note.  I have verified the history and personally performed the physical exam and medical decision making.  I agree with the assessment and plan of care as documented in the note.      Kamryn Begum MD  PGY1    [facility]  Discharge Summary - Medicine & Pediatrics       Date of Admission:  11/3/2021  Date of Discharge:  11/5/2021  Discharging Provider: Dr. Begum   Discharge Service: Family Medicine Service     Discharge Diagnoses   COPD Exacerbation     Follow-ups Needed After Discharge   Patient should follow up with PCP within 1 week for hospital follow up.    Patient should follow up with pulmonologist specialist, Dr. Juarez, within 1-2 weeks for COPD exacerbation.      Discharge Disposition   Discharged to home  Condition at discharge: Stable    Hospital Course   Adalgisa Solano was admitted on 11/3/2021 for shortness of breath and increased oxygen requirements.     Kim was hospitalized from 10/7-10/10 for a COPD exacerbation and was discharged with doxycycline and prednisone. At that time she was on 3L of O2 and self titrated down to her baseline 1L within a week after discharge. She felt much better until early November when she began to noticed increased shortness of breath and cough with yellow sputum. She came back to the [facility] ED on 11/03 with an increased oxygen requirement of 4L.     At [facility] ED the patient received ceftriaxone and azithromycin along with methylprednisone. The patient was admitted to the floor for acute hypoxic respiratory failure possibly due to COPD exacerbation. However, the team was considering cardiac causes as well. Sputum culture and blood culture were gathered.     The next day the patient showed great improvement after the antibiotics and steroids. She was feeling better and only requiring  2L. Additionally, the patient was not showing clinical signs of CHF, so the hypoxia was attributed to COPD exacerbation. Sputum cultures grew moraxella catarrhalis. She was sent home on a 5 day course of Azithromycin and prednisone for COPD exacerbation.     She should be seen by her pulmonologist within the next week.     Of note, the patient has a hematoma on her left leg that has been there for 1 year; should been seen outpatient as it causes her pain.    Consultations This Hospital Stay   PHYSICAL THERAPY ADULT IP CONSULT  PULMONARY IP CONSULT  PULMONARY IP CONSULT  SOCIAL WORK IP CONSULT    Code Status   Full Code     The patient was discussed with Dr. Padmini Gracia  Family Medicine Service  [facility]  [address] [address]  Phone: [phone]  Fax: [phone]  ______________________________________________________________________    Physical Exam   Vital Signs: Temp: 97.4  F (36.3  C) Temp src: Oral BP: 95/71 Pulse: 84   Resp: 20 SpO2: 90 % O2 Device: Nasal cannula Oxygen Delivery: 2 LPM  Weight: 144 lbs 7 oz  Constitutional: awake, alert, cooperative, no apparent distress, and appears stated age  Respiratory: no increased work of breathing, good air exchange and rhonchi diffuse, wheeze diffuse  Cardiovascular: Normal apical impulse, regular rate and rhythm, normal S1 and S2, no S3 or S4, and no murmur noted  GI: No scars, normal bowel sounds, soft, non-distended, non-tender, no masses palpated, no hepatosplenomegally  Skin: Left lower extremity ecchymosis      Primary Care Physician   Shai Ellington    Discharge Orders   No discharge procedures on file.    Significant Results and Procedures   Most Recent 3 CBC's:Recent Labs   Lab Test 11/05/21 0415 11/03/21  1736 10/10/21  0628   WBC 11.5* 12.4* 11.4*   HGB 12.5 13.7 14.5   MCV 88 86 88    188 272     Most Recent 3 BMP's:Recent Labs   Lab Test 11/05/21 0415 11/04/21  0846  11/03/21  1736 10/10/21  1133 10/10/21  0628   *  --  132*  --  135*   POTASSIUM 4.5  --  3.9  --  4.5   CHLORIDE 94*  --  94*  --  95*   CO2 27  --  27  --  33*   BUN 14  --  13  --  15   CR 0.63  --  0.75  --  0.67   ANIONGAP 9  --  11  --  7   DENISE 8.6  --  8.6  --  8.8   * 163* 126*   < > 124    < > = values in this interval not displayed.     Most Recent 3 BNP's:No lab results found.  Most Recent 6 Bacteria Isolates From Any Culture (See EPIC Reports for Culture Details):No lab results found.  Most Recent Hemoglobin A1c:Recent Labs   Lab Test 10/07/21  2325   A1C 7.1*       Discharge Medications   Current Discharge Medication List      CONTINUE these medications which have NOT CHANGED    Details   acetaminophen (TYLENOL) 500 MG tablet Take 500-1,000 mg by mouth every 6 hours as needed for mild pain      albuterol (PROAIR HFA/PROVENTIL HFA/VENTOLIN HFA) 108 (90 Base) MCG/ACT inhaler Inhale 2 puffs into the lungs every 4 hours as needed       albuterol (PROVENTIL) (2.5 MG/3ML) 0.083% neb solution 3 ML INHALATION FOUR TIMES A DAY AS NEEDED USE BEFORE NEBULIZED SALINE. FOR SHORTNESS OF BREATH.  Qty: 600 mL, Refills: 0    Associated Diagnoses: Essential hypertension      ARIPiprazole (ABILIFY) 5 MG tablet Take 5 mg by mouth every evening       atorvastatin (LIPITOR) 20 MG tablet Take 20 mg by mouth every evening       calcium carbonate 600 mg-vitamin D 400 units (CALTRATE) 600-400 MG-UNIT per tablet Take 1 tablet by mouth every morning       clobetasol (TEMOVATE) 0.05 % external cream Apply 1 applicator topically 2 times daily as needed To legs and elbows      cyanocobalamin (VITAMIN B-12) 500 MCG tablet Take 500 mcg by mouth every morning       diltiazem ER (DILT-XR) 120 MG 24 hr capsule Take 120 mg by mouth every morning       Fluticasone-Umeclidin-Vilanterol (TRELEGY ELLIPTA) 100-62.5-25 MCG/INH oral inhaler Inhale 1 puff into the lungs every morning Inhale after sodium chloride neb      furosemide  (LASIX) 20 MG tablet Take 2 tablets (40 mg) by mouth every morning  Qty: 60 tablet, Refills: 11    Associated Diagnoses: Acute combined systolic and diastolic CHF, NYHA class 1 (H)      gabapentin (NEURONTIN) 300 MG capsule Take 600 mg by mouth every evening       ipratropium (ATROVENT) 0.03 % nasal spray Spray 1 spray into both nostrils every morning       metFORMIN (GLUCOPHAGE) 500 MG tablet Take 1 tablet (500 mg) by mouth 2 times daily (with meals)  Qty: 60 tablet, Refills: 11    Associated Diagnoses: Essential hypertension      mineral oil-white petrolatum (EUCERIN) CREA cream Apply 1 g topically 2 times daily Apply to legs and elbows prior to clobetasol      omeprazole (PRILOSEC) 20 MG DR capsule Take 20 mg by mouth every morning       rivaroxaban ANTICOAGULANT (XARELTO) 20 MG TABS tablet Take 1 tablet (20 mg) by mouth daily (with dinner)  Qty: 90 tablet, Refills: 0    Associated Diagnoses: Atrial fibrillation with RVR (H)      sodium chloride (NEBUSAL) 3 % neb solution Take 4 mLs by nebulization 2 times daily  Qty: 240 mL, Refills: 11    Associated Diagnoses: Bronchiectasis (H)      spironolactone (ALDACTONE) 25 MG tablet Take 12.5 mg by mouth every morning       Vitamin D3 (VITAMIN D3) 25 mcg (1000 units) tablet Take 1 capsule by mouth every morning       Blood Glucose Monitoring Suppl (ONETOUCH VERIO FLEX SYSTEM) w/Device KIT 1 each daily  Qty: 1 kit, Refills: 0    Associated Diagnoses: Type 2 diabetes mellitus with complication, without long-term current use of insulin (H)      OneTouch Delica Lancets 33G MISC 1 each daily  Qty: 100 each, Refills: 4    Associated Diagnoses: Type 2 diabetes mellitus with complication, without long-term current use of insulin (H)      ONETOUCH VERIO IQ test strip Use to test blood sugar 1 times daily.  Qty: 50 strip, Refills: 11    Associated Diagnoses: Type 2 diabetes mellitus with complication, without long-term current use of insulin (H)      OXYGEN-HELIUM IN 3 LPM when  resting and at night and 5 LPM with activity  [employer]           Allergies   Allergies   Allergen Reactions     Tramadol Nausea     Amoxicillin Itching and Rash     Levofloxacin Itching and Rash     Penicillins Itching and Rash     Has tolerated ceftriaxone.

## 2021-11-05 NOTE — PLAN OF CARE
Problem: Gas Exchange Impaired  Goal: Optimal Gas Exchange  Outcome: Improving  Intervention: Optimize Oxygenation and Ventilation  Recent Flowsheet Documentation  Taken 11/4/2021 2312 by Faby Jang RN  Head of Bed (HOB) Positioning: HOB at 30-45 degrees   O2 3L/NC. MEYER, per patient improved from admission. Nonproductive harsh cough.  Problem: COPD Comorbidity  Goal: Maintenance of COPD Symptom Control  Outcome: Improving     Problem: Hypertension Comorbidity  Goal: Blood Pressure in Desired Range  Outcome: Improving

## 2021-11-05 NOTE — ED NOTES
Placed orders for EKG.  Date of exam was on 16:30 at 11/03/2021  Mari Skelton RN 11/5/2021 5:07 AM

## 2021-11-05 NOTE — ED NOTES
Placed orders for EKG.  Date of exam was on 11/03/2021 at 16:30  Mari Skelton RN 11/5/2021 5:06 AM

## 2021-11-05 NOTE — CONSULTS
Care Management Initial Consult    General Information  Assessment completed with: Patient,    Type of CM/SW Visit: Initial Assessment    Primary Care Provider verified and updated as needed: Yes   Readmission within the last 30 days: no previous admission in last 30 days      Reason for Consult: discharge planning  Advance Care Planning: Advance Care Planning Reviewed: verified with patient          Communication Assessment  Patient's communication style: spoken language (English or Bilingual)    Hearing Difficulty or Deaf: no   Wear Glasses or Blind: no    Cognitive  Cognitive/Neuro/Behavioral: WDL                      Living Environment:   People in home: spouse     Current living Arrangements: house      Able to return to prior arrangements: yes       Family/Social Support:  Care provided by: self  Provides care for: no one  Marital Status:             Description of Support System: Supportive, Involved    Support Assessment: Adequate social supports    Current Resources:   Patient receiving home care services: No     Community Resources: None  Equipment currently used at home: cane, straight, grab bar, toilet, grab bar, tub/shower, walker, rolling, shower chair  Supplies currently used at home: Oxygen Tubing/Supplies    Employment/Financial:  Employment Status: retired        Financial Concerns: No concerns identified   Referral to Financial Counselor: No       Lifestyle & Psychosocial Needs:  Social Determinants of Health     Tobacco Use: Medium Risk     Smoking Tobacco Use: Former Smoker     Smokeless Tobacco Use: Never Used   Alcohol Use:      Frequency of Alcohol Consumption:      Average Number of Drinks:      Frequency of Binge Drinking:    Financial Resource Strain:      Difficulty of Paying Living Expenses:    Food Insecurity:      Worried About Running Out of Food in the Last Year:      Ran Out of Food in the Last Year:    Transportation Needs:      Lack of Transportation (Medical):       Lack of Transportation (Non-Medical):    Physical Activity:      Days of Exercise per Week:      Minutes of Exercise per Session:    Stress:      Feeling of Stress :    Social Connections:      Frequency of Communication with Friends and Family:      Frequency of Social Gatherings with Friends and Family:      Attends Christianity Services:      Active Member of Clubs or Organizations:      Attends Club or Organization Meetings:      Marital Status:    Intimate Partner Violence:      Fear of Current or Ex-Partner:      Emotionally Abused:      Physically Abused:      Sexually Abused:    Depression: Not at risk     PHQ-2 Score: 0   Housing Stability:      Unable to Pay for Housing in the Last Year:      Number of Places Lived in the Last Year:      Unstable Housing in the Last Year:        Functional Status:  Prior to admission patient needed assistance:   Dependent ADLs:: Bathing, Ambulation-walker  Dependent IADLs:: Cleaning, Cooking, Laundry, Shopping, Meal Preparation, Medication Management, Transportation  Assesssment of Functional Status: At functional baseline    Mental Health Status:  Mental Health Status: No Current Concerns       Chemical Dependency Status:  Chemical Dependency Status: No Current Concerns             Values/Beliefs:  Spiritual, Cultural Beliefs, Christianity Practices, Values that affect care: no               Additional Information:  JAMES assessed. Pt resides at home with spouse and is independent at baseline although her spouse is available to provide support as needed. She uses a 4ww and straight cane for mobility, as well as a shower chair, grab bars, nebs and oxygen through [employer]. Pt uses 1L NC PRN at baseline. Pt's goal is to return home at discharge with spouse transport, PT recommending home therapy.    Kandace Jerome, ISAAKSW

## 2021-11-05 NOTE — PROGRESS NOTES
"Care Management Discharge Note    Discharge Date: 11/05/2021       Discharge Disposition: Home    Discharge Services: None    Discharge DME: None    Discharge Transportation: family or friend will provide    Private pay costs discussed: Not applicable    Education Provided on the Discharge Plan:  yes  Persons Notified of Discharge Plans: patient,   Patient/Family in Agreement with the Plan: yes    Handoff Referral Completed: No    Additional Information:  The writer called and spoke with the patient regarding her discharge plan. The patient denied the need for any home care services, and she stated that she and her  are \"more than capable\" of taking care of their needs. The patient's  will provide discharge transportation.    Ronnie Rivera RN        "

## 2021-11-06 NOTE — PLAN OF CARE
Physical Therapy Discharge Summary    Reason for therapy discharge:    Discharged to home.    Progress towards therapy goal(s). See goals on Care Plan in [facility] electronic health record for goal details.  Goals partially met.  Barriers to achieving goals:   limited tolerance for therapy.    Therapy recommendation(s):    No further therapy is recommended.

## 2021-11-08 NOTE — PROGRESS NOTES
Clinic Care Coordination Contact  [facility]/Voicemail       Clinical Data: Care Coordinator Outreach  Outreach attempted x 1.  Left message on patient's voicemail with call back information and requested return call.   Care Coordinator will try to reach patient again in 1-2 business days.      Chloé Torres  [phone]  Care

## 2021-11-09 NOTE — PATIENT INSTRUCTIONS
1. Continue to exercise daily as able.  2. Schedule eye exam.  3. Continue to have a carbohydrate and protein source with all meals and snacks.  4. Avoid all sugary beverages.  5. Follow up with Dr. Ellington on 11/16/21 and ask about:  **Scheduling a visit with an onsite pharmacist for medication reviewed  **Having corns removed with Podiatry  **tingling you have noticed in your arms      Educator will follow up on [employer] CGM system order with Dr. Ellington.

## 2021-11-09 NOTE — PROGRESS NOTES
"Diabetes Self-Management Education & Support    Presents for: Follow-up    SUBJECTIVE/OBJECTIVE:  Presents for: Follow-up  Accompanied by: Self,Spouse,Daughter (- Anabel and daughter- Veronika, accompanied Kim)  Diabetes education in the past 24mo: Yes  Focus of Visit: Monitoring,Reducing Risks,Taking Medication,Healthy Eating,Being Active  Diabetes type: Type 2  Date of diagnosis: 10/7/21  Disease course: (P) Other  Diabetes management related comments/concerns: (P) I need more education.  Other concerns:: None  Cultural Influences/Ethnic Background:  Not  or       Diabetes Symptoms & Complications:  Fatigue: No  Neuropathy: (P) No  Polydipsia: (P) Yes  Polyphagia: (P) Yes  Polyuria: (P) Yes  Visual change: (P) Sometimes  Slow healing wounds: (P) No  Autonomic neuropathy: (P) Other  CVA: (P) No  Heart disease: (P) Yes  Nephropathy: (P) No  Peripheral neuropathy: (P) No  Peripheral Vascular Disease: (P) Yes  Retinopathy: (P) No  Sexual dysfunction: (P) Other    Patient Problem List and Family Medical History reviewed for relevant medical history, current medical status, and diabetes risk factors.    Vitals:  There were no vitals taken for this visit.  Estimated body mass index is 26.42 kg/m  as calculated from the following:    Height as of 11/4/21: 1.575 m (5' 2\").    Weight as of 11/4/21: 65.5 kg (144 lb 7 oz).   Last 3 BP:   BP Readings from Last 3 Encounters:   11/05/21 107/58   10/21/21 106/60   10/12/21 110/60       History   Smoking Status     Former Smoker     Quit date: 1/1/2006   Smokeless Tobacco     Never Used     Comment: quit 2006       Labs:  Lab Results   Component Value Date    A1C 7.1 10/07/2021     Lab Results   Component Value Date     11/05/2021     Lab Results   Component Value Date    LDL 61 06/24/2021     Direct Measure HDL   Date Value Ref Range Status   06/24/2021 58 >=50 mg/dL Final   ]  GFR Estimate   Date Value Ref Range Status   11/05/2021 83 >60 mL/min/1.73m2 " Final     Comment:     As of July 11, 2021, eGFR is calculated by the CKD-EPI creatinine equation, without race adjustment. eGFR can be influenced by muscle mass, exercise, and diet. The reported eGFR is an estimation only and is only applicable if the renal function is stable.   06/24/2021 >60 >60 mL/min/1.73m2 Final     GFR Estimate If Black   Date Value Ref Range Status   06/24/2021 >60 >60 mL/min/1.73m2 Final     Lab Results   Component Value Date    CR 0.63 11/05/2021     No results found for: MICROALBUMIN    Healthy Eating:  Healthy Eating Assessed Today: Yes  Cultural/Religion diet restrictions?: (P) No  Meal planning/habits: Frequent snacking  How many times a week on average do you eat food made away from home (restaurant/take-out)?: (P) 1  Meals include: Breakfast,Lunch,Dinner  Breakfast: eggs with low calorie toast or honey nut cheerios/milk  Lunch: salad/dwayne eduard low calorie bread or a meat/cheese sandwich or chicken noodle soup  Dinner: meat/vegetables/potato or pizza or pasta or grilled meats  Snacks: peanut butter toast, or peanut butter with an apple or cheese/crackers  Beverages: Water,Coffee  Has patient met with a dietitian in the past?: Yes    Being Active:  Being Active Assessed Today: Yes (patient is doing exercises for breathing, and using some hand weights and other exercises from a chair every day)  Exercise:: Yes  Days per week of moderate to strenuous exercise (like a brisk walk): 7  On average, minutes per day of exercise at this level: (P) 40  How intense was your typical exercise? : Light (like stretching or slow walking)  Exercise Minutes per Week: (P) 200  Barrier to exercise: None    Monitoring:  Monitoring Assessed Today: Yes (patient having difficulty with testing BG, she has had a hard time gettting a blood sample and her fingers are sore)  Did patient bring glucose meter to appointment? : Yes  Blood Glucose Meter: One Touch  Times checking blood sugar at home (number): (P)  Other  Blood glucose trend: (P) Increasing        Taking Medications:  Diabetes Medication(s)     Biguanides       metFORMIN (GLUCOPHAGE) 500 MG tablet    Take 1 tablet (500 mg) by mouth 2 times daily (with meals)          Current Treatments: Diet,Oral Medication (taken by mouth)  Problems taking diabetes medications regularly?: Yes  Diabetes medication side effects?: No    Problem Solving:                 Reducing Risks:  Diabetes Risks: Age over 45 years  Has dilated eye exam at least once a year?: No (no eye exam since before COVID)  Sees dentist every 6 months?: No (patient wears dentures, she reported that they are fitting well)    Healthy Coping:  Emotional response to diabetes: Ready to learn  Informal Support system:: Children,Spouse  Stage of change: ACTION (Actively working towards change)  Patient Activation Measure Survey Score:  No flowsheet data found.    Diabetes knowledge and skills assessment:   Patient is knowledgeable in diabetes management concepts related to: Being Active and Taking Medication    Patient needs further education on the following diabetes management concepts: Healthy Eating, Monitoring and Reducing Risks    Based on learning assessment above, most appropriate setting for further diabetes education would be: Individual setting.      INTERVENTIONS:    Education provided today on:  AADE Self-Care Behaviors:  Healthy Eating: carbohydrate counting, consistency in amount, composition, and timing of food intake, heart healthy diet and portion control  Being Active: describe appropriate activity program  Monitoring: discussed CGM system  Reducing Risks: major complications of diabetes, foot care, appropriate dental care, annual eye exam, smoking cessation, A1C - goals, relating to blood glucose levels, how often to check, lipids levels and goals and blood pressure and goals    Opportunities for ongoing education and support in diabetes-self management were discussed.    Pt verbalized  understanding of concepts discussed and recommendations provided today.       Education Materials Provided:  Living Healthy with Diabetes      ASSESSMENT:  Follow up visit for Kim today, who was accompanied by her daughter, Veronika and , Anabel. Kim was having difficulty testing her BG at home, she reported having a hard time getting a blood sample and that her fingers were all sore. She is interested in learning about how her eating affects her BG and would like to see if she could use a CGM. She is exercising daily at home.  She has been adding in snacks between her meals and has avoided sugary beverages. Last eye exam was before COVID, no dental cleaning due to patient wearing dentures.         Patient's most recent   Lab Results   Component Value Date    A1C 7.1 10/07/2021    is meeting goal of <8.0    PLAN  1. Continue to exercise daily as able.  2. Schedule eye exam.  3. Continue to have a carbohydrate and protein source with all meals and snacks.  4. Avoid all sugary beverages.  5. Follow up with Dr. Ellington on 11/16/21       Educator will follow up on [employer] CGM system order with Dr. Ellington.       See Patient Instructions for co-developed, patient-stated behavior change goals.  AVS printed and provided to patient today. See Follow-Up section for recommended follow-up.      Time Spent: 60 minutes  Encounter Type: Individual    Any diabetes medication dose changes were made via the CDE Protocol and Collaborative Practice Agreement with the patient's primary care provider. A copy of this encounter was shared with the provider.

## 2021-11-09 NOTE — PROGRESS NOTES
Clinic Care Coordination Contact  [facility]/Voicemail       Clinical Data: Care Coordinator Outreach  Outreach attempted x 2.  Left message on patient's voicemail with call back information and requested return call.  Plan:  Care Coordinator will do no further outreaches at this time.    Daniela HECTOR Community Health Worker  Clinic Care Coordination  [facility]  Phone: [phone]

## 2021-11-09 NOTE — LETTER
"    11/9/2021         RE: Adalgisa Solano  [address] [facility] [address]        Dear Colleague,    Thank you for referring your patient, Adalgisa Solano, to the [facility]. Please see a copy of my visit note below.    Diabetes Self-Management Education & Support    Presents for: Follow-up    SUBJECTIVE/OBJECTIVE:  Presents for: Follow-up  Accompanied by: Self,Spouse,Daughter (- Anabel and daughter- Veronika, accompanied Kim)  Diabetes education in the past 24mo: Yes  Focus of Visit: Monitoring,Reducing Risks,Taking Medication,Healthy Eating,Being Active  Diabetes type: Type 2  Date of diagnosis: 10/7/21  Disease course: (P) Other  Diabetes management related comments/concerns: (P) I need more education.  Other concerns:: None  Cultural Influences/Ethnic Background:  Not  or       Diabetes Symptoms & Complications:  Fatigue: No  Neuropathy: (P) No  Polydipsia: (P) Yes  Polyphagia: (P) Yes  Polyuria: (P) Yes  Visual change: (P) Sometimes  Slow healing wounds: (P) No  Autonomic neuropathy: (P) Other  CVA: (P) No  Heart disease: (P) Yes  Nephropathy: (P) No  Peripheral neuropathy: (P) No  Peripheral Vascular Disease: (P) Yes  Retinopathy: (P) No  Sexual dysfunction: (P) Other    Patient Problem List and Family Medical History reviewed for relevant medical history, current medical status, and diabetes risk factors.    Vitals:  There were no vitals taken for this visit.  Estimated body mass index is 26.42 kg/m  as calculated from the following:    Height as of 11/4/21: 1.575 m (5' 2\").    Weight as of 11/4/21: 65.5 kg (144 lb 7 oz).   Last 3 BP:   BP Readings from Last 3 Encounters:   11/05/21 107/58   10/21/21 106/60   10/12/21 110/60       History   Smoking Status     Former Smoker     Quit date: 1/1/2006   Smokeless Tobacco     Never Used     Comment: quit 2006       Labs:  Lab Results   Component Value Date    A1C 7.1 10/07/2021     Lab Results   Component Value " Date     11/05/2021     Lab Results   Component Value Date    LDL 61 06/24/2021     Direct Measure HDL   Date Value Ref Range Status   06/24/2021 58 >=50 mg/dL Final   ]  GFR Estimate   Date Value Ref Range Status   11/05/2021 83 >60 mL/min/1.73m2 Final     Comment:     As of July 11, 2021, eGFR is calculated by the CKD-EPI creatinine equation, without race adjustment. eGFR can be influenced by muscle mass, exercise, and diet. The reported eGFR is an estimation only and is only applicable if the renal function is stable.   06/24/2021 >60 >60 mL/min/1.73m2 Final     GFR Estimate If Black   Date Value Ref Range Status   06/24/2021 >60 >60 mL/min/1.73m2 Final     Lab Results   Component Value Date    CR 0.63 11/05/2021     No results found for: MICROALBUMIN    Healthy Eating:  Healthy Eating Assessed Today: Yes  Cultural/Yarsani diet restrictions?: (P) No  Meal planning/habits: Frequent snacking  How many times a week on average do you eat food made away from home (restaurant/take-out)?: (P) 1  Meals include: Breakfast,Lunch,Dinner  Breakfast: eggs with low calorie toast or honey nut cheerios/milk  Lunch: salad/dwayne eduard low calorie bread or a meat/cheese sandwich or chicken noodle soup  Dinner: meat/vegetables/potato or pizza or pasta or grilled meats  Snacks: peanut butter toast, or peanut butter with an apple or cheese/crackers  Beverages: Water,Coffee  Has patient met with a dietitian in the past?: Yes    Being Active:  Being Active Assessed Today: Yes (patient is doing exercises for breathing, and using some hand weights and other exercises from a chair every day)  Exercise:: Yes  Days per week of moderate to strenuous exercise (like a brisk walk): 7  On average, minutes per day of exercise at this level: (P) 40  How intense was your typical exercise? : Light (like stretching or slow walking)  Exercise Minutes per Week: (P) 200  Barrier to exercise: None    Monitoring:  Monitoring Assessed Today: Yes  (patient having difficulty with testing BG, she has had a hard time gettting a blood sample and her fingers are sore)  Did patient bring glucose meter to appointment? : Yes  Blood Glucose Meter: One Touch  Times checking blood sugar at home (number): (P) Other  Blood glucose trend: (P) Increasing        Taking Medications:  Diabetes Medication(s)     Biguanides       metFORMIN (GLUCOPHAGE) 500 MG tablet    Take 1 tablet (500 mg) by mouth 2 times daily (with meals)          Current Treatments: Diet,Oral Medication (taken by mouth)  Problems taking diabetes medications regularly?: Yes  Diabetes medication side effects?: No    Problem Solving:                 Reducing Risks:  Diabetes Risks: Age over 45 years  Has dilated eye exam at least once a year?: No (no eye exam since before COVID)  Sees dentist every 6 months?: No (patient wears dentures, she reported that they are fitting well)    Healthy Coping:  Emotional response to diabetes: Ready to learn  Informal Support system:: Children,Spouse  Stage of change: ACTION (Actively working towards change)  Patient Activation Measure Survey Score:  No flowsheet data found.    Diabetes knowledge and skills assessment:   Patient is knowledgeable in diabetes management concepts related to: Being Active and Taking Medication    Patient needs further education on the following diabetes management concepts: Healthy Eating, Monitoring and Reducing Risks    Based on learning assessment above, most appropriate setting for further diabetes education would be: Individual setting.      INTERVENTIONS:    Education provided today on:  AADE Self-Care Behaviors:  Healthy Eating: carbohydrate counting, consistency in amount, composition, and timing of food intake, heart healthy diet and portion control  Being Active: describe appropriate activity program  Monitoring: discussed CGM system  Reducing Risks: major complications of diabetes, foot care, appropriate dental care, annual eye exam,  smoking cessation, A1C - goals, relating to blood glucose levels, how often to check, lipids levels and goals and blood pressure and goals    Opportunities for ongoing education and support in diabetes-self management were discussed.    Pt verbalized understanding of concepts discussed and recommendations provided today.       Education Materials Provided:  Living Healthy with Diabetes      ASSESSMENT:  Follow up visit for Kim today, who was accompanied by her daughter, Veronika and , Anabel. Kim was having difficulty testing her BG at home, she reported having a hard time getting a blood sample and that her fingers were all sore. She is interested in learning about how her eating affects her BG and would like to see if she could use a CGM. She is exercising daily at home.  She has been adding in snacks between her meals and has avoided sugary beverages. Last eye exam was before COVID, no dental cleaning due to patient wearing dentures.         Patient's most recent   Lab Results   Component Value Date    A1C 7.1 10/07/2021    is meeting goal of <8.0    PLAN  1. Continue to exercise daily as able.  2. Schedule eye exam.  3. Continue to have a carbohydrate and protein source with all meals and snacks.  4. Avoid all sugary beverages.  5. Follow up with Dr. Ellington on 11/16/21       Educator will follow up on [employer] CGM system order with Dr. Ellington.       See Patient Instructions for co-developed, patient-stated behavior change goals.  AVS printed and provided to patient today. See Follow-Up section for recommended follow-up.      Time Spent: 60 minutes  Encounter Type: Individual    Any diabetes medication dose changes were made via the CDE Protocol and Collaborative Practice Agreement with the patient's primary care provider. A copy of this encounter was shared with the provider.

## 2021-11-16 NOTE — PROGRESS NOTES
"Assessment/Plan:    COPD and exacerbation with recent hospital stay [facility].  November 3 through November 5, 2021 questions regarding medications.  On supplemental nasal oxygen 9302% saturation on 2 L nasal cannula.  Accompanied by  very supportive.  Dr. Jun Juarez pulmonologist following.    Hypertension with chronic atrial fibrillation and pulmonary hypertension followed by Dr. Conrado Marquez  from cardiology upcoming appointment Friday next.    Dyspepsia Pepcid and lieu of omeprazole fewer drug drug interactions.    25 minutes spent on the date of the encounter doing chart review, patient visit, documentation and discussion with family     Subjective:  Adalgisa Solano is a 86 year old female presents for the following health issues hospital follow-up [facility] COPD November 3-5, 2021 questions regarding medications answered.    Cardiology appointment this coming Friday.  Followed by Dr. Jun Juarez from a pulmonary medicine standpoint.  Pepcid preferable to omeprazole.  Fewer drug-drug interactions.    ROS:  No blood in stool or urine feels better nasal O2 at 2 L O2 sats 93% denies chest pain shortness of breath currently medication list reviewed reconciled.  Non-smoker currently no excess alcohol.  Drug allergies are listed reviewed including tramadol amoxicillin levofloxacin and penicillin all causing pruritus and rash.    Objective:  /58 (BP Location: Right arm, Patient Position: Sitting)   Pulse 90   Ht 1.575 m (5' 2\")   Wt 64.4 kg (142 lb)   SpO2 93%   BMI 25.97 kg/m    Chest revealed some rhonchi and expiration the heart tones were regular the carotids were full there were no carotid bruits she is wheelchair-bound accompanied by her  is very supportive the abdomen was slightly protuberant there was no abdominal tenderness no liver or spleen tip palpable bowel sounds were present but hypoactive there was only trace edema noted lower extremities there was no neck " vein distention on examination of her jugular veins right neck.  Answers for HPI/ROS submitted by the patient on 11/10/2021  Current status of COPD symptom:: better  Status of fatigue and dyspnea with ambulation:: same as usual  Status of dyspnea:: same as usual  Increase or change in cough or sputum:: sometimes  Have you noticed a change in your sputum/phlegm?: Yes  Have you experienced a recent fever?: No  Baseline ambulation without stopping to rest:: the length of 3-5 rooms  Number of flights of stairs without resting:: 1  Have you had any Emergency Room, Urgent Care visits or a Hospital admission because of your COPD since your last office visit?: Yes  How many times have you gone to the ER, Urgent Care, or been hospitalized for COPD since your last clinic visit?: 1  How many servings of fruits and vegetables do you eat daily?: 4 or more  On average, how many sweetened beverages do you drink each day (Examples: soda, juice, sweet tea, etc.  Do NOT count diet or artificially sweetened beverages)?: 0  How many minutes a day do you exercise enough to make your heart beat faster?: 30 to 60  How many days a week do you exercise enough to make your heart beat faster?: 4  How many days per week do you miss taking your medication?: 0

## 2021-11-16 NOTE — LETTER
November 16, 2021      Adalgisa P Russ  [address] [facility] [address]        Dear ,    We are writing to inform you of your test results.    All very good       Resulted Orders   Lipid panel reflex to direct LDL Fasting   Result Value Ref Range    Cholesterol 145 <=199 mg/dL    Triglycerides 65 <=149 mg/dL    Direct Measure HDL 65 >=50 mg/dL      Comment:      HDL Cholesterol Reference Range:     0-2 years:   No reference ranges established for patients under 2 years old  at [facility] for lipid analytes.    2-8 years:  Greater than 45 mg/dL     18 years and older:   Female: Greater than or equal to 50 mg/dL   Male:   Greater than or equal to 40 mg/dL    LDL Cholesterol Calculated 67 <=129 mg/dL    Patient Fasting > 8hrs? Yes        If you have any questions or concerns, please call the clinic at the number listed above.       Sincerely,      Shai Ellington MD

## 2021-11-18 NOTE — TELEPHONE ENCOUNTER
----- Message from Yakelin YOLANDA Tolentino sent at 11/18/2021 12:05 PM CST -----  Regarding: LBF  General phone call:    Caller: Kim  Primary cardiologist: DAVID  Detailed reason for call: Pt is calling and states her BP is low today. Pt states she spoke with BARON Veliz in triage and was told to see LBF within 4 hours    Best phone number: [phone]  Best time to contact: anytime  Ok to leave a detailedmessage? yes  Device? no    Additional Info:       ==Called back Kim to address her concerns. She states that after taking all her AM diuretics and blood pressure pills, her systolic BP is between 88-90.  This is hours after her AM pills. She denies any symptoms whatsoever- no dizziness or lightheadedness or febrile illness. She called nurse triage and they told her to be seen at PMD within 4 hours per their protocol. She called cardiology. She has an appt with LBF tomorrow. She is not symptomatic. She was encouraged to change position slowly, drink plenty of fluids and call back PMD office if she would like to get an appt there or use walk in care. DAVID is not in clinic this afternoon. She wonders if she should go to the ER. Writer stated that this did not seem necessary unless she was feeling severely symptomatic, which she is not. Reassured her. Will update triage nurse that we cannot see this patient within 4 hours. -[facility],  This patient was transferred to us based on low BP and triage protocol to have her be seen in 4 hours. We have no ability to see her in clinic today - please call patient back and see about getting into PMD office to manage blood pressure- she has not seen Dr. Marquez in nearly a year. She is not having any symptoms at this time; I encouraged her to monitor symptoms and drink fluids.  Appt with cardiology as scheduled tomorrow.  Thanks  Yolanda

## 2021-11-18 NOTE — TELEPHONE ENCOUNTER
"Triage Call:     Blood pressure:  Top number is readin, 89. 91  HR: 75-80  No dizziness or lightheadedness    She checks her Bp at home regularlyHer last BP in office was 110/58 in the clinic  Pt has afib  Surgery in Sept. 2 hernias repaired    Pt reports that she has an appointment with her cardiologist tomorrow.     Disposition: See HCP within 4 hours. Pt was transferred to [facility] to see if she can be seen today.    Keren Ambriz RN  [facility] Nurse Advisor 11:49 AM 2021      Reason for Disposition    [1] Systolic BP < 90 AND [2] NOT dizzy, lightheaded or weak    Additional Information    Negative: Started suddenly after an allergic medicine, an allergic food, or bee sting    Negative: Shock suspected (e.g., cold/pale/clammy skin, too weak to stand, low BP, rapid pulse)    Negative: Difficult to awaken or acting confused (e.g., disoriented, slurred speech)    Negative: Fainted    Negative: [1] Systolic BP < 90 AND [2] dizzy, lightheaded, or weak    Negative: Chest pain    Negative: Bleeding (e.g., vomiting blood, rectal bleeding or tarry stools, severe vaginal bleeding)(Exception: fainted from sight of small amount of blood; small cut or abrasion)    Negative: Extra heart beats or heart is beating fast  (i.e., \"palpitations\")    Negative: Sounds like a life-threatening emergency to the triager    Negative: [1] Systolic BP < 80 AND [2] NOT dizzy, lightheaded or weak    Negative: Abdominal pain    Negative: Fever > 100.4 F (38.0 C)    Negative: Major surgery in the past month    Negative: [1] Drinking very little AND [2] dehydration suspected (e.g., no urine > 12 hours, very dry mouth, very lightheaded)    Negative: [1] Fall in systolic BP > 20 mm Hg from normal AND [2] dizzy, lightheaded, or weak    Negative: Patient sounds very sick or weak to the triager    Protocols used: LOW BLOOD PRESSURE-A-AH      "

## 2021-11-19 NOTE — PATIENT INSTRUCTIONS
Ms Adalgisa Solano,  I enjoyed visiting with you again today.  I am glad to hear you are doing well.  Per our conversation cut the AM FUROSEMIDE to only 20 mg a day.  I will plan on seeing you see one of nurses next week to make sure not filling up with fluid.  Francis Marquez

## 2021-11-19 NOTE — LETTER
11/19/2021    Shai Ellington MD  [address] [facility] [address]    RE: Adalgisa Solano       Dear Colleague,    I had the pleasure of seeing Adalgisa Solano in the [facility].        [facility]  Heart Care Clinic Follow-up Note    Assessment & Plan        (I48.91) Atrial fibrillation with RVR (H)  (primary encounter diagnosis)  Comment: permanent, asymptomatic and not valvular and on chronic anticoagulation with Xarelto given her CHADS 2 VASC score of 4, this dose appropriate considering age, renal function as well as weight.  Using diltiazem for rate control, very hesitant to pull this away.  If blood pressure remains low, might need to consider discontinuing this and starting digoxin or possibly AV kwame ablation.    (R06.00) Dyspnea, unspecified type  Comment: Most likely secondary to COPD.  She did have some vague left arm discomfort, is worried about her heart although stress test was normal in 2017.  Will check coronary CTA.  Doubt it was heart failure given normal BNP.    (J43.1) Panlobular emphysema (H)  Comment: As above, with COPD, assume this is the main culprit.    (E11.9) Type 2 diabetes mellitus without complication, without long-term current use of insulin (H)  Comment: So noted with hemoglobin A1c of 7.1.    (I10) Primary hypertension  Comment: Concerned that her blood pressure is low around high 80s or low 90s although asymptomatic.  Decrease Lasix to only 20 mg a day and have her follow-up with heart failure nurse practitioners in a week.  If needed could also lower dose of Aldactone.    (I27.20) Pulmonary hypertension (H)  Comment: At least moderate with estimated pressures of 60 mmHg, most likely WHO class III due to COPD.    (I50.41) Acute combined systolic and diastolic CHF, NYHA class 1 (H)  Comment: No significant signs or symptoms currently in light of preserved ejection fraction of 63% and normal BNP.   Given this and low blood pressure will back off on Lasix.    Plan  1.  Decrease Lasix down to only 20 mg a day.  2.  If blood pressure remains down we will need to back off on dose of Cardizem as well as possibly consider digoxin for rate control or stop Cardizem altogether.  3.  CTA given shortness breath or early coronary disease.  4.  Heart failure nurse practitioner in 1 to 2 weeks to make sure she does not start retaining water.  5.  Follow-up with me thereafter.    Subjective  CC: 86-year-old white female being seen in follow-up today.  Since I seen her she was hospitalized for COPD acute exacerbation, she was told she has some heart failure although BNP was normal.  She admits to shortness of breath and heavy activity but is very busy throughout the day using oxygen 24/7.  She does have some mild bipedal edema but needs compression hose.  She had a vague left arm pain as well as left arm numbness which is now constant.  There is no significant chest discomfort, palpitations, PND, orthopnea.  She does however sleep in a recliner secondary to back discomfort.    Medications  Current Outpatient Medications   Medication Sig Dispense Refill     acetaminophen (TYLENOL) 500 MG tablet Take 500-1,000 mg by mouth every 6 hours as needed for mild pain        albuterol (PROAIR HFA/PROVENTIL HFA/VENTOLIN HFA) 108 (90 Base) MCG/ACT inhaler Inhale 2 puffs into the lungs every 4 hours as needed        albuterol (PROVENTIL) (2.5 MG/3ML) 0.083% neb solution 3 ML INHALATION FOUR TIMES A DAY AS NEEDED USE BEFORE NEBULIZED SALINE. FOR SHORTNESS OF BREATH. 600 mL 0     ARIPiprazole (ABILIFY) 5 MG tablet Take 5 mg by mouth every evening        atorvastatin (LIPITOR) 20 MG tablet Take 20 mg by mouth every evening        calcium carbonate 600 mg-vitamin D 400 units (CALTRATE) 600-400 MG-UNIT per tablet Take 1 tablet by mouth every morning        clobetasol (TEMOVATE) 0.05 % external cream Apply 1 applicator topically 2 times daily as  "needed To legs and elbows       cyanocobalamin (VITAMIN B-12) 500 MCG tablet Take 500 mcg by mouth every morning        diltiazem ER (DILT-XR) 120 MG 24 hr capsule Take 120 mg by mouth every morning        Fluticasone-Umeclidin-Vilanterol (TRELEGY ELLIPTA) 100-62.5-25 MCG/INH oral inhaler Inhale 1 puff into the lungs every morning Inhale after sodium chloride neb       furosemide (LASIX) 20 MG tablet Take 2 tablets (40 mg) by mouth every morning 60 tablet 11     gabapentin (NEURONTIN) 300 MG capsule Take 600 mg by mouth every evening        ipratropium (ATROVENT) 0.03 % nasal spray Spray 1 spray into both nostrils every morning        metFORMIN (GLUCOPHAGE) 500 MG tablet Take 1 tablet (500 mg) by mouth 2 times daily (with meals) 60 tablet 11     mineral oil-white petrolatum (EUCERIN) CREA cream Apply 1 g topically 2 times daily Apply to legs and elbows prior to clobetasol       omeprazole (PRILOSEC) 20 MG DR capsule Take 20 mg by mouth every morning        OXYGEN-HELIUM IN 3 LPM when resting and at night and 5 LPM with activity  [facility]       rivaroxaban ANTICOAGULANT (XARELTO) 20 MG TABS tablet Take 1 tablet (20 mg) by mouth daily (with dinner) 90 tablet 0     sodium chloride (NEBUSAL) 3 % neb solution Take 4 mLs by nebulization 2 times daily 240 mL 11     spironolactone (ALDACTONE) 25 MG tablet Take 12.5 mg by mouth every morning        Vitamin D3 (VITAMIN D3) 25 mcg (1000 units) tablet Take 1 capsule by mouth every morning          Objective  /58 (BP Location: Left arm, Patient Position: Sitting, Cuff Size: Adult Regular)   Pulse 91   Ht 1.562 m (5' 1.5\")   Wt 64.4 kg (142 lb)   SpO2 93%   BMI 26.40 kg/m      General Appearance:    Alert, cooperative, no distress, appears stated age   Head:    Normocephalic, without obvious abnormality, atraumatic   Throat:   Lips, mucosa, and tongue normal; teeth and gums normal   Neck:   Supple, symmetrical, trachea midline, no adenopathy;        thyroid:  No " enlargement/tenderness/nodules; no carotid    bruit or JVD   Back:     Symmetric, no curvature, ROM normal, no CVA tenderness   Lungs:     Clear to auscultation bilaterally, respirations unlabored   Chest wall:    No tenderness or deformity   Heart:   Irregularly irregular, S1 and S2 normal, no murmur, rub   or gallop   Abdomen:     Soft, non-tender, bowel sounds active all four quadrants,     no masses, no organomegaly   Extremities:   Normal, atraumatic, no cyanosis or edema   Pulses:   2+ and symmetric all extremities   Skin:   Skin color, texture, turgor normal, no rashes or lesions     Results    Lab Results personally reviewed   Lab Results   Component Value Date    CHOL 145 11/16/2021    CHOL 131 06/24/2021     Lab Results   Component Value Date    HDL 65 11/16/2021    HDL 58 06/24/2021     No components found for: LDLCALC  Lab Results   Component Value Date    TRIG 65 11/16/2021    TRIG 61 06/24/2021     Lab Results   Component Value Date    WBC 11.5 (H) 11/05/2021    HGB 12.5 11/05/2021    HCT 38.7 11/05/2021     11/05/2021     Lab Results   Component Value Date    BUN 14 11/05/2021     (L) 11/05/2021    CO2 27 11/05/2021               Thank you for allowing me to participate in the care of your patient.      Sincerely,     COLEMAN MARQUEZ MD     [facility]  cc:   Coleman Marquez MD  [address] [place] [address]

## 2021-11-19 NOTE — PROGRESS NOTES
[facility]  Heart Care Clinic Follow-up Note    Assessment & Plan        (I48.91) Atrial fibrillation with RVR (H)  (primary encounter diagnosis)  Comment: permanent, asymptomatic and not valvular and on chronic anticoagulation with Xarelto given her CHADS 2 VASC score of 4, this dose appropriate considering age, renal function as well as weight.  Using diltiazem for rate control, very hesitant to pull this away.  If blood pressure remains low, might need to consider discontinuing this and starting digoxin or possibly AV kwame ablation.    (R06.00) Dyspnea, unspecified type  Comment: Most likely secondary to COPD.  She did have some vague left arm discomfort, is worried about her heart although stress test was normal in 2017.  Will check coronary CTA.  Doubt it was heart failure given normal BNP.    (J43.1) Panlobular emphysema (H)  Comment: As above, with COPD, assume this is the main culprit.    (E11.9) Type 2 diabetes mellitus without complication, without long-term current use of insulin (H)  Comment: So noted with hemoglobin A1c of 7.1.    (I10) Primary hypertension  Comment: Concerned that her blood pressure is low around high 80s or low 90s although asymptomatic.  Decrease Lasix to only 20 mg a day and have her follow-up with heart failure nurse practitioners in a week.  If needed could also lower dose of Aldactone.    (I27.20) Pulmonary hypertension (H)  Comment: At least moderate with estimated pressures of 60 mmHg, most likely WHO class III due to COPD.    (I50.41) Acute combined systolic and diastolic CHF, NYHA class 1 (H)  Comment: No significant signs or symptoms currently in light of preserved ejection fraction of 63% and normal BNP.  Given this and low blood pressure will back off on Lasix.    Plan  1.  Decrease Lasix down to only 20 mg a day.  2.  If blood pressure remains down we will need to back off on dose of Cardizem as well as possibly consider digoxin for rate control or stop  Cardizem altogether.  3.  CTA given shortness breath or early coronary disease.  4.  Heart failure nurse practitioner in 1 to 2 weeks to make sure she does not start retaining water.  5.  Follow-up with me thereafter.    Subjective  CC: 86-year-old white female being seen in follow-up today.  Since I seen her she was hospitalized for COPD acute exacerbation, she was told she has some heart failure although BNP was normal.  She admits to shortness of breath and heavy activity but is very busy throughout the day using oxygen 24/7.  She does have some mild bipedal edema but needs compression hose.  She had a vague left arm pain as well as left arm numbness which is now constant.  There is no significant chest discomfort, palpitations, PND, orthopnea.  She does however sleep in a recliner secondary to back discomfort.    Medications  Current Outpatient Medications   Medication Sig Dispense Refill     acetaminophen (TYLENOL) 500 MG tablet Take 500-1,000 mg by mouth every 6 hours as needed for mild pain        albuterol (PROAIR HFA/PROVENTIL HFA/VENTOLIN HFA) 108 (90 Base) MCG/ACT inhaler Inhale 2 puffs into the lungs every 4 hours as needed        albuterol (PROVENTIL) (2.5 MG/3ML) 0.083% neb solution 3 ML INHALATION FOUR TIMES A DAY AS NEEDED USE BEFORE NEBULIZED SALINE. FOR SHORTNESS OF BREATH. 600 mL 0     ARIPiprazole (ABILIFY) 5 MG tablet Take 5 mg by mouth every evening        atorvastatin (LIPITOR) 20 MG tablet Take 20 mg by mouth every evening        calcium carbonate 600 mg-vitamin D 400 units (CALTRATE) 600-400 MG-UNIT per tablet Take 1 tablet by mouth every morning        clobetasol (TEMOVATE) 0.05 % external cream Apply 1 applicator topically 2 times daily as needed To legs and elbows       cyanocobalamin (VITAMIN B-12) 500 MCG tablet Take 500 mcg by mouth every morning        diltiazem ER (DILT-XR) 120 MG 24 hr capsule Take 120 mg by mouth every morning        Fluticasone-Umeclidin-Vilanterol (TRELEGY  "ELLIPTA) 100-62.5-25 MCG/INH oral inhaler Inhale 1 puff into the lungs every morning Inhale after sodium chloride neb       furosemide (LASIX) 20 MG tablet Take 2 tablets (40 mg) by mouth every morning 60 tablet 11     gabapentin (NEURONTIN) 300 MG capsule Take 600 mg by mouth every evening        ipratropium (ATROVENT) 0.03 % nasal spray Spray 1 spray into both nostrils every morning        metFORMIN (GLUCOPHAGE) 500 MG tablet Take 1 tablet (500 mg) by mouth 2 times daily (with meals) 60 tablet 11     mineral oil-white petrolatum (EUCERIN) CREA cream Apply 1 g topically 2 times daily Apply to legs and elbows prior to clobetasol       omeprazole (PRILOSEC) 20 MG DR capsule Take 20 mg by mouth every morning        OXYGEN-HELIUM IN 3 LPM when resting and at night and 5 LPM with activity  [facility]       rivaroxaban ANTICOAGULANT (XARELTO) 20 MG TABS tablet Take 1 tablet (20 mg) by mouth daily (with dinner) 90 tablet 0     sodium chloride (NEBUSAL) 3 % neb solution Take 4 mLs by nebulization 2 times daily 240 mL 11     spironolactone (ALDACTONE) 25 MG tablet Take 12.5 mg by mouth every morning        Vitamin D3 (VITAMIN D3) 25 mcg (1000 units) tablet Take 1 capsule by mouth every morning          Objective  /58 (BP Location: Left arm, Patient Position: Sitting, Cuff Size: Adult Regular)   Pulse 91   Ht 1.562 m (5' 1.5\")   Wt 64.4 kg (142 lb)   SpO2 93%   BMI 26.40 kg/m      General Appearance:    Alert, cooperative, no distress, appears stated age   Head:    Normocephalic, without obvious abnormality, atraumatic   Throat:   Lips, mucosa, and tongue normal; teeth and gums normal   Neck:   Supple, symmetrical, trachea midline, no adenopathy;        thyroid:  No enlargement/tenderness/nodules; no carotid    bruit or JVD   Back:     Symmetric, no curvature, ROM normal, no CVA tenderness   Lungs:     Clear to auscultation bilaterally, respirations unlabored   Chest wall:    No tenderness or deformity   Heart:   " Irregularly irregular, S1 and S2 normal, no murmur, rub   or gallop   Abdomen:     Soft, non-tender, bowel sounds active all four quadrants,     no masses, no organomegaly   Extremities:   Normal, atraumatic, no cyanosis or edema   Pulses:   2+ and symmetric all extremities   Skin:   Skin color, texture, turgor normal, no rashes or lesions     Results    Lab Results personally reviewed   Lab Results   Component Value Date    CHOL 145 11/16/2021    CHOL 131 06/24/2021     Lab Results   Component Value Date    HDL 65 11/16/2021    HDL 58 06/24/2021     No components found for: LDLCALC  Lab Results   Component Value Date    TRIG 65 11/16/2021    TRIG 61 06/24/2021     Lab Results   Component Value Date    WBC 11.5 (H) 11/05/2021    HGB 12.5 11/05/2021    HCT 38.7 11/05/2021     11/05/2021     Lab Results   Component Value Date    BUN 14 11/05/2021     (L) 11/05/2021    CO2 27 11/05/2021

## 2021-11-23 NOTE — LETTER
[facility]   [facility]          Please give to facility    From:  Stephania Romeo  [facility]  Care Coordinator [phone]   [facility]   P: [phone]  [email or web address]   Patient Name:  Adalgisa Solano YOB: 1935   Admit date: 1-5-2019      *Information Needed:  Please contact me when the patient will discharge (or if they will move to long term care)- include the discharge date, disposition, and main diagnosis   - If the patient is discharged with home care services, please provide the name of the agency    Phone or Email with information  Thank you, Stephania  P: [phone]  [email or web address]                                        stall shower with grab bar/independent

## 2021-11-29 NOTE — TELEPHONE ENCOUNTER
Phone call from Adalgisa. States she is having a COPD exacerbation and would like to see if she can get prescription for her action plan.    Will send prescription for her action plan:   Prednisone 20mg dialy x 5, then 10mg daily x 5 AND doxycycline bid x 7 days

## 2021-11-29 NOTE — LETTER
11/29/2021    Shai Ellington MD  [address] [facility] [address]    RE: Adalgisa Solano       Dear Colleague,    I had the pleasure of seeing Adalgisa Solano in the [facility].    HEART CARE PROGRESS NOTE      [facility]  [phone]      Assessment/Recommendations   Assessment:    1.  Heart failure with preserved ejection fraction: She is tolerating a lower dose of Lasix.  She notes mild increase in edema in her legs today but she attributes this to not wearing compression stockings yesterday.  She is still following a low-sodium diet.  Blood pressure has improved on a lower dose of Lasix.    2.  COPD: She has had cold symptoms for the past 6 days and feels that they are worsening.  Lungs are congested throughout and she has a congested cough.  Oxygen requirements are increasing.  She was hospitalized in early November with COPD exacerbation.    3.  Atrial fibrillation: Heart rate controlled.  She continues to take Xarelto.    Plan:  1.  She was encouraged to wear her compression stockings daily and elevate her legs  2.  Continue low-sodium diet and daily weights  3.  She was encouraged to call Dr. Juarez's office today due to worsening breathing and history of COPD.  She states that she typically would start antibiotics and prednisone course with current symptoms.    Kim will follow up with Dr. Marquez in 6 weeks.       History of Present Illness/Subjective    Ms. Adalgisa Solano is a 86 year old female seen at [facility] today for follow-up.  She has a history of heart failure with preserved ejection fraction, permanent atrial fibrillation, COPD, diabetes, hypertension, and pulmonary hypertension.  Echocardiogram from December 2020 showed ejection fraction of 63%, moderate tricuspid regurgitation, moderate mitral regurgitation, and moderate pulmonary hypertension.    She was seen by  Dr. Marquez on November 19.  Lasix was decreased due to hypotension.  She had been hospitalized in early November with COPD exacerbation and BNP was normal.  Kim developed cold symptoms about 6 days ago with nasal congestion, congested cough, increased shortness of breath, and increased oxygen requirements.  She denies any fevers, chills, or loss of taste or smell.  She is vaccinated for COVID-19 along with booster.  She forgot to wear her compression stockings yesterday so notes mild increase in edema today.  Her weight has been stable at 142 pounds on home scale.  She denies lightheadedness and chest pain.      She continues to follow a low-sodium diet.        Physical Examination Review of Systems   Vitals: /58 (BP Location: Right arm, Patient Position: Sitting, Cuff Size: Adult Regular)   Pulse 88   Resp 20   BMI= There is no height or weight on file to calculate BMI.  Wt Readings from Last 3 Encounters:   11/19/21 64.4 kg (142 lb)   11/16/21 64.4 kg (142 lb)   11/04/21 65.5 kg (144 lb 7 oz)       General Appearance:     Alert, cooperative and in no acute distress.   ENT/Mouth: membranes moist, no oral lesions or bleeding gums.      EYES:  no scleral icterus, normal conjunctivae   Chest/Lungs:    Congested throughout   Cardiovascular:   Irregular. Normal first and second heart sounds, 1+ edema left lower leg, trace edema right lower leg   Abdomen:  Soft, nontender, nondistended, bowel sounds present   Extremities: no cyanosis or clubbing   Skin: warm, dry.    Neurologic: mood and affect are appropriate, alert and oriented x3         Please refer above for cardiac ROS details.      Medical History  Surgical History Family History Social History   Past Medical History:   Diagnosis Date     Acute and chronic respiratory failure with hypoxia (H)      Acute blood loss anemia 1/15/2019     Acute bronchitis 1/15/2019     Anemia     pernicious anemia     Anxiety      Arm skin lesion, right      Arnold-Chiari  malformation (H) 1998     Arthritis      Atrial fibrillation (H) 02/2017     Avascular necrosis of bone (H)      Breast cyst      Breast lump      Candidal skin infection      Cellulitis of left lower extremity 1/28/2019     CHF (congestive heart failure) (H)     diastolic and systolic     Chronic bronchitis (H)     & acute     Chronic diastolic heart failure (H)     diastolic chf     Chronic pain syndrome      Chronic respiratory failure with hypoxia (H) 3/13/2017     COPD (chronic obstructive pulmonary disease) (H)      COPD exacerbation (H)      Depression      Diet-controlled type 2 diabetes mellitus (H)      Drug induced constipation      DVT of axillary vein, acute (H)     left     DVT of axillary vein, acute left (H)      Edema      Encounter for palliative care      Erysipelas      Fracture of femoral neck, left (H)      Generalized muscle weakness      GERD (gastroesophageal reflux disease)      History of blood clots      Hyperlipidemia      Hypertension      Left hip pain      Lumbar spinal stenosis      PAD (peripheral artery disease) (H)      Peripheral arterial disease (H) 10/28/2015     Psoriasis     arms      Pulmonary hypertension (H) 3/5/2018     Recurrent major depressive episodes (H)     Created by Conversion  Replacement Utility updated for latest IMO load     Schizoaffective disorder, bipolar type (H) 6/11/2015     Slow transit constipation      Stasis dermatitis of both legs      Status post total hip replacement, 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     Type 2 diabetes mellitus without complication (H) 8/2/2016     Vitamin B12 deficiency      Volume overload      Past Surgical History:   Procedure Laterality Date     BACK SURGERY       BREAST CYST ASPIRATION Left 1994     C OPEN FIXATN PROX END/NECK FEMUR FX Left 1/2/2019    Procedure: OPEN REDUCTION INTERNAL FIXATION, FRACTURE LEFT HIP, PERIPROSTHETIC FRACTURE;  Surgeon: Kevin Lackey MD;  Location: [facility];  Service: Orthopedics     C TOTAL HIP  ARTHROPLASTY Left 2019    Procedure: LEFT TOTAL HIP ARTHROPLASTY;  Surgeon: Kevin Lackey MD;  Location: [facility] OR;  Service: Orthopedics     C TOTAL HIP ARTHROPLASTY Right 2019    Procedure: RIGHT TOTAL HIP ARTHROPLASTY;  Surgeon: Kan Quesada MD;  Location: [facility] OR;  Service: Orthopedics     C TOTAL KNEE ARTHROPLASTY Left 10/7/2016    Procedure: TOTAL KNEE ARTHROPLASTY, LEFT;  Surgeon: Kan Quesada MD;  Location: [facility] OR;  Service: Orthopedics     CERVICAL SPINE SURGERY      for arnold chiari malformation      SECTION  X3     CHOLECYSTECTOMY       COLONOSCOPY       EXCISE LESION UPPER EXTREMITY Right 2019    Procedure: EXCISION RIGHT ARM LIPOMA;  Surgeon: Andreas Holly MD;  Location: [facility] OR;  Service: General     EYE SURGERY      bilateral cataracts     HAND SURGERY       HERNIORRHAPHY UMBILICAL N/A 2021    Procedure: INCARCERATED UMBILICAL AND;  Surgeon: Andreas Holly MD;  [facility]     JOINT REPLACEMENT Right     knee - partial     LAPAROSCOPIC HERNIORRHAPHY INCISIONAL Left 3/27/2015    Procedure: ATTEMPTED LAPAROSCOPIC ABDOMINA/FLANK INCISION REPAIR;  Surgeon: Jose Lakhani MD;  Location: [facility] OR;  Service:      LUMBAR FUSION N/A 2014    Procedure: POSTERIOR FUSION / DECOMPRESSION L3-S1 WITH PELVIC FIXATION BILATERAL ;  Surgeon: Rajan Mares MD;  Location: [facility];  Service:      OTHER SURGICAL HISTORY      IR for PAD LOWER EXTREMITY     REPAIR SPIGELIAN HERNIA N/A 2021    Procedure: SPIGELIAN HERNIA REPAIR;  Surgeon: Andreas Holly MD;  [facility]     US ASPIRATION HEMATOMA SEROMA OR FLUID COLLECTION  2020     Family History   Problem Relation Age of Onset     Coronary Artery Disease Mother      Coronary Artery Disease Father     Social History     Socioeconomic History     Marital status:      Spouse name: Not on file     Number of children:  Not on file     Years of education: Not on file     Highest education level: Not on file   Occupational History     Not on file   Tobacco Use     Smoking status: Former Smoker     Quit date: 1/1/2006     Years since quitting: 15.9     Smokeless tobacco: Never Used     Tobacco comment: quit 2006   Vaping Use     Vaping Use: Never used   Substance and Sexual Activity     Alcohol use: No     Drug use: No     Sexual activity: Not on file   Other Topics Concern     Parent/sibling w/ CABG, MI or angioplasty before 65F 55M? Not Asked   Social History Narrative         Social Determinants of Health     Financial Resource Strain: Not on file   Food Insecurity: Not on file   Transportation Needs: Not on file   Physical Activity: Not on file   Stress: Not on file   Social Connections: Not on file   Intimate Partner Violence: Not on file   Housing Stability: Not on file          Medications  Allergies   Current Outpatient Medications   Medication Sig Dispense Refill     acetaminophen (TYLENOL) 500 MG tablet Take 500-1,000 mg by mouth every 6 hours as needed for mild pain        albuterol (PROAIR HFA/PROVENTIL HFA/VENTOLIN HFA) 108 (90 Base) MCG/ACT inhaler Inhale 2 puffs into the lungs every 4 hours as needed        albuterol (PROVENTIL) (2.5 MG/3ML) 0.083% neb solution 3 ML INHALATION FOUR TIMES A DAY AS NEEDED USE BEFORE NEBULIZED SALINE. FOR SHORTNESS OF BREATH. 600 mL 0     ARIPiprazole (ABILIFY) 5 MG tablet Take 5 mg by mouth every evening        atorvastatin (LIPITOR) 20 MG tablet Take 20 mg by mouth every evening        calcium carbonate 600 mg-vitamin D 400 units (CALTRATE) 600-400 MG-UNIT per tablet Take 1 tablet by mouth every morning        clobetasol (TEMOVATE) 0.05 % external cream Apply 1 applicator topically 2 times daily as needed To legs and elbows       cyanocobalamin (VITAMIN B-12) 500 MCG tablet Take 500 mcg by mouth every morning        diltiazem ER (DILT-XR) 120 MG 24 hr capsule Take 120 mg by mouth  every morning        Fluticasone-Umeclidin-Vilanterol (TRELEGY ELLIPTA) 100-62.5-25 MCG/INH oral inhaler Inhale 1 puff into the lungs every morning Inhale after sodium chloride neb       furosemide (LASIX) 20 MG tablet Take 20 mg by mouth daily       gabapentin (NEURONTIN) 300 MG capsule Take 600 mg by mouth every evening        ipratropium (ATROVENT) 0.03 % nasal spray Spray 1 spray into both nostrils every morning        metFORMIN (GLUCOPHAGE) 500 MG tablet Take 1 tablet (500 mg) by mouth 2 times daily (with meals) 60 tablet 11     mineral oil-white petrolatum (EUCERIN) CREA cream Apply 1 g topically 2 times daily Apply to legs and elbows prior to clobetasol       omeprazole (PRILOSEC) 20 MG DR capsule Take 20 mg by mouth every morning        OXYGEN-HELIUM IN 3 LPM when resting and at night and 5 LPM with activity  [employer]       rivaroxaban ANTICOAGULANT (XARELTO) 20 MG TABS tablet Take 1 tablet (20 mg) by mouth daily (with dinner) 90 tablet 0     sodium chloride (NEBUSAL) 3 % neb solution Take 4 mLs by nebulization 2 times daily 240 mL 11     spironolactone (ALDACTONE) 25 MG tablet Take 12.5 mg by mouth every morning        Vitamin D3 (VITAMIN D3) 25 mcg (1000 units) tablet Take 1 capsule by mouth every morning       Allergies   Allergen Reactions     Tramadol Nausea     Amoxicillin Itching and Rash     Levofloxacin Itching and Rash     Penicillins Itching and Rash     Has tolerated ceftriaxone.         Lab Results    Chemistry/lipid CBC Cardiac Enzymes/BNP/TSH/INR   Recent Labs   Lab Test 11/16/21  0951   CHOL 145   HDL 65   LDL 67   TRIG 65     Recent Labs   Lab Test 11/16/21  0951 06/24/21  1035 02/23/21  1042   LDL 67 61 50     Recent Labs   Lab Test 11/05/21  0415   *   POTASSIUM 4.5   CHLORIDE 94*   CO2 27   *   BUN 14   CR 0.63   GFRESTIMATED 83   DENISE 8.6     Recent Labs   Lab Test 11/05/21  0415 11/03/21  1736 10/10/21  0628   CR 0.63 0.75 0.67     Recent Labs   Lab Test 10/07/21  8931  06/24/21  1035 02/23/21  1042   A1C 7.1* 7.0* 6.6*    Recent Labs   Lab Test 11/05/21  0415   WBC 11.5*   HGB 12.5   HCT 38.7   MCV 88        Recent Labs   Lab Test 11/05/21  0415 11/03/21  1736 10/10/21  0628   HGB 12.5 13.7 14.5    Recent Labs   Lab Test 11/03/21  1736 10/07/21  2325 08/15/21  1638   TROPONINI <0.01 0.01 0.01     Recent Labs   Lab Test 11/03/21  1736 10/07/21  2325 08/15/21  1638    274* 117     Recent Labs   Lab Test 08/15/21  1638   TSH 1.41     Recent Labs   Lab Test 10/07/21  2325 06/11/21  1022 01/31/20  2023   INR 3.68* 2.07* 1.14*              cc:   Francis Marquez MD  [address] [place] [address]

## 2021-11-29 NOTE — PATIENT INSTRUCTIONS
Adalgisa Solano,    It was a pleasure to see you today at the [facility].     My recommendations after this visit include:  - Call Dr. Juarez's office today about breathing   - See Dr. Marquez in 6 weeks.   - If you have any questions or concerns, please call [phone] to talk with my nurse.    Aubree Norwood, CNP

## 2021-11-29 NOTE — PROGRESS NOTES
HEART CARE PROGRESS NOTE      [facility]  [phone]      Assessment/Recommendations   Assessment:    1.  Heart failure with preserved ejection fraction: She is tolerating a lower dose of Lasix.  She notes mild increase in edema in her legs today but she attributes this to not wearing compression stockings yesterday.  She is still following a low-sodium diet.  Blood pressure has improved on a lower dose of Lasix.    2.  COPD: She has had cold symptoms for the past 6 days and feels that they are worsening.  Lungs are congested throughout and she has a congested cough.  Oxygen requirements are increasing.  She was hospitalized in early November with COPD exacerbation.    3.  Atrial fibrillation: Heart rate controlled.  She continues to take Xarelto.    Plan:  1.  She was encouraged to wear her compression stockings daily and elevate her legs  2.  Continue low-sodium diet and daily weights  3.  She was encouraged to call Dr. Juarez's office today due to worsening breathing and history of COPD.  She states that she typically would start antibiotics and prednisone course with current symptoms.    Kim will follow up with Dr. Marquez in 6 weeks.       History of Present Illness/Subjective    Ms. Adalgisa Solano is a 86 year old female seen at [facility] today for follow-up.  She has a history of heart failure with preserved ejection fraction, permanent atrial fibrillation, COPD, diabetes, hypertension, and pulmonary hypertension.  Echocardiogram from December 2020 showed ejection fraction of 63%, moderate tricuspid regurgitation, moderate mitral regurgitation, and moderate pulmonary hypertension.    She was seen by Dr. Marquez on November 19.  Lasix was decreased due to hypotension.  She had been hospitalized in early November with COPD exacerbation and BNP was normal.  Kim developed cold symptoms about 6 days ago with nasal congestion, congested cough, increased  shortness of breath, and increased oxygen requirements.  She denies any fevers, chills, or loss of taste or smell.  She is vaccinated for COVID-19 along with booster.  She forgot to wear her compression stockings yesterday so notes mild increase in edema today.  Her weight has been stable at 142 pounds on home scale.  She denies lightheadedness and chest pain.      She continues to follow a low-sodium diet.        Physical Examination Review of Systems   Vitals: /58 (BP Location: Right arm, Patient Position: Sitting, Cuff Size: Adult Regular)   Pulse 88   Resp 20   BMI= There is no height or weight on file to calculate BMI.  Wt Readings from Last 3 Encounters:   11/19/21 64.4 kg (142 lb)   11/16/21 64.4 kg (142 lb)   11/04/21 65.5 kg (144 lb 7 oz)       General Appearance:     Alert, cooperative and in no acute distress.   ENT/Mouth: membranes moist, no oral lesions or bleeding gums.      EYES:  no scleral icterus, normal conjunctivae   Chest/Lungs:    Congested throughout   Cardiovascular:   Irregular. Normal first and second heart sounds, 1+ edema left lower leg, trace edema right lower leg   Abdomen:  Soft, nontender, nondistended, bowel sounds present   Extremities: no cyanosis or clubbing   Skin: warm, dry.    Neurologic: mood and affect are appropriate, alert and oriented x3         Please refer above for cardiac ROS details.      Medical History  Surgical History Family History Social History   Past Medical History:   Diagnosis Date     Acute and chronic respiratory failure with hypoxia (H)      Acute blood loss anemia 1/15/2019     Acute bronchitis 1/15/2019     Anemia     pernicious anemia     Anxiety      Arm skin lesion, right      Arnold-Chiari malformation (H) 1998     Arthritis      Atrial fibrillation (H) 02/2017     Avascular necrosis of bone (H)      Breast cyst      Breast lump      Candidal skin infection      Cellulitis of left lower extremity 1/28/2019     CHF (congestive heart failure)  (H)     diastolic and systolic     Chronic bronchitis (H)     & acute     Chronic diastolic heart failure (H)     diastolic chf     Chronic pain syndrome      Chronic respiratory failure with hypoxia (H) 3/13/2017     COPD (chronic obstructive pulmonary disease) (H)      COPD exacerbation (H)      Depression      Diet-controlled type 2 diabetes mellitus (H)      Drug induced constipation      DVT of axillary vein, acute (H)     left     DVT of axillary vein, acute left (H)      Edema      Encounter for palliative care      Erysipelas      Fracture of femoral neck, left (H)      Generalized muscle weakness      GERD (gastroesophageal reflux disease)      History of blood clots      Hyperlipidemia      Hypertension      Left hip pain      Lumbar spinal stenosis      PAD (peripheral artery disease) (H)      Peripheral arterial disease (H) 10/28/2015     Psoriasis     arms      Pulmonary hypertension (H) 3/5/2018     Recurrent major depressive episodes (H)     Created by Conversion  Replacement Utility updated for latest IMO load     Schizoaffective disorder, bipolar type (H) 6/11/2015     Slow transit constipation      Stasis dermatitis of both legs      Status post total hip replacement, 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     Type 2 diabetes mellitus without complication (H) 8/2/2016     Vitamin B12 deficiency      Volume overload      Past Surgical History:   Procedure Laterality Date     BACK SURGERY       BREAST CYST ASPIRATION Left 1994     C OPEN FIXATN PROX END/NECK FEMUR FX Left 1/2/2019    Procedure: OPEN REDUCTION INTERNAL FIXATION, FRACTURE LEFT HIP, PERIPROSTHETIC FRACTURE;  Surgeon: Kevin Lackey MD;  Location: [facility] OR;  Service: Orthopedics     C TOTAL HIP ARTHROPLASTY Left 1/2/2019    Procedure: LEFT TOTAL HIP ARTHROPLASTY;  Surgeon: Kevin Lackey MD;  Location: [facility];  Service: Orthopedics     C TOTAL HIP ARTHROPLASTY Right 9/16/2019    Procedure: RIGHT TOTAL HIP ARTHROPLASTY;  Surgeon: Sangita  Kan LARA MD;  Location: [facility] OR;  Service: Orthopedics     C TOTAL KNEE ARTHROPLASTY Left 10/7/2016    Procedure: TOTAL KNEE ARTHROPLASTY, LEFT;  Surgeon: Kan Quesada MD;  Location: [facility];  Service: Orthopedics     CERVICAL SPINE SURGERY      for arnold chiari malformation      SECTION  X3     CHOLECYSTECTOMY       COLONOSCOPY       EXCISE LESION UPPER EXTREMITY Right 2019    Procedure: EXCISION RIGHT ARM LIPOMA;  Surgeon: Andreas Holly MD;  Location: [facility];  Service: General     EYE SURGERY      bilateral cataracts     HAND SURGERY       HERNIORRHAPHY UMBILICAL N/A 2021    Procedure: INCARCERATED UMBILICAL AND;  Surgeon: Andreas Holly MD;  Location: [facility]     JOINT REPLACEMENT Right     knee - partial     LAPAROSCOPIC HERNIORRHAPHY INCISIONAL Left 3/27/2015    Procedure: ATTEMPTED LAPAROSCOPIC ABDOMINA/FLANK INCISION REPAIR;  Surgeon: Jose Lakhani MD;  Location: [facility] OR;  Service:      LUMBAR FUSION N/A 2014    Procedure: POSTERIOR FUSION / DECOMPRESSION L3-S1 WITH PELVIC FIXATION BILATERAL ;  Surgeon: Rajan Mares MD;  [place] [facility];  Service:      OTHER SURGICAL HISTORY      IR for PAD LOWER EXTREMITY     REPAIR SPIGELIAN HERNIA N/A 2021    Procedure: SPIGELIAN HERNIA REPAIR;  Surgeon: Andreas Holly MD;  Location: [facility]     US ASPIRATION HEMATOMA SEROMA OR FLUID COLLECTION  2020     Family History   Problem Relation Age of Onset     Coronary Artery Disease Mother      Coronary Artery Disease Father     Social History     Socioeconomic History     Marital status:      Spouse name: Not on file     Number of children: Not on file     Years of education: Not on file     Highest education level: Not on file   Occupational History     Not on file   Tobacco Use     Smoking status: Former Smoker     Quit date: 2006     Years since quitting: 15.9     Smokeless tobacco: Never  Used     Tobacco comment: quit 2006   Vaping Use     Vaping Use: Never used   Substance and Sexual Activity     Alcohol use: No     Drug use: No     Sexual activity: Not on file   Other Topics Concern     Parent/sibling w/ CABG, MI or angioplasty before 65F 55M? Not Asked   Social History Narrative         Social Determinants of Health     Financial Resource Strain: Not on file   Food Insecurity: Not on file   Transportation Needs: Not on file   Physical Activity: Not on file   Stress: Not on file   Social Connections: Not on file   Intimate Partner Violence: Not on file   Housing Stability: Not on file          Medications  Allergies   Current Outpatient Medications   Medication Sig Dispense Refill     acetaminophen (TYLENOL) 500 MG tablet Take 500-1,000 mg by mouth every 6 hours as needed for mild pain        albuterol (PROAIR HFA/PROVENTIL HFA/VENTOLIN HFA) 108 (90 Base) MCG/ACT inhaler Inhale 2 puffs into the lungs every 4 hours as needed        albuterol (PROVENTIL) (2.5 MG/3ML) 0.083% neb solution 3 ML INHALATION FOUR TIMES A DAY AS NEEDED USE BEFORE NEBULIZED SALINE. FOR SHORTNESS OF BREATH. 600 mL 0     ARIPiprazole (ABILIFY) 5 MG tablet Take 5 mg by mouth every evening        atorvastatin (LIPITOR) 20 MG tablet Take 20 mg by mouth every evening        calcium carbonate 600 mg-vitamin D 400 units (CALTRATE) 600-400 MG-UNIT per tablet Take 1 tablet by mouth every morning        clobetasol (TEMOVATE) 0.05 % external cream Apply 1 applicator topically 2 times daily as needed To legs and elbows       cyanocobalamin (VITAMIN B-12) 500 MCG tablet Take 500 mcg by mouth every morning        diltiazem ER (DILT-XR) 120 MG 24 hr capsule Take 120 mg by mouth every morning        Fluticasone-Umeclidin-Vilanterol (TRELEGY ELLIPTA) 100-62.5-25 MCG/INH oral inhaler Inhale 1 puff into the lungs every morning Inhale after sodium chloride neb       furosemide (LASIX) 20 MG tablet Take 20 mg by mouth daily        gabapentin (NEURONTIN) 300 MG capsule Take 600 mg by mouth every evening        ipratropium (ATROVENT) 0.03 % nasal spray Spray 1 spray into both nostrils every morning        metFORMIN (GLUCOPHAGE) 500 MG tablet Take 1 tablet (500 mg) by mouth 2 times daily (with meals) 60 tablet 11     mineral oil-white petrolatum (EUCERIN) CREA cream Apply 1 g topically 2 times daily Apply to legs and elbows prior to clobetasol       omeprazole (PRILOSEC) 20 MG DR capsule Take 20 mg by mouth every morning        OXYGEN-HELIUM IN 3 LPM when resting and at night and 5 LPM with activity  [employer]       rivaroxaban ANTICOAGULANT (XARELTO) 20 MG TABS tablet Take 1 tablet (20 mg) by mouth daily (with dinner) 90 tablet 0     sodium chloride (NEBUSAL) 3 % neb solution Take 4 mLs by nebulization 2 times daily 240 mL 11     spironolactone (ALDACTONE) 25 MG tablet Take 12.5 mg by mouth every morning        Vitamin D3 (VITAMIN D3) 25 mcg (1000 units) tablet Take 1 capsule by mouth every morning       Allergies   Allergen Reactions     Tramadol Nausea     Amoxicillin Itching and Rash     Levofloxacin Itching and Rash     Penicillins Itching and Rash     Has tolerated ceftriaxone.         Lab Results    Chemistry/lipid CBC Cardiac Enzymes/BNP/TSH/INR   Recent Labs   Lab Test 11/16/21  0951   CHOL 145   HDL 65   LDL 67   TRIG 65     Recent Labs   Lab Test 11/16/21  0951 06/24/21  1035 02/23/21  1042   LDL 67 61 50     Recent Labs   Lab Test 11/05/21  0415   *   POTASSIUM 4.5   CHLORIDE 94*   CO2 27   *   BUN 14   CR 0.63   GFRESTIMATED 83   DENISE 8.6     Recent Labs   Lab Test 11/05/21 0415 11/03/21  1736 10/10/21  0628   CR 0.63 0.75 0.67     Recent Labs   Lab Test 10/07/21  2325 06/24/21  1035 02/23/21  1042   A1C 7.1* 7.0* 6.6*    Recent Labs   Lab Test 11/05/21 0415   WBC 11.5*   HGB 12.5   HCT 38.7   MCV 88        Recent Labs   Lab Test 11/05/21  0415 11/03/21  1736 10/10/21  0628   HGB 12.5 13.7 14.5    Recent Labs    Lab Test 11/03/21  1736 10/07/21  2325 08/15/21  1638   TROPONINI <0.01 0.01 0.01     Recent Labs   Lab Test 11/03/21  1736 10/07/21  2325 08/15/21  1638    274* 117     Recent Labs   Lab Test 08/15/21  1638   TSH 1.41     Recent Labs   Lab Test 10/07/21  2325 06/11/21  1022 01/31/20  2023   INR 3.68* 2.07* 1.14*

## 2021-12-02 NOTE — TELEPHONE ENCOUNTER
Outpatient Medication Detail     Disp Refills Start End IVRI   ARIPiprazole (ABILIFY) 5 MG tablet 90 tablet 3 2/2/2021  No   Sig - Route: Take 1 tablet (5 mg total) by mouth at bedtime. - Oral   Sent to pharmacy as: ARIPiprazole 5 mg tablet (ABILIFY)   Notes to Pharmacy: Generic For:ABILIFY 5MG   N O T I C E    PRESCRIPTION PREVIOUSLY AUTHORIZED BY DOCTOR:KASSANDRA FISCHER [phone]      N O T I C E    Last quantity doesn't match original quantity   E-Prescribing Status: Receipt confirmed by pharmacy (2/2/2021 12:53 PM CST)       ARIPiprazole (ABILIFY) 5 MG tablet [[id number]]    Electronically signed by: Shai Ellington MD on 02/02/21 1253 Status: Active   Ordering user: Shai Ellington MD 02/02/21 1253 Authorized by: Shai Ellington MD   Frequency: QHS 02/02/21 - Until Discontinued Released by: Shai Ellington MD 02/02/21 1253   Diagnoses  Gastroesophageal reflux disease without esophagitis [K21.9]   Medication comments: Generic For:ABILIFY 5MG   N O T I C E    PRESCRIPTION PREVIOUSLY AUTHORIZED BY DOCTOR:KASSANDRA FISCHER [phone]      N O T I C E    Last quantity doesn't match original quantity   Outpatient Medication Detail     Disp Refills Start End VIRI   spironolactone (ALDACTONE) 25 MG tablet 45 tablet 3 2/2/2021  No   Sig: TAKE 1/2 TABLET (12.5 MG TOTAL) BY MOUTH DAILY.   Sent to pharmacy as: spironolactone 25 mg tablet (ALDACTONE)   E-Prescribing Status: Receipt confirmed by pharmacy (2/2/2021 12:53 PM CST)       spironolactone (ALDACTONE) 25 MG tablet [[id number]]    Electronically signed by: Shai Ellington MD on 02/02/21 1253 Status: Active   Ordering user: Shai Ellington MD 02/02/21 1253 Authorized by: Shai Ellington MD   Frequency:  02/02/21 - Until Discontinued Released by: Shai Ellington MD 02/02/21 1253   Diagnoses  Hypervolemia, unspecified hypervolemia type [E87.70]     Outpatient Medication Detail     Disp Refills Start End VIRI   omeprazole (PRILOSEC)  20 MG capsule 90 capsule 3 2/2/2021  No   Sig: TAKE 1 CAPSULE DAILY.   Sent to pharmacy as: omeprazole 20 mg capsule,delayed release (PriLOSEC)   E-Prescribing Status: Receipt confirmed by pharmacy (2/2/2021 12:53 PM CST)       omeprazole (PRILOSEC) 20 MG capsule [[id number]]    Electronically signed by: Shai Ellington MD on 02/02/21 1253 Status: Active   Ordering user: Shai Ellington MD 02/02/21 125 Authorized by: Shai Ellington MD   Frequency:  02/02/21 - Until Discontinued Released by: Shai Ellington MD 02/02/21 1253   Diagnoses  Gastroesophageal reflux disease without esophagitis [K21.9]       Routing refill request to provider for review/approval because:  Labs out of range:  Na+  Diagnosis warning  Early refill requested.    Last office visit provider:  11/16/21     Requested Prescriptions   Pending Prescriptions Disp Refills     ARIPiprazole (ABILIFY) 5 MG tablet [Pharmacy Med Name: ARIPIPRAZOLE TABS 5MG] 90 tablet 3     Sig: TAKE 1 TABLET AT BEDTIME       Antipsychotic Medications Passed - 11/30/2021 11:41 PM        Passed - Blood pressure under 140/90 in past 12 months     BP Readings from Last 3 Encounters:   11/29/21 106/58   11/19/21 118/58   11/16/21 110/58                 Passed - Patient is 12 years of age or older        Passed - Lipid panel on file within the past 12 months     Recent Labs   Lab Test 11/16/21  0951   CHOL 145   TRIG 65   HDL 65   LDL 67               Passed - CBC on file in past 12 months     Recent Labs   Lab Test 11/05/21  0415   WBC 11.5*   RBC 4.41   HGB 12.5   HCT 38.7                    Passed - Heart Rate on file within past 12 months     Pulse Readings from Last 3 Encounters:   11/29/21 88   11/19/21 91   11/16/21 90               Passed - A1c or Glucose on file in past 12 months     Recent Labs   Lab Test 11/05/21  0415 10/08/21  0354 10/07/21  2325   *   < > 124   A1C  --   --  7.1*    < > = values in this interval not displayed.  "      Please review patients last 3 weights. If a weight gain of >10 lbs exists, you may refill the prescription once after instructing the patient to schedule an appointment within the next 30 days.    Wt Readings from Last 3 Encounters:   11/19/21 64.4 kg (142 lb)   11/16/21 64.4 kg (142 lb)   11/04/21 65.5 kg (144 lb 7 oz)             Passed - Medication is active on med list        Passed - Patient is not pregnant        Passed - No positve pregnancy test on file in past 12 months        Passed - Recent (6 mo) or future (30 days) visit within the authorizing provider's specialty     Patient had office visit in the last 6 months or has a visit in the next 30 days with authorizing provider or within the authorizing provider's specialty.  See \"Patient Info\" tab in inbasket, or \"Choose Columns\" in Meds & Orders section of the refill encounter.               spironolactone (ALDACTONE) 25 MG tablet [Pharmacy Med Name: SPIRONOLACTONE TABS 25MG] 45 tablet 3     Sig: TAKE ONE-HALF (1/2) TABLET DAILY       Diuretics (Including Combos) Protocol Failed - 11/30/2021 11:41 PM        Failed - Normal serum sodium on file in past 12 months     Recent Labs   Lab Test 11/05/21  0415   *              Passed - Blood pressure under 140/90 in past 12 months     BP Readings from Last 3 Encounters:   11/29/21 106/58   11/19/21 118/58   11/16/21 110/58                 Passed - Recent (12 mo) or future (30 days) visit within the authorizing provider's specialty     Patient has had an office visit with the authorizing provider or a provider within the authorizing providers department within the previous 12 mos or has a future within next 30 days. See \"Patient Info\" tab in inbasket, or \"Choose Columns\" in Meds & Orders section of the refill encounter.              Passed - Medication is active on med list        Passed - Patient is age 18 or older        Passed - No active pregancy on record        Passed - Normal serum creatinine on " "file in past 12 months     Recent Labs   Lab Test 11/05/21  0415   CR 0.63              Passed - Normal serum potassium on file in past 12 months     Recent Labs   Lab Test 11/05/21  0415   POTASSIUM 4.5                    Passed - No positive pregnancy test in past 12 months           omeprazole (PRILOSEC) 20 MG DR capsule [Pharmacy Med Name: OMEPRAZOLE DR CAPS 20MG] 90 capsule 3     Sig: TAKE 1 CAPSULE DAILY       PPI Protocol Failed - 11/30/2021 11:41 PM        Failed - No diagnosis of osteoporosis on record        Passed - Not on Clopidogrel (unless Pantoprazole ordered)        Passed - Recent (12 mo) or future (30 days) visit within the authorizing provider's specialty     Patient has had an office visit with the authorizing provider or a provider within the authorizing providers department within the previous 12 mos or has a future within next 30 days. See \"Patient Info\" tab in inbasket, or \"Choose Columns\" in Meds & Orders section of the refill encounter.              Passed - Medication is active on med list        Passed - Patient is age 18 or older        Passed - No active pregnacy on record        Passed - No positive pregnancy test in past 12 months             Rob Cm RN 12/02/21 1:44 PM  "

## 2021-12-05 NOTE — TELEPHONE ENCOUNTER
"Outpatient Medication Detail     Disp Refills Start End VIRI   diltiazem (CARDIZEM CD) 120 MG 24 hr capsule 90 capsule 3 2/2/2021  No   Sig - Route: Take 1 capsule (120 mg total) by mouth daily. - Oral   Sent to pharmacy as: dilTIAZem  mg capsule,extended release 24 hr (CARDIZEM CD)   E-Prescribing Status: Receipt confirmed by pharmacy (2/2/2021 12:53 PM CST)       diltiazem (CARDIZEM CD) 120 MG 24 hr capsule [[id number]]    Electronically signed by: Shai Ellington MD on 02/02/21 1253 Status: Active   Ordering user: Shai Ellington MD 02/02/21 1253 Authorized by: Shai Ellington MD   Frequency: DAILY 02/02/21 - Until Discontinued Released by: Shai Ellington MD 02/02/21 1253   Diagnoses  Acute on chronic diastolic heart failure (H) [I50.33]   Routing refill request to provider for review/approval because:  Early refill requested.    Last office visit provider:  11/16/21     Requested Prescriptions   Pending Prescriptions Disp Refills     diltiazem ER (DILT-XR) 120 MG 24 hr capsule       Sig: Take 1 capsule (120 mg) by mouth every morning       Calcium Channel Blockers Protocol  Passed - 12/3/2021  8:32 AM        Passed - Blood pressure under 140/90 in past 12 months     BP Readings from Last 3 Encounters:   11/29/21 106/58   11/19/21 118/58   11/16/21 110/58                 Passed - Normal ALT in past 12 months     Recent Labs   Lab Test 11/03/21  1736   ALT 22             Passed - Recent (12 mo) or future (30 days) visit within the authorizing provider's specialty     Patient has had an office visit with the authorizing provider or a provider within the authorizing providers department within the previous 12 mos or has a future within next 30 days. See \"Patient Info\" tab in inbasket, or \"Choose Columns\" in Meds & Orders section of the refill encounter.              Passed - Medication is active on med list        Passed - Patient is age 18 or older        Passed - No active pregnancy on " record        Passed - Normal serum creatinine on file in past 12 months     Recent Labs   Lab Test 11/05/21  0415   CR 0.63       Ok to refill medication if creatinine is low          Passed - No positive pregnancy test in past 12 months             Rob Cm RN 12/05/21 3:11 PM

## 2021-12-08 NOTE — PROGRESS NOTES
Kim is a 86 year old who is being evaluated via a billable video visit.      How would you like to obtain your AVS? Pikanote       Video Start Time: 2pm  Video-Visit Details    Type of service:  Video Visit    Video End Time:215    Originating Location (pt. Location): Home    Distant Location (provider location):  [facility]     Platform used for Video Visit: [facility]    LUNG NODULE & INTERVENTIONAL PULMONARY CLINIC  [facility]     Adalgisa Solano MRN# [id number]   Age: 86 year old YOB: 1935       Requesting Physician: No referring provider defined for this encounter.       Assessment and Plan:    1.  Bronchiectasis with exacerbation.  Complete prednisone and antibiotics.  Restart vest therapy.  Continue other medications.  She would like to change to breztri from [facility] so we will give this a try.    2.  Nasal dryness.  In the setting of chronic oxygen use.  I have asked her to check her sats while at rest on room air to see if she can spend some time off her oxygen.  Continue goal saturation 89% or greater at rest.                 History:     Adalgisa Solano is a 86 year old female with sig h/o for bronchiectasis who is here for evaluation/followup of bronchiectasis.  Unfortunately she has continued to have problems with exacerbations and was admitted to the hospital.  She is having a cath because of persistent chest symptoms.  She stopped using her vest out of concern for heart issues.    She had a very mild respiratory infection that went down into her chest and is still having quite a bit of productive cough.  Started her action plan last week and is now feeling better.             Past Medical History:      Past Medical History:   Diagnosis Date     Acute and chronic respiratory failure with hypoxia (H)      Acute blood loss anemia 1/15/2019     Acute bronchitis 1/15/2019     Anemia      pernicious anemia     Anxiety      Arm skin lesion, right      Arnold-Chiari malformation (H) 1998     Arthritis      Atrial fibrillation (H) 02/2017     Avascular necrosis of bone (H)      Breast cyst      Breast lump      Candidal skin infection      Cellulitis of left lower extremity 1/28/2019     CHF (congestive heart failure) (H)     diastolic and systolic     Chronic bronchitis (H)     & acute     Chronic diastolic heart failure (H)     diastolic chf     Chronic pain syndrome      Chronic respiratory failure with hypoxia (H) 3/13/2017     COPD (chronic obstructive pulmonary disease) (H)      COPD exacerbation (H)      Depression      Diet-controlled type 2 diabetes mellitus (H)      Drug induced constipation      DVT of axillary vein, acute (H)     left     DVT of axillary vein, acute left (H)      Edema      Encounter for palliative care      Erysipelas      Fracture of femoral neck, left (H)      Generalized muscle weakness      GERD (gastroesophageal reflux disease)      History of blood clots      Hyperlipidemia      Hypertension      Left hip pain      Lumbar spinal stenosis      PAD (peripheral artery disease) (H)      Peripheral arterial disease (H) 10/28/2015     Psoriasis     arms      Pulmonary hypertension (H) 3/5/2018     Recurrent major depressive episodes (H)     Created by Conversion  Replacement Utility updated for latest IMO load     Schizoaffective disorder, bipolar type (H) 6/11/2015     Slow transit constipation      Stasis dermatitis of both legs      Status post total hip replacement, 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     Type 2 diabetes mellitus without complication (H) 8/2/2016     Vitamin B12 deficiency      Volume overload            Past Surgical History:      Past Surgical History:   Procedure Laterality Date     BACK SURGERY       BREAST CYST ASPIRATION Left 1994     C OPEN FIXATN PROX END/NECK FEMUR FX Left 1/2/2019    Procedure: OPEN REDUCTION INTERNAL FIXATION, FRACTURE LEFT HIP, PERIPROSTHETIC  FRACTURE;  Surgeon: Kevin Lackey MD;  Location: [facility] OR;  Service: Orthopedics     C TOTAL HIP ARTHROPLASTY Left 2019    Procedure: LEFT TOTAL HIP ARTHROPLASTY;  Surgeon: Kevin Lackey MD;  Location: [facility] OR;  Service: Orthopedics     C TOTAL HIP ARTHROPLASTY Right 2019    Procedure: RIGHT TOTAL HIP ARTHROPLASTY;  Surgeon: Kan Quesada MD;  Location: [facility] OR;  Service: Orthopedics     C TOTAL KNEE ARTHROPLASTY Left 10/7/2016    Procedure: TOTAL KNEE ARTHROPLASTY, LEFT;  Surgeon: Kan Quesada MD;  Location: [facility] OR;  Service: Orthopedics     CERVICAL SPINE SURGERY      for arnold chiari malformation      SECTION  X3     CHOLECYSTECTOMY       COLONOSCOPY       EXCISE LESION UPPER EXTREMITY Right 2019    Procedure: EXCISION RIGHT ARM LIPOMA;  Surgeon: Andreas Holly MD;  Location: [facility];  Service: General     EYE SURGERY      bilateral cataracts     HAND SURGERY       HERNIORRHAPHY UMBILICAL N/A 2021    Procedure: INCARCERATED UMBILICAL AND;  Surgeon: Andreas Holly MD;  [place] [facility]     JOINT REPLACEMENT Right     knee - partial     LAPAROSCOPIC HERNIORRHAPHY INCISIONAL Left 3/27/2015    Procedure: ATTEMPTED LAPAROSCOPIC ABDOMINA/FLANK INCISION REPAIR;  Surgeon: Jose Lakhani MD;  Location: [facility];  Service:      LUMBAR FUSION N/A 2014    Procedure: POSTERIOR FUSION / DECOMPRESSION L3-S1 WITH PELVIC FIXATION BILATERAL ;  Surgeon: Rajan Mares MD;  Location: [facility];  Service:      OTHER SURGICAL HISTORY      IR for PAD LOWER EXTREMITY     REPAIR SPIGELIAN HERNIA N/A 2021    Procedure: SPIGELIAN HERNIA REPAIR;  Surgeon: Andreas Holly MD;  Location: [facility]     US ASPIRATION HEMATOMA SEROMA OR FLUID COLLECTION  2020          Social History:     Social History     Tobacco Use     Smoking status: Former Smoker     Quit date: 2006     Years since  quitting: 15.9     Smokeless tobacco: Never Used     Tobacco comment: quit 2006   Substance Use Topics     Alcohol use: No          Family History:     Family History   Problem Relation Age of Onset     Coronary Artery Disease Mother      Coronary Artery Disease Father            Allergies:      Allergies   Allergen Reactions     Tramadol Nausea     Amoxicillin Itching and Rash     Levofloxacin Itching and Rash     Penicillins Itching and Rash     Has tolerated ceftriaxone.          Medications:     Current Outpatient Medications   Medication Sig     acetaminophen (TYLENOL) 500 MG tablet Take 500-1,000 mg by mouth every 6 hours as needed for mild pain      albuterol (PROAIR HFA/PROVENTIL HFA/VENTOLIN HFA) 108 (90 Base) MCG/ACT inhaler Inhale 2 puffs into the lungs every 4 hours as needed      albuterol (PROVENTIL) (2.5 MG/3ML) 0.083% neb solution 3 ML INHALATION FOUR TIMES A DAY AS NEEDED USE BEFORE NEBULIZED SALINE. FOR SHORTNESS OF BREATH.     ARIPiprazole (ABILIFY) 5 MG tablet TAKE 1 TABLET AT BEDTIME     atorvastatin (LIPITOR) 20 MG tablet Take 20 mg by mouth every evening      calcium carbonate 600 mg-vitamin D 400 units (CALTRATE) 600-400 MG-UNIT per tablet Take 1 tablet by mouth every morning      clobetasol (TEMOVATE) 0.05 % external cream Apply 1 applicator topically 2 times daily as needed To legs and elbows     cyanocobalamin (VITAMIN B-12) 500 MCG tablet Take 500 mcg by mouth every morning      diltiazem ER (DILT-XR) 120 MG 24 hr capsule Take 1 capsule (120 mg) by mouth every morning     Fluticasone-Umeclidin-Vilanterol (TRELEGY ELLIPTA) 100-62.5-25 MCG/INH oral inhaler Inhale 1 puff into the lungs every morning Inhale after sodium chloride neb     furosemide (LASIX) 20 MG tablet Take 20 mg by mouth daily     gabapentin (NEURONTIN) 300 MG capsule Take 600 mg by mouth every evening      ipratropium (ATROVENT) 0.03 % nasal spray Spray 1 spray into both nostrils every morning      metFORMIN (GLUCOPHAGE) 500  MG tablet Take 1 tablet (500 mg) by mouth 2 times daily (with meals)     mineral oil-white petrolatum (EUCERIN) CREA cream Apply 1 g topically 2 times daily Apply to legs and elbows prior to clobetasol     omeprazole (PRILOSEC) 20 MG DR capsule TAKE 1 CAPSULE DAILY     OXYGEN-HELIUM IN 3 LPM when resting and at night and 5 LPM with activity  Linccarlos enrique     predniSONE (DELTASONE) 10 MG tablet Take 2 tabs daily x 5 days, THEN 1 tab daily x 5 days     rivaroxaban ANTICOAGULANT (XARELTO) 20 MG TABS tablet Take 1 tablet (20 mg) by mouth daily (with dinner)     sodium chloride (NEBUSAL) 3 % neb solution Take 4 mLs by nebulization 2 times daily     spironolactone (ALDACTONE) 25 MG tablet TAKE ONE-HALF (1/2) TABLET DAILY     Vitamin D3 (VITAMIN D3) 25 mcg (1000 units) tablet Take 1 capsule by mouth every morning      No current facility-administered medications for this visit.          Review of Systems:     See HPI         Physical Exam:   There were no vitals taken for this visit.    Constitutional - looks well, in no apparent distress  Eyes - no redness or discharge  Respiratory -breathing appears comfortable. No wheeze or rhonchi.   Skin - No appreciable discoloration or lesions (very limited exam)  Neurological - No apparent tremors. Speech fluent and articlate  Psychiatric - no signs of delirium or anxiety          Current Laboratory Data:   All laboratory and imaging data reviewed.    No results found for this or any previous visit (from the past 24 hour(s)).

## 2021-12-09 NOTE — TELEPHONE ENCOUNTER
"Routing refill request to provider for review/approval because:  Labs out of range:  sodium    Outpatient Medication Detail     Disp Refills Start End VIRI   furosemide (LASIX) 20 MG tablet (Discontinued) 60 tablet 11 10/12/2021 11/29/2021 No   Sig - Route: Take 2 tablets (40 mg) by mouth every morning - Oral   Patient taking differently: Take 20 mg by mouth every morning         Sent to pharmacy as: Furosemide 20 MG Oral Tablet (LASIX)   Class: E-Prescribe   Reason for Discontinue: Dose adjustment   Order: [id number]   E-Prescribing Status: Receipt confirmed by pharmacy (10/12/2021  9:51 AM CDT)   E-Cancel Status: Request denied by pharmacy (11/29/2021  9:34 AM CST)       E-Cancel Status Note: Rx not found. Call [phone] if you have questions       Printout Tracking    External Result Report     Pharmacy    [facility] - [address]     This Order Has Been Discontinued    Order Status Reason By On   Discontinued Dose adjustment Aubree Machado NP 11/29/21 0963       Last office visit provider:  11/16/21     Requested Prescriptions   Pending Prescriptions Disp Refills     furosemide (LASIX) 20 MG tablet [Pharmacy Med Name: FUROSEMIDE TABS 20MG] 180 tablet 3     Sig: TAKE 1 TABLET TWICE A DAY AT 9 A.M. AND 6 P.M.       Diuretics (Including Combos) Protocol Failed - 12/7/2021 11:28 PM        Failed - Normal serum sodium on file in past 12 months     Recent Labs   Lab Test 11/05/21  0415   *              Passed - Blood pressure under 140/90 in past 12 months     BP Readings from Last 3 Encounters:   11/29/21 106/58   11/19/21 118/58   11/16/21 110/58                 Passed - Recent (12 mo) or future (30 days) visit within the authorizing provider's specialty     Patient has had an office visit with the authorizing provider or a provider within the authorizing providers department within the previous 12 mos or has a future within next 30 days. See \"Patient Info\" " "tab in inbasket, or \"Choose Columns\" in Meds & Orders section of the refill encounter.              Passed - Medication is active on med list        Passed - Patient is age 18 or older        Passed - No active pregancy on record        Passed - Normal serum creatinine on file in past 12 months     Recent Labs   Lab Test 11/05/21  0415   CR 0.63              Passed - Normal serum potassium on file in past 12 months     Recent Labs   Lab Test 11/05/21  0415   POTASSIUM 4.5                    Passed - No positive pregnancy test in past 12 months             Gibran Colorado RN 12/09/21 8:10 AM  "

## 2021-12-14 NOTE — TELEPHONE ENCOUNTER
"Routing refill request to provider for review/approval because:  PCP to review - early refill request.    Last Written Prescription Date:  2/2/2021  Last Fill Quantity: 90,  # refills: 3   Last office visit provider:  11/16/2021 Dr. Ellington     atorvastatin (LIPITOR) 20 MG tablet 90 tablet 3 2/2/2021  No   Sig - Route: Take 1 tablet (20 mg total) by mouth at bedtime. - Oral   Sent to pharmacy as: atorvastatin 20 mg tablet (LIPITOR)   Notes to Pharmacy: Generic For:LIPITOR 20MG   N O T I C E    Last quantity doesn't match original quantity   E-Prescribing Status: Receipt confirmed by pharmacy (2/2/2021 12:53 PM CST       Requested Prescriptions   Pending Prescriptions Disp Refills     atorvastatin (LIPITOR) 20 MG tablet [Pharmacy Med Name: ATORVASTATIN TABS 20MG] 90 tablet 3     Sig: TAKE 1 TABLET AT BEDTIME       Statins Protocol Passed - 12/13/2021 12:33 AM        Passed - LDL on file in past 12 months     Recent Labs   Lab Test 11/16/21  0951   LDL 67             Passed - No abnormal creatine kinase in past 12 months     Recent Labs   Lab Test 01/31/20 2023                   Passed - Recent (12 mo) or future (30 days) visit within the authorizing provider's specialty     Patient has had an office visit with the authorizing provider or a provider within the authorizing providers department within the previous 12 mos or has a future within next 30 days. See \"Patient Info\" tab in inbasket, or \"Choose Columns\" in Meds & Orders section of the refill encounter.              Passed - Medication is active on med list        Passed - Patient is age 18 or older        Passed - No active pregnancy on record        Passed - No positive pregnancy test in past 12 months             Marisabel Barba RN 12/14/21 4:28 PM  "

## 2021-12-14 NOTE — TELEPHONE ENCOUNTER
----- Message from Yuan Stanton sent at 2021  8:43 AM CST -----  Regarding: RE: Dx code does not pass Medicare guidelines  Yes as of right now, this does not pass and will not be covered for the pt.    I did check this Dx code for the pharm nuc stress test and it does pass for that CPT code per Medicare guidelines.    Please let me know what you would like to do from here so I can notify the pt if I need to.    Thank you  Yuan Stanton  ----- Message -----  From: Francis Marquez MD  Sent: 2021   6:34 PM CST  To: Yolanda Juarez RN, Yuan Stanton  Subject: RE: Dx code does not pass Medicare guidelines    If denied let us set up pharm sdtress nuke.LF  ----- Message -----  From: Yuan Stanton  Sent: 2021   1:56 PM CST  To: Francis Marquez MD  Subject: Dx code does not pass Medicare guidelines        Order Diagnosis Update Request    Patient Name:  Adalgisa Solano  :    1935  MRN:    [id number]    Dr. Marquez,    The diagnosis code on the order for the CTA Angiogram Coronary Artery is not meeting medical necessity per Medicare guidelines. If you were to choose another diagnosis code and update the existing order, we will review it again.    For your reference, here is the link to the payor's policy:  https://www.cms.gov/medicare-coverage-database/view/article.aspx?fjxudaisz=[email or web address]&[email or web address]&keyword=&keywordType=starts&areaId=all&docType=6,3,5,1,F,P&contractOption=all&hcpcsOption=code&dsfewNfhtoPlim=60861&uwqljVjcMatt=95630&sortBy=title&bc=1    Please note: medical policies are not authorizations and are set by the payer. Medicare does not allow authorizations or appeals to these policies pre-service. If you disagree with the policy, an appeal for medical necessity can be submitted after claim denial.    Thank you,    Yuan Stanton  [employer] [place]

## 2021-12-14 NOTE — TELEPHONE ENCOUNTER
Left message for patient to discuss alternative testing; order placed for NM lexiscan. Will need to cancel 12/23 ccta after discussing with patient. -[place]

## 2021-12-15 NOTE — TELEPHONE ENCOUNTER
Received a call back from Kim. She verbalized understanding. CT coronary angiogram was cancelled and order discontinued to prevent rescheduling in error. NM lexiscan ordered and she was transferred to scheduling to have stress test scheduled. -[facility]

## 2021-12-21 NOTE — PROGRESS NOTES
"Assessment/Plan:    Diabetes mellitus type 2 stable check A1c blood sugar lipid panel today.  She is about 3 or 4 hours postprandial lunch.    Hypertension controlled 110/64.    Breathlessness and shortness of breath may be related to underlying COPD Long history of asthmatic bronchitis with a 50-pack-year history of smoking quit smoking 2006 evaluation of breathlessness to continue tomorrow with stress test dobutamine versus adenosine followed by cardiologist Dr. HERIBERTO keith of [facility] division of cardiology.  [place] now..    COPD followed by Dr. Jun Juarez pulmonary specialist and probable major reason for her windedness with activity and at rest.  Probably moderate to severe in degree O2 sats 90% on 2 L nasal cannula.  Nasal O2 dependent wheelchair-bound.    25 minutes spent on the date of the encounter doing chart review, interpretation of tests, patient visit and documentation     Subjective:  Adalgisa Solano is a 86 year old female presents for the following health issues windedness and breathlessness stress test pending scheduled for tomorrow.    Quit smoking 2000 650 years of smoking 1 pack a day on average.  Long history of asthmatic bronchitis COPD.  Previous treatment with steroids antibiotics have been beneficial.  The patient has received 3 vaccinations for COVID-19 prevention including the booster.  She has yet to contract the COVID-19 illness.  Her  also patient of this examiner has received 3 vaccines as well including the booster.    ROS:  No blood in stool urine or sputum medication list reviewed reconciled in the chart currently a non-smoker quit smoking 15 years prior wheelchair-bound no excess alcohol.  Drug allergies include tramadol amoxicillin levofloxacin and penicillin.    Objective:  /64 (BP Location: Right arm, Patient Position: Sitting)   Pulse 99   Ht 1.562 m (5' 1.5\")   Wt 63.5 kg (140 lb)   SpO2 90%   BMI 26.02 kg/m    Wheezing heard on " expiration in both lung fields no carotid bruits heart tones regular rhythm soft systolic murmur at the base the belly is soft nontender leg exam is limited by being in a wheelchair extremities are free of edema she is neatly attired not in acute distress not toxic.  On 2 L nasal cannula her O2 sats were 90.

## 2021-12-22 NOTE — TELEPHONE ENCOUNTER
----- Message from Francis Marquez MD sent at 12/22/2021 11:29 AM CST -----  Please try to set her up for an outpatient CTA, we can prior authorize it if need be.LF  ----- Message -----  From: Sujey Toure RN  Sent: 12/22/2021  11:05 AM CST  To: Francis Marquez MD    Thank you DR Marquez,  My concern with CCTA would be her low B/P since she was in the 80's Systolic today.  This was both by machine and by rhonda.  Thanks  ----- Message -----  From: Francis Marquez MD  Sent: 12/22/2021  10:32 AM CST  To: Yolanda Juarez RN, Sujey Toure RN    Given her significant COPD we try to schedule her for coronary CTA.Apparently that was declined, thus the reason for the pharmacological nuclear.Looks like we are unable to do pharmacological nuclear, will try to appeal the coronary CTA given her severe COPD unless they really want us to proceed with invasive angiography.LF  ----- Message -----  From: Sujey Toure RN  Sent: 12/22/2021  10:14 AM CST  To: MD Dr Alma Chaudhry,  Pt presented to the  lexiscan stress test.  Pt was coarse in all fields, wheezing in the bases with a loose productive cough.  No edema noted.  Came to us on 1 liter NC chronic O2 use.  B/P 80's systolic.  Controlled A-Fib on the EKG.  Dr England recommended we not complete this test with the current findings.  Thank you Sujey Toure RN          ==Order placed for CCTA. Will try to get this approved for her. -[facility]

## 2021-12-23 NOTE — TELEPHONE ENCOUNTER
Patient called requesting refill of prednisone and doxycycline. She has had increasing shortness of breath and wheezing. She was seen by cardiology yesterday and they noted coarse crackles in all fields.     Last Written Prescription Date:  11/29/2021  Last office visit: 12/8/2021 with prescribing provider:  Dr. Juarez      Pended medications. Routing to provider to review.     Mar Combs RN

## 2021-12-27 NOTE — TELEPHONE ENCOUNTER
Called patient and updated on new recommendation to retry CCTA. Scan was ordered and looks to be authorized. Msg sent to CTA  to please arrange. -[place]

## 2022-01-01 ENCOUNTER — TELEPHONE (OUTPATIENT)
Dept: PULMONOLOGY | Facility: OTHER | Age: 87
End: 2022-01-01
Payer: COMMERCIAL

## 2022-01-01 ENCOUNTER — APPOINTMENT (OUTPATIENT)
Dept: OCCUPATIONAL THERAPY | Facility: CLINIC | Age: 87
DRG: 291 | End: 2022-01-01
Attending: FAMILY MEDICINE
Payer: COMMERCIAL

## 2022-01-01 ENCOUNTER — DOCUMENTATION ONLY (OUTPATIENT)
Dept: OTHER | Facility: CLINIC | Age: 87
End: 2022-01-01
Payer: COMMERCIAL

## 2022-01-01 ENCOUNTER — VIRTUAL VISIT (OUTPATIENT)
Dept: INTERNAL MEDICINE | Facility: CLINIC | Age: 87
End: 2022-01-01
Payer: COMMERCIAL

## 2022-01-01 ENCOUNTER — APPOINTMENT (OUTPATIENT)
Dept: PHYSICAL THERAPY | Facility: CLINIC | Age: 87
DRG: 177 | End: 2022-01-01
Payer: COMMERCIAL

## 2022-01-01 ENCOUNTER — APPOINTMENT (OUTPATIENT)
Dept: RADIOLOGY | Facility: CLINIC | Age: 87
DRG: 189 | End: 2022-01-01
Attending: EMERGENCY MEDICINE
Payer: COMMERCIAL

## 2022-01-01 ENCOUNTER — APPOINTMENT (OUTPATIENT)
Dept: PHYSICAL THERAPY | Facility: CLINIC | Age: 87
DRG: 177 | End: 2022-01-01
Attending: INTERNAL MEDICINE
Payer: COMMERCIAL

## 2022-01-01 ENCOUNTER — TELEPHONE (OUTPATIENT)
Dept: GERIATRICS | Facility: CLINIC | Age: 87
End: 2022-01-01
Payer: COMMERCIAL

## 2022-01-01 ENCOUNTER — LAB REQUISITION (OUTPATIENT)
Dept: LAB | Facility: CLINIC | Age: 87
End: 2022-01-01
Payer: COMMERCIAL

## 2022-01-01 ENCOUNTER — MEDICAL CORRESPONDENCE (OUTPATIENT)
Dept: HEALTH INFORMATION MANAGEMENT | Facility: CLINIC | Age: 87
End: 2022-01-01

## 2022-01-01 ENCOUNTER — MEDICAL CORRESPONDENCE (OUTPATIENT)
Dept: PEDIATRICS | Facility: CLINIC | Age: 87
End: 2022-01-01

## 2022-01-01 ENCOUNTER — TELEPHONE (OUTPATIENT)
Dept: INTERNAL MEDICINE | Facility: CLINIC | Age: 87
End: 2022-01-01
Payer: COMMERCIAL

## 2022-01-01 ENCOUNTER — APPOINTMENT (OUTPATIENT)
Dept: OCCUPATIONAL THERAPY | Facility: CLINIC | Age: 87
DRG: 177 | End: 2022-01-01
Payer: COMMERCIAL

## 2022-01-01 ENCOUNTER — PATIENT OUTREACH (OUTPATIENT)
Dept: CARE COORDINATION | Facility: CLINIC | Age: 87
End: 2022-01-01
Payer: COMMERCIAL

## 2022-01-01 ENCOUNTER — LAB (OUTPATIENT)
Dept: CARDIOLOGY | Facility: CLINIC | Age: 87
End: 2022-01-01
Payer: COMMERCIAL

## 2022-01-01 ENCOUNTER — TRANSFERRED RECORDS (OUTPATIENT)
Dept: HEALTH INFORMATION MANAGEMENT | Facility: CLINIC | Age: 87
End: 2022-01-01
Payer: COMMERCIAL

## 2022-01-01 ENCOUNTER — OFFICE VISIT (OUTPATIENT)
Dept: PULMONOLOGY | Facility: OTHER | Age: 87
End: 2022-01-01
Payer: COMMERCIAL

## 2022-01-01 ENCOUNTER — APPOINTMENT (OUTPATIENT)
Dept: OCCUPATIONAL THERAPY | Facility: CLINIC | Age: 87
DRG: 193 | End: 2022-01-01
Payer: COMMERCIAL

## 2022-01-01 ENCOUNTER — APPOINTMENT (OUTPATIENT)
Dept: OCCUPATIONAL THERAPY | Facility: CLINIC | Age: 87
DRG: 291 | End: 2022-01-01
Payer: COMMERCIAL

## 2022-01-01 ENCOUNTER — APPOINTMENT (OUTPATIENT)
Dept: PHYSICAL THERAPY | Facility: CLINIC | Age: 87
DRG: 193 | End: 2022-01-01
Payer: COMMERCIAL

## 2022-01-01 ENCOUNTER — APPOINTMENT (OUTPATIENT)
Dept: CT IMAGING | Facility: CLINIC | Age: 87
End: 2022-01-01
Attending: EMERGENCY MEDICINE
Payer: COMMERCIAL

## 2022-01-01 ENCOUNTER — APPOINTMENT (OUTPATIENT)
Dept: OCCUPATIONAL THERAPY | Facility: CLINIC | Age: 87
DRG: 177 | End: 2022-01-01
Attending: INTERNAL MEDICINE
Payer: COMMERCIAL

## 2022-01-01 ENCOUNTER — TELEPHONE (OUTPATIENT)
Dept: INTERNAL MEDICINE | Facility: CLINIC | Age: 87
End: 2022-01-01

## 2022-01-01 ENCOUNTER — OFFICE VISIT (OUTPATIENT)
Dept: INTERNAL MEDICINE | Facility: CLINIC | Age: 87
End: 2022-01-01
Payer: COMMERCIAL

## 2022-01-01 ENCOUNTER — HOME INFUSION (PRE-WILLOW HOME INFUSION) (OUTPATIENT)
Dept: PHARMACY | Facility: CLINIC | Age: 87
End: 2022-01-01
Payer: COMMERCIAL

## 2022-01-01 ENCOUNTER — HOSPITAL ENCOUNTER (INPATIENT)
Facility: HOSPITAL | Age: 87
LOS: 2 days | Discharge: HOME | DRG: 190 | End: 2022-03-25
Attending: INTERNAL MEDICINE | Admitting: INTERNAL MEDICINE
Payer: COMMERCIAL

## 2022-01-01 ENCOUNTER — APPOINTMENT (OUTPATIENT)
Dept: RADIOLOGY | Facility: CLINIC | Age: 87
DRG: 291 | End: 2022-01-01
Payer: COMMERCIAL

## 2022-01-01 ENCOUNTER — LAB REQUISITION (OUTPATIENT)
Dept: LAB | Facility: CLINIC | Age: 87
End: 2022-01-01

## 2022-01-01 ENCOUNTER — HOSPITAL ENCOUNTER (INPATIENT)
Facility: CLINIC | Age: 87
LOS: 5 days | Discharge: HOME HEALTH | DRG: 291 | End: 2022-06-13
Attending: FAMILY MEDICINE | Admitting: STUDENT IN AN ORGANIZED HEALTH CARE EDUCATION/TRAINING PROGRAM
Payer: COMMERCIAL

## 2022-01-01 ENCOUNTER — DOCUMENTATION ONLY (OUTPATIENT)
Dept: HOME HEALTH SERVICES | Facility: CLINIC | Age: 87
End: 2022-01-01
Payer: COMMERCIAL

## 2022-01-01 ENCOUNTER — PATIENT OUTREACH (OUTPATIENT)
Dept: CARE COORDINATION | Facility: CLINIC | Age: 87
End: 2022-01-01

## 2022-01-01 ENCOUNTER — DOCUMENTATION ONLY (OUTPATIENT)
Dept: PULMONOLOGY | Facility: OTHER | Age: 87
End: 2022-01-01

## 2022-01-01 ENCOUNTER — HOSPITAL ENCOUNTER (EMERGENCY)
Facility: CLINIC | Age: 87
Discharge: ANOTHER HOSPITAL | End: 2022-03-23
Attending: STUDENT IN AN ORGANIZED HEALTH CARE EDUCATION/TRAINING PROGRAM | Admitting: STUDENT IN AN ORGANIZED HEALTH CARE EDUCATION/TRAINING PROGRAM
Payer: COMMERCIAL

## 2022-01-01 ENCOUNTER — APPOINTMENT (OUTPATIENT)
Dept: RADIOLOGY | Facility: CLINIC | Age: 87
DRG: 193 | End: 2022-01-01
Attending: EMERGENCY MEDICINE
Payer: COMMERCIAL

## 2022-01-01 ENCOUNTER — APPOINTMENT (OUTPATIENT)
Dept: PHYSICAL THERAPY | Facility: CLINIC | Age: 87
DRG: 291 | End: 2022-01-01
Payer: COMMERCIAL

## 2022-01-01 ENCOUNTER — APPOINTMENT (OUTPATIENT)
Dept: RADIOLOGY | Facility: CLINIC | Age: 87
DRG: 177 | End: 2022-01-01
Attending: EMERGENCY MEDICINE
Payer: COMMERCIAL

## 2022-01-01 ENCOUNTER — TELEPHONE (OUTPATIENT)
Dept: CARDIOLOGY | Facility: CLINIC | Age: 87
End: 2022-01-01

## 2022-01-01 ENCOUNTER — HEALTH MAINTENANCE LETTER (OUTPATIENT)
Age: 87
End: 2022-01-01

## 2022-01-01 ENCOUNTER — VIRTUAL VISIT (OUTPATIENT)
Dept: PHARMACY | Facility: CLINIC | Age: 87
End: 2022-01-01
Payer: COMMERCIAL

## 2022-01-01 ENCOUNTER — HOSPITAL ENCOUNTER (INPATIENT)
Facility: CLINIC | Age: 87
LOS: 17 days | Discharge: HOME HEALTH | DRG: 177 | End: 2022-06-02
Attending: EMERGENCY MEDICINE | Admitting: STUDENT IN AN ORGANIZED HEALTH CARE EDUCATION/TRAINING PROGRAM
Payer: COMMERCIAL

## 2022-01-01 ENCOUNTER — APPOINTMENT (OUTPATIENT)
Dept: PHYSICAL THERAPY | Facility: CLINIC | Age: 87
DRG: 193 | End: 2022-01-01
Attending: FAMILY MEDICINE
Payer: COMMERCIAL

## 2022-01-01 ENCOUNTER — DOCUMENTATION ONLY (OUTPATIENT)
Dept: RESPIRATORY THERAPY | Facility: CLINIC | Age: 87
End: 2022-01-01
Payer: COMMERCIAL

## 2022-01-01 ENCOUNTER — APPOINTMENT (OUTPATIENT)
Dept: RADIOLOGY | Facility: CLINIC | Age: 87
DRG: 291 | End: 2022-01-01
Attending: INTERNAL MEDICINE
Payer: COMMERCIAL

## 2022-01-01 ENCOUNTER — APPOINTMENT (OUTPATIENT)
Dept: OCCUPATIONAL THERAPY | Facility: CLINIC | Age: 87
DRG: 193 | End: 2022-01-01
Attending: FAMILY MEDICINE
Payer: COMMERCIAL

## 2022-01-01 ENCOUNTER — HOSPITAL ENCOUNTER (INPATIENT)
Facility: CLINIC | Age: 87
LOS: 9 days | Discharge: NURSING, REHAB OR LONG-TERM CARE | DRG: 193 | End: 2022-04-22
Attending: EMERGENCY MEDICINE | Admitting: FAMILY MEDICINE
Payer: COMMERCIAL

## 2022-01-01 ENCOUNTER — PATIENT OUTREACH (OUTPATIENT)
Dept: NURSING | Facility: CLINIC | Age: 87
End: 2022-01-01
Payer: COMMERCIAL

## 2022-01-01 ENCOUNTER — APPOINTMENT (OUTPATIENT)
Dept: CT IMAGING | Facility: CLINIC | Age: 87
DRG: 193 | End: 2022-01-01
Attending: EMERGENCY MEDICINE
Payer: COMMERCIAL

## 2022-01-01 ENCOUNTER — APPOINTMENT (OUTPATIENT)
Dept: SPEECH THERAPY | Facility: CLINIC | Age: 87
DRG: 193 | End: 2022-01-01
Payer: COMMERCIAL

## 2022-01-01 ENCOUNTER — MEDICAL CORRESPONDENCE (OUTPATIENT)
Dept: HEALTH INFORMATION MANAGEMENT | Facility: CLINIC | Age: 87
End: 2022-01-01
Payer: COMMERCIAL

## 2022-01-01 ENCOUNTER — APPOINTMENT (OUTPATIENT)
Dept: ULTRASOUND IMAGING | Facility: CLINIC | Age: 87
DRG: 193 | End: 2022-01-01
Attending: FAMILY MEDICINE
Payer: COMMERCIAL

## 2022-01-01 ENCOUNTER — APPOINTMENT (OUTPATIENT)
Dept: CT IMAGING | Facility: CLINIC | Age: 87
DRG: 177 | End: 2022-01-01
Payer: COMMERCIAL

## 2022-01-01 ENCOUNTER — MYC MEDICAL ADVICE (OUTPATIENT)
Dept: INTERNAL MEDICINE | Facility: CLINIC | Age: 87
End: 2022-01-01
Payer: COMMERCIAL

## 2022-01-01 ENCOUNTER — OFFICE VISIT (OUTPATIENT)
Dept: FAMILY MEDICINE | Facility: CLINIC | Age: 87
End: 2022-01-01
Payer: COMMERCIAL

## 2022-01-01 ENCOUNTER — TELEPHONE (OUTPATIENT)
Dept: PULMONOLOGY | Facility: CLINIC | Age: 87
End: 2022-01-01
Payer: COMMERCIAL

## 2022-01-01 ENCOUNTER — APPOINTMENT (OUTPATIENT)
Dept: RADIOLOGY | Facility: CLINIC | Age: 87
End: 2022-01-01
Attending: EMERGENCY MEDICINE
Payer: COMMERCIAL

## 2022-01-01 ENCOUNTER — APPOINTMENT (OUTPATIENT)
Dept: OCCUPATIONAL THERAPY | Facility: CLINIC | Age: 87
DRG: 193 | End: 2022-01-01
Attending: STUDENT IN AN ORGANIZED HEALTH CARE EDUCATION/TRAINING PROGRAM
Payer: COMMERCIAL

## 2022-01-01 ENCOUNTER — HOSPITAL ENCOUNTER (EMERGENCY)
Facility: CLINIC | Age: 87
Discharge: HOME | End: 2022-02-06
Attending: EMERGENCY MEDICINE | Admitting: EMERGENCY MEDICINE
Payer: COMMERCIAL

## 2022-01-01 ENCOUNTER — TELEPHONE (OUTPATIENT)
Dept: EDUCATION SERVICES | Facility: CLINIC | Age: 87
End: 2022-01-01
Payer: COMMERCIAL

## 2022-01-01 ENCOUNTER — APPOINTMENT (OUTPATIENT)
Dept: ULTRASOUND IMAGING | Facility: CLINIC | Age: 87
End: 2022-01-01
Attending: EMERGENCY MEDICINE
Payer: COMMERCIAL

## 2022-01-01 ENCOUNTER — HOSPITAL ENCOUNTER (OUTPATIENT)
Dept: CT IMAGING | Facility: CLINIC | Age: 87
Discharge: HOME | End: 2022-02-01
Attending: INTERNAL MEDICINE | Admitting: INTERNAL MEDICINE
Payer: COMMERCIAL

## 2022-01-01 ENCOUNTER — APPOINTMENT (OUTPATIENT)
Dept: RADIOLOGY | Facility: CLINIC | Age: 87
DRG: 177 | End: 2022-01-01
Attending: STUDENT IN AN ORGANIZED HEALTH CARE EDUCATION/TRAINING PROGRAM
Payer: COMMERCIAL

## 2022-01-01 ENCOUNTER — HOSPITAL ENCOUNTER (INPATIENT)
Facility: CLINIC | Age: 87
LOS: 4 days | DRG: 189 | End: 2022-06-28
Attending: EMERGENCY MEDICINE | Admitting: FAMILY MEDICINE
Payer: COMMERCIAL

## 2022-01-01 ENCOUNTER — TRANSITIONAL CARE UNIT VISIT (OUTPATIENT)
Dept: GERIATRICS | Facility: CLINIC | Age: 87
End: 2022-01-01
Payer: COMMERCIAL

## 2022-01-01 ENCOUNTER — APPOINTMENT (OUTPATIENT)
Dept: CT IMAGING | Facility: CLINIC | Age: 87
End: 2022-01-01
Attending: STUDENT IN AN ORGANIZED HEALTH CARE EDUCATION/TRAINING PROGRAM
Payer: COMMERCIAL

## 2022-01-01 ENCOUNTER — APPOINTMENT (OUTPATIENT)
Dept: CARDIOLOGY | Facility: CLINIC | Age: 87
DRG: 193 | End: 2022-01-01
Attending: FAMILY MEDICINE
Payer: COMMERCIAL

## 2022-01-01 ENCOUNTER — HOSPITAL ENCOUNTER (INPATIENT)
Facility: CLINIC | Age: 87
LOS: 4 days | Discharge: HOME | DRG: 193 | End: 2022-05-09
Attending: EMERGENCY MEDICINE | Admitting: FAMILY MEDICINE
Payer: COMMERCIAL

## 2022-01-01 ENCOUNTER — DOCUMENTATION ONLY (OUTPATIENT)
Dept: HOME HEALTH SERVICES | Facility: CLINIC | Age: 87
End: 2022-01-01

## 2022-01-01 ENCOUNTER — APPOINTMENT (OUTPATIENT)
Dept: RADIOLOGY | Facility: CLINIC | Age: 87
DRG: 193 | End: 2022-01-01
Attending: INTERNAL MEDICINE
Payer: COMMERCIAL

## 2022-01-01 ENCOUNTER — APPOINTMENT (OUTPATIENT)
Dept: SPEECH THERAPY | Facility: CLINIC | Age: 87
DRG: 193 | End: 2022-01-01
Attending: INTERNAL MEDICINE
Payer: COMMERCIAL

## 2022-01-01 ENCOUNTER — NURSE TRIAGE (OUTPATIENT)
Dept: NURSING | Facility: CLINIC | Age: 87
End: 2022-01-01

## 2022-01-01 ENCOUNTER — TELEPHONE (OUTPATIENT)
Dept: CARDIOLOGY | Facility: CLINIC | Age: 87
End: 2022-01-01
Payer: COMMERCIAL

## 2022-01-01 ENCOUNTER — OFFICE VISIT (OUTPATIENT)
Dept: CARDIOLOGY | Facility: CLINIC | Age: 87
End: 2022-01-01
Payer: COMMERCIAL

## 2022-01-01 VITALS
RESPIRATION RATE: 20 BRPM | SYSTOLIC BLOOD PRESSURE: 122 MMHG | TEMPERATURE: 97.7 F | RESPIRATION RATE: 18 BRPM | DIASTOLIC BLOOD PRESSURE: 73 MMHG | OXYGEN SATURATION: 93 % | OXYGEN SATURATION: 93 % | BODY MASS INDEX: 26 KG/M2 | WEIGHT: 137.7 LBS | HEART RATE: 95 BPM | HEART RATE: 97 BPM | SYSTOLIC BLOOD PRESSURE: 151 MMHG | DIASTOLIC BLOOD PRESSURE: 72 MMHG | HEIGHT: 61 IN | TEMPERATURE: 97.3 F

## 2022-01-01 VITALS
RESPIRATION RATE: 18 BRPM | DIASTOLIC BLOOD PRESSURE: 63 MMHG | OXYGEN SATURATION: 91 % | TEMPERATURE: 98 F | SYSTOLIC BLOOD PRESSURE: 117 MMHG | WEIGHT: 140.4 LBS | HEIGHT: 61 IN | BODY MASS INDEX: 26.51 KG/M2 | HEART RATE: 110 BPM

## 2022-01-01 VITALS
DIASTOLIC BLOOD PRESSURE: 53 MMHG | OXYGEN SATURATION: 95 % | WEIGHT: 138.3 LBS | HEIGHT: 61 IN | BODY MASS INDEX: 26.11 KG/M2 | RESPIRATION RATE: 18 BRPM | SYSTOLIC BLOOD PRESSURE: 109 MMHG | TEMPERATURE: 97.3 F | HEART RATE: 79 BPM

## 2022-01-01 VITALS
DIASTOLIC BLOOD PRESSURE: 66 MMHG | OXYGEN SATURATION: 93 % | TEMPERATURE: 98.6 F | HEART RATE: 85 BPM | BODY MASS INDEX: 25.84 KG/M2 | SYSTOLIC BLOOD PRESSURE: 116 MMHG | RESPIRATION RATE: 25 BRPM | WEIGHT: 139 LBS

## 2022-01-01 VITALS
OXYGEN SATURATION: 92 % | HEIGHT: 61 IN | HEART RATE: 94 BPM | BODY MASS INDEX: 26.13 KG/M2 | DIASTOLIC BLOOD PRESSURE: 60 MMHG | BODY MASS INDEX: 25.58 KG/M2 | WEIGHT: 139 LBS | SYSTOLIC BLOOD PRESSURE: 120 MMHG | HEIGHT: 62 IN | TEMPERATURE: 97.2 F | HEART RATE: 89 BPM | RESPIRATION RATE: 20 BRPM | DIASTOLIC BLOOD PRESSURE: 70 MMHG | OXYGEN SATURATION: 94 % | SYSTOLIC BLOOD PRESSURE: 128 MMHG

## 2022-01-01 VITALS
WEIGHT: 142 LBS | RESPIRATION RATE: 20 BRPM | HEART RATE: 82 BPM | OXYGEN SATURATION: 98 % | SYSTOLIC BLOOD PRESSURE: 125 MMHG | BODY MASS INDEX: 26.4 KG/M2 | DIASTOLIC BLOOD PRESSURE: 58 MMHG | TEMPERATURE: 97.6 F

## 2022-01-01 VITALS
DIASTOLIC BLOOD PRESSURE: 64 MMHG | WEIGHT: 141 LBS | BODY MASS INDEX: 25.95 KG/M2 | HEIGHT: 62 IN | SYSTOLIC BLOOD PRESSURE: 102 MMHG | HEART RATE: 68 BPM

## 2022-01-01 VITALS
BODY MASS INDEX: 24.53 KG/M2 | HEART RATE: 93 BPM | TEMPERATURE: 97.3 F | SYSTOLIC BLOOD PRESSURE: 122 MMHG | DIASTOLIC BLOOD PRESSURE: 58 MMHG | RESPIRATION RATE: 18 BRPM | WEIGHT: 129.8 LBS | OXYGEN SATURATION: 91 %

## 2022-01-01 VITALS
DIASTOLIC BLOOD PRESSURE: 58 MMHG | WEIGHT: 140 LBS | TEMPERATURE: 97.8 F | SYSTOLIC BLOOD PRESSURE: 110 MMHG | BODY MASS INDEX: 26.43 KG/M2 | HEIGHT: 61 IN

## 2022-01-01 VITALS
TEMPERATURE: 97.9 F | DIASTOLIC BLOOD PRESSURE: 83 MMHG | SYSTOLIC BLOOD PRESSURE: 136 MMHG | OXYGEN SATURATION: 92 % | WEIGHT: 138.4 LBS | HEIGHT: 61 IN | BODY MASS INDEX: 26.13 KG/M2 | HEART RATE: 97 BPM | RESPIRATION RATE: 20 BRPM

## 2022-01-01 VITALS
RESPIRATION RATE: 24 BRPM | SYSTOLIC BLOOD PRESSURE: 120 MMHG | DIASTOLIC BLOOD PRESSURE: 80 MMHG | HEART RATE: 108 BPM | OXYGEN SATURATION: 92 %

## 2022-01-01 VITALS
RESPIRATION RATE: 18 BRPM | DIASTOLIC BLOOD PRESSURE: 70 MMHG | SYSTOLIC BLOOD PRESSURE: 116 MMHG | TEMPERATURE: 98.2 F | OXYGEN SATURATION: 92 % | HEART RATE: 90 BPM

## 2022-01-01 VITALS
HEART RATE: 94 BPM | OXYGEN SATURATION: 98 % | DIASTOLIC BLOOD PRESSURE: 55 MMHG | RESPIRATION RATE: 18 BRPM | SYSTOLIC BLOOD PRESSURE: 109 MMHG | TEMPERATURE: 97.9 F

## 2022-01-01 VITALS
RESPIRATION RATE: 18 BRPM | SYSTOLIC BLOOD PRESSURE: 120 MMHG | HEART RATE: 102 BPM | OXYGEN SATURATION: 92 % | DIASTOLIC BLOOD PRESSURE: 68 MMHG | BODY MASS INDEX: 26.77 KG/M2 | WEIGHT: 144 LBS

## 2022-01-01 VITALS
SYSTOLIC BLOOD PRESSURE: 116 MMHG | WEIGHT: 145.1 LBS | TEMPERATURE: 98.4 F | HEART RATE: 95 BPM | BODY MASS INDEX: 27.4 KG/M2 | DIASTOLIC BLOOD PRESSURE: 71 MMHG | RESPIRATION RATE: 30 BRPM | HEIGHT: 61 IN | OXYGEN SATURATION: 90 %

## 2022-01-01 VITALS
RESPIRATION RATE: 16 BRPM | SYSTOLIC BLOOD PRESSURE: 103 MMHG | HEART RATE: 83 BPM | TEMPERATURE: 98 F | OXYGEN SATURATION: 99 % | DIASTOLIC BLOOD PRESSURE: 65 MMHG

## 2022-01-01 DIAGNOSIS — J47.1 BRONCHIECTASIS WITH ACUTE EXACERBATION (H): ICD-10-CM

## 2022-01-01 DIAGNOSIS — J44.1 COPD WITH EXACERBATION (H): Primary | ICD-10-CM

## 2022-01-01 DIAGNOSIS — J47.1 BRONCHIECTASIS WITH ACUTE EXACERBATION (H): Primary | ICD-10-CM

## 2022-01-01 DIAGNOSIS — J44.9 CHRONIC OBSTRUCTIVE PULMONARY DISEASE, UNSPECIFIED COPD TYPE (H): Primary | ICD-10-CM

## 2022-01-01 DIAGNOSIS — F41.9 ANXIETY: ICD-10-CM

## 2022-01-01 DIAGNOSIS — J44.1 COPD WITH ACUTE EXACERBATION (H): Primary | ICD-10-CM

## 2022-01-01 DIAGNOSIS — I48.20 CHRONIC ATRIAL FIBRILLATION (H): ICD-10-CM

## 2022-01-01 DIAGNOSIS — J96.21 ACUTE ON CHRONIC RESPIRATORY FAILURE WITH HYPOXIA AND HYPERCAPNIA (H): ICD-10-CM

## 2022-01-01 DIAGNOSIS — G62.9 NEUROPATHY: ICD-10-CM

## 2022-01-01 DIAGNOSIS — J44.1 COPD EXACERBATION (H): ICD-10-CM

## 2022-01-01 DIAGNOSIS — E11.8 TYPE 2 DIABETES MELLITUS WITH COMPLICATION, WITHOUT LONG-TERM CURRENT USE OF INSULIN (H): Primary | ICD-10-CM

## 2022-01-01 DIAGNOSIS — I50.32 CHRONIC HEART FAILURE WITH PRESERVED EJECTION FRACTION (H): ICD-10-CM

## 2022-01-01 DIAGNOSIS — I10 ESSENTIAL HYPERTENSION: Primary | ICD-10-CM

## 2022-01-01 DIAGNOSIS — Z86.39 H/O NON-INSULIN DEPENDENT DIABETES MELLITUS: ICD-10-CM

## 2022-01-01 DIAGNOSIS — Z79.01 LONG TERM (CURRENT) USE OF ANTICOAGULANTS: ICD-10-CM
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
## 2022-01-01 DIAGNOSIS — J96.11 CHRONIC RESPIRATORY FAILURE WITH HYPOXIA (H): ICD-10-CM

## 2022-01-01 DIAGNOSIS — I50.33 ACUTE ON CHRONIC DIASTOLIC (CONGESTIVE) HEART FAILURE (H): ICD-10-CM

## 2022-01-01 DIAGNOSIS — J96.11 CHRONIC RESPIRATORY FAILURE WITH HYPOXIA (H): Primary | ICD-10-CM

## 2022-01-01 DIAGNOSIS — E11.69 TYPE 2 DIABETES MELLITUS WITH OTHER SPECIFIED COMPLICATION, WITHOUT LONG-TERM CURRENT USE OF INSULIN (H): Primary | ICD-10-CM

## 2022-01-01 DIAGNOSIS — J44.1 COPD WITH ACUTE EXACERBATION (H): ICD-10-CM

## 2022-01-01 DIAGNOSIS — I10 ESSENTIAL HYPERTENSION: ICD-10-CM

## 2022-01-01 DIAGNOSIS — K64.9 HEMORRHOIDS, UNSPECIFIED HEMORRHOID TYPE: ICD-10-CM

## 2022-01-01 DIAGNOSIS — J18.9 PNEUMONIA, UNSPECIFIED ORGANISM: ICD-10-CM

## 2022-01-01 DIAGNOSIS — R06.00 DYSPNEA, UNSPECIFIED TYPE: ICD-10-CM

## 2022-01-01 DIAGNOSIS — J96.01 ACUTE RESPIRATORY FAILURE WITH HYPOXIA (H): Primary | ICD-10-CM

## 2022-01-01 DIAGNOSIS — D64.9 ANEMIA, UNSPECIFIED: ICD-10-CM

## 2022-01-01 DIAGNOSIS — J44.1 COPD EXACERBATION (H): Primary | ICD-10-CM

## 2022-01-01 DIAGNOSIS — E87.1 HYPONATREMIA: ICD-10-CM

## 2022-01-01 DIAGNOSIS — B02.8 HERPES ZOSTER WITH COMPLICATION: Primary | ICD-10-CM

## 2022-01-01 DIAGNOSIS — F41.1 GENERALIZED ANXIETY DISORDER: ICD-10-CM

## 2022-01-01 DIAGNOSIS — J96.22 ACUTE ON CHRONIC RESPIRATORY FAILURE WITH HYPOXIA AND HYPERCAPNIA (H): ICD-10-CM

## 2022-01-01 DIAGNOSIS — J96.21 ACUTE AND CHRONIC RESPIRATORY FAILURE WITH HYPOXIA (H): ICD-10-CM

## 2022-01-01 DIAGNOSIS — N39.0 URINARY TRACT INFECTION, SITE NOT SPECIFIED: ICD-10-CM

## 2022-01-01 DIAGNOSIS — B96.5 PSEUDOMONAS RESPIRATORY INFECTION: ICD-10-CM

## 2022-01-01 DIAGNOSIS — L03.116 CELLULITIS OF LEFT LOWER EXTREMITY: ICD-10-CM

## 2022-01-01 DIAGNOSIS — J47.9 BRONCHIECTASIS WITHOUT COMPLICATION (H): Primary | ICD-10-CM

## 2022-01-01 DIAGNOSIS — I50.9 CHF (CONGESTIVE HEART FAILURE) (H): ICD-10-CM

## 2022-01-01 DIAGNOSIS — J18.9 PNEUMONIA OF RIGHT LOWER LOBE DUE TO INFECTIOUS ORGANISM: ICD-10-CM

## 2022-01-01 DIAGNOSIS — J43.1 PANLOBULAR EMPHYSEMA (H): ICD-10-CM

## 2022-01-01 DIAGNOSIS — I50.9 HEART FAILURE, UNSPECIFIED (H): ICD-10-CM

## 2022-01-01 DIAGNOSIS — R73.9 STEROID-INDUCED HYPERGLYCEMIA: ICD-10-CM

## 2022-01-01 DIAGNOSIS — E78.00 HYPERCHOLESTEROLEMIA: ICD-10-CM

## 2022-01-01 DIAGNOSIS — E83.42 HYPOMAGNESEMIA: ICD-10-CM

## 2022-01-01 DIAGNOSIS — I48.91 ATRIAL FIBRILLATION WITH RVR (H): ICD-10-CM

## 2022-01-01 DIAGNOSIS — R09.02 HYPOXIA: ICD-10-CM

## 2022-01-01 DIAGNOSIS — I50.32 CHRONIC HEART FAILURE WITH PRESERVED EJECTION FRACTION (H): Primary | ICD-10-CM

## 2022-01-01 DIAGNOSIS — J96.01 ACUTE RESPIRATORY FAILURE WITH HYPOXIA (H): ICD-10-CM

## 2022-01-01 DIAGNOSIS — J15.1 PNEUMONIA OF RIGHT UPPER LOBE DUE TO PSEUDOMONAS SPECIES (H): ICD-10-CM

## 2022-01-01 DIAGNOSIS — J47.9 BRONCHIECTASIS WITHOUT COMPLICATION (H): ICD-10-CM

## 2022-01-01 DIAGNOSIS — E11.9 TYPE 2 DIABETES MELLITUS WITHOUT COMPLICATION, WITHOUT LONG-TERM CURRENT USE OF INSULIN (H): ICD-10-CM

## 2022-01-01 DIAGNOSIS — E87.5 HYPERKALEMIA: ICD-10-CM

## 2022-01-01 DIAGNOSIS — S51.812A SKIN TEAR OF FOREARM WITHOUT COMPLICATION, LEFT, INITIAL ENCOUNTER: Primary | ICD-10-CM

## 2022-01-01 DIAGNOSIS — J18.9 PNEUMONIA OF LEFT LOWER LOBE DUE TO INFECTIOUS ORGANISM: ICD-10-CM

## 2022-01-01 DIAGNOSIS — J44.9 CHRONIC OBSTRUCTIVE PULMONARY DISEASE, UNSPECIFIED COPD TYPE (H): ICD-10-CM

## 2022-01-01 DIAGNOSIS — Z78.9 TAKES DIETARY SUPPLEMENTS: ICD-10-CM

## 2022-01-01 DIAGNOSIS — J98.8 PSEUDOMONAS RESPIRATORY INFECTION: ICD-10-CM

## 2022-01-01 DIAGNOSIS — I48.20 CHRONIC ATRIAL FIBRILLATION (H): Primary | ICD-10-CM

## 2022-01-01 DIAGNOSIS — L03.90 CELLULITIS: ICD-10-CM

## 2022-01-01 DIAGNOSIS — T38.0X5A STEROID-INDUCED HYPERGLYCEMIA: ICD-10-CM

## 2022-01-01 DIAGNOSIS — R07.9 CHEST PAIN ON EXERTION: ICD-10-CM

## 2022-01-01 DIAGNOSIS — Z71.89 OTHER SPECIFIED COUNSELING: ICD-10-CM

## 2022-01-01 DIAGNOSIS — K21.9 GASTROESOPHAGEAL REFLUX DISEASE: ICD-10-CM

## 2022-01-01 DIAGNOSIS — K64.9 HEMORRHOIDS, UNSPECIFIED HEMORRHOID TYPE: Primary | ICD-10-CM

## 2022-01-01 DIAGNOSIS — B35.3 TINEA PEDIS, UNSPECIFIED LATERALITY: ICD-10-CM

## 2022-01-01 DIAGNOSIS — J18.9 HOSPITAL-ACQUIRED PNEUMONIA: ICD-10-CM

## 2022-01-01 DIAGNOSIS — J44.1 COPD WITH EXACERBATION (H): ICD-10-CM

## 2022-01-01 DIAGNOSIS — F33.9 RECURRENT MAJOR DEPRESSIVE EPISODES (H): ICD-10-CM

## 2022-01-01 DIAGNOSIS — J47.9 BRONCHIECTASIS (H): Primary | ICD-10-CM

## 2022-01-01 DIAGNOSIS — J47.1 BRONCHIECTASIS WITH (ACUTE) EXACERBATION (H): ICD-10-CM

## 2022-01-01 DIAGNOSIS — K64.9 HEMORRHOIDS: Primary | ICD-10-CM

## 2022-01-01 DIAGNOSIS — E78.5 DYSLIPIDEMIA: ICD-10-CM

## 2022-01-01 DIAGNOSIS — J47.9 BRONCHIECTASIS (H): ICD-10-CM

## 2022-01-01 DIAGNOSIS — E55.9 VITAMIN D DEFICIENCY: ICD-10-CM

## 2022-01-01 DIAGNOSIS — M25.552 HIP PAIN, LEFT: ICD-10-CM

## 2022-01-01 DIAGNOSIS — L03.116 CELLULITIS OF LEFT LOWER EXTREMITY: Primary | ICD-10-CM

## 2022-01-01 LAB
ACID FAST STAIN (ARUP): NORMAL
ALBUMIN SERPL-MCNC: 2.5 G/DL (ref 3.5–5)
ALBUMIN SERPL-MCNC: 2.5 G/DL (ref 3.5–5)
ALBUMIN SERPL-MCNC: 2.6 G/DL (ref 3.5–5)
ALBUMIN SERPL-MCNC: 2.8 G/DL (ref 3.5–5)
ALBUMIN SERPL-MCNC: 2.9 G/DL (ref 3.5–5)
ALBUMIN SERPL-MCNC: 3.2 G/DL (ref 3.5–5)
ALBUMIN SERPL-MCNC: 3.2 G/DL (ref 3.5–5)
ALBUMIN SERPL-MCNC: 3.4 G/DL (ref 3.5–5)
ALBUMIN SERPL-MCNC: 3.5 G/DL (ref 3.5–5)
ALBUMIN UR-MCNC: 10 MG/DL
ALBUMIN UR-MCNC: 30 MG/DL
ALBUMIN UR-MCNC: NEGATIVE MG/DL
ALP SERPL-CCNC: 49 U/L (ref 45–120)
ALP SERPL-CCNC: 55 U/L (ref 45–120)
ALP SERPL-CCNC: 56 U/L (ref 45–120)
ALP SERPL-CCNC: 58 U/L (ref 45–120)
ALP SERPL-CCNC: 60 U/L (ref 45–120)
ALP SERPL-CCNC: 65 U/L (ref 45–120)
ALP SERPL-CCNC: 75 U/L (ref 45–120)
ALT SERPL W P-5'-P-CCNC: 13 U/L (ref 0–45)
ALT SERPL W P-5'-P-CCNC: 14 U/L (ref 0–45)
ALT SERPL W P-5'-P-CCNC: 15 U/L (ref 0–45)
ALT SERPL W P-5'-P-CCNC: 17 U/L (ref 0–45)
ALT SERPL W P-5'-P-CCNC: 17 U/L (ref 0–45)
ALT SERPL W P-5'-P-CCNC: 22 U/L (ref 0–45)
ALT SERPL W P-5'-P-CCNC: 23 U/L (ref 0–45)
ALT SERPL W P-5'-P-CCNC: 24 U/L (ref 0–45)
ALT SERPL W P-5'-P-CCNC: 29 U/L (ref 0–45)
ANION GAP SERPL CALCULATED.3IONS-SCNC: 10 MMOL/L (ref 5–18)
ANION GAP SERPL CALCULATED.3IONS-SCNC: 11 MMOL/L (ref 5–18)
ANION GAP SERPL CALCULATED.3IONS-SCNC: 12 MMOL/L (ref 5–18)
ANION GAP SERPL CALCULATED.3IONS-SCNC: 13 MMOL/L (ref 5–18)
ANION GAP SERPL CALCULATED.3IONS-SCNC: 6 MMOL/L (ref 5–18)
ANION GAP SERPL CALCULATED.3IONS-SCNC: 7 MMOL/L (ref 5–18)
ANION GAP SERPL CALCULATED.3IONS-SCNC: 8 MMOL/L (ref 5–18)
ANION GAP SERPL CALCULATED.3IONS-SCNC: 9 MMOL/L (ref 5–18)
APPEARANCE UR: CLEAR
APTT PPP: 45 SECONDS (ref 22–38)
AST SERPL W P-5'-P-CCNC: 12 U/L (ref 0–40)
AST SERPL W P-5'-P-CCNC: 13 U/L (ref 0–40)
AST SERPL W P-5'-P-CCNC: 15 U/L (ref 0–40)
AST SERPL W P-5'-P-CCNC: 15 U/L (ref 0–40)
AST SERPL W P-5'-P-CCNC: 16 U/L (ref 0–40)
AST SERPL W P-5'-P-CCNC: 16 U/L (ref 0–40)
AST SERPL W P-5'-P-CCNC: 19 U/L (ref 0–40)
AST SERPL W P-5'-P-CCNC: 19 U/L (ref 0–40)
AST SERPL W P-5'-P-CCNC: 21 U/L (ref 0–40)
ATRIAL RATE - MUSE: 115 BPM
ATRIAL RATE - MUSE: 174 BPM
ATRIAL RATE - MUSE: 416 BPM
ATRIAL RATE - MUSE: 468 BPM
ATRIAL RATE - MUSE: 82 BPM
ATRIAL RATE - MUSE: 94 BPM
ATRIAL RATE - MUSE: 99 BPM
BACTERIA #/AREA URNS HPF: ABNORMAL /HPF
BACTERIA BLD CULT: NO GROWTH
BACTERIA SPT CULT: ABNORMAL
BACTERIA SPT CULT: NORMAL
BACTERIA UR CULT: NO GROWTH
BACTERIA UR CULT: NO GROWTH
BACTERIA UR CULT: NORMAL
BASE EXCESS BLDA CALC-SCNC: 15.5 MMOL/L
BASE EXCESS BLDA CALC-SCNC: 19.4 MMOL/L
BASE EXCESS BLDV CALC-SCNC: 10.9 MMOL/L
BASE EXCESS BLDV CALC-SCNC: 12.1 MMOL/L
BASE EXCESS BLDV CALC-SCNC: 12.5 MMOL/L
BASE EXCESS BLDV CALC-SCNC: 12.9 MMOL/L
BASE EXCESS BLDV CALC-SCNC: 12.9 MMOL/L
BASE EXCESS BLDV CALC-SCNC: 18.3 MMOL/L
BASE EXCESS BLDV CALC-SCNC: 19 MMOL/L
BASE EXCESS BLDV CALC-SCNC: 6.1 MMOL/L
BASE EXCESS BLDV CALC-SCNC: 8.3 MMOL/L
BASE EXCESS BLDV CALC-SCNC: 9.2 MMOL/L
BASOPHILS # BLD AUTO: 0 10E3/UL (ref 0–0.2)
BASOPHILS # BLD AUTO: 0.1 10E3/UL (ref 0–0.2)
BASOPHILS # BLD AUTO: 0.1 10E3/UL (ref 0–0.2)
BASOPHILS NFR BLD AUTO: 0 %
BILIRUB DIRECT SERPL-MCNC: 0.3 MG/DL
BILIRUB SERPL-MCNC: 0.5 MG/DL (ref 0–1)
BILIRUB SERPL-MCNC: 0.5 MG/DL (ref 0–1)
BILIRUB SERPL-MCNC: 0.6 MG/DL (ref 0–1)
BILIRUB SERPL-MCNC: 0.7 MG/DL (ref 0–1)
BILIRUB SERPL-MCNC: 0.7 MG/DL (ref 0–1)
BILIRUB SERPL-MCNC: 0.9 MG/DL (ref 0–1)
BILIRUB SERPL-MCNC: 0.9 MG/DL (ref 0–1)
BILIRUB UR QL STRIP: NEGATIVE
BNP SERPL-MCNC: 100 PG/ML (ref 0–167)
BNP SERPL-MCNC: 120 PG/ML (ref 0–167)
BNP SERPL-MCNC: 159 PG/ML (ref 0–167)
BNP SERPL-MCNC: 189 PG/ML (ref 0–167)
BNP SERPL-MCNC: 192 PG/ML (ref 0–167)
BNP SERPL-MCNC: 196 PG/ML (ref 0–167)
BNP SERPL-MCNC: 259 PG/ML (ref 0–167)
BNP SERPL-MCNC: 272 PG/ML (ref 0–167)
BSA FOR ECHO PROCEDURE: 0 M2
BUN SERPL-MCNC: 10 MG/DL (ref 8–28)
BUN SERPL-MCNC: 11 MG/DL (ref 8–28)
BUN SERPL-MCNC: 12 MG/DL (ref 8–28)
BUN SERPL-MCNC: 13 MG/DL (ref 8–28)
BUN SERPL-MCNC: 13 MG/DL (ref 8–28)
BUN SERPL-MCNC: 14 MG/DL (ref 8–28)
BUN SERPL-MCNC: 15 MG/DL (ref 8–28)
BUN SERPL-MCNC: 16 MG/DL (ref 8–28)
BUN SERPL-MCNC: 17 MG/DL (ref 8–28)
BUN SERPL-MCNC: 19 MG/DL (ref 8–28)
BUN SERPL-MCNC: 20 MG/DL (ref 8–28)
BUN SERPL-MCNC: 21 MG/DL (ref 8–28)
BUN SERPL-MCNC: 23 MG/DL (ref 8–28)
BUN SERPL-MCNC: 24 MG/DL (ref 8–28)
BUN SERPL-MCNC: 31 MG/DL (ref 8–28)
BUN SERPL-MCNC: 40 MG/DL (ref 8–28)
BUN SERPL-MCNC: 50 MG/DL (ref 8–28)
BUN SERPL-MCNC: 53 MG/DL (ref 8–28)
BUN SERPL-MCNC: 9 MG/DL (ref 8–28)
BUN SERPL-MCNC: 9 MG/DL (ref 8–28)
C PNEUM DNA SPEC QL NAA+PROBE: NOT DETECTED
C REACTIVE PROTEIN LHE: 11.8 MG/DL (ref 0–0.8)
C REACTIVE PROTEIN LHE: 16.2 MG/DL (ref 0–0.8)
C REACTIVE PROTEIN LHE: 18.5 MG/DL (ref 0–0.8)
C REACTIVE PROTEIN LHE: 2 MG/DL (ref 0–0.8)
C REACTIVE PROTEIN LHE: 5.2 MG/DL (ref 0–0.8)
C REACTIVE PROTEIN LHE: 8.3 MG/DL (ref 0–0.8)
CALCIUM SERPL-MCNC: 7.3 MG/DL (ref 8.5–10.5)
CALCIUM SERPL-MCNC: 7.7 MG/DL (ref 8.5–10.5)
CALCIUM SERPL-MCNC: 7.7 MG/DL (ref 8.5–10.5)
CALCIUM SERPL-MCNC: 7.8 MG/DL (ref 8.5–10.5)
CALCIUM SERPL-MCNC: 7.8 MG/DL (ref 8.5–10.5)
CALCIUM SERPL-MCNC: 7.9 MG/DL (ref 8.5–10.5)
CALCIUM SERPL-MCNC: 8 MG/DL (ref 8.5–10.5)
CALCIUM SERPL-MCNC: 8.1 MG/DL (ref 8.5–10.5)
CALCIUM SERPL-MCNC: 8.1 MG/DL (ref 8.5–10.5)
CALCIUM SERPL-MCNC: 8.2 MG/DL (ref 8.5–10.5)
CALCIUM SERPL-MCNC: 8.3 MG/DL (ref 8.5–10.5)
CALCIUM SERPL-MCNC: 8.4 MG/DL (ref 8.5–10.5)
CALCIUM SERPL-MCNC: 8.5 MG/DL (ref 8.5–10.5)
CALCIUM SERPL-MCNC: 8.6 MG/DL (ref 8.5–10.5)
CALCIUM SERPL-MCNC: 8.7 MG/DL (ref 8.5–10.5)
CALCIUM SERPL-MCNC: 8.7 MG/DL (ref 8.5–10.5)
CALCIUM SERPL-MCNC: 8.8 MG/DL (ref 8.5–10.5)
CALCIUM SERPL-MCNC: 8.9 MG/DL (ref 8.5–10.5)
CALCIUM SERPL-MCNC: 9 MG/DL (ref 8.5–10.5)
CALCIUM SERPL-MCNC: 9.2 MG/DL (ref 8.5–10.5)
CALCIUM SERPL-MCNC: 9.2 MG/DL (ref 8.5–10.5)
CAOX CRY #/AREA URNS HPF: ABNORMAL /HPF
CHLORIDE BLD-SCNC: 84 MMOL/L (ref 98–107)
CHLORIDE BLD-SCNC: 85 MMOL/L (ref 98–107)
CHLORIDE BLD-SCNC: 86 MMOL/L (ref 98–107)
CHLORIDE BLD-SCNC: 87 MMOL/L (ref 98–107)
CHLORIDE BLD-SCNC: 88 MMOL/L (ref 98–107)
CHLORIDE BLD-SCNC: 89 MMOL/L (ref 98–107)
CHLORIDE BLD-SCNC: 90 MMOL/L (ref 98–107)
CHLORIDE BLD-SCNC: 91 MMOL/L (ref 98–107)
CHLORIDE BLD-SCNC: 92 MMOL/L (ref 98–107)
CHLORIDE BLD-SCNC: 93 MMOL/L (ref 98–107)
CHLORIDE BLD-SCNC: 94 MMOL/L (ref 98–107)
CHLORIDE BLD-SCNC: 95 MMOL/L (ref 98–107)
CHLORIDE BLD-SCNC: 96 MMOL/L (ref 98–107)
CHOLEST SERPL-MCNC: 140 MG/DL
CO2 SERPL-SCNC: 27 MMOL/L (ref 22–31)
CO2 SERPL-SCNC: 28 MMOL/L (ref 22–31)
CO2 SERPL-SCNC: 28 MMOL/L (ref 22–31)
CO2 SERPL-SCNC: 29 MMOL/L (ref 22–31)
CO2 SERPL-SCNC: 29 MMOL/L (ref 22–31)
CO2 SERPL-SCNC: 30 MMOL/L (ref 22–31)
CO2 SERPL-SCNC: 31 MMOL/L (ref 22–31)
CO2 SERPL-SCNC: 31 MMOL/L (ref 22–31)
CO2 SERPL-SCNC: 32 MMOL/L (ref 22–31)
CO2 SERPL-SCNC: 33 MMOL/L (ref 22–31)
CO2 SERPL-SCNC: 34 MMOL/L (ref 22–31)
CO2 SERPL-SCNC: 35 MMOL/L (ref 22–31)
CO2 SERPL-SCNC: 36 MMOL/L (ref 22–31)
CO2 SERPL-SCNC: 37 MMOL/L (ref 22–31)
CO2 SERPL-SCNC: 38 MMOL/L (ref 22–31)
CO2 SERPL-SCNC: 38 MMOL/L (ref 22–31)
CO2 SERPL-SCNC: 39 MMOL/L (ref 22–31)
CO2 SERPL-SCNC: 39 MMOL/L (ref 22–31)
CO2 SERPL-SCNC: 40 MMOL/L (ref 22–31)
CO2 SERPL-SCNC: 41 MMOL/L (ref 22–31)
CO2 SERPL-SCNC: 41 MMOL/L (ref 22–31)
CO2 SERPL-SCNC: 42 MMOL/L (ref 22–31)
CO2 SERPL-SCNC: 42 MMOL/L (ref 22–31)
CO2 SERPL-SCNC: 43 MMOL/L (ref 22–31)
CO2 SERPL-SCNC: 44 MMOL/L (ref 22–31)
CO2 SERPL-SCNC: 45 MMOL/L (ref 22–31)
COHGB MFR BLD: 92.6 % (ref 95–96)
COHGB MFR BLD: 96.3 % (ref 95–96)
COLOR UR AUTO: ABNORMAL
COLOR UR AUTO: COLORLESS
COLOR UR AUTO: YELLOW
CREAT SERPL-MCNC: 0.46 MG/DL (ref 0.6–1.1)
CREAT SERPL-MCNC: 0.47 MG/DL (ref 0.6–1.1)
CREAT SERPL-MCNC: 0.48 MG/DL (ref 0.6–1.1)
CREAT SERPL-MCNC: 0.48 MG/DL (ref 0.6–1.1)
CREAT SERPL-MCNC: 0.5 MG/DL (ref 0.6–1.1)
CREAT SERPL-MCNC: 0.53 MG/DL (ref 0.6–1.1)
CREAT SERPL-MCNC: 0.54 MG/DL (ref 0.6–1.1)
CREAT SERPL-MCNC: 0.55 MG/DL (ref 0.6–1.1)
CREAT SERPL-MCNC: 0.56 MG/DL (ref 0.6–1.1)
CREAT SERPL-MCNC: 0.56 MG/DL (ref 0.6–1.1)
CREAT SERPL-MCNC: 0.57 MG/DL (ref 0.6–1.1)
CREAT SERPL-MCNC: 0.57 MG/DL (ref 0.6–1.1)
CREAT SERPL-MCNC: 0.59 MG/DL (ref 0.6–1.1)
CREAT SERPL-MCNC: 0.6 MG/DL (ref 0.6–1.1)
CREAT SERPL-MCNC: 0.61 MG/DL (ref 0.6–1.1)
CREAT SERPL-MCNC: 0.62 MG/DL (ref 0.6–1.1)
CREAT SERPL-MCNC: 0.62 MG/DL (ref 0.6–1.1)
CREAT SERPL-MCNC: 0.63 MG/DL (ref 0.6–1.1)
CREAT SERPL-MCNC: 0.64 MG/DL (ref 0.6–1.1)
CREAT SERPL-MCNC: 0.65 MG/DL (ref 0.6–1.1)
CREAT SERPL-MCNC: 0.68 MG/DL (ref 0.6–1.1)
CREAT SERPL-MCNC: 0.69 MG/DL (ref 0.6–1.1)
CREAT SERPL-MCNC: 0.7 MG/DL (ref 0.6–1.1)
CREAT SERPL-MCNC: 0.71 MG/DL (ref 0.6–1.1)
CREAT SERPL-MCNC: 0.71 MG/DL (ref 0.6–1.1)
CREAT SERPL-MCNC: 0.72 MG/DL (ref 0.6–1.1)
CREAT SERPL-MCNC: 0.95 MG/DL (ref 0.6–1.1)
CREAT SERPL-MCNC: 0.96 MG/DL (ref 0.6–1.1)
CREAT SERPL-MCNC: 1.06 MG/DL (ref 0.6–1.1)
DIASTOLIC BLOOD PRESSURE - MUSE: 59 MMHG
DIASTOLIC BLOOD PRESSURE - MUSE: 59 MMHG
DIASTOLIC BLOOD PRESSURE - MUSE: 63 MMHG
DIASTOLIC BLOOD PRESSURE - MUSE: 72 MMHG
DIASTOLIC BLOOD PRESSURE - MUSE: 75 MMHG
DIASTOLIC BLOOD PRESSURE - MUSE: NORMAL MMHG
DIASTOLIC BLOOD PRESSURE - MUSE: NORMAL MMHG
EOSINOPHIL # BLD AUTO: 0 10E3/UL (ref 0–0.7)
EOSINOPHIL # BLD AUTO: 0.1 10E3/UL (ref 0–0.7)
EOSINOPHIL # BLD AUTO: 0.2 10E3/UL (ref 0–0.7)
EOSINOPHIL # BLD AUTO: 0.3 10E3/UL (ref 0–0.7)
EOSINOPHIL NFR BLD AUTO: 0 %
EOSINOPHIL NFR BLD AUTO: 1 %
EOSINOPHIL NFR BLD AUTO: 2 %
ERYTHROCYTE [DISTWIDTH] IN BLOOD BY AUTOMATED COUNT: 14.6 % (ref 10–15)
ERYTHROCYTE [DISTWIDTH] IN BLOOD BY AUTOMATED COUNT: 15 % (ref 10–15)
ERYTHROCYTE [DISTWIDTH] IN BLOOD BY AUTOMATED COUNT: 15.2 % (ref 10–15)
ERYTHROCYTE [DISTWIDTH] IN BLOOD BY AUTOMATED COUNT: 15.2 % (ref 10–15)
ERYTHROCYTE [DISTWIDTH] IN BLOOD BY AUTOMATED COUNT: 15.3 % (ref 10–15)
ERYTHROCYTE [DISTWIDTH] IN BLOOD BY AUTOMATED COUNT: 15.4 % (ref 10–15)
ERYTHROCYTE [DISTWIDTH] IN BLOOD BY AUTOMATED COUNT: 15.6 % (ref 10–15)
ERYTHROCYTE [DISTWIDTH] IN BLOOD BY AUTOMATED COUNT: 15.6 % (ref 10–15)
ERYTHROCYTE [DISTWIDTH] IN BLOOD BY AUTOMATED COUNT: 15.7 % (ref 10–15)
ERYTHROCYTE [DISTWIDTH] IN BLOOD BY AUTOMATED COUNT: 15.7 % (ref 10–15)
ERYTHROCYTE [DISTWIDTH] IN BLOOD BY AUTOMATED COUNT: 15.8 % (ref 10–15)
ERYTHROCYTE [DISTWIDTH] IN BLOOD BY AUTOMATED COUNT: 15.9 % (ref 10–15)
ERYTHROCYTE [DISTWIDTH] IN BLOOD BY AUTOMATED COUNT: 16 % (ref 10–15)
ERYTHROCYTE [DISTWIDTH] IN BLOOD BY AUTOMATED COUNT: 16 % (ref 10–15)
ERYTHROCYTE [DISTWIDTH] IN BLOOD BY AUTOMATED COUNT: 16.1 % (ref 10–15)
ERYTHROCYTE [DISTWIDTH] IN BLOOD BY AUTOMATED COUNT: 16.2 % (ref 10–15)
ERYTHROCYTE [DISTWIDTH] IN BLOOD BY AUTOMATED COUNT: 16.3 % (ref 10–15)
ERYTHROCYTE [DISTWIDTH] IN BLOOD BY AUTOMATED COUNT: 16.4 % (ref 10–15)
ERYTHROCYTE [DISTWIDTH] IN BLOOD BY AUTOMATED COUNT: 16.5 % (ref 10–15)
ERYTHROCYTE [DISTWIDTH] IN BLOOD BY AUTOMATED COUNT: 16.6 % (ref 10–15)
ERYTHROCYTE [DISTWIDTH] IN BLOOD BY AUTOMATED COUNT: 16.8 % (ref 10–15)
ERYTHROCYTE [DISTWIDTH] IN BLOOD BY AUTOMATED COUNT: 17.1 % (ref 10–15)
ERYTHROCYTE [DISTWIDTH] IN BLOOD BY AUTOMATED COUNT: 17.2 % (ref 10–15)
ERYTHROCYTE [DISTWIDTH] IN BLOOD BY AUTOMATED COUNT: 17.3 % (ref 10–15)
FASTING STATUS PATIENT QL REPORTED: YES
FLOW: 10 LPM
FLOW: 5 LPM
FLUAV H1 2009 PAND RNA SPEC QL NAA+PROBE: NOT DETECTED
FLUAV H1 RNA SPEC QL NAA+PROBE: NOT DETECTED
FLUAV H3 RNA SPEC QL NAA+PROBE: NOT DETECTED
FLUAV RNA SPEC QL NAA+PROBE: NEGATIVE
FLUAV RNA SPEC QL NAA+PROBE: NOT DETECTED
FLUBV RNA RESP QL NAA+PROBE: NEGATIVE
FLUBV RNA SPEC QL NAA+PROBE: NOT DETECTED
GFR SERPL CREATININE-BSD FRML MDRD: 52 ML/MIN/1.73M2
GFR SERPL CREATININE-BSD FRML MDRD: 58 ML/MIN/1.73M2
GFR SERPL CREATININE-BSD FRML MDRD: 59 ML/MIN/1.73M2
GFR SERPL CREATININE-BSD FRML MDRD: 82 ML/MIN/1.73M2
GFR SERPL CREATININE-BSD FRML MDRD: 83 ML/MIN/1.73M2
GFR SERPL CREATININE-BSD FRML MDRD: 83 ML/MIN/1.73M2
GFR SERPL CREATININE-BSD FRML MDRD: 85 ML/MIN/1.73M2
GFR SERPL CREATININE-BSD FRML MDRD: 86 ML/MIN/1.73M2
GFR SERPL CREATININE-BSD FRML MDRD: 87 ML/MIN/1.73M2
GFR SERPL CREATININE-BSD FRML MDRD: 88 ML/MIN/1.73M2
GFR SERPL CREATININE-BSD FRML MDRD: 89 ML/MIN/1.73M2
GFR SERPL CREATININE-BSD FRML MDRD: 90 ML/MIN/1.73M2
GFR SERPL CREATININE-BSD FRML MDRD: >90 ML/MIN/1.73M2
GLUCOSE BLD-MCNC: 100 MG/DL (ref 70–125)
GLUCOSE BLD-MCNC: 107 MG/DL (ref 70–125)
GLUCOSE BLD-MCNC: 109 MG/DL (ref 70–125)
GLUCOSE BLD-MCNC: 112 MG/DL (ref 70–125)
GLUCOSE BLD-MCNC: 113 MG/DL (ref 70–125)
GLUCOSE BLD-MCNC: 117 MG/DL (ref 70–125)
GLUCOSE BLD-MCNC: 127 MG/DL (ref 70–125)
GLUCOSE BLD-MCNC: 137 MG/DL (ref 70–125)
GLUCOSE BLD-MCNC: 141 MG/DL (ref 70–125)
GLUCOSE BLD-MCNC: 151 MG/DL (ref 70–125)
GLUCOSE BLD-MCNC: 158 MG/DL (ref 70–125)
GLUCOSE BLD-MCNC: 165 MG/DL (ref 70–125)
GLUCOSE BLD-MCNC: 166 MG/DL (ref 70–125)
GLUCOSE BLD-MCNC: 168 MG/DL (ref 70–125)
GLUCOSE BLD-MCNC: 177 MG/DL (ref 70–125)
GLUCOSE BLD-MCNC: 180 MG/DL (ref 70–125)
GLUCOSE BLD-MCNC: 180 MG/DL (ref 70–125)
GLUCOSE BLD-MCNC: 190 MG/DL (ref 70–125)
GLUCOSE BLD-MCNC: 191 MG/DL (ref 70–125)
GLUCOSE BLD-MCNC: 193 MG/DL (ref 70–125)
GLUCOSE BLD-MCNC: 198 MG/DL (ref 70–125)
GLUCOSE BLD-MCNC: 199 MG/DL (ref 70–125)
GLUCOSE BLD-MCNC: 204 MG/DL (ref 70–125)
GLUCOSE BLD-MCNC: 208 MG/DL (ref 70–125)
GLUCOSE BLD-MCNC: 212 MG/DL (ref 70–125)
GLUCOSE BLD-MCNC: 223 MG/DL (ref 70–125)
GLUCOSE BLD-MCNC: 227 MG/DL (ref 70–125)
GLUCOSE BLD-MCNC: 231 MG/DL (ref 70–125)
GLUCOSE BLD-MCNC: 232 MG/DL (ref 70–125)
GLUCOSE BLD-MCNC: 233 MG/DL (ref 70–125)
GLUCOSE BLD-MCNC: 239 MG/DL (ref 70–125)
GLUCOSE BLD-MCNC: 244 MG/DL (ref 70–125)
GLUCOSE BLD-MCNC: 258 MG/DL (ref 70–125)
GLUCOSE BLD-MCNC: 263 MG/DL (ref 70–125)
GLUCOSE BLD-MCNC: 269 MG/DL (ref 70–125)
GLUCOSE BLD-MCNC: 273 MG/DL (ref 70–125)
GLUCOSE BLD-MCNC: 278 MG/DL (ref 70–125)
GLUCOSE BLD-MCNC: 294 MG/DL (ref 70–125)
GLUCOSE BLD-MCNC: 316 MG/DL (ref 70–125)
GLUCOSE BLD-MCNC: 319 MG/DL (ref 70–125)
GLUCOSE BLD-MCNC: 62 MG/DL (ref 70–125)
GLUCOSE BLD-MCNC: 75 MG/DL (ref 70–125)
GLUCOSE BLD-MCNC: 80 MG/DL (ref 70–125)
GLUCOSE BLD-MCNC: 87 MG/DL (ref 70–125)
GLUCOSE BLD-MCNC: 91 MG/DL (ref 70–125)
GLUCOSE BLDC GLUCOMTR-MCNC: 100 MG/DL (ref 70–99)
GLUCOSE BLDC GLUCOMTR-MCNC: 106 MG/DL (ref 70–99)
GLUCOSE BLDC GLUCOMTR-MCNC: 107 MG/DL (ref 70–99)
GLUCOSE BLDC GLUCOMTR-MCNC: 109 MG/DL (ref 70–99)
GLUCOSE BLDC GLUCOMTR-MCNC: 109 MG/DL (ref 70–99)
GLUCOSE BLDC GLUCOMTR-MCNC: 110 MG/DL (ref 70–99)
GLUCOSE BLDC GLUCOMTR-MCNC: 112 MG/DL (ref 70–99)
GLUCOSE BLDC GLUCOMTR-MCNC: 113 MG/DL (ref 70–99)
GLUCOSE BLDC GLUCOMTR-MCNC: 121 MG/DL (ref 70–99)
GLUCOSE BLDC GLUCOMTR-MCNC: 124 MG/DL (ref 70–99)
GLUCOSE BLDC GLUCOMTR-MCNC: 125 MG/DL (ref 70–99)
GLUCOSE BLDC GLUCOMTR-MCNC: 125 MG/DL (ref 70–99)
GLUCOSE BLDC GLUCOMTR-MCNC: 127 MG/DL (ref 70–99)
GLUCOSE BLDC GLUCOMTR-MCNC: 132 MG/DL (ref 70–99)
GLUCOSE BLDC GLUCOMTR-MCNC: 134 MG/DL (ref 70–99)
GLUCOSE BLDC GLUCOMTR-MCNC: 136 MG/DL (ref 70–99)
GLUCOSE BLDC GLUCOMTR-MCNC: 137 MG/DL (ref 70–99)
GLUCOSE BLDC GLUCOMTR-MCNC: 138 MG/DL (ref 70–99)
GLUCOSE BLDC GLUCOMTR-MCNC: 138 MG/DL (ref 70–99)
GLUCOSE BLDC GLUCOMTR-MCNC: 140 MG/DL (ref 70–99)
GLUCOSE BLDC GLUCOMTR-MCNC: 143 MG/DL (ref 70–99)
GLUCOSE BLDC GLUCOMTR-MCNC: 143 MG/DL (ref 70–99)
GLUCOSE BLDC GLUCOMTR-MCNC: 144 MG/DL (ref 70–99)
GLUCOSE BLDC GLUCOMTR-MCNC: 146 MG/DL (ref 70–99)
GLUCOSE BLDC GLUCOMTR-MCNC: 150 MG/DL (ref 70–99)
GLUCOSE BLDC GLUCOMTR-MCNC: 151 MG/DL (ref 70–99)
GLUCOSE BLDC GLUCOMTR-MCNC: 152 MG/DL (ref 70–99)
GLUCOSE BLDC GLUCOMTR-MCNC: 152 MG/DL (ref 70–99)
GLUCOSE BLDC GLUCOMTR-MCNC: 153 MG/DL (ref 70–99)
GLUCOSE BLDC GLUCOMTR-MCNC: 155 MG/DL (ref 70–99)
GLUCOSE BLDC GLUCOMTR-MCNC: 156 MG/DL (ref 70–99)
GLUCOSE BLDC GLUCOMTR-MCNC: 157 MG/DL (ref 70–99)
GLUCOSE BLDC GLUCOMTR-MCNC: 157 MG/DL (ref 70–99)
GLUCOSE BLDC GLUCOMTR-MCNC: 158 MG/DL (ref 70–99)
GLUCOSE BLDC GLUCOMTR-MCNC: 159 MG/DL (ref 70–99)
GLUCOSE BLDC GLUCOMTR-MCNC: 160 MG/DL (ref 70–99)
GLUCOSE BLDC GLUCOMTR-MCNC: 160 MG/DL (ref 70–99)
GLUCOSE BLDC GLUCOMTR-MCNC: 162 MG/DL (ref 70–99)
GLUCOSE BLDC GLUCOMTR-MCNC: 164 MG/DL (ref 70–99)
GLUCOSE BLDC GLUCOMTR-MCNC: 164 MG/DL (ref 70–99)
GLUCOSE BLDC GLUCOMTR-MCNC: 166 MG/DL (ref 70–99)
GLUCOSE BLDC GLUCOMTR-MCNC: 167 MG/DL (ref 70–99)
GLUCOSE BLDC GLUCOMTR-MCNC: 168 MG/DL (ref 70–99)
GLUCOSE BLDC GLUCOMTR-MCNC: 169 MG/DL (ref 70–99)
GLUCOSE BLDC GLUCOMTR-MCNC: 170 MG/DL (ref 70–99)
GLUCOSE BLDC GLUCOMTR-MCNC: 172 MG/DL (ref 70–99)
GLUCOSE BLDC GLUCOMTR-MCNC: 173 MG/DL (ref 70–99)
GLUCOSE BLDC GLUCOMTR-MCNC: 174 MG/DL (ref 70–99)
GLUCOSE BLDC GLUCOMTR-MCNC: 175 MG/DL (ref 70–99)
GLUCOSE BLDC GLUCOMTR-MCNC: 177 MG/DL (ref 70–99)
GLUCOSE BLDC GLUCOMTR-MCNC: 179 MG/DL (ref 70–99)
GLUCOSE BLDC GLUCOMTR-MCNC: 179 MG/DL (ref 70–99)
GLUCOSE BLDC GLUCOMTR-MCNC: 183 MG/DL (ref 70–99)
GLUCOSE BLDC GLUCOMTR-MCNC: 188 MG/DL (ref 70–99)
GLUCOSE BLDC GLUCOMTR-MCNC: 188 MG/DL (ref 70–99)
GLUCOSE BLDC GLUCOMTR-MCNC: 192 MG/DL (ref 70–99)
GLUCOSE BLDC GLUCOMTR-MCNC: 193 MG/DL (ref 70–99)
GLUCOSE BLDC GLUCOMTR-MCNC: 193 MG/DL (ref 70–99)
GLUCOSE BLDC GLUCOMTR-MCNC: 195 MG/DL (ref 70–99)
GLUCOSE BLDC GLUCOMTR-MCNC: 196 MG/DL (ref 70–99)
GLUCOSE BLDC GLUCOMTR-MCNC: 197 MG/DL (ref 70–99)
GLUCOSE BLDC GLUCOMTR-MCNC: 197 MG/DL (ref 70–99)
GLUCOSE BLDC GLUCOMTR-MCNC: 198 MG/DL (ref 70–99)
GLUCOSE BLDC GLUCOMTR-MCNC: 198 MG/DL (ref 70–99)
GLUCOSE BLDC GLUCOMTR-MCNC: 199 MG/DL (ref 70–99)
GLUCOSE BLDC GLUCOMTR-MCNC: 200 MG/DL (ref 70–99)
GLUCOSE BLDC GLUCOMTR-MCNC: 201 MG/DL (ref 70–99)
GLUCOSE BLDC GLUCOMTR-MCNC: 201 MG/DL (ref 70–99)
GLUCOSE BLDC GLUCOMTR-MCNC: 202 MG/DL (ref 70–99)
GLUCOSE BLDC GLUCOMTR-MCNC: 202 MG/DL (ref 70–99)
GLUCOSE BLDC GLUCOMTR-MCNC: 203 MG/DL (ref 70–99)
GLUCOSE BLDC GLUCOMTR-MCNC: 207 MG/DL (ref 70–99)
GLUCOSE BLDC GLUCOMTR-MCNC: 207 MG/DL (ref 70–99)
GLUCOSE BLDC GLUCOMTR-MCNC: 208 MG/DL (ref 70–99)
GLUCOSE BLDC GLUCOMTR-MCNC: 209 MG/DL (ref 70–99)
GLUCOSE BLDC GLUCOMTR-MCNC: 212 MG/DL (ref 70–99)
GLUCOSE BLDC GLUCOMTR-MCNC: 213 MG/DL (ref 70–99)
GLUCOSE BLDC GLUCOMTR-MCNC: 214 MG/DL (ref 70–99)
GLUCOSE BLDC GLUCOMTR-MCNC: 214 MG/DL (ref 70–99)
GLUCOSE BLDC GLUCOMTR-MCNC: 215 MG/DL (ref 70–99)
GLUCOSE BLDC GLUCOMTR-MCNC: 215 MG/DL (ref 70–99)
GLUCOSE BLDC GLUCOMTR-MCNC: 216 MG/DL (ref 70–99)
GLUCOSE BLDC GLUCOMTR-MCNC: 218 MG/DL (ref 70–99)
GLUCOSE BLDC GLUCOMTR-MCNC: 219 MG/DL (ref 70–99)
GLUCOSE BLDC GLUCOMTR-MCNC: 219 MG/DL (ref 70–99)
GLUCOSE BLDC GLUCOMTR-MCNC: 221 MG/DL (ref 70–99)
GLUCOSE BLDC GLUCOMTR-MCNC: 225 MG/DL (ref 70–99)
GLUCOSE BLDC GLUCOMTR-MCNC: 226 MG/DL (ref 70–99)
GLUCOSE BLDC GLUCOMTR-MCNC: 227 MG/DL (ref 70–99)
GLUCOSE BLDC GLUCOMTR-MCNC: 228 MG/DL (ref 70–99)
GLUCOSE BLDC GLUCOMTR-MCNC: 228 MG/DL (ref 70–99)
GLUCOSE BLDC GLUCOMTR-MCNC: 229 MG/DL (ref 70–99)
GLUCOSE BLDC GLUCOMTR-MCNC: 230 MG/DL (ref 70–99)
GLUCOSE BLDC GLUCOMTR-MCNC: 230 MG/DL (ref 70–99)
GLUCOSE BLDC GLUCOMTR-MCNC: 231 MG/DL (ref 70–99)
GLUCOSE BLDC GLUCOMTR-MCNC: 232 MG/DL (ref 70–99)
GLUCOSE BLDC GLUCOMTR-MCNC: 233 MG/DL (ref 70–99)
GLUCOSE BLDC GLUCOMTR-MCNC: 234 MG/DL (ref 70–99)
GLUCOSE BLDC GLUCOMTR-MCNC: 234 MG/DL (ref 70–99)
GLUCOSE BLDC GLUCOMTR-MCNC: 235 MG/DL (ref 70–99)
GLUCOSE BLDC GLUCOMTR-MCNC: 235 MG/DL (ref 70–99)
GLUCOSE BLDC GLUCOMTR-MCNC: 238 MG/DL (ref 70–99)
GLUCOSE BLDC GLUCOMTR-MCNC: 239 MG/DL (ref 70–99)
GLUCOSE BLDC GLUCOMTR-MCNC: 241 MG/DL (ref 70–99)
GLUCOSE BLDC GLUCOMTR-MCNC: 242 MG/DL (ref 70–99)
GLUCOSE BLDC GLUCOMTR-MCNC: 243 MG/DL (ref 70–99)
GLUCOSE BLDC GLUCOMTR-MCNC: 244 MG/DL (ref 70–99)
GLUCOSE BLDC GLUCOMTR-MCNC: 246 MG/DL (ref 70–99)
GLUCOSE BLDC GLUCOMTR-MCNC: 246 MG/DL (ref 70–99)
GLUCOSE BLDC GLUCOMTR-MCNC: 247 MG/DL (ref 70–99)
GLUCOSE BLDC GLUCOMTR-MCNC: 249 MG/DL (ref 70–99)
GLUCOSE BLDC GLUCOMTR-MCNC: 249 MG/DL (ref 70–99)
GLUCOSE BLDC GLUCOMTR-MCNC: 250 MG/DL (ref 70–99)
GLUCOSE BLDC GLUCOMTR-MCNC: 250 MG/DL (ref 70–99)
GLUCOSE BLDC GLUCOMTR-MCNC: 251 MG/DL (ref 70–99)
GLUCOSE BLDC GLUCOMTR-MCNC: 251 MG/DL (ref 70–99)
GLUCOSE BLDC GLUCOMTR-MCNC: 252 MG/DL (ref 70–99)
GLUCOSE BLDC GLUCOMTR-MCNC: 253 MG/DL (ref 70–99)
GLUCOSE BLDC GLUCOMTR-MCNC: 255 MG/DL (ref 70–99)
GLUCOSE BLDC GLUCOMTR-MCNC: 255 MG/DL (ref 70–99)
GLUCOSE BLDC GLUCOMTR-MCNC: 263 MG/DL (ref 70–99)
GLUCOSE BLDC GLUCOMTR-MCNC: 266 MG/DL (ref 70–99)
GLUCOSE BLDC GLUCOMTR-MCNC: 270 MG/DL (ref 70–99)
GLUCOSE BLDC GLUCOMTR-MCNC: 271 MG/DL (ref 70–99)
GLUCOSE BLDC GLUCOMTR-MCNC: 275 MG/DL (ref 70–99)
GLUCOSE BLDC GLUCOMTR-MCNC: 276 MG/DL (ref 70–99)
GLUCOSE BLDC GLUCOMTR-MCNC: 284 MG/DL (ref 70–99)
GLUCOSE BLDC GLUCOMTR-MCNC: 285 MG/DL (ref 70–99)
GLUCOSE BLDC GLUCOMTR-MCNC: 285 MG/DL (ref 70–99)
GLUCOSE BLDC GLUCOMTR-MCNC: 288 MG/DL (ref 70–99)
GLUCOSE BLDC GLUCOMTR-MCNC: 289 MG/DL (ref 70–99)
GLUCOSE BLDC GLUCOMTR-MCNC: 291 MG/DL (ref 70–99)
GLUCOSE BLDC GLUCOMTR-MCNC: 293 MG/DL (ref 70–99)
GLUCOSE BLDC GLUCOMTR-MCNC: 293 MG/DL (ref 70–99)
GLUCOSE BLDC GLUCOMTR-MCNC: 298 MG/DL (ref 70–99)
GLUCOSE BLDC GLUCOMTR-MCNC: 300 MG/DL (ref 70–99)
GLUCOSE BLDC GLUCOMTR-MCNC: 301 MG/DL (ref 70–99)
GLUCOSE BLDC GLUCOMTR-MCNC: 301 MG/DL (ref 70–99)
GLUCOSE BLDC GLUCOMTR-MCNC: 303 MG/DL (ref 70–99)
GLUCOSE BLDC GLUCOMTR-MCNC: 304 MG/DL (ref 70–99)
GLUCOSE BLDC GLUCOMTR-MCNC: 305 MG/DL (ref 70–99)
GLUCOSE BLDC GLUCOMTR-MCNC: 306 MG/DL (ref 70–99)
GLUCOSE BLDC GLUCOMTR-MCNC: 308 MG/DL (ref 70–99)
GLUCOSE BLDC GLUCOMTR-MCNC: 311 MG/DL (ref 70–99)
GLUCOSE BLDC GLUCOMTR-MCNC: 311 MG/DL (ref 70–99)
GLUCOSE BLDC GLUCOMTR-MCNC: 313 MG/DL (ref 70–99)
GLUCOSE BLDC GLUCOMTR-MCNC: 314 MG/DL (ref 70–99)
GLUCOSE BLDC GLUCOMTR-MCNC: 316 MG/DL (ref 70–99)
GLUCOSE BLDC GLUCOMTR-MCNC: 321 MG/DL (ref 70–99)
GLUCOSE BLDC GLUCOMTR-MCNC: 335 MG/DL (ref 70–99)
GLUCOSE BLDC GLUCOMTR-MCNC: 338 MG/DL (ref 70–99)
GLUCOSE BLDC GLUCOMTR-MCNC: 344 MG/DL (ref 70–99)
GLUCOSE BLDC GLUCOMTR-MCNC: 355 MG/DL (ref 70–99)
GLUCOSE BLDC GLUCOMTR-MCNC: 369 MG/DL (ref 70–99)
GLUCOSE BLDC GLUCOMTR-MCNC: 386 MG/DL (ref 70–99)
GLUCOSE BLDC GLUCOMTR-MCNC: 390 MG/DL (ref 70–99)
GLUCOSE BLDC GLUCOMTR-MCNC: 429 MG/DL (ref 70–99)
GLUCOSE BLDC GLUCOMTR-MCNC: 65 MG/DL (ref 70–99)
GLUCOSE BLDC GLUCOMTR-MCNC: 69 MG/DL (ref 70–99)
GLUCOSE BLDC GLUCOMTR-MCNC: 80 MG/DL (ref 70–99)
GLUCOSE BLDC GLUCOMTR-MCNC: 81 MG/DL (ref 70–99)
GLUCOSE BLDC GLUCOMTR-MCNC: 83 MG/DL (ref 70–99)
GLUCOSE BLDC GLUCOMTR-MCNC: 83 MG/DL (ref 70–99)
GLUCOSE BLDC GLUCOMTR-MCNC: 93 MG/DL (ref 70–99)
GLUCOSE BLDC GLUCOMTR-MCNC: 97 MG/DL (ref 70–99)
GLUCOSE UR STRIP-MCNC: 100 MG/DL
GLUCOSE UR STRIP-MCNC: 200 MG/DL
GLUCOSE UR STRIP-MCNC: 500 MG/DL
GLUCOSE UR STRIP-MCNC: NEGATIVE MG/DL
GLUCOSE UR STRIP-MCNC: NEGATIVE MG/DL
GRAM STAIN RESULT: ABNORMAL
GRAM STAIN RESULT: NORMAL
HADV DNA SPEC QL NAA+PROBE: NOT DETECTED
HBA1C MFR BLD: 6.2 % (ref 0–5.6)
HCO3 BLD-SCNC: 37 MMOL/L (ref 23–29)
HCO3 BLD-SCNC: 40 MMOL/L (ref 23–29)
HCO3 BLDV-SCNC: 27 MMOL/L (ref 24–30)
HCO3 BLDV-SCNC: 30 MMOL/L (ref 24–30)
HCO3 BLDV-SCNC: 31 MMOL/L (ref 24–30)
HCO3 BLDV-SCNC: 33 MMOL/L (ref 24–30)
HCO3 BLDV-SCNC: 33 MMOL/L (ref 24–30)
HCO3 BLDV-SCNC: 34 MMOL/L (ref 24–30)
HCO3 BLDV-SCNC: 39 MMOL/L (ref 24–30)
HCO3 BLDV-SCNC: 39 MMOL/L (ref 24–30)
HCOV PNL SPEC NAA+PROBE: DETECTED
HCT VFR BLD AUTO: 29.4 % (ref 35–47)
HCT VFR BLD AUTO: 29.7 % (ref 35–47)
HCT VFR BLD AUTO: 30.3 % (ref 35–47)
HCT VFR BLD AUTO: 31.2 % (ref 35–47)
HCT VFR BLD AUTO: 31.3 % (ref 35–47)
HCT VFR BLD AUTO: 31.8 % (ref 35–47)
HCT VFR BLD AUTO: 32 % (ref 35–47)
HCT VFR BLD AUTO: 32.3 % (ref 35–47)
HCT VFR BLD AUTO: 32.9 % (ref 35–47)
HCT VFR BLD AUTO: 33.5 % (ref 35–47)
HCT VFR BLD AUTO: 33.6 % (ref 35–47)
HCT VFR BLD AUTO: 33.6 % (ref 35–47)
HCT VFR BLD AUTO: 33.8 % (ref 35–47)
HCT VFR BLD AUTO: 34.2 % (ref 35–47)
HCT VFR BLD AUTO: 34.3 % (ref 35–47)
HCT VFR BLD AUTO: 34.7 % (ref 35–47)
HCT VFR BLD AUTO: 35.2 % (ref 35–47)
HCT VFR BLD AUTO: 35.5 % (ref 35–47)
HCT VFR BLD AUTO: 35.8 % (ref 35–47)
HCT VFR BLD AUTO: 36.4 % (ref 35–47)
HCT VFR BLD AUTO: 36.5 % (ref 35–47)
HCT VFR BLD AUTO: 37.3 % (ref 35–47)
HCT VFR BLD AUTO: 37.3 % (ref 35–47)
HCT VFR BLD AUTO: 37.7 % (ref 35–47)
HCT VFR BLD AUTO: 37.9 % (ref 35–47)
HCT VFR BLD AUTO: 38.2 % (ref 35–47)
HCT VFR BLD AUTO: 38.4 % (ref 35–47)
HCT VFR BLD AUTO: 38.8 % (ref 35–47)
HCT VFR BLD AUTO: 39.5 % (ref 35–47)
HCT VFR BLD AUTO: 40.1 % (ref 35–47)
HCT VFR BLD AUTO: 40.4 % (ref 35–47)
HCT VFR BLD AUTO: 40.8 % (ref 35–47)
HCT VFR BLD AUTO: 42.7 % (ref 35–47)
HDLC SERPL-MCNC: 67 MG/DL
HGB BLD-MCNC: 10 G/DL (ref 11.7–15.7)
HGB BLD-MCNC: 10 G/DL (ref 11.7–15.7)
HGB BLD-MCNC: 10.3 G/DL (ref 11.7–15.7)
HGB BLD-MCNC: 10.3 G/DL (ref 11.7–15.7)
HGB BLD-MCNC: 10.4 G/DL (ref 11.7–15.7)
HGB BLD-MCNC: 10.5 G/DL (ref 11.7–15.7)
HGB BLD-MCNC: 10.7 G/DL (ref 11.7–15.7)
HGB BLD-MCNC: 10.8 G/DL (ref 11.7–15.7)
HGB BLD-MCNC: 10.9 G/DL (ref 11.7–15.7)
HGB BLD-MCNC: 10.9 G/DL (ref 11.7–15.7)
HGB BLD-MCNC: 11 G/DL (ref 11.7–15.7)
HGB BLD-MCNC: 11.1 G/DL (ref 11.7–15.7)
HGB BLD-MCNC: 11.2 G/DL (ref 11.7–15.7)
HGB BLD-MCNC: 11.3 G/DL (ref 11.7–15.7)
HGB BLD-MCNC: 11.6 G/DL (ref 11.7–15.7)
HGB BLD-MCNC: 11.9 G/DL (ref 11.7–15.7)
HGB BLD-MCNC: 12.2 G/DL (ref 11.7–15.7)
HGB BLD-MCNC: 12.2 G/DL (ref 11.7–15.7)
HGB BLD-MCNC: 12.3 G/DL (ref 11.7–15.7)
HGB BLD-MCNC: 12.5 G/DL (ref 11.7–15.7)
HGB BLD-MCNC: 12.8 G/DL (ref 11.7–15.7)
HGB BLD-MCNC: 12.8 G/DL (ref 11.7–15.7)
HGB BLD-MCNC: 13 G/DL (ref 11.7–15.7)
HGB BLD-MCNC: 13.2 G/DL (ref 11.7–15.7)
HGB BLD-MCNC: 13.8 G/DL (ref 11.7–15.7)
HGB BLD-MCNC: 9.1 G/DL (ref 11.7–15.7)
HGB BLD-MCNC: 9.2 G/DL (ref 11.7–15.7)
HGB BLD-MCNC: 9.6 G/DL (ref 11.7–15.7)
HGB BLD-MCNC: 9.6 G/DL (ref 11.7–15.7)
HGB BLD-MCNC: 9.7 G/DL (ref 11.7–15.7)
HGB BLD-MCNC: 9.9 G/DL (ref 11.7–15.7)
HGB UR QL STRIP: ABNORMAL
HGB UR QL STRIP: NEGATIVE
HGB UR QL STRIP: NEGATIVE
HMPV RNA SPEC QL NAA+PROBE: NOT DETECTED
HOLD SPECIMEN: NORMAL
HPIV1 RNA SPEC QL NAA+PROBE: NOT DETECTED
HPIV2 RNA SPEC QL NAA+PROBE: NOT DETECTED
HPIV3 RNA SPEC QL NAA+PROBE: NOT DETECTED
HPIV4 RNA SPEC QL NAA+PROBE: NOT DETECTED
HYALINE CASTS: 21 /LPF
HYALINE CASTS: 6 /LPF
IMM GRANULOCYTES # BLD: 0 10E3/UL
IMM GRANULOCYTES # BLD: 0 10E3/UL
IMM GRANULOCYTES # BLD: 0.1 10E3/UL
IMM GRANULOCYTES NFR BLD: 0 %
IMM GRANULOCYTES NFR BLD: 0 %
IMM GRANULOCYTES NFR BLD: 1 %
INR PPP: 1.48 (ref 0.85–1.15)
INR PPP: 3.26 (ref 0.85–1.15)
INTERPRETATION ECG - MUSE: NORMAL
KETONES UR STRIP-MCNC: 10 MG/DL
KETONES UR STRIP-MCNC: NEGATIVE MG/DL
L PNEUMO1 AG UR QL IA: NEGATIVE
LACTATE SERPL-SCNC: 0.7 MMOL/L (ref 0.7–2)
LACTATE SERPL-SCNC: 0.8 MMOL/L (ref 0.7–2)
LACTATE SERPL-SCNC: 1 MMOL/L (ref 0.7–2)
LACTATE SERPL-SCNC: 1.1 MMOL/L (ref 0.7–2)
LACTATE SERPL-SCNC: 1.3 MMOL/L (ref 0.7–2)
LACTATE SERPL-SCNC: 1.9 MMOL/L (ref 0.7–2)
LDLC SERPL CALC-MCNC: 63 MG/DL
LEUKOCYTE ESTERASE UR QL STRIP: ABNORMAL
LEUKOCYTE ESTERASE UR QL STRIP: ABNORMAL
LEUKOCYTE ESTERASE UR QL STRIP: NEGATIVE
LIPASE SERPL-CCNC: 13 U/L (ref 0–52)
LYMPHOCYTES # BLD AUTO: 0.1 10E3/UL (ref 0.8–5.3)
LYMPHOCYTES # BLD AUTO: 0.2 10E3/UL (ref 0.8–5.3)
LYMPHOCYTES # BLD AUTO: 0.3 10E3/UL (ref 0.8–5.3)
LYMPHOCYTES # BLD AUTO: 0.4 10E3/UL (ref 0.8–5.3)
LYMPHOCYTES # BLD AUTO: 0.4 10E3/UL (ref 0.8–5.3)
LYMPHOCYTES # BLD AUTO: 0.5 10E3/UL (ref 0.8–5.3)
LYMPHOCYTES # BLD AUTO: 0.5 10E3/UL (ref 0.8–5.3)
LYMPHOCYTES # BLD AUTO: 0.7 10E3/UL (ref 0.8–5.3)
LYMPHOCYTES # BLD AUTO: 1 10E3/UL (ref 0.8–5.3)
LYMPHOCYTES NFR BLD AUTO: 1 %
LYMPHOCYTES NFR BLD AUTO: 1 %
LYMPHOCYTES NFR BLD AUTO: 10 %
LYMPHOCYTES NFR BLD AUTO: 2 %
LYMPHOCYTES NFR BLD AUTO: 2 %
LYMPHOCYTES NFR BLD AUTO: 3 %
LYMPHOCYTES NFR BLD AUTO: 4 %
LYMPHOCYTES NFR BLD AUTO: 4 %
LYMPHOCYTES NFR BLD AUTO: 5 %
LYMPHOCYTES NFR BLD AUTO: 7 %
M PNEUMO DNA SPEC QL NAA+PROBE: NOT DETECTED
MAGNESIUM SERPL-MCNC: 1.2 MG/DL (ref 1.8–2.6)
MAGNESIUM SERPL-MCNC: 1.3 MG/DL (ref 1.8–2.6)
MAGNESIUM SERPL-MCNC: 1.3 MG/DL (ref 1.8–2.6)
MAGNESIUM SERPL-MCNC: 1.5 MG/DL (ref 1.8–2.6)
MAGNESIUM SERPL-MCNC: 1.6 MG/DL (ref 1.8–2.6)
MAGNESIUM SERPL-MCNC: 1.7 MG/DL (ref 1.8–2.6)
MAGNESIUM SERPL-MCNC: 1.8 MG/DL (ref 1.8–2.6)
MAGNESIUM SERPL-MCNC: 1.9 MG/DL (ref 1.8–2.6)
MAGNESIUM SERPL-MCNC: 2 MG/DL (ref 1.8–2.6)
MAGNESIUM SERPL-MCNC: 2 MG/DL (ref 1.8–2.6)
MAGNESIUM SERPL-MCNC: 2.1 MG/DL (ref 1.8–2.6)
MAGNESIUM SERPL-MCNC: 2.1 MG/DL (ref 1.8–2.6)
MAGNESIUM SERPL-MCNC: 2.2 MG/DL (ref 1.8–2.6)
MAGNESIUM SERPL-MCNC: 2.3 MG/DL (ref 1.8–2.6)
MAGNESIUM SERPL-MCNC: 2.3 MG/DL (ref 1.8–2.6)
MAGNESIUM SERPL-MCNC: 2.4 MG/DL (ref 1.8–2.6)
MAGNESIUM SERPL-MCNC: 2.7 MG/DL (ref 1.8–2.6)
MCH RBC QN AUTO: 28.3 PG (ref 26.5–33)
MCH RBC QN AUTO: 28.4 PG (ref 26.5–33)
MCH RBC QN AUTO: 28.7 PG (ref 26.5–33)
MCH RBC QN AUTO: 28.8 PG (ref 26.5–33)
MCH RBC QN AUTO: 28.9 PG (ref 26.5–33)
MCH RBC QN AUTO: 28.9 PG (ref 26.5–33)
MCH RBC QN AUTO: 29 PG (ref 26.5–33)
MCH RBC QN AUTO: 29 PG (ref 26.5–33)
MCH RBC QN AUTO: 29.1 PG (ref 26.5–33)
MCH RBC QN AUTO: 29.2 PG (ref 26.5–33)
MCH RBC QN AUTO: 29.3 PG (ref 26.5–33)
MCH RBC QN AUTO: 29.4 PG (ref 26.5–33)
MCH RBC QN AUTO: 29.5 PG (ref 26.5–33)
MCH RBC QN AUTO: 29.7 PG (ref 26.5–33)
MCHC RBC AUTO-ENTMCNC: 30 G/DL (ref 31.5–36.5)
MCHC RBC AUTO-ENTMCNC: 30.2 G/DL (ref 31.5–36.5)
MCHC RBC AUTO-ENTMCNC: 30.5 G/DL (ref 31.5–36.5)
MCHC RBC AUTO-ENTMCNC: 30.6 G/DL (ref 31.5–36.5)
MCHC RBC AUTO-ENTMCNC: 30.7 G/DL (ref 31.5–36.5)
MCHC RBC AUTO-ENTMCNC: 31 G/DL (ref 31.5–36.5)
MCHC RBC AUTO-ENTMCNC: 31 G/DL (ref 31.5–36.5)
MCHC RBC AUTO-ENTMCNC: 31.2 G/DL (ref 31.5–36.5)
MCHC RBC AUTO-ENTMCNC: 31.2 G/DL (ref 31.5–36.5)
MCHC RBC AUTO-ENTMCNC: 31.3 G/DL (ref 31.5–36.5)
MCHC RBC AUTO-ENTMCNC: 31.4 G/DL (ref 31.5–36.5)
MCHC RBC AUTO-ENTMCNC: 31.5 G/DL (ref 31.5–36.5)
MCHC RBC AUTO-ENTMCNC: 31.6 G/DL (ref 31.5–36.5)
MCHC RBC AUTO-ENTMCNC: 31.6 G/DL (ref 31.5–36.5)
MCHC RBC AUTO-ENTMCNC: 31.7 G/DL (ref 31.5–36.5)
MCHC RBC AUTO-ENTMCNC: 31.7 G/DL (ref 31.5–36.5)
MCHC RBC AUTO-ENTMCNC: 31.8 G/DL (ref 31.5–36.5)
MCHC RBC AUTO-ENTMCNC: 31.9 G/DL (ref 31.5–36.5)
MCHC RBC AUTO-ENTMCNC: 32.1 G/DL (ref 31.5–36.5)
MCHC RBC AUTO-ENTMCNC: 32.2 G/DL (ref 31.5–36.5)
MCHC RBC AUTO-ENTMCNC: 32.3 G/DL (ref 31.5–36.5)
MCHC RBC AUTO-ENTMCNC: 32.4 G/DL (ref 31.5–36.5)
MCHC RBC AUTO-ENTMCNC: 32.5 G/DL (ref 31.5–36.5)
MCHC RBC AUTO-ENTMCNC: 32.6 G/DL (ref 31.5–36.5)
MCHC RBC AUTO-ENTMCNC: 32.8 G/DL (ref 31.5–36.5)
MCHC RBC AUTO-ENTMCNC: 32.9 G/DL (ref 31.5–36.5)
MCHC RBC AUTO-ENTMCNC: 33 G/DL (ref 31.5–36.5)
MCV RBC AUTO: 88 FL (ref 78–100)
MCV RBC AUTO: 89 FL (ref 78–100)
MCV RBC AUTO: 90 FL (ref 78–100)
MCV RBC AUTO: 91 FL (ref 78–100)
MCV RBC AUTO: 92 FL (ref 78–100)
MCV RBC AUTO: 93 FL (ref 78–100)
MCV RBC AUTO: 94 FL (ref 78–100)
MCV RBC AUTO: 95 FL (ref 78–100)
MCV RBC AUTO: 96 FL (ref 78–100)
MONOCYTES # BLD AUTO: 0.1 10E3/UL (ref 0–1.3)
MONOCYTES # BLD AUTO: 0.1 10E3/UL (ref 0–1.3)
MONOCYTES # BLD AUTO: 0.2 10E3/UL (ref 0–1.3)
MONOCYTES # BLD AUTO: 0.2 10E3/UL (ref 0–1.3)
MONOCYTES # BLD AUTO: 0.3 10E3/UL (ref 0–1.3)
MONOCYTES # BLD AUTO: 0.7 10E3/UL (ref 0–1.3)
MONOCYTES # BLD AUTO: 0.7 10E3/UL (ref 0–1.3)
MONOCYTES # BLD AUTO: 0.8 10E3/UL (ref 0–1.3)
MONOCYTES # BLD AUTO: 0.9 10E3/UL (ref 0–1.3)
MONOCYTES # BLD AUTO: 0.9 10E3/UL (ref 0–1.3)
MONOCYTES # BLD AUTO: 1.1 10E3/UL (ref 0–1.3)
MONOCYTES NFR BLD AUTO: 1 %
MONOCYTES NFR BLD AUTO: 10 %
MONOCYTES NFR BLD AUTO: 2 %
MONOCYTES NFR BLD AUTO: 3 %
MONOCYTES NFR BLD AUTO: 3 %
MONOCYTES NFR BLD AUTO: 6 %
MONOCYTES NFR BLD AUTO: 7 %
MONOCYTES NFR BLD AUTO: 7 %
MONOCYTES NFR BLD AUTO: 8 %
MONOCYTES NFR BLD AUTO: 8 %
MUCOUS THREADS #/AREA URNS LPF: PRESENT /LPF
MUCOUS THREADS #/AREA URNS LPF: PRESENT /LPF
NEUTROPHILS # BLD AUTO: 12.2 10E3/UL (ref 1.6–8.3)
NEUTROPHILS # BLD AUTO: 13.2 10E3/UL (ref 1.6–8.3)
NEUTROPHILS # BLD AUTO: 6.5 10E3/UL (ref 1.6–8.3)
NEUTROPHILS # BLD AUTO: 6.6 10E3/UL (ref 1.6–8.3)
NEUTROPHILS # BLD AUTO: 6.9 10E3/UL (ref 1.6–8.3)
NEUTROPHILS # BLD AUTO: 7.6 10E3/UL (ref 1.6–8.3)
NEUTROPHILS # BLD AUTO: 8 10E3/UL (ref 1.6–8.3)
NEUTROPHILS # BLD AUTO: 8.8 10E3/UL (ref 1.6–8.3)
NEUTROPHILS # BLD AUTO: 9 10E3/UL (ref 1.6–8.3)
NEUTROPHILS # BLD AUTO: 9.2 10E3/UL (ref 1.6–8.3)
NEUTROPHILS # BLD AUTO: 9.3 10E3/UL (ref 1.6–8.3)
NEUTROPHILS # BLD AUTO: 9.4 10E3/UL (ref 1.6–8.3)
NEUTROPHILS # BLD AUTO: 9.9 10E3/UL (ref 1.6–8.3)
NEUTROPHILS NFR BLD AUTO: 81 %
NEUTROPHILS NFR BLD AUTO: 82 %
NEUTROPHILS NFR BLD AUTO: 85 %
NEUTROPHILS NFR BLD AUTO: 86 %
NEUTROPHILS NFR BLD AUTO: 86 %
NEUTROPHILS NFR BLD AUTO: 90 %
NEUTROPHILS NFR BLD AUTO: 94 %
NEUTROPHILS NFR BLD AUTO: 95 %
NEUTROPHILS NFR BLD AUTO: 95 %
NEUTROPHILS NFR BLD AUTO: 96 %
NEUTROPHILS NFR BLD AUTO: 96 %
NITRATE UR QL: NEGATIVE
NRBC # BLD AUTO: 0 10E3/UL
NRBC BLD AUTO-RTO: 0 /100
OXYHGB MFR BLD: 90.9 % (ref 95–96)
OXYHGB MFR BLD: 94.2 % (ref 95–96)
OXYHGB MFR BLDV: 52.9 % (ref 70–75)
OXYHGB MFR BLDV: 62.9 % (ref 70–75)
OXYHGB MFR BLDV: 64.6 % (ref 70–75)
OXYHGB MFR BLDV: 68.2 % (ref 70–75)
OXYHGB MFR BLDV: 69.2 % (ref 70–75)
OXYHGB MFR BLDV: 80.1 % (ref 70–75)
OXYHGB MFR BLDV: 90.3 % (ref 70–75)
OXYHGB MFR BLDV: 93.2 % (ref 70–75)
OXYHGB MFR BLDV: 94.5 % (ref 70–75)
OXYHGB MFR BLDV: 95.2 % (ref 70–75)
P AXIS - MUSE: 55 DEGREES
P AXIS - MUSE: NORMAL DEGREES
PCO2 BLD: 59 MM HG (ref 35–45)
PCO2 BLD: 66 MM HG (ref 35–45)
PCO2 BLDV: 59 MM HG (ref 35–50)
PCO2 BLDV: 59 MM HG (ref 35–50)
PCO2 BLDV: 62 MM HG (ref 35–50)
PCO2 BLDV: 63 MM HG (ref 35–50)
PCO2 BLDV: 65 MM HG (ref 35–50)
PCO2 BLDV: 68 MM HG (ref 35–50)
PCO2 BLDV: 70 MM HG (ref 35–50)
PCO2 BLDV: 70 MM HG (ref 35–50)
PCO2 BLDV: 71 MM HG (ref 35–50)
PCO2 BLDV: 82 MM HG (ref 35–50)
PH BLD: 7.43 [PH] (ref 7.37–7.44)
PH BLD: 7.44 [PH] (ref 7.37–7.44)
PH BLDV: 7.28 [PH] (ref 7.35–7.45)
PH BLDV: 7.34 [PH] (ref 7.35–7.45)
PH BLDV: 7.34 [PH] (ref 7.35–7.45)
PH BLDV: 7.35 [PH] (ref 7.35–7.45)
PH BLDV: 7.35 [PH] (ref 7.35–7.45)
PH BLDV: 7.36 [PH] (ref 7.35–7.45)
PH BLDV: 7.39 [PH] (ref 7.35–7.45)
PH BLDV: 7.4 [PH] (ref 7.35–7.45)
PH UR STRIP: 6 [PH] (ref 5–7)
PH UR STRIP: 6.5 [PH] (ref 5–7)
PH UR STRIP: 7.5 [PH] (ref 5–7)
PLATELET # BLD AUTO: 184 10E3/UL (ref 150–450)
PLATELET # BLD AUTO: 208 10E3/UL (ref 150–450)
PLATELET # BLD AUTO: 213 10E3/UL (ref 150–450)
PLATELET # BLD AUTO: 215 10E3/UL (ref 150–450)
PLATELET # BLD AUTO: 222 10E3/UL (ref 150–450)
PLATELET # BLD AUTO: 224 10E3/UL (ref 150–450)
PLATELET # BLD AUTO: 226 10E3/UL (ref 150–450)
PLATELET # BLD AUTO: 226 10E3/UL (ref 150–450)
PLATELET # BLD AUTO: 233 10E3/UL (ref 150–450)
PLATELET # BLD AUTO: 233 10E3/UL (ref 150–450)
PLATELET # BLD AUTO: 237 10E3/UL (ref 150–450)
PLATELET # BLD AUTO: 237 10E3/UL (ref 150–450)
PLATELET # BLD AUTO: 241 10E3/UL (ref 150–450)
PLATELET # BLD AUTO: 244 10E3/UL (ref 150–450)
PLATELET # BLD AUTO: 245 10E3/UL (ref 150–450)
PLATELET # BLD AUTO: 245 10E3/UL (ref 150–450)
PLATELET # BLD AUTO: 248 10E3/UL (ref 150–450)
PLATELET # BLD AUTO: 249 10E3/UL (ref 150–450)
PLATELET # BLD AUTO: 251 10E3/UL (ref 150–450)
PLATELET # BLD AUTO: 256 10E3/UL (ref 150–450)
PLATELET # BLD AUTO: 259 10E3/UL (ref 150–450)
PLATELET # BLD AUTO: 261 10E3/UL (ref 150–450)
PLATELET # BLD AUTO: 261 10E3/UL (ref 150–450)
PLATELET # BLD AUTO: 265 10E3/UL (ref 150–450)
PLATELET # BLD AUTO: 267 10E3/UL (ref 150–450)
PLATELET # BLD AUTO: 268 10E3/UL (ref 150–450)
PLATELET # BLD AUTO: 269 10E3/UL (ref 150–450)
PLATELET # BLD AUTO: 270 10E3/UL (ref 150–450)
PLATELET # BLD AUTO: 270 10E3/UL (ref 150–450)
PLATELET # BLD AUTO: 281 10E3/UL (ref 150–450)
PLATELET # BLD AUTO: 287 10E3/UL (ref 150–450)
PLATELET # BLD AUTO: 288 10E3/UL (ref 150–450)
PLATELET # BLD AUTO: 295 10E3/UL (ref 150–450)
PLATELET # BLD AUTO: 319 10E3/UL (ref 150–450)
PO2 BLD: 63 MM HG (ref 75–85)
PO2 BLD: 75 MM HG (ref 75–85)
PO2 BLDV: 30 MM HG (ref 25–47)
PO2 BLDV: 37 MM HG (ref 25–47)
PO2 BLDV: 37 MM HG (ref 25–47)
PO2 BLDV: 39 MM HG (ref 25–47)
PO2 BLDV: 39 MM HG (ref 25–47)
PO2 BLDV: 51 MM HG (ref 25–47)
PO2 BLDV: 60 MM HG (ref 25–47)
PO2 BLDV: 69 MM HG (ref 25–47)
PO2 BLDV: 80 MM HG (ref 25–47)
PO2 BLDV: 87 MM HG (ref 25–47)
POTASSIUM BLD-SCNC: 3 MMOL/L (ref 3.5–5)
POTASSIUM BLD-SCNC: 3.2 MMOL/L (ref 3.5–5)
POTASSIUM BLD-SCNC: 3.4 MMOL/L (ref 3.5–5)
POTASSIUM BLD-SCNC: 3.5 MMOL/L (ref 3.5–5)
POTASSIUM BLD-SCNC: 3.6 MMOL/L (ref 3.5–5)
POTASSIUM BLD-SCNC: 3.7 MMOL/L (ref 3.5–5)
POTASSIUM BLD-SCNC: 3.8 MMOL/L (ref 3.5–5)
POTASSIUM BLD-SCNC: 3.9 MMOL/L (ref 3.5–5)
POTASSIUM BLD-SCNC: 4 MMOL/L (ref 3.5–5)
POTASSIUM BLD-SCNC: 4.1 MMOL/L (ref 3.5–5)
POTASSIUM BLD-SCNC: 4.2 MMOL/L (ref 3.5–5)
POTASSIUM BLD-SCNC: 4.3 MMOL/L (ref 3.5–5)
POTASSIUM BLD-SCNC: 4.4 MMOL/L (ref 3.5–5)
POTASSIUM BLD-SCNC: 4.5 MMOL/L (ref 3.5–5)
POTASSIUM BLD-SCNC: 4.6 MMOL/L (ref 3.5–5)
POTASSIUM BLD-SCNC: 4.7 MMOL/L (ref 3.5–5)
POTASSIUM BLD-SCNC: 4.8 MMOL/L (ref 3.5–5)
POTASSIUM BLD-SCNC: 4.9 MMOL/L (ref 3.5–5)
POTASSIUM BLD-SCNC: 5 MMOL/L (ref 3.5–5)
POTASSIUM BLD-SCNC: 5.2 MMOL/L (ref 3.5–5)
POTASSIUM BLD-SCNC: 5.3 MMOL/L (ref 3.5–5)
POTASSIUM BLD-SCNC: 5.4 MMOL/L (ref 3.5–5)
POTASSIUM BLD-SCNC: 5.4 MMOL/L (ref 3.5–5)
POTASSIUM BLD-SCNC: 5.5 MMOL/L (ref 3.5–5)
POTASSIUM BLD-SCNC: 5.6 MMOL/L (ref 3.5–5)
POTASSIUM BLD-SCNC: 5.7 MMOL/L (ref 3.5–5)
PR INTERVAL - MUSE: 218 MS
PR INTERVAL - MUSE: 254 MS
PR INTERVAL - MUSE: NORMAL MS
PROCALCITONIN SERPL-MCNC: 0.03 NG/ML (ref 0–0.49)
PROCALCITONIN SERPL-MCNC: 0.06 NG/ML (ref 0–0.49)
PROCALCITONIN SERPL-MCNC: 0.07 NG/ML (ref 0–0.49)
PROCALCITONIN SERPL-MCNC: 0.09 NG/ML (ref 0–0.49)
PROT SERPL-MCNC: 5.4 G/DL (ref 6–8)
PROT SERPL-MCNC: 5.6 G/DL (ref 6–8)
PROT SERPL-MCNC: 5.9 G/DL (ref 6–8)
PROT SERPL-MCNC: 6 G/DL (ref 6–8)
PROT SERPL-MCNC: 6 G/DL (ref 6–8)
PROT SERPL-MCNC: 6.1 G/DL (ref 6–8)
PROT SERPL-MCNC: 6.6 G/DL (ref 6–8)
QRS DURATION - MUSE: 62 MS
QRS DURATION - MUSE: 68 MS
QRS DURATION - MUSE: 70 MS
QRS DURATION - MUSE: 72 MS
QRS DURATION - MUSE: 74 MS
QRS DURATION - MUSE: 76 MS
QRS DURATION - MUSE: 76 MS
QT - MUSE: 300 MS
QT - MUSE: 314 MS
QT - MUSE: 334 MS
QT - MUSE: 336 MS
QT - MUSE: 348 MS
QT - MUSE: 350 MS
QT - MUSE: 384 MS
QTC - MUSE: 392 MS
QTC - MUSE: 402 MS
QTC - MUSE: 405 MS
QTC - MUSE: 411 MS
QTC - MUSE: 415 MS
QTC - MUSE: 428 MS
QTC - MUSE: 492 MS
R AXIS - MUSE: 100 DEGREES
R AXIS - MUSE: 109 DEGREES
R AXIS - MUSE: 68 DEGREES
R AXIS - MUSE: 82 DEGREES
R AXIS - MUSE: 91 DEGREES
R AXIS - MUSE: 97 DEGREES
R AXIS - MUSE: 98 DEGREES
RBC # BLD AUTO: 3.12 10E6/UL (ref 3.8–5.2)
RBC # BLD AUTO: 3.15 10E6/UL (ref 3.8–5.2)
RBC # BLD AUTO: 3.27 10E6/UL (ref 3.8–5.2)
RBC # BLD AUTO: 3.33 10E6/UL (ref 3.8–5.2)
RBC # BLD AUTO: 3.37 10E6/UL (ref 3.8–5.2)
RBC # BLD AUTO: 3.42 10E6/UL (ref 3.8–5.2)
RBC # BLD AUTO: 3.45 10E6/UL (ref 3.8–5.2)
RBC # BLD AUTO: 3.51 10E6/UL (ref 3.8–5.2)
RBC # BLD AUTO: 3.51 10E6/UL (ref 3.8–5.2)
RBC # BLD AUTO: 3.52 10E6/UL (ref 3.8–5.2)
RBC # BLD AUTO: 3.53 10E6/UL (ref 3.8–5.2)
RBC # BLD AUTO: 3.67 10E6/UL (ref 3.8–5.2)
RBC # BLD AUTO: 3.68 10E6/UL (ref 3.8–5.2)
RBC # BLD AUTO: 3.71 10E6/UL (ref 3.8–5.2)
RBC # BLD AUTO: 3.73 10E6/UL (ref 3.8–5.2)
RBC # BLD AUTO: 3.78 10E6/UL (ref 3.8–5.2)
RBC # BLD AUTO: 3.78 10E6/UL (ref 3.8–5.2)
RBC # BLD AUTO: 3.81 10E6/UL (ref 3.8–5.2)
RBC # BLD AUTO: 3.81 10E6/UL (ref 3.8–5.2)
RBC # BLD AUTO: 3.9 10E6/UL (ref 3.8–5.2)
RBC # BLD AUTO: 3.97 10E6/UL (ref 3.8–5.2)
RBC # BLD AUTO: 4.06 10E6/UL (ref 3.8–5.2)
RBC # BLD AUTO: 4.08 10E6/UL (ref 3.8–5.2)
RBC # BLD AUTO: 4.09 10E6/UL (ref 3.8–5.2)
RBC # BLD AUTO: 4.2 10E6/UL (ref 3.8–5.2)
RBC # BLD AUTO: 4.22 10E6/UL (ref 3.8–5.2)
RBC # BLD AUTO: 4.24 10E6/UL (ref 3.8–5.2)
RBC # BLD AUTO: 4.3 10E6/UL (ref 3.8–5.2)
RBC # BLD AUTO: 4.36 10E6/UL (ref 3.8–5.2)
RBC # BLD AUTO: 4.41 10E6/UL (ref 3.8–5.2)
RBC # BLD AUTO: 4.44 10E6/UL (ref 3.8–5.2)
RBC # BLD AUTO: 4.47 10E6/UL (ref 3.8–5.2)
RBC # BLD AUTO: 4.8 10E6/UL (ref 3.8–5.2)
RBC URINE: 1 /HPF
RBC URINE: 2 /HPF
RBC URINE: 2 /HPF
RBC URINE: 3 /HPF
RBC URINE: >182 /HPF
RSV RNA SPEC NAA+PROBE: NEGATIVE
RSV RNA SPEC NAA+PROBE: NEGATIVE
RSV RNA SPEC QL NAA+PROBE: NOT DETECTED
RSV RNA SPEC QL NAA+PROBE: NOT DETECTED
RV+EV RNA SPEC QL NAA+PROBE: NOT DETECTED
S PNEUM AG SPEC QL: NEGATIVE
S PNEUM AG SPEC QL: NEGATIVE
SAO2 % BLDV: 53.7 % (ref 70–75)
SAO2 % BLDV: 64.6 % (ref 70–75)
SAO2 % BLDV: 65.9 % (ref 70–75)
SAO2 % BLDV: 69.4 % (ref 70–75)
SAO2 % BLDV: 70.8 % (ref 70–75)
SAO2 % BLDV: 82.4 % (ref 70–75)
SAO2 % BLDV: 92 % (ref 70–75)
SAO2 % BLDV: 94.8 % (ref 70–75)
SAO2 % BLDV: 96 % (ref 70–75)
SAO2 % BLDV: 97.1 % (ref 70–75)
SARS-COV-2 RNA RESP QL NAA+PROBE: NEGATIVE
SODIUM SERPL-SCNC: 126 MMOL/L (ref 136–145)
SODIUM SERPL-SCNC: 128 MMOL/L (ref 136–145)
SODIUM SERPL-SCNC: 129 MMOL/L (ref 136–145)
SODIUM SERPL-SCNC: 130 MMOL/L (ref 136–145)
SODIUM SERPL-SCNC: 131 MMOL/L (ref 136–145)
SODIUM SERPL-SCNC: 132 MMOL/L (ref 136–145)
SODIUM SERPL-SCNC: 133 MMOL/L (ref 136–145)
SODIUM SERPL-SCNC: 134 MMOL/L (ref 136–145)
SODIUM SERPL-SCNC: 134 MMOL/L (ref 136–145)
SODIUM SERPL-SCNC: 135 MMOL/L (ref 136–145)
SODIUM SERPL-SCNC: 136 MMOL/L (ref 136–145)
SODIUM SERPL-SCNC: 137 MMOL/L (ref 136–145)
SODIUM SERPL-SCNC: 138 MMOL/L (ref 136–145)
SODIUM SERPL-SCNC: 139 MMOL/L (ref 136–145)
SODIUM SERPL-SCNC: 140 MMOL/L (ref 136–145)
SODIUM SERPL-SCNC: 140 MMOL/L (ref 136–145)
SODIUM SERPL-SCNC: 141 MMOL/L (ref 136–145)
SODIUM SERPL-SCNC: 142 MMOL/L (ref 136–145)
SP GR UR STRIP: 1.01 (ref 1–1.03)
SP GR UR STRIP: 1.03 (ref 1–1.03)
SQUAMOUS EPITHELIAL: 1 /HPF
SQUAMOUS EPITHELIAL: 2 /HPF
SYSTOLIC BLOOD PRESSURE - MUSE: 122 MMHG
SYSTOLIC BLOOD PRESSURE - MUSE: 123 MMHG
SYSTOLIC BLOOD PRESSURE - MUSE: 131 MMHG
SYSTOLIC BLOOD PRESSURE - MUSE: 133 MMHG
SYSTOLIC BLOOD PRESSURE - MUSE: 144 MMHG
SYSTOLIC BLOOD PRESSURE - MUSE: NORMAL MMHG
SYSTOLIC BLOOD PRESSURE - MUSE: NORMAL MMHG
T AXIS - MUSE: 118 DEGREES
T AXIS - MUSE: 14 DEGREES
T AXIS - MUSE: 22 DEGREES
T AXIS - MUSE: 36 DEGREES
T AXIS - MUSE: 67 DEGREES
T AXIS - MUSE: 7 DEGREES
T AXIS - MUSE: 79 DEGREES
TEMPERATURE: 37 DEGREES C
TEMPERATURE: 37 DEGREES C
TRIGL SERPL-MCNC: 49 MG/DL
TROPONIN I SERPL-MCNC: 0.01 NG/ML (ref 0–0.29)
TROPONIN I SERPL-MCNC: 0.02 NG/ML (ref 0–0.29)
TROPONIN I SERPL-MCNC: <0.01 NG/ML (ref 0–0.29)
UROBILINOGEN UR STRIP-MCNC: <2 MG/DL
UROBILINOGEN UR STRIP-MCNC: NORMAL MG/DL
VENTRICULAR RATE- MUSE: 100 BPM
VENTRICULAR RATE- MUSE: 115 BPM
VENTRICULAR RATE- MUSE: 83 BPM
VENTRICULAR RATE- MUSE: 84 BPM
VENTRICULAR RATE- MUSE: 86 BPM
VENTRICULAR RATE- MUSE: 90 BPM
VENTRICULAR RATE- MUSE: 99 BPM
WBC # BLD AUTO: 10.1 10E3/UL (ref 4–11)
WBC # BLD AUTO: 10.1 10E3/UL (ref 4–11)
WBC # BLD AUTO: 10.5 10E3/UL (ref 4–11)
WBC # BLD AUTO: 10.9 10E3/UL (ref 4–11)
WBC # BLD AUTO: 11.1 10E3/UL (ref 4–11)
WBC # BLD AUTO: 11.1 10E3/UL (ref 4–11)
WBC # BLD AUTO: 11.2 10E3/UL (ref 4–11)
WBC # BLD AUTO: 11.3 10E3/UL (ref 4–11)
WBC # BLD AUTO: 11.5 10E3/UL (ref 4–11)
WBC # BLD AUTO: 11.7 10E3/UL (ref 4–11)
WBC # BLD AUTO: 12.1 10E3/UL (ref 4–11)
WBC # BLD AUTO: 12.6 10E3/UL (ref 4–11)
WBC # BLD AUTO: 13 10E3/UL (ref 4–11)
WBC # BLD AUTO: 13.7 10E3/UL (ref 4–11)
WBC # BLD AUTO: 13.8 10E3/UL (ref 4–11)
WBC # BLD AUTO: 14.1 10E3/UL (ref 4–11)
WBC # BLD AUTO: 14.2 10E3/UL (ref 4–11)
WBC # BLD AUTO: 14.3 10E3/UL (ref 4–11)
WBC # BLD AUTO: 14.7 10E3/UL (ref 4–11)
WBC # BLD AUTO: 15.3 10E3/UL (ref 4–11)
WBC # BLD AUTO: 6.7 10E3/UL (ref 4–11)
WBC # BLD AUTO: 7 10E3/UL (ref 4–11)
WBC # BLD AUTO: 7 10E3/UL (ref 4–11)
WBC # BLD AUTO: 7.3 10E3/UL (ref 4–11)
WBC # BLD AUTO: 7.3 10E3/UL (ref 4–11)
WBC # BLD AUTO: 8.1 10E3/UL (ref 4–11)
WBC # BLD AUTO: 8.4 10E3/UL (ref 4–11)
WBC # BLD AUTO: 8.6 10E3/UL (ref 4–11)
WBC # BLD AUTO: 9.1 10E3/UL (ref 4–11)
WBC # BLD AUTO: 9.5 10E3/UL (ref 4–11)
WBC # BLD AUTO: 9.6 10E3/UL (ref 4–11)
WBC # BLD AUTO: 9.7 10E3/UL (ref 4–11)
WBC # BLD AUTO: 9.9 10E3/UL (ref 4–11)
WBC # BLD AUTO: 9.9 10E3/UL (ref 4–11)
WBC URINE: 1 /HPF
WBC URINE: 2 /HPF
WBC URINE: 3 /HPF
WBC URINE: 6 /HPF
WBC URINE: >182 /HPF
YEAST #/AREA URNS HPF: ABNORMAL /HPF
YEAST #/AREA URNS HPF: ABNORMAL /HPF

## 2022-01-01 PROCEDURE — 250N000011 HC RX IP 250 OP 636: Performed by: EMERGENCY MEDICINE

## 2022-01-01 PROCEDURE — 80048 BASIC METABOLIC PNL TOTAL CA: CPT

## 2022-01-01 PROCEDURE — 250N000013 HC RX MED GY IP 250 OP 250 PS 637: Performed by: INTERNAL MEDICINE

## 2022-01-01 PROCEDURE — 82310 ASSAY OF CALCIUM: CPT | Performed by: HOSPITALIST

## 2022-01-01 PROCEDURE — 999N000215 HC STATISTIC HFNC ADULT NON-CPAP

## 2022-01-01 PROCEDURE — C9113 INJ PANTOPRAZOLE SODIUM, VIA: HCPCS | Performed by: STUDENT IN AN ORGANIZED HEALTH CARE EDUCATION/TRAINING PROGRAM

## 2022-01-01 PROCEDURE — 250N000009 HC RX 250: Performed by: STUDENT IN AN ORGANIZED HEALTH CARE EDUCATION/TRAINING PROGRAM

## 2022-01-01 PROCEDURE — 94640 AIRWAY INHALATION TREATMENT: CPT | Mod: 76 | Performed by: PEDIATRICS

## 2022-01-01 PROCEDURE — 93005 ELECTROCARDIOGRAM TRACING: CPT | Performed by: EMERGENCY MEDICINE

## 2022-01-01 PROCEDURE — 250N000011 HC RX IP 250 OP 636: Performed by: STUDENT IN AN ORGANIZED HEALTH CARE EDUCATION/TRAINING PROGRAM

## 2022-01-01 PROCEDURE — 83735 ASSAY OF MAGNESIUM: CPT | Performed by: FAMILY MEDICINE

## 2022-01-01 PROCEDURE — 999N000157 HC STATISTIC RCP TIME EA 10 MIN

## 2022-01-01 PROCEDURE — 999N000185 HC STATISTIC TRANSPORT TIME EA 15 MIN

## 2022-01-01 PROCEDURE — 94640 AIRWAY INHALATION TREATMENT: CPT | Mod: 76

## 2022-01-01 PROCEDURE — 99233 SBSQ HOSP IP/OBS HIGH 50: CPT | Performed by: INTERNAL MEDICINE

## 2022-01-01 PROCEDURE — 250N000013 HC RX MED GY IP 250 OP 250 PS 637: Performed by: FAMILY MEDICINE

## 2022-01-01 PROCEDURE — 200N000001 HC R&B ICU

## 2022-01-01 PROCEDURE — 250N000013 HC RX MED GY IP 250 OP 250 PS 637: Performed by: STUDENT IN AN ORGANIZED HEALTH CARE EDUCATION/TRAINING PROGRAM

## 2022-01-01 PROCEDURE — 258N000003 HC RX IP 258 OP 636: Performed by: FAMILY MEDICINE

## 2022-01-01 PROCEDURE — 94640 AIRWAY INHALATION TREATMENT: CPT

## 2022-01-01 PROCEDURE — 250N000012 HC RX MED GY IP 250 OP 636 PS 637: Performed by: STUDENT IN AN ORGANIZED HEALTH CARE EDUCATION/TRAINING PROGRAM

## 2022-01-01 PROCEDURE — 71275 CT ANGIOGRAPHY CHEST: CPT

## 2022-01-01 PROCEDURE — 86140 C-REACTIVE PROTEIN: CPT | Performed by: STUDENT IN AN ORGANIZED HEALTH CARE EDUCATION/TRAINING PROGRAM

## 2022-01-01 PROCEDURE — 36415 COLL VENOUS BLD VENIPUNCTURE: CPT | Performed by: FAMILY MEDICINE

## 2022-01-01 PROCEDURE — 85027 COMPLETE CBC AUTOMATED: CPT | Performed by: INTERNAL MEDICINE

## 2022-01-01 PROCEDURE — 36415 COLL VENOUS BLD VENIPUNCTURE: CPT

## 2022-01-01 PROCEDURE — 99356 PR PROLONGED SERV,INPATIENT,1ST HR: CPT | Performed by: CLINICAL NURSE SPECIALIST

## 2022-01-01 PROCEDURE — 94669 MECHANICAL CHEST WALL OSCILL: CPT

## 2022-01-01 PROCEDURE — 84145 PROCALCITONIN (PCT): CPT | Performed by: EMERGENCY MEDICINE

## 2022-01-01 PROCEDURE — 97535 SELF CARE MNGMENT TRAINING: CPT | Mod: GO

## 2022-01-01 PROCEDURE — 250N000011 HC RX IP 250 OP 636: Performed by: INTERNAL MEDICINE

## 2022-01-01 PROCEDURE — G0463 HOSPITAL OUTPT CLINIC VISIT: HCPCS

## 2022-01-01 PROCEDURE — 258N000003 HC RX IP 258 OP 636: Performed by: STUDENT IN AN ORGANIZED HEALTH CARE EDUCATION/TRAINING PROGRAM

## 2022-01-01 PROCEDURE — 84132 ASSAY OF SERUM POTASSIUM: CPT | Performed by: FAMILY MEDICINE

## 2022-01-01 PROCEDURE — 36415 COLL VENOUS BLD VENIPUNCTURE: CPT | Performed by: EMERGENCY MEDICINE

## 2022-01-01 PROCEDURE — 97110 THERAPEUTIC EXERCISES: CPT | Mod: GP

## 2022-01-01 PROCEDURE — 94660 CPAP INITIATION&MGMT: CPT

## 2022-01-01 PROCEDURE — 99233 SBSQ HOSP IP/OBS HIGH 50: CPT | Performed by: FAMILY MEDICINE

## 2022-01-01 PROCEDURE — 250N000013 HC RX MED GY IP 250 OP 250 PS 637

## 2022-01-01 PROCEDURE — 85048 AUTOMATED LEUKOCYTE COUNT: CPT | Performed by: FAMILY MEDICINE

## 2022-01-01 PROCEDURE — 99233 SBSQ HOSP IP/OBS HIGH 50: CPT | Mod: GC

## 2022-01-01 PROCEDURE — 250N000009 HC RX 250: Performed by: FAMILY MEDICINE

## 2022-01-01 PROCEDURE — 120N000001 HC R&B MED SURG/OB

## 2022-01-01 PROCEDURE — 87077 CULTURE AEROBIC IDENTIFY: CPT | Performed by: FAMILY MEDICINE

## 2022-01-01 PROCEDURE — 250N000012 HC RX MED GY IP 250 OP 636 PS 637: Performed by: INTERNAL MEDICINE

## 2022-01-01 PROCEDURE — U0005 INFEC AGEN DETEC AMPLI PROBE: HCPCS | Performed by: INTERNAL MEDICINE

## 2022-01-01 PROCEDURE — 97530 THERAPEUTIC ACTIVITIES: CPT | Mod: GP | Performed by: PHYSICAL THERAPIST

## 2022-01-01 PROCEDURE — 85027 COMPLETE CBC AUTOMATED: CPT

## 2022-01-01 PROCEDURE — 87637 SARSCOV2&INF A&B&RSV AMP PRB: CPT | Performed by: PHYSICIAN ASSISTANT

## 2022-01-01 PROCEDURE — 99233 SBSQ HOSP IP/OBS HIGH 50: CPT | Mod: GC | Performed by: INTERNAL MEDICINE

## 2022-01-01 PROCEDURE — 250N000009 HC RX 250: Performed by: INTERNAL MEDICINE

## 2022-01-01 PROCEDURE — 86140 C-REACTIVE PROTEIN: CPT

## 2022-01-01 PROCEDURE — 82805 BLOOD GASES W/O2 SATURATION: CPT | Performed by: STUDENT IN AN ORGANIZED HEALTH CARE EDUCATION/TRAINING PROGRAM

## 2022-01-01 PROCEDURE — 85014 HEMATOCRIT: CPT

## 2022-01-01 PROCEDURE — 99232 SBSQ HOSP IP/OBS MODERATE 35: CPT | Performed by: INTERNAL MEDICINE

## 2022-01-01 PROCEDURE — 99605 MTMS BY PHARM NP 15 MIN: CPT | Performed by: PHARMACIST

## 2022-01-01 PROCEDURE — 94668 MNPJ CHEST WALL SBSQ: CPT

## 2022-01-01 PROCEDURE — 250N000009 HC RX 250

## 2022-01-01 PROCEDURE — 97530 THERAPEUTIC ACTIVITIES: CPT | Mod: GP

## 2022-01-01 PROCEDURE — 83880 ASSAY OF NATRIURETIC PEPTIDE: CPT | Performed by: EMERGENCY MEDICINE

## 2022-01-01 PROCEDURE — 80048 BASIC METABOLIC PNL TOTAL CA: CPT | Performed by: FAMILY MEDICINE

## 2022-01-01 PROCEDURE — 72100 X-RAY EXAM L-S SPINE 2/3 VWS: CPT

## 2022-01-01 PROCEDURE — 94799 UNLISTED PULMONARY SVC/PX: CPT

## 2022-01-01 PROCEDURE — 94667 MNPJ CHEST WALL 1ST: CPT

## 2022-01-01 PROCEDURE — 71045 X-RAY EXAM CHEST 1 VIEW: CPT

## 2022-01-01 PROCEDURE — 999N000032 HC STATISTIC CHRONIC DISEASE SPECIALIST RT CONSULT

## 2022-01-01 PROCEDURE — 87086 URINE CULTURE/COLONY COUNT: CPT | Performed by: EMERGENCY MEDICINE

## 2022-01-01 PROCEDURE — 97116 GAIT TRAINING THERAPY: CPT | Mod: GP

## 2022-01-01 PROCEDURE — 84132 ASSAY OF SERUM POTASSIUM: CPT | Performed by: INTERNAL MEDICINE

## 2022-01-01 PROCEDURE — 99222 1ST HOSP IP/OBS MODERATE 55: CPT | Performed by: INTERNAL MEDICINE

## 2022-01-01 PROCEDURE — 99238 HOSP IP/OBS DSCHRG MGMT 30/<: CPT | Mod: GC

## 2022-01-01 PROCEDURE — 87205 SMEAR GRAM STAIN: CPT | Performed by: FAMILY MEDICINE

## 2022-01-01 PROCEDURE — 85025 COMPLETE CBC W/AUTO DIFF WBC: CPT

## 2022-01-01 PROCEDURE — 99233 SBSQ HOSP IP/OBS HIGH 50: CPT | Performed by: NURSE PRACTITIONER

## 2022-01-01 PROCEDURE — 999N000065 XR CHEST PORT 1 VIEW

## 2022-01-01 PROCEDURE — 93005 ELECTROCARDIOGRAM TRACING: CPT | Performed by: STUDENT IN AN ORGANIZED HEALTH CARE EDUCATION/TRAINING PROGRAM

## 2022-01-01 PROCEDURE — 87040 BLOOD CULTURE FOR BACTERIA: CPT | Performed by: EMERGENCY MEDICINE

## 2022-01-01 PROCEDURE — P9604 ONE-WAY ALLOW PRORATED TRIP: HCPCS | Performed by: FAMILY MEDICINE

## 2022-01-01 PROCEDURE — 99232 SBSQ HOSP IP/OBS MODERATE 35: CPT | Performed by: FAMILY MEDICINE

## 2022-01-01 PROCEDURE — 99443 PR PHYSICIAN TELEPHONE EVALUATION 21-30 MIN: CPT | Mod: 95 | Performed by: INTERNAL MEDICINE

## 2022-01-01 PROCEDURE — 96367 TX/PROPH/DG ADDL SEQ IV INF: CPT

## 2022-01-01 PROCEDURE — 99213 OFFICE O/P EST LOW 20 MIN: CPT | Performed by: NURSE PRACTITIONER

## 2022-01-01 PROCEDURE — 80048 BASIC METABOLIC PNL TOTAL CA: CPT | Performed by: INTERNAL MEDICINE

## 2022-01-01 PROCEDURE — U0003 INFECTIOUS AGENT DETECTION BY NUCLEIC ACID (DNA OR RNA); SEVERE ACUTE RESPIRATORY SYNDROME CORONAVIRUS 2 (SARS-COV-2) (CORONAVIRUS DISEASE [COVID-19]), AMPLIFIED PROBE TECHNIQUE, MAKING USE OF HIGH THROUGHPUT TECHNOLOGIES AS DESCRIBED BY CMS-2020-01-R: HCPCS

## 2022-01-01 PROCEDURE — 5A09457 ASSISTANCE WITH RESPIRATORY VENTILATION, 24-96 CONSECUTIVE HOURS, CONTINUOUS POSITIVE AIRWAY PRESSURE: ICD-10-PCS | Performed by: FAMILY MEDICINE

## 2022-01-01 PROCEDURE — 96365 THER/PROPH/DIAG IV INF INIT: CPT

## 2022-01-01 PROCEDURE — 83880 ASSAY OF NATRIURETIC PEPTIDE: CPT | Performed by: FAMILY MEDICINE

## 2022-01-01 PROCEDURE — 36415 COLL VENOUS BLD VENIPUNCTURE: CPT | Performed by: INTERNAL MEDICINE

## 2022-01-01 PROCEDURE — 99213 OFFICE O/P EST LOW 20 MIN: CPT | Performed by: PHYSICIAN ASSISTANT

## 2022-01-01 PROCEDURE — 250N000012 HC RX MED GY IP 250 OP 636 PS 637: Performed by: FAMILY MEDICINE

## 2022-01-01 PROCEDURE — 83735 ASSAY OF MAGNESIUM: CPT | Performed by: INTERNAL MEDICINE

## 2022-01-01 PROCEDURE — 5A09357 ASSISTANCE WITH RESPIRATORY VENTILATION, LESS THAN 24 CONSECUTIVE HOURS, CONTINUOUS POSITIVE AIRWAY PRESSURE: ICD-10-PCS | Performed by: STUDENT IN AN ORGANIZED HEALTH CARE EDUCATION/TRAINING PROGRAM

## 2022-01-01 PROCEDURE — 82310 ASSAY OF CALCIUM: CPT | Performed by: INTERNAL MEDICINE

## 2022-01-01 PROCEDURE — 87206 SMEAR FLUORESCENT/ACID STAI: CPT | Performed by: INTERNAL MEDICINE

## 2022-01-01 PROCEDURE — 99607 MTMS BY PHARM ADDL 15 MIN: CPT | Performed by: PHARMACIST

## 2022-01-01 PROCEDURE — 96375 TX/PRO/DX INJ NEW DRUG ADDON: CPT

## 2022-01-01 PROCEDURE — 99223 1ST HOSP IP/OBS HIGH 75: CPT | Mod: GC | Performed by: STUDENT IN AN ORGANIZED HEALTH CARE EDUCATION/TRAINING PROGRAM

## 2022-01-01 PROCEDURE — 94668 MNPJ CHEST WALL SBSQ: CPT | Performed by: PEDIATRICS

## 2022-01-01 PROCEDURE — 87205 SMEAR GRAM STAIN: CPT | Performed by: STUDENT IN AN ORGANIZED HEALTH CARE EDUCATION/TRAINING PROGRAM

## 2022-01-01 PROCEDURE — 250N000011 HC RX IP 250 OP 636: Performed by: FAMILY MEDICINE

## 2022-01-01 PROCEDURE — 99291 CRITICAL CARE FIRST HOUR: CPT | Performed by: INTERNAL MEDICINE

## 2022-01-01 PROCEDURE — 87070 CULTURE OTHR SPECIMN AEROBIC: CPT | Performed by: INTERNAL MEDICINE

## 2022-01-01 PROCEDURE — 93970 EXTREMITY STUDY: CPT

## 2022-01-01 PROCEDURE — 84484 ASSAY OF TROPONIN QUANT: CPT | Performed by: INTERNAL MEDICINE

## 2022-01-01 PROCEDURE — 85027 COMPLETE CBC AUTOMATED: CPT | Performed by: FAMILY MEDICINE

## 2022-01-01 PROCEDURE — C9803 HOPD COVID-19 SPEC COLLECT: HCPCS

## 2022-01-01 PROCEDURE — 97535 SELF CARE MNGMENT TRAINING: CPT | Mod: GP

## 2022-01-01 PROCEDURE — 99213 OFFICE O/P EST LOW 20 MIN: CPT | Mod: 95 | Performed by: INTERNAL MEDICINE

## 2022-01-01 PROCEDURE — 99232 SBSQ HOSP IP/OBS MODERATE 35: CPT | Performed by: HOSPITALIST

## 2022-01-01 PROCEDURE — 82805 BLOOD GASES W/O2 SATURATION: CPT | Performed by: INTERNAL MEDICINE

## 2022-01-01 PROCEDURE — 250N000011 HC RX IP 250 OP 636: Performed by: PHYSICIAN ASSISTANT

## 2022-01-01 PROCEDURE — 99233 SBSQ HOSP IP/OBS HIGH 50: CPT | Mod: GC | Performed by: STUDENT IN AN ORGANIZED HEALTH CARE EDUCATION/TRAINING PROGRAM

## 2022-01-01 PROCEDURE — 36569 INSJ PICC 5 YR+ W/O IMAGING: CPT

## 2022-01-01 PROCEDURE — 80061 LIPID PANEL: CPT | Performed by: INTERNAL MEDICINE

## 2022-01-01 PROCEDURE — 99223 1ST HOSP IP/OBS HIGH 75: CPT | Mod: AI

## 2022-01-01 PROCEDURE — 82805 BLOOD GASES W/O2 SATURATION: CPT | Performed by: EMERGENCY MEDICINE

## 2022-01-01 PROCEDURE — 99285 EMERGENCY DEPT VISIT HI MDM: CPT | Mod: 25

## 2022-01-01 PROCEDURE — 97110 THERAPEUTIC EXERCISES: CPT | Mod: GP | Performed by: PHYSICAL THERAPIST

## 2022-01-01 PROCEDURE — 84145 PROCALCITONIN (PCT): CPT | Performed by: STUDENT IN AN ORGANIZED HEALTH CARE EDUCATION/TRAINING PROGRAM

## 2022-01-01 PROCEDURE — 84484 ASSAY OF TROPONIN QUANT: CPT | Performed by: EMERGENCY MEDICINE

## 2022-01-01 PROCEDURE — 99232 SBSQ HOSP IP/OBS MODERATE 35: CPT | Performed by: NURSE PRACTITIONER

## 2022-01-01 PROCEDURE — 83690 ASSAY OF LIPASE: CPT | Performed by: INTERNAL MEDICINE

## 2022-01-01 PROCEDURE — 80053 COMPREHEN METABOLIC PANEL: CPT | Performed by: STUDENT IN AN ORGANIZED HEALTH CARE EDUCATION/TRAINING PROGRAM

## 2022-01-01 PROCEDURE — 83036 HEMOGLOBIN GLYCOSYLATED A1C: CPT | Performed by: INTERNAL MEDICINE

## 2022-01-01 PROCEDURE — 72131 CT LUMBAR SPINE W/O DYE: CPT

## 2022-01-01 PROCEDURE — 99305 1ST NF CARE MODERATE MDM 35: CPT | Performed by: FAMILY MEDICINE

## 2022-01-01 PROCEDURE — 81003 URINALYSIS AUTO W/O SCOPE: CPT | Performed by: STUDENT IN AN ORGANIZED HEALTH CARE EDUCATION/TRAINING PROGRAM

## 2022-01-01 PROCEDURE — 99223 1ST HOSP IP/OBS HIGH 75: CPT | Performed by: CLINICAL NURSE SPECIALIST

## 2022-01-01 PROCEDURE — 97166 OT EVAL MOD COMPLEX 45 MIN: CPT | Mod: GO

## 2022-01-01 PROCEDURE — 85014 HEMATOCRIT: CPT | Performed by: STUDENT IN AN ORGANIZED HEALTH CARE EDUCATION/TRAINING PROGRAM

## 2022-01-01 PROCEDURE — 250N000012 HC RX MED GY IP 250 OP 636 PS 637: Performed by: EMERGENCY MEDICINE

## 2022-01-01 PROCEDURE — 250N000013 HC RX MED GY IP 250 OP 250 PS 637: Performed by: HOSPITALIST

## 2022-01-01 PROCEDURE — 250N000011 HC RX IP 250 OP 636

## 2022-01-01 PROCEDURE — 82248 BILIRUBIN DIRECT: CPT | Performed by: FAMILY MEDICINE

## 2022-01-01 PROCEDURE — 36415 COLL VENOUS BLD VENIPUNCTURE: CPT | Performed by: STUDENT IN AN ORGANIZED HEALTH CARE EDUCATION/TRAINING PROGRAM

## 2022-01-01 PROCEDURE — 83880 ASSAY OF NATRIURETIC PEPTIDE: CPT | Performed by: STUDENT IN AN ORGANIZED HEALTH CARE EDUCATION/TRAINING PROGRAM

## 2022-01-01 PROCEDURE — 74230 X-RAY XM SWLNG FUNCJ C+: CPT

## 2022-01-01 PROCEDURE — 80053 COMPREHEN METABOLIC PANEL: CPT | Performed by: INTERNAL MEDICINE

## 2022-01-01 PROCEDURE — 83735 ASSAY OF MAGNESIUM: CPT | Performed by: EMERGENCY MEDICINE

## 2022-01-01 PROCEDURE — 87086 URINE CULTURE/COLONY COUNT: CPT

## 2022-01-01 PROCEDURE — 96376 TX/PRO/DX INJ SAME DRUG ADON: CPT

## 2022-01-01 PROCEDURE — 84484 ASSAY OF TROPONIN QUANT: CPT | Performed by: PHYSICIAN ASSISTANT

## 2022-01-01 PROCEDURE — 71250 CT THORAX DX C-: CPT

## 2022-01-01 PROCEDURE — 82565 ASSAY OF CREATININE: CPT | Performed by: FAMILY MEDICINE

## 2022-01-01 PROCEDURE — 258N000003 HC RX IP 258 OP 636: Performed by: INTERNAL MEDICINE

## 2022-01-01 PROCEDURE — 99223 1ST HOSP IP/OBS HIGH 75: CPT | Performed by: FAMILY MEDICINE

## 2022-01-01 PROCEDURE — 94667 MNPJ CHEST WALL 1ST: CPT | Performed by: PEDIATRICS

## 2022-01-01 PROCEDURE — 97162 PT EVAL MOD COMPLEX 30 MIN: CPT | Mod: GP

## 2022-01-01 PROCEDURE — 87637 SARSCOV2&INF A&B&RSV AMP PRB: CPT | Performed by: EMERGENCY MEDICINE

## 2022-01-01 PROCEDURE — 36415 COLL VENOUS BLD VENIPUNCTURE: CPT | Performed by: HOSPITALIST

## 2022-01-01 PROCEDURE — 85018 HEMOGLOBIN: CPT | Performed by: STUDENT IN AN ORGANIZED HEALTH CARE EDUCATION/TRAINING PROGRAM

## 2022-01-01 PROCEDURE — 250N000009 HC RX 250: Performed by: EMERGENCY MEDICINE

## 2022-01-01 PROCEDURE — 85025 COMPLETE CBC W/AUTO DIFF WBC: CPT | Performed by: STUDENT IN AN ORGANIZED HEALTH CARE EDUCATION/TRAINING PROGRAM

## 2022-01-01 PROCEDURE — 81001 URINALYSIS AUTO W/SCOPE: CPT | Performed by: FAMILY MEDICINE

## 2022-01-01 PROCEDURE — 93010 ELECTROCARDIOGRAM REPORT: CPT | Performed by: INTERNAL MEDICINE

## 2022-01-01 PROCEDURE — 99207 PR NO CHARGE LOS: CPT | Performed by: INTERNAL MEDICINE

## 2022-01-01 PROCEDURE — 73502 X-RAY EXAM HIP UNI 2-3 VIEWS: CPT

## 2022-01-01 PROCEDURE — 97110 THERAPEUTIC EXERCISES: CPT | Mod: GO

## 2022-01-01 PROCEDURE — 83735 ASSAY OF MAGNESIUM: CPT | Performed by: STUDENT IN AN ORGANIZED HEALTH CARE EDUCATION/TRAINING PROGRAM

## 2022-01-01 PROCEDURE — 87040 BLOOD CULTURE FOR BACTERIA: CPT | Performed by: FAMILY MEDICINE

## 2022-01-01 PROCEDURE — 87899 AGENT NOS ASSAY W/OPTIC: CPT | Performed by: INTERNAL MEDICINE

## 2022-01-01 PROCEDURE — 36600 WITHDRAWAL OF ARTERIAL BLOOD: CPT

## 2022-01-01 PROCEDURE — 82805 BLOOD GASES W/O2 SATURATION: CPT | Performed by: FAMILY MEDICINE

## 2022-01-01 PROCEDURE — 84295 ASSAY OF SERUM SODIUM: CPT | Performed by: FAMILY MEDICINE

## 2022-01-01 PROCEDURE — 92611 MOTION FLUOROSCOPY/SWALLOW: CPT | Mod: GN | Performed by: SPEECH-LANGUAGE PATHOLOGIST

## 2022-01-01 PROCEDURE — 80048 BASIC METABOLIC PNL TOTAL CA: CPT | Performed by: STUDENT IN AN ORGANIZED HEALTH CARE EDUCATION/TRAINING PROGRAM

## 2022-01-01 PROCEDURE — 99292 CRITICAL CARE ADDL 30 MIN: CPT | Performed by: INTERNAL MEDICINE

## 2022-01-01 PROCEDURE — 83735 ASSAY OF MAGNESIUM: CPT

## 2022-01-01 PROCEDURE — 85041 AUTOMATED RBC COUNT: CPT | Performed by: STUDENT IN AN ORGANIZED HEALTH CARE EDUCATION/TRAINING PROGRAM

## 2022-01-01 PROCEDURE — 84155 ASSAY OF PROTEIN SERUM: CPT | Performed by: STUDENT IN AN ORGANIZED HEALTH CARE EDUCATION/TRAINING PROGRAM

## 2022-01-01 PROCEDURE — 94762 N-INVAS EAR/PLS OXIMTRY CONT: CPT

## 2022-01-01 PROCEDURE — 80053 COMPREHEN METABOLIC PANEL: CPT | Performed by: EMERGENCY MEDICINE

## 2022-01-01 PROCEDURE — 272N000452 HC KIT SHRLOCK 5FR POWER PICC TRIPLE LUMEN

## 2022-01-01 PROCEDURE — 272N000054 HC CANNULA HIGH FLOW, ADULT

## 2022-01-01 PROCEDURE — 99221 1ST HOSP IP/OBS SF/LOW 40: CPT | Performed by: INTERNAL MEDICINE

## 2022-01-01 PROCEDURE — 85027 COMPLETE CBC AUTOMATED: CPT | Performed by: STUDENT IN AN ORGANIZED HEALTH CARE EDUCATION/TRAINING PROGRAM

## 2022-01-01 PROCEDURE — 96372 THER/PROPH/DIAG INJ SC/IM: CPT | Mod: 59

## 2022-01-01 PROCEDURE — 97161 PT EVAL LOW COMPLEX 20 MIN: CPT | Mod: GP

## 2022-01-01 PROCEDURE — 87636 SARSCOV2 & INF A&B AMP PRB: CPT | Performed by: EMERGENCY MEDICINE

## 2022-01-01 PROCEDURE — 93005 ELECTROCARDIOGRAM TRACING: CPT | Performed by: INTERNAL MEDICINE

## 2022-01-01 PROCEDURE — 99214 OFFICE O/P EST MOD 30 MIN: CPT | Performed by: INTERNAL MEDICINE

## 2022-01-01 PROCEDURE — 84295 ASSAY OF SERUM SODIUM: CPT

## 2022-01-01 PROCEDURE — 96366 THER/PROPH/DIAG IV INF ADDON: CPT

## 2022-01-01 PROCEDURE — 93306 TTE W/DOPPLER COMPLETE: CPT

## 2022-01-01 PROCEDURE — 85610 PROTHROMBIN TIME: CPT | Performed by: EMERGENCY MEDICINE

## 2022-01-01 PROCEDURE — 85025 COMPLETE CBC W/AUTO DIFF WBC: CPT | Performed by: EMERGENCY MEDICINE

## 2022-01-01 PROCEDURE — 83880 ASSAY OF NATRIURETIC PEPTIDE: CPT | Performed by: INTERNAL MEDICINE

## 2022-01-01 PROCEDURE — 250N000012 HC RX MED GY IP 250 OP 636 PS 637

## 2022-01-01 PROCEDURE — 84145 PROCALCITONIN (PCT): CPT | Performed by: FAMILY MEDICINE

## 2022-01-01 PROCEDURE — 80051 ELECTROLYTE PANEL: CPT

## 2022-01-01 PROCEDURE — 81001 URINALYSIS AUTO W/SCOPE: CPT | Performed by: INTERNAL MEDICINE

## 2022-01-01 PROCEDURE — 82040 ASSAY OF SERUM ALBUMIN: CPT | Performed by: EMERGENCY MEDICINE

## 2022-01-01 PROCEDURE — 92526 ORAL FUNCTION THERAPY: CPT | Mod: GN | Performed by: SPEECH-LANGUAGE PATHOLOGIST

## 2022-01-01 PROCEDURE — 87635 SARS-COV-2 COVID-19 AMP PRB: CPT | Performed by: FAMILY MEDICINE

## 2022-01-01 PROCEDURE — 83735 ASSAY OF MAGNESIUM: CPT | Performed by: PHYSICIAN ASSISTANT

## 2022-01-01 PROCEDURE — 83605 ASSAY OF LACTIC ACID: CPT | Performed by: INTERNAL MEDICINE

## 2022-01-01 PROCEDURE — 85004 AUTOMATED DIFF WBC COUNT: CPT | Performed by: INTERNAL MEDICINE

## 2022-01-01 PROCEDURE — 83605 ASSAY OF LACTIC ACID: CPT | Performed by: EMERGENCY MEDICINE

## 2022-01-01 PROCEDURE — 5A09557 ASSISTANCE WITH RESPIRATORY VENTILATION, GREATER THAN 96 CONSECUTIVE HOURS, CONTINUOUS POSITIVE AIRWAY PRESSURE: ICD-10-PCS | Performed by: FAMILY MEDICINE

## 2022-01-01 PROCEDURE — 250N000013 HC RX MED GY IP 250 OP 250 PS 637: Performed by: CLINICAL NURSE SPECIALIST

## 2022-01-01 PROCEDURE — 99233 SBSQ HOSP IP/OBS HIGH 50: CPT | Performed by: CLINICAL NURSE SPECIALIST

## 2022-01-01 PROCEDURE — 250N000009 HC RX 250: Performed by: PHYSICIAN ASSISTANT

## 2022-01-01 PROCEDURE — 84484 ASSAY OF TROPONIN QUANT: CPT | Performed by: STUDENT IN AN ORGANIZED HEALTH CARE EDUCATION/TRAINING PROGRAM

## 2022-01-01 PROCEDURE — 84484 ASSAY OF TROPONIN QUANT: CPT | Performed by: FAMILY MEDICINE

## 2022-01-01 PROCEDURE — 99606 MTMS BY PHARM EST 15 MIN: CPT | Performed by: PHARMACIST

## 2022-01-01 PROCEDURE — 84145 PROCALCITONIN (PCT): CPT | Performed by: INTERNAL MEDICINE

## 2022-01-01 PROCEDURE — 74176 CT ABD & PELVIS W/O CONTRAST: CPT

## 2022-01-01 PROCEDURE — 999N000054 HC STATISTIC EKG NON-CHARGEABLE

## 2022-01-01 PROCEDURE — 87486 CHLMYD PNEUM DNA AMP PROBE: CPT | Performed by: FAMILY MEDICINE

## 2022-01-01 PROCEDURE — 96374 THER/PROPH/DIAG INJ IV PUSH: CPT

## 2022-01-01 PROCEDURE — P9603 ONE-WAY ALLOW PRORATED MILES: HCPCS | Performed by: FAMILY MEDICINE

## 2022-01-01 PROCEDURE — 97116 GAIT TRAINING THERAPY: CPT | Mod: GP | Performed by: PHYSICAL THERAPIST

## 2022-01-01 PROCEDURE — 80048 BASIC METABOLIC PNL TOTAL CA: CPT | Performed by: PHYSICIAN ASSISTANT

## 2022-01-01 PROCEDURE — 99309 SBSQ NF CARE MODERATE MDM 30: CPT | Performed by: FAMILY MEDICINE

## 2022-01-01 PROCEDURE — 99223 1ST HOSP IP/OBS HIGH 75: CPT | Performed by: INTERNAL MEDICINE

## 2022-01-01 PROCEDURE — 85014 HEMATOCRIT: CPT | Performed by: EMERGENCY MEDICINE

## 2022-01-01 PROCEDURE — U0005 INFEC AGEN DETEC AMPLI PROBE: HCPCS | Performed by: EMERGENCY MEDICINE

## 2022-01-01 PROCEDURE — 999N000157 HC STATISTIC RCP TIME EA 10 MIN: Performed by: PEDIATRICS

## 2022-01-01 PROCEDURE — 83880 ASSAY OF NATRIURETIC PEPTIDE: CPT | Performed by: PHYSICIAN ASSISTANT

## 2022-01-01 PROCEDURE — 82805 BLOOD GASES W/O2 SATURATION: CPT | Performed by: HOSPITALIST

## 2022-01-01 PROCEDURE — 85018 HEMOGLOBIN: CPT

## 2022-01-01 PROCEDURE — 120N000004 HC R&B MS OVERFLOW

## 2022-01-01 PROCEDURE — 36415 COLL VENOUS BLD VENIPUNCTURE: CPT | Performed by: PHYSICIAN ASSISTANT

## 2022-01-01 PROCEDURE — 85730 THROMBOPLASTIN TIME PARTIAL: CPT | Performed by: EMERGENCY MEDICINE

## 2022-01-01 PROCEDURE — 85025 COMPLETE CBC W/AUTO DIFF WBC: CPT | Performed by: INTERNAL MEDICINE

## 2022-01-01 PROCEDURE — 99239 HOSP IP/OBS DSCHRG MGMT >30: CPT | Performed by: HOSPITALIST

## 2022-01-01 PROCEDURE — 96365 THER/PROPH/DIAG IV INF INIT: CPT | Mod: 59

## 2022-01-01 PROCEDURE — 83605 ASSAY OF LACTIC ACID: CPT | Performed by: STUDENT IN AN ORGANIZED HEALTH CARE EDUCATION/TRAINING PROGRAM

## 2022-01-01 PROCEDURE — 99232 SBSQ HOSP IP/OBS MODERATE 35: CPT | Performed by: CLINICAL NURSE SPECIALIST

## 2022-01-01 PROCEDURE — 85027 COMPLETE CBC AUTOMATED: CPT | Performed by: PHYSICIAN ASSISTANT

## 2022-01-01 PROCEDURE — 96361 HYDRATE IV INFUSION ADD-ON: CPT

## 2022-01-01 PROCEDURE — 82805 BLOOD GASES W/O2 SATURATION: CPT | Performed by: PHYSICIAN ASSISTANT

## 2022-01-01 PROCEDURE — 99442 PR PHYSICIAN TELEPHONE EVALUATION 11-20 MIN: CPT | Mod: 95 | Performed by: INTERNAL MEDICINE

## 2022-01-01 PROCEDURE — 96374 THER/PROPH/DIAG INJ IV PUSH: CPT | Mod: 59

## 2022-01-01 PROCEDURE — 81001 URINALYSIS AUTO W/SCOPE: CPT | Performed by: STUDENT IN AN ORGANIZED HEALTH CARE EDUCATION/TRAINING PROGRAM

## 2022-01-01 PROCEDURE — 87635 SARS-COV-2 COVID-19 AMP PRB: CPT | Performed by: STUDENT IN AN ORGANIZED HEALTH CARE EDUCATION/TRAINING PROGRAM

## 2022-01-01 PROCEDURE — 99215 OFFICE O/P EST HI 40 MIN: CPT | Performed by: INTERNAL MEDICINE

## 2022-01-01 PROCEDURE — 87899 AGENT NOS ASSAY W/OPTIC: CPT | Performed by: FAMILY MEDICINE

## 2022-01-01 PROCEDURE — 83605 ASSAY OF LACTIC ACID: CPT | Performed by: FAMILY MEDICINE

## 2022-01-01 PROCEDURE — 99223 1ST HOSP IP/OBS HIGH 75: CPT | Performed by: NURSE PRACTITIONER

## 2022-01-01 PROCEDURE — 93005 ELECTROCARDIOGRAM TRACING: CPT

## 2022-01-01 PROCEDURE — 99213 OFFICE O/P EST LOW 20 MIN: CPT | Performed by: INTERNAL MEDICINE

## 2022-01-01 PROCEDURE — 85049 AUTOMATED PLATELET COUNT: CPT | Performed by: STUDENT IN AN ORGANIZED HEALTH CARE EDUCATION/TRAINING PROGRAM

## 2022-01-01 PROCEDURE — 97535 SELF CARE MNGMENT TRAINING: CPT | Mod: GP | Performed by: PHYSICAL THERAPIST

## 2022-01-01 PROCEDURE — 75574 CT ANGIO HRT W/3D IMAGE: CPT | Mod: 26 | Performed by: INTERNAL MEDICINE

## 2022-01-01 PROCEDURE — 99310 SBSQ NF CARE HIGH MDM 45: CPT | Performed by: FAMILY MEDICINE

## 2022-01-01 PROCEDURE — 93306 TTE W/DOPPLER COMPLETE: CPT | Mod: 26 | Performed by: INTERNAL MEDICINE

## 2022-01-01 PROCEDURE — 87086 URINE CULTURE/COLONY COUNT: CPT | Performed by: STUDENT IN AN ORGANIZED HEALTH CARE EDUCATION/TRAINING PROGRAM

## 2022-01-01 PROCEDURE — 87077 CULTURE AEROBIC IDENTIFY: CPT | Performed by: INTERNAL MEDICINE

## 2022-01-01 PROCEDURE — 99231 SBSQ HOSP IP/OBS SF/LOW 25: CPT | Performed by: CLINICAL NURSE SPECIALIST

## 2022-01-01 PROCEDURE — 93005 ELECTROCARDIOGRAM TRACING: CPT | Performed by: PHYSICIAN ASSISTANT

## 2022-01-01 PROCEDURE — 99356 PR PROLONGED SERV,INPATIENT,1ST HR: CPT | Performed by: NURSE PRACTITIONER

## 2022-01-01 PROCEDURE — 81001 URINALYSIS AUTO W/SCOPE: CPT

## 2022-01-01 PROCEDURE — 82310 ASSAY OF CALCIUM: CPT | Performed by: EMERGENCY MEDICINE

## 2022-01-01 PROCEDURE — 82310 ASSAY OF CALCIUM: CPT

## 2022-01-01 PROCEDURE — 99207 PR CDG-CRITICAL CARE TIME NOT DOCUMENTED: CPT | Performed by: INTERNAL MEDICINE

## 2022-01-01 PROCEDURE — 80048 BASIC METABOLIC PNL TOTAL CA: CPT | Performed by: EMERGENCY MEDICINE

## 2022-01-01 PROCEDURE — 87899 AGENT NOS ASSAY W/OPTIC: CPT | Performed by: STUDENT IN AN ORGANIZED HEALTH CARE EDUCATION/TRAINING PROGRAM

## 2022-01-01 PROCEDURE — 84132 ASSAY OF SERUM POTASSIUM: CPT | Performed by: STUDENT IN AN ORGANIZED HEALTH CARE EDUCATION/TRAINING PROGRAM

## 2022-01-01 PROCEDURE — 75574 CT ANGIO HRT W/3D IMAGE: CPT

## 2022-01-01 PROCEDURE — 99285 EMERGENCY DEPT VISIT HI MDM: CPT | Mod: CS,25

## 2022-01-01 RX ORDER — LIDOCAINE 40 MG/G
CREAM TOPICAL
Status: DISCONTINUED | OUTPATIENT
Start: 2022-01-01 | End: 2022-01-01 | Stop reason: HOSPADM

## 2022-01-01 RX ORDER — SPIRONOLACTONE 25 MG
12.5 TABLET ORAL DAILY
Status: DISCONTINUED | OUTPATIENT
Start: 2022-01-01 | End: 2022-01-01 | Stop reason: HOSPADM

## 2022-01-01 RX ORDER — MAGNESIUM SULFATE HEPTAHYDRATE 40 MG/ML
2 INJECTION, SOLUTION INTRAVENOUS ONCE
Status: COMPLETED | OUTPATIENT
Start: 2022-01-01 | End: 2022-01-01

## 2022-01-01 RX ORDER — PREDNISONE 10 MG/1
TABLET ORAL
Qty: 30 TABLET | Refills: 0 | Status: SHIPPED | OUTPATIENT
Start: 2022-01-01 | End: 2022-01-01

## 2022-01-01 RX ORDER — HYDROMORPHONE HCL IN WATER/PF 6 MG/30 ML
0.2 PATIENT CONTROLLED ANALGESIA SYRINGE INTRAVENOUS
Status: DISCONTINUED | OUTPATIENT
Start: 2022-01-01 | End: 2022-01-01 | Stop reason: HOSPADM

## 2022-01-01 RX ORDER — BARIUM SULFATE 400 MG/ML
SUSPENSION ORAL ONCE
Status: COMPLETED | OUTPATIENT
Start: 2022-01-01 | End: 2022-01-01

## 2022-01-01 RX ORDER — IPRATROPIUM BROMIDE AND ALBUTEROL SULFATE 2.5; .5 MG/3ML; MG/3ML
3 SOLUTION RESPIRATORY (INHALATION)
Status: CANCELLED | OUTPATIENT
Start: 2022-01-01

## 2022-01-01 RX ORDER — ONDANSETRON 4 MG/1
4 TABLET, ORALLY DISINTEGRATING ORAL ONCE
Status: DISCONTINUED | OUTPATIENT
Start: 2022-01-01 | End: 2022-01-01

## 2022-01-01 RX ORDER — METHYLPREDNISOLONE SODIUM SUCCINATE 125 MG/2ML
60 INJECTION, POWDER, LYOPHILIZED, FOR SOLUTION INTRAMUSCULAR; INTRAVENOUS EVERY 8 HOURS
Status: DISCONTINUED | OUTPATIENT
Start: 2022-01-01 | End: 2022-01-01

## 2022-01-01 RX ORDER — IPRATROPIUM BROMIDE AND ALBUTEROL SULFATE 2.5; .5 MG/3ML; MG/3ML
3 SOLUTION RESPIRATORY (INHALATION)
Status: DISCONTINUED | OUTPATIENT
Start: 2022-01-01 | End: 2022-01-01 | Stop reason: HOSPADM

## 2022-01-01 RX ORDER — ALBUTEROL SULFATE 90 UG/1
2 AEROSOL, METERED RESPIRATORY (INHALATION) EVERY 4 HOURS PRN
Status: CANCELLED | OUTPATIENT
Start: 2022-01-01

## 2022-01-01 RX ORDER — GUAIFENESIN 600 MG/1
1200 TABLET, EXTENDED RELEASE ORAL 2 TIMES DAILY
Status: DISCONTINUED | OUTPATIENT
Start: 2022-01-01 | End: 2022-01-01 | Stop reason: HOSPADM

## 2022-01-01 RX ORDER — ACETAMINOPHEN 325 MG/1
650 TABLET ORAL EVERY 4 HOURS PRN
Status: DISCONTINUED | OUTPATIENT
Start: 2022-01-01 | End: 2022-01-01 | Stop reason: HOSPADM

## 2022-01-01 RX ORDER — MORPHINE SULFATE 20 MG/ML
2.5-1 SOLUTION ORAL
Status: DISCONTINUED | OUTPATIENT
Start: 2022-01-01 | End: 2022-01-01 | Stop reason: HOSPADM

## 2022-01-01 RX ORDER — ACYCLOVIR 50 MG/G
OINTMENT TOPICAL
Status: DISCONTINUED | OUTPATIENT
Start: 2022-01-01 | End: 2022-01-01 | Stop reason: ALTCHOICE

## 2022-01-01 RX ORDER — ALBUTEROL SULFATE 0.83 MG/ML
2.5 SOLUTION RESPIRATORY (INHALATION)
Status: DISCONTINUED | OUTPATIENT
Start: 2022-01-01 | End: 2022-01-01 | Stop reason: HOSPADM

## 2022-01-01 RX ORDER — AMOXICILLIN 250 MG
1 CAPSULE ORAL 2 TIMES DAILY
Status: DISCONTINUED | OUTPATIENT
Start: 2022-01-01 | End: 2022-01-01 | Stop reason: HOSPADM

## 2022-01-01 RX ORDER — PREDNISONE 10 MG/1
TABLET ORAL
Qty: 15 TABLET | Refills: 0 | Status: ON HOLD | OUTPATIENT
Start: 2022-01-01 | End: 2022-01-01

## 2022-01-01 RX ORDER — PREDNISONE 5 MG/1
5 TABLET ORAL DAILY
Qty: 5 TABLET | Refills: 0 | Status: SHIPPED | OUTPATIENT
Start: 2022-01-01 | End: 2022-01-01

## 2022-01-01 RX ORDER — ALBUTEROL SULFATE 0.83 MG/ML
2.5 SOLUTION RESPIRATORY (INHALATION)
Status: DISCONTINUED | OUTPATIENT
Start: 2022-01-01 | End: 2022-01-01

## 2022-01-01 RX ORDER — FUROSEMIDE 10 MG/ML
40 INJECTION INTRAMUSCULAR; INTRAVENOUS ONCE
Status: COMPLETED | OUTPATIENT
Start: 2022-01-01 | End: 2022-01-01

## 2022-01-01 RX ORDER — ACETAMINOPHEN 650 MG/1
650 SUPPOSITORY RECTAL EVERY 6 HOURS
Status: DISCONTINUED | OUTPATIENT
Start: 2022-01-01 | End: 2022-01-01 | Stop reason: HOSPADM

## 2022-01-01 RX ORDER — CALCIUM CARBONATE/VITAMIN D3 600 MG-10
500 TABLET ORAL EVERY MORNING
Status: DISCONTINUED | OUTPATIENT
Start: 2022-01-01 | End: 2022-01-01 | Stop reason: HOSPADM

## 2022-01-01 RX ORDER — DEXTROSE MONOHYDRATE 25 G/50ML
25-50 INJECTION, SOLUTION INTRAVENOUS
Status: DISCONTINUED | OUTPATIENT
Start: 2022-01-01 | End: 2022-01-01 | Stop reason: HOSPADM

## 2022-01-01 RX ORDER — DILTIAZEM HYDROCHLORIDE 120 MG/1
240 CAPSULE, EXTENDED RELEASE ORAL EVERY MORNING
Status: DISCONTINUED | OUTPATIENT
Start: 2022-01-01 | End: 2022-01-01 | Stop reason: HOSPADM

## 2022-01-01 RX ORDER — LIDOCAINE 40 MG/G
CREAM TOPICAL
Status: ACTIVE | OUTPATIENT
Start: 2022-01-01 | End: 2022-01-01

## 2022-01-01 RX ORDER — PREDNISONE 20 MG/1
40 TABLET ORAL DAILY
Status: DISCONTINUED | OUTPATIENT
Start: 2022-01-01 | End: 2022-01-01 | Stop reason: HOSPADM

## 2022-01-01 RX ORDER — MIRTAZAPINE 7.5 MG/1
TABLET, FILM COATED ORAL
Qty: 7 TABLET | Refills: 0 | Status: SHIPPED | OUTPATIENT
Start: 2022-01-01 | End: 2022-01-01

## 2022-01-01 RX ORDER — ENOXAPARIN SODIUM 100 MG/ML
40 INJECTION SUBCUTANEOUS EVERY 24 HOURS
Status: DISCONTINUED | OUTPATIENT
Start: 2022-01-01 | End: 2022-01-01 | Stop reason: ALTCHOICE

## 2022-01-01 RX ORDER — DILTIAZEM HYDROCHLORIDE 120 MG/1
240 CAPSULE, COATED, EXTENDED RELEASE ORAL DAILY
COMMUNITY
Start: 2022-01-01

## 2022-01-01 RX ORDER — POTASSIUM CHLORIDE 750 MG/1
10 TABLET, EXTENDED RELEASE ORAL DAILY
COMMUNITY
End: 2022-01-01

## 2022-01-01 RX ORDER — FUROSEMIDE 40 MG
40 TABLET ORAL DAILY
Status: DISCONTINUED | OUTPATIENT
Start: 2022-01-01 | End: 2022-01-01 | Stop reason: HOSPADM

## 2022-01-01 RX ORDER — ACETAMINOPHEN 650 MG/1
650 SUPPOSITORY RECTAL EVERY 6 HOURS PRN
Status: CANCELLED | OUTPATIENT
Start: 2022-01-01

## 2022-01-01 RX ORDER — SODIUM CHLORIDE FOR INHALATION 3 %
3 VIAL, NEBULIZER (ML) INHALATION 4 TIMES DAILY
Status: DISCONTINUED | OUTPATIENT
Start: 2022-01-01 | End: 2022-01-01

## 2022-01-01 RX ORDER — DILTIAZEM HYDROCHLORIDE 180 MG/1
180 CAPSULE, EXTENDED RELEASE ORAL EVERY MORNING
Status: DISCONTINUED | OUTPATIENT
Start: 2022-01-01 | End: 2022-01-01

## 2022-01-01 RX ORDER — PREDNISONE 10 MG/1
TABLET ORAL
COMMUNITY
Start: 2022-01-01 | End: 2022-01-01

## 2022-01-01 RX ORDER — NALOXONE HYDROCHLORIDE 0.4 MG/ML
0.2 INJECTION, SOLUTION INTRAMUSCULAR; INTRAVENOUS; SUBCUTANEOUS
Status: DISCONTINUED | OUTPATIENT
Start: 2022-01-01 | End: 2022-01-01 | Stop reason: HOSPADM

## 2022-01-01 RX ORDER — GUAIFENESIN 600 MG/1
1200 TABLET, EXTENDED RELEASE ORAL 2 TIMES DAILY
Qty: 60 TABLET | Refills: 0
Start: 2022-01-01 | End: 2022-01-01

## 2022-01-01 RX ORDER — FUROSEMIDE 20 MG
80 TABLET ORAL 2 TIMES DAILY
Status: CANCELLED | OUTPATIENT
Start: 2022-01-01

## 2022-01-01 RX ORDER — FUROSEMIDE 10 MG/ML
40 INJECTION INTRAMUSCULAR; INTRAVENOUS EVERY 12 HOURS
Status: DISCONTINUED | OUTPATIENT
Start: 2022-01-01 | End: 2022-01-01

## 2022-01-01 RX ORDER — UREA 10 %
500 LOTION (ML) TOPICAL EVERY MORNING
Status: CANCELLED | OUTPATIENT
Start: 2022-01-01

## 2022-01-01 RX ORDER — LORAZEPAM 2 MG/ML
INJECTION INTRAMUSCULAR
Status: DISCONTINUED
Start: 2022-01-01 | End: 2022-01-01 | Stop reason: HOSPADM

## 2022-01-01 RX ORDER — ONDANSETRON 2 MG/ML
4 INJECTION INTRAMUSCULAR; INTRAVENOUS EVERY 6 HOURS PRN
Status: DISCONTINUED | OUTPATIENT
Start: 2022-01-01 | End: 2022-01-01 | Stop reason: HOSPADM

## 2022-01-01 RX ORDER — VALACYCLOVIR HYDROCHLORIDE 500 MG/1
500 TABLET, FILM COATED ORAL 3 TIMES DAILY
Qty: 21 TABLET | Refills: 0 | Status: SHIPPED | OUTPATIENT
Start: 2022-01-01 | End: 2022-01-01

## 2022-01-01 RX ORDER — ONDANSETRON 4 MG/1
4 TABLET, ORALLY DISINTEGRATING ORAL EVERY 6 HOURS PRN
Status: CANCELLED | OUTPATIENT
Start: 2022-01-01

## 2022-01-01 RX ORDER — DILTIAZEM HYDROCHLORIDE 120 MG/1
240 CAPSULE, COATED, EXTENDED RELEASE ORAL DAILY
Status: DISCONTINUED | OUTPATIENT
Start: 2022-01-01 | End: 2022-01-01 | Stop reason: HOSPADM

## 2022-01-01 RX ORDER — IPRATROPIUM BROMIDE AND ALBUTEROL SULFATE 2.5; .5 MG/3ML; MG/3ML
3 SOLUTION RESPIRATORY (INHALATION) 4 TIMES DAILY
Status: DISCONTINUED | OUTPATIENT
Start: 2022-01-01 | End: 2022-01-01

## 2022-01-01 RX ORDER — IPRATROPIUM BROMIDE AND ALBUTEROL SULFATE 2.5; .5 MG/3ML; MG/3ML
3 SOLUTION RESPIRATORY (INHALATION) ONCE
Status: COMPLETED | OUTPATIENT
Start: 2022-01-01 | End: 2022-01-01

## 2022-01-01 RX ORDER — IPRATROPIUM BROMIDE AND ALBUTEROL SULFATE 2.5; .5 MG/3ML; MG/3ML
3 SOLUTION RESPIRATORY (INHALATION) ONCE
Status: DISCONTINUED | OUTPATIENT
Start: 2022-01-01 | End: 2022-01-01

## 2022-01-01 RX ORDER — DOXYCYCLINE 100 MG/10ML
100 INJECTION, POWDER, LYOPHILIZED, FOR SOLUTION INTRAVENOUS EVERY 12 HOURS
Status: DISCONTINUED | OUTPATIENT
Start: 2022-01-01 | End: 2022-01-01 | Stop reason: HOSPADM

## 2022-01-01 RX ORDER — CEPHALEXIN 500 MG/1
500 CAPSULE ORAL 3 TIMES DAILY
Status: DISCONTINUED | OUTPATIENT
Start: 2022-01-01 | End: 2022-01-01 | Stop reason: HOSPADM

## 2022-01-01 RX ORDER — ACETAMINOPHEN 325 MG/1
650 TABLET ORAL ONCE
Status: COMPLETED | OUTPATIENT
Start: 2022-01-01 | End: 2022-01-01

## 2022-01-01 RX ORDER — PREDNISONE 10 MG/1
TABLET ORAL
Qty: 15 TABLET | Refills: 0 | Status: SHIPPED | OUTPATIENT
Start: 2022-01-01 | End: 2022-01-01

## 2022-01-01 RX ORDER — ATORVASTATIN CALCIUM 10 MG/1
20 TABLET, FILM COATED ORAL AT BEDTIME
Status: DISCONTINUED | OUTPATIENT
Start: 2022-01-01 | End: 2022-01-01 | Stop reason: HOSPADM

## 2022-01-01 RX ORDER — PROCHLORPERAZINE 25 MG
12.5 SUPPOSITORY, RECTAL RECTAL EVERY 12 HOURS PRN
Status: DISCONTINUED | OUTPATIENT
Start: 2022-01-01 | End: 2022-01-01 | Stop reason: HOSPADM

## 2022-01-01 RX ORDER — DILTIAZEM HYDROCHLORIDE 5 MG/ML
5-25 INJECTION INTRAVENOUS
Status: CANCELLED | OUTPATIENT
Start: 2022-01-01

## 2022-01-01 RX ORDER — FUROSEMIDE 10 MG/ML
20 INJECTION INTRAMUSCULAR; INTRAVENOUS ONCE
Status: COMPLETED | OUTPATIENT
Start: 2022-01-01 | End: 2022-01-01

## 2022-01-01 RX ORDER — FUROSEMIDE 10 MG/ML
40 INJECTION INTRAMUSCULAR; INTRAVENOUS EVERY 12 HOURS
Status: COMPLETED | OUTPATIENT
Start: 2022-01-01 | End: 2022-01-01

## 2022-01-01 RX ORDER — DOCUSATE SODIUM 100 MG/1
100 CAPSULE, LIQUID FILLED ORAL 2 TIMES DAILY
Status: DISCONTINUED | OUTPATIENT
Start: 2022-01-01 | End: 2022-01-01 | Stop reason: HOSPADM

## 2022-01-01 RX ORDER — MAGNESIUM OXIDE 400 MG/1
400 TABLET ORAL 2 TIMES DAILY
Status: DISCONTINUED | OUTPATIENT
Start: 2022-01-01 | End: 2022-01-01 | Stop reason: ALTCHOICE

## 2022-01-01 RX ORDER — PANTOPRAZOLE SODIUM 20 MG/1
40 TABLET, DELAYED RELEASE ORAL DAILY
Status: DISCONTINUED | OUTPATIENT
Start: 2022-01-01 | End: 2022-01-01 | Stop reason: HOSPADM

## 2022-01-01 RX ORDER — POLYETHYLENE GLYCOL 3350 17 G/17G
17 POWDER, FOR SOLUTION ORAL DAILY
Status: DISCONTINUED | OUTPATIENT
Start: 2022-01-01 | End: 2022-01-01

## 2022-01-01 RX ORDER — PROCHLORPERAZINE 25 MG
12.5 SUPPOSITORY, RECTAL RECTAL EVERY 12 HOURS PRN
Status: DISCONTINUED | OUTPATIENT
Start: 2022-01-01 | End: 2022-01-01

## 2022-01-01 RX ORDER — ALBUTEROL SULFATE 0.83 MG/ML
2.5 SOLUTION RESPIRATORY (INHALATION) 4 TIMES DAILY
Status: DISCONTINUED | OUTPATIENT
Start: 2022-01-01 | End: 2022-01-01 | Stop reason: HOSPADM

## 2022-01-01 RX ORDER — TOBRAMYCIN INHALATION SOLUTION 300 MG/5ML
150 INHALANT RESPIRATORY (INHALATION)
Status: DISCONTINUED | OUTPATIENT
Start: 2022-01-01 | End: 2022-01-01 | Stop reason: HOSPADM

## 2022-01-01 RX ORDER — NICOTINE POLACRILEX 4 MG
15-30 LOZENGE BUCCAL
Status: DISCONTINUED | OUTPATIENT
Start: 2022-01-01 | End: 2022-01-01 | Stop reason: HOSPADM

## 2022-01-01 RX ORDER — DOXYCYCLINE 100 MG/1
100 CAPSULE ORAL 2 TIMES DAILY
Qty: 6 CAPSULE | Refills: 0 | Status: SHIPPED | OUTPATIENT
Start: 2022-01-01 | End: 2022-01-01

## 2022-01-01 RX ORDER — ACETAMINOPHEN 325 MG/1
650 TABLET ORAL EVERY 6 HOURS PRN
Status: DISCONTINUED | OUTPATIENT
Start: 2022-01-01 | End: 2022-01-01

## 2022-01-01 RX ORDER — CEPHALEXIN 500 MG/1
500 CAPSULE ORAL 3 TIMES DAILY
Qty: 15 CAPSULE | Refills: 0 | Status: ON HOLD | OUTPATIENT
Start: 2022-01-01 | End: 2022-01-01

## 2022-01-01 RX ORDER — PIPERACILLIN SODIUM, TAZOBACTAM SODIUM 3; .375 G/15ML; G/15ML
3.38 INJECTION, POWDER, LYOPHILIZED, FOR SOLUTION INTRAVENOUS EVERY 8 HOURS
Status: DISCONTINUED | OUTPATIENT
Start: 2022-01-01 | End: 2022-01-01

## 2022-01-01 RX ORDER — SODIUM CHLORIDE FOR INHALATION 3 %
3 VIAL, NEBULIZER (ML) INHALATION
Status: DISCONTINUED | OUTPATIENT
Start: 2022-01-01 | End: 2022-01-01 | Stop reason: HOSPADM

## 2022-01-01 RX ORDER — PREDNISONE 20 MG/1
40 TABLET ORAL DAILY
Status: CANCELLED | OUTPATIENT
Start: 2022-01-01

## 2022-01-01 RX ORDER — LANOLIN ALCOHOL/MO/W.PET/CERES
3 CREAM (GRAM) TOPICAL
Status: DISCONTINUED | OUTPATIENT
Start: 2022-01-01 | End: 2022-01-01 | Stop reason: HOSPADM

## 2022-01-01 RX ORDER — MIRTAZAPINE 15 MG/1
15 TABLET, FILM COATED ORAL AT BEDTIME
Status: DISCONTINUED | OUTPATIENT
Start: 2022-01-01 | End: 2022-01-01 | Stop reason: HOSPADM

## 2022-01-01 RX ORDER — SODIUM CHLORIDE FOR INHALATION 3 %
3 VIAL, NEBULIZER (ML) INHALATION 2 TIMES DAILY
Status: DISCONTINUED | OUTPATIENT
Start: 2022-01-01 | End: 2022-01-01

## 2022-01-01 RX ORDER — DOCUSATE SODIUM 100 MG/1
100 CAPSULE, LIQUID FILLED ORAL DAILY PRN
Status: DISCONTINUED | OUTPATIENT
Start: 2022-01-01 | End: 2022-01-01 | Stop reason: HOSPADM

## 2022-01-01 RX ORDER — AZITHROMYCIN 250 MG/1
250 TABLET, FILM COATED ORAL DAILY
Status: DISCONTINUED | OUTPATIENT
Start: 2022-01-01 | End: 2022-01-01 | Stop reason: HOSPADM

## 2022-01-01 RX ORDER — ALBUTEROL SULFATE 0.83 MG/ML
2.5 SOLUTION RESPIRATORY (INHALATION)
Qty: 600 ML | Refills: 3 | COMMUNITY
Start: 2022-01-01

## 2022-01-01 RX ORDER — IPRATROPIUM BROMIDE AND ALBUTEROL SULFATE 2.5; .5 MG/3ML; MG/3ML
3 SOLUTION RESPIRATORY (INHALATION)
Status: DISCONTINUED | OUTPATIENT
Start: 2022-01-01 | End: 2022-01-01

## 2022-01-01 RX ORDER — FUROSEMIDE 40 MG
40 TABLET ORAL DAILY
Status: DISCONTINUED | OUTPATIENT
Start: 2022-01-01 | End: 2022-01-01

## 2022-01-01 RX ORDER — PREDNISONE 5 MG/1
10 TABLET ORAL DAILY
Status: DISCONTINUED | OUTPATIENT
Start: 2022-01-01 | End: 2022-01-01 | Stop reason: HOSPADM

## 2022-01-01 RX ORDER — DILTIAZEM HYDROCHLORIDE 120 MG/1
120 CAPSULE, EXTENDED RELEASE ORAL EVERY MORNING
Qty: 90 CAPSULE | Refills: 1 | Status: ON HOLD | OUTPATIENT
Start: 2022-01-01 | End: 2022-01-01

## 2022-01-01 RX ORDER — ACETYLCYSTEINE 200 MG/ML
4 SOLUTION ORAL; RESPIRATORY (INHALATION) 4 TIMES DAILY
Status: DISCONTINUED | OUTPATIENT
Start: 2022-01-01 | End: 2022-01-01 | Stop reason: HOSPADM

## 2022-01-01 RX ORDER — PROCHLORPERAZINE MALEATE 5 MG
5 TABLET ORAL EVERY 6 HOURS PRN
Status: DISCONTINUED | OUTPATIENT
Start: 2022-01-01 | End: 2022-01-01

## 2022-01-01 RX ORDER — LORAZEPAM 0.5 MG/1
0.25 TABLET ORAL EVERY 6 HOURS PRN
Status: DISCONTINUED | OUTPATIENT
Start: 2022-01-01 | End: 2022-01-01 | Stop reason: HOSPADM

## 2022-01-01 RX ORDER — MORPHINE SULFATE 2 MG/ML
2-4 INJECTION, SOLUTION INTRAMUSCULAR; INTRAVENOUS EVERY 4 HOURS PRN
Status: DISCONTINUED | OUTPATIENT
Start: 2022-01-01 | End: 2022-01-01

## 2022-01-01 RX ORDER — DOXYCYCLINE 100 MG/1
100 CAPSULE ORAL 2 TIMES DAILY
Qty: 14 CAPSULE | Refills: 1 | Status: SHIPPED | OUTPATIENT
Start: 2022-01-01 | End: 2022-01-01

## 2022-01-01 RX ORDER — GABAPENTIN 300 MG/1
600 CAPSULE ORAL AT BEDTIME
Status: DISCONTINUED | OUTPATIENT
Start: 2022-01-01 | End: 2022-01-01 | Stop reason: HOSPADM

## 2022-01-01 RX ORDER — UREA 10 %
500 LOTION (ML) TOPICAL EVERY MORNING
Status: DISCONTINUED | OUTPATIENT
Start: 2022-01-01 | End: 2022-01-01 | Stop reason: HOSPADM

## 2022-01-01 RX ORDER — ACYCLOVIR 50 MG/G
CREAM TOPICAL
Status: DISCONTINUED | OUTPATIENT
Start: 2022-01-01 | End: 2022-01-01

## 2022-01-01 RX ORDER — BUDESONIDE, GLYCOPYRROLATE, AND FORMOTEROL FUMARATE 160; 9; 4.8 UG/1; UG/1; UG/1
2 AEROSOL, METERED RESPIRATORY (INHALATION) 2 TIMES DAILY
Qty: 17.7 G | Refills: 3 | Status: SHIPPED | OUTPATIENT
Start: 2022-01-01 | End: 2022-01-01

## 2022-01-01 RX ORDER — TOBRAMYCIN INHALATION SOLUTION 300 MG/5ML
INHALANT RESPIRATORY (INHALATION)
Qty: 300 ML | Refills: 5 | Status: SHIPPED | OUTPATIENT
Start: 2022-01-01

## 2022-01-01 RX ORDER — CAPSAICIN 0.025 %
CREAM (GRAM) TOPICAL 3 TIMES DAILY PRN
Status: DISCONTINUED | OUTPATIENT
Start: 2022-01-01 | End: 2022-01-01

## 2022-01-01 RX ORDER — BETAMETHASONE DIPROPIONATE 0.5 MG/G
LOTION TOPICAL 2 TIMES DAILY
Status: DISCONTINUED | OUTPATIENT
Start: 2022-01-01 | End: 2022-01-01

## 2022-01-01 RX ORDER — MAGNESIUM OXIDE 400 MG/1
400 TABLET ORAL 2 TIMES DAILY
Status: COMPLETED | OUTPATIENT
Start: 2022-01-01 | End: 2022-01-01

## 2022-01-01 RX ORDER — FUROSEMIDE 40 MG
40 TABLET ORAL
Status: DISCONTINUED | OUTPATIENT
Start: 2022-01-01 | End: 2022-01-01 | Stop reason: HOSPADM

## 2022-01-01 RX ORDER — ATROPINE SULFATE 10 MG/ML
2 SOLUTION/ DROPS OPHTHALMIC EVERY 4 HOURS PRN
Status: DISCONTINUED | OUTPATIENT
Start: 2022-01-01 | End: 2022-01-01 | Stop reason: HOSPADM

## 2022-01-01 RX ORDER — POLYETHYLENE GLYCOL 3350 17 G/17G
17 POWDER, FOR SOLUTION ORAL DAILY
Status: DISCONTINUED | OUTPATIENT
Start: 2022-01-01 | End: 2022-01-01 | Stop reason: HOSPADM

## 2022-01-01 RX ORDER — ONDANSETRON 2 MG/ML
4 INJECTION INTRAMUSCULAR; INTRAVENOUS EVERY 6 HOURS PRN
Status: CANCELLED | OUTPATIENT
Start: 2022-01-01

## 2022-01-01 RX ORDER — ACETYLCYSTEINE 200 MG/ML
4 SOLUTION ORAL; RESPIRATORY (INHALATION) 4 TIMES DAILY
Status: DISCONTINUED | OUTPATIENT
Start: 2022-01-01 | End: 2022-01-01

## 2022-01-01 RX ORDER — NICOTINE POLACRILEX 4 MG
15-30 LOZENGE BUCCAL
Status: DISCONTINUED | OUTPATIENT
Start: 2022-01-01 | End: 2022-01-01

## 2022-01-01 RX ORDER — FUROSEMIDE 20 MG
20 TABLET ORAL DAILY
Status: CANCELLED | OUTPATIENT
Start: 2022-01-01

## 2022-01-01 RX ORDER — PREDNISONE 10 MG/1
10 TABLET ORAL DAILY
Status: DISCONTINUED | OUTPATIENT
Start: 2022-01-01 | End: 2022-01-01 | Stop reason: HOSPADM

## 2022-01-01 RX ORDER — NALOXONE HYDROCHLORIDE 0.4 MG/ML
0.1 INJECTION, SOLUTION INTRAMUSCULAR; INTRAVENOUS; SUBCUTANEOUS
Status: DISCONTINUED | OUTPATIENT
Start: 2022-01-01 | End: 2022-01-01 | Stop reason: HOSPADM

## 2022-01-01 RX ORDER — IODINE/SODIUM IODIDE 2 %
TINCTURE TOPICAL
Status: DISCONTINUED | OUTPATIENT
Start: 2022-01-01 | End: 2022-01-01 | Stop reason: HOSPADM

## 2022-01-01 RX ORDER — GABAPENTIN 300 MG/1
600 CAPSULE ORAL EVERY EVENING
Status: DISCONTINUED | OUTPATIENT
Start: 2022-01-01 | End: 2022-01-01 | Stop reason: HOSPADM

## 2022-01-01 RX ORDER — ARIPIPRAZOLE 5 MG/1
5 TABLET ORAL AT BEDTIME
Status: DISCONTINUED | OUTPATIENT
Start: 2022-01-01 | End: 2022-01-01 | Stop reason: HOSPADM

## 2022-01-01 RX ORDER — IPRATROPIUM BROMIDE AND ALBUTEROL SULFATE 2.5; .5 MG/3ML; MG/3ML
3 SOLUTION RESPIRATORY (INHALATION) 4 TIMES DAILY
Qty: 360 ML | Refills: 11 | Status: SHIPPED | OUTPATIENT
Start: 2022-01-01 | End: 2022-01-01

## 2022-01-01 RX ORDER — LORAZEPAM 0.5 MG/1
0.25 TABLET ORAL ONCE
Status: COMPLETED | OUTPATIENT
Start: 2022-01-01 | End: 2022-01-01

## 2022-01-01 RX ORDER — NICOTINE POLACRILEX 4 MG
15-30 LOZENGE BUCCAL
Status: CANCELLED | OUTPATIENT
Start: 2022-01-01

## 2022-01-01 RX ORDER — BISACODYL 10 MG
10 SUPPOSITORY, RECTAL RECTAL ONCE
Status: COMPLETED | OUTPATIENT
Start: 2022-01-01 | End: 2022-01-01

## 2022-01-01 RX ORDER — ARIPIPRAZOLE 5 MG/1
5 TABLET ORAL AT BEDTIME
Status: CANCELLED | OUTPATIENT
Start: 2022-01-01

## 2022-01-01 RX ORDER — ACETAMINOPHEN 650 MG/1
650 SUPPOSITORY RECTAL EVERY 6 HOURS PRN
Status: DISCONTINUED | OUTPATIENT
Start: 2022-01-01 | End: 2022-01-01

## 2022-01-01 RX ORDER — ACETYLCYSTEINE 200 MG/ML
4 SOLUTION ORAL; RESPIRATORY (INHALATION)
Status: DISCONTINUED | OUTPATIENT
Start: 2022-01-01 | End: 2022-01-01 | Stop reason: HOSPADM

## 2022-01-01 RX ORDER — MORPHINE SULFATE 2 MG/ML
1-4 INJECTION, SOLUTION INTRAMUSCULAR; INTRAVENOUS
Status: DISCONTINUED | OUTPATIENT
Start: 2022-01-01 | End: 2022-01-01 | Stop reason: HOSPADM

## 2022-01-01 RX ORDER — TOBRAMYCIN INHALATION SOLUTION 300 MG/5ML
300 INHALANT RESPIRATORY (INHALATION)
Status: DISCONTINUED | OUTPATIENT
Start: 2022-01-01 | End: 2022-01-01 | Stop reason: HOSPADM

## 2022-01-01 RX ORDER — OLANZAPINE 2.5 MG/1
2.5 TABLET, FILM COATED ORAL EVERY 4 HOURS PRN
Status: DISCONTINUED | OUTPATIENT
Start: 2022-01-01 | End: 2022-01-01

## 2022-01-01 RX ORDER — LORAZEPAM 0.5 MG/1
0.5 TABLET ORAL EVERY 4 HOURS PRN
Status: DISCONTINUED | OUTPATIENT
Start: 2022-01-01 | End: 2022-01-01

## 2022-01-01 RX ORDER — AMOXICILLIN 250 MG
1 CAPSULE ORAL 2 TIMES DAILY PRN
Status: CANCELLED | OUTPATIENT
Start: 2022-01-01

## 2022-01-01 RX ORDER — ACETAMINOPHEN 325 MG/1
650-975 TABLET ORAL EVERY 6 HOURS PRN
Status: DISCONTINUED | OUTPATIENT
Start: 2022-01-01 | End: 2022-01-01 | Stop reason: HOSPADM

## 2022-01-01 RX ORDER — TERBINAFINE HYDROCHLORIDE 250 MG/1
250 TABLET ORAL DAILY
Status: DISCONTINUED | OUTPATIENT
Start: 2022-01-01 | End: 2022-01-01 | Stop reason: HOSPADM

## 2022-01-01 RX ORDER — METHYLPREDNISOLONE SODIUM SUCCINATE 125 MG/2ML
60 INJECTION, POWDER, LYOPHILIZED, FOR SOLUTION INTRAMUSCULAR; INTRAVENOUS EVERY 6 HOURS
Status: DISCONTINUED | OUTPATIENT
Start: 2022-01-01 | End: 2022-01-01

## 2022-01-01 RX ORDER — FUROSEMIDE 20 MG
40 TABLET ORAL DAILY
Status: ON HOLD | COMMUNITY
End: 2022-01-01

## 2022-01-01 RX ORDER — CEFTRIAXONE 1 G/1
1 INJECTION, POWDER, FOR SOLUTION INTRAMUSCULAR; INTRAVENOUS EVERY 24 HOURS
Status: DISCONTINUED | OUTPATIENT
Start: 2022-01-01 | End: 2022-01-01

## 2022-01-01 RX ORDER — BETAMETHASONE DIPROPIONATE 0.5 MG/G
OINTMENT, AUGMENTED TOPICAL 2 TIMES DAILY
Status: DISCONTINUED | OUTPATIENT
Start: 2022-01-01 | End: 2022-01-01 | Stop reason: HOSPADM

## 2022-01-01 RX ORDER — VALACYCLOVIR HYDROCHLORIDE 500 MG/1
500 TABLET, FILM COATED ORAL 3 TIMES DAILY
Status: COMPLETED | OUTPATIENT
Start: 2022-01-01 | End: 2022-01-01

## 2022-01-01 RX ORDER — MAGNESIUM SULFATE 4 G/50ML
4 INJECTION INTRAVENOUS ONCE
Status: COMPLETED | OUTPATIENT
Start: 2022-01-01 | End: 2022-01-01

## 2022-01-01 RX ORDER — PROCHLORPERAZINE MALEATE 5 MG
5 TABLET ORAL EVERY 6 HOURS PRN
Status: DISCONTINUED | OUTPATIENT
Start: 2022-01-01 | End: 2022-01-01 | Stop reason: HOSPADM

## 2022-01-01 RX ORDER — DOXYCYCLINE HYCLATE 100 MG/1
100 TABLET, DELAYED RELEASE ORAL 2 TIMES DAILY
Status: CANCELLED | OUTPATIENT
Start: 2022-01-01

## 2022-01-01 RX ORDER — SODIUM CHLORIDE FOR INHALATION 3 %
3 VIAL, NEBULIZER (ML) INHALATION 4 TIMES DAILY
COMMUNITY
Start: 2022-01-01 | End: 2022-01-01

## 2022-01-01 RX ORDER — ACETYLCYSTEINE 200 MG/ML
4 SOLUTION ORAL; RESPIRATORY (INHALATION) 4 TIMES DAILY
Qty: 480 ML | Refills: 11 | Status: SHIPPED | OUTPATIENT
Start: 2022-01-01

## 2022-01-01 RX ORDER — ONDANSETRON 4 MG/1
4 TABLET, ORALLY DISINTEGRATING ORAL EVERY 6 HOURS PRN
Status: DISCONTINUED | OUTPATIENT
Start: 2022-01-01 | End: 2022-01-01 | Stop reason: HOSPADM

## 2022-01-01 RX ORDER — GUAIFENESIN 600 MG/1
600 TABLET, EXTENDED RELEASE ORAL 2 TIMES DAILY
Status: DISCONTINUED | OUTPATIENT
Start: 2022-01-01 | End: 2022-01-01 | Stop reason: HOSPADM

## 2022-01-01 RX ORDER — PANTOPRAZOLE SODIUM 40 MG/1
40 TABLET, DELAYED RELEASE ORAL
Status: DISCONTINUED | OUTPATIENT
Start: 2022-01-01 | End: 2022-01-01 | Stop reason: HOSPADM

## 2022-01-01 RX ORDER — AMOXICILLIN 250 MG
2 CAPSULE ORAL 2 TIMES DAILY
Status: DISCONTINUED | OUTPATIENT
Start: 2022-01-01 | End: 2022-01-01 | Stop reason: HOSPADM

## 2022-01-01 RX ORDER — METHYLPREDNISOLONE SODIUM SUCCINATE 125 MG/2ML
125 INJECTION, POWDER, LYOPHILIZED, FOR SOLUTION INTRAMUSCULAR; INTRAVENOUS ONCE
Status: COMPLETED | OUTPATIENT
Start: 2022-01-01 | End: 2022-01-01

## 2022-01-01 RX ORDER — FUROSEMIDE 20 MG
40 TABLET ORAL EVERY OTHER DAY
Status: DISCONTINUED | OUTPATIENT
Start: 2022-01-01 | End: 2022-01-01 | Stop reason: HOSPADM

## 2022-01-01 RX ORDER — PREDNISONE 20 MG/1
40 TABLET ORAL DAILY
Status: COMPLETED | OUTPATIENT
Start: 2022-01-01 | End: 2022-01-01

## 2022-01-01 RX ORDER — AZITHROMYCIN 500 MG/5ML
500 INJECTION, POWDER, LYOPHILIZED, FOR SOLUTION INTRAVENOUS EVERY 24 HOURS
Status: DISCONTINUED | OUTPATIENT
Start: 2022-01-01 | End: 2022-01-01 | Stop reason: HOSPADM

## 2022-01-01 RX ORDER — LANOLIN ALCOHOL/MO/W.PET/CERES
3 CREAM (GRAM) TOPICAL AT BEDTIME
Status: DISCONTINUED | OUTPATIENT
Start: 2022-01-01 | End: 2022-01-01 | Stop reason: HOSPADM

## 2022-01-01 RX ORDER — NALOXONE HYDROCHLORIDE 0.4 MG/ML
0.4 INJECTION, SOLUTION INTRAMUSCULAR; INTRAVENOUS; SUBCUTANEOUS
Status: DISCONTINUED | OUTPATIENT
Start: 2022-01-01 | End: 2022-01-01 | Stop reason: HOSPADM

## 2022-01-01 RX ORDER — LIDOCAINE 40 MG/G
CREAM TOPICAL
Status: CANCELLED | OUTPATIENT
Start: 2022-01-01

## 2022-01-01 RX ORDER — SPIRONOLACTONE 25 MG
12.5 TABLET ORAL DAILY
Status: DISCONTINUED | OUTPATIENT
Start: 2022-01-01 | End: 2022-01-01

## 2022-01-01 RX ORDER — METHYLPREDNISOLONE SODIUM SUCCINATE 125 MG/2ML
40 INJECTION, POWDER, LYOPHILIZED, FOR SOLUTION INTRAMUSCULAR; INTRAVENOUS EVERY 6 HOURS
Status: DISCONTINUED | OUTPATIENT
Start: 2022-01-01 | End: 2022-01-01

## 2022-01-01 RX ORDER — ATORVASTATIN CALCIUM 10 MG/1
20 TABLET, FILM COATED ORAL AT BEDTIME
Status: DISCONTINUED | OUTPATIENT
Start: 2022-01-01 | End: 2022-01-01

## 2022-01-01 RX ORDER — DILTIAZEM HYDROCHLORIDE 5 MG/ML
10 INJECTION INTRAVENOUS
Status: CANCELLED | OUTPATIENT
Start: 2022-01-01

## 2022-01-01 RX ORDER — GABAPENTIN 300 MG/1
600 CAPSULE ORAL DAILY
Status: DISCONTINUED | OUTPATIENT
Start: 2022-01-01 | End: 2022-01-01 | Stop reason: HOSPADM

## 2022-01-01 RX ORDER — ACYCLOVIR 50 MG/G
OINTMENT TOPICAL
Status: DISCONTINUED | OUTPATIENT
Start: 2022-01-01 | End: 2022-01-01

## 2022-01-01 RX ORDER — POLYETHYLENE GLYCOL 3350 17 G/17G
17 POWDER, FOR SOLUTION ORAL DAILY PRN
Status: DISCONTINUED | OUTPATIENT
Start: 2022-01-01 | End: 2022-01-01 | Stop reason: HOSPADM

## 2022-01-01 RX ORDER — METOPROLOL TARTRATE 1 MG/ML
5-20 INJECTION, SOLUTION INTRAVENOUS
Status: DISCONTINUED | OUTPATIENT
Start: 2022-01-01 | End: 2022-01-01 | Stop reason: HOSPADM

## 2022-01-01 RX ORDER — SENNOSIDES 8.6 MG
8.6 TABLET ORAL DAILY PRN
Status: DISCONTINUED | OUTPATIENT
Start: 2022-01-01 | End: 2022-01-01 | Stop reason: HOSPADM

## 2022-01-01 RX ORDER — DILTIAZEM HYDROCHLORIDE 120 MG/1
120 CAPSULE, EXTENDED RELEASE ORAL EVERY MORNING
Status: CANCELLED | OUTPATIENT
Start: 2022-01-01

## 2022-01-01 RX ORDER — SODIUM CHLORIDE FOR INHALATION 3 %
3 VIAL, NEBULIZER (ML) INHALATION ONCE
Status: COMPLETED | OUTPATIENT
Start: 2022-01-01 | End: 2022-01-01

## 2022-01-01 RX ORDER — DEXTROSE MONOHYDRATE 25 G/50ML
25-50 INJECTION, SOLUTION INTRAVENOUS
Status: CANCELLED | OUTPATIENT
Start: 2022-01-01

## 2022-01-01 RX ORDER — LORAZEPAM 0.5 MG/1
0.25 TABLET ORAL EVERY 4 HOURS PRN
Status: DISCONTINUED | OUTPATIENT
Start: 2022-01-01 | End: 2022-01-01 | Stop reason: HOSPADM

## 2022-01-01 RX ORDER — GUAIFENESIN 600 MG/1
1200 TABLET, EXTENDED RELEASE ORAL 2 TIMES DAILY
Status: CANCELLED | OUTPATIENT
Start: 2022-01-01

## 2022-01-01 RX ORDER — SODIUM CHLORIDE FOR INHALATION 3 %
3 VIAL, NEBULIZER (ML) INHALATION
Status: CANCELLED | OUTPATIENT
Start: 2022-01-01

## 2022-01-01 RX ORDER — VITAMIN B COMPLEX
25 TABLET ORAL EVERY MORNING
Status: DISCONTINUED | OUTPATIENT
Start: 2022-01-01 | End: 2022-01-01 | Stop reason: HOSPADM

## 2022-01-01 RX ORDER — DOXYCYCLINE 100 MG/1
100 CAPSULE ORAL 2 TIMES DAILY
Qty: 10 CAPSULE | Refills: 0 | Status: ON HOLD | OUTPATIENT
Start: 2022-01-01 | End: 2022-01-01

## 2022-01-01 RX ORDER — SODIUM CHLORIDE FOR INHALATION 3 %
4 VIAL, NEBULIZER (ML) INHALATION 2 TIMES DAILY
Status: DISCONTINUED | OUTPATIENT
Start: 2022-01-01 | End: 2022-01-01 | Stop reason: HOSPADM

## 2022-01-01 RX ORDER — LIDOCAINE 40 MG/G
CREAM TOPICAL
Status: DISCONTINUED | OUTPATIENT
Start: 2022-01-01 | End: 2022-01-01

## 2022-01-01 RX ORDER — FUROSEMIDE 20 MG
40 TABLET ORAL EVERY OTHER DAY
Status: ON HOLD | COMMUNITY
Start: 2022-01-01 | End: 2022-01-01

## 2022-01-01 RX ORDER — CIPROFLOXACIN 750 MG/1
750 TABLET, FILM COATED ORAL 2 TIMES DAILY
Qty: 14 TABLET | Refills: 0 | Status: SHIPPED | OUTPATIENT
Start: 2022-01-01 | End: 2022-01-01 | Stop reason: ALTCHOICE

## 2022-01-01 RX ORDER — SODIUM CHLORIDE, SODIUM LACTATE, POTASSIUM CHLORIDE, CALCIUM CHLORIDE 600; 310; 30; 20 MG/100ML; MG/100ML; MG/100ML; MG/100ML
INJECTION, SOLUTION INTRAVENOUS CONTINUOUS
Status: DISCONTINUED | OUTPATIENT
Start: 2022-01-01 | End: 2022-01-01

## 2022-01-01 RX ORDER — CIPROFLOXACIN 500 MG/1
750 TABLET, FILM COATED ORAL 2 TIMES DAILY
Status: ON HOLD | COMMUNITY
Start: 2022-01-01 | End: 2022-01-01

## 2022-01-01 RX ORDER — ACETAMINOPHEN 325 MG/1
975 TABLET ORAL EVERY 8 HOURS PRN
Status: DISCONTINUED | OUTPATIENT
Start: 2022-01-01 | End: 2022-01-01 | Stop reason: HOSPADM

## 2022-01-01 RX ORDER — FUROSEMIDE 10 MG/ML
20 INJECTION INTRAMUSCULAR; INTRAVENOUS EVERY 12 HOURS
Status: DISCONTINUED | OUTPATIENT
Start: 2022-01-01 | End: 2022-01-01

## 2022-01-01 RX ORDER — DOXYCYCLINE 100 MG/10ML
100 INJECTION, POWDER, LYOPHILIZED, FOR SOLUTION INTRAVENOUS EVERY 12 HOURS
Status: DISCONTINUED | OUTPATIENT
Start: 2022-01-01 | End: 2022-01-01

## 2022-01-01 RX ORDER — SIMETHICONE 80 MG
80 TABLET,CHEWABLE ORAL EVERY 6 HOURS PRN
Status: DISCONTINUED | OUTPATIENT
Start: 2022-01-01 | End: 2022-01-01 | Stop reason: HOSPADM

## 2022-01-01 RX ORDER — MORPHINE SULFATE 2 MG/ML
2-4 INJECTION, SOLUTION INTRAMUSCULAR; INTRAVENOUS
Status: DISCONTINUED | OUTPATIENT
Start: 2022-01-01 | End: 2022-01-01 | Stop reason: HOSPADM

## 2022-01-01 RX ORDER — PREDNISONE 20 MG/1
20 TABLET ORAL DAILY
Status: COMPLETED | OUTPATIENT
Start: 2022-01-01 | End: 2022-01-01

## 2022-01-01 RX ORDER — DOXYCYCLINE 100 MG/1
100 CAPSULE ORAL 2 TIMES DAILY
Qty: 10 CAPSULE | Refills: 0 | Status: SHIPPED | OUTPATIENT
Start: 2022-01-01 | End: 2022-01-01

## 2022-01-01 RX ORDER — METHYLPREDNISOLONE SODIUM SUCCINATE 40 MG/ML
40 INJECTION, POWDER, LYOPHILIZED, FOR SOLUTION INTRAMUSCULAR; INTRAVENOUS ONCE
Status: COMPLETED | OUTPATIENT
Start: 2022-01-01 | End: 2022-01-01

## 2022-01-01 RX ORDER — AZITHROMYCIN 250 MG/1
500 TABLET, FILM COATED ORAL ONCE
Status: COMPLETED | OUTPATIENT
Start: 2022-01-01 | End: 2022-01-01

## 2022-01-01 RX ORDER — PREDNISONE 5 MG/1
5 TABLET ORAL DAILY
Status: ON HOLD | COMMUNITY
End: 2022-01-01

## 2022-01-01 RX ORDER — CEPHALEXIN 500 MG/1
500 CAPSULE ORAL 3 TIMES DAILY
Qty: 15 CAPSULE | Refills: 0 | Status: SHIPPED | OUTPATIENT
Start: 2022-01-01 | End: 2022-01-01

## 2022-01-01 RX ORDER — ACETYLCYSTEINE 200 MG/ML
4 SOLUTION ORAL; RESPIRATORY (INHALATION)
Status: CANCELLED | OUTPATIENT
Start: 2022-01-01

## 2022-01-01 RX ORDER — BETAMETHASONE DIPROPIONATE 0.05 %
OINTMENT (GRAM) TOPICAL DAILY
COMMUNITY
End: 2022-01-01

## 2022-01-01 RX ORDER — CEPHALEXIN 500 MG/1
500 CAPSULE ORAL 3 TIMES DAILY
Qty: 30 CAPSULE | Refills: 0 | Status: SHIPPED | OUTPATIENT
Start: 2022-01-01 | End: 2022-01-01

## 2022-01-01 RX ORDER — DOXYCYCLINE 100 MG/1
100 CAPSULE ORAL 2 TIMES DAILY
Status: DISCONTINUED | OUTPATIENT
Start: 2022-01-01 | End: 2022-01-01 | Stop reason: HOSPADM

## 2022-01-01 RX ORDER — FUROSEMIDE 10 MG/ML
40 INJECTION INTRAMUSCULAR; INTRAVENOUS EVERY 12 HOURS
Status: DISCONTINUED | OUTPATIENT
Start: 2022-01-01 | End: 2022-01-01 | Stop reason: HOSPADM

## 2022-01-01 RX ORDER — ALBUTEROL SULFATE 0.83 MG/ML
2.5 SOLUTION RESPIRATORY (INHALATION) ONCE
Status: COMPLETED | OUTPATIENT
Start: 2022-01-01 | End: 2022-01-01

## 2022-01-01 RX ORDER — ACETAZOLAMIDE 500 MG/5ML
500 INJECTION, POWDER, LYOPHILIZED, FOR SOLUTION INTRAVENOUS ONCE
Status: COMPLETED | OUTPATIENT
Start: 2022-01-01 | End: 2022-01-01

## 2022-01-01 RX ORDER — POTASSIUM CHLORIDE 750 MG/1
10 TABLET, EXTENDED RELEASE ORAL DAILY
Qty: 90 TABLET | Refills: 3 | Status: SHIPPED | OUTPATIENT
Start: 2022-01-01

## 2022-01-01 RX ORDER — OXYMETAZOLINE HYDROCHLORIDE 0.05 G/100ML
2 SPRAY NASAL 2 TIMES DAILY
Status: COMPLETED | OUTPATIENT
Start: 2022-01-01 | End: 2022-01-01

## 2022-01-01 RX ORDER — SODIUM CHLORIDE FOR INHALATION 3 %
3 VIAL, NEBULIZER (ML) INHALATION 2 TIMES DAILY
Status: DISCONTINUED | OUTPATIENT
Start: 2022-01-01 | End: 2022-01-01 | Stop reason: HOSPADM

## 2022-01-01 RX ORDER — POLYETHYLENE GLYCOL 3350 17 G/17G
17 POWDER, FOR SOLUTION ORAL DAILY
Qty: 510 G | COMMUNITY
Start: 2022-01-01

## 2022-01-01 RX ORDER — PREDNISONE 5 MG/1
5 TABLET ORAL DAILY
Status: COMPLETED | OUTPATIENT
Start: 2022-01-01 | End: 2022-01-01

## 2022-01-01 RX ORDER — MIRTAZAPINE 15 MG/1
15 TABLET, FILM COATED ORAL AT BEDTIME
COMMUNITY
Start: 2022-01-01 | End: 2022-01-01

## 2022-01-01 RX ORDER — PREDNISONE 5 MG/1
5 TABLET ORAL DAILY
Status: DISCONTINUED | OUTPATIENT
Start: 2022-01-01 | End: 2022-01-01 | Stop reason: HOSPADM

## 2022-01-01 RX ORDER — DILTIAZEM HYDROCHLORIDE 120 MG/1
120 CAPSULE, EXTENDED RELEASE ORAL EVERY MORNING
Status: DISCONTINUED | OUTPATIENT
Start: 2022-01-01 | End: 2022-01-01 | Stop reason: HOSPADM

## 2022-01-01 RX ORDER — AMOXICILLIN 250 MG
2 CAPSULE ORAL 2 TIMES DAILY PRN
Status: CANCELLED | OUTPATIENT
Start: 2022-01-01

## 2022-01-01 RX ORDER — POLYETHYLENE GLYCOL 3350 17 G/17G
17 POWDER, FOR SOLUTION ORAL DAILY PRN
Status: CANCELLED | OUTPATIENT
Start: 2022-01-01

## 2022-01-01 RX ORDER — ALBUTEROL SULFATE 90 UG/1
2 AEROSOL, METERED RESPIRATORY (INHALATION) EVERY 4 HOURS PRN
Status: DISCONTINUED | OUTPATIENT
Start: 2022-01-01 | End: 2022-01-01 | Stop reason: HOSPADM

## 2022-01-01 RX ORDER — METHYLPREDNISOLONE SODIUM SUCCINATE 40 MG/ML
40 INJECTION, POWDER, LYOPHILIZED, FOR SOLUTION INTRAMUSCULAR; INTRAVENOUS EVERY 12 HOURS
Status: DISCONTINUED | OUTPATIENT
Start: 2022-01-01 | End: 2022-01-01

## 2022-01-01 RX ORDER — MAGNESIUM OXIDE 400 MG/1
400 TABLET ORAL ONCE
Status: COMPLETED | OUTPATIENT
Start: 2022-01-01 | End: 2022-01-01

## 2022-01-01 RX ORDER — QUETIAPINE FUMARATE 25 MG/1
12.5 TABLET, FILM COATED ORAL 2 TIMES DAILY
Refills: 0 | COMMUNITY
Start: 2022-01-01 | End: 2022-01-01

## 2022-01-01 RX ORDER — LIDOCAINE HYDROCHLORIDE 20 MG/ML
JELLY TOPICAL ONCE
Status: COMPLETED | OUTPATIENT
Start: 2022-01-01 | End: 2022-01-01

## 2022-01-01 RX ORDER — FUROSEMIDE 20 MG
80 TABLET ORAL 2 TIMES DAILY
Qty: 120 TABLET | Refills: 0 | Status: SHIPPED | OUTPATIENT
Start: 2022-01-01 | End: 2022-07-13

## 2022-01-01 RX ORDER — DOCUSATE SODIUM 100 MG/1
100 CAPSULE, LIQUID FILLED ORAL DAILY PRN
COMMUNITY
Start: 2022-01-01

## 2022-01-01 RX ORDER — MORPHINE SULFATE 2 MG/ML
0.5 INJECTION, SOLUTION INTRAMUSCULAR; INTRAVENOUS EVERY 4 HOURS PRN
Status: DISCONTINUED | OUTPATIENT
Start: 2022-01-01 | End: 2022-01-01

## 2022-01-01 RX ORDER — PREDNISONE 10 MG/1
10 TABLET ORAL DAILY
Qty: 3 TABLET | Refills: 0 | Status: SHIPPED | OUTPATIENT
Start: 2022-01-01 | End: 2022-01-01

## 2022-01-01 RX ORDER — IOPAMIDOL 755 MG/ML
100 INJECTION, SOLUTION INTRAVASCULAR ONCE
Status: COMPLETED | OUTPATIENT
Start: 2022-01-01 | End: 2022-01-01

## 2022-01-01 RX ORDER — PREDNISONE 20 MG/1
20 TABLET ORAL DAILY
Status: DISCONTINUED | OUTPATIENT
Start: 2022-01-01 | End: 2022-01-01 | Stop reason: HOSPADM

## 2022-01-01 RX ORDER — FENTANYL CITRATE 50 UG/ML
25 INJECTION, SOLUTION INTRAMUSCULAR; INTRAVENOUS ONCE
Status: COMPLETED | OUTPATIENT
Start: 2022-01-01 | End: 2022-01-01

## 2022-01-01 RX ORDER — PANTOPRAZOLE SODIUM 20 MG/1
40 TABLET, DELAYED RELEASE ORAL DAILY
Status: DISCONTINUED | OUTPATIENT
Start: 2022-01-01 | End: 2022-01-01

## 2022-01-01 RX ORDER — DOCUSATE SODIUM 100 MG/1
100 CAPSULE, LIQUID FILLED ORAL DAILY
Status: DISCONTINUED | OUTPATIENT
Start: 2022-01-01 | End: 2022-01-01

## 2022-01-01 RX ORDER — MORPHINE SULFATE 10 MG/5ML
2.5-1 SOLUTION ORAL
Status: DISCONTINUED | OUTPATIENT
Start: 2022-01-01 | End: 2022-01-01 | Stop reason: HOSPADM

## 2022-01-01 RX ORDER — SODIUM CHLORIDE FOR INHALATION 3 %
3 VIAL, NEBULIZER (ML) INHALATION 4 TIMES DAILY
Status: DISCONTINUED | OUTPATIENT
Start: 2022-01-01 | End: 2022-01-01 | Stop reason: HOSPADM

## 2022-01-01 RX ORDER — MORPHINE SULFATE 2 MG/ML
1 INJECTION, SOLUTION INTRAMUSCULAR; INTRAVENOUS ONCE
Status: COMPLETED | OUTPATIENT
Start: 2022-01-01 | End: 2022-01-01

## 2022-01-01 RX ORDER — ERTAPENEM 1 G/1
1 INJECTION, POWDER, LYOPHILIZED, FOR SOLUTION INTRAMUSCULAR; INTRAVENOUS EVERY 24 HOURS
Status: DISCONTINUED | OUTPATIENT
Start: 2022-01-01 | End: 2022-01-01

## 2022-01-01 RX ORDER — PREDNISONE 5 MG/1
5 TABLET ORAL DAILY
Qty: 5 TABLET | Refills: 0 | Status: ON HOLD | OUTPATIENT
Start: 2022-01-01 | End: 2022-01-01

## 2022-01-01 RX ORDER — MORPHINE SULFATE 2 MG/ML
1-2 INJECTION, SOLUTION INTRAMUSCULAR; INTRAVENOUS EVERY 4 HOURS PRN
Status: DISCONTINUED | OUTPATIENT
Start: 2022-01-01 | End: 2022-01-01

## 2022-01-01 RX ORDER — IPRATROPIUM BROMIDE AND ALBUTEROL SULFATE 2.5; .5 MG/3ML; MG/3ML
3 SOLUTION RESPIRATORY (INHALATION) 4 TIMES DAILY
Qty: 360 ML | Refills: 11 | Status: SHIPPED | OUTPATIENT
Start: 2022-01-01

## 2022-01-01 RX ORDER — SPIRONOLACTONE 25 MG
12.5 TABLET ORAL DAILY
Status: CANCELLED | OUTPATIENT
Start: 2022-01-01

## 2022-01-01 RX ORDER — ACETAMINOPHEN 325 MG/1
650 TABLET ORAL EVERY 6 HOURS PRN
Status: DISCONTINUED | OUTPATIENT
Start: 2022-01-01 | End: 2022-01-01 | Stop reason: HOSPADM

## 2022-01-01 RX ORDER — LORAZEPAM 0.5 MG/1
0.25 TABLET ORAL EVERY 6 HOURS PRN
Qty: 10 TABLET | Refills: 0 | Status: SHIPPED | OUTPATIENT
Start: 2022-01-01 | End: 2022-01-01

## 2022-01-01 RX ORDER — AZITHROMYCIN 250 MG/1
250 TABLET, FILM COATED ORAL DAILY
Qty: 2 TABLET | Refills: 0 | Status: SHIPPED | OUTPATIENT
Start: 2022-01-01 | End: 2022-01-01

## 2022-01-01 RX ORDER — POLYETHYLENE GLYCOL 3350 17 G/17G
17 POWDER, FOR SOLUTION ORAL 2 TIMES DAILY
Status: DISCONTINUED | OUTPATIENT
Start: 2022-01-01 | End: 2022-01-01 | Stop reason: HOSPADM

## 2022-01-01 RX ORDER — DOCUSATE SODIUM 100 MG/1
100 CAPSULE, LIQUID FILLED ORAL 2 TIMES DAILY
Status: DISCONTINUED | OUTPATIENT
Start: 2022-01-01 | End: 2022-01-01

## 2022-01-01 RX ORDER — DOXYCYCLINE HYCLATE 100 MG/1
100 TABLET, DELAYED RELEASE ORAL 2 TIMES DAILY
Status: ON HOLD | COMMUNITY
End: 2022-01-01

## 2022-01-01 RX ORDER — ALBUTEROL SULFATE 0.83 MG/ML
SOLUTION RESPIRATORY (INHALATION)
Qty: 600 ML | Refills: 3 | Status: ON HOLD | OUTPATIENT
Start: 2022-01-01 | End: 2022-01-01

## 2022-01-01 RX ORDER — GABAPENTIN 300 MG/1
600 CAPSULE ORAL AT BEDTIME
COMMUNITY

## 2022-01-01 RX ORDER — ACETAMINOPHEN 325 MG/1
650 TABLET ORAL EVERY 6 HOURS PRN
Status: CANCELLED | OUTPATIENT
Start: 2022-01-01

## 2022-01-01 RX ORDER — METHYLPREDNISOLONE SODIUM SUCCINATE 40 MG/ML
20 INJECTION, POWDER, LYOPHILIZED, FOR SOLUTION INTRAMUSCULAR; INTRAVENOUS EVERY 24 HOURS
Status: DISCONTINUED | OUTPATIENT
Start: 2022-01-01 | End: 2022-01-01

## 2022-01-01 RX ORDER — POTASSIUM CHLORIDE 1500 MG/1
40 TABLET, EXTENDED RELEASE ORAL ONCE
Status: COMPLETED | OUTPATIENT
Start: 2022-01-01 | End: 2022-01-01

## 2022-01-01 RX ORDER — PREDNISONE 20 MG/1
40 TABLET ORAL DAILY
Status: DISCONTINUED | OUTPATIENT
Start: 2022-01-01 | End: 2022-01-01

## 2022-01-01 RX ORDER — SODIUM CHLORIDE FOR INHALATION 3 %
4 VIAL, NEBULIZER (ML) INHALATION 2 TIMES DAILY
Qty: 240 ML | Refills: 11 | Status: SHIPPED | OUTPATIENT
Start: 2022-01-01

## 2022-01-01 RX ORDER — GABAPENTIN 300 MG/1
600 CAPSULE ORAL EVERY EVENING
Status: CANCELLED | OUTPATIENT
Start: 2022-01-01

## 2022-01-01 RX ORDER — PREDNISONE 20 MG/1
60 TABLET ORAL ONCE
Status: COMPLETED | OUTPATIENT
Start: 2022-01-01 | End: 2022-01-01

## 2022-01-01 RX ORDER — ACETYLCYSTEINE 100 MG/ML
4 SOLUTION ORAL; RESPIRATORY (INHALATION) EVERY 4 HOURS
Status: DISCONTINUED | OUTPATIENT
Start: 2022-01-01 | End: 2022-01-01 | Stop reason: CLARIF

## 2022-01-01 RX ORDER — PREDNISONE 10 MG/1
10 TABLET ORAL DAILY
Status: ON HOLD | COMMUNITY
End: 2022-01-01

## 2022-01-01 RX ORDER — ACETAMINOPHEN 325 MG/1
650 TABLET ORAL EVERY 6 HOURS
Status: DISCONTINUED | OUTPATIENT
Start: 2022-01-01 | End: 2022-01-01 | Stop reason: HOSPADM

## 2022-01-01 RX ORDER — PREDNISONE 10 MG/1
10 TABLET ORAL DAILY
Status: COMPLETED | OUTPATIENT
Start: 2022-01-01 | End: 2022-01-01

## 2022-01-01 RX ORDER — CLOTRIMAZOLE 1 %
CREAM (GRAM) TOPICAL 2 TIMES DAILY
Status: DISCONTINUED | OUTPATIENT
Start: 2022-01-01 | End: 2022-01-01 | Stop reason: HOSPADM

## 2022-01-01 RX ORDER — ALBUTEROL SULFATE 0.83 MG/ML
2.5 SOLUTION RESPIRATORY (INHALATION) 3 TIMES DAILY
Status: DISCONTINUED | OUTPATIENT
Start: 2022-01-01 | End: 2022-01-01

## 2022-01-01 RX ORDER — MIRTAZAPINE 7.5 MG/1
TABLET, FILM COATED ORAL
Qty: 7 TABLET | Refills: 0 | COMMUNITY
Start: 2022-01-01 | End: 2022-01-01

## 2022-01-01 RX ORDER — IPRATROPIUM BROMIDE AND ALBUTEROL SULFATE 2.5; .5 MG/3ML; MG/3ML
3 SOLUTION RESPIRATORY (INHALATION) 4 TIMES DAILY
Qty: 90 ML | Refills: 0 | Status: SHIPPED | OUTPATIENT
Start: 2022-01-01 | End: 2022-01-01

## 2022-01-01 RX ORDER — CALCIUM CARBONATE 500 MG/1
500 TABLET, CHEWABLE ORAL DAILY PRN
Status: DISCONTINUED | OUTPATIENT
Start: 2022-01-01 | End: 2022-01-01 | Stop reason: HOSPADM

## 2022-01-01 RX ORDER — FUROSEMIDE 10 MG/ML
80 INJECTION INTRAMUSCULAR; INTRAVENOUS EVERY 12 HOURS
Status: DISCONTINUED | OUTPATIENT
Start: 2022-01-01 | End: 2022-01-01

## 2022-01-01 RX ORDER — DEXTROSE MONOHYDRATE 25 G/50ML
25-50 INJECTION, SOLUTION INTRAVENOUS
Status: DISCONTINUED | OUTPATIENT
Start: 2022-01-01 | End: 2022-01-01

## 2022-01-01 RX ORDER — MEROPENEM 1 G/1
1 INJECTION, POWDER, FOR SOLUTION INTRAVENOUS EVERY 8 HOURS
Status: DISCONTINUED | OUTPATIENT
Start: 2022-01-01 | End: 2022-01-01

## 2022-01-01 RX ORDER — ACETAMINOPHEN 500 MG
500-1000 TABLET ORAL EVERY 6 HOURS PRN
Status: DISCONTINUED | OUTPATIENT
Start: 2022-01-01 | End: 2022-01-01 | Stop reason: HOSPADM

## 2022-01-01 RX ORDER — NITROGLYCERIN 0.4 MG/1
0.4 TABLET SUBLINGUAL ONCE
Status: COMPLETED | OUTPATIENT
Start: 2022-01-01 | End: 2022-01-01

## 2022-01-01 RX ORDER — MIRTAZAPINE 7.5 MG/1
7.5 TABLET, FILM COATED ORAL AT BEDTIME
COMMUNITY
End: 2022-01-01

## 2022-01-01 RX ORDER — ALBUTEROL SULFATE 90 UG/1
2 AEROSOL, METERED RESPIRATORY (INHALATION) EVERY 4 HOURS PRN
COMMUNITY

## 2022-01-01 RX ORDER — ACETYLCYSTEINE 200 MG/ML
4 SOLUTION ORAL; RESPIRATORY (INHALATION) 4 TIMES DAILY
Qty: 30 ML | Refills: 0 | Status: SHIPPED | OUTPATIENT
Start: 2022-01-01 | End: 2022-01-01

## 2022-01-01 RX ORDER — IPRATROPIUM BROMIDE AND ALBUTEROL SULFATE 2.5; .5 MG/3ML; MG/3ML
3 SOLUTION RESPIRATORY (INHALATION) 4 TIMES DAILY
Status: DISCONTINUED | OUTPATIENT
Start: 2022-01-01 | End: 2022-01-01 | Stop reason: HOSPADM

## 2022-01-01 RX ORDER — NITROGLYCERIN 0.4 MG/1
0.4 TABLET SUBLINGUAL EVERY 5 MIN PRN
Status: DISCONTINUED | OUTPATIENT
Start: 2022-01-01 | End: 2022-01-01 | Stop reason: HOSPADM

## 2022-01-01 RX ORDER — TERBINAFINE HYDROCHLORIDE 250 MG/1
250 TABLET ORAL DAILY
Qty: 14 TABLET | Refills: 0 | Status: SHIPPED | OUTPATIENT
Start: 2022-01-01 | End: 2022-01-01

## 2022-01-01 RX ORDER — SODIUM CHLORIDE FOR INHALATION 3 %
3 VIAL, NEBULIZER (ML) INHALATION
Status: ON HOLD | COMMUNITY
End: 2022-01-01

## 2022-01-01 RX ORDER — POTASSIUM CHLORIDE 1500 MG/1
20 TABLET, EXTENDED RELEASE ORAL ONCE
Status: COMPLETED | OUTPATIENT
Start: 2022-01-01 | End: 2022-01-01

## 2022-01-01 RX ORDER — DILTIAZEM HYDROCHLORIDE 120 MG/1
120 CAPSULE, EXTENDED RELEASE ORAL EVERY MORNING
Status: DISCONTINUED | OUTPATIENT
Start: 2022-01-01 | End: 2022-01-01

## 2022-01-01 RX ORDER — FUROSEMIDE 40 MG
40 TABLET ORAL DAILY
COMMUNITY
Start: 2022-01-01 | End: 2022-01-01

## 2022-01-01 RX ORDER — ATORVASTATIN CALCIUM 10 MG/1
20 TABLET, FILM COATED ORAL AT BEDTIME
Status: CANCELLED | OUTPATIENT
Start: 2022-01-01

## 2022-01-01 RX ORDER — BISACODYL 10 MG
10 SUPPOSITORY, RECTAL RECTAL DAILY PRN
Status: DISCONTINUED | OUTPATIENT
Start: 2022-01-01 | End: 2022-01-01 | Stop reason: HOSPADM

## 2022-01-01 RX ORDER — PREDNISONE 20 MG/1
40 TABLET ORAL ONCE
Status: DISCONTINUED | OUTPATIENT
Start: 2022-01-01 | End: 2022-01-01

## 2022-01-01 RX ORDER — DILTIAZEM HYDROCHLORIDE 240 MG/1
240 CAPSULE, EXTENDED RELEASE ORAL DAILY
Status: DISCONTINUED | OUTPATIENT
Start: 2022-01-01 | End: 2022-01-01 | Stop reason: HOSPADM

## 2022-01-01 RX ORDER — ALBUTEROL SULFATE 90 UG/1
2 AEROSOL, METERED RESPIRATORY (INHALATION) EVERY 4 HOURS PRN
Qty: 18 G | Refills: 11 | Status: SHIPPED | OUTPATIENT
Start: 2022-01-01 | End: 2022-01-01

## 2022-01-01 RX ORDER — ALBUTEROL SULFATE 5 MG/ML
5 SOLUTION RESPIRATORY (INHALATION) ONCE
Status: COMPLETED | OUTPATIENT
Start: 2022-01-01 | End: 2022-01-01

## 2022-01-01 RX ORDER — VALACYCLOVIR HYDROCHLORIDE 1 G/1
1000 TABLET, FILM COATED ORAL 3 TIMES DAILY
Status: DISCONTINUED | OUTPATIENT
Start: 2022-01-01 | End: 2022-01-01

## 2022-01-01 RX ORDER — PREDNISONE 20 MG/1
20 TABLET ORAL DAILY
Qty: 2 TABLET | Refills: 0 | Status: SHIPPED | OUTPATIENT
Start: 2022-01-01 | End: 2022-01-01

## 2022-01-01 RX ORDER — ACETYLCYSTEINE 200 MG/ML
4 SOLUTION ORAL; RESPIRATORY (INHALATION)
Status: DISCONTINUED | OUTPATIENT
Start: 2022-01-01 | End: 2022-01-01

## 2022-01-01 RX ORDER — FUROSEMIDE 40 MG
80 TABLET ORAL 2 TIMES DAILY
Status: DISCONTINUED | OUTPATIENT
Start: 2022-01-01 | End: 2022-01-01

## 2022-01-01 RX ORDER — POTASSIUM CHLORIDE 750 MG/1
10 TABLET, EXTENDED RELEASE ORAL DAILY
Status: DISCONTINUED | OUTPATIENT
Start: 2022-01-01 | End: 2022-01-01 | Stop reason: HOSPADM

## 2022-01-01 RX ORDER — DILTIAZEM HYDROCHLORIDE 240 MG/1
240 CAPSULE, EXTENDED RELEASE ORAL DAILY
COMMUNITY
Start: 2022-01-01 | End: 2022-01-01

## 2022-01-01 RX ORDER — ACETAMINOPHEN 650 MG/1
650 SUPPOSITORY RECTAL EVERY 6 HOURS PRN
Status: DISCONTINUED | OUTPATIENT
Start: 2022-01-01 | End: 2022-01-01 | Stop reason: HOSPADM

## 2022-01-01 RX ADMIN — ACETAMINOPHEN 650 MG: 325 TABLET ORAL at 11:01

## 2022-01-01 RX ADMIN — LIDOCAINE HYDROCHLORIDE 3 ML: 10 INJECTION, SOLUTION EPIDURAL; INFILTRATION; INTRACAUDAL; PERINEURAL at 12:43

## 2022-01-01 RX ADMIN — ERTAPENEM SODIUM 1 G: 1 INJECTION, POWDER, LYOPHILIZED, FOR SOLUTION INTRAMUSCULAR; INTRAVENOUS at 11:57

## 2022-01-01 RX ADMIN — Medication 500 MCG: at 08:15

## 2022-01-01 RX ADMIN — ACETAMINOPHEN 650 MG: 325 TABLET ORAL at 15:51

## 2022-01-01 RX ADMIN — DOXYCYCLINE 100 MG: 100 INJECTION, POWDER, LYOPHILIZED, FOR SOLUTION INTRAVENOUS at 22:13

## 2022-01-01 RX ADMIN — MORPHINE SULFATE 5 MG: 100 SOLUTION ORAL at 09:46

## 2022-01-01 RX ADMIN — RIVAROXABAN 20 MG: 10 TABLET, FILM COATED ORAL at 17:47

## 2022-01-01 RX ADMIN — ARIPIPRAZOLE 5 MG: 5 TABLET ORAL at 21:10

## 2022-01-01 RX ADMIN — MEROPENEM 1 G: 1 INJECTION, POWDER, FOR SOLUTION INTRAVENOUS at 13:26

## 2022-01-01 RX ADMIN — INSULIN ASPART 2 UNITS: 100 INJECTION, SOLUTION INTRAVENOUS; SUBCUTANEOUS at 12:16

## 2022-01-01 RX ADMIN — ACETYLCYSTEINE 4 ML: 200 SOLUTION ORAL; RESPIRATORY (INHALATION) at 13:24

## 2022-01-01 RX ADMIN — IPRATROPIUM BROMIDE AND ALBUTEROL SULFATE 3 ML: 2.5; .5 SOLUTION RESPIRATORY (INHALATION) at 08:57

## 2022-01-01 RX ADMIN — SODIUM CHLORIDE SOLN NEBU 3% 3 ML: 3 NEBU SOLN at 16:25

## 2022-01-01 RX ADMIN — AZITHROMYCIN 500 MG: 250 TABLET, FILM COATED ORAL at 13:02

## 2022-01-01 RX ADMIN — MEROPENEM 1 G: 1 INJECTION, POWDER, FOR SOLUTION INTRAVENOUS at 04:49

## 2022-01-01 RX ADMIN — ACETYLCYSTEINE 4 ML: 200 SOLUTION ORAL; RESPIRATORY (INHALATION) at 12:30

## 2022-01-01 RX ADMIN — IPRATROPIUM BROMIDE AND ALBUTEROL SULFATE 3 ML: 2.5; .5 SOLUTION RESPIRATORY (INHALATION) at 20:21

## 2022-01-01 RX ADMIN — DOCUSATE SODIUM 100 MG: 100 CAPSULE, LIQUID FILLED ORAL at 08:16

## 2022-01-01 RX ADMIN — GABAPENTIN 600 MG: 300 CAPSULE ORAL at 21:46

## 2022-01-01 RX ADMIN — POLYETHYLENE GLYCOL 3350 17 G: 17 POWDER, FOR SOLUTION ORAL at 07:50

## 2022-01-01 RX ADMIN — CEFEPIME HYDROCHLORIDE 2 G: 2 INJECTION, POWDER, FOR SOLUTION INTRAVENOUS at 02:59

## 2022-01-01 RX ADMIN — METHYLPREDNISOLONE SODIUM SUCCINATE 40 MG: 40 INJECTION, POWDER, FOR SOLUTION INTRAMUSCULAR; INTRAVENOUS at 23:31

## 2022-01-01 RX ADMIN — ARIPIPRAZOLE 5 MG: 5 TABLET ORAL at 22:06

## 2022-01-01 RX ADMIN — INSULIN ASPART 1 UNITS: 100 INJECTION, SOLUTION INTRAVENOUS; SUBCUTANEOUS at 00:06

## 2022-01-01 RX ADMIN — DOCUSATE SODIUM 100 MG: 100 CAPSULE, LIQUID FILLED ORAL at 09:36

## 2022-01-01 RX ADMIN — SODIUM CHLORIDE, POTASSIUM CHLORIDE, SODIUM LACTATE AND CALCIUM CHLORIDE: 600; 310; 30; 20 INJECTION, SOLUTION INTRAVENOUS at 21:00

## 2022-01-01 RX ADMIN — RIVAROXABAN 20 MG: 10 TABLET, FILM COATED ORAL at 16:59

## 2022-01-01 RX ADMIN — FUROSEMIDE 40 MG: 10 INJECTION, SOLUTION INTRAMUSCULAR; INTRAVENOUS at 00:45

## 2022-01-01 RX ADMIN — TERBINAFINE HYDROCHLORIDE 250 MG: 250 TABLET ORAL at 14:35

## 2022-01-01 RX ADMIN — FUROSEMIDE 20 MG: 10 INJECTION, SOLUTION INTRAMUSCULAR; INTRAVENOUS at 04:22

## 2022-01-01 RX ADMIN — RIVAROXABAN 20 MG: 10 TABLET, FILM COATED ORAL at 17:29

## 2022-01-01 RX ADMIN — SODIUM CHLORIDE SOLN NEBU 3% 3 ML: 3 NEBU SOLN at 19:43

## 2022-01-01 RX ADMIN — GABAPENTIN 600 MG: 300 CAPSULE ORAL at 21:13

## 2022-01-01 RX ADMIN — BISACODYL 10 MG: 10 SUPPOSITORY RECTAL at 12:23

## 2022-01-01 RX ADMIN — ACETYLCYSTEINE 4 ML: 200 SOLUTION ORAL; RESPIRATORY (INHALATION) at 09:18

## 2022-01-01 RX ADMIN — ACETAMINOPHEN 975 MG: 325 TABLET ORAL at 00:07

## 2022-01-01 RX ADMIN — SPIRONOLACTONE 12.5 MG: 25 TABLET ORAL at 10:21

## 2022-01-01 RX ADMIN — MEROPENEM 1 G: 1 INJECTION, POWDER, FOR SOLUTION INTRAVENOUS at 12:38

## 2022-01-01 RX ADMIN — IPRATROPIUM BROMIDE AND ALBUTEROL SULFATE 3 ML: 2.5; .5 SOLUTION RESPIRATORY (INHALATION) at 07:19

## 2022-01-01 RX ADMIN — CALCIUM CARBONATE-VITAMIN D TAB 500 MG-200 UNIT 1 TABLET: 500-200 TAB at 08:13

## 2022-01-01 RX ADMIN — CEFEPIME HYDROCHLORIDE 2 G: 2 INJECTION, POWDER, FOR SOLUTION INTRAVENOUS at 21:50

## 2022-01-01 RX ADMIN — IPRATROPIUM BROMIDE AND ALBUTEROL SULFATE 3 ML: 2.5; .5 SOLUTION RESPIRATORY (INHALATION) at 11:55

## 2022-01-01 RX ADMIN — ATORVASTATIN CALCIUM 20 MG: 10 TABLET, FILM COATED ORAL at 21:08

## 2022-01-01 RX ADMIN — CIPROFLOXACIN HYDROCHLORIDE 750 MG: 250 TABLET, FILM COATED ORAL at 21:13

## 2022-01-01 RX ADMIN — POLYETHYLENE GLYCOL 3350 17 G: 17 POWDER, FOR SOLUTION ORAL at 09:13

## 2022-01-01 RX ADMIN — RIVAROXABAN 20 MG: 10 TABLET, FILM COATED ORAL at 17:01

## 2022-01-01 RX ADMIN — MEROPENEM 1 G: 1 INJECTION, POWDER, FOR SOLUTION INTRAVENOUS at 13:30

## 2022-01-01 RX ADMIN — PANTOPRAZOLE SODIUM 40 MG: 40 INJECTION, POWDER, FOR SOLUTION INTRAVENOUS at 08:22

## 2022-01-01 RX ADMIN — GUAIFENESIN 1200 MG: 600 TABLET ORAL at 20:46

## 2022-01-01 RX ADMIN — ATORVASTATIN CALCIUM 20 MG: 10 TABLET, FILM COATED ORAL at 21:43

## 2022-01-01 RX ADMIN — IPRATROPIUM BROMIDE AND ALBUTEROL SULFATE 3 ML: 2.5; .5 SOLUTION RESPIRATORY (INHALATION) at 23:41

## 2022-01-01 RX ADMIN — SODIUM CHLORIDE SOLN NEBU 3% 3 ML: 3 NEBU SOLN at 07:42

## 2022-01-01 RX ADMIN — SODIUM CHLORIDE 250 ML: 9 INJECTION, SOLUTION INTRAVENOUS at 07:55

## 2022-01-01 RX ADMIN — RIVAROXABAN 20 MG: 10 TABLET, FILM COATED ORAL at 17:55

## 2022-01-01 RX ADMIN — ACYCLOVIR: 50 OINTMENT TOPICAL at 21:10

## 2022-01-01 RX ADMIN — FLUTICASONE FUROATE AND VILANTEROL TRIFENATATE 1 PUFF: 100; 25 POWDER RESPIRATORY (INHALATION) at 08:58

## 2022-01-01 RX ADMIN — NITROGLYCERIN 0.4 MG: 0.4 TABLET SUBLINGUAL at 08:34

## 2022-01-01 RX ADMIN — Medication 25 MCG: at 08:40

## 2022-01-01 RX ADMIN — DILTIAZEM HYDROCHLORIDE 240 MG: 120 CAPSULE, COATED, EXTENDED RELEASE ORAL at 08:16

## 2022-01-01 RX ADMIN — ACETAMINOPHEN 650 MG: 325 TABLET ORAL at 00:38

## 2022-01-01 RX ADMIN — DOCUSATE SODIUM 100 MG: 100 CAPSULE, LIQUID FILLED ORAL at 21:13

## 2022-01-01 RX ADMIN — ACETYLCYSTEINE 4 ML: 200 SOLUTION ORAL; RESPIRATORY (INHALATION) at 23:50

## 2022-01-01 RX ADMIN — POLYETHYLENE GLYCOL 3350 17 G: 17 POWDER, FOR SOLUTION ORAL at 11:25

## 2022-01-01 RX ADMIN — METHYLPREDNISOLONE SODIUM SUCCINATE 62.5 MG: 125 INJECTION, POWDER, FOR SOLUTION INTRAMUSCULAR; INTRAVENOUS at 23:45

## 2022-01-01 RX ADMIN — IPRATROPIUM BROMIDE AND ALBUTEROL SULFATE 3 ML: 2.5; .5 SOLUTION RESPIRATORY (INHALATION) at 20:47

## 2022-01-01 RX ADMIN — SODIUM CHLORIDE SOLN NEBU 3% 3 ML: 3 NEBU SOLN at 15:50

## 2022-01-01 RX ADMIN — GABAPENTIN 600 MG: 300 CAPSULE ORAL at 21:27

## 2022-01-01 RX ADMIN — METHYLPREDNISOLONE SODIUM SUCCINATE 125 MG: 125 INJECTION, POWDER, FOR SOLUTION INTRAMUSCULAR; INTRAVENOUS at 13:42

## 2022-01-01 RX ADMIN — PREDNISONE 40 MG: 20 TABLET ORAL at 14:42

## 2022-01-01 RX ADMIN — IPRATROPIUM BROMIDE AND ALBUTEROL SULFATE 3 ML: 2.5; .5 SOLUTION RESPIRATORY (INHALATION) at 16:24

## 2022-01-01 RX ADMIN — POLYETHYLENE GLYCOL 3350 17 G: 17 POWDER, FOR SOLUTION ORAL at 09:21

## 2022-01-01 RX ADMIN — IPRATROPIUM BROMIDE AND ALBUTEROL SULFATE 3 ML: 2.5; .5 SOLUTION RESPIRATORY (INHALATION) at 08:46

## 2022-01-01 RX ADMIN — RIVAROXABAN 20 MG: 10 TABLET, FILM COATED ORAL at 18:01

## 2022-01-01 RX ADMIN — IPRATROPIUM BROMIDE AND ALBUTEROL SULFATE 3 ML: 2.5; .5 SOLUTION RESPIRATORY (INHALATION) at 16:22

## 2022-01-01 RX ADMIN — IPRATROPIUM BROMIDE AND ALBUTEROL SULFATE 3 ML: 2.5; .5 SOLUTION RESPIRATORY (INHALATION) at 13:24

## 2022-01-01 RX ADMIN — IPRATROPIUM BROMIDE AND ALBUTEROL SULFATE 3 ML: 2.5; .5 SOLUTION RESPIRATORY (INHALATION) at 20:34

## 2022-01-01 RX ADMIN — ATORVASTATIN CALCIUM 20 MG: 10 TABLET, FILM COATED ORAL at 21:13

## 2022-01-01 RX ADMIN — GABAPENTIN 600 MG: 300 CAPSULE ORAL at 21:12

## 2022-01-01 RX ADMIN — ARIPIPRAZOLE 5 MG: 5 TABLET ORAL at 21:21

## 2022-01-01 RX ADMIN — DOCUSATE SODIUM 100 MG: 100 CAPSULE, LIQUID FILLED ORAL at 21:22

## 2022-01-01 RX ADMIN — PANTOPRAZOLE SODIUM 40 MG: 20 TABLET, DELAYED RELEASE ORAL at 09:20

## 2022-01-01 RX ADMIN — ACETYLCYSTEINE 4 ML: 200 SOLUTION ORAL; RESPIRATORY (INHALATION) at 15:57

## 2022-01-01 RX ADMIN — DOXYCYCLINE 100 MG: 100 INJECTION, POWDER, LYOPHILIZED, FOR SOLUTION INTRAVENOUS at 22:49

## 2022-01-01 RX ADMIN — INSULIN ASPART 3 UNITS: 100 INJECTION, SOLUTION INTRAVENOUS; SUBCUTANEOUS at 12:00

## 2022-01-01 RX ADMIN — FUROSEMIDE 40 MG: 40 TABLET ORAL at 08:07

## 2022-01-01 RX ADMIN — Medication 25 MCG: at 07:47

## 2022-01-01 RX ADMIN — MEROPENEM 1 G: 1 INJECTION, POWDER, FOR SOLUTION INTRAVENOUS at 05:12

## 2022-01-01 RX ADMIN — RIVAROXABAN 20 MG: 10 TABLET, FILM COATED ORAL at 17:18

## 2022-01-01 RX ADMIN — FUROSEMIDE 40 MG: 10 INJECTION, SOLUTION INTRAMUSCULAR; INTRAVENOUS at 21:48

## 2022-01-01 RX ADMIN — GABAPENTIN 600 MG: 300 CAPSULE ORAL at 21:04

## 2022-01-01 RX ADMIN — IOPAMIDOL 60 ML: 755 INJECTION, SOLUTION INTRAVENOUS at 13:39

## 2022-01-01 RX ADMIN — TOBRAMYCIN INHALATION SOLUTION 150 MG: 300 INHALANT RESPIRATORY (INHALATION) at 07:45

## 2022-01-01 RX ADMIN — CEFEPIME HYDROCHLORIDE 2 G: 2 INJECTION, POWDER, FOR SOLUTION INTRAVENOUS at 10:40

## 2022-01-01 RX ADMIN — METHYLPREDNISOLONE SODIUM SUCCINATE 20 MG: 40 INJECTION, POWDER, FOR SOLUTION INTRAMUSCULAR; INTRAVENOUS at 15:17

## 2022-01-01 RX ADMIN — CEFEPIME HYDROCHLORIDE 2 G: 2 INJECTION, POWDER, FOR SOLUTION INTRAVENOUS at 18:36

## 2022-01-01 RX ADMIN — IPRATROPIUM BROMIDE AND ALBUTEROL SULFATE 3 ML: 2.5; .5 SOLUTION RESPIRATORY (INHALATION) at 15:35

## 2022-01-01 RX ADMIN — FUROSEMIDE 40 MG: 10 INJECTION, SOLUTION INTRAMUSCULAR; INTRAVENOUS at 12:38

## 2022-01-01 RX ADMIN — SODIUM CHLORIDE SOLN NEBU 3% 3 ML: 3 NEBU SOLN at 08:08

## 2022-01-01 RX ADMIN — IPRATROPIUM BROMIDE AND ALBUTEROL SULFATE 3 ML: 2.5; .5 SOLUTION RESPIRATORY (INHALATION) at 11:29

## 2022-01-01 RX ADMIN — ARIPIPRAZOLE 5 MG: 5 TABLET ORAL at 21:12

## 2022-01-01 RX ADMIN — AZITHROMYCIN MONOHYDRATE 500 MG: 500 INJECTION, POWDER, LYOPHILIZED, FOR SOLUTION INTRAVENOUS at 18:17

## 2022-01-01 RX ADMIN — SODIUM CHLORIDE SOLN NEBU 3% 3 ML: 3 NEBU SOLN at 20:59

## 2022-01-01 RX ADMIN — CEFEPIME HYDROCHLORIDE 2 G: 2 INJECTION, POWDER, FOR SOLUTION INTRAVENOUS at 14:39

## 2022-01-01 RX ADMIN — ACETYLCYSTEINE 4 ML: 200 SOLUTION ORAL; RESPIRATORY (INHALATION) at 11:52

## 2022-01-01 RX ADMIN — SODIUM CHLORIDE SOLN NEBU 3% 3 ML: 3 NEBU SOLN at 19:56

## 2022-01-01 RX ADMIN — IPRATROPIUM BROMIDE AND ALBUTEROL SULFATE 3 ML: 2.5; .5 SOLUTION RESPIRATORY (INHALATION) at 07:34

## 2022-01-01 RX ADMIN — METHYLPREDNISOLONE SODIUM SUCCINATE 62.5 MG: 125 INJECTION, POWDER, FOR SOLUTION INTRAMUSCULAR; INTRAVENOUS at 00:38

## 2022-01-01 RX ADMIN — ACETYLCYSTEINE 4 ML: 200 SOLUTION ORAL; RESPIRATORY (INHALATION) at 15:04

## 2022-01-01 RX ADMIN — CEFEPIME HYDROCHLORIDE 2 G: 2 INJECTION, POWDER, FOR SOLUTION INTRAVENOUS at 12:21

## 2022-01-01 RX ADMIN — ACETYLCYSTEINE 4 ML: 200 INHALANT RESPIRATORY (INHALATION) at 20:34

## 2022-01-01 RX ADMIN — IPRATROPIUM BROMIDE AND ALBUTEROL SULFATE 3 ML: 2.5; .5 SOLUTION RESPIRATORY (INHALATION) at 08:06

## 2022-01-01 RX ADMIN — DILTIAZEM HYDROCHLORIDE 240 MG: 120 CAPSULE, COATED, EXTENDED RELEASE ORAL at 07:50

## 2022-01-01 RX ADMIN — ALBUTEROL SULFATE 2.5 MG: 2.5 SOLUTION RESPIRATORY (INHALATION) at 12:52

## 2022-01-01 RX ADMIN — RIVAROXABAN 15 MG: 15 TABLET, FILM COATED ORAL at 17:37

## 2022-01-01 RX ADMIN — RIVAROXABAN 20 MG: 10 TABLET, FILM COATED ORAL at 18:49

## 2022-01-01 RX ADMIN — CEFEPIME HYDROCHLORIDE 2 G: 2 INJECTION, POWDER, FOR SOLUTION INTRAVENOUS at 09:48

## 2022-01-01 RX ADMIN — FUROSEMIDE 40 MG: 40 TABLET ORAL at 10:02

## 2022-01-01 RX ADMIN — Medication 400 MG: at 09:10

## 2022-01-01 RX ADMIN — Medication 25 MCG: at 08:14

## 2022-01-01 RX ADMIN — INSULIN ASPART 2 UNITS: 100 INJECTION, SOLUTION INTRAVENOUS; SUBCUTANEOUS at 09:59

## 2022-01-01 RX ADMIN — DICLOFENAC SODIUM 2 G: 10 GEL TOPICAL at 09:38

## 2022-01-01 RX ADMIN — ALBUTEROL SULFATE 2.5 MG: 2.5 SOLUTION RESPIRATORY (INHALATION) at 03:24

## 2022-01-01 RX ADMIN — ACETYLCYSTEINE 4 ML: 200 SOLUTION ORAL; RESPIRATORY (INHALATION) at 12:10

## 2022-01-01 RX ADMIN — IPRATROPIUM BROMIDE AND ALBUTEROL SULFATE 3 ML: 2.5; .5 SOLUTION RESPIRATORY (INHALATION) at 16:41

## 2022-01-01 RX ADMIN — FUROSEMIDE 40 MG: 40 TABLET ORAL at 08:44

## 2022-01-01 RX ADMIN — SODIUM CHLORIDE SOLN NEBU 3% 3 ML: 3 NEBU SOLN at 15:18

## 2022-01-01 RX ADMIN — AZITHROMYCIN MONOHYDRATE 500 MG: 500 INJECTION, POWDER, LYOPHILIZED, FOR SOLUTION INTRAVENOUS at 21:22

## 2022-01-01 RX ADMIN — LORAZEPAM 0.25 MG: 0.5 TABLET ORAL at 11:02

## 2022-01-01 RX ADMIN — INSULIN ASPART 1 UNITS: 100 INJECTION, SOLUTION INTRAVENOUS; SUBCUTANEOUS at 04:27

## 2022-01-01 RX ADMIN — DOCUSATE SODIUM 100 MG: 100 CAPSULE, LIQUID FILLED ORAL at 20:12

## 2022-01-01 RX ADMIN — INSULIN ASPART 1 UNITS: 100 INJECTION, SOLUTION INTRAVENOUS; SUBCUTANEOUS at 08:49

## 2022-01-01 RX ADMIN — METHYLPREDNISOLONE SODIUM SUCCINATE 62.5 MG: 125 INJECTION, POWDER, FOR SOLUTION INTRAMUSCULAR; INTRAVENOUS at 06:09

## 2022-01-01 RX ADMIN — Medication 1 TABLET: at 08:07

## 2022-01-01 RX ADMIN — Medication 1 TABLET: at 08:49

## 2022-01-01 RX ADMIN — PREDNISONE 40 MG: 20 TABLET ORAL at 08:00

## 2022-01-01 RX ADMIN — ACETAMINOPHEN 650 MG: 325 TABLET ORAL at 06:12

## 2022-01-01 RX ADMIN — FUROSEMIDE 40 MG: 10 INJECTION, SOLUTION INTRAMUSCULAR; INTRAVENOUS at 11:02

## 2022-01-01 RX ADMIN — ATORVASTATIN CALCIUM 20 MG: 10 TABLET, FILM COATED ORAL at 21:05

## 2022-01-01 RX ADMIN — PANTOPRAZOLE SODIUM 40 MG: 20 TABLET, DELAYED RELEASE ORAL at 09:55

## 2022-01-01 RX ADMIN — PANTOPRAZOLE SODIUM 40 MG: 40 TABLET, DELAYED RELEASE ORAL at 07:30

## 2022-01-01 RX ADMIN — ACETYLCYSTEINE 4 ML: 200 INHALANT RESPIRATORY (INHALATION) at 19:40

## 2022-01-01 RX ADMIN — ACETYLCYSTEINE 4 ML: 200 SOLUTION ORAL; RESPIRATORY (INHALATION) at 09:01

## 2022-01-01 RX ADMIN — BENZOCAINE AND MENTHOL 1 LOZENGE: 15; 3.6 LOZENGE ORAL at 00:47

## 2022-01-01 RX ADMIN — ARIPIPRAZOLE 5 MG: 5 TABLET ORAL at 22:24

## 2022-01-01 RX ADMIN — MAGNESIUM SULFATE HEPTAHYDRATE 2 G: 2 INJECTION, SOLUTION INTRAVENOUS at 16:04

## 2022-01-01 RX ADMIN — IPRATROPIUM BROMIDE AND ALBUTEROL SULFATE 3 ML: 2.5; .5 SOLUTION RESPIRATORY (INHALATION) at 15:50

## 2022-01-01 RX ADMIN — ACETYLCYSTEINE 4 ML: 200 SOLUTION ORAL; RESPIRATORY (INHALATION) at 20:21

## 2022-01-01 RX ADMIN — LORAZEPAM 0.25 MG: 0.5 TABLET ORAL at 21:47

## 2022-01-01 RX ADMIN — FLUTICASONE FUROATE AND VILANTEROL TRIFENATATE 1 PUFF: 100; 25 POWDER RESPIRATORY (INHALATION) at 08:30

## 2022-01-01 RX ADMIN — CYANOCOBALAMIN TAB 500 MCG 500 MCG: 500 TAB at 12:20

## 2022-01-01 RX ADMIN — RIVAROXABAN 20 MG: 10 TABLET, FILM COATED ORAL at 17:43

## 2022-01-01 RX ADMIN — LORAZEPAM 0.25 MG: 0.5 TABLET ORAL at 18:10

## 2022-01-01 RX ADMIN — DOCUSATE SODIUM 100 MG: 100 CAPSULE, LIQUID FILLED ORAL at 08:10

## 2022-01-01 RX ADMIN — METHYLPREDNISOLONE SODIUM SUCCINATE 37.5 MG: 125 INJECTION, POWDER, FOR SOLUTION INTRAMUSCULAR; INTRAVENOUS at 08:41

## 2022-01-01 RX ADMIN — DOXYCYCLINE 100 MG: 100 CAPSULE ORAL at 08:14

## 2022-01-01 RX ADMIN — IPRATROPIUM BROMIDE AND ALBUTEROL SULFATE 3 ML: 2.5; .5 SOLUTION RESPIRATORY (INHALATION) at 15:19

## 2022-01-01 RX ADMIN — INSULIN ASPART 1 UNITS: 100 INJECTION, SOLUTION INTRAVENOUS; SUBCUTANEOUS at 11:43

## 2022-01-01 RX ADMIN — DOCUSATE SODIUM 100 MG: 100 CAPSULE, LIQUID FILLED ORAL at 08:06

## 2022-01-01 RX ADMIN — ARIPIPRAZOLE 5 MG: 5 TABLET ORAL at 20:53

## 2022-01-01 RX ADMIN — GABAPENTIN 600 MG: 300 CAPSULE ORAL at 21:33

## 2022-01-01 RX ADMIN — SODIUM CHLORIDE SOLN NEBU 3% 3 ML: 3 NEBU SOLN at 20:33

## 2022-01-01 RX ADMIN — FUROSEMIDE 40 MG: 40 TABLET ORAL at 08:59

## 2022-01-01 RX ADMIN — MEROPENEM 1 G: 1 INJECTION, POWDER, FOR SOLUTION INTRAVENOUS at 21:04

## 2022-01-01 RX ADMIN — DOCUSATE SODIUM 100 MG: 100 CAPSULE, LIQUID FILLED ORAL at 21:12

## 2022-01-01 RX ADMIN — PANTOPRAZOLE SODIUM 40 MG: 20 TABLET, DELAYED RELEASE ORAL at 08:30

## 2022-01-01 RX ADMIN — INSULIN ASPART 2 UNITS: 100 INJECTION, SOLUTION INTRAVENOUS; SUBCUTANEOUS at 14:36

## 2022-01-01 RX ADMIN — FUROSEMIDE 40 MG: 10 INJECTION, SOLUTION INTRAMUSCULAR; INTRAVENOUS at 00:36

## 2022-01-01 RX ADMIN — FUROSEMIDE 40 MG: 40 TABLET ORAL at 16:20

## 2022-01-01 RX ADMIN — ATORVASTATIN CALCIUM 20 MG: 10 TABLET, FILM COATED ORAL at 22:06

## 2022-01-01 RX ADMIN — CYANOCOBALAMIN TAB 500 MCG 500 MCG: 500 TAB at 07:50

## 2022-01-01 RX ADMIN — POLYETHYLENE GLYCOL 3350 17 G: 17 POWDER, FOR SOLUTION ORAL at 14:57

## 2022-01-01 RX ADMIN — CEFEPIME HYDROCHLORIDE 2 G: 2 INJECTION, POWDER, FOR SOLUTION INTRAVENOUS at 18:33

## 2022-01-01 RX ADMIN — ACETYLCYSTEINE 4 ML: 200 SOLUTION ORAL; RESPIRATORY (INHALATION) at 16:33

## 2022-01-01 RX ADMIN — BISACODYL 10 MG: 10 SUPPOSITORY RECTAL at 15:03

## 2022-01-01 RX ADMIN — DOCUSATE SODIUM 100 MG: 100 CAPSULE, LIQUID FILLED ORAL at 21:27

## 2022-01-01 RX ADMIN — DILTIAZEM HYDROCHLORIDE 240 MG: 120 CAPSULE, COATED, EXTENDED RELEASE ORAL at 08:57

## 2022-01-01 RX ADMIN — IPRATROPIUM BROMIDE AND ALBUTEROL SULFATE 3 ML: 2.5; .5 SOLUTION RESPIRATORY (INHALATION) at 00:26

## 2022-01-01 RX ADMIN — INSULIN ASPART 1 UNITS: 100 INJECTION, SOLUTION INTRAVENOUS; SUBCUTANEOUS at 08:53

## 2022-01-01 RX ADMIN — NASAL DECONGESTANT 2 SPRAY: 0.05 SPRAY NASAL at 08:09

## 2022-01-01 RX ADMIN — FUROSEMIDE 40 MG: 10 INJECTION, SOLUTION INTRAMUSCULAR; INTRAVENOUS at 12:10

## 2022-01-01 RX ADMIN — ACYCLOVIR: 50 OINTMENT TOPICAL at 04:00

## 2022-01-01 RX ADMIN — INSULIN ASPART 3 UNITS: 100 INJECTION, SOLUTION INTRAVENOUS; SUBCUTANEOUS at 17:10

## 2022-01-01 RX ADMIN — GABAPENTIN 600 MG: 300 CAPSULE ORAL at 21:43

## 2022-01-01 RX ADMIN — DOCUSATE SODIUM 100 MG: 100 CAPSULE, LIQUID FILLED ORAL at 20:19

## 2022-01-01 RX ADMIN — ATORVASTATIN CALCIUM 20 MG: 10 TABLET, FILM COATED ORAL at 21:41

## 2022-01-01 RX ADMIN — SODIUM CHLORIDE SOLN NEBU 3% 3 ML: 3 NEBU SOLN at 11:52

## 2022-01-01 RX ADMIN — INSULIN ASPART 3 UNITS: 100 INJECTION, SOLUTION INTRAVENOUS; SUBCUTANEOUS at 22:15

## 2022-01-01 RX ADMIN — LORAZEPAM 0.25 MG: 0.5 TABLET ORAL at 09:28

## 2022-01-01 RX ADMIN — ACETYLCYSTEINE 4 ML: 200 SOLUTION ORAL; RESPIRATORY (INHALATION) at 11:10

## 2022-01-01 RX ADMIN — FUROSEMIDE 40 MG: 10 INJECTION, SOLUTION INTRAMUSCULAR; INTRAVENOUS at 03:57

## 2022-01-01 RX ADMIN — ACETAMINOPHEN 650 MG: 325 TABLET ORAL at 06:41

## 2022-01-01 RX ADMIN — CYANOCOBALAMIN TAB 500 MCG 500 MCG: 500 TAB at 07:45

## 2022-01-01 RX ADMIN — SODIUM CHLORIDE SOLN NEBU 3% 4 ML: 3 NEBU SOLN at 20:35

## 2022-01-01 RX ADMIN — ALBUTEROL SULFATE 2.5 MG: 2.5 SOLUTION RESPIRATORY (INHALATION) at 07:41

## 2022-01-01 RX ADMIN — SPIRONOLACTONE 12.5 MG: 25 TABLET ORAL at 09:34

## 2022-01-01 RX ADMIN — SODIUM CHLORIDE SOLN NEBU 3% 3 ML: 3 NEBU SOLN at 09:11

## 2022-01-01 RX ADMIN — ACETYLCYSTEINE 4 ML: 200 SOLUTION ORAL; RESPIRATORY (INHALATION) at 12:03

## 2022-01-01 RX ADMIN — MEROPENEM 1 G: 1 INJECTION, POWDER, FOR SOLUTION INTRAVENOUS at 04:06

## 2022-01-01 RX ADMIN — IPRATROPIUM BROMIDE AND ALBUTEROL SULFATE 3 ML: 2.5; .5 SOLUTION RESPIRATORY (INHALATION) at 19:49

## 2022-01-01 RX ADMIN — METOPROLOL TARTRATE 5 MG: 5 INJECTION INTRAVENOUS at 07:38

## 2022-01-01 RX ADMIN — INSULIN ASPART 2 UNITS: 100 INJECTION, SOLUTION INTRAVENOUS; SUBCUTANEOUS at 11:31

## 2022-01-01 RX ADMIN — CEFEPIME HYDROCHLORIDE 2 G: 2 INJECTION, POWDER, FOR SOLUTION INTRAVENOUS at 21:17

## 2022-01-01 RX ADMIN — DOCUSATE SODIUM 100 MG: 100 CAPSULE, LIQUID FILLED ORAL at 21:10

## 2022-01-01 RX ADMIN — ACETAMINOPHEN 650 MG: 325 TABLET ORAL at 13:09

## 2022-01-01 RX ADMIN — ATORVASTATIN CALCIUM 20 MG: 10 TABLET, FILM COATED ORAL at 21:10

## 2022-01-01 RX ADMIN — ACETYLCYSTEINE 4 ML: 200 SOLUTION ORAL; RESPIRATORY (INHALATION) at 16:16

## 2022-01-01 RX ADMIN — TOBRAMYCIN INHALATION SOLUTION 300 MG: 300 INHALANT RESPIRATORY (INHALATION) at 10:13

## 2022-01-01 RX ADMIN — IPRATROPIUM BROMIDE AND ALBUTEROL SULFATE 3 ML: 2.5; .5 SOLUTION RESPIRATORY (INHALATION) at 12:22

## 2022-01-01 RX ADMIN — SODIUM CHLORIDE SOLN NEBU 3% 3 ML: 3 NEBU SOLN at 12:23

## 2022-01-01 RX ADMIN — TOBRAMYCIN INHALATION SOLUTION 150 MG: 300 INHALANT RESPIRATORY (INHALATION) at 07:28

## 2022-01-01 RX ADMIN — PANTOPRAZOLE SODIUM 40 MG: 20 TABLET, DELAYED RELEASE ORAL at 10:02

## 2022-01-01 RX ADMIN — POTASSIUM CHLORIDE 10 MEQ: 750 TABLET, EXTENDED RELEASE ORAL at 09:20

## 2022-01-01 RX ADMIN — Medication 3 MG: at 21:46

## 2022-01-01 RX ADMIN — VALACYCLOVIR HYDROCHLORIDE 500 MG: 500 TABLET, FILM COATED ORAL at 19:46

## 2022-01-01 RX ADMIN — IPRATROPIUM BROMIDE AND ALBUTEROL SULFATE 3 ML: 2.5; .5 SOLUTION RESPIRATORY (INHALATION) at 20:29

## 2022-01-01 RX ADMIN — METHYLPREDNISOLONE SODIUM SUCCINATE 62.5 MG: 125 INJECTION, POWDER, FOR SOLUTION INTRAMUSCULAR; INTRAVENOUS at 13:55

## 2022-01-01 RX ADMIN — RIVAROXABAN 20 MG: 10 TABLET, FILM COATED ORAL at 17:38

## 2022-01-01 RX ADMIN — DOCUSATE SODIUM 100 MG: 100 CAPSULE, LIQUID FILLED ORAL at 09:20

## 2022-01-01 RX ADMIN — CYANOCOBALAMIN TAB 500 MCG 500 MCG: 500 TAB at 08:50

## 2022-01-01 RX ADMIN — ATORVASTATIN CALCIUM 20 MG: 10 TABLET, FILM COATED ORAL at 20:20

## 2022-01-01 RX ADMIN — IPRATROPIUM BROMIDE AND ALBUTEROL SULFATE 3 ML: 2.5; .5 SOLUTION RESPIRATORY (INHALATION) at 12:18

## 2022-01-01 RX ADMIN — INSULIN ASPART 2 UNITS: 100 INJECTION, SOLUTION INTRAVENOUS; SUBCUTANEOUS at 08:10

## 2022-01-01 RX ADMIN — SODIUM CHLORIDE SOLN NEBU 3% 3 ML: 3 NEBU SOLN at 22:19

## 2022-01-01 RX ADMIN — METHYLPREDNISOLONE SODIUM SUCCINATE 62.5 MG: 125 INJECTION, POWDER, FOR SOLUTION INTRAMUSCULAR; INTRAVENOUS at 12:20

## 2022-01-01 RX ADMIN — UMECLIDINIUM 1 PUFF: 62.5 AEROSOL, POWDER ORAL at 09:43

## 2022-01-01 RX ADMIN — INSULIN ASPART 1 UNITS: 100 INJECTION, SOLUTION INTRAVENOUS; SUBCUTANEOUS at 20:07

## 2022-01-01 RX ADMIN — SENNOSIDES 8.6 MG: 8.6 TABLET, FILM COATED ORAL at 12:08

## 2022-01-01 RX ADMIN — FUROSEMIDE 40 MG: 40 TABLET ORAL at 18:10

## 2022-01-01 RX ADMIN — ARIPIPRAZOLE 5 MG: 5 TABLET ORAL at 21:27

## 2022-01-01 RX ADMIN — VALACYCLOVIR HYDROCHLORIDE 500 MG: 500 TABLET, FILM COATED ORAL at 09:14

## 2022-01-01 RX ADMIN — TERBINAFINE HYDROCHLORIDE 250 MG: 250 TABLET ORAL at 08:22

## 2022-01-01 RX ADMIN — ACYCLOVIR: 50 OINTMENT TOPICAL at 02:07

## 2022-01-01 RX ADMIN — DILTIAZEM HYDROCHLORIDE 240 MG: 120 CAPSULE, COATED, EXTENDED RELEASE ORAL at 08:27

## 2022-01-01 RX ADMIN — Medication 500 MCG: at 07:58

## 2022-01-01 RX ADMIN — IPRATROPIUM BROMIDE AND ALBUTEROL SULFATE 3 ML: 2.5; .5 SOLUTION RESPIRATORY (INHALATION) at 11:10

## 2022-01-01 RX ADMIN — ACETYLCYSTEINE 4 ML: 200 SOLUTION ORAL; RESPIRATORY (INHALATION) at 15:35

## 2022-01-01 RX ADMIN — DILTIAZEM HYDROCHLORIDE 240 MG: 120 CAPSULE, COATED, EXTENDED RELEASE ORAL at 08:44

## 2022-01-01 RX ADMIN — ATORVASTATIN CALCIUM 20 MG: 10 TABLET, FILM COATED ORAL at 21:31

## 2022-01-01 RX ADMIN — ACETYLCYSTEINE 4 ML: 200 INHALANT RESPIRATORY (INHALATION) at 15:38

## 2022-01-01 RX ADMIN — DOCUSATE SODIUM 100 MG: 100 CAPSULE, LIQUID FILLED ORAL at 09:08

## 2022-01-01 RX ADMIN — ACYCLOVIR: 50 OINTMENT TOPICAL at 12:22

## 2022-01-01 RX ADMIN — PANTOPRAZOLE SODIUM 40 MG: 20 TABLET, DELAYED RELEASE ORAL at 08:14

## 2022-01-01 RX ADMIN — Medication 25 MCG: at 07:50

## 2022-01-01 RX ADMIN — IPRATROPIUM BROMIDE AND ALBUTEROL SULFATE 3 ML: 2.5; .5 SOLUTION RESPIRATORY (INHALATION) at 20:35

## 2022-01-01 RX ADMIN — MEROPENEM 1 G: 1 INJECTION, POWDER, FOR SOLUTION INTRAVENOUS at 20:58

## 2022-01-01 RX ADMIN — INSULIN ASPART 2 UNITS: 100 INJECTION, SOLUTION INTRAVENOUS; SUBCUTANEOUS at 22:25

## 2022-01-01 RX ADMIN — DOCUSATE SODIUM 100 MG: 100 CAPSULE, LIQUID FILLED ORAL at 08:44

## 2022-01-01 RX ADMIN — DOCUSATE SODIUM 100 MG: 100 CAPSULE, LIQUID FILLED ORAL at 21:11

## 2022-01-01 RX ADMIN — ACETYLCYSTEINE 4 ML: 200 SOLUTION ORAL; RESPIRATORY (INHALATION) at 16:55

## 2022-01-01 RX ADMIN — ACETAMINOPHEN 650 MG: 325 TABLET ORAL at 17:57

## 2022-01-01 RX ADMIN — DILTIAZEM HYDROCHLORIDE 240 MG: 120 CAPSULE, COATED, EXTENDED RELEASE ORAL at 08:14

## 2022-01-01 RX ADMIN — FUROSEMIDE 80 MG: 10 INJECTION, SOLUTION INTRAMUSCULAR; INTRAVENOUS at 21:18

## 2022-01-01 RX ADMIN — ACETYLCYSTEINE 4 ML: 200 SOLUTION ORAL; RESPIRATORY (INHALATION) at 12:18

## 2022-01-01 RX ADMIN — ALBUTEROL SULFATE 2.5 MG: 2.5 SOLUTION RESPIRATORY (INHALATION) at 22:18

## 2022-01-01 RX ADMIN — ACETYLCYSTEINE 4 ML: 200 SOLUTION ORAL; RESPIRATORY (INHALATION) at 07:29

## 2022-01-01 RX ADMIN — ALBUTEROL SULFATE 2.5 MG: 2.5 SOLUTION RESPIRATORY (INHALATION) at 07:39

## 2022-01-01 RX ADMIN — ACETAMINOPHEN 650 MG: 325 TABLET ORAL at 05:15

## 2022-01-01 RX ADMIN — IPRATROPIUM BROMIDE AND ALBUTEROL SULFATE 3 ML: 2.5; .5 SOLUTION RESPIRATORY (INHALATION) at 20:22

## 2022-01-01 RX ADMIN — DOXYCYCLINE 100 MG: 100 INJECTION, POWDER, LYOPHILIZED, FOR SOLUTION INTRAVENOUS at 10:13

## 2022-01-01 RX ADMIN — QUETIAPINE FUMARATE 12.5 MG: 25 TABLET ORAL at 11:50

## 2022-01-01 RX ADMIN — DILTIAZEM HYDROCHLORIDE 120 MG: 120 CAPSULE, EXTENDED RELEASE ORAL at 08:14

## 2022-01-01 RX ADMIN — ARIPIPRAZOLE 5 MG: 5 TABLET ORAL at 21:37

## 2022-01-01 RX ADMIN — DOCUSATE SODIUM 50 MG AND SENNOSIDES 8.6 MG 1 TABLET: 8.6; 5 TABLET, FILM COATED ORAL at 08:00

## 2022-01-01 RX ADMIN — QUETIAPINE 12.5 MG: 25 TABLET, FILM COATED ORAL at 10:04

## 2022-01-01 RX ADMIN — IPRATROPIUM BROMIDE AND ALBUTEROL SULFATE 3 ML: 2.5; .5 SOLUTION RESPIRATORY (INHALATION) at 12:41

## 2022-01-01 RX ADMIN — POLYETHYLENE GLYCOL 3350 17 G: 17 POWDER, FOR SOLUTION ORAL at 08:21

## 2022-01-01 RX ADMIN — IPRATROPIUM BROMIDE AND ALBUTEROL SULFATE 3 ML: 2.5; .5 SOLUTION RESPIRATORY (INHALATION) at 16:43

## 2022-01-01 RX ADMIN — DOCUSATE SODIUM 100 MG: 100 CAPSULE, LIQUID FILLED ORAL at 09:15

## 2022-01-01 RX ADMIN — GUAIFENESIN 600 MG: 600 TABLET ORAL at 19:46

## 2022-01-01 RX ADMIN — RIVAROXABAN 20 MG: 10 TABLET, FILM COATED ORAL at 16:50

## 2022-01-01 RX ADMIN — DOXYCYCLINE 100 MG: 100 CAPSULE ORAL at 20:46

## 2022-01-01 RX ADMIN — IPRATROPIUM BROMIDE AND ALBUTEROL SULFATE 3 ML: 2.5; .5 SOLUTION RESPIRATORY (INHALATION) at 11:18

## 2022-01-01 RX ADMIN — Medication 25 MCG: at 08:16

## 2022-01-01 RX ADMIN — DILTIAZEM HYDROCHLORIDE 240 MG: 120 CAPSULE, COATED, EXTENDED RELEASE ORAL at 08:30

## 2022-01-01 RX ADMIN — DOCUSATE SODIUM 100 MG: 100 CAPSULE, LIQUID FILLED ORAL at 19:49

## 2022-01-01 RX ADMIN — FUROSEMIDE 20 MG: 10 INJECTION, SOLUTION INTRAMUSCULAR; INTRAVENOUS at 03:02

## 2022-01-01 RX ADMIN — CYANOCOBALAMIN TAB 500 MCG 500 MCG: 500 TAB at 10:04

## 2022-01-01 RX ADMIN — ACYCLOVIR: 50 OINTMENT TOPICAL at 12:38

## 2022-01-01 RX ADMIN — SODIUM CHLORIDE SOLN NEBU 3% 3 ML: 3 NEBU SOLN at 11:23

## 2022-01-01 RX ADMIN — VALACYCLOVIR HYDROCHLORIDE 1000 MG: 1 TABLET, FILM COATED ORAL at 14:47

## 2022-01-01 RX ADMIN — ACETAMINOPHEN 975 MG: 325 TABLET ORAL at 15:48

## 2022-01-01 RX ADMIN — IPRATROPIUM BROMIDE AND ALBUTEROL SULFATE 3 ML: 2.5; .5 SOLUTION RESPIRATORY (INHALATION) at 16:44

## 2022-01-01 RX ADMIN — HUMAN INSULIN 10 UNITS: 100 INJECTION, SUSPENSION SUBCUTANEOUS at 08:41

## 2022-01-01 RX ADMIN — MEROPENEM 1 G: 1 INJECTION, POWDER, FOR SOLUTION INTRAVENOUS at 12:59

## 2022-01-01 RX ADMIN — Medication 1 TABLET: at 08:59

## 2022-01-01 RX ADMIN — MEROPENEM 1 G: 1 INJECTION, POWDER, FOR SOLUTION INTRAVENOUS at 14:14

## 2022-01-01 RX ADMIN — FUROSEMIDE 40 MG: 10 INJECTION, SOLUTION INTRAMUSCULAR; INTRAVENOUS at 12:16

## 2022-01-01 RX ADMIN — PANTOPRAZOLE SODIUM 40 MG: 20 TABLET, DELAYED RELEASE ORAL at 08:52

## 2022-01-01 RX ADMIN — IPRATROPIUM BROMIDE AND ALBUTEROL SULFATE 3 ML: 2.5; .5 SOLUTION RESPIRATORY (INHALATION) at 09:22

## 2022-01-01 RX ADMIN — RIVAROXABAN 15 MG: 15 TABLET, FILM COATED ORAL at 18:26

## 2022-01-01 RX ADMIN — TERBINAFINE HYDROCHLORIDE 250 MG: 250 TABLET ORAL at 08:52

## 2022-01-01 RX ADMIN — METHYLPREDNISOLONE SODIUM SUCCINATE 125 MG: 125 INJECTION, POWDER, FOR SOLUTION INTRAMUSCULAR; INTRAVENOUS at 14:40

## 2022-01-01 RX ADMIN — DOCUSATE SODIUM 50 MG AND SENNOSIDES 8.6 MG 1 TABLET: 8.6; 5 TABLET, FILM COATED ORAL at 22:27

## 2022-01-01 RX ADMIN — ACETYLCYSTEINE 4 ML: 200 INHALANT RESPIRATORY (INHALATION) at 07:24

## 2022-01-01 RX ADMIN — IPRATROPIUM BROMIDE AND ALBUTEROL SULFATE 3 ML: 2.5; .5 SOLUTION RESPIRATORY (INHALATION) at 08:30

## 2022-01-01 RX ADMIN — MEROPENEM 1 G: 1 INJECTION, POWDER, FOR SOLUTION INTRAVENOUS at 21:23

## 2022-01-01 RX ADMIN — MIRTAZAPINE 15 MG: 15 TABLET, FILM COATED ORAL at 22:24

## 2022-01-01 RX ADMIN — DOCUSATE SODIUM 100 MG: 100 CAPSULE, LIQUID FILLED ORAL at 08:39

## 2022-01-01 RX ADMIN — ACETAMINOPHEN 650 MG: 325 TABLET ORAL at 06:02

## 2022-01-01 RX ADMIN — SPIRONOLACTONE 12.5 MG: 25 TABLET ORAL at 08:06

## 2022-01-01 RX ADMIN — IPRATROPIUM BROMIDE AND ALBUTEROL SULFATE 3 ML: 2.5; .5 SOLUTION RESPIRATORY (INHALATION) at 16:33

## 2022-01-01 RX ADMIN — DILTIAZEM HYDROCHLORIDE 240 MG: 240 CAPSULE, COATED, EXTENDED RELEASE ORAL at 08:46

## 2022-01-01 RX ADMIN — ACETYLCYSTEINE 4 ML: 200 SOLUTION ORAL; RESPIRATORY (INHALATION) at 20:46

## 2022-01-01 RX ADMIN — MORPHINE SULFATE 10 MG: 100 SOLUTION ORAL at 15:40

## 2022-01-01 RX ADMIN — SODIUM CHLORIDE SOLN NEBU 3% 4 ML: 3 NEBU SOLN at 07:21

## 2022-01-01 RX ADMIN — CYANOCOBALAMIN TAB 500 MCG 500 MCG: 500 TAB at 09:38

## 2022-01-01 RX ADMIN — DOCUSATE SODIUM 100 MG: 100 CAPSULE, LIQUID FILLED ORAL at 20:09

## 2022-01-01 RX ADMIN — GABAPENTIN 600 MG: 300 CAPSULE ORAL at 20:19

## 2022-01-01 RX ADMIN — ACETYLCYSTEINE 4 ML: 200 SOLUTION ORAL; RESPIRATORY (INHALATION) at 15:19

## 2022-01-01 RX ADMIN — Medication 1 TABLET: at 08:14

## 2022-01-01 RX ADMIN — FUROSEMIDE 40 MG: 40 TABLET ORAL at 09:15

## 2022-01-01 RX ADMIN — INSULIN ASPART 2 UNITS: 100 INJECTION, SOLUTION INTRAVENOUS; SUBCUTANEOUS at 07:56

## 2022-01-01 RX ADMIN — RIVAROXABAN 20 MG: 10 TABLET, FILM COATED ORAL at 17:56

## 2022-01-01 RX ADMIN — ACETAMINOPHEN 975 MG: 325 TABLET ORAL at 23:07

## 2022-01-01 RX ADMIN — SPIRONOLACTONE 12.5 MG: 25 TABLET ORAL at 08:50

## 2022-01-01 RX ADMIN — ALBUTEROL SULFATE 2.5 MG: 2.5 SOLUTION RESPIRATORY (INHALATION) at 15:33

## 2022-01-01 RX ADMIN — IPRATROPIUM BROMIDE AND ALBUTEROL SULFATE 3 ML: 2.5; .5 SOLUTION RESPIRATORY (INHALATION) at 08:03

## 2022-01-01 RX ADMIN — GABAPENTIN 600 MG: 300 CAPSULE ORAL at 14:57

## 2022-01-01 RX ADMIN — Medication 1 TABLET: at 09:00

## 2022-01-01 RX ADMIN — FUROSEMIDE 40 MG: 40 TABLET ORAL at 08:08

## 2022-01-01 RX ADMIN — ACETAMINOPHEN 650 MG: 325 TABLET ORAL at 12:21

## 2022-01-01 RX ADMIN — RIVAROXABAN 20 MG: 10 TABLET, FILM COATED ORAL at 16:53

## 2022-01-01 RX ADMIN — BISACODYL 10 MG: 10 SUPPOSITORY RECTAL at 20:18

## 2022-01-01 RX ADMIN — SODIUM CHLORIDE SOLN NEBU 3% 3 ML: 3 NEBU SOLN at 13:53

## 2022-01-01 RX ADMIN — ACETAMINOPHEN 650 MG: 325 TABLET ORAL at 23:41

## 2022-01-01 RX ADMIN — DILTIAZEM HYDROCHLORIDE 240 MG: 120 CAPSULE, COATED, EXTENDED RELEASE ORAL at 09:55

## 2022-01-01 RX ADMIN — DICLOFENAC SODIUM 2 G: 10 GEL TOPICAL at 12:02

## 2022-01-01 RX ADMIN — INSULIN ASPART 3 UNITS: 100 INJECTION, SOLUTION INTRAVENOUS; SUBCUTANEOUS at 02:03

## 2022-01-01 RX ADMIN — MAGNESIUM SULFATE HEPTAHYDRATE 2 G: 40 INJECTION, SOLUTION INTRAVENOUS at 11:05

## 2022-01-01 RX ADMIN — POLYETHYLENE GLYCOL 3350 17 G: 17 POWDER, FOR SOLUTION ORAL at 09:15

## 2022-01-01 RX ADMIN — DILTIAZEM HYDROCHLORIDE 180 MG: 180 CAPSULE, EXTENDED RELEASE ORAL at 08:20

## 2022-01-01 RX ADMIN — PANTOPRAZOLE SODIUM 40 MG: 20 TABLET, DELAYED RELEASE ORAL at 06:41

## 2022-01-01 RX ADMIN — ACETAMINOPHEN 650 MG: 325 TABLET ORAL at 17:42

## 2022-01-01 RX ADMIN — METHYLPREDNISOLONE SODIUM SUCCINATE 62.5 MG: 125 INJECTION, POWDER, FOR SOLUTION INTRAMUSCULAR; INTRAVENOUS at 17:40

## 2022-01-01 RX ADMIN — FUROSEMIDE 40 MG: 40 TABLET ORAL at 17:01

## 2022-01-01 RX ADMIN — DILTIAZEM HYDROCHLORIDE 240 MG: 120 CAPSULE, COATED, EXTENDED RELEASE ORAL at 08:43

## 2022-01-01 RX ADMIN — GABAPENTIN 600 MG: 300 CAPSULE ORAL at 21:36

## 2022-01-01 RX ADMIN — INSULIN ASPART 3 UNITS: 100 INJECTION, SOLUTION INTRAVENOUS; SUBCUTANEOUS at 18:45

## 2022-01-01 RX ADMIN — IPRATROPIUM BROMIDE AND ALBUTEROL SULFATE 3 ML: 2.5; .5 SOLUTION RESPIRATORY (INHALATION) at 12:10

## 2022-01-01 RX ADMIN — FUROSEMIDE 40 MG: 10 INJECTION, SOLUTION INTRAMUSCULAR; INTRAVENOUS at 22:20

## 2022-01-01 RX ADMIN — SODIUM CHLORIDE SOLN NEBU 3% 4 ML: 3 NEBU SOLN at 07:01

## 2022-01-01 RX ADMIN — IPRATROPIUM BROMIDE AND ALBUTEROL SULFATE 3 ML: 2.5; .5 SOLUTION RESPIRATORY (INHALATION) at 00:25

## 2022-01-01 RX ADMIN — INSULIN ASPART 2 UNITS: 100 INJECTION, SOLUTION INTRAVENOUS; SUBCUTANEOUS at 08:30

## 2022-01-01 RX ADMIN — FUROSEMIDE 40 MG: 40 TABLET ORAL at 08:49

## 2022-01-01 RX ADMIN — OLANZAPINE 2.5 MG: 5 TABLET, ORALLY DISINTEGRATING ORAL at 12:28

## 2022-01-01 RX ADMIN — PREDNISONE 40 MG: 20 TABLET ORAL at 08:38

## 2022-01-01 RX ADMIN — SPIRONOLACTONE 12.5 MG: 25 TABLET ORAL at 09:01

## 2022-01-01 RX ADMIN — RIVAROXABAN 20 MG: 10 TABLET, FILM COATED ORAL at 18:07

## 2022-01-01 RX ADMIN — SODIUM CHLORIDE SOLN NEBU 3% 3 ML: 3 NEBU SOLN at 20:36

## 2022-01-01 RX ADMIN — ACYCLOVIR: 50 OINTMENT TOPICAL at 21:23

## 2022-01-01 RX ADMIN — DOCUSATE SODIUM 100 MG: 100 CAPSULE, LIQUID FILLED ORAL at 08:01

## 2022-01-01 RX ADMIN — ARIPIPRAZOLE 5 MG: 5 TABLET ORAL at 21:28

## 2022-01-01 RX ADMIN — DOCUSATE SODIUM 100 MG: 100 CAPSULE, LIQUID FILLED ORAL at 08:49

## 2022-01-01 RX ADMIN — IPRATROPIUM BROMIDE AND ALBUTEROL SULFATE 3 ML: 2.5; .5 SOLUTION RESPIRATORY (INHALATION) at 00:03

## 2022-01-01 RX ADMIN — MEROPENEM 1 G: 1 INJECTION, POWDER, FOR SOLUTION INTRAVENOUS at 21:08

## 2022-01-01 RX ADMIN — INSULIN ASPART 3 UNITS: 100 INJECTION, SOLUTION INTRAVENOUS; SUBCUTANEOUS at 16:55

## 2022-01-01 RX ADMIN — ACETAMINOPHEN 650 MG: 325 TABLET ORAL at 06:14

## 2022-01-01 RX ADMIN — Medication 3 MG: at 21:35

## 2022-01-01 RX ADMIN — CIPROFLOXACIN HYDROCHLORIDE 750 MG: 250 TABLET, FILM COATED ORAL at 09:01

## 2022-01-01 RX ADMIN — CYANOCOBALAMIN TAB 500 MCG 500 MCG: 500 TAB at 08:14

## 2022-01-01 RX ADMIN — METHYLPREDNISOLONE SODIUM SUCCINATE 62.5 MG: 125 INJECTION, POWDER, FOR SOLUTION INTRAMUSCULAR; INTRAVENOUS at 17:18

## 2022-01-01 RX ADMIN — RIVAROXABAN 20 MG: 10 TABLET, FILM COATED ORAL at 17:52

## 2022-01-01 RX ADMIN — IPRATROPIUM BROMIDE AND ALBUTEROL SULFATE 3 ML: 2.5; .5 SOLUTION RESPIRATORY (INHALATION) at 16:59

## 2022-01-01 RX ADMIN — MEROPENEM 1 G: 1 INJECTION, POWDER, FOR SOLUTION INTRAVENOUS at 06:14

## 2022-01-01 RX ADMIN — SODIUM CHLORIDE SOLN NEBU 3% 3 ML: 3 NEBU SOLN at 08:28

## 2022-01-01 RX ADMIN — SODIUM CHLORIDE SOLN NEBU 3% 3 ML: 3 NEBU SOLN at 08:43

## 2022-01-01 RX ADMIN — GABAPENTIN 600 MG: 300 CAPSULE ORAL at 21:08

## 2022-01-01 RX ADMIN — ACYCLOVIR: 50 OINTMENT TOPICAL at 15:02

## 2022-01-01 RX ADMIN — PANTOPRAZOLE SODIUM 40 MG: 20 TABLET, DELAYED RELEASE ORAL at 08:16

## 2022-01-01 RX ADMIN — DILTIAZEM HYDROCHLORIDE 180 MG: 180 CAPSULE, EXTENDED RELEASE ORAL at 08:08

## 2022-01-01 RX ADMIN — ATORVASTATIN CALCIUM 20 MG: 10 TABLET, FILM COATED ORAL at 20:53

## 2022-01-01 RX ADMIN — CYANOCOBALAMIN TAB 500 MCG 500 MCG: 500 TAB at 08:28

## 2022-01-01 RX ADMIN — MORPHINE SULFATE 10 MG: 100 SOLUTION ORAL at 18:08

## 2022-01-01 RX ADMIN — IPRATROPIUM BROMIDE AND ALBUTEROL SULFATE 3 ML: 2.5; .5 SOLUTION RESPIRATORY (INHALATION) at 08:28

## 2022-01-01 RX ADMIN — ACETAMINOPHEN 650 MG: 325 TABLET ORAL at 23:55

## 2022-01-01 RX ADMIN — ACETAMINOPHEN 975 MG: 325 TABLET ORAL at 17:12

## 2022-01-01 RX ADMIN — Medication 25 MCG: at 09:08

## 2022-01-01 RX ADMIN — AZITHROMYCIN 250 MG: 250 TABLET, FILM COATED ORAL at 08:45

## 2022-01-01 RX ADMIN — PANTOPRAZOLE SODIUM 40 MG: 20 TABLET, DELAYED RELEASE ORAL at 08:09

## 2022-01-01 RX ADMIN — IPRATROPIUM BROMIDE AND ALBUTEROL SULFATE 3 ML: 2.5; .5 SOLUTION RESPIRATORY (INHALATION) at 20:46

## 2022-01-01 RX ADMIN — OLANZAPINE 2.5 MG: 2.5 TABLET, FILM COATED ORAL at 17:57

## 2022-01-01 RX ADMIN — SODIUM CHLORIDE SOLN NEBU 3% 3 ML: 3 NEBU SOLN at 08:47

## 2022-01-01 RX ADMIN — IPRATROPIUM BROMIDE AND ALBUTEROL SULFATE 3 ML: 2.5; .5 SOLUTION RESPIRATORY (INHALATION) at 11:38

## 2022-01-01 RX ADMIN — IPRATROPIUM BROMIDE AND ALBUTEROL SULFATE 3 ML: 2.5; .5 SOLUTION RESPIRATORY (INHALATION) at 12:53

## 2022-01-01 RX ADMIN — IPRATROPIUM BROMIDE AND ALBUTEROL SULFATE 3 ML: 2.5; .5 SOLUTION RESPIRATORY (INHALATION) at 16:13

## 2022-01-01 RX ADMIN — SODIUM CHLORIDE SOLN NEBU 3% 3 ML: 3 NEBU SOLN at 20:19

## 2022-01-01 RX ADMIN — SODIUM CHLORIDE SOLN NEBU 3% 3 ML: 3 NEBU SOLN at 07:39

## 2022-01-01 RX ADMIN — GABAPENTIN 600 MG: 300 CAPSULE ORAL at 16:59

## 2022-01-01 RX ADMIN — BENZOCAINE AND MENTHOL 1 LOZENGE: 15; 3.6 LOZENGE ORAL at 05:48

## 2022-01-01 RX ADMIN — PANTOPRAZOLE SODIUM 40 MG: 20 TABLET, DELAYED RELEASE ORAL at 08:59

## 2022-01-01 RX ADMIN — ARIPIPRAZOLE 5 MG: 5 TABLET ORAL at 21:01

## 2022-01-01 RX ADMIN — ACETYLCYSTEINE 4 ML: 200 SOLUTION ORAL; RESPIRATORY (INHALATION) at 08:30

## 2022-01-01 RX ADMIN — Medication 400 MG: at 21:27

## 2022-01-01 RX ADMIN — RIVAROXABAN 20 MG: 10 TABLET, FILM COATED ORAL at 17:57

## 2022-01-01 RX ADMIN — IPRATROPIUM BROMIDE AND ALBUTEROL SULFATE 3 ML: 2.5; .5 SOLUTION RESPIRATORY (INHALATION) at 23:51

## 2022-01-01 RX ADMIN — IPRATROPIUM BROMIDE AND ALBUTEROL SULFATE 3 ML: 2.5; .5 SOLUTION RESPIRATORY (INHALATION) at 12:17

## 2022-01-01 RX ADMIN — INSULIN GLARGINE 4 UNITS: 100 INJECTION, SOLUTION SUBCUTANEOUS at 22:27

## 2022-01-01 RX ADMIN — ATORVASTATIN CALCIUM 20 MG: 10 TABLET, FILM COATED ORAL at 22:47

## 2022-01-01 RX ADMIN — CEFEPIME HYDROCHLORIDE 2 G: 2 INJECTION, POWDER, FOR SOLUTION INTRAVENOUS at 14:59

## 2022-01-01 RX ADMIN — POTASSIUM CHLORIDE 10 MEQ: 750 TABLET, EXTENDED RELEASE ORAL at 08:39

## 2022-01-01 RX ADMIN — CYANOCOBALAMIN TAB 500 MCG 500 MCG: 500 TAB at 09:15

## 2022-01-01 RX ADMIN — LORAZEPAM 0.25 MG: 0.5 TABLET ORAL at 08:47

## 2022-01-01 RX ADMIN — FUROSEMIDE 20 MG: 10 INJECTION, SOLUTION INTRAMUSCULAR; INTRAVENOUS at 06:15

## 2022-01-01 RX ADMIN — QUETIAPINE 12.5 MG: 25 TABLET, FILM COATED ORAL at 08:45

## 2022-01-01 RX ADMIN — VALACYCLOVIR HYDROCHLORIDE 500 MG: 500 TABLET, FILM COATED ORAL at 21:28

## 2022-01-01 RX ADMIN — DILTIAZEM HYDROCHLORIDE 240 MG: 240 CAPSULE, COATED, EXTENDED RELEASE ORAL at 08:49

## 2022-01-01 RX ADMIN — Medication 3 MG: at 21:34

## 2022-01-01 RX ADMIN — ACETYLCYSTEINE 4 ML: 200 SOLUTION ORAL; RESPIRATORY (INHALATION) at 04:40

## 2022-01-01 RX ADMIN — PANTOPRAZOLE SODIUM 40 MG: 20 TABLET, DELAYED RELEASE ORAL at 08:39

## 2022-01-01 RX ADMIN — Medication 25 MCG: at 10:02

## 2022-01-01 RX ADMIN — ATORVASTATIN CALCIUM 20 MG: 10 TABLET, FILM COATED ORAL at 21:37

## 2022-01-01 RX ADMIN — POLYETHYLENE GLYCOL 3350 17 G: 17 POWDER, FOR SOLUTION ORAL at 08:06

## 2022-01-01 RX ADMIN — FUROSEMIDE 40 MG: 10 INJECTION, SOLUTION INTRAMUSCULAR; INTRAVENOUS at 16:11

## 2022-01-01 RX ADMIN — NITROGLYCERIN 0.4 MG: 0.4 TABLET SUBLINGUAL at 03:18

## 2022-01-01 RX ADMIN — DOCUSATE SODIUM 100 MG: 100 CAPSULE, LIQUID FILLED ORAL at 08:21

## 2022-01-01 RX ADMIN — CEFEPIME HYDROCHLORIDE 2 G: 2 INJECTION, POWDER, FOR SOLUTION INTRAVENOUS at 11:14

## 2022-01-01 RX ADMIN — POTASSIUM CHLORIDE 20 MEQ: 1500 TABLET, EXTENDED RELEASE ORAL at 06:56

## 2022-01-01 RX ADMIN — ATORVASTATIN CALCIUM 20 MG: 10 TABLET, FILM COATED ORAL at 22:13

## 2022-01-01 RX ADMIN — CYANOCOBALAMIN TAB 500 MCG 500 MCG: 500 TAB at 09:34

## 2022-01-01 RX ADMIN — DOCUSATE SODIUM 100 MG: 100 CAPSULE, LIQUID FILLED ORAL at 20:48

## 2022-01-01 RX ADMIN — PANTOPRAZOLE SODIUM 40 MG: 20 TABLET, DELAYED RELEASE ORAL at 08:21

## 2022-01-01 RX ADMIN — ATORVASTATIN CALCIUM 20 MG: 10 TABLET, FILM COATED ORAL at 21:12

## 2022-01-01 RX ADMIN — Medication 1 TABLET: at 10:02

## 2022-01-01 RX ADMIN — PANTOPRAZOLE SODIUM 40 MG: 20 TABLET, DELAYED RELEASE ORAL at 09:13

## 2022-01-01 RX ADMIN — ACETYLCYSTEINE 4 ML: 200 SOLUTION ORAL; RESPIRATORY (INHALATION) at 16:35

## 2022-01-01 RX ADMIN — PANTOPRAZOLE SODIUM 40 MG: 40 TABLET, DELAYED RELEASE ORAL at 06:15

## 2022-01-01 RX ADMIN — ACETYLCYSTEINE 4 ML: 200 SOLUTION ORAL; RESPIRATORY (INHALATION) at 16:13

## 2022-01-01 RX ADMIN — DILTIAZEM HYDROCHLORIDE 240 MG: 120 CAPSULE, COATED, EXTENDED RELEASE ORAL at 08:06

## 2022-01-01 RX ADMIN — CEFEPIME HYDROCHLORIDE 2 G: 2 INJECTION, POWDER, FOR SOLUTION INTRAVENOUS at 09:02

## 2022-01-01 RX ADMIN — PANTOPRAZOLE SODIUM 40 MG: 20 TABLET, DELAYED RELEASE ORAL at 07:48

## 2022-01-01 RX ADMIN — IPRATROPIUM BROMIDE AND ALBUTEROL SULFATE 3 ML: 2.5; .5 SOLUTION RESPIRATORY (INHALATION) at 20:14

## 2022-01-01 RX ADMIN — ACETAMINOPHEN 650 MG: 325 TABLET ORAL at 06:01

## 2022-01-01 RX ADMIN — CYANOCOBALAMIN TAB 500 MCG 500 MCG: 500 TAB at 08:07

## 2022-01-01 RX ADMIN — CEFEPIME HYDROCHLORIDE 2 G: 2 INJECTION, POWDER, FOR SOLUTION INTRAVENOUS at 02:54

## 2022-01-01 RX ADMIN — CYANOCOBALAMIN TAB 500 MCG 500 MCG: 500 TAB at 09:01

## 2022-01-01 RX ADMIN — SPIRONOLACTONE 12.5 MG: 25 TABLET ORAL at 09:08

## 2022-01-01 RX ADMIN — CYANOCOBALAMIN TAB 500 MCG 500 MCG: 500 TAB at 08:52

## 2022-01-01 RX ADMIN — ACETYLCYSTEINE 4 ML: 200 SOLUTION ORAL; RESPIRATORY (INHALATION) at 08:06

## 2022-01-01 RX ADMIN — IPRATROPIUM BROMIDE AND ALBUTEROL SULFATE 3 ML: 2.5; .5 SOLUTION RESPIRATORY (INHALATION) at 12:13

## 2022-01-01 RX ADMIN — ACETAMINOPHEN 650 MG: 325 TABLET ORAL at 17:37

## 2022-01-01 RX ADMIN — INSULIN ASPART 1 UNITS: 100 INJECTION, SOLUTION INTRAVENOUS; SUBCUTANEOUS at 09:44

## 2022-01-01 RX ADMIN — RIVAROXABAN 20 MG: 10 TABLET, FILM COATED ORAL at 15:51

## 2022-01-01 RX ADMIN — SODIUM CHLORIDE SOLN NEBU 3% 3 ML: 3 NEBU SOLN at 09:27

## 2022-01-01 RX ADMIN — SPIRONOLACTONE 12.5 MG: 25 TABLET ORAL at 08:10

## 2022-01-01 RX ADMIN — MEROPENEM 1 G: 1 INJECTION, POWDER, FOR SOLUTION INTRAVENOUS at 12:10

## 2022-01-01 RX ADMIN — IPRATROPIUM BROMIDE AND ALBUTEROL SULFATE 3 ML: 2.5; .5 SOLUTION RESPIRATORY (INHALATION) at 16:23

## 2022-01-01 RX ADMIN — METHYLPREDNISOLONE SODIUM SUCCINATE 62.5 MG: 125 INJECTION, POWDER, FOR SOLUTION INTRAMUSCULAR; INTRAVENOUS at 06:14

## 2022-01-01 RX ADMIN — CLOTRIMAZOLE: 10 CREAM TOPICAL at 21:15

## 2022-01-01 RX ADMIN — INSULIN ASPART 3 UNITS: 100 INJECTION, SOLUTION INTRAVENOUS; SUBCUTANEOUS at 16:40

## 2022-01-01 RX ADMIN — IOPAMIDOL 75 ML: 755 INJECTION, SOLUTION INTRAVENOUS at 03:15

## 2022-01-01 RX ADMIN — FUROSEMIDE 40 MG: 40 TABLET ORAL at 18:07

## 2022-01-01 RX ADMIN — Medication 1 TABLET: at 08:27

## 2022-01-01 RX ADMIN — PANTOPRAZOLE SODIUM 40 MG: 20 TABLET, DELAYED RELEASE ORAL at 08:49

## 2022-01-01 RX ADMIN — PANTOPRAZOLE SODIUM 40 MG: 20 TABLET, DELAYED RELEASE ORAL at 09:00

## 2022-01-01 RX ADMIN — SODIUM CHLORIDE SOLN NEBU 3% 3 ML: 3 NEBU SOLN at 12:17

## 2022-01-01 RX ADMIN — FUROSEMIDE 20 MG: 10 INJECTION, SOLUTION INTRAMUSCULAR; INTRAVENOUS at 17:10

## 2022-01-01 RX ADMIN — UMECLIDINIUM 1 PUFF: 62.5 AEROSOL, POWDER ORAL at 08:58

## 2022-01-01 RX ADMIN — ACETYLCYSTEINE 4 ML: 200 SOLUTION ORAL; RESPIRATORY (INHALATION) at 09:20

## 2022-01-01 RX ADMIN — ACETYLCYSTEINE 4 ML: 200 SOLUTION ORAL; RESPIRATORY (INHALATION) at 15:33

## 2022-01-01 RX ADMIN — SPIRONOLACTONE 12.5 MG: 25 TABLET ORAL at 08:16

## 2022-01-01 RX ADMIN — ACETYLCYSTEINE 4 ML: 200 SOLUTION ORAL; RESPIRATORY (INHALATION) at 11:23

## 2022-01-01 RX ADMIN — ARIPIPRAZOLE 5 MG: 5 TABLET ORAL at 20:19

## 2022-01-01 RX ADMIN — SODIUM CHLORIDE SOLN NEBU 3% 4 ML: 3 NEBU SOLN at 07:24

## 2022-01-01 RX ADMIN — DOXYCYCLINE 100 MG: 100 INJECTION, POWDER, LYOPHILIZED, FOR SOLUTION INTRAVENOUS at 16:21

## 2022-01-01 RX ADMIN — MAGNESIUM SULFATE HEPTAHYDRATE 2 G: 40 INJECTION, SOLUTION INTRAVENOUS at 06:10

## 2022-01-01 RX ADMIN — RIVAROXABAN 20 MG: 10 TABLET, FILM COATED ORAL at 17:45

## 2022-01-01 RX ADMIN — SODIUM CHLORIDE SOLN NEBU 3% 3 ML: 3 NEBU SOLN at 16:34

## 2022-01-01 RX ADMIN — INSULIN ASPART 1 UNITS: 100 INJECTION, SOLUTION INTRAVENOUS; SUBCUTANEOUS at 07:31

## 2022-01-01 RX ADMIN — IPRATROPIUM BROMIDE AND ALBUTEROL SULFATE 3 ML: 2.5; .5 SOLUTION RESPIRATORY (INHALATION) at 14:23

## 2022-01-01 RX ADMIN — PREDNISONE 40 MG: 20 TABLET ORAL at 16:35

## 2022-01-01 RX ADMIN — CLOTRIMAZOLE: 10 CREAM TOPICAL at 09:15

## 2022-01-01 RX ADMIN — BENZOCAINE AND MENTHOL 1 LOZENGE: 15; 3.6 LOZENGE ORAL at 22:11

## 2022-01-01 RX ADMIN — SODIUM CHLORIDE SOLN NEBU 3% 3 ML: 3 NEBU SOLN at 11:32

## 2022-01-01 RX ADMIN — ACETYLCYSTEINE 4 ML: 200 SOLUTION ORAL; RESPIRATORY (INHALATION) at 04:46

## 2022-01-01 RX ADMIN — VALACYCLOVIR HYDROCHLORIDE 1000 MG: 1 TABLET, FILM COATED ORAL at 08:59

## 2022-01-01 RX ADMIN — ACETAMINOPHEN 650 MG: 325 TABLET ORAL at 17:18

## 2022-01-01 RX ADMIN — PANTOPRAZOLE SODIUM 40 MG: 20 TABLET, DELAYED RELEASE ORAL at 07:45

## 2022-01-01 RX ADMIN — IPRATROPIUM BROMIDE AND ALBUTEROL SULFATE 3 ML: 2.5; .5 SOLUTION RESPIRATORY (INHALATION) at 20:59

## 2022-01-01 RX ADMIN — ACYCLOVIR: 50 OINTMENT TOPICAL at 05:42

## 2022-01-01 RX ADMIN — IPRATROPIUM BROMIDE AND ALBUTEROL SULFATE 3 ML: 2.5; .5 SOLUTION RESPIRATORY (INHALATION) at 20:54

## 2022-01-01 RX ADMIN — METHYLPREDNISOLONE SODIUM SUCCINATE 62.5 MG: 125 INJECTION, POWDER, FOR SOLUTION INTRAMUSCULAR; INTRAVENOUS at 21:02

## 2022-01-01 RX ADMIN — FUROSEMIDE 40 MG: 40 TABLET ORAL at 09:35

## 2022-01-01 RX ADMIN — SODIUM CHLORIDE SOLN NEBU 3% 3 ML: 3 NEBU SOLN at 12:30

## 2022-01-01 RX ADMIN — ACYCLOVIR: 50 OINTMENT TOPICAL at 21:26

## 2022-01-01 RX ADMIN — IPRATROPIUM BROMIDE AND ALBUTEROL SULFATE 3 ML: 2.5; .5 SOLUTION RESPIRATORY (INHALATION) at 08:24

## 2022-01-01 RX ADMIN — CEFEPIME HYDROCHLORIDE 2 G: 2 INJECTION, POWDER, FOR SOLUTION INTRAVENOUS at 23:25

## 2022-01-01 RX ADMIN — ACETYLCYSTEINE 4 ML: 200 SOLUTION ORAL; RESPIRATORY (INHALATION) at 11:18

## 2022-01-01 RX ADMIN — CEFEPIME HYDROCHLORIDE 2 G: 2 INJECTION, POWDER, FOR SOLUTION INTRAVENOUS at 11:58

## 2022-01-01 RX ADMIN — ACETYLCYSTEINE 4 ML: 200 SOLUTION ORAL; RESPIRATORY (INHALATION) at 15:18

## 2022-01-01 RX ADMIN — TERBINAFINE HYDROCHLORIDE 250 MG: 250 TABLET ORAL at 09:55

## 2022-01-01 RX ADMIN — ACETAMINOPHEN 650 MG: 325 TABLET ORAL at 00:49

## 2022-01-01 RX ADMIN — IPRATROPIUM BROMIDE AND ALBUTEROL SULFATE 3 ML: 2.5; .5 SOLUTION RESPIRATORY (INHALATION) at 16:49

## 2022-01-01 RX ADMIN — FUROSEMIDE 40 MG: 10 INJECTION, SOLUTION INTRAVENOUS at 16:16

## 2022-01-01 RX ADMIN — FUROSEMIDE 40 MG: 40 TABLET ORAL at 17:16

## 2022-01-01 RX ADMIN — FUROSEMIDE 40 MG: 10 INJECTION, SOLUTION INTRAMUSCULAR; INTRAVENOUS at 11:24

## 2022-01-01 RX ADMIN — PANTOPRAZOLE SODIUM 40 MG: 20 TABLET, DELAYED RELEASE ORAL at 09:05

## 2022-01-01 RX ADMIN — MORPHINE SULFATE 5 MG: 100 SOLUTION ORAL at 12:28

## 2022-01-01 RX ADMIN — ACETAMINOPHEN 1000 MG: 500 TABLET, FILM COATED ORAL at 20:14

## 2022-01-01 RX ADMIN — MEROPENEM 1 G: 1 INJECTION, POWDER, FOR SOLUTION INTRAVENOUS at 13:51

## 2022-01-01 RX ADMIN — IPRATROPIUM BROMIDE AND ALBUTEROL SULFATE 3 ML: 2.5; .5 SOLUTION RESPIRATORY (INHALATION) at 07:23

## 2022-01-01 RX ADMIN — CEFEPIME HYDROCHLORIDE 2 G: 2 INJECTION, POWDER, FOR SOLUTION INTRAVENOUS at 11:25

## 2022-01-01 RX ADMIN — ARIPIPRAZOLE 5 MG: 5 TABLET ORAL at 21:59

## 2022-01-01 RX ADMIN — FUROSEMIDE 80 MG: 10 INJECTION, SOLUTION INTRAMUSCULAR; INTRAVENOUS at 08:42

## 2022-01-01 RX ADMIN — ACETAMINOPHEN 650 MG: 325 TABLET ORAL at 00:36

## 2022-01-01 RX ADMIN — PREDNISONE 40 MG: 20 TABLET ORAL at 07:48

## 2022-01-01 RX ADMIN — NASAL DECONGESTANT 2 SPRAY: 0.05 SPRAY NASAL at 20:13

## 2022-01-01 RX ADMIN — INSULIN ASPART 2 UNITS: 100 INJECTION, SOLUTION INTRAVENOUS; SUBCUTANEOUS at 06:10

## 2022-01-01 RX ADMIN — CYANOCOBALAMIN TAB 500 MCG 500 MCG: 500 TAB at 08:44

## 2022-01-01 RX ADMIN — POTASSIUM CHLORIDE 40 MEQ: 1500 TABLET, EXTENDED RELEASE ORAL at 12:21

## 2022-01-01 RX ADMIN — SODIUM CHLORIDE SOLN NEBU 3% 3 ML: 3 NEBU SOLN at 12:14

## 2022-01-01 RX ADMIN — MORPHINE SULFATE 10 MG: 100 SOLUTION ORAL at 13:10

## 2022-01-01 RX ADMIN — VANCOMYCIN HYDROCHLORIDE 1500 MG: 5 INJECTION, POWDER, LYOPHILIZED, FOR SOLUTION INTRAVENOUS at 15:51

## 2022-01-01 RX ADMIN — IPRATROPIUM BROMIDE AND ALBUTEROL SULFATE 3 ML: 2.5; .5 SOLUTION RESPIRATORY (INHALATION) at 21:05

## 2022-01-01 RX ADMIN — MAGNESIUM SULFATE IN WATER 4 G: 80 INJECTION, SOLUTION INTRAVENOUS at 23:21

## 2022-01-01 RX ADMIN — ARIPIPRAZOLE 5 MG: 5 TABLET ORAL at 22:15

## 2022-01-01 RX ADMIN — GUAIFENESIN 1200 MG: 600 TABLET ORAL at 08:00

## 2022-01-01 RX ADMIN — SODIUM CHLORIDE SOLN NEBU 3% 3 ML: 3 NEBU SOLN at 08:09

## 2022-01-01 RX ADMIN — GABAPENTIN 600 MG: 300 CAPSULE ORAL at 21:03

## 2022-01-01 RX ADMIN — TOBRAMYCIN INHALATION SOLUTION 150 MG: 300 INHALANT RESPIRATORY (INHALATION) at 07:01

## 2022-01-01 RX ADMIN — SODIUM CHLORIDE SOLN NEBU 3% 3 ML: 3 NEBU SOLN at 12:10

## 2022-01-01 RX ADMIN — PREDNISONE 10 MG: 10 TABLET ORAL at 10:02

## 2022-01-01 RX ADMIN — ATORVASTATIN CALCIUM 20 MG: 10 TABLET, FILM COATED ORAL at 22:07

## 2022-01-01 RX ADMIN — ATORVASTATIN CALCIUM 20 MG: 10 TABLET, FILM COATED ORAL at 21:29

## 2022-01-01 RX ADMIN — POLYETHYLENE GLYCOL 3350 17 G: 17 POWDER, FOR SOLUTION ORAL at 07:49

## 2022-01-01 RX ADMIN — SPIRONOLACTONE 12.5 MG: 25 TABLET ORAL at 08:00

## 2022-01-01 RX ADMIN — IPRATROPIUM BROMIDE AND ALBUTEROL SULFATE 3 ML: 2.5; .5 SOLUTION RESPIRATORY (INHALATION) at 08:25

## 2022-01-01 RX ADMIN — SODIUM CHLORIDE SOLN NEBU 3% 3 ML: 3 NEBU SOLN at 20:02

## 2022-01-01 RX ADMIN — SPIRONOLACTONE 12.5 MG: 25 TABLET ORAL at 08:20

## 2022-01-01 RX ADMIN — ACETYLCYSTEINE 4 ML: 200 INHALANT RESPIRATORY (INHALATION) at 07:01

## 2022-01-01 RX ADMIN — ALBUTEROL SULFATE 2.5 MG: 2.5 SOLUTION RESPIRATORY (INHALATION) at 08:47

## 2022-01-01 RX ADMIN — ACETAMINOPHEN 650 MG: 325 TABLET ORAL at 12:17

## 2022-01-01 RX ADMIN — Medication 1 TABLET: at 07:50

## 2022-01-01 RX ADMIN — TOBRAMYCIN INHALATION SOLUTION 150 MG: 300 INHALANT RESPIRATORY (INHALATION) at 11:47

## 2022-01-01 RX ADMIN — ACETAMINOPHEN 650 MG: 325 TABLET ORAL at 23:44

## 2022-01-01 RX ADMIN — MEROPENEM 1 G: 1 INJECTION, POWDER, FOR SOLUTION INTRAVENOUS at 05:03

## 2022-01-01 RX ADMIN — CEFEPIME HYDROCHLORIDE 2 G: 2 INJECTION, POWDER, FOR SOLUTION INTRAVENOUS at 14:57

## 2022-01-01 RX ADMIN — PANTOPRAZOLE SODIUM 40 MG: 20 TABLET, DELAYED RELEASE ORAL at 07:49

## 2022-01-01 RX ADMIN — ACETAMINOPHEN 1000 MG: 325 TABLET ORAL at 14:54

## 2022-01-01 RX ADMIN — ACYCLOVIR: 50 OINTMENT TOPICAL at 12:10

## 2022-01-01 RX ADMIN — POLYETHYLENE GLYCOL 3350 17 G: 17 POWDER, FOR SOLUTION ORAL at 09:01

## 2022-01-01 RX ADMIN — ACETAMINOPHEN 650 MG: 325 TABLET ORAL at 05:39

## 2022-01-01 RX ADMIN — IPRATROPIUM BROMIDE AND ALBUTEROL SULFATE 3 ML: 2.5; .5 SOLUTION RESPIRATORY (INHALATION) at 09:21

## 2022-01-01 RX ADMIN — NITROGLYCERIN 0.4 MG: 0.4 TABLET SUBLINGUAL at 11:18

## 2022-01-01 RX ADMIN — ARIPIPRAZOLE 5 MG: 5 TABLET ORAL at 20:46

## 2022-01-01 RX ADMIN — SPIRONOLACTONE 12.5 MG: 25 TABLET ORAL at 07:49

## 2022-01-01 RX ADMIN — CEFEPIME HYDROCHLORIDE 2 G: 2 INJECTION, POWDER, FOR SOLUTION INTRAVENOUS at 19:12

## 2022-01-01 RX ADMIN — DOCUSATE SODIUM 100 MG: 100 CAPSULE, LIQUID FILLED ORAL at 19:46

## 2022-01-01 RX ADMIN — IPRATROPIUM BROMIDE AND ALBUTEROL SULFATE 3 ML: 2.5; .5 SOLUTION RESPIRATORY (INHALATION) at 07:21

## 2022-01-01 RX ADMIN — ACETAMINOPHEN 1000 MG: 500 TABLET, FILM COATED ORAL at 19:01

## 2022-01-01 RX ADMIN — CEFEPIME HYDROCHLORIDE 2 G: 2 INJECTION, POWDER, FOR SOLUTION INTRAVENOUS at 18:16

## 2022-01-01 RX ADMIN — ACETYLCYSTEINE 4 ML: 200 SOLUTION ORAL; RESPIRATORY (INHALATION) at 12:27

## 2022-01-01 RX ADMIN — FUROSEMIDE 40 MG: 40 TABLET ORAL at 09:09

## 2022-01-01 RX ADMIN — ARIPIPRAZOLE 5 MG: 5 TABLET ORAL at 21:40

## 2022-01-01 RX ADMIN — ARIPIPRAZOLE 5 MG: 5 TABLET ORAL at 21:29

## 2022-01-01 RX ADMIN — ACETAMINOPHEN 650 MG: 325 TABLET ORAL at 17:02

## 2022-01-01 RX ADMIN — ACETAMINOPHEN 650 MG: 325 TABLET ORAL at 19:00

## 2022-01-01 RX ADMIN — FUROSEMIDE 40 MG: 10 INJECTION, SOLUTION INTRAVENOUS at 18:00

## 2022-01-01 RX ADMIN — DOXYCYCLINE 100 MG: 100 INJECTION, POWDER, LYOPHILIZED, FOR SOLUTION INTRAVENOUS at 16:48

## 2022-01-01 RX ADMIN — GABAPENTIN 600 MG: 300 CAPSULE ORAL at 22:15

## 2022-01-01 RX ADMIN — ACETAMINOPHEN 650 MG: 325 TABLET ORAL at 00:34

## 2022-01-01 RX ADMIN — IPRATROPIUM BROMIDE AND ALBUTEROL SULFATE 3 ML: 2.5; .5 SOLUTION RESPIRATORY (INHALATION) at 16:26

## 2022-01-01 RX ADMIN — SPIRONOLACTONE 12.5 MG: 25 TABLET ORAL at 10:02

## 2022-01-01 RX ADMIN — AZITHROMYCIN MONOHYDRATE 500 MG: 500 INJECTION, POWDER, LYOPHILIZED, FOR SOLUTION INTRAVENOUS at 18:25

## 2022-01-01 RX ADMIN — ARIPIPRAZOLE 5 MG: 5 TABLET ORAL at 21:42

## 2022-01-01 RX ADMIN — PANTOPRAZOLE SODIUM 40 MG: 20 TABLET, DELAYED RELEASE ORAL at 08:28

## 2022-01-01 RX ADMIN — ALBUTEROL SULFATE 5 MG: 2.5 SOLUTION RESPIRATORY (INHALATION) at 02:30

## 2022-01-01 RX ADMIN — ACETYLCYSTEINE 4 ML: 200 SOLUTION ORAL; RESPIRATORY (INHALATION) at 20:35

## 2022-01-01 RX ADMIN — POTASSIUM CHLORIDE 10 MEQ: 750 TABLET, EXTENDED RELEASE ORAL at 08:14

## 2022-01-01 RX ADMIN — IPRATROPIUM BROMIDE AND ALBUTEROL SULFATE 3 ML: 2.5; .5 SOLUTION RESPIRATORY (INHALATION) at 08:43

## 2022-01-01 RX ADMIN — Medication 3 MG: at 21:13

## 2022-01-01 RX ADMIN — INSULIN ASPART 1 UNITS: 100 INJECTION, SOLUTION INTRAVENOUS; SUBCUTANEOUS at 05:48

## 2022-01-01 RX ADMIN — ACETAMINOPHEN 650 MG: 325 TABLET ORAL at 12:10

## 2022-01-01 RX ADMIN — IPRATROPIUM BROMIDE AND ALBUTEROL SULFATE 3 ML: 2.5; .5 SOLUTION RESPIRATORY (INHALATION) at 11:23

## 2022-01-01 RX ADMIN — DOCUSATE SODIUM 100 MG: 100 CAPSULE, LIQUID FILLED ORAL at 10:03

## 2022-01-01 RX ADMIN — SODIUM CHLORIDE SOLN NEBU 3% 4 ML: 3 NEBU SOLN at 20:40

## 2022-01-01 RX ADMIN — MORPHINE SULFATE 2 MG: 2 INJECTION, SOLUTION INTRAMUSCULAR; INTRAVENOUS at 00:42

## 2022-01-01 RX ADMIN — PREDNISONE 40 MG: 20 TABLET ORAL at 08:49

## 2022-01-01 RX ADMIN — ACETYLCYSTEINE 4 ML: 200 SOLUTION ORAL; RESPIRATORY (INHALATION) at 04:17

## 2022-01-01 RX ADMIN — SPIRONOLACTONE 12.5 MG: 25 TABLET ORAL at 08:14

## 2022-01-01 RX ADMIN — MEROPENEM 1 G: 1 INJECTION, POWDER, FOR SOLUTION INTRAVENOUS at 05:34

## 2022-01-01 RX ADMIN — ACETAMINOPHEN 500 MG: 325 TABLET ORAL at 05:42

## 2022-01-01 RX ADMIN — IPRATROPIUM BROMIDE AND ALBUTEROL SULFATE 3 ML: 2.5; .5 SOLUTION RESPIRATORY (INHALATION) at 04:17

## 2022-01-01 RX ADMIN — PANTOPRAZOLE SODIUM 40 MG: 20 TABLET, DELAYED RELEASE ORAL at 10:03

## 2022-01-01 RX ADMIN — DOCUSATE SODIUM 100 MG: 100 CAPSULE, LIQUID FILLED ORAL at 09:13

## 2022-01-01 RX ADMIN — Medication 400 MG: at 18:01

## 2022-01-01 RX ADMIN — METHYLPREDNISOLONE SODIUM SUCCINATE 125 MG: 125 INJECTION, POWDER, FOR SOLUTION INTRAMUSCULAR; INTRAVENOUS at 17:07

## 2022-01-01 RX ADMIN — DILTIAZEM HYDROCHLORIDE 240 MG: 120 CAPSULE, COATED, EXTENDED RELEASE ORAL at 08:38

## 2022-01-01 RX ADMIN — SPIRONOLACTONE 12.5 MG: 25 TABLET ORAL at 07:45

## 2022-01-01 RX ADMIN — Medication 1 TABLET: at 09:07

## 2022-01-01 RX ADMIN — IPRATROPIUM BROMIDE AND ALBUTEROL SULFATE 3 ML: 2.5; .5 SOLUTION RESPIRATORY (INHALATION) at 14:17

## 2022-01-01 RX ADMIN — IPRATROPIUM BROMIDE AND ALBUTEROL SULFATE 3 ML: 2.5; .5 SOLUTION RESPIRATORY (INHALATION) at 19:42

## 2022-01-01 RX ADMIN — RIVAROXABAN 20 MG: 10 TABLET, FILM COATED ORAL at 15:49

## 2022-01-01 RX ADMIN — ARIPIPRAZOLE 5 MG: 5 TABLET ORAL at 22:13

## 2022-01-01 RX ADMIN — FUROSEMIDE 40 MG: 40 TABLET ORAL at 09:01

## 2022-01-01 RX ADMIN — INSULIN ASPART 1 UNITS: 100 INJECTION, SOLUTION INTRAVENOUS; SUBCUTANEOUS at 08:09

## 2022-01-01 RX ADMIN — METHYLPREDNISOLONE SODIUM SUCCINATE 62.5 MG: 125 INJECTION, POWDER, FOR SOLUTION INTRAMUSCULAR; INTRAVENOUS at 23:40

## 2022-01-01 RX ADMIN — ARIPIPRAZOLE 5 MG: 5 TABLET ORAL at 21:04

## 2022-01-01 RX ADMIN — PANTOPRAZOLE SODIUM 40 MG: 20 TABLET, DELAYED RELEASE ORAL at 08:44

## 2022-01-01 RX ADMIN — CEFEPIME HYDROCHLORIDE 2 G: 2 INJECTION, POWDER, FOR SOLUTION INTRAVENOUS at 12:06

## 2022-01-01 RX ADMIN — ACYCLOVIR: 50 OINTMENT TOPICAL at 15:00

## 2022-01-01 RX ADMIN — MAGNESIUM SULFATE HEPTAHYDRATE 2 G: 40 INJECTION, SOLUTION INTRAVENOUS at 08:07

## 2022-01-01 RX ADMIN — MAGNESIUM SULFATE HEPTAHYDRATE 2 G: 40 INJECTION, SOLUTION INTRAVENOUS at 22:07

## 2022-01-01 RX ADMIN — ALBUTEROL SULFATE 2.5 MG: 2.5 SOLUTION RESPIRATORY (INHALATION) at 11:31

## 2022-01-01 RX ADMIN — ARIPIPRAZOLE 5 MG: 5 TABLET ORAL at 21:35

## 2022-01-01 RX ADMIN — IPRATROPIUM BROMIDE AND ALBUTEROL SULFATE 3 ML: 2.5; .5 SOLUTION RESPIRATORY (INHALATION) at 15:38

## 2022-01-01 RX ADMIN — PANTOPRAZOLE SODIUM 40 MG: 20 TABLET, DELAYED RELEASE ORAL at 08:51

## 2022-01-01 RX ADMIN — ACETAMINOPHEN 650 MG: 325 TABLET ORAL at 06:15

## 2022-01-01 RX ADMIN — PREDNISONE 10 MG: 10 TABLET ORAL at 08:07

## 2022-01-01 RX ADMIN — GABAPENTIN 600 MG: 300 CAPSULE ORAL at 16:50

## 2022-01-01 RX ADMIN — SODIUM CHLORIDE SOLN NEBU 3% 3 ML: 3 NEBU SOLN at 08:30

## 2022-01-01 RX ADMIN — IPRATROPIUM BROMIDE AND ALBUTEROL SULFATE 3 ML: 2.5; .5 SOLUTION RESPIRATORY (INHALATION) at 11:16

## 2022-01-01 RX ADMIN — PANTOPRAZOLE SODIUM 40 MG: 20 TABLET, DELAYED RELEASE ORAL at 09:35

## 2022-01-01 RX ADMIN — SODIUM CHLORIDE SOLN NEBU 3% 3 ML: 3 NEBU SOLN at 19:49

## 2022-01-01 RX ADMIN — PREDNISONE 30 MG: 20 TABLET ORAL at 09:07

## 2022-01-01 RX ADMIN — PREDNISONE 40 MG: 20 TABLET ORAL at 08:13

## 2022-01-01 RX ADMIN — ACETAMINOPHEN 650 MG: 325 TABLET ORAL at 05:35

## 2022-01-01 RX ADMIN — ALBUTEROL SULFATE 2.5 MG: 2.5 SOLUTION RESPIRATORY (INHALATION) at 19:56

## 2022-01-01 RX ADMIN — ARIPIPRAZOLE 5 MG: 5 TABLET ORAL at 21:41

## 2022-01-01 RX ADMIN — DILTIAZEM HYDROCHLORIDE 180 MG: 180 CAPSULE, EXTENDED RELEASE ORAL at 09:05

## 2022-01-01 RX ADMIN — INSULIN ASPART 1 UNITS: 100 INJECTION, SOLUTION INTRAVENOUS; SUBCUTANEOUS at 17:17

## 2022-01-01 RX ADMIN — ACETAMINOPHEN 650 MG: 325 TABLET ORAL at 17:55

## 2022-01-01 RX ADMIN — SODIUM CHLORIDE SOLN NEBU 3% 3 ML: 3 NEBU SOLN at 09:18

## 2022-01-01 RX ADMIN — CYANOCOBALAMIN TAB 500 MCG 500 MCG: 500 TAB at 09:08

## 2022-01-01 RX ADMIN — ACETAMINOPHEN 650 MG: 325 TABLET ORAL at 21:36

## 2022-01-01 RX ADMIN — FUROSEMIDE 40 MG: 10 INJECTION, SOLUTION INTRAMUSCULAR; INTRAVENOUS at 16:48

## 2022-01-01 RX ADMIN — ACETAMINOPHEN 650 MG: 325 TABLET ORAL at 13:10

## 2022-01-01 RX ADMIN — METFORMIN HYDROCHLORIDE 1000 MG: 500 TABLET, FILM COATED ORAL at 20:19

## 2022-01-01 RX ADMIN — FUROSEMIDE 20 MG: 10 INJECTION, SOLUTION INTRAMUSCULAR; INTRAVENOUS at 17:18

## 2022-01-01 RX ADMIN — CYANOCOBALAMIN TAB 500 MCG 500 MCG: 500 TAB at 08:39

## 2022-01-01 RX ADMIN — PREDNISONE 30 MG: 20 TABLET ORAL at 08:01

## 2022-01-01 RX ADMIN — SODIUM CHLORIDE SOLN NEBU 3% 3 ML: 3 NEBU SOLN at 16:33

## 2022-01-01 RX ADMIN — INSULIN ASPART 1 UNITS: 100 INJECTION, SOLUTION INTRAVENOUS; SUBCUTANEOUS at 12:13

## 2022-01-01 RX ADMIN — BARIUM SULFATE: 400 SUSPENSION ORAL at 14:40

## 2022-01-01 RX ADMIN — CEFEPIME HYDROCHLORIDE 2 G: 2 INJECTION, POWDER, FOR SOLUTION INTRAVENOUS at 02:19

## 2022-01-01 RX ADMIN — IPRATROPIUM BROMIDE AND ALBUTEROL SULFATE 3 ML: 2.5; .5 SOLUTION RESPIRATORY (INHALATION) at 20:11

## 2022-01-01 RX ADMIN — IPRATROPIUM BROMIDE AND ALBUTEROL SULFATE 3 ML: 2.5; .5 SOLUTION RESPIRATORY (INHALATION) at 20:13

## 2022-01-01 RX ADMIN — SODIUM CHLORIDE SOLN NEBU 3% 3 ML: 3 NEBU SOLN at 07:21

## 2022-01-01 RX ADMIN — ATORVASTATIN CALCIUM 20 MG: 10 TABLET, FILM COATED ORAL at 21:03

## 2022-01-01 RX ADMIN — ACETYLCYSTEINE 4 ML: 200 SOLUTION ORAL; RESPIRATORY (INHALATION) at 09:22

## 2022-01-01 RX ADMIN — MORPHINE SULFATE 2 MG: 2 INJECTION, SOLUTION INTRAMUSCULAR; INTRAVENOUS at 17:05

## 2022-01-01 RX ADMIN — DILTIAZEM HYDROCHLORIDE 240 MG: 120 CAPSULE, COATED, EXTENDED RELEASE ORAL at 09:58

## 2022-01-01 RX ADMIN — POLYETHYLENE GLYCOL 3350 17 G: 17 POWDER, FOR SOLUTION ORAL at 23:05

## 2022-01-01 RX ADMIN — ARIPIPRAZOLE 5 MG: 5 TABLET ORAL at 20:58

## 2022-01-01 RX ADMIN — DOCUSATE SODIUM 100 MG: 100 CAPSULE, LIQUID FILLED ORAL at 08:57

## 2022-01-01 RX ADMIN — CEFEPIME HYDROCHLORIDE 2 G: 2 INJECTION, POWDER, FOR SOLUTION INTRAVENOUS at 21:45

## 2022-01-01 RX ADMIN — IPRATROPIUM BROMIDE AND ALBUTEROL SULFATE 3 ML: 2.5; .5 SOLUTION RESPIRATORY (INHALATION) at 07:48

## 2022-01-01 RX ADMIN — GUAIFENESIN 600 MG: 600 TABLET ORAL at 09:15

## 2022-01-01 RX ADMIN — ALBUTEROL SULFATE 2.5 MG: 2.5 SOLUTION RESPIRATORY (INHALATION) at 16:26

## 2022-01-01 RX ADMIN — VANCOMYCIN HYDROCHLORIDE 1500 MG: 5 INJECTION, POWDER, LYOPHILIZED, FOR SOLUTION INTRAVENOUS at 13:59

## 2022-01-01 RX ADMIN — ACETYLCYSTEINE 4 ML: 200 SOLUTION ORAL; RESPIRATORY (INHALATION) at 08:31

## 2022-01-01 RX ADMIN — DILTIAZEM HYDROCHLORIDE 120 MG: 120 CAPSULE, EXTENDED RELEASE ORAL at 07:49

## 2022-01-01 RX ADMIN — FUROSEMIDE 40 MG: 40 TABLET ORAL at 08:43

## 2022-01-01 RX ADMIN — ACETYLCYSTEINE 4 ML: 200 SOLUTION ORAL; RESPIRATORY (INHALATION) at 19:32

## 2022-01-01 RX ADMIN — ACETYLCYSTEINE 4 ML: 200 SOLUTION ORAL; RESPIRATORY (INHALATION) at 12:41

## 2022-01-01 RX ADMIN — ACETAMINOPHEN 650 MG: 325 TABLET ORAL at 12:22

## 2022-01-01 RX ADMIN — BETAMETHASONE DIPROPIONATE: 0.5 OINTMENT, AUGMENTED TOPICAL at 08:03

## 2022-01-01 RX ADMIN — ARIPIPRAZOLE 5 MG: 5 TABLET ORAL at 22:08

## 2022-01-01 RX ADMIN — SPIRONOLACTONE 12.5 MG: 25 TABLET ORAL at 08:43

## 2022-01-01 RX ADMIN — DOCUSATE SODIUM 100 MG: 100 CAPSULE, LIQUID FILLED ORAL at 09:00

## 2022-01-01 RX ADMIN — SODIUM CHLORIDE SOLN NEBU 3% 3 ML: 3 NEBU SOLN at 19:57

## 2022-01-01 RX ADMIN — IOPAMIDOL 100 ML: 755 INJECTION, SOLUTION INTRAVENOUS at 08:41

## 2022-01-01 RX ADMIN — IPRATROPIUM BROMIDE AND ALBUTEROL SULFATE 3 ML: 2.5; .5 SOLUTION RESPIRATORY (INHALATION) at 12:03

## 2022-01-01 RX ADMIN — ACETAMINOPHEN 650 MG: 325 TABLET ORAL at 17:09

## 2022-01-01 RX ADMIN — SODIUM CHLORIDE SOLN NEBU 3% 3 ML: 3 NEBU SOLN at 20:22

## 2022-01-01 RX ADMIN — IPRATROPIUM BROMIDE AND ALBUTEROL SULFATE 3 ML: 2.5; .5 SOLUTION RESPIRATORY (INHALATION) at 13:52

## 2022-01-01 RX ADMIN — PANTOPRAZOLE SODIUM 40 MG: 20 TABLET, DELAYED RELEASE ORAL at 09:15

## 2022-01-01 RX ADMIN — INSULIN ASPART 1 UNITS: 100 INJECTION, SOLUTION INTRAVENOUS; SUBCUTANEOUS at 08:21

## 2022-01-01 RX ADMIN — METHYLPREDNISOLONE SODIUM SUCCINATE 40 MG: 40 INJECTION, POWDER, FOR SOLUTION INTRAMUSCULAR; INTRAVENOUS at 11:12

## 2022-01-01 RX ADMIN — NITROGLYCERIN 0.4 MG: 0.4 TABLET SUBLINGUAL at 10:54

## 2022-01-01 RX ADMIN — IPRATROPIUM BROMIDE AND ALBUTEROL SULFATE 3 ML: 2.5; .5 SOLUTION RESPIRATORY (INHALATION) at 07:28

## 2022-01-01 RX ADMIN — PANTOPRAZOLE SODIUM 40 MG: 20 TABLET, DELAYED RELEASE ORAL at 08:58

## 2022-01-01 RX ADMIN — DILTIAZEM HYDROCHLORIDE 240 MG: 240 CAPSULE, COATED, EXTENDED RELEASE ORAL at 08:43

## 2022-01-01 RX ADMIN — SPIRONOLACTONE 12.5 MG: 25 TABLET ORAL at 08:27

## 2022-01-01 RX ADMIN — IPRATROPIUM BROMIDE AND ALBUTEROL SULFATE 3 ML: 2.5; .5 SOLUTION RESPIRATORY (INHALATION) at 12:27

## 2022-01-01 RX ADMIN — PREDNISONE 10 MG: 5 TABLET ORAL at 09:55

## 2022-01-01 RX ADMIN — Medication 1 TABLET: at 10:04

## 2022-01-01 RX ADMIN — TOBRAMYCIN INHALATION SOLUTION 150 MG: 300 INHALANT RESPIRATORY (INHALATION) at 20:35

## 2022-01-01 RX ADMIN — ACETYLCYSTEINE 4 ML: 200 SOLUTION ORAL; RESPIRATORY (INHALATION) at 00:26

## 2022-01-01 RX ADMIN — FUROSEMIDE 40 MG: 40 TABLET ORAL at 09:04

## 2022-01-01 RX ADMIN — ALBUTEROL SULFATE 2.5 MG: 2.5 SOLUTION RESPIRATORY (INHALATION) at 00:25

## 2022-01-01 RX ADMIN — INSULIN ASPART 2 UNITS: 100 INJECTION, SOLUTION INTRAVENOUS; SUBCUTANEOUS at 08:21

## 2022-01-01 RX ADMIN — ACETYLCYSTEINE 4 ML: 200 SOLUTION ORAL; RESPIRATORY (INHALATION) at 07:53

## 2022-01-01 RX ADMIN — FUROSEMIDE 40 MG: 10 INJECTION, SOLUTION INTRAMUSCULAR; INTRAVENOUS at 22:08

## 2022-01-01 RX ADMIN — ACETAMINOPHEN 650 MG: 325 TABLET ORAL at 18:10

## 2022-01-01 RX ADMIN — SODIUM CHLORIDE SOLN NEBU 3% 3 ML: 3 NEBU SOLN at 20:34

## 2022-01-01 RX ADMIN — Medication 3 MG: at 22:34

## 2022-01-01 RX ADMIN — Medication 1 TABLET: at 08:44

## 2022-01-01 RX ADMIN — ALBUTEROL SULFATE 2.5 MG: 2.5 SOLUTION RESPIRATORY (INHALATION) at 09:26

## 2022-01-01 RX ADMIN — ATORVASTATIN CALCIUM 20 MG: 10 TABLET, FILM COATED ORAL at 20:55

## 2022-01-01 RX ADMIN — ACETYLCYSTEINE 4 ML: 200 SOLUTION ORAL; RESPIRATORY (INHALATION) at 20:29

## 2022-01-01 RX ADMIN — FLUTICASONE FUROATE AND VILANTEROL TRIFENATATE 1 PUFF: 100; 25 POWDER RESPIRATORY (INHALATION) at 09:43

## 2022-01-01 RX ADMIN — ALBUTEROL SULFATE 2.5 MG: 2.5 SOLUTION RESPIRATORY (INHALATION) at 12:28

## 2022-01-01 RX ADMIN — Medication 25 MCG: at 09:01

## 2022-01-01 RX ADMIN — IPRATROPIUM BROMIDE AND ALBUTEROL SULFATE 3 ML: 2.5; .5 SOLUTION RESPIRATORY (INHALATION) at 17:49

## 2022-01-01 RX ADMIN — RIVAROXABAN 20 MG: 10 TABLET, FILM COATED ORAL at 17:06

## 2022-01-01 RX ADMIN — DOCUSATE SODIUM 100 MG: 100 CAPSULE, LIQUID FILLED ORAL at 07:48

## 2022-01-01 RX ADMIN — MAGNESIUM SULFATE HEPTAHYDRATE 2 G: 40 INJECTION, SOLUTION INTRAVENOUS at 15:18

## 2022-01-01 RX ADMIN — GABAPENTIN 600 MG: 300 CAPSULE ORAL at 20:53

## 2022-01-01 RX ADMIN — ACETYLCYSTEINE 4 ML: 200 SOLUTION ORAL; RESPIRATORY (INHALATION) at 20:11

## 2022-01-01 RX ADMIN — PREDNISONE 40 MG: 20 TABLET ORAL at 09:36

## 2022-01-01 RX ADMIN — DOXYCYCLINE 100 MG: 100 CAPSULE ORAL at 08:00

## 2022-01-01 RX ADMIN — SODIUM CHLORIDE SOLN NEBU 3% 3 ML: 3 NEBU SOLN at 12:53

## 2022-01-01 RX ADMIN — Medication 1 TABLET: at 07:45

## 2022-01-01 RX ADMIN — ACYCLOVIR: 50 OINTMENT TOPICAL at 18:59

## 2022-01-01 RX ADMIN — DOXYCYCLINE 100 MG: 100 INJECTION, POWDER, LYOPHILIZED, FOR SOLUTION INTRAVENOUS at 10:26

## 2022-01-01 RX ADMIN — SODIUM CHLORIDE SOLN NEBU 3% 3 ML: 3 NEBU SOLN at 12:19

## 2022-01-01 RX ADMIN — DILTIAZEM HYDROCHLORIDE 240 MG: 120 CAPSULE, COATED, EXTENDED RELEASE ORAL at 09:43

## 2022-01-01 RX ADMIN — DILTIAZEM HYDROCHLORIDE 240 MG: 120 CAPSULE, COATED, EXTENDED RELEASE ORAL at 10:41

## 2022-01-01 RX ADMIN — PREDNISONE 30 MG: 20 TABLET ORAL at 12:21

## 2022-01-01 RX ADMIN — CLOTRIMAZOLE: 10 CREAM TOPICAL at 19:50

## 2022-01-01 RX ADMIN — DOCUSATE SODIUM 100 MG: 100 CAPSULE, LIQUID FILLED ORAL at 08:28

## 2022-01-01 RX ADMIN — IPRATROPIUM BROMIDE AND ALBUTEROL SULFATE 3 ML: 2.5; .5 SOLUTION RESPIRATORY (INHALATION) at 15:33

## 2022-01-01 RX ADMIN — QUETIAPINE 12.5 MG: 25 TABLET, FILM COATED ORAL at 09:14

## 2022-01-01 RX ADMIN — CEFEPIME HYDROCHLORIDE 2 G: 2 INJECTION, POWDER, FOR SOLUTION INTRAVENOUS at 18:26

## 2022-01-01 RX ADMIN — BENZOCAINE AND MENTHOL 1 LOZENGE: 15; 3.6 LOZENGE ORAL at 04:25

## 2022-01-01 RX ADMIN — METHYLPREDNISOLONE SODIUM SUCCINATE 62.5 MG: 125 INJECTION, POWDER, FOR SOLUTION INTRAMUSCULAR; INTRAVENOUS at 04:23

## 2022-01-01 RX ADMIN — ATORVASTATIN CALCIUM 20 MG: 10 TABLET, FILM COATED ORAL at 21:04

## 2022-01-01 RX ADMIN — IPRATROPIUM BROMIDE AND ALBUTEROL SULFATE 3 ML: 2.5; .5 SOLUTION RESPIRATORY (INHALATION) at 15:17

## 2022-01-01 RX ADMIN — PANTOPRAZOLE SODIUM 40 MG: 40 INJECTION, POWDER, FOR SOLUTION INTRAVENOUS at 08:05

## 2022-01-01 RX ADMIN — IPRATROPIUM BROMIDE AND ALBUTEROL SULFATE 3 ML: 2.5; .5 SOLUTION RESPIRATORY (INHALATION) at 07:55

## 2022-01-01 RX ADMIN — ACETYLCYSTEINE 4 ML: 200 SOLUTION ORAL; RESPIRATORY (INHALATION) at 20:59

## 2022-01-01 RX ADMIN — SODIUM CHLORIDE SOLN NEBU 3% 3 ML: 3 NEBU SOLN at 12:52

## 2022-01-01 RX ADMIN — MAGNESIUM OXIDE TAB 400 MG (241.3 MG ELEMENTAL MG) 400 MG: 400 (241.3 MG) TAB at 09:14

## 2022-01-01 RX ADMIN — FUROSEMIDE 40 MG: 20 TABLET ORAL at 08:13

## 2022-01-01 RX ADMIN — ACYCLOVIR: 50 OINTMENT TOPICAL at 08:01

## 2022-01-01 RX ADMIN — Medication 1 TABLET: at 08:46

## 2022-01-01 RX ADMIN — ACETAMINOPHEN 1000 MG: 500 TABLET, FILM COATED ORAL at 01:13

## 2022-01-01 RX ADMIN — INSULIN ASPART 1 UNITS: 100 INJECTION, SOLUTION INTRAVENOUS; SUBCUTANEOUS at 13:06

## 2022-01-01 RX ADMIN — FUROSEMIDE 40 MG: 10 INJECTION, SOLUTION INTRAMUSCULAR; INTRAVENOUS at 12:02

## 2022-01-01 RX ADMIN — Medication 25 MCG: at 08:07

## 2022-01-01 RX ADMIN — RIVAROXABAN 20 MG: 10 TABLET, FILM COATED ORAL at 17:15

## 2022-01-01 RX ADMIN — ACETYLCYSTEINE 4 ML: 200 SOLUTION ORAL; RESPIRATORY (INHALATION) at 08:57

## 2022-01-01 RX ADMIN — METHYLPREDNISOLONE SODIUM SUCCINATE 20 MG: 40 INJECTION, POWDER, FOR SOLUTION INTRAMUSCULAR; INTRAVENOUS at 14:55

## 2022-01-01 RX ADMIN — CYANOCOBALAMIN TAB 500 MCG 500 MCG: 500 TAB at 08:46

## 2022-01-01 RX ADMIN — CIPROFLOXACIN HYDROCHLORIDE 750 MG: 250 TABLET, FILM COATED ORAL at 12:16

## 2022-01-01 RX ADMIN — CIPROFLOXACIN HYDROCHLORIDE 750 MG: 250 TABLET, FILM COATED ORAL at 08:43

## 2022-01-01 RX ADMIN — ARIPIPRAZOLE 5 MG: 5 TABLET ORAL at 22:07

## 2022-01-01 RX ADMIN — ACETYLCYSTEINE 4 ML: 200 INHALANT RESPIRATORY (INHALATION) at 19:49

## 2022-01-01 RX ADMIN — ACETAMINOPHEN 650 MG: 325 TABLET ORAL at 05:24

## 2022-01-01 RX ADMIN — MEROPENEM 1 G: 1 INJECTION, POWDER, FOR SOLUTION INTRAVENOUS at 13:55

## 2022-01-01 RX ADMIN — Medication 400 MG: at 20:55

## 2022-01-01 RX ADMIN — ACYCLOVIR: 50 OINTMENT TOPICAL at 09:14

## 2022-01-01 RX ADMIN — IPRATROPIUM BROMIDE AND ALBUTEROL SULFATE 3 ML: 2.5; .5 SOLUTION RESPIRATORY (INHALATION) at 09:33

## 2022-01-01 RX ADMIN — IPRATROPIUM BROMIDE AND ALBUTEROL SULFATE 3 ML: 2.5; .5 SOLUTION RESPIRATORY (INHALATION) at 20:19

## 2022-01-01 RX ADMIN — QUETIAPINE 12.5 MG: 25 TABLET, FILM COATED ORAL at 20:09

## 2022-01-01 RX ADMIN — DOCUSATE SODIUM 50 MG AND SENNOSIDES 8.6 MG 1 TABLET: 8.6; 5 TABLET, FILM COATED ORAL at 20:46

## 2022-01-01 RX ADMIN — ACETYLCYSTEINE 4 ML: 200 SOLUTION ORAL; RESPIRATORY (INHALATION) at 08:28

## 2022-01-01 RX ADMIN — IPRATROPIUM BROMIDE AND ALBUTEROL SULFATE 3 ML: 2.5; .5 SOLUTION RESPIRATORY (INHALATION) at 12:15

## 2022-01-01 RX ADMIN — ARIPIPRAZOLE 5 MG: 5 TABLET ORAL at 21:22

## 2022-01-01 RX ADMIN — DOCUSATE SODIUM 100 MG: 100 CAPSULE, LIQUID FILLED ORAL at 21:21

## 2022-01-01 RX ADMIN — ACYCLOVIR: 50 OINTMENT TOPICAL at 00:37

## 2022-01-01 RX ADMIN — METHYLPREDNISOLONE SODIUM SUCCINATE 40 MG: 40 INJECTION, POWDER, FOR SOLUTION INTRAMUSCULAR; INTRAVENOUS at 11:25

## 2022-01-01 RX ADMIN — ARIPIPRAZOLE 5 MG: 5 TABLET ORAL at 21:30

## 2022-01-01 RX ADMIN — DOXYCYCLINE 100 MG: 100 INJECTION, POWDER, LYOPHILIZED, FOR SOLUTION INTRAVENOUS at 03:40

## 2022-01-01 RX ADMIN — SPIRONOLACTONE 12.5 MG: 25 TABLET ORAL at 08:45

## 2022-01-01 RX ADMIN — CIPROFLOXACIN HYDROCHLORIDE 750 MG: 250 TABLET, FILM COATED ORAL at 08:49

## 2022-01-01 RX ADMIN — MEROPENEM 1 G: 1 INJECTION, POWDER, FOR SOLUTION INTRAVENOUS at 21:14

## 2022-01-01 RX ADMIN — CEPHALEXIN 500 MG: 500 CAPSULE ORAL at 09:15

## 2022-01-01 RX ADMIN — DILTIAZEM HYDROCHLORIDE 240 MG: 240 CAPSULE, COATED, EXTENDED RELEASE ORAL at 08:10

## 2022-01-01 RX ADMIN — ACETYLCYSTEINE 4 ML: 200 SOLUTION ORAL; RESPIRATORY (INHALATION) at 07:21

## 2022-01-01 RX ADMIN — Medication 25 MCG: at 12:23

## 2022-01-01 RX ADMIN — MIRTAZAPINE 15 MG: 15 TABLET, FILM COATED ORAL at 21:29

## 2022-01-01 RX ADMIN — ACETAMINOPHEN 975 MG: 325 TABLET ORAL at 13:30

## 2022-01-01 RX ADMIN — SODIUM CHLORIDE SOLN NEBU 3% 3 ML: 3 NEBU SOLN at 17:49

## 2022-01-01 RX ADMIN — SODIUM CHLORIDE SOLN NEBU 3% 3 ML: 3 NEBU SOLN at 09:22

## 2022-01-01 RX ADMIN — METHYLPREDNISOLONE SODIUM SUCCINATE 40 MG: 40 INJECTION, POWDER, FOR SOLUTION INTRAMUSCULAR; INTRAVENOUS at 11:26

## 2022-01-01 RX ADMIN — ACETYLCYSTEINE 4 ML: 200 SOLUTION ORAL; RESPIRATORY (INHALATION) at 12:53

## 2022-01-01 RX ADMIN — SODIUM CHLORIDE SOLN NEBU 3% 3 ML: 3 NEBU SOLN at 16:06

## 2022-01-01 RX ADMIN — ACETAMINOPHEN 650 MG: 325 TABLET ORAL at 12:20

## 2022-01-01 RX ADMIN — GABAPENTIN 600 MG: 300 CAPSULE ORAL at 20:55

## 2022-01-01 RX ADMIN — SODIUM CHLORIDE SOLN NEBU 3% 3 ML: 3 NEBU SOLN at 20:47

## 2022-01-01 RX ADMIN — ACETAMINOPHEN 650 MG: 325 TABLET ORAL at 05:42

## 2022-01-01 RX ADMIN — METHYLPREDNISOLONE SODIUM SUCCINATE 62.5 MG: 125 INJECTION, POWDER, FOR SOLUTION INTRAMUSCULAR; INTRAVENOUS at 05:12

## 2022-01-01 RX ADMIN — Medication 25 MCG: at 08:38

## 2022-01-01 RX ADMIN — ATORVASTATIN CALCIUM 20 MG: 10 TABLET, FILM COATED ORAL at 21:46

## 2022-01-01 RX ADMIN — ACYCLOVIR: 50 OINTMENT TOPICAL at 15:31

## 2022-01-01 RX ADMIN — MEROPENEM 1 G: 1 INJECTION, POWDER, FOR SOLUTION INTRAVENOUS at 20:53

## 2022-01-01 RX ADMIN — ACETYLCYSTEINE 4 ML: 200 SOLUTION ORAL; RESPIRATORY (INHALATION) at 16:22

## 2022-01-01 RX ADMIN — SODIUM CHLORIDE SOLN NEBU 3% 3 ML: 3 NEBU SOLN at 08:06

## 2022-01-01 RX ADMIN — MEROPENEM 1 G: 1 INJECTION, POWDER, FOR SOLUTION INTRAVENOUS at 04:51

## 2022-01-01 RX ADMIN — ALBUTEROL SULFATE 2.5 MG: 2.5 SOLUTION RESPIRATORY (INHALATION) at 16:59

## 2022-01-01 RX ADMIN — MAGNESIUM OXIDE TAB 400 MG (241.3 MG ELEMENTAL MG) 400 MG: 400 (241.3 MG) TAB at 21:13

## 2022-01-01 RX ADMIN — IPRATROPIUM BROMIDE AND ALBUTEROL SULFATE 3 ML: 2.5; .5 SOLUTION RESPIRATORY (INHALATION) at 12:30

## 2022-01-01 RX ADMIN — ACYCLOVIR: 50 OINTMENT TOPICAL at 21:07

## 2022-01-01 RX ADMIN — ACETAMINOPHEN 1000 MG: 325 TABLET ORAL at 04:30

## 2022-01-01 RX ADMIN — RIVAROXABAN 20 MG: 10 TABLET, FILM COATED ORAL at 16:12

## 2022-01-01 RX ADMIN — CIPROFLOXACIN HYDROCHLORIDE 750 MG: 250 TABLET, FILM COATED ORAL at 14:24

## 2022-01-01 RX ADMIN — GUAIFENESIN 1200 MG: 600 TABLET ORAL at 22:26

## 2022-01-01 RX ADMIN — INSULIN ASPART 1 UNITS: 100 INJECTION, SOLUTION INTRAVENOUS; SUBCUTANEOUS at 09:06

## 2022-01-01 RX ADMIN — INSULIN ASPART 2 UNITS: 100 INJECTION, SOLUTION INTRAVENOUS; SUBCUTANEOUS at 11:12

## 2022-01-01 RX ADMIN — GABAPENTIN 600 MG: 300 CAPSULE ORAL at 20:14

## 2022-01-01 RX ADMIN — LORAZEPAM 0.25 MG: 0.5 TABLET ORAL at 21:21

## 2022-01-01 RX ADMIN — INSULIN ASPART 2 UNITS: 100 INJECTION, SOLUTION INTRAVENOUS; SUBCUTANEOUS at 06:00

## 2022-01-01 RX ADMIN — SPIRONOLACTONE 12.5 MG: 25 TABLET ORAL at 09:00

## 2022-01-01 RX ADMIN — SODIUM CHLORIDE SOLN NEBU 3% 3 ML: 3 NEBU SOLN at 11:38

## 2022-01-01 RX ADMIN — INSULIN ASPART 1 UNITS: 100 INJECTION, SOLUTION INTRAVENOUS; SUBCUTANEOUS at 17:28

## 2022-01-01 RX ADMIN — DOCUSATE SODIUM 100 MG: 100 CAPSULE, LIQUID FILLED ORAL at 21:37

## 2022-01-01 RX ADMIN — IPRATROPIUM BROMIDE AND ALBUTEROL SULFATE 3 ML: .5; 3 SOLUTION RESPIRATORY (INHALATION) at 12:53

## 2022-01-01 RX ADMIN — PANTOPRAZOLE SODIUM 40 MG: 20 TABLET, DELAYED RELEASE ORAL at 08:38

## 2022-01-01 RX ADMIN — DOCUSATE SODIUM 100 MG: 100 CAPSULE, LIQUID FILLED ORAL at 06:41

## 2022-01-01 RX ADMIN — ACETYLCYSTEINE 4 ML: 200 INHALANT RESPIRATORY (INHALATION) at 07:21

## 2022-01-01 RX ADMIN — CEFEPIME HYDROCHLORIDE 2 G: 2 INJECTION, POWDER, FOR SOLUTION INTRAVENOUS at 21:30

## 2022-01-01 RX ADMIN — NITROGLYCERIN 0.4 MG: 0.4 TABLET SUBLINGUAL at 02:51

## 2022-01-01 RX ADMIN — PANTOPRAZOLE SODIUM 40 MG: 20 TABLET, DELAYED RELEASE ORAL at 08:07

## 2022-01-01 RX ADMIN — DOCUSATE SODIUM 100 MG: 100 CAPSULE, LIQUID FILLED ORAL at 08:45

## 2022-01-01 RX ADMIN — SODIUM CHLORIDE SOLN NEBU 3% 3 ML: 3 NEBU SOLN at 13:30

## 2022-01-01 RX ADMIN — ACETYLCYSTEINE 4 ML: 200 SOLUTION ORAL; RESPIRATORY (INHALATION) at 15:50

## 2022-01-01 RX ADMIN — LORAZEPAM 0.25 MG: 0.5 TABLET ORAL at 13:30

## 2022-01-01 RX ADMIN — PREDNISONE 60 MG: 20 TABLET ORAL at 02:51

## 2022-01-01 RX ADMIN — ACETYLCYSTEINE 4 ML: 200 SOLUTION ORAL; RESPIRATORY (INHALATION) at 20:34

## 2022-01-01 RX ADMIN — ACETAMINOPHEN 650 MG: 325 TABLET ORAL at 17:29

## 2022-01-01 RX ADMIN — FUROSEMIDE 40 MG: 10 INJECTION, SOLUTION INTRAMUSCULAR; INTRAVENOUS at 22:42

## 2022-01-01 RX ADMIN — ATORVASTATIN CALCIUM 20 MG: 10 TABLET, FILM COATED ORAL at 20:46

## 2022-01-01 RX ADMIN — CYANOCOBALAMIN TAB 500 MCG 500 MCG: 500 TAB at 07:48

## 2022-01-01 RX ADMIN — POLYETHYLENE GLYCOL 3350 17 G: 17 POWDER, FOR SOLUTION ORAL at 21:12

## 2022-01-01 RX ADMIN — QUETIAPINE 12.5 MG: 25 TABLET, FILM COATED ORAL at 19:49

## 2022-01-01 RX ADMIN — POLYETHYLENE GLYCOL 3350 17 G: 17 POWDER, FOR SOLUTION ORAL at 09:56

## 2022-01-01 RX ADMIN — MAGNESIUM OXIDE TAB 400 MG (241.3 MG ELEMENTAL MG) 400 MG: 400 (241.3 MG) TAB at 07:49

## 2022-01-01 RX ADMIN — METHYLPREDNISOLONE SODIUM SUCCINATE 37.5 MG: 125 INJECTION, POWDER, FOR SOLUTION INTRAMUSCULAR; INTRAVENOUS at 02:30

## 2022-01-01 RX ADMIN — MORPHINE SULFATE 5 MG: 100 SOLUTION ORAL at 12:00

## 2022-01-01 RX ADMIN — IPRATROPIUM BROMIDE AND ALBUTEROL SULFATE 3 ML: 2.5; .5 SOLUTION RESPIRATORY (INHALATION) at 15:57

## 2022-01-01 RX ADMIN — POLYETHYLENE GLYCOL 3350 17 G: 17 POWDER, FOR SOLUTION ORAL at 08:30

## 2022-01-01 RX ADMIN — DILTIAZEM HYDROCHLORIDE 240 MG: 240 CAPSULE, COATED, EXTENDED RELEASE ORAL at 09:36

## 2022-01-01 RX ADMIN — ACETYLCYSTEINE 4 ML: 200 SOLUTION ORAL; RESPIRATORY (INHALATION) at 08:25

## 2022-01-01 RX ADMIN — IPRATROPIUM BROMIDE AND ALBUTEROL SULFATE 3 ML: 2.5; .5 SOLUTION RESPIRATORY (INHALATION) at 15:34

## 2022-01-01 RX ADMIN — ACETYLCYSTEINE 4 ML: 200 SOLUTION ORAL; RESPIRATORY (INHALATION) at 04:43

## 2022-01-01 RX ADMIN — ACETAMINOPHEN 650 MG: 325 TABLET ORAL at 23:57

## 2022-01-01 RX ADMIN — INSULIN ASPART 2 UNITS: 100 INJECTION, SOLUTION INTRAVENOUS; SUBCUTANEOUS at 16:40

## 2022-01-01 RX ADMIN — ACYCLOVIR: 50 OINTMENT TOPICAL at 21:05

## 2022-01-01 RX ADMIN — FUROSEMIDE 40 MG: 10 INJECTION, SOLUTION INTRAMUSCULAR; INTRAVENOUS at 00:20

## 2022-01-01 RX ADMIN — SODIUM CHLORIDE SOLN NEBU 3% 3 ML: 3 NEBU SOLN at 08:03

## 2022-01-01 RX ADMIN — GUAIFENESIN 600 MG: 600 TABLET ORAL at 20:09

## 2022-01-01 RX ADMIN — SODIUM CHLORIDE SOLN NEBU 3% 3 ML: 3 NEBU SOLN at 08:31

## 2022-01-01 RX ADMIN — METHYLPREDNISOLONE SODIUM SUCCINATE 40 MG: 40 INJECTION, POWDER, FOR SOLUTION INTRAMUSCULAR; INTRAVENOUS at 02:52

## 2022-01-01 RX ADMIN — ACETYLCYSTEINE 4 ML: 200 INHALANT RESPIRATORY (INHALATION) at 15:42

## 2022-01-01 RX ADMIN — GUAIFENESIN 600 MG: 600 TABLET ORAL at 08:46

## 2022-01-01 RX ADMIN — POTASSIUM CHLORIDE 10 MEQ: 750 TABLET, EXTENDED RELEASE ORAL at 08:16

## 2022-01-01 RX ADMIN — IPRATROPIUM BROMIDE AND ALBUTEROL SULFATE 3 ML: 2.5; .5 SOLUTION RESPIRATORY (INHALATION) at 15:04

## 2022-01-01 RX ADMIN — GABAPENTIN 600 MG: 300 CAPSULE ORAL at 23:30

## 2022-01-01 RX ADMIN — IPRATROPIUM BROMIDE AND ALBUTEROL SULFATE 3 ML: 2.5; .5 SOLUTION RESPIRATORY (INHALATION) at 07:52

## 2022-01-01 RX ADMIN — DOXYCYCLINE 100 MG: 100 INJECTION, POWDER, LYOPHILIZED, FOR SOLUTION INTRAVENOUS at 09:47

## 2022-01-01 RX ADMIN — MEROPENEM 1 G: 1 INJECTION, POWDER, FOR SOLUTION INTRAVENOUS at 04:53

## 2022-01-01 RX ADMIN — ACETAMINOPHEN 650 MG: 325 TABLET ORAL at 07:31

## 2022-01-01 RX ADMIN — DOCUSATE SODIUM 100 MG: 100 CAPSULE, LIQUID FILLED ORAL at 20:22

## 2022-01-01 RX ADMIN — CIPROFLOXACIN HYDROCHLORIDE 750 MG: 250 TABLET, FILM COATED ORAL at 22:05

## 2022-01-01 RX ADMIN — ACETYLCYSTEINE 4 ML: 200 SOLUTION ORAL; RESPIRATORY (INHALATION) at 12:31

## 2022-01-01 RX ADMIN — CEFEPIME HYDROCHLORIDE 2 G: 2 INJECTION, POWDER, FOR SOLUTION INTRAVENOUS at 12:20

## 2022-01-01 RX ADMIN — SODIUM CHLORIDE SOLN NEBU 3% 3 ML: 3 NEBU SOLN at 20:13

## 2022-01-01 RX ADMIN — ACETYLCYSTEINE 4 ML: 200 SOLUTION ORAL; RESPIRATORY (INHALATION) at 00:04

## 2022-01-01 RX ADMIN — NITROGLYCERIN 0.4 MG: 0.4 TABLET SUBLINGUAL at 03:11

## 2022-01-01 RX ADMIN — MEROPENEM 1 G: 1 INJECTION, POWDER, FOR SOLUTION INTRAVENOUS at 12:09

## 2022-01-01 RX ADMIN — FUROSEMIDE 20 MG: 10 INJECTION, SOLUTION INTRAMUSCULAR; INTRAVENOUS at 15:03

## 2022-01-01 RX ADMIN — ALBUTEROL SULFATE 2.5 MG: 2.5 SOLUTION RESPIRATORY (INHALATION) at 06:07

## 2022-01-01 RX ADMIN — ATORVASTATIN CALCIUM 20 MG: 10 TABLET, FILM COATED ORAL at 21:35

## 2022-01-01 RX ADMIN — IPRATROPIUM BROMIDE AND ALBUTEROL SULFATE 3 ML: 2.5; .5 SOLUTION RESPIRATORY (INHALATION) at 04:56

## 2022-01-01 RX ADMIN — PREDNISONE 20 MG: 20 TABLET ORAL at 08:54

## 2022-01-01 RX ADMIN — IPRATROPIUM BROMIDE AND ALBUTEROL SULFATE 3 ML: 2.5; .5 SOLUTION RESPIRATORY (INHALATION) at 20:40

## 2022-01-01 RX ADMIN — SIMETHICONE 80 MG: 80 TABLET, CHEWABLE ORAL at 10:46

## 2022-01-01 RX ADMIN — ACETAMINOPHEN 650 MG: 325 TABLET ORAL at 00:57

## 2022-01-01 RX ADMIN — ACETYLCYSTEINE 4 ML: 200 SOLUTION ORAL; RESPIRATORY (INHALATION) at 14:03

## 2022-01-01 RX ADMIN — BARIUM SULFATE: 400 SUSPENSION ORAL at 14:39

## 2022-01-01 RX ADMIN — DOCUSATE SODIUM 100 MG: 100 CAPSULE, LIQUID FILLED ORAL at 22:13

## 2022-01-01 RX ADMIN — ACYCLOVIR: 50 OINTMENT TOPICAL at 05:03

## 2022-01-01 RX ADMIN — IPRATROPIUM BROMIDE AND ALBUTEROL SULFATE 3 ML: 2.5; .5 SOLUTION RESPIRATORY (INHALATION) at 15:42

## 2022-01-01 RX ADMIN — QUETIAPINE 12.5 MG: 25 TABLET, FILM COATED ORAL at 08:59

## 2022-01-01 RX ADMIN — ACETAMINOPHEN 1000 MG: 500 TABLET, FILM COATED ORAL at 11:53

## 2022-01-01 RX ADMIN — METHYLPREDNISOLONE SODIUM SUCCINATE 62.5 MG: 125 INJECTION, POWDER, FOR SOLUTION INTRAMUSCULAR; INTRAVENOUS at 12:59

## 2022-01-01 RX ADMIN — ACYCLOVIR: 50 OINTMENT TOPICAL at 05:56

## 2022-01-01 RX ADMIN — POLYETHYLENE GLYCOL 3350 17 G: 17 POWDER, FOR SOLUTION ORAL at 10:02

## 2022-01-01 RX ADMIN — RIVAROXABAN 20 MG: 10 TABLET, FILM COATED ORAL at 16:57

## 2022-01-01 RX ADMIN — ACETYLCYSTEINE 4 ML: 200 INHALANT RESPIRATORY (INHALATION) at 11:29

## 2022-01-01 RX ADMIN — IPRATROPIUM BROMIDE AND ALBUTEROL SULFATE 3 ML: 2.5; .5 SOLUTION RESPIRATORY (INHALATION) at 14:35

## 2022-01-01 RX ADMIN — ACETAMINOPHEN 1000 MG: 500 TABLET, FILM COATED ORAL at 18:52

## 2022-01-01 RX ADMIN — PANTOPRAZOLE SODIUM 40 MG: 20 TABLET, DELAYED RELEASE ORAL at 09:43

## 2022-01-01 RX ADMIN — CIPROFLOXACIN HYDROCHLORIDE 750 MG: 250 TABLET, FILM COATED ORAL at 20:19

## 2022-01-01 RX ADMIN — IPRATROPIUM BROMIDE AND ALBUTEROL SULFATE 3 ML: 2.5; .5 SOLUTION RESPIRATORY (INHALATION) at 20:37

## 2022-01-01 RX ADMIN — ACETAMINOPHEN 650 MG: 325 TABLET ORAL at 11:50

## 2022-01-01 RX ADMIN — ACETAMINOPHEN 650 MG: 325 TABLET ORAL at 18:09

## 2022-01-01 RX ADMIN — RIVAROXABAN 20 MG: 10 TABLET, FILM COATED ORAL at 20:19

## 2022-01-01 RX ADMIN — INSULIN ASPART 1 UNITS: 100 INJECTION, SOLUTION INTRAVENOUS; SUBCUTANEOUS at 16:25

## 2022-01-01 RX ADMIN — IPRATROPIUM BROMIDE AND ALBUTEROL SULFATE 3 ML: 2.5; .5 SOLUTION RESPIRATORY (INHALATION) at 19:56

## 2022-01-01 RX ADMIN — POLYETHYLENE GLYCOL 3350 17 G: 17 POWDER, FOR SOLUTION ORAL at 08:51

## 2022-01-01 RX ADMIN — ACETYLCYSTEINE 4 ML: 200 SOLUTION ORAL; RESPIRATORY (INHALATION) at 20:32

## 2022-01-01 RX ADMIN — Medication 1 TABLET: at 08:38

## 2022-01-01 RX ADMIN — METHYLPREDNISOLONE SODIUM SUCCINATE 37.5 MG: 125 INJECTION, POWDER, FOR SOLUTION INTRAMUSCULAR; INTRAVENOUS at 21:16

## 2022-01-01 RX ADMIN — PANTOPRAZOLE SODIUM 40 MG: 20 TABLET, DELAYED RELEASE ORAL at 09:09

## 2022-01-01 RX ADMIN — SODIUM CHLORIDE SOLN NEBU 3% 3 ML: 3 NEBU SOLN at 11:54

## 2022-01-01 RX ADMIN — Medication 3 MG: at 21:15

## 2022-01-01 RX ADMIN — INSULIN ASPART 2 UNITS: 100 INJECTION, SOLUTION INTRAVENOUS; SUBCUTANEOUS at 02:03

## 2022-01-01 RX ADMIN — CEFEPIME HYDROCHLORIDE 2 G: 2 INJECTION, POWDER, FOR SOLUTION INTRAVENOUS at 02:26

## 2022-01-01 RX ADMIN — FUROSEMIDE 40 MG: 40 TABLET ORAL at 08:00

## 2022-01-01 RX ADMIN — FUROSEMIDE 40 MG: 10 INJECTION, SOLUTION INTRAMUSCULAR; INTRAVENOUS at 09:48

## 2022-01-01 RX ADMIN — DOCUSATE SODIUM 100 MG: 100 CAPSULE, LIQUID FILLED ORAL at 20:53

## 2022-01-01 RX ADMIN — IPRATROPIUM BROMIDE AND ALBUTEROL SULFATE 3 ML: 2.5; .5 SOLUTION RESPIRATORY (INHALATION) at 15:30

## 2022-01-01 RX ADMIN — DILTIAZEM HYDROCHLORIDE 240 MG: 120 CAPSULE, COATED, EXTENDED RELEASE ORAL at 08:24

## 2022-01-01 RX ADMIN — ACETYLCYSTEINE 4 ML: 200 SOLUTION ORAL; RESPIRATORY (INHALATION) at 16:49

## 2022-01-01 RX ADMIN — CEFEPIME HYDROCHLORIDE 2 G: 2 INJECTION, POWDER, FOR SOLUTION INTRAVENOUS at 03:03

## 2022-01-01 RX ADMIN — RIVAROXABAN 20 MG: 10 TABLET, FILM COATED ORAL at 16:35

## 2022-01-01 RX ADMIN — FENTANYL CITRATE 25 MCG: 50 INJECTION, SOLUTION INTRAMUSCULAR; INTRAVENOUS at 11:38

## 2022-01-01 RX ADMIN — Medication 1 TABLET: at 09:13

## 2022-01-01 RX ADMIN — ALBUTEROL SULFATE 2.5 MG: 2.5 SOLUTION RESPIRATORY (INHALATION) at 16:34

## 2022-01-01 RX ADMIN — QUETIAPINE 12.5 MG: 25 TABLET, FILM COATED ORAL at 09:37

## 2022-01-01 RX ADMIN — ACETAMINOPHEN 650 MG: 325 TABLET ORAL at 14:35

## 2022-01-01 RX ADMIN — CYANOCOBALAMIN TAB 500 MCG 500 MCG: 500 TAB at 09:00

## 2022-01-01 RX ADMIN — MORPHINE SULFATE 1 MG: 2 INJECTION, SOLUTION INTRAMUSCULAR; INTRAVENOUS at 17:09

## 2022-01-01 RX ADMIN — CEFEPIME HYDROCHLORIDE 2 G: 2 INJECTION, POWDER, FOR SOLUTION INTRAVENOUS at 02:02

## 2022-01-01 RX ADMIN — SODIUM CHLORIDE SOLN NEBU 3% 3 ML: 3 NEBU SOLN at 09:20

## 2022-01-01 RX ADMIN — GABAPENTIN 600 MG: 300 CAPSULE ORAL at 20:58

## 2022-01-01 RX ADMIN — IPRATROPIUM BROMIDE AND ALBUTEROL SULFATE 3 ML: 2.5; .5 SOLUTION RESPIRATORY (INHALATION) at 20:49

## 2022-01-01 RX ADMIN — ACETAMINOPHEN 650 MG: 325 TABLET ORAL at 18:59

## 2022-01-01 RX ADMIN — SODIUM CHLORIDE SOLN NEBU 3% 3 ML: 3 NEBU SOLN at 15:19

## 2022-01-01 RX ADMIN — MAGNESIUM OXIDE TAB 400 MG (241.3 MG ELEMENTAL MG) 400 MG: 400 (241.3 MG) TAB at 01:23

## 2022-01-01 RX ADMIN — MAGNESIUM SULFATE HEPTAHYDRATE 2 G: 40 INJECTION, SOLUTION INTRAVENOUS at 18:06

## 2022-01-01 RX ADMIN — ACETAZOLAMIDE 500 MG: 500 INJECTION, POWDER, LYOPHILIZED, FOR SOLUTION INTRAVENOUS at 12:37

## 2022-01-01 RX ADMIN — INSULIN ASPART 2 UNITS: 100 INJECTION, SOLUTION INTRAVENOUS; SUBCUTANEOUS at 02:23

## 2022-01-01 RX ADMIN — FUROSEMIDE 40 MG: 10 INJECTION, SOLUTION INTRAMUSCULAR; INTRAVENOUS at 01:54

## 2022-01-01 RX ADMIN — SODIUM CHLORIDE SOLN NEBU 3% 3 ML: 3 NEBU SOLN at 20:27

## 2022-01-01 RX ADMIN — NASAL DECONGESTANT 2 SPRAY: 0.05 SPRAY NASAL at 21:12

## 2022-01-01 RX ADMIN — CIPROFLOXACIN HYDROCHLORIDE 750 MG: 250 TABLET, FILM COATED ORAL at 08:28

## 2022-01-01 RX ADMIN — IPRATROPIUM BROMIDE AND ALBUTEROL SULFATE 3 ML: 2.5; .5 SOLUTION RESPIRATORY (INHALATION) at 16:16

## 2022-01-01 RX ADMIN — SODIUM CHLORIDE SOLN NEBU 3% 3 ML: 3 NEBU SOLN at 16:26

## 2022-01-01 RX ADMIN — ACETYLCYSTEINE 4 ML: 200 SOLUTION ORAL; RESPIRATORY (INHALATION) at 20:27

## 2022-01-01 RX ADMIN — POLYETHYLENE GLYCOL 3350 17 G: 17 POWDER, FOR SOLUTION ORAL at 09:58

## 2022-01-01 RX ADMIN — IPRATROPIUM BROMIDE AND ALBUTEROL SULFATE 3 ML: 2.5; .5 SOLUTION RESPIRATORY (INHALATION) at 20:23

## 2022-01-01 RX ADMIN — ACETAMINOPHEN 650 MG: 325 TABLET ORAL at 05:07

## 2022-01-01 RX ADMIN — ACETAMINOPHEN 650 MG: 325 TABLET ORAL at 05:34

## 2022-01-01 RX ADMIN — DOXYCYCLINE 100 MG: 100 INJECTION, POWDER, LYOPHILIZED, FOR SOLUTION INTRAVENOUS at 12:52

## 2022-01-01 RX ADMIN — SODIUM CHLORIDE SOLN NEBU 3% 3 ML: 3 NEBU SOLN at 16:35

## 2022-01-01 RX ADMIN — IPRATROPIUM BROMIDE AND ALBUTEROL SULFATE 3 ML: 2.5; .5 SOLUTION RESPIRATORY (INHALATION) at 19:40

## 2022-01-01 RX ADMIN — ARIPIPRAZOLE 5 MG: 5 TABLET ORAL at 22:40

## 2022-01-01 RX ADMIN — RIVAROXABAN 20 MG: 20 TABLET, FILM COATED ORAL at 18:45

## 2022-01-01 RX ADMIN — IPRATROPIUM BROMIDE AND ALBUTEROL SULFATE 3 ML: 2.5; .5 SOLUTION RESPIRATORY (INHALATION) at 19:33

## 2022-01-01 RX ADMIN — MAGNESIUM SULFATE HEPTAHYDRATE 2 G: 40 INJECTION, SOLUTION INTRAVENOUS at 08:50

## 2022-01-01 RX ADMIN — IPRATROPIUM BROMIDE AND ALBUTEROL SULFATE 3 ML: 2.5; .5 SOLUTION RESPIRATORY (INHALATION) at 04:43

## 2022-01-01 RX ADMIN — IPRATROPIUM BROMIDE AND ALBUTEROL SULFATE 3 ML: 2.5; .5 SOLUTION RESPIRATORY (INHALATION) at 11:53

## 2022-01-01 RX ADMIN — IPRATROPIUM BROMIDE AND ALBUTEROL SULFATE 3 ML: 2.5; .5 SOLUTION RESPIRATORY (INHALATION) at 17:05

## 2022-01-01 RX ADMIN — DOCUSATE SODIUM 100 MG: 100 CAPSULE, LIQUID FILLED ORAL at 08:00

## 2022-01-01 RX ADMIN — VALACYCLOVIR HYDROCHLORIDE 1000 MG: 1 TABLET, FILM COATED ORAL at 20:10

## 2022-01-01 RX ADMIN — CEFEPIME HYDROCHLORIDE 2 G: 2 INJECTION, POWDER, FOR SOLUTION INTRAVENOUS at 21:21

## 2022-01-01 RX ADMIN — RIVAROXABAN 20 MG: 10 TABLET, FILM COATED ORAL at 17:10

## 2022-01-01 RX ADMIN — IPRATROPIUM BROMIDE AND ALBUTEROL SULFATE 3 ML: 2.5; .5 SOLUTION RESPIRATORY (INHALATION) at 15:59

## 2022-01-01 RX ADMIN — PREDNISONE 40 MG: 20 TABLET ORAL at 16:48

## 2022-01-01 RX ADMIN — CYANOCOBALAMIN TAB 500 MCG 500 MCG: 500 TAB at 09:13

## 2022-01-01 RX ADMIN — IPRATROPIUM BROMIDE AND ALBUTEROL SULFATE 3 ML: 2.5; .5 SOLUTION RESPIRATORY (INHALATION) at 08:22

## 2022-01-01 RX ADMIN — CEFEPIME HYDROCHLORIDE 2 G: 2 INJECTION, POWDER, FOR SOLUTION INTRAVENOUS at 09:47

## 2022-01-01 RX ADMIN — ARIPIPRAZOLE 5 MG: 5 TABLET ORAL at 21:13

## 2022-01-01 RX ADMIN — CEFEPIME HYDROCHLORIDE 2 G: 2 INJECTION, POWDER, FOR SOLUTION INTRAVENOUS at 02:43

## 2022-01-01 RX ADMIN — ACETYLCYSTEINE 4 ML: 200 SOLUTION ORAL; RESPIRATORY (INHALATION) at 07:55

## 2022-01-01 RX ADMIN — ACETYLCYSTEINE 4 ML: 200 SOLUTION ORAL; RESPIRATORY (INHALATION) at 19:50

## 2022-01-01 RX ADMIN — FUROSEMIDE 20 MG: 10 INJECTION, SOLUTION INTRAMUSCULAR; INTRAVENOUS at 03:33

## 2022-01-01 RX ADMIN — SODIUM CHLORIDE SOLN NEBU 3% 4 ML: 3 NEBU SOLN at 19:40

## 2022-01-01 RX ADMIN — GABAPENTIN 600 MG: 300 CAPSULE ORAL at 22:13

## 2022-01-01 RX ADMIN — ACYCLOVIR: 50 OINTMENT TOPICAL at 14:42

## 2022-01-01 RX ADMIN — DOCUSATE SODIUM 100 MG: 100 CAPSULE, LIQUID FILLED ORAL at 08:43

## 2022-01-01 RX ADMIN — DILTIAZEM HYDROCHLORIDE 240 MG: 120 CAPSULE, COATED, EXTENDED RELEASE ORAL at 09:08

## 2022-01-01 RX ADMIN — FUROSEMIDE 20 MG: 10 INJECTION, SOLUTION INTRAMUSCULAR; INTRAVENOUS at 05:02

## 2022-01-01 RX ADMIN — DOXYCYCLINE 100 MG: 100 CAPSULE ORAL at 22:26

## 2022-01-01 RX ADMIN — GABAPENTIN 600 MG: 300 CAPSULE ORAL at 20:37

## 2022-01-01 RX ADMIN — MEROPENEM 1 G: 1 INJECTION, POWDER, FOR SOLUTION INTRAVENOUS at 21:43

## 2022-01-01 RX ADMIN — NITROGLYCERIN 0.4 MG: 0.4 TABLET SUBLINGUAL at 02:57

## 2022-01-01 RX ADMIN — CEFEPIME HYDROCHLORIDE 2 G: 2 INJECTION, POWDER, FOR SOLUTION INTRAVENOUS at 11:26

## 2022-01-01 RX ADMIN — ATORVASTATIN CALCIUM 20 MG: 10 TABLET, FILM COATED ORAL at 21:27

## 2022-01-01 RX ADMIN — INSULIN GLARGINE 10 UNITS: 100 INJECTION, SOLUTION SUBCUTANEOUS at 12:07

## 2022-01-01 RX ADMIN — SENNOSIDES 8.6 MG: 8.6 TABLET, FILM COATED ORAL at 13:09

## 2022-01-01 RX ADMIN — IPRATROPIUM BROMIDE AND ALBUTEROL SULFATE 3 ML: 2.5; .5 SOLUTION RESPIRATORY (INHALATION) at 16:55

## 2022-01-01 RX ADMIN — OLANZAPINE 2.5 MG: 2.5 TABLET, FILM COATED ORAL at 23:20

## 2022-01-01 RX ADMIN — QUETIAPINE 12.5 MG: 25 TABLET, FILM COATED ORAL at 19:46

## 2022-01-01 RX ADMIN — SODIUM CHLORIDE SOLN NEBU 3% 3 ML: 3 NEBU SOLN at 07:35

## 2022-01-01 RX ADMIN — DILTIAZEM HYDROCHLORIDE 240 MG: 240 CAPSULE, COATED, EXTENDED RELEASE ORAL at 08:00

## 2022-01-01 RX ADMIN — ATORVASTATIN CALCIUM 20 MG: 10 TABLET, FILM COATED ORAL at 20:58

## 2022-01-01 RX ADMIN — DILTIAZEM HYDROCHLORIDE 240 MG: 120 CAPSULE, COATED, EXTENDED RELEASE ORAL at 09:20

## 2022-01-01 RX ADMIN — GABAPENTIN 600 MG: 300 CAPSULE ORAL at 21:11

## 2022-01-01 RX ADMIN — CIPROFLOXACIN HYDROCHLORIDE 750 MG: 250 TABLET, FILM COATED ORAL at 21:30

## 2022-01-01 RX ADMIN — PREDNISONE 40 MG: 20 TABLET ORAL at 18:44

## 2022-01-01 RX ADMIN — INSULIN ASPART 2 UNITS: 100 INJECTION, SOLUTION INTRAVENOUS; SUBCUTANEOUS at 08:12

## 2022-01-01 RX ADMIN — IPRATROPIUM BROMIDE AND ALBUTEROL SULFATE 3 ML: 2.5; .5 SOLUTION RESPIRATORY (INHALATION) at 11:52

## 2022-01-01 RX ADMIN — DILTIAZEM HYDROCHLORIDE 240 MG: 120 CAPSULE, COATED, EXTENDED RELEASE ORAL at 10:02

## 2022-01-01 RX ADMIN — FUROSEMIDE 40 MG: 40 TABLET ORAL at 17:30

## 2022-01-01 RX ADMIN — ACYCLOVIR: 50 OINTMENT TOPICAL at 06:17

## 2022-01-01 RX ADMIN — DILTIAZEM HYDROCHLORIDE 240 MG: 120 CAPSULE, COATED, EXTENDED RELEASE ORAL at 07:45

## 2022-01-01 RX ADMIN — LIDOCAINE HYDROCHLORIDE: 20 JELLY TOPICAL at 11:10

## 2022-01-01 RX ADMIN — DOXYCYCLINE 100 MG: 100 INJECTION, POWDER, LYOPHILIZED, FOR SOLUTION INTRAVENOUS at 04:00

## 2022-01-01 RX ADMIN — SODIUM CHLORIDE SOLN NEBU 3% 3 ML: 3 NEBU SOLN at 12:29

## 2022-01-01 RX ADMIN — GABAPENTIN 600 MG: 300 CAPSULE ORAL at 21:22

## 2022-01-01 RX ADMIN — INSULIN ASPART 3 UNITS: 100 INJECTION, SOLUTION INTRAVENOUS; SUBCUTANEOUS at 21:55

## 2022-01-01 RX ADMIN — DILTIAZEM HYDROCHLORIDE 240 MG: 120 CAPSULE, COATED, EXTENDED RELEASE ORAL at 09:00

## 2022-01-01 RX ADMIN — ACETYLCYSTEINE 4 ML: 200 SOLUTION ORAL; RESPIRATORY (INHALATION) at 00:25

## 2022-01-01 RX ADMIN — SODIUM CHLORIDE SOLN NEBU 3% 3 ML: 3 NEBU SOLN at 20:35

## 2022-01-01 RX ADMIN — Medication 25 MCG: at 08:28

## 2022-01-01 RX ADMIN — GABAPENTIN 600 MG: 300 CAPSULE ORAL at 21:10

## 2022-01-01 RX ADMIN — METHYLPREDNISOLONE SODIUM SUCCINATE 40 MG: 40 INJECTION, POWDER, FOR SOLUTION INTRAMUSCULAR; INTRAVENOUS at 08:25

## 2022-01-01 RX ADMIN — GABAPENTIN 600 MG: 300 CAPSULE ORAL at 14:59

## 2022-01-01 RX ADMIN — FUROSEMIDE 40 MG: 40 TABLET ORAL at 08:15

## 2022-01-01 RX ADMIN — CEFEPIME HYDROCHLORIDE 2 G: 2 INJECTION, POWDER, FOR SOLUTION INTRAVENOUS at 01:14

## 2022-01-01 RX ADMIN — PREDNISONE 40 MG: 20 TABLET ORAL at 08:30

## 2022-01-01 RX ADMIN — FUROSEMIDE 40 MG: 10 INJECTION, SOLUTION INTRAMUSCULAR; INTRAVENOUS at 00:05

## 2022-01-01 RX ADMIN — ALBUTEROL SULFATE 2.5 MG: 2.5 SOLUTION RESPIRATORY (INHALATION) at 20:02

## 2022-01-01 RX ADMIN — ERTAPENEM SODIUM 1 G: 1 INJECTION, POWDER, LYOPHILIZED, FOR SOLUTION INTRAMUSCULAR; INTRAVENOUS at 14:45

## 2022-01-01 RX ADMIN — ACETYLCYSTEINE 4 ML: 200 SOLUTION ORAL; RESPIRATORY (INHALATION) at 07:42

## 2022-01-01 RX ADMIN — ACETYLCYSTEINE 4 ML: 200 SOLUTION ORAL; RESPIRATORY (INHALATION) at 20:47

## 2022-01-01 RX ADMIN — PREDNISONE 20 MG: 20 TABLET ORAL at 09:01

## 2022-01-01 RX ADMIN — IPRATROPIUM BROMIDE AND ALBUTEROL SULFATE 3 ML: 2.5; .5 SOLUTION RESPIRATORY (INHALATION) at 15:07

## 2022-01-01 RX ADMIN — DILTIAZEM HYDROCHLORIDE 240 MG: 240 CAPSULE, COATED, EXTENDED RELEASE ORAL at 10:09

## 2022-01-01 RX ADMIN — ACETAMINOPHEN 650 MG: 325 TABLET ORAL at 17:16

## 2022-01-01 RX ADMIN — MAGNESIUM OXIDE TAB 400 MG (241.3 MG ELEMENTAL MG) 400 MG: 400 (241.3 MG) TAB at 08:59

## 2022-01-01 RX ADMIN — DOCUSATE SODIUM 100 MG: 100 CAPSULE, LIQUID FILLED ORAL at 08:59

## 2022-01-01 RX ADMIN — IPRATROPIUM BROMIDE AND ALBUTEROL SULFATE 3 ML: 2.5; .5 SOLUTION RESPIRATORY (INHALATION) at 11:40

## 2022-01-01 RX ADMIN — INSULIN ASPART 2 UNITS: 100 INJECTION, SOLUTION INTRAVENOUS; SUBCUTANEOUS at 17:33

## 2022-01-01 RX ADMIN — SODIUM CHLORIDE SOLN NEBU 3% 3 ML: 3 NEBU SOLN at 11:16

## 2022-01-01 RX ADMIN — MEROPENEM 1 G: 1 INJECTION, POWDER, FOR SOLUTION INTRAVENOUS at 04:45

## 2022-01-01 RX ADMIN — DILTIAZEM HYDROCHLORIDE 240 MG: 120 CAPSULE, COATED, EXTENDED RELEASE ORAL at 08:39

## 2022-01-01 RX ADMIN — SODIUM CHLORIDE SOLN NEBU 3% 3 ML: 3 NEBU SOLN at 16:16

## 2022-01-01 RX ADMIN — FUROSEMIDE 20 MG: 10 INJECTION, SOLUTION INTRAMUSCULAR; INTRAVENOUS at 04:53

## 2022-01-01 RX ADMIN — ACETAMINOPHEN 650 MG: 325 TABLET ORAL at 03:02

## 2022-01-01 RX ADMIN — GABAPENTIN 600 MG: 300 CAPSULE ORAL at 20:46

## 2022-01-01 RX ADMIN — PREDNISONE 10 MG: 10 TABLET ORAL at 08:51

## 2022-01-01 RX ADMIN — ACETYLCYSTEINE 4 ML: 200 SOLUTION ORAL; RESPIRATORY (INHALATION) at 08:24

## 2022-01-01 RX ADMIN — Medication 1 TABLET: at 07:48

## 2022-01-01 RX ADMIN — ACYCLOVIR: 50 OINTMENT TOPICAL at 00:42

## 2022-01-01 RX ADMIN — SODIUM CHLORIDE SOLN NEBU 3% 3 ML: 3 NEBU SOLN at 11:18

## 2022-01-01 RX ADMIN — ACETAMINOPHEN 650 MG: 325 TABLET ORAL at 18:07

## 2022-01-01 RX ADMIN — INSULIN GLARGINE 4 UNITS: 100 INJECTION, SOLUTION SUBCUTANEOUS at 20:48

## 2022-01-01 RX ADMIN — ACETAMINOPHEN 650 MG: 325 TABLET ORAL at 00:29

## 2022-01-01 RX ADMIN — ACETYLCYSTEINE 4 ML: 200 SOLUTION ORAL; RESPIRATORY (INHALATION) at 07:22

## 2022-01-01 RX ADMIN — DOCUSATE SODIUM 100 MG: 100 CAPSULE, LIQUID FILLED ORAL at 09:05

## 2022-01-01 RX ADMIN — ACETYLCYSTEINE 4 ML: 200 INHALANT RESPIRATORY (INHALATION) at 15:35

## 2022-01-01 RX ADMIN — PREDNISONE 40 MG: 20 TABLET ORAL at 08:58

## 2022-01-01 RX ADMIN — FUROSEMIDE 80 MG: 40 TABLET ORAL at 21:43

## 2022-01-01 RX ADMIN — MEROPENEM 1 G: 1 INJECTION, POWDER, FOR SOLUTION INTRAVENOUS at 12:25

## 2022-01-01 RX ADMIN — FUROSEMIDE 40 MG: 10 INJECTION, SOLUTION INTRAMUSCULAR; INTRAVENOUS at 13:45

## 2022-01-01 RX ADMIN — CEFEPIME HYDROCHLORIDE 2 G: 2 INJECTION, POWDER, FOR SOLUTION INTRAVENOUS at 04:58

## 2022-01-01 RX ADMIN — ACETAMINOPHEN 650 MG: 325 TABLET ORAL at 01:54

## 2022-01-01 RX ADMIN — ARIPIPRAZOLE 5 MG: 5 TABLET ORAL at 21:03

## 2022-01-01 RX ADMIN — GABAPENTIN 600 MG: 300 CAPSULE ORAL at 22:06

## 2022-01-01 RX ADMIN — FUROSEMIDE 40 MG: 10 INJECTION, SOLUTION INTRAMUSCULAR; INTRAVENOUS at 12:25

## 2022-01-01 RX ADMIN — BENZOCAINE AND MENTHOL 1 LOZENGE: 15; 3.6 LOZENGE ORAL at 05:34

## 2022-01-01 RX ADMIN — IPRATROPIUM BROMIDE AND ALBUTEROL SULFATE 3 ML: 2.5; .5 SOLUTION RESPIRATORY (INHALATION) at 02:08

## 2022-01-01 RX ADMIN — PANTOPRAZOLE SODIUM 40 MG: 20 TABLET, DELAYED RELEASE ORAL at 08:11

## 2022-01-01 RX ADMIN — GABAPENTIN 600 MG: 300 CAPSULE ORAL at 21:35

## 2022-01-01 RX ADMIN — CEFEPIME HYDROCHLORIDE 2 G: 2 INJECTION, POWDER, FOR SOLUTION INTRAVENOUS at 01:23

## 2022-01-01 RX ADMIN — IPRATROPIUM BROMIDE AND ALBUTEROL SULFATE 3 ML: 2.5; .5 SOLUTION RESPIRATORY (INHALATION) at 20:32

## 2022-01-01 RX ADMIN — POLYETHYLENE GLYCOL 3350 17 G: 17 POWDER, FOR SOLUTION ORAL at 08:15

## 2022-01-01 RX ADMIN — ACETAMINOPHEN 650 MG: 325 TABLET ORAL at 12:06

## 2022-01-01 RX ADMIN — FUROSEMIDE 40 MG: 10 INJECTION, SOLUTION INTRAMUSCULAR; INTRAVENOUS at 12:07

## 2022-01-01 RX ADMIN — GABAPENTIN 600 MG: 300 CAPSULE ORAL at 21:37

## 2022-01-01 RX ADMIN — GABAPENTIN 600 MG: 300 CAPSULE ORAL at 17:12

## 2022-01-01 RX ADMIN — ACETYLCYSTEINE 4 ML: 200 SOLUTION ORAL; RESPIRATORY (INHALATION) at 20:54

## 2022-01-01 RX ADMIN — DOCUSATE SODIUM 100 MG: 100 CAPSULE, LIQUID FILLED ORAL at 08:14

## 2022-01-01 RX ADMIN — ACETYLCYSTEINE 4 ML: 200 INHALANT RESPIRATORY (INHALATION) at 20:40

## 2022-01-01 RX ADMIN — Medication 400 MG: at 11:53

## 2022-01-01 RX ADMIN — ACETAMINOPHEN 650 MG: 325 TABLET ORAL at 12:38

## 2022-01-01 RX ADMIN — NASAL DECONGESTANT 2 SPRAY: 0.05 SPRAY NASAL at 14:07

## 2022-01-01 RX ADMIN — ACETYLCYSTEINE 4 ML: 200 SOLUTION ORAL; RESPIRATORY (INHALATION) at 16:06

## 2022-01-01 RX ADMIN — SPIRONOLACTONE 12.5 MG: 25 TABLET ORAL at 07:50

## 2022-01-01 RX ADMIN — ATORVASTATIN CALCIUM 20 MG: 10 TABLET, FILM COATED ORAL at 21:21

## 2022-01-01 RX ADMIN — ACETYLCYSTEINE 4 ML: 200 SOLUTION ORAL; RESPIRATORY (INHALATION) at 07:34

## 2022-01-01 RX ADMIN — FUROSEMIDE 20 MG: 10 INJECTION, SOLUTION INTRAMUSCULAR; INTRAVENOUS at 17:17

## 2022-01-01 RX ADMIN — IPRATROPIUM BROMIDE AND ALBUTEROL SULFATE 3 ML: 2.5; .5 SOLUTION RESPIRATORY (INHALATION) at 09:11

## 2022-01-01 RX ADMIN — Medication 1 TABLET: at 08:47

## 2022-01-01 RX ADMIN — PANTOPRAZOLE SODIUM 40 MG: 20 TABLET, DELAYED RELEASE ORAL at 08:00

## 2022-01-01 RX ADMIN — POLYETHYLENE GLYCOL 3350 17 G: 17 POWDER, FOR SOLUTION ORAL at 20:47

## 2022-01-01 RX ADMIN — ACETAMINOPHEN 1000 MG: 500 TABLET, FILM COATED ORAL at 01:30

## 2022-01-01 RX ADMIN — POLYETHYLENE GLYCOL 3350 17 G: 17 POWDER, FOR SOLUTION ORAL at 08:39

## 2022-01-01 RX ADMIN — Medication 1 TABLET: at 08:39

## 2022-01-01 RX ADMIN — RIVAROXABAN 20 MG: 10 TABLET, FILM COATED ORAL at 16:43

## 2022-01-01 RX ADMIN — CAPSAICIN: 0.25 CREAM TOPICAL at 22:10

## 2022-01-01 RX ADMIN — ACETAMINOPHEN 650 MG: 325 TABLET ORAL at 12:59

## 2022-01-01 RX ADMIN — INSULIN ASPART 2 UNITS: 100 INJECTION, SOLUTION INTRAVENOUS; SUBCUTANEOUS at 13:27

## 2022-01-01 RX ADMIN — DILTIAZEM HYDROCHLORIDE 240 MG: 120 CAPSULE, COATED, EXTENDED RELEASE ORAL at 07:48

## 2022-01-01 RX ADMIN — ACETAMINOPHEN 650 MG: 325 TABLET ORAL at 17:00

## 2022-01-01 RX ADMIN — POLYETHYLENE GLYCOL 3350 17 G: 17 POWDER, FOR SOLUTION ORAL at 08:52

## 2022-01-01 RX ADMIN — POLYETHYLENE GLYCOL 3350 17 G: 17 POWDER, FOR SOLUTION ORAL at 08:27

## 2022-01-01 RX ADMIN — ACYCLOVIR: 50 OINTMENT TOPICAL at 00:57

## 2022-01-01 RX ADMIN — DILTIAZEM HYDROCHLORIDE 240 MG: 120 CAPSULE, COATED, EXTENDED RELEASE ORAL at 09:13

## 2022-01-01 RX ADMIN — CIPROFLOXACIN HYDROCHLORIDE 750 MG: 250 TABLET, FILM COATED ORAL at 21:35

## 2022-01-01 RX ADMIN — CYANOCOBALAMIN TAB 500 MCG 500 MCG: 500 TAB at 10:21

## 2022-01-01 RX ADMIN — ACETYLCYSTEINE 4 ML: 200 SOLUTION ORAL; RESPIRATORY (INHALATION) at 09:21

## 2022-01-01 RX ADMIN — IPRATROPIUM BROMIDE AND ALBUTEROL SULFATE 3 ML: 2.5; .5 SOLUTION RESPIRATORY (INHALATION) at 12:11

## 2022-01-01 RX ADMIN — RIVAROXABAN 20 MG: 10 TABLET, FILM COATED ORAL at 16:40

## 2022-01-01 RX ADMIN — Medication 1 TABLET: at 09:14

## 2022-01-01 RX ADMIN — FUROSEMIDE 40 MG: 40 TABLET ORAL at 17:43

## 2022-01-01 RX ADMIN — GUAIFENESIN 600 MG: 600 TABLET ORAL at 09:36

## 2022-01-01 RX ADMIN — ACYCLOVIR: 50 OINTMENT TOPICAL at 10:22

## 2022-01-01 RX ADMIN — PREDNISONE 5 MG: 5 TABLET ORAL at 10:41

## 2022-01-01 RX ADMIN — MEROPENEM 1 G: 1 INJECTION, POWDER, FOR SOLUTION INTRAVENOUS at 21:01

## 2022-01-01 RX ADMIN — ACETAMINOPHEN 975 MG: 325 TABLET ORAL at 14:45

## 2022-01-01 RX ADMIN — POLYETHYLENE GLYCOL 3350 17 G: 17 POWDER, FOR SOLUTION ORAL at 06:41

## 2022-01-01 RX ADMIN — GABAPENTIN 600 MG: 300 CAPSULE ORAL at 20:13

## 2022-01-01 RX ADMIN — ALBUTEROL SULFATE 2.5 MG: 2.5 SOLUTION RESPIRATORY (INHALATION) at 20:32

## 2022-01-01 RX ADMIN — PANTOPRAZOLE SODIUM 40 MG: 40 INJECTION, POWDER, FOR SOLUTION INTRAVENOUS at 09:57

## 2022-01-01 RX ADMIN — ATROPINE SULFATE 2 DROP: 10 SOLUTION/ DROPS OPHTHALMIC at 10:32

## 2022-01-01 RX ADMIN — TOBRAMYCIN INHALATION SOLUTION 150 MG: 300 INHALANT RESPIRATORY (INHALATION) at 20:39

## 2022-01-01 RX ADMIN — RIVAROXABAN 20 MG: 10 TABLET, FILM COATED ORAL at 16:41

## 2022-01-01 RX ADMIN — IPRATROPIUM BROMIDE AND ALBUTEROL SULFATE 3 ML: 2.5; .5 SOLUTION RESPIRATORY (INHALATION) at 00:29

## 2022-01-01 RX ADMIN — IPRATROPIUM BROMIDE AND ALBUTEROL SULFATE 3 ML: 2.5; .5 SOLUTION RESPIRATORY (INHALATION) at 07:42

## 2022-01-01 RX ADMIN — SPIRONOLACTONE 12.5 MG: 25 TABLET ORAL at 08:40

## 2022-01-01 RX ADMIN — DOCUSATE SODIUM 100 MG: 100 CAPSULE, LIQUID FILLED ORAL at 21:04

## 2022-01-01 RX ADMIN — SODIUM CHLORIDE SOLN NEBU 3% 3 ML: 3 NEBU SOLN at 15:07

## 2022-01-01 RX ADMIN — ACYCLOVIR: 50 OINTMENT TOPICAL at 17:03

## 2022-01-01 RX ADMIN — PANTOPRAZOLE SODIUM 40 MG: 20 TABLET, DELAYED RELEASE ORAL at 08:45

## 2022-01-01 RX ADMIN — GABAPENTIN 600 MG: 300 CAPSULE ORAL at 20:47

## 2022-01-01 RX ADMIN — DOCUSATE SODIUM 100 MG: 100 CAPSULE, LIQUID FILLED ORAL at 08:08

## 2022-01-01 RX ADMIN — CLOTRIMAZOLE: 10 CREAM TOPICAL at 21:00

## 2022-01-01 RX ADMIN — ATORVASTATIN CALCIUM 20 MG: 10 TABLET, FILM COATED ORAL at 21:22

## 2022-01-01 RX ADMIN — IPRATROPIUM BROMIDE AND ALBUTEROL SULFATE 3 ML: 2.5; .5 SOLUTION RESPIRATORY (INHALATION) at 12:58

## 2022-01-01 RX ADMIN — SODIUM CHLORIDE SOLN NEBU 3% 4 ML: 3 NEBU SOLN at 19:49

## 2022-01-01 RX ADMIN — ONDANSETRON 4 MG: 2 INJECTION INTRAMUSCULAR; INTRAVENOUS at 14:56

## 2022-01-01 RX ADMIN — ACETYLCYSTEINE 4 ML: 200 SOLUTION ORAL; RESPIRATORY (INHALATION) at 16:44

## 2022-01-01 RX ADMIN — MAGNESIUM OXIDE TAB 400 MG (241.3 MG ELEMENTAL MG) 400 MG: 400 (241.3 MG) TAB at 20:09

## 2022-01-01 RX ADMIN — LORAZEPAM 0.25 MG: 0.5 TABLET ORAL at 14:39

## 2022-01-01 RX ADMIN — PANTOPRAZOLE SODIUM 40 MG: 20 TABLET, DELAYED RELEASE ORAL at 07:50

## 2022-01-01 RX ADMIN — ACETYLCYSTEINE 4 ML: 200 INHALANT RESPIRATORY (INHALATION) at 11:56

## 2022-01-01 RX ADMIN — ATORVASTATIN CALCIUM 20 MG: 10 TABLET, FILM COATED ORAL at 23:30

## 2022-01-01 RX ADMIN — DILTIAZEM HYDROCHLORIDE 120 MG: 120 CAPSULE, EXTENDED RELEASE ORAL at 09:09

## 2022-01-01 RX ADMIN — CLOTRIMAZOLE: 10 CREAM TOPICAL at 08:54

## 2022-01-01 RX ADMIN — ARIPIPRAZOLE 5 MG: 5 TABLET ORAL at 23:30

## 2022-01-01 RX ADMIN — Medication 3 MG: at 21:36

## 2022-01-01 RX ADMIN — MEROPENEM 1 G: 1 INJECTION, POWDER, FOR SOLUTION INTRAVENOUS at 04:23

## 2022-01-01 RX ADMIN — RIVAROXABAN 20 MG: 10 TABLET, FILM COATED ORAL at 18:10

## 2022-01-01 RX ADMIN — FUROSEMIDE 20 MG: 10 INJECTION, SOLUTION INTRAMUSCULAR; INTRAVENOUS at 14:55

## 2022-01-01 RX ADMIN — QUETIAPINE 12.5 MG: 25 TABLET, FILM COATED ORAL at 21:14

## 2022-01-01 RX ADMIN — CEFEPIME HYDROCHLORIDE 2 G: 2 INJECTION, POWDER, FOR SOLUTION INTRAVENOUS at 11:12

## 2022-01-01 RX ADMIN — DOCUSATE SODIUM 100 MG: 100 CAPSULE, LIQUID FILLED ORAL at 20:37

## 2022-01-01 RX ADMIN — BENZOCAINE AND MENTHOL 1 LOZENGE: 15; 3.6 LOZENGE ORAL at 04:14

## 2022-01-01 RX ADMIN — CEFEPIME HYDROCHLORIDE 2 G: 2 INJECTION, POWDER, FOR SOLUTION INTRAVENOUS at 02:05

## 2022-01-01 RX ADMIN — SPIRONOLACTONE 12.5 MG: 25 TABLET ORAL at 08:49

## 2022-01-01 RX ADMIN — ACETYLCYSTEINE 4 ML: 200 SOLUTION ORAL; RESPIRATORY (INHALATION) at 12:11

## 2022-01-01 RX ADMIN — Medication 25 MCG: at 09:13

## 2022-01-01 RX ADMIN — IPRATROPIUM BROMIDE AND ALBUTEROL SULFATE 3 ML: 2.5; .5 SOLUTION RESPIRATORY (INHALATION) at 04:46

## 2022-01-01 RX ADMIN — PREDNISONE 10 MG: 10 TABLET ORAL at 08:28

## 2022-01-01 RX ADMIN — MEROPENEM 1 G: 1 INJECTION, POWDER, FOR SOLUTION INTRAVENOUS at 21:13

## 2022-01-01 RX ADMIN — POLYETHYLENE GLYCOL 3350 17 G: 17 POWDER, FOR SOLUTION ORAL at 08:01

## 2022-01-01 RX ADMIN — METHYLPREDNISOLONE SODIUM SUCCINATE 40 MG: 40 INJECTION, POWDER, FOR SOLUTION INTRAMUSCULAR; INTRAVENOUS at 22:30

## 2022-01-01 RX ADMIN — SODIUM CHLORIDE SOLN NEBU 3% 3 ML: 3 NEBU SOLN at 20:21

## 2022-01-01 RX ADMIN — RIVAROXABAN 20 MG: 10 TABLET, FILM COATED ORAL at 17:12

## 2022-01-01 RX ADMIN — SPIRONOLACTONE 12.5 MG: 25 TABLET ORAL at 09:12

## 2022-01-01 RX ADMIN — Medication 25 MCG: at 09:03

## 2022-01-01 RX ADMIN — ACETYLCYSTEINE 4 ML: 200 SOLUTION ORAL; RESPIRATORY (INHALATION) at 00:29

## 2022-01-01 RX ADMIN — PREDNISONE 40 MG: 20 TABLET ORAL at 17:52

## 2022-01-01 RX ADMIN — DILTIAZEM HYDROCHLORIDE 240 MG: 120 CAPSULE, COATED, EXTENDED RELEASE ORAL at 08:52

## 2022-01-01 RX ADMIN — RIVAROXABAN 20 MG: 10 TABLET, FILM COATED ORAL at 17:27

## 2022-01-01 RX ADMIN — PREDNISONE 40 MG: 20 TABLET ORAL at 09:14

## 2022-01-01 RX ADMIN — MIRTAZAPINE 15 MG: 15 TABLET, FILM COATED ORAL at 22:08

## 2022-01-01 RX ADMIN — ACETAMINOPHEN 650 MG: 325 TABLET ORAL at 12:09

## 2022-01-01 RX ADMIN — ARIPIPRAZOLE 5 MG: 5 TABLET ORAL at 21:05

## 2022-01-01 RX ADMIN — Medication 1 TABLET: at 09:21

## 2022-01-01 RX ADMIN — ACETYLCYSTEINE 4 ML: 200 SOLUTION ORAL; RESPIRATORY (INHALATION) at 20:23

## 2022-01-01 RX ADMIN — TERBINAFINE HYDROCHLORIDE 250 MG: 250 TABLET ORAL at 10:42

## 2022-01-01 RX ADMIN — ALBUTEROL SULFATE 2 PUFF: 90 AEROSOL, METERED RESPIRATORY (INHALATION) at 05:48

## 2022-01-01 RX ADMIN — DOXYCYCLINE 100 MG: 100 INJECTION, POWDER, LYOPHILIZED, FOR SOLUTION INTRAVENOUS at 22:23

## 2022-01-01 RX ADMIN — DILTIAZEM HYDROCHLORIDE 240 MG: 240 CAPSULE, COATED, EXTENDED RELEASE ORAL at 08:59

## 2022-01-01 RX ADMIN — FUROSEMIDE 40 MG: 40 TABLET ORAL at 17:06

## 2022-01-01 RX ADMIN — MORPHINE SULFATE 1 MG: 2 INJECTION, SOLUTION INTRAMUSCULAR; INTRAVENOUS at 08:44

## 2022-01-01 RX ADMIN — VALACYCLOVIR HYDROCHLORIDE 500 MG: 500 TABLET, FILM COATED ORAL at 14:56

## 2022-01-01 RX ADMIN — FUROSEMIDE 40 MG: 40 TABLET ORAL at 08:28

## 2022-01-01 RX ADMIN — IPRATROPIUM BROMIDE AND ALBUTEROL SULFATE 3 ML: 2.5; .5 SOLUTION RESPIRATORY (INHALATION) at 09:18

## 2022-01-01 RX ADMIN — ACETYLCYSTEINE 4 ML: 200 SOLUTION ORAL; RESPIRATORY (INHALATION) at 20:36

## 2022-01-01 RX ADMIN — INSULIN ASPART 2 UNITS: 100 INJECTION, SOLUTION INTRAVENOUS; SUBCUTANEOUS at 12:28

## 2022-01-01 RX ADMIN — DOCUSATE SODIUM 50 MG AND SENNOSIDES 8.6 MG 1 TABLET: 8.6; 5 TABLET, FILM COATED ORAL at 08:13

## 2022-01-01 RX ADMIN — FUROSEMIDE 40 MG: 10 INJECTION, SOLUTION INTRAMUSCULAR; INTRAVENOUS at 11:25

## 2022-01-01 RX ADMIN — PREDNISONE 40 MG: 20 TABLET ORAL at 16:20

## 2022-01-01 RX ADMIN — SODIUM CHLORIDE 250 ML: 9 INJECTION, SOLUTION INTRAVENOUS at 08:24

## 2022-01-01 RX ADMIN — GABAPENTIN 600 MG: 300 CAPSULE ORAL at 21:31

## 2022-01-01 RX ADMIN — ATORVASTATIN CALCIUM 20 MG: 10 TABLET, FILM COATED ORAL at 22:24

## 2022-01-01 RX ADMIN — METHYLPREDNISOLONE SODIUM SUCCINATE 125 MG: 125 INJECTION, POWDER, FOR SOLUTION INTRAMUSCULAR; INTRAVENOUS at 12:46

## 2022-01-01 RX ADMIN — SODIUM CHLORIDE SOLN NEBU 3% 3 ML: 3 NEBU SOLN at 15:57

## 2022-01-01 RX ADMIN — SODIUM CHLORIDE SOLN NEBU 3% 3 ML: 3 NEBU SOLN at 15:32

## 2022-01-01 RX ADMIN — ACETYLCYSTEINE 4 ML: 200 SOLUTION ORAL; RESPIRATORY (INHALATION) at 09:12

## 2022-01-01 RX ADMIN — GUAIFENESIN 600 MG: 600 TABLET ORAL at 08:59

## 2022-01-01 RX ADMIN — ARIPIPRAZOLE 5 MG: 5 TABLET ORAL at 22:47

## 2022-01-01 RX ADMIN — MIRTAZAPINE 15 MG: 15 TABLET, FILM COATED ORAL at 21:13

## 2022-01-01 RX ADMIN — INSULIN ASPART 1 UNITS: 100 INJECTION, SOLUTION INTRAVENOUS; SUBCUTANEOUS at 08:15

## 2022-01-01 RX ADMIN — METFORMIN HYDROCHLORIDE 1000 MG: 500 TABLET, FILM COATED ORAL at 08:14

## 2022-01-01 RX ADMIN — PREDNISONE 40 MG: 20 TABLET ORAL at 08:59

## 2022-01-01 RX ADMIN — ACETAMINOPHEN 650 MG: 325 TABLET ORAL at 12:36

## 2022-01-01 RX ADMIN — CALCIUM CARBONATE-VITAMIN D TAB 500 MG-200 UNIT 1 TABLET: 500-200 TAB at 08:00

## 2022-01-01 RX ADMIN — CYANOCOBALAMIN TAB 500 MCG 500 MCG: 500 TAB at 10:02

## 2022-01-01 RX ADMIN — IPRATROPIUM BROMIDE AND ALBUTEROL SULFATE 3 ML: 2.5; .5 SOLUTION RESPIRATORY (INHALATION) at 04:40

## 2022-01-01 RX ADMIN — Medication 25 MCG: at 08:50

## 2022-01-01 RX ADMIN — RIVAROXABAN 20 MG: 10 TABLET, FILM COATED ORAL at 17:17

## 2022-01-01 RX ADMIN — Medication 3 MG: at 22:15

## 2022-01-01 RX ADMIN — POLYETHYLENE GLYCOL 3350 17 G: 17 POWDER, FOR SOLUTION ORAL at 07:44

## 2022-01-01 RX ADMIN — ARIPIPRAZOLE 5 MG: 5 TABLET ORAL at 23:27

## 2022-01-01 RX ADMIN — ATORVASTATIN CALCIUM 20 MG: 10 TABLET, FILM COATED ORAL at 21:01

## 2022-01-01 RX ADMIN — INSULIN ASPART 1 UNITS: 100 INJECTION, SOLUTION INTRAVENOUS; SUBCUTANEOUS at 13:15

## 2022-01-01 RX ADMIN — ACETAMINOPHEN 650 MG: 325 TABLET ORAL at 12:08

## 2022-01-01 RX ADMIN — ACETYLCYSTEINE 4 ML: 200 SOLUTION ORAL; RESPIRATORY (INHALATION) at 20:14

## 2022-01-01 RX ADMIN — ATORVASTATIN CALCIUM 20 MG: 10 TABLET, FILM COATED ORAL at 21:59

## 2022-01-01 RX ADMIN — FUROSEMIDE 40 MG: 10 INJECTION, SOLUTION INTRAVENOUS at 14:55

## 2022-01-01 RX ADMIN — POLYETHYLENE GLYCOL 3350 17 G: 17 POWDER, FOR SOLUTION ORAL at 09:05

## 2022-01-01 RX ADMIN — POLYETHYLENE GLYCOL 3350 17 G: 17 POWDER, FOR SOLUTION ORAL at 08:10

## 2022-01-01 RX ADMIN — ACETAMINOPHEN 650 MG: 325 TABLET ORAL at 01:29

## 2022-01-01 RX ADMIN — DILTIAZEM HYDROCHLORIDE 120 MG: 120 CAPSULE, EXTENDED RELEASE ORAL at 07:57

## 2022-01-01 RX ADMIN — SODIUM CHLORIDE SOLN NEBU 3% 3 ML: 3 NEBU SOLN at 07:19

## 2022-01-01 RX ADMIN — Medication 25 MCG: at 08:44

## 2022-01-01 RX ADMIN — ATROPINE SULFATE 2 DROP: 10 SOLUTION/ DROPS OPHTHALMIC at 15:40

## 2022-01-01 RX ADMIN — Medication 1 TABLET: at 08:16

## 2022-01-01 RX ADMIN — DOCUSATE SODIUM 100 MG: 100 CAPSULE, LIQUID FILLED ORAL at 21:35

## 2022-01-01 RX ADMIN — ACETYLCYSTEINE 4 ML: 200 SOLUTION ORAL; RESPIRATORY (INHALATION) at 11:17

## 2022-01-01 RX ADMIN — ACYCLOVIR: 50 OINTMENT TOPICAL at 06:04

## 2022-01-01 RX ADMIN — PREDNISONE 40 MG: 20 TABLET ORAL at 15:51

## 2022-01-01 RX ADMIN — CIPROFLOXACIN HYDROCHLORIDE 750 MG: 250 TABLET, FILM COATED ORAL at 22:39

## 2022-01-01 RX ADMIN — SODIUM CHLORIDE SOLN NEBU 3% 3 ML: 3 NEBU SOLN at 09:45

## 2022-01-01 RX ADMIN — ACETYLCYSTEINE 4 ML: 200 SOLUTION ORAL; RESPIRATORY (INHALATION) at 20:22

## 2022-01-01 RX ADMIN — IPRATROPIUM BROMIDE AND ALBUTEROL SULFATE 3 ML: 2.5; .5 SOLUTION RESPIRATORY (INHALATION) at 20:27

## 2022-01-01 RX ADMIN — SODIUM CHLORIDE SOLN NEBU 3% 3 ML: 3 NEBU SOLN at 14:34

## 2022-01-01 RX ADMIN — ACETYLCYSTEINE 4 ML: 200 SOLUTION ORAL; RESPIRATORY (INHALATION) at 23:41

## 2022-01-01 RX ADMIN — FUROSEMIDE 40 MG: 40 TABLET ORAL at 08:46

## 2022-01-01 RX ADMIN — Medication 3 MG: at 21:33

## 2022-01-01 RX ADMIN — CEFEPIME HYDROCHLORIDE 2 G: 2 INJECTION, POWDER, FOR SOLUTION INTRAVENOUS at 20:12

## 2022-01-01 RX ADMIN — IPRATROPIUM BROMIDE AND ALBUTEROL SULFATE 3 ML: 2.5; .5 SOLUTION RESPIRATORY (INHALATION) at 07:00

## 2022-01-01 RX ADMIN — HUMAN INSULIN 5 UNITS: 100 INJECTION, SUSPENSION SUBCUTANEOUS at 09:34

## 2022-01-01 RX ADMIN — SODIUM CHLORIDE SOLN NEBU 3% 4 ML: 3 NEBU SOLN at 07:28

## 2022-01-01 RX ADMIN — ACETAMINOPHEN 1000 MG: 500 TABLET, FILM COATED ORAL at 14:43

## 2022-01-01 RX ADMIN — SPIRONOLACTONE 12.5 MG: 25 TABLET ORAL at 09:13

## 2022-01-01 RX ADMIN — GUAIFENESIN 1200 MG: 600 TABLET ORAL at 08:13

## 2022-01-01 RX ADMIN — PREDNISONE 10 MG: 5 TABLET ORAL at 14:24

## 2022-01-01 RX ADMIN — ACETAMINOPHEN 975 MG: 325 TABLET ORAL at 00:33

## 2022-01-01 RX ADMIN — MEROPENEM 1 G: 1 INJECTION, POWDER, FOR SOLUTION INTRAVENOUS at 21:09

## 2022-01-01 RX ADMIN — INSULIN ASPART 2 UNITS: 100 INJECTION, SOLUTION INTRAVENOUS; SUBCUTANEOUS at 16:50

## 2022-01-01 RX ADMIN — POLYETHYLENE GLYCOL 3350 17 G: 17 POWDER, FOR SOLUTION ORAL at 08:09

## 2022-01-01 RX ADMIN — DOCUSATE SODIUM 100 MG: 100 CAPSULE, LIQUID FILLED ORAL at 21:29

## 2022-01-01 RX ADMIN — ACETAMINOPHEN 1000 MG: 500 TABLET, FILM COATED ORAL at 03:58

## 2022-01-01 RX ADMIN — ATORVASTATIN CALCIUM 20 MG: 10 TABLET, FILM COATED ORAL at 22:08

## 2022-01-01 RX ADMIN — ACETYLCYSTEINE 4 ML: 200 SOLUTION ORAL; RESPIRATORY (INHALATION) at 11:40

## 2022-01-01 RX ADMIN — SODIUM CHLORIDE SOLN NEBU 3% 3 ML: 3 NEBU SOLN at 15:04

## 2022-01-01 RX ADMIN — ACETYLCYSTEINE 4 ML: 100 SOLUTION ORAL; RESPIRATORY (INHALATION) at 08:21

## 2022-01-01 RX ADMIN — DICLOFENAC SODIUM 2 G: 10 GEL TOPICAL at 12:46

## 2022-01-01 RX ADMIN — FUROSEMIDE 20 MG: 10 INJECTION, SOLUTION INTRAMUSCULAR; INTRAVENOUS at 17:39

## 2022-01-01 RX ADMIN — IPRATROPIUM BROMIDE AND ALBUTEROL SULFATE 3 ML: 2.5; .5 SOLUTION RESPIRATORY (INHALATION) at 13:16

## 2022-01-01 RX ADMIN — DILTIAZEM HYDROCHLORIDE 180 MG: 180 CAPSULE, EXTENDED RELEASE ORAL at 09:15

## 2022-01-01 RX ADMIN — SODIUM CHLORIDE SOLN NEBU 3% 3 ML: 3 NEBU SOLN at 16:41

## 2022-01-01 RX ADMIN — ALBUTEROL SULFATE 2.5 MG: 2.5 SOLUTION RESPIRATORY (INHALATION) at 15:17

## 2022-01-01 RX ADMIN — CEFEPIME HYDROCHLORIDE 2 G: 2 INJECTION, POWDER, FOR SOLUTION INTRAVENOUS at 14:09

## 2022-01-01 RX ADMIN — LORAZEPAM 0.5 MG: 0.5 TABLET ORAL at 04:54

## 2022-01-01 RX ADMIN — ACETYLCYSTEINE 4 ML: 200 INHALANT RESPIRATORY (INHALATION) at 07:29

## 2022-01-01 RX ADMIN — DOCUSATE SODIUM 100 MG: 100 CAPSULE, LIQUID FILLED ORAL at 10:02

## 2022-01-01 RX ADMIN — BISACODYL 10 MG: 10 SUPPOSITORY RECTAL at 07:54

## 2022-01-01 RX ADMIN — IPRATROPIUM BROMIDE AND ALBUTEROL SULFATE 3 ML: 2.5; .5 SOLUTION RESPIRATORY (INHALATION) at 16:00

## 2022-01-01 RX ADMIN — PREDNISONE 20 MG: 20 TABLET ORAL at 08:49

## 2022-01-01 RX ADMIN — UMECLIDINIUM 1 PUFF: 62.5 AEROSOL, POWDER ORAL at 08:30

## 2022-01-01 RX ADMIN — SALINE NASAL SPRAY 1 SPRAY: 1.5 SOLUTION NASAL at 11:56

## 2022-01-01 RX ADMIN — LORAZEPAM 0.25 MG: 0.5 TABLET ORAL at 21:13

## 2022-01-01 RX ADMIN — RIVAROXABAN 20 MG: 10 TABLET, FILM COATED ORAL at 16:15

## 2022-01-01 RX ADMIN — CLOTRIMAZOLE: 10 CREAM TOPICAL at 09:40

## 2022-01-01 RX ADMIN — GABAPENTIN 600 MG: 300 CAPSULE ORAL at 20:22

## 2022-01-01 RX ADMIN — CALAMINE 8% AND ZINC OXIDE 8%: 160 LOTION TOPICAL at 21:20

## 2022-01-01 RX ADMIN — ALBUTEROL SULFATE 2.5 MG: 2.5 SOLUTION RESPIRATORY (INHALATION) at 02:20

## 2022-01-01 RX ADMIN — IPRATROPIUM BROMIDE AND ALBUTEROL SULFATE 3 ML: 2.5; .5 SOLUTION RESPIRATORY (INHALATION) at 16:06

## 2022-01-01 RX ADMIN — ACETYLCYSTEINE 4 ML: 200 SOLUTION ORAL; RESPIRATORY (INHALATION) at 08:46

## 2022-01-01 RX ADMIN — IPRATROPIUM BROMIDE AND ALBUTEROL SULFATE 3 ML: 2.5; .5 SOLUTION RESPIRATORY (INHALATION) at 09:44

## 2022-01-01 RX ADMIN — LORAZEPAM 0.25 MG: 0.5 TABLET ORAL at 11:08

## 2022-01-01 RX ADMIN — BETAMETHASONE DIPROPIONATE: 0.5 OINTMENT, AUGMENTED TOPICAL at 20:48

## 2022-01-01 RX ADMIN — RIVAROXABAN 20 MG: 10 TABLET, FILM COATED ORAL at 23:30

## 2022-01-01 RX ADMIN — SODIUM CHLORIDE SOLN NEBU 3% 3 ML: 3 NEBU SOLN at 15:59

## 2022-01-01 RX ADMIN — ACETYLCYSTEINE 4 ML: 200 SOLUTION ORAL; RESPIRATORY (INHALATION) at 16:26

## 2022-01-01 RX ADMIN — IPRATROPIUM BROMIDE AND ALBUTEROL SULFATE 3 ML: 2.5; .5 SOLUTION RESPIRATORY (INHALATION) at 20:36

## 2022-01-01 RX ADMIN — FUROSEMIDE 40 MG: 40 TABLET ORAL at 08:20

## 2022-01-01 RX ADMIN — SPIRONOLACTONE 12.5 MG: 25 TABLET ORAL at 09:15

## 2022-01-01 RX ADMIN — ERTAPENEM SODIUM 1 G: 1 INJECTION, POWDER, LYOPHILIZED, FOR SOLUTION INTRAMUSCULAR; INTRAVENOUS at 13:13

## 2022-01-01 RX ADMIN — Medication 25 MCG: at 09:20

## 2022-01-01 RX ADMIN — ACETYLCYSTEINE 4 ML: 200 SOLUTION ORAL; RESPIRATORY (INHALATION) at 11:38

## 2022-01-01 RX ADMIN — IPRATROPIUM BROMIDE AND ALBUTEROL SULFATE 3 ML: 2.5; .5 SOLUTION RESPIRATORY (INHALATION) at 07:36

## 2022-01-01 RX ADMIN — PREDNISONE 20 MG: 20 TABLET ORAL at 08:46

## 2022-01-01 RX ADMIN — IPRATROPIUM BROMIDE AND ALBUTEROL SULFATE 3 ML: 2.5; .5 SOLUTION RESPIRATORY (INHALATION) at 09:01

## 2022-01-01 RX ADMIN — SPIRONOLACTONE 12.5 MG: 25 TABLET ORAL at 08:01

## 2022-01-01 RX ADMIN — CIPROFLOXACIN HYDROCHLORIDE 750 MG: 250 TABLET, FILM COATED ORAL at 08:07

## 2022-01-01 RX ADMIN — RIVAROXABAN 20 MG: 20 TABLET, FILM COATED ORAL at 17:40

## 2022-01-01 RX ADMIN — SODIUM CHLORIDE SOLN NEBU 3% 3 ML: 3 NEBU SOLN at 07:48

## 2022-01-01 RX ADMIN — ACETYLCYSTEINE 4 ML: 200 SOLUTION ORAL; RESPIRATORY (INHALATION) at 20:49

## 2022-01-01 RX ADMIN — SODIUM CHLORIDE SOLN NEBU 3% 3 ML: 3 NEBU SOLN at 17:01

## 2022-01-01 RX ADMIN — DILTIAZEM HYDROCHLORIDE 240 MG: 120 CAPSULE, COATED, EXTENDED RELEASE ORAL at 08:49

## 2022-01-01 RX ADMIN — NITROGLYCERIN 0.4 MG: 0.4 TABLET SUBLINGUAL at 11:07

## 2022-01-01 RX ADMIN — SPIRONOLACTONE 12.5 MG: 25 TABLET ORAL at 09:09

## 2022-01-01 RX ADMIN — GUAIFENESIN 600 MG: 600 TABLET ORAL at 10:04

## 2022-01-01 RX ADMIN — AZITHROMYCIN MONOHYDRATE 500 MG: 500 INJECTION, POWDER, LYOPHILIZED, FOR SOLUTION INTRAVENOUS at 17:40

## 2022-01-01 RX ADMIN — PREDNISONE 40 MG: 20 TABLET ORAL at 12:21

## 2022-01-01 RX ADMIN — SODIUM CHLORIDE SOLN NEBU 3% 3 ML: 3 NEBU SOLN at 12:13

## 2022-01-01 RX ADMIN — METHYLPREDNISOLONE SODIUM SUCCINATE 62.5 MG: 125 INJECTION, POWDER, FOR SOLUTION INTRAMUSCULAR; INTRAVENOUS at 13:25

## 2022-01-01 RX ADMIN — SODIUM CHLORIDE SOLN NEBU 3% 3 ML: 3 NEBU SOLN at 20:49

## 2022-01-01 RX ADMIN — SPIRONOLACTONE 12.5 MG: 25 TABLET ORAL at 09:37

## 2022-01-01 RX ADMIN — IPRATROPIUM BROMIDE AND ALBUTEROL SULFATE 3 ML: 2.5; .5 SOLUTION RESPIRATORY (INHALATION) at 01:01

## 2022-01-01 RX ADMIN — IPRATROPIUM BROMIDE AND ALBUTEROL SULFATE 3 ML: 2.5; .5 SOLUTION RESPIRATORY (INHALATION) at 02:30

## 2022-01-01 RX ADMIN — ACETYLCYSTEINE 4 ML: 200 INHALANT RESPIRATORY (INHALATION) at 13:16

## 2022-01-01 RX ADMIN — INSULIN ASPART 2 UNITS: 100 INJECTION, SOLUTION INTRAVENOUS; SUBCUTANEOUS at 13:04

## 2022-01-01 RX ADMIN — SPIRONOLACTONE 12.5 MG: 25 TABLET ORAL at 09:04

## 2022-01-01 RX ADMIN — ACETAMINOPHEN 1000 MG: 500 TABLET, FILM COATED ORAL at 12:21

## 2022-01-01 RX ADMIN — SODIUM CHLORIDE SOLN NEBU 3% 3 ML: 3 NEBU SOLN at 16:24

## 2022-01-01 RX ADMIN — IPRATROPIUM BROMIDE AND ALBUTEROL SULFATE 3 ML: 2.5; .5 SOLUTION RESPIRATORY (INHALATION) at 08:08

## 2022-01-01 RX ADMIN — AZITHROMYCIN 250 MG: 250 TABLET, FILM COATED ORAL at 10:26

## 2022-01-01 RX ADMIN — GABAPENTIN 600 MG: 300 CAPSULE ORAL at 21:21

## 2022-01-01 RX ADMIN — GUAIFENESIN 600 MG: 600 TABLET ORAL at 19:49

## 2022-01-01 RX ADMIN — SODIUM CHLORIDE SOLN NEBU 3% 3 ML: 3 NEBU SOLN at 20:14

## 2022-01-01 RX ADMIN — Medication 1 TABLET: at 09:36

## 2022-01-01 RX ADMIN — RIVAROXABAN 20 MG: 10 TABLET, FILM COATED ORAL at 17:37

## 2022-01-01 RX ADMIN — IPRATROPIUM BROMIDE AND ALBUTEROL SULFATE 3 ML: 2.5; .5 SOLUTION RESPIRATORY (INHALATION) at 16:35

## 2022-01-01 RX ADMIN — GUAIFENESIN 600 MG: 600 TABLET ORAL at 21:14

## 2022-01-01 RX ADMIN — METHYLPREDNISOLONE SODIUM SUCCINATE 40 MG: 40 INJECTION, POWDER, FOR SOLUTION INTRAMUSCULAR; INTRAVENOUS at 23:58

## 2022-01-01 RX ADMIN — ACETYLCYSTEINE 4 ML: 200 SOLUTION ORAL; RESPIRATORY (INHALATION) at 15:30

## 2022-01-01 RX ADMIN — PANTOPRAZOLE SODIUM 40 MG: 20 TABLET, DELAYED RELEASE ORAL at 09:07

## 2022-01-01 RX ADMIN — ACETYLCYSTEINE 4 ML: 200 SOLUTION ORAL; RESPIRATORY (INHALATION) at 04:56

## 2022-01-01 RX ADMIN — CLOTRIMAZOLE: 10 CREAM TOPICAL at 19:47

## 2022-01-01 RX ADMIN — METHYLPREDNISOLONE SODIUM SUCCINATE 62.5 MG: 125 INJECTION, POWDER, FOR SOLUTION INTRAMUSCULAR; INTRAVENOUS at 18:53

## 2022-01-01 ASSESSMENT — ACTIVITIES OF DAILY LIVING (ADL)
ADLS_ACUITY_SCORE: 38
ADLS_ACUITY_SCORE: 11
ADLS_ACUITY_SCORE: 18
ADLS_ACUITY_SCORE: 7
ADLS_ACUITY_SCORE: 18
ADLS_ACUITY_SCORE: 32
ADLS_ACUITY_SCORE: 9
ADLS_ACUITY_SCORE: 18
ADLS_ACUITY_SCORE: 26
ADLS_ACUITY_SCORE: 29
ADLS_ACUITY_SCORE: 12
ADLS_ACUITY_SCORE: 43
ADLS_ACUITY_SCORE: 17
ADLS_ACUITY_SCORE: 31
ADLS_ACUITY_SCORE: 10
ADLS_ACUITY_SCORE: 31
ADLS_ACUITY_SCORE: 9
ADLS_ACUITY_SCORE: 9
ADLS_ACUITY_SCORE: 38
ADLS_ACUITY_SCORE: 30
ADLS_ACUITY_SCORE: 35
TOILETING_ISSUES: NO
DIFFICULTY_EATING/SWALLOWING: NO
ADLS_ACUITY_SCORE: 12
ADLS_ACUITY_SCORE: 18
ADLS_ACUITY_SCORE: 34
ADLS_ACUITY_SCORE: 29
ADLS_ACUITY_SCORE: 14
FALL_HISTORY_WITHIN_LAST_SIX_MONTHS: NO
ADLS_ACUITY_SCORE: 30
ADLS_ACUITY_SCORE: 9
ADLS_ACUITY_SCORE: 17
ADLS_ACUITY_SCORE: 16
DIFFICULTY_EATING/SWALLOWING: NO
ADLS_ACUITY_SCORE: 38
DIFFICULTY_COMMUNICATING: NO
ADLS_ACUITY_SCORE: 31
ADLS_ACUITY_SCORE: 19
WALKING_OR_CLIMBING_STAIRS_DIFFICULTY: YES
ADLS_ACUITY_SCORE: 12
ADLS_ACUITY_SCORE: 31
ADLS_ACUITY_SCORE: 34
ADLS_ACUITY_SCORE: 14
ADLS_ACUITY_SCORE: 12
ADLS_ACUITY_SCORE: 30
ADLS_ACUITY_SCORE: 7
BATHING: 1-->ASSISTANCE NEEDED
ADLS_ACUITY_SCORE: 12
ADLS_ACUITY_SCORE: 30
ADLS_ACUITY_SCORE: 12
ADLS_ACUITY_SCORE: 35
ADLS_ACUITY_SCORE: 34
ADLS_ACUITY_SCORE: 12
VISION_MANAGEMENT: GLASSES
ADLS_ACUITY_SCORE: 18
DIFFICULTY_EATING/SWALLOWING: NO
ADLS_ACUITY_SCORE: 12
ADLS_ACUITY_SCORE: 29
ADLS_ACUITY_SCORE: 9
ADLS_ACUITY_SCORE: 18
ADLS_ACUITY_SCORE: 31
ADLS_ACUITY_SCORE: 12
ADLS_ACUITY_SCORE: 18
ADLS_ACUITY_SCORE: 30
ADLS_ACUITY_SCORE: 31
ADLS_ACUITY_SCORE: 14
ADLS_ACUITY_SCORE: 7
ADLS_ACUITY_SCORE: 12
ADLS_ACUITY_SCORE: 12
ADLS_ACUITY_SCORE: 14
DOING_ERRANDS_INDEPENDENTLY_DIFFICULTY: YES
ADLS_ACUITY_SCORE: 13
WALKING_OR_CLIMBING_STAIRS_DIFFICULTY: YES
ADLS_ACUITY_SCORE: 16
ADLS_ACUITY_SCORE: 31
ADLS_ACUITY_SCORE: 9
ADLS_ACUITY_SCORE: 12
ADLS_ACUITY_SCORE: 12
ADLS_ACUITY_SCORE: 14
ADLS_ACUITY_SCORE: 26
DOING_ERRANDS_INDEPENDENTLY_DIFFICULTY: YES
ADLS_ACUITY_SCORE: 30
ADLS_ACUITY_SCORE: 12
ADLS_ACUITY_SCORE: 10
ADLS_ACUITY_SCORE: 16
ADLS_ACUITY_SCORE: 18
ADLS_ACUITY_SCORE: 29
ADLS_ACUITY_SCORE: 17
ADLS_ACUITY_SCORE: 15
ADLS_ACUITY_SCORE: 12
ADLS_ACUITY_SCORE: 18
ADLS_ACUITY_SCORE: 31
ADLS_ACUITY_SCORE: 29
ADLS_ACUITY_SCORE: 14
ADLS_ACUITY_SCORE: 12
ADLS_ACUITY_SCORE: 30
ADLS_ACUITY_SCORE: 31
ADLS_ACUITY_SCORE: 31
WEAR_GLASSES_OR_BLIND: NO
ADLS_ACUITY_SCORE: 33
ADLS_ACUITY_SCORE: 31
ADLS_ACUITY_SCORE: 32
CONCENTRATING,_REMEMBERING_OR_MAKING_DECISIONS_DIFFICULTY: NO
ADLS_ACUITY_SCORE: 30
ADLS_ACUITY_SCORE: 29
ADLS_ACUITY_SCORE: 33
ADLS_ACUITY_SCORE: 18
ADLS_ACUITY_SCORE: 30
ADLS_ACUITY_SCORE: 12
ADLS_ACUITY_SCORE: 7
ADLS_ACUITY_SCORE: 29
ADLS_ACUITY_SCORE: 30
ADLS_ACUITY_SCORE: 34
EQUIPMENT_CURRENTLY_USED_AT_HOME: CANE, STRAIGHT;WALKER, ROLLING
ADLS_ACUITY_SCORE: 12
FALL_HISTORY_WITHIN_LAST_SIX_MONTHS: NO
ADLS_ACUITY_SCORE: 31
ADLS_ACUITY_SCORE: 18
ADLS_ACUITY_SCORE: 38
ADLS_ACUITY_SCORE: 31
ADLS_ACUITY_SCORE: 7
ADLS_ACUITY_SCORE: 34
ADLS_ACUITY_SCORE: 33
ADLS_ACUITY_SCORE: 33
ADLS_ACUITY_SCORE: 29
ADLS_ACUITY_SCORE: 14
ADLS_ACUITY_SCORE: 13
ADLS_ACUITY_SCORE: 9
ADLS_ACUITY_SCORE: 29
ADLS_ACUITY_SCORE: 9
ADLS_ACUITY_SCORE: 34
ADLS_ACUITY_SCORE: 18
ADLS_ACUITY_SCORE: 9
ADLS_ACUITY_SCORE: 30
ADLS_ACUITY_SCORE: 31
ADLS_ACUITY_SCORE: 12
ADLS_ACUITY_SCORE: 15
ADLS_ACUITY_SCORE: 30
ADLS_ACUITY_SCORE: 29
ADLS_ACUITY_SCORE: 9
ADLS_ACUITY_SCORE: 15
FALL_HISTORY_WITHIN_LAST_SIX_MONTHS: NO
ADLS_ACUITY_SCORE: 12
ADLS_ACUITY_SCORE: 45
ADLS_ACUITY_SCORE: 30
ADLS_ACUITY_SCORE: 19
ADLS_ACUITY_SCORE: 33
ADLS_ACUITY_SCORE: 16
ADLS_ACUITY_SCORE: 12
ADLS_ACUITY_SCORE: 10
ADLS_ACUITY_SCORE: 16
CONCENTRATING,_REMEMBERING_OR_MAKING_DECISIONS_DIFFICULTY: NO
ADLS_ACUITY_SCORE: 14
ADLS_ACUITY_SCORE: 9
ADLS_ACUITY_SCORE: 30
ADLS_ACUITY_SCORE: 34
TRANSFERRING: 1-->ASSISTANCE (EQUIPMENT/PERSON) NEEDED
ADLS_ACUITY_SCORE: 17
EQUIPMENT_CURRENTLY_USED_AT_HOME: WALKER, ROLLING;CANE, STRAIGHT
ADLS_ACUITY_SCORE: 9
ADLS_ACUITY_SCORE: 29
ADLS_ACUITY_SCORE: 11
ADLS_ACUITY_SCORE: 41
ADLS_ACUITY_SCORE: 29
ADLS_ACUITY_SCORE: 31
ADLS_ACUITY_SCORE: 7
ADLS_ACUITY_SCORE: 31
ADLS_ACUITY_SCORE: 9
ADLS_ACUITY_SCORE: 9
ADLS_ACUITY_SCORE: 14
ADLS_ACUITY_SCORE: 16
ADLS_ACUITY_SCORE: 12
ADLS_ACUITY_SCORE: 30
TRANSFERRING: 1-->ASSISTANCE (EQUIPMENT/PERSON) NEEDED (NOT DEVELOPMENTALLY APPROPRIATE)
ADLS_ACUITY_SCORE: 26
ADLS_ACUITY_SCORE: 31
ADLS_ACUITY_SCORE: 29
ADLS_ACUITY_SCORE: 32
ADLS_ACUITY_SCORE: 12
ADLS_ACUITY_SCORE: 14
ADLS_ACUITY_SCORE: 32
ADLS_ACUITY_SCORE: 12
ADLS_ACUITY_SCORE: 13
ADLS_ACUITY_SCORE: 18
ADLS_ACUITY_SCORE: 18
ADLS_ACUITY_SCORE: 16
ADLS_ACUITY_SCORE: 12
TOILETING_ISSUES: NO
CHANGE_IN_FUNCTIONAL_STATUS_SINCE_ONSET_OF_CURRENT_ILLNESS/INJURY: YES
DOING_ERRANDS_INDEPENDENTLY_DIFFICULTY: NO
ADLS_ACUITY_SCORE: 12
ADLS_ACUITY_SCORE: 41
ADLS_ACUITY_SCORE: 12
ADLS_ACUITY_SCORE: 31
ADLS_ACUITY_SCORE: 26
ADLS_ACUITY_SCORE: 18
ADLS_ACUITY_SCORE: 13
ADLS_ACUITY_SCORE: 14
TRANSFERRING: 0-->ASSISTANCE NEEDED (DEVELOPMETNALLY APPROPRIATE)
ADLS_ACUITY_SCORE: 9
ADLS_ACUITY_SCORE: 35
ADLS_ACUITY_SCORE: 9
ADLS_ACUITY_SCORE: 30
ADLS_ACUITY_SCORE: 30
ADLS_ACUITY_SCORE: 19
ADLS_ACUITY_SCORE: 31
ADLS_ACUITY_SCORE: 31
WALKING_OR_CLIMBING_STAIRS_DIFFICULTY: YES
ADLS_ACUITY_SCORE: 12
ADLS_ACUITY_SCORE: 31
ADLS_ACUITY_SCORE: 11
ADLS_ACUITY_SCORE: 30
ADLS_ACUITY_SCORE: 9
FALL_HISTORY_WITHIN_LAST_SIX_MONTHS: NO
ADLS_ACUITY_SCORE: 34
ADLS_ACUITY_SCORE: 30
WALKING_OR_CLIMBING_STAIRS: AMBULATION DIFFICULTY, REQUIRES EQUIPMENT;STAIR CLIMBING DIFFICULTY, REQUIRES EQUIPMENT
ADLS_ACUITY_SCORE: 30
ADLS_ACUITY_SCORE: 19
ADLS_ACUITY_SCORE: 31
ADLS_ACUITY_SCORE: 13
ADLS_ACUITY_SCORE: 30
ADLS_ACUITY_SCORE: 31
ADLS_ACUITY_SCORE: 18
ADLS_ACUITY_SCORE: 16
ADLS_ACUITY_SCORE: 30
ADLS_ACUITY_SCORE: 13
ADLS_ACUITY_SCORE: 19
ADLS_ACUITY_SCORE: 38
ADLS_ACUITY_SCORE: 18
DRESSING/BATHING_DIFFICULTY: NO
ADLS_ACUITY_SCORE: 32
ADLS_ACUITY_SCORE: 31
ADLS_ACUITY_SCORE: 30
ADLS_ACUITY_SCORE: 30
ADLS_ACUITY_SCORE: 29
ADLS_ACUITY_SCORE: 10
ADLS_ACUITY_SCORE: 31
ADLS_ACUITY_SCORE: 31
DIFFICULTY_EATING/SWALLOWING: NO
ADLS_ACUITY_SCORE: 16
ADLS_ACUITY_SCORE: 30
ADLS_ACUITY_SCORE: 31
ADLS_ACUITY_SCORE: 18
ADLS_ACUITY_SCORE: 18
ADLS_ACUITY_SCORE: 15
ADLS_ACUITY_SCORE: 12
ADLS_ACUITY_SCORE: 38
ADLS_ACUITY_SCORE: 12
ADLS_ACUITY_SCORE: 15
ADLS_ACUITY_SCORE: 35
ADLS_ACUITY_SCORE: 31
ADLS_ACUITY_SCORE: 28
ADLS_ACUITY_SCORE: 18
ADLS_ACUITY_SCORE: 35
ADLS_ACUITY_SCORE: 13
ADLS_ACUITY_SCORE: 12
ADLS_ACUITY_SCORE: 9
ADLS_ACUITY_SCORE: 34
ADLS_ACUITY_SCORE: 32
ADLS_ACUITY_SCORE: 30
ADLS_ACUITY_SCORE: 33
DRESSING/BATHING_MANAGEMENT: HUSBAND ASSISTS
DRESS: 0-->INDEPENDENT
ADLS_ACUITY_SCORE: 12
TOILETING_ISSUES: NO
ADLS_ACUITY_SCORE: 38
ADLS_ACUITY_SCORE: 9
ADLS_ACUITY_SCORE: 14
ADLS_ACUITY_SCORE: 13
ADLS_ACUITY_SCORE: 7
ADLS_ACUITY_SCORE: 30
ADLS_ACUITY_SCORE: 12
ADLS_ACUITY_SCORE: 30
ADLS_ACUITY_SCORE: 17
ADLS_ACUITY_SCORE: 14
ADLS_ACUITY_SCORE: 11
ADLS_ACUITY_SCORE: 12
EQUIPMENT_CURRENTLY_USED_AT_HOME: CANE, STRAIGHT;WALKER, ROLLING
ADLS_ACUITY_SCORE: 13
ADLS_ACUITY_SCORE: 7
ADLS_ACUITY_SCORE: 10
ADLS_ACUITY_SCORE: 30
ADLS_ACUITY_SCORE: 9
ADLS_ACUITY_SCORE: 30
ADLS_ACUITY_SCORE: 9
CHANGE_IN_FUNCTIONAL_STATUS_SINCE_ONSET_OF_CURRENT_ILLNESS/INJURY: YES
ADLS_ACUITY_SCORE: 32
ADLS_ACUITY_SCORE: 13
ADLS_ACUITY_SCORE: 31
FALL_HISTORY_WITHIN_LAST_SIX_MONTHS: NO
ADLS_ACUITY_SCORE: 10
ADLS_ACUITY_SCORE: 18
ADLS_ACUITY_SCORE: 18
ADLS_ACUITY_SCORE: 31
ADLS_ACUITY_SCORE: 12
ADLS_ACUITY_SCORE: 26
ADLS_ACUITY_SCORE: 31
ADLS_ACUITY_SCORE: 18
ADLS_ACUITY_SCORE: 34
ADLS_ACUITY_SCORE: 34
ADLS_ACUITY_SCORE: 30
ADLS_ACUITY_SCORE: 35
ADLS_ACUITY_SCORE: 29
ADLS_ACUITY_SCORE: 31
WALKING_OR_CLIMBING_STAIRS: STAIR CLIMBING DIFFICULTY, ASSISTANCE 1 PERSON
ADLS_ACUITY_SCORE: 9
ADLS_ACUITY_SCORE: 8
ADLS_ACUITY_SCORE: 14
ADLS_ACUITY_SCORE: 31
ADLS_ACUITY_SCORE: 43
EQUIPMENT_CURRENTLY_USED_AT_HOME: WALKER, STANDARD
WEAR_GLASSES_OR_BLIND: NO
TOILETING_ISSUES: NO
ADLS_ACUITY_SCORE: 9
ADLS_ACUITY_SCORE: 12
DEPENDENT_IADLS:: CLEANING;LAUNDRY;COOKING;SHOPPING;MEAL PREPARATION;MEDICATION MANAGEMENT;TRANSPORTATION
ADLS_ACUITY_SCORE: 15
ADLS_ACUITY_SCORE: 8
ADLS_ACUITY_SCORE: 12
DRESSING/BATHING_DIFFICULTY: NO
ADLS_ACUITY_SCORE: 15
ADLS_ACUITY_SCORE: 47
ADLS_ACUITY_SCORE: 38
CHANGE_IN_FUNCTIONAL_STATUS_SINCE_ONSET_OF_CURRENT_ILLNESS/INJURY: YES
ADLS_ACUITY_SCORE: 43
ADLS_ACUITY_SCORE: 30
ADLS_ACUITY_SCORE: 9
ADLS_ACUITY_SCORE: 18
ADLS_ACUITY_SCORE: 19
ADLS_ACUITY_SCORE: 13
WALKING_OR_CLIMBING_STAIRS: AMBULATION DIFFICULTY, ASSISTANCE 1 PERSON
ADLS_ACUITY_SCORE: 12
ADLS_ACUITY_SCORE: 18
ADLS_ACUITY_SCORE: 18
ADLS_ACUITY_SCORE: 30
ADLS_ACUITY_SCORE: 30
ADLS_ACUITY_SCORE: 31
ADLS_ACUITY_SCORE: 12
ADLS_ACUITY_SCORE: 12
ADLS_ACUITY_SCORE: 14
ADLS_ACUITY_SCORE: 12
ADLS_ACUITY_SCORE: 29
ADLS_ACUITY_SCORE: 18
ADLS_ACUITY_SCORE: 9
ADLS_ACUITY_SCORE: 18
ADLS_ACUITY_SCORE: 31
ADLS_ACUITY_SCORE: 9
ADLS_ACUITY_SCORE: 17
TRANSFERRING: 1-->ASSISTANCE (EQUIPMENT/PERSON) NEEDED (NOT DEVELOPMENTALLY APPROPRIATE)
ADLS_ACUITY_SCORE: 13
ADLS_ACUITY_SCORE: 9
ADLS_ACUITY_SCORE: 30
WALKING_OR_CLIMBING_STAIRS_DIFFICULTY: YES
ADLS_ACUITY_SCORE: 14
ADLS_ACUITY_SCORE: 31
ADLS_ACUITY_SCORE: 9
ADLS_ACUITY_SCORE: 31
ADLS_ACUITY_SCORE: 12
ADLS_ACUITY_SCORE: 41
ADLS_ACUITY_SCORE: 29
DRESSING/BATHING_DIFFICULTY: YES
ADLS_ACUITY_SCORE: 38
ADLS_ACUITY_SCORE: 17
ADLS_ACUITY_SCORE: 12
ADLS_ACUITY_SCORE: 12
ADLS_ACUITY_SCORE: 26
ADLS_ACUITY_SCORE: 30
ADLS_ACUITY_SCORE: 31
ADLS_ACUITY_SCORE: 9
CONCENTRATING,_REMEMBERING_OR_MAKING_DECISIONS_DIFFICULTY: NO
ADLS_ACUITY_SCORE: 30
ADLS_ACUITY_SCORE: 9
ADLS_ACUITY_SCORE: 31
ADLS_ACUITY_SCORE: 9
CONCENTRATING,_REMEMBERING_OR_MAKING_DECISIONS_DIFFICULTY: NO
ADLS_ACUITY_SCORE: 9
ADLS_ACUITY_SCORE: 15
ADLS_ACUITY_SCORE: 7
ADLS_ACUITY_SCORE: 26
ADLS_ACUITY_SCORE: 31
ADLS_ACUITY_SCORE: 18
ADLS_ACUITY_SCORE: 9
ADLS_ACUITY_SCORE: 10
ADLS_ACUITY_SCORE: 7
ADLS_ACUITY_SCORE: 43
ADLS_ACUITY_SCORE: 19
ADLS_ACUITY_SCORE: 34
ADLS_ACUITY_SCORE: 43
ADLS_ACUITY_SCORE: 12
DRESSING/BATHING: BATHING DIFFICULTY, ASSISTANCE 1 PERSON
ADLS_ACUITY_SCORE: 12
DRESSING/BATHING_DIFFICULTY: NO
ADLS_ACUITY_SCORE: 12
ADLS_ACUITY_SCORE: 30
ADLS_ACUITY_SCORE: 16
ADLS_ACUITY_SCORE: 30
ADLS_ACUITY_SCORE: 30
ADLS_ACUITY_SCORE: 10
ADLS_ACUITY_SCORE: 12
ADLS_ACUITY_SCORE: 34
ADLS_ACUITY_SCORE: 14
ADLS_ACUITY_SCORE: 15
ADLS_ACUITY_SCORE: 18
DOING_ERRANDS_INDEPENDENTLY_DIFFICULTY: NO
ADLS_ACUITY_SCORE: 15
ADLS_ACUITY_SCORE: 34
ADLS_ACUITY_SCORE: 33
ADLS_ACUITY_SCORE: 16
DRESSING/BATHING_DIFFICULTY: YES
ADLS_ACUITY_SCORE: 17
ADLS_ACUITY_SCORE: 38
ADLS_ACUITY_SCORE: 18
ADLS_ACUITY_SCORE: 30
TRANSFERRING: 1-->ASSISTANCE (EQUIPMENT/PERSON) NEEDED
ADLS_ACUITY_SCORE: 29
ADLS_ACUITY_SCORE: 13
ADLS_ACUITY_SCORE: 15
ADLS_ACUITY_SCORE: 18
ADLS_ACUITY_SCORE: 34
ADLS_ACUITY_SCORE: 24
ADLS_ACUITY_SCORE: 38
ADLS_ACUITY_SCORE: 11
ADLS_ACUITY_SCORE: 32
ADLS_ACUITY_SCORE: 16
ADLS_ACUITY_SCORE: 11
ADLS_ACUITY_SCORE: 32
ADLS_ACUITY_SCORE: 12
ADLS_ACUITY_SCORE: 9
ADLS_ACUITY_SCORE: 35
ADLS_ACUITY_SCORE: 12
ADLS_ACUITY_SCORE: 12
ADLS_ACUITY_SCORE: 18
ADLS_ACUITY_SCORE: 18
TRANSFERRING: 1-->ASSISTANCE (EQUIPMENT/PERSON) NEEDED
ADLS_ACUITY_SCORE: 9
ADLS_ACUITY_SCORE: 14
DEPENDENT_IADLS:: CLEANING;LAUNDRY;SHOPPING;TRANSPORTATION
ADLS_ACUITY_SCORE: 18
CONCENTRATING,_REMEMBERING_OR_MAKING_DECISIONS_DIFFICULTY: NO
ADLS_ACUITY_SCORE: 31
ADLS_ACUITY_SCORE: 10
ADLS_ACUITY_SCORE: 30
ADLS_ACUITY_SCORE: 9
ADLS_ACUITY_SCORE: 31
ADLS_ACUITY_SCORE: 17
ADLS_ACUITY_SCORE: 13
ADLS_ACUITY_SCORE: 15
ADLS_ACUITY_SCORE: 31
ADLS_ACUITY_SCORE: 18
ADLS_ACUITY_SCORE: 16
ADLS_ACUITY_SCORE: 35
ADLS_ACUITY_SCORE: 31
ADLS_ACUITY_SCORE: 30
ADLS_ACUITY_SCORE: 31
ADLS_ACUITY_SCORE: 12
ADLS_ACUITY_SCORE: 31
ADLS_ACUITY_SCORE: 29
WEAR_GLASSES_OR_BLIND: NO
ADLS_ACUITY_SCORE: 12
ADLS_ACUITY_SCORE: 9
ADLS_ACUITY_SCORE: 30
ADLS_ACUITY_SCORE: 26
ADLS_ACUITY_SCORE: 12
ADLS_ACUITY_SCORE: 9
ADLS_ACUITY_SCORE: 32
ADLS_ACUITY_SCORE: 31
ADLS_ACUITY_SCORE: 35
ADLS_ACUITY_SCORE: 9
ADLS_ACUITY_SCORE: 15
ADLS_ACUITY_SCORE: 8
ADLS_ACUITY_SCORE: 38
ADLS_ACUITY_SCORE: 30
ADLS_ACUITY_SCORE: 17
ADLS_ACUITY_SCORE: 16
ADLS_ACUITY_SCORE: 31
ADLS_ACUITY_SCORE: 31
ADLS_ACUITY_SCORE: 7
ADLS_ACUITY_SCORE: 12
ADLS_ACUITY_SCORE: 18
ADLS_ACUITY_SCORE: 16
ADLS_ACUITY_SCORE: 15
ADLS_ACUITY_SCORE: 34
ADLS_ACUITY_SCORE: 16
ADLS_ACUITY_SCORE: 17
ADLS_ACUITY_SCORE: 7
WEAR_GLASSES_OR_BLIND: NO
ADLS_ACUITY_SCORE: 15
DEPENDENT_IADLS:: CLEANING;COOKING;LAUNDRY;SHOPPING;TRANSPORTATION
ADLS_ACUITY_SCORE: 9
ADLS_ACUITY_SCORE: 38
ADLS_ACUITY_SCORE: 14
ADLS_ACUITY_SCORE: 30
ADLS_ACUITY_SCORE: 24
ADLS_ACUITY_SCORE: 30
ADLS_ACUITY_SCORE: 30
ADLS_ACUITY_SCORE: 12
ADLS_ACUITY_SCORE: 31
ADLS_ACUITY_SCORE: 9
ADLS_ACUITY_SCORE: 30
ADLS_ACUITY_SCORE: 35
ADLS_ACUITY_SCORE: 26
ADLS_ACUITY_SCORE: 9
ADLS_ACUITY_SCORE: 34
ADLS_ACUITY_SCORE: 30
ADLS_ACUITY_SCORE: 12
ADLS_ACUITY_SCORE: 9
ADLS_ACUITY_SCORE: 7
ADLS_ACUITY_SCORE: 18
DEPENDENT_IADLS:: CLEANING;COOKING;SHOPPING;MEDICATION MANAGEMENT;TRANSPORTATION
ADLS_ACUITY_SCORE: 13
WALKING_OR_CLIMBING_STAIRS: STAIR CLIMBING DIFFICULTY, ASSISTANCE 1 PERSON
ADLS_ACUITY_SCORE: 9
ADLS_ACUITY_SCORE: 14
ADLS_ACUITY_SCORE: 30
ADLS_ACUITY_SCORE: 31
ADLS_ACUITY_SCORE: 30
ADLS_ACUITY_SCORE: 30
ADLS_ACUITY_SCORE: 11
WALKING_OR_CLIMBING_STAIRS: STAIR CLIMBING DIFFICULTY, ASSISTANCE 1 PERSON
ADLS_ACUITY_SCORE: 30
ADLS_ACUITY_SCORE: 30
EQUIPMENT_CURRENTLY_USED_AT_HOME: WALKER, ROLLING;CANE, STRAIGHT
ADLS_ACUITY_SCORE: 31
ADLS_ACUITY_SCORE: 26
ADLS_ACUITY_SCORE: 18
ADLS_ACUITY_SCORE: 29
ADLS_ACUITY_SCORE: 29
ADLS_ACUITY_SCORE: 12
ADLS_ACUITY_SCORE: 12
ADLS_ACUITY_SCORE: 15
ADLS_ACUITY_SCORE: 18
ADLS_ACUITY_SCORE: 7
ADLS_ACUITY_SCORE: 33
ADLS_ACUITY_SCORE: 17
ADLS_ACUITY_SCORE: 45
ADLS_ACUITY_SCORE: 24
ADLS_ACUITY_SCORE: 31
ADLS_ACUITY_SCORE: 35
ADLS_ACUITY_SCORE: 18
ADLS_ACUITY_SCORE: 30
ADLS_ACUITY_SCORE: 30
ADLS_ACUITY_SCORE: 33
ADLS_ACUITY_SCORE: 7
ADLS_ACUITY_SCORE: 31
ADLS_ACUITY_SCORE: 9
WALKING_OR_CLIMBING_STAIRS: STAIR CLIMBING DIFFICULTY, REQUIRES EQUIPMENT
ADLS_ACUITY_SCORE: 10
ADLS_ACUITY_SCORE: 32
ADLS_ACUITY_SCORE: 18
ADLS_ACUITY_SCORE: 12
DRESS: 0-->INDEPENDENT
ADLS_ACUITY_SCORE: 38
ADLS_ACUITY_SCORE: 30
ADLS_ACUITY_SCORE: 12
ADLS_ACUITY_SCORE: 29
ADLS_ACUITY_SCORE: 41
ADLS_ACUITY_SCORE: 12
ADLS_ACUITY_SCORE: 31
ADLS_ACUITY_SCORE: 31
IADL_COMMENTS: FAMILY DOES
ADLS_ACUITY_SCORE: 9
ADLS_ACUITY_SCORE: 12
ADLS_ACUITY_SCORE: 16
ADLS_ACUITY_SCORE: 18
DIFFICULTY_EATING/SWALLOWING: NO
ADLS_ACUITY_SCORE: 30
ADLS_ACUITY_SCORE: 12
CHANGE_IN_FUNCTIONAL_STATUS_SINCE_ONSET_OF_CURRENT_ILLNESS/INJURY: NO
ADLS_ACUITY_SCORE: 18
ADLS_ACUITY_SCORE: 32
ADLS_ACUITY_SCORE: 34
ADLS_ACUITY_SCORE: 31
ADLS_ACUITY_SCORE: 30
DRESSING/BATHING: BATHING DIFFICULTY, ASSISTANCE 1 PERSON
ADLS_ACUITY_SCORE: 10
ADLS_ACUITY_SCORE: 9
ADLS_ACUITY_SCORE: 30
ADLS_ACUITY_SCORE: 26
ADLS_ACUITY_SCORE: 31
ADLS_ACUITY_SCORE: 31
ADLS_ACUITY_SCORE: 11
ADLS_ACUITY_SCORE: 9
ADLS_ACUITY_SCORE: 32
ADLS_ACUITY_SCORE: 15
ADLS_ACUITY_SCORE: 34
ADLS_ACUITY_SCORE: 30
ADLS_ACUITY_SCORE: 17
ADLS_ACUITY_SCORE: 30
ADLS_ACUITY_SCORE: 9
ADLS_ACUITY_SCORE: 31
ADLS_ACUITY_SCORE: 30
ADLS_ACUITY_SCORE: 12
ADLS_ACUITY_SCORE: 14
ADLS_ACUITY_SCORE: 31
ADLS_ACUITY_SCORE: 34
ADLS_ACUITY_SCORE: 16
ADLS_ACUITY_SCORE: 30
ADLS_ACUITY_SCORE: 45
ADLS_ACUITY_SCORE: 30
ADLS_ACUITY_SCORE: 12
ADLS_ACUITY_SCORE: 12
ADLS_ACUITY_SCORE: 7
ADLS_ACUITY_SCORE: 30
ADLS_ACUITY_SCORE: 12
ADLS_ACUITY_SCORE: 26
ADLS_ACUITY_SCORE: 31
ADLS_ACUITY_SCORE: 16
CHANGE_IN_FUNCTIONAL_STATUS_SINCE_ONSET_OF_CURRENT_ILLNESS/INJURY: YES
ADLS_ACUITY_SCORE: 30
ADLS_ACUITY_SCORE: 12
ADLS_ACUITY_SCORE: 12
ADLS_ACUITY_SCORE: 38
ADLS_ACUITY_SCORE: 31
ADLS_ACUITY_SCORE: 31
ADLS_ACUITY_SCORE: 12
TRANSFERRING: 1-->ASSISTANCE (EQUIPMENT/PERSON) NEEDED
ADLS_ACUITY_SCORE: 26
DRESSING/BATHING_DIFFICULTY: NO
ADLS_ACUITY_SCORE: 38
ADLS_ACUITY_SCORE: 14
ADLS_ACUITY_SCORE: 30
CHANGE_IN_FUNCTIONAL_STATUS_SINCE_ONSET_OF_CURRENT_ILLNESS/INJURY: YES
ADLS_ACUITY_SCORE: 31
ADLS_ACUITY_SCORE: 30
ADLS_ACUITY_SCORE: 31
ADLS_ACUITY_SCORE: 16
ADLS_ACUITY_SCORE: 43
ADLS_ACUITY_SCORE: 9
ADLS_ACUITY_SCORE: 9
ADLS_ACUITY_SCORE: 8
ADLS_ACUITY_SCORE: 12
ADLS_ACUITY_SCORE: 9
ADLS_ACUITY_SCORE: 10
ADLS_ACUITY_SCORE: 18
ADLS_ACUITY_SCORE: 15
ADLS_ACUITY_SCORE: 30
ADLS_ACUITY_SCORE: 18
ADLS_ACUITY_SCORE: 18
ADLS_ACUITY_SCORE: 12
ADLS_ACUITY_SCORE: 9
ADLS_ACUITY_SCORE: 9
ADLS_ACUITY_SCORE: 29
ADLS_ACUITY_SCORE: 34
ADLS_ACUITY_SCORE: 31
ADLS_ACUITY_SCORE: 30
ADLS_ACUITY_SCORE: 26
ADLS_ACUITY_SCORE: 33
DEPENDENT_IADLS:: CLEANING;COOKING;LAUNDRY;SHOPPING;MEAL PREPARATION;MEDICATION MANAGEMENT;TRANSPORTATION
ADLS_ACUITY_SCORE: 31
ADLS_ACUITY_SCORE: 15
FALL_HISTORY_WITHIN_LAST_SIX_MONTHS: NO
ADLS_ACUITY_SCORE: 7
ADLS_ACUITY_SCORE: 18
ADLS_ACUITY_SCORE: 9
ADLS_ACUITY_SCORE: 7
ADLS_ACUITY_SCORE: 30
ADLS_ACUITY_SCORE: 7
TOILETING_ISSUES: NO
ADLS_ACUITY_SCORE: 38
ADLS_ACUITY_SCORE: 14
ADLS_ACUITY_SCORE: 12
ADLS_ACUITY_SCORE: 35
ADLS_ACUITY_SCORE: 9
TOILETING_ISSUES: NO
ADLS_ACUITY_SCORE: 17
DIFFICULTY_EATING/SWALLOWING: NO
ADLS_ACUITY_SCORE: 16
DOING_ERRANDS_INDEPENDENTLY_DIFFICULTY: NO
ADLS_ACUITY_SCORE: 31
ADLS_ACUITY_SCORE: 12
ADLS_ACUITY_SCORE: 12
ADLS_ACUITY_SCORE: 29
ADLS_ACUITY_SCORE: 18
ADLS_ACUITY_SCORE: 30
ADLS_ACUITY_SCORE: 13
ADLS_ACUITY_SCORE: 30
ADLS_ACUITY_SCORE: 16
ADLS_ACUITY_SCORE: 31
ADLS_ACUITY_SCORE: 29
ADLS_ACUITY_SCORE: 9
ADLS_ACUITY_SCORE: 12
ADLS_ACUITY_SCORE: 12
ADLS_ACUITY_SCORE: 18
ADLS_ACUITY_SCORE: 10
EQUIPMENT_CURRENTLY_USED_AT_HOME: WALKER, STANDARD
ADLS_ACUITY_SCORE: 12
ADLS_ACUITY_SCORE: 18
ADLS_ACUITY_SCORE: 12
DIFFICULTY_COMMUNICATING: NO
ADLS_ACUITY_SCORE: 30
ADLS_ACUITY_SCORE: 14
TRANSFERRING: 1-->ASSISTANCE (EQUIPMENT/PERSON) NEEDED
ADLS_ACUITY_SCORE: 9
ADLS_ACUITY_SCORE: 35
ADLS_ACUITY_SCORE: 10
ADLS_ACUITY_SCORE: 31
ADLS_ACUITY_SCORE: 30
ADLS_ACUITY_SCORE: 18
ADLS_ACUITY_SCORE: 45
ADLS_ACUITY_SCORE: 11
ADLS_ACUITY_SCORE: 9
ADLS_ACUITY_SCORE: 12
TRANSFERRING: 1-->ASSISTANCE (EQUIPMENT/PERSON) NEEDED (NOT DEVELOPMENTALLY APPROPRIATE)
ADLS_ACUITY_SCORE: 16
WALKING_OR_CLIMBING_STAIRS_DIFFICULTY: YES
ADLS_ACUITY_SCORE: 12
ADLS_ACUITY_SCORE: 18
ADLS_ACUITY_SCORE: 31
ADLS_ACUITY_SCORE: 12
ADLS_ACUITY_SCORE: 18
ADLS_ACUITY_SCORE: 45
WEAR_GLASSES_OR_BLIND: YES
ADLS_ACUITY_SCORE: 9
ADLS_ACUITY_SCORE: 9
ADLS_ACUITY_SCORE: 32
ADLS_ACUITY_SCORE: 33
WEAR_GLASSES_OR_BLIND: NO
ADLS_ACUITY_SCORE: 30
ADLS_ACUITY_SCORE: 12
ADLS_ACUITY_SCORE: 12
ADLS_ACUITY_SCORE: 32
ADLS_ACUITY_SCORE: 15
ADLS_ACUITY_SCORE: 30
ADLS_ACUITY_SCORE: 30
WALKING_OR_CLIMBING_STAIRS_DIFFICULTY: YES
ADLS_ACUITY_SCORE: 34
ADLS_ACUITY_SCORE: 43
ADLS_ACUITY_SCORE: 31
ADLS_ACUITY_SCORE: 8
DOING_ERRANDS_INDEPENDENTLY_DIFFICULTY: YES
ADLS_ACUITY_SCORE: 31
ADLS_ACUITY_SCORE: 32
ADLS_ACUITY_SCORE: 47
ADLS_ACUITY_SCORE: 31
CONCENTRATING,_REMEMBERING_OR_MAKING_DECISIONS_DIFFICULTY: NO
ADLS_ACUITY_SCORE: 41
ADLS_ACUITY_SCORE: 15
ADLS_ACUITY_SCORE: 18
ADLS_ACUITY_SCORE: 34
ADLS_ACUITY_SCORE: 41
ADLS_ACUITY_SCORE: 31
ADLS_ACUITY_SCORE: 18
ADLS_ACUITY_SCORE: 31
ADLS_ACUITY_SCORE: 12
ADLS_ACUITY_SCORE: 31
ADLS_ACUITY_SCORE: 30
ADLS_ACUITY_SCORE: 31
ADLS_ACUITY_SCORE: 30

## 2022-01-01 ASSESSMENT — ENCOUNTER SYMPTOMS
WOUND: 1
ABDOMINAL PAIN: 0
CONFUSION: 0
LIGHT-HEADEDNESS: 0
CHILLS: 1
SHORTNESS OF BREATH: 1
ABDOMINAL PAIN: 1
EYES NEGATIVE: 1
DIARRHEA: 0
ENDOCRINE NEGATIVE: 1
NAUSEA: 0
COUGH: 0
CHOKING: 0
DIAPHORESIS: 0
CHEST TIGHTNESS: 1
COUGH: 1
FEVER: 0
BACK PAIN: 0
SHORTNESS OF BREATH: 1
CHILLS: 0
MYALGIAS: 0
DIARRHEA: 0
NECK PAIN: 0
ALLERGIC/IMMUNOLOGIC NEGATIVE: 1
WOUND: 1
NAUSEA: 1
SORE THROAT: 0
FEVER: 0
COUGH: 0
ABDOMINAL PAIN: 0
HEADACHES: 0
FEVER: 0
SHORTNESS OF BREATH: 1
DIZZINESS: 0
VOMITING: 0
DYSURIA: 0
WEAKNESS: 0
LIGHT-HEADEDNESS: 0
ABDOMINAL PAIN: 0
CONSTIPATION: 0
APPETITE CHANGE: 0
HEADACHES: 0
ABDOMINAL PAIN: 0
HEADACHES: 0
SLEEP DISTURBANCE: 0
VOMITING: 0
VOMITING: 0
HEMATURIA: 0
SHORTNESS OF BREATH: 1
WOUND: 1
ARTHRALGIAS: 1
ARTHRALGIAS: 1
NUMBNESS: 0
FEVER: 0
PSYCHIATRIC NEGATIVE: 1
NECK PAIN: 0
NAUSEA: 0
FEVER: 0
DYSURIA: 0
DIZZINESS: 0
WHEEZING: 1
DIFFICULTY URINATING: 1
SHORTNESS OF BREATH: 0
NECK STIFFNESS: 0
DIARRHEA: 0
TROUBLE SWALLOWING: 0
COUGH: 1

## 2022-01-01 ASSESSMENT — PATIENT HEALTH QUESTIONNAIRE - PHQ9: SUM OF ALL RESPONSES TO PHQ QUESTIONS 1-9: 0

## 2022-01-01 ASSESSMENT — MIFFLIN-ST. JEOR: SCORE: 1034.88

## 2022-01-06 NOTE — PROGRESS NOTES
[facility]  Heart Care Clinic Follow-up Note    Assessment & Plan        (I48.20) Chronic atrial fibrillation (H)  (primary encounter diagnosis)  Comment: Permanent, asymptomatic and not valvular and on chronic anticoagulation with Xarelto given her CHADS 2 VASC score of 4, this dose appropriate considering age, renal function as well as weight.  Using diltiazem for rate control, very hesitant to pull this away.  If blood pressure remains low, might need to consider discontinuing this and starting digoxin or possibly AV kwame ablation.    (I50.32) Chronic heart failure with preserved ejection fraction (H)  Comment: Chronic shortness of breath.  BNP most recently normal.  Sounds like secondary to COPD, although awaiting coronary CTA to rule out coronary disease.    (J43.1) Panlobular emphysema (H)  Comment: As above, on numerous inhalers and nebulizers and sees pulmonary, suspect this is the main etiology for shortness of breath.    (E11.9) Type 2 diabetes mellitus without complication, without long-term current use of insulin (H)  Comment: So notably hemoglobin A1c is 6.7, most likely diet related secondary to obesity as well as prednisone.  Most recent nonfasting glucose 102.  Do not consider this a current problem.    (E78.5) Dyslipidemia  Comment: Most recent cholesterol excellent at 137 with an LDL of 58, not a current issue.    (L03.116) Cellulitis of left lower extremity  Comment: Chronic left lower extremity edema, no DVT.  Defer to primary.    (I10) Primary hypertension  Comment: Concerned that her blood pressure is low around high 80s or low 90s although asymptomatic.  Decreased Lasix to only 20 mg and doing better.  If needed could also lower dose of Aldactone.    Plan  1. Await coronary CTA looking for coronary disease.  If abnormal consider invasive angiography.  2.  Follow-up with me in 1 year or sooner if needed.    Subjective  CC: 86-year-old white female here for follow-up,   present.  Still at home living independently, according to  are very busy although she uses a walker in the house.  Does have baseline shortness of breath on minimal activity which has been going on for months to years.  No PND, apnea, chest discomfort, palpitations, arm discomfort, syncope or dizziness.  She is getting ready to have a coronary CTA given her increased shortness of breath and arm discomfort in the past to make sure there is no coronary disease.    Medications  Current Outpatient Medications   Medication Sig Dispense Refill     acetaminophen (TYLENOL) 500 MG tablet Take 500-1,000 mg by mouth every 6 hours as needed for mild pain        albuterol (PROAIR HFA/PROVENTIL HFA/VENTOLIN HFA) 108 (90 Base) MCG/ACT inhaler Inhale 2 puffs into the lungs every 4 hours as needed        albuterol (PROVENTIL) (2.5 MG/3ML) 0.083% neb solution 3 ML INHALATION FOUR TIMES A DAY AS NEEDED USE BEFORE NEBULIZED SALINE. FOR SHORTNESS OF BREATH. 600 mL 0     ARIPiprazole (ABILIFY) 5 MG tablet TAKE 1 TABLET AT BEDTIME 90 tablet 3     atorvastatin (LIPITOR) 20 MG tablet TAKE 1 TABLET AT BEDTIME 90 tablet 3     Budeson-Glycopyrrol-Formoterol (BREZTRI AEROSPHERE) 160-9-4.8 MCG/ACT AERO Inhale 2 puffs into the lungs 2 times daily 17.7 g 3     calcium carbonate 600 mg-vitamin D 400 units (CALTRATE) 600-400 MG-UNIT per tablet Take 1 tablet by mouth every morning        clobetasol (TEMOVATE) 0.05 % external cream Apply 1 applicator topically 2 times daily as needed To legs and elbows       cyanocobalamin (VITAMIN B-12) 500 MCG tablet Take 500 mcg by mouth every morning        diltiazem ER (DILT-XR) 120 MG 24 hr capsule Take 1 capsule (120 mg) by mouth every morning 90 capsule 1     furosemide (LASIX) 20 MG tablet TAKE 1 TABLET TWICE A DAY AT 9 A.M. AND 6 P.M. (Patient taking differently: Take 20 mg by mouth daily ) 180 tablet 3     gabapentin (NEURONTIN) 300 MG capsule Take 600 mg by mouth every evening        metFORMIN  (GLUCOPHAGE) 500 MG tablet Take 1 tablet (500 mg) by mouth 2 times daily (with meals) 60 tablet 11     mineral oil-white petrolatum (EUCERIN) CREA cream Apply 1 g topically 2 times daily Apply to legs and elbows prior to clobetasol       omeprazole (PRILOSEC) 20 MG DR capsule TAKE 1 CAPSULE DAILY 90 capsule 3     OXYGEN-HELIUM IN 3 LPM when resting and at night and 5 LPM with activity  [employer]       sodium chloride (NEBUSAL) 3 % neb solution Take 4 mLs by nebulization 2 times daily 240 mL 11     spironolactone (ALDACTONE) 25 MG tablet TAKE ONE-HALF (1/2) TABLET DAILY 45 tablet 3     Vitamin D3 (VITAMIN D3) 25 mcg (1000 units) tablet Take 1 capsule by mouth every morning        XARELTO ANTICOAGULANT 20 MG TABS tablet TAKE 1 TABLET DAILY WITH DINNER 90 tablet 1     Fluticasone-Umeclidin-Vilanterol (TRELEGY ELLIPTA) 100-62.5-25 MCG/INH oral inhaler Inhale 1 puff into the lungs every morning Inhale after sodium chloride neb (Patient not taking: Reported on 12/21/2021)       ipratropium (ATROVENT) 0.03 % nasal spray Spray 1 spray into both nostrils every morning  (Patient not taking: Reported on 12/21/2021)         Objective  /68 (BP Location: Left arm, Patient Position: Sitting, Cuff Size: Adult Regular)   Pulse 102   Resp 18   Wt 65.3 kg (144 lb)   SpO2 92%   BMI 26.77 kg/m      General Appearance:    Alert, cooperative, no distress, appears stated age   Head:    Normocephalic, without obvious abnormality, atraumatic   Throat:   Lips, mucosa, and tongue normal; teeth and gums normal   Neck:   Supple, symmetrical, trachea midline, no adenopathy;        thyroid:  No enlargement/tenderness/nodules; no carotid    bruit or JVD   Back:     Symmetric, no curvature, ROM normal, no CVA tenderness   Lungs:     Clear to auscultation bilaterally, respirations unlabored   Chest wall:    No tenderness or deformity   Heart:   Irregularly irregular, S1 and S2 normal, no murmur, rub   or gallop   Abdomen:     Soft,  non-tender, bowel sounds active all four quadrants,     no masses, no organomegaly   Extremities:   Normal, atraumatic, no cyanosis or edema   Pulses:   2+ and symmetric all extremities   Skin:   Skin color, texture, turgor normal, no rashes or lesions     Results    Lab Results personally reviewed   Lab Results   Component Value Date    CHOL 137 12/21/2021    CHOL 145 11/16/2021     Lab Results   Component Value Date    HDL 66 12/21/2021    HDL 65 11/16/2021     No components found for: LDLCALC  Lab Results   Component Value Date    TRIG 64 12/21/2021    TRIG 65 11/16/2021     Lab Results   Component Value Date    WBC 11.5 (H) 11/05/2021    HGB 12.5 11/05/2021    HCT 38.7 11/05/2021     11/05/2021     Lab Results   Component Value Date    BUN 14 11/05/2021     (L) 11/05/2021    CO2 27 11/05/2021

## 2022-01-06 NOTE — LETTER
1/6/2022    Shai Ellington MD  [address] [address]    RE: Adalgisa Solano       Dear Colleague,     I had the pleasure of seeing Adalgisa Solano in the [facility].      [facility]  Heart Care Clinic Follow-up Note    Assessment & Plan        (I48.20) Chronic atrial fibrillation (H)  (primary encounter diagnosis)  Comment: Permanent, asymptomatic and not valvular and on chronic anticoagulation with Xarelto given her CHADS 2 VASC score of 4, this dose appropriate considering age, renal function as well as weight.  Using diltiazem for rate control, very hesitant to pull this away.  If blood pressure remains low, might need to consider discontinuing this and starting digoxin or possibly AV kwame ablation.    (I50.32) Chronic heart failure with preserved ejection fraction (H)  Comment: Chronic shortness of breath.  BNP most recently normal.  Sounds like secondary to COPD, although awaiting coronary CTA to rule out coronary disease.    (J43.1) Panlobular emphysema (H)  Comment: As above, on numerous inhalers and nebulizers and sees pulmonary, suspect this is the main etiology for shortness of breath.    (E11.9) Type 2 diabetes mellitus without complication, without long-term current use of insulin (H)  Comment: So notably hemoglobin A1c is 6.7, most likely diet related secondary to obesity as well as prednisone.  Most recent nonfasting glucose 102.  Do not consider this a current problem.    (E78.5) Dyslipidemia  Comment: Most recent cholesterol excellent at 137 with an LDL of 58, not a current issue.    (L03.116) Cellulitis of left lower extremity  Comment: Chronic left lower extremity edema, no DVT.  Defer to primary.    (I10) Primary hypertension  Comment: Concerned that her blood pressure is low around high 80s or low 90s although asymptomatic.  Decreased Lasix to only 20 mg and doing better.  If needed could also lower dose of Aldactone.    Plan  1. Await  coronary CTA looking for coronary disease.  If abnormal consider invasive angiography.  2.  Follow-up with me in 1 year or sooner if needed.    Subjective  CC: 86-year-old white female here for follow-up,  present.  Still at home living independently, according to  are very busy although she uses a walker in the house.  Does have baseline shortness of breath on minimal activity which has been going on for months to years.  No PND, apnea, chest discomfort, palpitations, arm discomfort, syncope or dizziness.  She is getting ready to have a coronary CTA given her increased shortness of breath and arm discomfort in the past to make sure there is no coronary disease.    Medications  Current Outpatient Medications   Medication Sig Dispense Refill     acetaminophen (TYLENOL) 500 MG tablet Take 500-1,000 mg by mouth every 6 hours as needed for mild pain        albuterol (PROAIR HFA/PROVENTIL HFA/VENTOLIN HFA) 108 (90 Base) MCG/ACT inhaler Inhale 2 puffs into the lungs every 4 hours as needed        albuterol (PROVENTIL) (2.5 MG/3ML) 0.083% neb solution 3 ML INHALATION FOUR TIMES A DAY AS NEEDED USE BEFORE NEBULIZED SALINE. FOR SHORTNESS OF BREATH. 600 mL 0     ARIPiprazole (ABILIFY) 5 MG tablet TAKE 1 TABLET AT BEDTIME 90 tablet 3     atorvastatin (LIPITOR) 20 MG tablet TAKE 1 TABLET AT BEDTIME 90 tablet 3     Budeson-Glycopyrrol-Formoterol (BREZTRI AEROSPHERE) 160-9-4.8 MCG/ACT AERO Inhale 2 puffs into the lungs 2 times daily 17.7 g 3     calcium carbonate 600 mg-vitamin D 400 units (CALTRATE) 600-400 MG-UNIT per tablet Take 1 tablet by mouth every morning        clobetasol (TEMOVATE) 0.05 % external cream Apply 1 applicator topically 2 times daily as needed To legs and elbows       cyanocobalamin (VITAMIN B-12) 500 MCG tablet Take 500 mcg by mouth every morning        diltiazem ER (DILT-XR) 120 MG 24 hr capsule Take 1 capsule (120 mg) by mouth every morning 90 capsule 1     furosemide (LASIX) 20 MG tablet  TAKE 1 TABLET TWICE A DAY AT 9 A.M. AND 6 P.M. (Patient taking differently: Take 20 mg by mouth daily ) 180 tablet 3     gabapentin (NEURONTIN) 300 MG capsule Take 600 mg by mouth every evening        metFORMIN (GLUCOPHAGE) 500 MG tablet Take 1 tablet (500 mg) by mouth 2 times daily (with meals) 60 tablet 11     mineral oil-white petrolatum (EUCERIN) CREA cream Apply 1 g topically 2 times daily Apply to legs and elbows prior to clobetasol       omeprazole (PRILOSEC) 20 MG DR capsule TAKE 1 CAPSULE DAILY 90 capsule 3     OXYGEN-HELIUM IN 3 LPM when resting and at night and 5 LPM with activity  [employer]       sodium chloride (NEBUSAL) 3 % neb solution Take 4 mLs by nebulization 2 times daily 240 mL 11     spironolactone (ALDACTONE) 25 MG tablet TAKE ONE-HALF (1/2) TABLET DAILY 45 tablet 3     Vitamin D3 (VITAMIN D3) 25 mcg (1000 units) tablet Take 1 capsule by mouth every morning        XARELTO ANTICOAGULANT 20 MG TABS tablet TAKE 1 TABLET DAILY WITH DINNER 90 tablet 1     Fluticasone-Umeclidin-Vilanterol (TRELEGY ELLIPTA) 100-62.5-25 MCG/INH oral inhaler Inhale 1 puff into the lungs every morning Inhale after sodium chloride neb (Patient not taking: Reported on 12/21/2021)       ipratropium (ATROVENT) 0.03 % nasal spray Spray 1 spray into both nostrils every morning  (Patient not taking: Reported on 12/21/2021)         Objective  /68 (BP Location: Left arm, Patient Position: Sitting, Cuff Size: Adult Regular)   Pulse 102   Resp 18   Wt 65.3 kg (144 lb)   SpO2 92%   BMI 26.77 kg/m      General Appearance:    Alert, cooperative, no distress, appears stated age   Head:    Normocephalic, without obvious abnormality, atraumatic   Throat:   Lips, mucosa, and tongue normal; teeth and gums normal   Neck:   Supple, symmetrical, trachea midline, no adenopathy;        thyroid:  No enlargement/tenderness/nodules; no carotid    bruit or JVD   Back:     Symmetric, no curvature, ROM normal, no CVA tenderness   Lungs:      Clear to auscultation bilaterally, respirations unlabored   Chest wall:    No tenderness or deformity   Heart:   Irregularly irregular, S1 and S2 normal, no murmur, rub   or gallop   Abdomen:     Soft, non-tender, bowel sounds active all four quadrants,     no masses, no organomegaly   Extremities:   Normal, atraumatic, no cyanosis or edema   Pulses:   2+ and symmetric all extremities   Skin:   Skin color, texture, turgor normal, no rashes or lesions     Results    Lab Results personally reviewed   Lab Results   Component Value Date    CHOL 137 12/21/2021    CHOL 145 11/16/2021     Lab Results   Component Value Date    HDL 66 12/21/2021    HDL 65 11/16/2021     No components found for: LDLCALC  Lab Results   Component Value Date    TRIG 64 12/21/2021    TRIG 65 11/16/2021     Lab Results   Component Value Date    WBC 11.5 (H) 11/05/2021    HGB 12.5 11/05/2021    HCT 38.7 11/05/2021     11/05/2021     Lab Results   Component Value Date    BUN 14 11/05/2021     (L) 11/05/2021    CO2 27 11/05/2021               Thank you for allowing me to participate in the care of your patient.      Sincerely,     COLEMAN NEVES MD     [facility]  cc:   Aubree Machado NP  [address] [place] [id number]

## 2022-01-06 NOTE — PATIENT INSTRUCTIONS
Ms Adalgisa Solano,  I enjoyed visiting with you again today.  I am concerned with the shortness of breath.  Per our conversation I await the CAT scan of the heart, or angiogram,  I will plan on seeing you 1 year or sooner if needed.  Francis Marquez

## 2022-01-10 NOTE — TELEPHONE ENCOUNTER
Kim set up for a virtual phone visit on Thursday 1/13/2022 at 10:20 and Doxycycline  100mg #14 one bid sent to [facility]

## 2022-01-10 NOTE — TELEPHONE ENCOUNTER
Reason for call:  Patient reporting a symptom    Symptom or request: cellulitis    Duration (how long have symptoms been present): not sure    Have you been treated for this before? No    Additional comments: states had seen cardiologist and he noted it and told her to call PCP    Phone Number patient can be reached at:  Home number on file [phone] (home)    Best Time:  anytime    Can we leave a detailed message on this number:  YES    Call taken on 1/10/2022 at 10:40 AM by David Fajardo

## 2022-01-10 NOTE — TELEPHONE ENCOUNTER
Needs virtual phone visit or office visit to further evaluate and treat in the meantime may wish to try cephalexin 500 mg tablets or capsules dispense #28 take 1 tablet or capsule 4 times a day.  1 refill

## 2022-01-12 NOTE — TELEPHONE ENCOUNTER
Phone call from Adalgisa.  States she is having another COPD flare and requesting her action plan.  Also states she has some swelling in her ankles and cellulitis.  She has just started a 5 day course of doxycycline for her cellulitis, so will just send prescription for some prednisone for her to take.  States that she did take an additional lasix today to see if that will help the swelling, otherwise she will call cardiology regarding this issue.

## 2022-01-13 NOTE — TELEPHONE ENCOUNTER
----- Message from Hosea Diaz sent at 1/13/2022  8:15 AM CST -----  Regarding: LBF PT  General phone call:    Caller: SHELBI  Primary cardiologist: DAVID  Detailed reason for call: change of medication pt states pt is having a COPD flare up and is on predniSONE (DELTASONE) 10 MG tablet     Best phone number: [phone]  Best time to contact: any  Ok to leave a detailedmessage? yes  Device? no    Additional Info:         ==Called Shelbi to address her concerns. She states that she is having a COPD flare and has received steroids from her pulmonologist. She is having increased shortness of breath and also some ankle and leg swelling with 3 lb weight gain over the last few days. She is normally 141 lbs and today she is 144 lbs.     She typically is on 20 mg of Furosemide daily. Today, she self-increased to 40 mg to help her symptoms. Will update LBF and see if he would recommend increased dosing for a few days or not. Pt updated that message would be passed on and will receive a call back. -[facility]      Dr. Marquez,  See above- in addition to COPD flare requiring action plan steroids, she feels she is hanging on to extra fluid. She is up about 3 lbs and has ankle and lower leg swelling. She took an extra 20 mg of furosemide this AM to make 40 mg. Would you recommend any temporary increase in her diuretics?  Thanks,  Carson

## 2022-01-13 NOTE — TELEPHONE ENCOUNTER
Francis Marquez MD Caswell, Mallory J, RN  Caller: Unspecified (Today, 10:38 AM)  Yes, would suggest 40 mg of Lasix daily for maybe 3 days, would like to get renal profile on Monday.  LF         ==Called patient and updated on message from LBF. She will increase her diuretic to 40 mg daily for 3 days and then BMP Monday. Message to  to please arrange. -[place]

## 2022-01-13 NOTE — PROGRESS NOTES
"Kim is a 86 year old who is being evaluated via a billable telephone visit.      What phone number would you like to be contacted at? [phone]  How would you like to obtain your AVS? MyChart    {PROVIDER CHARTING PREFERENCE:829813}    Subjective   Kim is a 86 year old who presents for the following health issues {ACCOMPANIED BY STATEMENT (Optional):381895}    HPI     {SUPERLIST (Optional):674274}  {additonal problems for provider to add (Optional):728015}    Review of Systems   {ROS COMP (Optional):787182}      Objective           Vitals:  No vitals were obtained today due to virtual visit.    Physical Exam   {GENERAL APPEARANCE:50::\"healthy\",\"alert\",\"no distress\"}  PSYCH: Alert and oriented times 3; coherent speech, normal   rate and volume, able to articulate logical thoughts, able   to abstract reason, no tangential thoughts, no hallucinations   or delusions  Her affect is { :7452247::\"normal\"}  RESP: No cough, no audible wheezing, able to talk in full sentences  Remainder of exam unable to be completed due to telephone visits    {Diagnostic Test Results (Optional):578509}    {AMBULATORY ATTESTATION (Optional):448537}        Phone call duration: *** minutes  "

## 2022-01-13 NOTE — PROGRESS NOTES
Adalgisa Solano is a 86 year oldwho is being evaluated via a billable telephone visit.       What phone number would you like to be contacted at? [phone]      Assessment & Plan  COPD severe on nasal oxygen 4 L/min plus prednisone plus doxycycline also being treated for cellulitis left ankle stable or improved slightly. Evidence for right heart failure with lower extremity edema has put in because to cardiologist Dr. Cabezas as well as Dr. SINGH from pulmonary medicine. Using albuterol nebulizers 2-3 times per day. Refuses hospital stay because of pandemic COVID-19. Struggling with her breathing.     21 minutes spent on the date of the encounter doing chart review, patient visit and documentation        Subjective   Follow-up cellulitis stable left ankle on doxycycline also flareup of COPD which has been present for years advanced age 86 previous history of smoking no longer a smoker. This morning 106/62 pulse 92 recheck blood pressure 128/72 and 131/70. On 4 L nasal cannula. Chest congestion difficulty breathing she is struggling and refuses hospital stay.     Review of Systems   No hemoptysis or blood in stool or urine no chest pain per se medication list reviewed leg edema noted higher doses of furosemide have been ordered by cardiologist. She has talked to the nurse of Dr. Jun Juarez her pulmonologist.       Shai Ellington MD

## 2022-01-20 NOTE — TELEPHONE ENCOUNTER
Phone call from ROSLYN mullins and also spoke with her pharmacy.    They are not able to get her 3% saline. Is on back order and they do not know when they will be able to supply.      Is there an alternative she can use in the meantime???  They do not have just normal saline for inhalation available either.

## 2022-01-24 NOTE — PATIENT INSTRUCTIONS
Change dressings every 2 to 3 days.    Wash area with soap and water gently, blot dry, apply bacitracin or Vaseline to the open area followed by a nonstick dressing and tape or gauze wrap.    Do this until the area is dry and starting to heal over.    If it becomes painful or swollen, please remove the dressings and look for signs of infection -redness, smelly yellow or tan drainage.     Will slowly heal over several weeks.

## 2022-01-24 NOTE — PROGRESS NOTES
"Assessment & Plan     Skin tear of forearm without complication, left, initial encounter    - lidocaine (XYLOCAINE) 2 % external gel       Patient with trip and fall of skin tear on left forearm.  Denies other significant injury.  Placed sterile 2% lidocaine gel to help numb the area somewhat as I could not touch it without her being painful.  This applied good analgesia for wound care below.    Area cleaned with wound spray and iris scissors used to clip off areas of devitalized tissue.  Bacitracin and nonstick dressings applied.  Patient instructed on wound care.    Instructed to examine wound and come back with any signs of infection is worsening erythema, swelling, purulent drainage.      15 minutes spent on the date of the encounter doing chart review, patient visit, documentation, discussion with family and Wound care as described         No follow-ups on file.    [facility] 86 [facility] female who presents to clinic today for the following health issues:  Chief Complaint   Patient presents with     Laceration     LT ARM LACERATION FROM A FALL ON FRIDAY NIGHT 1/21. ON [facility].      HPI    Sustained a fall and cut to left arm 3 days ago.  Patient is O2 dependent and had a long oxygen cord.  She tripped and fell over this and scraped her left forearm on either her walker or something else in her kitchen.  Landed onto her left hip.  States her left hip is not bothersome at this time.    Has a skin tear to the left arm.  Has been using bacitracin and gauze to this.  Cleaned it with running water at the time of injury.    Last Tdap was May, 2021.    History of type 2 diabetes, controlled, with last A1c of 6.7%      Review of Systems   Musculoskeletal: Positive for arthralgias (left hip after fall \"able to walk fine\" ). Negative for neck pain and neck stiffness.   Skin: Positive for wound.   Neurological: Negative for headaches.         "   Objective    /70 (BP Location: Right arm, Patient Position: Sitting, Cuff Size: Adult Regular)   Pulse 90   Temp 98.2  F (36.8  C)   Resp 18   SpO2 92%   Physical Exam  Constitutional:       General: She is not in acute distress.     Appearance: She is well-developed.   Eyes:      General:         Right eye: No discharge.         Left eye: No discharge.      Conjunctiva/sclera: Conjunctivae normal.   Cardiovascular:      Pulses: Normal pulses.   Pulmonary:      Effort: Pulmonary effort is normal.      Comments: On O2 tank   Musculoskeletal:         General: Normal range of motion.   Skin:     General: Skin is warm and dry.      Capillary Refill: Capillary refill takes less than 2 seconds.      Comments: Lengthwise skin tear left forearm with fragments of devitalized thin skin noted.  Widest part approximately 1 cm for 4 cm long and the rest is approx 1/3 cm for total of 14 cm. No bleeding.     Neurological:      Mental Status: She is alert and oriented to person, place, and time.   Psychiatric:         Mood and Affect: Mood normal.         Behavior: Behavior normal.         Thought Content: Thought content normal.         Judgment: Judgment normal.

## 2022-02-01 NOTE — PROGRESS NOTES
Dr Lyn paged for the second time.  250 ml bolus of NS infused and B/P still in the Low 80's.  Heart rate in the 60's to 70's at this time. Awaiting further instructions.  Sujey Toure RN

## 2022-02-02 NOTE — TELEPHONE ENCOUNTER
"Phone call form patient. States that she is having another COPD flare and asking for her \"emergency meds\" to be sent to her pharmacy.      Will send:  Prednisone 20mg x 5 days, then 10mg x 5 day AND doxycycline x 5 days  "

## 2022-02-06 NOTE — ED TRIAGE NOTES
Pt fell about 9 days ago injuring her left hip, scratched her left arm. Went to clinic for her arm and did some antibiotic spray and dressing. She did not tell them about her hip. Pt states she has back pain and states she has chronic back pain, but thinks that the hip is making her back hurt more. Pt on Xaralto. Denies hitting her head.

## 2022-02-06 NOTE — ED PROVIDER NOTES
EMERGENCY DEPARTMENT ENCOUNTER      NAME: Adalgisa Solano  AGE: 86 year old female  YOB: 1935  MRN: [id number]  EVALUATION DATE & TIME: 2022 10:57 AM    PCP: Shai Ellington    ED PROVIDER: Luly Asif M.D.        Chief Complaint   Patient presents with     Hip Pain     Fall         FINAL IMPRESSION:    1. Hyponatremia    2. Hip pain, left            MEDICAL DECISION MAKIN year old female who presents emergency department with left hip and buttock pain that began just over a week ago after mechanical fall.  Pain is not getting better possibly getting worse.  Imaging including x-rays and CT scan through the pelvis and lumbar spine are negative.  X-rays to the hip are negative.  Patient did not feel admission to the hospital for pain control as needed and felt comfortable with using Tylenol at home.  Declined prescription for narcotics.  Sodium was 128 which is a little low from her baseline of right around 130.  She will follow-up with primary care as an outpatient to recheck sodium and ongoing hip pain.  Found to have incidental changes and some hardware from a lumbar spine and questionable some screw loosening in the left iliac bone.  Possible this could be contributing to her pain and just that she follow-up with her orthopedic surgeon.      ED COURSE:  11:12 AM I met with the patient to gather history and perform my exam. ED course and treatment discussed.    1:41 PM I rechecked and updated the patient.  She is aware of all the results and the plan to proceed with CT scans since no obvious fracture was seen on x-rays.  She agrees with this plan.    4:09 PM  Patient was updated on all results.  Sodium at 128 though baseline read around 130.  Will have primary care monitor this.  CT scans do not show any acute pathology though there is some questionable hardware loosening in the left iliac bone.  This might be contributing to her symptoms.  At this time patient can be  discharged home.  She felt comfortable with discharge and did not feel like she needed admission to the hospital for pain control.  She is declining any prescriptions for narcotics and prefers to try Tylenol at home.  Encouraged her to follow-up closely with primary care.      COVID-19 PPE worn during patient evaluation:  Mask: n95 and homemade masks   Eye Protection: goggles   Gown: none  Hair cover: yes  Face shield: yes   Patient wearing a mask: yes    At the conclusion of the encounter I discussed the results of all of the tests and the disposition. Their questions were answered. The patient (and any family present) acknowledged understanding and were agreeable with the care plan.        CONSULTANTS:  none        MEDICATIONS GIVEN IN THE EMERGENCY:  Medications   fentaNYL (PF) (SUBLIMAZE) injection 25 mcg (25 mcg Intravenous Given 2/6/22 1138)         NEW PRESCRIPTIONS STARTED AT TODAY'S ER VISIT     Medication List      There are no discharge medications for this visit.             CONDITION:  stable        DISPOSITION:  discharge home         =================================================================  =================================================================    HPI    Patient information was obtained from: the patient     Use of Intrepreter: N/A      Adalgisa Solano is a 86 year old female with history of hypertension, atrial fibrillation, s/p hip replacement (s/p left and right 2019), type 2 diabetes mellitus, DVT, hyperlipidemia, and anxiety, who presents to the ER with complaints of hip pain after a fall.     The patient reports that nine days ago (1/28) while walking to her kitchen, her foot got tangled in something and she fell on her left hip and buttock. She denies hitting her head or losing consciousness. Since then she has had worsening pain to her left hip and buttock. She also endorses an abrasion on her left forearm, which she followed up about with her primary care provider.  The patient states she has been taking tylenol for the pain, her last dose was last night. She also takes Xarelto at this time.     The patient denies headache, confusion, fever, neck pain, back pain, chest pain, shortness of breath, abdominal pain, vomiting, diarrhea, and any other symptoms or complaints at this time.       REVIEW OF SYSTEMS  Review of Systems   Constitutional: Negative for fever.   Eyes: Negative for visual disturbance.   Respiratory: Negative for cough and shortness of breath.    Cardiovascular: Negative for chest pain.   Gastrointestinal: Negative for abdominal pain, diarrhea, nausea and vomiting.   Genitourinary: Negative for dysuria.   Musculoskeletal: Negative for back pain and neck pain.        +left hip and buttock pain   Skin: Positive for wound (left arm wound healed (seen by PCP)).   Allergic/Immunologic: Negative for immunocompromised state.   Neurological: Negative for dizziness, weakness, light-headedness, numbness and headaches.   Psychiatric/Behavioral: Negative for confusion.   All other systems reviewed and are negative.          PAST MEDICAL HISTORY:  Past Medical History:   Diagnosis Date     Acute and chronic respiratory failure with hypoxia (H)      Acute blood loss anemia 1/15/2019     Acute bronchitis 1/15/2019     Anemia     pernicious anemia     Anxiety      Arm skin lesion, right      Arnold-Chiari malformation (H) 1998     Arthritis      Atrial fibrillation (H) 02/2017     Avascular necrosis of bone (H)      Breast cyst      Breast lump      Candidal skin infection      Cellulitis of left lower extremity 1/28/2019     CHF (congestive heart failure) (H)     diastolic and systolic     Chronic bronchitis (H)     & acute     Chronic diastolic heart failure (H)     diastolic chf     Chronic pain syndrome      Chronic respiratory failure with hypoxia (H) 3/13/2017     COPD (chronic obstructive pulmonary disease) (H)      COPD exacerbation (H)      Depression       Diet-controlled type 2 diabetes mellitus (H)      Drug induced constipation      DVT of axillary vein, acute (H)     left     DVT of axillary vein, acute left (H)      Edema      Encounter for palliative care      Erysipelas      Fracture of femoral neck, left (H)      Generalized muscle weakness      GERD (gastroesophageal reflux disease)      History of blood clots      Hyperlipidemia      Hypertension      Left hip pain      Lumbar spinal stenosis      PAD (peripheral artery disease) (H)      Peripheral arterial disease (H) 10/28/2015     Psoriasis     arms      Pulmonary hypertension (H) 3/5/2018     Recurrent major depressive episodes (H)     Created by Conversion  Replacement Utility updated for latest IMO load     Schizoaffective disorder, bipolar type (H) 2015     Slow transit constipation      Stasis dermatitis of both legs      Status post total hip replacement, 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     Type 2 diabetes mellitus without complication (H) 2016     Vitamin B12 deficiency      Volume overload          PAST SURGICAL HISTORY:  Past Surgical History:   Procedure Laterality Date     BACK SURGERY       BREAST CYST ASPIRATION Left      CERVICAL SPINE SURGERY      for arnold chiari malformation      SECTION  X3     CHOLECYSTECTOMY       COLONOSCOPY       EXCISE LESION UPPER EXTREMITY Right 2019    Procedure: EXCISION RIGHT ARM LIPOMA;  Surgeon: Andreas Holly MD;  Location: [facility];  Service: General     EYE SURGERY      bilateral cataracts     HAND SURGERY       HERNIORRHAPHY UMBILICAL N/A 2021    Procedure: INCARCERATED UMBILICAL AND;  Surgeon: Andreas Holly MD;  Location[facility]     JOINT REPLACEMENT Right     knee - partial     LAPAROSCOPIC HERNIORRHAPHY INCISIONAL Left 3/27/2015    Procedure: ATTEMPTED LAPAROSCOPIC ABDOMINA/FLANK INCISION REPAIR;  Surgeon: Jose Lakhani MD;  Location: [facility] OR;  Service:      LUMBAR FUSION N/A 2014    Procedure:  POSTERIOR FUSION / DECOMPRESSION L3-S1 WITH PELVIC FIXATION BILATERAL ;  Surgeon: Rajan Mares MD;  Location: [facility] [place];  Service:      OTHER SURGICAL HISTORY      IR for PAD LOWER EXTREMITY     REPAIR SPIGELIAN HERNIA N/A 9/21/2021    Procedure: SPIGELIAN HERNIA REPAIR;  Surgeon: Andreas Holly MD;  Location: [facility] ASPIRATION HEMATOMA SEROMA OR FLUID COLLECTION  2/6/2020     [facility] OPEN FIXATN PROX END/NECK FEMUR FX Left 1/2/2019    Procedure: OPEN REDUCTION INTERNAL FIXATION, FRACTURE LEFT HIP, PERIPROSTHETIC FRACTURE;  Surgeon: Kevin Lackey MD;  Location: [facility];  Service: Orthopedics     [facility] TOTAL HIP ARTHROPLASTY Left 1/2/2019    Procedure: LEFT TOTAL HIP ARTHROPLASTY;  Surgeon: Kevin Lackey MD;  Location: [facility] OR;  Service: Orthopedics     [facility] TOTAL HIP ARTHROPLASTY Right 9/16/2019    Procedure: RIGHT TOTAL HIP ARTHROPLASTY;  Surgeon: Kan Quesada MD;  Location: [facility];  Service: Orthopedics     [facility] TOTAL KNEE ARTHROPLASTY Left 10/7/2016    Procedure: TOTAL KNEE ARTHROPLASTY, LEFT;  Surgeon: Kan Quesada MD;  Location: [facility];  Service: Orthopedics         CURRENT MEDICATIONS:    Prior to Admission medications    Medication Sig Start Date End Date Taking? Authorizing Provider   acetaminophen (TYLENOL) 500 MG tablet Take 500-1,000 mg by mouth every 6 hours as needed for mild pain     Unknown, Entered By History   albuterol (PROAIR HFA/PROVENTIL HFA/VENTOLIN HFA) 108 (90 Base) MCG/ACT inhaler Inhale 2 puffs into the lungs every 4 hours as needed for shortness of breath / dyspnea or wheezing 1/17/22   Jun Juarez MD   albuterol (PROVENTIL) (2.5 MG/3ML) 0.083% neb solution 3 ML INHALATION FOUR TIMES A DAY AS NEEDED USE BEFORE NEBULIZED SALINE. FOR SHORTNESS OF BREATH. 1/21/22   Shai Ellington MD   ARIPiprazole (ABILIFY) 5 MG tablet TAKE 1 TABLET AT BEDTIME 12/2/21   Shai Ellington MD   atorvastatin  (LIPITOR) 20 MG tablet TAKE 1 TABLET AT BEDTIME 12/15/21   Shai Ellington MD   Budeson-Glycopyrrol-Formoterol (BREZTRI AEROSPHERE) 160-9-4.8 MCG/ACT AERO Inhale 2 puffs into the lungs 2 times daily 12/8/21   Jun Juarez MD   calcium carbonate 600 mg-vitamin D 400 units (CALTRATE) 600-400 MG-UNIT per tablet Take 1 tablet by mouth every morning     Reported, Patient   clobetasol (TEMOVATE) 0.05 % external cream Apply 1 applicator topically 2 times daily as needed To legs and elbows 6/8/21   Reported, Patient   cyanocobalamin (VITAMIN B-12) 500 MCG tablet Take 500 mcg by mouth every morning     Reported, Patient   diltiazem ER (DILT-XR) 120 MG 24 hr capsule Take 1 capsule (120 mg) by mouth every morning 1/10/22   Francis Marquez MD   doxycycline hyclate (VIBRAMYCIN) 100 MG capsule Take 1 capsule (100 mg) by mouth 2 times daily for 5 days 2/2/22 2/7/22  Jun Juarez MD   doxycycline hyclate (VIBRAMYCIN) 100 MG capsule Take 1 capsule (100 mg) by mouth 2 times daily 1/11/22   Shai Ellington MD   Fluticasone-Umeclidin-Vilanterol (TRELEGY ELLIPTA) 100-62.5-25 MCG/INH oral inhaler Inhale 1 puff into the lungs every morning Inhale after sodium chloride neb  Patient not taking: Reported on 12/21/2021 2/1/21   Reported, Patient   furosemide (LASIX) 20 MG tablet TAKE 1 TABLET TWICE A DAY AT 9 A.M. AND 6 P.M.  Patient taking differently: Take 20 mg by mouth daily  12/9/21   Shai Ellington MD   gabapentin (NEURONTIN) 300 MG capsule Take 600 mg by mouth every evening     Reported, Patient   ipratropium (ATROVENT) 0.03 % nasal spray Spray 1 spray into both nostrils every morning  5/30/21   Reported, Patient   metFORMIN (GLUCOPHAGE) 500 MG tablet Take 1 tablet (500 mg) by mouth 2 times daily (with meals) 10/22/21   Shai Ellington MD   mineral oil-white petrolatum (EUCERIN) CREA cream Apply 1 g topically 2 times daily Apply to legs and elbows prior to clobetasol    Unknown, Entered By History    omeprazole (PRILOSEC) 20 MG DR capsule TAKE 1 CAPSULE DAILY 21   Shai Ellington MD   OXYGEN-HELIUM IN 3 LPM when resting and at night and 5 LPM with activity  Val[place]    Reported, Patient   predniSONE (DELTASONE) 10 MG tablet Take 2 tabs daily x 5 days, THEN 1 tab daily x 5 days 22   Jun Juarez MD   predniSONE (DELTASONE) 10 MG tablet Take 2 tabs daily x 5 days, THEN 1 tab daily x 5 days 22   Jun Juarez MD   sodium chloride (NEBUSAL) 3 % neb solution Take 4 mLs by nebulization 2 times daily 21   Jun Juarez MD   spironolactone (ALDACTONE) 25 MG tablet TAKE ONE-HALF (1/2) TABLET DAILY 21   Shai Ellington MD   Vitamin D3 (VITAMIN D3) 25 mcg (1000 units) tablet Take 1 capsule by mouth every morning     Unknown, Entered By History   XARELTO ANTICOAGULANT 20 MG TABS tablet TAKE 1 TABLET DAILY WITH DINNER 1/3/22   Francis Marquez MD         ALLERGIES:  Allergies   Allergen Reactions     Tramadol Nausea     Amoxicillin Itching and Rash     Levofloxacin Itching and Rash     Penicillins Itching and Rash     Has tolerated ceftriaxone.         FAMILY HISTORY:  Family History   Problem Relation Age of Onset     Coronary Artery Disease Mother      Coronary Artery Disease Father          SOCIAL HISTORY:  Social History     Socioeconomic History     Marital status:      Spouse name: Not on file     Number of children: Not on file     Years of education: Not on file     Highest education level: Not on file   Occupational History     Not on file   Tobacco Use     Smoking status: Former Smoker     Quit date: 2006     Years since quittin.1     Smokeless tobacco: Never Used     Tobacco comment: quit 2006   Vaping Use     Vaping Use: Never used   Substance and Sexual Activity     Alcohol use: No     Drug use: No     Sexual activity: Not on file   Other Topics Concern     Parent/sibling w/ CABG, MI or angioplasty before 65F 55M? Not Asked   Social History Narrative          Social Determinants of Health     Financial Resource Strain: Not on file   Food Insecurity: Not on file   Transportation Needs: Not on file   Physical Activity: Not on file   Stress: Not on file   Social Connections: Not on file   Intimate Partner Violence: Not on file   Housing Stability: Not on file         VITALS:  Patient Vitals for the past 24 hrs:   BP Temp Temp src Pulse Resp SpO2 Weight   02/06/22 1532 -- -- -- -- -- 98 % --   02/06/22 1410 -- -- -- 82 -- 95 % --   02/06/22 1400 125/58 -- -- 75 20 95 % --   02/06/22 1350 -- -- -- 76 -- 94 % --   02/06/22 1340 -- -- -- 78 -- 95 % --   02/06/22 1330 105/57 -- -- 78 -- 94 % --   02/06/22 1320 108/59 -- -- 80 -- 93 % --   02/06/22 1220 92/55 -- -- 72 18 92 % --   02/06/22 1200 100/59 -- -- 70 -- 93 % --   02/06/22 1150 99/55 -- -- 75 -- 92 % --   02/06/22 1118 -- -- -- -- -- -- 64.4 kg (142 lb)   02/06/22 1115 90/53 -- -- 78 -- 97 % --   02/06/22 1113 -- 97.6  F (36.4  C) Oral -- -- -- --   02/06/22 1110 96/61 -- -- 75 20 99 % --       Wt Readings from Last 3 Encounters:   02/06/22 64.4 kg (142 lb)   02/01/22 64 kg (141 lb)   01/06/22 65.3 kg (144 lb)         PHYSICAL EXAM    Constitutional:  Well developed, Well nourished, NAD, GCS 15  HENT:  Normocephalic, Atraumatic, Bilateral external ears normal, Nose normal. Neck-  Normal range of motion, No tenderness, Supple, No stridor.   Eyes:  PERRL, EOMI, Conjunctiva normal, No discharge.  Respiratory:  Normal breath sounds, No respiratory distress, No wheezing, Speaks full sentences easily. No cough.   Cardiovascular:  Normal heart rate, Regular rhythm, No murmurs, No rubs, No gallops. Chest wall nontender.   GI:  No excessive obesity.  Bowel sounds normal, Soft, No tenderness, No masses, No flank tenderness. No rebound or guarding.   : deferred  Musculoskeletal: 2+ DP pulses. +BLE (L>R) edema. No cyanosis, No clubbing. No major deformities noted. No tenderness of the CTspine. +lumbar tenderness.  +left hip and buttock pain and bruising  Integument:  Warm, Dry, No erythema, No rash.  No petechiae.   Neurologic:  Alert & oriented x 3, No focal deficits noted.   Psychiatric:  Affect normal, Cooperative          LAB:  All pertinent labs reviewed and interpreted.  Recent Results (from the past 24 hour(s))   CBC (+ platelets, no diff)    Collection Time: 02/06/22 11:36 AM   Result Value Ref Range    WBC Count 7.0 4.0 - 11.0 10e3/uL    RBC Count 4.22 3.80 - 5.20 10e6/uL    Hemoglobin 12.3 11.7 - 15.7 g/dL    Hematocrit 37.3 35.0 - 47.0 %    MCV 88 78 - 100 fL    MCH 29.1 26.5 - 33.0 pg    MCHC 33.0 31.5 - 36.5 g/dL    RDW 15.3 (H) 10.0 - 15.0 %    Platelet Count 215 150 - 450 10e3/uL   Basic metabolic panel    Collection Time: 02/06/22 11:36 AM   Result Value Ref Range    Sodium 128 (L) 136 - 145 mmol/L    Potassium 4.6 3.5 - 5.0 mmol/L    Chloride 90 (L) 98 - 107 mmol/L    Carbon Dioxide (CO2) 27 22 - 31 mmol/L    Anion Gap 11 5 - 18 mmol/L    Urea Nitrogen 12 8 - 28 mg/dL    Creatinine 0.68 0.60 - 1.10 mg/dL    Calcium 8.6 8.5 - 10.5 mg/dL    Glucose 166 (H) 70 - 125 mg/dL    GFR Estimate 85 >60 mL/min/1.73m2       Lab Results   Component Value Date    ABORH A POS 09/16/2019           RADIOLOGY:  Reviewed all pertinent imaging. Please see official radiology report.    CT Abdomen Pelvis w/o Contrast   Final Result   IMPRESSION:    1.  No fractures.   2.  Distended urinary bladder.   3.  Cardiomegaly.   4.  Bronchiectasis with debris in the airways and mild scarring at the lung bases.          Lumbar spine CT w/o contrast   Final Result   Addendum 1 of 1   4. 1 mm spondylolisthesis of L4 on L5 with bilateral L4 chronic pars    defects.       Final   IMPRESSION:   1.  Transitional L5 which is partially sacralized.    2.  Postsurgical changes of posterior instrumented fusion from L2 to L5 with extension into the iliac bones bilaterally. There is suggestion of mild lucency surrounding the fixation screw in the left  iliac bone. This could be artifactual or represent a    component of loosening.   3.  No fractures or posttraumatic subluxations.      Lumbar spine XR, 2-3 views   Final Result   IMPRESSION: Stable bilateral pedicle screw and andrea fusion extending from L3 to the sacrum/iliac bones bilaterally. No evidence of hardware loosening. No evidence of acute fracture. DDD change at L1-2 and L2-3 with loss of disc height at these levels as    seen on previous exam.      XR Hip Left 2-3 Views   Final Result   IMPRESSION: Extensive lumbar fusion surgery with iliac screws. Bilateral total hip arthroplasties with cerclage wires and clip-type sideplate on the left. No evidence of acute fracture or loosening.       US Lower Extremity Venous Duplex Bilateral   Final Result   IMPRESSION:   1.  No deep venous thrombosis in the bilateral lower extremities.   2.  Bilobed or 2 adjacent left Baker's cyst. This could also suggest partial rupture of the Baker's cyst. Correlate clinically.            EKG:    None.       PROCEDURES:  None.       I, Yakelin Nina, am serving as a scribe to document services personally performed by Dr. Luly Asif based on my observation and the provider's statements to me. I, Dr. Luly Asif MD attest that Yakelin Nina is acting in a scribe capacity, has observed my performance of the services and has documented them in accordance with my direction.        Luly Asif M.D. [facility]  Emergency Medicine and Medical Toxicology  Formerly [facility] EMERGENCY ROOM  [address] [facility] [address] [id number] [phone]  Dept: [phone]           Luly Asif MD  02/06/22 6543

## 2022-02-06 NOTE — DISCHARGE INSTRUCTIONS
Call tomorrow make an appointment to follow-up with your primary care doctor in the next 1-3 days for reevaluation.    Talk to them about your slightly low sodium level and your symptoms today.    The CT scan looking at your hip area does show that one of the screws from your spine surgery might be coming a little loose and it is possible this could be causing your discomfort.  I do recommend you make an appointment to follow-up with your orthopedic surgery clinic to discuss this.    You can continue to take your Tylenol to help control your pain, follow the instructions on your bottle.    Return the emergency department with worse pain, cold or numb toes, fever, abdominal pain, or any other concerns.    Thank you for choosing [facility]  Emergency Department.  It has been my pleasure caring for you today.     ~Dr. Laya MD

## 2022-02-08 NOTE — PROGRESS NOTES
Adalgisa Solano is a 86 year oldwho is being evaluated via a billable telephone visit.       What phone number would you like to be contacted at? [phone]      Assessment & Plan  Hypertension controlled 105/68 no recent target organ damage related to same.    Recent fall with left hip pain x-ray CT scan negative for fracture.    Incidental finding of hyponatremia probably related to COPD.  SIADH.  Discussed the importance of fluid water restriction and decrease furosemide to 2 tablets 4 times a week instead of daily.  Each tablet way 40 mg.  All patients with hyponatremia have an inability to excrete a water load including patients with SIADH.  COPD.     21 minutes spent on the date of the encounter doing chart review, patient visit and documentation        Subjective   Emergency room follow-up for fall with hip pain left side date of injury was 1 week prior to ER encounter ER encounter of February 6, 2022.  Incidental finding on February 6 hyponatremia of 128 prior history of COPD volume overload.  Furosemide dosing discussed see above.  Hypertension controlled 105/68.  Ultrasound of legs was done and negative.  CT scan of hip negative for fracture x-ray of left hip done initially negative for fracture.  ER encounter February 6, 2022.     Review of Systems   Denies blood in stool or urine no chest pain or shortness of breath  is also on the phone visit today and is very supportive of his wife.  Medication list reviewed reconciled in the chart.  Multiple drug allergies noted including tramadol amoxicillin levofloxacin and penicillin.  Does not abuse alcohol and is a non-smoker.       Shai Ellington MD

## 2022-02-08 NOTE — PROGRESS NOTES
"Kim is a 86 year old who is being evaluated via a billable telephone visit.      What phone number would you like to be contacted at? [phone]  How would you like to obtain your AVS? MyChart    {PROVIDER CHARTING PREFERENCE:837271}    Subjective   Kim is a 86 year old who presents for the following health issues {ACCOMPANIED BY STATEMENT (Optional):884010}    HPI     {SUPERLIST (Optional):523424}  {additonal problems for provider to add (Optional):872419}    Review of Systems   {ROS COMP (Optional):792960}      Objective           Vitals:  No vitals were obtained today due to virtual visit.    Physical Exam   {GENERAL APPEARANCE:50::\"healthy\",\"alert\",\"no distress\"}  PSYCH: Alert and oriented times 3; coherent speech, normal   rate and volume, able to articulate logical thoughts, able   to abstract reason, no tangential thoughts, no hallucinations   or delusions  Her affect is { :0928629::\"normal\"}  RESP: No cough, no audible wheezing, able to talk in full sentences  Remainder of exam unable to be completed due to telephone visits    {Diagnostic Test Results (Optional):741381}    {AMBULATORY ATTESTATION (Optional):388635}        Phone call duration: *** minutes  "

## 2022-02-28 NOTE — TELEPHONE ENCOUNTER
Phone message from Kim stating that she is having a flare of her COPD. Needs to start her action plan    Orders sent for: prednisone 20mg daily x 5 days, THEN 10mg daily x 5 days, and doxycycline x 5 days

## 2022-03-17 NOTE — TELEPHONE ENCOUNTER
Phone call from Kim. States she is having another flare with sob and breathing issues. Needs to start her action plan.    Will send prednisone 20mg daily x 5 days, then 10mg daily x 5 days AND doxycycline x 5 days

## 2022-03-21 NOTE — PROGRESS NOTES
"Assessment/Plan:    Diabetes mellitus type 2 check A1c comprehensive metabolic profile lipid panel today stable.    Hypertension controlled 120/60.    Hyponatremia discussed the importance of fluid restriction no more than 1 L/day as all patients with hyponatremia have an inability to excrete a water load.    COPD followed by Dr. Bernardo Juarez from pulmonary medicine stable few bronchial breath sounds audible upcoming appoint with Dr. Juarez from pulmonology this coming Wednesday 2 days.  Currently on low-dose prednisone.  Continue same.  Stable    25 minutes spent on the date of the encounter doing chart review, patient visit, documentation and discussion with family     Subjective:  Adalgisa Solano is a 86 year old female presents for the following health issues as above    ROS:  No blood in stool or urine denies chest pain shortness of breath wheelchair-bound medication list reviewed reconciled.    Objective:  /60 (BP Location: Right arm, Patient Position: Sitting)   Pulse 94   Ht 1.562 m (5' 1.5\")   Wt 63 kg (139 lb)   SpO2 94%   BMI 25.84 kg/m    Wheelchair-bound.  Chest clear few rhonchi scattered no rales.  No wheezing not in respiratory distress O2 sats on supplemental nasal cannula O2 1 to 2 L.  94%.    Heart tones are regular abdomen benign soft extremities free of edema neck veins nondistended no carotid bruits easily conversant good spirited.  Wheelchair-bound.  RTC 2 months    Shai Ellington MD  Internal Medicine    Answers for HPI/ROS submitted by the patient on 3/16/2022  How many servings of fruits and vegetables do you eat daily?: 4 or more  On average, how many sweetened beverages do you drink each day (Examples: soda, juice, sweet tea, etc.  Do NOT count diet or artificially sweetened beverages)?: 0  How many minutes a day do you exercise enough to make your heart beat faster?: 30 to 60  How many days a week do you exercise enough to make your heart beat faster?: 4  What is the " reason for your visit today?: Follow up to previous appt. Fasting for labs.  Need update on handicap permit; diabetes check of legs and feet.  Information on 2nd booster shot  What are your symptoms?: Have no symptoms

## 2022-03-21 NOTE — LETTER
March 21, 2022      Kim Solano  [address]  [facility] [address]        Dear ,    We are writing to inform you of your test results.    All very good with nice improvement in serum sodium.       Resulted Orders   Comprehensive metabolic panel   Result Value Ref Range    Sodium 133 (L) 136 - 145 mmol/L    Potassium 4.5 3.5 - 5.0 mmol/L    Chloride 92 (L) 98 - 107 mmol/L    Carbon Dioxide (CO2) 31 22 - 31 mmol/L    Anion Gap 10 5 - 18 mmol/L    Urea Nitrogen 11 8 - 28 mg/dL    Creatinine 0.56 (L) 0.60 - 1.10 mg/dL    Calcium 9.2 8.5 - 10.5 mg/dL    Glucose 91 70 - 125 mg/dL    Alkaline Phosphatase 65 45 - 120 U/L    AST 16 0 - 40 U/L    ALT 17 0 - 45 U/L    Protein Total 5.9 (L) 6.0 - 8.0 g/dL    Albumin 3.5 3.5 - 5.0 g/dL    Bilirubin Total 0.7 0.0 - 1.0 mg/dL    GFR Estimate 89 >60 mL/min/1.73m2      Comment:      Effective December 21, 2021 eGFRcr in adults is calculated using the 2021 CKD-EPI creatinine equation which includes age and gender (Kurtis et al., NEJM, DOI: 10.1056/VMOBhd9050734)   Hemoglobin A1c   Result Value Ref Range    Hemoglobin A1C 6.2 (H) 0.0 - 5.6 %      Comment:      Normal <5.7%   Prediabetes 5.7-6.4%    Diabetes 6.5% or higher     Note: Adopted from ADA consensus guidelines.   Lipid panel reflex to direct LDL Fasting   Result Value Ref Range    Cholesterol 140 <=199 mg/dL    Triglycerides 49 <=149 mg/dL    Direct Measure HDL 67 >=50 mg/dL      Comment:      HDL Cholesterol Reference Range:     0-2 years:   No reference ranges established for patients under 2 years old  at [employer] for lipid analytes.    2-8 years:  Greater than 45 mg/dL     18 years and older:   Female: Greater than or equal to 50 mg/dL   Male:   Greater than or equal to 40 mg/dL    LDL Cholesterol Calculated 63 <=129 mg/dL    Patient Fasting > 8hrs? Yes        If you have any questions or concerns, please call the clinic at the number listed above.       Sincerely,      Shai Ellington,  MD

## 2022-03-23 PROBLEM — J96.11 CHRONIC RESPIRATORY FAILURE WITH HYPOXIA (H): Status: ACTIVE | Noted: 2017-03-13

## 2022-03-23 PROBLEM — I50.32 CHRONIC HEART FAILURE WITH PRESERVED EJECTION FRACTION (H): Status: ACTIVE | Noted: 2021-01-01

## 2022-03-23 PROBLEM — J44.1 COPD EXACERBATION (H): Status: ACTIVE | Noted: 2021-06-11

## 2022-03-23 PROBLEM — I27.20 PULMONARY HYPERTENSION (H): Status: ACTIVE | Noted: 2018-03-05

## 2022-03-23 PROBLEM — Z79.01 ANTICOAGULATED: Status: ACTIVE | Noted: 2022-01-01

## 2022-03-23 PROBLEM — J44.1 COPD WITH ACUTE EXACERBATION (H): Status: ACTIVE | Noted: 2022-01-01

## 2022-03-23 PROBLEM — J47.9 BRONCHIECTASIS WITHOUT COMPLICATION (H): Status: ACTIVE | Noted: 2020-01-16

## 2022-03-23 PROBLEM — E78.5 DYSLIPIDEMIA: Status: ACTIVE | Noted: 2020-02-11

## 2022-03-23 NOTE — PROGRESS NOTES
[facility]UNG [facility]ODULE [facility] INTERVENTIONAL PULMONARY CLINIC  [facility]     Adalgisa Solano MRN# [id number]   Age: 86 year old YOB: 1935       Requesting Physician: No referring provider defined for this encounter.       Assessment and Plan:    1.  COPD exacerbation.  Unfortunately she is already been on her action plan.  This is relatively low prednisone dose because previously she is responded well to this low dose.  Unfortunately I think she will need to go to the emergency room for IV treatment, possibly positive pressure, consideration for heart failure.    There are no specific risk factors for PE especially if she has been taking her Xarelto.      2. Lower extremity edema.  She has been having issues with hyponatremia and has had her Furosemide dose change recently.  She should have BNP checked and consideration of diuresis.    I spoke to the emergency room MD at [facility].  I appreciate their help in her care.    I explained the for a true emergency transfer to the ER we would send in an ambulance.  Given the subacute onset of her symptoms, Kim and her  prefer to go to the ER via personal transport.    Recommendations  IV steroids, IV antibiotics, evaluate for IV diuresis  Supplemental oxygen    The patient is very motivated and has a  who can help support her at home.  I anticipate that she will probably need admission to the hospital but this can be reevaluated in the emergency department after initial treatment.    Severe exacerbation  Decision for hospital admission         History:     Adalgisa Solano is a 86 year old female with sig h/o for chronic respiratory failure, chronic bronchitis type COPD here for exacerbation.  No clear causative factor.  She was started on her action plan without any improvement.  Unfortunately not having her 3% saline nebulizers has made sputum management more difficult for her.   She is short of breath at rest.             Past Medical History:      Past Medical History:   Diagnosis Date     Acute and chronic respiratory failure with hypoxia (H)      Acute blood loss anemia 1/15/2019     Acute bronchitis 1/15/2019     Anemia     pernicious anemia     Anxiety      Arm skin lesion, right      Arnold-Chiari malformation (H) 1998     Arthritis      Atrial fibrillation (H) 02/2017     Avascular necrosis of bone (H)      Breast cyst      Breast lump      Candidal skin infection      Cellulitis of left lower extremity 1/28/2019     CHF (congestive heart failure) (H)     diastolic and systolic     Chronic bronchitis (H)     & acute     Chronic diastolic heart failure (H)     diastolic chf     Chronic pain syndrome      Chronic respiratory failure with hypoxia (H) 3/13/2017     COPD (chronic obstructive pulmonary disease) (H)      COPD exacerbation (H)      Depression      Diet-controlled type 2 diabetes mellitus (H)      Drug induced constipation      DVT of axillary vein, acute (H)     left     DVT of axillary vein, acute left (H)      Edema      Encounter for palliative care      Erysipelas      Fracture of femoral neck, left (H)      Generalized muscle weakness      GERD (gastroesophageal reflux disease)      History of blood clots      Hyperlipidemia      Hypertension      Left hip pain      Lumbar spinal stenosis      PAD (peripheral artery disease) (H)      Peripheral arterial disease (H) 10/28/2015     Psoriasis     arms      Pulmonary hypertension (H) 3/5/2018     Recurrent major depressive episodes (H)     Created by Conversion  Replacement Utility updated for latest IMO load     Schizoaffective disorder, bipolar type (H) 6/11/2015     Slow transit constipation      Stasis dermatitis of both legs      Status post total hip replacement, 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     Type 2 diabetes mellitus without complication (H) 8/2/2016     Vitamin B12 deficiency      Volume overload            Past Surgical  History:      Past Surgical History:   Procedure Laterality Date     BACK SURGERY       BREAST CYST ASPIRATION Left      CERVICAL SPINE SURGERY      for arnold chiari malformation      SECTION  X3     CHOLECYSTECTOMY       COLONOSCOPY       EXCISE LESION UPPER EXTREMITY Right 2019    Procedure: EXCISION RIGHT ARM LIPOMA;  Surgeon: Andreas Holly MD;  Location: [facility];  Service: General     EYE SURGERY      bilateral cataracts     HAND SURGERY       HERNIORRHAPHY UMBILICAL N/A 2021    Procedure: INCARCERATED UMBILICAL AND;  Surgeon: Andreas Holly MD;  Location: [facility]     JOINT REPLACEMENT Right     knee - partial     LAPAROSCOPIC HERNIORRHAPHY INCISIONAL Left 3/27/2015    Procedure: ATTEMPTED LAPAROSCOPIC ABDOMINA/FLANK INCISION REPAIR;  Surgeon: Jose Lakhani MD;  Location: [facility];  Service:      LUMBAR FUSION N/A 2014    Procedure: POSTERIOR FUSION / DECOMPRESSION L3-S1 WITH PELVIC FIXATION BILATERAL ;  Surgeon: Rajan Mares MD;  Location: [facility];  Service:      OTHER SURGICAL HISTORY      IR for PAD LOWER EXTREMITY     REPAIR SPIGELIAN HERNIA N/A 2021    Procedure: SPIGELIAN HERNIA REPAIR;  Surgeon: Andreas Holly MD;  Location: [facility]     US ASPIRATION HEMATOMA SEROMA OR FLUID COLLECTION  2020     [facility] OPEN FIXATN PROX END/NECK FEMUR FX Left 2019    Procedure: OPEN REDUCTION INTERNAL FIXATION, FRACTURE LEFT HIP, PERIPROSTHETIC FRACTURE;  Surgeon: Kevin Lackey MD;  Location: [facility];  Service: Orthopedics     [facility] TOTAL HIP ARTHROPLASTY Left 2019    Procedure: LEFT TOTAL HIP ARTHROPLASTY;  Surgeon: Kevin Lackey MD;  Location: [facility] OR;  Service: Orthopedics     [facility] TOTAL HIP ARTHROPLASTY Right 2019    Procedure: RIGHT TOTAL HIP ARTHROPLASTY;  Surgeon: Kan Quesada MD;  Location: [facility] OR;  Service: Orthopedics     [facility] TOTAL KNEE ARTHROPLASTY Left  10/7/2016    Procedure: TOTAL KNEE ARTHROPLASTY, LEFT;  Surgeon: Kan Quesada MD;  Location: [facility];  Service: Orthopedics          Social History:     Social History     Tobacco Use     Smoking status: Former Smoker     Quit date: 2006     Years since quittin.2     Smokeless tobacco: Never Used     Tobacco comment: quit 2006   Substance Use Topics     Alcohol use: No          Family History:     Family History   Problem Relation Age of Onset     Coronary Artery Disease Mother      Coronary Artery Disease Father            Allergies:      Allergies   Allergen Reactions     Tramadol Nausea     Amoxicillin Itching and Rash     Levofloxacin Itching and Rash     Penicillins Itching and Rash     Has tolerated ceftriaxone.          Medications:     Current Outpatient Medications   Medication Sig     acetaminophen (TYLENOL) 500 MG tablet Take 500-1,000 mg by mouth every 6 hours as needed for mild pain      albuterol (PROAIR HFA/PROVENTIL HFA/VENTOLIN HFA) 108 (90 Base) MCG/ACT inhaler Inhale 2 puffs into the lungs every 4 hours as needed for shortness of breath / dyspnea or wheezing     albuterol (PROVENTIL) (2.5 MG/3ML) 0.083% neb solution 3 ML INHALATION FOUR TIMES A DAY AS NEEDED USE BEFORE NEBULIZED SALINE. FOR SHORTNESS OF BREATH.     ARIPiprazole (ABILIFY) 5 MG tablet TAKE 1 TABLET AT BEDTIME     atorvastatin (LIPITOR) 20 MG tablet TAKE 1 TABLET AT BEDTIME     Budeson-Glycopyrrol-Formoterol (BREZTRI AEROSPHERE) 160-9-4.8 MCG/ACT AERO Inhale 2 puffs into the lungs 2 times daily     calcium carbonate 600 mg-vitamin D 400 units (CALTRATE) 600-400 MG-UNIT per tablet Take 1 tablet by mouth every morning      cyanocobalamin (VITAMIN B-12) 500 MCG tablet Take 500 mcg by mouth every morning      diltiazem ER (DILT-XR) 120 MG 24 hr capsule Take 1 capsule (120 mg) by mouth every morning     furosemide (LASIX) 20 MG tablet TAKE 1 TABLET TWICE A DAY AT 9 A.M. AND 6 P.M. (Patient taking differently: Take 20  mg by mouth daily )     gabapentin (NEURONTIN) 300 MG capsule Take 600 mg by mouth every evening      ipratropium (ATROVENT) 0.03 % nasal spray Spray 1 spray into both nostrils every morning      metFORMIN (GLUCOPHAGE) 500 MG tablet Take 1 tablet (500 mg) by mouth 2 times daily (with meals)     omeprazole (PRILOSEC) 20 MG DR capsule TAKE 1 CAPSULE DAILY     OXYGEN-HELIUM IN 3 LPM when resting and at night and 5 LPM with activity  [facility] [place]     spironolactone (ALDACTONE) 25 MG tablet TAKE ONE-HALF (1/2) TABLET DAILY     Vitamin D3 (VITAMIN D3) 25 mcg (1000 units) tablet Take 1 capsule by mouth every morning      XARELTO ANTICOAGULANT 20 MG TABS tablet TAKE 1 TABLET DAILY WITH DINNER     No current facility-administered medications for this visit.          Review of Systems:     See HPI         Physical Exam:   /80 (BP Location: Right arm, Patient Position: Chair, Cuff Size: Adult Regular)   Pulse 108   Resp 24   SpO2 92%     Constitutional -short of breath at rest  Eyes - no redness or discharge  Respiratory -dyspneic with talking, no significant crackles but very prolonged expiratory phase.  Cardiac --regular rhythm, tachycardic  Skin - No appreciable discoloration or lesions (very limited exam)  Neurological - No apparent tremors. Speech fluent and articlate  Psychiatric - no signs of delirium or anxiety          Current Laboratory Data:   All laboratory and imaging data reviewed.    No results found for this or any previous visit (from the past 24 hour(s)).

## 2022-03-23 NOTE — ED TRIAGE NOTES
Pt presents to the ED with c/o SOB, CP, and lower leg swelling. PT sent from clinic. Hx of COPD and CHF. Normally on 2L O2 currently on 4L NC.

## 2022-03-23 NOTE — ED PROVIDER NOTES
6:05 PM.  Patient signed out to me at 4 PM, told the patient was admitted upstairs.  Just now, noted that bed had closed and the admitting hospitalist had arranged her transfer to [facility].  Patient informed of the change and transfer paperwork was completed.  Patient in agreement with the plan.     Vaibhav Lowe MD  03/23/22 [id number]

## 2022-03-23 NOTE — H&P
[facility] MEDICINE ADMISSION HISTORY AND PHYSICAL   Date of Admission: 3/23/2022  Adalgisa Solano, 1935, [id number]      Chief Complaint  shortness of breath, cough     Identification/HPI/History/Summary:   Adalgisa Solano is a 86 year old female with PMHx of chronic respiratory failure, bronchiectasis, COPD, PAD, type 2 NIDDM, hypertension, pulmonary hypertension, chronic atrial fibrillation, chronically anticoagulated (Xarelto), chronic diastolic CHF, GERD and hyperlipidemia.  Patient initially presented to her pulmonologist, Dr. Juarez's office with increasing shortness of breath, cough, unable to clear secretions despite using vest at home twice daily.  She has not been able to use saline nebs since not available nationally, has only been using albuterol nebs.  Reports no fever/chills, chest pain, hemoptysis or any GI/ symptoms.  She is on a prescribed plan of treatment with her care Pulmonologist including daily steroids and recently started oral doxycycline 3/22, and since not improving with it, advised ED evaluation.  In addition, her Lasix dose was decreased recently due to hyponatremia.  ED work-up: Normal vitals, afebrile.  O2 sats 95% on 3 L.  Lab work: Sodium 133, potassium 4.2, creatinine 0.59.  , troponin <0.01.  WBC 11,300, hemoglobin 12.5.  CT chest shows:  multiple irregular shaped nodular and peribronchiolar opacities in the right upper lobe, cylindrical bronchiectasis in both lower lobes with new areas of retained secretions in lower lobes L>R, bullous emphysema..  She was given DuoNeb and IV Solu-Medrol 135 mg x 1 including Lasix 40 mg IV x1.  She feels clinically better.  Admitted for further treatment for COPD exacerbation    Assessment and Plan:    Principal Problem:    COPD exacerbation (H)  Active Problems:    Recurrent major depressive episodes (H)    Chronic obstructive pulmonary disease (H)    Peripheral arterial disease (H)    Type 2  diabetes mellitus without complication (H)    Chronic respiratory failure with hypoxia (H)    Pulmonary hypertension (H)    Chronic atrial fibrillation (H)    Gastroesophageal reflux disease, esophagitis presence not specified    Bronchiectasis without complication (H)    Dyslipidemia    Chronic heart failure with preserved ejection fraction (H)    Anticoagulated      COPD exacerbation  Bronchiectasis  More retained secretions on CT imaging, patient unable to use 3% saline at home with vest therapy.  No clear sign of infection.  , no evidence of CHF exacerbation.  Hyponatremia improved from prior.    -Admit to inpatient  -Optimize pulmonary therapy of bronchiectasis, COPD  -DuoNeb 4 times daily  -Albuterol every 4 hours as needed  -RT consulted to start vest therapy with Mucomyst, monitor oxygen and bronchodilator therapy  -Continue prednisone 40 mg daily  -Continue doxycycline 100 mg twice daily  -Continue home inhalers.  Patient defers Breztri over Trelegy  -Check procalcitonin in a.m. with a.m. labs  -Consider Pulmonology consult in a.m. if not improving    HFpEF with chronic diastolic CHF  Pulmonary hypertension   Moderate MR and TR  Appears clinically euvolemic, BNP normal at 159.  CT chest shows no acute pulmonary vascular congestion.  Echo 12/7/2020 showed LVEF 63%, moderate TR and MR, moderate pulmonary hypertension.  Lasix dose was recently decreased due to hyponatremia  -Continue Lasix 20 mg daily.  No need for more aggressive diuresis    Chronic atrial fibrillation  Chronic anticoagulated, Xarelto  Optimally rate control on diltiazem CD  -Continue home dose diltiazem CD, Xarelto    Type II NIDDM  PTA meds: Metformin  Anticipate some degree of hyperglycemia since starting steroids.  A1c 6.2 on 3/21/2022 signifying reasonably well-controlled diabetes  -NovoLog per low-dose sliding scale  -Hold Metformin while in hospital    GERD  -Continue PPI    Hyperlipidemia  -Continue home dose statin    -COVID19  PCR status: Pending    -Anticoagulation   DOAC    -FoleyNot present    Code Status: Full code    Disposition: Inpatient       Past Medical History   Past Medical History:  No date: Acute and chronic respiratory failure with hypoxia (H)  1/15/2019: Acute blood loss anemia  1/15/2019: Acute bronchitis  No date: Anemia      Comment:  pernicious anemia  3/23/2022: Anticoagulated  No date: Anxiety  No date: Arm skin lesion, right  1998: Arnold-Chiari malformation (H)  No date: Arthritis  02/2017: Atrial fibrillation (H)  No date: Avascular necrosis of bone (H)  No date: Breast cyst  No date: Breast lump  No date: Candidal skin infection  1/28/2019: Cellulitis of left lower extremity  No date: CHF (congestive heart failure) (H)      Comment:  diastolic and systolic  No date: Chronic bronchitis (H)      Comment:  & acute  No date: Chronic diastolic heart failure (H)      Comment:  diastolic chf  No date: Chronic pain syndrome  3/13/2017: Chronic respiratory failure with hypoxia (H)  No date: COPD (chronic obstructive pulmonary disease) (H)  No date: COPD exacerbation (H)  No date: Depression  No date: Diet-controlled type 2 diabetes mellitus (H)  No date: Drug induced constipation  No date: DVT of axillary vein, acute (H)      Comment:  left  No date: DVT of axillary vein, acute left (H)  No date: Edema  No date: Encounter for palliative care  No date: Erysipelas  No date: Fracture of femoral neck, left (H)  No date: Generalized muscle weakness  No date: GERD (gastroesophageal reflux disease)  No date: History of blood clots  No date: Hyperlipidemia  No date: Hypertension  No date: Left hip pain  No date: Lumbar spinal stenosis  No date: PAD (peripheral artery disease) (H)  10/28/2015: Peripheral arterial disease (H)  No date: Psoriasis      Comment:  arms   3/5/2018: Pulmonary hypertension (H)  No date: Recurrent major depressive episodes (H)      Comment:  Created by Conversion  Replacement Utility updated for                 latest IMO load  6/11/2015: Schizoaffective disorder, bipolar type (H)  No date: Slow transit constipation  No date: Stasis dermatitis of both legs  1/3/2019: Status post total hip replacement, left  8/2/2016: Type 2 diabetes mellitus without complication (H)  No date: Vitamin B12 deficiency  No date: Volume overload     Patient Active Problem List    Diagnosis Date Noted     Anticoagulated 03/23/2022     Priority: Medium     Chronic heart failure with preserved ejection fraction (H) 11/29/2021     Priority: Medium     Hypoxia 11/03/2021     Priority: Medium     Dyspnea, unspecified type 11/03/2021     Priority: Medium     Hyponatremia 10/11/2021     Priority: Medium     COPD exacerbation (H) 06/11/2021     Priority: Medium     Gastroesophageal reflux disease, esophagitis presence not specified      Priority: Medium     IMO Regulatory Load OCT 2020         Traumatic hematoma 02/11/2020     Priority: Medium     Dyslipidemia 02/11/2020     Priority: Medium     Hypomagnesemia 02/11/2020     Priority: Medium     Osteoporosis 02/11/2020     Priority: Medium     Bronchiectasis without complication (H) 01/16/2020     Priority: Medium     Cellulitis of left lower extremity 01/28/2019     Priority: Medium     Recurrent major depressive episodes (H)      Priority: Medium     Created by Conversion  Replacement Utility updated for latest IMO load         Chronic obstructive pulmonary disease (H)      Priority: Medium     Created by Conversion         Slow transit constipation      Priority: Medium     Generalized muscle weakness 12/21/2018     Priority: Medium     Drug-induced constipation 12/21/2018     Priority: Medium     Chronic pain syndrome 12/21/2018     Priority: Medium     Stasis dermatitis of both legs      Priority: Medium     Chronic atrial fibrillation (H)      Priority: Medium     Primary osteoarthritis of both knees 11/11/2018     Priority: Medium     Pulmonary hypertension (H) 03/05/2018     Priority: Medium      Chronic respiratory failure with hypoxia (H) 2017     Priority: Medium     Spirometry   FEV1/FVC is 48 and is reduced.  FEV1 is 49% predicted and is normal.  FVC is 78% predicted and is reduced.  There was no improvement in spirometry after a single inhaled dose of bronchodilator.  TLC is 103% predicted and is normal.  RV is 119% predicted and is normal.  DLCO is 39% predicted and is reduced when it   is corrected for hemoglobin.  The flow volume loop is normal No.     Impression:  Full Pulmonary Function Test is abnormal.   Spirometry is consistent with severe obstructive ventilatory defect.  Spirometry is not consistent with reversibility.  There is no hyperinflation.  There is no air-trapping.  Diffusion capacity when corrected for hemoglobin is severely reduced.  Flow volume loop demonstrates significant scooping of expiratory limb consistent with obstruction.        Type 2 diabetes mellitus without complication (H) 2016     Priority: Medium     Hypertension 2016     Priority: Medium     Peripheral arterial disease (H) 10/28/2015     Priority: Medium     Schizoaffective disorder, bipolar type (H) 2015     Priority: Medium     Arnold-Chiari malformation (H) 1998     Priority: Medium     Surgical History     Past Surgical History:   Procedure Laterality Date     BACK SURGERY       BREAST CYST ASPIRATION Left      CERVICAL SPINE SURGERY      for arnold chiari malformation      SECTION  X3     CHOLECYSTECTOMY       COLONOSCOPY       EXCISE LESION UPPER EXTREMITY Right 2019    Procedure: EXCISION RIGHT ARM LIPOMA;  Surgeon: Andreas Holly MD;  Location: [facility];  Service: General     EYE SURGERY      bilateral cataracts     HAND SURGERY       HERNIORRHAPHY UMBILICAL N/A 2021    Procedure: INCARCERATED UMBILICAL AND;  Surgeon: Andreas Holly MD;  [place] [facility] [place]     JOINT REPLACEMENT Right     knee - partial     LAPAROSCOPIC  HERNIORRHAPHY INCISIONAL Left 3/27/2015    Procedure: ATTEMPTED LAPAROSCOPIC ABDOMINA/FLANK INCISION REPAIR;  Surgeon: Jose Lakhani MD;  Location: [facility] Main OR;  Service:      LUMBAR FUSION N/A 2014    Procedure: POSTERIOR FUSION / DECOMPRESSION L3-S1 WITH PELVIC FIXATION BILATERAL ;  Surgeon: Rajan Mares MD;  Location: [facility] Main OR;  Service:      OTHER SURGICAL HISTORY      IR for PAD LOWER EXTREMITY     REPAIR SPIGELIAN HERNIA N/A 2021    Procedure: SPIGELIAN HERNIA REPAIR;  Surgeon: Andreas Holly MD;  Location: [facility] ASPIRATION HEMATOMA SEROMA OR FLUID COLLECTION  2020     [facility] OPEN FIXATN PROX END/NECK FEMUR FX Left 2019    Procedure: OPEN REDUCTION INTERNAL FIXATION, FRACTURE LEFT HIP, PERIPROSTHETIC FRACTURE;  Surgeon: Kevin Lackey MD;  Location: [facility] OR;  Service: Orthopedics     [facility] TOTAL HIP ARTHROPLASTY Left 2019    Procedure: LEFT TOTAL HIP ARTHROPLASTY;  Surgeon: Kevin Lackey MD;  Location: [facility] Main OR;  Service: Orthopedics     [facility] TOTAL HIP ARTHROPLASTY Right 2019    Procedure: RIGHT TOTAL HIP ARTHROPLASTY;  Surgeon: Kan Quesada MD;  Location: [facility] Main OR;  Service: Orthopedics     [facility] TOTAL KNEE ARTHROPLASTY Left 10/7/2016    Procedure: TOTAL KNEE ARTHROPLASTY, LEFT;  Surgeon: Kan Quesada MD;  Location: [facility] OR;  Service: Orthopedics     Family History      Family History   Problem Relation Age of Onset     Coronary Artery Disease Mother      Coronary Artery Disease Father       Social History      Social History     Tobacco Use     Smoking status: Former Smoker     Quit date: 2006     Years since quittin.2     Smokeless tobacco: Never Used     Tobacco comment: quit 2006   Vaping Use     Vaping Use: Never used   Substance Use Topics     Alcohol use: No     Drug use: No      Allergies     Allergies   Allergen Reactions     Tramadol Nausea     Amoxicillin Itching and Rash      Levofloxacin Itching and Rash     Penicillins Itching and Rash     Has tolerated ceftriaxone.     Prior to Admission Medications      Prior to Admission Medications   Prescriptions Last Dose Informant Patient Reported? Taking?   ARIPiprazole (ABILIFY) 5 MG tablet 3/22/2022 at Unknown time  No Yes   Sig: TAKE 1 TABLET AT BEDTIME   Budeson-Glycopyrrol-Formoterol (BREZTRI AEROSPHERE) 160-9-4.8 MCG/ACT AERO Past Week at Unknown time  No Yes   Sig: Inhale 2 puffs into the lungs 2 times daily   Doxycycline Hyclate 100 MG TBEC 3/22/2022 at has three of five days left  Yes Yes   Sig: Take 100 mg by mouth 2 times daily    Fluticasone-Umeclidin-Vilanterol (TRELEGY ELLIPTA) 100-62.5-25 MCG/INH oral inhaler More than a month at pt own supply   Yes Yes   Sig: Inhale 1 puff into the lungs daily   OXYGEN-HELIUM IN   Yes Yes   Sig: 3 LPM when resting and at night and 5 LPM with activity  [facility]   Vitamin D3 (VITAMIN D3) 25 mcg (1000 units) tablet 3/22/2022 at Unknown time  Yes Yes   Sig: Take 1 capsule by mouth every morning    XARELTO ANTICOAGULANT 20 MG TABS tablet 3/22/2022 at Unknown time  No Yes   Sig: TAKE 1 TABLET DAILY WITH DINNER   acetaminophen (TYLENOL) 500 MG tablet Past Week at Unknown time  Yes Yes   Sig: Take 500-1,000 mg by mouth every 6 hours as needed for mild pain    albuterol (PROAIR HFA/PROVENTIL HFA/VENTOLIN HFA) 108 (90 Base) MCG/ACT inhaler 3/22/2022 at Unknown time  No Yes   Sig: Inhale 2 puffs into the lungs every 4 hours as needed for shortness of breath / dyspnea or wheezing   albuterol (PROVENTIL) (2.5 MG/3ML) 0.083% neb solution 3/22/2022 at Unknown time  No Yes   Sig: 3 ML INHALATION FOUR TIMES A DAY AS NEEDED USE BEFORE NEBULIZED SALINE. FOR SHORTNESS OF BREATH.   atorvastatin (LIPITOR) 20 MG tablet 3/22/2022 at Unknown time  No Yes   Sig: TAKE 1 TABLET AT BEDTIME   betamethasone dipropionate (DIPROSONE) 0.05 % external ointment 3/22/2022 at Unknown time  Yes Yes   Sig: Apply topically daily Use  "with Eucerin   calcium carbonate 600 mg-vitamin D 400 units (CALTRATE) 600-400 MG-UNIT per tablet 3/22/2022 at Unknown time  Yes Yes   Sig: Take 1 tablet by mouth every morning    cyanocobalamin (VITAMIN B-12) 500 MCG tablet 3/22/2022 at Unknown time  Yes Yes   Sig: Take 500 mcg by mouth every morning    diltiazem ER (DILT-XR) 120 MG 24 hr capsule 3/22/2022 at Unknown time  No Yes   Sig: Take 1 capsule (120 mg) by mouth every morning   furosemide (LASIX) 20 MG tablet 3/22/2022 at Unknown time  No Yes   Sig: TAKE 1 TABLET TWICE A DAY AT 9 A.M. AND 6 P.M.   Patient taking differently: Take 40 mg by mouth every other day Takes \"2 tabs three days each week and on one day on the weekend\" (every other day)   gabapentin (NEURONTIN) 300 MG capsule 3/22/2022 at Unknown time  Yes Yes   Sig: Take 600 mg by mouth every evening    metFORMIN (GLUCOPHAGE) 500 MG tablet 3/22/2022 at Unknown time  No Yes   Sig: Take 1 tablet (500 mg) by mouth 2 times daily (with meals)   omeprazole (PRILOSEC) 20 MG DR capsule 3/22/2022 at Unknown time  No Yes   Sig: TAKE 1 CAPSULE DAILY   predniSONE (DELTASONE) 10 MG tablet 3/23/2022 at am, has three left  Yes Yes   Sig: Take 10 mg by mouth daily   sodium chloride (NEBUSAL) 3 % neb solution Unknown at Unknown time  Yes Yes   Sig: Take 3 mLs by nebulization every 3 hours as needed for wheezing   spironolactone (ALDACTONE) 25 MG tablet 3/22/2022 at Unknown time  No Yes   Sig: TAKE ONE-HALF (1/2) TABLET DAILY      Facility-Administered Medications: None      Review of Systems   A 12 point comprehensive review of systems was negative except as noted above in HPI.    PHYSICAL EXAMINATION     Vitals    Temp:  [98.6  F (37  C)] 98.6  F (37  C)  Pulse:  [] 86  Resp:  [24-30] 30  BP: ()/(57-80) 122/63  SpO2:  [80 %-97 %] 96 %    Examination   GENERAL: Alert, oriented, conversant, in no distress.   EYES: Normal conjunctiva. Sclera anicteric.   NECK: Supple, no lymph adenopathy. JVP is not " distended.    LUNGS: Coarse breath sounds with fine basilar crackles bilaterally.  Scattered rhonchi, no wheezes.  Diminished air entry bilaterally.   HEART: S1 S2, Rate and rhythm is regular. No murmurs  ABDOMEN: Soft, nontender, no distension. Bowel sounds are positive. No guarding or rebound.  EXTREMITIES: Trace pitting edema  SKIN: No rash or ulcers.   NEUROLOGIC: Alert and oriented x3. Speech fluent and normal. Clear mentation. Motor, sensory and cranial exam is grossly intact andsymmetric.   PSYCHIATRIC: Normal affect and cognition     Pertinent Lab     Results for orders placed or performed during the hospital encounter of 03/23/22 (from the past 24 hour(s))   ECG 12-LEAD WITH MUSE ([facility])   Result Value Ref Range    Systolic Blood Pressure 133 mmHg    Diastolic Blood Pressure 59 mmHg    Ventricular Rate 83 BPM    Atrial Rate 468 BPM    MT Interval  ms    QRS Duration 74 ms     ms    QTc 392 ms    P Axis  degrees    R AXIS 82 degrees    T Axis 36 degrees    Interpretation ECG       Atrial fibrillation  Low voltage QRS  Septal infarct (cited on or before 11-JUN-2021)  Abnormal ECG  When compared with ECG of 03-NOV-2021 16:30,  Questionable change in initial forces of Anterior leads  Confirmed by SEE ED PROVIDER NOTE FOR, ECG INTERPRETATION ([id number]),  UZMA CAGLE ([id number]) on 3/23/2022 1:00:14 PM     CBC with Platelets & Differential    Narrative    The following orders were created for panel order CBC with Platelets & Differential.  Procedure                               Abnormality         Status                     ---------                               -----------         ------                     CBC with platelets and d...[[id number]]  Abnormal            Final result                 Please view results for these tests on the individual orders.   Comprehensive metabolic panel   Result Value Ref Range    Sodium 133 (L) 136 - 145 mmol/L    Potassium 4.2 3.5 - 5.0 mmol/L    Chloride 94 (L) 98 - 107  mmol/L    Carbon Dioxide (CO2) 32 (H) 22 - 31 mmol/L    Anion Gap 7 5 - 18 mmol/L    Urea Nitrogen 11 8 - 28 mg/dL    Creatinine 0.59 (L) 0.60 - 1.10 mg/dL    Calcium 9.0 8.5 - 10.5 mg/dL    Glucose 112 70 - 125 mg/dL    Alkaline Phosphatase 60 45 - 120 U/L    AST 16 0 - 40 U/L    ALT 14 0 - 45 U/L    Protein Total 6.0 6.0 - 8.0 g/dL    Albumin 3.4 (L) 3.5 - 5.0 g/dL    Bilirubin Total 0.5 0.0 - 1.0 mg/dL    GFR Estimate 88 >60 mL/min/1.73m2   Troponin I (now)   Result Value Ref Range    Troponin I <0.01 0.00 - 0.29 ng/mL   B-Type Natriuretic Peptide ([facility] Only)   Result Value Ref Range     0 - 167 pg/mL   CBC with platelets and differential   Result Value Ref Range    WBC Count 11.3 (H) 4.0 - 11.0 10e3/uL    RBC Count 4.30 3.80 - 5.20 10e6/uL    Hemoglobin 12.5 11.7 - 15.7 g/dL    Hematocrit 38.4 35.0 - 47.0 %    MCV 89 78 - 100 fL    MCH 29.1 26.5 - 33.0 pg    MCHC 32.6 31.5 - 36.5 g/dL    RDW 15.4 (H) 10.0 - 15.0 %    Platelet Count 226 150 - 450 10e3/uL    % Neutrophils 82 %    % Lymphocytes 7 %    % Monocytes 8 %    % Eosinophils 2 %    % Basophils 0 %    % Immature Granulocytes 1 %    NRBCs per 100 WBC 0 <1 /100    Absolute Neutrophils 9.3 (H) 1.6 - 8.3 10e3/uL    Absolute Lymphocytes 0.7 (L) 0.8 - 5.3 10e3/uL    Absolute Monocytes 0.9 0.0 - 1.3 10e3/uL    Absolute Eosinophils 0.3 0.0 - 0.7 10e3/uL    Absolute Basophils 0.1 0.0 - 0.2 10e3/uL    Absolute Immature Granulocytes 0.1 <=0.4 10e3/uL    Absolute NRBCs 0.0 10e3/uL   CT Chest Pulmonary Embolism w Contrast    Narrative    EXAM: CT CHEST PULMONARY EMBOLISM W CONTRAST  LOCATION: [facility]  DATE/TIME: 3/23/2022 1:33 PM    INDICATION: Severe shortness of breath. Known COPD and bronchiectasis.  COMPARISON: 11/03/2021  TECHNIQUE: CT chest pulmonary angiogram during arterial phase injection of IV contrast. Multiplanar reformats and MIP reconstructions were performed. Dose reduction techniques were used.   CONTRAST: 60ml  Isovue 370    FINDINGS:  ANGIOGRAM CHEST: Excellent opacification of pulmonary arteries and branches. No evidence of pulmonary emboli. Pulmonary artery is of normal caliber. Extensive aortic calcifications without any aneurysm.    LUNGS AND PLEURA: There is a lobulated right upper lobe pulmonary nodule that measures 1.2 x 0.7 cm (axial image 106, coronal image 51 and sagittal image 82). In retrospect there is a 5 x 3 mm nodule in this area before. Inferior to this, there are other   multiple smaller nodular as well as peribronchiolar opacities (images 116 122, 123, 135 and 141). Extensive upper lobe bullous emphysema. Areas of cylindrical bronchiectasis noted in both lower lobes with peribronchial thickening multiple areas of   retained secretions greatest in the left lower lobe. No consolidating pneumonia or effusions.    MEDIASTINUM/AXILLAE: There is a heterogeneous 2.4 cm left thyroid nodule, unchanged. No suspicious adenopathy. Multichamber cardiac enlargement. There is a small hiatal hernia.    CORONARY ARTERY CALCIFICATION: Severe.    UPPER ABDOMEN: Normal.    MUSCULOSKELETAL: Normal.      Impression    IMPRESSION:  1.  There are multiple irregular shaped nodular and peribronchiolar opacities in the right upper lobe as noted above. The largest one superiorly is most suspicious in appearance for an indolent malignancy. While this and more likely the other opacities   could certainly be inflammatory in nature, suggest nonemergent pulmonary consultation re further evaluation and timing of followup studies.  2.  Evidence of cylindrical bronchiectasis and chronic bronchitis in both lower lobes with new areas of retained secretions in the lower lobes, left greater than right.  3.  Moderate bullous emphysema in the upper lobes.  4.  There is a 2.4 cm left thyroid nodule which is stable. Consider nonemergent follow-up thyroid ultrasound. Reference noted below.  5.  No evidence of pulmonary emboli.    REFERENCE:  Toribio  SAMANTHA et al. Managing Incidental Thyroid Nodules Detected on Imaging: White Paper of the ACR Incidental Thyroid Findings Committee. JACR 2015; 12:143-150.    Incidental thyroid nodule detected on CT or MRI without suspicious findings. Applies to general population without limited life expectancy or significant comorbidities.    Age greater than or equal to 35 years  Less than 1.5 cm: No further evaluation.  Greater than or equal to 1.5 cm: Evaluate with thyroid ultrasound.       Pertinent Radiology     Radiology Results:   Recent Results (from the past 24 hour(s))   CT Chest Pulmonary Embolism w Contrast    Narrative    EXAM: CT CHEST PULMONARY EMBOLISM W CONTRAST  LOCATION: [facility]  DATE/TIME: 3/23/2022 1:33 PM    INDICATION: Severe shortness of breath. Known COPD and bronchiectasis.  COMPARISON: 11/03/2021  TECHNIQUE: CT chest pulmonary angiogram during arterial phase injection of IV contrast. Multiplanar reformats and MIP reconstructions were performed. Dose reduction techniques were used.   CONTRAST: 60ml Isovue 370    FINDINGS:  ANGIOGRAM CHEST: Excellent opacification of pulmonary arteries and branches. No evidence of pulmonary emboli. Pulmonary artery is of normal caliber. Extensive aortic calcifications without any aneurysm.    LUNGS AND PLEURA: There is a lobulated right upper lobe pulmonary nodule that measures 1.2 x 0.7 cm (axial image 106, coronal image 51 and sagittal image 82). In retrospect there is a 5 x 3 mm nodule in this area before. Inferior to this, there are other   multiple smaller nodular as well as peribronchiolar opacities (images 116 122, 123, 135 and 141). Extensive upper lobe bullous emphysema. Areas of cylindrical bronchiectasis noted in both lower lobes with peribronchial thickening multiple areas of   retained secretions greatest in the left lower lobe. No consolidating pneumonia or effusions.    MEDIASTINUM/AXILLAE: There is a heterogeneous 2.4 cm left  thyroid nodule, unchanged. No suspicious adenopathy. Multichamber cardiac enlargement. There is a small hiatal hernia.    CORONARY ARTERY CALCIFICATION: Severe.    UPPER ABDOMEN: Normal.    MUSCULOSKELETAL: Normal.      Impression    IMPRESSION:  1.  There are multiple irregular shaped nodular and peribronchiolar opacities in the right upper lobe as noted above. The largest one superiorly is most suspicious in appearance for an indolent malignancy. While this and more likely the other opacities   could certainly be inflammatory in nature, suggest nonemergent pulmonary consultation re further evaluation and timing of followup studies.  2.  Evidence of cylindrical bronchiectasis and chronic bronchitis in both lower lobes with new areas of retained secretions in the lower lobes, left greater than right.  3.  Moderate bullous emphysema in the upper lobes.  4.  There is a 2.4 cm left thyroid nodule which is stable. Consider nonemergent follow-up thyroid ultrasound. Reference noted below.  5.  No evidence of pulmonary emboli.    REFERENCE:  Toribio LARAK et al. Managing Incidental Thyroid Nodules Detected on Imaging: White Paper of the ACR Incidental Thyroid Findings Committee. JACR 2015; 12:143-150.    Incidental thyroid nodule detected on CT or MRI without suspicious findings. Applies to general population without limited life expectancy or significant comorbidities.    Age greater than or equal to 35 years  Less than 1.5 cm: No further evaluation.  Greater than or equal to 1.5 cm: Evaluate with thyroid ultrasound.       EKG Results: personally reviewed.   A. fib rhythm.  Heart rate 83.  No significant acute ischemic changes.    Advance Care Planning        GARCÍA GOETZ MD  [facility]   Phone: #[phone]

## 2022-03-23 NOTE — ED PROVIDER NOTES
EMERGENCY DEPARTMENT ENCOUNTER      NAME: Adalgisa Solano  AGE: 86 year old female  YOB: 1935  MRN: [id number]  EVALUATION DATE & TIME: 3/23/2022 12:06 PM    PCP: Shai Ellington    ED PROVIDER: Manuel Jane M.D.      Chief Complaint   Patient presents with     Shortness of Breath     Chest Pain         FINAL IMPRESSION:  1. Chronic obstructive pulmonary disease, unspecified COPD type (H)          ED COURSE & MEDICAL DECISION MAKING:    Pertinent Labs & Imaging studies reviewed. (See chart for details)  86 year old female presents to the Emergency Department for evaluation of shortness of breath.  Patient was seen by pulmonologist who felt that her COPD was worsened.  Patient has been unable to get her 3% normal saline nebulizers due to national shortage.  Patient also had some fluid retention and there may have been a component of CHF.  Here in the ER she was tachypneic and had an increased oxygen requirement over her baseline.  She received nebulizer treatments and I gave her a dose of Lasix.  Chest x-ray does not show any PE but she does have significant lung disease.  Remainder of her blood work is relatively nonacute.  Because the patient's overall presentation and what I believe is a need for continued nebulizer treatments felt that admission to the hospital for some mild diuresis and nebulizer treatments was appropriate.    12:18 PM I introduced myself to the patient, performed my physical exam, and discussed plan for ED workup.  2:38 PM Rechecked the patient, discussed plan for admission.  2:44 PM Placed call to admitting physician.  3:05 PM I spoke with the hospitalist, Dr. Mancilla. We discussed the patient's case, and they agree to admit the patient.     At the conclusion of the encounter I discussed the results of all of the tests and the disposition. The questions were answered. The patient or family acknowledged understanding and was agreeable with the care plan.            MEDICATIONS GIVEN IN THE EMERGENCY:  Medications   furosemide (LASIX) injection 40 mg (has no administration in time range)   methylPREDNISolone sodium succinate (solu-MEDROL) injection 125 mg (125 mg Intravenous Given 3/23/22 1246)   ipratropium - albuterol 0.5 mg/2.5 mg/3 mL (DUONEB) neb solution 3 mL (3 mLs Nebulization Given 3/23/22 1253)   sodium chloride (NEBUSAL) 3 % neb solution 3 mL (3 mLs Nebulization Given 3/23/22 1253)   iopamidol (ISOVUE-370) solution 100 mL (60 mLs Intravenous Given 3/23/22 1339)       NEW PRESCRIPTIONS STARTED AT TODAY'S ER VISIT  New Prescriptions    No medications on file          =================================================================    HPI    Patient information was obtained from: Patient    Use of : N/A       Adalgisa Solano is a 86 year old female with a pertinent history of COPD, acute bronchitis, chronic Afib, HTN, lymphedema, CHF, DVT, diabetes mellitus type II, and dyslipidemia who presents to this ED by private vehicle for evaluation of shortness of breath, chest pain, and leg swelling.    Per chart review, the patient had an appointment with her pulmonologist earlier today for evaluation of COPD exacerbation. She had been on her action plan (low dose prednisone) without any improvement. She does not have any of her 3% saline nebulizers making sputum management difficult. On exam, patient was dyspneic with talking, short of breath at rest, and had a prolonged expiratory phase, no crackles. As the patient was not responding to her low dose prednisone action plan, her pulmonologist recommended she present to the ED for IV treatment, possible positive pressure, and consideration for heart failure. The patient also reported lower extremity edema and recent change to Lasix dosing.    On 3/21/22 she had an appointment for evaluation of her COPD. She was diagnosed with acute bronchiectasis, but no treatments were started as the patient had an  upcoming appointment with her pulmonologist.    The patient reports she has had worsening shortness of breath at rest and with exertion over the past few days. She endorses associated chest tightness. She has supplemental oxygen at home, and has had to turn it up to 4-5 L over the past few days in order to help her breathing. She has also been doing double albuterol nebulizers without improvement. She normally uses 3% saline nebulizers for her COPD exacerbations which typically help, but pharmacies are currently out of the 3% saline. She has also been on a prednisone course for the past 7 days without improvement. She was referred to the ED after an appointment with her pulmonologist earlier today.    On the way to the ED she had sharp chest pain. Her chest pain has now resolved.    She also reports increased leg swelling. Her  helped her wrap her legs which did offer some improvement. She takes Lasix daily and has not missed any doses recently.    She denies fever and any other complaints at this time.    REVIEW OF SYSTEMS   Review of Systems   Constitutional: Negative for fever.   Respiratory: Positive for chest tightness and shortness of breath.    Cardiovascular: Positive for chest pain and leg swelling.   All other systems reviewed and are negative.       PAST MEDICAL HISTORY:  Past Medical History:   Diagnosis Date     Acute and chronic respiratory failure with hypoxia (H)      Acute blood loss anemia 1/15/2019     Acute bronchitis 1/15/2019     Anemia     pernicious anemia     Anticoagulated 3/23/2022     Anxiety      Arm skin lesion, right      Arnold-Chiari malformation (H) 1998     Arthritis      Atrial fibrillation (H) 02/2017     Avascular necrosis of bone (H)      Breast cyst      Breast lump      Candidal skin infection      Cellulitis of left lower extremity 1/28/2019     CHF (congestive heart failure) (H)     diastolic and systolic     Chronic bronchitis (H)     & acute     Chronic diastolic  heart failure (H)     diastolic chf     Chronic pain syndrome      Chronic respiratory failure with hypoxia (H) 3/13/2017     COPD (chronic obstructive pulmonary disease) (H)      COPD exacerbation (H)      Depression      Diet-controlled type 2 diabetes mellitus (H)      Drug induced constipation      DVT of axillary vein, acute (H)     left     DVT of axillary vein, acute left (H)      Edema      Encounter for palliative care      Erysipelas      Fracture of femoral neck, left (H)      Generalized muscle weakness      GERD (gastroesophageal reflux disease)      History of blood clots      Hyperlipidemia      Hypertension      Left hip pain      Lumbar spinal stenosis      PAD (peripheral artery disease) (H)      Peripheral arterial disease (H) 10/28/2015     Psoriasis     arms      Pulmonary hypertension (H) 3/5/2018     Recurrent major depressive episodes (H)     Created by Conversion  Replacement Utility updated for latest IMO load     Schizoaffective disorder, bipolar type (H) 2015     Slow transit constipation      Stasis dermatitis of both legs      Status post total hip replacement, 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;  Service: General     EYE SURGERY      bilateral cataracts     HAND SURGERY       HERNIORRHAPHY UMBILICAL N/A 2021    Procedure: INCARCERATED UMBILICAL AND;  Surgeon: Andreas Holly MD;  Location: [facility]     JOINT REPLACEMENT Right     knee - partial     LAPAROSCOPIC  HERNIORRHAPHY INCISIONAL Left 3/27/2015    Procedure: ATTEMPTED LAPAROSCOPIC ABDOMINA/FLANK INCISION REPAIR;  Surgeon: Jose Lakhani MD;  Location: [facility] OR;  Service:      LUMBAR FUSION N/A 11/17/2014    Procedure: POSTERIOR FUSION / DECOMPRESSION L3-S1 WITH PELVIC FIXATION BILATERAL ;  Surgeon: Rajan Mares MD;  Location: [facility];  Service:      OTHER SURGICAL HISTORY      IR for PAD LOWER EXTREMITY     REPAIR SPIGELIAN HERNIA N/A 9/21/2021    Procedure: SPIGELIAN HERNIA REPAIR;  Surgeon: Andreas Holly MD;  Location: [facility] ASPIRATION HEMATOMA SEROMA OR FLUID COLLECTION  2/6/2020     [facility] OPEN FIXATN PROX END/NECK FEMUR FX Left 1/2/2019    Procedure: OPEN REDUCTION INTERNAL FIXATION, FRACTURE LEFT HIP, PERIPROSTHETIC FRACTURE;  Surgeon: Kevin Lackey MD;  Location: [facility] OR;  Service: Orthopedics     [facility] TOTAL HIP ARTHROPLASTY Left 1/2/2019    Procedure: LEFT TOTAL HIP ARTHROPLASTY;  Surgeon: Kevin Lackey MD;  Location: [facility] OR;  Service: Orthopedics     [facility] TOTAL HIP ARTHROPLASTY Right 9/16/2019    Procedure: RIGHT TOTAL HIP ARTHROPLASTY;  Surgeon: Kan Quesada MD;  Location: [facility] OR;  Service: Orthopedics     [facility] TOTAL KNEE ARTHROPLASTY Left 10/7/2016    Procedure: TOTAL KNEE ARTHROPLASTY, LEFT;  Surgeon: Kan Quesada MD;  Location: [facility];  Service: Orthopedics           CURRENT MEDICATIONS:    acetaminophen (TYLENOL) 500 MG tablet  albuterol (PROAIR HFA/PROVENTIL HFA/VENTOLIN HFA) 108 (90 Base) MCG/ACT inhaler  albuterol (PROVENTIL) (2.5 MG/3ML) 0.083% neb solution  ARIPiprazole (ABILIFY) 5 MG tablet  atorvastatin (LIPITOR) 20 MG tablet  betamethasone dipropionate (DIPROSONE) 0.05 % external ointment  Budeson-Glycopyrrol-Formoterol (BREZTRI AEROSPHERE) 160-9-4.8 MCG/ACT AERO  calcium carbonate 600 mg-vitamin D 400 units (CALTRATE) 600-400 MG-UNIT per tablet  cyanocobalamin (VITAMIN B-12) 500 MCG  tablet  diltiazem ER (DILT-XR) 120 MG 24 hr capsule  Doxycycline Hyclate 100 MG TBEC  Fluticasone-Umeclidin-Vilanterol (TRELEGY ELLIPTA) 100-62.5-25 MCG/INH oral inhaler  furosemide (LASIX) 20 MG tablet  gabapentin (NEURONTIN) 300 MG capsule  metFORMIN (GLUCOPHAGE) 500 MG tablet  omeprazole (PRILOSEC) 20 MG DR capsule  OXYGEN-HELIUM IN  predniSONE (DELTASONE) 10 MG tablet  sodium chloride (NEBUSAL) 3 % neb solution  spironolactone (ALDACTONE) 25 MG tablet  Vitamin D3 (VITAMIN D3) 25 mcg (1000 units) tablet  XARELTO ANTICOAGULANT 20 MG TABS tablet        ALLERGIES:  Allergies   Allergen Reactions     Tramadol Nausea     Amoxicillin Itching and Rash     Levofloxacin Itching and Rash     Penicillins Itching and Rash     Has tolerated ceftriaxone.       FAMILY HISTORY:  Family History   Problem Relation Age of Onset     Coronary Artery Disease Mother      Coronary Artery Disease Father        SOCIAL HISTORY:   Social History     Socioeconomic History     Marital status:      Spouse name: Not on file     Number of children: Not on file     Years of education: Not on file     Highest education level: Not on file   Occupational History     Not on file   Tobacco Use     Smoking status: Former Smoker     Quit date: 2006     Years since quittin.2     Smokeless tobacco: Never Used     Tobacco comment: quit 2006   Vaping Use     Vaping Use: Never used   Substance and Sexual Activity     Alcohol use: No     Drug use: No     Sexual activity: Not on file   Other Topics Concern     Parent/sibling w/ CABG, MI or angioplasty before 65F 55M? Not Asked   Social History Narrative         Social Determinants of Health     Financial Resource Strain: Not on file   Food Insecurity: Not on file   Transportation Needs: Not on file   Physical Activity: Not on file   Stress: Not on file   Social Connections: Not on file   Intimate Partner Violence: Not on file   Housing Stability: Not on file       VITALS:  /63    Pulse 86   Temp 98.6  F (37  C) (Oral)   Resp 30   Wt 63 kg (139 lb)   SpO2 96%   BMI 25.84 kg/m        PHYSICAL EXAM    Constitutional: Well developed, Well nourished, NAD, GCS 15  HENT: Normocephalic, Atraumatic, Bilateral external ears normal, Oropharynx normal, mucous membranes moist, Nose normal. Neck-  Normal range of motion, No tenderness, Supple, No stridor.  Eyes: PERRL, EOMI, Conjunctiva normal, No discharge.   Respiratory: Normal breath sounds, No respiratory distress, diffuse moderate wheezing, Speaks full sentences easily. No cough.  Cardiovascular: Normal heart rate, Regular rhythm, No murmurs, No rubs, No gallops. Chest wall nontender.  GI:Soft, No tenderness, No masses, No flank tenderness. No rebound or guarding.   Musculoskeletal: 2+ DP pulses. No edema.No cyanosis, No clubbing. Good range of motion in all major joints. No tenderness to palpation or major deformities noted.   Integument: Warm, Dry, No erythema, No rash. No petechiae.   Neurologic: Alert & oriented x 3,  CN 3-12 intact Normal motor function, Normal sensory function, No focal deficits noted. Normal gait. Normal finger to nose bilaterally  Psychiatric: Affect normal, Judgment normal, Mood normal. Cooperative.          LAB:  All pertinent labs reviewed and interpreted.  Labs Ordered and Resulted from Time of ED Arrival to Time of ED Departure   COMPREHENSIVE METABOLIC PANEL - Abnormal       Result Value    Sodium 133 (*)     Potassium 4.2      Chloride 94 (*)     Carbon Dioxide (CO2) 32 (*)     Anion Gap 7      Urea Nitrogen 11      Creatinine 0.59 (*)     Calcium 9.0      Glucose 112      Alkaline Phosphatase 60      AST 16      ALT 14      Protein Total 6.0      Albumin 3.4 (*)     Bilirubin Total 0.5      GFR Estimate 88     CBC WITH PLATELETS AND DIFFERENTIAL - Abnormal    WBC Count 11.3 (*)     RBC Count 4.30      Hemoglobin 12.5      Hematocrit 38.4      MCV 89      MCH 29.1      MCHC 32.6      RDW 15.4 (*)     Platelet  Count 226      % Neutrophils 82      % Lymphocytes 7      % Monocytes 8      % Eosinophils 2      % Basophils 0      % Immature Granulocytes 1      NRBCs per 100 WBC 0      Absolute Neutrophils 9.3 (*)     Absolute Lymphocytes 0.7 (*)     Absolute Monocytes 0.9      Absolute Eosinophils 0.3      Absolute Basophils 0.1      Absolute Immature Granulocytes 0.1      Absolute NRBCs 0.0     TROPONIN I - Normal    Troponin I <0.01     B-TYPE NATRIURETIC PEPTIDE ( EAST ONLY) - Normal         COVID-19 VIRUS (CORONAVIRUS) BY PCR       RADIOLOGY:  Reviewed all pertinent imaging. Please see official radiology report.  CT Chest Pulmonary Embolism w Contrast   Final Result   IMPRESSION:   1.  There are multiple irregular shaped nodular and peribronchiolar opacities in the right upper lobe as noted above. The largest one superiorly is most suspicious in appearance for an indolent malignancy. While this and more likely the other opacities    could certainly be inflammatory in nature, suggest nonemergent pulmonary consultation re further evaluation and timing of followup studies.   2.  Evidence of cylindrical bronchiectasis and chronic bronchitis in both lower lobes with new areas of retained secretions in the lower lobes, left greater than right.   3.  Moderate bullous emphysema in the upper lobes.   4.  There is a 2.4 cm left thyroid nodule which is stable. Consider nonemergent follow-up thyroid ultrasound. Reference noted below.   5.  No evidence of pulmonary emboli.      REFERENCE:   Toribio SINGH et al. Managing Incidental Thyroid Nodules Detected on Imaging: White Paper of the ACR Incidental Thyroid Findings Committee. JACR 2015; 12:143-150.      Incidental thyroid nodule detected on CT or MRI without suspicious findings. Applies to general population without limited life expectancy or significant comorbidities.      Age greater than or equal to 35 years   Less than 1.5 cm: No further evaluation.   Greater than or equal to  1.5 cm: Evaluate with thyroid ultrasound.          EKG:    Performed at: 1224    Impression: Afib    Rate: 83  Rhythm: atrial firillation  Axis:   MA Interval:   QRS Interval: 74  QTc Interval: 392  ST Changes: none  Comparison: nov 2021 no change    I have independently reviewed and interpreted the EKG(s) documented above.    PROCEDURES:         I, Javier Marciano, am serving as a scribe to document services personally performed by Dr. Manuel Jane based on my observation and the provider's statements to me. I, Manuel Jane MD attest that Javier Tariq is acting in a scribe capacity, has observed my performance of the services and has documented them in accordance with my direction.    Manuel Jane M.D.  Emergency Medicine  [facility] EMERGENCY ROOM  [address] [id number] [phone]  Dept: [phone]     Manuel Jane MD  03/23/22 1534

## 2022-03-23 NOTE — PHARMACY-ADMISSION MEDICATION HISTORY
Pharmacy Note - Admission Medication History    Pertinent Provider Information: Patient is very knowledgeable with her medications. Reports taking doxycyline (3/22 for 5 days) and prednisone (took this am) and has three days left of each. She is out of her inhalers and would prefer to continue with Breztri over Trelegy,  may be able to  if new rx provided. Has been unable to get 3% saline nebs from the pharmacy.      ______________________________________________________________________    Prior To Admission (PTA) med list completed and updated in EMR.       PTA Med List   Medication Sig Note Last Dose     acetaminophen (TYLENOL) 500 MG tablet Take 500-1,000 mg by mouth every 6 hours as needed for mild pain   Past Week at Unknown time     albuterol (PROAIR HFA/PROVENTIL HFA/VENTOLIN HFA) 108 (90 Base) MCG/ACT inhaler Inhale 2 puffs into the lungs every 4 hours as needed for shortness of breath / dyspnea or wheezing  3/22/2022 at Unknown time     albuterol (PROVENTIL) (2.5 MG/3ML) 0.083% neb solution 3 ML INHALATION FOUR TIMES A DAY AS NEEDED USE BEFORE NEBULIZED SALINE. FOR SHORTNESS OF BREATH.  3/22/2022 at Unknown time     ARIPiprazole (ABILIFY) 5 MG tablet TAKE 1 TABLET AT BEDTIME  3/22/2022 at Unknown time     atorvastatin (LIPITOR) 20 MG tablet TAKE 1 TABLET AT BEDTIME  3/22/2022 at Unknown time     betamethasone dipropionate (DIPROSONE) 0.05 % external ointment Apply topically daily Use with Eucerin  3/22/2022 at Unknown time     Budeson-Glycopyrrol-Formoterol (BREZTRI AEROSPHERE) 160-9-4.8 MCG/ACT AERO Inhale 2 puffs into the lungs 2 times daily 3/23/2022: Out of medication, prefers this over Trelegy Past Week at Unknown time     calcium carbonate 600 mg-vitamin D 400 units (CALTRATE) 600-400 MG-UNIT per tablet Take 1 tablet by mouth every morning   3/22/2022 at Unknown time     cyanocobalamin (VITAMIN B-12) 500 MCG tablet Take 500 mcg by mouth every morning   3/22/2022 at Unknown time      "diltiazem ER (DILT-XR) 120 MG 24 hr capsule Take 1 capsule (120 mg) by mouth every morning  3/22/2022 at Unknown time     Doxycycline Hyclate 100 MG TBEC Take 100 mg by mouth 2 times daily   3/22/2022 at has three of five days left     Fluticasone-Umeclidin-Vilanterol (TRELEGY ELLIPTA) 100-62.5-25 MCG/INH oral inhaler Inhale 1 puff into the lungs daily 3/23/2022: Out of inhalers, prefers Breztri over Trelegy More than a month at Unknown time     furosemide (LASIX) 20 MG tablet TAKE 1 TABLET TWICE A DAY AT 9 A.M. AND 6 P.M. (Patient taking differently: Take 40 mg by mouth every other day Takes \"2 tabs three days each week and on one day on the weekend\" (every other day))  3/22/2022 at Unknown time     gabapentin (NEURONTIN) 300 MG capsule Take 600 mg by mouth every evening   3/22/2022 at Unknown time     metFORMIN (GLUCOPHAGE) 500 MG tablet Take 1 tablet (500 mg) by mouth 2 times daily (with meals)  3/22/2022 at Unknown time     omeprazole (PRILOSEC) 20 MG DR capsule TAKE 1 CAPSULE DAILY  3/22/2022 at Unknown time     OXYGEN-HELIUM IN 3 LPM when resting and at night and 5 LPM with activity  [facility]       predniSONE (DELTASONE) 10 MG tablet Take 10 mg by mouth daily  3/23/2022 at am, has three left     sodium chloride (NEBUSAL) 3 % neb solution Take 3 mLs by nebulization every 3 hours as needed for wheezing  Unknown at Unknown time     spironolactone (ALDACTONE) 25 MG tablet TAKE ONE-HALF (1/2) TABLET DAILY  3/22/2022 at Unknown time     Vitamin D3 (VITAMIN D3) 25 mcg (1000 units) tablet Take 1 capsule by mouth every morning   3/22/2022 at Unknown time     XARELTO ANTICOAGULANT 20 MG TABS tablet TAKE 1 TABLET DAILY WITH DINNER  3/22/2022 at Unknown time       Information source(s): Patient, Family member and CareEverywhere/SureScripts  Method of interview communication: in-person    Summary of Changes to PTA Med List  New: prednisone, doxycycline, Trelegy, 3% saline nebulizer  Discontinued: ipratropium nasal, " mometasone cream  Changed: furosemide    Patient was asked about OTC/herbal products specifically.  PTA med list reflects this.    In the past week, patient estimated taking medication this percent of the time:  greater than 90%.    Allergies were reviewed, assessed, and updated with the patient.      Medications currently not available for use during hospital stay. Family/Patient representative states they will bring inhalers and betamethasone ointment to [facility].    The information provided in this note is only as accurate as the sources available at the time of the update(s).    Thank you for the opportunity to participate in the care of this patient.    GERONIMO SANCHEZ  3/23/2022 2:53 PM

## 2022-03-23 NOTE — ED NOTES
Ok for food per Dr. Jane; menu given to patient. Re. 40mg Lasix, patient declines purewick at this time, prefers to get up to bedside to commode if possible.

## 2022-03-23 NOTE — PROGRESS NOTES
Ordered nebulizer medication given. Patient states she does Vest treatment BID at home with Albuterol and NaCl as neb treatments.

## 2022-03-23 NOTE — ED NOTES
Bed that was pre assigned to patient at  was taken back.    [facility]  Randall Oconnor MD     Provider aware of possible transfer.  States that his H & P is already done in the chart.  If hospital ist needs to talk to him IC in ED will re page him.  Mari Skelton RN 3/23/2022 5:36 PM

## 2022-03-23 NOTE — CONSULTS
Care Management Initial Consult    General Information  Assessment completed with: Patient, Parents, Patient and  Les  Type of CM/SW Visit: Initial Assessment    Primary Care Provider verified and updated as needed: Yes   Readmission within the last 30 days: no previous admission in last 30 days      Reason for Consult: discharge planning  Advance Care Planning: Advance Care Planning Reviewed: no concerns identified, other (see comments) (Declined Honoring Choices information)          Communication Assessment  Patient's communication style: spoken language (English or Bilingual)    Hearing Difficulty or Deaf: yes   Wear Glasses or Blind: no                         Living Environment:   People in home: spouse     Current living Arrangements: house      Able to return to prior arrangements: yes       Family/Social Support:  Care provided by: self  Provides care for: no one  Marital Status:     Les       Description of Support System: Supportive, Involved    Support Assessment: Adequate family and caregiver support, Patient communicates needs well met    Current Resources:   Patient receiving home care services: No     Community Resources: None  Equipment currently used at home: cane, straight, walker, rolling  Supplies currently used at home: Hearing Aid Batteries, Oxygen Tubing/Supplies, Nebulizer tubing    Employment/Financial:  Employment Status: retired        Financial Concerns: No concerns identified           Lifestyle & Psychosocial Needs:  Social Determinants of Health     Tobacco Use: Medium Risk     Smoking Tobacco Use: Former Smoker     Smokeless Tobacco Use: Never Used   Alcohol Use: Not on file   Financial Resource Strain: Not on file   Food Insecurity: Not on file   Transportation Needs: Not on file   Physical Activity: Not on file   Stress: Not on file   Social Connections: Not on file   Intimate Partner Violence: Not on file   Depression: Not at risk     PHQ-2 Score: 0   Housing  Stability: Not on file       Functional Status:  Prior to admission patient needed assistance:   Dependent ADLs:: Ambulation-walker, Bathing  Dependent IADLs:: Cleaning, Cooking, Shopping, Medication Management, Transportation  Assesssment of Functional Status: Not at baseline with ADL Functioning, Not at baseline with mobility, Not at  functional baseline, Needs assistance with laundry/houskeeping, Needs assistance with bathing, Needs assistance with medications, Needs assistance with shopping, Needs assistive devices (DME), Needs assistance with transportation    Mental Health Status:          Chemical Dependency Status:                Values/Beliefs:  Spiritual, Cultural Beliefs, [employer] Practices, Values that affect care:                 Additional Information:  Alert, oriented patient lives in a private residence with  Les. Brought to [facility] ED due to COPD exacerbation. Uses a cane and roller-walker; no home care services; does drive. Usually on 2-3 liters of oxygen via cannula. Is independent with some I/ADLs and  assists with household chores; bathing and medications depending on how well patient feels. Declined Honoring Choices information. Plans to return home with  Les transporting patient on discharge. Other needs pending clinical progress and PT recommendation.    CANDACE SOLIZ, RN /CM

## 2022-03-23 NOTE — ED PROVIDER NOTES
Expected Patient Referral to ED  11:33 AM    Referring Clinic/Provider:  Ra Juarez    Reason for referral/Clinical facts:  In clinic.  History of bronchiectesis and copd. In exacerbation in clinic with tachypnea.  Also has CHF and has been off diuretics.  Dr. Juarez feels patient may need COPD treatment (nebs, steroids) and work up for fluid retention.  Most likely needs admission.  Patient coming by private vehicle    Recommendations provided:  Send to ED for further evaluation    Caller was informed that this institution does possess the capabilities and/or resources to provide for patient and should be transferred to our facility.    Discussed that if direct admit is sought and any hurdles are encountered, this ED would be happy to see the patient and evaluate.    Informed caller that recommendations provided are recommendations based only on the facts provided and that they responsible to accept or reject the advice, or to seek a formal in person consultation as needed and that this ED will see/treat patient should they arrive.      Manuel Jane MD  Emergency Medicine  [facility] EMERGENCY ROOM  [address] [facility] [address] [phone]       Manuel Jane MD  03/23/22 [id number]

## 2022-03-24 PROBLEM — E87.1 HYPONATREMIA: Status: ACTIVE | Noted: 2021-01-01

## 2022-03-24 NOTE — PHARMACY-ADMISSION MEDICATION HISTORY
Admission medication history completed at [facility]. Please see Pharmacy - Admission Medication History note from 03/23/2022.

## 2022-03-24 NOTE — UTILIZATION REVIEW
Admission Status; Secondary Review Determination   Under the authority of the Utilization Management Committee, the utilization review process indicated a secondary review on Adalgisa Solano. The review outcome is based on review of the medical records, discussions with staff, and applying clinical experience noted on the date of the review.   (x) Inpatient Status Appropriate - This patient's medical care is consistent with medical management for inpatient care and reasonable inpatient medical practice.     RATIONALE FOR DETERMINATION   86 yr old female with chronic respiratory failure on 3L NC, bronchiectasis, COPD, PAD, DM2, HTN, Pulmonary HTN, Afib, Diastolic CHF who presented to ED on 3/23/22 at outside facility after being sent from Pulmonary office with acute COPD exacerbation who was failing her action plan already implemented.  Initially treated with IV solumedrol and IV lasix as well as scheduled duonebs.  No bed at outside facility and transferred to [facility].  Oxygen saturations at outside ED 89% on 3L NC with RR>30s.  On arrival several hours after presentation at outside ED was improved but still not baseline.  Attempting conversion to PO meds and ongoing frequent nebs and monitoring oxygen needs.  Per Dr. Bustillo, ongoing monitoring as metabolic alkalosis and concern of developing CO2 retention and working with setting up home oxygen as has been having issues with this and home nebs prior to arrival.      At the time of admission with the information available to the attending physician more than 2 nights Hospital complex care was anticipated, based on patient risk of adverse outcome if treated as outpatient and complex care required. Inpatient admission is appropriate based on the Medicare guidelines.   The information on this document is developed by the utilization review team in order for the business office to ensure compliance. This only denotes the appropriateness of proper admission status  and does not reflect the quality of care rendered.   The definitions of Inpatient Status and Observation Status used in making the determination above are those provided in the CMS Coverage Manual, Chapter 1 and Chapter 6, section 70.4.   Sincerely,   Maru Mclean MD  Utilization Review  Physician Advisor  [facility]

## 2022-03-24 NOTE — PLAN OF CARE
Problem: Airway Clearance Ineffective (Pulmonary Impairment)  Goal: Effective Airway Clearance  Outcome: Ongoing, Progressing     Problem: Activity Intolerance (Pulmonary Impairment)  Goal: Improved Activity Tolerance  Outcome: Ongoing, Progressing  Patient alert, oriented x 3. Denied nausea/vomiting. Complained of a headache this morning administered Tylenol prn. Pleasant and co-operative with cares. Needed assist x 1 with cares and transfers with a cane. Noted with a productive cough and inspiratory wheezing, RT administered scheduled Duo neb. Saturation 96 % 3 LPM. Will continue to monitor respiratory status.

## 2022-03-24 NOTE — H&P
Admission History and Physical   Adalgisa Solano,  1935, MRN [id number]      COPD with acute exacerbation (H) [J44.1]    PCP: Shai Ellington, [unfilled], [phone]   Code status:  Full Code       Extended Emergency Contact Information  Primary Emergency Contact: Anabel Solano  Address: [address] [place] [address] [facility]  Home Phone: [phone]  Mobile Phone: [phone]  Relation: Spouse  Secondary Emergency Contact: Ratna Mcallister  Address: [address] [place], [place] [address] [place] [address]  Home Phone: [phone]  Mobile Phone: [phone]  Relation: Daughter       Assessment and Plan     Assessment:  86-year-old with bronchiectasis who failed outpatient therapy    Acute on chronic respiratory failure due to bronchiectasis and COPD exacerbation  --CT imaging shows retained secretions  --BNP of 159 no evidence of CHF exacerbation  --Continue prednisone, doxycycline at home dose  --Check procalcitonin in a.m.  Continue duo nebs 4 times a day  --R CAT to start vest therapy monitor oxygen and dilator therapy  --Use flutter valve as needed  Consider pulmonary consult if patient does not improve    Chronic atrial fibrillation  --Continue Xarelto and diltiazem CD    Non-insulin-dependent type 2 diabetes  --Hold Metformin in the hospital  --Order 4 units of Lantus at bedtime and sliding scale insulin medium resistance    Chronic diastolic heart failure  --Continue low-dose Lasix    Hyponatremia chronic  --Sodium level is stable at 133    GERD  -Continue PPI     Hyperlipidemia  -Continue home dose statin     -COVID19 PCR status: negative     -Anticoagulation   DOAC    Plan:    Pt would be admitted as inpatient and will likely stay for greater than 2 midnights patient failed outpatient therapy    Precautions: None     Nutrition:diabetic    Activity: As tolerated    IV fluids: None    Antibiotics: Doxycycline     DVT prophylaxis:Xarelto    GI prophylaxis:  PPI    Consult: None    Monitor: Hypoxia    Labs: Procalcitonin    Imaging: None      Total unit/floor time 55 minutes.     Chief Complaint:  Shortness of breath and cough     HPI:    Informant is patient reliable and [facility] hospitalist note  Adalgisa Solano is a 86 year old old female with history of bronchiectasis non-insulin-dependent diabetes pulmonary hypertension chronic atrial fibrillation chronic anticoagulation chronic diastolic failure GERD who presented to pulmonologist office Dr. Juarez with increasing shortness of breath and unable to clear secretions despite rest at home twice daily.  Patient is out of her saline nebs which are not available nationally.  She denies any fever chills chest pain hemoptysis or any GI or  complaints.  She is on a prescribed plan treatment plan with the pulmonologist on daily steroids and recently started oral doxycycline.  Due to failure of improvement, she was asked to be evaluated in the ED.  Her Lasix was recently decreased due to hyponatremia.  In the ED she was found to have hyponatremia of 133 which is stable CT chest shows irregularly-shaped or peribronchial opacity in the right upper lobe with cylindrical bronchiectasis in the lower lobe.  She was given duo nebs IV Solu-Medrol and IV Lasix.  Patient is clinically better.  Due to lack of beds she was transferred to [facility].                     Medical History      Past Medical History:   Diagnosis Date     Acute and chronic respiratory failure with hypoxia (H)      Acute blood loss anemia 1/15/2019     Acute bronchitis 1/15/2019     Anemia     pernicious anemia     Anticoagulated 3/23/2022     Anxiety      Arm skin lesion, right      Arnold-Chiari malformation (H) 1998     Arthritis      Atrial fibrillation (H) 02/2017     Avascular necrosis of bone (H)      Breast cyst      Breast lump      Candidal skin infection      Cellulitis of left lower extremity 1/28/2019     CHF (congestive heart  failure) (H)     diastolic and systolic     Chronic bronchitis (H)     & acute     Chronic diastolic heart failure (H)     diastolic chf     Chronic pain syndrome      Chronic respiratory failure with hypoxia (H) 3/13/2017     COPD (chronic obstructive pulmonary disease) (H)      COPD exacerbation (H)      Depression      Diet-controlled type 2 diabetes mellitus (H)      Drug induced constipation      DVT of axillary vein, acute (H)     left     DVT of axillary vein, acute left (H)      Edema      Encounter for palliative care      Erysipelas      Fracture of femoral neck, left (H)      Generalized muscle weakness      GERD (gastroesophageal reflux disease)      History of blood clots      Hyperlipidemia      Hypertension      Left hip pain      Lumbar spinal stenosis      PAD (peripheral artery disease) (H)      Peripheral arterial disease (H) 10/28/2015     Psoriasis     arms      Pulmonary hypertension (H) 3/5/2018     Recurrent major depressive episodes (H)     Created by Conversion  Replacement Utility updated for latest IMO load     Schizoaffective disorder, bipolar type (H) 6/11/2015     Slow transit constipation      Stasis dermatitis of both legs      Status post total hip replacement, 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          Allergies  Allergies   Allergen Reactions     Tramadol Nausea     Amoxicillin Itching and Rash     Levofloxacin Itching and Rash     Penicillins Itching and Rash     Has tolerated ceftriaxone.    Social History  Reviewed, and she  reports that she quit smoking about 16 years ago. She has never used smokeless tobacco. She reports that she does not drink alcohol and does not use drugs.    Review of Systems:  10-point ROS negative, except as noted in HPI            Prior to Admission  "Medications   Medications Prior to Admission   Medication Sig Dispense Refill Last Dose     acetaminophen (TYLENOL) 500 MG tablet Take 500-1,000 mg by mouth every 6 hours as needed for mild pain         albuterol (PROAIR HFA/PROVENTIL HFA/VENTOLIN HFA) 108 (90 Base) MCG/ACT inhaler Inhale 2 puffs into the lungs every 4 hours as needed for shortness of breath / dyspnea or wheezing 18 g 11      albuterol (PROVENTIL) (2.5 MG/3ML) 0.083% neb solution 3 ML INHALATION FOUR TIMES A DAY AS NEEDED USE BEFORE NEBULIZED SALINE. FOR SHORTNESS OF BREATH. 600 mL 3      ARIPiprazole (ABILIFY) 5 MG tablet TAKE 1 TABLET AT BEDTIME 90 tablet 3      atorvastatin (LIPITOR) 20 MG tablet TAKE 1 TABLET AT BEDTIME 90 tablet 3      betamethasone dipropionate (DIPROSONE) 0.05 % external ointment Apply topically daily Use with Eucerin        Budeson-Glycopyrrol-Formoterol (BREZTRI AEROSPHERE) 160-9-4.8 MCG/ACT AERO Inhale 2 puffs into the lungs 2 times daily 17.7 g 3      calcium carbonate 600 mg-vitamin D 400 units (CALTRATE) 600-400 MG-UNIT per tablet Take 1 tablet by mouth every morning         cyanocobalamin (VITAMIN B-12) 500 MCG tablet Take 500 mcg by mouth every morning         diltiazem ER (DILT-XR) 120 MG 24 hr capsule Take 1 capsule (120 mg) by mouth every morning 90 capsule 1      Doxycycline Hyclate 100 MG TBEC Take 100 mg by mouth 2 times daily         Fluticasone-Umeclidin-Vilanterol (TRELEGY ELLIPTA) 100-62.5-25 MCG/INH oral inhaler Inhale 1 puff into the lungs daily        furosemide (LASIX) 20 MG tablet TAKE 1 TABLET TWICE A DAY AT 9 A.M. AND 6 P.M. (Patient taking differently: Take 40 mg by mouth every other day Takes \"2 tabs three days each week and on one day on the weekend\" (every other day)) 180 tablet 3      gabapentin (NEURONTIN) 300 MG capsule Take 600 mg by mouth every evening         metFORMIN (GLUCOPHAGE) 500 MG tablet Take 1 tablet (500 mg) by mouth 2 times daily (with meals) 60 tablet 11      omeprazole " (PRILOSEC) 20 MG DR capsule TAKE 1 CAPSULE DAILY 90 capsule 3      OXYGEN-HELIUM IN 3 LPM when resting and at night and 5 LPM with activity  Valeria        predniSONE (DELTASONE) 10 MG tablet Take 10 mg by mouth daily        sodium chloride (NEBUSAL) 3 % neb solution Take 3 mLs by nebulization every 3 hours as needed for wheezing        spironolactone (ALDACTONE) 25 MG tablet TAKE ONE-HALF (1/2) TABLET DAILY 45 tablet 3      Vitamin D3 (VITAMIN D3) 25 mcg (1000 units) tablet Take 1 capsule by mouth every morning         XARELTO ANTICOAGULANT 20 MG TABS tablet TAKE 1 TABLET DAILY WITH DINNER 90 tablet 1           Physical Exam:  Temp:  [97.6  F (36.4  C)-98.6  F (37  C)] 97.6  F (36.4  C)  Pulse:  [] 80  Resp:  [22-33] 22  BP: ()/(56-80) 120/56  SpO2:  [80 %-97 %] 92 %  Wt Readings from Last 5 Encounters:   03/23/22 63 kg (139 lb)   03/21/22 63 kg (139 lb)   02/06/22 64.4 kg (142 lb)   02/01/22 64 kg (141 lb)   01/06/22 65.3 kg (144 lb)     There is no height or weight on file to calculate BMI.  Alert, oriented*3  No pallor, icterus, clubbing, cyanosis  Well-nourished  No sinus tenderness  Moist  Neck supple, but thin  CVS: S1 S2-N, no murmurs, gallops, rubs  Resp: Inspiratory crackles with mild wheezing   Abd: soft, No t/g/r  Neuro: no involuntary movements such as tremors  Vasc: no leg edema  No clubbing  Skin--no generalized skin rash     Pertinent Labs  Lab Results:  Recent Results (from the past 24 hour(s))   ECG 12-LEAD WITH MUSE (Jage[facility])    Collection Time: 03/23/22 12:24 PM   Result Value Ref Range    Systolic Blood Pressure 133 mmHg    Diastolic Blood Pressure 59 mmHg    Ventricular Rate 83 BPM    Atrial Rate 468 BPM    SD Interval  ms    QRS Duration 74 ms     ms    QTc 392 ms    P Axis  degrees    R AXIS 82 degrees    T Axis 36 degrees    Interpretation ECG       Atrial fibrillation  Low voltage QRS  Septal infarct (cited on or before 11-JUN-2021)  Abnormal ECG  When compared with ECG of  03-NOV-2021 16:30,  Questionable change in initial forces of Anterior leads  Confirmed by SEE ED PROVIDER NOTE FOR, ECG INTERPRETATION ([id number]),  UZMA CAGLE ([id number]) on 3/23/2022 1:00:14 PM     Comprehensive metabolic panel    Collection Time: 03/23/22 12:40 PM   Result Value Ref Range    Sodium 133 (L) 136 - 145 mmol/L    Potassium 4.2 3.5 - 5.0 mmol/L    Chloride 94 (L) 98 - 107 mmol/L    Carbon Dioxide (CO2) 32 (H) 22 - 31 mmol/L    Anion Gap 7 5 - 18 mmol/L    Urea Nitrogen 11 8 - 28 mg/dL    Creatinine 0.59 (L) 0.60 - 1.10 mg/dL    Calcium 9.0 8.5 - 10.5 mg/dL    Glucose 112 70 - 125 mg/dL    Alkaline Phosphatase 60 45 - 120 U/L    AST 16 0 - 40 U/L    ALT 14 0 - 45 U/L    Protein Total 6.0 6.0 - 8.0 g/dL    Albumin 3.4 (L) 3.5 - 5.0 g/dL    Bilirubin Total 0.5 0.0 - 1.0 mg/dL    GFR Estimate 88 >60 mL/min/1.73m2   Troponin I (now)    Collection Time: 03/23/22 12:40 PM   Result Value Ref Range    Troponin I <0.01 0.00 - 0.29 ng/mL   B-Type Natriuretic Peptide ([facility])    Collection Time: 03/23/22 12:40 PM   Result Value Ref Range     0 - 167 pg/mL   CBC with platelets and differential    Collection Time: 03/23/22 12:40 PM   Result Value Ref Range    WBC Count 11.3 (H) 4.0 - 11.0 10e3/uL    RBC Count 4.30 3.80 - 5.20 10e6/uL    Hemoglobin 12.5 11.7 - 15.7 g/dL    Hematocrit 38.4 35.0 - 47.0 %    MCV 89 78 - 100 fL    MCH 29.1 26.5 - 33.0 pg    MCHC 32.6 31.5 - 36.5 g/dL    RDW 15.4 (H) 10.0 - 15.0 %    Platelet Count 226 150 - 450 10e3/uL    % Neutrophils 82 %    % Lymphocytes 7 %    % Monocytes 8 %    % Eosinophils 2 %    % Basophils 0 %    % Immature Granulocytes 1 %    NRBCs per 100 WBC 0 <1 /100    Absolute Neutrophils 9.3 (H) 1.6 - 8.3 10e3/uL    Absolute Lymphocytes 0.7 (L) 0.8 - 5.3 10e3/uL    Absolute Monocytes 0.9 0.0 - 1.3 10e3/uL    Absolute Eosinophils 0.3 0.0 - 0.7 10e3/uL    Absolute Basophils 0.1 0.0 - 0.2 10e3/uL    Absolute Immature Granulocytes 0.1 <=0.4 10e3/uL    Absolute  NRBCs 0.0 10e3/uL   Asymptomatic COVID-19 Virus (Coronavirus) by PCR Nasopharyngeal    Collection Time: 03/23/22  4:03 PM    Specimen: Nasopharyngeal; Swab   Result Value Ref Range    SARS CoV2 PCR Negative Negative   Glucose by meter    Collection Time: 03/23/22  8:39 PM   Result Value Ref Range    GLUCOSE BY METER POCT 229 (H) 70 - 99 mg/dL     No results found for: HGBA1C  Lab Results   Component Value Date    IRON 19 (L) 01/31/2019     Lab Results   Component Value Date    TROPONINI <0.01 03/23/2022     Lab Results   Component Value Date    TSH 1.41 08/15/2021       Pertinent Radiology  Radiology Results:  CT Chest Pulmonary Embolism w Contrast    Result Date: 3/23/2022  EXAM: CT CHEST PULMONARY EMBOLISM W CONTRAST LOCATION: [facility] DATE/TIME: 3/23/2022 1:33 PM INDICATION: Severe shortness of breath. Known COPD and bronchiectasis. COMPARISON: 11/03/2021 TECHNIQUE: CT chest pulmonary angiogram during arterial phase injection of IV contrast. Multiplanar reformats and MIP reconstructions were performed. Dose reduction techniques were used. CONTRAST: 60ml Isovue 370 FINDINGS: ANGIOGRAM CHEST: Excellent opacification of pulmonary arteries and branches. No evidence of pulmonary emboli. Pulmonary artery is of normal caliber. Extensive aortic calcifications without any aneurysm. LUNGS AND PLEURA: There is a lobulated right upper lobe pulmonary nodule that measures 1.2 x 0.7 cm (axial image 106, coronal image 51 and sagittal image 82). In retrospect there is a 5 x 3 mm nodule in this area before. Inferior to this, there are other  multiple smaller nodular as well as peribronchiolar opacities (images 116 122, 123, 135 and 141). Extensive upper lobe bullous emphysema. Areas of cylindrical bronchiectasis noted in both lower lobes with peribronchial thickening multiple areas of retained secretions greatest in the left lower lobe. No consolidating pneumonia or effusions. MEDIASTINUM/AXILLAE: There  is a heterogeneous 2.4 cm left thyroid nodule, unchanged. No suspicious adenopathy. Multichamber cardiac enlargement. There is a small hiatal hernia. CORONARY ARTERY CALCIFICATION: Severe. UPPER ABDOMEN: Normal. MUSCULOSKELETAL: Normal.     IMPRESSION: 1.  There are multiple irregular shaped nodular and peribronchiolar opacities in the right upper lobe as noted above. The largest one superiorly is most suspicious in appearance for an indolent malignancy. While this and more likely the other opacities could certainly be inflammatory in nature, suggest nonemergent pulmonary consultation re further evaluation and timing of followup studies. 2.  Evidence of cylindrical bronchiectasis and chronic bronchitis in both lower lobes with new areas of retained secretions in the lower lobes, left greater than right. 3.  Moderate bullous emphysema in the upper lobes. 4.  There is a 2.4 cm left thyroid nodule which is stable. Consider nonemergent follow-up thyroid ultrasound. Reference noted below. 5.  No evidence of pulmonary emboli. REFERENCE: Toribio SINGH et al. Managing Incidental Thyroid Nodules Detected on Imaging: White Paper of the ACR Incidental Thyroid Findings Committee. JACR 2015; 12:143-150. Incidental thyroid nodule detected on CT or MRI without suspicious findings. Applies to general population without limited life expectancy or significant comorbidities. Age greater than or equal to 35 years Less than 1.5 cm: No further evaluation. Greater than or equal to 1.5 cm: Evaluate with thyroid ultrasound.      EKG Results: not reviewed.   Echo: No results found.

## 2022-03-24 NOTE — PROGRESS NOTES
[facility]    Medicine Progress Note - Hospitalist Service       Date of Admission:  3/23/2022    Assessment & Plan            Adalgisa Solano is a 86 year old female admitted on 3/23/2022. She has history of bronchiectasis, COPD, diabetes, pulmonary hypertension, A. fib, heart failure, GERD was being seen in her pulmonology clinic yesterday found to have shortness of breath at rest, sent to the ER for further evaluation. Hospital Day: 2     Acute on chronic respiratory failure due to bronchiectasis and COPD exacerbation  --CT imaging shows retained secretions  --BNP of 159 no evidence of CHF exacerbation  --Continue prednisone burst, doxycycline which she had already started at home  --procalcitonin 0.03 reassuring  --Continue duo nebs 4 times a day  --R CAT for vest therapy monitor oxygen and bronchodilator therapy  --Use flutter valve as needed  --She has not been able to get hypertonic saline nebs at home.  She also might need a new neb machine, and she is having some issues with her home oxygen company. Requested care mgmt check in with her about this.  Consider pulmonary consult if patient does not improve     Chronic atrial fibrillation  --Continue Xarelto and diltiazem CD     Non-insulin-dependent type 2 diabetes  --Hold Metformin in the hospital  --Order 4 units of Lantus at bedtime and sliding scale insulin medium resistance     Chronic diastolic heart failure  --Continue low-dose Lasix     Hyponatremia chronic  --Sodium level is stable at 133-134     GERD  -Continue PPI     Hyperlipidemia  -Continue home dose statin    Metabolic alkalosis  -Monitor as could be a sign of CO2 retention and uptrending slightly today. Recheck in AM with VBG       Diet: Combination Diet Regular Diet Adult; Moderate Consistent Carb (60 g CHO per Meal) Diet  Room Service    DVT Prophylaxis: Moderate risk. Xarelto   Borjas Catheter: Not present  Central Lines: None  Code Status: Full Code   "    Disposition Plan   Disposition: Home in 1-2 days  Discharge barriers: symptoms, alkalosis, pulmonary toilet  Medically ready to discharge today: No  Estimated discharge date: 03/25/2022     The patient's care was discussed with the Patient.    Aurora Bustillo MD  Hospitalist Service  [facility]  Text page via [employer] Paging/Directory      Clinically Significant Risk Factors Present on Admission               # Coagulation Defect: home medication list includes an anticoagulant medication    # Overweight: Estimated body mass index is 25.84 kg/m  as calculated from the following:    Height as of 3/21/22: 1.562 m (5' 1.5\").    Weight as of an earlier encounter on 3/23/22: 63 kg (139 lb).      ____________        Physical Exam   Vital Signs: Temp: 97.7  F (36.5  C) Temp src: Oral BP: 117/63 Pulse: 99   Resp: 18 SpO2: 93 % O2 Device: None (Room air) Oxygen Delivery: 3 LPM  Weight: 0 lbs 0 oz  General: in no apparent distress, non-toxic and alert female sitting in bedside chair oriented x3  HEENT: Head normocephalic atraumatic, oral mucosa moist. Sclerae anicteric  CV: Regular rhythm, normal rate, no murmurs  Resp: coarse expiratory wheezes throughout, good air movement, no focal consolidations, able to speak in full sentences without apparent shortness of breath  GI: Belly soft, nondistended, nontender, bowel sounds present  Skin: No rashes or lesions  Extremities: Trace ankle edema bilaterally  Psych: Normal affect, mood euthymic  Neuro: CNII-XII grossly intact, moving all 4 extremities      Data   Recent Results (from the past 24 hour(s))   Asymptomatic COVID-19 Virus (Coronavirus) by PCR Nasopharyngeal    Collection Time: 03/23/22  4:03 PM    Specimen: Nasopharyngeal; Swab   Result Value Ref Range    SARS CoV2 PCR Negative Negative   Glucose by meter    Collection Time: 03/23/22  8:39 PM   Result Value Ref Range    GLUCOSE BY METER POCT 229 (H) 70 - 99 mg/dL   Glucose by meter    Collection " Time: 03/24/22  2:03 AM   Result Value Ref Range    GLUCOSE BY METER POCT 162 (H) 70 - 99 mg/dL   Procalcitonin    Collection Time: 03/24/22  6:05 AM   Result Value Ref Range    Procalcitonin 0.03 0.00 - 0.49 ng/mL   Basic metabolic panel    Collection Time: 03/24/22  6:05 AM   Result Value Ref Range    Sodium 134 (L) 136 - 145 mmol/L    Potassium 4.1 3.5 - 5.0 mmol/L    Chloride 93 (L) 98 - 107 mmol/L    Carbon Dioxide (CO2) 33 (H) 22 - 31 mmol/L    Anion Gap 8 5 - 18 mmol/L    Urea Nitrogen 14 8 - 28 mg/dL    Creatinine 0.56 (L) 0.60 - 1.10 mg/dL    Calcium 8.7 8.5 - 10.5 mg/dL    Glucose 127 (H) 70 - 125 mg/dL    GFR Estimate 89 >60 mL/min/1.73m2   CBC with platelets and differential    Collection Time: 03/24/22  6:05 AM   Result Value Ref Range    WBC Count 9.9 4.0 - 11.0 10e3/uL    RBC Count 4.47 3.80 - 5.20 10e6/uL    Hemoglobin 13.2 11.7 - 15.7 g/dL    Hematocrit 40.8 35.0 - 47.0 %    MCV 91 78 - 100 fL    MCH 29.5 26.5 - 33.0 pg    MCHC 32.4 31.5 - 36.5 g/dL    RDW 15.0 10.0 - 15.0 %    Platelet Count 222 150 - 450 10e3/uL    % Neutrophils 95 %    % Lymphocytes 3 %    % Monocytes 2 %    % Eosinophils 0 %    % Basophils 0 %    % Immature Granulocytes 0 %    NRBCs per 100 WBC 0 <1 /100    Absolute Neutrophils 9.4 (H) 1.6 - 8.3 10e3/uL    Absolute Lymphocytes 0.3 (L) 0.8 - 5.3 10e3/uL    Absolute Monocytes 0.2 0.0 - 1.3 10e3/uL    Absolute Eosinophils 0.0 0.0 - 0.7 10e3/uL    Absolute Basophils 0.0 0.0 - 0.2 10e3/uL    Absolute Immature Granulocytes 0.0 <=0.4 10e3/uL    Absolute NRBCs 0.0 10e3/uL   Glucose by meter    Collection Time: 03/24/22  7:55 AM   Result Value Ref Range    GLUCOSE BY METER POCT 146 (H) 70 - 99 mg/dL   Glucose by meter    Collection Time: 03/24/22 11:02 AM   Result Value Ref Range    GLUCOSE BY METER POCT 239 (H) 70 - 99 mg/dL     ____________  Interval History   Data reviewed today: I reviewed all medications, new labs and imaging results over the last 24 hours. I personally reviewed no  images or EKG's today.  patient states doing ok today. neb treatments really helping. currently on 3L NC, still wheezy and coarse but not too tight. she is having issues with [employer] home oxygen, will ask SW to check in with her. sounds like she also needs a new neb machine. not ready for discharge today.

## 2022-03-24 NOTE — PLAN OF CARE
Problem: Activity Intolerance (Pulmonary Impairment)  Goal: Improved Activity Tolerance  Outcome: Ongoing, Progressing   Goal Outcome Evaluation:      Pt doing pummel vest per/RT. Oxygen at 3 liters. Pt has home oxygen. PO prednisone.

## 2022-03-24 NOTE — PLAN OF CARE
Pt reports she has shortness of breath with activity but can use cane to manage short distances to toilet.  She has chronic Left LE edema with redness - pt reports this is chronic in nature and improved after IV Lasix today.  She has some wheezing bilaterally and needs O2 at 3 LPM for comfort and to keep pulseox readings above 90%.    Problem: Plan of Care - These are the overarching goals to be used throughout the patient stay.    Goal: Absence of Hospital-Acquired Illness or Injury  Intervention: Identify and Manage Fall Risk  Recent Flowsheet Documentation  Taken 3/23/2022 2041 by Mar Jones, RN  Safety Promotion/Fall Prevention: bed alarm on  Intervention: Prevent and Manage VTE (Venous Thromboembolism) Risk  Recent Flowsheet Documentation  Taken 3/23/2022 2041 by Mar Jones, RN  VTE Prevention/Management: SCDs (sequential compression devices) off     Problem: Activity Intolerance (Pulmonary Impairment)  Goal: Improved Activity Tolerance  Outcome: Ongoing, Progressing     Problem: Airway Clearance Ineffective (Pulmonary Impairment)  Goal: Effective Airway Clearance  Outcome: Ongoing, Progressing     Problem: Gas Exchange Impaired (Pulmonary Impairment)  Goal: Optimal Gas Exchange  Outcome: Ongoing, Progressing   Goal Outcome Evaluation:

## 2022-03-25 NOTE — PROGRESS NOTES
Patient discharging to home - questions answered.  Prescriptions send to pharmacy of choice.  Knows to follow up with primary in a week

## 2022-03-25 NOTE — DISCHARGE SUMMARY
[facility] MEDICINE  DISCHARGE SUMMARY     Primary Care Physician: Shai Ellington  Admission Date: 3/23/2022   Discharge Provider: Aurora Bustillo Discharge Date: 3/25/2022   Diet:   Active Diet and Nourishment Order   Procedures     Room Service     Combination Diet Regular Diet Adult; Moderate Consistent Carb (60 g CHO per Meal) Diet     Diet       Code Status: Full Code   Activity: DCACTIVITY: Activity as tolerated        Condition at Discharge: Stable     REASON FOR PRESENTATION(See Admission Note for Details)   dyspnea    PRINCIPAL & ACTIVE DISCHARGE DIAGNOSES     Active Problems:    Type 2 diabetes mellitus without complication (H)    Hypertension    Chronic respiratory failure with hypoxia (H)    Chronic atrial fibrillation (H)    Bronchiectasis without complication (H)    Hyponatremia    COPD with acute exacerbation (H)      PENDING LABS     Unresulted Labs Ordered in the Past 30 Days of this Admission     No orders found from 2/21/2022 to 3/24/2022.          PROCEDURES ( this hospitalization only)      none    RECOMMENDATIONS TO OUTPATIENT PROVIDER FOR F/U VISIT     Follow-up Appointments     Follow-up and recommended labs and tests      Follow up with primary care provider, Shai Ellington, within 7 days   for hospital follow- up.   Follow up with Dr Juarez in 1-2 months.             DISPOSITION     Home    SUMMARY OF HOSPITAL COURSE:      Adalgisa Solano is a 86 year old female admitted on 3/23/2022. She has history of bronchiectasis, COPD, diabetes, pulmonary hypertension, A. fib, heart failure, GERD was being seen in her pulmonology clinic yesterday found to have shortness of breath at rest, sent to the ER for further evaluation. Hospital Day: 3     Acute on chronic respiratory failure due to bronchiectasis and COPD exacerbation  --Had started her Action Plan a few days before without benefit  --CT imaging shows retained secretions  --BNP of 159 no evidence  of CHF exacerbation  --Here was treated with prednisone 40mg daily, doxycycline she was taking at home was continued, and given vest treatments and Duonebs. She is feeling much better today  --Patient unable to get hypertonic saline nebs filled at pharmacy due to drug shortage issues  --Patient has also been organizing raad books in her home. Encouraged to wear mask during this activity  --Patient had concerns about her O2 concentrators working well. She perceived that O2 flow in hospital much more perceptible than at home despite flow number the same. Asked Care Management to check on this for her.  --Patient was prescribed a new Neb machine as hers was quite old. She was also prescribed Duonebs which she found helpful here.  --Continue prednisone and do a short taper. Continue doxycycline for 3 more days.  --Sent Rx for Breztri inhaler that patient apparently prefers over Trelegy  --Follow up with Dr Juarez in 1 month    Cellulitis  --Patient vague about whether worse than usual but L leg with some erythema in the setting of stasis dermatitis and lymphedema  --Prescribed 5 days Keflex in addition to doxycycline  --Patient needs to resume her home compression (she was not utilizing while here)     Discharge Medications with Med changes:     Current Discharge Medication List      START taking these medications    Details   cephALEXin (KEFLEX) 500 MG capsule Take 1 capsule (500 mg) by mouth 3 times daily for 5 days  Qty: 15 capsule, Refills: 0    Associated Diagnoses: Cellulitis of left lower extremity      doxycycline hyclate (VIBRAMYCIN) 100 MG capsule Take 1 capsule (100 mg) by mouth 2 times daily for 3 days  Qty: 6 capsule, Refills: 0    Associated Diagnoses: COPD with acute exacerbation (H)      ipratropium - albuterol 0.5 mg/2.5 mg/3 mL (DUONEB) 0.5-2.5 (3) MG/3ML neb solution Take 1 vial (3 mLs) by nebulization 4 times daily Once breathing is better, then change to every 6 hours as needed for shortness of  "breath  Qty: 90 mL, Refills: 0    Associated Diagnoses: COPD with acute exacerbation (H)         CONTINUE these medications which have CHANGED    Details   albuterol (PROVENTIL) (2.5 MG/3ML) 0.083% neb solution Take 1 vial (2.5 mg) by nebulization every 2 hours as needed for shortness of breath / dyspnea or wheezing  Qty: 600 mL, Refills: 3    Associated Diagnoses: Essential hypertension      Budeson-Glycopyrrol-Formoterol (BREZTRI AEROSPHERE) 160-9-4.8 MCG/ACT AERO Inhale 2 puffs into the lungs 2 times daily  Qty: 17.7 g, Refills: 3    Associated Diagnoses: Bronchiectasis with acute exacerbation (H)      furosemide (LASIX) 20 MG tablet Take 2 tablets (40 mg) by mouth every other day Takes \"2 tabs three days each week and on one day on the weekend\" (every other day)    Associated Diagnoses: Essential hypertension      predniSONE (DELTASONE) 10 MG tablet Take 4 tablets (40 mg) by mouth daily for 3 days, THEN 3 tablets (30 mg) daily for 3 days, THEN 2 tablets (20 mg) daily for 3 days, THEN 1 tablet (10 mg) daily for 3 days. Then stop  Qty: 30 tablet, Refills: 0    Associated Diagnoses: COPD with acute exacerbation (H)         CONTINUE these medications which have NOT CHANGED    Details   acetaminophen (TYLENOL) 500 MG tablet Take 500-1,000 mg by mouth every 6 hours as needed for mild pain       albuterol (PROAIR HFA/PROVENTIL HFA/VENTOLIN HFA) 108 (90 Base) MCG/ACT inhaler Inhale 2 puffs into the lungs every 4 hours as needed for shortness of breath / dyspnea or wheezing  Qty: 18 g, Refills: 11    Comments: Pharmacy may dispense brand covered by insurance (Proair, or proventil or ventolin or generic albuterol inhaler)  Associated Diagnoses: Bronchiectasis (H)      ARIPiprazole (ABILIFY) 5 MG tablet TAKE 1 TABLET AT BEDTIME  Qty: 90 tablet, Refills: 3    Associated Diagnoses: Essential hypertension      atorvastatin (LIPITOR) 20 MG tablet TAKE 1 TABLET AT BEDTIME  Qty: 90 tablet, Refills: 3    Associated Diagnoses: " Essential hypertension      betamethasone dipropionate (DIPROSONE) 0.05 % external ointment Apply topically daily Use with Eucerin      calcium carbonate 600 mg-vitamin D 400 units (CALTRATE) 600-400 MG-UNIT per tablet Take 1 tablet by mouth every morning       cyanocobalamin (VITAMIN B-12) 500 MCG tablet Take 500 mcg by mouth every morning       diltiazem ER (DILT-XR) 120 MG 24 hr capsule Take 1 capsule (120 mg) by mouth every morning  Qty: 90 capsule, Refills: 1    Associated Diagnoses: Essential hypertension      gabapentin (NEURONTIN) 300 MG capsule Take 600 mg by mouth every evening       metFORMIN (GLUCOPHAGE) 500 MG tablet Take 1 tablet (500 mg) by mouth 2 times daily (with meals)  Qty: 60 tablet, Refills: 11    Associated Diagnoses: Essential hypertension      omeprazole (PRILOSEC) 20 MG DR capsule TAKE 1 CAPSULE DAILY  Qty: 90 capsule, Refills: 3    Associated Diagnoses: Essential hypertension      OXYGEN-HELIUM IN 3 LPM when resting and at night and 5 LPM with activity  [employer]      sodium chloride (NEBUSAL) 3 % neb solution Take 3 mLs by nebulization every 3 hours as needed for wheezing      spironolactone (ALDACTONE) 25 MG tablet TAKE ONE-HALF (1/2) TABLET DAILY  Qty: 45 tablet, Refills: 3    Associated Diagnoses: Essential hypertension      Vitamin D3 (VITAMIN D3) 25 mcg (1000 units) tablet Take 1 capsule by mouth every morning       XARELTO ANTICOAGULANT 20 MG TABS tablet TAKE 1 TABLET DAILY WITH DINNER  Qty: 90 tablet, Refills: 1    Associated Diagnoses: Atrial fibrillation with RVR (H)         STOP taking these medications       Doxycycline Hyclate 100 MG TBEC Comments:   Reason for Stopping:         Fluticasone-Umeclidin-Vilanterol (TRELEGY ELLIPTA) 100-62.5-25 MCG/INH oral inhaler Comments:   Reason for Stopping:                 Consults     DIABETES EDUCATION IP CONSULT  IP RESPIRATORY CARE CHRONIC PULMONARY DISEASE SPECIALIST  SOCIAL WORK IP CONSULT  IP RESPIRATORY CARE CHRONIC PULMONARY DISEASE  SPECIALIST    SIGNIFICANT IMAGING FINDINGS     No results found for this visit on 03/23/22.    SIGNIFICANT LABORATORY FINDINGS     See emr    Discharge Orders        Reason for your hospital stay    COPD exacerbation     Follow-up and recommended labs and tests    Follow up with primary care provider, Shai Ellington, within 7 days for hospital follow- up.   Follow up with Dr Juarez in 1-2 months.     Activity    Your activity upon discharge: activity as tolerated     When to contact your care team    Call your primary doctor if you have any of the following: chest pain, shortness of breath, fever, chills, fainting, dizziness, or vomiting, or any other symptoms that are new or concerning to you.     Nebulizer and Supplies Order    Nebulizer/Supplies Documentation:   The patient was seen 03/25/2022. After assessment, the patient will need to be treated with ongoing nebulizer for treatment/management of COPD.    I, the undersigned, certify that the above prescribed supplies are medically necessary for this patient and is both reasonable and necessary in reference to accepted standards of medical and necessary in reference to accepted standards of medical practice in the treatment of this patient's condition and is not prescribed as a convenience.     Diet    Follow this diet upon discharge: Resume your regular diet       Examination   Physical Exam   Temp:  [97.4  F (36.3  C)-97.8  F (36.6  C)] 97.7  F (36.5  C)  Pulse:  [80-95] 95  Resp:  [16-20] 18  BP: (109-126)/(57-73) 122/73  SpO2:  [93 %-97 %] 93 %  Wt Readings from Last 1 Encounters:   03/23/22 63 kg (139 lb)       General: in no apparent distress, non-toxic and alert female sitting in bedside chair oriented x3  HEENT: Head normocephalic atraumatic, oral mucosa moist. Sclerae anicteric  Resp: coarse expiratory wheezes throughout, good air movement, no focal consolidations, able to speak in full sentences without apparent shortness of breath, states she feels  improved and wants to go home  Skin: No rashes or lesions  Extremities: Trace ankle edema bilaterally  Psych: Normal affect, mood euthymic  Neuro: CNII-XII grossly intact, moving all 4 extremities    Please see EMR for more detailed significant labs, imaging, consultant notes etc.    I, Aurora Bustillo MD, personally saw the patient today and spent greater than 30 minutes discharging this patient.    [place]or[place] Bustillo MD  [facility]    CC:Shai Ellington

## 2022-03-25 NOTE — PLAN OF CARE
Pt reports breathing is comfortable while sitting in chair.  She is using O2 at 3 LPM.  She denies any dyspnea with mild activity ie bed to chair and toileting.  Cough is infrequent and improved after neb today but no sputum production.  She has chronic LE edema with pink lymphedema of the right LE from ankle to knee.      Problem: Activity Intolerance (Pulmonary Impairment)  Goal: Improved Activity Tolerance  Outcome: Ongoing, Progressing     Problem: Airway Clearance Ineffective (Pulmonary Impairment)  Goal: Effective Airway Clearance  Outcome: Ongoing, Progressing     Problem: Gas Exchange Impaired (Pulmonary Impairment)  Goal: Optimal Gas Exchange  Outcome: Ongoing, Progressing   Goal Outcome Evaluation:

## 2022-03-25 NOTE — CONSULTS
"Care Management Initial Consult    General Information  Assessment completed with: PatientAdalgisa   Type of CM/SW Visit: Initial Assessment    Primary Care Provider verified and updated as needed: Yes   Readmission within the last 30 days: other (see comments) (no, no readmission to hospital - ER visits, but no readmit!)         Advance Care Planning: Advance Care Planning Reviewed: other (see comments) (pt has HCD but not on chart )          Communication Assessment  Patient's communication style: spoken language (English or Bilingual)    Hearing Difficulty or Deaf: yes   Wear Glasses or Blind: no    Cognitive  Cognitive/Neuro/Behavioral: WDL                      Living Environment:   People in home: spouse  Kim and Les, spouse  Current living Arrangements: house      Able to return to prior arrangements: yes       Family/Social Support:  Care provided by: self, other (see comments) (with assist from spouse as needed)  Provides care for: no one  Marital Status:   , Children, Other (specify) (grandchildren)  Les       Description of Support System: Supportive, Involved    Support Assessment: Adequate family and caregiver support    Current Resources:   Patient receiving home care services: No     Community Resources: None  Equipment currently used at home: walker, rolling, cane, straight, other (see comments) (\"oxygenator\" in home, portable concentrator)  Supplies currently used at home: Oxygen Tubing/Supplies    Employment/Financial:  Employment Status:          Financial Concerns:             Lifestyle & Psychosocial Needs:  Social Determinants of Health     Tobacco Use: Medium Risk     Smoking Tobacco Use: Former Smoker     Smokeless Tobacco Use: Never Used   Alcohol Use: Not on file   Financial Resource Strain: Not on file   Food Insecurity: Not on file   Transportation Needs: Not on file   Physical Activity: Not on file   Stress: Not on file   Social Connections: Not on file   Intimate Partner " "Violence: Not on file   Depression: Not at risk     PHQ-2 Score: 0   Housing Stability: Not on file       Functional Status:  Prior to admission patient needed assistance:              Mental Health Status:          Chemical Dependency Status:                Values/Beliefs:  Spiritual, Cultural Beliefs, Mormon Practices, Values that affect care:                 Additional Information:    BARRY met with pt to complete assessment and discuss discharge planning.  Pt having issues with her home oxygen.  She stated that she was given a new \"oxygenator\" about four to five months ago.  She reported that shortly after she got it they turned it off when they left for awhile.  When they put it back on, an alarm came on.  She ended up just hooking back up to the old unit that [facility] never picked up.  She has been getting subpar oxygenation ever since.  She stated that she called [facility] and was told she just needed to replace the tubing (they provide extra tubing) but she did not know that she actually got extra tubing and does not know how to put on/needs assistance with this.  Pt reported that she also had a smaller portable concentrator that threw a code saying to repair right away.  [facility]  While waiting for this was given tanks that she could not carry.  They just found out today that a new portable concentrator came in for her and spouse will be picking up from [facility] today.  Pt also asking about order for nebulizer from doctor and if she can rent or needs to buy and if can run through Medicare.  BARRY will call and speak with [facility] about all of this and then follow up with pt.      BARRY spoke with Mona at [facility].  Discussed issues that pt had been having at home with her oxygen and equipment.  Mona stated that pt's spouse not there yet to  new concentrator.  She can give him extra tubing when he arrives.  If home unit still not working, pt and spouse should call in and they " will troubleshoot.  They would send someone out in person if no luck after troubleshooting.  Discussed order that doctor wrote for pt for new nebulizer.  Mona requested that SW fax it to her and they will run through pt's insurance and then call pt back regarding coverage.      BARRY met with pt again and updated her regarding that [facility] will provide new tubing to spouse for home unit when spouse arrives to  portable concentrator.  SW will fax order for nebulizer to [facility] and they will send to pt's insurance and call her back regarding coverage.      SW faxed order for nebulizer to Mona at [facility].      BARRY updated pt's nurse Kendy.        CANDELARIO Lorenzo, MECHE 03/25/22 6:31 PM            Care Management Discharge Note    Discharge Date: 03/25/2022      Discharge Disposition: Home Care    Discharge Services: Other (see comment) (home oxygen )    Discharge DME: Oxygen, Other (see comment) (script for new nebulizer (faxed to [facility] from hospital))    Discharge Transportation: family or friend will provide    Private pay costs discussed: Not applicable    PAS Confirmation Code:  (N/A)    Patient/family educated on Medicare website which has current facility and service quality ratings: other (see comments) (N/A)    Education Provided on the Discharge Plan:  Yes    Persons Notified of Discharge Plans: patient     Patient/Family in Agreement with the Plan: yes    Handoff Referral Completed: yes    Additional Information: Pt to discharge home with continued home oxygen provided through [facility].  BARRY spoke with [facility] regarding pt's oxygen and oxygen equipment issues - followed up w/pt regarding feedback from [facility].  Pt has new script for nebulizer - BARRY faxed to [facility] for pt - they will run through pt's insurance and then call pt back regarding coverage.  Spouse to transport.       CANDELARIO Lorenzo LGSW 03/25/22 6:32 PM

## 2022-03-25 NOTE — PROGRESS NOTES
Respiratory care note:    Pt refused respiratory therapy this morning. She is anticipating discharge this afternoon and says she'll do her respiratory treatments at home.     Jewels Diaz, RT

## 2022-03-25 NOTE — PLAN OF CARE
Problem: Risk for Delirium  Goal: Improved Sleep  Outcome: Ongoing, Progressing   Goal Outcome Evaluation: Patient slept well     Problem: Gas Exchange Impaired (Pulmonary Impairment)  Goal: Optimal Gas Exchange  Outcome: Ongoing, Progressing  VSS on 3L oxygen. Baseline=2L    Patient LLE is swollen and red, a little warm. Hx of lymphedema and cellulitis. House officer updated=no new orders. Sticky note left for day hospitalist.

## 2022-03-25 NOTE — PLAN OF CARE
Problem: Plan of Care - These are the overarching goals to be used throughout the patient stay.    Goal: Absence of Hospital-Acquired Illness or Injury  Intervention: Identify and Manage Fall Risk  Recent Flowsheet Documentation  Taken 3/25/2022 0805 by Kendy Everett RN  Safety Promotion/Fall Prevention: activity supervised     Problem: Plan of Care - These are the overarching goals to be used throughout the patient stay.    Goal: Absence of Hospital-Acquired Illness or Injury  Intervention: Prevent and Manage VTE (Venous Thromboembolism) Risk  Recent Flowsheet Documentation  Taken 3/25/2022 0805 by Kendy Everett RN  Activity Management: ambulated to bathroom     Problem: Plan of Care - These are the overarching goals to be used throughout the patient stay.    Goal: Readiness for Transition of Care-able to discharge to home  Outcome: Ongoing, Progressing     Problem: Activity Intolerance (Pulmonary Impairment)  Goal: Improved Activity Tolerance  Outcome: Ongoing, Progressing   Goal Outcome Evaluation:up using cane to go home    Plan of Care Reviewed With: patient

## 2022-03-27 NOTE — PROGRESS NOTES
Clinic Care Coordination Contact  [facility]: Post-Discharge Note  SITUATION                                                      Admission:    Admission Date: 03/23/22   Reason for Admission: Type 2 diabetes mellitus without complication  Discharge:   Discharge Date: 03/25/22  Discharge Diagnosis: Type 2 diabetes mellitus without complication    BACKGROUND                                                      Per hospital discharge summary and inpatient provider notes:    Adalgisa Solano is a 86 year old female with PMHx of chronic respiratory failure, bronchiectasis, COPD, PAD, type 2 NIDDM, hypertension, pulmonary hypertension, chronic atrial fibrillation, chronically anticoagulated (Xarelto), chronic diastolic CHF, GERD and hyperlipidemia.  Patient initially presented to her pulmonologist, Dr. Juarez's office with increasing shortness of breath, cough, unable to clear secretions despite using vest at home twice daily.  She has not been able to use saline nebs since not available nationally, has only been using albuterol nebs.  Reports no fever/chills, chest pain, hemoptysis or any GI/ symptoms.  She is on a prescribed plan of treatment with her care Pulmonologist including daily steroids and recently started oral doxycycline 3/22, and since not improving with it, advised ED evaluation.  In addition, her Lasix dose was decreased recently due to hyponatremia.  ED work-up: Normal vitals, afebrile.  O2 sats 95% on 3 L.  Lab work: Sodium 133, potassium 4.2, creatinine 0.59.  , troponin <0.01.  WBC 11,300, hemoglobin 12.5.  CT chest shows:  multiple irregular shaped nodular and peribronchiolar opacities in the right upper lobe, cylindrical bronchiectasis in both lower lobes with new areas of retained secretions in lower lobes L>R, bullous emphysema..  She was given DuoNeb and IV Solu-Medrol 135 mg x 1 including Lasix 40 mg IV x1.  She feels clinically better.  Admitted for further treatment for  COPD exacerbation       ASSESSMENT      Enrollment  Primary Care Care Coordination Status: Declined    Discharge Assessment  How are you doing now that you are home?: Patient reports she is doing well.  No questions  How are your symptoms? (Red Flag symptoms escalate to triage hotline per guidelines): Improved  Do you feel your condition is stable enough to be safe at home until your provider visit?: Yes  Does the patient have their discharge instructions? : Yes  Does the patient have questions regarding their discharge instructions? : No  Were you started on any new medications or were there changes to any of your previous medications? : Yes  Does the patient have all of their medications?: Yes  Do you have questions regarding any of your medications? : No  Do you have all of your needed medical supplies or equipment (DME)?  (i.e. oxygen tank, CPAP, cane, etc.): Yes  Discharge follow-up appointment scheduled within 14 calendar days? : Yes  Discharge Follow Up Appointment Date: 03/31/22  Discharge Follow Up Appointment Scheduled with?: Primary Care Provider                  PLAN                                                      Outpatient Plan:     Follow-up Appointments     Follow-up and recommended labs and tests      Follow up with primary care provider, Shai Ellington, within 7 days   for hospital follow- up.   Follow up with Dr Juarez in 1-2 months.            Future Appointments   Date Time Provider Department Center   3/31/2022  5:00 PM [facility]   5/23/2022  3:40 PM [facility]         For any urgent concerns, please contact our 24 hour nurse triage line: [phone] ([phone])         DEEPTHI Gonzales  [phone]  [facility]

## 2022-03-31 NOTE — PROGRESS NOTES
Adalgisa Solano is a 86 year oldwho is being evaluated via a billable telephone visit.       What phone number would you like to be contacted at? [phone]      Assessment & Plan  COPD with exacerbation recent hospitalization March 23-25 feels better now breathing easier.  Diagnosis dyspnea COPD with acute exacerbation.    Cephalexin currently on board for recurrent cellulitis left lower extremity.  Wants refill of same done.  Cephalexin 500 mg 3 times a day for an additional 5 days.     11 minutes spent on the date of the encounter doing chart review, patient visit and documentation        Subjective   History as above hospital stay March 23-25 for shortness of breath related to underlying diagnosis of COPD with acute exacerbation.    Cellulitis left lower extremity on cephalexin last 3 pills are left.  Refill same as the weekend is coming up.    140 pounds is the weight.     Review of Systems   No blood in stool or urine no med problems no problems with current medication list meds are reconciled.       Shai Ellington MD

## 2022-03-31 NOTE — PROGRESS NOTES
"Kim is a 86 year old who is being evaluated via a billable telephone visit.      What phone number would you like to be contacted at? [phone]  How would you like to obtain your AVS? MyChart    {PROVIDER CHARTING PREFERENCE:429202}    Subjective   Kim is a 86 year old who presents for the following health issues {ACCOMPANIED BY STATEMENT (Optional):778992}    HPI     {SUPERLIST (Optional):492261}  {additonal problems for provider to add (Optional):148172}    Review of Systems   {ROS COMP (Optional):033016}      Objective           Vitals:  No vitals were obtained today due to virtual visit.    Physical Exam   {GENERAL APPEARANCE:50::\"healthy\",\"alert\",\"no distress\"}  PSYCH: Alert and oriented times 3; coherent speech, normal   rate and volume, able to articulate logical thoughts, able   to abstract reason, no tangential thoughts, no hallucinations   or delusions  Her affect is { :1642391::\"normal\"}  RESP: No cough, no audible wheezing, able to talk in full sentences  Remainder of exam unable to be completed due to telephone visits    {Diagnostic Test Results (Optional):891020}    {AMBULATORY ATTESTATION (Optional):708457}        Phone call duration: *** minutes  "

## 2022-04-07 NOTE — TELEPHONE ENCOUNTER
Routing refill request to provider for review/approval because:  Drug not on the G refill protocol     Last Written Prescription Date:  3/31/22  Last Fill Quantity: 15,  # refills: 0   Last office visit provider:  3/31/22     Requested Prescriptions   Pending Prescriptions Disp Refills     cephALEXin (KEFLEX) 500 MG capsule [Pharmacy Med Name: CEPHALEXIN 500MG] 15 capsule 0     Sig: TAKE 1 CAPSULE (500 MG) BY MOUTH 3 TIMES DAILY       There is no refill protocol information for this order          Rob Cm RN 04/07/22 10:29 AM

## 2022-04-11 NOTE — TELEPHONE ENCOUNTER
"Phone message from laith. States that she was recently hospitalized with COPD exacerbation.   Now having some of same symptoms again.  Her \"lungs and throat are dry\" and is coughing up some greenish phlegm. No fever         Will send Rx for her action plan:  Prednisone 20mg daily x 5 days and then 10mg daily x 5 days AND doxycycline x 5 days    Was not able to reach patient back in person.  Left detailed VM message with my phone number to call back if any questions.      "

## 2022-04-13 PROBLEM — E83.42 HYPOMAGNESEMIA: Status: ACTIVE | Noted: 2020-02-11
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
## 2022-04-13 PROBLEM — J18.9 HOSPITAL-ACQUIRED PNEUMONIA: Status: ACTIVE | Noted: 2022-01-01

## 2022-04-13 NOTE — ED PROVIDER NOTES
EMERGENCY DEPARTMENT ENCOUNTER      NAME: Adalgisa Solano  AGE: 86 year old female  YOB: 1935  MRN: [id number]  EVALUATION DATE & TIME: 4/13/2022 11:22 AM    PCP: Shai Ellington    ED PROVIDER: Alondra Bravo M.D.      CHIEF COMPLAINT     Chief Complaint   Patient presents with     Shortness of Breath     Cough         FINAL IMPRESSION:     1. Hospital-acquired pneumonia          MEDICAL DECISION MAKING:       Pertinent Labs & Imaging studies reviewed. (See chart for details)    86 year old female presents to the Emergency Department for evaluation of sob    Known history of COPD on home oxygen at 5 L  Portable machine only goes to 3 L  Arrived hypoxic on 3 L  Recently discharged for pneumonia copd and chf    Called her Pulmologist yesterday and had rx for 20 Prednisone and doxycycline she took it this morning  Patient reports some improvement after discharge but feeling sob again    On exam non toxic  In respiratory distress  Decreased BS B no wheezes    Placed on cardiac pulse ox and bp monitors  Given albuterol  o2 via NC 5 L toleratign well keeping O2 sats at 92%  Mild elevation of BNP  No pulmonary edema on CXR  CXR concerning for bronchopneumonia  Given recent hospital admission will treat for HAP  Allergies to PCN given Cefepime    Admitted to hospitalist in stable condition with guarded diagnosis    Clinical Impression and Decision making  End stage COPD on 5 L oxygen  Recently discharged  Now with same sx as before  In distress without oxygen (home portable cannister only 3l)  Tolerating oxygen via NC  Treated for HAP  Took 20 mg prednisone today  No evidence of exacerbation of heart failure  Chronic RLE lymphedema  No cellulitis    Differential Diagnosis Include but not Limited to  COPD pneumonia PE ptx chf PE anemia sepsis among others    Vital Signs: Hypoxic  EKG: Irregular  Imaging: Chest x-ray increased inflammation consolidation  Home Meds: Reviewed  ED meds/abx:  Cefepime  Fluids: TKO    Labs  K 4.1  Cr 0.64  Wbc 11.7  Hgb 13.8  platelets    Na 126      Review of Previous Records    Per chart review, patient was seen at [facility] ED on 3/23/22-3/25/22 for dyspnea.     Acute on chronic respiratory failure due to bronchiectasis and COPD exacerbation  --Had started her Action Plan a few days before without benefit  --CT imaging shows retained secretions  --BNP of 159 no evidence of CHF exacerbation  --Here was treated with prednisone 40mg daily, doxycycline she was taking at home was continued, and given vest treatments and Duonebs. She is feeling much better today  --Patient unable to get hypertonic saline nebs filled at pharmacy due to drug shortage issues  --Patient has also been organizing raad books in her home. Encouraged to wear mask during this activity  --Patient had concerns about her O2 concentrators working well. She perceived that O2 flow in hospital much more perceptible than at home despite flow number the same. Asked Care Management to check on this for her.  --Patient was prescribed a new Neb machine as hers was quite old. She was also prescribed Duonebs which she found helpful here.  --Continue prednisone and do a short taper. Continue doxycycline for 3 more days.  --Sent Rx for Breztri inhaler that patient apparently prefers over Trelegy  --Follow up with Dr Juarez in 1 month     Cellulitis  --Patient vague about whether worse than usual but L leg with some erythema in the setting of stasis dermatitis and lymphedema  --Prescribed 5 days Keflex in addition to doxycycline  --Patient needs to resume her home compression (she was not utilizing while here)     Consults    Hospitalist, Dr. Huang       ED COURSE     11:26 PM I met with the patient, obtained history, performed an initial exam, and discussed options and plan for diagnostics and treatment here in the ED.    12:48 PM reevaluated and updated.    1:26 PM I spoke with the hospitalist,  Dr. Huang who agrees to admit the patient to [facility].     2:24 PM reevaluated and updated  All questions answered at time of dictation patient awaiting inpatient bed    At the conclusion of the encounter I discussed the results of all of the tests and the disposition. The questions were answered. The patient and  acknowledged understanding and was agreeable with the care plan.       30 minutes of critical care time     MEDICATIONS GIVEN IN THE EMERGENCY:     Medications   acetaminophen (TYLENOL) tablet 500-1,000 mg (has no administration in time range)   albuterol (PROVENTIL HFA/VENTOLIN HFA) inhaler (has no administration in time range)   albuterol (PROVENTIL) neb solution 2.5 mg (has no administration in time range)   ARIPiprazole (ABILIFY) tablet 5 mg (has no administration in time range)   atorvastatin (LIPITOR) tablet 20 mg (has no administration in time range)   Budeson-Glycopyrrol-Formoterol 160-9-4.8 MCG/ACT AERO 2 puff (has no administration in time range)   diltiazem ER (DILT-XR) 24 hr capsule 120 mg (has no administration in time range)   gabapentin (NEURONTIN) capsule 600 mg (has no administration in time range)   ipratropium - albuterol 0.5 mg/2.5 mg/3 mL (DUONEB) neb solution 3 mL (has no administration in time range)   omeprazole (priLOSEC) CR capsule 20 mg (has no administration in time range)   spironolactone (ALDACTONE) half-tab 12.5 mg (has no administration in time range)   rivaroxaban ANTICOAGULANT (XARELTO) tablet 20 mg (has no administration in time range)   lidocaine 1 % 0.1-1 mL (has no administration in time range)   lidocaine (LMX4) cream (has no administration in time range)   sodium chloride (PF) 0.9% PF flush 3 mL (has no administration in time range)   sodium chloride (PF) 0.9% PF flush 3 mL (has no administration in time range)   melatonin tablet 1 mg (has no administration in time range)   glucose gel 15-30 g (has no administration in time range)     Or   dextrose 50 %  injection 25-50 mL (has no administration in time range)     Or   glucagon injection 1 mg (has no administration in time range)   Patient is already receiving anticoagulation with heparin, enoxaparin (LOVENOX), warfarin (COUMADIN)  or other anticoagulant medication (has no administration in time range)   ondansetron (ZOFRAN-ODT) ODT tab 4 mg (has no administration in time range)     Or   ondansetron (ZOFRAN) injection 4 mg (has no administration in time range)   ceFEPIme (MAXIPIME) 2 g vial to attach to  ml bag for ADULTS or 50 ml bag for PEDS (has no administration in time range)   doxycycline (VIBRAMYCIN) 100 mg vial to attach to  mL bag (has no administration in time range)   insulin aspart (NovoLOG) injection (RAPID ACTING) (has no administration in time range)   insulin aspart (NovoLOG) injection (RAPID ACTING) (has no administration in time range)   furosemide (LASIX) injection 40 mg (has no administration in time range)   predniSONE (DELTASONE) tablet 40 mg (has no administration in time range)   ipratropium - albuterol 0.5 mg/2.5 mg/3 mL (DUONEB) neb solution 3 mL (3 mLs Nebulization Given 4/13/22 1210)   ceFEPIme (MAXIPIME) 2 g vial to attach to  ml bag for ADULTS or 50 ml bag for PEDS (0 g Intravenous Stopped 4/13/22 1442)       NEW PRESCRIPTIONS STARTED AT TODAY'S ER VISIT     New Prescriptions    No medications on file          =================================================================    HPI     Patient information was obtained from: Patient and nurse    Use of : N/A         Adalgisa Solano is a 86 year old female who presents by private car.     Per nurse, she had a 69% oxygen sat on arrival. She is normally on 5L of oxygen at home but was on 3L when she got here. She is now on 6L.     Per patient, she has been feeling shortness of breath and cough for the past 3 weeks. She was recently at [facility] last week but was discharged and went home on Friday. She felt  better when she first got home, but felt sick again shortly after going home. Her left leg is usually bigger than her right due to swelling.     She endorses chills, shortness of breath, and cough. She denies any blood in her cough. She denies any vomiting, diarrhea, fever, abdominal pain, chest pain, or history of cancer. She has not had any recent travel. She is vaccinated against COVID.     REVIEW OF SYSTEMS   Review of Systems   Constitutional: Positive for chills. Negative for fever.   Respiratory: Positive for cough and shortness of breath.    Cardiovascular: Negative for chest pain.   Gastrointestinal: Negative for abdominal pain, diarrhea and vomiting.   All other systems reviewed and are negative.       PAST MEDICAL HISTORY:     Past Medical History:   Diagnosis Date     Acute and chronic respiratory failure with hypoxia (H)      Acute blood loss anemia 1/15/2019     Acute bronchitis 1/15/2019     Anemia     pernicious anemia     Anticoagulated 3/23/2022     Anxiety      Arm skin lesion, right      Arnold-Chiari malformation (H) 1998     Arthritis      Atrial fibrillation (H) 02/2017     Avascular necrosis of bone (H)      Breast cyst      Breast lump      Candidal skin infection      Cellulitis of left lower extremity 1/28/2019     CHF (congestive heart failure) (H)     diastolic and systolic     Chronic bronchitis (H)     & acute     Chronic diastolic heart failure (H)     diastolic chf     Chronic pain syndrome      Chronic respiratory failure with hypoxia (H) 3/13/2017     COPD (chronic obstructive pulmonary disease) (H)      COPD exacerbation (H)      Depression      Diet-controlled type 2 diabetes mellitus (H)      Drug induced constipation      DVT of axillary vein, acute (H)     left     DVT of axillary vein, acute left (H)      Edema      Encounter for palliative care      Erysipelas      Fracture of femoral neck, left (H)      Generalized muscle weakness      GERD (gastroesophageal reflux disease)       History of blood clots      Hyperlipidemia      Hypertension      Left hip pain      Lumbar spinal stenosis      PAD (peripheral artery disease) (H)      Peripheral arterial disease (H) 10/28/2015     Psoriasis     arms      Pulmonary hypertension (H) 3/5/2018     Recurrent major depressive episodes (H)     Created by Conversion  Replacement Utility updated for latest IMO load     Schizoaffective disorder, bipolar type (H) 2015     Slow transit constipation      Stasis dermatitis of both legs      Status post total hip replacement, 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     Type 2 diabetes mellitus without complication (H) 2016     Vitamin B12 deficiency      Volume overload        PAST SURGICAL HISTORY:     Past Surgical History:   Procedure Laterality Date     BACK SURGERY       BREAST CYST ASPIRATION Left      CERVICAL SPINE SURGERY      for arnold chiari malformation      SECTION  X3     CHOLECYSTECTOMY       COLONOSCOPY       EXCISE LESION UPPER EXTREMITY Right 2019    Procedure: EXCISION RIGHT ARM LIPOMA;  Surgeon: Andreas Holly MD;  Location: [facility];  Service: General     EYE SURGERY      bilateral cataracts     HAND SURGERY       HERNIORRHAPHY UMBILICAL N/A 2021    Procedure: INCARCERATED UMBILICAL AND;  Surgeon: Andreas Holly MD;  Location: [facility]     JOINT REPLACEMENT Right     knee - partial     LAPAROSCOPIC HERNIORRHAPHY INCISIONAL Left 3/27/2015    Procedure: ATTEMPTED LAPAROSCOPIC ABDOMINA/FLANK INCISION REPAIR;  Surgeon: Jose Lakhani MD;  Location: [facility];  Service:      LUMBAR FUSION N/A 2014    Procedure: POSTERIOR FUSION / DECOMPRESSION L3-S1 WITH PELVIC FIXATION BILATERAL ;  Surgeon: Rajan Mares MD;  Location: [facility];  Service:      OTHER SURGICAL HISTORY      IR for PAD LOWER EXTREMITY     REPAIR SPIGELIAN HERNIA N/A 2021    Procedure: SPIGELIAN HERNIA REPAIR;  Surgeon: Andreas Holly MD;  Location: [facility]  Main OR     US ASPIRATION HEMATOMA SEROMA OR FLUID COLLECTION  2/6/2020     [facility] OPEN FIXATN PROX END/NECK FEMUR FX Left 1/2/2019    Procedure: OPEN REDUCTION INTERNAL FIXATION, FRACTURE LEFT HIP, PERIPROSTHETIC FRACTURE;  Surgeon: Kevin Lackey MD;  Location: [facility] Main OR;  Service: Orthopedics     [facility] TOTAL HIP ARTHROPLASTY Left 1/2/2019    Procedure: LEFT TOTAL HIP ARTHROPLASTY;  Surgeon: Kevin Lackey MD;  Location: [facility] Main OR;  Service: Orthopedics     [facility] TOTAL HIP ARTHROPLASTY Right 9/16/2019    Procedure: RIGHT TOTAL HIP ARTHROPLASTY;  Surgeon: Kan Quesada MD;  Location: [facility] Main OR;  Service: Orthopedics     [facility] TOTAL KNEE ARTHROPLASTY Left 10/7/2016    Procedure: TOTAL KNEE ARTHROPLASTY, LEFT;  Surgeon: Kan Quesada MD;  Location: [facility] OR;  Service: Orthopedics         CURRENT MEDICATIONS:   acetaminophen (TYLENOL) 500 MG tablet  albuterol (PROAIR HFA/PROVENTIL HFA/VENTOLIN HFA) 108 (90 Base) MCG/ACT inhaler  albuterol (PROVENTIL) (2.5 MG/3ML) 0.083% neb solution  ARIPiprazole (ABILIFY) 5 MG tablet  atorvastatin (LIPITOR) 20 MG tablet  Budeson-Glycopyrrol-Formoterol (BREZTRI AEROSPHERE) 160-9-4.8 MCG/ACT AERO  calcium carbonate 600 mg-vitamin D 400 units (CALTRATE) 600-400 MG-UNIT per tablet  cephALEXin (KEFLEX) 500 MG capsule  cyanocobalamin (VITAMIN B-12) 500 MCG tablet  diltiazem ER (DILT-XR) 120 MG 24 hr capsule  doxycycline hyclate (VIBRAMYCIN) 100 MG capsule  furosemide (LASIX) 20 MG tablet  gabapentin (NEURONTIN) 300 MG capsule  ipratropium - albuterol 0.5 mg/2.5 mg/3 mL (DUONEB) 0.5-2.5 (3) MG/3ML neb solution  metFORMIN (GLUCOPHAGE) 500 MG tablet  omeprazole (PRILOSEC) 20 MG DR capsule  predniSONE (DELTASONE) 10 MG tablet  sodium chloride (NEBUSAL) 3 % neb solution  spironolactone (ALDACTONE) 25 MG tablet  Vitamin D3 (CHOLECALCIFEROL) 25 mcg (1000 units) tablet  XARELTO ANTICOAGULANT 20 MG TABS tablet  OXYGEN-HELIUM IN         ALLERGIES:     Allergies  "  Allergen Reactions     Tramadol Nausea     Amoxicillin Itching and Rash     Levofloxacin Itching and Rash     Penicillins Itching and Rash     Has tolerated ceftriaxone.       FAMILY HISTORY:     Family History   Problem Relation Age of Onset     Coronary Artery Disease Mother      Coronary Artery Disease Father        SOCIAL HISTORY:     Social History     Socioeconomic History     Marital status:    Tobacco Use     Smoking status: Former Smoker     Quit date: 2006     Years since quittin.2     Smokeless tobacco: Never Used     Tobacco comment: quit 2006   Vaping Use     Vaping Use: Never used   Substance and Sexual Activity     Alcohol use: No     Drug use: No   Social History Narrative           VITALS:   /63 (BP Location: Left arm, Patient Position: Semi-'s, Cuff Size: Adult Regular)   Pulse 92   Temp 97.5  F (36.4  C)   Resp 24   Ht 1.549 m (5' 1\")   Wt 64.4 kg (142 lb)   SpO2 91%   BMI 26.83 kg/m      PHYSICAL EXAM     Physical Exam  Vitals and nursing note reviewed.   Constitutional:       General: She is in acute distress.   Pulmonary:      Comments: Decreased breath sounds bilaterally  Musculoskeletal:      Comments: Swelling left lower extremity no palpable cords   Skin:     Comments: Chronic venous stasis changes left lower extremity   Neurological:      General: No focal deficit present.      Mental Status: She is alert and oriented to person, place, and time.         Physical Exam   Constitutional: Elderly.  Appears short of breath.  Speaks in short sentences.  Cooperative and pleasant.    Head: Atraumatic.     Nose: Nose normal.     Mouth/Throat: Oropharynx is clear and moist.     Eyes: EOM are normal. Pupils are equal, round, and reactive to light.     Ears: Lateral pearly white pearly TM.     Neck: Normal range of motion. Neck supple.     Cardiovascular: Normal rate, regular rhythm and normal heart sounds.      Pulmonary/Chest: Tachypneic, decreased breath " sounds bilaterally.    Abdominal: NA    Musculoskeletal: Normal range of motion.     Neurological: No deficit    Lymphatics: Lymph edema 3+ left lower extremity, 1+ right lower extremity.     : NA    Skin: Skin is warm and dry. Surgical scar of BL knee.     Psychiatric: Normal mood and affect. Behavior is normal.       LAB:     All pertinent labs reviewed and interpreted.  Labs Ordered and Resulted from Time of ED Arrival to Time of ED Departure   BASIC METABOLIC PANEL - Abnormal       Result Value    Sodium 126 (*)     Potassium 4.1      Chloride 86 (*)     Carbon Dioxide (CO2) 28      Anion Gap 12      Urea Nitrogen 10      Creatinine 0.64      Calcium 8.6      Glucose 190 (*)     GFR Estimate 87     BLOOD GAS VENOUS - Abnormal    pH Venous 7.28 (*)     pCO2 Venous 70 (*)     pO2 Venous 37      Bicarbonate Venous 27      Base Excess/Deficit (+/-) 6.1      Oxyhemoglobin Venous 64.6 (*)     O2 Sat, Venous 65.9 (*)    B-TYPE NATRIURETIC PEPTIDE ([facility] ONLY) - Abnormal     (*)    CBC WITH PLATELETS AND DIFFERENTIAL - Abnormal    WBC Count 11.7 (*)     RBC Count 4.80      Hemoglobin 13.8      Hematocrit 42.7      MCV 89      MCH 28.8      MCHC 32.3      RDW 15.2 (*)     Platelet Count 237      % Neutrophils 85      % Lymphocytes 4      % Monocytes 10      % Eosinophils 1      % Basophils 0      % Immature Granulocytes 0      NRBCs per 100 WBC 0      Absolute Neutrophils 9.9 (*)     Absolute Lymphocytes 0.5 (*)     Absolute Monocytes 1.1      Absolute Eosinophils 0.2      Absolute Basophils 0.1      Absolute Immature Granulocytes 0.1      Absolute NRBCs 0.0     MAGNESIUM - Abnormal    Magnesium 1.3 (*)    TROPONIN I - Normal    Troponin I <0.01     LACTIC ACID WHOLE BLOOD - Normal    Lactic Acid 1.9     INFLUENZA A/B & SARS-COV2 PCR MULTIPLEX - Normal    Influenza A PCR Negative      Influenza B PCR Negative      SARS CoV2 PCR Negative     POTASSIUM   PROCALCITONIN   GLUCOSE MONITOR NURSING POCT   GLUCOSE  MONITOR NURSING POCT   GLUCOSE MONITOR NURSING POCT   BLOOD CULTURE   BLOOD CULTURE   RESPIRATORY AEROBIC BACTERIAL CULTURE   GRAM STAIN   STREPTOCOCCUS PNEUMONIAE ANTIGEN   RESPIRATORY PANEL PCR - NP SWAB        RADIOLOGY:     Reviewed all pertinent imaging. Please see official radiology report.  XR Chest Port 1 View   Final Result   IMPRESSION: There are 2 new ill-defined opacities in the right base, largest measuring approximately 1.5 cm. These likely reflect reflect areas of retained secretions with bronchopneumonia.      Fine reticular opacities in both lungs and cardiomegaly are unchanged. Bullous emphysema and bronchiectasis is better appreciated on CT exams. No signs of failure.      US Lower Extremity Venous Duplex Bilateral    (Results Pending)        EKG:     EKG #1  Irregular rhythm narrow multiple P waves flutter with variable block.    Time: [id number]    Ventricular rate 99 bmp  Axis normal  DC interval ms  QRS duration 68 ms  QT//492 ms    Compared to previous EKG on 23 March 2022 flutter waves seen.  I have independently reviewed and interpreted the EKG(s) documented above.      PROCEDURES:     Procedures      I, Prasanth Brooks, am serving as a scribe to document services personally performed by Dr. Bravo based on my observation and the provider's statements to me. I, Alondra Bravo MD attest that Prasanth Brooks is acting in a scribe capacity, has observed my performance of the services and has documented them in accordance with my direction.    Alondra Bravo M.D.  Emergency Medicine  [facility] EMERGENCY ROOM  [id number] [place] [id number] [phone]  Dept: [phone]     Alondra Bravo MD  04/13/22 6930

## 2022-04-13 NOTE — CONSULTS
Care Management Initial Consult    General Information  Assessment completed with: Patient, Spouse or significant other,    Type of CM/SW Visit: Initial Assessment  Primary Care Provider verified and updated as needed: Yes   Readmission within the last 30 days: previous discharge plan unsuccessful   Return Category: Progression of disease  Reason for Consult: discharge planning, transportation, health care directive  Advance Care Planning: Advance Care Planning Reviewed: questions answered   will provide a completed copy at his convenience.     Communication Assessment  Patient's communication style: spoken language (English or Bilingual)    Hearing Difficulty or Deaf: no   Wear Glasses or Blind: no    Cognitive  Cognitive/Neuro/Behavioral: WDL                      Living Environment:   People in home: spouse     Current living Arrangements: house      Able to return to prior arrangements: yes     Family/Social Support:  Care provided by: self, spouse/significant other ( assists as needed without issue.)  Provides care for: no one, unable/limited ability to care for self  Marital Status:   , Children, Other (specify) (Friends and family.)  Francis JACINTO       Description of Support System: Supportive, Involved    Support Assessment: Adequate family and caregiver support, Adequate social supports    Current Resources:   Patient receiving home care services: No  Community Resources: None  Equipment currently used at home: walker, rolling, cane, straight, grab bar, tub/shower, grab bar, toilet, shower chair  Supplies currently used at home: Oxygen Tubing/Supplies, Other (Home O2 therapy PRN.)    Employment/Financial:  Employment Status: retired     Financial Concerns: No concerns identified   Referral to Financial Worker: No     Lifestyle & Psychosocial Needs:  Social Determinants of Health     Tobacco Use: Medium Risk     Smoking Tobacco Use: Former Smoker     Smokeless Tobacco Use: Never Used  "  Alcohol Use: Not on file   Financial Resource Strain: Not on file   Food Insecurity: Not on file   Transportation Needs: Not on file   Physical Activity: Not on file   Stress: Not on file   Social Connections: Not on file   Intimate Partner Violence: Not on file   Depression: Not at risk     PHQ-2 Score: 0   Housing Stability: Not on file     Functional Status:  Prior to admission patient needed assistance:   Dependent ADLs:: Ambulation-walker, Bathing, Ambulation-cane  Dependent IADLs:: Cleaning, Cooking, Laundry, Shopping, Transportation  Assessment of Functional Status: Not at  functional baseline    Mental Health Status:  Mental Health Status: No Current Concerns       Chemical Dependency Status:  Chemical Dependency Status: No Current Concerns           Values/Beliefs:  Spiritual, Cultural Beliefs, Islam Practices, Values that affect care: no             Additional Information:  Writer met with pt and her  Francis MICHELEZoran(LEROY) to introduce Care Management service(CM) and obtain an initial assessment. Pt resides in a house with  who assists as needed with I/ADL support. No current in-home services. PRN Home O2 therapy. DME utilized for ambulation and bathing. Willing to receive home care services upon discharge if needed.    4/13 per YASMEEN King -\"86 year old old female with history of COPD with chronic hypoxic respiratory failure requiring 2 to 5 L of nasal cannula at home presents to the hospital complaining of worsening shortness of breath with productive cough.  Patient was hospitalized at [facility] for COPD exacerbation and possible left lower leg cellulitis.  Post discharge she progressively got worse and noted that her oxygen requirements were increasing and was on 5 L nasal cannula the date she presented to the ER.  In the ER she was found to have infiltrates and is admitted.\"     PT, OT and DIAB EDU Consults pending. Anticipate improvement of symptoms and return home via  at " discharge. [employer] utilized previously and would prefer to use again if needed. No immediate discharge barrier related concerns identified. CM to follow pending consults and assist with discharge care coordination as determined.    Prasanna Chambers RN

## 2022-04-13 NOTE — PHARMACY-ADMISSION MEDICATION HISTORY
Pharmacy Note - Admission Medication History    Pertinent Provider Information: none     ______________________________________________________________________    Prior To Admission (PTA) med list completed and updated in EMR.       PTA Med List   Medication Sig Last Dose     acetaminophen (TYLENOL) 500 MG tablet Take 500-1,000 mg by mouth every 6 hours as needed for mild pain Past Week at Unknown time     albuterol (PROAIR HFA/PROVENTIL HFA/VENTOLIN HFA) 108 (90 Base) MCG/ACT inhaler Inhale 2 puffs into the lungs every 4 hours as needed for shortness of breath / dyspnea or wheezing Past Month at Unknown time     albuterol (PROVENTIL) (2.5 MG/3ML) 0.083% neb solution Take 2.5 mg by nebulization every 2 hours as needed for shortness of breath / dyspnea or wheezing Past Month at Unknown time     ARIPiprazole (ABILIFY) 5 MG tablet TAKE 1 TABLET AT BEDTIME 4/12/2022 at Unknown time     atorvastatin (LIPITOR) 20 MG tablet TAKE 1 TABLET AT BEDTIME 4/12/2022 at Unknown time     Budeson-Glycopyrrol-Formoterol (BREZTRI AEROSPHERE) 160-9-4.8 MCG/ACT AERO Inhale 2 puffs into the lungs 2 times daily 4/13/2022 at has with     calcium carbonate 600 mg-vitamin D 400 units (CALTRATE) 600-400 MG-UNIT per tablet Take 1 tablet by mouth every morning  4/13/2022 at Unknown time     cephALEXin (KEFLEX) 500 MG capsule TAKE 1 CAPSULE (500 MG) BY MOUTH 3 TIMES DAILY (Patient taking differently: Take 500 mg by mouth 3 times daily Started 4/7/22) 4/13/2022 at Unknown time     cyanocobalamin (VITAMIN B-12) 500 MCG tablet Take 500 mcg by mouth every morning  4/13/2022 at Unknown time     diltiazem ER (DILT-XR) 120 MG 24 hr capsule Take 1 capsule (120 mg) by mouth every morning 4/13/2022 at Unknown time     doxycycline hyclate (VIBRAMYCIN) 100 MG capsule Take 1 capsule (100 mg) by mouth in the morning and 1 capsule (100 mg) in the evening. Do all this for 5 days. (Patient taking differently: Take 100 mg by mouth 2 times daily Started 4/11/22)  "4/13/2022 at Unknown time     furosemide (LASIX) 20 MG tablet Take 40 mg by mouth every other day Takes \"2 tabs (40mg) every other day 4/13/2022 at Unknown time     gabapentin (NEURONTIN) 300 MG capsule Take 600 mg by mouth every evening  4/12/2022 at Unknown time     ipratropium - albuterol 0.5 mg/2.5 mg/3 mL (DUONEB) 0.5-2.5 (3) MG/3ML neb solution Take 1 vial (3 mLs) by nebulization 4 times daily Once breathing is better, then change to every 6 hours as needed for shortness of breath 4/13/2022 at Unknown time     metFORMIN (GLUCOPHAGE) 500 MG tablet Take 1 tablet (500 mg) by mouth 2 times daily (with meals) 4/13/2022 at Unknown time     omeprazole (PRILOSEC) 20 MG DR capsule TAKE 1 CAPSULE DAILY 4/13/2022 at Unknown time     predniSONE (DELTASONE) 10 MG tablet Take 2 tabs daily x 5 days, THEN 1 tab daily x 5 days (Patient taking differently: Take 2 tabs daily x 5 days, THEN 1 tab daily x 5 days  Started 4/11/22) 4/13/2022 at Unknown time     sodium chloride (NEBUSAL) 3 % neb solution Take 3 mLs by nebulization every 3 hours as needed for wheezing Past Week at Unknown time     spironolactone (ALDACTONE) 25 MG tablet TAKE ONE-HALF (1/2) TABLET DAILY 4/13/2022 at Unknown time     Vitamin D3 (CHOLECALCIFEROL) 25 mcg (1000 units) tablet Take 1 capsule by mouth every morning  4/13/2022 at Unknown time     XARELTO ANTICOAGULANT 20 MG TABS tablet TAKE 1 TABLET DAILY WITH DINNER 4/12/2022 at Unknown time       Information source(s): Patient, Hospital records and CareEverywhere/[facility]  Method of interview communication: in-person    Summary of Changes to PTA Med List  New: none      Patient was asked about OTC/herbal products specifically.  PTA med list reflects this.    Tano with    The information provided in this note is only as accurate as the sources available at the time of the update(s).    Thank you for the opportunity to participate in the care of this patient.    Timothy Stanton [facility]  4/13/2022 12:51 " PM

## 2022-04-13 NOTE — PROGRESS NOTES
RT at bedside to administer Duo neb. Patient states they use Vest at home BID.  to bring home vest tomorrow to provide treatment to patient while in the hospital. RT will continue to follow.    Dexter Greer, RT

## 2022-04-13 NOTE — H&P
[facility] MEDICINE ADMISSION HISTORY AND PHYSICAL     Assessment & Plan       Adalgisa Solano is a 86 year old old female with history of COPD with chronic hypoxic respiratory failure requiring 2 to 5 L of nasal cannula at home presents to the hospital complaining of worsening shortness of breath with productive cough.  Patient was hospitalized at [facility] for COPD exacerbation and possible left lower leg cellulitis.  Post discharge she progressively got worse and noted that her oxygen requirements were increasing and was on 5 L nasal cannula the date she presented to the ER.  In the ER she was found to have infiltrates and is admitted.      Right lower lobe pneumonia  Cefepime and doxycycline  Sputum culture  Scheduled duo nebs  Oxygen as needed with weaning with goal O2 sats of 88 to 92%  Influenza  Respiratory viral panel  Urine strep  If not responding to above we will consider adding Vanco    Acute on chronic hypoxic respiratory failure/respiratory acidosis  Caution with oxygen  Goal O2 sats 88 to 92%  Scheduled nebs  Steroids  Sputum as above  BiPAP for acidosis if decompensating    Heart failure with preserved ejection fraction/pulmonary hypertension  Echo in 2020 revealed preserved ejection fraction  Does appear mildly volume up on exam to me  IV Lasix  Trend renal function  Echo    Asymmetric lower extremity edema left greater than right  Rule out DVTs  Diuresis  Monitor response    Hyponatremia  Asymptomatic  Diuresis  Oral fluid restriction  Trend sodiums    Noninsulin-dependent diabetes  Hold Metformin with diuresis  Correction insulin    Hypertension  Home regimen    Atrial fibrillation/flutter  Rate controlled and anticoagulated       # Hyponatremia: Na = 126 mmol/L (Ref range: 136 - 145 mmol/L) on admission, will monitor as appropriate       # Coagulation Defect: home medication list includes an anticoagulant medication    # Overweight: Estimated body mass index is  "26.83 kg/m  as calculated from the following:    Height as of this encounter: 1.549 m (5' 1\").    Weight as of this encounter: 64.4 kg (142 lb).        DVTP: Direct Oral Anticagulants   Code Status: No CPR- Do NOT Intubate  Disposition: Inpatient   Expected LOS: 2 days   Goals for the hospitalization: Resolution of dyspnea  Disposition Plan   Home versus TCU       The patient's care was discussed with the Bedside Nurse, Patient and Patient's Family.    Chief Complaint  progressive dyspnea     HISTORY     Adalgisa Solano is a 86 year old old female with h/o COPD and recent hospital at [facility] for COPD exacerbation presents to the hospital with progressive dyspnea over the last 5 days.  Patient noted that her cough became worse and that she had worsening dyspnea.  Oxygen requirements at home were going up and she was on 5 L but at baseline is on 2.  Ultimately because of this she presented to the ER.  In the ER imaging the chest revealed right lower lobe pneumonia.  Patient was given cefepime and is being admitted.  She is feeling a bit better than when she first arrived.  She was noted to be hyponatremic with a sodium of 126.  She does appear to be a bit volume up with lower extremity edema with is asymmetric left greater than right.  She states this does happen when she does not wear her \"compression stockings\".  No recent dietary indiscretions.  She eats a low-sodium diet although she is more concerned about salt that she puts on the food and actually what is in the food.  She is DNR.    Past Medical History     Past Medical History:  No date: Acute and chronic respiratory failure with hypoxia (H)  1/15/2019: Acute blood loss anemia  1/15/2019: Acute bronchitis  No date: Anemia      Comment:  pernicious anemia  3/23/2022: Anticoagulated  No date: Anxiety  No date: Arm skin lesion, right  1998: Arnold-Chiari malformation (H)  No date: Arthritis  02/2017: Atrial fibrillation (H)  No date: Avascular necrosis " of bone (H)  No date: Breast cyst  No date: Breast lump  No date: Candidal skin infection  2019: Cellulitis of left lower extremity  No date: CHF (congestive heart failure) (H)      Comment:  diastolic and systolic  No date: Chronic bronchitis (H)      Comment:  & acute  No date: Chronic diastolic heart failure (H)      Comment:  diastolic chf  No date: Chronic pain syndrome  3/13/2017: Chronic respiratory failure with hypoxia (H)  No date: COPD (chronic obstructive pulmonary disease) (H)  No date: COPD exacerbation (H)  No date: Depression  No date: Diet-controlled type 2 diabetes mellitus (H)  No date: Drug induced constipation  No date: DVT of axillary vein, acute (H)      Comment:  left  No date: DVT of axillary vein, acute left (H)  No date: Edema  No date: Encounter for palliative care  No date: Erysipelas  No date: Fracture of femoral neck, left (H)  No date: Generalized muscle weakness  No date: GERD (gastroesophageal reflux disease)  No date: History of blood clots  No date: Hyperlipidemia  No date: Hypertension  No date: Left hip pain  No date: Lumbar spinal stenosis  No date: PAD (peripheral artery disease) (H)  10/28/2015: Peripheral arterial disease (H)  No date: Psoriasis      Comment:  arms   3/5/2018: Pulmonary hypertension (H)  No date: Recurrent major depressive episodes (H)      Comment:  Created by Conversion  Replacement Utility updated for                latest IMO load  2015: Schizoaffective disorder, bipolar type (H)  No date: Slow transit constipation  No date: Stasis dermatitis of both legs  1/3/2019: Status post total hip replacement, left  2016: Type 2 diabetes mellitus without complication (H)  No date: Vitamin B12 deficiency  No date: Volume overload     Surgical History     Past Surgical History:   Procedure Laterality Date     BACK SURGERY       BREAST CYST ASPIRATION Left      CERVICAL SPINE SURGERY      for arnold chiari malformation      SECTION  X3      CHOLECYSTECTOMY       COLONOSCOPY       EXCISE LESION UPPER EXTREMITY Right 8/29/2019    Procedure: EXCISION RIGHT ARM LIPOMA;  Surgeon: Andreas Holly MD;  Location: [facility] OR;  Service: General     EYE SURGERY      bilateral cataracts     HAND SURGERY       HERNIORRHAPHY UMBILICAL N/A 9/21/2021    Procedure: INCARCERATED UMBILICAL AND;  Surgeon: Andreas Holly MD;  Location: [facility]     JOINT REPLACEMENT Right     knee - partial     LAPAROSCOPIC HERNIORRHAPHY INCISIONAL Left 3/27/2015    Procedure: ATTEMPTED LAPAROSCOPIC ABDOMINA/FLANK INCISION REPAIR;  Surgeon: Jose Lakhani MD;  Location: [facility] OR;  Service:      LUMBAR FUSION N/A 11/17/2014    Procedure: POSTERIOR FUSION / DECOMPRESSION L3-S1 WITH PELVIC FIXATION BILATERAL ;  Surgeon: Rajan Mares MD;  Location: [facility] OR;  Service:      OTHER SURGICAL HISTORY      IR for PAD LOWER EXTREMITY     REPAIR SPIGELIAN HERNIA N/A 9/21/2021    Procedure: SPIGELIAN HERNIA REPAIR;  Surgeon: Andreas Holly MD;  Location: [facility]     US ASPIRATION HEMATOMA SEROMA OR FLUID COLLECTION  2/6/2020     [facility] OPEN FIXATN PROX END/NECK FEMUR FX Left 1/2/2019    Procedure: OPEN REDUCTION INTERNAL FIXATION, FRACTURE LEFT HIP, PERIPROSTHETIC FRACTURE;  Surgeon: Kevin Lackey MD;  Location: [facility] OR;  Service: Orthopedics     [facility] TOTAL HIP ARTHROPLASTY Left 1/2/2019    Procedure: LEFT TOTAL HIP ARTHROPLASTY;  Surgeon: Kevin Lackey MD;  Location: [facility] OR;  Service: Orthopedics     [facility] TOTAL HIP ARTHROPLASTY Right 9/16/2019    Procedure: RIGHT TOTAL HIP ARTHROPLASTY;  Surgeon: Kan Quesada MD;  Location: [facility] OR;  Service: Orthopedics     [facility] TOTAL KNEE ARTHROPLASTY Left 10/7/2016    Procedure: TOTAL KNEE ARTHROPLASTY, LEFT;  Surgeon: Kan Quesada MD;  Location: [facility] OR;  Service: Orthopedics     Family History    Reviewed, and   Family History   Problem Relation Age of Onset      Coronary Artery Disease Mother      Coronary Artery Disease Father         Social History      Social History     Tobacco Use     Smoking status: Former Smoker     Quit date: 2006     Years since quittin.2     Smokeless tobacco: Never Used     Tobacco comment: quit 2006   Vaping Use     Vaping Use: Never used   Substance Use Topics     Alcohol use: No     Drug use: No        Allergies     Allergies   Allergen Reactions     Tramadol Nausea     Amoxicillin Itching and Rash     Levofloxacin Itching and Rash     Penicillins Itching and Rash     Has tolerated ceftriaxone.     Prior to Admission Medications      Prior to Admission Medications   Prescriptions Last Dose Informant Patient Reported? Taking?   ARIPiprazole (ABILIFY) 5 MG tablet 2022 at Unknown time  No Yes   Sig: TAKE 1 TABLET AT BEDTIME   Budeson-Glycopyrrol-Formoterol (BREZTRI AEROSPHERE) 160-9-4.8 MCG/ACT AERO 2022 at has with  No Yes   Sig: Inhale 2 puffs into the lungs 2 times daily   OXYGEN-HELIUM IN   Yes No   Sig: 3 LPM when resting and at night and 5 LPM with activity  [employer]   Vitamin D3 (CHOLECALCIFEROL) 25 mcg (1000 units) tablet 2022 at Unknown time  Yes Yes   Sig: Take 1 capsule by mouth every morning    XARELTO ANTICOAGULANT 20 MG TABS tablet 2022 at Unknown time  No Yes   Sig: TAKE 1 TABLET DAILY WITH DINNER   acetaminophen (TYLENOL) 500 MG tablet Past Week at Unknown time  Yes Yes   Sig: Take 500-1,000 mg by mouth every 6 hours as needed for mild pain   albuterol (PROAIR HFA/PROVENTIL HFA/VENTOLIN HFA) 108 (90 Base) MCG/ACT inhaler Past Month at Unknown time  No Yes   Sig: Inhale 2 puffs into the lungs every 4 hours as needed for shortness of breath / dyspnea or wheezing   albuterol (PROVENTIL) (2.5 MG/3ML) 0.083% neb solution Past Month at Unknown time  Yes Yes   Sig: Take 2.5 mg by nebulization every 2 hours as needed for shortness of breath / dyspnea or wheezing   atorvastatin (LIPITOR) 20 MG tablet  "4/12/2022 at Unknown time  No Yes   Sig: TAKE 1 TABLET AT BEDTIME   calcium carbonate 600 mg-vitamin D 400 units (CALTRATE) 600-400 MG-UNIT per tablet 4/13/2022 at Unknown time  Yes Yes   Sig: Take 1 tablet by mouth every morning    cephALEXin (KEFLEX) 500 MG capsule 4/13/2022 at Unknown time  No Yes   Sig: TAKE 1 CAPSULE (500 MG) BY MOUTH 3 TIMES DAILY   Patient taking differently: Take 500 mg by mouth 3 times daily Started 4/7/22   cyanocobalamin (VITAMIN B-12) 500 MCG tablet 4/13/2022 at Unknown time  Yes Yes   Sig: Take 500 mcg by mouth every morning    diltiazem ER (DILT-XR) 120 MG 24 hr capsule 4/13/2022 at Unknown time  No Yes   Sig: Take 1 capsule (120 mg) by mouth every morning   doxycycline hyclate (VIBRAMYCIN) 100 MG capsule 4/13/2022 at Unknown time  No Yes   Sig: Take 1 capsule (100 mg) by mouth in the morning and 1 capsule (100 mg) in the evening. Do all this for 5 days.   Patient taking differently: Take 100 mg by mouth 2 times daily Started 4/11/22   furosemide (LASIX) 20 MG tablet 4/13/2022 at Unknown time  Yes Yes   Sig: Take 40 mg by mouth every other day Takes \"2 tabs (40mg) every other day   gabapentin (NEURONTIN) 300 MG capsule 4/12/2022 at Unknown time  Yes Yes   Sig: Take 600 mg by mouth every evening    ipratropium - albuterol 0.5 mg/2.5 mg/3 mL (DUONEB) 0.5-2.5 (3) MG/3ML neb solution 4/13/2022 at Unknown time  No Yes   Sig: Take 1 vial (3 mLs) by nebulization 4 times daily Once breathing is better, then change to every 6 hours as needed for shortness of breath   metFORMIN (GLUCOPHAGE) 500 MG tablet 4/13/2022 at Unknown time  No Yes   Sig: Take 1 tablet (500 mg) by mouth 2 times daily (with meals)   omeprazole (PRILOSEC) 20 MG DR capsule 4/13/2022 at Unknown time  No Yes   Sig: TAKE 1 CAPSULE DAILY   predniSONE (DELTASONE) 10 MG tablet 4/13/2022 at Unknown time  No Yes   Sig: Take 2 tabs daily x 5 days, THEN 1 tab daily x 5 days   Patient taking differently: Take 2 tabs daily x 5 days, THEN " 1 tab daily x 5 days  Started 4/11/22   sodium chloride (NEBUSAL) 3 % neb solution Past Week at Unknown time  Yes Yes   Sig: Take 3 mLs by nebulization every 3 hours as needed for wheezing   spironolactone (ALDACTONE) 25 MG tablet 4/13/2022 at Unknown time  No Yes   Sig: TAKE ONE-HALF (1/2) TABLET DAILY      Facility-Administered Medications: None      Review of Systems     A 12 point comprehensive review of systems was negative except as noted above in HPI.    PHYSICAL EXAMINATION       Vitals      Temp:  [97.3  F (36.3  C)-97.5  F (36.4  C)] 97.5  F (36.4  C)  Pulse:  [] 92  Resp:  [23-30] 24  BP: (105-144)/(57-75) 113/63  SpO2:  [69 %-99 %] 91 %    Examination     GENERAL:  Alert, appears comfortable, in no acute distress, appears stated age   HEAD:  Normocephalic, without obvious abnormality, atraumatic   EYES:  PERRL, conjunctiva/corneas clear, no scleral icterus, EOM's intact   NOSE: Nares normal, septum midline, mucosa normal, no drainage   THROAT: Lips, mucosa, and tongue normal; teeth and gums normal, mouth moist   NECK: Supple, symmetrical, trachea midline   BACK:   Symmetric, no curvature, ROM normal   LUNGS:    Coarse breath sounds throughout without wheezing, rhonchi in the bases   CHEST WALL:  No tenderness or deformity   HEART:  Regular rate and rhythm, S1 and S2 normal, no murmur, rub, or gallop    ABDOMEN:   Soft, non-tender, bowel sounds active all four quadrants, no masses, no organomegaly, no rebound or guarding   EXTREMITIES: Extremities normal, atraumatic, no cyanosis, 2+ pitting edema of the right ankle and 3+ of the left   SKIN: Dry to touch, no exanthems in the visualized areas   NEURO: Alert, oriented x 4, moves all four extremities freely, non-focal   PSYCH: Cooperative, behavior is appropriate      Pertinent Lab     Most Recent 3 CBC's:Recent Labs   Lab Test 04/13/22  1154 03/24/22  0605 03/23/22  1240   WBC 11.7* 9.9 11.3*   HGB 13.8 13.2 12.5   MCV 89 91 89    222 226      Most Recent 3 BMP's:Recent Labs   Lab Test 04/13/22  1154 03/25/22  0712 03/25/22  0606 03/24/22  0755 03/24/22  0605   *  --  135*  --  134*   POTASSIUM 4.1  --  3.9  --  4.1   CHLORIDE 86*  --  95*  --  93*   CO2 28  --  33*  --  33*   BUN 10  --  15  --  14   CR 0.64  --  0.63  --  0.56*   ANIONGAP 12  --  7  --  8   DENISE 8.6  --  8.4*  --  8.7   * 83 100   < > 127*    < > = values in this interval not displayed.     Most Recent 2 LFT's:Recent Labs   Lab Test 03/23/22  1240 03/21/22  0937   AST 16 16   ALT 14 17   ALKPHOS 60 65   BILITOTAL 0.5 0.7     Most Recent 3 INR's:Recent Labs   Lab Test 10/07/21  2325 06/11/21  1022 01/31/20 2023   INR 3.68* 2.07* 1.14*         Pertinent Radiology     Radiology Results:   Recent Results (from the past 24 hour(s))   XR Chest Port 1 View    Narrative    EXAM: XR CHEST PORT 1 VIEW  LOCATION: [facility]  DATE/TIME: 4/13/2022 12:05 PM    INDICATION: SOB  COMPARISON: Chest CTA 03/23/2022 and older studies, chest x-ray 11/03/2021  and older studies        Impression    IMPRESSION: There are 2 new ill-defined opacities in the right base, largest measuring approximately 1.5 cm. These likely reflect reflect areas of retained secretions with bronchopneumonia.    Fine reticular opacities in both lungs and cardiomegaly are unchanged. Bullous emphysema and bronchiectasis is better appreciated on CT exams. No signs of failure.     EKG Results: personally reviewed.     Advance Care Planning        Roney Huang MD  Hospitalist  [facility] Medicine  [facility]   Phone: #[phone]

## 2022-04-13 NOTE — ED TRIAGE NOTES
Pt arrives with sob and cough that started a few days after being discharged from [place] for COPD, CHF and pneumonia. Pt has been using 5L NC O2 at home. Upon arrival her transport O2 tank only goes to 3L. Upon arrival O2 sats were 69%. Brought back to ED room and placed on 7L NC and sats increased to 91%. No pain.

## 2022-04-14 NOTE — ED NOTES
"Pt given an incentive spirometer  Educated on how to use incentive spirometer appropriately   Assessed using the teach back method     Pt used the incentive spirometer x10 at this time     Pt states \"Im trying to find a way to catch my breath and get comfortable to try and fall asleep\"  Pt reports being a \"mouth breather\", therefore nasal cannula switch to oxymask so pt can attempt to get comfortable while sleeping   "

## 2022-04-14 NOTE — PROVIDER NOTIFICATION
04/13/22 1942   Tech Time   $Tech Time (10 minute increments) 2   Nebulizer Assessment & Treatment   $RT Use ONLY Delivery Method Nebulizer - Additional   Nebulizer Device Mask   Pretreatment Heart Rate (beats/min) 91   Pretreatment Resp Rate (breaths/min) 20   Pretreatment O2 sats - (TCU only) 93   Pretreat Breath Sounds - Bilat - All Lobes coarse;rhonchi   Patient Position HOB elevated   Breath Sounds Post-Respiratory Treatment   Posttreatment Heart Rate (beats/min) 93   Posttreatment Resp Rate (breaths/min) 24   Post treatment O2 Sats - (TCU only) 97   Breath Sounds Posttreatment All Russell wheezes, expiratory;coarse   Chest Physiotherapy (CPT)   $Chest Physiotherapy Initial  (aerobika)   Daily Review of Necessity (CPT) completed   Method (CPT) other (see comments)  (aerobika)   Patient Position HOB elevated   Lung Fields (CPT) all lung fields   Duration per Field (CPT) 5 mins

## 2022-04-14 NOTE — PROGRESS NOTES
[facility] MEDICINE PROGRESS NOTE      Identification/Summary:   Adalgisa Solano is a 86 year old old female with history of COPD with chronic hypoxic respiratory failure requiring 2 to 5 L of nasal cannula at home presents to the hospital complaining of worsening shortness of breath with productive cough.  Patient was hospitalized at [facility] for COPD exacerbation and possible left lower leg cellulitis.  Post discharge she progressively got worse and noted that her oxygen requirements were increasing and was on 5 L nasal cannula the date she presented to the ER.  In the ER she was found to have infiltrates and is admitted.  Subsequently tested positive for coronavirus but negative for Covid.  On droplet precautions.        Right lower lobe pneumonia/corona virus PCR positive with negative COVID-19 PCR  Cefepime and doxycycline  Sputum culture  Scheduled duo nebs  Oxygen as needed with weaning with goal O2 sats of 88 to 92%  Influenza  Respiratory viral panel  Urine strep  If not responding to above we will consider adding Vanco  Clinically improving  Procalcitonin reassuringly normal  Discussed directly with infection control  Droplet precautions  COVID-19 special precautions not required     Acute on chronic hypoxic respiratory failure/respiratory acidosis  Caution with oxygen  Goal O2 sats 88 to 92%  Scheduled nebs  Steroids  Sputum as above  BiPAP for acidosis if decompensating  Repeat VBG improved     Heart failure with preserved ejection fraction/pulmonary hypertension  Echo in 2020 revealed preserved ejection fraction  Does appear mildly volume up on exam to me  Responded to IV Lasix  Resume home oral Lasix  Echo     Asymmetric lower extremity edema left greater than right  Ruled out DVTs  Diuresis  Lymphedema wraps     Hyponatremia  Asymptomatic  Diuresis  Oral fluid restriction  Improving     Noninsulin-dependent diabetes  Hold Metformin with diuresis  Correction  insulin     Hypertension  Home regimen     Atrial fibrillation/flutter  Rate controlled and anticoagulated      Diet: Combination Diet Regular Diet Adult; Moderate Consistent Carb (60 g CHO per Meal) Diet; 2 gm NA Diet  Fluid restriction 1500 ML FLUID    Code Status: No CPR- Do NOT Intubate    Anticipated possible discharge in 1 to 2 days if continued improvement in sodium and resolution of tachycardia      The patient's care was discussed with the Bedside Nurse, Care Coordinator/ and Patient.    Roney Huang MD  [facility]  Phone: #[phone]    Interval History/Subjective:  Patient is doing okay.  Feels better than when she came in.  Decreased cough.  No fevers or chills.  No abdominal pain.  She is in agreement with getting lymphedema wraps on her lower extremities.  Discussed the result of her viral testing with positive coronavirus but negative Covid.  She was concerned about this but reassured her.  Continue droplet precautions.    Physical Exam/Objective:  Temp:  [97.6  F (36.4  C)-98.3  F (36.8  C)] 98.3  F (36.8  C)  Pulse:  [] 100  Resp:  [22-52] 22  BP: (111-143)/(57-81) 125/65  SpO2:  [89 %-97 %] 93 %  Body mass index is 26.83 kg/m .    GENERAL:  Alert, appears comfortable, in no acute distress, appears stated age   HEAD:  Normocephalic, without obvious abnormality, atraumatic   EYES:  PERRL, conjunctiva/corneas clear, no scleral icterus, EOM's intact   NOSE: Nares normal, septum midline, mucosa normal, no drainage   NECK: Supple, symmetrical, trachea midline   BACK:   Symmetric, no curvature, ROM normal   LUNGS:    Coarse breath sounds throughout   CHEST WALL:  No tenderness or deformity   HEART:  Regular rate and rhythm, S1 and S2 normal, no murmur, rub, or gallop    ABDOMEN:   Soft, non-tender, bowel sounds active all four quadrants, no masses, no organomegaly, no rebound or guarding   EXTREMITIES: Extremities normal,  atraumatic, no cyanosis or edema    SKIN: Dry to touch, no exanthems in the visualized areas   NEURO: Alert, oriented x3, moves all four extremities freely   PSYCH: Cooperative, behavior is appropriate      Data reviewed today: I personally reviewed all new medications, labs, imaging/diagnostics reports over the past 24 hours. Pertinent findings include:    Imaging:   Recent Results (from the past 24 hour(s))   US Lower Extremity Venous Duplex Bilateral    Narrative    EXAM: US LOWER EXTREMITY VENOUS DUPLEX BILATERAL  LOCATION: [facility]  DATE/TIME: 4/13/2022 4:16 PM    INDICATION: Lower extremity edema  COMPARISON: Ultrasound 02/06/2022  TECHNIQUE: Venous Duplex ultrasound of bilateral lower extremities with and without compression, augmentation and duplex. Color flow and spectral Doppler with waveform analysis performed.    FINDINGS: Exam includes the common femoral, femoral, popliteal veins as well as segmentally visualized deep calf veins and greater saphenous vein.     RIGHT: No deep vein thrombosis. No superficial thrombophlebitis. Complex 3.5 x 1.1 x 1.4 cm fluid containing structure in the popliteal fossa. No internal vascular flow. Right calf subcutaneous edema.    LEFT: No deep vein thrombosis. No superficial thrombophlebitis. Complex 2.9 x 2.8 x 1.6 cm fluid structure in the popliteal fossa. Left calf subcutaneous edema.      Impression    IMPRESSION:  1.  No deep venous thrombosis in the bilateral lower extremities.  2.  Bilateral calf subcutaneous edema.  3.  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 Note that the left popliteal fossa lesion is similar in size but has increased complexity since the ultrasound from 02/06/2022.       Labs:  Most Recent 3 CBC's:Recent Labs   Lab Test 04/14/22  0618 04/13/22  1154 03/24/22  0605   WBC 7.3 11.7* 9.9   HGB 13.0 13.8 13.2   MCV 90 89 91    237 222     Most Recent 3  BMP's:Recent Labs   Lab Test 04/14/22  1335 04/14/22  0806 04/14/22  0618 04/13/22  2103 04/13/22  1751 04/13/22  1654 04/13/22  1154 03/25/22  0712 03/25/22  0606   NA  --   --  131*  --   --   --  126*  --  135*   POTASSIUM  --   --  3.7  --  4.4  --  4.1  --  3.9   CHLORIDE  --   --  86*  --   --   --  86*  --  95*   CO2  --   --  32*  --   --   --  28  --  33*   BUN  --   --  12  --   --   --  10  --  15   CR  --   --  0.61  --   --   --  0.64  --  0.63   ANIONGAP  --   --  13  --   --   --  12  --  7   DENISE  --   --  8.5  --   --   --  8.6  --  8.4*   * 124* 151*   < >  --    < > 190*   < > 100    < > = values in this interval not displayed.     Most Recent 2 LFT's:Recent Labs   Lab Test 04/14/22  0618 03/23/22  1240   AST 15 16   ALT 13 14   ALKPHOS 55 60   BILITOTAL 0.6 0.5     Most Recent 3 INR's:Recent Labs   Lab Test 10/07/21  2325 06/11/21  1022 01/31/20  2023   INR 3.68* 2.07* 1.14*       Medications:   Personally Reviewed.  Medications     - MEDICATION INSTRUCTIONS -         ARIPiprazole  5 mg Oral At Bedtime     atorvastatin  20 mg Oral At Bedtime     Budeson-Glycopyrrol-Formoterol  2 puff Inhalation BID     ceFEPIme (MAXIPIME) IV  2 g Intravenous Q12H     diltiazem ER  120 mg Oral QAM     doxycycline (VIBRAMYCIN) IV  100 mg Intravenous Q12H     gabapentin  600 mg Oral QPM     insulin aspart  1-7 Units Subcutaneous TID AC     insulin aspart  1-5 Units Subcutaneous At Bedtime     ipratropium - albuterol 0.5 mg/2.5 mg/3 mL  3 mL Nebulization 4x Daily     magnesium oxide  400 mg Oral BID     pantoprazole  40 mg Oral Daily     predniSONE  40 mg Oral Daily     rivaroxaban ANTICOAGULANT  20 mg Oral Daily with supper     sodium chloride  3 mL Nebulization 4x Daily     sodium chloride (PF)  3 mL Intracatheter Q8H     spironolactone  12.5 mg Oral Daily

## 2022-04-14 NOTE — PROGRESS NOTES
Patient on 5L NC with SPO2 around 93%. Patient is coarse with Exp wheezing. Medication given as ordered. Patient home vest in room and receives treatments BID. Will get next treatment tonight at 2000. RT will continue to follow.    Dexter Greer, RT

## 2022-04-14 NOTE — TREATMENT PLAN
RCAT Treatment Plan    Patient Score:11  Patient Acuity: 3    Clinical Indication for Therapy: home regimen, productive cough, rhonchi on auscultation and atelectasis    Therapy Ordered: duoneb QID Sodium chloride 3 ml QID and Albuterol PRN    Assessment Summary:pt in ED with SOB congested cough. BBS coarse,rhonchi and exp wheezing.CXray shows RLL pneumonia     Moriah Bello, RT  4/14/2022

## 2022-04-14 NOTE — PROGRESS NOTES
04/14/22 1100   Quick Adds   Type of Visit Initial PT Evaluation   Living Environment   People in Home spouse   Current Living Arrangements house   Home Accessibility stairs to enter home;stairs within home   Number of Stairs, Main Entrance 1   Stair Railings, Main Entrance railings safe and in good condition   Number of Stairs, Within Home, Primary ten   Stair Railings, Within Home, Primary railings safe and in good condition   Self-Care   Equipment Currently Used at Home cane, straight;walker, rolling   General Information   Onset of Illness/Injury or Date of Surgery 04/13/22   Referring Physician Roney Huang MD   Patient/Family Therapy Goals Statement (PT) to get better   Pertinent History of Current Problem (include personal factors and/or comorbidities that impact the POC) COPD   Existing Precautions/Restrictions no known precautions/restrictions   Bed Mobility   Bed Mobility scooting/bridging;supine-sit;sit-supine   Scooting/Bridging [place] (Bed Mobility) supervision;verbal cues   Supine-Sit [place] (Bed Mobility) supervision;verbal cues   Sit-Supine [place] (Bed Mobility) supervision;verbal cues   Assistive Device (Bed Mobility) bed rails   Comment, (Bed Mobility) SBA with bed mobility and sit<>supine transfers. Verbal cues for safety   Transfers   Transfers sit-stand transfer   Comment, (Transfers) SBA/CGA with sit<>stand transfer with FWW/straight cane. Verbal cues for safe hand placement.   Sit-Stand Transfer   Sit-Stand [place] (Transfers) verbal cues;contact guard   Assistive Device (Sit-Stand Transfers) cane, straight;walker, front-wheeled   Comment, (Sit-Stand Transfer) SBA/CGA with sit<>stand transfer with FWW/straight cane. Verbal cues for safe hand placement.   Gait/Stairs (Locomotion)   [place] Level (Gait) verbal cues;contact guard   Assistive Device (Gait) walker, front-wheeled;cane, straight   Distance in Feet (Required for LE Total Joints) 40'   Pattern  (Gait) step-through   Deviations/Abnormal Patterns (Gait) carola decreased;gait speed decreased   Maintains Weight-bearing Status (Gait) able to maintain   Comment, (Gait/Stairs) Pt ambulates with FWW with CGA. Pt reports that the FWW is not as smooth as her 4WW and would prefer to ambulate with straight cane. CGA with straight cane. Verbal cues for safety and breathing techiques. Pt reports shortness of breath when ambulating. Verbal cues for breathing techniques and pursed lip breathing. Pt on 4L O2 during session. Pt up in bed at end of session with call light within reach.   Clinical Impression   Criteria for Skilled Therapeutic Intervention Yes, treatment indicated   PT Diagnosis (PT) impaired functional mobility   Influenced by the following impairments weakness, SOB   Functional limitations due to impairments bed mobility, transfers, ambulation   Clinical Presentation (PT Evaluation Complexity) Stable/Uncomplicated   Clinical Presentation Rationale Pt presents as medically diagnosed   Clinical Decision Making (Complexity) low complexity   Planned Therapy Interventions (PT) bed mobility training;gait training;home exercise program;patient/family education;ROM (range of motion);stair training;strengthening;stretching;transfer training   Anticipated Equipment Needs at Discharge (PT) cane, straight;walker, rolling   Risk & Benefits of therapy have been explained patient   PT Discharge Planning   PT Discharge Recommendation (DC Rec) (S)  home with assist;home with home care physical therapy   PT Rationale for DC Rec Pt reports good home support and good home set up with oxygen.   Plan of Care Review   Plan of Care Reviewed With patient   Therapy Certification   Start of care date 04/14/22   Certification date from 04/14/22   Certification date to 05/13/22   Medical Diagnosis COPD   Total Evaluation Time   Total Evaluation Time (Minutes) 10   Physical Therapy Goals   PT Frequency Daily   PT Predicted  Duration/Target Date for Goal Attainment 04/19/22   PT Goals Transfers;Gait;Stairs;PT Goal 1   PT: Transfers Modified independent;Sit to/from stand;Assistive device   PT: Gait Supervision/stand-by assist;50 feet;Assistive device;Rolling walker;Straight cane   PT: Stairs Supervision/stand-by assist;8 stairs;Rail on both sides;Assistive device   PT: Goal 1 Mod I with HEP     Mandy Tabares, PT, DPT

## 2022-04-14 NOTE — PROVIDER NOTIFICATION
04/14/22 0324   Tech Time   $Tech Time (10 minute increments) 2   Nebulizer Assessment & Treatment   $RT Use ONLY Delivery Method Nebulizer - Additional   Nebulizer Device Mask   Pretreatment Heart Rate (beats/min) 110   Pretreatment Resp Rate (breaths/min) 24   Pretreatment O2 sats - (TCU only) 93   Pretreat Breath Sounds - Bilat - All Lobes coarse;rhonchi;wheezes, expiratory   Breath Sounds Post-Respiratory Treatment   Posttreatment Heart Rate (beats/min) 135   Posttreatment Resp Rate (breaths/min) 24   Post treatment O2 Sats - (TCU only) 92   Posttreatment Assessment (SVN) breath sounds improved;increased aeration

## 2022-04-14 NOTE — PROGRESS NOTES
04/14/22 1540   Living Environment   People in Home spouse   Current Living Arrangements house   Living Environment Comments tub/shower with shower chair and GB; RTS with GB on the R   Self-Care   Usual Activity Tolerance good   Current Activity Tolerance moderate   Activity/Exercise/Self-Care Comment uses cane in the house at home.   General Information   Onset of Illness/Injury or Date of Surgery 04/13/22   Referring Physician Dr. Roney Huang   Patient/Family Therapy Goal Statement (OT) To breath better and return home with my .   General Observations and Info Currently on 5L NC O2   Cognitive Status Examination   Orientation Status person;place;time  (Pt needed three tries to get the year correct.  2002, 2020, 2022)   Visual Perception   Visual Impairment/Limitations other (see comments)  (No glasses but does wear bilat HA..)   Bed Mobility   Bed Mobility supine-sit;sit-supine   Supine-Sit [place] (Bed Mobility) modified independence   Sit-Supine [place] (Bed Mobility) modified independence   Assistive Device (Bed Mobility) bed rails;other (see comments)  (HOB elevated.)   Transfers   Transfers bed-chair transfer;sit-stand transfer;toilet transfer   Transfer Skill: Bed to Chair/Chair to Bed   Bed-Chair [place] (Transfers) contact guard   Assistive Device (Bed-Chair Transfers) straight cane   Sit-Stand Transfer   Sit-Stand [place] (Transfers) contact guard   Assistive Device (Sit-Stand Transfers) cane, straight   Toilet Transfer   Type (Toilet Transfer) sit-stand;stand-sit   [place] Level (Toilet Transfer) contact guard   Assistive Device (Toilet Transfer) commode chair   Toilet Transfer Comments Pt unable to walk to BR toilet due to SOB and decreased O2 sats with trsfs - 70's with 5L NC O2 running.   Balance   Balance Assessment standing balance: dynamic   Balance Comments CGA   Activities of Daily Living   BADL Assessment/Intervention lower body dressing;toileting   Lower  Body Dressing Assessment/Training   Position (Lower Body Dressing) unsupported sitting  (sitting EOB.  Increased SOB and decreased O2 sats)   Comment, (Lower Body Dressing) Will encourage Pt to use AE for L/B dressing to preserve breathing and avoid forward flex of the trunk.   [place] Level (Lower Body Dressing) contact guard assist   Toileting   Position (Toileting) supported standing;supported sitting   Assistive Devices (Toileting) commode chair   [place] Level (Toileting) contact guard assist   Clinical Impression   Criteria for Skilled Therapeutic Interventions Met (OT) Yes, treatment indicated   OT Diagnosis decreased indep with ADLs due to COPD   OT Problem List-Impairments impacting ADL activity tolerance impaired;mobility   Assessment of Occupational Performance 3-5 Performance Deficits   Identified Performance Deficits dressing, toileting, G/H, trsfs.   Planned Therapy Interventions (OT) ADL retraining;progressive activity/exercise   Clinical Decision Making Complexity (OT) moderate complexity   Risk & Benefits of therapy have been explained patient;spouse/significant other;evaluation/treatment results reviewed   Total Evaluation Time (Minutes)   Total Evaluation Time (Minutes) 15   OT Goals   Therapy Frequency (OT) Daily   OT Goals Lower Body Dressing;Transfers;Hygiene/Grooming   OT: Hygiene/Grooming modified independent   OT: Lower Body Dressing Modified independent   OT: Transfer Modified independent

## 2022-04-14 NOTE — CONSULTS
"Mari and Dr. Huang requested review to validate proper isolation (of Droplet Precautions) aligns with Infection flag: Coronavirus (non MERs-CoV, SARS CoV, and not COVID-19).  COVID-19 PCR result was negative 4/13/22.      A voicemail was left to Dr. Huang's phone to inform that IP reviewed this chart and IP guidance on scenario. For the duration of this patient's illness, Droplet precautions is appropriate and required for Human Coronavirus infection/condition. This can be found in the resource/guidance titled: \"Isolation and Standard  Precautions Table in [employer].     Thanks for connecting with  site infection preventionist, please connect with questions. .Desirae Pascual, Infection Prevention  .4/14/2022  .2:52 PM    "

## 2022-04-14 NOTE — ED NOTES
Lab result taken- Coronavirus detected on respiratory panel PCR panel. MD hodge, pt placed on droplet precautions.

## 2022-04-14 NOTE — PLAN OF CARE
Patient is alert and oriented. Oxygen titrated between 2-6 L of NC base on patient need to keep O2 sats between 88-92%. Patient have frequent weak cough. Pulmonary hygiene promoted. Heart monitor showing afib; controlled rate.     Problem: Gas Exchange Impaired  Goal: Optimal Gas Exchange  Outcome: Ongoing, Not Progressing  Intervention: Optimize Oxygenation and Ventilation  Recent Flowsheet Documentation  Taken 4/13/2022 1550 by Giovanny Virk, RN  Head of Bed (HOB) Positioning:    HOB at 45 degrees    HOB at 60 degrees     Problem: Plan of Care - These are the overarching goals to be used throughout the patient stay.    Goal: Optimal Comfort and Wellbeing  Intervention: Monitor Pain and Promote Comfort  Recent Flowsheet Documentation  Taken 4/13/2022 1550 by Giovanny Virk, RN  Pain Management Interventions:    repositioned    quiet environment facilitated    relaxation techniques promoted

## 2022-04-14 NOTE — ED NOTES
Pt requesting constipation medications  Hospitalist paged     Pt did have a very small bowel movement 10 minutes ago

## 2022-04-14 NOTE — PROGRESS NOTES
04/14/22 1540   Quick Adds   Type of Visit Initial Occupational Therapy Evaluation   Living Environment   People in Home spouse   Current Living Arrangements house   Living Environment Comments tub/shower with shower chair and GB; RTS with GB on the R   Self-Care   Usual Activity Tolerance good   Current Activity Tolerance moderate   Activity/Exercise/Self-Care Comment uses cane in the house at home.   General Information   Onset of Illness/Injury or Date of Surgery 04/13/22   Referring Physician Dr. Roney Huang   Patient/Family Therapy Goal Statement (OT) To breath better and return home with my .   General Observations and Info Currently on 5L NC O2   Cognitive Status Examination   Orientation Status person;place;time  (Pt needed three tries to get the year correct.  2002, 2020, 2022)   Visual Perception   Visual Impairment/Limitations other (see comments)  (No glasses but does wear bilat HA..)   Bed Mobility   Bed Mobility supine-sit;sit-supine   Supine-Sit [place] (Bed Mobility) modified independence   Sit-Supine [place] (Bed Mobility) modified independence   Assistive Device (Bed Mobility) bed rails;other (see comments)  (HOB elevated.)   Transfers   Transfers bed-chair transfer;sit-stand transfer;toilet transfer   Transfer Skill: Bed to Chair/Chair to Bed   Bed-Chair [place] (Transfers) contact guard   Assistive Device (Bed-Chair Transfers) straight cane   Sit-Stand Transfer   Sit-Stand [place] (Transfers) contact guard   Assistive Device (Sit-Stand Transfers) cane, straight   Toilet Transfer   Type (Toilet Transfer) sit-stand;stand-sit   [place] Level (Toilet Transfer) contact guard   Assistive Device (Toilet Transfer) commode chair   Toilet Transfer Comments Pt unable to walk to BR toilet due to SOB and decreased O2 sats with trsfs - 70's with 5L NC O2 running.   Balance   Balance Assessment standing balance: dynamic   Balance Comments CGA   Activities of Daily Living    BADL Assessment/Intervention lower body dressing;toileting   Lower Body Dressing Assessment/Training   Position (Lower Body Dressing) unsupported sitting  (sitting EOB.  Increased SOB and decreased O2 sats)   Comment, (Lower Body Dressing) Will encourage Pt to use AE for L/B dressing to preserve breathing and avoid forward flex of the trunk.   [place] Level (Lower Body Dressing) contact guard assist   Toileting   Position (Toileting) supported standing;supported sitting   Assistive Devices (Toileting) commode chair   [place] Level (Toileting) contact guard assist   Clinical Impression   Criteria for Skilled Therapeutic Interventions Met (OT) Yes, treatment indicated   OT Diagnosis decreased indep with ADLs due to COPD   OT Problem List-Impairments impacting ADL activity tolerance impaired;mobility   Assessment of Occupational Performance 3-5 Performance Deficits   Identified Performance Deficits dressing, toileting, G/H, trsfs.   Planned Therapy Interventions (OT) ADL retraining;progressive activity/exercise   Clinical Decision Making Complexity (OT) moderate complexity   Risk & Benefits of therapy have been explained patient;spouse/significant other;evaluation/treatment results reviewed   OT Discharge Planning   OT Discharge Recommendation (DC Rec) (S)  home with assist  (Pending progress with therapy and respir status with ADLs.)   OT Rationale for DC Rec  to assist at home   Total Evaluation Time (Minutes)   Total Evaluation Time (Minutes) 15   OT Goals   Therapy Frequency (OT) Daily   OT Goals Lower Body Dressing;Transfers;Hygiene/Grooming   OT: Hygiene/Grooming modified independent   OT: Lower Body Dressing Modified independent   OT: Transfer Modified independent

## 2022-04-15 NOTE — PLAN OF CARE
Problem: Plan of Care - These are the overarching goals to be used throughout the patient stay.    Goal: Readiness for Transition of Care  Outcome: Ongoing, Progressing     Problem: Gas Exchange Impaired  Goal: Optimal Gas Exchange  Outcome: Ongoing, Progressing   Goal Outcome Evaluation:                    Patient alert and oriented x 4, intermittently forgetful but easily reoriented.  Oxygen titrated down to 4L via nasal cannula this shift, able to maintain O2 saturation 88-92% on 4L. Patient has shortness of breath that worsens with exertion. She has a loud, congested sounding cough that is nonproductive. Patient uses pursed lip breathing when she gets short of breath. Using incentive spirometer and able to get up to 1250mL inspired volume. 2L is patient's baseline O2 requirement.  Telemetry reading a fib. Intermittently tachycardic, especially with activity.   HS blood glucose 150 not requiring sliding scale, spot checked overnight was 200.  On K and Mg protocols. Mg is being replaced orally and receiving oral replacement, last PO dose scheduled for 4/15 2000 and recheck needs to be the morning after (4/16). Will endorse this to next shift.  Fluid restriction maintained overnight.   Continues on IV doxycycline and cefepime.   Patient wears dentures and hearing aids and is independent with managing these.  Ambulating with standby assist, gait belt and cane.  Patient usually calls appropriately but is intermittently forgetful, utilizing bed alarm and chair alarm. Call light within reach.

## 2022-04-15 NOTE — PROGRESS NOTES
04/15/22 0945   Quick Adds   Quick Adds Edema   General Information   Onset of Illness/Injury or Date of Surgery 04/13/22   Referring Physician Sal   Edema General Information   Onset of Edema   (chronic for 3 years)   Affected Body Part(s) Left LE;Right LE  (L > R)   Edema Etiology Other (see comments)  (PAD)   Etiology Comments negative for DVT, recently had cellulitis but is clearing her RN   General Comments/Previous Edema Treatment/Edema Equipment pt reports swelling has been up and down for the last 3 years and has seen OP lymph on and off,  has been taught how to wrap pt's BLE, pt also have above knee compression stockings that she was fitted for.   Edema Examination/Assessment   Skin Condition Pitting;Non-pitting;Hemosiderin deposits  (hemosiderin spots over R shin)   Skin Condition Comments 2+ pitting LLE, more so in foot. non-pitting in RLE, minimal edema. skin is intact, no wounds, purple skin over   Scar No   Ulcerations No   Skin Integrity intact   Edema Assessment Comments pt reports slight pain in BLE   Clinical Impression   Criteria for Skilled Therapeutic Interventions Met (OT) Yes, treatment indicated   Edema: Patient Presentation Edema   Influenced by the following impairments PAD   Edema: Planned Interventions Gradient compression bandaging;Fit for compression garment;Edema exercises;Precautions to prevent infection/exacerbation;Education;Manual therapy   Clinical Decision Making Complexity (OT) moderate complexity   Risk & Benefits of therapy have been explained evaluation/treatment results reviewed;patient;spouse/significant other   OT Discharge Planning   OT Rationale for DC Rec  to assist at home. LYMPH: cont OP lymph   OT Goals   Therapy Frequency (OT) Daily   OT Predicted Duration/Target Date for Goal Attainment 04/17/22   OT Goals Edema   OT: Edema education to increase ability to manage edema after discharge from the hospital  Patient;Caregiver;Calvert;signs/symptoms of intolerance   OT: Management of edema bandages Caregiver;Verbalize;Calvert;quick wrap   Alanis Lehman, OTR/L

## 2022-04-15 NOTE — PLAN OF CARE
Problem: Plan of Care - These are the overarching goals to be used throughout the patient stay.    Goal: Optimal Comfort and Wellbeing  4/15/2022 1549 by Laura Stout RN  Outcome: Ongoing, Progressing  4/15/2022 1540 by Laura Stout RN  Outcome: Ongoing, Progressing  Pt denied pain.    Problem: Plan of Care - These are the overarching goals to be used throughout the patient stay.    Goal: Readiness for Transition of Care  4/15/2022 1549 by Laura Stout RN  Outcome: Ongoing, Progressing  4/15/2022 1540 by Laura Stout RN  Outcome: Ongoing, Progressing   Pt cont on IV antibx. Switched to Vanco this afternoon and new consult for ID.    Goal Outcome Evaluation:

## 2022-04-15 NOTE — PROGRESS NOTES
[facility] MEDICINE PROGRESS NOTE      Identification/Summary: Adalgisa Solano is a 86 year old old female with history of COPD with chronic hypoxic respiratory failure requiring 2 to 5 L of nasal cannula at home presents to the hospital complaining of worsening shortness of breath with productive cough.  Patient was hospitalized at [facility] for COPD exacerbation and possible left lower leg cellulitis.  Post discharge she progressively got worse and noted that her oxygen requirements were increasing and was on 5 L nasal cannula the date she presented to the ER.  In the ER she was found to have infiltrates and is admitted.  Subsequently tested positive for coronavirus but negative for Covid.  On droplet precautions.    Sputum cx growing Pseudomonas and Enterococcus. ID consulted. Afebrile with resolution of leukocytosis. Decreased O2 requirement to 3L.Respiratory acidosis resolving. Ongoing asymptomatic, mild hyponatremia. Tachycardic to 100s with chronic afib on telemetry. LLE DVT study negative and lymphedema wraps placed. Pt reports looser cough.     Assessment and Plan:  Right lower lobe pneumonia/corona virus PCR positive with negative COVID-19 PCR  Sputum Cx + Pseudomonas and Enterococcus   Blood culture, Influenza, urine strep - negative to date  Respiratory viral panel - coronavirus with negative COVID-19 PCR  Sputum cx - Pseudomonas and Enterococcus - sensitivities pending   Cefepime and Vancomycin  Discontinue Doxycycline  ID consulted  Scheduled duo nebs  Oxygen as needed with weaning with goal O2 sats of 88 to 92%  Nasal saline rinses for congestion  Clinically improving  Procalcitonin reassuringly normal  Discussed directly with infection control  Droplet precautions  COVID-19 special precautions not required     Acute on chronic hypoxic respiratory failure/respiratory acidosis  Caution with oxygen  Goal O2 sats 88 to 92%  Scheduled nebs  Pt using vest therapy from home  with RT  Steroids  Sputum as above  BiPAP for acidosis if decompensating  Repeat VBG improved     Heart failure with preserved ejection fraction/pulmonary hypertension  Echo in 2020 revealed preserved ejection fraction  Does appear mildly volume up on exam to me  Responded to IV Lasix  Resume home oral Lasix  Echo     Asymmetric lower extremity edema left greater than right  Ruled out DVTs  Diuresis  Lymphedema wraps     Hyponatremia  Asymptomatic  Diuresis  Oral fluid restriction discontinued   Improving     Noninsulin-dependent diabetes  Hold Metformin with diuresis  Correction insulin     Hypertension  Home regimen     Atrial fibrillation/flutter  Rate control via Diltiazem - increase to 180 mg  Anticoagulated - Xarelto     Hypomagnesemia  Magnesium protocol     Constipation  Colace and Miralax     Diet: Combination Diet Regular Diet Adult; Moderate Consistent Carb (60 g CHO per Meal) Diet; 2 gm NA Diet  Fluid restriction 1500 ML FLUID  DVT Prophylaxis:  DOAC  Code Status: No CPR- Do NOT Intubate      Disposition Plan   Anticipated possible discharge in 1 to 2 days if continued improvement in sodium and resolution of tachycardia     The patient's care was discussed with the Attending Physician, Dr. Huang and patient     Lisa HERIBERTO GLORIA [facility]  Phone: #[phone]    Interval History/Subjective:  Patient reports feeling better than when she came in. She denies fever or chills. Thinks that cough and chest congestion is beginning to loosen up. She does not feel short of breath at rest or ambulating around the room. Worsening nasal congestion. Feels bloated and constipated and asking for colace/miralax. Reports some numbness in LLE from lymphedema wrap. OT coming to rewrap following my visit. Denies headache dizziness, chest pain, abdominal pain, nausea, vomiting, diarrhea, paresthesias, or weakness.     Physical Exam/Objective:  Temp:  [97.3  F (36.3   C)-98.3  F (36.8  C)] 97.5  F (36.4  C)  Pulse:  [] 107  Resp:  [20-24] 20  BP: (114-136)/(65-87) 114/68  SpO2:  [92 %-97 %] 92 %  Body mass index is 25.9 kg/m .    GENERAL:  Alert, appears comfortable, in no acute distress, appears stated age   HEAD:  Normocephalic, without obvious abnormality, atraumatic   LUNGS:   Respirations unlabored at rest. Coarse breath sounds and expiratory wheezes throughout lung fields. Diminished breath sounds in RLL.Coughing throughout assessment.   CHEST WALL:  No tenderness or deformity   HEART:  Irregularly irregular rhythm. Tachycardic. No murmurs auscultated.    ABDOMEN:   Soft, non-tender, no masses, no organomegaly, no rebound or guarding   EXTREMITIES: LLE in lymphedema wrap from toes to knee. Visible edema underneath wrap. Cap refill hard to assess d/t swelling. Sensation intact bilaterally. 5/5 strength with plantarflexion and dorsiflexion in betancur extremities.    SKIN: Dry to touch, no exanthems in the visualized areas   NEURO: Alert, oriented x3, moves all four extremities freely, non focal   PSYCH: Cooperative, behavior is appropriate      Data reviewed today: I personally reviewed all new medications, labs, imaging/diagnostics reports over the past 24 hours. Pertinent findings include:    Imaging:   No results found for this or any previous visit (from the past 24 hour(s)).    Labs:  Most Recent 3 CBC's:Recent Labs   Lab Test 04/14/22  0618 04/13/22  1154 03/24/22  0605   WBC 7.3 11.7* 9.9   HGB 13.0 13.8 13.2   MCV 90 89 91    237 222     Most Recent 3 BMP's:Recent Labs   Lab Test 04/15/22  1203 04/15/22  0736 04/15/22  0506 04/14/22  0806 04/14/22  0618 04/13/22  2103 04/13/22  1751 04/13/22  1654 04/13/22  1154   NA  --   --  130*  --  131*  --   --   --  126*   POTASSIUM  --   --  4.0  --  3.7  --  4.4  --  4.1   CHLORIDE  --   --  86*  --  86*  --   --   --  86*   CO2  --   --  33*  --  32*  --   --   --  28   BUN  --   --  14  --  12  --   --   --  10    CR  --   --  0.61  --  0.61  --   --   --  0.64   ANIONGAP  --   --  11  --  13  --   --   --  12   DENISE  --   --  8.4*  --  8.5  --   --   --  8.6   * 155* 180*   < > 151*   < >  --    < > 190*    < > = values in this interval not displayed.     Most Recent 6 Bacteria Isolates From Any Culture (See EPIC Reports for Culture Details):No lab results found.  Most Recent 6 glucoses:Recent Labs   Lab Test 04/15/22  1203 04/15/22  0736 04/15/22  0506 04/15/22  0152 04/14/22  2121 04/14/22  1652   * 155* 180* 200* 150* 143*     Most Recent ABG:Recent Labs   Lab Test 01/31/20  2115   PH 7.43       Medications:   Personally Reviewed.  Medications     - MEDICATION INSTRUCTIONS -         ARIPiprazole  5 mg Oral At Bedtime     atorvastatin  20 mg Oral At Bedtime     Budeson-Glycopyrrol-Formoterol  2 puff Inhalation BID     ceFEPIme (MAXIPIME) IV  2 g Intravenous Q12H     diltiazem ER  120 mg Oral QAM     doxycycline (VIBRAMYCIN) IV  100 mg Intravenous Q12H     furosemide  40 mg Oral Daily     gabapentin  600 mg Oral QPM     insulin aspart  1-7 Units Subcutaneous TID AC     insulin aspart  1-5 Units Subcutaneous At Bedtime     ipratropium - albuterol 0.5 mg/2.5 mg/3 mL  3 mL Nebulization 4x Daily     magnesium oxide  400 mg Oral BID     pantoprazole  40 mg Oral Daily     predniSONE  40 mg Oral Daily     rivaroxaban ANTICOAGULANT  20 mg Oral Daily with supper     sodium chloride  3 mL Nebulization 4x Daily     sodium chloride (PF)  3 mL Intracatheter Q8H     spironolactone  12.5 mg Oral Daily     4/15/2022   [facility] :Faculty Attestation   I have seen and examined the patient.   I have discussed the case with PA student Lisa Cuba.  I agree with the findings, assessment and plan.   Roney Huang MD

## 2022-04-15 NOTE — PLAN OF CARE
Problem: Plan of Care - These are the overarching goals to be used throughout the patient stay.    Goal: Optimal Comfort and Wellbeing  Outcome: Ongoing, Progressing   Pt stated a little pain this afternoon. Was hard for pt to pin point location and was happy to get Tyl. Was rubbing thighs at one point this afternoon.     Problem: Plan of Care - These are the overarching goals to be used throughout the patient stay.    Goal: Readiness for Transition of Care  Outcome: Ongoing, Progressing   Pt having difficulty swallowing this morning. Bedside swallow done by RN and pt made NPO. Speech was able to come by this afternoon and see pt. Still unsure of swallow so put on puree diet and moderately thick liquids. Crush meds. CT re-done to make sure nothing has changed since admission.   Goal Outcome Evaluation:

## 2022-04-15 NOTE — CONSULTS
[facility]  General ID Service Consult      Patient: Adalgisa Solano  YOB: 1935, MRN: [id number]  Date of Admission:  4/13/2022  Date of Consult: 04/15/2022  Consult Requested by: Roney Huang MD  Admission Diagnosis: Hospital-acquired pneumonia [J18.9, Y95]      ID Assessment & Plan   Bronchiectasis/COPD exacerbation, Pseudomonas on sputum culture  Progressive decline  Coronavirus upper respiratory tract infection (not COVID-19)  Recent hospitalization and discharged on doxycycline and cephalexin  Upper lobe nodular opacities.  Radiology concerned about malignancy.  But consider   underlying chronic mycobacterial pneumonia (like MAC)  DM2  Allergic to levaquin so no oral options for pseudomonas    PLAN  Agree with IV cefepime  Pending susceptibilities on the Pseudomonas  10 days    Continue supportive measures, per hospitalist, bronchodilators and oral steroids  Sputum AFB stain and culture and induce if necessary, or consult pulmonary    Sheila Lafleur MD  [facility]  ______________________________________________________________________      History of Present Illness   86 years old female with history of COPD, emphysema, avascular bone necrosis, Arnold-Chiari malformation, A. fib, cellulitis left leg, peripheral arterial disease, diabetes mellitus type 2, left axillary DVT, chronic respiratory failure on home oxygen, multiple prosthetic joints, who is admitted yesterday with worsening shortness of breath and productive cough.  No hemoptysis.  Chest pain and discomfort only with coughing.   had a cold recently.  No travel.  Covid vaccinated x4.  She does describe a progressive worsening in her respiratory status over the last 2 years.  No weight loss.  Recently hospitalized last month with similar symptoms discharged on doxycycline and cephalexin for suspected left lower extremity cellulitis.    Sputum is positive for  Enterococcus and Pseudomonas.  Nasopharyngeal PCR screen is positive for none Covid coronavirus.    Radiology personally reviewed  CT  FINDINGS:  ANGIOGRAM CHEST: Excellent opacification of pulmonary arteries and branches. No evidence of pulmonary emboli. Pulmonary artery is of normal caliber. Extensive aortic calcifications without any aneurysm.     LUNGS AND PLEURA: There is a lobulated right upper lobe pulmonary nodule that measures 1.2 x 0.7 cm (axial image 106, coronal image 51 and sagittal image 82). In retrospect there is a 5 x 3 mm nodule in this area before. Inferior to this, there are other   multiple smaller nodular as well as peribronchiolar opacities (images 116 122, 123, 135 and 141). Extensive upper lobe bullous emphysema. Areas of cylindrical bronchiectasis noted in both lower lobes with peribronchial thickening multiple areas of   retained secretions greatest in the left lower lobe. No consolidating pneumonia or effusions.     MEDIASTINUM/AXILLAE: There is a heterogeneous 2.4 cm left thyroid nodule, unchanged. No suspicious adenopathy. Multichamber cardiac enlargement. There is a small hiatal hernia.    Review of Systems   The 10 point Review of Systems is negative other than noted in the HPI or here.     Past Medical History    Past Medical History:   Diagnosis Date     Acute and chronic respiratory failure with hypoxia (H)      Acute blood loss anemia 1/15/2019     Acute bronchitis 1/15/2019     Anemia     pernicious anemia     Anticoagulated 3/23/2022     Anxiety      Arm skin lesion, right      Arnold-Chiari malformation (H) 1998     Arthritis      Atrial fibrillation (H) 02/2017     Avascular necrosis of bone (H)      Breast cyst      Breast lump      Candidal skin infection      Cellulitis of left lower extremity 1/28/2019     CHF (congestive heart failure) (H)     diastolic and systolic     Chronic bronchitis (H)     & acute     Chronic diastolic heart failure (H)     diastolic chf      Chronic pain syndrome      Chronic respiratory failure with hypoxia (H) 3/13/2017     COPD (chronic obstructive pulmonary disease) (H)      COPD exacerbation (H)      Depression      Diet-controlled type 2 diabetes mellitus (H)      Drug induced constipation      DVT of axillary vein, acute (H)     left     DVT of axillary vein, acute left (H)      Edema      Encounter for palliative care      Erysipelas      Fracture of femoral neck, left (H)      Generalized muscle weakness      GERD (gastroesophageal reflux disease)      History of blood clots      Hyperlipidemia      Hypertension      Left hip pain      Lumbar spinal stenosis      PAD (peripheral artery disease) (H)      Peripheral arterial disease (H) 10/28/2015     Psoriasis     arms      Pulmonary hypertension (H) 3/5/2018     Recurrent major depressive episodes (H)     Created by Conversion  Replacement Utility updated for latest IMO load     Schizoaffective disorder, bipolar type (H) 2015     Slow transit constipation      Stasis dermatitis of both legs      Status post total hip replacement, 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     Type 2 diabetes mellitus without complication (H) 2016     Vitamin B12 deficiency      Volume overload        Past Surgical History   Past Surgical History:   Procedure Laterality Date     BACK SURGERY       BREAST CYST ASPIRATION Left      CERVICAL SPINE SURGERY      for arnold chiari malformation      SECTION  X3     CHOLECYSTECTOMY       COLONOSCOPY       EXCISE LESION UPPER EXTREMITY Right 2019    Procedure: EXCISION RIGHT ARM LIPOMA;  Surgeon: Andreas Holly MD;  Location: [facility];  Service: General     EYE SURGERY      bilateral cataracts     HAND SURGERY       HERNIORRHAPHY UMBILICAL N/A 2021    Procedure: INCARCERATED UMBILICAL AND;  Surgeon: Andreas Holly MD;  Location: [facility]     JOINT REPLACEMENT Right     knee - partial     LAPAROSCOPIC HERNIORRHAPHY INCISIONAL Left 3/27/2015     Procedure: ATTEMPTED LAPAROSCOPIC ABDOMINA/FLANK INCISION REPAIR;  Surgeon: Jose Lakhani MD;  Location: [facility] OR;  Service:      LUMBAR FUSION N/A 2014    Procedure: POSTERIOR FUSION / DECOMPRESSION L3-S1 WITH PELVIC FIXATION BILATERAL ;  Surgeon: Rajan Mares MD;  Location: [facility] OR;  Service:      OTHER SURGICAL HISTORY      IR for PAD LOWER EXTREMITY     REPAIR SPIGELIAN HERNIA N/A 2021    Procedure: SPIGELIAN HERNIA REPAIR;  Surgeon: Andreas Holly MD;  Location: [facility] ASPIRATION HEMATOMA SEROMA OR FLUID COLLECTION  2020     [facility] OPEN FIXATN PROX END/NECK FEMUR FX Left 2019    Procedure: OPEN REDUCTION INTERNAL FIXATION, FRACTURE LEFT HIP, PERIPROSTHETIC FRACTURE;  Surgeon: Kevin Lackey MD;  Location: [facility];  Service: Orthopedics     [facility] TOTAL HIP ARTHROPLASTY Left 2019    Procedure: LEFT TOTAL HIP ARTHROPLASTY;  Surgeon: Kevin Lackey MD;  Location: [facility] OR;  Service: Orthopedics     [facility] TOTAL HIP ARTHROPLASTY Right 2019    Procedure: RIGHT TOTAL HIP ARTHROPLASTY;  Surgeon: Kan Quesada MD;  Location: [facility] OR;  Service: Orthopedics     [facility] TOTAL KNEE ARTHROPLASTY Left 10/7/2016    Procedure: TOTAL KNEE ARTHROPLASTY, LEFT;  Surgeon: Kan Quesada MD;  Location: [facility];  Service: Orthopedics       Social History   Social History     Tobacco Use     Smoking status: Former Smoker     Quit date: 2006     Years since quittin.2     Smokeless tobacco: Never Used     Tobacco comment: quit    Vaping Use     Vaping Use: Never used   Substance Use Topics     Alcohol use: No     Drug use: No       Family History   I have reviewed this patient's family history and updated it with pertinent information if needed.  Family History   Problem Relation Age of Onset     Coronary Artery Disease Mother      Coronary Artery Disease Father        Medications   I have reviewed this patient's  current medications    Allergies   Allergies   Allergen Reactions     Tramadol Nausea     Amoxicillin Itching and Rash     Levofloxacin Itching and Rash     Penicillins Itching and Rash     Has tolerated ceftriaxone.       Physical Exam   Vital Signs: Temp: 98.4  F (36.9  C) Temp src: Oral BP: 122/64 Pulse: 95   Resp: 20 SpO2: 96 % O2 Device: Nasal cannula Oxygen Delivery: 3 LPM  Weight: 137 lbs 1.6 oz  Gen. Appearance tired, labored breathing, short sentences  Eyes no conjunctivitis or icterus  Neck no stiffness or neck vein distention, no LN  Heart  irregular  Lungs wheezing, rhonchi  Abdomen soft not tender  Extremities no synovitis, positive edema  Skin  no rash or emboli  Neurologic alert oriented no focal deficits        Data   Inflammatory Markers   Recent Labs   Lab Test 11/05/21  0415 11/03/21  1736 02/01/20  0625 01/31/20  2023 11/23/18  1834 11/23/18  1158 08/14/18  1451   SED  --   --  30*  --  22*  --   --    CRP 12.3* 10.5* 18.3* 18.1*  --  3.4* 0.9*        Hematology Studies   Recent Labs   Lab Test 04/14/22  0618 04/13/22  1154 03/24/22  0605 03/23/22  1240 02/06/22  1136 11/05/21  0415   WBC 7.3 11.7* 9.9 11.3* 7.0 11.5*   HGB 13.0 13.8 13.2 12.5 12.3 12.5   MCV 90 89 91 89 88 88    237 222 226 215 197       Metabolic Studies   Recent Labs   Lab Test 04/15/22  0506 04/14/22  0618 04/13/22  1751 04/13/22  1154 03/25/22  0606 03/24/22  0605   * 131*  --  126* 135* 134*   POTASSIUM 4.0 3.7 4.4 4.1 3.9 4.1   CHLORIDE 86* 86*  --  86* 95* 93*   CO2 33* 32*  --  28 33* 33*   BUN 14 12  --  10 15 14   CR 0.61 0.61  --  0.64 0.63 0.56*   GFRESTIMATED 88 88  --  87 87 89       Hepatic Studies    Recent Labs   Lab Test 04/14/22  0618 03/23/22  1240 03/21/22  0937 11/03/21  1736 10/07/21  2325 08/31/21  0923   BILITOTAL 0.6 0.5 0.7 0.8 0.5 1.2*   ALKPHOS 55 60 65 72 88 82   ALBUMIN 3.2* 3.4* 3.5 3.3* 3.4* 3.2*   AST 15 16 16 22 16 19   ALT 13 14 17 22 12 15       Most Recent 6 Bacteria Isolates  From Any Culture (See EPIC Reports for Culture Details):No lab results found.    Urine Studies    Recent Labs   Lab Test 08/15/21  1634 08/07/21  0957 12/28/20  1216 10/03/19  0916 09/11/19  0120 08/22/19  1513 01/10/19  2216 12/21/18  0558 11/08/18  1811 08/27/18  1359   LEUKEST Negative 250 Ney/uL* Negative Negative Negative Small*   < > Negative   < > Large*   WBCU <1 105*  --   --   --  0-5  --  0-5  --  25-50*    < > = values in this interval not displayed.       Vancomycin Levels  No lab results found.    Invalid input(s): VANCO    Hepatitis B Testing No lab results found.  Hepatitis C Testing   No results found for: HCVAB, HQTG, HCGENO, HCPCR, HQTRNA, HEPRNA  HIVTesting No lab results found.    Respiratory Virus Testing    No results found for: RS, FLUAG  COVID-19 Antibody Results, Testing for Immunity    COVID-19 Antibody Results, Testing for Immunity   No data to display.         COVID-19 PCR Results    COVID-19 PCR Results 6/11/21 8/15/21 9/17/21 10/7/21 11/3/21 3/23/22 4/13/22   SARS-CoV-2 PCR Result Negative         SARS CoV2 PCR  Negative Negative Negative Negative Negative Negative      Comments are available for some flowsheets but are not being displayed.

## 2022-04-15 NOTE — PHARMACY-VANCOMYCIN DOSING SERVICE
Pharmacy Vancomycin Initial Note  Date of Service April 15, 2022  Patient's  1935  86 year old, female    Indication: Community Acquired Pneumonia    Current estimated CrCl = Estimated Creatinine Clearance: 58.1 mL/min (based on SCr of 0.61 mg/dL).    Creatinine for last 3 days  2022: 11:54 AM Creatinine 0.64 mg/dL  2022:  6:18 AM Creatinine 0.61 mg/dL  4/15/2022:  5:06 AM Creatinine 0.61 mg/dL    Recent Vancomycin Level(s) for last 3 days  No results found for requested labs within last 72 hours.      Vancomycin IV Administrations (past 72 hours)      No vancomycin orders with administrations in past 72 hours.                Nephrotoxins and other renal medications (From now, onward)    Start     Dose/Rate Route Frequency Ordered Stop    04/15/22 1430  vancomycin 1500 mg in 0.9% NaCl 250 ml intermittent infusion 1,500 mg         1,500 mg  over 90 Minutes Intravenous EVERY 24 HOURS 04/15/22 1414      22 1530  furosemide (LASIX) tablet 40 mg         40 mg Oral DAILY 22 1513            Contrast Orders - past 72 hours (72h ago, onward)    None          [employer] Prediction of Planned Initial Vancomycin Regimen  Loading dose: N/A  Regimen: 1500 mg IV every 24 hours.  Start time: 14:16 on 04/15/2022  Exposure target: AUC24 (range)400-600 mg/L.hr   AUC24,ss: 501 mg/L.hr  Probability of AUC24 > 400: 76 %  Ctrough,ss: 13.5 mg/L  Probability of Ctrough,ss > 20: 17 %  Probability of nephrotoxicity (Lodise ELIUD ): 9 %          Plan:  1. Start vancomycin  1500 mg IV q24h.   2. Vancomycin monitoring method: AUC  3. Vancomycin therapeutic monitoring goal: 400-600 mg*h/L  4. Pharmacy will check vancomycin levels as appropriate in 1-3 Days.    5. Serum creatinine levels will be ordered daily for the first week of therapy and at least twice weekly for subsequent weeks.      Casandra Everett [facility]

## 2022-04-15 NOTE — PROGRESS NOTES
Pt seen for BID vest treatments (30 min treatments)  and QID nebulizers. Pt on 4L NC this morning and was weaned down to 3L NC. O2 sats remain 90-96% depending on activity. Pt has strong, harsh, congested cough. Pt seems to be improving. Rt to follow.    Gricelda Reyes, RT

## 2022-04-16 NOTE — PROGRESS NOTES
"/67 (BP Location: Right arm)   Pulse 82   Temp 98.3  F (36.8  C) (Oral)   Resp 18   Ht 1.549 m (5' 1\")   Wt 63.1 kg (139 lb 1.6 oz)   SpO2 94%   BMI 26.28 kg/m      Patient is on schedule duonebs and sodium chloride 4 times a day. Pt. Has home vest BID. Breath sounds are course/ex. Wheeze. Croupy congested cough. RT continue to follow.     Miriam Peterson, RT      "

## 2022-04-16 NOTE — PLAN OF CARE
Problem: Plan of Care - These are the overarching goals to be used throughout the patient stay.    Goal: Optimal Comfort and Wellbeing  Outcome: Ongoing, Progressing  Pt denied pain this shift.    Problem: Plan of Care - These are the overarching goals to be used throughout the patient stay.    Goal: Readiness for Transition of Care  Outcome: Ongoing, Progressing   Pt feeling much better this morning. Had a BM after lunch. Also had a significant coughing fit this afternoon. Pt had difficulty recovering so turned up to 5L NC. Cont to sat in the high 80's- low 90's. Sputum culture sent.    Goal Outcome Evaluation:

## 2022-04-16 NOTE — PLAN OF CARE
Problem: Gas Exchange Impaired  Goal: Optimal Gas Exchange  Outcome: Ongoing, Progressing   Goal Outcome Evaluation:        Pt A/O, VSS except utilizing 4L NC. Dyspnea on exertion. LS coarse. L leg lymph wrap in place. IV abx. CHO diet, 1500ml FR.     Plan to discharge home once medically stable.

## 2022-04-16 NOTE — PROGRESS NOTES
[facility] MEDICINE PROGRESS NOTE      Identification/Summary:   Adalgisa Solano is a 86 year old old female with history of COPD with chronic hypoxic respiratory failure requiring 2 to 5 L of nasal cannula at home presents to the hospital complaining of worsening shortness of breath with productive cough.  Patient was hospitalized at [facility] for COPD exacerbation and possible left lower leg cellulitis.  Post discharge she progressively got worse and noted that her oxygen requirements were increasing and was on 5 L nasal cannula the date she presented to the ER.  In the ER she was found to have infiltrates and is admitted.  Subsequently tested positive for coronavirus but negative for Covid.  On droplet precautions.     Sputum cx growing Pseudomonas and enterococcus.  ID consulted. Afebrile with resolution of leukocytosis. Decreased O2 requirement to 3L.Respiratory acidosis resolving. Ongoing asymptomatic, mild hyponatremia. Tachycardic to 100s with chronic afib on telemetry. LLE DVT study negative and lymphedema wraps placed. Pt reports looser cough.      Assessment and Plan:  Right lower lobe pneumonia/corona virus PCR positive with negative COVID-19 PCR  Sputum Cx + Pseudomonas and Enterococcus   Blood culture, Influenza, urine strep - negative to date  Respiratory viral panel - coronavirus with negative COVID-19 PCR  Sputum cx - Pseudomonas and Enterococcus - sensitivities pending   Cefepime   I started Vanco yesterday but looks like infectious disease discontinued  Discontinue Doxycycline  Scheduled duo nebs  Oxygen as needed with weaning with goal O2 sats of 88 to 92%  Nasal saline rinses for congestion  Clinically improving  Procalcitonin reassuringly normal  Discussed directly with infection control  Droplet precautions  COVID-19 special precautions not required     Acute on chronic hypoxic respiratory failure/respiratory acidosis  Caution with oxygen  Goal O2 sats 88 to  "92%  Scheduled nebs  Pt using vest therapy from home with RT  Steroids  Sputum as above  BiPAP for acidosis if decompensating  Repeat VBG improved     Heart failure with preserved ejection fraction/pulmonary hypertension  Echo in 2020 revealed preserved ejection fraction  Does appear mildly volume up on exam to me  Responded to IV Lasix  Resume home oral Lasix  Echo unremarkable     Asymmetric lower extremity edema left greater than right  Ruled out DVTs  Diuresis  Lymphedema wraps  Improved     Hyponatremia  Asymptomatic  Diuresis  Oral fluid restriction discontinued   Improving     Noninsulin-dependent diabetes  Hold Metformin with diuresis  Correction insulin     Hypertension  Home regimen     Atrial fibrillation/flutter  Rate control via Diltiazem - increase to 180 mg  Anticoagulated - Xarelto      Hypomagnesemia  Magnesium protocol      Constipation  Colace and Miralax              # Overweight: Estimated body mass index is 26.28 kg/m  as calculated from the following:    Height as of this encounter: 1.549 m (5' 1\").    Weight as of this encounter: 63.1 kg (139 lb 1.6 oz).      Diet: Combination Diet Regular Diet Adult; Moderate Consistent Carb (60 g CHO per Meal) Diet; 2 gm NA Diet  DVT Prophylaxis: DOAC  Code Status: No CPR- Do NOT Intubate    Anticipated possible discharge per IDs recommendation    Disposition Plan   Expected Discharge: 04/16/2022            The patient's care was discussed with the Bedside Nurse, Patient and Patient's Family.    Roney Huang MD  [facility] Medicine  [facility]  Phone: #[phone]    Interval History/Subjective:  Feeling okay.  States that she was told by ID that she need to stay here for several more days.  Explained I will discuss her case with infectious disease.  No fevers or chills.  No nausea vomiting.  Overall feeling better.    Physical Exam/Objective:  Temp:  [97.2  F (36.2  C)-98.4  F (36.9  C)] 97.5  F (36.4  C)  Pulse:  " [] 106  Resp:  [16-20] 16  BP: (122-140)/(64-76) 137/76  SpO2:  [89 %-96 %] 96 %  Body mass index is 26.28 kg/m .    GENERAL:  Alert, appears comfortable, in no acute distress, appears stated age   HEAD:  Normocephalic, without obvious abnormality, atraumatic   EYES:  PERRL, conjunctiva/corneas clear, no scleral icterus, EOM's intact   NOSE: Nares normal, septum midline, mucosa normal, no drainage   BACK:   Symmetric, no curvature, ROM normal   LUNGS:   Clear to auscultation bilaterally, no rales, rhonchi, or wheezing, symmetric chest rise on inhalation, respirations unlabored   CHEST WALL:  No tenderness or deformity   HEART:  Regular rate and rhythm, S1 and S2 normal, no murmur, rub, or gallop    ABDOMEN:   Soft, non-tender, bowel sounds active all four quadrants, no masses, no organomegaly, no rebound or guarding   EXTREMITIES: Extremities normal, atraumatic, no cyanosis or edema    SKIN: Dry to touch, no exanthems in the visualized areas   NEURO: Alert, oriented x3, moves all four extremities freely   PSYCH: Cooperative, behavior is appropriate      Data reviewed today: I personally reviewed all new medications, labs, imaging/diagnostics reports over the past 24 hours. Pertinent findings include:    Imaging:   Recent Results (from the past 24 hour(s))   Echocardiogram Complete    Narrative    [id number]  [id number]  [id number]  [id number]^[place] [address]     Name: ASH HARRIS  MRN: [id number]  : 1935  Study Date: 04/15/2022 03:07 PM  Age: 86 yrs  Gender: Female  Patient Location: [facility]  Reason For Study: Tachycardia  Ordering Physician: MATTIE ALCANTARA  Performed By: LIONEL     BSA: 1.6 m2  Height: 61 in  Weight: 137 lb  HR: 96  ______________________________________________________________________________  Procedure  Complete Portable Echo  Adult.  ______________________________________________________________________________  Interpretation Summary     1. Normal left ventricular size and systolic performance with a visually  estimated ejection fraction of 65%.  2. There is moderate mitral insufficiency.  3. There is moderate tricuspid insufficiency.  4. Mild right ventricular enlargement with normal right ventricular systolic  performance.  5. There is severe biatrial enlargement.  6. Right ventricular systolic pressure relative to right atrial pressure is  mildly increased. The pulmonary artery pressure is estimated to be 35-40 mmHg  plus right atrial pressure (the IVC is moderately dilated).     When compared to the prior real-time echocardiogram dated 7 December 2020,  there has been a mild decrease in the pulmonary artery pressure estimate. The  findings are otherwise felt to be fairly similar on both examinations.  ______________________________________________________________________________  Left ventricle:  Normal left ventricular size and systolic performance with a visually  estimated ejection fraction of 65%. There is normal regional wall motion. Left  ventricular wall thickness is normal.     Assessment of LV Diastolic Function: Patient appears to be in atrial  fibrillation which limits assessment of diastolic filling.     Right ventricle:  Mild right ventricular enlargement with normal right ventricular systolic  performance.     Left atrium:  There is severe left atrial enlargement.     Right atrium:  There is severe right atrial enlargement.     IVC:  The IVC is moderately dilated.     Aortic valve:  The aortic valve is comprised of three cusps. No significant aortic stenosis  or aortic insufficiency is detected on this study.     Mitral valve:  The mitral valve appears morphologically normal. There is moderate mitral  insufficiency.     Tricuspid valve:  The tricuspid valve is grossly morphologically normal. There is  moderate  tricuspid insufficiency.     Pulmonic valve:  The pulmonic valve is grossly morphologically normal. There is trace pulmonic  insufficiency.     Thoracic aorta:  The aortic root and proximal ascending aorta are of normal dimension.     Pericardium:  There is no significant pericardial effusion.  ______________________________________________________________________________  ______________________________________________________________________________  MMode/2D Measurements & Calculations  IVSd: 0.78 cm  LVIDd: 4.9 cm  LVIDs: 3.0 cm  LVPWd: 0.92 cm  FS: 38.2 %  LV mass(C)d: 139.2 grams  LV mass(C)dI: 86.5 grams/m2  Ao root diam: 2.9 cm  LA dimension: 3.9 cm  asc Aorta Diam: 2.9 cm  LA/Ao: 1.4  LVOT diam: 1.9 cm  LVOT area: 2.7 cm2     LA Volume Indexed (AL/bp): 57.5 ml/m2  RWT: 0.38     Time Measurements  MM HR: 104.0 BPM     Doppler Measurements & Calculations  MV E max jeffery: 107.0 cm/sec  MV max P.9 mmHg  MV mean P.6 mmHg  MV V2 VTI: 14.0 cm  MVA(VTI): 3.1 cm2  MV dec time: 0.19 sec  Ao V2 max: 131.9 cm/sec  Ao max P.0 mmHg  Ao V2 mean: 89.9 cm/sec  Ao mean PG: 3.7 mmHg  Ao V2 VTI: 22.8 cm  VAISHALI(I,D): 1.9 cm2  VAISHALI(V,D): 2.0 cm2  LV V1 max PG: 3.5 mmHg  LV V1 max: 94.1 cm/sec  LV V1 VTI: 16.1 cm  MR ERO: 0.10 cm2  MR volume: 14.0 ml  SV(LVOT): 44.0 ml  SI(LVOT): 27.4 ml/m2  PA acc time: 0.10 sec  TR max jeffery: 302.4 cm/sec  TR max P.6 mmHg     AV Jeffery Ratio (DI): 0.71  VAISHALI Index (cm2/m2): 1.2  E/E' avg: 10.5  Lateral E/e': 8.5  Medial E/e': 12.5     ______________________________________________________________________________  Report approved by: Annette White 04/15/2022 04:00 PM             Labs:  Most Recent 3 CBC's:Recent Labs   Lab Test 22  0618 22  1154 22  0605   WBC 7.3 11.7* 9.9   HGB 13.0 13.8 13.2   MCV 90 89 91    237 222     Most Recent 3 BMP's:Recent Labs   Lab Test 22  1211 22  0754 22  0617 04/15/22  0736 04/15/22  0506  04/14/22  0806 04/14/22 0618   NA  --   --  132*  --  130*  --  131*   POTASSIUM  --   --  4.2  --  4.0  --  3.7   CHLORIDE  --   --  92*  --  86*  --  86*   CO2  --   --  32*  --  33*  --  32*   BUN  --   --  15  --  14  --  12   CR  --   --  0.57*  --  0.61  --  0.61   ANIONGAP  --   --  8  --  11  --  13   DENISE  --   --  8.3*  --  8.4*  --  8.5   * 146* 177*   < > 180*   < > 151*    < > = values in this interval not displayed.     Most Recent 2 LFT's:Recent Labs   Lab Test 04/14/22 0618 03/23/22  1240   AST 15 16   ALT 13 14   ALKPHOS 55 60   BILITOTAL 0.6 0.5     Most Recent 3 INR's:Recent Labs   Lab Test 10/07/21  2325 06/11/21  1022 01/31/20 2023   INR 3.68* 2.07* 1.14*       Medications:   Personally Reviewed.  Medications     - MEDICATION INSTRUCTIONS -         ARIPiprazole  5 mg Oral At Bedtime     atorvastatin  20 mg Oral At Bedtime     Budeson-Glycopyrrol-Formoterol  2 puff Inhalation BID     ceFEPIme (MAXIPIME) IV  2 g Intravenous Q12H     diltiazem ER  180 mg Oral QAM     docusate sodium  100 mg Oral BID     furosemide  40 mg Oral Daily     gabapentin  600 mg Oral QPM     insulin aspart  1-7 Units Subcutaneous TID AC     insulin aspart  1-5 Units Subcutaneous At Bedtime     ipratropium - albuterol 0.5 mg/2.5 mg/3 mL  3 mL Nebulization 4x Daily     pantoprazole  40 mg Oral Daily     polyethylene glycol  17 g Oral Daily     predniSONE  40 mg Oral Daily     rivaroxaban ANTICOAGULANT  20 mg Oral Daily with supper     sodium chloride  3 mL Nebulization 4x Daily     sodium chloride (PF)  3 mL Intracatheter Q8H     spironolactone  12.5 mg Oral Daily

## 2022-04-16 NOTE — PROGRESS NOTES
Care Management Follow Up    Length of Stay (days): 3        Patient plan of care discussed at interdisciplinary rounds:     Expected Discharge Date:   4/17/22       Concerns to be Addressed / Barriers to Discharge: medical management, home care     Anticipated Discharge Disposition: home    Anticipated Discharge Services:  pending    Anticipated Discharge DME:        Education Provided on the Discharge Plan:   yes  Patient/Family in Agreement with the Plan:       Referrals Placed by CM/SW:   Private pay costs discussed:     Additional Information:  4/16/2022 Discussed discharge planning with patient re: home PT. She would like [facility]. When this writer went into computer found selection for [facility] as patient has used them in the past. Unfortunately, [facility] only offers hospice not home care.   Referral sent.    Called and left message for intake @ [facility] re: new referral- checking if they have availability.  Requested return call to [phone].     3:34 PM Return call from [facility]- at capacity so declining patient. CM to follow up with patient 4/17 for other home care agency names.

## 2022-04-16 NOTE — PROVIDER NOTIFICATION
04/15/22 2022   Tech Time   $Tech Time (10 minute increments) 3   Vital Signs   Resp 20   Pulse 91   Pulse Rate Source Monitor   Oxygen Therapy   Flow (L/min) 3   Oxygen Concentration (%) 94   Device (Oxygen Therapy) nasal cannula   Oxygen Therapy   SpO2 94 %   O2 Device Nasal cannula   Oxygen Delivery 4 LPM   Breath Sounds   Breath Sounds All Fields   All Lung Fields Breath Sounds coarse;rhonchi   Nebulizer Assessment & Treatment   $RT Use ONLY Delivery Method Nebulizer - Additional   Nebulizer Device Mask   Pretreatment Heart Rate (beats/min) 91   Pretreatment Resp Rate (breaths/min) 20   Pretreatment O2 sats - (TCU only) 94   Pretreat Breath Sounds - Bilat - All Lobes coarse;rhonchi   Patient Position sitting in chair   Breath Sounds Post-Respiratory Treatment   Posttreatment Heart Rate (beats/min) 98   Posttreatment Resp Rate (breaths/min) 22   Post treatment O2 Sats - (TCU only) 96   Posttreatment Assessment (SVN) breath sounds unchanged   Signs of Intolerance (SVN) none   Resp Acapella   Acapella Done   Resistance Level (indicate level) 3     Patient tolerating vest therapy and neb tx well.

## 2022-04-16 NOTE — PROGRESS NOTES
Occupational Therapy Discharge Summary    Reason for therapy discharge:    All goals and outcomes met, no further needs identified.    Progress towards therapy goal(s). See goals on Care Plan in [facility] electronic health record for goal details.  Goals met    Therapy recommendation(s):    No further OT recommended at this time. Pt is at baseline for ADLs and can receive assist from spouse at home as needed.    Loraine Lopez, OTR/L 4/16/2022

## 2022-04-16 NOTE — PROGRESS NOTES
ID chart check    Cefepime for now, awaiting pseudomonas susceptibilities. Enterococcus would be unlikely pulmonary pathogen.    Shai Simmons MD

## 2022-04-17 NOTE — PROGRESS NOTES
"/58 (BP Location: Right arm)   Pulse 108   Temp 98.2  F (36.8  C) (Oral)   Resp 18   Ht 1.549 m (5' 1\")   Wt 62.5 kg (137 lb 11.2 oz)   SpO2 95%   BMI 26.02 kg/m      Patient on 4L NC. Breath sounds are course with ex. Wheeze. Duonebs and sodium chloride are 4 times a day. Home Vest therapy BID. Non productive cough. RT continue to follow.     Miriam Peterson, RT      "

## 2022-04-17 NOTE — PROGRESS NOTES
Patient continues with scheduled duonebs and sodium chloride 4 times daily, Pt has home vest and is using it BID.  Oxygen currently at 5 liters with humidity Sp02 ~ 91-94%.   Congested loose cough, with Course/rhonchi breath sounds.

## 2022-04-17 NOTE — PROGRESS NOTES
Care Management Follow Up    Length of Stay (days): 4    Expected Discharge Date: 04/18/2022     Concerns to be Addressed: antibiotic plan  care coordination, discharge planning     Patient plan of care discussed at interdisciplinary rounds: Yes    Anticipated Discharge Disposition: Home Care, Home     Anticipated Discharge Services: TBD    Anticipated Discharge DME: None (No new equipment anticipated.)    Patient/family educated on Medicare website which has current facility and service quality ratings: no (When presented as a potential need, pt and  stated previous use of [facility] and had no other preferences. Not interested in vendor list.)    Education Provided on the Discharge Plan:  AVS will be per bedside RN.    Patient/Family in Agreement with the Plan: yes        Additional Information:  Chart reviewed. CM attempted 3 times to see patient. Patient was unavailable. The third attempt the patient asked CM to come back because too many people were in the room. A RN came in to attempt to restart an IV. CM will follow and attempt to clarify discharge plan and home care choices.       Anna Rodriguez RN      s

## 2022-04-17 NOTE — PROGRESS NOTES
[facility] MEDICINE PROGRESS NOTE      Identification/Summary:   Adalgisa Solano is a 86 year old old female with history of COPD with chronic hypoxic respiratory failure requiring 2 to 5 L of nasal cannula at home presents to the hospital complaining of worsening shortness of breath with productive cough.  Patient was hospitalized at [facility] for COPD exacerbation and possible left lower leg cellulitis.  Post discharge she progressively got worse and noted that her oxygen requirements were increasing and was on 5 L nasal cannula the date she presented to the ER.  In the ER she was found to have infiltrates and is admitted.  Subsequently tested positive for coronavirus but negative for Covid.  On droplet precautions.     Sputum cx growing Pseudomonas and enterococcus.  ID consulted. Afebrile with resolution of leukocytosis. Decreased O2 requirement to 3L.Respiratory acidosis resolving. Ongoing asymptomatic, mild hyponatremia. Tachycardic to 100s with chronic afib on telemetry. LLE DVT study negative and lymphedema wraps placed. Pt reports looser cough.      Assessment and Plan:  Right lower lobe pneumonia/corona virus PCR positive with negative COVID-19 PCR  Sputum Cx + Pseudomonas and Enterococcus   Blood culture, Influenza, urine strep - negative to date  Respiratory viral panel - coronavirus with negative COVID-19 PCR  Sputum cx - Pseudomonas and Enterococcus - sensitivities pending   Cefepime   Discontinue Doxycycline  Scheduled duo nebs  Oxygen as needed with weaning with goal O2 sats of 88 to 92%  Nasal saline rinses for congestion  Clinically improving  Procalcitonin reassuringly normal  Discussed directly with infection control  Droplet precautions  COVID-19 special precautions not required     Acute on chronic hypoxic respiratory failure/respiratory acidosis  Caution with oxygen  Goal O2 sats 88 to 92%  Scheduled nebs  Pt using vest therapy from home with  "RT  Steroids  Sputum as above  BiPAP for acidosis if decompensating  Repeat VBG improved     Heart failure with preserved ejection fraction/pulmonary hypertension  Echo in 2020 revealed preserved ejection fraction  Does appear mildly volume up on exam to me  Responded to IV Lasix  Resume home oral Lasix  Echo unremarkable     Asymmetric lower extremity edema left greater than right  Ruled out DVTs  Diuresis  Lymphedema wraps  Improved     Hyponatremia  Asymptomatic  Diuresis  Oral fluid restriction discontinued   Improving     Noninsulin-dependent diabetes  Hold Metformin with diuresis  Correction insulin     Hypertension  Home regimen     Atrial fibrillation/flutter  Rate control via Diltiazem - increase to 180 mg  Anticoagulated - Xarelto      Hypomagnesemia  Magnesium protocol      Constipation  Colace and Miralax \  Increase MiraLAX to twice daily 4/17                 # Overweight: Estimated body mass index is 26.02 kg/m  as calculated from the following:    Height as of this encounter: 1.549 m (5' 1\").    Weight as of this encounter: 62.5 kg (137 lb 11.2 oz).      Diet: Combination Diet Regular Diet Adult; Moderate Consistent Carb (60 g CHO per Meal) Diet; 2 gm NA Diet  DVT Prophylaxis:  DOAC  Code Status: No CPR- Do NOT Intubate    Anticipated possible discharge when antibiotic plan delineated    Disposition Plan   Expected Discharge: 04/17/2022     Anticipated discharge location: home with help/services (Pt stated openness to receiving in-home care if deemed appropriate. [facility] utilized in the past.)           The patient's care was discussed with the Bedside Nurse, Care Coordinator/, Patient and Patient's Family.    Roney Huang MD  [facility] Medicine  [facility]  Phone: #[phone]    Interval History/Subjective:  Patient is doing well.  No fevers or chills.  No nausea or vomiting.  No chest pain.  Eating without issues.  Still coughing but less " production per her report.  She overall feels much better than when she came in.    Physical Exam/Objective:  Temp:  [97.5  F (36.4  C)-98.3  F (36.8  C)] 98.2  F (36.8  C)  Pulse:  [79-92] 92  Resp:  [18-21] 20  BP: (130-137)/(67-82) 137/82  SpO2:  [88 %-98 %] 98 %  Body mass index is 26.02 kg/m .    GENERAL:  Alert, appears comfortable, in no acute distress, appears stated age   HEAD:  Normocephalic, without obvious abnormality, atraumatic   EYES:  PERRL, conjunctiva/corneas clear, no scleral icterus, EOM's intact   NECK: Supple, symmetrical, trachea midline   BACK:   Symmetric, no curvature, ROM normal   LUNGS:   Clear to auscultation bilaterally, no rales, rhonchi, or wheezing, symmetric chest rise on inhalation, respirations unlabored   CHEST WALL:  No tenderness or deformity   HEART:  Regular rate and rhythm, S1 and S2 normal, no murmur, rub, or gallop    ABDOMEN:   Soft, non-tender, bowel sounds active all four quadrants, no masses, no organomegaly, no rebound or guarding   EXTREMITIES: Extremities normal, atraumatic, no cyanosis or edema    SKIN: Dry to touch, no exanthems in the visualized areas   NEURO: Alert, oriented x3, moves all four extremities freely   PSYCH: Cooperative, behavior is appropriate      Data reviewed today: I personally reviewed all new medications, labs, imaging/diagnostics reports over the past 24 hours. Pertinent findings include:    Imaging:   No results found for this or any previous visit (from the past 24 hour(s)).    Labs:  Most Recent 3 CBC's:Recent Labs   Lab Test 04/14/22  0618 04/13/22  1154 03/24/22  0605   WBC 7.3 11.7* 9.9   HGB 13.0 13.8 13.2   MCV 90 89 91    237 222     Most Recent 3 BMP's:Recent Labs   Lab Test 04/17/22  0741 04/17/22  0738 04/16/22  2154 04/16/22  1703 04/16/22  0754 04/16/22  0617 04/15/22  0736 04/15/22  0506 04/14/22  0806 04/14/22  0618   NA  --   --   --   --   --  132*  --  130*  --  131*   POTASSIUM  --  4.6  --   --   --  4.2  --  4.0   --  3.7   CHLORIDE  --   --   --   --   --  92*  --  86*  --  86*   CO2  --   --   --   --   --  32*  --  33*  --  32*   BUN  --   --   --   --   --  15  --  14  --  12   CR  --   --   --   --   --  0.57*  --  0.61  --  0.61   ANIONGAP  --   --   --   --   --  8  --  11  --  13   DENISE  --   --   --   --   --  8.3*  --  8.4*  --  8.5   *  --  164* 112*   < > 177*   < > 180*   < > 151*    < > = values in this interval not displayed.     Most Recent 2 LFT's:Recent Labs   Lab Test 04/14/22  0618 03/23/22  1240   AST 15 16   ALT 13 14   ALKPHOS 55 60   BILITOTAL 0.6 0.5     Most Recent 3 INR's:Recent Labs   Lab Test 10/07/21  2325 06/11/21  1022 01/31/20 2023   INR 3.68* 2.07* 1.14*       Medications:   Personally Reviewed.  Medications     - MEDICATION INSTRUCTIONS -         ARIPiprazole  5 mg Oral At Bedtime     atorvastatin  20 mg Oral At Bedtime     Budeson-Glycopyrrol-Formoterol  2 puff Inhalation BID     ceFEPIme (MAXIPIME) IV  2 g Intravenous Q12H     diltiazem ER  180 mg Oral QAM     docusate sodium  100 mg Oral BID     furosemide  40 mg Oral Daily     gabapentin  600 mg Oral QPM     insulin aspart  1-7 Units Subcutaneous TID AC     insulin aspart  1-5 Units Subcutaneous At Bedtime     ipratropium - albuterol 0.5 mg/2.5 mg/3 mL  3 mL Nebulization 4x Daily     pantoprazole  40 mg Oral Daily     polyethylene glycol  17 g Oral Daily     predniSONE  40 mg Oral Daily     rivaroxaban ANTICOAGULANT  20 mg Oral Daily with supper     sodium chloride  3 mL Nebulization 4x Daily     sodium chloride (PF)  3 mL Intracatheter Q8H     spironolactone  12.5 mg Oral Daily

## 2022-04-17 NOTE — PLAN OF CARE
"Patient states feels breathing improved but not at baseline.  Harsh cough with nasal drainage.  Worked with PT and OT in room.   Up to chair for all meals.  Intermittent periods of confusion asking about \"the little girl in the room\" and that she did not mean to upset her\"  Appetite good.  Complains of constipation. Bowel prep meds given with prune juice. Small result, will continue to give preps and prune juice.                        "

## 2022-04-17 NOTE — PROGRESS NOTES
"INFECTIOUS DISEASE FOLLOW UP NOTE      ASSESSMENT:  Bronchiectasis/COPD exacerbation, Pseudomonas on sputum culture   Enterococcus in sputum probably a colonizer.  Progressive decline  Coronavirus upper respiratory tract infection (not COVID-19) -- can cause COPD exacerbation  Recent hospitalization and discharged on doxycycline and cephalexin  Upper lobe nodular opacities.  Radiology concerned about malignancy.  But consider   underlying chronic mycobacterial pneumonia (like MAC)  DM2  Allergic to levaquin so no oral options for pseudomonas       PLAN:  Agree with IV cefepime through  (10 days) -- I wrote this in discharge orders.   Should not need ID follow up for this.   If ready for discharge prior to completion would need set up for home infusions, likely just through midline IV for short course. Q12h med may not work out at infusion center unless they can run on a pump. Vs TCU.   AFB to look for non-tuberculous mycobacteria. See us in clinic if positive.   ID plan in place. Please call if questions.     Shai Simmons MD  [facility]  [phone] clinic  [employer] paging    ______________________________________________________________________    SUBJECTIVE / INTERVAL HISTORY: winded just back from walk. Dry cough. Tolerating antibiotics.  Discussed results. Family here.     ROS: deconditioned. All other systems negative except as listed above.        OBJECTIVE:  /82 (BP Location: Left arm)   Pulse 92   Temp 98.2  F (36.8  C) (Oral)   Resp 20   Ht 1.549 m (5' 1\")   Wt 62.5 kg (137 lb 11.2 oz)   SpO2 98%   BMI 26.02 kg/m         Vital Signs  Temp: 98.2  F (36.8  C)  Temp src: Oral  Resp: 20  Pulse: 92  Pulse Rate Source: Monitor  BP: 137/82  BP Location: Left arm    Temp (24hrs), Av  F (36.7  C), Min:97.5  F (36.4  C), Max:98.3  F (36.8  C)      GEN: No acute distress. Dyspnea better at rest. O2 NC.   RESPIRATORY:  Prolonged expiration, mild wheeze.  CARDIOVASCULAR: "  Regular rate and rhythm. Normal S1 and S2. No murmur, click, gallop or rub. No dependent edema. No excess JVD.  ABDOMEN:  Soft, normal bowel sounds, non-tender, no masses.  EXTREMITIES: LLE wrapped  SKIN/HAIR/NAILS:  No rashes  IV: peripheral        Antibiotics:  Cefepime     Pertinent labs:    Recent Labs   Lab 22  0618 22  1154   WBC 7.3 11.7*   HGB 13.0 13.8   HCT 39.5 42.7    237        Recent Labs   Lab 22  0617 04/15/22  0506 22  0618   * 130* 131*   CO2 32* 33* 32*   BUN 15 14 12        Lab Results   Component Value Date    CRP 12.3 (H) 2021    CRP 10.5 (H) 2021    CRP 18.3 (H) 2020       Lab Results   Component Value Date    ALT 13 2022    AST 15 2022    ALKPHOS 55 2022         MICROBIOLOGY DATA:  Sputum pseudomonas and enterococcus  resp panel coronavirus non-SARS    RADIOLOGY:  Echocardiogram Complete    Result Date: 4/15/2022  [id number]^[email or web address]^[place] [address]  Name: ASH HARRIS MRN: [id number] : 1935 Study Date: 04/15/2022 03:07 PM Age: 86 yrs Gender: Female Patient Location: [facility] Reason For Study: Tachycardia Ordering Physician: MATTIE ALCANTARA Performed By: LIONEL  BSA: 1.6 m2 Height: 61 in Weight: 137 lb HR: 96 ______________________________________________________________________________ Procedure Complete Portable Echo Adult. ______________________________________________________________________________ Interpretation Summary  1. Normal left ventricular size and systolic performance with a visually estimated ejection fraction of 65%. 2. There is moderate mitral insufficiency. 3. There is moderate tricuspid insufficiency. 4. Mild right ventricular enlargement with normal right ventricular systolic performance. 5. There is severe biatrial enlargement. 6. Right ventricular systolic pressure relative to right atrial pressure is  mildly increased. The pulmonary artery pressure is estimated to be 35-40 mmHg plus right atrial pressure (the IVC is moderately dilated).  When compared to the prior real-time echocardiogram dated 7 December 2020, there has been a mild decrease in the pulmonary artery pressure estimate. The findings are otherwise felt to be fairly similar on both examinations. ______________________________________________________________________________ Left ventricle: Normal left ventricular size and systolic performance with a visually estimated ejection fraction of 65%. There is normal regional wall motion. Left ventricular wall thickness is normal.  Assessment of LV Diastolic Function: Patient appears to be in atrial fibrillation which limits assessment of diastolic filling.  Right ventricle: Mild right ventricular enlargement with normal right ventricular systolic performance.  Left atrium: There is severe left atrial enlargement.  Right atrium: There is severe right atrial enlargement.  IVC: The IVC is moderately dilated.  Aortic valve: The aortic valve is comprised of three cusps. No significant aortic stenosis or aortic insufficiency is detected on this study.  Mitral valve: The mitral valve appears morphologically normal. There is moderate mitral insufficiency.  Tricuspid valve: The tricuspid valve is grossly morphologically normal. There is moderate tricuspid insufficiency.  Pulmonic valve: The pulmonic valve is grossly morphologically normal. There is trace pulmonic insufficiency.  Thoracic aorta: The aortic root and proximal ascending aorta are of normal dimension.  Pericardium: There is no significant pericardial effusion. ______________________________________________________________________________ ______________________________________________________________________________ MMode/2D Measurements & Calculations IVSd: 0.78 cm LVIDd: 4.9 cm LVIDs: 3.0 cm LVPWd: 0.92 cm FS: 38.2 % LV mass(C)d: 139.2 grams LV mass(C)dI:  86.5 grams/m2 Ao root diam: 2.9 cm LA dimension: 3.9 cm asc Aorta Diam: 2.9 cm LA/Ao: 1.4 LVOT diam: 1.9 cm LVOT area: 2.7 cm2  LA Volume Indexed (AL/bp): 57.5 ml/m2 RWT: 0.38  Time Measurements MM HR: 104.0 BPM  Doppler Measurements & Calculations MV E max jeffery: 107.0 cm/sec MV max P.9 mmHg MV mean P.6 mmHg MV V2 VTI: 14.0 cm MVA(VTI): 3.1 cm2 MV dec time: 0.19 sec Ao V2 max: 131.9 cm/sec Ao max P.0 mmHg Ao V2 mean: 89.9 cm/sec Ao mean PG: 3.7 mmHg Ao V2 VTI: 22.8 cm VAISHALI(I,D): 1.9 cm2 VAISHALI(V,D): 2.0 cm2 LV V1 max PG: 3.5 mmHg LV V1 max: 94.1 cm/sec LV V1 VTI: 16.1 cm MR ERO: 0.10 cm2 MR volume: 14.0 ml SV(LVOT): 44.0 ml SI(LVOT): 27.4 ml/m2 PA acc time: 0.10 sec TR max jeffery: 302.4 cm/sec TR max P.6 mmHg  AV Jeffery Ratio (DI): 0.71 VAISHALI Index (cm2/m2): 1.2 E/E' avg: 10.5 Lateral E/e': 8.5 Medial E/e': 12.5  ______________________________________________________________________________ Report approved by: Annette White 04/15/2022 04:00 PM       US Lower Extremity Venous Duplex Bilateral    Result Date: 2022  EXAM: US LOWER EXTREMITY VENOUS DUPLEX BILATERAL LOCATION: [facility] DATE/TIME: 2022 4:16 PM INDICATION: Lower extremity edema COMPARISON: Ultrasound 2022 TECHNIQUE: Venous Duplex ultrasound of bilateral lower extremities with and without compression, augmentation and duplex. Color flow and spectral Doppler with waveform analysis performed. FINDINGS: Exam includes the common femoral, femoral, popliteal veins as well as segmentally visualized deep calf veins and greater saphenous vein. RIGHT: No deep vein thrombosis. No superficial thrombophlebitis. Complex 3.5 x 1.1 x 1.4 cm fluid containing structure in the popliteal fossa. No internal vascular flow. Right calf subcutaneous edema. LEFT: No deep vein thrombosis. No superficial thrombophlebitis. Complex 2.9 x 2.8 x 1.6 cm fluid structure in the popliteal fossa. Left calf subcutaneous edema.      IMPRESSION: 1.  No deep venous thrombosis in the bilateral lower extremities. 2.  Bilateral calf subcutaneous edema. 3.  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 Note that the left popliteal fossa lesion is similar in size but has increased complexity since the ultrasound from 02/06/2022.    XR Chest Port 1 View    Result Date: 4/13/2022  EXAM: XR CHEST PORT 1 VIEW LOCATION: [facility] DATE/TIME: 4/13/2022 12:05 PM INDICATION: SOB COMPARISON: Chest CTA 03/23/2022 and older studies, chest x-ray 11/03/2021 and older studies     IMPRESSION: There are 2 new ill-defined opacities in the right base, largest measuring approximately 1.5 cm. These likely reflect reflect areas of retained secretions with bronchopneumonia. Fine reticular opacities in both lungs and cardiomegaly are unchanged. Bullous emphysema and bronchiectasis is better appreciated on CT exams. No signs of failure.    CT Chest Pulmonary Embolism w Contrast    Result Date: 3/23/2022  EXAM: CT CHEST PULMONARY EMBOLISM W CONTRAST LOCATION: [facility] DATE/TIME: 3/23/2022 1:33 PM INDICATION: Severe shortness of breath. Known COPD and bronchiectasis. COMPARISON: 11/03/2021 TECHNIQUE: CT chest pulmonary angiogram during arterial phase injection of IV contrast. Multiplanar reformats and MIP reconstructions were performed. Dose reduction techniques were used. CONTRAST: 60ml Isovue 370 FINDINGS: ANGIOGRAM CHEST: Excellent opacification of pulmonary arteries and branches. No evidence of pulmonary emboli. Pulmonary artery is of normal caliber. Extensive aortic calcifications without any aneurysm. LUNGS AND PLEURA: There is a lobulated right upper lobe pulmonary nodule that measures 1.2 x 0.7 cm (axial image 106, coronal image 51 and sagittal image 82). In retrospect there is a 5 x 3 mm nodule in this area before. Inferior to this, there are other  multiple  smaller nodular as well as peribronchiolar opacities (images 116 122, 123, 135 and 141). Extensive upper lobe bullous emphysema. Areas of cylindrical bronchiectasis noted in both lower lobes with peribronchial thickening multiple areas of retained secretions greatest in the left lower lobe. No consolidating pneumonia or effusions. MEDIASTINUM/AXILLAE: There is a heterogeneous 2.4 cm left thyroid nodule, unchanged. No suspicious adenopathy. Multichamber cardiac enlargement. There is a small hiatal hernia. CORONARY ARTERY CALCIFICATION: Severe. UPPER ABDOMEN: Normal. MUSCULOSKELETAL: Normal.     IMPRESSION: 1.  There are multiple irregular shaped nodular and peribronchiolar opacities in the right upper lobe as noted above. The largest one superiorly is most suspicious in appearance for an indolent malignancy. While this and more likely the other opacities could certainly be inflammatory in nature, suggest nonemergent pulmonary consultation re further evaluation and timing of followup studies. 2.  Evidence of cylindrical bronchiectasis and chronic bronchitis in both lower lobes with new areas of retained secretions in the lower lobes, left greater than right. 3.  Moderate bullous emphysema in the upper lobes. 4.  There is a 2.4 cm left thyroid nodule which is stable. Consider nonemergent follow-up thyroid ultrasound. Reference noted below. 5.  No evidence of pulmonary emboli. REFERENCE: Toribio LARAK et al. Managing Incidental Thyroid Nodules Detected on Imaging: White Paper of the ACR Incidental Thyroid Findings Committee. JACR 2015; 12:143-150. Incidental thyroid nodule detected on CT or MRI without suspicious findings. Applies to general population without limited life expectancy or significant comorbidities. Age greater than or equal to 35 years Less than 1.5 cm: No further evaluation. Greater than or equal to 1.5 cm: Evaluate with thyroid ultrasound.

## 2022-04-17 NOTE — PLAN OF CARE
Goal Outcome Evaluation:      Problem: Gas Exchange Impaired  Goal: Optimal Gas Exchange  Outcome: Ongoing, Progressing     Patient denies pain.  VSS.   Patient is on 5L O2 via NC with sats 90-95%.  Continues to have a harsh cough and MEYER.   Patient is up with assist of 1 and cane.

## 2022-04-18 NOTE — CONSULTS
LUNG NODULE & INTERVENTIONAL PULMONARY CLINIC  CLINICS [facility] SURGERY [facility], [facility]     Adalgisa Solano MRN# [id number]   Age: 86 year old YOB: 1935       Requesting Physician: Dr. Huang       Assessment and Plan:    1.  Complex and persistent bronchiectasis and COPD exacerbation.  Change steroids to IV.  Continue diuresis.  Start Afrin.  Continue antibiotics.  Follow-up for MAC.  Ipratropium removed from nebs.  Start treatment CPAP to decrease work of breathing.    2.  Acute on chronic hypoxic respiratory failure.  Continue supplemental oxygen.  Secondary to above.    3.  Pulmonary nodule.  Noted.  Is not causing her current symptoms.  We can consider follow-up CT in the future.             History:     Adalgisa Solano is a 86 year old female with sig h/o for bronchiectasis who is here for evaluation/followup of exacerbation of bronchiectasis and COPD, unfortunately not making much progress since coming in.  She has significant shortness of breath with exertion which improved with rest.  However she is still little bit short of breath with rest.  She is having thick secretions, no fevers, no hemoptysis.  She is also having significant nasal congestion.      - My interpretation of the images relevant for this visit includes: New right lower lobe nodular infiltrates on chest x-ray.  Right upper lobe nodule noted on CT recently.  Significant bronchiectasis.  - My interpretation of the PFT's relevant for this visit includes: Obstructive pattern            Past Medical History:      Past Medical History:   Diagnosis Date     Acute and chronic respiratory failure with hypoxia (H)      Acute blood loss anemia 1/15/2019     Acute bronchitis 1/15/2019     Anemia     pernicious anemia     Anticoagulated 3/23/2022     Anxiety      Arm skin lesion, right      Arnold-Chiari malformation (H) 1998     Arthritis      Atrial fibrillation (H) 02/2017     Avascular  necrosis of bone (H)      Breast cyst      Breast lump      Candidal skin infection      Cellulitis of left lower extremity 2019     CHF (congestive heart failure) (H)     diastolic and systolic     Chronic bronchitis (H)     & acute     Chronic diastolic heart failure (H)     diastolic chf     Chronic pain syndrome      Chronic respiratory failure with hypoxia (H) 3/13/2017     COPD (chronic obstructive pulmonary disease) (H)      COPD exacerbation (H)      Depression      Diet-controlled type 2 diabetes mellitus (H)      Drug induced constipation      DVT of axillary vein, acute (H)     left     DVT of axillary vein, acute left (H)      Edema      Encounter for palliative care      Erysipelas      Fracture of femoral neck, left (H)      Generalized muscle weakness      GERD (gastroesophageal reflux disease)      History of blood clots      Hyperlipidemia      Hypertension      Left hip pain      Lumbar spinal stenosis      PAD (peripheral artery disease) (H)      Peripheral arterial disease (H) 10/28/2015     Psoriasis     arms      Pulmonary hypertension (H) 3/5/2018     Recurrent major depressive episodes (H)     Created by Conversion  Replacement Utility updated for latest IMO load     Schizoaffective disorder, bipolar type (H) 2015     Slow transit constipation      Stasis dermatitis of both legs      Status post total hip replacement, 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     Type 2 diabetes mellitus without complication (H) 2016     Vitamin B12 deficiency      Volume overload            Past Surgical History:      Past Surgical History:   Procedure Laterality Date     BACK SURGERY       BREAST CYST ASPIRATION Left      CERVICAL SPINE SURGERY      for arnold chiari malformation      SECTION  X3     CHOLECYSTECTOMY       COLONOSCOPY       EXCISE LESION UPPER EXTREMITY Right 2019    Procedure: EXCISION RIGHT ARM LIPOMA;  Surgeon: Andreas Holly MD;  Location: [facility];  Service: General      EYE SURGERY      bilateral cataracts     HAND SURGERY       HERNIORRHAPHY UMBILICAL N/A 2021    Procedure: INCARCERATED UMBILICAL AND;  Surgeon: Andreas Holly MD;  Location: [facility]     JOINT REPLACEMENT Right     knee - partial     LAPAROSCOPIC HERNIORRHAPHY INCISIONAL Left 3/27/2015    Procedure: ATTEMPTED LAPAROSCOPIC ABDOMINA/FLANK INCISION REPAIR;  Surgeon: Jose Lakhani MD;  Location: [facility] OR;  Service:      LUMBAR FUSION N/A 2014    Procedure: POSTERIOR FUSION / DECOMPRESSION L3-S1 WITH PELVIC FIXATION BILATERAL ;  Surgeon: Rajan Mares MD;  Location: [facility] OR;  Service:      OTHER SURGICAL HISTORY      IR for PAD LOWER EXTREMITY     REPAIR SPIGELIAN HERNIA N/A 2021    Procedure: SPIGELIAN HERNIA REPAIR;  Surgeon: Andreas Holly MD;  Location: [facility] ASPIRATION HEMATOMA SEROMA OR FLUID COLLECTION  2020     [facility] OPEN FIXATN PROX END/NECK FEMUR FX Left 2019    Procedure: OPEN REDUCTION INTERNAL FIXATION, FRACTURE LEFT HIP, PERIPROSTHETIC FRACTURE;  Surgeon: Kevin Lackey MD;  Location: [facility] OR;  Service: Orthopedics     [facility] TOTAL HIP ARTHROPLASTY Left 2019    Procedure: LEFT TOTAL HIP ARTHROPLASTY;  Surgeon: Kevin Lackey MD;  Location: [facility] OR;  Service: Orthopedics     [facility] TOTAL HIP ARTHROPLASTY Right 2019    Procedure: RIGHT TOTAL HIP ARTHROPLASTY;  Surgeon: Kan Quesada MD;  Location: [facility] OR;  Service: Orthopedics     [facility] TOTAL KNEE ARTHROPLASTY Left 10/7/2016    Procedure: TOTAL KNEE ARTHROPLASTY, LEFT;  Surgeon: Kan Quesada MD;  Location: [facility];  Service: Orthopedics          Social History:     Social History     Tobacco Use     Smoking status: Former Smoker     Quit date: 2006     Years since quittin.3     Smokeless tobacco: Never Used     Tobacco comment: quit    Substance Use Topics     Alcohol use: No          Family History:     Family  History   Problem Relation Age of Onset     Coronary Artery Disease Mother      Coronary Artery Disease Father            Allergies:      Allergies   Allergen Reactions     Tramadol Nausea     Amoxicillin Itching and Rash     Levofloxacin Itching and Rash     Penicillins Itching and Rash     Has tolerated ceftriaxone.          Medications:     Current Facility-Administered Medications   Medication     acetaminophen (TYLENOL) tablet 500-1,000 mg     albuterol (PROVENTIL HFA/VENTOLIN HFA) inhaler     albuterol (PROVENTIL) neb solution 2.5 mg     ARIPiprazole (ABILIFY) tablet 5 mg     atorvastatin (LIPITOR) tablet 20 mg     Budeson-Glycopyrrol-Formoterol 160-9-4.8 MCG/ACT AERO 2 puff     ceFEPIme (MAXIPIME) 2 g vial to attach to  ml bag for ADULTS or 50 ml bag for PEDS     glucose gel 15-30 g    Or     dextrose 50 % injection 25-50 mL    Or     glucagon injection 1 mg     diltiazem ER (DILT-XR) 24 hr capsule 180 mg     docusate sodium (COLACE) capsule 100 mg     furosemide (LASIX) tablet 40 mg     gabapentin (NEURONTIN) capsule 600 mg     insulin aspart (NovoLOG) injection (RAPID ACTING)     insulin aspart (NovoLOG) injection (RAPID ACTING)     ipratropium - albuterol 0.5 mg/2.5 mg/3 mL (DUONEB) neb solution 3 mL     lidocaine (LMX4) cream     lidocaine 1 % 0.1-1 mL     melatonin tablet 1 mg     methylPREDNISolone sodium succinate (solu-MEDROL) injection 40 mg     ondansetron (ZOFRAN-ODT) ODT tab 4 mg    Or     ondansetron (ZOFRAN) injection 4 mg     oxymetazoline (AFRIN) 0.05 % spray 2 spray     pantoprazole (PROTONIX) EC tablet 40 mg     Patient is already receiving anticoagulation with heparin, enoxaparin (LOVENOX), warfarin (COUMADIN)  or other anticoagulant medication     polyethylene glycol (MIRALAX) Packet 17 g     rivaroxaban ANTICOAGULANT (XARELTO) tablet 20 mg     sodium chloride (NEBUSAL) 3 % neb solution 3 mL     sodium chloride (PF) 0.9% PF flush 3 mL     sodium chloride (PF) 0.9% PF flush 3 mL      "spironolactone (ALDACTONE) half-tab 12.5 mg          Review of Systems:   Comprehensive review of systems negative except as above.       Physical Exam:   /80 (BP Location: Left arm, Patient Position: Fowlers)   Pulse 97   Temp 98.3  F (36.8  C) (Oral)   Resp 18   Ht 1.549 m (5' 1\")   Wt 63.2 kg (139 lb 4 oz)   SpO2 95%   BMI 26.31 kg/m      Constitutional - fatigue, mildly short of breath  Neck supple with no palpable thyroid  Eyes - no redness or discharge  Respiratory -increased work of breathing-mild, mildly prolonged expiratory phase, small bilateral crackles  Cardiac -- Normal rate, rhythm.   Abdomen soft with no palpable masses  Extremities warm with no clubbing  Skin - No appreciable discoloration or lesions (very limited exam)  Neurological - No apparent tremors. Speech fluent and articlate  Psychiatric - no signs of delirium or anxiety          Current Laboratory Data:   All laboratory and imaging data reviewed.    Results for orders placed or performed during the hospital encounter of 04/13/22 (from the past 24 hour(s))   Glucose by meter   Result Value Ref Range    GLUCOSE BY METER POCT 160 (H) 70 - 99 mg/dL   Glucose by meter   Result Value Ref Range    GLUCOSE BY METER POCT 144 (H) 70 - 99 mg/dL   Glucose by meter   Result Value Ref Range    GLUCOSE BY METER POCT 179 (H) 70 - 99 mg/dL   Basic metabolic panel   Result Value Ref Range    Sodium 135 (L) 136 - 145 mmol/L    Potassium 4.8 3.5 - 5.0 mmol/L    Chloride 95 (L) 98 - 107 mmol/L    Carbon Dioxide (CO2) 32 (H) 22 - 31 mmol/L    Anion Gap 8 5 - 18 mmol/L    Urea Nitrogen 14 8 - 28 mg/dL    Creatinine 0.59 (L) 0.60 - 1.10 mg/dL    Calcium 8.4 (L) 8.5 - 10.5 mg/dL    Glucose 198 (H) 70 - 125 mg/dL    GFR Estimate 88 >60 mL/min/1.73m2   Magnesium   Result Value Ref Range    Magnesium 1.9 1.8 - 2.6 mg/dL   Glucose by meter   Result Value Ref Range    GLUCOSE BY METER POCT 188 (H) 70 - 99 mg/dL                  "

## 2022-04-18 NOTE — PROGRESS NOTES
[facility] MEDICINE PROGRESS NOTE      Identification/Summary: Adalgisa Solano is a 86 year old old female with history of COPD with chronic hypoxic respiratory failure requiring 2 to 5 L of nasal cannula at home presents to the hospital complaining of worsening shortness of breath with productive cough.  Patient was hospitalized at [facility] for COPD exacerbation and possible left lower leg cellulitis.  Post discharge she progressively got worse and noted that her oxygen requirements were increasing and was on 5 L nasal cannula the date she presented to the ER.  In the ER she was found to have infiltrates and is admitted.  Subsequently tested positive for coronavirus but negative for Covid.  On droplet precautions    Sputum cx grew Pseudomonas and Enterococcus. ID following. Requiring IV abx d/t organism and abx allergies. Dispo planning pending.  Seen by pulmonology today. Requiring 4-5L humidified O2.     Assessment and Plan:  Right lower lobe pneumonia/corona virus PCR positive with negative COVID-19 PCR  Sputum Cx + Pseudomonas and Enterococcus   Blood culture, Influenza, urine strep - negative to date  Respiratory viral panel - coronavirus with negative COVID-19 PCR  Sputum cx - Pseudomonas and Enterococcus - sensitivities pending   AFB for MAC -pending   Cefepime through 4/23  Scheduled duo nebs  Oxygen as needed with weaning with goal O2 sats of 88 to 92%  Nasal saline rinses for congestion - started Afrin   Clinically improving  Procalcitonin reassuringly normal  Discussed directly with infection control  Droplet precautions  COVID-19 special precautions not required     Acute on chronic hypoxic respiratory failure/respiratory acidosis  Caution with oxygen  Goal O2 sats 88 to 92%  Scheduled nebs  Pt using vest therapy from home with RT  Steroids - changed to methylprednisolone per pulmonology   Sputum as above  CPAP at night   Repeat VBG improved      Heart failure with  preserved ejection fraction/pulmonary hypertension  Echo in 2020 revealed preserved ejection fraction  Does appear mildly volume up on exam to me  Responded to IV Lasix  Resume home oral Lasix  Echo unremarkable     Asymmetric lower extremity edema left greater than right  Ruled out DVTs  Diuresis  Lymphedema wraps  Improved     Hyponatremia  Asymptomatic  Diuresis  Oral fluid restriction discontinued   Improved     Noninsulin-dependent diabetes  Hold Metformin with diuresis  Correction insulin     Hypertension  Home regimen     Atrial fibrillation/flutter  Rate control via Diltiazem - increased to 180 mg  Anticoagulated - Xarelto      Hypomagnesemia  Magnesium protocol      Constipation  Colace and Miralax   Increase MiraLAX to twice daily 4/17 4/18 Dulcolax suppository     Clinically Significant Risk Factors Present on Admission                    Diet: Combination Diet Regular Diet Adult; Moderate Consistent Carb (60 g CHO per Meal) Diet; 2 gm NA Diet  DVT Prophylaxis:  DOAC  Code Status: No CPR- Do NOT Intubate    Disposition Plan   Expected Discharge: 04/19/2022     Anticipated discharge location: home with help/services (Pt stated openness to receiving in-home care if deemed appropriate. [facility] utilized in the past.)    Delays:      Pt does not have coverage for IV abx in the home. Pending discussion with patient regarding coverage and options. Barrier to discharge home is requirement of IV abx until 4/23. Trial of IV steroids today to observe for improvement in respiratory status.       The patient's care was discussed with the Attending Physician, Dr. Huang and patient     Lisa GLORIA Student  [facility] Medicine  [facility]  Phone: #[phone]    Interval History/Subjective:  Patient reports that she is feeling improved since admission, but feels weak. She continues to have a productive barky cough and thinks this has been more frequent due to postnasal drip. She  is dyspneic with exertion, but feels she is able to recover quickly and has noticed improvement in SOB at rest. Experiencing some posttussive nausea.   She desires suppository for ongoing constipation. Denies headache, dizziness, chest pain, vomiting, abdominal pain, numbness/tingling in the lower extremities.      Physical Exam/Objective:  Temp:  [97.4  F (36.3  C)-98.3  F (36.8  C)] 98.3  F (36.8  C)  Pulse:  [] 97  Resp:  [18-20] 18  BP: (121-138)/(58-80) 138/80  SpO2:  [92 %-95 %] 95 %  Body mass index is 26.31 kg/m .    GENERAL:  Alert, appears comfortable, in no acute distress, appears fatigued    HEAD:  Normocephalic, without obvious abnormality, atraumatic   LUNGS:   Barking cough with deep inhalation with corresponding hypoxia. Quiet breathing at rest. Labored inhalations when hypoxic. Diffuse expiratory coarse rhonchi. No crackles. Improved.   CHEST WALL:  No tenderness or deformity   HEART:  Irregularly irregular tachycardic rate and rhythm, S1 and S2 normal, no murmur, rub, or gallop    ABDOMEN:   Soft, non-tender, no masses, no organomegaly, no rebound or guarding   EXTREMITIES: LLE in lymphedema wrap from toes to knee. Visible edema underneath wrap improved. Cap refill hard to assess d/t swelling. Sensation intact bilaterally. 5/5 strength with plantarflexion and dorsiflexion in both extremities.   SKIN: Dry to touch, no exanthems in the visualized areas   NEURO: Alert, oriented x3, moves all four extremities freely, non focal    PSYCH: Cooperative, behavior is appropriate      Data reviewed today: I personally reviewed all new medications, labs, imaging/diagnostics reports over the past 24 hours. Pertinent findings include:    Imaging:   No results found for this or any previous visit (from the past 24 hour(s)).    Labs:  Most Recent 3 CBC's:Recent Labs   Lab Test 04/14/22  0618 04/13/22  1154 03/24/22  0605   WBC 7.3 11.7* 9.9   HGB 13.0 13.8 13.2   MCV 90 89 91    237 222     Most  Recent 3 BMP's:Recent Labs   Lab Test 04/18/22  1140 04/18/22  0708 04/18/22  0631 04/17/22  0741 04/17/22  0738 04/16/22  0754 04/16/22  0617 04/15/22  0736 04/15/22  0506   NA  --   --  135*  --   --   --  132*  --  130*   POTASSIUM  --   --  4.8  --  4.6  --  4.2  --  4.0   CHLORIDE  --   --  95*  --   --   --  92*  --  86*   CO2  --   --  32*  --   --   --  32*  --  33*   BUN  --   --  14  --   --   --  15  --  14   CR  --   --  0.59*  --   --   --  0.57*  --  0.61   ANIONGAP  --   --  8  --   --   --  8  --  11   DENISE  --   --  8.4*  --   --   --  8.3*  --  8.4*   * 188* 198*   < >  --    < > 177*   < > 180*    < > = values in this interval not displayed.     Most Recent 6 Bacteria Isolates From Any Culture (See EPIC Reports for Culture Details):No lab results found.  Most Recent 6 glucoses:Recent Labs   Lab Test 04/18/22  1140 04/18/22  0708 04/18/22  0631 04/17/22  2059 04/17/22  1712 04/17/22  1313   * 188* 198* 179* 144* 160*     Most Recent ABG:Recent Labs   Lab Test 01/31/20  2115   PH 7.43       Medications:   Personally Reviewed.  Medications     - MEDICATION INSTRUCTIONS -         albuterol  2.5 mg Nebulization 4x Daily     ARIPiprazole  5 mg Oral At Bedtime     atorvastatin  20 mg Oral At Bedtime     bisacodyl  10 mg Rectal Once     Budeson-Glycopyrrol-Formoterol  2 puff Inhalation BID     ceFEPIme (MAXIPIME) IV  2 g Intravenous Q8H     diltiazem ER  180 mg Oral QAM     docusate sodium  100 mg Oral BID     furosemide  40 mg Oral Daily     gabapentin  600 mg Oral QPM     insulin aspart  1-7 Units Subcutaneous TID AC     insulin aspart  1-5 Units Subcutaneous At Bedtime     methylPREDNISolone  40 mg Intravenous Q12H     oxymetazoline  2 spray Both Nostrils BID     pantoprazole  40 mg Oral Daily     polyethylene glycol  17 g Oral BID     rivaroxaban ANTICOAGULANT  20 mg Oral Daily with supper     sodium chloride  3 mL Nebulization 4x Daily     sodium chloride (PF)  3 mL Intracatheter Q8H      spironolactone  12.5 mg Oral Daily     4/18/2022   [facility] :Faculty Attestation   I have seen and examined the patient.   I have discussed the case with the PA student Lisa Cuba.  I agree with the findings, assessment and plan.   Roney Huang MD

## 2022-04-18 NOTE — PROGRESS NOTES
Pt does not have coverage for iv abx in the home with their [employer] Medicare plan. Pt would be self-pay. Drug would be billed to the part D and supplies will be self-pay. Based on Cefepime 2g q8h, total cost is $42.48 for drug and supplies per day.     Nursing is only covered if patient is homebound if not cost is $90.00 per visit if [facility] bills for it. Patient should have coverage in a TCU or infusion center. Let us know how patient would like to proceed.      (Lan) In reference to admission date 04/13/2022 to check for iv abx coverage.       Please contact Intake with any questions, [phone] or In Basket pool,  Home Infusion ([phone]).

## 2022-04-18 NOTE — PLAN OF CARE
Patient A&Ox4. Patient remains on 4L of O2 nasal canula.  Patient was weaned down to 3 at one time, tolerating well, but then patient was coughing a lot, so then her O2 had to be increased back up to 4L.  Patient has a congested cough, with little amounts of thick white sputum. Patient very short of breath with activity, but is able to recover quite quickly.  Patient had two PRN nebs over-night. VSS.

## 2022-04-18 NOTE — PROGRESS NOTES
Patient given nebulizer therapy as ordered. Changed from Duoneb to Albuterol QID. Started CPAP treatments QID with nebulizer's with CPAP level of +5. Patient has increased work of breathing with exertion. Breath sounds coarse/diminished t/o. Patient has barky congested cough. Continue to follow.     Ciara Alatorre RRT

## 2022-04-18 NOTE — PROGRESS NOTES
Patient on 4LNC, vest therapy with HHN given at 2000 on 4/17 tolerating well.  Continued course with rhonchi breath sounds noted pre and post treatment.

## 2022-04-18 NOTE — PLAN OF CARE
Goal Outcome Evaluation:    Plan of Care Reviewed With: patient     Overall Patient Progress: improving    Outcome Evaluation: Pulmonology consulted patient for respiratory symptoms and ordered CPAP to be managed by RT.  IV solumedrol ordered and given.  Oxygen at 4L/min throughout the day and titrated up to 5L/min PRN while patient ambulating or coughing.  Patient is to discharge to TCU per IV antibiotics.

## 2022-04-18 NOTE — PROGRESS NOTES
[facility]    Referral received  from Anna Rodriguez CM,  for IV antibiotics.    Benefits verified. Pt does not have coverage for IV antibiotics in the home under her [employer] Medicare plan.  Patient would be self-pay.  The drug would be billed to the Part D and supplies will be self pay.  Based on Cefepime 2 g IV q 8 hours, the total cost is $42.48 for drug and supplies per day.    Nursing is only covered if the patient's homebound.  If not, the cost is $90 per visit if [facility] bills for it.  The patient should have coverage in a TCU or infusion center.    Writer will speak with pt to review benefits, home infusion and to offer choice of agency/home infusion provider.    Thank you for the referral.    Peace Jean RN, BSN  [facility] Liaison  [phone] (Mon through Fri, 8:00 am-5:00 pm)  [phone] (Office)

## 2022-04-18 NOTE — PROGRESS NOTES
Care Management Follow Up    Length of Stay (days): 5    Expected Discharge Date: 04/19/2022     Concerns to be Addressed:IV antibiotics   care coordination/care conferences, discharge planning       Patient plan of care discussed at interdisciplinary rounds: Yes    Anticipated Discharge Disposition:TCU     Anticipated Discharge Services: TCU    Anticipated Discharge DME: None (No new equipment anticipated.)    Patient/family educated on Medicare website which has current facility and service quality ratings: yes (When presented as a potential need, pt and  stated previous use of [facility] and had no other preferences. Not interested in vendor list.)    Education Provided on the Discharge Plan:  CM met with patient. AVS per bedside RN.    Patient/Family in Agreement with the Plan: yes    Referrals Placed by CM/SW:  Pending      Additional Information:  Chart reviewed. CM met with patient and  came into the room. CM discussed the plan for IV antibiotics and options. CM looked into IV antibiotics options. Patient is not able to manage IV antibiotics independently and there would be out of pocket cost and  outpatient is not an option. Patient is agreeable to TCU.  Patient gave CM TCU choices. CM sent referrals to facilities of patients choice.CM will follow.      Referrals sent  [facility] - [facility] - Unable to accept patients needing LYMPH therapy. If that changes we can resend referral for review. 2:11 PM  [facility]    Anna Rodriguez RN

## 2022-04-19 NOTE — PROGRESS NOTES
[facility] MEDICINE PROGRESS NOTE      Identification/Summary: Adalgisa Solano is a 86 year old old female with history of COPD with chronic hypoxic respiratory failure requiring 2 to 5 L of nasal cannula at home presents to the hospital complaining of worsening shortness of breath with productive cough.  Patient was hospitalized at [facility] for COPD exacerbation and possible left lower leg cellulitis.  Post discharge she progressively got worse and noted that her oxygen requirements were increasing and was on 5 L nasal cannula the date she presented to the ER.  In the ER she was found to have infiltrates and is admitted.  Subsequently tested positive for coronavirus but negative for Covid. On droplet precautions     Sputum cx grew Pseudomonas and Enterococcus. ID following. Requiring IV abx d/t organism and abx allergies. Requiring 4-5L humidified O2. TCU Pending     Assessment and Plan:  Right lower lobe pneumonia/corona virus PCR positive with negative COVID-19 PCR  Sputum Cx + Pseudomonas and Enterococcus   Blood culture, Influenza, urine strep - negative to date  Respiratory viral panel - coronavirus with negative COVID-19 PCR  Sputum cx - Pseudomonas and Enterococcus   AFB for MAC -pending   Cefepime through 4/23  Scheduled duo nebs - transitioned to albuterol   Oxygen as needed with weaning with goal O2 sats of 88 to 92%  Nasal saline rinses for congestion - started Afrin   Clinically improving  Procalcitonin reassuringly normal  Discussed directly with infection control  Droplet precautions  COVID-19 special precautions not required     Acute on chronic hypoxic respiratory failure/respiratory acidosis  Caution with oxygen  Goal O2 sats 88 to 92%  Scheduled nebs  Pt using vest therapy from home with RT  Steroids - changed to methylprednisolone per pulmonology - continue for 1 more day   Then oral pred as in note  Sputum as above  CPAP at night - did not tolerate   Repeat VBG  improved      Heart failure with preserved ejection fraction/pulmonary hypertension  Echo in 2020 revealed preserved ejection fraction  Does appear mildly volume up on exam to me  Responded to IV Lasix  Resume home oral Lasix  Echo unremarkable     Asymmetric lower extremity edema left greater than right  Ruled out DVTs  Diuresis  Lymphedema wraps  Significantly improved      Hyponatremia  Asymptomatic  Diuresis  Oral fluid restriction discontinued   Resolved      Noninsulin-dependent diabetes  Hold Metformin with diuresis  Correction insulin  Monitor for increase in BG with IV steroids      Hypertension  Home regimen     Atrial fibrillation/flutter  HR remains in 90s-100s   Rate control via Diltiazem - increased to 240 mg   Anticoagulated - Xarelto      Hypomagnesemia  Magnesium protocol      Constipation  Colace and Miralax   Increase MiraLAX to twice daily 4/17 4/18 Dulcolax suppository - subsequent BM      Diet: Combination Diet Regular Diet Adult; Moderate Consistent Carb (60 g CHO per Meal) Diet; 2 gm NA Diet  DVT Prophylaxis:  DOAC (Xarelto)  Code Status: No CPR- Do NOT Intubate    Disposition Plan   Expected Discharge: 04/20/2022     Anticipated discharge location: home with help/services (Pt stated openness to receiving in-home care if deemed appropriate. [facility] utilized in the past.)      TCU pending for IV abx. Requiring one more day of IV steroids.     The patient's care was discussed with the Attending Physician, Dr. Huang, patient, and .    Lisa GLORIA Student   [facility] Medicine  [facility]  Phone: #[phone]    Interval History/Subjective:  Feeling better with improvement in congestion and constipation. Cough is less frequent and looser. Ongoing dyspnea with exertion, but feels breathing is more comfortable at rest. Swelling is significantly improved in left lower extremities. Feels weak and fatigued from hospitalization. In agreement with TCU for  IV abx completion when medically stable and placement confirmed. Denies headache, dizziness, chest pain, abdominal pain, nausea, diarrhea, chills, or numbness in lower extremities.     Physical Exam/Objective:  Temp:  [97.3  F (36.3  C)-98.3  F (36.8  C)] 98.1  F (36.7  C)  Pulse:  [] 104  Resp:  [16-20] 20  BP: (124-142)/(70-91) 142/91  SpO2:  [90 %-95 %] 92 %  Body mass index is 26.04 kg/m .    GENERAL:  Alert, appears comfortable, in no acute distress, appears stated age   HEAD:  Normocephalic, without obvious abnormality, atraumatic   LUNGS:   Quiet breathing at rest. Diffuse expiratory rhonchi. No crackles. Improved. No cough during deep breathing with auscultation.    CHEST WALL:  No tenderness or deformity   HEART:  Irregularly irregular tachycardic rate and rhythm, S1 and S2 normal, no murmur, rub, or gallop    ABDOMEN:   Soft, non-tender, no masses, no organomegaly, no rebound or guarding   EXTREMITIES: Edema in bilateral lower extremities. L>R but much improved. 5/5 strength with plantarflexion and dorsiflexion. Sensation intact in bilateral lower extremities.    SKIN: Dry to touch, no exanthems in the visualized areas   NEURO: Alert, oriented x3, moves all four extremities freely, nonfocal    PSYCH: Cooperative, behavior is appropriate     Data reviewed today: I personally reviewed all new medications, labs, imaging/diagnostics reports over the past 24 hours. Pertinent findings include:    Imaging:   No results found for this or any previous visit (from the past 24 hour(s)).    Labs:  Most Recent 3 CBC's:Recent Labs   Lab Test 04/14/22  0618 04/13/22  1154 03/24/22  0605   WBC 7.3 11.7* 9.9   HGB 13.0 13.8 13.2   MCV 90 89 91    237 222     Most Recent 3 BMP's:Recent Labs   Lab Test 04/19/22  0737 04/19/22  0630 04/19/22  0209 04/18/22  0708 04/18/22  0631 04/17/22  0741 04/17/22  0738 04/16/22  0754 04/16/22  0617   NA  --  136  --   --  135*  --   --   --  132*   POTASSIUM  --  4.5  --   --   4.8  --  4.6  --  4.2   CHLORIDE  --  96*  --   --  95*  --   --   --  92*   CO2  --  33*  --   --  32*  --   --   --  32*   BUN  --  15  --   --  14  --   --   --  15   CR  --  0.59*  --   --  0.59*  --   --   --  0.57*   ANIONGAP  --  7  --   --  8  --   --   --  8   DENISE  --  8.3*  --   --  8.4*  --   --   --  8.3*   * 239* 183*   < > 198*   < >  --    < > 177*    < > = values in this interval not displayed.     Most Recent 6 glucoses:Recent Labs   Lab Test 04/19/22  0737 04/19/22  0630 04/19/22  0209 04/18/22  2120 04/18/22  1140 04/18/22  0708   * 239* 183* 228* 143* 188*       Medications:   Personally Reviewed.  Medications     - MEDICATION INSTRUCTIONS -         albuterol  2.5 mg Nebulization 4x Daily     ARIPiprazole  5 mg Oral At Bedtime     atorvastatin  20 mg Oral At Bedtime     Budeson-Glycopyrrol-Formoterol  2 puff Inhalation BID     ceFEPIme (MAXIPIME) IV  2 g Intravenous Q8H     [START ON 4/20/2022] diltiazem ER  240 mg Oral QAM     docusate sodium  100 mg Oral BID     furosemide  40 mg Oral Daily     gabapentin  600 mg Oral QPM     insulin aspart  1-7 Units Subcutaneous TID AC     insulin aspart  1-5 Units Subcutaneous At Bedtime     methylPREDNISolone  40 mg Intravenous Q12H     oxymetazoline  2 spray Both Nostrils BID     pantoprazole  40 mg Oral Daily     polyethylene glycol  17 g Oral BID     rivaroxaban ANTICOAGULANT  20 mg Oral Daily with supper     sodium chloride  3 mL Nebulization 4x Daily     sodium chloride (PF)  3 mL Intracatheter Q8H     spironolactone  12.5 mg Oral Daily     4/19/2022   [facility] :Faculty Attestation   I have seen and examined the patient.   I have discussed the case with PA student Lisa Cuba.  I agree with the findings, assessment and plan.   Roney Huang MD

## 2022-04-19 NOTE — PROGRESS NOTES
Care Management Follow Up    Length of Stay (days): 6    Expected Discharge Date: 04/20/2022     Concerns to be Addressed: care coordination/care conferences, discharge planning     Patient plan of care discussed at interdisciplinary rounds: Yes    Anticipated Discharge Disposition: Home Care, Home     Anticipated Discharge Services: None  Anticipated Discharge DME: None (No new equipment anticipated.)    Patient/family educated on Medicare website which has current facility and service quality ratings: no (When presented as a potential need, pt and  stated previous use of [facility] and had no other preferences. Not interested in vendor list.)  Education Provided on the Discharge Plan:    Patient/Family in Agreement with the Plan: yes    Referrals Placed by CM/SW:  (Not currently indicated.)  Private pay costs discussed: Not applicable    Additional Information:  9:05 AM  BARRY called [facility] -  full, unable to leave message  [facility] - anticipates beds Thursday  Spoke with Luly at [facility].  She states no beds today but possible bed tomorrow or Thursday.  She will review and call SW back.    3:39 PM  SW met with patient to obtain further TCU choices.  Pt requested referrals be sent to facilities in [place].  SW sent referrals.      ESE Deal

## 2022-04-19 NOTE — PLAN OF CARE
Goal Outcome Evaluation:    Plan of Care Reviewed With: patient     Overall Patient Progress: improving      Problem: Plan of Care - These are the overarching goals to be used throughout the patient stay.    Goal: Plan of Care Review/Shift Note  Description: The Plan of Care Review/Shift note should be completed every shift.  The Outcome Evaluation is a brief statement about your assessment that the patient is improving, declining, or no change.  This information will be displayed automatically on your shift note.  4/19/2022 1416 by Taniya Jones RN  Outcome: Ongoing, Progressing  Flowsheets (Taken 4/19/2022 1412)  Plan of Care Reviewed With: patient  Outcome Evaluation: Pulmonology consulted patient for ongoing respiratory symptoms.  Patient worked with PT to ambulate in the room.  Patient is on 4L/min at rest and 5-6L/min with activity.  Nebs to be continued.  Overall Patient Progress: improving

## 2022-04-19 NOTE — PROGRESS NOTES
LUNG NODULE & INTERVENTIONAL PULMONARY CLINIC  [facility], [facility]     Adalgisa Solano MRN# [id number]   Age: 86 year old YOB: 1935       Requesting Physician: Dr. Huang       Assessment and Plan:    1.  Complex and persistent bronchiectasis and COPD exacerbation.  Still severe but feeling a little bit better today  - Continue IV steroids for 1 more day, then decrease down to 40 mg daily and taper  - Continue to follow for mycobacterial organisms  - Pharmacy consult placed to see if we can get 3% nebs for her as an outpatient.  -Continue remainder of current regimen    2.  Acute on chronic hypoxic respiratory failure.  Continue supplemental oxygen.  Secondary to above.    3.  Pulmonary nodule.  Noted.  Is not causing her current symptoms.  We can consider follow-up CT in the future.    4.  Sinus congestion.  Time limited Afrin ordered.             History:     Adalgisa Solano is a 86 year old female with sig h/o for bronchiectasis who is here for evaluation/followup of exacerbation of bronchiectasis and COPD, unfortunately not making much progress since coming in.  She is feeling a little bit better today because of improved nasal congestion.  Still with poor energy.             Past Medical History:      Past Medical History:   Diagnosis Date     Acute and chronic respiratory failure with hypoxia (H)      Acute blood loss anemia 1/15/2019     Acute bronchitis 1/15/2019     Anemia     pernicious anemia     Anticoagulated 3/23/2022     Anxiety      Arm skin lesion, right      Arnold-Chiari malformation (H) 1998     Arthritis      Atrial fibrillation (H) 02/2017     Avascular necrosis of bone (H)      Breast cyst      Breast lump      Candidal skin infection      Cellulitis of left lower extremity 1/28/2019     CHF (congestive heart failure) (H)     diastolic and systolic     Chronic bronchitis (H)     & acute     Chronic diastolic  heart failure (H)     diastolic chf     Chronic pain syndrome      Chronic respiratory failure with hypoxia (H) 3/13/2017     COPD (chronic obstructive pulmonary disease) (H)      COPD exacerbation (H)      Depression      Diet-controlled type 2 diabetes mellitus (H)      Drug induced constipation      DVT of axillary vein, acute (H)     left     DVT of axillary vein, acute left (H)      Edema      Encounter for palliative care      Erysipelas      Fracture of femoral neck, left (H)      Generalized muscle weakness      GERD (gastroesophageal reflux disease)      History of blood clots      Hyperlipidemia      Hypertension      Left hip pain      Lumbar spinal stenosis      PAD (peripheral artery disease) (H)      Peripheral arterial disease (H) 10/28/2015     Psoriasis     arms      Pulmonary hypertension (H) 3/5/2018     Recurrent major depressive episodes (H)     Created by Conversion  Replacement Utility updated for latest IMO load     Schizoaffective disorder, bipolar type (H) 2015     Slow transit constipation      Stasis dermatitis of both legs      Status post total hip replacement, 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     Type 2 diabetes mellitus without complication (H) 2016     Vitamin B12 deficiency      Volume overload            Past Surgical History:      Past Surgical History:   Procedure Laterality Date     BACK SURGERY       BREAST CYST ASPIRATION Left      CERVICAL SPINE SURGERY      for arnold chiari malformation      SECTION  X3     CHOLECYSTECTOMY       COLONOSCOPY       EXCISE LESION UPPER EXTREMITY Right 2019    Procedure: EXCISION RIGHT ARM LIPOMA;  Surgeon: Andreas Holly MD;  Location: [facility];  Service: General     EYE SURGERY      bilateral cataracts     HAND SURGERY       HERNIORRHAPHY UMBILICAL N/A 2021    Procedure: INCARCERATED UMBILICAL AND;  Surgeon: Andreas Holly MD;  Location: [facility]     JOINT REPLACEMENT Right     knee - partial      LAPAROSCOPIC HERNIORRHAPHY INCISIONAL Left 3/27/2015    Procedure: ATTEMPTED LAPAROSCOPIC ABDOMINA/FLANK INCISION REPAIR;  Surgeon: Jose Lakhani MD;  Location: [facility] OR;  Service:      LUMBAR FUSION N/A 2014    Procedure: POSTERIOR FUSION / DECOMPRESSION L3-S1 WITH PELVIC FIXATION BILATERAL ;  Surgeon: Rajan Mares MD;  Location: [facility];  Service:      OTHER SURGICAL HISTORY      IR for PAD LOWER EXTREMITY     REPAIR SPIGELIAN HERNIA N/A 2021    Procedure: SPIGELIAN HERNIA REPAIR;  Surgeon: Andreas Holly MD;  Location: [facility] ASPIRATION HEMATOMA SEROMA OR FLUID COLLECTION  2020     [facility] OPEN FIXATN PROX END/NECK FEMUR FX Left 2019    Procedure: OPEN REDUCTION INTERNAL FIXATION, FRACTURE LEFT HIP, PERIPROSTHETIC FRACTURE;  Surgeon: Kevin Lackey MD;  Location: [facility] OR;  Service: Orthopedics     [facility] TOTAL HIP ARTHROPLASTY Left 2019    Procedure: LEFT TOTAL HIP ARTHROPLASTY;  Surgeon: Kevin Lackey MD;  Location: [facility] Main OR;  Service: Orthopedics     [facility] TOTAL HIP ARTHROPLASTY Right 2019    Procedure: RIGHT TOTAL HIP ARTHROPLASTY;  Surgeon: Kan Quesada MD;  Location: [facility] OR;  Service: Orthopedics     [facility] TOTAL KNEE ARTHROPLASTY Left 10/7/2016    Procedure: TOTAL KNEE ARTHROPLASTY, LEFT;  Surgeon: Kan Quesada MD;  Location: [facility];  Service: Orthopedics          Social History:     Social History     Tobacco Use     Smoking status: Former Smoker     Quit date: 2006     Years since quittin.3     Smokeless tobacco: Never Used     Tobacco comment: quit 2006   Substance Use Topics     Alcohol use: No          Family History:     Family History   Problem Relation Age of Onset     Coronary Artery Disease Mother      Coronary Artery Disease Father            Allergies:      Allergies   Allergen Reactions     Tramadol Nausea     Amoxicillin Itching and Rash     Levofloxacin Itching and Rash      Penicillins Itching and Rash     Has tolerated ceftriaxone.          Medications:     Current Facility-Administered Medications   Medication     acetaminophen (TYLENOL) tablet 500-1,000 mg     albuterol (PROVENTIL HFA/VENTOLIN HFA) inhaler     albuterol (PROVENTIL) neb solution 2.5 mg     albuterol (PROVENTIL) neb solution 2.5 mg     ARIPiprazole (ABILIFY) tablet 5 mg     atorvastatin (LIPITOR) tablet 20 mg     benzocaine-menthol (CEPACOL) 15-3.6 MG lozenge 1 lozenge     Budeson-Glycopyrrol-Formoterol 160-9-4.8 MCG/ACT AERO 2 puff     ceFEPIme (MAXIPIME) 2 g vial to attach to  ml bag for ADULTS or 50 ml bag for PEDS     glucose gel 15-30 g    Or     dextrose 50 % injection 25-50 mL    Or     glucagon injection 1 mg     [START ON 4/20/2022] diltiazem ER (DILT-XR) 24 hr capsule 240 mg     docusate sodium (COLACE) capsule 100 mg     furosemide (LASIX) tablet 40 mg     gabapentin (NEURONTIN) capsule 600 mg     insulin aspart (NovoLOG) injection (RAPID ACTING)     insulin aspart (NovoLOG) injection (RAPID ACTING)     lidocaine (LMX4) cream     lidocaine 1 % 0.1-1 mL     melatonin tablet 1 mg     methylPREDNISolone sodium succinate (solu-MEDROL) injection 40 mg     ondansetron (ZOFRAN-ODT) ODT tab 4 mg    Or     ondansetron (ZOFRAN) injection 4 mg     oxymetazoline (AFRIN) 0.05 % spray 2 spray     pantoprazole (PROTONIX) EC tablet 40 mg     Patient is already receiving anticoagulation with heparin, enoxaparin (LOVENOX), warfarin (COUMADIN)  or other anticoagulant medication     polyethylene glycol (MIRALAX) Packet 17 g     rivaroxaban ANTICOAGULANT (XARELTO) tablet 20 mg     sodium chloride (NEBUSAL) 3 % neb solution 3 mL     sodium chloride (PF) 0.9% PF flush 3 mL     sodium chloride (PF) 0.9% PF flush 3 mL     spironolactone (ALDACTONE) half-tab 12.5 mg          Review of Systems:   Comprehensive review of systems negative except as above.       Physical Exam:   BP (!) 142/91 (BP Location: Right arm)   Pulse  "104   Temp 98.1  F (36.7  C) (Oral)   Resp 20   Ht 1.549 m (5' 1\")   Wt 62.5 kg (137 lb 12.8 oz)   SpO2 92%   BMI 26.04 kg/m      Constitutional - fatigue, more comfortable breathing  Neck supple with no palpable thyroid  Eyes - no redness or discharge  Respiratory -breathing comfortably  Cardiac -- Normal rate, rhythm.   Neurological - No apparent tremors. Speech fluent and articlate  Psychiatric - no signs of delirium or anxiety          Current Laboratory Data:   All laboratory and imaging data reviewed.    Results for orders placed or performed during the hospital encounter of 04/13/22 (from the past 24 hour(s))   Glucose by meter   Result Value Ref Range    GLUCOSE BY METER POCT 143 (H) 70 - 99 mg/dL   Glucose by meter   Result Value Ref Range    GLUCOSE BY METER POCT 228 (H) 70 - 99 mg/dL   Glucose by meter   Result Value Ref Range    GLUCOSE BY METER POCT 183 (H) 70 - 99 mg/dL   Magnesium   Result Value Ref Range    Magnesium 1.9 1.8 - 2.6 mg/dL   Basic metabolic panel   Result Value Ref Range    Sodium 136 136 - 145 mmol/L    Potassium 4.5 3.5 - 5.0 mmol/L    Chloride 96 (L) 98 - 107 mmol/L    Carbon Dioxide (CO2) 33 (H) 22 - 31 mmol/L    Anion Gap 7 5 - 18 mmol/L    Urea Nitrogen 15 8 - 28 mg/dL    Creatinine 0.59 (L) 0.60 - 1.10 mg/dL    Calcium 8.3 (L) 8.5 - 10.5 mg/dL    Glucose 239 (H) 70 - 125 mg/dL    GFR Estimate 88 >60 mL/min/1.73m2   Extra Tube ([place] Draw)    Narrative    The following orders were created for panel order Extra Tube ([place] Draw).  Procedure                               Abnormality         Status                     ---------                               -----------         ------                     Extra Purple Top Tube[[id number]]                            Final result                 Please view results for these tests on the individual orders.   Extra Purple Top Tube   Result Value Ref Range    Hold Specimen JI    Glucose by meter   Result Value Ref Range    " GLUCOSE BY METER POCT 193 (H) 70 - 99 mg/dL            More than 35 minutes spent on this visit, greater than for percent counseling and coordination of care.  This includes time spent at bedside with the patient, discussion with RT, discussion with primary hospitalist, review of data and documentation.

## 2022-04-19 NOTE — PROGRESS NOTES
Patient placed on CPAP at ~ 2000, patient stayed on until about 2100.  Scheduled neb given Duo/sodium chloride.  Will continue to monitor.

## 2022-04-19 NOTE — PLAN OF CARE
Pt was oriented by four. C/o of abd discomfort and constipation. Gave stool softeners and bisacodyl suppository. She managed to have a BM then, which she greatly appreciated. RT placed her on CPAP which she removed as soon as RT left the room. She did not feel like trying it anymore overnight, so she was placed back on 02 via NC. Saline locked when not infusing abx.     Problem: Gas Exchange Impaired  Goal: Optimal Gas Exchange  Outcome: Ongoing, Progressing   Goal Outcome Evaluation:

## 2022-04-20 NOTE — PROGRESS NOTES
CLINICAL NUTRITION SERVICES    Reviewed nutrition risk factors due to LOS. Pt is tolerating diet, eating well per nursing documentation. No nutrition issues identified at this time. RD will follow via LOS, unless consulted.

## 2022-04-20 NOTE — PROGRESS NOTES
PULMONARY / CRITICAL CARE PROGRESS NOTE    Date / Time of Admission:  4/13/2022 11:22 AM    Assessment:   Adalgisa Solano is a 86 year old female with history of COPD on home O2, bronchiectasis, atrial fibrillation, anticoagulated, HFpEF, hiatal hernia, GERD.   Presents to ED on 4/13 for evaluation of shortness of breath, started on steroids and abx for bronchiectasis / COPD exacerbation. Chest CT scan showed no pulmonary embolism, irregular nodular opacitities in the RUL, bronchiectasis, emphysema, peribronchial thickening with retained secretions both lower lobes, sputum Cx grew pseudomonas and enterococcus. Viral panel was positive for coronavirus ( not COVID19). Patient required non invasive ventilation. ID service consulted, recommending to complete ten days of cefepime, in addition to obtain AFB in sputum.     1. Acute on chronic respiratory failure   2. COPD exacerbation   3. Bronchiectasis exacerbation   4. Atrial fibrillation, rate control anticoagulated  5. HTN, HFpEF  6. Hiatal hernia , GERD    Advance Directives: DNR    Plan:   1. Titrate FiO2, keep SpO2 > 90%  2. Schedule bronchodilators albuterol nebs   3. Add saline nebs   4. Titrate BP meds and diuresis  5. Systemic steroids   6. Abx per ID recommendation, currently on cefepime  7. Video swallow study  8. Diet per speech therapy  9. PPI for GI prophylaxis   10. Glucose level monitoring   11. DVT prophylaxis anticoagulated with xarelto.     Please contact me if you have any questions.    Evin Thorpe  Pulmonary / Critical Care  04/20/2022  9:21 AM      Subjective:   HPI:  Adalgisa Solano is a 86 year old female with history of COPD on home O2, bronchiectasis, atrial fibrillation, anticoagulated, HFpEF, hiatal hernia, GERD.   Presents to ED on 4/13 for evaluation of shortness of breath, started on steroids and abx for bronchiectasis / COPD exacerbation. Chest CT scan showed no pulmonary embolism, irregular nodular opacitities  in the RUL, bronchiectasis, emphysema, peribronchial thickening with retained secretions both lower lobes, sputum Cx grew pseudomonas and enterococcus. Viral panel was positive for coronavirus ( not COVID19). Patient required non invasive ventilation. ID service consulted, recommending to complete ten days of cefepime, in addition to obtain AFB in sputum.     Events overnight.   - Feels depressed  - Improvement of shortness of breath  - Afebrile, hemodynamically stable, unchanged O2 needs      Allergies: Tramadol, Amoxicillin, Levofloxacin, and Penicillins     MEDS:  Current Facility-Administered Medications   Medication     acetaminophen (TYLENOL) tablet 500-1,000 mg     albuterol (PROVENTIL HFA/VENTOLIN HFA) inhaler     albuterol (PROVENTIL) neb solution 2.5 mg     albuterol (PROVENTIL) neb solution 2.5 mg     ARIPiprazole (ABILIFY) tablet 5 mg     atorvastatin (LIPITOR) tablet 20 mg     benzocaine-menthol (CEPACOL) 15-3.6 MG lozenge 1 lozenge     Budeson-Glycopyrrol-Formoterol 160-9-4.8 MCG/ACT AERO 2 puff     ceFEPIme (MAXIPIME) 2 g vial to attach to  ml bag for ADULTS or 50 ml bag for PEDS     glucose gel 15-30 g    Or     dextrose 50 % injection 25-50 mL    Or     glucagon injection 1 mg     diltiazem ER (DILT-XR) 24 hr capsule 240 mg     docusate sodium (COLACE) capsule 100 mg     furosemide (LASIX) tablet 40 mg     gabapentin (NEURONTIN) capsule 600 mg     insulin aspart (NovoLOG) injection (RAPID ACTING)     insulin aspart (NovoLOG) injection (RAPID ACTING)     lidocaine (LMX4) cream     lidocaine 1 % 0.1-1 mL     melatonin tablet 1 mg     methylPREDNISolone sodium succinate (solu-MEDROL) injection 40 mg     ondansetron (ZOFRAN-ODT) ODT tab 4 mg    Or     ondansetron (ZOFRAN) injection 4 mg     pantoprazole (PROTONIX) EC tablet 40 mg     Patient is already receiving anticoagulation with heparin, enoxaparin (LOVENOX), warfarin (COUMADIN)  or other anticoagulant medication     polyethylene glycol  "(MIRALAX) Packet 17 g     rivaroxaban ANTICOAGULANT (XARELTO) tablet 20 mg     sodium chloride (NEBUSAL) 3 % neb solution 3 mL     sodium chloride (PF) 0.9% PF flush 3 mL     sodium chloride (PF) 0.9% PF flush 3 mL     spironolactone (ALDACTONE) half-tab 12.5 mg       Objective:   VITALS:  /89 (BP Location: Right arm, Patient Position: Sitting)   Pulse 106   Temp 97.7  F (36.5  C) (Oral)   Resp 20   Ht 1.549 m (5' 1\")   Wt 62.8 kg (138 lb 6.4 oz)   SpO2 95%   BMI 26.15 kg/m    VENT:  Resp: 20    EXAM:   Gen: awake, alert, no distress  HEENT: pink conjunctiva, moist mucosa  Neck: no thyromegaly, masses or JVD  Lungs: discrete ronchi  CV: regular, no murmurs or gallops appreciated  Abdomen: soft, NT, BS wnl  Ext: no edema  Neuro: CN II-XII intact, non focal       Data Review:     04/19/22 06:30   Sodium 136   Potassium 4.5   Chloride 96 (L)   Carbon Dioxide 33 (H)   Urea Nitrogen 15   Creatinine 0.59 (L)   GFR Estimate 88 [1]   Calcium 8.3 (L)   Anion Gap 7   Magnesium 1.9      04/14/22 06:18   WBC 7.3   Hemoglobin 13.0   Hematocrit 39.5   Platelet Count 213   RBC Count 4.41   MCV 90   MCH 29.5   MCHC 32.9   RDW 14.6      10/07/21 23:25   INR 3.68 (H)      04/13/22 14:11   Coronavirus Detected ! [1]     Respiratory Culture  4/13 1+ Normal marielena       1+ Pseudomonas aeruginosa Abnormal        1+ Enterococcus faecalis Abnormal                 Gram Stain Result  <10 Squamous epithelial cells/low power field      >25 PMNs/low power field      No organisms seen             VENOUS US LE 4/13  IMPRESSION:  1.  No deep venous thrombosis in the bilateral lower extremities.  2.  Bilateral calf subcutaneous edema.  3.  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 Note that the left popliteal fossa lesion is similar in size but has increased complexity since the ultrasound from 02/06/2022.    CT CHEST PULMONARY EMBOLISM W CONTRAST  DATE/TIME: 3/23/2022 1:33 PM    "   IMPRESSION:  1.  There are multiple irregular shaped nodular and peribronchiolar opacities in the right upper lobe as noted above. The largest one superiorly is most suspicious in appearance for an indolent malignancy. While this and more likely the other opacities   could certainly be inflammatory in nature, suggest nonemergent pulmonary consultation re further evaluation and timing of followup studies.  2.  Evidence of cylindrical bronchiectasis and chronic bronchitis in both lower lobes with new areas of retained secretions in the lower lobes, left greater than right.  3.  Moderate bullous emphysema in the upper lobes.  4.  There is a 2.4 cm left thyroid nodule which is stable. Consider nonemergent follow-up thyroid ultrasound. Reference noted below.  5.  No evidence of pulmonary emboli.    By:  Evin Thorpe MD, 04/20/2022  9:21 AM    Primary Care Physician:  Shai Ellington

## 2022-04-20 NOTE — PLAN OF CARE
Problem: Gas Exchange Impaired  Goal: Optimal Gas Exchange  Outcome: Ongoing, Progressing   Goal Outcome Evaluation:      Pt on 4L O2 via NC sating 92%.   Lung sounds are expiratory wheezes and course crackles in the base. Pt gets SOB with exertion but none at rest.   Pt has productive congestive cough with blood streaked sputum.   Continue with IV abx, nebs, IV solu-medrol and RT therapy with vest.   Pt used prn albuterol inhaler this morning.   Continue with droplet precautions.       Plan: discharge to TCU when one becomes available.

## 2022-04-20 NOTE — PLAN OF CARE
Goal Outcome Evaluation:    Plan of Care Reviewed With: patient     Overall Patient Progress: improving    Outcome Evaluation: Pt on 4L/min O2 at rest via NC, titrated up to 5-6L/min PRN while ambulating.  Lungs sounds are course crackles with expiratory wheezes.  IV antibiotics and steroids continued, nebs given twice during shift per RT.  PT and lymphadema consulted pt.

## 2022-04-20 NOTE — PROGRESS NOTES
[facility] MEDICINE PROGRESS NOTE      Identification/Summary: Adalgisa Solano is a 86 year old female with a past medical history of  atrial fibrillation on chronic anticoagulation treatment, DM2, COPD, bronchiectasis, getting vest therapy at home, recent hospitalization with COPD exacerbation [facility] 3/23-3/25 ,who was admitted on 4/13/2022 for worsening shortness of breath with productive cough secondary to bronchiectasis and COPD exacerbation and right lower lobe pneumonia.  Getting IV systemic steroid, vest therapy, bronchodilator treatment.  Sputum culture grew Pseudomonas and Enterococcus.  On IV cefepime. Tested positive for coronavirus, but negative for COVID-19. Chronically on 2 to 3 L of oxygen now on 4 L.  Gentle diuresed due to volume excess.  Echocardiogram is unremarkable. Has slow recovery.  Anticipated discharge to TCU for more rehabilitation.     Assessment and Plan:  Complex and persistent bronchiectasis and COPD exacerbation  IV systemic steroid then taper dose of oral steroid    Right lower lobe pneumonia   Sputum culture-Pseudomonas and Enterococcus IV cefepime 2 g 3 times a day  Bronchiectasis flareup  Continue vest therapy  COPD exacerbation  Systemic steroid, bronchodilator therapy  Acute on chronic hypoxic respiratory failure  Requiring 4 L of oxygen at present  Was on 2 to 3 L of oxygen at home    HFpEF  Pulmonary Hypertension  Echo in 2020 revealed preserved EF  BLE edema on exam  Continue 40 mg PO lasix daily  Spironolactone 12.5 mg daily    Non-insulin Dependent Diabetes  Steroid-induced hyperglycemia  Monitor blood glucose with correction as needed.   Hold metformin     Hypertension   Diltiazem 240 mg daily    Atrial fibrillation  Heart rate is stable with diltiazem 240 mg daily   Xarelto 20 mg daily for chronic anticoagulation treatment    Dyslipidemia  Lipitor 20 mg daily    Code DNR  DVT prophylaxis  On chronic anticoagulation treatment  with Xarelto  Barrier to discharge  Awaiting for more clinical improvement.  Anticipated discharge in 2-day to TCU    I agreed and reviewed Rosy Morse's note and plan. I examined the  Patient by myself.    Gracy Prado MD  [facility]    Rosy Morse, PA-S  [facility]  [facility]  Phone: #[phone]    Interval History/Subjective:  Feeling improved today, but still with productive cough.  Still feels stuff in her lungs that she cannot cough out. Nebs seem to help. Still having dyspnea with activity. Afebrile. One episode of dark stool this morning. Feeling slightly lightheaded. Denies chest pain. Denies abdominal pain.     Physical Exam/Objective:  Temp:  [97.7  F (36.5  C)-98.4  F (36.9  C)] 97.7  F (36.5  C)  Pulse:  [] 106  Resp:  [18-20] 20  BP: (110-139)/(61-89) 139/89  SpO2:  [91 %-95 %] 95 %  Body mass index is 26.15 kg/m .    GENERAL:  Alert, appears comfortable, in no acute distress, appears stated age   HEAD:  Normocephalic, without obvious abnormality, atraumatic   LUNGS:   Diffuse expiratory rhonchi. Symmetric chest rise on inhalation, respirations unlabored   CHEST WALL:  No tenderness or deformity   HEART:  Regular rate and rhythm, S1 and S2 normal, no murmur, rub, or gallop    ABDOMEN:   Soft, non-tender, bowel sounds active all four quadrants, no organomegaly, no rebound or guarding   EXTREMITIES: BLE edema 1+, tender   SKIN: No rashes   NEURO: Alert, oriented x3, moves all four extremities freely   PSYCH: Cooperative, behavior is appropriate      Data reviewed today: I personally reviewed all new medications, labs, imaging/diagnostics reports over the past 24 hours. Pertinent findings include:    Imaging:   CXR  There are 2 new ill-defined opacities in the right base, largest measuring approximately 1.5 cm. These likely reflect reflect areas of retained secretions with bronchopneumonia.     Fine reticular opacities in both lungs and  cardiomegaly are unchanged. Bullous emphysema and bronchiectasis is better appreciated on CT exams. No signs of failure.    Leg us  No deep venous thrombosis in the bilateral lower extremities.    Labs:  Most Recent 3 CBC's:Recent Labs   Lab Test 04/14/22  0618 04/13/22  1154 03/24/22  0605   WBC 7.3 11.7* 9.9   HGB 13.0 13.8 13.2   MCV 90 89 91    237 222     Most Recent 3 BMP's:Recent Labs   Lab Test 04/20/22  0748 04/20/22  0204 04/19/22 2112 04/19/22  0737 04/19/22  0630 04/18/22  0708 04/18/22  0631 04/17/22  0741 04/17/22  0738 04/16/22  0754 04/16/22  0617   NA  --   --   --   --  136  --  135*  --   --   --  132*   POTASSIUM  --   --   --   --  4.5  --  4.8  --  4.6  --  4.2   CHLORIDE  --   --   --   --  96*  --  95*  --   --   --  92*   CO2  --   --   --   --  33*  --  32*  --   --   --  32*   BUN  --   --   --   --  15  --  14  --   --   --  15   CR  --   --   --   --  0.59*  --  0.59*  --   --   --  0.57*   ANIONGAP  --   --   --   --  7  --  8  --   --   --  8   DENISE  --   --   --   --  8.3*  --  8.4*  --   --   --  8.3*   * 172* 199*   < > 239*   < > 198*   < >  --    < > 177*    < > = values in this interval not displayed.     Most Recent 6 glucoses:Recent Labs   Lab Test 04/20/22  0748 04/20/22  0204 04/19/22 2112 04/19/22  1638 04/19/22  1224 04/19/22  0737   * 172* 199* 212* 216* 193*       Medications:   Personally Reviewed.  Medications     - MEDICATION INSTRUCTIONS -         albuterol  2.5 mg Nebulization 4x Daily     ARIPiprazole  5 mg Oral At Bedtime     atorvastatin  20 mg Oral At Bedtime     Budeson-Glycopyrrol-Formoterol  2 puff Inhalation BID     ceFEPIme (MAXIPIME) IV  2 g Intravenous Q8H     diltiazem ER  240 mg Oral QAM     docusate sodium  100 mg Oral BID     furosemide  40 mg Oral Daily     gabapentin  600 mg Oral QPM     insulin aspart  1-7 Units Subcutaneous TID AC     insulin aspart  1-5 Units Subcutaneous At Bedtime     methylPREDNISolone  40 mg Intravenous  Q12H     pantoprazole  40 mg Oral Daily     polyethylene glycol  17 g Oral BID     rivaroxaban ANTICOAGULANT  20 mg Oral Daily with supper     sodium chloride  3 mL Nebulization 4x Daily     sodium chloride (PF)  3 mL Intracatheter Q8H     spironolactone  12.5 mg Oral Daily

## 2022-04-20 NOTE — PLAN OF CARE
Lymphedema Discharge Summary    Reason for therapy discharge:    No further expectations of functional progress.    Progress towards therapy goal(s). See goals on Care Plan in [facility] electronic health record for goal details.  Goals met    Therapy recommendation(s):    Continued therapy is recommended.  Rationale/Recommendations:  Continue to care/monitor BLE edema at TCU. discharge instructions provided for tubular compression (tubigrip).

## 2022-04-20 NOTE — PROGRESS NOTES
Patient seen on 4L NC with bubbler. SPO2 at 93% with Coarse BS. 30 min vest completed with nebulizer treatments. RT will continue to follow and provide vest therapy and nebs as scheduled. No other concerns at this time.    Dexter Greer, RT

## 2022-04-20 NOTE — PROGRESS NOTES
Care Management Follow Up    Length of Stay (days): 7    Expected Discharge Date: 04/21/2022     Concerns to be Addressed: care coordination/care conferences, discharge planning     Patient plan of care discussed at interdisciplinary rounds: Yes    Anticipated Discharge Disposition: Home Care, Home     Anticipated Discharge Services: None  Anticipated Discharge DME: None (No new equipment anticipated.)    Patient/family educated on Medicare website which has current facility and service quality ratings: no (When presented as a potential need, pt and  stated previous use of [facility] and had no other preferences. Not interested in vendor list.)  Education Provided on the Discharge Plan:    Patient/Family in Agreement with the Plan: yes    Referrals Placed by CM/SW:  (Not currently indicated.)  Private pay costs discussed: Not applicable    Additional Information:  11:19 AM  SW spoke with Sonny at [facility].  She accepted pt for tomorrow 4/21 any time after 10am.  SW met with patient and  in room.  They are in agreement to [facility].   will transport at 10am.  Pt has portable O2 tank in hospital room.        ESE Deal

## 2022-04-20 NOTE — DISCHARGE INSTRUCTIONS
Lymphedema Therapy Instructions for Discharge:    Tubigrip:  Patient now tolerating Tubigrip (tan tubular compression) to manage LE edema, should wear 24-hours/day and remove once daily for skin check/moisturizing. Luis from toes to knees, double back over foot for 2-layer compression. Tubigrip can be hand washed every 7-10 days (or as needed), lay flat to dry. Please ensure Tubigrip lays flat to avoid tourniquet effect. Tubigrip should be removed if pain, numbness/tingling, or if soiled.  -----------------------------------------------------------------------------------------------------------------------------------------------------

## 2022-04-21 NOTE — PROGRESS NOTES
Care Management Follow Up    Length of Stay (days): 8    Expected Discharge Date: 04/21/2022     Concerns to be Addressed: care coordination/care conferences, discharge planning     Patient plan of care discussed at interdisciplinary rounds: Yes    Anticipated Discharge Disposition: Home Care, Home     Anticipated Discharge Services: None  Anticipated Discharge DME: None (No new equipment anticipated.)    Patient/family educated on Medicare website which has current facility and service quality ratings: no (When presented as a potential need, pt and  stated previous use of [facility] and had no other preferences. Not interested in vendor list.)  Education Provided on the Discharge Plan:    Patient/Family in Agreement with the Plan: yes      Additional Information:  Provider called CM and stated that discharge was on hold due to medical needs and to plan for tomorrow.     CM called [facility] and spoke with Sonny and stated that the discharge was on hold until tomorrow.     No PAS needed for this TCU.     BETTY Schneider

## 2022-04-21 NOTE — PROGRESS NOTES
[facility] MEDICINE PROGRESS NOTE      Identification/Summary: Adalgisa Solano is a 86 year old female with a past medical history of  atrial fibrillation on chronic anticoagulation treatment, DM2, COPD, bronchiectasis, getting vest therapy at home, recent hospitalization with COPD exacerbation [facility] 3/23-3/25 ,who was admitted on 4/13/2022 for worsening shortness of breath with productive cough secondary to bronchiectasis and COPD exacerbation and right lower lobe pneumonia.  Treated with IV systemic steroids now switched to tapered dose of oral steroid, resumed vest therapy started 2 years ago, scheduled bronchodilator 4 times a day.Sputum culture grew Pseudomonas and Enterococcus.  On IV cefepime until 4/23. Tested positive for coronavirus, but negative for COVID-19. Chronically on 2 to 3 L of oxygen now on 5 L.  Getting dyspneic with acute anxiety.  Continue diuresis with Lasix 40 mg twice a day.  Echocardiogram is unremarkable. Has slow recovery.  Evaluated by pulmonology. Palliative care consult.  Anticipated discharge to TCU for more rehabilitation.     Assessment and Plan:  Complex and persistent bronchiectasis with exacerbation  COPD exacerbation  Acute on chronic hypoxic respiratory failure  Requiring 5 L of oxygen at present  Was on 2 to 3 L of oxygen at home  S/P IV systemic steroid, now started taper dose of oral steroid  Continue vest therapy, scheduled bronchodilator 4 times a day    Right lower lobe pneumonia   Sputum culture-Pseudomonas and Enterococcus IV cefepime 2 g 3 times a day until 4/23    Acute on chronic diastolic CHF exacerbation  Pulmonary Hypertension  Echo in 2020 revealed preserved EF  BLE edema on exam  P.o. Lasix 40 mg twice a day, was on 40 mg every other day at home  Spironolactone 12.5 mg daily    Non-insulin Dependent Diabetes  Steroid-induced hyperglycemia  Monitor blood glucose with correction as needed.   Hold metformin      Hypertension   Diltiazem 240 mg daily    Atrial fibrillation  Heart rate is stable with diltiazem 240 mg daily   Xarelto 20 mg daily for chronic anticoagulation treatment    Dyslipidemia  Lipitor 20 mg daily    Chest pain likely chest wall pain from coughing  Serial cardiac enzymes,    Acute anxiety   Ativan 0.25 mg every 4 hours as needed    Code DNR  Related care consult for symptom management  DVT prophylaxis  On chronic anticoagulation treatment with Xarelto  Barrier to discharge  Awaiting for more clinical improvement.  Anticipated discharge in 1 day to TCU    I agreed and reviewed Rosy Morse's note and plan. I examined the  Patient by myself.    Gracy Prado MD  [facility]    Rosy Morse, PA-S  [facility]  Phone: #[phone]    Interval History/Subjective:  Develops left-sided chest pain, coughing spell, short of breath.  Requiring 5 L of oxygen.  Getting anxious easily due to dyspnea.  Afebrile.  Denies headache, palpitation, nausea, vomiting, abdominal pain or urinary symptoms.   is present.       Physical Exam/Objective:  Temp:  [97.1  F (36.2  C)-98.2  F (36.8  C)] 97.1  F (36.2  C)  Pulse:  [89-99] 97  Resp:  [20-24] 20  BP: (106-146)/(55-85) 146/85  SpO2:  [85 %-97 %] 91 %  Body mass index is 26.15 kg/m .    GENERAL:  Alert, appears comfortable, in no acute distress, appears stated age   HEAD:  Normocephalic, without obvious abnormality, atraumatic   LUNGS:   Diffuse expiratory rhonchi. Symmetric chest rise on inhalation, respirations unlabored   CHEST WALL:  No tenderness or deformity   HEART:  Regular rate and rhythm, S1 and S2 normal, no murmur, rub, or gallop    ABDOMEN:   Soft, non-tender, bowel sounds active all four quadrants, no organomegaly, no rebound or guarding   EXTREMITIES: BLE edema 1+, tender   SKIN: No rashes   NEURO: Alert, oriented x3, moves all four extremities freely   PSYCH: Cooperative, behavior is  appropriate      Data reviewed today: I personally reviewed all new medications, labs, imaging/diagnostics reports over the past 24 hours. Pertinent findings include:    Imaging:   CXR  There are 2 new ill-defined opacities in the right base, largest measuring approximately 1.5 cm. These likely reflect reflect areas of retained secretions with bronchopneumonia.     Fine reticular opacities in both lungs and cardiomegaly are unchanged. Bullous emphysema and bronchiectasis is better appreciated on CT exams. No signs of failure.    Leg us  No deep venous thrombosis in the bilateral lower extremities.    Labs:  Most Recent 3 CBC's:  Recent Labs   Lab Test 04/21/22  0237 04/20/22  1112 04/14/22  0618   WBC 8.6 11.1* 7.3   HGB 12.2 12.8 13.0   MCV 89 91 90    249 213     Most Recent 3 BMP's:  Recent Labs   Lab Test 04/21/22  0822 04/21/22 0237 04/21/22 0212 04/20/22  1131 04/20/22  1112 04/19/22  0737 04/19/22  0630   NA  --  133*  --   --  130*  --  136   POTASSIUM  --  4.3  --   --  4.3  --  4.5   CHLORIDE  --  95*  --   --  91*  --  96*   CO2  --  29  --   --  28  --  33*   BUN  --  17  --   --  19  --  15   CR  --  0.62  --   --  0.65  --  0.59*   ANIONGAP  --  9  --   --  11  --  7   DENISE  --  8.4*  --   --  8.6  --  8.3*   * 273* 251*   < > 244*   < > 239*    < > = values in this interval not displayed.     Most Recent 6 glucoses:  Recent Labs   Lab Test 04/21/22  0822 04/21/22  0237 04/21/22  0212 04/20/22  2124 04/20/22  1611 04/20/22  1131   * 273* 251* 271* 244* 196*       Medications:   Personally Reviewed.  Medications     - MEDICATION INSTRUCTIONS -         albuterol  2.5 mg Nebulization 4x Daily     ARIPiprazole  5 mg Oral At Bedtime     atorvastatin  20 mg Oral At Bedtime     Budeson-Glycopyrrol-Formoterol  2 puff Inhalation BID     ceFEPIme (MAXIPIME) IV  2 g Intravenous Q8H     diltiazem ER  240 mg Oral QAM     docusate sodium  100 mg Oral BID     furosemide  40 mg Oral BID      gabapentin  600 mg Oral QPM     insulin aspart  1-7 Units Subcutaneous TID AC     insulin aspart  1-5 Units Subcutaneous At Bedtime     pantoprazole  40 mg Oral Daily     polyethylene glycol  17 g Oral BID     predniSONE  40 mg Oral Daily    Followed by     [START ON 4/24/2022] predniSONE  30 mg Oral Daily    Followed by     [START ON 4/27/2022] predniSONE  20 mg Oral Daily    Followed by     [START ON 4/30/2022] predniSONE  10 mg Oral Daily     rivaroxaban ANTICOAGULANT  20 mg Oral Daily with supper     sodium chloride  3 mL Nebulization 4x Daily     sodium chloride (PF)  3 mL Intracatheter Q8H     spironolactone  12.5 mg Oral Daily

## 2022-04-21 NOTE — PROGRESS NOTES
PULMONARY / CRITICAL CARE PROGRESS NOTE    Date / Time of Admission:  4/13/2022 11:22 AM    Assessment:   Adalgisa Solano is a 86 year old female with history of COPD on home O2, bronchiectasis, atrial fibrillation, anticoagulated, HFpEF, hiatal hernia, GERD.   Presents to ED on 4/13 for evaluation of shortness of breath, started on steroids and abx for bronchiectasis / COPD exacerbation. Chest CT scan showed no pulmonary embolism, irregular nodular opacitities in the RUL, bronchiectasis, emphysema, peribronchial thickening with retained secretions both lower lobes, sputum Cx grew pseudomonas and enterococcus. Viral panel was positive for coronavirus ( not COVID19). Patient required non invasive ventilation. ID service consulted, recommending to complete ten days of cefepime, in addition to obtain AFB in sputum.     1. Acute on chronic respiratory failure   2. COPD exacerbation   3. Bronchiectasis exacerbation   4. Atrial fibrillation, rate control anticoagulated  5. HTN, HFpEF  6. Hiatal hernia , GERD  7. Depression     Advance Directives: DNR    Plan:   1. Titrate FiO2, keep SpO2 > 90%  2. Schedule bronchodilators albuterol nebs, continue saline nebs twice a day   3. Titrate BP meds and diuresis  4. Taper down systemic steroids   5. Abx per ID recommendation, currently on cefepime  6. Diet per speech therapy  7. PPI for GI prophylaxis   8. Glucose level monitoring   9. DVT prophylaxis anticoagulated with xarelto.   10. Psy consult for evaluation of depression in elderly patient  11. Continue PT, OT   12. Disposition TCU upon discharge     Please contact me if you have any questions.    Evin Thorpe  Pulmonary / Critical Care  04/21/2022  11:22 AM      Subjective:   HPI:  Adalgisa Solano is a 86 year old female with history of COPD on home O2, bronchiectasis, atrial fibrillation, anticoagulated, HFpEF, hiatal hernia, GERD.   Presents to ED on 4/13 for evaluation of shortness of breath,  started on steroids and abx for bronchiectasis / COPD exacerbation. Chest CT scan showed no pulmonary embolism, irregular nodular opacitities in the RUL, bronchiectasis, emphysema, peribronchial thickening with retained secretions both lower lobes, sputum Cx grew pseudomonas and enterococcus. Viral panel was positive for coronavirus ( not COVID19). Patient required non invasive ventilation. ID service consulted, recommending to complete ten days of cefepime, in addition to obtain AFB in sputum.     Events overnight.   - Feels depressed  - Improvement of shortness of breath  - Afebrile, hemodynamically stable, unchanged O2 needs  - Working with physical therapy    Allergies: Tramadol, Amoxicillin, Levofloxacin, and Penicillins     MEDS:  Current Facility-Administered Medications   Medication     acetaminophen (TYLENOL) tablet 500-1,000 mg     albuterol (PROVENTIL HFA/VENTOLIN HFA) inhaler     albuterol (PROVENTIL) neb solution 2.5 mg     albuterol (PROVENTIL) neb solution 2.5 mg     ARIPiprazole (ABILIFY) tablet 5 mg     atorvastatin (LIPITOR) tablet 20 mg     benzocaine-menthol (CEPACOL) 15-3.6 MG lozenge 1 lozenge     Budeson-Glycopyrrol-Formoterol 160-9-4.8 MCG/ACT AERO 2 puff     ceFEPIme (MAXIPIME) 2 g vial to attach to  ml bag for ADULTS or 50 ml bag for PEDS     glucose gel 15-30 g    Or     dextrose 50 % injection 25-50 mL    Or     glucagon injection 1 mg     diltiazem ER (DILT-XR) 24 hr capsule 240 mg     docusate sodium (COLACE) capsule 100 mg     furosemide (LASIX) tablet 40 mg     gabapentin (NEURONTIN) capsule 600 mg     insulin aspart (NovoLOG) injection (RAPID ACTING)     insulin aspart (NovoLOG) injection (RAPID ACTING)     lidocaine (LMX4) cream     lidocaine 1 % 0.1-1 mL     LORazepam (ATIVAN) half-tab 0.25 mg     melatonin tablet 1 mg     nitroGLYcerin (NITROSTAT) sublingual tablet 0.4 mg     ondansetron (ZOFRAN-ODT) ODT tab 4 mg    Or     ondansetron (ZOFRAN) injection 4 mg     pantoprazole  "(PROTONIX) EC tablet 40 mg     Patient is already receiving anticoagulation with heparin, enoxaparin (LOVENOX), warfarin (COUMADIN)  or other anticoagulant medication     polyethylene glycol (MIRALAX) Packet 17 g     predniSONE (DELTASONE) tablet 40 mg    Followed by     [START ON 4/24/2022] predniSONE (DELTASONE) tablet 30 mg    Followed by     [START ON 4/27/2022] predniSONE (DELTASONE) tablet 20 mg    Followed by     [START ON 4/30/2022] predniSONE (DELTASONE) tablet 10 mg     rivaroxaban ANTICOAGULANT (XARELTO) tablet 20 mg     sodium chloride (NEBUSAL) 3 % neb solution 3 mL     sodium chloride (PF) 0.9% PF flush 3 mL     sodium chloride (PF) 0.9% PF flush 3 mL     spironolactone (ALDACTONE) half-tab 12.5 mg       Objective:   VITALS:  BP (!) 146/85 (BP Location: Right arm, Patient Position: Semi-'s)   Pulse 97   Temp 97.1  F (36.2  C) (Oral)   Resp 20   Ht 1.549 m (5' 1\")   Wt 62.8 kg (138 lb 6.4 oz)   SpO2 91%   BMI 26.15 kg/m    VENT:  Resp: 20    EXAM:   Gen: awake, alert, no distress  HEENT: pink conjunctiva, moist mucosa  Neck: no thyromegaly, masses or JVD  Lungs: discrete ronchi  CV: regular, no murmurs or gallops appreciated  Abdomen: soft, NT, BS wnl  Ext: trace edema  Neuro: CN II-XII intact, non focal       Data Review:   04/21/22 02:37   Sodium 133 (L)   Potassium 4.3   Chloride 95 (L)   Carbon Dioxide 29   Urea Nitrogen 17   Creatinine 0.62   GFR Estimate 87 [1]   Calcium 8.4 (L)   Anion Gap 9   Magnesium 2.2   Albumin 3.2 (L)   Protein Total 6.0   Bilirubin Total 0.6   Alkaline Phosphatase 49   ALT 17   AST 12    (H)   Lipase 13   Troponin I 0.02      04/21/22 02:37   WBC 8.6   Hemoglobin 12.2   Hematocrit 37.9   Platelet Count 244   RBC Count 4.24   MCV 89   MCH 28.8   MCHC 32.2   RDW 15.2 (H)   % Neutrophils 94   % Lymphocytes 2   % Monocytes 3   % Eosinophils 0   % Basophils 0   (H): Data is abnormally high    Respiratory Culture  4/13 1+ Normal marielena       1+ Pseudomonas " aeruginosa Abnormal        1+ Enterococcus faecalis Abnormal                 Gram Stain Result  <10 Squamous epithelial cells/low power field      >25 PMNs/low power field      No organisms seen             VENOUS US LE 4/13  IMPRESSION:  1.  No deep venous thrombosis in the bilateral lower extremities.  2.  Bilateral calf subcutaneous edema.  3.  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 Note that the left popliteal fossa lesion is similar in size but has increased complexity since the ultrasound from 02/06/2022.    CT CHEST PULMONARY EMBOLISM W CONTRAST  DATE/TIME: 3/23/2022 1:33 PM      IMPRESSION:  1.  There are multiple irregular shaped nodular and peribronchiolar opacities in the right upper lobe as noted above. The largest one superiorly is most suspicious in appearance for an indolent malignancy. While this and more likely the other opacities   could certainly be inflammatory in nature, suggest nonemergent pulmonary consultation re further evaluation and timing of followup studies.  2.  Evidence of cylindrical bronchiectasis and chronic bronchitis in both lower lobes with new areas of retained secretions in the lower lobes, left greater than right.  3.  Moderate bullous emphysema in the upper lobes.  4.  There is a 2.4 cm left thyroid nodule which is stable. Consider nonemergent follow-up thyroid ultrasound. Reference noted below.  5.  No evidence of pulmonary emboli.    By:  Evin Thorpe MD, 04/21/2022  11:22 AM    Primary Care Physician:  Shai Ellington

## 2022-04-21 NOTE — PROGRESS NOTES
"Called to room to trial patient back on CPAP due to adverse VBG and increase in oxygen needs. Placed patient on CPAP+5 with 8L bleed in. Patient became anxious with SPO2 at 88% and stating \"I can't breathe\". Patient removed CPAP and did not want to wear it again. Placed back on Nasal cannula 6L with SPO2 at 92%. RN aware of refusal.     Dexter Greer, RT   "

## 2022-04-21 NOTE — PLAN OF CARE
Goal Outcome Evaluation:    Plan of Care Reviewed With: patient, spouse     Overall Patient Progress: improving    Outcome Evaluation: Pt on 6L/min O2 at rest via NC.  Lung sounds are course crackles with expiratory wheezes.  IV antibiotics continued, nebs given twice during shift per RT.  PT and palliative care saw pt today, psych was also consulted.    Problem: Risk for Delirium  Goal: Optimal Coping  Outcome: Ongoing, Progressing    Pt seemed weepy and depressed this morning, she was tearful and making statements as though she was not going to make it through the day.  Making statements of remorse from past.  Pt reassured, comforted, redirected.  Ativan ordered and given once this afternoon.  Updated  on concerns with pt.  Pt in better spirits this afternoon.

## 2022-04-21 NOTE — PROGRESS NOTES
"BP (!) 146/85 (BP Location: Right arm, Patient Position: Semi-'s)   Pulse 97   Temp 97.1  F (36.2  C) (Oral)   Resp 20   Ht 1.549 m (5' 1\")   Wt 62.8 kg (138 lb 6.4 oz)   SpO2 91%   BMI 26.15 kg/m      Patient receiving duonebs 4 times a day, and sodium chloride 4 times a day. Patient didn't do her vest therapy this morning. Patient is alittle more short of breath today. Strong non productive cough. Patient not able to do the acapella very well. Rt continue to follow.     Miriam Peterson, RT      "

## 2022-04-21 NOTE — PROVIDER NOTIFICATION
1:39 AM Paged Dr Gil in regards to increased O2 needs and new bilateral chest pain    MD to bedside, patient resting comfortably, did not arouse to his assessment. Instructed to call back if patient wakes in pain again

## 2022-04-21 NOTE — PROGRESS NOTES
"SPEECH PATHOLOGY-VIDEO SWALLOW STUDY     04/21/22 1446   General Information   Onset of Illness/Injury or Date of Surgery 04/13/22   Pertinent History of Current Problem Per MD note, dated today: Adalgisa Solano is a 86 year old female with a past medical history of  atrial fibrillation on chronic anticoagulation treatment, DM2, COPD, bronchiectasis, getting vest therapy at home, recent hospitalization with COPD exacerbation [facility] 3/23-3/25 ,who was admitted on 4/13/2022 for worsening shortness of breath with productive cough secondary to bronchiectasis and COPD exacerbation and right lower lobe pneumonia.  Treated with IV systemic steroids now switched to tapered dose of oral steroid, resumed vest therapy started 2 years ago, scheduled bronchodilator 4 times a day.Sputum culture grew Pseudomonas and Enterococcus.  On IV cefepime until 4/23. Tested positive for coronavirus, but negative for COVID-19. Chronically on 2 to 3 L of oxygen now on 5 L.  Getting dyspneic with acute anxiety.   General Observations At current [facility], patient reports coughing with food and liquids, at least once per meal.   Past History of Dysphagia Patient had clinical swallow evaluation in 2017 in this health system, with recommended diet of regular textures and thin liquids but suggestion for esophagram based on symptoms consistent with esophageal dysphagia. No record of any esophagram or GI consult was readily found. During that assessment, patient reported to have completed a video swallow study 1-2 years prior at an outside hospital. Upon review, patient had a video swallow study completed on 11/5/2015 with the following impressions: \"Minimal pharyngeal dysphagia characterized by mildly distended pharyngoesophageal opening, which resulted in trace-mild pharyngeal residue with puree and solids. No laryngeal penetration/aspiration occurred. Radiologist noted \"probable changes of esophageal dysmotility noted in the upper " "esophagus\" upon obtaining x-ray of upper esophagus. \" Patient has been noted to have multiple instances of pneumonia over the past several years.   Type of Evaluation   Type of Evaluation Swallow Evaluation   Oral Motor   Oral Musculature generally intact   Structural Abnormalities none present   Mucosal Quality adequate   Dentition (Oral Motor)   Dentition (Oral Motor) adequate dentition   Facial Symmetry (Oral Motor)   Facial Symmetry (Oral Motor) WNL   Lip Function (Oral Motor)   Comment, Lip Function (Oral Motor) WNL   Tongue Function (Oral Motor)   Comment, Tongue Function (Oral Motor) WNL   Cough/Swallow/Gag Reflex (Oral Motor)   Volitional Swallow (Oral Motor) WNL   Vocal Quality/Secretion Management (Oral Motor)   Vocal Quality (Oral Motor) WFL   Secretion Management (Oral Motor) WNL   General Swallowing Observations   Current Diet/Method of Nutritional Intake (General Swallowing Observations, NIS) thin liquids (level 0);regular diet   Swallowing Evaluation Videofluoroscopic swallow study (VFSS)   VFSS Evaluation   Views Taken left lateral   VFSS Textures Trialed thin liquids;mildly thick liquids;moderately thick liquids/liquidized;pureed;solid foods   VFSS Eval: Thin Liquid Texture Trial   Mode of Presentation, Thin Liquid cup;straw   Preparatory Phase WFL   Oral Phase, Thin Liquid Premature pharyngeal entry   Pharyngeal Phase, Thin Liquid Delayed swallow reflex;UES dysfunction   Rosenbek's Penetration Aspiration Scale: Thin Liquid Trial Results 2 - contrast enters airway, remains above the vocal cords, no residue remains (penetration)   Diagnostic Statement Pourover past epiglottis and into laryngeal vestibule but not to level of vocal folds, even with large, consecutive gulps via straw. Use of chin tuck strategy with small sip did not consistently eliminate laryngeal penetration.   VFSS Eval: Mildly Thick Liquids   Mode of Presentation cup   Preparatory Phase WFL   Oral Phase Premature pharyngeal entry "   Pharyngeal Phase Delayed swallow reflex;UES dysfunction   Rosenbek's Penetration Aspiration Scale 1 - no aspiration, contrast does not enter airway   Diagnostic Statement Pourover past epiglottis prior to swallow but no entrance into the airway observed.   VFSS Eval: Moderately Thick Liquids   Mode of Presentation spoon   Preparatory Phase WFL   Oral Phase Premature pharyngeal entry   Pharyngeal Phase UES dysfunction  (cleared with subsequent dry swallow)   Rosenbek's Penetration Aspiration Scale 1 - no aspiration, contrast does not enter airway   Diagnostic Statement Premature spill to valleculae but adequate airway protection   VFSS Evaluation: Puree Solid Texture Trial   Mode of Presentation, Puree spoon   Preparatory Phase WFL   Oral Phase, Puree Premature pharyngeal entry   Pharyngeal Phase, Puree UES dysfunction  (cleared with subsequent dry swallow)   Rosenbek's Penetration Aspiration Scale: Puree Food Trial Results 1 - no aspiration, contrast does not enter airway   Diagnostic Statement Premature spill to valleculae but adequate airway protection. Mild residue at level of cricopharyngeus, cleared with subsequent dry swallow.   VFSS Evaluation: Solid Food Texture Trial   Preparatory Phase WFL   Oral Phase, Solid WFL   Pharyngeal Phase, Solid UES dysfunction  (cleared with subsequent dry swallow)   Rosenbek's Penetration Aspiration Scale: Solid Food Trial Results 1 - no aspiration, contrast does not enter airway   Diagnostic Statement Adequate airway protection. Mild residue in upper esophagus but cleared with liquid wash and subsequent dry swallow.   Esophageal Phase of Swallow   Patient reports or presents with symptoms of esophageal dysphagia Yes   Esophageal sweep performed during today s vidofluoroscopic exam  No  (unable to sweep d/t equipment limitations)   Swallowing Recommendations   Diet Consistency Recommendations thin liquids (level 0);regular diet   Supervision Level for Intake patient  independent   Mode of Delivery Recommendations bolus size, small;slow rate of intake   Swallowing Maneuver Recommendations extra swallow   Recommended Feeding/Eating Techniques (Swallow Eval) maintain upright sitting position for eating;maintain upright posture during/after eating for 30 minutes   Medication Administration Recommendations, Swallowing (SLP) per preference   Comment, Swallowing Recommendations Videofluoroscopic Swallow Study completed. Patient had no aspiration with any consistencies and intermittent transient penetration of mild to moderate depth inconsistently with thin liquids. This is WFL for her age. Oral motor function intact and oral phase WFL. Tongue base retraction was not impaired. Swallow response was mildly delayed and epiglottic inversion was complete.  Mild stasis occurred with all consistencies in the upper esophagus with inconsistent retrograde flow into the pyriform sinuses.  This cleared with use of subsequent dry swallow.  Hyolaryngeal elevation was not impaired and hyolaryngeal excursion was not impaired. Recommend diet of regular textures and thin liquids.   General Therapy Interventions   Planned Therapy Interventions Dysphagia Treatment   Dysphagia treatment Instruction of safe swallow strategies;Compensatory strategies for swallowing   Clinical Impression   Criteria for Skilled Therapeutic Interventions Met (SLP Eval) Yes, treatment indicated   SLP Diagnosis dysphagia   Risks & Benefits of therapy have been explained evaluation/treatment results reviewed;care plan/treatment goals reviewed;participants included;patient;participants voiced agreement with care plan   Clinical Impression Comments Patient is appropriate to continue current diet of regular textures and thin liquids. While she is at somewhat elevated risk of aspiration d/t delayed swallow reflex this is not outside norms for her age. While chin tuck was not helpful in decreasing aspiration risk, use of double swallow  may reduce risk of aspiration after the swallow from kickback from stasis. Diagnostic treatment would be appropriate, preferably during meal, given reports of s/s aspiration each meal, as well as to ensure consistent implementation of swallow strategies of double swallow and alternating bites and sips.   SLP Discharge Planning   SLP Discharge Recommendation   (per team)   SLP Rationale for DC Rec no speech therapy anticipated at d/c   SLP Brief overview of current status  Regular textures and thin liquids    Total Evaluation Time   Total Evaluation Time (Minutes) 20   SLP Goals   Therapy Frequency (SLP Eval) 2 times/wk   SLP Predicted Duration/Target Date for Goal Attainment 04/28/22   SLP Goals Swallow   SLP: Safely tolerate diet without signs/symptoms of aspiration Thin liquids;Regular diet;With use of compensatory swallow strategies;Independently;With use of swallow precautions

## 2022-04-21 NOTE — PROGRESS NOTES
Will check labs - for evaluation of chest pain for 5 days. She also sounded wheezy, sat 87%, - will give nebs, steroids, nitroglycerin, pain meds       Recent Coronary CTA -- Mild to moderate diffuse coronary atherosclerosis. Prox Cx, Moderate (50-69%)    320A --- Trop Neg, EKG is Afib, no evidence of STEMI. BNP is over 200 - will give lasix. Corrected VBG. Not on CPAP. She is supposed to be wearing her CPAP - RN informed.     Recheck VBG at 6AM      458A - On follow up, breathing better after earlier interventions, her pain is better as well. HR 85. O2 sat is 95-96%

## 2022-04-21 NOTE — PROGRESS NOTES
"SPEECH PATHOLOGY-VIDEO SWALLOW STUDY     04/21/22 0489   General Information   Onset of Illness/Injury or Date of Surgery 04/13/22   Pertinent History of Current Problem Per MD note, dated today: Adalgisa Solano is a 86 year old female with a past medical history of  atrial fibrillation on chronic anticoagulation treatment, DM2, COPD, bronchiectasis, getting vest therapy at home, recent hospitalization with COPD exacerbation [facility] 3/23-3/25 ,who was admitted on 4/13/2022 for worsening shortness of breath with productive cough secondary to bronchiectasis and COPD exacerbation and right lower lobe pneumonia.  Treated with IV systemic steroids now switched to tapered dose of oral steroid, resumed vest therapy started 2 years ago, scheduled bronchodilator 4 times a day.Sputum culture grew Pseudomonas and Enterococcus.  On IV cefepime until 4/23. Tested positive for coronavirus, but negative for COVID-19. Chronically on 2 to 3 L of oxygen now on 5 L.  Getting dyspneic with acute anxiety.   General Observations At current [facility], patient reports coughing with food and liquids, at least once per meal.   Past History of Dysphagia Patient had clinical swallow evaluation in 2017 in this health system, with recommended diet of regular textures and thin liquids but suggestion for esophagram based on symptoms consistent with esophageal dysphagia. No record of any esophagram or GI consult was readily found. During that assessment, patient reported to have completed a video swallow study 1-2 years prior at an outside hospital. Upon review, patient had a video swallow study completed on 11/5/2015 with the following impressions: \"Minimal pharyngeal dysphagia characterized by mildly distended pharyngoesophageal opening, which resulted in trace-mild pharyngeal residue with puree and solids. No laryngeal penetration/aspiration occurred. Radiologist noted \"probable changes of esophageal dysmotility noted in the upper " "esophagus\" upon obtaining x-ray of upper esophagus. \" Patient has been noted to have multiple instances of pneumonia over the past several years.   Type of Evaluation   Type of Evaluation Swallow Evaluation   Oral Motor   Oral Musculature generally intact   Structural Abnormalities none present   Mucosal Quality adequate   Dentition (Oral Motor)   Dentition (Oral Motor) adequate dentition   Facial Symmetry (Oral Motor)   Facial Symmetry (Oral Motor) WNL   Lip Function (Oral Motor)   Comment, Lip Function (Oral Motor) WNL   Tongue Function (Oral Motor)   Comment, Tongue Function (Oral Motor) WNL   Cough/Swallow/Gag Reflex (Oral Motor)   Volitional Swallow (Oral Motor) WNL   Vocal Quality/Secretion Management (Oral Motor)   Vocal Quality (Oral Motor) WFL   Secretion Management (Oral Motor) WNL   General Swallowing Observations   Current Diet/Method of Nutritional Intake (General Swallowing Observations, NIS) thin liquids (level 0);regular diet   Swallowing Evaluation Videofluoroscopic swallow study (VFSS)   VFSS Evaluation   Views Taken left lateral   VFSS Textures Trialed thin liquids;mildly thick liquids;moderately thick liquids/liquidized;pureed;solid foods   VFSS Eval: Thin Liquid Texture Trial   Mode of Presentation, Thin Liquid cup;straw   Preparatory Phase WFL   Oral Phase, Thin Liquid Premature pharyngeal entry   Pharyngeal Phase, Thin Liquid Delayed swallow reflex;UES dysfunction   Rosenbek's Penetration Aspiration Scale: Thin Liquid Trial Results 2 - contrast enters airway, remains above the vocal cords, no residue remains (penetration)   Diagnostic Statement Pourover past epiglottis and into laryngeal vestibule but not to level of vocal folds, even with large, consecutive gulps via straw. Use of chin tuck strategy with small sip did not consistently eliminate laryngeal penetration.   VFSS Eval: Mildly Thick Liquids   Mode of Presentation cup   Preparatory Phase WFL   Oral Phase Premature pharyngeal entry "   Pharyngeal Phase Delayed swallow reflex;UES dysfunction   Rosenbek's Penetration Aspiration Scale 1 - no aspiration, contrast does not enter airway   Diagnostic Statement Pourover past epiglottis prior to swallow but no entrance into the airway observed.   VFSS Eval: Moderately Thick Liquids   Mode of Presentation spoon   Preparatory Phase WFL   Oral Phase Premature pharyngeal entry   Pharyngeal Phase UES dysfunction  (cleared with subsequent dry swallow)   Rosenbek's Penetration Aspiration Scale 1 - no aspiration, contrast does not enter airway   Diagnostic Statement Premature spill to valleculae but adequate airway protection   VFSS Evaluation: Puree Solid Texture Trial   Mode of Presentation, Puree spoon   Preparatory Phase WFL   Oral Phase, Puree Premature pharyngeal entry   Pharyngeal Phase, Puree UES dysfunction  (cleared with subsequent dry swallow)   Rosenbek's Penetration Aspiration Scale: Puree Food Trial Results 1 - no aspiration, contrast does not enter airway   Diagnostic Statement Premature spill to valleculae but adequate airway protection. Mild residue at level of cricopharyngeus, cleared with subsequent dry swallow.   VFSS Evaluation: Solid Food Texture Trial   Preparatory Phase WFL   Oral Phase, Solid WFL   Pharyngeal Phase, Solid UES dysfunction  (cleared with subsequent dry swallow)   Rosenbek's Penetration Aspiration Scale: Solid Food Trial Results 1 - no aspiration, contrast does not enter airway   Diagnostic Statement Adequate airway protection. Mild residue in upper esophagus but cleared with liquid wash and subsequent dry swallow.   Esophageal Phase of Swallow   Patient reports or presents with symptoms of esophageal dysphagia Yes   Esophageal sweep performed during today s vidofluoroscopic exam  No  (unable to sweep d/t equipment limitations)   Swallowing Recommendations   Diet Consistency Recommendations thin liquids (level 0);regular diet   Supervision Level for Intake patient  independent   Mode of Delivery Recommendations bolus size, small;slow rate of intake   Swallowing Maneuver Recommendations extra swallow   Recommended Feeding/Eating Techniques (Swallow Eval) maintain upright sitting position for eating;maintain upright posture during/after eating for 30 minutes   Medication Administration Recommendations, Swallowing (SLP) per preference   Comment, Swallowing Recommendations Videofluoroscopic Swallow Study completed. Patient had no aspiration with any consistencies and intermittent transient penetration of mild to moderate depth inconsistently with thin liquids. This is WFL for her age. Oral motor function intact and oral phase WFL. Tongue base retraction was not impaired. Swallow response was mildly delayed and epiglottic inversion was complete.  Mild stasis occurred with all consistencies in the upper esophagus with inconsistent retrograde flow into the pyriform sinuses.  This cleared with use of subsequent dry swallow.  Hyolaryngeal elevation was not impaired and hyolaryngeal excursion was not impaired. Recommend diet of regular textures and thin liquids.   General Therapy Interventions   Planned Therapy Interventions Dysphagia Treatment   Dysphagia treatment Instruction of safe swallow strategies;Compensatory strategies for swallowing   Clinical Impression   Criteria for Skilled Therapeutic Interventions Met (SLP Eval) Yes, treatment indicated   SLP Diagnosis dysphagia   Risks & Benefits of therapy have been explained evaluation/treatment results reviewed;care plan/treatment goals reviewed;participants included;patient;participants voiced agreement with care plan   Clinical Impression Comments Patient is appropriate to continue current diet of regular textures and thin liquids. While she is at somewhat elevated risk of aspiration d/t delayed swallow reflex this is not outside norms for her age. While chin tuck was not helpful in decreasing aspiration risk, use of double swallow  may reduce risk of aspiration after the swallow from kickback from stasis.   SLP Discharge Planning   SLP Discharge Recommendation   (per team)   SLP Rationale for DC Rec no speech therapy anticipated at d/c   SLP Brief overview of current status  Regular textures and thin liquids    Total Evaluation Time   Total Evaluation Time (Minutes) 20   SLP Goals   Therapy Frequency (SLP Eval) 2 times/wk   SLP Predicted Duration/Target Date for Goal Attainment 04/28/22   SLP Goals Swallow   SLP: Safely tolerate diet without signs/symptoms of aspiration Thin liquids;Regular diet;With use of compensatory swallow strategies;Independently;With use of swallow precautions

## 2022-04-21 NOTE — CONSULTS
[facility]  Palliative Care Consultation Note    Patient: Adalgisa Solano  Date of Admission:  4/13/2022    Requesting Clinician / Team: Dr. Prado and Dr. Aguila  Reason for consult: Goals of care    Code status: No CPR- Do NOT Intubate    Impression & Recommendations:  SYMPTOM ASSESSMENT  1.  Shortness of breath secondary to advanced COPD and bronchiectasis exacerbation  -Management per [facility] and pulmonology services  -Lorazepam 0.25 mg by mouth every 4 hours as needed  -Lasix, prednisone taper, albuterol nebs, cefepime IV every 8 hours,    2.  Dysphagia, unspecified  -Patient to have video swallow study this afternoon    3.  Fatigue and activity intolerance secondary to his COPD and bronchiectasis exacerbation causing exertional dyspnea  -Patient likely to benefit from rehabilitation at transitional care facility and pulmonary rehab    4.  Anxiety related to health, exacerbated with underlying schizoaffective, bipolar disorder and dyspnea  -On Abilify 5 mg nightly  -Lorazepam 0.25 mg p.o. every 4 hours as needed ordered today.  Discussed with patient that this is more for episodes or panic attacks than chronic use also discussed this can help ease air hunger.    5.  Chronic pain syndrome-chronic mid to low back pain  -Continue Tylenol as needed  -Continue gabapentin 600 mg at bedtime  -Would not recommend addition of opiate at this time due to restorative goals and fragile respiratory status.  Would use opiates sparingly for pain.  -Avoid NSAIDs as on anticoagulant.    ADVANCED CARE PLANNING/GOALS OF CARE DISCUSSION  -CODE STATUS: DO NOT RESUSCITATE and DO NOT INTUBATE confirmed this afternoon.  Detailed discussion with patient, , daughter and son regarding benefit and burden of chest compressions.  See discussion below.  -Continue current cares and treatments.   - Patient agreeable to discharge to TCU for ongoing IV antibiotics and strengthening at discharge.  -Working on  completion of honoring choices document.  Document has been filled out and needs to be notarized.  -Reviewed the role of palliative care specialty and role of outpatient clinic.  Patient and her family will discuss and consider follow-up as outpatient.     Discussed with Taniya Jones RN and Laura Stout RN and updated Dr. Prado via text.      Thank you for the opportunity to participate in the care of this patient and family. Our team: will continue to follow.     During regular M-F work hours -- if you are not sure who specifically to contact -- please contact us by calling us directly at the Palliative Care Main Line [phone]    After regular work hours and on weekends/holidays, you can leave a message at [phone]    History of Present Illness:  History gathered today from: patient, medical chart, medical team members, unit team members  Adalgisa Solano is a 86 year old female admitted to [facility] on 4/13/2022 with worsening shortness of breath and being treated for COPD and bronchiectasis exacerbation and pneumonia. Patient being treated with steroids and IV antibiotics. Recent hospitalization at [facility] 3/23 - 3/25 for SOB and COPD exacerbation. Chest CT scan showed no pulmonary embolism, irregular nodular opacitities in the RUL, bronchiectasis, emphysema, peribronchial thickening with retained secretions both lower lobes.Sputum Culture positive for pseudomonas and enterococcus. Pulomonology consulted and following.  Viral panel was positive for coronavirus (not COVID19). ID service consulted and recommending ten days of cefepime IV and to obtain sputum to test for AFB.    Today, the patient was seen for:  Shortness of breath, fatigue, patient and family support and goals of care discussion    Prognosis, Goals, & Planning:      Functional Status just prior to hospitalization: 1 (Restricted in physically strenuous activity but ambulatory and able to carry out work of a light or  "sedentary nature)      Prognosis, Goals, and/or Advance Care Planning were addressed today: Yes        Summary/Comments: Met with patient independently and again with patient, her , Anabel, face-to-face and in presence of 2 children, Karlos and Ratna via speaker phone.  Reviewed the role of the palliative medicine specialty and answered questions.  Discussed differences between palliative medicine and hospice program.  Patient and family report previously \"being recommended to be on palliative care\" when patient fractured hips in the past.  Reviewed the role of the honoring choices document and how patient listed Anabel, Ratna and Karlos as her decision makers for medical care when she is unable to make decisions for herself as well as wishes for DNR/I.  Reviewed in detail benefit versus burden of resuscitation including chest compressions, shocking the heart and intubation with them.  Specifically, with patient's history of atrial fibrillation, discussed that even though patient is DNR/I, she could therapeutically have cardioversion to get her heart out of atrial fibrillation if recommended by cardiology.  Family verbalized understanding of this.  Reviewed end-stage nature of patient's COPD with family.  Overall goals are presently life-prolonging.  Wishes to continue current cares and treatments.  Patient understands that there may be a episode where she is unable to recover and this causes increased anxiety and fear.  Support provided to patient.  Discussions will be ongoing and reviewed the role of the palliative care clinic as a means to help support the patient and her family when not in the hospital.  Family will further discuss and decide if this is something they would be interested in.      Patient's decision making preferences: with input from medical clinicians and loved ones          Patient has decision-making capacity today for complex decisions: Yes            I have concerns about the " patient/family's health literacy today: No           Patient has a completed Health Care Directive: No.       Code status: No CPR / No Intubation    Coping, Meaning, & Spirituality:   Mood, coping, and/or meaning in the context of serious illness were addressed today: Yes  Summary/Comments: Good support from her family.    Key Palliative Symptom Data:  # Pain severity the last 12 hours: moderate  # Dyspnea severity the last 12 hours: moderate  # Nausea severity the last 12 hours: none  # Anxiety severity the last 12 hours: moderate  Chronic low back pain.  Currently rates pain mid to low back as 5/10.  On gabapentin at bedtime.  Anxiety: moderate, worsens with SOB   Constipation: Denies  Fatigue: Moderate    ROS:  Comprehensive ROS is reviewed and is negative except as here & per HPI.     Past Medical History:  Past Medical History:   Diagnosis Date     Acute and chronic respiratory failure with hypoxia (H)      Acute blood loss anemia 1/15/2019     Acute bronchitis 1/15/2019     Anemia     pernicious anemia     Anticoagulated 3/23/2022     Anxiety      Arm skin lesion, right      Arnold-Chiari malformation (H) 1998     Arthritis      Atrial fibrillation (H) 02/2017     Avascular necrosis of bone (H)      Breast cyst      Breast lump      Candidal skin infection      Cellulitis of left lower extremity 1/28/2019     CHF (congestive heart failure) (H)     diastolic and systolic     Chronic bronchitis (H)     & acute     Chronic diastolic heart failure (H)     diastolic chf     Chronic pain syndrome      Chronic respiratory failure with hypoxia (H) 3/13/2017     COPD (chronic obstructive pulmonary disease) (H)      COPD exacerbation (H)      Depression      Diet-controlled type 2 diabetes mellitus (H)      Drug induced constipation      DVT of axillary vein, acute (H)     left     DVT of axillary vein, acute left (H)      Edema      Encounter for palliative care      Erysipelas      Fracture of femoral neck, left (H)       Generalized muscle weakness      GERD (gastroesophageal reflux disease)      History of blood clots      Hyperlipidemia      Hypertension      Left hip pain      Lumbar spinal stenosis      PAD (peripheral artery disease) (H)      Peripheral arterial disease (H) 10/28/2015     Psoriasis     arms      Pulmonary hypertension (H) 3/5/2018     Recurrent major depressive episodes (H)     Created by Conversion  Replacement Utility updated for latest IMO load     Schizoaffective disorder, bipolar type (H) 2015     Slow transit constipation      Stasis dermatitis of both legs      Status post total hip replacement, 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     Type 2 diabetes mellitus without complication (H) 2016     Vitamin B12 deficiency      Volume overload         Past Surgical History:  Past Surgical History:   Procedure Laterality Date     BACK SURGERY       BREAST CYST ASPIRATION Left      CERVICAL SPINE SURGERY      for arnold chiari malformation      SECTION  X3     CHOLECYSTECTOMY       COLONOSCOPY       EXCISE LESION UPPER EXTREMITY Right 2019    Procedure: EXCISION RIGHT ARM LIPOMA;  Surgeon: Andreas Holly MD;  Location: [facility];  Service: General     EYE SURGERY      bilateral cataracts     HAND SURGERY       HERNIORRHAPHY UMBILICAL N/A 2021    Procedure: INCARCERATED UMBILICAL AND;  Surgeon: Andreas Holly MD;  [place] [facility]     JOINT REPLACEMENT Right     knee - partial     LAPAROSCOPIC HERNIORRHAPHY INCISIONAL Left 3/27/2015    Procedure: ATTEMPTED LAPAROSCOPIC ABDOMINA/FLANK INCISION REPAIR;  Surgeon: Jose Lakhani MD;  Location: [facility];  Service:      LUMBAR FUSION N/A 2014    Procedure: POSTERIOR FUSION / DECOMPRESSION L3-S1 WITH PELVIC FIXATION BILATERAL ;  Surgeon: Rajan Mares MD;  Location: [facility];  Service:      OTHER SURGICAL HISTORY      IR for PAD LOWER EXTREMITY     REPAIR SPIGELIAN HERNIA N/A 2021    Procedure:  SPIGELIAN HERNIA REPAIR;  Surgeon: Andreas Holly MD;  Location: [facility] ASPIRATION HEMATOMA SEROMA OR FLUID COLLECTION  2/6/2020     [facility] OPEN FIXATN PROX END/NECK FEMUR FX Left 1/2/2019    Procedure: OPEN REDUCTION INTERNAL FIXATION, FRACTURE LEFT HIP, PERIPROSTHETIC FRACTURE;  Surgeon: Kevin Lackey MD;  Location: [facility];  Service: Orthopedics     [facility] TOTAL HIP ARTHROPLASTY Left 1/2/2019    Procedure: LEFT TOTAL HIP ARTHROPLASTY;  Surgeon: Kevin Lackey MD;  Location: [facility];  Service: Orthopedics     [facility] TOTAL HIP ARTHROPLASTY Right 9/16/2019    Procedure: RIGHT TOTAL HIP ARTHROPLASTY;  Surgeon: Kan Quesada MD;  Location: [facility];  Service: Orthopedics     [facility] TOTAL KNEE ARTHROPLASTY Left 10/7/2016    Procedure: TOTAL KNEE ARTHROPLASTY, LEFT;  Surgeon: Kan Quesada MD;  Location: [facility];  Service: Orthopedics         Family History:  Family History   Problem Relation Age of Onset     Coronary Artery Disease Mother      Coronary Artery Disease Father         Social:     Living situation: Lives at home with her , Anabel.    Powers family / caregivers: , Anabel, children, Ratna, Karlos and Shai.     Allergies:  Allergies   Allergen Reactions     Tramadol Nausea     Amoxicillin Itching and Rash     Levofloxacin Itching and Rash     Penicillins Itching and Rash     Has tolerated ceftriaxone.        Medications:  I have reviewed this patient's medication profile and medications from this hospitalization.     Lab Results: personally reviewed.   Lab Results   Component Value Date     04/21/2022    CO2 29 04/21/2022    BUN 17 04/21/2022     Lab Results   Component Value Date    WBC 8.6 04/21/2022    HGB 12.2 04/21/2022    HCT 37.9 04/21/2022    MCV 89 04/21/2022     04/21/2022     AST   Date Value Ref Range Status   04/21/2022 12 0 - 40 U/L Final     ALT   Date Value Ref Range Status   04/21/2022 17 0 - 45 U/L Final      Alkaline Phosphatase   Date Value Ref Range Status   2022 49 45 - 120 U/L Final     Albumin   Date Value Ref Range Status   2022 3.2 (L) 3.5 - 5.0 g/dL Final       RADIOLOGY:  Echocardiogram Complete    Result Date: 4/15/2022  [id number]^MARICRUZ^[place] [address]  Name: ASH HARRIS MRN: [id number] : 1935 Study Date: 04/15/2022 03:07 PM Age: 86 yrs Gender: Female Patient Location: [facility] Reason For Study: Tachycardia Ordering Physician: MATTIE ALCANTARA Performed By: LIONEL  BSA: 1.6 m2 Height: 61 in Weight: 137 lb HR: 96 ______________________________________________________________________________ Procedure Complete Portable Echo Adult. ______________________________________________________________________________ Interpretation Summary  1. Normal left ventricular size and systolic performance with a visually estimated ejection fraction of 65%. 2. There is moderate mitral insufficiency. 3. There is moderate tricuspid insufficiency. 4. Mild right ventricular enlargement with normal right ventricular systolic performance. 5. There is severe biatrial enlargement. 6. Right ventricular systolic pressure relative to right atrial pressure is mildly increased. The pulmonary artery pressure is estimated to be 35-40 mmHg plus right atrial pressure (the IVC is moderately dilated).  When compared to the prior real-time echocardiogram dated 2020, there has been a mild decrease in the pulmonary artery pressure estimate. The findings are otherwise felt to be fairly similar on both examinations. ______________________________________________________________________________ Left ventricle: Normal left ventricular size and systolic performance with a visually estimated ejection fraction of 65%. There is normal regional wall motion. Left ventricular wall thickness is normal.  Assessment of LV Diastolic Function: Patient  appears to be in atrial fibrillation which limits assessment of diastolic filling.  Right ventricle: Mild right ventricular enlargement with normal right ventricular systolic performance.  Left atrium: There is severe left atrial enlargement.  Right atrium: There is severe right atrial enlargement.  IVC: The IVC is moderately dilated.  Aortic valve: The aortic valve is comprised of three cusps. No significant aortic stenosis or aortic insufficiency is detected on this study.  Mitral valve: The mitral valve appears morphologically normal. There is moderate mitral insufficiency.  Tricuspid valve: The tricuspid valve is grossly morphologically normal. There is moderate tricuspid insufficiency.  Pulmonic valve: The pulmonic valve is grossly morphologically normal. There is trace pulmonic insufficiency.  Thoracic aorta: The aortic root and proximal ascending aorta are of normal dimension.  Pericardium: There is no significant pericardial effusion. ______________________________________________________________________________ ______________________________________________________________________________ MMode/2D Measurements & Calculations IVSd: 0.78 cm LVIDd: 4.9 cm LVIDs: 3.0 cm LVPWd: 0.92 cm FS: 38.2 % LV mass(C)d: 139.2 grams LV mass(C)dI: 86.5 grams/m2 Ao root diam: 2.9 cm LA dimension: 3.9 cm asc Aorta Diam: 2.9 cm LA/Ao: 1.4 LVOT diam: 1.9 cm LVOT area: 2.7 cm2  LA Volume Indexed (AL/bp): 57.5 ml/m2 RWT: 0.38  Time Measurements MM HR: 104.0 BPM  Doppler Measurements & Calculations MV E max cassie: 107.0 cm/sec MV max P.9 mmHg MV mean P.6 mmHg MV V2 VTI: 14.0 cm MVA(VTI): 3.1 cm2 MV dec time: 0.19 sec Ao V2 max: 131.9 cm/sec Ao max P.0 mmHg Ao V2 mean: 89.9 cm/sec Ao mean PG: 3.7 mmHg Ao V2 VTI: 22.8 cm VAISHALI(I,D): 1.9 cm2 VAISHALI(V,D): 2.0 cm2 LV V1 max PG: 3.5 mmHg LV V1 max: 94.1 cm/sec LV V1 VTI: 16.1 cm MR ERO: 0.10 cm2 MR volume: 14.0 ml SV(LVOT): 44.0 ml SI(LVOT): 27.4 ml/m2 PA acc time: 0.10 sec TR max  jeffery: 302.4 cm/sec TR max P.6 mmHg  AV Jeffery Ratio (DI): 0.71 VAISHALI Index (cm2/m2): 1.2 E/E' avg: 10.5 Lateral E/e': 8.5 Medial E/e': 12.5  ______________________________________________________________________________ Report approved by: Annette White 04/15/2022 04:00 PM       US Lower Extremity Venous Duplex Bilateral    Result Date: 2022  EXAM: US LOWER EXTREMITY VENOUS DUPLEX BILATERAL LOCATION: [facility] DATE/TIME: 2022 4:16 PM INDICATION: Lower extremity edema COMPARISON: Ultrasound 2022 TECHNIQUE: Venous Duplex ultrasound of bilateral lower extremities with and without compression, augmentation and duplex. Color flow and spectral Doppler with waveform analysis performed. FINDINGS: Exam includes the common femoral, femoral, popliteal veins as well as segmentally visualized deep calf veins and greater saphenous vein. RIGHT: No deep vein thrombosis. No superficial thrombophlebitis. Complex 3.5 x 1.1 x 1.4 cm fluid containing structure in the popliteal fossa. No internal vascular flow. Right calf subcutaneous edema. LEFT: No deep vein thrombosis. No superficial thrombophlebitis. Complex 2.9 x 2.8 x 1.6 cm fluid structure in the popliteal fossa. Left calf subcutaneous edema.     IMPRESSION: 1.  No deep venous thrombosis in the bilateral lower extremities. 2.  Bilateral calf subcutaneous edema. 3.  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 Note that the left popliteal fossa lesion is similar in size but has increased complexity since the ultrasound from 2022.    XR Chest Port 1 View    Result Date: 2022  EXAM: XR CHEST PORT 1 VIEW LOCATION: [facility] DATE/TIME: 2022 12:05 PM INDICATION: SOB COMPARISON: Chest CTA 2022 and older studies, chest x-ray 2021 and older studies     IMPRESSION: There are 2 new ill-defined opacities in the right base, largest measuring  approximately 1.5 cm. These likely reflect reflect areas of retained secretions with bronchopneumonia. Fine reticular opacities in both lungs and cardiomegaly are unchanged. Bullous emphysema and bronchiectasis is better appreciated on CT exams. No signs of failure.    CT Chest Pulmonary Embolism w Contrast    Result Date: 3/23/2022  EXAM: CT CHEST PULMONARY EMBOLISM W CONTRAST LOCATION: [facility] DATE/TIME: 3/23/2022 1:33 PM INDICATION: Severe shortness of breath. Known COPD and bronchiectasis. COMPARISON: 11/03/2021 TECHNIQUE: CT chest pulmonary angiogram during arterial phase injection of IV contrast. Multiplanar reformats and MIP reconstructions were performed. Dose reduction techniques were used. CONTRAST: 60ml Isovue 370 FINDINGS: ANGIOGRAM CHEST: Excellent opacification of pulmonary arteries and branches. No evidence of pulmonary emboli. Pulmonary artery is of normal caliber. Extensive aortic calcifications without any aneurysm. LUNGS AND PLEURA: There is a lobulated right upper lobe pulmonary nodule that measures 1.2 x 0.7 cm (axial image 106, coronal image 51 and sagittal image 82). In retrospect there is a 5 x 3 mm nodule in this area before. Inferior to this, there are other  multiple smaller nodular as well as peribronchiolar opacities (images 116 122, 123, 135 and 141). Extensive upper lobe bullous emphysema. Areas of cylindrical bronchiectasis noted in both lower lobes with peribronchial thickening multiple areas of retained secretions greatest in the left lower lobe. No consolidating pneumonia or effusions. MEDIASTINUM/AXILLAE: There is a heterogeneous 2.4 cm left thyroid nodule, unchanged. No suspicious adenopathy. Multichamber cardiac enlargement. There is a small hiatal hernia. CORONARY ARTERY CALCIFICATION: Severe. UPPER ABDOMEN: Normal. MUSCULOSKELETAL: Normal.     IMPRESSION: 1.  There are multiple irregular shaped nodular and peribronchiolar opacities in the right  upper lobe as noted above. The largest one superiorly is most suspicious in appearance for an indolent malignancy. While this and more likely the other opacities could certainly be inflammatory in nature, suggest nonemergent pulmonary consultation re further evaluation and timing of followup studies. 2.  Evidence of cylindrical bronchiectasis and chronic bronchitis in both lower lobes with new areas of retained secretions in the lower lobes, left greater than right. 3.  Moderate bullous emphysema in the upper lobes. 4.  There is a 2.4 cm left thyroid nodule which is stable. Consider nonemergent follow-up thyroid ultrasound. Reference noted below. 5.  No evidence of pulmonary emboli. REFERENCE: Toribio SINGH et al. Managing Incidental Thyroid Nodules Detected on Imaging: White Paper of the ACR Incidental Thyroid Findings Committee. JACR 2015; 12:143-150. Incidental thyroid nodule detected on CT or MRI without suspicious findings. Applies to general population without limited life expectancy or significant comorbidities. Age greater than or equal to 35 years Less than 1.5 cm: No further evaluation. Greater than or equal to 1.5 cm: Evaluate with thyroid ultrasound.        Physical Exam:  Temp:  [97.1  F (36.2  C)-98.2  F (36.8  C)] 97.1  F (36.2  C)  Pulse:  [89-99] 97  Resp:  [20-24] 20  BP: (106-146)/(55-85) 146/85  SpO2:  [85 %-97 %] 91 %  Wt Readings from Last 3 Encounters:   04/20/22 62.8 kg (138 lb 6.4 oz)   03/23/22 63 kg (139 lb)   03/21/22 63 kg (139 lb)      General appearance: alert, appears stated age, cooperative, fatigued and mild distress  Head: Normocephalic, without obvious abnormality, atraumatic  Eyes: Lids and lashes normal, sclera anicteric, pupils round  Nose: no discharge, Nasal cannula in place  Throat: Lips, mucosa and tongue normal  Lungs: Mildly labored with speaking sitting in chair; pursed lip breathing noted  Heart: Atrial fibrillation noted on monitoring, regular rate  Extremities: No edema  noted  Neurologic: Alert, mildly anxious in discussion, oriented x4.    TTS: I have personally spent a total of 80 minutes today reviewing patient's medical record, consultation with the medical providers, and > 50% of this time spent assessing patient and symptoms, providing emotional support and conducting family meeting to discuss goals of care and honoring choices document review and completion.    MARLINE Barerra, CNS  Palliative Care  [phone]

## 2022-04-21 NOTE — PLAN OF CARE
Problem: Gas Exchange Impaired  Goal: Optimal Gas Exchange  Outcome: Ongoing, Not Progressing     Patient alert and oriented, tired. Expiratory wheezing and increased work of breathing. Desaturates quickly on 4-6L of oxygen with any activity and sometimes at rest to low 80s. Complained of new onset chest pain, assessed by MD, extra nebulizer, IV lasix given, extra dose of IV steroid given, nitroglycerin given without any decrease in pain per patient although appeared more comfortable. Attempted to place patient on her CPAP with RT, but patient unable to tolerate, crying and hysterical due to claustrophobia, MD aware. Continues on IV antibiotics. Will discharge to TCU when medically ready. Now on 6LPM nasal cannula.    K and mag protocols run this morning, recheck 4/22/22

## 2022-04-21 NOTE — PROGRESS NOTES
Patient seen on 4L NC with bubbler. SPO2 at 97% with coarse BS. 30 min vest completed with nebulizer treatments. RT will continue to follow and provide vest therapy and nebs as scheduled. No other concerns at this time.    Dexter Greer, RT

## 2022-04-22 NOTE — PLAN OF CARE
Problem: Plan of Care - These are the overarching goals to be used throughout the patient stay.    Goal: Plan of Care Review/Shift Note  Description: The Plan of Care Review/Shift note should be completed every shift.  The Outcome Evaluation is a brief statement about your assessment that the patient is improving, declining, or no change.  This information will be displayed automatically on your shift note.  Outcome: Ongoing, Progressing     Patient A & O. Breathing treatments provided by RT. Patient did not like CPaP last night and choose to forgo using it tonight. Maintained on 5 liters until 1800 when turned up to 6 liters while sleeping. Ativan x 1 utilizes, per patient requests. IV ABX provided, per MD order. On K and Mg protocol with planned recheck in the morning.

## 2022-04-22 NOTE — PROVIDER NOTIFICATION
04/21/22 2008   Tech Time   $Tech Time (10 minute increments) 5   Nebulizer Assessment & Treatment   $RT Use ONLY Delivery Method Nebulizer - Additional   Pretreatment Heart Rate (beats/min) 90   Pretreatment Resp Rate (breaths/min) 18   Pretreatment O2 sats - (TCU only) 94   Pretreat Breath Sounds - Bilat - All Lobes rhonchi;diminished   Breath Sounds Post-Respiratory Treatment   Posttreatment Heart Rate (beats/min) 87   Posttreatment Resp Rate (breaths/min) 18   Post treatment O2 Sats - (TCU only) 97   Posttreatment Assessment (SVN) breath sounds improved   Chest Physiotherapy (CPT)   Daily Review of Necessity (CPT) completed  (pt own vest)   Type (CPT) vibration   Method (CPT)   (vest)   Patient Position sitting in chair   Lung Fields (CPT) all lung fields   Duration per Field (CPT) 20 mins

## 2022-04-22 NOTE — PLAN OF CARE
Problem: Plan of Care - These are the overarching goals to be used throughout the patient stay.    Goal: Plan of Care Review/Shift Note  Description: The Plan of Care Review/Shift note should be completed every shift.  The Outcome Evaluation is a brief statement about your assessment that the patient is improving, declining, or no change.  This information will be displayed automatically on your shift note.  Outcome: Adequate for Care Transition   Goal Outcome Evaluation:    VSS. Chest pain thi late AM, PRN nitroglycerin, ativan, and seroquel given. Chest pain resolved.     AVS reviewed and signed, questions answered, to leave with both Ivs for abx at TCU and will discontinue there. Belongings returned, spouse to transport at discharge.

## 2022-04-22 NOTE — PROVIDER NOTIFICATION
Paged Eve WONG    TCU wondering how long Pt needs to be on droplet precautions?    Spoke with Eve WONG

## 2022-04-22 NOTE — PLAN OF CARE
Problem: Gas Exchange Impaired  Goal: Optimal Gas Exchange  Outcome: Ongoing, Progressing   Goal Outcome Evaluation:        Nebs given home vest completed

## 2022-04-22 NOTE — PROGRESS NOTES
Care Management Discharge Note    Discharge Date: 04/22/2022       Discharge Disposition: Home Care, Home    Discharge Services: None    Discharge DME: None (No new equipment anticipated.)    Discharge Transportation: family or friend will provide    Private pay costs discussed: Not applicable    PAS Confirmation Code:    Patient/family educated on Medicare website which has current facility and service quality ratings: no (When presented as a potential need, pt and  stated previous use of [facility] and had no other preferences. Not interested in vendor list.)    Education Provided on the Discharge Plan:    Persons Notified of Discharge Plans: Pt,  and admissions at [facility]  Patient/Family in Agreement with the Plan: yes    Handoff Referral Completed: No    Additional Information:  Confirmed bed available for pt today at [facility].   will transport pt at 2pm.    Pt and  aware and in agreement with discharge plan.        BETTY Elizalde

## 2022-04-22 NOTE — PLAN OF CARE
Problem: Gas Exchange Impaired  Goal: Optimal Gas Exchange  Outcome: Ongoing, Progressing   Goal Outcome Evaluation:        Patient A&Ox4 once fully awake. Feeling well today. Taking cough drops for cough and tylenol for sore chest from coughing. On 5-6 LPM oxygen to stay above 90%. Patient continues to decline CPAP. Continues on IV antibiotics and steroids. Plan to discharge to TCU when medically ready.

## 2022-04-22 NOTE — PLAN OF CARE
Physical Therapy Discharge Summary    Reason for therapy discharge:    Discharged to transitional care facility.    Progress towards therapy goal(s). See goals on Care Plan in [facility] electronic health record for goal details.  Goals partially met.  Barriers to achieving goals:   discharge from facility.    Therapy recommendation(s):    Continued therapy is recommended.  Rationale/Recommendations:  continued PT at TCU.

## 2022-04-22 NOTE — PROGRESS NOTES
PULMONARY / CRITICAL CARE PROGRESS NOTE    Date / Time of Admission:  4/13/2022 11:22 AM    Assessment:   Adalgisa Solano is a 86 year old female with history of COPD on home O2, bronchiectasis, atrial fibrillation, anticoagulated, HFpEF, hiatal hernia, GERD.   Presents to ED on 4/13 for evaluation of shortness of breath, started on steroids and abx for bronchiectasis / COPD exacerbation. Chest CT scan showed no pulmonary embolism, irregular nodular opacitities in the RUL, bronchiectasis, emphysema, peribronchial thickening with retained secretions both lower lobes, sputum Cx grew pseudomonas and enterococcus. Viral panel was positive for coronavirus ( not COVID19). Patient required non invasive ventilation. ID service consulted, recommending to complete ten days of cefepime, in addition to obtain AFB in sputum.     1. Acute on chronic respiratory failure   2. COPD exacerbation   3. Bronchiectasis exacerbation   4. Atypical chest pain   Started in AM after physical therapy.   Patient reports improvement of chest pain after nitroglycerin SL.   Hospitalist team was informed by RN.   5. Atrial fibrillation, rate control anticoagulated  6. HTN, HFpEF  7. Hiatal hernia , GERD  8. Depression     Advance Directives: DNR    Plan:   1. Titrate FiO2, keep SpO2 > 90%  2. Schedule bronchodilators albuterol nebs, continue saline nebs twice a day   3. Titrate BP meds and diuresis  4. Taper down systemic steroids   5. Abx per ID recommendation, currently on cefepime  6. Diet per speech therapy  7. PPI for GI prophylaxis   8. Glucose level monitoring   9. DVT prophylaxis anticoagulated with xarelto.   10. Adjust anti depression meds per psy recommendation  11. Continue PT, OT   12. Disposition TCU upon discharge     Please contact me if you have any questions.    Evin Thorpe  Pulmonary / Critical Care  04/22/2022  12:09 PM      Subjective:   HPI:  Adalgisa Solano is a 86 year old female with history of  COPD on home O2, bronchiectasis, atrial fibrillation, anticoagulated, HFpEF, hiatal hernia, GERD.   Presents to ED on 4/13 for evaluation of shortness of breath, started on steroids and abx for bronchiectasis / COPD exacerbation. Chest CT scan showed no pulmonary embolism, irregular nodular opacitities in the RUL, bronchiectasis, emphysema, peribronchial thickening with retained secretions both lower lobes, sputum Cx grew pseudomonas and enterococcus. Viral panel was positive for coronavirus ( not COVID19). Patient required non invasive ventilation. ID service consulted, recommending to complete ten days of cefepime, in addition to obtain AFB in sputum.     Events overnight.   - Feels depressed, psy evaluated patient  - Improvement of shortness of breath  - Chest pain after walking with PT. Received nitro SL with improvement of pain.   - Afebrile, hemodynamically stable, unchanged O2 needs    Allergies: Tramadol, Amoxicillin, Levofloxacin, and Penicillins     MEDS:  Current Facility-Administered Medications   Medication     acetaminophen (TYLENOL) tablet 500-1,000 mg     albuterol (PROVENTIL HFA/VENTOLIN HFA) inhaler     albuterol (PROVENTIL) neb solution 2.5 mg     albuterol (PROVENTIL) neb solution 2.5 mg     ARIPiprazole (ABILIFY) tablet 5 mg     atorvastatin (LIPITOR) tablet 20 mg     benzocaine-menthol (CEPACOL) 15-3.6 MG lozenge 1 lozenge     Budeson-Glycopyrrol-Formoterol 160-9-4.8 MCG/ACT AERO 2 puff     ceFEPIme (MAXIPIME) 2 g vial to attach to  ml bag for ADULTS or 50 ml bag for PEDS     glucose gel 15-30 g    Or     dextrose 50 % injection 25-50 mL    Or     glucagon injection 1 mg     diltiazem ER (DILT-XR) 24 hr capsule 240 mg     docusate sodium (COLACE) capsule 100 mg     furosemide (LASIX) tablet 40 mg     gabapentin (NEURONTIN) capsule 600 mg     insulin aspart (NovoLOG) injection (RAPID ACTING)     insulin aspart (NovoLOG) injection (RAPID ACTING)     lidocaine (LMX4) cream     lidocaine 1 %  "0.1-1 mL     LORazepam (ATIVAN) half-tab 0.25 mg     melatonin tablet 1 mg     nitroGLYcerin (NITROSTAT) sublingual tablet 0.4 mg     ondansetron (ZOFRAN-ODT) ODT tab 4 mg    Or     ondansetron (ZOFRAN) injection 4 mg     pantoprazole (PROTONIX) EC tablet 40 mg     Patient is already receiving anticoagulation with heparin, enoxaparin (LOVENOX), warfarin (COUMADIN)  or other anticoagulant medication     polyethylene glycol (MIRALAX) Packet 17 g     predniSONE (DELTASONE) tablet 40 mg    Followed by     [START ON 4/24/2022] predniSONE (DELTASONE) tablet 30 mg    Followed by     [START ON 4/27/2022] predniSONE (DELTASONE) tablet 20 mg    Followed by     [START ON 4/30/2022] predniSONE (DELTASONE) tablet 10 mg     QUEtiapine (SEROquel) half-tab 12.5 mg     rivaroxaban ANTICOAGULANT (XARELTO) tablet 20 mg     sodium chloride (NEBUSAL) 3 % neb solution 3 mL     sodium chloride (PF) 0.9% PF flush 3 mL     sodium chloride (PF) 0.9% PF flush 3 mL     spironolactone (ALDACTONE) half-tab 12.5 mg       Objective:   VITALS:  /83 (BP Location: Right arm)   Pulse 97   Temp 97.9  F (36.6  C) (Axillary)   Resp 20   Ht 1.549 m (5' 1\")   Wt 62.8 kg (138 lb 6.4 oz)   SpO2 92%   BMI 26.15 kg/m    VENT:  Resp: 20    EXAM:   Gen: awake, alert, mild distress due to weakness  HEENT: pink conjunctiva, moist mucosa  Neck: no thyromegaly, masses or JVD  Lungs: discrete ronchi both HT  CV: regular, no murmurs or gallops appreciated  Abdomen: soft, NT, BS wnl  Ext: trace edema  Neuro: CN II-XII intact, non focal       Data Review:   04/22/22 07:16   Sodium 135 (L)   Potassium 3.9  3.9   Chloride 94 (L)   Carbon Dioxide 33 (H)   Urea Nitrogen 20   Creatinine 0.62   GFR Estimate 87 [1]   Calcium 8.4 (L)   Anion Gap 8   Magnesium 2.0   Troponin I 0.02      04/21/22 02:37   WBC 8.6   Hemoglobin 12.2   Hematocrit 37.9   Platelet Count 244   RBC Count 4.24   MCV 89   MCH 28.8   MCHC 32.2   RDW 15.2 (H)   % Neutrophils 94   % Lymphocytes 2 "   % Monocytes 3   % Eosinophils 0   % Basophils 0     Respiratory Culture  4/13 1+ Normal marielena       1+ Pseudomonas aeruginosa Abnormal        1+ Enterococcus faecalis Abnormal                 Gram Stain Result  <10 Squamous epithelial cells/low power field      >25 PMNs/low power field      No organisms seen             VENOUS US LE 4/13  IMPRESSION:  1.  No deep venous thrombosis in the bilateral lower extremities.  2.  Bilateral calf subcutaneous edema.  3.  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 Note that the left popliteal fossa lesion is similar in size but has increased complexity since the ultrasound from 02/06/2022.    CT CHEST PULMONARY EMBOLISM W CONTRAST  DATE/TIME: 3/23/2022 1:33 PM      IMPRESSION:  1.  There are multiple irregular shaped nodular and peribronchiolar opacities in the right upper lobe as noted above. The largest one superiorly is most suspicious in appearance for an indolent malignancy. While this and more likely the other opacities   could certainly be inflammatory in nature, suggest nonemergent pulmonary consultation re further evaluation and timing of followup studies.  2.  Evidence of cylindrical bronchiectasis and chronic bronchitis in both lower lobes with new areas of retained secretions in the lower lobes, left greater than right.  3.  Moderate bullous emphysema in the upper lobes.  4.  There is a 2.4 cm left thyroid nodule which is stable. Consider nonemergent follow-up thyroid ultrasound. Reference noted below.  5.  No evidence of pulmonary emboli.    By:  Evin Thorpe MD, 04/22/2022  12:09 PM    Primary Care Physician:  Shai Ellington

## 2022-04-22 NOTE — PLAN OF CARE
Speech Language Therapy Discharge Summary    Reason for therapy discharge:    All goals and outcomes met, no further needs identified.    Progress towards therapy goal(s). See goals on Care Plan in [facility] electronic health record for goal details.  Goals met    Therapy recommendation(s):    No further therapy is recommended. Patient is discharged with recommended diet of regular textures and thin liquids, using double swallow and alternating bites/sips strategies. Patient completed video swallow study on 4/21/22 with the following summary:   Patient had no aspiration with any consistencies and intermittent transient penetration of mild to moderate depth inconsistently with thin liquids. This is WFL for her age. Oral motor function intact and oral phase WFL. Tongue base retraction was not impaired. Swallow response was mildly delayed and epiglottic inversion was complete.  Mild stasis occurred with all consistencies in the upper esophagus with inconsistent retrograde flow into the pyriform sinuses.  This cleared with use of subsequent dry swallow.  Hyolaryngeal elevation was not impaired and hyolaryngeal excursion was not impaired.

## 2022-04-22 NOTE — DISCHARGE SUMMARY
[facility] MEDICINE  DISCHARGE SUMMARY     Primary Care Physician: Shai Ellington  Admission Date: 4/13/2022   Discharge Provider: Gracy Prado MD Discharge Date: 4/22/2022   Diet:   Combination Diet Regular Diet Adult; Moderate Consistent Carb (60 g CHO per Meal) Diet; 2 gm NA Diet  Nutrition supplement daily  Aspiration precaution Code Status: No CPR- Do NOT Intubate   Activity: DCACTIVITY: Activity as tolerated        Condition at Discharge: Good     REASON FOR PRESENTATION(See Admission Note for Details)   Shortness of breath, productive cough    PRINCIPAL & ACTIVE DISCHARGE DIAGNOSES     Complex and persistent bronchiectasis with exacerbation  COPD exacerbation  Acute on chronic hypoxic and hypercarbic respiratory failure  Right lower lobe pneumonia, Pseudomonas and Enterococcus  Acute on chronic diastolic CHF exacerbation  Pulmonary hypertension  Non-insulin-dependent diabetes  Steroid-induced hyperglycemia  Chronic atrial fibrillation  Essential hypertension  Dyslipidemia  Atypical chest pain likely from anxiety  Anxiety disorder      PENDING LABS     Unresulted Labs Ordered in the Past 30 Days of this Admission     Date and Time Order Name Status Description    4/15/2022  5:59 PM Acid-Fast Bacilli Culture and Stain In process           PROCEDURES ( this hospitalization only)      None    RECOMMENDATIONS TO OUTPATIENT PROVIDER FOR F/U VISIT   Follow-up with TCU physician in next rounding  Follow-up with primary MD in 1 week  Follow-up with pulmonology in 2-week  Follow-up with palliative care in 2-week  CBC and BMP in 1 week  Resumed vest therapy      DISPOSITION     Skilled Nursing Facility    SUMMARY OF HOSPITAL COURSE:      Ms.Rosemary SHANNON Solano is a 86 year old female with a past medical history of chronic atrial fibrillation on chronic anticoagulation treatment, DM2, COPD, bronchiectasis, getting vest therapy at home for last 2 years, recent hospitalization  with COPD exacerbation [facility] 3/23-->3/25 ,who was admitted on 4/13/2022 for worsening shortness of breath with productive cough secondary to bronchiectasis and COPD exacerbation and right lower lobe pneumonia.  Treated with IV systemic steroids now switched to tapered dose of oral steroid. Resumed vest therapy,  scheduled bronchodilator/DuoNeb 4 times a day. Sputum culture grew Pseudomonas and Enterococcus.  On IV cefepime until 4/23. Tested positive for coronavirus, but negative for COVID-19. Chronically on 2 to 3 L of oxygen now on 4 L.  Getting dyspneic with acute anxiety.  Resume gentle diuresis, was on Lasix 40 mg every other day now on daily. Echocardiogram is unremarkable. Has slow recovery.  Evaluated by pulmonology and palliative care.  Will follow-up with pulmonology closely as outpatient.  Has recurrent anxiety.  Ativan 0.25 mg every 6 hours as needed for acute anxiety, mirtazapine 7.5 mg daily for 1 week then 15 mg daily, Seroquel 12.5 mg twice a day for 3 days started. Resume Abilify 5 mg daily.    She has chronic atrial fibrillation.  Diltiazem dose increased to 240 mg daily for tachycardia.  Blood pressure is stable.  On chronic anticoagulation treatment with Xarelto 20 mg daily.  She has atypical chest pain related to anxiety.  Serial cardiac enzymes are stable.    Discharge Medications with Med changes:     Current Discharge Medication List      START taking these medications    Details   ceFEPIme (MAXIPIME) 2 G vial Inject 2 g into the vein every 12 hours for 2 days Last day is 4/23/22 then remove IV  Qty: 25 mL, Refills: 0    Associated Diagnoses: Bronchiectasis without complication (H); Pseudomonas respiratory infection      docusate sodium (COLACE) 100 MG capsule Take 1 capsule (100 mg) by mouth 2 times daily      insulin aspart (NOVOLOG PEN) 100 UNIT/ML pen Correction Scale - MEDIUM INSULIN RESISTANCE DOSING     Do Not give Correction Insulin if Pre-Meal BG less than 140.   For  Pre-Meal  - 189 give 1 unit.   For Pre-Meal  - 239 give 2 units.   For Pre-Meal  - 289 give 3 units.   For Pre-Meal  - 339 give 4 units.   For Pre-Meal - 399 give 5 units.   For Pre-Meal -449 give 6 units  For Pre-Meal BG greater than or equal to 450 give 7 units.   To be given with prandial insulin, and based on pre-meal blood glucose.    Notify provider if glucose greater than or equal to 350 mg/dL after administration of correction dose.  Qty: 15 mL      LORazepam (ATIVAN) 0.5 MG tablet Take 0.5 tablets (0.25 mg) by mouth every 6 hours as needed for anxiety Hold with sedation  Qty: 10 tablet, Refills: 0    Associated Diagnoses: Anxiety      melatonin 1 MG TABS tablet Take 1 tablet (1 mg) by mouth nightly as needed for sleep      polyethylene glycol (MIRALAX) 17 GM/Dose powder Take 17 g by mouth daily as needed  Qty: 510 g         CONTINUE these medications which have CHANGED    Details   diltiazem ER (DILT-XR) 240 MG 24 hr ER beaded capsule Take 1 capsule (240 mg) by mouth      furosemide (LASIX) 40 MG tablet Take 1 tablet (40 mg) by mouth daily      !! mirtazapine (REMERON) 7.5 MG tablet 1 tab daily for 1 week then 15 mg daily  Qty: 7 tablet, Refills: 0    Associated Diagnoses: Anxiety      predniSONE (DELTASONE) 10 MG tablet 4 tab daily for 2 day then 3 tab daily for 3 day then 2 tab daily for 3 day then 1 tab daily for 3 day and then stop      sodium chloride (NEBUSAL) 3 % neb solution Take 3 mLs by nebulization 4 times daily       !! - Potential duplicate medications found. Please discuss with provider.      CONTINUE these medications which have NOT CHANGED    Details   acetaminophen (TYLENOL) 500 MG tablet Take 500-1,000 mg by mouth every 6 hours as needed for mild pain      albuterol (PROAIR HFA/PROVENTIL HFA/VENTOLIN HFA) 108 (90 Base) MCG/ACT inhaler Inhale 2 puffs into the lungs every 4 hours as needed for shortness of breath / dyspnea or wheezing  Qty: 18 g, Refills:  11    Comments: Pharmacy may dispense brand covered by insurance (Proair, or proventil or ventolin or generic albuterol inhaler)  Associated Diagnoses: Bronchiectasis (H)      albuterol (PROVENTIL) (2.5 MG/3ML) 0.083% neb solution Take 2.5 mg by nebulization every 2 hours as needed for shortness of breath / dyspnea or wheezing  Qty: 600 mL, Refills: 3    Associated Diagnoses: Essential hypertension      ARIPiprazole (ABILIFY) 5 MG tablet TAKE 1 TABLET AT BEDTIME  Qty: 90 tablet, Refills: 3    Associated Diagnoses: Essential hypertension      atorvastatin (LIPITOR) 20 MG tablet TAKE 1 TABLET AT BEDTIME  Qty: 90 tablet, Refills: 3    Associated Diagnoses: Essential hypertension      Budeson-Glycopyrrol-Formoterol (BREZTRI AEROSPHERE) 160-9-4.8 MCG/ACT AERO Inhale 2 puffs into the lungs 2 times daily  Qty: 17.7 g, Refills: 3    Associated Diagnoses: Bronchiectasis with acute exacerbation (H)      calcium carbonate 600 mg-vitamin D 400 units (CALTRATE) 600-400 MG-UNIT per tablet Take 1 tablet by mouth every morning       cyanocobalamin (VITAMIN B-12) 500 MCG tablet Take 500 mcg by mouth every morning       gabapentin (NEURONTIN) 300 MG capsule Take 600 mg by mouth every evening       ipratropium - albuterol 0.5 mg/2.5 mg/3 mL (DUONEB) 0.5-2.5 (3) MG/3ML neb solution Take 1 vial (3 mLs) by nebulization 4 times daily Once breathing is better, then change to every 6 hours as needed for shortness of breath  Qty: 360 mL, Refills: 11    Associated Diagnoses: COPD with acute exacerbation (H)      metFORMIN (GLUCOPHAGE) 500 MG tablet Take 1 tablet (500 mg) by mouth 2 times daily (with meals)  Qty: 60 tablet, Refills: 11    Associated Diagnoses: Essential hypertension      !! mirtazapine (REMERON) 15 MG tablet Take 1 tablet (15 mg) by mouth At Bedtime To be started in 1 week      omeprazole (PRILOSEC) 20 MG DR capsule TAKE 1 CAPSULE DAILY  Qty: 90 capsule, Refills: 3    Associated Diagnoses: Essential hypertension       spironolactone (ALDACTONE) 25 MG tablet TAKE ONE-HALF (1/2) TABLET DAILY  Qty: 45 tablet, Refills: 3    Associated Diagnoses: Essential hypertension      Vitamin D3 (CHOLECALCIFEROL) 25 mcg (1000 units) tablet Take 1 capsule by mouth every morning       XARELTO ANTICOAGULANT 20 MG TABS tablet TAKE 1 TABLET DAILY WITH DINNER  Qty: 90 tablet, Refills: 1    Associated Diagnoses: Atrial fibrillation with RVR (H)       !! - Potential duplicate medications found. Please discuss with provider.      STOP taking these medications       cephALEXin (KEFLEX) 500 MG capsule Comments:   Reason for Stopping:         doxycycline hyclate (VIBRAMYCIN) 100 MG capsule Comments:   Reason for Stopping:         OXYGEN-HELIUM IN Comments:   Reason for Stopping:                     Rationale for medication changes:      Cefepime for total 10 days  Ativan as needed for anxiety  Mirtazapine for anxiety and depression  Oxygen requirement 4 L continuously        Consults   Pulmonology  Infectious disease  Palliative care  Psychiatry    Immunizations given this encounter     Most Recent Immunizations   Administered Date(s) Administered     COVID-19,PF,Moderna 04/02/2022     DT (PEDS <7y) 03/29/2004     Flu, Unspecified 11/01/2019     Influenza (High Dose) 3 valent vaccine 10/11/2018     Influenza Vaccine IM > 6 months Valent IIV4 (Alfuria,Fluzone) 11/20/2014     Influenza, Quad, High Dose, Pf, 65yr+ (Fluzone HD) 10/10/2021     Pneumo Conj 13-V (2010&after) 10/20/2015     Pneumococcal 23 valent 08/01/2005, 08/01/2005     Td (Adult), Adsorbed 03/29/2004     Tdap (Adacel,Boostrix) 05/28/2021     Tdap (Adult) Unspecified Formulation 03/29/2004     Zoster vaccine recombinant adjuvanted (SHINGRIX) 04/07/2022           Anticoagulation Information      Xarelto 20 mg daily    SIGNIFICANT IMAGING FINDINGS     Results for orders placed or performed during the hospital encounter of 04/13/22   XR Chest Port 1 View    Impression    IMPRESSION: There are 2  new ill-defined opacities in the right base, largest measuring approximately 1.5 cm. These likely reflect reflect areas of retained secretions with bronchopneumonia.    Fine reticular opacities in both lungs and cardiomegaly are unchanged. Bullous emphysema and bronchiectasis is better appreciated on CT exams. No signs of failure.   US Lower Extremity Venous Duplex Bilateral    Impression    IMPRESSION:  1.  No deep venous thrombosis in the bilateral lower extremities.  2.  Bilateral calf subcutaneous edema.  3.  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 Note that the left popliteal fossa lesion is similar in size but has increased complexity since the ultrasound from 02/06/2022.   XR Video Swallow with SLP or OT    Impression    IMPRESSION:  1.  Transient penetration with thin liquids. No aspiration.       Echocardiogram  1. Normal left ventricular size and systolic performance with a visually  estimated ejection fraction of 65%.  2. There is moderate mitral insufficiency.  3. There is moderate tricuspid insufficiency.  4. Mild right ventricular enlargement with normal right ventricular systolic  performance.  5. There is severe biatrial enlargement.  6. Right ventricular systolic pressure relative to right atrial pressure is  mildly increased. The pulmonary artery pressure is estimated to be 35-40 mmHg  plus right atrial pressure (the IVC is moderately dilated).    SIGNIFICANT LABORATORY FINDINGS     Sodium 135, bicarb 33, serum 3.9    Hemoglobin A1c 7.1  Sputum culture 2+ Normal marielena       4+ Moraxella catarrhalis Abnormal        2+ Klebsiella pneumoniae Abnormal        2+ Citrobacter koseri Abnormal        Influenza A, B, COVID-19 are negative    Discharge Orders        General info for SNF    Length of Stay Estimate: Short Term Care: Estimated # of Days <30  Condition at Discharge: Stable  Level of care:skilled   Rehabilitation Potential:  Good  Admission H&P remains valid and up-to-date: Yes  Recent Chemotherapy: N/A  Use Nursing Home Standing Orders: Yes     Mantoux instructions    Give two-step Mantoux (PPD) Per Facility Policy Yes     Reason for your hospital stay    sob     Glucose monitor nursing POCT    Before meals and at bedtime     Activity - Up ad parrish     Follow Up and recommended labs and tests    Follow-up with TCU physician in next rounding  Follow-up with primary MD in 1 week  Follow-up with pulmonology in 2-week  Follow-up with palliative care in 2-week  CBC and BMP in 1 week  Continue vest therapy     Physical Therapy Adult Consult    Evaluate and treat as clinically indicated.    Reason:  weakness     Occupational Therapy Adult Consult    Evaluate and treat as clinically indicated.    Reason:  weakness     Fall precautions     Oxygen Adult/Peds    Chronic respiratory failure     Diet    Follow this diet upon discharge: diabetic, nutrition supplement daily       Examination   Physical Exam   Temp:  [97.9  F (36.6  C)-98.1  F (36.7  C)] 97.9  F (36.6  C)  Pulse:  [75-96] 96  Resp:  [20] 20  BP: (114-130)/(61-64) 114/64  SpO2:  [91 %-95 %] 92 %  Wt Readings from Last 1 Encounters:   04/20/22 62.8 kg (138 lb 6.4 oz)     GENERAL:  Alert, appears comfortable, in no acute distress, anxious easily, appears stated age   HEAD:  Normocephalic, without obvious abnormality, atraumatic   LUNGS:   Diffuse coarse breath sounds, improved. Symmetric chest rise on inhalation, respirations unlabored   CHEST WALL:  No tenderness or deformity   HEART:  Regular rate and rhythm, S1 and S2 normal, no murmur, rub, or gallop    ABDOMEN:   Soft, non-tender, bowel sounds active all four quadrants, no organomegaly, no rebound or guarding   EXTREMITIES: 1+BLE edema     SKIN: No rashes   NEURO: Alert, oriented x3, moves all four extremities freely   PSYCH: Cooperative, behavior is appropriate, anxious         Please see EMR for more detailed significant labs,  imaging, consultant notes etc.    I, Gracy Prado MD, personally saw the patient today and spent greater than 30 minutes discharging this patient.    Gracy Prado MD  [facility]    CC:Shai Ellington

## 2022-04-22 NOTE — PROVIDER NOTIFICATION
Pagezoie Prado MD    Pt complaining of chest pain. Gave nitroglycerin, will continue to monitor.     Orders received

## 2022-04-22 NOTE — PROVIDER NOTIFICATION
Paged yAla Thorpe MD    How much longer does the Pt need to be on droplet precautions following discharge to TCU?    Spoke to Ayla Thorpe MD

## 2022-04-25 NOTE — PROGRESS NOTES
Clinic Care Coordination Contact  Care Coordination Transition Communication         Clinical Data: Patient was hospitalized at [facility]   Admission Date: 4/13/2022    Discharge Provider: Gracy Prado MD Discharge Date: 4/22/2022    Code Status: No CPR- Do NOT Intubate           Condition at Discharge: Good     REASON FOR PRESENTATION(See Admission Note for Details)   Shortness of breath, productive cough     PRINCIPAL & ACTIVE DISCHARGE DIAGNOSES      Complex and persistent bronchiectasis with exacerbation  COPD exacerbation  Acute on chronic hypoxic and hypercarbic respiratory failure  Right lower lobe pneumonia, Pseudomonas and Enterococcus  Acute on chronic diastolic CHF exacerbation  Pulmonary hypertension  Non-insulin-dependent diabetes  Steroid-induced hyperglycemia  Chronic atrial fibrillation  Essential hypertension  Dyslipidemia  Atypical chest pain likely from anxiety  Anxiety disorder    Transition to Facility:              Facility Name: [facility]              Contact name and phone number/fax: [phone]    Plan: RN/SW Care Coordinator will await notification from facility staff informing RN/SW Care Coordinator of patient's discharge plans/needs. RN/SW Care Coordinator will review chart and outreach to facility staff every 4 weeks and as needed.     Stephania Romeo,   [facility]  [phone]

## 2022-04-25 NOTE — LETTER
4/25/2022        RE: Adalgisa Solano  [address]  [facility] [address]        M [facility] GERIATRIC SERVICES    Facility:  [facility] ([facility]) [[id number]]  Code Status: DNR and FULL CODE not DNR patient is full code by discussion from today      CHIEF COMPLAINT/REASON FOR VISIT:  Chief Complaint   Patient presents with     Hospital F/U       HPI:   Adalgisa is a 86 year old female who was recently admitted to the hospital on 4/13/2022.  She does have a complex past medical history of pulmonary disease including COPD with persistent bronchiectasis and also does have a non-insulin-dependent diabetes with chronic atrial fibrillation.  Also chronic heart failure with preserved ejection fraction.  She has been having difficult time at home and recent hospitalization was from 3/23/2022 to 3/25/2022 for COPD exacerbation.    Her signs and symptoms of worsening shortness of breath and she was brought to the hospital found to have multiple problems exacerbation of COPD in which she needed steroid therapy as well as community-acquired pneumonia in which she was treated with antibiotics.  Her sputum culture did grow out Pseudomonas as well as Enterococcus and she was started on cefepime on 4/23/2022.  She did test positive for coronavirus but negative for COVID-19.  And at home she is on 2 to 3 L but is on for at this time.  She also has acute anxiety which she is being treated for at this time and she did see palliative care and pulmonary medicine in the hospital.    Patient was treated appropriate and transferred here to the TCU in stable condition.    Patient is on 5 L at this time.  I did discuss with about CODE STATUS and she absolutely wants to be full code.    At this time she does not have any complaints of shortness of breath but she is on 5 L at this time.  However she does complain of hemoptysis this morning with little amounts of blood in her sputum.  I did not see this but it seemed like streaks of blood.   This could be consistent with her bronchiectasis.    Past Medical History:  Past Medical History:   Diagnosis Date     Acute and chronic respiratory failure with hypoxia (H)      Acute blood loss anemia 1/15/2019     Acute bronchitis 1/15/2019     Anemia     pernicious anemia     Anticoagulated 3/23/2022     Anxiety      Arm skin lesion, right      Arnold-Chiari malformation (H) 1998     Arthritis      Atrial fibrillation (H) 02/2017     Avascular necrosis of bone (H)      Breast cyst      Breast lump      Candidal skin infection      Cellulitis of left lower extremity 1/28/2019     CHF (congestive heart failure) (H)     diastolic and systolic     Chronic bronchitis (H)     & acute     Chronic diastolic heart failure (H)     diastolic chf     Chronic pain syndrome      Chronic respiratory failure with hypoxia (H) 3/13/2017     COPD (chronic obstructive pulmonary disease) (H)      COPD exacerbation (H)      Depression      Diet-controlled type 2 diabetes mellitus (H)      Drug induced constipation      DVT of axillary vein, acute (H)     left     DVT of axillary vein, acute left (H)      Edema      Encounter for palliative care      Erysipelas      Fracture of femoral neck, left (H)      Generalized muscle weakness      GERD (gastroesophageal reflux disease)      History of blood clots      Hyperlipidemia      Hypertension      Left hip pain      Lumbar spinal stenosis      PAD (peripheral artery disease) (H)      Peripheral arterial disease (H) 10/28/2015     Psoriasis     arms      Pulmonary hypertension (H) 3/5/2018     Recurrent major depressive episodes (H)     Created by Conversion  Replacement Utility updated for latest IMO load     Schizoaffective disorder, bipolar type (H) 6/11/2015     Slow transit constipation      Stasis dermatitis of both legs      Status post total hip replacement, 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     Type 2 diabetes mellitus without complication (H) 8/2/2016     Vitamin B12 deficiency      Volume  overload            Surgical History:  Past Surgical History:   Procedure Laterality Date     BACK SURGERY       BREAST CYST ASPIRATION Left      CERVICAL SPINE SURGERY      for arnold chiari malformation      SECTION  X3     CHOLECYSTECTOMY       COLONOSCOPY       EXCISE LESION UPPER EXTREMITY Right 2019    Procedure: EXCISION RIGHT ARM LIPOMA;  Surgeon: Andreas Holly MD;  Location: [facility];  Service: General     EYE SURGERY      bilateral cataracts     HAND SURGERY       HERNIORRHAPHY UMBILICAL N/A 2021    Procedure: INCARCERATED UMBILICAL AND;  Surgeon: Andreas Holly MD;  Location: [facility] [place]     JOINT REPLACEMENT Right     knee - partial     LAPAROSCOPIC HERNIORRHAPHY INCISIONAL Left 3/27/2015    Procedure: ATTEMPTED LAPAROSCOPIC ABDOMINA/FLANK INCISION REPAIR;  Surgeon: Jose Lakhani MD;  Location: [facility] OR;  Service:      LUMBAR FUSION N/A 2014    Procedure: POSTERIOR FUSION / DECOMPRESSION L3-S1 WITH PELVIC FIXATION BILATERAL ;  Surgeon: Rajan Mares MD;  Location[facility];  Service:      OTHER SURGICAL HISTORY      IR for PAD LOWER EXTREMITY     REPAIR SPIGELIAN HERNIA N/A 2021    Procedure: SPIGELIAN HERNIA REPAIR;  Surgeon: Andreas Holly MD;  Location: [facility]     US ASPIRATION HEMATOMA SEROMA OR FLUID COLLECTION  2020     [facility] OPEN FIXATN PROX END/NECK FEMUR FX Left 2019    Procedure: OPEN REDUCTION INTERNAL FIXATION, FRACTURE LEFT HIP, PERIPROSTHETIC FRACTURE;  Surgeon: Kevin Lackey MD;  Location: [facility] OR;  Service: Orthopedics     [facility] TOTAL HIP ARTHROPLASTY Left 2019    Procedure: LEFT TOTAL HIP ARTHROPLASTY;  Surgeon: Kevin Lackey MD;  Location: [facility] OR;  Service: Orthopedics     [facility] TOTAL HIP ARTHROPLASTY Right 2019    Procedure: RIGHT TOTAL HIP ARTHROPLASTY;  Surgeon: Kan Quesada MD;  Location: [facility] OR;  Service: Orthopedics     [facility] TOTAL KNEE  ARTHROPLASTY Left 10/7/2016    Procedure: TOTAL KNEE ARTHROPLASTY, LEFT;  Surgeon: Kan Quesada MD;  Location: [facility] OR;  Service: Orthopedics       Family History:   Family History   Problem Relation Age of Onset     Coronary Artery Disease Mother      Coronary Artery Disease Father        Social History:    Social History     Socioeconomic History     Marital status:    Tobacco Use     Smoking status: Former Smoker     Quit date: 2006     Years since quittin.3     Smokeless tobacco: Never Used     Tobacco comment: quit 2006   Vaping Use     Vaping Use: Never used   Substance and Sexual Activity     Alcohol use: No     Drug use: No   Social History Narrative           Post Discharge Medication Reconciliation Status: discharge medications reconciled, continue medications without change    Current Outpatient Medications   Medication Sig     acetaminophen (TYLENOL) 500 MG tablet Take 500-1,000 mg by mouth every 6 hours as needed for mild pain     albuterol (PROVENTIL) (2.5 MG/3ML) 0.083% neb solution Take 2.5 mg by nebulization every 2 hours as needed for shortness of breath / dyspnea or wheezing     ARIPiprazole (ABILIFY) 5 MG tablet TAKE 1 TABLET AT BEDTIME     atorvastatin (LIPITOR) 20 MG tablet TAKE 1 TABLET AT BEDTIME     Budeson-Glycopyrrol-Formoterol (BREZTRI AEROSPHERE) 160-9-4.8 MCG/ACT AERO Inhale 2 puffs into the lungs 2 times daily     calcium carbonate 600 mg-vitamin D 400 units (CALTRATE) 600-400 MG-UNIT per tablet Take 1 tablet by mouth every morning      cyanocobalamin (VITAMIN B-12) 500 MCG tablet Take 500 mcg by mouth every morning      diltiazem ER (DILT-XR) 240 MG 24 hr ER beaded capsule Take 1 capsule (240 mg) by mouth     docusate sodium (COLACE) 100 MG capsule Take 1 capsule (100 mg) by mouth 2 times daily     furosemide (LASIX) 40 MG tablet Take 1 tablet (40 mg) by mouth daily     gabapentin (NEURONTIN) 300 MG capsule Take 600 mg by mouth every evening       insulin aspart (NOVOLOG PEN) 100 UNIT/ML pen Correction Scale - MEDIUM INSULIN RESISTANCE DOSING     Do Not give Correction Insulin if Pre-Meal BG less than 140.   For Pre-Meal  - 189 give 1 unit.   For Pre-Meal  - 239 give 2 units.   For Pre-Meal  - 289 give 3 units.   For Pre-Meal  - 339 give 4 units.   For Pre-Meal - 399 give 5 units.   For Pre-Meal -449 give 6 units  For Pre-Meal BG greater than or equal to 450 give 7 units.   To be given with prandial insulin, and based on pre-meal blood glucose.    Notify provider if glucose greater than or equal to 350 mg/dL after administration of correction dose.     ipratropium - albuterol 0.5 mg/2.5 mg/3 mL (DUONEB) 0.5-2.5 (3) MG/3ML neb solution Take 1 vial (3 mLs) by nebulization 4 times daily Once breathing is better, then change to every 6 hours as needed for shortness of breath     LORazepam (ATIVAN) 0.5 MG tablet Take 0.5 tablets (0.25 mg) by mouth every 6 hours as needed for anxiety Hold with sedation     melatonin 1 MG TABS tablet Take 1 tablet (1 mg) by mouth nightly as needed for sleep     metFORMIN (GLUCOPHAGE) 500 MG tablet Take 1 tablet (500 mg) by mouth 2 times daily (with meals)     mirtazapine (REMERON) 7.5 MG tablet Take 7.5 mg by mouth At Bedtime     mirtazapine (REMERON) 7.5 MG tablet 1 tab daily for 1 week then 15 mg daily     omeprazole (PRILOSEC) 20 MG DR capsule TAKE 1 CAPSULE DAILY     polyethylene glycol (MIRALAX) 17 GM/Dose powder Take 17 g by mouth daily as needed     predniSONE (DELTASONE) 10 MG tablet 4 tab daily for 2 day then 3 tab daily for 3 day then 2 tab daily for 3 day then 1 tab daily for 3 day and then stop     QUEtiapine (SEROQUEL) 25 MG tablet Take 0.5 tablets (12.5 mg) by mouth 2 times daily Hold with sedation     sodium chloride (NEBUSAL) 3 % neb solution Take 3 mLs by nebulization 4 times daily     spironolactone (ALDACTONE) 25 MG tablet TAKE ONE-HALF (1/2) TABLET DAILY     Vitamin D3  (CHOLECALCIFEROL) 25 mcg (1000 units) tablet Take 1 capsule by mouth every morning      XARELTO ANTICOAGULANT 20 MG TABS tablet TAKE 1 TABLET DAILY WITH DINNER     No current facility-administered medications for this visit.       REVIEW OF SYSTEM: Positive for hemoptysis but denies any fevers chills nausea vomiting diarrhea change in vision hearing taste or smell weakness one-sided of the chest pain or tightness.  She does have shortness of breath with exertion but she is comfortable now at 5 L of oxygen nasal cannula.      PHYSICAL EXAM: Patient is alert pleasant does not appear to be in acute distress head is normocephalic and atraumatic sclera conjunctive is clear mucosas moist nasal discharge.  Heart sounds were irregularly irregular with adequate rate control lungs show some coarse rhonchi bilateral with occasional crackles.  There is also some expiratory wheezing.  Moving air well.  Neurologic Romulo is nonfocal and affect was pleasant.        LABS: Hospital labs are as followed on 4/13/2022 white count was 11.7, hemoglobin was 13.8, platelets were 237,000.    Sodium was 126, potassium 4.1, chloride was 86, CO2 was 28, BUN was 10, creatinine 0.64, anion gap was 12, calcium was 8.6,    Vitals; blood pressure 151/72    Pulse 97    Temperature is 97.3    Respirations 20    O2 sats 93%.    Blood sugars have been running from 83 up to and including 374 with the highest of 426 which is an outlier.      ASSESSMENT:    Encounter Diagnoses   Name Primary?     COPD with exacerbation (H) Yes     Bronchiectasis with acute exacerbation (H)      Pneumonia of right lower lobe due to infectious organism      H/O non-insulin dependent diabetes mellitus      Steroid-induced hyperglycemia      Chronic atrial fibrillation (H)      Generalized anxiety disorder      Essential hypertension         PLAN: This time we will get labs from hospital download into chart I did review them somewhat and basic metabolic profile be done tomorrow  second hyponatremia and diuretics.    Weights will be done on a daily basis I will be notified of any 2 pound weight changes but I will be monitoring her weights pretty closely.    Chest x-ray done Wednesday secondary hemoptysis likely her pneumonia versus her COPD at this time.    I did have a discussion CODE STATUS and patient is adamant about being full code.  It did say DNR/DNI on the chart but she is a full code at this time.    I will continue to monitor above medical problems and no other changes to care plan at this time.        Electronically signed by: JONATHAN IBARRA DO          Sincerely,        JONATHAN IBARRA DO

## 2022-04-25 NOTE — PROGRESS NOTES
[facility] GERIATRIC SERVICES    Facility:  [facility] ([facility]) [[id number]]  Code Status: DNR and FULL CODE not DNR patient is full code by discussion from today      CHIEF COMPLAINT/REASON FOR VISIT:  Chief Complaint   Patient presents with     Hospital F/U       HPI:   Adalgisa is a 86 year old female who was recently admitted to the hospital on 4/13/2022.  She does have a complex past medical history of pulmonary disease including COPD with persistent bronchiectasis and also does have a non-insulin-dependent diabetes with chronic atrial fibrillation.  Also chronic heart failure with preserved ejection fraction.  She has been having difficult time at home and recent hospitalization was from 3/23/2022 to 3/25/2022 for COPD exacerbation.    Her signs and symptoms of worsening shortness of breath and she was brought to the hospital found to have multiple problems exacerbation of COPD in which she needed steroid therapy as well as community-acquired pneumonia in which she was treated with antibiotics.  Her sputum culture did grow out Pseudomonas as well as Enterococcus and she was started on cefepime on 4/23/2022.  She did test positive for coronavirus but negative for COVID-19.  And at home she is on 2 to 3 L but is on for at this time.  She also has acute anxiety which she is being treated for at this time and she did see palliative care and pulmonary medicine in the hospital.    Patient was treated appropriate and transferred here to the TCU in stable condition.    Patient is on 5 L at this time.  I did discuss with about CODE STATUS and she absolutely wants to be full code.    At this time she does not have any complaints of shortness of breath but she is on 5 L at this time.  However she does complain of hemoptysis this morning with little amounts of blood in her sputum.  I did not see this but it seemed like streaks of blood.  This could be consistent with her bronchiectasis.    Past Medical History:  Past  Medical History:   Diagnosis Date     Acute and chronic respiratory failure with hypoxia (H)      Acute blood loss anemia 1/15/2019     Acute bronchitis 1/15/2019     Anemia     pernicious anemia     Anticoagulated 3/23/2022     Anxiety      Arm skin lesion, right      Arnold-Chiari malformation (H) 1998     Arthritis      Atrial fibrillation (H) 02/2017     Avascular necrosis of bone (H)      Breast cyst      Breast lump      Candidal skin infection      Cellulitis of left lower extremity 1/28/2019     CHF (congestive heart failure) (H)     diastolic and systolic     Chronic bronchitis (H)     & acute     Chronic diastolic heart failure (H)     diastolic chf     Chronic pain syndrome      Chronic respiratory failure with hypoxia (H) 3/13/2017     COPD (chronic obstructive pulmonary disease) (H)      COPD exacerbation (H)      Depression      Diet-controlled type 2 diabetes mellitus (H)      Drug induced constipation      DVT of axillary vein, acute (H)     left     DVT of axillary vein, acute left (H)      Edema      Encounter for palliative care      Erysipelas      Fracture of femoral neck, left (H)      Generalized muscle weakness      GERD (gastroesophageal reflux disease)      History of blood clots      Hyperlipidemia      Hypertension      Left hip pain      Lumbar spinal stenosis      PAD (peripheral artery disease) (H)      Peripheral arterial disease (H) 10/28/2015     Psoriasis     arms      Pulmonary hypertension (H) 3/5/2018     Recurrent major depressive episodes (H)     Created by Conversion  Replacement Utility updated for latest IMO load     Schizoaffective disorder, bipolar type (H) 6/11/2015     Slow transit constipation      Stasis dermatitis of both legs      Status post total hip replacement, 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     Type 2 diabetes mellitus without complication (H) 8/2/2016     Vitamin B12 deficiency      Volume overload            Surgical History:  Past Surgical History:   Procedure Laterality  Date     BACK SURGERY       BREAST CYST ASPIRATION Left      CERVICAL SPINE SURGERY      for arnold chiari malformation      SECTION  X3     CHOLECYSTECTOMY       COLONOSCOPY       EXCISE LESION UPPER EXTREMITY Right 2019    Procedure: EXCISION RIGHT ARM LIPOMA;  Surgeon: Andreas Holly MD;  Location: [facility];  Service: General     EYE SURGERY      bilateral cataracts     HAND SURGERY       HERNIORRHAPHY UMBILICAL N/A 2021    Procedure: INCARCERATED UMBILICAL AND;  Surgeon: Anrdeas Holly MD;  Location: [facility]     JOINT REPLACEMENT Right     knee - partial     LAPAROSCOPIC HERNIORRHAPHY INCISIONAL Left 3/27/2015    Procedure: ATTEMPTED LAPAROSCOPIC ABDOMINA/FLANK INCISION REPAIR;  Surgeon: Jose Lakhani MD;  Location: [facility] OR;  Service:      LUMBAR FUSION N/A 2014    Procedure: POSTERIOR FUSION / DECOMPRESSION L3-S1 WITH PELVIC FIXATION BILATERAL ;  Surgeon: Rajan Mares MD;  Location: [facility] OR;  Service:      OTHER SURGICAL HISTORY      IR for PAD LOWER EXTREMITY     REPAIR SPIGELIAN HERNIA N/A 2021    Procedure: SPIGELIAN HERNIA REPAIR;  Surgeon: Andreas Holly MD;  Location: [facility]     US ASPIRATION HEMATOMA SEROMA OR FLUID COLLECTION  2020     [facility] OPEN FIXATN PROX END/NECK FEMUR FX Left 2019    Procedure: OPEN REDUCTION INTERNAL FIXATION, FRACTURE LEFT HIP, PERIPROSTHETIC FRACTURE;  Surgeon: Kevin Lackey MD;  Location: [facility] OR;  Service: Orthopedics     [facility] TOTAL HIP ARTHROPLASTY Left 2019    Procedure: LEFT TOTAL HIP ARTHROPLASTY;  Surgeon: Kevin Lackey MD;  Location: [facility] OR;  Service: Orthopedics     [facility] TOTAL HIP ARTHROPLASTY Right 2019    Procedure: RIGHT TOTAL HIP ARTHROPLASTY;  Surgeon: Kan Quesada MD;  Location: [facility] OR;  Service: Orthopedics     [facility] TOTAL KNEE ARTHROPLASTY Left 10/7/2016    Procedure: TOTAL KNEE ARTHROPLASTY, LEFT;  Surgeon:  Kan Quesada MD;  Location: [facility] OR;  Service: Orthopedics       Family History:   Family History   Problem Relation Age of Onset     Coronary Artery Disease Mother      Coronary Artery Disease Father        Social History:    Social History     Socioeconomic History     Marital status:    Tobacco Use     Smoking status: Former Smoker     Quit date: 2006     Years since quittin.3     Smokeless tobacco: Never Used     Tobacco comment: quit 2006   Vaping Use     Vaping Use: Never used   Substance and Sexual Activity     Alcohol use: No     Drug use: No   Social History Narrative           Post Discharge Medication Reconciliation Status: discharge medications reconciled, continue medications without change    Current Outpatient Medications   Medication Sig     acetaminophen (TYLENOL) 500 MG tablet Take 500-1,000 mg by mouth every 6 hours as needed for mild pain     albuterol (PROVENTIL) (2.5 MG/3ML) 0.083% neb solution Take 2.5 mg by nebulization every 2 hours as needed for shortness of breath / dyspnea or wheezing     ARIPiprazole (ABILIFY) 5 MG tablet TAKE 1 TABLET AT BEDTIME     atorvastatin (LIPITOR) 20 MG tablet TAKE 1 TABLET AT BEDTIME     Budeson-Glycopyrrol-Formoterol (BREZTRI AEROSPHERE) 160-9-4.8 MCG/ACT AERO Inhale 2 puffs into the lungs 2 times daily     calcium carbonate 600 mg-vitamin D 400 units (CALTRATE) 600-400 MG-UNIT per tablet Take 1 tablet by mouth every morning      cyanocobalamin (VITAMIN B-12) 500 MCG tablet Take 500 mcg by mouth every morning      diltiazem ER (DILT-XR) 240 MG 24 hr ER beaded capsule Take 1 capsule (240 mg) by mouth     docusate sodium (COLACE) 100 MG capsule Take 1 capsule (100 mg) by mouth 2 times daily     furosemide (LASIX) 40 MG tablet Take 1 tablet (40 mg) by mouth daily     gabapentin (NEURONTIN) 300 MG capsule Take 600 mg by mouth every evening      insulin aspart (NOVOLOG PEN) 100 UNIT/ML pen Correction Scale - MEDIUM INSULIN  RESISTANCE DOSING     Do Not give Correction Insulin if Pre-Meal BG less than 140.   For Pre-Meal  - 189 give 1 unit.   For Pre-Meal  - 239 give 2 units.   For Pre-Meal  - 289 give 3 units.   For Pre-Meal  - 339 give 4 units.   For Pre-Meal - 399 give 5 units.   For Pre-Meal -449 give 6 units  For Pre-Meal BG greater than or equal to 450 give 7 units.   To be given with prandial insulin, and based on pre-meal blood glucose.    Notify provider if glucose greater than or equal to 350 mg/dL after administration of correction dose.     ipratropium - albuterol 0.5 mg/2.5 mg/3 mL (DUONEB) 0.5-2.5 (3) MG/3ML neb solution Take 1 vial (3 mLs) by nebulization 4 times daily Once breathing is better, then change to every 6 hours as needed for shortness of breath     LORazepam (ATIVAN) 0.5 MG tablet Take 0.5 tablets (0.25 mg) by mouth every 6 hours as needed for anxiety Hold with sedation     melatonin 1 MG TABS tablet Take 1 tablet (1 mg) by mouth nightly as needed for sleep     metFORMIN (GLUCOPHAGE) 500 MG tablet Take 1 tablet (500 mg) by mouth 2 times daily (with meals)     mirtazapine (REMERON) 7.5 MG tablet Take 7.5 mg by mouth At Bedtime     mirtazapine (REMERON) 7.5 MG tablet 1 tab daily for 1 week then 15 mg daily     omeprazole (PRILOSEC) 20 MG DR capsule TAKE 1 CAPSULE DAILY     polyethylene glycol (MIRALAX) 17 GM/Dose powder Take 17 g by mouth daily as needed     predniSONE (DELTASONE) 10 MG tablet 4 tab daily for 2 day then 3 tab daily for 3 day then 2 tab daily for 3 day then 1 tab daily for 3 day and then stop     QUEtiapine (SEROQUEL) 25 MG tablet Take 0.5 tablets (12.5 mg) by mouth 2 times daily Hold with sedation     sodium chloride (NEBUSAL) 3 % neb solution Take 3 mLs by nebulization 4 times daily     spironolactone (ALDACTONE) 25 MG tablet TAKE ONE-HALF (1/2) TABLET DAILY     Vitamin D3 (CHOLECALCIFEROL) 25 mcg (1000 units) tablet Take 1 capsule by mouth every morning       XARELTO ANTICOAGULANT 20 MG TABS tablet TAKE 1 TABLET DAILY WITH DINNER     No current facility-administered medications for this visit.       REVIEW OF SYSTEM: Positive for hemoptysis but denies any fevers chills nausea vomiting diarrhea change in vision hearing taste or smell weakness one-sided of the chest pain or tightness.  She does have shortness of breath with exertion but she is comfortable now at 5 L of oxygen nasal cannula.      PHYSICAL EXAM: Patient is alert pleasant does not appear to be in acute distress head is normocephalic and atraumatic sclera conjunctive is clear mucosas moist nasal discharge.  Heart sounds were irregularly irregular with adequate rate control lungs show some coarse rhonchi bilateral with occasional crackles.  There is also some expiratory wheezing.  Moving air well.  Neurologic Romulo is nonfocal and affect was pleasant.        LABS: Hospital labs are as followed on 4/13/2022 white count was 11.7, hemoglobin was 13.8, platelets were 237,000.    Sodium was 126, potassium 4.1, chloride was 86, CO2 was 28, BUN was 10, creatinine 0.64, anion gap was 12, calcium was 8.6,    Vitals; blood pressure 151/72    Pulse 97    Temperature is 97.3    Respirations 20    O2 sats 93%.    Blood sugars have been running from 83 up to and including 374 with the highest of 426 which is an outlier.      ASSESSMENT:    Encounter Diagnoses   Name Primary?     COPD with exacerbation (H) Yes     Bronchiectasis with acute exacerbation (H)      Pneumonia of right lower lobe due to infectious organism      H/O non-insulin dependent diabetes mellitus      Steroid-induced hyperglycemia      Chronic atrial fibrillation (H)      Generalized anxiety disorder      Essential hypertension         PLAN: This time we will get labs from hospital download into chart I did review them somewhat and basic metabolic profile be done tomorrow second hyponatremia and diuretics.    Weights will be done on a daily basis I will be  notified of any 2 pound weight changes but I will be monitoring her weights pretty closely.    Chest x-ray done Wednesday secondary hemoptysis likely her pneumonia versus her COPD at this time.    I did have a discussion CODE STATUS and patient is adamant about being full code.  It did say DNR/DNI on the chart but she is a full code at this time.    I will continue to monitor above medical problems and no other changes to care plan at this time.        Electronically signed by: JONATHAN IBARRA DO

## 2022-04-26 NOTE — TELEPHONE ENCOUNTER
Facility called in lab results. Potassium low at 3.2. Pt not currently on Potassium supplement. Breztri Aerosphere inhaler not covered by insurance and pt does not want to pay out of pocket. Nurse asking if medication can be discontinued.    Orders given: Potassium 10 meq PO every day. Recheck Potassium on 4/29/22. Provider asked facility nurse to check with pulmonary specialist about d/c'ing inhaler.    Orders relayed to Jun DRAPER at [facility]

## 2022-04-27 NOTE — TELEPHONE ENCOUNTER
[facility] Nurse Triage Telephone Note    Provider: Rob Hurley DO  Facility: [facility]   Facility Type:  TCU    Caller: Jun    Allergies   Allergen Reactions     Tramadol Nausea     Amoxicillin Itching and Rash     Levofloxacin Itching and Rash     Penicillins Itching and Rash     Has tolerated ceftriaxone.       Reason for call: Pt c/o pain and inflammation in rectum. Inspection by nurse noted no external inflammation or redness, does believe to be internal hemorrhoidal pain. Pt brought Preparation H from home and requests order to use.    Verbal Order/Direction given by Provider:   [facility] STANDING ORDER GIVEN:  Preparation H or Anusol ointment (or therapeutic equivalent) per package instructions QID PRN after bowel movement for hemorrhoid pain    Provider giving Order:  Rob Hurley DO    Verbal Order given to: Jun Pierson RN

## 2022-04-28 NOTE — PROGRESS NOTES
[facility] GERIATRIC SERVICES    Facility:  [facility] ([facility]) [[id number]]  Code Status: FULL CODE      CHIEF COMPLAINT/REASON FOR VISIT:  Chief Complaint   Patient presents with     RECHECK       HISTORY:      HPI: Adalgisa is a 86 year old female [facility] to COPD with persistent bronchiectasis and some hemoptysis.  She also has diabetes which blood sugars are under suboptimal control and also has chronic heart failure with preserved ejection fraction.  Her COPD exacerbation was reason she went to the hospital and she also on her last basic metabolic profile had low potassium as well as low sodium.  Recheck is tomorrow.  She is on her free water restriction I did put her on potassium 10 mill equivalents daily.    Staff indicates to me that she is complaining of hemorrhoids which is not a new diagnosis for her.    She says her bottom was sore and her breathing so much better she is on 5 L of oxygen.        Past Medical History:   Diagnosis Date     Acute and chronic respiratory failure with hypoxia (H)      Acute blood loss anemia 1/15/2019     Acute bronchitis 1/15/2019     Anemia     pernicious anemia     Anticoagulated 3/23/2022     Anxiety      Arm skin lesion, right      Arnold-Chiari malformation (H) 1998     Arthritis      Atrial fibrillation (H) 02/2017     Avascular necrosis of bone (H)      Breast cyst      Breast lump      Candidal skin infection      Cellulitis of left lower extremity 1/28/2019     CHF (congestive heart failure) (H)     diastolic and systolic     Chronic bronchitis (H)     & acute     Chronic diastolic heart failure (H)     diastolic chf     Chronic pain syndrome      Chronic respiratory failure with hypoxia (H) 3/13/2017     COPD (chronic obstructive pulmonary disease) (H)      COPD exacerbation (H)      Depression      Diet-controlled type 2 diabetes mellitus (H)      Drug induced constipation      DVT of axillary vein, acute (H)      left     DVT of axillary vein, acute left (H)      Edema      Encounter for palliative care      Erysipelas      Fracture of femoral neck, left (H)      Generalized muscle weakness      GERD (gastroesophageal reflux disease)      History of blood clots      Hyperlipidemia      Hypertension      Left hip pain      Lumbar spinal stenosis      PAD (peripheral artery disease) (H)      Peripheral arterial disease (H) 10/28/2015     Psoriasis     arms      Pulmonary hypertension (H) 3/5/2018     Recurrent major depressive episodes (H)     Created by Conversion  Replacement Utility updated for latest IMO load     Schizoaffective disorder, bipolar type (H) 2015     Slow transit constipation      Stasis dermatitis of both legs      Status post total hip replacement, 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     Type 2 diabetes mellitus without complication (H) 2016     Vitamin B12 deficiency      Volume overload              Family History   Problem Relation Age of Onset     Coronary Artery Disease Mother      Coronary Artery Disease Father      Social History     Socioeconomic History     Marital status:    Tobacco Use     Smoking status: Former Smoker     Quit date: 2006     Years since quittin.3     Smokeless tobacco: Never Used     Tobacco comment: quit    Vaping Use     Vaping Use: Never used   Substance and Sexual Activity     Alcohol use: No     Drug use: No   Social History Narrative             REVIEW OF SYSTEM: Bottom pain is improving with the Preparation H and I did not observe any hemorrhoids.  She denies any fevers chills nausea vomit diarrhea change in vision hearing taste or smell weakness one-sided other chest pain.  She has her normal shortness of breath at this time but is improving.      PHYSICAL EXAM: Right leg does show pitting edema at this time and this is been going on for some time.  She has pain in her calf at this time but head was normocephalic and atraumatic sclera conjunctive is clear  mucosas moist nasal discharge.  Heart sounds were irregular regular with adequate rate control lungs show some coarse rhonchi bilateral otherwise moving air well.  No crackles or rales.  Right leg showed edema neurologic Romulo is nonfocal.    Examination rectum no external hemorrhoids were seen.  But she does some redness around the buttock area which will need attention.  It did rosalio and it was not warm to touch.        LABS: Basic metabolic profile on 4/6/2022 is as follows sodium is 135, potassium 3.2, chloride 89, CO2 is 34, BUN is 13, creatinine 0.64, calcium was 8.8, glucose 165, GFR was 87.    Blood pressure 109/53    Pulse of 79    Respirations 18    O2 sats 95%.  Blood sugars are running from 113 up to including 311.      ASSESSMENT:   Encounter Diagnoses   Name Primary?     Hemorrhoids, unspecified hemorrhoid type Yes     Pneumonia of right lower lobe due to infectious organism      COPD with exacerbation (H)      H/O non-insulin dependent diabetes mellitus      Bronchiectasis with acute exacerbation (H)      Generalized anxiety disorder      Chronic atrial fibrillation (H)         PLAN: This time basic metabolic profile tomorrow second hyponatremia and hypokalemia.    Anusol cream apply to hemorrhoids twice daily x3 days.  There were there yesterday but have improved.    Venous Doppler ultrasound the right lower extremity to rule out DVT    Position changes every 2 hours and barrier cream to the buttocks twice daily.    Give trial of O2 at 4 L today and monitor O2 sats okay to continue to wean O2 to 3 L if patient tolerates.  O2 sat should be done frequently.    38 minutes was spent on this follow-up visit greater than 50% the time dedicated to coordination care coordination include discussed with nursing staff the weaning process as well as attention to her bottom at this time.  Also the need for basic metabolic profile to monitor sodium potassium because they were low and nursing staff seems to be  understanding.  Also discussed with nursing staff of the venous Doppler ultrasound in a prompt notification when this comes back.            Electronically signed by: JONATHAN IBARRA DO

## 2022-04-28 NOTE — LETTER
4/28/2022        RE: Adalgisa Solano  [address]  [facility] [address]        [facility]    Facility:  [facility] ([facility]) [[id number]]  Code Status: FULL CODE      CHIEF COMPLAINT/REASON FOR VISIT:  Chief Complaint   Patient presents with     RECHECK       HISTORY:      HPI: Adalgisa is a 86 year old female who resides here at the [facility] secondary to COPD with persistent bronchiectasis and some hemoptysis.  She also has diabetes which blood sugars are under suboptimal control and also has chronic heart failure with preserved ejection fraction.  Her COPD exacerbation was reason she went to the hospital and she also on her last basic metabolic profile had low potassium as well as low sodium.  Recheck is tomorrow.  She is on her free water restriction I did put her on potassium 10 mill equivalents daily.    Staff indicates to me that she is complaining of hemorrhoids which is not a new diagnosis for her.    She says her bottom was sore and her breathing so much better she is on 5 L of oxygen.        Past Medical History:   Diagnosis Date     Acute and chronic respiratory failure with hypoxia (H)      Acute blood loss anemia 1/15/2019     Acute bronchitis 1/15/2019     Anemia     pernicious anemia     Anticoagulated 3/23/2022     Anxiety      Arm skin lesion, right      Arnold-Chiari malformation (H) 1998     Arthritis      Atrial fibrillation (H) 02/2017     Avascular necrosis of bone (H)      Breast cyst      Breast lump      Candidal skin infection      Cellulitis of left lower extremity 1/28/2019     CHF (congestive heart failure) (H)     diastolic and systolic     Chronic bronchitis (H)     & acute     Chronic diastolic heart failure (H)     diastolic chf     Chronic pain syndrome      Chronic respiratory failure with hypoxia (H) 3/13/2017     COPD (chronic obstructive pulmonary disease) (H)      COPD exacerbation (H)      Depression      Diet-controlled type 2 diabetes  mellitus (H)      Drug induced constipation      DVT of axillary vein, acute (H)     left     DVT of axillary vein, acute left (H)      Edema      Encounter for palliative care      Erysipelas      Fracture of femoral neck, left (H)      Generalized muscle weakness      GERD (gastroesophageal reflux disease)      History of blood clots      Hyperlipidemia      Hypertension      Left hip pain      Lumbar spinal stenosis      PAD (peripheral artery disease) (H)      Peripheral arterial disease (H) 10/28/2015     Psoriasis     arms      Pulmonary hypertension (H) 3/5/2018     Recurrent major depressive episodes (H)     Created by Conversion  Replacement Utility updated for latest IMO load     Schizoaffective disorder, bipolar type (H) 2015     Slow transit constipation      Stasis dermatitis of both legs      Status post total hip replacement, 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     Type 2 diabetes mellitus without complication (H) 2016     Vitamin B12 deficiency      Volume overload              Family History   Problem Relation Age of Onset     Coronary Artery Disease Mother      Coronary Artery Disease Father      Social History     Socioeconomic History     Marital status:    Tobacco Use     Smoking status: Former Smoker     Quit date: 2006     Years since quittin.3     Smokeless tobacco: Never Used     Tobacco comment: quit    Vaping Use     Vaping Use: Never used   Substance and Sexual Activity     Alcohol use: No     Drug use: No   Social History Narrative             REVIEW OF SYSTEM: Bottom pain is improving with the Preparation H and I did not observe any hemorrhoids.  She denies any fevers chills nausea vomit diarrhea change in vision hearing taste or smell weakness one-sided other chest pain.  She has her normal shortness of breath at this time but is improving.      PHYSICAL EXAM: Right leg does show pitting edema at this time and this is been going on for some time.  She has pain in her calf  at this time but head was normocephalic and atraumatic sclera conjunctive is clear mucosas moist nasal discharge.  Heart sounds were irregular regular with adequate rate control lungs show some coarse rhonchi bilateral otherwise moving air well.  No crackles or rales.  Right leg showed edema neurologic Romulo is nonfocal.    Examination rectum no external hemorrhoids were seen.  But she does some redness around the buttock area which will need attention.  It did rosalio and it was not warm to touch.        LABS: Basic metabolic profile on 4/6/2022 is as follows sodium is 135, potassium 3.2, chloride 89, CO2 is 34, BUN is 13, creatinine 0.64, calcium was 8.8, glucose 165, GFR was 87.    Blood pressure 109/53    Pulse of 79    Respirations 18    O2 sats 95%.  Blood sugars are running from 113 up to including 311.      ASSESSMENT:   Encounter Diagnoses   Name Primary?     Hemorrhoids, unspecified hemorrhoid type Yes     Pneumonia of right lower lobe due to infectious organism      COPD with exacerbation (H)      H/O non-insulin dependent diabetes mellitus      Bronchiectasis with acute exacerbation (H)      Generalized anxiety disorder      Chronic atrial fibrillation (H)         PLAN: This time basic metabolic profile tomorrow second hyponatremia and hypokalemia.    Anusol cream apply to hemorrhoids twice daily x3 days.  There were there yesterday but have improved.    Venous Doppler ultrasound the right lower extremity to rule out DVT    Position changes every 2 hours and barrier cream to the buttocks twice daily.    Give trial of O2 at 4 L today and monitor O2 sats okay to continue to wean O2 to 3 L if patient tolerates.  O2 sat should be done frequently.    38 minutes was spent on this follow-up visit greater than 50% the time dedicated to coordination care coordination include discussed with nursing staff the weaning process as well as attention to her bottom at this time.  Also the need for basic metabolic profile to  monitor sodium potassium because they were low and nursing staff seems to be understanding.  Also discussed with nursing staff of the venous Doppler ultrasound in a prompt notification when this comes back.            Electronically signed by: JONATHAN IBARRA DO          Sincerely,        JONATHAN IBARRA DO

## 2022-04-30 NOTE — TELEPHONE ENCOUNTER
[facility] TELEPHONE ENCOUNTER    Chief Complaint   Patient presents with     X-ray Results       Adalgisa Solano is a 86 year old  (1935),Nurse called today to report: CXR results below. Currently on furosemide 40mg daily. Per Registered Nurse patient does have supply of Breztri, however there is documentation in chat that this medication is not able to fill due to out of pocket cost and patient not willing to start this? Pulmonology follow up isn't until 5/24/22. She was previously on trellogy prior to starting breztri. Has orders for PRN albuterol neb every 2 hours along with scheduled duoneb QID. Discharged from hospital on prednisone taper-currently taking 20mg daily today. Per Registered Nurse- VSS. No shortness of breath. Independent ambulating in room. Currently on 4L via oxygen right now.         ASSESSMENT/PLAN      Start mucinex 600mg BID    Continue furosemide 40mg daily. Give Furosemide 20mg daily at 1600 x 2 days, starting tonight 4/30/22    BMP 5/2/22    Recommend Monday on site provider to check on if Breztri is currently being used. If not, would benefit from changing to trelogy if warranted due to cost.     Electronically signed by:   MARLINE Blakely CNP

## 2022-05-03 NOTE — PROGRESS NOTES
[facility]ATR[facility]    Facility:  [facility] ([facility]) [[id number]]  Code Status: FULL CODE      CHIEF COMPLAINT/REASON FOR VISIT:  Chief Complaint   Patient presents with     RECHECK       HISTORY:      HPI: Adalgisa is a 86 year old female who resides at the [facility] she was recently hospitalized for bronchiectasis with some hemoptysis and also COPD exacerbation.  This seems to be under control at this time and she is on steroids at this time and does have type 2 diabetes and blood sugars have been somewhat high.  But also low at 102.  Highest is 228 at this time and she did have 1 outlier 427 on the first.  We do a her free water restriction her sodium is starting to come up in the last 1 was 134.  She did have some hemorrhoids which I did treat her for on last visit and she is on oxygen at this time.    Patient is okay at this time and she is on 10 mg of prednisone.  We will likely get down to 5 after tomorrow and she is still on oxygen I did get down to 3 L.  She is urinating okay but she has not had a bowel movement in 4 days and her usual oxygen at home is 2 to 5 L.    Past Medical History:   Diagnosis Date     Acute and chronic respiratory failure with hypoxia (H)      Acute blood loss anemia 1/15/2019     Acute bronchitis 1/15/2019     Anemia     pernicious anemia     Anticoagulated 3/23/2022     Anxiety      Arm skin lesion, right      Arnold-Chiari malformation (H) 1998     Arthritis      Atrial fibrillation (H) 02/2017     Avascular necrosis of bone (H)      Breast cyst      Breast lump      Candidal skin infection      Cellulitis of left lower extremity 1/28/2019     CHF (congestive heart failure) (H)     diastolic and systolic     Chronic bronchitis (H)     & acute     Chronic diastolic heart failure (H)     diastolic chf     Chronic pain syndrome      Chronic respiratory failure with hypoxia (H) 3/13/2017     COPD (chronic obstructive pulmonary disease) (H)      COPD exacerbation  (H)      Depression      Diet-controlled type 2 diabetes mellitus (H)      Drug induced constipation      DVT of axillary vein, acute (H)     left     DVT of axillary vein, acute left (H)      Edema      Encounter for palliative care      Erysipelas      Fracture of femoral neck, left (H)      Generalized muscle weakness      GERD (gastroesophageal reflux disease)      History of blood clots      Hyperlipidemia      Hypertension      Left hip pain      Lumbar spinal stenosis      PAD (peripheral artery disease) (H)      Peripheral arterial disease (H) 10/28/2015     Psoriasis     arms      Pulmonary hypertension (H) 3/5/2018     Recurrent major depressive episodes (H)     Created by Conversion  Replacement Utility updated for latest IMO load     Schizoaffective disorder, bipolar type (H) 2015     Slow transit constipation      Stasis dermatitis of both legs      Status post total hip replacement, 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     Type 2 diabetes mellitus without complication (H) 2016     Vitamin B12 deficiency      Volume overload              Family History   Problem Relation Age of Onset     Coronary Artery Disease Mother      Coronary Artery Disease Father      Social History     Socioeconomic History     Marital status:    Tobacco Use     Smoking status: Former Smoker     Quit date: 2006     Years since quittin.3     Smokeless tobacco: Never Used     Tobacco comment: quit 2006   Vaping Use     Vaping Use: Never used   Substance and Sexual Activity     Alcohol use: No     Drug use: No   Social History Narrative             REVIEW OF SYSTEM: Patient denies any pain fevers chills nausea vomit diarrhea change in vision hearing taste or smell weakness one-sided chest pain however she is at her baseline shortness of breath at this time according to her..  Denies any current shortness stool polyphagia polydipsia polyuria depression or anxiety and the remainder review of systems is  negative.      PHYSICAL EXAM: Patient is alert pleasant does not appear to be in acute distress head was normocephalic and atraumatic sclera conjunctiva was clear oromucosa was moist nasal discharge.  Heart sounds were irregularly irregular with adequate rate control lungs showed expiratory rhonchi but no crackles or rales.  There were no wheezes.  Right leg did show edema neurologic exam was nonfocal and affect was pleasant.        LABS: Recent blood culture showed no growth.  On 5/2/2022 brain metric peptide was 100 and sodium was 134.  White count was still low elevated at 14 however she is on steroids at this time with no signs of infection.    Vital; blood pressure 128/70    Pulse is 89    Temperature is 97.2    Respirations 20    O2 sats 92%.  Blood sugars have been running from 102 up to and including 228.  On 5 1 she was 427.      ASSESSMENT:   Encounter Diagnoses   Name Primary?     COPD with exacerbation (H) Yes     Bronchiectasis with acute exacerbation (H)      Generalized anxiety disorder      Chronic atrial fibrillation (H)      Hemorrhoids, unspecified hemorrhoid type      Essential hypertension         PLAN: Plan at this time basic metabolic profile will be done tomorrow second hyponatremia.    1200 cc free water restriction we will stay at this time until I get the sodium tomorrow.    White count be done tomorrow second leukocytosis.    Prednisone down to 5 mg daily after tomorrow.    Dulcolax suppository today and I will be notified know if no bowel movement this afternoon or she likely have an enema.    I will continue to monitor above medical problems and no other changes to care plan at this time.  Care plan was reviewed and is appropriate.        Electronically signed by: JONATHAN IBARRA DO

## 2022-05-03 NOTE — LETTER
5/3/2022        RE: Adalgisa Solano  [address]  [facility] [address]        [facility]    Facility:  [facility] ([facility]) [[id number]]  Code Status: FULL CODE      CHIEF COMPLAINT/REASON FOR VISIT:  Chief Complaint   Patient presents with     RECHECK       HISTORY:      HPI: Adalgisa is a 86 year old female who resides at the [facility] she was recently hospitalized for bronchiectasis with some hemoptysis and also COPD exacerbation.  This seems to be under control at this time and she is on steroids at this time and does have type 2 diabetes and blood sugars have been somewhat high.  But also low at 102.  Highest is 228 at this time and she did have 1 outlier 427 on the first.  We do a her free water restriction her sodium is starting to come up in the last 1 was 134.  She did have some hemorrhoids which I did treat her for on last visit and she is on oxygen at this time.    Patient is okay at this time and she is on 10 mg of prednisone.  We will likely get down to 5 after tomorrow and she is still on oxygen I did get down to 3 L.  She is urinating okay but she has not had a bowel movement in 4 days and her usual oxygen at home is 2 to 5 L.    Past Medical History:   Diagnosis Date     Acute and chronic respiratory failure with hypoxia (H)      Acute blood loss anemia 1/15/2019     Acute bronchitis 1/15/2019     Anemia     pernicious anemia     Anticoagulated 3/23/2022     Anxiety      Arm skin lesion, right      Arnold-Chiari malformation (H) 1998     Arthritis      Atrial fibrillation (H) 02/2017     Avascular necrosis of bone (H)      Breast cyst      Breast lump      Candidal skin infection      Cellulitis of left lower extremity 1/28/2019     CHF (congestive heart failure) (H)     diastolic and systolic     Chronic bronchitis (H)     & acute     Chronic diastolic heart failure (H)     diastolic chf     Chronic pain syndrome      Chronic respiratory failure with hypoxia (H)  3/13/2017     COPD (chronic obstructive pulmonary disease) (H)      COPD exacerbation (H)      Depression      Diet-controlled type 2 diabetes mellitus (H)      Drug induced constipation      DVT of axillary vein, acute (H)     left     DVT of axillary vein, acute left (H)      Edema      Encounter for palliative care      Erysipelas      Fracture of femoral neck, left (H)      Generalized muscle weakness      GERD (gastroesophageal reflux disease)      History of blood clots      Hyperlipidemia      Hypertension      Left hip pain      Lumbar spinal stenosis      PAD (peripheral artery disease) (H)      Peripheral arterial disease (H) 10/28/2015     Psoriasis     arms      Pulmonary hypertension (H) 3/5/2018     Recurrent major depressive episodes (H)     Created by Conversion  Replacement Utility updated for latest IMO load     Schizoaffective disorder, bipolar type (H) 2015     Slow transit constipation      Stasis dermatitis of both legs      Status post total hip replacement, 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     Type 2 diabetes mellitus without complication (H) 2016     Vitamin B12 deficiency      Volume overload              Family History   Problem Relation Age of Onset     Coronary Artery Disease Mother      Coronary Artery Disease Father      Social History     Socioeconomic History     Marital status:    Tobacco Use     Smoking status: Former Smoker     Quit date: 2006     Years since quittin.3     Smokeless tobacco: Never Used     Tobacco comment: quit 2006   Vaping Use     Vaping Use: Never used   Substance and Sexual Activity     Alcohol use: No     Drug use: No   Social History Narrative             REVIEW OF SYSTEM: Patient denies any pain fevers chills nausea vomit diarrhea change in vision hearing taste or smell weakness one-sided chest pain however she is at her baseline shortness of breath at this time according to her..  Denies any current shortness stool polyphagia polydipsia  polyuria depression or anxiety and the remainder review of systems is negative.      PHYSICAL EXAM: Patient is alert pleasant does not appear to be in acute distress head was normocephalic and atraumatic sclera conjunctiva was clear oromucosa was moist nasal discharge.  Heart sounds were irregularly irregular with adequate rate control lungs showed expiratory rhonchi but no crackles or rales.  There were no wheezes.  Right leg did show edema neurologic exam was nonfocal and affect was pleasant.        LABS: Recent blood culture showed no growth.  On 5/2/2022 brain metric peptide was 100 and sodium was 134.  White count was still low elevated at 14 however she is on steroids at this time with no signs of infection.    Vital; blood pressure 128/70    Pulse is 89    Temperature is 97.2    Respirations 20    O2 sats 92%.  Blood sugars have been running from 102 up to and including 228.  On 5 1 she was 427.      ASSESSMENT:   Encounter Diagnoses   Name Primary?     COPD with exacerbation (H) Yes     Bronchiectasis with acute exacerbation (H)      Generalized anxiety disorder      Chronic atrial fibrillation (H)      Hemorrhoids, unspecified hemorrhoid type      Essential hypertension         PLAN: Plan at this time basic metabolic profile will be done tomorrow second hyponatremia.    1200 cc free water restriction we will stay at this time until I get the sodium tomorrow.    White count be done tomorrow second leukocytosis.    Prednisone down to 5 mg daily after tomorrow.    Dulcolax suppository today and I will be notified know if no bowel movement this afternoon or she likely have an enema.    I will continue to monitor above medical problems and no other changes to care plan at this time.  Care plan was reviewed and is appropriate.        Electronically signed by: JONATHAN IBARRA DO        Sincerely,        JONATHAN IBARRA DO

## 2022-05-04 NOTE — TELEPHONE ENCOUNTER
[facility] Geriatric[facility] Triage Nurse Telephone Encounter    Provider: Rob Hurley DO  Facility: [facility] Facility Type:  TCU    Caller: Mai  Call Back Number: [phone]    Allergies:    Allergies   Allergen Reactions     Tramadol Nausea     Amoxicillin Itching and Rash     Levofloxacin Itching and Rash     Penicillins Itching and Rash     Has tolerated ceftriaxone.        Reason for call: Nurse is calling to report WBC and BMP results.  Of note, patient is on a 1200cc/day total fluid restriction.  Notable meds:  Seroquel 12.5mg BID, Metformin 500mg BID, Diltiazem ER 240mg daily, Lasix 40mg daily, Remeron 15mg Q HS, Omeprazole 20mg daily, potassium 10meq daily, Prednisone 5mg daily, Spironolactone 12.5mg daily.      Verbal Order/Direction given by Provider: Change fluid restriction to 1200cc/day free water restriction.  Check BMP on 5/9/22.      Provider giving Order:  Rob Hurley DO    Verbal Order given to: Mai Santiago RN

## 2022-05-05 PROBLEM — R09.02 HYPOXIA: Status: ACTIVE | Noted: 2022-01-01

## 2022-05-05 PROBLEM — J18.9 PNEUMONIA OF LEFT LOWER LOBE DUE TO INFECTIOUS ORGANISM: Status: ACTIVE | Noted: 2022-01-01

## 2022-05-05 PROBLEM — J44.1 COPD EXACERBATION (H): Status: ACTIVE | Noted: 2022-01-01

## 2022-05-05 NOTE — ED TRIAGE NOTES
Arrives via EMS from TCU. Staff was checking routine vitals and found pt's SpO2 to be in the mid 60's on RA. Fire placed pt on NRB at 10L and SpO2 increased to mid 90's. SpO2 90% RA here. Placed on 3L oxymask.

## 2022-05-05 NOTE — PROGRESS NOTES
05/05/22 1530   Quick Adds   Type of Visit Initial PT Evaluation   Living Environment   People in Home spouse   Current Living Arrangements house   Home Accessibility stairs to enter home;stairs within home   Number of Stairs, Main Entrance 1   Stair Railings, Main Entrance railings safe and in good condition   Number of Stairs, Within Home, Primary ten   Stair Railings, Within Home, Primary railings safe and in good condition   Self-Care   Equipment Currently Used at Home cane, straight;walker, rolling   General Information   Onset of Illness/Injury or Date of Surgery 05/05/22   Referring Physician Andi Jones MD   Patient/Family Therapy Goals Statement (PT) to get better   Pertinent History of Current Problem (include personal factors and/or comorbidities that impact the POC) COPD with hypoxia, pneumonia   Existing Precautions/Restrictions fall   Transfers   Transfers sit-stand transfer   Maintains Weight-bearing Status (Transfers) able to maintain   Comment, (Transfers) SBA with sit<>stand tranfser with FWW. Verbal cues for safety and safe hand placement. Pt sitting up in chair at end of session with call light within reach and spouse present in room.   Sit-Stand Transfer   Sit-Stand Neola (Transfers) supervision;verbal cues   Assistive Device (Sit-Stand Transfers) walker, front-wheeled   Comment, (Sit-Stand Transfer) SBA with FWW for sit<>stand transfers. Verbal cues for safety and safe hand placement. Pt able to complete about 5 sit<>stand transfers with FWW and SBA. Pt required few sitting rest breaks due to fatigue and coughing. Pt on O2 throughout session via nasal cannula.   Gait/Stairs (Locomotion)   Neola Level (Gait) supervision;verbal cues   Assistive Device (Gait) walker, front-wheeled   Distance in Feet (Required for LE Total Joints) 4', 4'   Pattern (Gait) step-through   Deviations/Abnormal Patterns (Gait) base of support, wide;carola decreased;gait speed decreased   Maintains  Weight-bearing Status (Gait) able to maintain   Comment, (Gait/Stairs) Pt ambulates with SBA and FWW. Verbal cues for safety and posture. Pt ambulation limited due to IV and oxygen tubing. Pt has mild coughing after ambulating and reports fatigue.   Clinical Impression   Criteria for Skilled Therapeutic Intervention Yes, treatment indicated   PT Diagnosis (PT) impaired functional mobility   Influenced by the following impairments weakness, cough   Functional limitations due to impairments bed mobility, transfers, ambulation   Clinical Presentation (PT Evaluation Complexity) Evolving/Changing   Clinical Presentation Rationale Pt presents as medically diagnosed   Clinical Decision Making (Complexity) moderate complexity   Planned Therapy Interventions (PT) bed mobility training;gait training;home exercise program;patient/family education;ROM (range of motion);stair training;strengthening;stretching;transfer training   Anticipated Equipment Needs at Discharge (PT) walker, rolling  (FWW)   Risk & Benefits of therapy have been explained patient;spouse/significant other   PT Discharge Planning   PT Discharge Recommendation (DC Rec) (S)  home with assist;home with home care physical therapy;Transitional Care Facility   PT Rationale for DC Rec Pt requires assist with transfers and ambulation for safety, fall risk, limited activity tolerance   Plan of Care Review   Plan of Care Reviewed With patient;spouse   Total Evaluation Time   Total Evaluation Time (Minutes) 10   Physical Therapy Goals   PT Frequency Daily   PT Predicted Duration/Target Date for Goal Attainment 05/10/22   PT Goals Transfers;Gait;PT Goal 1   PT: Transfers Modified independent;Sit to/from stand;Assistive device  (FWW)   PT: Gait Supervision/stand-by assist;Assistive device;Rolling walker;25 feet  (FWW)   PT: Stairs Supervision/stand-by assist;Assistive device;1 stair;Rail on both sides  (FWW)   PT: Goal 1 Mod I with HEP     Mandy Tabares, PT, DPT

## 2022-05-05 NOTE — CONSULTS
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     PIP ACTIVITY MEASURES:  If pt is refusing to turn or reposition they must be educated on the  potential injury from not off loading pressure.  Then this \"educated refusal\" needs to be documented as an \"educated refusal to turn/ reposition\" and document if alert, etc. Additionally, if repeated refusals ensure the charge nurse, nurse manager and the provider is aware.  Follow Joshua Risk recommendations      CHAIR: Pt should sit on a chair cushion ([id number]) when up to the chair and not sit for more than one hour at a time before fully offloading backside (either stand for a couple of minutes and/or return to bed, positioning on a side) to relieve pressure and re-perfuse tissue.  Additionally, encourage pt to shift side to side every 15 minutes, too.    NOTE: the Z-Flow Pad is NOT a cushion, pt should NOT sit on the Z-Flow pads      BED:  Reposition side to side every 1-2 hours when awake.     No direct supine positioning, position only side to side    Consider Z-Jeremiah Pillows to help keep pt on side (#[id number]-medium or #[id number]- large). *Z-Flows are for positioning, DO NOT SIT ON!    Keep heels elevated    As able keep HOB below 30 degrees    Evaluate for specialty mattress    Use TAPS wedges and perform frequent microturns as pt tolerates      Orders unable to order topical antiviral medications   Recommended provider order: topical antiviral medication for treatment of viral appearing lesions & pulsate mattress when she arrives to the floor   WO " Nurse follow-up plan:weekly  Nursing to notify the Provider(s) and re-consult the [facility] Nurse if wound(s) deteriorates or new skin concern.    Patient History  According to provider note(s):  History gathered today from: patient, medical chart, medical team members, unit team members  Adalgisa Solano is a 86 year old female admitted to [facility] on 4/13/2022 with worsening shortness of breath and being treated for COPD and bronchiectasis exacerbation and pneumonia. Patient being treated with steroids and IV antibiotics. Recent hospitalization at [facility] 3/23 - 3/25 for SOB and COPD exacerbation. Chest CT scan showed no pulmonary embolism, irregular nodular opacitities in the RUL, bronchiectasis, emphysema, peribronchial thickening with retained secretions both lower lobes.Sputum Culture positive for pseudomonas and enterococcus. Pulomonology consulted and following.  Viral panel was positive for coronavirus (not COVID19). ID service consulted and recommending ten days of cefepime IV and to obtain sputum to test for AFB.  Objective Data  Containment of urine/stool: Continent of bladder and Continent of bowel    Active Diet Order  Orders Placed This Encounter      Regular Diet Adult      Output:   No intake/output data recorded.    Risk Assessment:   Sensory Perception: 3-->slightly limited  Moisture: 3-->occasionally moist  Activity: 3-->walks occasionally  Mobility: 3-->slightly limited  Nutrition: 3-->adequate  Friction and Shear: 2-->potential problem  Joshua Score: 17                          Labs:   Recent Labs   Lab 05/05/22  0930 05/05/22  0226   ALBUMIN 2.9* 2.8*   HGB 11.9 11.6*   INR  --  3.26*   WBC 8.4 10.1   CRP 18.5*  --        Physical Exam  Areas of skin assessed: focused sacrum/bilateral buttocks periananal skin     Wound Location:  Gluteal cleft bilateral buttocks and posterior thighs        Date of last photo 5/5  Wound History: patient states it has been burning  Wound Base: 100 %  clusters of open and intact blisters in various stages of healing      Palpation of the wound bed: boggy      Drainage: none     Description of drainage: none  Periwound skin: irritant dermatits, dry peeling, raw skin       Color: pink      Temperature: normal   Odor: none  Pain: moderate, burning  Interventions  Visual inspection and assessment completed   Wound Care Rationale Decrease bacterial load and Pain reduction  Wound Care: None necessary  Supplies: discussed with RN  Current off-loading measures: Pillows  Current support surface: Standard  Low air loss mattress  Education provided to: plan of care and etiology   Discussed plan of care with Patient, Family, Nurse and Physician    Padmini Almeida RN BS, CWOCN

## 2022-05-05 NOTE — ED NOTES
Pt seen in the ED. Pt received duoneb and sodium chloride neb. BS diminished with faint exp/insp wheezes. Pos neb coarse with increase aeration. Pt is on 4 lpm oxymask 91%.    Gisselle Tyson, RT

## 2022-05-05 NOTE — PROGRESS NOTES
Resident/Fellow Attestation   I, Win Awan MD, was present with the medical/ASTRID student who participated in the service and in the documentation of the note.  I have verified the history and personally performed the physical exam and medical decision making.  I agree with the assessment and plan of care as documented in the note.      Coarse breath sounds consistent with bronchiectasis and pneumonia.  Patient likely has recurrent pneumonia given history and exam findings.  Broad-spectrum antibiotics started today.    Win Awan MD  PGY1  Date of Service (when I saw the patient): 05/05/22    [facility]    Progress Note - Hospitalist Service       Date of Admission:  5/5/2022    Assessment & Plan   Adalgisa Solano is a 86 year old female admitted on 5/5/2022. She has a history of pulmonary disease including COPD with persistent bronchiectasis getting vest therapy at home for the last 2 years, non-insulin-dependent diabetes, chronic A. fib, chronic heart failure with preserved ejection fraction who has had several recent hospitalizations for COPD exacerbation, most recently complicated by Pseudomonas and Enterococcus pneumonia  and is admitted for acute on chronic hypoxic and hypercarbic respiratory failure and a new left lower lobe opacity on chest CT. Remains vitally stable on 4L HFNC.     Acute on chronic hypoxic and hypercarbic respiratory failure  Recent right lower lobe Pseudomonas and Enterococcus UTI  New left lower lobe opacity  -IV ertapenem and vancomycin  -ID consult for assistance with antibiotic coverage  -Pulmonology consult  -Supplemental oxygen maintain SPO2 above 90%  -Continue scheduled bronchodilators, albuterol nebs, saline nebs twice a day  -Titrate blood pressure medications and diuresis  -Systemic steroids- Taper starting 5/6 (prednisone 40mg x3, 20mg x3, 10mg x3)  -Mucomyst  -Consider speech therapy     COPD exacerbation  Bronchiectasis  CT chest on  admission notable for known left upper lobe predominance bronchiectasis. She has been on a steroid taper (5 mg) prior to admission and does vest therapy at home. Received IV steroids in the ED.  -Vest therapy  -RT consult  -Systemic steroids- Restart taper 5/6 (prednisone 40mg x3, 20mg x3, 10mg x3)  -DuoNebs and albuterol nebulizer as needed  - Mucomyst  -PTA Trelegy Ellipta  - DuoNeb 4 times daily  -PPI for GI prophylaxis     Chronic diastolic CHF  Bilateral lymphedema  Pulmonary hypertension  Bilateral lower extremity edema, however, BNP normal at 100.  CT chest shows no acute pulmonary vascular congestion. Last echocardiogram in 4/2022 with an EF of 65%, with moderate mitral and tricuspid insufficiency. Home regimen includes Lasix (40mg daily) and Spironolactone (12.5 mg daily).  -Continue Lasix and spironolactone for respiratory optimization  -OT lymphedema consult  -Consider pulmonology  - PT lymphedema wraps    Hyponatremia  Sodium of 128 on admission. Suspect SIADH in setting of bacterial pneumonia.  - Fluid restriction  - Resume normal diet     Atrial fibrillation  HTN  On Xarelto 20 mg daily and diltiazem 240 mg daily prior to admission. 24 hour BP readings: Min-120/58, Max- 138/70.  - Continue diltiazem 240 mg daily   -Continue daily Xarelto 20 mg     Anxiety disorder  Continue prior recommendations from palliative team.  -Continue Ativan 0.5 mg every 6 hours as needed  -Continue mirtazapine, Seroquel, Abilify     T2DM  At risk for steroid-induced hyperglycemia  Last Hgb A1C (3/21/22)- 6.2, suggesting well controlled diabetes. On metformin (500mg daily) and Novolog prior to admission.   -4 times daily and AC glucose checks  -Medium sliding scale insulin for correction  -Hold metformin     Constipation  Has not had bowel movement in 4 days prior to admission.   -Bowel regimen     Chronic pain syndrome-chronic mid to low back pain  -Continue Tylenol as needed  -Continue gabapentin 600 mg at bedtime  -Use  opiates sparingly for pain  -Avoid NSAIDs as on anticoagulant    Magnesium deficiency  Mg low at 1.3.   - Mg replacement    Tinea pedis (right foot)  - Clotrimazole cream    Sores on bottom  - Wound consult     Diet: Regular Diet Adult  Fluid restriction 1200 ML FLUID    DVT Prophylaxis: PTA Xarelto  Borjas Catheter: Not present  Fluids: P.o.  Central Lines: None  Cardiac Monitoring: ACTIVE order. Indication: Tachyarrhythmias, acute (48 hours)  Code Status: No CPR- Do NOT Intubate      Disposition Plan   Expected Discharge: 05/09/2022     Anticipated discharge location:   Pending clinical improvement, ID consult  Delays: Improvement in respiratory status     The patient's care was discussed with the Attending Physician, Dr. Jones.    KIET SHUN  Medical Student  Hospitalist Service  [facility]  Securely message with the Vocera Web Console (learn more here)  Text page via [employer] Paging/Directory     Win Awan MD PGY1 May 5, 2022   [facility] Family Medicine Residency Program  Page Win Awan MD PGY1 at [phone] from 7:30 am to 5:30 pm.   Call the senior pager with any questions or concerns, 24/7: [phone]  ______________________________________________________________________    Interval History     Patient remained on 3L HFNC overnight.    This morning she is comfortable with good O2 saturation on 4L HFNC. She reports feeling more comfortable this morning with the nasal cannula but has a persistent cough. She says she does not like the oxymask. She says her lungs feel heavy, but that she is having a difficult time bringing up anything. She says her bottom is feeling sore. She had perry putting topical creams on her bottom at home. She thinks her ankle swelling is improved from previously. She says a new skin lesion appeared near her right toe yesterday.    Data reviewed today: I reviewed all medications, new labs and imaging results over the  last 24 hours. I personally reviewed no images or EKG's today.    Physical Exam   Vital Signs: Temp: 98.3  F (36.8  C) Temp src: Oral BP: 105/56 Pulse: 105   Resp: (!) 37 SpO2: 95 % O2 Device: Oxymask Oxygen Delivery: 6 LPM  Weight: 0 lbs 0 oz  Constitutional: awake, alert, cooperative  Respiratory: mild discomfort with cough/breathing, course lung sounds throughout all lung fields  Cardiovascular: Normal apical impulse, regular rate and rhythm, normal S1 and S2, no S3 or S4, and no murmur noted  Skin: 6x6mm annular lesion at base between 1st and 2nd right toes    Data   Recent Labs   Lab 05/05/22  1307 05/05/22  0930 05/05/22  0835 05/05/22  0226 05/04/22  0555 05/02/22  0545 04/29/22  0508   WBC  --  8.4  --  10.1 10.9  --  14.1*   HGB  --  11.9  --  11.6*  --   --  12.8   MCV  --  91  --  92  --   --  92   PLT  --  184  --  208  --   --  237   INR  --   --   --  3.26*  --   --   --    NA  --  130*  --  128* 131*   < > 132*   POTASSIUM  --  4.9  --  4.8 4.3  --  4.2   CHLORIDE  --  88*  --  86* 88*  --  86*   CO2  --  30  --  30 34*  --  35*   BUN  --  13  --  15 14  --  11   CR  --  0.59*  --  0.65 0.61  --  0.48*   ANIONGAP  --  12  --  12 9  --  11   DENISE  --  8.7  --  8.6 8.8  --  8.5   * 168* 151* 137* 117  --  80   ALBUMIN  --  2.9*  --  2.8*  --   --   --    PROTTOTAL  --  6.1  --  5.9*  --   --   --    BILITOTAL  --  0.9  --  0.9  --   --   --    ALKPHOS  --  58  --  58  --   --   --    ALT  --  24  --  29  --   --   --    AST  --  19  --  21  --   --   --     < > = values in this interval not displayed.      Resident

## 2022-05-05 NOTE — CONSULTS
COPD Initial Interview, Education, and Consult  5/5/2022, 5:41 PM    Reason for Consult: COPD, Bronchiectasis, Vest Therapy  Patient Admitted for: Hypoxia [R09.02] on 5/5/2022     History of Present Illness: Adalgisa is a 87 yo female with a history of COPD, Oxygen dependent at 2-3 LPM baseline,bronchiectasis, HFpEF, GERD, and Afib who presented in the ED with increased shortness of breath and hypoxia. Imaging suggests acute pneumonia. She has been receiving treatment with antibiotics, steroids, scheduled DuoNebs, Mucomist nebs, 3% sodium chloride nebs, and fluttervalve.  Adalgisa has her own vest treatment system and her  stated that he had it along in the car. He is willing to bring it in so that she can use it here. I advised RT personnel on duty of this and the plan is to have her do a vest treatment when the scheduled nebulizer treatments are given. Vest treatments have been ordered.    Last PFT:  Date:6/14/ 2019  Post-Spirometry:  FVC: 1.75, 78%  FEV1: 0.84, 49%  FEV1/FVC: 48%    Home Respiratory Medications:  Bronchodilators:   -albuterol neb/inhaler PRN    Combination Therapy:   -Trelegy Ellipta inhaler daily    Assessment: Patient is currently sitting up in the chair and playing cards with her .She is very alert. She is able to speak in full sentences and is comfortable at rest. She has a strong productive cough.    Education done during visit:  -Medication use and instruction done for Albuterol and Trelegy Ellipta inhalers.  Patient is able to demonstrate use with a MDI spacer and achieve and adequate inhalation flow of 90 LPM on the in-check device at free flow,  MDI spacer also was issued for use with their Albuterol rescue inhaler.  -COPD Action Plan, discussed - Kim already has an Action Plan on file at our [facility]. She knows when to call her providers and has a history of calling the [facility] when appropriate. -Information on COPD and tools to help cope  -Issued patient  a COPD information and resource packet    Recommendations:  -Continue current inpatient therapy to include newly ordered vest therapy and per [facility] protocols.  -Follow up appointment with pulmonology. Patient is followed by Dr. Juarez and she already has a follow up appointment scheduled with him on 5/24/22. An IP Pulmonary consult has also been ordered by admitting MD.  -Medications patient is currently taking at home appear to be appropriate, recommend deferring any changes to Pulmonologist.    Kim will participate in our call back program. Will continue to follow patient throughout hospital stay along with educating patient and family.    Total time spend with patient 35 minutes and 50 minutes spent in chart review, care coordination, and documentation.    Gianluca Rosas, RT, Chronic Pulmonary Disease Specialist  Phone [phone]

## 2022-05-05 NOTE — ED NOTES
SpO2 down to low 80's on monitor. Entered pt's room and she was upright and sleeping. Woke her up and instructed her to take some bigger breaths. Pt began to cough and SpO2 dropped to 76%. Had difficulty recovering inbetween coughing fits. Placed temporarily on 15L NRB until coughing fits subsided. RT at bedside during this time as well as pt had just received nebs. Dr. Justice called to bedside to evaluate. SpO2 increased and was able to wean down O2. SpO2 low 90's now on 6L oxymask.

## 2022-05-05 NOTE — ED NOTES
RESPIRATORY CARE NOTE     Patient Name: Adalgisa Solano  Today's Date: 5/5/2022       Pt continues to receive duoneb and sodium chloride. BS are coarse rhonchi. Pt is on 6lpm of oxygen via NC, SpO2 is 91%. Post treatment there is increased aeration. Pt also perceives improvement.  RT encouraged deep breathing and coughing techniques .  RT will continue to monitor and assess.     Gisselle Tyson, RT

## 2022-05-05 NOTE — H&P
[facility]    History and Physical - Teaching Service        Date of Admission:  5/5/2022    Assessment & Plan        Acute on chronic hypoxic and hypercarbic respiratory failure  Recent right lower lobe Pseudomonas and Enterococcus UTI  New left lower lobe opacity  -IV antibiotic  -ID consult for assistance with antibiotic coverage  -CBC, Pro-Dashawn  -Supplemental oxygen maintain SPO2 above 90%  -Continue scheduled bronchodilators, albuterol nebs, saline nebs twice a day  -Titrate blood pressure medications and diuresis  -Taper systemic steroids as able  -Mucomyst  -Consider speech therapy  -N.p.o. for now    Bronchiectasis  -Vest therapy    COPD exacerbation  Has been on steroid taper, 5 mg prior to admission.  -PPI for GI prophylaxis  -Systemic steroids  -Bronchodilators    Hyponatremia  SIADH  Has been on fluid restriction.    Bilateral lymphedema  -Holding Lasix for hypoxia, med rec  -Continue spironolactone  -OT lymphedema consult    Atrial fibrillation, HTN  -Resume diltiazem 240 mg daily   -Continue daily Xarelto 20 mg    Anxiety  Continue prior recommendations from palliative team  -Continue Ativan 0.5 mg every 6 hours as needed  -Continue mirtazapine, Seroquel, Abilify    Chronic diastolic CHF    Pulmonary hypertension  -RT consult  -Consider pulmonology    Anxiety disorder    T2DM  At risk for steroid-induced hyperglycemia  -4 times daily and AC glucose checks  -Medium sliding scale insulin for correction  -Hold metformin    Constipation  -Bowel regimen    Hiatal hernia, GERD    Chronic pain syndrome-chronic mid to low back pain  -Continue Tylenol as needed  -Continue gabapentin 600 mg at bedtime  -Use opiates sparingly for pain  -Avoid NSAIDs as on anticoagulant       Diet: NPO for Medical/Clinical Reasons Except for: Meds, Ice ChipsN.p.o.  DVT Prophylaxis: Defer to primary team  Borjas Catheter: Not present  Fluids: Defer to primary team  Central Lines: None  Cardiac  "Monitoring: ACTIVE order. Indication: Tachyarrhythmias, acute (48 hours)  Code Status: No CPR- Do NOT Intubate         # Hyponatremia: Na = 128 mmol/L (Ref range: 136 - 145 mmol/L) on admission, will monitor as appropriate     # Hypoalbuminemia: Albumin = 2.8 g/dL (Ref range: 3.5 - 5.0 g/dL) on admission, will monitor as appropriate   # Coagulation Defect: home medication list includes an anticoagulant medication    # Overweight: Estimated body mass index is 26.13 kg/m  as calculated from the following:    Height as of 5/3/22: 1.549 m (5' 1\").    Weight as of 4/28/22: 62.7 kg (138 lb 4.8 oz).      Disposition Plan   Expected Discharge: 05/09/2022   Anticipated discharge location:  Awaiting care coordination huddle         The patient's care was discussed with the Attending Physician, Dr. Jones.    Hilaria Reza MD  [facility]  Securely message with the Vocera Web Console (learn more here)  Text page via [employer] Paging/Directory       ______________________________________________________________________    Chief Complaint   Shortness of breath  History is obtained from the patient    History of Present Illness   Adalgisa Solano is a 86 year old female with a history of pulmonary disease including COPD with persistent bronchiectasis getting vest therapy at home for the last 2 years, non-insulin-dependent diabetes, chronic A. fib, chronic heart failure with preserved ejection fraction who has had several recent hospitalizations for COPD exacerbation, most recently complicated by Pseudomonas and Enterococcus pneumonia who was brought in from her TCU with worsening hypoxia.  She was discharged on 4/22 after being admitted for COPD exacerbation and RLL pneumonia.  She was treated with IV systemic steroids, scheduled bronchodilators/DuoNebs 4 times daily, and was receiving her vest therapy.  She was on IV cefepime until 4/23.  She is chronically on 2 to 3 L of oxygen.  She also gets " dyspneic with acute anxiety.  Last echocardiogram in early April was unremarkable.  She is slow to recover.  At the time, she was evaluated by pulmonology and palliative care and was recommended to follow-up closely with pulmonology outpatient.  For her recurrent anxiety, she has been on 0.25 mg Ativan every 6 hours as needed, mirtazapine 15 mg daily, Seroquel 12 mg twice a day, and Abilify 5 mg daily.       On arrival to the ED, she was hypoxic.  CT chest in the ED without PE, new left lower lobe infiltrate.  She received antibiotics, prednisone, and nebs and her breathing improved.  Oxygen was decreased from 6 L to 4 L when I saw her.  She has chronic hyponatremia secondary to presumed SIADH.  Troponins were okay.  She was admitted for management of pneumonia and COPD with hypoxia.    Review of Systems    The 10 point Review of Systems is negative other than noted in the HPI or here.     Past Medical History    I have reviewed this patient's medical history and updated it with pertinent information if needed.   Past Medical History:   Diagnosis Date     Acute and chronic respiratory failure with hypoxia (H)      Acute blood loss anemia 1/15/2019     Acute bronchitis 1/15/2019     Anemia     pernicious anemia     Anticoagulated 3/23/2022     Anxiety      Arm skin lesion, right      Arnold-Chiari malformation (H) 1998     Arthritis      Atrial fibrillation (H) 02/2017     Avascular necrosis of bone (H)      Breast cyst      Breast lump      Candidal skin infection      Cellulitis of left lower extremity 1/28/2019     CHF (congestive heart failure) (H)     diastolic and systolic     Chronic bronchitis (H)     & acute     Chronic diastolic heart failure (H)     diastolic chf     Chronic pain syndrome      Chronic respiratory failure with hypoxia (H) 3/13/2017     COPD (chronic obstructive pulmonary disease) (H)      COPD exacerbation (H)      Depression      Diet-controlled type 2 diabetes mellitus (H)      Drug  induced constipation      DVT of axillary vein, acute (H)     left     DVT of axillary vein, acute left (H)      Edema      Encounter for palliative care      Erysipelas      Fracture of femoral neck, left (H)      Generalized muscle weakness      GERD (gastroesophageal reflux disease)      History of blood clots      Hyperlipidemia      Hypertension      Left hip pain      Lumbar spinal stenosis      PAD (peripheral artery disease) (H)      Peripheral arterial disease (H) 10/28/2015     Psoriasis     arms      Pulmonary hypertension (H) 3/5/2018     Recurrent major depressive episodes (H)     Created by Conversion  Replacement Utility updated for latest IMO load     Schizoaffective disorder, bipolar type (H) 2015     Slow transit constipation      Stasis dermatitis of both legs      Status post total hip replacement, 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  Past Surgical History:   Procedure Laterality Date     BACK SURGERY       BREAST CYST ASPIRATION Left      CERVICAL SPINE SURGERY      for arnold chiari malformation      SECTION  X3     CHOLECYSTECTOMY       COLONOSCOPY       EXCISE LESION UPPER EXTREMITY Right 2019    Procedure: EXCISION RIGHT ARM LIPOMA;  Surgeon: Andreas Holly MD;  Location: [facility];  Service: General     EYE SURGERY      bilateral cataracts     HAND SURGERY       HERNIORRHAPHY UMBILICAL N/A 2021    Procedure: INCARCERATED UMBILICAL AND;  Surgeon: Andreas Holly MD;  Location: [facility]     JOINT REPLACEMENT Right     knee - partial     LAPAROSCOPIC HERNIORRHAPHY INCISIONAL Left 3/27/2015    Procedure: ATTEMPTED LAPAROSCOPIC ABDOMINA/FLANK INCISION REPAIR;  Surgeon: Jose Lakhani MD;  Location: [facility];  Service:       LUMBAR FUSION N/A 11/17/2014    Procedure: POSTERIOR FUSION / DECOMPRESSION L3-S1 WITH PELVIC FIXATION BILATERAL ;  Surgeon: Rajan Mares MD;  Location: [facility];  Service:      OTHER SURGICAL HISTORY      IR for PAD LOWER EXTREMITY     REPAIR SPIGELIAN HERNIA N/A 9/21/2021    Procedure: SPIGELIAN HERNIA REPAIR;  Surgeon: Andreas Holly MD;  Location: [facility] ASPIRATION HEMATOMA SEROMA OR FLUID COLLECTION  2/6/2020     [facility] OPEN FIXATN PROX END/NECK FEMUR FX Left 1/2/2019    Procedure: OPEN REDUCTION INTERNAL FIXATION, FRACTURE LEFT HIP, PERIPROSTHETIC FRACTURE;  Surgeon: Kevin Lackey MD;  Location: [facility];  Service: Orthopedics     [facility] TOTAL HIP ARTHROPLASTY Left 1/2/2019    Procedure: LEFT TOTAL HIP ARTHROPLASTY;  Surgeon: Kevin Lackey MD;  Location: [facility] OR;  Service: Orthopedics     [facility] TOTAL HIP ARTHROPLASTY Right 9/16/2019    Procedure: RIGHT TOTAL HIP ARTHROPLASTY;  Surgeon: Kan Quesada MD;  Location: [facility] OR;  Service: Orthopedics     [facility] TOTAL KNEE ARTHROPLASTY Left 10/7/2016    Procedure: TOTAL KNEE ARTHROPLASTY, LEFT;  Surgeon: Kan Quesada MD;  Location: [facility];  Service: Orthopedics        Social History   I have reviewed this patient's social history and updated it with pertinent information if needed. Adalgisa Solano  reports that she quit smoking about 16 years ago. She has never used smokeless tobacco. She reports that she does not drink alcohol and does not use drugs.    Family History   I have reviewed this patient's family history and updated it with pertinent information if needed.  Family History   Problem Relation Age of Onset     Coronary Artery Disease Mother      Coronary Artery Disease Father        Prior to Admission Medications   Prior to Admission Medications   Prescriptions Last Dose Informant Patient Reported? Taking?   ARIPiprazole (ABILIFY) 5 MG tablet   No No   Sig: TAKE 1 TABLET AT  BEDTIME   Budeson-Glycopyrrol-Formoterol (BREZTRI AEROSPHERE) 160-9-4.8 MCG/ACT AERO   No No   Sig: Inhale 2 puffs into the lungs 2 times daily   LORazepam (ATIVAN) 0.5 MG tablet   No No   Sig: Take 0.5 tablets (0.25 mg) by mouth every 6 hours as needed for anxiety Hold with sedation   QUEtiapine (SEROQUEL) 25 MG tablet   Yes No   Sig: Take 0.5 tablets (12.5 mg) by mouth 2 times daily Hold with sedation   Vitamin D3 (CHOLECALCIFEROL) 25 mcg (1000 units) tablet   Yes No   Sig: Take 1 capsule by mouth every morning    XARELTO ANTICOAGULANT 20 MG TABS tablet   No No   Sig: TAKE 1 TABLET DAILY WITH DINNER   acetaminophen (TYLENOL) 500 MG tablet   Yes No   Sig: Take 500-1,000 mg by mouth every 6 hours as needed for mild pain   albuterol (PROVENTIL) (2.5 MG/3ML) 0.083% neb solution   Yes No   Sig: Take 2.5 mg by nebulization every 2 hours as needed for shortness of breath / dyspnea or wheezing   atorvastatin (LIPITOR) 20 MG tablet   No No   Sig: TAKE 1 TABLET AT BEDTIME   calcium carbonate 600 mg-vitamin D 400 units (CALTRATE) 600-400 MG-UNIT per tablet   Yes No   Sig: Take 1 tablet by mouth every morning    cyanocobalamin (VITAMIN B-12) 500 MCG tablet   Yes No   Sig: Take 500 mcg by mouth every morning    diltiazem ER (DILT-XR) 240 MG 24 hr ER beaded capsule   Yes No   Sig: Take 1 capsule (240 mg) by mouth   docusate sodium (COLACE) 100 MG capsule   Yes No   Sig: Take 1 capsule (100 mg) by mouth 2 times daily   furosemide (LASIX) 40 MG tablet   Yes No   Sig: Take 1 tablet (40 mg) by mouth daily   gabapentin (NEURONTIN) 300 MG capsule   Yes No   Sig: Take 600 mg by mouth every evening    guaiFENesin (MUCINEX) 600 MG 12 hr tablet   No No   Sig: Take 2 tablets (1,200 mg) by mouth 2 times daily   insulin aspart (NOVOLOG PEN) 100 UNIT/ML pen   Yes No   Sig: Correction Scale - MEDIUM INSULIN RESISTANCE DOSING     Do Not give Correction Insulin if Pre-Meal BG less than 140.   For Pre-Meal  - 189 give 1 unit.   For  Pre-Meal  - 239 give 2 units.   For Pre-Meal  - 289 give 3 units.   For Pre-Meal  - 339 give 4 units.   For Pre-Meal - 399 give 5 units.   For Pre-Meal -449 give 6 units  For Pre-Meal BG greater than or equal to 450 give 7 units.   To be given with prandial insulin, and based on pre-meal blood glucose.    Notify provider if glucose greater than or equal to 350 mg/dL after administration of correction dose.   ipratropium - albuterol 0.5 mg/2.5 mg/3 mL (DUONEB) 0.5-2.5 (3) MG/3ML neb solution   No No   Sig: Take 1 vial (3 mLs) by nebulization 4 times daily Once breathing is better, then change to every 6 hours as needed for shortness of breath   melatonin 1 MG TABS tablet   Yes No   Sig: Take 1 tablet (1 mg) by mouth nightly as needed for sleep   metFORMIN (GLUCOPHAGE) 500 MG tablet   No No   Sig: Take 1 tablet (500 mg) by mouth 2 times daily (with meals)   mirtazapine (REMERON) 7.5 MG tablet   No No   Si tab daily for 1 week then 15 mg daily   mirtazapine (REMERON) 7.5 MG tablet   Yes No   Sig: Take 7.5 mg by mouth At Bedtime   omeprazole (PRILOSEC) 20 MG DR capsule   No No   Sig: TAKE 1 CAPSULE DAILY   phenylephrine-shark liver oil-mineral oil-petrolatum (PREPARATION H) 0.25-3-14-71.9 % rectal ointment   Yes No   Sig: Place rectally 4 times daily as needed for hemorrhoids   polyethylene glycol (MIRALAX) 17 GM/Dose powder   Yes No   Sig: Take 17 g by mouth daily as needed   predniSONE (DELTASONE) 10 MG tablet   Yes No   Si tab daily for 2 day then 3 tab daily for 3 day then 2 tab daily for 3 day then 1 tab daily for 3 day and then stop   sodium chloride (NEBUSAL) 3 % neb solution   Yes No   Sig: Take 3 mLs by nebulization 4 times daily   spironolactone (ALDACTONE) 25 MG tablet   No No   Sig: TAKE ONE-HALF (1/2) TABLET DAILY      Facility-Administered Medications: None     Allergies   Allergies   Allergen Reactions     Tramadol Nausea     Tuberculin Tests      Amoxicillin Itching  and Rash     Levofloxacin Itching and Rash     Penicillins Itching and Rash     Has tolerated ceftriaxone.       Physical Exam   Vital Signs: Temp: 98.3  F (36.8  C) Temp src: Oral BP: 122/58 Pulse: 96   Resp: 29 SpO2: 98 % O2 Device: Oxymask Oxygen Delivery: 3 LPM  Weight: 0 lbs 0 oz    Appears stated age, lying in bed, in mild respiratory distress  NC/AT  PERRL, EOMI, anicteric sclera   dry mucous membranes  Neck supple   heart tones are regular  CTAB, difficulty speaking in full sentences  Abd soft, NT, ND, normoactive bowel sounds  Ext without gross deformity, bilateral 2+ pitting edema with painful appearing chronic venous stasis changes   Alert, CN II-XII grossly intact, moves all extremities      Data   Data reviewed today: I reviewed all medications, new labs and imaging results over the last 24 hours.   Recent Labs   Lab 05/05/22  0226 05/04/22  0555 05/02/22  0545 04/29/22  0508   WBC 10.1 10.9  --  14.1*   HGB 11.6*  --   --  12.8   MCV 92  --   --  92     --   --  237   INR 3.26*  --   --   --    * 131* 134* 132*   POTASSIUM 4.8 4.3  --  4.2   CHLORIDE 86* 88*  --  86*   CO2 30 34*  --  35*   BUN 15 14  --  11   CR 0.65 0.61  --  0.48*   ANIONGAP 12 9  --  11   DENISE 8.6 8.8  --  8.5   * 117  --  80   ALBUMIN 2.8*  --   --   --    PROTTOTAL 5.9*  --   --   --    BILITOTAL 0.9  --   --   --    ALKPHOS 58  --   --   --    ALT 29  --   --   --    AST 21  --   --   --      Recent Results (from the past 24 hour(s))   CT Chest Pulmonary Embolism w Contrast    Narrative    EXAM: CT CHEST PULMONARY EMBOLISM W CONTRAST  LOCATION: [facility]  DATE/TIME: 5/5/2022 3:01 AM    INDICATION: Hypoxia, coughing  COMPARISON: 03/23/2022  TECHNIQUE: CT chest pulmonary angiogram during arterial phase injection of IV contrast. Multiplanar reformats and MIP reconstructions were performed. Dose reduction techniques were used.   CONTRAST: [id number] 75ml    FINDINGS:  ANGIOGRAM CHEST:  Pulmonary arteries are normal caliber and negative for pulmonary emboli. Thoracic aorta is not well opacified and is  indeterminate for dissection. No CT evidence of right heart strain.    LUNGS AND PLEURA: Centrilobular and paraseptal emphysematous change. Associated left upper lobe predominance bronchiectasis. New bibasilar airspace disease, left greater than right scattered right upper lobe nodules, largest on image 95 reaching 11 mm,   likely unchanged.    MEDIASTINUM/AXILLAE: AP window borderline enlarged nodes, may be reactive in the setting of infection. Left thyroid nodule redemonstrated. Global cardiomegaly.    CORONARY ARTERY CALCIFICATION: Moderate.    UPPER ABDOMEN: No acute abnormalities.    MUSCULOSKELETAL: No acute bony abnormalities.      Impression    IMPRESSION:  1.  No evidence of pulmonary embolus.  2.  Left lower lobe predominant new airspace disease compatible with pneumonia or aspiration. Follow-up to resolution recommended.  3.  Likely stable right upper lobe pulmonary nodules measuring up to 11 mm in size. Continued short-term follow-up CT versus PET CT recommended per Fleischner Society guidelines.    REFERENCE:  Guidelines for Management of Incidental Pulmonary Nodules Detected on CT Images: From the Fleischner Society 2017.   Guidelines apply to incidental nodules in patients who are 35 years or older.  Guidelines do not apply to lung cancer screening, patients with immunosuppression, or patients with known primary cancer.    Nodule size 6 mm or larger  Low-risk patients: Follow-up CT at 3-6 months, then consider CT at 18-24 months.  High-risk patients: Follow-up CT at 3-6 months, then at 18-24 months if no change.  -Use most suspicious nodule as guide to management.    Consider referral to lung nodule clinic.

## 2022-05-05 NOTE — PHARMACY-VANCOMYCIN DOSING SERVICE
Pharmacy Vancomycin Initial Note  Date of Service May 5, 2022  Patient's  1935  86 year old, female    Indication: Community Acquired Pneumonia    Current estimated CrCl = Estimated Creatinine Clearance: 60.3 mL/min (A) (based on SCr of 0.59 mg/dL (L)).    Creatinine for last 3 days  2022:  5:55 AM Creatinine 0.61 mg/dL  2022:  2:26 AM Creatinine 0.65 mg/dL;  9:30 AM Creatinine 0.59 mg/dL    Recent Vancomycin Level(s) for last 3 days  No results found for requested labs within last 72 hours.      Vancomycin IV Administrations (past 72 hours)      No vancomycin orders with administrations in past 72 hours.                Nephrotoxins and other renal medications (From now, onward)    Start     Dose/Rate Route Frequency Ordered Stop    22 1230  vancomycin 1500 mg in 0.9% NaCl 250 ml intermittent infusion 1,500 mg         1,500 mg  over 90 Minutes Intravenous EVERY 24 HOURS 22 1214      22 0800  furosemide (LASIX) tablet 40 mg         40 mg Oral DAILY 22 0703            Contrast Orders - past 72 hours (72h ago, onward)    Start     Dose/Rate Route Frequency Stop    22 0330  iopamidol (ISOVUE-370) solution 100 mL         100 mL Intravenous ONCE 22 0315          RentJuiceRBrammo Prediction of Planned Initial Vancomycin Regimen  Regimen: 1500 mg IV every 24 hours.  Start time: 12:15 on 2022  Exposure target: AUC24 (range)400-600 mg/L.hr   AUC24,ss: 487 mg/L.hr  Probability of AUC24 > 400: 73 %  Ctrough,ss: 12.9 mg/L  Probability of Ctrough,ss > 20: 15 %  Probability of nephrotoxicity (Lodise ELIUD ): 8 %          Plan:  1. Start vancomycin  1500 mg IV q24h.   2. Vancomycin monitoring method: AUC  3. Vancomycin therapeutic monitoring goal: 400-600 mg*h/L  4. Pharmacy will check vancomycin levels as appropriate in 1-3 Days.    5. Serum creatinine levels will be ordered daily for the first week of therapy and at least twice weekly for subsequent weeks.      Rivas Moy  Pilar, [facility]

## 2022-05-05 NOTE — PHARMACY-ADMISSION MEDICATION HISTORY
Pharmacy Note - Admission Medication History    Pertinent Provider Information: Per facility MAR, pt completed prednisone taper on 5/3 then was prescribed 5 mg daily to start 5/5. Facility MAR listed Breztri inhaler but pt reports switched to Trelegy. Pt is bringing in inhaler so will confirm at that time.      ______________________________________________________________________    Prior To Admission (PTA) med list completed and updated in EMR.       PTA Med List   Medication Sig Note Last Dose     acetaminophen (TYLENOL) 500 MG tablet Take 500-1,000 mg by mouth every 6 hours as needed for mild pain  5/4/2022 at 1800     albuterol (PROAIR HFA/PROVENTIL HFA/VENTOLIN HFA) 108 (90 Base) MCG/ACT inhaler Inhale 2 puffs into the lungs every 4 hours as needed for shortness of breath / dyspnea or wheezing  Unknown at Unknown time     albuterol (PROVENTIL) (2.5 MG/3ML) 0.083% neb solution Take 2.5 mg by nebulization every 2 hours as needed for shortness of breath / dyspnea or wheezing  5/4/2022 at 0435     ARIPiprazole (ABILIFY) 5 MG tablet TAKE 1 TABLET AT BEDTIME  5/4/2022 at 2000     atorvastatin (LIPITOR) 20 MG tablet TAKE 1 TABLET AT BEDTIME  5/4/2022 at 2000     Budeson-Glycopyrrol-Formoterol (BREZTRI AEROSPHERE) 160-9-4.8 MCG/ACT AERO Inhale 2 puffs into the lungs 2 times daily  5/4/2022 at 2000, not bringing     calcium carbonate 600 mg-vitamin D 400 units (CALTRATE) 600-400 MG-UNIT per tablet Take 1 tablet by mouth every morning   5/4/2022 at 0800     cyanocobalamin (VITAMIN B-12) 500 MCG tablet Take 500 mcg by mouth every morning   5/4/2022 at 0800     diltiazem ER (DILT-XR) 240 MG 24 hr ER beaded capsule Take 240 mg by mouth daily  5/4/2022 at AM     docusate sodium (COLACE) 100 MG capsule Take 1 capsule (100 mg) by mouth 2 times daily  5/4/2022 at 2000     furosemide (LASIX) 40 MG tablet Take 1 tablet (40 mg) by mouth daily  5/4/2022 at 0800     gabapentin (NEURONTIN) 300 MG capsule Take 600 mg by mouth every  evening 4PM  5/4/2022 at 1600     guaiFENesin (MUCINEX) 600 MG 12 hr tablet Take 2 tablets (1,200 mg) by mouth 2 times daily (Patient taking differently: Take 600 mg by mouth 2 times daily)  5/4/2022 at 2000     insulin aspart (NOVOLOG PEN) 100 UNIT/ML pen Inject Subcutaneous 3 times daily (with meals) Correction Scale - MEDIUM INSULIN RESISTANCE DOSING     Do Not give Correction Insulin if Pre-Meal BG less than 140.   For Pre-Meal  - 189 give 1 unit.   For Pre-Meal  - 239 give 2 units.   For Pre-Meal  - 289 give 3 units.   For Pre-Meal  - 339 give 4 units.   For Pre-Meal - 399 give 5 units.   For Pre-Meal -449 give 6 units  For Pre-Meal BG greater than or equal to 450 give 7 units.   To be given with prandial insulin, and based on pre-meal blood glucose.    Notify provider if glucose greater than or equal to 350 mg/dL after administration of correction dose.  5/4/2022 at 1700     ipratropium - albuterol 0.5 mg/2.5 mg/3 mL (DUONEB) 0.5-2.5 (3) MG/3ML neb solution Take 1 vial (3 mLs) by nebulization 4 times daily Once breathing is better, then change to every 6 hours as needed for shortness of breath  5/4/2022 at 2000     LORazepam (ATIVAN) 0.5 MG tablet Take 0.5 tablets (0.25 mg) by mouth every 6 hours as needed for anxiety Hold with sedation  5/2/2022     melatonin 1 MG TABS tablet Take 1 tablet (1 mg) by mouth nightly as needed for sleep  Unknown at Unknown time     menthol-zinc oxide (CALMOSEPTINE) 0.44-20.6 % OINT ointment Apply topically 2 times daily Barrier cream. Apply to buttocks.  5/4/2022 at 2000     metFORMIN (GLUCOPHAGE) 500 MG tablet Take 1 tablet (500 mg) by mouth 2 times daily (with meals)  5/4/2022 at 1700     mirtazapine (REMERON) 7.5 MG tablet 1 tab daily for 1 week then 15 mg daily (Patient taking differently: Take 15 mg by mouth At Bedtime 1 tab daily for 1 week then 15 mg daily)  5/4/2022 at 2000     omeprazole (PRILOSEC) 20 MG DR capsule TAKE 1 CAPSULE DAILY   5/4/2022 at 0700     phenylephrine-shark liver oil-mineral oil-petrolatum (PREPARATION H) 0.25-3-14-71.9 % rectal ointment Place rectally 4 times daily as needed for hemorrhoids  Unknown at Unknown time     polyethylene glycol (MIRALAX) 17 GM/Dose powder Take 17 g by mouth daily as needed  5/1/2022     potassium chloride ER (KLOR-CON M) 10 MEQ CR tablet Take 10 mEq by mouth daily  5/4/2022 at 0800     predniSONE (DELTASONE) 5 MG tablet Take 5 mg by mouth daily 5/5/2022: New Rx to start 5/5. Completed prednisone taper 5/3/22.  new to start 5/5     QUEtiapine (SEROQUEL) 25 MG tablet Take 0.5 tablets (12.5 mg) by mouth 2 times daily Hold with sedation  5/4/2022 at 2000     sodium chloride (NEBUSAL) 3 % neb solution Take 3 mLs by nebulization 4 times daily  5/4/2022 at 2000     spironolactone (ALDACTONE) 25 MG tablet TAKE ONE-HALF (1/2) TABLET DAILY  5/4/2022 at 0800     Vitamin D3 (CHOLECALCIFEROL) 25 mcg (1000 units) tablet Take 1 capsule by mouth every morning   5/4/2022 at 0800     XARELTO ANTICOAGULANT 20 MG TABS tablet TAKE 1 TABLET DAILY WITH DINNER  5/4/2022 at 1700       Information source(s): Facility ([facility]/) medication list/MAR  Method of interview communication: N/A    Summary of Changes to PTA Med List  New: Potassium, albuterol inhaler, Trelegy  Discontinued: Breztri inhaler  Changed: prednisone dose    Patient was asked about OTC/herbal products specifically.  PTA med list reflects this.    In the past week, patient estimated taking medication this percent of the time:  greater than 90%.    Allergies were reviewed, assessed, and updated with the patient.      Medications available for use during hospital stay: albuterol, Trelegy.     The information provided in this note is only as accurate as the sources available at the time of the update(s).    Thank you for the opportunity to participate in the care of this patient.    Rivas Quezada [facility]  5/5/2022 7:54 AM

## 2022-05-06 NOTE — PROGRESS NOTES
"   05/06/22 1025   Quick Adds   Quick Adds Edema   General Information   Onset of Illness/Injury or Date of Surgery 05/05/22   Referring Physician SHYAM Jones   Edema General Information   Onset of Edema   (\"5-6 years, ever since my back surgery\")   Affected Body Part(s) Left LE;Right LE   Edema Etiology Other (see comments)  (CHF, PAD)   Etiology Comments hx of cellulitis, per MD no concern for infection   General Comments/Previous Edema Treatment/Edema Equipment pt reports swelling comes and goes. came from TCU after d/c [facility], reports did not have lymph at TCU   Edema Examination/Assessment   Skin Condition Pitting;Temperature;Hemosiderin deposits   Skin Condition Comments discoloration over bilat shins, redness and warm to touch L lower leg, MD present- no concern, no wounds, skin intact   Scar No   Ulcerations No   Skin Integrity intct   Pitting Assessment trace/1+ pitting over R ankle, medial side of calf; 2+ pitting throughout L lower leg/foot. L>R for swelling   Edema Assessment Comments tender/painful to touch at times   Clinical Impression   Criteria for Skilled Therapeutic Interventions Met (OT) Yes, treatment indicated   Edema: Patient Presentation Edema   Influenced by the following impairments PAD, CHF   Edema: Planned Interventions Gradient compression bandaging;Edema exercises;Precautions to prevent infection/exacerbation;Education   Clinical Decision Making Complexity (OT) moderate complexity   Risk & Benefits of therapy have been explained evaluation/treatment results reviewed;patient   OT Discharge Planning   OT Rationale for DC Rec lymph: home lymph   Total Evaluation Time (Minutes)   Total Evaluation Time (Minutes) 10   OT Goals   Therapy Frequency (OT) Daily   OT Predicted Duration/Target Date for Goal Attainment 05/13/22   OT Goals Edema   OT: Edema education to increase ability to manage edema after discharge from the hospital Patient;Verbalize;Jewell;signs/symptoms of intolerance   OT: " Management of edema bandages Patient;Verbalize;Max assist;quick wrap   Alanis Lehman, OTR/L

## 2022-05-06 NOTE — PLAN OF CARE
Pt arrived to the unit around 1400. She ate a small amount for supper.    Her O2 will drop to 87 on 6L of O2 when ambulating in the room.  She otherwise maintains above 90L on 6L when sitting.      Problem: Plan of Care - These are the overarching goals to be used throughout the patient stay.    Goal: Plan of Care Review/Shift Note  Description: The Plan of Care Review/Shift note should be completed every shift.  The Outcome Evaluation is a brief statement about your assessment that the patient is improving, declining, or no change.  This information will be displayed automatically on your shift note.  Outcome: Ongoing, Progressing     Problem: Gas Exchange Impaired  Goal: Optimal Gas Exchange  Outcome: Ongoing, Progressing  Intervention: Optimize Oxygenation and Ventilation  Recent Flowsheet Documentation  Taken 5/6/2022 1400 by Kristy Esparza, RN  Head of Bed (HOB) Positioning: HOB at 20-30 degrees       Goal Outcome Evaluation:      Continue to monitor VS, labs, pain level, respiratory status and activity tolerance.

## 2022-05-06 NOTE — PROGRESS NOTES
[facility]    Referral received  from Prasanna Chambers RN, for home IV antibiotics.    Benefits verified. Pt does not have coverage for IV antibiotics in the home under her [employer] Medicare plan.  Patient would be self-pay.  The drug would be billed to the Part D and supplies would be self-pay.  Based on Vancomycin 1500 mg v q 24 hours and Ertapenem 1g IV q 24 hr, the total cost is $64.50 for the drug and supplies.     Once its determined pt will discharge to home on IV abx, writer will speak with pt to review benefits, home infusion and to offer choice of agency/home infusion provider.    Thank you for the referral.    Peace Jean RN, BSN  [facility] Liaison  [phone] (Mon through Fri, 8:00 am-5:00 pm)  [phone] (Office)

## 2022-05-06 NOTE — PROGRESS NOTES
/58 (BP Location: Right arm)   Pulse 94   Temp 97.4  F (36.3  C) (Oral)   Resp 18   SpO2 93%     Patient is on 6L NC. Breath sounds are clear to wheezy. Duonebs 4 times a day, sodium chloride 4 times a day. Pt.'s  will bring in home vest. Rt continue to follow.     Miriam Peterson, RT

## 2022-05-06 NOTE — CONSULTS
"[facility] Pulmonary/Critical Care Consult Team Note    Adalgisa Solano,  1935, MRN [id number]  Admitting Dx: Hypoxia [R09.02]  Date / Time of Admission:  2022  2:11 AM    Overnight Events:  Intake/Output last 3 shifts:  I/O last 3 completed shifts:  In: 900 [P.O.:900]  Out: 900 [Urine:900]  Up to 6L NC (normally wears 2L)  She has increased cough and dyspnea  She does not have fevers  She sees Dr. Juarez in pulm clinic      Assessment/Plan: Adalgisa Solano is a 86 year old female with PMHx of COPD on home O2, bronchiectasis, atrial fibrillation, anticoagulated, HFpEF, hiatal hernia, GERD who presents with      1. Acute on chronic respiratory failure   2. COPD exacerbation   3. Bronchiectasis exacerbation   4. Atrial fibrillation, rate control anticoagulated  5. HTN, HFpEF  6. Hiatal hernia , GERD  7. Depression    Plan:   5. Titrate FiO2, keep SpO2 > 90%  6. Schedule bronchodilators albuterol nebs, continue saline nebs twice a day   7. Taper systemic steroids as ordered  8. Abx per ID recommendation  9. PPI for GI prophylaxis     Risk Factors Present on Admission:  Clinically Significant Risk Factors Present on Admission               # Overweight: Estimated body mass index is 26.17 kg/m  as calculated from the following:    Height as of an earlier encounter on 22: 1.549 m (5' 1\").    Weight as of an earlier encounter on 22: 62.8 kg (138 lb 8 oz).  Code Status: No CPR- Do NOT Intubate         Padmini Voss DO  Pulmonary and Critical Care Attending  pgr [phone]    Allergies   Allergen Reactions     Tramadol Nausea     Tuberculin Tests      Amoxicillin Itching and Rash     Levofloxacin Itching and Rash     Penicillins Itching and Rash     Has tolerated ceftriaxone.       Meds: See MAR    Physical Exam:  /65   Pulse 99   Temp 97.8  F (36.6  C) (Oral)   Resp 25   SpO2 95%   Intake/Output this shift:  No intake/output data recorded.  GEN: sitting up in a chair, NAD  HEENT: " MMM  CVS: regular rhythm, no murmurs  RESP: bilateral rhonchi, no wheezing  ABD: Soft, No abdominal pain with palpation, no guarding, no rigidity  EXT: Warm, well perfused, + edema with wraps on her legs  NEURO:  Moving all extremities  PSYCH: pleasant    Pertinent Labs: Latest lab results in EHR personally reviewed.   CMP  Recent Labs   Lab 05/06/22  0914 05/06/22  0817 05/06/22  0413 05/06/22  0004 05/05/22  1959 05/05/22  1307 05/05/22  0930 05/05/22  0835 05/05/22  0226 05/04/22  0555   NA  --  132*  --   --   --   --  130*  --  128* 131*   POTASSIUM  --   --   --   --   --   --  4.9  --  4.8 4.3   CHLORIDE  --   --   --   --   --   --  88*  --  86* 88*   CO2  --   --   --   --   --   --  30  --  30 34*   ANIONGAP  --   --   --   --   --   --  12  --  12 9   *  --  235* 208* 232*   < > 168*   < > 137* 117   BUN  --   --   --   --   --   --  13  --  15 14   CR  --   --   --   --   --   --  0.59*  --  0.65 0.61   GFRESTIMATED  --   --   --   --   --   --  88  --  86 88   DENISE  --   --   --   --   --   --  8.7  --  8.6 8.8   MAG  --   --   --   --   --   --  1.3*  --   --   --    PROTTOTAL  --   --   --   --   --   --  6.1  --  5.9*  --    ALBUMIN  --   --   --   --   --   --  2.9*  --  2.8*  --    BILITOTAL  --   --   --   --   --   --  0.9  --  0.9  --    ALKPHOS  --   --   --   --   --   --  58  --  58  --    AST  --   --   --   --   --   --  19  --  21  --    ALT  --   --   --   --   --   --  24  --  29  --     < > = values in this interval not displayed.     CBC  Recent Labs   Lab 05/05/22  0930 05/05/22 0226 05/04/22  0555   WBC 8.4 10.1 10.9   RBC 4.08 3.97  --    HGB 11.9 11.6*  --    HCT 37.3 36.5  --    MCV 91 92  --    MCH 29.2 29.2  --    MCHC 31.9 31.8  --    RDW 15.6* 15.7*  --     208  --      INR  Recent Labs   Lab 05/05/22 0226   INR 3.26*     Arterial Blood GasNo lab results found in last 7 days.    Cultures: not yet available.    Imaging: personally reviewed.   Results for orders  placed or performed during the hospital encounter of 05/05/22   CT Chest Pulmonary Embolism w Contrast    Narrative    EXAM: CT CHEST PULMONARY EMBOLISM W CONTRAST  LOCATION: [facility]  DATE/TIME: 5/5/2022 3:01 AM    INDICATION: Hypoxia, coughing  COMPARISON: 03/23/2022  TECHNIQUE: CT chest pulmonary angiogram during arterial phase injection of IV contrast. Multiplanar reformats and MIP reconstructions were performed. Dose reduction techniques were used.   CONTRAST: [id number] 75ml    FINDINGS:  ANGIOGRAM CHEST: Pulmonary arteries are normal caliber and negative for pulmonary emboli. Thoracic aorta is not well opacified and is  indeterminate for dissection. No CT evidence of right heart strain.    LUNGS AND PLEURA: Centrilobular and paraseptal emphysematous change. Associated left upper lobe predominance bronchiectasis. New bibasilar airspace disease, left greater than right scattered right upper lobe nodules, largest on image 95 reaching 11 mm,   likely unchanged.    MEDIASTINUM/AXILLAE: AP window borderline enlarged nodes, may be reactive in the setting of infection. Left thyroid nodule redemonstrated. Global cardiomegaly.    CORONARY ARTERY CALCIFICATION: Moderate.    UPPER ABDOMEN: No acute abnormalities.    MUSCULOSKELETAL: No acute bony abnormalities.      Impression    IMPRESSION:  1.  No evidence of pulmonary embolus.  2.  Left lower lobe predominant new airspace disease compatible with pneumonia or aspiration. Follow-up to resolution recommended.  3.  Likely stable right upper lobe pulmonary nodules measuring up to 11 mm in size. Continued short-term follow-up CT versus PET CT recommended per Fleischner Society guidelines.    REFERENCE:  Guidelines for Management of Incidental Pulmonary Nodules Detected on CT Images: From the Fleischner Society 2017.   Guidelines apply to incidental nodules in patients who are 35 years or older.  Guidelines do not apply to lung cancer screening,  patients with immunosuppression, or patients with known primary cancer.    Nodule size 6 mm or larger  Low-risk patients: Follow-up CT at 3-6 months, then consider CT at 18-24 months.  High-risk patients: Follow-up CT at 3-6 months, then at 18-24 months if no change.  -Use most suspicious nodule as guide to management.    Consider referral to lung nodule clinic.       Patient Active Problem List   Diagnosis     Recurrent major depressive episodes (H)     Chronic obstructive pulmonary disease (H)     Peripheral arterial disease (H)     Type 2 diabetes mellitus without complication (H)     Hypertension     Schizoaffective disorder, bipolar type (H)     Arnold-Chiari malformation (H)     Chronic respiratory failure with hypoxia (H)     Pulmonary hypertension (H)     Primary osteoarthritis of both knees     Chronic atrial fibrillation (H)     Gastroesophageal reflux disease, esophagitis presence not specified     Stasis dermatitis of both legs     Generalized muscle weakness     Drug-induced constipation     Chronic pain syndrome     Slow transit constipation     Cellulitis of left lower extremity     Bronchiectasis without complication (H)     Traumatic hematoma     Dyslipidemia     Hypomagnesemia     Osteoporosis     Hyponatremia     Dyspnea, unspecified type     Chronic heart failure with preserved ejection fraction (H)     Anticoagulated     COPD with acute exacerbation (H)     Hospital-acquired pneumonia     Hypoxia     COPD exacerbation (H)     Pneumonia of left lower lobe due to infectious organism               Past Medical History:      Past Medical History        Past Medical History:   Diagnosis Date     Acute and chronic respiratory failure with hypoxia (H)       Acute blood loss anemia 1/15/2019     Acute bronchitis 1/15/2019     Anemia       pernicious anemia     Anticoagulated 3/23/2022     Anxiety       Arm skin lesion, right       Arnold-Chiari malformation (H) 1998     Arthritis       Atrial  fibrillation (H) 2017     Avascular necrosis of bone (H)       Breast cyst       Breast lump       Candidal skin infection       Cellulitis of left lower extremity 2019     CHF (congestive heart failure) (H)       diastolic and systolic     Chronic bronchitis (H)       & acute     Chronic diastolic heart failure (H)       diastolic chf     Chronic pain syndrome       Chronic respiratory failure with hypoxia (H) 3/13/2017     COPD (chronic obstructive pulmonary disease) (H)       COPD exacerbation (H)       Depression       Diet-controlled type 2 diabetes mellitus (H)       Drug induced constipation       DVT of axillary vein, acute (H)       left     DVT of axillary vein, acute left (H)       Edema       Encounter for palliative care       Erysipelas       Fracture of femoral neck, left (H)       Generalized muscle weakness       GERD (gastroesophageal reflux disease)       History of blood clots       Hyperlipidemia       Hypertension       Left hip pain       Lumbar spinal stenosis       PAD (peripheral artery disease) (H)       Peripheral arterial disease (H) 10/28/2015     Psoriasis       arms      Pulmonary hypertension (H) 3/5/2018     Recurrent major depressive episodes (H)       Created by Conversion  Replacement Utility updated for latest IMO load     Schizoaffective disorder, bipolar type (H) 2015     Slow transit constipation       Stasis dermatitis of both legs       Status post total hip replacement, 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     Type 2 diabetes mellitus without complication (H) 2016     Vitamin B12 deficiency       Volume overload                Past Surgical History:       Past Surgical History         Past Surgical History:   Procedure Laterality Date     BACK SURGERY         BREAST CYST ASPIRATION Left      CERVICAL SPINE SURGERY         for arnold chiari malformation      SECTION   X3     CHOLECYSTECTOMY         COLONOSCOPY         EXCISE LESION UPPER EXTREMITY Right 2019      Procedure: EXCISION RIGHT ARM LIPOMA;  Surgeon: Andreas Holly MD;  Location: [facility] OR;  Service: General     EYE SURGERY         bilateral cataracts     HAND SURGERY         HERNIORRHAPHY UMBILICAL N/A 9/21/2021     Procedure: INCARCERATED UMBILICAL AND;  Surgeon: Andreas Holly MD;  [place] [facility]     JOINT REPLACEMENT Right       knee - partial     LAPAROSCOPIC HERNIORRHAPHY INCISIONAL Left 3/27/2015     Procedure: ATTEMPTED LAPAROSCOPIC ABDOMINA/FLANK INCISION REPAIR;  Surgeon: Jose Lakhani MD;  Location: [facility] OR;  Service:      LUMBAR FUSION N/A 11/17/2014     Procedure: POSTERIOR FUSION / DECOMPRESSION L3-S1 WITH PELVIC FIXATION BILATERAL ;  Surgeon: Rajan Mares MD;  Location: [facility] OR;  Service:      OTHER SURGICAL HISTORY         IR for PAD LOWER EXTREMITY     REPAIR SPIGELIAN HERNIA N/A 9/21/2021     Procedure: SPIGELIAN HERNIA REPAIR;  Surgeon: Andreas Holly MD;  [place] [facility]     US ASPIRATION HEMATOMA SEROMA OR FLUID COLLECTION   2/6/2020     [facility] OPEN FIXATN PROX END/NECK FEMUR FX Left 1/2/2019     Procedure: OPEN REDUCTION INTERNAL FIXATION, FRACTURE LEFT HIP, PERIPROSTHETIC FRACTURE;  Surgeon: Kevin Lackey MD;  Location: [facility] OR;  Service: Orthopedics     [facility] TOTAL HIP ARTHROPLASTY Left 1/2/2019     Procedure: LEFT TOTAL HIP ARTHROPLASTY;  Surgeon: Kevin Lackey MD;  Location: [facility] OR;  Service: Orthopedics     [facility] TOTAL HIP ARTHROPLASTY Right 9/16/2019     Procedure: RIGHT TOTAL HIP ARTHROPLASTY;  Surgeon: Kan Quesada MD;  Location: [facility] OR;  Service: Orthopedics     [facility] TOTAL KNEE ARTHROPLASTY Left 10/7/2016     Procedure: TOTAL KNEE ARTHROPLASTY, LEFT;  Surgeon: Kan Quesada MD;  Location: [facility] OR;  Service: Orthopedics              Social History:      Social History            Tobacco Use     Smoking status: Former Smoker       Quit date: 1/1/2006       Years since  quittin.3     Smokeless tobacco: Never Used     Tobacco comment: quit 2006   Substance Use Topics     Alcohol use: No           Family History:      Family History         Family History   Problem Relation Age of Onset     Coronary Artery Disease Mother       Coronary Artery Disease Father               Padmini Voss DO  Pulmonary and Critical Care Attending  pgr [phone]

## 2022-05-06 NOTE — PROGRESS NOTES
Resident/Fellow Attestation   I, Win Awan, was present with the medical student who participated in the service and in the documentation of the note.  I have verified the history and personally performed the physical exam and medical decision making.  I agree with the assessment and plan of care as documented in the note.      Date of Service (when I saw the patient): 05/06/22    [facility]    Progress Note - Hospitalist Service       Date of Admission:  5/5/2022    Assessment & Plan   Adalgisa Solano is a 86 year old female admitted on 5/5/2022. She has a history of pulmonary disease including COPD with persistent bronchiectasis getting vest therapy at home for the last 2 years, non-insulin-dependent diabetes, chronic A. fib, chronic heart failure with preserved ejection fraction who has had several recent hospitalizations for COPD exacerbation, most recently complicated by Pseudomonas and Enterococcus pneumonia  and is admitted for acute on chronic hypoxic and hypercarbic respiratory failure and a new left lower lobe opacity on chest CT. Remains vitally stable on 6l NC.    Acute on chronic hypoxic and hypercarbic respiratory failure  Recent right lower lobe Pseudomonas and Enterococcus UTI  New left lower lobe opacity  -IV ertapenem and vancomycin  -ID consult for assistance with antibiotic coverage  -Pulmonology consult  -Supplemental oxygen maintain SPO2 above 90%  -Continue scheduled bronchodilators, albuterol nebs, saline nebs twice a day  -Titrate blood pressure medications and diuresis  -Systemic steroids- Taper starting 5/6 (prednisone 40mg x3, 20mg x3, 10mg x3)  -Mucomyst  -Consider speech therapy     COPD exacerbation  Bronchiectasis  CT chest on admission notable for known left upper lobe predominance bronchiectasis. She has been on a steroid taper (5 mg) prior to admission and does vest therapy at home. Received IV steroids in the ED.  -Vest therapy  -RT consult,  appreciate recs  -Systemic steroids- Restarted taper 5/6 (prednisone 40mg x3, 20mg x3, 10mg x3)  -DuoNebs and albuterol nebulizer as needed  - Mucomyst  -PTA Trelegy Ellipta  - DuoNeb 4 times daily  -PPI for GI prophylaxis     Chronic diastolic CHF  Bilateral lymphedema  Pulmonary hypertension  Bilateral lower extremity edema, however, BNP normal at 100.  CT chest shows no acute pulmonary vascular congestion. Last echocardiogram in 4/2022 with an EF of 65%, with moderate mitral and tricuspid insufficiency. Home regimen includes Lasix (40mg daily) and Spironolactone (12.5 mg daily).  -Continue Lasix and spironolactone for respiratory optimization  -OT lymphedema consult  -Consider pulmonology  - PT lymphedema wraps    Hyponatremia  Sodium of 128 on admission. Suspect SIADH in setting of bacterial pneumonia. Improving with fluid restriction and resumption of normal diet.  - Fluid restriction  - Resume normal diet     Atrial fibrillation  HTN  On Xarelto 20 mg daily and diltiazem 240 mg daily prior to admission. 24 hour BP readings: Min-105/56, Max- 138/70.  - Continue diltiazem 240 mg daily   -Continue daily Xarelto 20 mg     Anxiety disorder  Continue prior recommendations from palliative team.  -Continue Ativan 0.5 mg every 6 hours as needed  -Continue mirtazapine, Seroquel, Abilify     T2DM  At risk for steroid-induced hyperglycemia  Last Hgb A1C (3/21/22)- 6.2, suggesting well controlled diabetes. On metformin (500mg daily) and Novolog prior to admission. Blood sugars elevated to 300s since admission.  -4 times daily and AC glucose checks  -Change from low SSI to medium sliding scale insulin for correction  -Holding PTA metformin     Constipation  Has not had bowel movement in 4 days prior to admission.   -Bowel regimen     Chronic pain syndrome-chronic mid to low back pain  -Continue Tylenol as needed  -Continue gabapentin 600 mg at bedtime  -Use opiates sparingly for pain  -Avoid NSAIDs as on  "anticoagulant    Magnesium deficiency  Mg low at 1.3.   - Mg replacement    Tinea pedis (right foot)  - Clotrimazole cream    Herpes Zoster, (left buttock)  Incontinence Associated Dermatitis (IAD), sacrum and perianal skin  - Wound consult, appreciate rec   - Perineal skin care protocol  - Valtrex 1000mg TID     Diet: Regular Diet Adult  Fluid restriction 1200 ML FLUID    DVT Prophylaxis: PTA Xarelto  Borjas Catheter: Not present  Fluids: P.o.  Central Lines: None  Cardiac Monitoring: None  Code Status: No CPR- Do NOT Intubate      Disposition Plan   Expected Discharge: 05/09/2022     Anticipated discharge location: home with help/services Pending clinical improvement, ID consult  Delays: Improvement in respiratory status     The patient's care was discussed with the Attending Physician, Dr. Jones.    KIET HOFFMANN  Medical Student  Hospitalist Service  [facility]  Securely message with the Vocera Web Console (learn more here)  Text page via [employer] Paging/Po Awan MD PGY1 May 6, 2022   [facility]  Call the senior pager with any questions or concerns, 24/7: [phone]  ___    Interval History     Patient remained on 6L nasal cannula overnight.    This morning she is comfortable with good O2 saturation on 6L NC. She is feeling \"angry\" this morning with everything going on. She says her breathing feels okay but that she \"blocks it out\". She still feels like she has stuff in her lungs, but is bringing more mucous up. Her  brout her repsiratory therapy vest today. She is concerned about the fungus in her toenails.      Data reviewed today: I reviewed all medications, new labs and imaging results over the last 24 hours. I personally reviewed no images or EKG's today.    Physical Exam   Vital Signs: Temp: 97.8  F (36.6  C) Temp src: Oral BP: 132/65 Pulse: 107   Resp: 24 SpO2: 95 % O2 Device: Nasal cannula " Oxygen Delivery: 6 LPM  Weight: 0 lbs 0 oz  Constitutional: awake, alert, cooperative  Respiratory: mild discomfort with cough/breathing, course lung sounds throughout all lung fields, although improve from previously, good air movement throuout  Cardiovascular: Normal apical impulse, regular rate and rhythm, normal S1 and S2, no S3 or S4, and no murmur noted  Skin: 6x6mm annular lesion at base between 1st and 2nd right toes  Extremities: Lower extremities edema, worse on the left. Notable hyperpigmentation on left ankle.  Does not appear to have cellulitis based on exam.    Data   Recent Labs   Lab 05/06/22  0914 05/06/22  0817 05/06/22  0413 05/06/22  0004 05/05/22  1307 05/05/22  0930 05/05/22  0835 05/05/22  0226 05/04/22  0555   WBC  --   --   --   --   --  8.4  --  10.1 10.9   HGB  --   --   --   --   --  11.9  --  11.6*  --    MCV  --   --   --   --   --  91  --  92  --    PLT  --   --   --   --   --  184  --  208  --    INR  --   --   --   --   --   --   --  3.26*  --    NA  --  132*  --   --   --  130*  --  128* 131*   POTASSIUM  --   --   --   --   --  4.9  --  4.8 4.3   CHLORIDE  --   --   --   --   --  88*  --  86* 88*   CO2  --   --   --   --   --  30  --  30 34*   BUN  --   --   --   --   --  13  --  15 14   CR  --   --   --   --   --  0.59*  --  0.65 0.61   ANIONGAP  --   --   --   --   --  12  --  12 9   DENISE  --   --   --   --   --  8.7  --  8.6 8.8   *  --  235* 208*   < > 168*   < > 137* 117   ALBUMIN  --   --   --   --   --  2.9*  --  2.8*  --    PROTTOTAL  --   --   --   --   --  6.1  --  5.9*  --    BILITOTAL  --   --   --   --   --  0.9  --  0.9  --    ALKPHOS  --   --   --   --   --  58  --  58  --    ALT  --   --   --   --   --  24  --  29  --    AST  --   --   --   --   --  19  --  21  --     < > = values in this interval not displayed.

## 2022-05-06 NOTE — PROGRESS NOTES
Pt does not have coverage for iv abx in the home with their [employer] Medicare plan. Pt would be self-pay. Drug would be billed to the part D and supplies will be self-pay. Based on Vanco 1500mg q24h and Ertapenem 1g q24h, total cost is $64.50 for drug and supplies per day.     Nursing is only covered if patient is homebound if not cost is $90.00 per visit if [facility] bills for it. Patient should have coverage in a TCU or infusion center. Let us know how patient would like to proceed.              Please contact Intake with any questions, [phone] or In Basket pool,  Home Infusion ([phone]).

## 2022-05-06 NOTE — CONSULTS
Infectious Disease Consultation:  Requesting MD:  Reason for consult:      HISTORY:  Pt seen by us 3 wks ago for COPD exac/bronchietasis/decline admitted now for COPDE/bronchiectasis/decline.  We gave cefepime for PA in sputum until April 23.  We had concern she could have an NTM infection and she is the typical host for them, but AFB tests were all negative.  She has mild L buttock and post thigh shingles on exam today.  She is on 6L, uses 2L at home.          Pertinent past history, past infectious disease history:  See April consult, reviewed no additions    Medications:  Reviewed prior to admission meds as applicable in chart review.  Current meds are reviewed in the EMR listed MAR.     ANTIBIOTICS:    Current:vanco, valtrex, erta   Prior:cefepime   Allergy to:    SH/FH and  travel history(if applicable to consult):  See April consult reviewed no additions  REVIEW OF SYSTEMS:  All other systems negative    EXAMINATION:  /70 (BP Location: Right arm)   Pulse 105   Temp 97.9  F (36.6  C) (Oral)   Resp 22   SpO2 96%   Alert, awake  Vitals tabulated above, reviewed  HEENT:nl blue bloater physique  Neck supple without lymphadenopathy  Sclera clear  CARDIOVASCULAR regular rate and rhythm, no murmur  Lungs some wheeze  Abdomen soft, NT/ND, absent HEPATOSPLENOMEGALY  Skin normal  Joints normal  Neurologic exam non focal  Wound:  NA        CLINICAL DATABASE FOR---LAB/MICRO/CULTURES/IMAGING STUDIES:  IMPRESSION:  CRP 12   and was low at care facility see notes  Which count 8.4   Latest Reference Range & Units 05/05/22 09:30   Procalcitonin 0.00 - 0.49 ng/mL 0.07 [1]     April 13 sputum PA and enterococcus      CT lung may 5  IMPRESSION:  1.  No evidence of pulmonary embolus.  2.  Left lower lobe predominant new airspace disease compatible with pneumonia or aspiration. Follow-up to resolution recommended.  3.  Likely stable right upper lobe pulmonary nodules measuring up to 11 mm in size. Continued  short-term follow-up CT versus PET CT recommended per Fleischner Society guidelines.       IMP:  COPD exacerbation, doubt pna based on proC, labs clinical picture etc  Very mild shingles    PLAN:  On abx--utility likely low however  Stop vanco  Suspect in lady this size valtrex dose is too high, consider halving it.    Broach more palliative approach with pt?        BRAXTON STEWART MD  [facility]  Office [phone]

## 2022-05-06 NOTE — ED NOTES
RT assessed pt and did Duoneb and Sodium Chloride via mask which she tolerated well. Strong cough afterwards and able to cough up secretions on her own.She did bring her own aerobika which she did, and states her  is bringing her vest in tomorrow when she gets a hospital room.   Breath sounds are coarse, she is on 6L via NC.   Rt will continue to monitor    Magali Cifuentes RT

## 2022-05-06 NOTE — PROGRESS NOTES
Care Management Follow Up    Length of Stay (days): 1    Expected Discharge Date: 05/09/2022  Concerns to be Addressed: care coordination/care conferences, discharge planning, home safety, medication, other (see comments) (Possible Home IV Abx pt.)  Further assessment needed.  Patient plan of care discussed at interdisciplinary rounds: Yes  Anticipated Discharge Disposition: Home Care  Disposition Comments: Currently accepted for PT, OT, and RN coverage for home care if not needing IV Abx at d/c.  Anticipated Discharge Services: Other (see comment) (Home PT, OT, RN, and possible IV Abx Inf.)  Anticipated Discharge DME: Other (see comment) (Pt currently owns all required DME.)  Patient/family educated on Medicare website which has current facility and service quality ratings: yes (Declined.)  Education Provided on the Discharge Plan:  Hospitalists updated on pt's intent to return home at discharge rather than back to TCU.  Patient/Family in Agreement with the Plan: yes  Referrals Placed by CM/SW: Homecare (Referrals sent.)  Private pay costs discussed: N/A    Additional Information:  [facility] has stated ability to accept the pt for PT, OT, and RN services starting 5/9. They CANNOT accept pt if she needs Home IV Abx therapy as they do not provide that service under licensure. [facility] called and stated pt does not have Home IV Abx coverage and it would be $64.50 per day for drug/equipment and $90 per RN visit if not on home bound status. Infectious Disease Consult is still pending.    Prasanna Chambers RN

## 2022-05-06 NOTE — PROGRESS NOTES
A&O, no c/o pain. On 6L NC. Frequent cough, exp wheezes, course LS. Afib rate 90-100s. Sacral scattered blanchable redness/ rash, JOSSELIN. SBA w walker. Plan to discharge back to TCU. Transfer to  @1310, report given to Kristy.

## 2022-05-06 NOTE — CONSULTS
Care Management Initial Consult    General Information  Assessment completed with: Spouse or significant other, Patient,    Type of CM/SW Visit: Initial Assessment  Primary Care Provider verified and updated as needed: Yes   Readmission within the last 30 days: previous discharge plan unsuccessful   Reason for Consult: discharge planning, transportation, health care directive  Advance Care Planning: Advance Care Planning Reviewed: questions answered  Documents requested.  stated he will provide at his earliest convenience.     Communication Assessment  Patient's communication style: spoken language (English or Bilingual)        Cognitive  Cognitive/Neuro/Behavioral: WDL                      Living Environment:   People in home: spouse     Current living Arrangements: other (see comments) (Pt was admitted from [facility] level of care.)  [facility].   Able to return to prior arrangements: other (see comments) (TCU bed is not currently being held.)     Family/Social Support:  Care provided by: spouse/significant other, other (see comments) (TCU staff.)  Provides care for: no one, unable/limited ability to care for self  Marital Status:     Anabel JACINTO       Description of Support System: Supportive, Involved    Support Assessment: Other (see comments) ( is supportive as needed/able. Pt will need supportive services initiated for a d/c to home rather than TCU.)    Current Resources:   Patient receiving home care services: No  Community Resources: None  Equipment currently used at home: cane, straight, walker, rolling  Supplies currently used at home: Oxygen Tubing/Supplies, Other (O2 managed by [facility]. No concerns expressed with current O2 therapy/equipment.)    Employment/Financial:  Employment Status: retired     Financial/Environmental Concerns: other (see comments) (Pt wishes to return home rather than a return to TCU upon d/c.)   Referral to  "Financial Worker: No     Lifestyle & Psychosocial Needs:  Social Determinants of Health     Tobacco Use: Medium Risk     Smoking Tobacco Use: Former Smoker     Smokeless Tobacco Use: Never Used   Alcohol Use: Not on file   Financial Resource Strain: Not on file   Food Insecurity: Not on file   Transportation Needs: Not on file   Physical Activity: Not on file   Stress: Not on file   Social Connections: Not on file   Intimate Partner Violence: Not on file   Depression: Not at risk     PHQ-2 Score: 0   Housing Stability: Not on file     Functional Status:  Prior to admission patient needed assistance:   Dependent ADLs:: Ambulation-walker, Ambulation-cane, Transfers, Bathing, Dressing  Dependent IADLs:: Cleaning, Laundry, Cooking, Shopping, Meal Preparation, Medication Management, Transportation  Assesssment of Functional Status: Not at  functional baseline    Mental Health Status:  Mental Health Status: No Current Concerns       Chemical Dependency Status:  Chemical Dependency Status: No Current Concerns           Values/Beliefs:  Spiritual, Cultural Beliefs, Hinduism Practices, Values that affect care: no (None reported by pt.)             Additional Information:  Writer met with pt and her  Anabel JACINTO(LEROY) to introduce Care Management service(CM) and obtain an initial assessment. Pt was brought to this hospital from [facility] Pt stated she was unimpressed with care at [facility] and wishes to return home upon hospital discharge. TCU bed was not held and is no longer an option for discharge placement at this time. Further assessment and CM involvement needed to determine if returning home is a viable plan. Pt also stated some involvement of a daughter who may be able to assist upon pt return home.    5/5 GUSTAVO Mccormick. -\"86 year old female presents to the Emergency Department for evaluation of shortness of breath.  Patient was hypoxic on arrival down into the 80s.  Very tight.  " "Did give her prednisone, DuoNeb and albuterol with some improvement.  Still requiring facemask for oxygenation.  Did do a CT scan of her chest as it was unclear if she had been on anticoagulation or not.  There is no PE but there is a left lower lobe infiltrate.  Of note her INR is up to 3.26.  Unclear what is causing this as it looks like she had been on Xarelto I do not see Coumadin listed.  Patient also found to have a sodium 128.  Will be admitted for pneumonia.  Discussed with the resident accepted patient.  Given IV antibiotics for likely hospital-acquired pneumonia.\"    PT, OT, WOC, PHARM, RT, and Infectious Disease Consults are pending. CM to follow and assist with home care initiation and possible Home IV Infusion service coordination. Home care and [facility] referrals placed. Hospitalist placed an expected discharge date of 5/9 on the chart. Home O2 therapy is managed through [facility] and pt/ state having all needed equipment without concern.  to transport.     Prasanna Chambers RN        "

## 2022-05-07 NOTE — PLAN OF CARE
Pt eating, drinking, voiding and has active bowel sounds.    Her sores on her buttocks seem better today - medication applied.  She also has sores on her feet and finger.      Acetaminophen given for back pain with good results.    Problem: Plan of Care - These are the overarching goals to be used throughout the patient stay.    Goal: Plan of Care Review/Shift Note  Description: The Plan of Care Review/Shift note should be completed every shift.  The Outcome Evaluation is a brief statement about your assessment that the patient is improving, declining, or no change.  This information will be displayed automatically on your shift note.  Outcome: Ongoing, Progressing     Problem: Gas Exchange Impaired  Goal: Optimal Gas Exchange  Outcome: Ongoing, Progressing  Intervention: Optimize Oxygenation and Ventilation  Recent Flowsheet Documentation  Taken 5/7/2022 1600 by Kristy Esparza RN  Head of Bed (HOB) Positioning: HOB at 20-30 degrees  Taken 5/7/2022 1548 by Kristy Esparza RN  Head of Bed (HOB) Positioning: HOB at 20-30 degrees  Taken 5/7/2022 1200 by Kristy Esparza RN  Head of Bed (HOB) Positioning: HOB at 20-30 degrees  Taken 5/7/2022 0900 by Kristy Esparza RN  Head of Bed (HOB) Positioning: HOB at 20-30 degrees     Problem: Skin Injury Risk Increased  Goal: Skin Health and Integrity  Outcome: Ongoing, Progressing  Intervention: Optimize Skin Protection  Recent Flowsheet Documentation  Taken 5/7/2022 1600 by Kristy Esparza RN  Head of Bed (HOB) Positioning: HOB at 20-30 degrees  Taken 5/7/2022 1548 by Kristy Esparza RN  Head of Bed (HOB) Positioning: HOB at 20-30 degrees  Taken 5/7/2022 1200 by Kristy Esparza RN  Head of Bed (HOB) Positioning: HOB at 20-30 degrees  Taken 5/7/2022 0900 by Kristy Esparza RN  Head of Bed (HOB) Positioning: HOB at 20-30 degrees       Goal Outcome Evaluation:        Continue to monitor VS, labs, pain level, respiratory status and activity tolerance.

## 2022-05-07 NOTE — PROGRESS NOTES
[facility]    Progress Note - Hospitalist Service       Date of Admission:  5/5/2022    Assessment & Plan   Adalgisa Solano is a 86 year old female admitted on 5/5/2022. She has a history of pulmonary disease including COPD with persistent bronchiectasis getting vest therapy at home for the last 2 years, non-insulin-dependent diabetes, chronic A. fib, chronic heart failure with preserved ejection fraction who has had several recent hospitalizations for COPD exacerbation, most recently complicated by Pseudomonas and Enterococcus pneumonia  and is admitted for acute on chronic hypoxic and hypercarbic respiratory failure and a new left lower lobe opacity on chest CT. Remains vitally stable on 6l NC.  Questionable utility of antibiotics based on infectious disease consult.  We are possibly dealing with worsening underlying lung disease which needs symptomatic treatment.  Patient's baseline oxygen is 3 L and would like to see the patient's oxygen needs decreasing before discharging her.    Acute on chronic hypoxic and hypercarbic respiratory failure  Recent right lower lobe Pseudomonas and Enterococcus UTI  New left lower lobe opacity  ID was consulted who recommended adding azithromycin and discontinuing the vancomycin.  They doubt the patient is suffering from pneumonia based on procalcitonin levels and instead favors COPD exacerbation.  -IV ertapenem  -ID consult for assistance with antibiotic coverage: starting azithromycin x5 days  -Pulmonology consult  -Supplemental oxygen maintain SPO2 above 90%  -Continue scheduled bronchodilators, albuterol nebs, saline nebs twice a day  -Titrate blood pressure medications and diuresis  -Systemic steroids- Taper starting 5/6 (prednisone 40mg x3, 20mg x3, 10mg x3)  -Mucomyst     COPD exacerbation  Bronchiectasis  CT chest on admission notable for known left upper lobe predominance bronchiectasis. She has been on a steroid taper (5 mg) prior to  admission and does vest therapy at home. Received IV steroids in the ED and now on oral steroid taper.  -PTA Vest therapy  -RT consult, appreciate recs  -Systemic steroids- Restarted taper 5/6 (prednisone 40mg x3, 20mg x3, 10mg x3)  -DuoNebs and albuterol nebulizer as needed  - Mucomyst  -PTA Trelegy Ellipta  - DuoNeb 4 times daily  -PPI for GI prophylaxis     Chronic diastolic CHF  Bilateral lymphedema  Pulmonary hypertension  Bilateral lower extremity edema, however, BNP normal at 100.  CT chest shows no acute pulmonary vascular congestion. Last echocardiogram in 4/2022 with an EF of 65%, with moderate mitral and tricuspid insufficiency. Home regimen includes Lasix (40mg daily) and Spironolactone (12.5 mg daily).  -Continue Lasix and spironolactone for respiratory optimization  -OT lymphedema consult    Tinea pedis (right foot)  Patient's fungal infection appears to spread to the fourth digit of the right hand.  - Clotrimazole cream    Herpes Zoster, (left buttock)  Incontinence Associated Dermatitis (IAD), sacrum and perianal skin  - Wound consult, appreciate rec   - Perineal skin care protocol  - Valtrex 1000mg TID    Hyponatremia  Sodium of 128 on admission. Suspect SIADH in setting of bacterial pneumonia. Improving with fluid restriction and resumption of normal diet.  - Recheck sodium in the morning  - Fluid restriction  -Regular diet     Atrial fibrillation  HTN  On Xarelto 20 mg daily and diltiazem 240 mg daily prior to admission. 24 hour BP readings: Min-105/56, Max- 138/70.  - Continue diltiazem 240 mg daily   -Continue daily Xarelto 20 mg     Anxiety disorder  Continue prior recommendations from palliative team.  -Continue Ativan 0.5 mg every 6 hours as needed  -Continue mirtazapine, Seroquel, Abilify     T2DM  At risk for steroid-induced hyperglycemia  Last Hgb A1C (3/21/22)- 6.2, suggesting well controlled diabetes. On metformin (500mg daily) and Novolog prior to admission. Blood sugars elevated to  300s since admission.  -4 times daily and AC glucose checks  -medium sliding scale insulin  -Holding PTA metformin     Constipation  Has not had bowel movement in 4 days prior to admission.   -Bowel regimen     Chronic pain syndrome-chronic mid to low back pain  -Continue Tylenol as needed  -Continue gabapentin 600 mg at bedtime  -Use opiates sparingly for pain  -Avoid NSAIDs as on anticoagulant    Magnesium deficiency  Mg low at 1.3.   - Mg replacement       Diet: Regular Diet Adult  Fluid restriction 1200 ML FLUID    DVT Prophylaxis: PTA Xarelto  Borjas Catheter: Not present  Fluids: P.o.  Central Lines: None  Cardiac Monitoring: None  Code Status: No CPR- Do NOT Intubate      Disposition Plan   Expected Discharge: 05/09/2022     Anticipated discharge location: home with help/services   Delays: Improvement in respiratory status     The patient's care was discussed with the Attending Physician, Dr. Jones.      Win Awan MD PGY1 May 7, 2022   [facility]  Page Win Awan MD PGY1 at [phone] from 7:30 am to 5:30 pm.   Call the senior pager with any questions or concerns, 24/7: [phone]  ___    Interval History   Patient feels breathing is slightly improved today.  She noticed a new lesion on her finger that she believes is similar to the ringworm infection on her foot.  Looking forward to taking a shower today and then having lymphedema wraps.  She is frustrated with her ongoing health problems and is wondering what the long-term prognosis is.  We discussed that her underlying lung disease is likely never going to go away but that her life expectancy is still many years.      Data reviewed today: I reviewed all medications, new labs and imaging results over the last 24 hours. I personally reviewed no images or EKG's today.    Physical Exam   Vital Signs: Temp: 97.4  F (36.3  C) Temp src: Oral BP: (!) 140/74 Pulse: 94   Resp: 22 SpO2: 94 % O2  Device: Nasal cannula Oxygen Delivery: 6 LPM  Weight: 0 lbs 0 oz  Constitutional: awake, alert, cooperative  Respiratory: mild discomfort with cough/breathing, course lung sounds throughout all lung fields with mild improvement from previously  Cardiovascular: Irregularly irregular rhythm.  Regular rate, normal S1 and S2, no S3 or S4, and no murmur noted  Skin: 6x6mm annular lesion at base between 1st and 2nd right toes.  Similar lesion on the fourth digit of the right hand.  Extremities: Lower extremities edema, worse on the left. Notable hyperpigmentation on left ankle.  Still no signs of cellulitis    Data   Recent Labs   Lab 05/07/22  1300 05/07/22  0816 05/07/22  0611 05/06/22  2053 05/06/22  0914 05/06/22  0817 05/05/22  1307 05/05/22  0930 05/05/22  0835 05/05/22  0226 05/04/22  0555   WBC  --   --   --   --   --   --   --  8.4  --  10.1 10.9   HGB  --   --   --   --   --   --   --  11.9  --  11.6*  --    MCV  --   --   --   --   --   --   --  91  --  92  --    PLT  --   --   --   --   --   --   --  184  --  208  --    INR  --   --   --   --   --   --   --   --   --  3.26*  --    NA  --   --   --   --   --  132*  --  130*  --  128* 131*   POTASSIUM  --   --   --   --   --   --   --  4.9  --  4.8 4.3   CHLORIDE  --   --   --   --   --   --   --  88*  --  86* 88*   CO2  --   --   --   --   --   --   --  30  --  30 34*   BUN  --   --   --   --   --   --   --  13  --  15 14   CR  --   --  0.60  --   --   --   --  0.59*  --  0.65 0.61   ANIONGAP  --   --   --   --   --   --   --  12  --  12 9   DENISE  --   --   --   --   --   --   --  8.7  --  8.6 8.8   * 107*  --  250*   < >  --    < > 168*   < > 137* 117   ALBUMIN  --   --   --   --   --   --   --  2.9*  --  2.8*  --    PROTTOTAL  --   --   --   --   --   --   --  6.1  --  5.9*  --    BILITOTAL  --   --   --   --   --   --   --  0.9  --  0.9  --    ALKPHOS  --   --   --   --   --   --   --  58  --  58  --    ALT  --   --   --   --   --   --   --  24  --  29   --    AST  --   --   --   --   --   --   --  19  --  21  --     < > = values in this interval not displayed.

## 2022-05-07 NOTE — PROGRESS NOTES
Infectious Diseases Progress Note  [facility]    Date of visit: 05/07/2022     IMP:  COPD exacerbation, doubt pna based on proC, labs clinical picture etc  Very mild shingles    PLAN:  On antibiotics empirically  Add azithromycin x 5 days  Sputum culture  Continue valtrex   Contact precautions    Jc Borges MD  [facility]  Direct messaging: [employer] Paging  On-Call ID provider: [phone], option: 9      =============================      SUBJECTIVE / INTERVAL HISTORY:     First visit by me.  in room. Still on more oxygen than baseline, otherwise feels okay. Did sleep some. Noticed a new lesion on right 4th finger. Productive sputum this morning.      Review of Systems     No fevers, no n/v       EXAMINATION:  BP (!) 140/74 (BP Location: Right arm)   Pulse 94   Temp 97.4  F (36.3  C) (Oral)   Resp 22   SpO2 96%   Alert, awake  HEENT: nasal canula in place   Sclera clear  CARDIOVASCULAR regular rate and rhythm, no murmur  Lungs: bilateral crackles  Skin: small patch of open lesions on right buttock. 1 small vesicle on 4th finger of right hand  Joints normal  Neurologic exam non focal  Wound:  NA        CLINICAL DATABASE FOR---LAB/MICRO/CULTURES/IMAGING STUDIES:  IMPRESSION:  CRP 12   and was low at care facility see notes  Which count 8.4   Latest Reference Range & Units 05/05/22 09:30   Procalcitonin 0.00 - 0.49 ng/mL 0.07 [1]     April 13 sputum PA and enterococcus      CT lung may 5  IMPRESSION:  1.  No evidence of pulmonary embolus.  2.  Left lower lobe predominant new airspace disease compatible with pneumonia or aspiration. Follow-up to resolution recommended.  3.  Likely stable right upper lobe pulmonary nodules measuring up to 11 mm in size. Continued short-term follow-up CT versus PET CT recommended per Fleischner Society guidelines.

## 2022-05-07 NOTE — PROGRESS NOTES
Pulmonary Progress Note  5/7/2022      Admit Date: 5/5/2022  Hospital Day: 2   CODE: No CPR- Do NOT Intubate    Reason for Consult: COPD exacerbation      Interim Events:     Breathing improved today. Supplemental oxygen needs have decreased.      Assessment/Plan:   Adalgisa Solano is a 86 year old female with a past medical history significant for COPD on chronic supplemental oxygen, bronchiectasis, A-fib on anticoagulation, HFpEF, hiatal hernia and GERD admitted for a COPD exacerbation.     1. COPD exacerbation:Has known severe COPD at baseline with a severe reduction in her DLCO. These abnormalities are likely secondary to a combination of her underlying emphysema and bronchiectasis. Is presently being treated with broad spectrum antibiotics and steroids with improvement in her symptoms. Imaging with a LLL infiltrate concerning for an aspiration event. Prior swallow evaluation in 2021 did not show evidence of julio aspiration.    Would continue to taper antibiotics over the next 24h given clinical improvement and negative procalcitonin.     Continue prednisone (taper already ordered).    Continue inhaled bronchodilators as you are.    BID Flutter valve therapy.    Continue to taper supplemental oxygen to maintain saturations> 92%.    Would repeat swallow study.    Should follow with Dr. Juarez at the time of discharge.  2. Pulmonary nodules: Noted to have a dominant 11mm pulmonary nodule in the RUL which has been growing slowly since 2018. Likely to be an adenocarcinoma given her history of tobacco abuse and continued, albeit very slow growth.    Given her degree of emphysema, likely not safe to undergo a biopsy without concerns for a pneumothorax. Upper lobe lesions can usually be managed without placement of a fidicial marker for SBRT but would defer this discussion to Dr. Juarez on an outpatient basis.  3. A-Fib:Noted at baseline.    INR in the therapeutic range. Defer management to primary  team.  4. Electrolyte abnormalities:Noted to be hypomagnesemic. Would replace as you are.    Thank you for involving us in the care of this patient.    Karine Hermosillo MD  Pulmonary and Critical Care  (P) [phone]      .soc  Medications:       ARIPiprazole  5 mg Oral At Bedtime     atorvastatin  20 mg Oral At Bedtime     [START ON 5/8/2022] azithromycin  250 mg Oral Daily     calcium carbonate-vitamin D  1 tablet Oral QAM     clotrimazole   Topical BID     cyanocobalamin  500 mcg Oral QAM     diltiazem ER  240 mg Oral Daily     docusate sodium  100 mg Oral BID     ertapenem (INVanz) IV  1 g Intravenous Q24H     Fluticasone-Umeclidin-Vilanterol  1 puff Inhalation Daily     furosemide  40 mg Oral Daily     gabapentin  600 mg Oral Daily     guaiFENesin  600 mg Oral BID     insulin aspart  1-7 Units Subcutaneous TID AC     insulin aspart  1-5 Units Subcutaneous At Bedtime     ipratropium - albuterol 0.5 mg/2.5 mg/3 mL  3 mL Nebulization 4x Daily     mirtazapine  15 mg Oral At Bedtime     pantoprazole  40 mg Oral Daily     predniSONE  40 mg Oral Daily    Followed by     [START ON 5/9/2022] predniSONE  20 mg Oral Daily    Followed by     [START ON 5/12/2022] predniSONE  10 mg Oral Daily     QUEtiapine  12.5 mg Oral BID     rivaroxaban ANTICOAGULANT  20 mg Oral Daily with supper     sodium chloride  3 mL Nebulization 4x Daily     sodium chloride (PF)  3 mL Intracatheter Q8H     spironolactone  12.5 mg Oral Daily     valACYclovir  500 mg Oral TID         Exam/Data:   Vitals  BP (!) 140/74 (BP Location: Right arm)   Pulse 84   Temp 97.4  F (36.3  C) (Oral)   Resp 20   SpO2 95%      I/O last 3 completed shifts:  In: 700 [P.O.:600; I.V.:100]  Out: 450 [Urine:450]  Weight change:   Resp: 20      EXAM:  Physical Exam  Constitutional:       Appearance: Normal appearance.   HENT:      Mouth/Throat:      Mouth: Mucous membranes are moist.   Cardiovascular:      Rate and Rhythm: Normal rate and regular rhythm.      Pulses:  Normal pulses.      Heart sounds: Normal heart sounds.   Pulmonary:      Effort: Pulmonary effort is normal.      Comments: Coarse BS BL.  Abdominal:      General: Abdomen is flat.   Skin:     General: Skin is warm.   Neurological:      Mental Status: She is alert.             DATA:    MICRO:  Date Source Organism   5/5 Blood NGTD    Blood NGTD    NP Swab Negative for Influenza A,  And SarsCoV2       Organism Antibiotic Antibiotic Start Date Antibiotic End Date   NGTD Cefepime 5/5 5/5    Ertapenem 5/5 Ongoing    Valacyclovir 5/5 Ongoig    Vancomycin 5/5 5/5    Azithromycin 5/7 Ongoing       PFTs 6/14/19:  FEV1/FVC is 48 and is reduced.  FEV1 is 49% predicted and is normal.  FVC is 78% predicted and is reduced.  There was no improvement in spirometry after a single inhaled dose of bronchodilator.  TLC is 103% predicted and is normal.  RV is 119% predicted and is normal.  DLCO is 39% predicted and is reduced when it   is corrected for hemoglobin.  The flow volume loop is normal No.     Impression:  Full Pulmonary Function Test is abnormal.   Spirometry is consistent with severe obstructive ventilatory defect.  Spirometry is not consistent with reversibility.  There is no hyperinflation.  There is no air-trapping.  Diffusion capacity when corrected for hemoglobin is severely reduced.  Flow volume loop demonstrates significant scooping of expiratory limb consistent with obstruction.     IMAGING:   CT Chest Pulmonary Embolism w Contrast    Result Date: 5/5/2022  EXAM: CT CHEST PULMONARY EMBOLISM W CONTRAST LOCATION: [facility] DATE/TIME: 5/5/2022 3:01 AM INDICATION: Hypoxia, coughing COMPARISON: 03/23/2022 TECHNIQUE: CT chest pulmonary angiogram during arterial phase injection of IV contrast. Multiplanar reformats and MIP reconstructions were performed. Dose reduction techniques were used. CONTRAST: [id number] 75ml FINDINGS: ANGIOGRAM CHEST: Pulmonary arteries are normal caliber and negative for  pulmonary emboli. Thoracic aorta is not well opacified and is  indeterminate for dissection. No CT evidence of right heart strain. LUNGS AND PLEURA: Centrilobular and paraseptal emphysematous change. Associated left upper lobe predominance bronchiectasis. New bibasilar airspace disease, left greater than right scattered right upper lobe nodules, largest on image 95 reaching 11 mm, likely unchanged. MEDIASTINUM/AXILLAE: AP window borderline enlarged nodes, may be reactive in the setting of infection. Left thyroid nodule redemonstrated. Global cardiomegaly. CORONARY ARTERY CALCIFICATION: Moderate. UPPER ABDOMEN: No acute abnormalities. MUSCULOSKELETAL: No acute bony abnormalities.     IMPRESSION: 1.  No evidence of pulmonary embolus. 2.  Left lower lobe predominant new airspace disease compatible with pneumonia or aspiration. Follow-up to resolution recommended. 3.  Likely stable right upper lobe pulmonary nodules measuring up to 11 mm in size. Continued short-term follow-up CT versus PET CT recommended per Fleischner Society guidelines. REFERENCE: Guidelines for Management of Incidental Pulmonary Nodules Detected on CT Images: From the Fleischner Society 2017. Guidelines apply to incidental nodules in patients who are 35 years or older. Guidelines do not apply to lung cancer screening, patients with immunosuppression, or patients with known primary cancer. Nodule size 6 mm or larger Low-risk patients: Follow-up CT at 3-6 months, then consider CT at 18-24 months. High-risk patients: Follow-up CT at 3-6 months, then at 18-24 months if no change. -Use most suspicious nodule as guide to management. Consider referral to lung nodule clinic.         Karine Hermosillo  Pulm/CC  [id number]

## 2022-05-07 NOTE — PLAN OF CARE
Problem: Risk for Delirium  Goal: Improved Behavioral Control  5/7/2022 0553 by Tiki Goodwin, RN  Outcome: Ongoing, Not Progressing       Problem: Gas Exchange Impaired  Goal: Optimal Gas Exchange  5/7/2022 0553 by Tiki Goodwin, RN  Outcome: Ongoing, Not Progressing   Goal Outcome Evaluation:          Satting 91-92% on 6L O2 NC. Coarse lung sounds. Dry cough. Short of breath with ambulating. Patient set off bed alarm and became agitated when staff came in to assist. Stated she didn't need to be watched or helped. Staff explained that it was for patient's own safety, but patient was still agitated towards staff. Alarms on for safety.

## 2022-05-08 NOTE — PROVIDER NOTIFICATION
MD Notified:  Lab called and stated that the sputum that was sent failed because it was mostly spit.

## 2022-05-08 NOTE — PLAN OF CARE
Goal Outcome Evaluation:    Plan of Care Reviewed With: patient     Overall Patient Progress: improving    Forgetful at times, on 6 L of O2 and humidifier via NC, c/o of burning pain at buttock rating 7 out of 10, notified DR, capsaicin cream was ordered and applied on patient's buttock,   patient experienced an intense burning,notified doctor, doctor came to see patient,  came as well, wiped off cream with cold wash cloth and applied ice and gave PRN tylenol which was effective relief,  stayed overnight, pt slept well overnight, upon waking up, patient c/o SOB, pulse ox was 97%, walking to the bathroom and sitting upright helped, gave PRN Cepacol x 2 for c/o sore throat and cough with good relief, sputum culture sent.

## 2022-05-08 NOTE — PROGRESS NOTES
/78 (BP Location: Right arm)   Pulse 100   Temp 98  F (36.7  C) (Oral)   Resp 20   SpO2 96%     Patient is receiving duoneb 4 times a day. Sodium chloride was changed to BID. Vest therapy is BID. On 5L NC. Breath sounds are course and wheezy, improved with treatment. Little non productive dry cough. RT continue to follow.     Miriam Peterson, RT

## 2022-05-08 NOTE — PROGRESS NOTES
"Brief Progress Note:    Patient requested cream to help with herpetic neuralgia from shingles on her R buttock this evening, her daughter was present in the room and agreed. Patient already takes gabapentin and also took valtrex for shingles outbreak. Capsaicin cream was provided for patient.     After applied, patient experienced a burning sensation from the cream and became infuriated. She threatened to call the police for \"torture by the devil doctor\". Patient called her  and he came to the hospital. The cream was washed off and patient was given ice to apply to the area. Patient's pain improved. She calmed down significantly after her  arrived.     The situation was explained to her . Patient denied ever asking for the cream at that time, patient also forgot who her nurse was who had been taking care of her all evening. Patient requested photos be taken of her rash. Photo was taken and uploaded into patient's chart under media tab.     Rash is consistent with shingles, no new vesicles were noted, lesions were crusted, no new erythema to suggest allergic reaction to the capsaicin cream. Patient appeared to be reassured by the photo her  took and showed her on his personal phone.     Plan:  - discontinue capsaicin cream  - continue ice pack prn  - continue gabapentin  - patient's and 's questions and concerns were answered  - discussed may try diclofenac cream tomorrow if patient would like to try another cream    Benita Behm, MD PGY2  [facility]  05/08/22    I precepted today with Dr. Jones.  "

## 2022-05-08 NOTE — CONSULTS
Infectious Diseases Progress Note  [facility]    Date of visit: 05/08/2022     IMP:  COPD exacerbation, doubt pna based on proC, labs clinical picture etc  Very mild shingles (painful)    PLAN:  Discontinue ertapenem   Continue azithromycin x 5 days  Sputum culture - rejected by lab  Continue valtrex 10-days  Contact precautions    Jc Borges MD  [facility]  Direct messaging: [employer]  On-Call ID provider: [phone], option: 9      =============================      SUBJECTIVE / INTERVAL HISTORY:     Poor sleep. Received capsaicin cream for shingles and says it burned. Very upset about this. No pain in finger. Very tired after returning to bed from bathroom. On 6L oxygen.    Review of Systems     No fevers, no n/v       EXAMINATION:  /78 (BP Location: Right arm)   Pulse 100   Temp 98  F (36.7  C) (Oral)   Resp 20   SpO2 96%   Alert, awake  HEENT: nasal canula in place   Sclera clear  CARDIOVASCULAR regular rate and rhythm, no murmur  Lungs: bilateral crackles  Skin: small patch of open lesions on right buttock, still open. 4th finger of right hand with some erythema, but no vesicle today  Joints normal  Neurologic exam non focal  Wound:  NA        CLINICAL DATABASE FOR---LAB/MICRO/CULTURES/IMAGING STUDIES:  IMPRESSION:  CRP 12   and was low at care facility see notes  Which count 8.4   Latest Reference Range & Units 05/05/22 09:30   Procalcitonin 0.00 - 0.49 ng/mL 0.07 [1]     April 13 sputum PA and enterococcus      CT lung may 5  IMPRESSION:  1.  No evidence of pulmonary embolus.  2.  Left lower lobe predominant new airspace disease compatible with pneumonia or aspiration. Follow-up to resolution recommended.  3.  Likely stable right upper lobe pulmonary nodules measuring up to 11 mm in size. Continued short-term follow-up CT versus PET CT recommended per Fleischner Society guidelines.

## 2022-05-08 NOTE — PROGRESS NOTES
Patient on O2 6L NC with humidificaiton. Sats 92-95%, RR 20-24, HR  bpm. Patient received neb and vest treatment as order. Patient coughs excessively after the neb treatment. BS coarse and diminished prior neb and expiratory wheezing post neb. Patient produce thick, dry, white and yellow sticky mucus.  RT will continue to follow.    Roderick Chow, RT

## 2022-05-08 NOTE — PROGRESS NOTES
[facility]    Progress Note - Hospitalist Service       Date of Admission:  5/5/2022    Assessment & Plan   Adalgisa Solano is a 86 year old female admitted on 5/5/2022. She has a history of pulmonary disease including COPD with persistent bronchiectasis getting vest therapy at home for the last 2 years, non-insulin-dependent diabetes, chronic A. fib, chronic heart failure with preserved ejection fraction who has had several recent hospitalizations for COPD exacerbation, most recently complicated by Pseudomonas and Enterococcus pneumonia  and is admitted for acute on chronic hypoxic and hypercarbic respiratory failure and a new left lower lobe opacity on chest CT. Remains vitally stable on 5-6L NC, home O2 use of 3-5L.  Pulm and ID following, suspect COPD and/or bronchiectasis exacerbations with low suspicion for pneumonia currently. Patient's baseline oxygen is 3-5 L and would like to see the patient's oxygen needs decreasing before discharging her.    Acute on chronic hypoxic and hypercarbic respiratory failure  Recent right lower lobe Pseudomonas and Enterococcus UTI  New left lower lobe opacity  ID was consulted who recommended adding azithromycin and discontinuing the vancomycin.  Pulm following as well. They doubt the patient is suffering from pneumonia based on procalcitonin levels and instead favors COPD and bronchiectasis exacerbation.  -ID consult for assistance with antibiotic coverage: starting azithromycin x5 days   - Discontinue IV Ertapenem ( 5/5 - 5/7 )    - Continue IV azithromycin x5 days ( 5/7 - 5/11 )   -Pulmonology consult  -Supplemental oxygen maintain SPO2 above 90%  -Continue scheduled bronchodilators, albuterol nebs, saline nebs twice a day  -Titrate blood pressure medications and diuresis  -Systemic steroids- Taper starting 5/6 (prednisone 40mg x3d, 20mg x3d, 10mg x3d)  -Mucomyst     COPD exacerbation  Bronchiectasis  CT chest on admission notable for known  left upper lobe predominance bronchiectasis. She has been on a steroid taper (5 mg) prior to admission and does vest therapy at home. Received IV steroids in the ED and now on oral steroid taper.  -PTA Vest therapy  -RT consult, appreciate recs  -Systemic steroids- Restarted taper 5/6 (prednisone 40mg x3, 20mg x3, 10mg x3)  -DuoNebs and albuterol nebulizer as needed  - Mucomyst  -PTA Trelegy Ellipta  - DuoNeb 4 times daily  -PPI for GI prophylaxis, will need on discharge due to steroid taper  - Antibiotics as above     Chronic diastolic CHF  Bilateral lymphedema  Pulmonary hypertension  Bilateral lower extremity edema, however, BNP normal at 100.  CT chest shows no acute pulmonary vascular congestion. Last echocardiogram in 4/2022 with an EF of 65%, with moderate mitral and tricuspid insufficiency. Home regimen includes Lasix (40mg daily) and Spironolactone (12.5 mg daily).  -Continue Lasix and spironolactone for respiratory optimization  -OT lymphedema consult    Tinea pedis (right foot)  Patient's fungal infection appears to spread to the fourth digit of the right hand.  - Clotrimazole cream    Herpes Zoster, (left buttock)  Incontinence Associated Dermatitis (IAD), sacrum and perianal skin  Did not tolerate capsaicin cream on zoster rash, would not like any other topical treatments right now.   - Wound consult, appreciate rec   - Perineal skin care protocol  - Valtrex 1000mg TID  - Gabapentin 600mg every day   - Contact precautions     Hyponatremia  Sodium of 128 on admission. Suspect SIADH in setting of bacterial pneumonia. Improving with fluid restriction and resumption of normal diet.  - Recheck sodium in the morning  - Fluid restriction  -Regular diet     Atrial fibrillation  HTN  On Xarelto 20 mg daily and diltiazem 240 mg daily prior to admission. 24 hour BP readings: Min-105/56, Max- 138/70.  - Continue diltiazem 240 mg daily   -Continue daily Xarelto 20 mg     Anxiety disorder  Continue prior  recommendations from palliative team.  -Continue Ativan 0.5 mg every 6 hours as needed  -Continue mirtazapine, Seroquel, Abilify     T2DM  At risk for steroid-induced hyperglycemia  Last Hgb A1C (3/21/22)- 6.2, suggesting well controlled diabetes. On metformin (500mg daily) and Novolog prior to admission. Blood sugars elevated to 300s since admission.  -4 times daily and AC glucose checks  -medium sliding scale insulin  -Holding PTA metformin     Constipation  Has not had bowel movement in 4 days prior to admission.   -Bowel regimen     Chronic pain syndrome-chronic mid to low back pain  -Continue Tylenol as needed  -Continue gabapentin 600 mg at bedtime  -Use opiates sparingly for pain  -Avoid NSAIDs as on anticoagulant    Magnesium deficiency  Mg low at 1.3.   - Mg replacement       Diet: Regular Diet Adult  Fluid restriction 1200 ML FLUID    DVT Prophylaxis: PTA Xarelto  Borjas Catheter: Not present  Fluids: P.o.  Central Lines: None  Cardiac Monitoring: None  Code Status: No CPR- Do NOT Intubate      Disposition Plan   Expected Discharge: 05/09/2022     Anticipated discharge location: home with help/services   Delays: Improvement in respiratory status     The patient's care was discussed with the Attending Physician, Dr. Jones.    Josr Brooks DO, PGY-2  [facility]  Pager: [phone] (from 8AM-5:30PM)  Please page/text page the senior pager with any questions or concerns, 24/7: [phone], or search ID# [id number] on [facility]  Securely message with the Vocera Web Console (learn more here)  Text page via [employer] Paging/Directory   ___    Interval History   Did not tolerate capsaicin cream overnight due to burning nature, id not appreciate it being started and is angry from events overnight. Does not want any more creams on her zoster rash. No acute concerns currently, wondering what the plan is today and wants to continue IV antibiotics and steroid taper.  Wondering if her outpatient pulmonologist can be consulted, was told there was an inpatient pulmonologist following currently.     Data reviewed today: I reviewed all medications, new labs and imaging results over the last 24 hours. I personally reviewed no images or EKG's today.    Physical Exam   Vital Signs: Temp: 98.2  F (36.8  C) Temp src: Oral BP: 128/66 Pulse: 100   Resp: 18 SpO2: 97 % O2 Device: Nasal cannula with humidification Oxygen Delivery: 6 LPM  Weight: 0 lbs 0 oz  Constitutional: awake, alert, cooperative  Respiratory: mild discomfort with cough/breathing, course expiratory lung sounds throughout all lung fields with mild improvement from previously  Cardiovascular: Irregularly irregular rhythm.  Regular rate, normal S1 and S2, no S3 or S4, and no murmur noted  Skin: 6x6mm annular lesion at base between 1st and 2nd right toes.  Similar lesion on the fourth digit of the right hand.  Extremities: Lower extremities edema, worse on the left. Notable hyperpigmentation on left ankle.  Still no signs of cellulitis    Data   Recent Labs   Lab 05/08/22  0749 05/07/22  2313 05/07/22  2121 05/07/22  1735 05/07/22  0816 05/07/22  0611 05/06/22  0914 05/06/22  0817 05/05/22  1307 05/05/22  0930 05/05/22  0835 05/05/22  0226 05/04/22  0555   WBC  --   --   --   --   --   --   --   --   --  8.4  --  10.1 10.9   HGB  --   --   --   --   --   --   --   --   --  11.9  --  11.6*  --    MCV  --   --   --   --   --   --   --   --   --  91  --  92  --      --   --   --   --   --   --   --   --  184  --  208  --    INR  --   --   --   --   --   --   --   --   --   --   --  3.26*  --    NA  --   --   --   --   --   --   --  132*  --  130*  --  128* 131*   POTASSIUM  --   --   --   --   --   --   --   --   --  4.9  --  4.8 4.3   CHLORIDE  --   --   --   --   --   --   --   --   --  88*  --  86* 88*   CO2  --   --   --   --   --   --   --   --   --  30  --  30 34*   BUN  --   --   --   --   --   --   --   --   --  13   --  15 14   CR  --   --   --   --   --  0.60  --   --   --  0.59*  --  0.65 0.61   ANIONGAP  --   --   --   --   --   --   --   --   --  12  --  12 9   DENISE  --   --   --   --   --   --   --   --   --  8.7  --  8.6 8.8   GLC  --  174* 234* 298*   < >  --    < >  --    < > 168*   < > 137* 117   ALBUMIN  --   --   --   --   --   --   --   --   --  2.9*  --  2.8*  --    PROTTOTAL  --   --   --   --   --   --   --   --   --  6.1  --  5.9*  --    BILITOTAL  --   --   --   --   --   --   --   --   --  0.9  --  0.9  --    ALKPHOS  --   --   --   --   --   --   --   --   --  58  --  58  --    ALT  --   --   --   --   --   --   --   --   --  24  --  29  --    AST  --   --   --   --   --   --   --   --   --  19  --  21  --     < > = values in this interval not displayed.

## 2022-05-09 PROBLEM — B02.8 HERPES ZOSTER WITH COMPLICATION: Status: ACTIVE | Noted: 2022-01-01

## 2022-05-09 NOTE — PROGRESS NOTES
Care Management Discharge Note    Discharge Date: 05/10/2022       Discharge Disposition:with family and  Home Care    Discharge Services: [facility] -  Home PT, OT, RN, and HHA    Discharge DME: resuming home 02    Discharge Transportation: family or friend will provide    Private pay costs discussed: possible home care cost. Patient is to call insurance and verify.     Education Provided on the Discharge Plan:  CM met with patient     Persons Notified of Discharge Plans: patient and      Patient/Family in Agreement with the Plan: yes    Handoff Referral Completed: Yes    Additional Information:  Chart reviewed. CM met with patient. Patient lives with . She will discharge home with . Discharging on oral antibiotics. CM has confirmed that patient has been accepted for home care- [facility] with RN, PT, OT and HHA.  will transport home at time of hospital discharge. Will resume home 02. CM confirmed with MD and patient that he is on her baseline/home 02. No changes were made to her 02. Patient states she has no additional needs for 02 or other services from .     [facility] referral sent per protocol.      PT recommendations: home with assist;home with home care physical therapy;Transitional Care Facility  OT recommendations: home with assist      Anna Rodriguez RN

## 2022-05-09 NOTE — PLAN OF CARE
Goal Outcome Evaluation:    A&O x4. Ambulating SBA. Remains on 5L O2. Denies pain. Plan is to discharge today home with home care.

## 2022-05-09 NOTE — PLAN OF CARE
Goal Outcome Evaluation:    Plan of Care Reviewed With: patient     Overall Patient Progress: improving    Alert and oriented x4, had minimal c/o pain, on 5 L of O2 and humidifier via NC, dyspnea with activity,  pt slept well overnight, upon waking up, had productive congested coughs, notified RT, patient refused any interventions, denies SOB, and O2 saturation >90%,  gave PRN Cepacol x 1 for c/o sore throat and cough with good relief, refused anything applied to buttock.

## 2022-05-09 NOTE — PROGRESS NOTES
Patient on O2 4-5L NC with humidification. Sats 95-97%, RR 20-24, HR 85-88, BS coarse and diminished prior neb and expiratory wheezing after neb. Patient received neb and vest treatment as order, pt tolerated treatment well. RT will continue to follow.     Roderick Chow, RT

## 2022-05-09 NOTE — PLAN OF CARE
Problem: Plan of Care - These are the overarching goals to be used throughout the patient stay.    Goal: Readiness for Transition of Care  Outcome: Adequate for Care Transition    Pt alert and oriented x 4. Lung sounds coarse with crackles. On 5 L/min NC with humidifier; sat @ 94%. Dyspnea on exertion. Tolerating regular diet. Up with SBA. Clear to discharge home with home O2 therapy.

## 2022-05-09 NOTE — PLAN OF CARE
"Pt eating, drinking, voiding and has active bowel sounds.     Pt had an episode of feeling like she couldn't breath, her O2 sats remained above 90%. When RN assessed her she noticed her hands were shaking and that she seemed more anxious than normal.  Ativan given with good results.     Problem: Plan of Care - These are the overarching goals to be used throughout the patient stay.    Goal: Plan of Care Review/Shift Note  Description: The Plan of Care Review/Shift note should be completed every shift.  The Outcome Evaluation is a brief statement about your assessment that the patient is improving, declining, or no change.  This information will be displayed automatically on your shift note.  Outcome: Ongoing, Progressing  Goal: Patient-Specific Goal (Individualized)  Description: You can add care plan individualizations to a care plan. Examples of Individualization might be:  \"Parent requests to be called daily at 9am for status\", \"I have a hard time hearing out of my right ear\", or \"Do not touch me to wake me up as it startles me\".  Outcome: Ongoing, Progressing     Problem: Gas Exchange Impaired  Goal: Optimal Gas Exchange  Outcome: Ongoing, Progressing       Goal Outcome Evaluation:        Continue to monitor VS, labs, respiratory status incision site and activity tolerance.                 "

## 2022-05-09 NOTE — PLAN OF CARE
Physical Therapy Discharge Summary    Reason for therapy discharge:    Discharged to home with home therapy.    Progress towards therapy goal(s). See goals on Care Plan in [facility] electronic health record for goal details.  Goals partially met.  Barriers to achieving goals:   limited tolerance for therapy and discharge from facility.    Therapy recommendation(s):    Continued therapy is recommended.  Rationale/Recommendations:  Home therapy for functional mobility and activity tolerance.

## 2022-05-09 NOTE — DISCHARGE INSTRUCTIONS
[facility]  Phone: [phone]  This agency has accepted you and will see you for physical therapy, occupational therapy, RN and home health aide.   The will call you to arrange the visits.

## 2022-05-09 NOTE — DISCHARGE SUMMARY
[facility]  Discharge Summary - Medicine & Pediatrics       Date of Admission:  5/5/2022  Date of Discharge:  5/9/2022  Discharging Provider: Dr. Romeo Tsang  Discharge Service: Hospitalist Service    Discharge Diagnoses   COPD exacerbation  Pneumonia of left lower lobe due to infectious organism  Hypoxia  Herpes zoster with complication  Tinea infection    Follow-ups Needed After Discharge    Follow up with primary care provider, Shai Ellington, within 7 days   to evaluate medication change and for hospital follow- up.  The following   labs/tests are recommended: glucose.      Follow up with Dr. Juarez at the [facility]   pulmonology clinic, within 1 week  to evaluate medication change and for   hospital follow- up. No follow up labs or test are needed.              Additional important follow-up instructions/to-do's for PCP:  1. Herpes zoster infection: Treated with Valtrex.  2. Type II diabetes/ Steroid induced hyperglycemia: No changes to current diabetes regimen while inpatient. Recommend reassessing once off steroids.  3. Tinea infection: Treated with topical clotrimazole.    Unresulted Labs Ordered in the Past 30 Days of this Admission     Date and Time Order Name Status Description    5/9/2022  9:18 AM Respiratory Aerobic Bacterial Culture In process     5/5/2022  4:12 AM Blood Culture Peripheral Blood Preliminary     5/5/2022  4:12 AM Blood Culture Line, venous Preliminary     4/15/2022  5:59 PM Acid-Fast Bacilli Culture and Stain In process       These results will be followed up by outpatient pulmonologist/PCP.    Discharge Disposition   Discharged to [facility] (Facility Type: [facility])  Condition at discharge: Stable    Hospital Course   Adalgisa Solano was admitted on 5/5/2022 for acute on chronic hypoxic and hypercarbic respiratory failure likely secondary to exacerbation of COPD/Bronchiectasis. She has a history of pulmonary  disease including COPD with persistent bronchiectasis (getting vest therapy at home for the last 2 years), non-insulin-dependent diabetes, chronic A. fib, chronic HFpEF, and several recent hospitalizations for COPD exacerbation, most recently complicated by Pseudomonas and Enterococcus pneumonia (discharged on 4/22/2022). On admission, she was hypoxic and a CT chest revealed new a left lower lobe infiltrate. Labs were unremarkable aside from mild hyponatremia presumed to be SIADH. Blood cultures with no growth to date. Pulmonology and ID assessed and suspected COPD and/or bronchiectasis exacerbations with low suspicion for pneumonia. Her respiratory status improved with antibiotics, prednisone, and nebs.    The patient was deemed stable for discharge to TCU on 5/9/2022. She was maintaining good O2 saturation on 5L nasal cannula (home O2 use of 3-5L). Discharged with p.o. azithromycin, steroid taper, and Valtrex for her herpes zoster infection. Sent home with supplemental O2, as well as home nursing, PT, OT, and home health aid.    Consultations This Hospital Stay   PHYSICAL THERAPY ADULT IP CONSULT  IP RESPIRATORY CARE CHRONIC PULMONARY DISEASE SPECIALIST  INFECTIOUS DISEASES IP CONSULT  PHARMACY TO DOSE VANCO  WOUND OSTOMY CONTINENCE NURSE  IP CONSULT  LYMPHEDEMA THERAPY IP CONSULT  PULMONARY IP CONSULT  CARE MANAGEMENT / SOCIAL WORK IP CONSULT    Code Status   No CPR- Do NOT Intubate     The patient was discussed with Dr. Trinh HUANG MS 4  [address] [address]  Phone: [phone]  Fax: [phone]    I have seen and examined the patient.  I have discussed the case with Student Valentina Huang.  I agree with the findings, assessment and plan.     Win Awan MD PGY1  [facility]  May 9,  2022  ______________________________________________________________________    Physical Exam   Vital Signs: Temp: 97.9  F (36.6  C) Temp src: Oral BP: 109/55 Pulse: 94   Resp: 18 SpO2: 98 % O2 Device: Nasal cannula Oxygen Delivery: 5 LPM  Weight: 0 lbs 0 oz  Constitutional: awake, alert, cooperative  Respiratory: mild discomfort with cough/breathing, course expiratory lung sounds throughout all lung fields, although improved from previously  Cardiovascular: Irregularly irregular rhythm.  Regular rate, normal S1 and S2, no S3 or S4, and no murmur noted  Skin: 6x6mm annular lesion at base between 1st and 2nd right toes.  Similar lesion on the fourth digit of the right hand.  Extremities: Lower extremities edema, worse on the left. Notable hyperpigmentation on left ankle.  Still no signs of cellulitis      Primary Care Physician   Shai Ellington    Discharge Orders      Primary Care - Care Coordination Referral      Home Care Referral      Reason for your hospital stay    Acute worsening of bronchiectasis     Follow-up and recommended labs and tests     Follow up with primary care provider, Shai Ellington, within 7 days to evaluate medication change and for hospital follow- up.  The following labs/tests are recommended: glucose.    Follow up with Dr. Juarez at the [facility] pulmonology clinic, within 1 week  to evaluate medication change and for hospital follow- up. No follow up labs or test are needed.     Activity    Your activity upon discharge: activity as tolerated     Diet    Follow this diet upon discharge: Orders Placed This Encounter      Fluid restriction 1200 ML FLUID      Regular Diet Adult       Significant Results and Procedures   Lab Results   Component Value Date    WBC 8.4 05/05/2022    HGB 11.9 05/05/2022    HCT 37.3 05/05/2022     05/08/2022     05/08/2022    POTASSIUM 4.9 05/05/2022    CHLORIDE 88 (L) 05/05/2022    CO2 30 05/05/2022    BUN 13 05/05/2022     CR 0.53 (L) 05/09/2022     (H) 05/09/2022    SED 30 (H) 02/01/2020    DD 0.77 (H) 07/09/2021    AST 19 05/05/2022    ALT 24 05/05/2022    ALKPHOS 58 05/05/2022    BILITOTAL 0.9 05/05/2022    INR 3.26 (H) 05/05/2022     CT chest (5/5/2022):  IMPRESSION:  1.  No evidence of pulmonary embolus.  2.  Left lower lobe predominant new airspace disease compatible with pneumonia or aspiration. Follow-up to resolution recommended.  3.  Likely stable right upper lobe pulmonary nodules measuring up to 11 mm in size. Continued short-term follow-up CT versus PET CT recommended per Fleischner Society guidelines.      Discharge Medications   Current Discharge Medication List      START taking these medications    Details   azithromycin (ZITHROMAX) 250 MG tablet Take 1 tablet (250 mg) by mouth daily for 2 days  Qty: 2 tablet, Refills: 0    Associated Diagnoses: COPD exacerbation (H)      valACYclovir (VALTREX) 500 MG tablet Take 1 tablet (500 mg) by mouth 3 times daily for 7 days  Qty: 21 tablet, Refills: 0    Associated Diagnoses: Herpes zoster with complication         CONTINUE these medications which have CHANGED    Details   !! predniSONE (DELTASONE) 10 MG tablet Take 1 tablet (10 mg) by mouth daily for 3 days  Qty: 3 tablet, Refills: 0    Associated Diagnoses: COPD exacerbation (H)      !! predniSONE (DELTASONE) 20 MG tablet Take 1 tablet (20 mg) by mouth daily for 2 doses  Qty: 2 tablet, Refills: 0    Associated Diagnoses: COPD exacerbation (H)       !! - Potential duplicate medications found. Please discuss with provider.      CONTINUE these medications which have NOT CHANGED    Details   acetaminophen (TYLENOL) 500 MG tablet Take 500-1,000 mg by mouth every 6 hours as needed for mild pain      albuterol (PROAIR HFA/PROVENTIL HFA/VENTOLIN HFA) 108 (90 Base) MCG/ACT inhaler Inhale 2 puffs into the lungs every 4 hours as needed for shortness of breath / dyspnea or wheezing      albuterol (PROVENTIL) (2.5 MG/3ML) 0.083%  neb solution Take 2.5 mg by nebulization every 2 hours as needed for shortness of breath / dyspnea or wheezing  Qty: 600 mL, Refills: 3    Associated Diagnoses: Essential hypertension      ARIPiprazole (ABILIFY) 5 MG tablet TAKE 1 TABLET AT BEDTIME  Qty: 90 tablet, Refills: 3    Associated Diagnoses: Essential hypertension      atorvastatin (LIPITOR) 20 MG tablet TAKE 1 TABLET AT BEDTIME  Qty: 90 tablet, Refills: 3    Associated Diagnoses: Essential hypertension      calcium carbonate 600 mg-vitamin D 400 units (CALTRATE) 600-400 MG-UNIT per tablet Take 1 tablet by mouth every morning       cyanocobalamin (VITAMIN B-12) 500 MCG tablet Take 500 mcg by mouth every morning       diltiazem ER (DILT-XR) 240 MG 24 hr ER beaded capsule Take 240 mg by mouth daily      docusate sodium (COLACE) 100 MG capsule Take 1 capsule (100 mg) by mouth 2 times daily      Fluticasone-Umeclidin-Vilanterol (TRELEGY ELLIPTA) 100-62.5-25 MCG/INH oral inhaler Inhale 1 puff into the lungs daily      furosemide (LASIX) 40 MG tablet Take 1 tablet (40 mg) by mouth daily      gabapentin (NEURONTIN) 300 MG capsule Take 600 mg by mouth every evening 4PM      guaiFENesin (MUCINEX) 600 MG 12 hr tablet Take 2 tablets (1,200 mg) by mouth 2 times daily  Qty: 60 tablet, Refills: 0    Associated Diagnoses: COPD with exacerbation (H)      insulin aspart (NOVOLOG PEN) 100 UNIT/ML pen Inject Subcutaneous 3 times daily (with meals) Correction Scale - MEDIUM INSULIN RESISTANCE DOSING     Do Not give Correction Insulin if Pre-Meal BG less than 140.   For Pre-Meal  - 189 give 1 unit.   For Pre-Meal  - 239 give 2 units.   For Pre-Meal  - 289 give 3 units.   For Pre-Meal  - 339 give 4 units.   For Pre-Meal - 399 give 5 units.   For Pre-Meal -449 give 6 units  For Pre-Meal BG greater than or equal to 450 give 7 units.   To be given with prandial insulin, and based on pre-meal blood glucose.    Notify provider if glucose greater  than or equal to 350 mg/dL after administration of correction dose.  Qty: 15 mL      ipratropium - albuterol 0.5 mg/2.5 mg/3 mL (DUONEB) 0.5-2.5 (3) MG/3ML neb solution Take 1 vial (3 mLs) by nebulization 4 times daily Once breathing is better, then change to every 6 hours as needed for shortness of breath  Qty: 360 mL, Refills: 11    Associated Diagnoses: COPD with acute exacerbation (H)      LORazepam (ATIVAN) 0.5 MG tablet Take 0.5 tablets (0.25 mg) by mouth every 6 hours as needed for anxiety Hold with sedation  Qty: 10 tablet, Refills: 0    Associated Diagnoses: Anxiety      melatonin 1 MG TABS tablet Take 1 tablet (1 mg) by mouth nightly as needed for sleep      menthol-zinc oxide (CALMOSEPTINE) 0.44-20.6 % OINT ointment Apply topically 2 times daily Barrier cream. Apply to buttocks.      metFORMIN (GLUCOPHAGE) 500 MG tablet Take 1 tablet (500 mg) by mouth 2 times daily (with meals)  Qty: 60 tablet, Refills: 11    Associated Diagnoses: Essential hypertension      mirtazapine (REMERON) 7.5 MG tablet 1 tab daily for 1 week then 15 mg daily  Qty: 7 tablet, Refills: 0    Associated Diagnoses: Anxiety      omeprazole (PRILOSEC) 20 MG DR capsule TAKE 1 CAPSULE DAILY  Qty: 90 capsule, Refills: 3    Associated Diagnoses: Essential hypertension      phenylephrine-shark liver oil-mineral oil-petrolatum (PREPARATION H) 0.25-3-14-71.9 % rectal ointment Place rectally 4 times daily as needed for hemorrhoids    Associated Diagnoses: Hemorrhoids      polyethylene glycol (MIRALAX) 17 GM/Dose powder Take 17 g by mouth daily as needed  Qty: 510 g      potassium chloride ER (KLOR-CON M) 10 MEQ CR tablet Take 10 mEq by mouth daily      QUEtiapine (SEROQUEL) 25 MG tablet Take 0.5 tablets (12.5 mg) by mouth 2 times daily Hold with sedation  Refills: 0      sodium chloride (NEBUSAL) 3 % neb solution Take 3 mLs by nebulization 4 times daily      spironolactone (ALDACTONE) 25 MG tablet TAKE ONE-HALF (1/2) TABLET DAILY  Qty: 45 tablet,  Refills: 3    Associated Diagnoses: Essential hypertension      Vitamin D3 (CHOLECALCIFEROL) 25 mcg (1000 units) tablet Take 1 capsule by mouth every morning       XARELTO ANTICOAGULANT 20 MG TABS tablet TAKE 1 TABLET DAILY WITH DINNER  Qty: 90 tablet, Refills: 1    Associated Diagnoses: Atrial fibrillation with RVR (H)      Budeson-Glycopyrrol-Formoterol (BREZTRI AEROSPHERE) 160-9-4.8 MCG/ACT AERO Inhale 2 puffs into the lungs 2 times daily  Qty: 17.7 g, Refills: 3    Associated Diagnoses: Bronchiectasis with acute exacerbation (H)           Allergies   Allergies   Allergen Reactions     Tramadol Nausea     Tuberculin Tests      Amoxicillin Itching and Rash     Levofloxacin Itching and Rash     Penicillins Itching and Rash     Has tolerated ceftriaxone.

## 2022-05-09 NOTE — PROGRESS NOTES
Lymphedema Discharge Summary    Reason for therapy discharge:    Discharged to home with home therapy.    Progress towards therapy goal(s). See goals on Care Plan in [facility] electronic health record for goal details.  Goals met    Therapy recommendation(s):    Continued therapy is recommended.  Rationale/Recommendations:  May need additional support at home or Outpatient.

## 2022-05-10 NOTE — PROGRESS NOTES
"Assessment/Plan:    COPD and exacerbation with recent hospital stay May 5 through 9 [facility].  Pneumonia left lung base.  Suggest follow-up with Dr. Jun Juarez or pulmonologist.    Herpes zoster perineal area.  Not examined.    Hyperglycemia related to steroid therapy.  Latest blood sugar is 150.  Continue metformin without insulin.    Schizoaffective disorder.  Discussed Seroquel therapy.  Seems sleepy today may be a side effect of same.    15 minutes spent on the date of the encounter doing chart review, patient visit, documentation and discussion with family     Subjective:  Adalgisa Solano is a 86 year old female presents for the following health issues as above.  Follow-up hospital stay discharged yesterday from the hospital.    ROS:  Wheezing continues no blood in stool urine sputum medication list reviewed reconciled.    Objective:  /58 (BP Location: Right arm, Patient Position: Sitting)   Temp 97.8  F (36.6  C)   Ht 1.549 m (5' 1\")   Wt 63.5 kg (140 lb)   BMI 26.45 kg/m    Heart tones distant sounds regular chest sounds are sibilant rales rhonchi scattered seems sleepy.  Little or no leg edema.  Wheelchair-bound  at her side supportive.    Shai Ellington MD  Internal Medicine    "

## 2022-05-11 NOTE — TELEPHONE ENCOUNTER
MTM referral from: Transitions of Care (recent hospital discharge or ED visit)    MTM referral outreach attempt #2 on May 11, 2022 at 10:48 AM      Outcome: Spoke with patient patient declined a visit    RATNA Portillo

## 2022-05-12 NOTE — TELEPHONE ENCOUNTER
"Spoke w/ pt and daughter.   They state they are not sure if pt should be on insulin as pt was on insulin in hospital.   Per chart review:  Dr. Ellington note 5/10/22:   \"Hyperglycemia related to steroid therapy.  Latest blood sugar is 150.  Continue metformin without insulin.\"    Advised of message above. Daughter verbalized understanding. They will check BG daily and call back if >500.     Pt will be done w/ steroid on Tuesday. Daughter checked BG while on the phone it is at 364 mg/dl, pt just ate an ice cream cone.   "

## 2022-05-12 NOTE — TELEPHONE ENCOUNTER
All ok    
Called Loraine at [facility] but need to discuss the amount of O2 Adalgisa is on with Dr. Ellington    
Talked with Loraine at home care and Ok's 5L of O2 till Kim sees her Pulmonologist on May 24th  
verbal orders    skilled nurse visit   1x 1week, 2x 2 weeks, 1x 7 weeks    PT eval and treat  OT eval and treat    Home Health adie, 2x a week for 6 weeks    -Medication discrepancies, she is not on isulin right now does she need to be?    -Does patietn need continue azithromycin (ZITHROMAX) 250 MG tablet    Interim requesting soraya th patients behalf:    -Potassium 10 mg daily... if to contiune geoff need prescription    -hkbtmevbut71.5 mg twice a day requesting prescription... psychartist retired    -mirtazapine 7.5 mg for 1 week then 15 mg daily requesting, physcatris retired      Interim [facility]  [phone]  Message: Y    
- - -

## 2022-05-12 NOTE — TELEPHONE ENCOUNTER
Patient just got out of the hospital and is requesting a call back today from Chantel or any educator.

## 2022-05-13 NOTE — TELEPHONE ENCOUNTER
Kim is down to four Liters of O2. She does have an appointment later in the month with the Pulmonologist.

## 2022-05-13 NOTE — PROGRESS NOTES
Contact   Chart Review     Situation: Patient chart reviewed by .    Background: [facility] following for discharge from TCU.     Assessment: Patient is home with home care. Talked to patient and she is doing ok and has shared her concerns with nurse from home care who will follow up with PCP.  She does not need other support at this time.     Plan/Recommendations: No further outreach planned.     Stephania Romeo,   [facility]  [phone]

## 2022-05-13 NOTE — LETTER
[facility] CARE COORDINATION  [facility]    May 13, 2022    Adalgisa Solano  [address] [place] [address]      Dear Adalgisa,    I am a clinic care coordinator who works with Shai Ellington MD at [facility]. I wanted to thank you for spending the time to talk with me.  Below is a description of clinic care coordination and how I can further assist you.      The clinic care coordination team is made up of a registered nurse,  and community health worker who understand the health care system. The goal of clinic care coordination is to help you manage your health and improve access to the health care system in the most efficient manner. The team can assist you in meeting your health care goals by providing education, coordinating services, strengthening the communication among your providers and supporting you with any resource needs.    Please feel free to contact me at [phone] with any questions or concerns. We are focused on providing you with the highest-quality healthcare experience possible and that all starts with you.     Sincerely,     Stephania Romeo,   [facility]  [phone]

## 2022-05-13 NOTE — TELEPHONE ENCOUNTER
Patient requesting a call back from [facility] to discuss oxygen level concern. Writer asked if they wanted nurse triage they stated they are good but would like a call back from [facility].    Call Back   [phone]

## 2022-05-16 PROBLEM — J96.01 ACUTE RESPIRATORY FAILURE WITH HYPOXIA (H): Status: ACTIVE | Noted: 2022-01-01

## 2022-05-16 NOTE — ED TRIAGE NOTES
EMS called to pt's home for increasing shortness of breath.  Pt has history of COPD and normally wears 3L oxygen at home.  Pt has had several hospitalizations for breathing concerns recently per EMS.  EMS states 88% on 5L.

## 2022-05-16 NOTE — PROGRESS NOTES
Pharmacy Note - Admission Medication History    Pertinent Provider Information:     Patient was recently in the hospital 4/13-4/22 and 5/5-5/9. Several medication changes made, some confusion regarding changes.      At 4/22 discharge to skilled nursing facility - Novolog, lorazepam, and melatonin prescribed, continued at re-admission 5/5 and discharged on them 5/9; however patient reports she is not taking. Quetiapine likely added during stay at nursing facility, yet patient is not taking. I removed the above mentioned medications from her list. She has continued docusate and Miralax, taking both once daily. Although she doesn't know if she needs them or not.     Second, patient is questioning if she should remain on higher dose of diltiazem  mg once daily compared with what she was taking prior to diltiazem  mg once daily.      At 5/9 discharge, prescribed prednisone and Valtrex for shingles, patient reports she has completed both courses.      ______________________________________________________________________    Prior To Admission (PTA) med list completed and updated in EMR.       PTA Med List   Medication Sig Note Last Dose     acetaminophen (TYLENOL) 500 MG tablet Take 500-1,000 mg by mouth every 6 hours as needed for mild pain  Unknown at Unknown time     albuterol (PROAIR HFA/PROVENTIL HFA/VENTOLIN HFA) 108 (90 Base) MCG/ACT inhaler Inhale 2 puffs into the lungs every 4 hours as needed for shortness of breath / dyspnea or wheezing  Unknown at Unknown time     albuterol (PROVENTIL) (2.5 MG/3ML) 0.083% neb solution Take 2.5 mg by nebulization every 2 hours as needed for shortness of breath / dyspnea or wheezing  Unknown at Unknown time     ARIPiprazole (ABILIFY) 5 MG tablet TAKE 1 TABLET AT BEDTIME  5/15/2022 at Unknown time     atorvastatin (LIPITOR) 20 MG tablet TAKE 1 TABLET AT BEDTIME  5/15/2022 at Unknown time     calcium carbonate 600 mg-vitamin D 400 units (CALTRATE) 600-400 MG-UNIT per  tablet Take 1 tablet by mouth every morning   5/16/2022 at Unknown time     cyanocobalamin (VITAMIN B-12) 500 MCG tablet Take 500 mcg by mouth every morning   5/16/2022 at Unknown time     diltiazem ER COATED BEADS (CARDIZEM CD/CARTIA XT) 120 MG 24 hr capsule Take 240 mg by mouth daily 5/16/2022: 4/22/22: During recent hospital stay, diltiazem dose increased to 240 mg daily for tachycardia. Patient has been taking 2x120 mg but is questioning why she is on a higher dose, felt more comfortable taking 120 mg daily as previously prescribed by cardiology.  5/16/2022 at Unknown time     docusate sodium (COLACE) 100 MG capsule Take 100 mg by mouth daily  5/16/2022 at Unknown time     Fluticasone-Umeclidin-Vilanterol (TRELEGY ELLIPTA) 100-62.5-25 MCG/INH oral inhaler Inhale 1 puff into the lungs daily  5/16/2022 at not with     furosemide (LASIX) 20 MG tablet Take 40 mg by mouth daily 5/16/2022: 2 tabs 5/16/2022 at Unknown time     gabapentin (NEURONTIN) 300 MG capsule Take 600 mg by mouth At Bedtime  5/15/2022 at Unknown time     guaiFENesin (MUCINEX) 600 MG 12 hr tablet Take 2 tablets (1,200 mg) by mouth 2 times daily (Patient taking differently: Take 1,200 mg by mouth daily)  5/16/2022 at takes 2 tabs daily     ipratropium - albuterol 0.5 mg/2.5 mg/3 mL (DUONEB) 0.5-2.5 (3) MG/3ML neb solution Take 1 vial (3 mLs) by nebulization 4 times daily Once breathing is better, then change to every 6 hours as needed for shortness of breath  Past Week at Unknown time     metFORMIN (GLUCOPHAGE) 500 MG tablet Take 1 tablet (500 mg) by mouth 2 times daily (with meals)  5/16/2022 at am     omeprazole (PRILOSEC) 20 MG DR capsule TAKE 1 CAPSULE DAILY  5/16/2022 at Unknown time     polyethylene glycol (MIRALAX) 17 GM/Dose powder Take 17 g by mouth daily  5/16/2022 at Unknown time     potassium chloride ER (KLOR-CON M) 10 MEQ CR tablet Take 1 tablet (10 mEq) by mouth daily  5/16/2022 at Unknown time     spironolactone (ALDACTONE) 25 MG  tablet TAKE ONE-HALF (1/2) TABLET DAILY  5/16/2022 at Unknown time     Vitamin D3 (CHOLECALCIFEROL) 25 mcg (1000 units) tablet Take 1 capsule by mouth every morning   5/16/2022 at Unknown time     XARELTO ANTICOAGULANT 20 MG TABS tablet TAKE 1 TABLET DAILY WITH DINNER  5/15/2022 at Unknown time       Information source(s): Patient, [facility]  Method of interview communication: in-person    Summary of Changes to PTA Med List  New: none  Discontinued: Novolog, lorazepam (4/22/22-not picked up), quetiapine (not using), melatonin (not using), Calmoseptine ointment (not using), mirtazapine (4/22/22 7 day supply), prednisone completed, Valtrex completed,   Changed: Diltiazem  mg x 2 = 240 mg dose, docusate 100 mg once daily, furosemide 20 mg x 2 = 40 mg dose, gabapentin taken at bedtime, Mucinex 1200 mg once daily, Miralax once daily,     Patient was asked about OTC/herbal products specifically.  PTA med list reflects this.    In the past week, patient estimated taking medication this percent of the time:  50-90% due to other.    Allergies were reviewed, assessed, and updated with the patient.      Patient did not bring any medications to the hospital and can't retrieve from home. No multi-dose medications are available for use during hospital stay.     The information provided in this note is only as accurate as the sources available at the time of the update(s).    Thank you for the opportunity to participate in the care of this patient.    Cori Lewis [facility]  5/16/2022 4:48 PM

## 2022-05-16 NOTE — CONSULTS
Care Management Initial Consult    General Information  Assessment completed with: Patient, Spouse or significant other,    Type of CM/SW Visit: Initial Assessment    Primary Care Provider verified and updated as needed: Yes   Readmission within the last 30 days: previous discharge plan unsuccessful   Return Category: Exacerbation of disease  Reason for Consult: discharge planning  Advance Care Planning:            Communication Assessment  Patient's communication style: spoken language (English or Bilingual)             Cognitive  Cognitive/Neuro/Behavioral:                        Living Environment:   People in home: spouse     Current living Arrangements: house      Able to return to prior arrangements: yes       Family/Social Support:  Care provided by: homecare agency  Provides care for: no one  Marital Status:             Description of Support System: Supportive, Involved         Current Resources:   Patient receiving home care services: Yes  Skilled Home Care Services: Skilled Nursing, Home Health Aid, Physicial Therapy, Occupational Therapy  Community Resources:    Equipment currently used at home: walker, rolling  Supplies currently used at home: Oxygen Tubing/Supplies, Nebulizer tubing    Employment/Financial:  Employment Status:          Financial Concerns:             Lifestyle & Psychosocial Needs:  Social Determinants of Health     Tobacco Use: Medium Risk     Smoking Tobacco Use: Former Smoker     Smokeless Tobacco Use: Never Used   Alcohol Use: Not on file   Financial Resource Strain: Not on file   Food Insecurity: Not on file   Transportation Needs: Not on file   Physical Activity: Not on file   Stress: Not on file   Social Connections: Not on file   Intimate Partner Violence: Not on file   Depression: Not at risk     PHQ-2 Score: 0   Housing Stability: Not on file       Functional Status:  Prior to admission patient needed assistance:   Dependent ADLs:: Ambulation-walker,  "Bathing  Dependent IADLs:: Cleaning, Laundry, Shopping, Transportation  Assesssment of Functional Status: Not at baseline with ADL Functioning    Mental Health Status:          Chemical Dependency Status:                Values/Beliefs:  Spiritual, Cultural Beliefs, Adventist Practices, Values that affect care:                 Additional Information:  AIDET completed. Writer met with Pt and Pts spouse Anabel \"Les\" [phone], cell: [phone]. Pt lives with spouse in a private residence. She had been in hospital 5/5- 5/10/ and discharged with [facility]; RN, PT, OT, HHA.  will call to cancel. Patient would like that set up again at discharge. She uses 02 at 3 L, but recently has increased it to 5 L. She has a nebulizer, uses a walker.  here and will transport at discharge.     Informed CM will follow. Tracey Maradiaga RN, CM, JAMES          "

## 2022-05-16 NOTE — ED PROVIDER NOTES
EMERGENCY DEPARTMENT ENCOUNTER      NAME: Adalgisa Solano  AGE: 86 year old female  YOB: 1935  MRN: [id number]  EVALUATION DATE & TIME: 5/16/2022 12:46 PM    PCP: Shai Ellington    ED PROVIDER: Keren Mendez M.D.      Chief Complaint   Patient presents with     Shortness of Breath         FINAL IMPRESSION:  1. COPD exacerbation (H)    2. Acute respiratory failure with hypoxia (H)    3. Hypomagnesemia    4. Hyponatremia          ED COURSE & MEDICAL DECISION MAKING:    ED Course as of 05/16/22 1546   Mon May 16, 2022   1315 Pt with coarse diffuse BS with known COPD on 5L NC O2 sat 96% currently with some tachypnea with suggestion of current COPD exacerbation. Pt with bilateral LE edema same as prior and given diffuse coarse BS with prolonged expiratory phase, this is certainly most consistent with COPD exacerbation rather than rales with COPD exacerbation, low dose IV lasix begun along with nebs and steroid therapy, cutlures pending and XR and COVID19 PCR pending, patient and  amenable to plan to admit for nebs/steroids and serial reassessment and diuresis with troponin ACS r/o   1520 COVID19 negative and influenza negative reassuringly and BNP a nebulous 189, procalcitonin negative and lactic acid 0.8 and WBC normal 10.5 thus unlikely acute pneumonia. Pt with new hyponatremia 129 which may relate to fatigue and magnesium very low at 1.2, repleted.    1521 XR with some interstitial marking increase but without effusion or signs of florid CHF exacerbation. With primarily COPD exacerbation, no abx given, will reassess now.   1525 Pt with still coarse breath sounds, pt feeling somewhat improved. Will give further albuterol, sat 93% on 5L NC   1527 I spoke with charge BARON Luevano who notes no beds available currently for admission in [place], should admit here as boarding patient. Hospitalist paged for admission   1542 Pt endorsed to hospitalist [facility] to board in ED under their care      Critical Care time was 45 minutes for this patient excluding procedures.    Pertinent Labs & Imaging studies reviewed. (See chart for details)    N95 worn  A face shield was worn also  COVID PPE    1:13 PM I met with the patient, obtained history, performed an initial exam, and discussed options and plan for diagnostics and treatment here in the ED.   3:23 PM I rechecked and updated the patient on results.  3:44 PM I spoke with the [facility] resident Dr. Rolle who accepts the patient for admission.    At the conclusion of the encounter I discussed the results of all of the tests and the disposition. The questions were answered. The patient or family acknowledged understanding and was agreeable with the care plan.     MEDICATIONS GIVEN IN THE EMERGENCY:  Medications   albuterol (PROVENTIL) neb solution 2.5 mg (has no administration in time range)   magnesium sulfate 2 g in water intermittent infusion (has no administration in time range)   albuterol (PROVENTIL) neb solution 2.5 mg (2.5 mg Nebulization Given 5/16/22 1533)   ipratropium - albuterol 0.5 mg/2.5 mg/3 mL (DUONEB) neb solution 3 mL (3 mLs Nebulization Given 5/16/22 1324)   methylPREDNISolone sodium succinate (solu-MEDROL) injection 125 mg (125 mg Intravenous Given 5/16/22 1342)   furosemide (LASIX) injection 40 mg (40 mg Intravenous Given 5/16/22 1345)       NEW PRESCRIPTIONS STARTED AT TODAY'S ER VISIT  New Prescriptions    No medications on file          =================================================================    HPI      Adalgisa Solano is a 86 year old female with PMHx of COPD on 3L O2 nasal cannula at baseline, atrial fibrillation on Xarelto, systolic CHF on Lasix, diabetes mellitus type 2, schizoaffective disorder who presents to the ED today via EMS with shortness of breath. Patient endorses 1 week of gradually increasing shortness of breath from baseline which she states is similar to her prior COPD exacerbations.  Shortness of breath is worse with exertion. For the last couple of days, she started to develop intermittent chest pain which is worse with deep inspiration. The patient's intermittent chest pain are what prompted the patient's  to call EMS. She states her chest pain is currently resolved. On EMS arrival, they note the patient had O2 saturations of 88% while on 5L O2 NC.    Denies fever, changes in urine output, . She does endorse baseline leg swelling and a dry cough, but states these have been unchanged. Denies any known sick contacts.    SHx- Patient is a former smoker who quit in 2006. No current smoke exposure.    REVIEW OF SYSTEMS   All other systems reviewed and are negative except as noted above in HPI.    PAST MEDICAL HISTORY:  Past Medical History:   Diagnosis Date     Acute and chronic respiratory failure with hypoxia (H)      Acute blood loss anemia 1/15/2019     Acute bronchitis 1/15/2019     Anemia     pernicious anemia     Anticoagulated 3/23/2022     Anxiety      Arm skin lesion, right      Arnold-Chiari malformation (H) 1998     Arthritis      Atrial fibrillation (H) 02/2017     Avascular necrosis of bone (H)      Breast cyst      Breast lump      Candidal skin infection      Cellulitis of left lower extremity 1/28/2019     CHF (congestive heart failure) (H)     diastolic and systolic     Chronic bronchitis (H)     & acute     Chronic diastolic heart failure (H)     diastolic chf     Chronic pain syndrome      Chronic respiratory failure with hypoxia (H) 3/13/2017     COPD (chronic obstructive pulmonary disease) (H)      COPD exacerbation (H)      Depression      Diet-controlled type 2 diabetes mellitus (H)      Drug induced constipation      DVT of axillary vein, acute (H)     left     DVT of axillary vein, acute left (H)      Edema      Encounter for palliative care      Erysipelas      Fracture of femoral neck, left (H)      Generalized muscle weakness      GERD (gastroesophageal reflux  disease)      History of blood clots      Hyperlipidemia      Hypertension      Left hip pain      Lumbar spinal stenosis      PAD (peripheral artery disease) (H)      Peripheral arterial disease (H) 10/28/2015     Psoriasis     arms      Pulmonary hypertension (H) 3/5/2018     Recurrent major depressive episodes (H)     Created by Conversion  Replacement Utility updated for latest IMO load     Schizoaffective disorder, bipolar type (H) 2015     Slow transit constipation      Stasis dermatitis of both legs      Status post total hip replacement, 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     Type 2 diabetes mellitus without complication (H) 2016     Vitamin B12 deficiency      Volume overload        PAST SURGICAL HISTORY:  Past Surgical History:   Procedure Laterality Date     BACK SURGERY       BREAST CYST ASPIRATION Left      CERVICAL SPINE SURGERY      for arnold chiari malformation      SECTION  X3     CHOLECYSTECTOMY       COLONOSCOPY       EXCISE LESION UPPER EXTREMITY Right 2019    Procedure: EXCISION RIGHT ARM LIPOMA;  Surgeon: Andreas Holly MD;  Location: [facility];  Service: General     EYE SURGERY      bilateral cataracts     HAND SURGERY       HERNIORRHAPHY UMBILICAL N/A 2021    Procedure: INCARCERATED UMBILICAL AND;  Surgeon: Andreas Holly MD;  Location: [facility]     JOINT REPLACEMENT Right     knee - partial     LAPAROSCOPIC HERNIORRHAPHY INCISIONAL Left 3/27/2015    Procedure: ATTEMPTED LAPAROSCOPIC ABDOMINA/FLANK INCISION REPAIR;  Surgeon: Jose Lakhani MD;  Location: [facility];  Service:      LUMBAR FUSION N/A 2014    Procedure: POSTERIOR FUSION / DECOMPRESSION L3-S1 WITH PELVIC FIXATION BILATERAL ;  Surgeon: Rajan Mares MD;  Location: [facility];  Service:      OTHER SURGICAL HISTORY      IR for PAD LOWER EXTREMITY     REPAIR SPIGELIAN HERNIA N/A 2021    Procedure: SPIGELIAN HERNIA REPAIR;  Surgeon: Andreas Holly MD;  Location:   [facility] OR      ASPIRATION HEMATOMA SEROMA OR FLUID COLLECTION  2/6/2020     [facility] OPEN FIXATN PROX END/NECK FEMUR FX Left 1/2/2019    Procedure: OPEN REDUCTION INTERNAL FIXATION, FRACTURE LEFT HIP, PERIPROSTHETIC FRACTURE;  Surgeon: Kevin Lackey MD;  Location: [facility] [place] OR;  Service: Orthopedics     [facility] TOTAL HIP ARTHROPLASTY Left 1/2/2019    Procedure: LEFT TOTAL HIP ARTHROPLASTY;  Surgeon: Kevin Lackey MD;  Location: [facility] [place] OR;  Service: Orthopedics     [facility] TOTAL HIP ARTHROPLASTY Right 9/16/2019    Procedure: RIGHT TOTAL HIP ARTHROPLASTY;  Surgeon: Kan Quesada MD;  Location: [facility] [place] OR;  Service: Orthopedics     [facility] TOTAL KNEE ARTHROPLASTY Left 10/7/2016    Procedure: TOTAL KNEE ARTHROPLASTY, LEFT;  Surgeon: Kan Quesada MD;  Location: [facility] [place];  Service: Orthopedics       CURRENT MEDICATIONS:    acetaminophen (TYLENOL) 500 MG tablet  albuterol (PROAIR HFA/PROVENTIL HFA/VENTOLIN HFA) 108 (90 Base) MCG/ACT inhaler  albuterol (PROVENTIL) (2.5 MG/3ML) 0.083% neb solution  ARIPiprazole (ABILIFY) 5 MG tablet  atorvastatin (LIPITOR) 20 MG tablet  calcium carbonate 600 mg-vitamin D 400 units (CALTRATE) 600-400 MG-UNIT per tablet  cyanocobalamin (VITAMIN B-12) 500 MCG tablet  diltiazem ER (DILT-XR) 240 MG 24 hr ER beaded capsule  docusate sodium (COLACE) 100 MG capsule  Fluticasone-Umeclidin-Vilanterol (TRELEGY ELLIPTA) 100-62.5-25 MCG/INH oral inhaler  furosemide (LASIX) 40 MG tablet  gabapentin (NEURONTIN) 300 MG capsule  guaiFENesin (MUCINEX) 600 MG 12 hr tablet  insulin aspart (NOVOLOG PEN) 100 UNIT/ML pen  ipratropium - albuterol 0.5 mg/2.5 mg/3 mL (DUONEB) 0.5-2.5 (3) MG/3ML neb solution  LORazepam (ATIVAN) 0.5 MG tablet  melatonin 1 MG TABS tablet  menthol-zinc oxide (CALMOSEPTINE) 0.44-20.6 % OINT ointment  metFORMIN (GLUCOPHAGE) 500 MG tablet  mirtazapine (REMERON) 7.5 MG tablet  omeprazole (PRILOSEC) 20 MG DR capsule  polyethylene glycol (MIRALAX) 17 GM/Dose  powder  potassium chloride ER (KLOR-CON M) 10 MEQ CR tablet  QUEtiapine (SEROQUEL) 25 MG tablet  sodium chloride (NEBUSAL) 3 % neb solution  spironolactone (ALDACTONE) 25 MG tablet  valACYclovir (VALTREX) 500 MG tablet  Vitamin D3 (CHOLECALCIFEROL) 25 mcg (1000 units) tablet  XARELTO ANTICOAGULANT 20 MG TABS tablet        ALLERGIES:  Allergies   Allergen Reactions     Tramadol Nausea     Tuberculin Tests      Amoxicillin Itching and Rash     Levofloxacin Itching and Rash     Penicillins Itching and Rash     Has tolerated ceftriaxone.       FAMILY HISTORY:  Family History   Problem Relation Age of Onset     Coronary Artery Disease Mother      Coronary Artery Disease Father        SOCIAL HISTORY:   Social History     Socioeconomic History     Marital status:      Spouse name: None     Number of children: None     Years of education: None     Highest education level: None   Tobacco Use     Smoking status: Former Smoker     Quit date: 2006     Years since quittin.3     Smokeless tobacco: Never Used     Tobacco comment: quit    Vaping Use     Vaping Use: Never used   Substance and Sexual Activity     Alcohol use: No     Drug use: No   Social History Narrative           VITALS:  Patient Vitals for the past 24 hrs:   BP Temp Temp src Pulse Resp SpO2 Weight   22 1533 -- -- -- 96 -- 95 % --   22 1450 136/69 -- -- 89 -- 97 % --   22 1430 138/69 -- -- 90 -- 98 % --   22 1400 (!) 148/86 -- -- 88 21 93 % --   22 1324 -- -- -- 83 24 92 % --   22 1300 -- -- -- 81 27 94 % --   22 1241 124/61 97.8  F (36.6  C) Temporal 80 26 99 % 64.4 kg (142 lb)       PHYSICAL EXAM    GENERAL: Awake, alert.  In no acute distress.   HEENT: Normocephalic, atraumatic.  Pupils equal, round and reactive.  Conjunctiva normal.  EOMI.  NECK: No stridor or apparent deformity.  PULMONARY: Diffuse coarse wheezes to bilateral apices without auscultated rales. Tachypeneic with slight scaling  retractions, but no intracostal or abdominal retractions. 96% O2 saturations on 5L O2 nasal cannula.  CARDIO: Regular rate and rhythm.  No significant murmur, rub or gallop.  Radial pulses strong and symmetrical.  ABDOMINAL: Abdomen soft, non-distended and non-tender to palpation.  No CVAT, no palpable hepatosplenomegaly.  EXTREMITIES: No lower extremity swelling or edema.    NEURO: Alert and oriented to person, place and time.  Cranial nerves grossly intact.  No focal motor deficit.  PSYCH: Normal mood and affect  SKIN: No rashes      LAB:  All pertinent labs reviewed and interpreted.  Results for orders placed or performed during the hospital encounter of 05/16/22   XR Chest Port 1 View    Impression    IMPRESSION: Moderately enlarged heart. Mildly increased retrocardiac opacity, favored to represent atelectasis. Resolved right lower lobe opacities. Mild pulmonary vascular engorgement with increased interstitial markings, not significantly changed.   Extra Blue Top Tube   Result Value Ref Range    Hold Specimen JIC    Extra Red Top Tube   Result Value Ref Range    Hold Specimen JIC    Extra Green Top (Lithium Heparin) Tube   Result Value Ref Range    Hold Specimen JIC    Extra Purple Top Tube   Result Value Ref Range    Hold Specimen JIC    Extra Green Top (Lithium Heparin) ON ICE   Result Value Ref Range    Hold Specimen JIC    Result Value Ref Range    INR 1.48 (H) 0.85 - 1.15   Comprehensive metabolic panel   Result Value Ref Range    Sodium 129 (L) 136 - 145 mmol/L    Potassium 3.8 3.5 - 5.0 mmol/L    Chloride 84 (L) 98 - 107 mmol/L    Carbon Dioxide (CO2) 36 (H) 22 - 31 mmol/L    Anion Gap 9 5 - 18 mmol/L    Urea Nitrogen 9 8 - 28 mg/dL    Creatinine 0.50 (L) 0.60 - 1.10 mg/dL    Calcium 8.2 (L) 8.5 - 10.5 mg/dL    Glucose 158 (H) 70 - 125 mg/dL    Alkaline Phosphatase 58 45 - 120 U/L    AST 19 0 - 40 U/L    ALT 22 0 - 45 U/L    Protein Total 5.6 (L) 6.0 - 8.0 g/dL    Albumin 2.5 (L) 3.5 - 5.0 g/dL     Bilirubin Total 0.7 0.0 - 1.0 mg/dL    GFR Estimate >90 >60 mL/min/1.73m2   Result Value Ref Range    Troponin I 0.01 0.00 - 0.29 ng/mL   Result Value Ref Range    Magnesium 1.2 (L) 1.8 - 2.6 mg/dL   B-Type Natriuretic Peptide ([facility] Only)   Result Value Ref Range     (H) 0 - 167 pg/mL   Symptomatic; Unknown Influenza A/B & SARS-CoV2 (COVID-19) Virus PCR Multiplex Nasopharyngeal    Specimen: Nasopharyngeal; Swab   Result Value Ref Range    Influenza A PCR Negative Negative    Influenza B PCR Negative Negative    RSV PCR Negative Negative    SARS CoV2 PCR Negative Negative   Lactic acid whole blood   Result Value Ref Range    Lactic Acid 0.8 0.7 - 2.0 mmol/L   Result Value Ref Range    Procalcitonin 0.07 0.00 - 0.49 ng/mL   CBC with platelets and differential   Result Value Ref Range    WBC Count 10.5 4.0 - 11.0 10e3/uL    RBC Count 3.81 3.80 - 5.20 10e6/uL    Hemoglobin 11.0 (L) 11.7 - 15.7 g/dL    Hematocrit 33.8 (L) 35.0 - 47.0 %    MCV 89 78 - 100 fL    MCH 28.9 26.5 - 33.0 pg    MCHC 32.5 31.5 - 36.5 g/dL    RDW 15.6 (H) 10.0 - 15.0 %    Platelet Count 281 150 - 450 10e3/uL    % Neutrophils 86 %    % Lymphocytes 4 %    % Monocytes 8 %    % Eosinophils 1 %    % Basophils 0 %    % Immature Granulocytes 1 %    NRBCs per 100 WBC 0 <1 /100    Absolute Neutrophils 9.0 (H) 1.6 - 8.3 10e3/uL    Absolute Lymphocytes 0.4 (L) 0.8 - 5.3 10e3/uL    Absolute Monocytes 0.8 0.0 - 1.3 10e3/uL    Absolute Eosinophils 0.1 0.0 - 0.7 10e3/uL    Absolute Basophils 0.0 0.0 - 0.2 10e3/uL    Absolute Immature Granulocytes 0.1 <=0.4 10e3/uL    Absolute NRBCs 0.0 10e3/uL   ECG 12-LEAD WITH MUSE ([facility])   Result Value Ref Range    Systolic Blood Pressure 123 mmHg    Diastolic Blood Pressure 72 mmHg    Ventricular Rate 90 BPM    Atrial Rate 94 BPM    ME Interval  ms    QRS Duration 70 ms     ms    QTc 428 ms    P Axis  degrees    R AXIS 91 degrees    T Axis 14 degrees    Interpretation ECG       Atrial fibrillation  Rightward  axis  Low voltage QRS  Septal infarct (cited on or before 11-JUN-2021)  Abnormal ECG  When compared with ECG of 05-MAY-2022 02:18,  Atrial fibrillation has replaced Sinus rhythm  Nonspecific T wave abnormality now evident in Inferior leads  Confirmed by SEE ED PROVIDER NOTE FOR, ECG INTERPRETATION ([id number]),  [address] ([id number]) on 5/16/2022 1:04:24 PM         RADIOLOGY:  Reviewed all pertinent imaging. Please see official radiology report.  XR Chest Port 1 View   Final Result   IMPRESSION: Moderately enlarged heart. Mildly increased retrocardiac opacity, favored to represent atelectasis. Resolved right lower lobe opacities. Mild pulmonary vascular engorgement with increased interstitial markings, not significantly changed.            EKG:    Reviewed and interpreted as: Performed at 12:48:56. Atrial fibrillation, 90 bpm, no ST changes. When compared with EKG of 05-MAY-2022, No significant change was found.      I have independently reviewed and interpreted the EKG(s) documented above.        I, Ji Tran, am serving as a scribe to document services personally performed by Dr. Keren Mendez based on my observation and the provider's statements to me. I, Keren Mendez MD attest that Ji Tran is acting in a scribe capacity, has observed my performance of the services and has documented them in accordance with my direction.     Keren Mendez MD  05/16/22 [id number]

## 2022-05-16 NOTE — H&P
[facility]    History and Physical - Hospitalist Service       Date of Admission:  5/16/2022    Assessment & Plan      Adalgisa Solano is a 86 year old female admitted on 5/5/2022. She has a history of pulmonary disease including COPD with persistent bronchiectasis getting vest therapy at home for the last 2 years, non-insulin-dependent diabetes, chronic A. fib, chronic heart failure with preserved ejection fraction who has had several recent hospitalizations for COPD exacerbation with pneumonia. Admitted for recurrent COPD exacerbation without signs of infection.    Acute on chronic respiratory failure  COPD exacerbation  Hx of recent LLL opacity, resolved  Recently admitted 5/5-5/9 for COPD exacerbation with LLL opacity treated with cefepime and vanco x1 day then transitioned to azithromycin course per ID input. Baseline oxygen needs 3-5L. Presented satting 88% on 5L. CXR shows retrocardiac opacity, likely atelectasis and resolved RLL opacities; mild pulmonary engorgement, no pleural effusions. No comment regarding previously LLL opacities on imaging and CXR only one view. Recent PNA treated with cefepime end of April; also treated with ertapenem and azithromycin beginning of May. On Trelegy Ellipta outpatient. No leukocytosis, elevated procal, therefore infectious source unlikely.   - prednisone 40mg tomorrow   - duonebs q4h   -albuterol nebs prn  -hold off on abx for now  -Mucomyst   -vest therapy  -RT consult  - continue PTA Trelegy Ellipta  -Consider palliative for ongoing decline  -consider pulm consult, 2 view CXR if not improving    Chronic diastolic CHF  Bilateral lymphedema  Pulmonary hypertension  Bilateral lower extremity edema L>R, however BNP relatively normal at 180 and patient endorses improvement from baseline. Last echocardiogram in 4/2022 with an EF of 65%, with moderate mitral and tricuspid insufficiency. Home regimen includes Lasix (40mg daily) and  Spironolactone (12.5 mg daily).  -Continue PTA Lasix and spironolactone for respiratory optimization  -OT lymphedema consult  - Continue PTA spironolactone     Herpes Zoster, (left buttock)  Improving, scabs present. No signs of secondary infection. Completed Valtrex.  - continue to monitor  - RN wound care     Hyponatremia  Sodium of 129 on admission. Multifactoral; could be secondary to lasix, recent bacterial infection, antipsychotic use.   - Recheck sodium in the morning  - Fluid restriction  - Regular diet     Atrial fibrillation  HTN  On Xarelto 20 mg daily and diltiazem 240 mg daily prior to admission.   -Continue diltiazem 240 mg daily   -Continue daily Xarelto 20 mg     Anxiety disorder  -Continue PTA Abilify     T2DM  At risk for steroid-induced hyperglycemia  Last Hgb A1C (3/21/22)- 6.2, suggesting well controlled diabetes. On metformin (500mg daily) prior to admission. Likely will be elevated in setting of steroids. Patient requests increasing metformin dose while getting steroids.   -4 times daily and AC glucose checks  -medium sliding scale insulin  -Continue metformin, increase dose to 1000 BID while receiving steroids per patient request ( BID)     Chronic pain syndrome-chronic mid to low back pain  -Continue Tylenol as needed  -Continue gabapentin 600 mg at bedtime  -Avoid NSAIDs as on anticoagulant     Magnesium deficiency  Mg low at 1.2.   - Mg replacement protocol          Diet: Fluid restriction 1200 ML FLUID  Combination Diet Regular Diet Adult  DVT Prophylaxis: DOAC  Borjas Catheter: Not present  Fluids: PO  Central Lines: None  Cardiac Monitoring: None  Code Status: Full Code    Clinically Significant Risk Factors Present on Admission         # Hyponatremia: Na = 129 mmol/L (Ref range: 136 - 145 mmol/L) on admission, will monitor as appropriate   # Hypomagnesemia: Mg = 1.2 mg/dL (Ref range: 1.8 - 2.6 mg/dL) on admission, will replace as needed   # Hypoalbuminemia: Albumin = 2.5 g/dL  "(Ref range: 3.5 - 5.0 g/dL) on admission, will monitor as appropriate   # Coagulation Defect: home medication list includes an anticoagulant medication    # Overweight: Estimated body mass index is 26.83 kg/m  as calculated from the following:    Height as of 5/10/22: 1.549 m (5' 1\").    Weight as of this encounter: 64.4 kg (142 lb).      Disposition Plan   Expected Discharge: 1-3 days  Anticipated discharge location:  Awaiting care coordination huddle  Delays:     clinical improvement       The patient's care was discussed with the Attending Physician, Dr. Paulino.    Karuna Tiwari PGY-1  Hospitalist Service  [facility]  Securely message with the Vocera Web Console (learn more here)  Text page via [email or web address] Paging/Directory       ______________________________________________________________________    Chief Complaint   Shortness of breath    History is obtained from the patient    History of Present Illness   Adalgisa Solano is a 86 year old female admitted on 5/5/2022. She has a history of pulmonary disease including COPD with persistent bronchiectasis getting vest therapy at home for the last 2 years, non-insulin-dependent diabetes, chronic A. fib, chronic heart failure with preserved ejection fraction who has had several recent hospitalizations for COPD exacerbation with pneumonia. She presents with worsening dyspnea.     Admitted to [facility] from 5/5/22 to 5/9/22 for COPD exacerbation, LLL pneumonia and acute on chronic hypoxic respiratory failure.    Feels unwell today, still cannot function since last hospital discharge. Used to be very active in the home/doing housework/bookkeeping. Has not been at her baseline for the last 2 months. Dyspnea never really went away. She felt like she was discharged too quickly. Gets sob with any exercise or ambulation, was using wheelchair at TCU. Baseline between 3-5 L O2. Was typically on 4-5L at the TCU.      In last few days " has been coughing up yellow-brown phlegm, Mucinex exacerbates this. Used to be only yellow sputum.     Has been having chest pressure, attributes to CHF. Describes as pressure in lungs and back. However says her LE swelling is improved from baseline. Compression socks help. Other chest pain from the exacerbation is improved now in the ED; she feels 80% better.    ROS positive for chills without fever.     Outpatient follow up with pulmonology scheduled 5/24/2022    Received IV lasix 40 mg and IV methylpred in ED.     Review of Systems    The 10 point Review of Systems is negative other than noted in the HPI or here.     Past Medical History    I have reviewed this patient's medical history and updated it with pertinent information if needed.   Past Medical History:   Diagnosis Date     Acute and chronic respiratory failure with hypoxia (H)      Acute blood loss anemia 1/15/2019     Acute bronchitis 1/15/2019     Anemia     pernicious anemia     Anticoagulated 3/23/2022     Anxiety      Arm skin lesion, right      Arnold-Chiari malformation (H) 1998     Arthritis      Atrial fibrillation (H) 02/2017     Avascular necrosis of bone (H)      Breast cyst      Breast lump      Candidal skin infection      Cellulitis of left lower extremity 1/28/2019     CHF (congestive heart failure) (H)     diastolic and systolic     Chronic bronchitis (H)     & acute     Chronic diastolic heart failure (H)     diastolic chf     Chronic pain syndrome      Chronic respiratory failure with hypoxia (H) 3/13/2017     COPD (chronic obstructive pulmonary disease) (H)      COPD exacerbation (H)      Depression      Diet-controlled type 2 diabetes mellitus (H)      Drug induced constipation      DVT of axillary vein, acute (H)     left     DVT of axillary vein, acute left (H)      Edema      Encounter for palliative care      Erysipelas      Fracture of femoral neck, left (H)      Generalized muscle weakness      GERD (gastroesophageal reflux  disease)      History of blood clots      Hyperlipidemia      Hypertension      Left hip pain      Lumbar spinal stenosis      PAD (peripheral artery disease) (H)      Peripheral arterial disease (H) 10/28/2015     Psoriasis     arms      Pulmonary hypertension (H) 3/5/2018     Recurrent major depressive episodes (H)     Created by Conversion  Replacement Utility updated for latest IMO load     Schizoaffective disorder, bipolar type (H) 2015     Slow transit constipation      Stasis dermatitis of both legs      Status post total hip replacement, 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  Past Surgical History:   Procedure Laterality Date     BACK SURGERY       BREAST CYST ASPIRATION Left      CERVICAL SPINE SURGERY      for arnold chiari malformation      SECTION  X3     CHOLECYSTECTOMY       COLONOSCOPY       EXCISE LESION UPPER EXTREMITY Right 2019    Procedure: EXCISION RIGHT ARM LIPOMA;  Surgeon: Andreas Holly MD;  Location: [facility];  Service: General     EYE SURGERY      bilateral cataracts     HAND SURGERY       HERNIORRHAPHY UMBILICAL N/A 2021    Procedure: INCARCERATED UMBILICAL AND;  Surgeon: Andreas Holly MD;  [place] [facility]     JOINT REPLACEMENT Right     knee - partial     LAPAROSCOPIC HERNIORRHAPHY INCISIONAL Left 3/27/2015    Procedure: ATTEMPTED LAPAROSCOPIC ABDOMINA/FLANK INCISION REPAIR;  Surgeon: Jose Lakhani MD;  Location: [facility];  Service:      LUMBAR FUSION N/A 2014    Procedure: POSTERIOR FUSION / DECOMPRESSION L3-S1 WITH PELVIC FIXATION BILATERAL ;  Surgeon: Rajan Mares MD;  Location: [facility];  Service:      OTHER SURGICAL HISTORY      IR for PAD LOWER EXTREMITY     REPAIR SPIGELIAN HERNIA  N/A 9/21/2021    Procedure: SPIGELIAN HERNIA REPAIR;  Surgeon: Andreas Holly MD;  Location: [facility] ASPIRATION HEMATOMA SEROMA OR FLUID COLLECTION  2/6/2020     [facility] OPEN FIXATN PROX END/NECK FEMUR FX Left 1/2/2019    Procedure: OPEN REDUCTION INTERNAL FIXATION, FRACTURE LEFT HIP, PERIPROSTHETIC FRACTURE;  Surgeon: Kevin Lackey MD;  Location: [facility];  Service: Orthopedics     [facility] TOTAL HIP ARTHROPLASTY Left 1/2/2019    Procedure: LEFT TOTAL HIP ARTHROPLASTY;  Surgeon: Kevin Lackey MD;  Location: [facility] OR;  Service: Orthopedics     [facility] TOTAL HIP ARTHROPLASTY Right 9/16/2019    Procedure: RIGHT TOTAL HIP ARTHROPLASTY;  Surgeon: Kan Quesada MD;  Location: [facility];  Service: Orthopedics     [facility] TOTAL KNEE ARTHROPLASTY Left 10/7/2016    Procedure: TOTAL KNEE ARTHROPLASTY, LEFT;  Surgeon: Kan Quesada MD;  Location: [facility];  Service: Orthopedics      Social History   I have reviewed this patient's social history and updated it with pertinent information if needed. Adalgisa Solano  reports that she quit smoking about 16 years ago. She has never used smokeless tobacco. She reports that she does not drink alcohol and does not use drugs.    Family History   I have reviewed this patient's family history and updated it with pertinent information if needed.  Family History   Problem Relation Age of Onset     Coronary Artery Disease Mother      Coronary Artery Disease Father        Prior to Admission Medications   Prior to Admission Medications   Prescriptions Last Dose Informant Patient Reported? Taking?   ARIPiprazole (ABILIFY) 5 MG tablet 5/15/2022 at Unknown time  No Yes   Sig: TAKE 1 TABLET AT BEDTIME   Fluticasone-Umeclidin-Vilanterol (TRELEGY ELLIPTA) 100-62.5-25 MCG/INH oral inhaler 5/16/2022 at not with  Yes Yes   Sig: Inhale 1 puff into the lungs daily   Vitamin D3 (CHOLECALCIFEROL) 25 mcg (1000 units) tablet 5/16/2022 at Unknown time  Yes  Yes   Sig: Take 1 capsule by mouth every morning    XARELTO ANTICOAGULANT 20 MG TABS tablet 5/15/2022 at Unknown time  No Yes   Sig: TAKE 1 TABLET DAILY WITH DINNER   acetaminophen (TYLENOL) 500 MG tablet Unknown at Unknown time  Yes Yes   Sig: Take 500-1,000 mg by mouth every 6 hours as needed for mild pain   albuterol (PROAIR HFA/PROVENTIL HFA/VENTOLIN HFA) 108 (90 Base) MCG/ACT inhaler Unknown at Unknown time  Yes Yes   Sig: Inhale 2 puffs into the lungs every 4 hours as needed for shortness of breath / dyspnea or wheezing   albuterol (PROVENTIL) (2.5 MG/3ML) 0.083% neb solution Unknown at Unknown time  Yes Yes   Sig: Take 2.5 mg by nebulization every 2 hours as needed for shortness of breath / dyspnea or wheezing   atorvastatin (LIPITOR) 20 MG tablet 5/15/2022 at Unknown time  No Yes   Sig: TAKE 1 TABLET AT BEDTIME   calcium carbonate 600 mg-vitamin D 400 units (CALTRATE) 600-400 MG-UNIT per tablet 5/16/2022 at Unknown time  Yes Yes   Sig: Take 1 tablet by mouth every morning    cyanocobalamin (VITAMIN B-12) 500 MCG tablet 5/16/2022 at Unknown time  Yes Yes   Sig: Take 500 mcg by mouth every morning    diltiazem ER COATED BEADS (CARDIZEM CD/CARTIA XT) 120 MG 24 hr capsule 5/16/2022 at Unknown time  Yes Yes   Sig: Take 240 mg by mouth daily   docusate sodium (COLACE) 100 MG capsule 5/16/2022 at Unknown time  Yes Yes   Sig: Take 100 mg by mouth daily   furosemide (LASIX) 20 MG tablet 5/16/2022 at Unknown time  Yes Yes   Sig: Take 40 mg by mouth daily   gabapentin (NEURONTIN) 300 MG capsule 5/15/2022 at Unknown time  Yes Yes   Sig: Take 600 mg by mouth At Bedtime   guaiFENesin (MUCINEX) 600 MG 12 hr tablet 5/16/2022 at takes 2 tabs daily  No Yes   Sig: Take 2 tablets (1,200 mg) by mouth 2 times daily   Patient taking differently: Take 1,200 mg by mouth daily   ipratropium - albuterol 0.5 mg/2.5 mg/3 mL (DUONEB) 0.5-2.5 (3) MG/3ML neb solution Past Week at Unknown time  No Yes   Sig: Take 1 vial (3 mLs) by  nebulization 4 times daily Once breathing is better, then change to every 6 hours as needed for shortness of breath   metFORMIN (GLUCOPHAGE) 500 MG tablet 5/16/2022 at am  No Yes   Sig: Take 1 tablet (500 mg) by mouth 2 times daily (with meals)   omeprazole (PRILOSEC) 20 MG DR capsule 5/16/2022 at Unknown time  No Yes   Sig: TAKE 1 CAPSULE DAILY   polyethylene glycol (MIRALAX) 17 GM/Dose powder 5/16/2022 at Unknown time  Yes Yes   Sig: Take 17 g by mouth daily   potassium chloride ER (KLOR-CON M) 10 MEQ CR tablet 5/16/2022 at Unknown time  No Yes   Sig: Take 1 tablet (10 mEq) by mouth daily   sodium chloride (NEBUSAL) 3 % neb solution Not Taking at Unknown time  Yes No   Sig: Take 3 mLs by nebulization 4 times daily   Patient not taking: Reported on 5/16/2022   spironolactone (ALDACTONE) 25 MG tablet 5/16/2022 at Unknown time  No Yes   Sig: TAKE ONE-HALF (1/2) TABLET DAILY      Facility-Administered Medications: None     Allergies   Allergies   Allergen Reactions     Tramadol Nausea     Tuberculin Tests      Amoxicillin Itching and Rash     Levofloxacin Itching and Rash     Penicillins Itching and Rash     Has tolerated ceftriaxone.       Physical Exam   Vital Signs: Temp: 97.9  F (36.6  C) Temp src: Oral BP: 120/69 Pulse: 103   Resp: (!) 32 SpO2: (!) 85 % O2 Device: Nasal cannula Oxygen Delivery: 5 LPM  Weight: 142 lbs 0 oz    Constitutional: awake, alert, cooperative, no apparent distress, and appears stated age  Eyes: Lids and lashes normal, extra ocular muscles intact, sclera clear, conjunctiva normal  ENT: normocepalic, without obvious abnormality, atraumatic. Mucous membranes moist  Neck: Supple, symmetric  Respiratory: On 4.5-5L. No increased work of breathing, diffuse inspiratory wheezing with prolonged expiration, coarse diffuse breath sounds  Cardiovascular: irregularly irregular rhythm, no murmur auscltated  GI:  normal bowel sounds, soft, non-distended, non-tender  Skin: scabbed over shingles lesions of  perineal area, no drainage or signs of infection  Musculoskeletal: compression stockings in place, LLE edema > RLE edema  Neurologic: Awake, alert, oriented to name, place and time.  Neuropsychiatric: General: normal, calm and normal eye contact    Data   Data reviewed today: I reviewed all medications, new labs and imaging results over the last 24 hours. I personally reviewed the EKG tracing showing atrial fibrillation and the chest x-ray image(s) showing atelectasis without increaesed consolidation.    Recent Labs   Lab 05/16/22  1254   WBC 10.5   HGB 11.0*   MCV 89      INR 1.48*   *   POTASSIUM 3.8   CHLORIDE 84*   CO2 36*   BUN 9   CR 0.50*   ANIONGAP 9   DENISE 8.2*   *   ALBUMIN 2.5*   PROTTOTAL 5.6*   BILITOTAL 0.7   ALKPHOS 58   ALT 22   AST 19     Recent Results (from the past 24 hour(s))   XR Chest Port 1 View    Narrative    EXAM: XR CHEST PORT 1 VIEW  LOCATION: [facility]  DATE/TIME: 5/16/2022 2:21 PM    INDICATION: SOB  COMPARISON: 04/13/2022      Impression    IMPRESSION: Moderately enlarged heart. Mildly increased retrocardiac opacity, favored to represent atelectasis. Resolved right lower lobe opacities. Mild pulmonary vascular engorgement with increased interstitial markings, not significantly changed.

## 2022-05-17 NOTE — CONSULTS
PALLIATIVE CARE CONSULT NOTE     Patient Name: Adalgisa Solano  Date of Admission: 5/16/2022   Requesting Clinician / Team: Resident  Reason for consult: *Consider palliative for ongoing decline** This is her 3rd admission in 2022 for COPD and pulmonary issues.        Impressions and Recommendations     1)   GOALS OF CARE   Life Prolonging , not restorative    Patient had made decision for DNR DNI after a  meeting with Palliative care in  April 2022. She indicates, however, that  she never had the paperwork finalized (assuming this to be the [employer]  Advanced Directive) and she ore up her paperwork  when she got home.  When I introduced my self as Palliative Care she said she wants nothing to do with our program and she won't sign anything.  She wont make decisions with out her  here.  He is coming in later this evening.  I asked if she would like to meet tomorrow and her daughter indicated they would prefer literature instead.  I provided daughter with [employer] information regarding intubation, CPR and Artifical nutrition.   She said her parents had  completed a POA document with an   and said that should give us the answers. I explained that those forms do not provide medical staff the information re: medical choices in case of a life threatening event and usually  froms are focused on big picture or involve estate issues.      Daughter stated that her Dad, patients , Francis, is the decision maker. Daughter Veronika indicated that if it was her choice, she would want  Every treatment for her mom to keep her alive no matter what. When I discussed the suffering that can occur after CPR (Broken ribs, pain causing difficulty with breathing or not being able to be weaned off the vent, would she find that quality of life. Daughter indicated  Her mom is  suffering now so what does it matter if she suffers with living on a vent, she is my mom, I want her alive    I indicated  that we will honor what ever her mother would want.  I indicated that a few years ago her mom didn't want the vent or tube and I explained that her high C02 levels could be impacting her decision making and that it may be beneficial to have her  here.    Daughter looked at the paperwork and asked what we want.    I explained that the default is to do everything if the patient has not make decisions re: code status.  At that point it is an emergency and we would call after the event, not before or during it.      1. I  indicated we would want to know her choice of medical interventions.  Would she want CPR (via Simón) if her heart stopped and it needed to be restarted.    2. Part of the CPR process involves intubation.  I indicated that If we could not wean her off the vent (which is very probable given her lung status)  Then we  Would want to know if  she would  a Trache in her neck. Would she want short term or long term vent status.     Daughter asked if she could live at home on the vent and I indicated insurance does not usually cover that but there are 3 nursing home where people can live the remainder of their life on the vent.    I again explained we would honor her wishes, whatever they are. She thanked me and will discuss this with her Dad.       2)    ADVANCED CARE PLANNING  ? Patient has completed health care directive:  N  ? Surrogate health care agent:  Less  ? Code Status:Patient had been DNR DNI, now she is  full code tho she said today, I wouldn't want to be on that machine     3)    SYMPTOM MANAGEMENT      1.  Shortness of breath secondary to advanced COPD and bronchiectasis exacerbation  -Management per [facility] and pulmonology services  -Recommend Lorazepam for dyspnea related to Anxiety     2.    Fatigue and activity intolerance secondary to his COPD and bronchiectasis exacerbation causing exertional dyspnea  -Patient likely to benefit from Hospice      4.  Anxiety related to health,  exacerbated with underlying schizoaffective, bipolar disorder and dyspnea  -Per medial team, could try  Abilify 5 mg nightly  -Lorazepam 0.25 mg p.o. every 4 hours as needed ordered today.  Discussed with patient that this is more for episodes or panic attacks than chronic use also discussed this can help ease air hunger.     5.  Chronic pain syndrome-chronic mid to low back pain  -Continue Tylenol as needed  -Continue gabapentin 600 mg at bedtime  -Would not recommend addition of opiate at this time due to restorative goals and fragile respiratory status.  Would use opiates sparingly for pain.  -Avoid NSAIDs as on anticoagulant.     IN April 2022 PC provided a   Detailed discussion with patient, , daughter and son regarding benefit and burden of chest compressions.  -             4)    PSYCHOSOCIAL/SPIRITUAL SUPPORT  ? Will request spiritual care support    ? Palliative medicine will sign off per patients request  ? Left forms regarding CPR, Vent and Artificial nutrition    Discussed this with [facility] [employer]       Admission Info          History of Present Illness:  Adalgisa Solano is a 86 year old female admitted on 5/5/2022. She has a history of pulmonary disease including COPD with persistent bronchiectasis getting vest therapy at home for the last 2 years  She stopped using her vest out of concern for heart issues.  non-insulin-dependent diabetes, chronic A. fib, chronic heart failure with preserved ejection fraction who has had several recent hospitalizations for COPD exacerbation with pneumonia. Admitted for recurrent COPD exacerbation without signs of infection.    Quit smoking 2006    Recent Inpatient Admissions (last 12 mo)    5/5 to 5/10  Herpes, pneumonia  4/13 Bronchiectasis   Seen by PC this admission  3/23 COPD           Interval History:     Currently on HFNC, 40 Liters at 72%  High risk of decompensation       Palliative Assessment/discussion      I discussed the reason for Palliative  Care Referral and our role in symptom management, patient family communication, understanding their choices for medical treatment, and providing  guidance in making difficult decisions in the framework of focusing on patient comfort and quality of life.    Patient does not want our service to follow her     Code status Discussion    Explained purpose of code discussion, making certain choices regarding future procedures as this is not well done in an emergency situation.  I explained the Pros and Cons of CPR (may cause more harm than good  based on patients skeletal frame and comorbid diagnoses and will not change underlying medical problem and outcome may end up with worsened medical diagnoses due to lack of blood flow to vital organs during CPR).             Prognosis, goals and planning        Patient's decision making preferences:     I have concerns about the patient/family's health literacy today: yes     Prognosis, Goals, and/or Advance Care Planning were addressed today: N     Mood, coping, and/or meaning in the context of serious illness were addressed today: I did state that this is a very scary time for the patient     Key Palliative Symptom data:   Pain: yes, back pain  Dyspnea: Yes, severe dyspnea  Nausea: Mild   Anxiety: Moderate    Functional Status:  Current PPS% (100% normal, 0% death): 30   Baseline PPS% (2 weeks prior to admit): 40     Capacity evaluation:    Patient does  not have decision making capacity based on the following:     Ability to understand relevant information about current  condition? N*    Ability to demonstrate understanding of  current  illness and care needs? No  Ability to communicate  options for treatment? N    Social:   Living situation: lives in homew wiht spouse   Baseline function: walker , 02 5 L at home  . Has nebs  Home support: Had interim HC PTA  Marital status: Les  Number of children: 2, aram and marlon                   Review of Systems:     A full  14 point review of systems was otherwise completed and is negative aside from that mentioned above    -----------------------------------------------------------------------------------------------------------------  I have reviewed and supplemented the documentation in this patient's medical record listed below regarding past medical history, social history, active medical problems, allergies and medications.     Current Problem List:   Active Problems:    Hypomagnesemia    Hyponatremia    COPD exacerbation (H)    Acute respiratory failure with hypoxia (H)      Medical/Surgical History :  Past Medical History:   Diagnosis Date     Acute and chronic respiratory failure with hypoxia (H)      Acute blood loss anemia 1/15/2019     Acute bronchitis 1/15/2019     Anemia     pernicious anemia     Anticoagulated 3/23/2022     Anxiety      Arm skin lesion, right      Arnold-Chiari malformation (H) 1998     Arthritis      Atrial fibrillation (H) 02/2017     Avascular necrosis of bone (H)      Breast cyst      Breast lump      Candidal skin infection      Cellulitis of left lower extremity 1/28/2019     CHF (congestive heart failure) (H)     diastolic and systolic     Chronic bronchitis (H)     & acute     Chronic diastolic heart failure (H)     diastolic chf     Chronic pain syndrome      Chronic respiratory failure with hypoxia (H) 3/13/2017     COPD (chronic obstructive pulmonary disease) (H)      COPD exacerbation (H)      Depression      Diet-controlled type 2 diabetes mellitus (H)      Drug induced constipation      DVT of axillary vein, acute (H)     left     DVT of axillary vein, acute left (H)      Edema      Encounter for palliative care      Erysipelas      Fracture of femoral neck, left (H)      Generalized muscle weakness      GERD (gastroesophageal reflux disease)      History of blood clots      Hyperlipidemia      Hypertension      Left hip pain      Lumbar spinal stenosis      PAD (peripheral artery disease)  (H)      Peripheral arterial disease (H) 10/28/2015     Psoriasis     arms      Pulmonary hypertension (H) 3/5/2018     Recurrent major depressive episodes (H)     Created by Conversion  Replacement Utility updated for latest IMO load     Schizoaffective disorder, bipolar type (H) 2015     Slow transit constipation      Stasis dermatitis of both legs      Status post total hip replacement, 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     Type 2 diabetes mellitus without complication (H) 2016     Vitamin B12 deficiency      Volume overload      Past Surgical History:   Procedure Laterality Date     BACK SURGERY       BREAST CYST ASPIRATION Left      CERVICAL SPINE SURGERY      for arnold chiari malformation      SECTION  X3     CHOLECYSTECTOMY       COLONOSCOPY       EXCISE LESION UPPER EXTREMITY Right 2019    Procedure: EXCISION RIGHT ARM LIPOMA;  Surgeon: Andreas Holly MD;  Location: [facility] OR;  Service: General     EYE SURGERY      bilateral cataracts     HAND SURGERY       HERNIORRHAPHY UMBILICAL N/A 2021    Procedure: INCARCERATED UMBILICAL AND;  Surgeon: Andreas Holly MD;  Location: [facility]     JOINT REPLACEMENT Right     knee - partial     LAPAROSCOPIC HERNIORRHAPHY INCISIONAL Left 3/27/2015    Procedure: ATTEMPTED LAPAROSCOPIC ABDOMINA/FLANK INCISION REPAIR;  Surgeon: Jose Lakhani MD;  Location: [facility] OR;  Service:      LUMBAR FUSION N/A 2014    Procedure: POSTERIOR FUSION / DECOMPRESSION L3-S1 WITH PELVIC FIXATION BILATERAL ;  Surgeon: Rajan Mares MD;  Location: [facility] OR;  Service:      OTHER SURGICAL HISTORY      IR for PAD LOWER EXTREMITY     REPAIR SPIGELIAN HERNIA N/A 2021    Procedure: SPIGELIAN HERNIA REPAIR;  Surgeon: Andreas Holly MD;  Location: [facility]     US ASPIRATION HEMATOMA SEROMA OR FLUID COLLECTION  2020     ZZC OPEN FIXATN PROX END/NECK FEMUR FX Left 2019    Procedure: OPEN REDUCTION INTERNAL FIXATION,  FRACTURE LEFT HIP, PERIPROSTHETIC FRACTURE;  Surgeon: Kevin Lackey MD;  Location: [facility] OR;  Service: Orthopedics     [facility] TOTAL HIP ARTHROPLASTY Left 1/2/2019    Procedure: LEFT TOTAL HIP ARTHROPLASTY;  Surgeon: Kevin Lackey MD;  Location: [facility] OR;  Service: Orthopedics     [facility] TOTAL HIP ARTHROPLASTY Right 9/16/2019    Procedure: RIGHT TOTAL HIP ARTHROPLASTY;  Surgeon: Kan Quesada MD;  Location: [facility] OR;  Service: Orthopedics     [facility] TOTAL KNEE ARTHROPLASTY Left 10/7/2016    Procedure: TOTAL KNEE ARTHROPLASTY, LEFT;  Surgeon: Kan Quesada MD;  Location: [facility] OR;  Service: Orthopedics     Relevant Family History  Family History   Problem Relation Age of Onset     Coronary Artery Disease Mother      Coronary Artery Disease Father      Family Status   Relation Name Status     Mo  (Not Specified)     Fa  (Not Specified)     Social History:    she  reports that she quit smoking about 16 years ago. She has never used smokeless tobacco. She reports that she does not drink alcohol and does not use drugs.  Medication  :  Current Facility-Administered Medications   Medication     acetaminophen (TYLENOL) tablet 650 mg    Or     acetaminophen (TYLENOL) Suppository 650 mg     acetylcysteine (MUCOMYST) 20 % nebulizer solution 4 mL     albuterol (PROVENTIL) neb solution 2.5 mg     albuterol (PROVENTIL) neb solution 2.5 mg     albuterol (PROVENTIL) neb solution 2.5 mg     ARIPiprazole (ABILIFY) tablet 5 mg     atorvastatin (LIPITOR) tablet 20 mg     calcium carbonate-vitamin D (OS-DENISE with D) per tablet 1 tablet     cyanocobalamin (VITAMIN B-12) tablet 500 mcg     glucose gel 15-30 g    Or     dextrose 50 % injection 25-50 mL    Or     glucagon injection 1 mg     diltiazem ER COATED BEADS (CARDIZEM CD/CARTIA XT) 24 hr capsule 240 mg     docusate sodium (COLACE) capsule 100 mg     furosemide (LASIX) tablet 40 mg     gabapentin (NEURONTIN) capsule 600 mg     insulin aspart  (NovoLOG) injection (RAPID ACTING)     insulin aspart (NovoLOG) injection (RAPID ACTING)     ipratropium - albuterol 0.5 mg/2.5 mg/3 mL (DUONEB) neb solution 3 mL     lidocaine (LMX4) cream     lidocaine (LMX4) cream     lidocaine 1 % 0.1-1 mL     lidocaine 1 % 0.1-1 mL     melatonin tablet 1 mg     meropenem (MERREM) 1 g vial to attach to  mL bag     metFORMIN (GLUCOPHAGE) tablet 1,000 mg     methylPREDNISolone sodium succinate (solu-MEDROL) injection 62.5 mg     ondansetron (ZOFRAN ODT) ODT tab 4 mg    Or     ondansetron (ZOFRAN) injection 4 mg     pantoprazole (PROTONIX) EC tablet 40 mg     Patient is already receiving anticoagulation with heparin, enoxaparin (LOVENOX), warfarin (COUMADIN)  or other anticoagulant medication     polyethylene glycol (MIRALAX) Packet 17 g     potassium chloride ER (KLOR-CON M) CR tablet 10 mEq     predniSONE (DELTASONE) tablet 40 mg     rivaroxaban ANTICOAGULANT (XARELTO) tablet 20 mg     sodium chloride (OCEAN) 0.65 % nasal spray 1 spray     sodium chloride (PF) 0.9% PF flush 3 mL     sodium chloride (PF) 0.9% PF flush 3 mL     sodium chloride (PF) 0.9% PF flush 3 mL     sodium chloride (PF) 0.9% PF flush 3 mL     spironolactone (ALDACTONE) half-tab 12.5 mg     Vitamin D3 (CHOLECALCIFEROL) tablet 25 mcg            Allergies:  Allergies   Allergen Reactions     Tramadol Nausea     Tuberculin Tests      Amoxicillin Itching and Rash     Levofloxacin Itching and Rash     Penicillins Itching and Rash     Has tolerated ceftriaxone.     Emergency Contact Info (Pulled from chart)  Extended Emergency Contact Information  Primary Emergency Contact: Anabel Solano  Address: [address] [place] [address] [place]  Home Phone: [phone]  Mobile Phone: [phone]  Relation: Spouse  Secondary Emergency Contact: AryRatna  Address: [address] [place] [address] [facility]  Home Phone: [phone]  Mobile Phone: [phone]  Relation:  Daughter     Evaluation             PERTINENT PHYSICAL EXAMINATION:  Vital Signs: Blood pressure 113/59, pulse 102, temperature 97  F (36.1  C), temperature source Axillary, resp. rate (!) 32, weight 63.5 kg (140 lb 1.6 oz), SpO2 90 %, not currently breastfeeding.   GENERAL: laying in bed, anxious    SKIN: Warm and dry   HEENT: Normocephalic, anicteric sclera,    LUNGS:  labored on HFNC  CARDIAC: Afib   ABDOMINAL: BS (+), soft, non distended, non tender  NEUROLOGIC: alert and oriented x3  PSYCH: anxious         Data Reviewed:     All labs/imaging reviewed in Epic     ====================================================  TT: I have personally spent a total of 65 minutes on the unit in review of medical record, consultation with the medical providers and assessment of patient today, with more than 50% of this time spent in counseling, coordination of care, and discussion with patient and daughter and MD re: diagnostic results, prognosis, symptom management, risks and benefits of management options, and development of plan of care as noted above.  ====================================================    MARLINE Sargent, NP-C, ACHPN   [facility]  Palliative Medicine  [phone]

## 2022-05-17 NOTE — PROGRESS NOTES
Pulmonary Brief Update    Called by resident team for worsening hypoxia in this patient with COPD, emphysema and bronchiectasis who was recently admitted. Now up to 15L and per RT is working harder to breathe.    She had CTPE run done 5/5/22 that found new LLL pneumonia and was negative for PE.    VBG done shows compensated respiratory acidosis but this is much worse than in April 2022 when her pCO2 was 59, now 71.    I recommend:  1) Initiation of high-flow oxygen  2) Bipap PRN and at night given hypercapnea  3) CT scan if patient's respiratory status will tolerate lying flat.   4) Initiation of Meropenem given recent exposure to Cefepime  5) Palliative consult as patient is 86 with poor pulmonary reserve and if intubated would be unlikely to wean off ventilator.    I will continue to receive updates as patient progresses.     Mera Kowalski MD

## 2022-05-17 NOTE — PROGRESS NOTES
05/17/22 0444   Tech Time   $Tech Time (10 minute increments) 3   Vital Signs   Resp 30   Pulse 104   Oxygen Therapy   Flow (L/min) 10   Device (Oxygen Therapy) oxymask   Oxygen Therapy   SpO2 (!) 89 %   O2 Device Oxymask   Oxygen Delivery 10 LPM   Breath Sounds   Breath Sounds All Fields   All Lung Fields Breath Sounds crackles, coarse;wheezes, expiratory   Nebulizer Assessment & Treatment   $RT Use ONLY Delivery Method Nebulizer - Additional   Nebulizer Device Mask   Pretreatment Heart Rate (beats/min) 109   Pretreatment Resp Rate (breaths/min) 30   Pretreatment O2 sats - (TCU only) (!) 89   Pretreat Breath Sounds - Bilat - All Lobes crackles, coarse;wheezes, expiratory   Breath Sounds Post-Respiratory Treatment   Posttreatment Heart Rate (beats/min) 104   Posttreatment Resp Rate (breaths/min) 30   Post treatment O2 Sats - (TCU only) 91   Posttreatment Assessment (SVN) breath sounds unchanged   Signs of Intolerance (SVN) none   Breath Sounds Posttreatment All Fields All Fields   Breath Sounds Posttreatment All Fields crackles, coarse;wheezes, expiratory     Duoneb with mucomyst scheduled Q4, patient tolerating well.  Weak loose cough, patients  to bring vest for CPT in today.  FIO2 increased from 5 L oxymask to 12 L oxymask overnight, sats dropped to 81% with transfer from bed to bedside toilet.  RT will continue to follow

## 2022-05-17 NOTE — CONSULTS
"PULMONARY/CRITICAL CARE CONSULT NOTE    Date / Time of Admission:  5/16/2022 12:46 PM  Assessment:   86yoF with history of emphysema (DLCO 39% predicted), COPD (FEV1 49% predicted without reversibility) and bronchiectasis, upper lobe predominant who presents with hypoxia after multiple admissions for COPD exacerbations.         Clinically Significant Risk Factors Present on Admission             # Hypoalbuminemia: Albumin = 2.5 g/dL (Ref range: 3.5 - 5.0 g/dL) on admission, will monitor as appropriate   # Coagulation Defect: home medication list includes an anticoagulant medication    # Overweight: Estimated body mass index is 26.47 kg/m  as calculated from the following:    Height as of 5/10/22: 1.549 m (5' 1\").    Weight as of this encounter: 63.5 kg (140 lb 1.6 oz).      Active Problems:    Hypomagnesemia    Hyponatremia    COPD exacerbation (H)    Acute respiratory failure with hypoxia (H)      Advance Directives:  Full Code  Critical Care Time greater than: 45 minutes, this patient is critically ill with hypoxia that could decompensate and require intubation. Currently on high-flow oxygen.       Plan:   Pulmonary  1) COPD  2) Emphysema  3) Bronchiectasis  4) Pseudomonal pneumonia  5) Compensated chronic hypercapneic respiratory failure  -Continue vesting and mucomyst  -Bipap at night and PRN, careful balance of mobilizing secretions and aiding in ventilations  -Continue Solumedrol  -Too hypoxic for bronchoscopy, will repeat Sputum sample  -Repeat CT scan, anticipate worsening of her infiltrate given antibiotic coverage at discharge.  -PE unlikely given her anticoagulation status so will forgo repeat CTA since last was done 2 weeks ago.     ID:  1) Pseudomonal pneumonia  -Repeat Sputum culture  -Meropenem as she has already been exposed to cefepime at previous admission  -May eventually require rubia nebs at discharge    C/V:  1) Pulmonary hypertension likely WHO II and III  2) Severe biatrial enlargement " suggesting elevated filling pressures  3) Moderate mitral insufficiency  4) Afib  -Gentle diuresis given her normal renal function will use IV lasix  -Continue home diltiazem for rate control  -Already anticoagulated for Afib, continue feliciano xarelto    GI:  OK for diet right now, but if more hypoxic will make NPO    Renal:  No issues    Endo:  1) Hyperglycemia  -Sliding scale  -Hold metformin while inpatient in case she needs contrast dye.     Appreciate palliative's assistance in defining goals of care given advanced lung disease.     Subjective:    Cc: dyspnea, hypoxia.     HPI: 86yoF with history of emphysema (DLCO 39% predicted), COPD (FEV1 49% predicted without reversibility) and bronchiectasis, upper lobe predominant who presents with hypoxia after multiple admissions for COPD exacerbations.    She was just discharged 5/9/22 for COPD exacerbation and LLL pneumonia where her sputum culture grew Pseudomonas. She was discharged on azithromycin and 5L O2 to TCU. She represented now with worsening hypoxia from TCU. Initially on 12L overnight, now 15L and transitioned to high-flow oxygen.     Past Medical History:   Diagnosis Date     Acute and chronic respiratory failure with hypoxia (H)      Acute blood loss anemia 1/15/2019     Acute bronchitis 1/15/2019     Anemia     pernicious anemia     Anticoagulated 3/23/2022     Anxiety      Arm skin lesion, right      Arnold-Chiari malformation (H) 1998     Arthritis      Atrial fibrillation (H) 02/2017     Avascular necrosis of bone (H)      Breast cyst      Breast lump      Candidal skin infection      Cellulitis of left lower extremity 1/28/2019     CHF (congestive heart failure) (H)     diastolic and systolic     Chronic bronchitis (H)     & acute     Chronic diastolic heart failure (H)     diastolic chf     Chronic pain syndrome      Chronic respiratory failure with hypoxia (H) 3/13/2017     COPD (chronic obstructive pulmonary disease) (H)      COPD exacerbation (H)       Depression      Diet-controlled type 2 diabetes mellitus (H)      Drug induced constipation      DVT of axillary vein, acute (H)     left     DVT of axillary vein, acute left (H)      Edema      Encounter for palliative care      Erysipelas      Fracture of femoral neck, left (H)      Generalized muscle weakness      GERD (gastroesophageal reflux disease)      History of blood clots      Hyperlipidemia      Hypertension      Left hip pain      Lumbar spinal stenosis      PAD (peripheral artery disease) (H)      Peripheral arterial disease (H) 10/28/2015     Psoriasis     arms      Pulmonary hypertension (H) 3/5/2018     Recurrent major depressive episodes (H)     Created by Conversion  Replacement Utility updated for latest IMO load     Schizoaffective disorder, bipolar type (H) 2015     Slow transit constipation      Stasis dermatitis of both legs      Status post total hip replacement, 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     Type 2 diabetes mellitus without complication (H) 2016     Vitamin B12 deficiency      Volume overload        Social History     Tobacco Use     Smoking status: Former Smoker     Quit date: 2006     Years since quittin.3     Smokeless tobacco: Never Used     Tobacco comment: quit    Substance Use Topics     Alcohol use: No       Family History   Problem Relation Age of Onset     Coronary Artery Disease Mother      Coronary Artery Disease Father        Current Facility-Administered Medications   Medication     acetaminophen (TYLENOL) tablet 650 mg    Or     acetaminophen (TYLENOL) Suppository 650 mg     acetylcysteine (MUCOMYST) 20 % nebulizer solution 4 mL     albuterol (PROVENTIL) neb solution 2.5 mg     albuterol (PROVENTIL) neb solution 2.5 mg     albuterol (PROVENTIL) neb solution 2.5 mg     ARIPiprazole (ABILIFY) tablet 5 mg     atorvastatin (LIPITOR) tablet 20 mg     calcium carbonate-vitamin D (OS-DENISE with D) per tablet 1 tablet     cyanocobalamin (VITAMIN B-12) tablet 500 mcg      glucose gel 15-30 g    Or     dextrose 50 % injection 25-50 mL    Or     glucagon injection 1 mg     diltiazem ER COATED BEADS (CARDIZEM CD/CARTIA XT) 24 hr capsule 240 mg     docusate sodium (COLACE) capsule 100 mg     furosemide (LASIX) tablet 40 mg     gabapentin (NEURONTIN) capsule 600 mg     insulin aspart (NovoLOG) injection (RAPID ACTING)     insulin aspart (NovoLOG) injection (RAPID ACTING)     ipratropium - albuterol 0.5 mg/2.5 mg/3 mL (DUONEB) neb solution 3 mL     lidocaine (LMX4) cream     lidocaine (LMX4) cream     lidocaine 1 % 0.1-1 mL     lidocaine 1 % 0.1-1 mL     melatonin tablet 1 mg     meropenem (MERREM) 1 g vial to attach to  mL bag     metFORMIN (GLUCOPHAGE) tablet 1,000 mg     methylPREDNISolone sodium succinate (solu-MEDROL) injection 62.5 mg     ondansetron (ZOFRAN ODT) ODT tab 4 mg    Or     ondansetron (ZOFRAN) injection 4 mg     pantoprazole (PROTONIX) EC tablet 40 mg     Patient is already receiving anticoagulation with heparin, enoxaparin (LOVENOX), warfarin (COUMADIN)  or other anticoagulant medication     polyethylene glycol (MIRALAX) Packet 17 g     potassium chloride ER (KLOR-CON M) CR tablet 10 mEq     rivaroxaban ANTICOAGULANT (XARELTO) tablet 20 mg     sodium chloride (OCEAN) 0.65 % nasal spray 1 spray     sodium chloride (PF) 0.9% PF flush 3 mL     sodium chloride (PF) 0.9% PF flush 3 mL     sodium chloride (PF) 0.9% PF flush 3 mL     sodium chloride (PF) 0.9% PF flush 3 mL     spironolactone (ALDACTONE) half-tab 12.5 mg     Vitamin D3 (CHOLECALCIFEROL) tablet 25 mcg         Review of Systems: 12-point review performed and negative aside from that noted in HPI.    Objective:    Vital signs:  /59 (BP Location: Left arm)   Pulse 102   Temp 97  F (36.1  C) (Axillary)   Resp (!) 32   Wt 63.5 kg (140 lb 1.6 oz)   SpO2 93%   BMI 26.47 kg/m      GENERAL APPEARANCE: healthy, alert and no distress     EYES: EOMI, - PERRL     NECK: no adenopathy, no asymmetry, masses,  or scars and thyroid normal to palpation     RESP: rhoncorous, squeaking and popping     CV: irregularly irregular rhythm     ABDOMEN:  soft, nontender, no HSM or masses and bowel sounds normal     MS: extremities normal- no gross deformities noted, no evidence of inflammation in joints, FROM in all extremities.     SKIN: no suspicious lesions or rashes     NEURO: Normal strength and tone, sensory exam grossly normal, mentation intact and speech normal     PSYCH: mentation appears normal. and affect normal/bright     LYMPHATICS: No axillary, cervical, inguinal, or supraclavicular nodes          Data    CT Chest: personally reviewed  More dense consolidation in LLL, improved in Left upper lobe.     Laboratory:  Results for orders placed or performed in visit on 05/04/22   Basic metabolic panel   Result Value Ref Range    Sodium 131 (L) 136 - 145 mmol/L    Potassium 4.3 3.5 - 5.0 mmol/L    Chloride 88 (L) 98 - 107 mmol/L    Carbon Dioxide (CO2) 34 (H) 22 - 31 mmol/L    Anion Gap 9 5 - 18 mmol/L    Urea Nitrogen 14 8 - 28 mg/dL    Creatinine 0.61 0.60 - 1.10 mg/dL    Calcium 8.8 8.5 - 10.5 mg/dL    Glucose 117 70 - 125 mg/dL    GFR Estimate 88 >60 mL/min/1.73m2     Lab Results   Component Value Date    WBC 6.7 05/17/2022    HGB 10.8 (L) 05/17/2022    HCT 32.9 (L) 05/17/2022    MCV 89 05/17/2022     05/17/2022     VBG 7.39/71/39/39

## 2022-05-17 NOTE — PROGRESS NOTES
Pt placed on HFNC 40 lpm 70%. Pt is eating currently and talking to daughter. Will wean O2 as tolerated.     Gisselle Tyson, RT

## 2022-05-17 NOTE — PLAN OF CARE
Problem: Gas Exchange Impaired  Goal: Optimal Gas Exchange  Outcome: Ongoing, Progressing     Problem: Risk for Delirium  Goal: Improved Sleep  Outcome: Ongoing, Progressing     Problem: Plan of Care - These are the overarching goals to be used throughout the patient stay.    Goal: Absence of Hospital-Acquired Illness or Injury  Intervention: Identify and Manage Fall Risk  Recent Flowsheet Documentation  Taken 5/16/2022 4005 by Ronnie Rivera RN  Safety Promotion/Fall Prevention:    activity supervised    bed alarm on    clutter free environment maintained    fall prevention program maintained    increased rounding and observation    mobility aid in reach    nonskid shoes/slippers when out of bed    patient and family education    room door open    safety round/check completed   Goal Outcome Evaluation: The patient is generally alert and oriented and able to make her needs known; however, after bed time, the patient has removed her oxygen several time. When she does this, her oxygen saturation drops into the upper 70s fairly quickly. She is currently on 5L of oxygen via oxymask. She also desaturates with even light activity, such as rolling in the bed. The patient does still have a shingles rash on her buttocks, but it appears to be scabbed over at the current time.

## 2022-05-17 NOTE — PLAN OF CARE
Goal Outcome Evaluation:      Pt desats quickly in bed. Was on 5 lpm via oxymask and is currently using 12 lpm via oxymask. Some episodes of tachypnea and elevated HR. Frequent congested/ productive cough. Denies pain. Triggered sepsis protocol at start of shift. Lactic acid is normal. Pro calcitonin is normal. WBC 6.7. afebrile. Bed alarms on for safety reasons. 1200 ml fluid restriction. Pt had 240 ml this shift.

## 2022-05-17 NOTE — PLAN OF CARE
Problem: Plan of Care - These are the overarching goals to be used throughout the patient stay.    Goal: Plan of Care Review/Shift Note  Outcome: Ongoing, Not Progressing   Goal Outcome Evaluation:      Vital signs stable but oxygen needs higher. Changed from oxymizer to hi flow oxygen. Plan to transfer to ICU when bed available. Eating well. Fluid restriction continues. Voiding well. Continue to monitor.  Problem: Plan of Care - These are the overarching goals to be used throughout the patient stay.    Goal: Optimal Comfort and Wellbeing  Outcome: Ongoing, Not Progressing  Intervention: Monitor Pain and Promote Comfort  Recent Flowsheet Documentation  Taken 5/17/2022 0815 by Anitha Gracia, RN  Pain Management Interventions:   distraction   emotional support   rest

## 2022-05-17 NOTE — PROGRESS NOTES
Care Management Follow Up    Length of Stay (days): 1    Expected Discharge Date: 05/20/2022     Concerns to be Addressed:       Patient plan of care discussed at interdisciplinary rounds: Yes    Anticipated Discharge Disposition: Home with Home Care vs TCU         Additional Information:  CM had a call from Yakelin at [facility] at [phone] who states that they are currently following the pt for home care services for OT, RN, and HHA at this time. If PT is needed this will need to be added to home care request. CM to monitor progression. Palliative to see.       BETTY Schneider

## 2022-05-17 NOTE — PROGRESS NOTES
[facility]    Progress Note - Hospitalist Service       Date of Admission:  5/16/2022    Assessment & Plan        Adalgisa Solano is a 86 year old female admitted on 5/5/2022. She has a history of pulmonary disease including COPD with persistent bronchiectasis getting vest therapy at home for the last 2 years, non-insulin-dependent diabetes, chronic A. fib, chronic heart failure with preserved ejection fraction who has had several recent hospitalizations for COPD exacerbation with pneumonia. Admitted for recurrent COPD exacerbation without signs of infection. Requiring increasing oxygen support.      Acute on chronic respiratory failure  COPD exacerbation  Hx of recent LLL opacity, resolved  Recently admitted 5/5-5/9 for COPD exacerbation with LLL opacity treated with cefepime and vanco x1 day then transitioned to azithromycin course per ID input. Baseline oxygen needs 3-5L. Presented satting 88% on 5L. CXR shows retrocardiac opacity, likely atelectasis and resolved RLL opacities; mild pulmonary engorgement, no pleural effusions. No comment regarding previously LLL opacities on imaging and CXR only one view. Recent PNA treated with cefepime end of April; also treated with ertapenem and azithromycin beginning of May. On Trelegy Ellipta outpatient. No leukocytosis, elevated procal, therefore infectious source unlikely. Oxygen needs increased to 12L overnight; now on HFNC.   - pulmonology consulted, appreciate recs   - IV methylpred q6h   - start meropenem   - palliative consult   - CT chest if respiratory status tolerates lying flat   - BIPAP PRN and at night given hypercapnia  - duonebs q4h   -albuterol nebs prn  -Mucomyst   -vest therapy  -RCAT consult  - continue PTA Trelegy Ellipta     Chronic diastolic CHF  Bilateral lymphedema  Pulmonary hypertension  Bilateral lower extremity edema L>R, however BNP relatively normal at 180 and patient endorses improvement from baseline. Last  echocardiogram in 4/2022 with an EF of 65%, with moderate mitral and tricuspid insufficiency. Home regimen includes Lasix (40mg daily) and Spironolactone (12.5 mg daily). LE swelling stable.  -Continue PTA Lasix and spironolactone for respiratory optimization  -OT lymphedema consult     Herpes Zoster, (left buttock)  Improving, scabs present. No signs of secondary infection. Completed Valtrex.  - continue to monitor  - RN wound care     Hyponatremia, improving  Sodium of 129 on admission. Multifactoral; could be secondary to lasix, recent bacterial infection, antipsychotic use.   - Recheck sodium in the morning  - Fluid restriction  - Regular diet     Atrial fibrillation  HTN  On Xarelto 20 mg daily and diltiazem 240 mg daily prior to admission.   -Continue diltiazem 240 mg daily   -Continue daily Xarelto 20 mg     Anxiety disorder  -Continue PTA Abilify     T2DM  At risk for steroid-induced hyperglycemia  Last Hgb A1C (3/21/22)- 6.2, suggesting well controlled diabetes. On metformin (500mg daily) prior to admission. Likely will be elevated in setting of steroids. Patient requests increasing metformin dose while getting steroids.   -4 times daily and AC glucose checks  -medium sliding scale insulin  -Continue metformin, increase dose to 1000 BID while receiving steroids per patient request ( BID)     Chronic pain syndrome-chronic mid to low back pain  -Continue Tylenol as needed  -Continue gabapentin 600 mg at bedtime  -Avoid NSAIDs as on anticoagulant     Magnesium deficiency  Mg low at 1.2. Improving.  - Mg replacement protocol     Diet: Fluid restriction 1200 ML FLUID  Combination Diet Regular Diet Adult    DVT Prophylaxis: DOAC  Borjas Catheter: Not present  Fluids: PO  Central Lines: None  Cardiac Monitoring: None  Code Status: Full Code      Disposition Plan   Expected Discharge: 05/20/2022  Anticipated discharge location: home with family;home with help/services    Delays:    oxygen needs        The  "patient's care was discussed with the Attending Physician, Dr. Paulino.    Truong Tiwari MD PGY-1  Hospitalist Service  [facility]  Securely message with the Vocera Web Console (learn more here)  Text page via "[employer]" Paging/Directory         Clinically Significant Risk Factors Present on Admission             # Hypoalbuminemia: Albumin = 2.5 g/dL (Ref range: 3.5 - 5.0 g/dL) on admission, will monitor as appropriate   # Coagulation Defect: home medication list includes an anticoagulant medication    # Overweight: Estimated body mass index is 26.47 kg/m  as calculated from the following:    Height as of 5/10/22: 1.549 m (5' 1\").    Weight as of this encounter: 63.5 kg (140 lb 1.6 oz).      ______________________________________________________________________    Interval History   Overnight, patient tachycardic and required 12L NC. Sepsis labs collected at that time were negative for signs of infection.     This morning, patient stated that she was feeling better. Her cough was improving. Weaned from 12L to 10L while in the room and maintained sats while moving around in bed. Edema is still well controlled per patient. Her sputum is thinner now and white/yellow. No palpitations. Discussed diltiazem with patient; this medication was increased while hospitalized at the end of April for tachycardia.     Page from RT later about patient requiring 15L and working harder to breathe; started on HFNC.     Data reviewed today: I reviewed all medications, new labs and imaging results over the last 24 hours.     Physical Exam   Vital Signs: Temp: 97  F (36.1  C) Temp src: Axillary BP: 113/59 Pulse: 102   Resp: (!) 32 SpO2: 93 % O2 Device: High Flow Nasal Cannula (HFNC) Oxygen Delivery: 40 LPM  Weight: 140 lbs 1.6 oz  Constitutional: awake, alert, cooperative, no apparent distress, and appears stated age  Eyes: Lids and lashes normal, extra ocular muscles intact, sclera clear, conjunctiva normal  ENT: " normocepalic, without obvious abnormality, atraumatic. Mucous membranes moist  Neck: Supple, symmetric  Respiratory: On 12L, weaned to 10L. Diffuse inspiratory wheezing with prolonged expiration, coarse diffuse breath sounds. Some increased air movement throughout. No signs of respiratory distress.  Cardiovascular: irregularly irregular rhythm, no murmur auscltated  GI:  normal bowel sounds, soft, non-distended, non-tender  Skin: scabbed over shingles lesions of perineal area, no drainage or signs of infection  Musculoskeletal: compression stockings in place, LLE edema > RLE edema, stable  Neurologic: Awake, alert, oriented to name, place and time.    Neuropsychiatric: General: normal, calm and normal eye contact     Data   Recent Labs   Lab 05/17/22  1203 05/17/22  0742 05/17/22  0055 05/16/22 2126 05/16/22  1254   WBC  --   --  6.7  --  10.5   HGB  --   --  10.8*  --  11.0*   MCV  --   --  89  --  89   PLT  --   --  265  --  281   INR  --   --   --   --  1.48*   NA  --   --  133*  --  129*   POTASSIUM  --   --  3.8  --  3.8   CHLORIDE  --   --  84*  --  84*   CO2  --   --  38*  --  36*   BUN  --   --  9  --  9   CR  --   --  0.55*  --  0.50*   ANIONGAP  --   --  11  --  9   DENISE  --   --  8.2*  --  8.2*   * 169* 223*   < > 158*   ALBUMIN  --   --  2.5*  --  2.5*   PROTTOTAL  --   --  5.4*  --  5.6*   BILITOTAL  --   --  0.5  --  0.7   ALKPHOS  --   --  56  --  58   ALT  --   --  15  --  22   AST  --   --  13  --  19    < > = values in this interval not displayed.     Recent Results (from the past 24 hour(s))   XR Chest Port 1 View    Narrative    EXAM: XR CHEST PORT 1 VIEW  LOCATION: [facility]  DATE/TIME: 5/16/2022 2:21 PM    INDICATION: SOB  COMPARISON: 04/13/2022      Impression    IMPRESSION: Moderately enlarged heart. Mildly increased retrocardiac opacity, favored to represent atelectasis. Resolved right lower lobe opacities. Mild pulmonary vascular engorgement with increased  interstitial markings, not significantly changed.

## 2022-05-17 NOTE — PLAN OF CARE
Problem: Gas Exchange Impaired  Goal: Optimal Gas Exchange  Outcome: Ongoing, Progressing  Intervention: Optimize Oxygenation and Ventilation  Recent Flowsheet Documentation  Taken 5/17/2022 1600 by Meaghan Steen RN  Head of Bed (HOB) Positioning: HOB at 20-30 degrees    Pt alert and oriented x 4. On high flow NC 40 L with 71% FiO2. Dyspnea on exertion. Lung sounds coarse with crackles. Family by bedside. Reported off to oncoming nurse and transferred to ICU.

## 2022-05-18 NOTE — PROGRESS NOTES
[facility]    Progress Note - Hospitalist Service       Date of Admission:  5/16/2022    Assessment & Plan        Adalgisa Solano is a 86 year old female admitted on 5/5/2022. She has a history of pulmonary disease including COPD with persistent bronchiectasis getting vest therapy at home for the last 2 years, non-insulin-dependent diabetes, chronic A. fib, chronic heart failure with preserved ejection fraction who has had several recent hospitalizations for COPD exacerbation with pneumonia. Admitted for recurrent COPD exacerbation without signs of infection. Requiring increasing oxygen support.      Acute on chronic respiratory failure  COPD exacerbation  Pseudomonal pneumonia   Probable malignant neoplasms  Recently admitted 5/5-5/9 for COPD exacerbation with LLL opacity treated with cefepime and vanco x1 day then transitioned to azithromycin course per ID input. Baseline oxygen needs 3-5L. Presented satting 88% on 5L. Recent PNA treated with cefepime end of April; also treated with ertapenem and azithromycin beginning of May. On Trele Ellipta outpatient. No leukocytosis or elevated procal however CT chest with partial clearing of airspace opacities from the left lower lobe, Residual dense opacities are now along the central bronchovascular structures and new inflammatory nodules. Also with multiple solid nodules concerning for neoplasm.  Alternating between HFNC and BIPAP.  - pulmonology consulted, appreciate recs   - IV methylpred q6h   - continue meropenem   - repeat VBG tomorrow   - CT chest if respiratory status tolerates lying flat   - repeat sputum culture   - BIPAP PRN and at night given hypercapnia  - duonebs q4h   -albuterol nebs prn  - palliative consulted, appreciate recs  -Mucomyst   -vest therapy  -RCAT consult     Chronic diastolic CHF  Bilateral lymphedema  Pulmonary hypertension  Bilateral lower extremity edema L>R, however BNP relatively normal at 180 and  patient endorses improvement from baseline. Last echocardiogram in 4/2022 with an EF of 65%, with moderate mitral and tricuspid insufficiency. Home regimen includes Lasix (40mg daily) and Spironolactone (12.5 mg daily). LE swelling stable.  -Continue PTA spironolactone   - IV lasix 20 mg BID per pulm recs  -OT lymphedema consult     Herpes Zoster, (left buttock)  Improving, scabs present. No signs of secondary infection. Completed Valtrex.  - continue to monitor  - WOC consult      Hyponatremia, improving  Sodium of 129 on admission. Multifactoral; could be secondary to lasix, recent bacterial infection, antipsychotic use.   - continue to monitor  - Fluid restriction  - Regular diet     Atrial fibrillation  HTN  On Xarelto 20 mg daily and diltiazem 240 mg daily prior to admission.   -Continue diltiazem 240 mg daily   -Continue daily Xarelto 20 mg     Anxiety disorder  -Continue PTA Abilify  - Ativan 0.25mg q4h prn for anxiety     T2DM  At risk for steroid-induced hyperglycemia  Last Hgb A1C (3/21/22)- 6.2, suggesting well controlled diabetes. On metformin (500mg daily) prior to admission. Likely will be elevated in setting of steroids. Glucose in low 200s.   -4 times daily and AC glucose checks  -medium sliding scale insulin  - Add NPH 5u qAM     Chronic pain syndrome-chronic mid to low back pain  -Continue Tylenol as needed  -Continue gabapentin 600 mg at bedtime  -Avoid NSAIDs as on anticoagulant     Magnesium deficiency  Mg low at 1.2. Improving.  - Mg replacement protocol    Urinary retention  -Borjas in place       Diet: Fluid restriction 1200 ML FLUID  Combination Diet Regular Diet Adult    DVT Prophylaxis: DOAC  Borjas Catheter: PRESENT, indication:    Fluids: PO  Central Lines: PRESENT  PICC Triple Lumen 05/18/22 Right Basilic-Site Assessment: WDL  Cardiac Monitoring: None  Code Status: Full Code      Disposition Plan   Expected Discharge: 05/21/2022  Anticipated discharge location: home with family;home with  "help/services    Delays:    oxygen needs        The patient's care was discussed with the Attending Physician, Dr. Paulino.    Truong Tiwari MD PGY-1  Hospitalist Service  [facility]  Securely message with the Vocera Web Console (learn more here)  Text page via [employer] Paging/Directory         Clinically Significant Risk Factors Present on Admission              # Overweight: Estimated body mass index is 25.73 kg/m  as calculated from the following:    Height as of 5/10/22: 1.549 m (5' 1\").    Weight as of this encounter: 61.8 kg (136 lb 3.2 oz).      ______________________________________________________________________    Interval History   Overnight, patient on BIPAP.      This morning, patient stated that she was feeling the same as yesterday. On HFNC currently. Edema is still well controlled per patient. No palpitations, but does note chest pain in lower right rib cage. More tender with palpation of the area. Patient brought in advanced directive.     Data reviewed today: I reviewed all medications, new labs and imaging results over the last 24 hours.     Physical Exam   Vital Signs: Temp: 97.8  F (36.6  C) Temp src: Axillary BP: 96/57 Pulse: 101   Resp: (!) 34 SpO2: 97 % O2 Device: High Flow Nasal Cannula (HFNC) Oxygen Delivery: 40 LPM  Weight: 136 lbs 3.2 oz  Constitutional: awake, alert, cooperative, no apparent distress, and appears stated age  Eyes: Lids and lashes normal, extra ocular muscles intact, sclera clear, conjunctiva normal  ENT: normocepalic, without obvious abnormality, atraumatic. Mucous membranes moist  Neck: Supple, symmetric  Respiratory: On HFNC. Diffuse wheezing with coarse diffuse breath sounds. Moderate air movement throughout. No signs of respiratory distress.  Cardiovascular: irregularly irregular rhythm, no murmur auscultated  Chest wall: Right sided TTP near xyphoid process and along lower anterior rib cage.   GI:  normal bowel sounds, soft, non-distended, " non-tender  Skin: scabbed over shingles lesions of perineal area, no drainage or signs of infection  Musculoskeletal: compression stockings in place, LLE edema > RLE edema, stable  Neurologic: Awake, alert, oriented to name, place and time.    Neuropsychiatric: General: normal, calm and normal eye contact     Data   Recent Labs   Lab 05/18/22  1217 05/18/22  0813 05/18/22  0527 05/17/22  0742 05/17/22  0055 05/16/22  2126 05/16/22  1254   WBC  --   --  15.3*  --  6.7  --  10.5   HGB  --   --  10.9*  --  10.8*  --  11.0*   MCV  --   --  93  --  89  --  89   PLT  --   --  295  --  265  --  281   INR  --   --   --   --   --   --  1.48*   NA  --   --  136  --  133*  --  129*   POTASSIUM  --   --  4.2  --  3.8  --  3.8   CHLORIDE  --   --  85*  --  84*  --  84*   CO2  --   --  39*  --  38*  --  36*   BUN  --   --  17  --  9  --  9   CR  --   --  0.63  --  0.55*  --  0.50*   ANIONGAP  --   --  12  --  11  --  9   DENISE  --   --  8.3*  --  8.2*  --  8.2*   * 198* 227*   < > 223*   < > 158*   ALBUMIN  --   --   --   --  2.5*  --  2.5*   PROTTOTAL  --   --   --   --  5.4*  --  5.6*   BILITOTAL  --   --   --   --  0.5  --  0.7   ALKPHOS  --   --   --   --  56  --  58   ALT  --   --   --   --  15  --  22   AST  --   --   --   --  13  --  19    < > = values in this interval not displayed.     Recent Results (from the past 24 hour(s))   CT Chest w/o Contrast    Narrative    EXAM: CT CHEST W/O CONTRAST  LOCATION: [facility]  DATE/TIME: 5/17/2022 2:37 PM    INDICATION: COPD exacerbation  COMPARISON: CT pulmonary angiogram 5/5/2022, 3/11/2021  TECHNIQUE: CT chest without IV contrast. Multiplanar reformats were obtained. Dose reduction techniques were used.  CONTRAST: None.    FINDINGS:   LUNGS AND PLEURA: Background of fairly advanced emphysema mixed centrilobular and paraseptal distribution with an upper lung predominance. In the areas of greatest emphysema the intervening interstitium has mild  thickening, unchanged. The multiple   segment posterior airspace and interstitial process in the left lower lobe has mildly improved from 12 days earlier, however there is no more conspicuous and bandlike opacity in the left upper lobe along the posterior pleura and along the central   bronchovascular structures in the left lower lobe. Increased subpleural opacity in the right posterior costophrenic sulcus as well as development of multiple patchy inflammatory nodules within the parenchyma of the right lower lobe.    Several lobulated solid nodules are present in the right upper lobe. One of the nodules measuring 11 x 12 mm (series 4, image 110) has substantially increased from 11/3/2021 previously measuring 7 mm long axis. A second larger lobulated solid 9 x 16 mm   (series 4, image 123) right upper lobe nodule is also slightly larger measuring 7 x 14 mm. A few smaller solid nodules are present in the right upper lobe closer to the hilum (series 4, image 140 and 143) and are also larger from March 2021.    MEDIASTINUM: Biatrial cardiac enlargement. Cardiac ventricles are normal in size. No pericardial effusion. Enlarged right lower paratracheal lymph node measures up to 17 mm short axis (series 4, image 121) and enlarged left lower paratracheal lymph nodes   are also unchanged. No definite hilar adenopathy. Esophagus is decompressed. Conspicuous cisterna chyli.     CORONARY ARTERY CALCIFICATION: Three-vessel, moderate.    UPPER ABDOMEN: No actionable findings in the imaged upper abdomen.    MUSCULOSKELETAL: Degenerative osteophytes of the thoracic spine largest from T6 through the thoracolumbar junction. No aggressive or destructive bone lesions are present.      Impression    IMPRESSION:     1.  Partial clearing of airspace opacities from the left lower lobe compared to 5/5/2022. Residual dense opacities are now along the central bronchovascular structures. There are new inflammatory nodules in the right lower  lobe and increased opacity in   the right posterior sulcus consistent with new atelectasis/inflammation.  2.  Multiple solid nodules with lobular borders are present in the right upper lobe 2 largest of which measure 11 x 12 and 9 x 16 mm. The 11 x 12 nodule and several other solid nodules are substantially larger from 3/11/2021 and is suspect for neoplasm.   Consider PET/CT after resolution of this acute respiratory illness for further evaluation.  3.  Advanced emphysema.  4.  Severe enlargement of both cardiac atria and moderate atheromatous coronary calcifications.

## 2022-05-18 NOTE — PROCEDURES
"PICC Line Insertion Procedure Note  Pt. Name: Adalgisa Solano  MRN:        [id number]    Procedure: Insertion of a  triple Lumen  5 fr  Bard SOLO (valved) Power PICC, Lot number [id number]    Indications: Access    Contraindications : None    Procedure Details   Patient identified with 2 identifiers and \"Time Out\" conducted.  .     Central line insertion bundle followed: hand hygeine performed prior to procedure, site cleansed with cholraprep, hat, mask, sterile gloves,sterile gown worn, patient draped with maximum barrier head to toe drape, sterile field maintained.    The vein was assessed and found to be compressible and of adequate size. 3 ml 1% Lidocaine administered sq to the insertion site. A 5 Fr PICC was inserted into the basilic vein of the right arm with ultrasound guidance. 1 attempt(s) required to access vein.   Catheter threaded without difficulty. Good blood return noted.    Modified Seldinger Technique used for insertion.    The 8 sharps that are included in the PICC insertion kit were accounted for and disposed of in the sharps container prior to breakdown of the sterile field.    Catheter secured with Statlock, biopatch and Tegaderm dressing applied.    Findings:  Total catheter length  36 cm, with 0 cm exposed. Mid upper arm circumference is 28 cm. Catheter was flushed with 20 cc NS. Patient  tolerated procedure well.    Tip placement verified by xray. Xray read by Dr. Fahrner, Lester, MD . Tip placement in the A right PICC tip terminates in the region of the lower SVC. .    CLABSI prevention brochure left at bedside.    Patient's primary RN notified PICC is ready for use.    Comments:          @ME2@,BSN  [facility]        "

## 2022-05-18 NOTE — PLAN OF CARE
Major Shift Events:  Eyak. A&OX4 . Anxious at times . MEYER . Hemodynamically stable. . Afebrile . Tele A-fib 60's-90's . On HFNC FIO2 60%-70% with 40L FLOW , While awake and BIPAP FIO2 50-70% . Breath sound coarse with exp wheezing . On scheduled neb and Vest therapy . Lasix given . Unable to void via the purewick . Up to commode and voided spontaneously . Magnesium 1.5 . Getting replaced with 2 gram of magnesium sulfate .     Plan: Lymphedema and wound consults . Recheck mag at 0900.    For vital signs and complete assessments, please see documentation flowsheets.      Problem: Gas Exchange Impaired  Goal: Optimal Gas Exchange  Outcome: Ongoing, Progressing  Intervention: Optimize Oxygenation and Ventilation  Recent Flowsheet Documentation  Taken 5/18/2022 0600 by Wen Osorio RN  Head of Bed (HOB) Positioning: HOB at 30 degrees  Taken 5/18/2022 0400 by Wen Osorio RN    Problem: COPD (Chronic Obstructive Pulmonary Disease) Comorbidity  Goal: Maintenance of COPD Symptom Control  Intervention: Maintain COPD-Symptom Control  Recent Flowsheet Documentation  Taken 5/18/2022 0400 by Wen Osorio RN  Medication Review/Management: medications reviewed    Problem: Heart Failure Comorbidity  Goal: Maintenance of Heart Failure Symptom Control  Intervention: Maintain Heart Failure-Management  Recent Flowsheet Documentation  Taken 5/18/2022 0400 by Wen Osorio RN  Medication Review/Management: medications reviewed

## 2022-05-18 NOTE — PROGRESS NOTES
"PULMONARY / CRITICAL CARE PROGRESS NOTE    Date / Time of Admission:  5/16/2022 12:46 PM    Assessment:   Active Problems:    Hypomagnesemia    Hyponatremia    COPD exacerbation (H)    Acute respiratory failure with hypoxia (H)      Code Status:  Full Code  86yoF with history of emphysema (DLCO 39% predicted), COPD (FEV1 49% predicted without reversibility) and bronchiectasis, upper lobe predominant who presents with hypoxia after multiple admissions for COPD exacerbations.       Plan:   Pulmonary  1) COPD  2) Emphysema  3) Bronchiectasis  4) Pseudomonal pneumonia  5) Compensated chronic hypercapneic respiratory failure  6) Probable malignant pulmonary nodules  -Continue vesting and mucomyst  -Bipap at night and PRN, careful balance of mobilizing secretions and aiding in ventilation  -Repeat VBG 5/19 am  -Continue Solumedrol  -Too hypoxic for bronchoscopy, will repeat Sputum sample  -PE unlikely given her anticoagulation status so will forgo repeat CTA since last was done 2 weeks ago.   -Patient very concerned about probable malignancy in lungs, I am more concerned about her immediate ability to survive this hospialization. We reviewed her very limited lung function and how recovery from this is in question. Next step would be intubation and she would be unlikely to liberate from the ventilator. \"she is a fighter\" responded her . I replied, sometimes no matter how hard you fight if your body cannot keep up, the outcome is not good.      ID:  1) Pseudomonal pneumonia  -Repeat Sputum culture--sent this morning.   -Meropenem as she has already been exposed to cefepime at previous admission  -May eventually require rubia nebs at discharge     C/V:  1) Pulmonary hypertension likely WHO II and III  2) Severe biatrial enlargement suggesting elevated filling pressures  3) Moderate mitral insufficiency  4) Afib  -Gentle diuresis given her normal renal function will use IV lasix.  -Continue home diltiazem for rate " "control  -Already anticoagulated for Afib, continue home xarelto     GI:  OK for diet right now, but if more hypoxic will make NPO     Renal:  No issues     Endo:  1) Hyperglycemia  -Sliding scale increased to very high resistance.   -May require Lantus if continues to be elevated.         ICU DAILY CHECKLIST                           Can patient transfer out of MICU? no    FAST HUG:    Feeding:  Feeding: Yes.  Patient is receiving ORAL    Borjas:Yes  Analgesia/Sedation:No  Thromboembolic prophylaxis: Yes; Mode:  DOAC  HOB>30:  Yes  Stress Ulcer Protocol Active: Yes; Mode: PPI  Glycemic Control: Any glucose > 180 yes; Mode of Insulin Therapy: Sliding Scale Insulin    Clinically Significant Risk Factors Present on Admission             # Overweight: Estimated body mass index is 25.73 kg/m  as calculated from the following:    Height as of 5/10/22: 1.549 m (5' 1\").    Weight as of this encounter: 61.8 kg (136 lb 3.2 oz).        PHYSICAL THERAPY AND MOBILITY:  Can patient have PT and mobility trial: no  Activity: Bedrest    Critical Care Time: 40 minutes, this patient is critically ill requiring significant oxygen supplementation at high risk of being intubated.           Subjective:   HPI:  Adalgisa Solano is a 86 year old female with history of emphysema (DLCO 39% predicted), COPD (FEV1 49% predicted without reversibility) and bronchiectasis, upper lobe predominant who presents with hypoxia after multiple admissions for COPD exacerbations.     She was just discharged 5/9/22 for COPD exacerbation and LLL pneumonia where her sputum culture grew Pseudomonas. She was discharged on azithromycin and 5L O2 to TCU. She represented now with worsening hypoxia from TCU. Initially on 12L overnight, now 15L and transitioned to high-flow oxygen.     Active Problems:    Hypomagnesemia    Hyponatremia    COPD exacerbation (H)    Acute respiratory failure with hypoxia (H)      Allergies: Tramadol, Tuberculin tests, Amoxicillin, " Levofloxacin, and Penicillins     MEDS:  Scheduled Meds:    acetylcysteine  4 mL Nebulization Q4H     ARIPiprazole  5 mg Oral At Bedtime     atorvastatin  20 mg Oral At Bedtime     calcium carbonate-vitamin D  1 tablet Oral QAM     cyanocobalamin  500 mcg Oral QAM     diltiazem ER COATED BEADS  240 mg Oral Daily     docusate sodium  100 mg Oral Daily     furosemide  20 mg Intravenous Q12H     gabapentin  600 mg Oral At Bedtime     insulin aspart  1-7 Units Subcutaneous TID AC     insulin aspart  1-5 Units Subcutaneous At Bedtime     ipratropium - albuterol 0.5 mg/2.5 mg/3 mL  3 mL Nebulization Q4H     meropenem  1 g Intravenous Q8H     methylPREDNISolone  62.5 mg Intravenous Q6H     pantoprazole  40 mg Oral Daily     polyethylene glycol  17 g Oral Daily     potassium chloride ER  10 mEq Oral Daily     rivaroxaban ANTICOAGULANT  20 mg Oral Daily with supper     sodium chloride (PF)  3 mL Intracatheter Q8H     sodium chloride (PF)  3 mL Intracatheter Q8H     spironolactone  12.5 mg Oral Daily     Vitamin D3  25 mcg Oral QAM     Continuous Infusions:    - MEDICATION INSTRUCTIONS -       PRN Meds:.acetaminophen **OR** acetaminophen, albuterol, glucose **OR** dextrose **OR** glucagon, lidocaine 4%, lidocaine 4%, lidocaine 4%, lidocaine (buffered or not buffered), lidocaine (buffered or not buffered), lidocaine (buffered or not buffered), melatonin, ondansetron **OR** ondansetron, - MEDICATION INSTRUCTIONS -, sodium chloride, sodium chloride (PF), sodium chloride (PF), sodium chloride (PF)      Objective:   VITALS:  /57 (BP Location: Left arm, Cuff Size: Adult Regular)   Pulse 106   Temp 97.4  F (36.3  C) (Axillary)   Resp (!) 36   Wt 61.8 kg (136 lb 3.2 oz)   SpO2 94%   BMI 25.73 kg/m    EXAM:  General appearance: alert, appears stated age and cooperative  Neck: no adenopathy and supple, symmetrical, trachea midline  Lungs: rhonchorous bilaterally with squeaking and popping.   Heart: irregularly irregular, S1  and S2  Abdomen: soft, non-tender; bowel sounds normal; no masses,  no organomegaly  Extremities: minimal edema  Skin: Skin color, texture, turgor normal. No rashes or lesions  Neurologic: Grossly normal    I&O:     Intake/Output Summary (Last 24 hours) at 5/18/2022 0920  Last data filed at 5/18/2022 0600  Gross per 24 hour   Intake 1120 ml   Output 750 ml   Net 370 ml           Data Review:  Chest CT personally reviewed  Personally reviewed image/s and Personally reviewed impression/s  EXAM: CT CHEST W/O CONTRAST  LOCATION: [facility]  DATE/TIME: 5/17/2022 2:37 PM     INDICATION: COPD exacerbation  COMPARISON: CT pulmonary angiogram 5/5/2022, 3/11/2021  TECHNIQUE: CT chest without IV contrast. Multiplanar reformats were obtained. Dose reduction techniques were used.  CONTRAST: None.     FINDINGS:   LUNGS AND PLEURA: Background of fairly advanced emphysema mixed centrilobular and paraseptal distribution with an upper lung predominance. In the areas of greatest emphysema the intervening interstitium has mild thickening, unchanged. The multiple   segment posterior airspace and interstitial process in the left lower lobe has mildly improved from 12 days earlier, however there is no more conspicuous and bandlike opacity in the left upper lobe along the posterior pleura and along the central   bronchovascular structures in the left lower lobe. Increased subpleural opacity in the right posterior costophrenic sulcus as well as development of multiple patchy inflammatory nodules within the parenchyma of the right lower lobe.     Several lobulated solid nodules are present in the right upper lobe. One of the nodules measuring 11 x 12 mm (series 4, image 110) has substantially increased from 11/3/2021 previously measuring 7 mm long axis. A second larger lobulated solid 9 x 16 mm   (series 4, image 123) right upper lobe nodule is also slightly larger measuring 7 x 14 mm. A few smaller solid nodules  are present in the right upper lobe closer to the hilum (series 4, image 140 and 143) and are also larger from March 2021.     MEDIASTINUM: Biatrial cardiac enlargement. Cardiac ventricles are normal in size. No pericardial effusion. Enlarged right lower paratracheal lymph node measures up to 17 mm short axis (series 4, image 121) and enlarged left lower paratracheal lymph nodes   are also unchanged. No definite hilar adenopathy. Esophagus is decompressed. Conspicuous cisterna chyli.      CORONARY ARTERY CALCIFICATION: Three-vessel, moderate.     UPPER ABDOMEN: No actionable findings in the imaged upper abdomen.     MUSCULOSKELETAL: Degenerative osteophytes of the thoracic spine largest from T6 through the thoracolumbar junction. No aggressive or destructive bone lesions are present.                                                                      IMPRESSION:      1.  Partial clearing of airspace opacities from the left lower lobe compared to 5/5/2022. Residual dense opacities are now along the central bronchovascular structures. There are new inflammatory nodules in the right lower lobe and increased opacity in   the right posterior sulcus consistent with new atelectasis/inflammation.  2.  Multiple solid nodules with lobular borders are present in the right upper lobe 2 largest of which measure 11 x 12 and 9 x 16 mm. The 11 x 12 nodule and several other solid nodules are substantially larger from 3/11/2021 and is suspect for neoplasm.   Consider PET/CT after resolution of this acute respiratory illness for further evaluation.  3.  Advanced emphysema.  4.  Severe enlargement of both cardiac atria and moderate atheromatous coronary calcifications.    LABS  Glucose Values Latest Ref Rng & Units 4/29/2022 5/4/2022 5/5/2022 5/5/2022 5/16/2022 5/17/2022 5/18/2022   Bedside Glucose (mg/dl )  - -- -- -- -- -- -- --   GLUCOSE 70 - 125 mg/dL 80 117 137(H) 168(H) 158(H) 223(H) 227(H)   Some recent data might be  hidden       Results for orders placed or performed during the hospital encounter of 22   Basic metabolic panel   Result Value Ref Range    Sodium 136 136 - 145 mmol/L    Potassium 4.2 3.5 - 5.0 mmol/L    Chloride 85 (L) 98 - 107 mmol/L    Carbon Dioxide (CO2) 39 (H) 22 - 31 mmol/L    Anion Gap 12 5 - 18 mmol/L    Urea Nitrogen 17 8 - 28 mg/dL    Creatinine 0.63 0.60 - 1.10 mg/dL    Calcium 8.3 (L) 8.5 - 10.5 mg/dL    Glucose 227 (H) 70 - 125 mg/dL    GFR Estimate 87 >60 mL/min/1.73m2     Lab Results   Component Value Date    WBC 15.3 (H) 2022    HGB 10.9 (L) 2022    HCT 35.2 2022    MCV 93 2022     2022     VB.39/71/39/39        By:  Mera Kowalski MD  Pulmonary/Critical Care

## 2022-05-18 NOTE — PROGRESS NOTES
BP 95/55 (BP Location: Left arm)   Pulse 80   Temp 98.8  F (37.1  C) (Oral)   Resp 22   Wt 61.8 kg (136 lb 3.2 oz)   SpO2 97%   BMI 25.73 kg/m      Pt was on HFNC when last seen. Current settings are 40 lpm @ 80% fio2. BS were coarse pre and post neb. A sputum sample was obtain and sent to lab. Pt goes on Bipap at night on 10/5. RT will continue to follow.

## 2022-05-18 NOTE — PROGRESS NOTES
05/18/22 1415   Quick Adds   Quick Adds Edema Eval   General Information   Onset of Illness/Injury or Date of Surgery 05/16/22   Referring Physician Dr Mera Sapp   Edema General Information   Onset of Edema   (Chronic for 3 years)   Affected Body Part(s) Left LE;Right LE  (L>R)   Edema Etiology Other (see comments)  (PAD)   Etiology Comments COPD exac; ARF   General Comments/Previous Edema Treatment/Edema Equipment Pt reports swelling has been up/down for 3 years.  has been taught how to wrap pt's BLE. Also has above knee compression stockings that she was fitted for.   Edema Examination/Assessment   Skin Condition Pitting;Hemosiderin deposits  (hemosiderin deposits over R shin)   Skin Condition Comments L lower leg 2+ pitting edema, R lower leg 1+ pitting edema. Skin intact, no wounds or dry skin. L lower leg has red skin.   Scar No   Ulcerations No   Skin Integrity Skin intact.   Edema Assessment Comments Reports minor pain in L lower leg.   Clinical Impression   Criteria for Skilled Therapeutic Intervention Yes, treatment indicated   Edema: Patient Presentation Edema   Influenced by the following impairments PAD, COPD exac, ARF   Edema: Planned Interventions Gradient compression bandaging;Precautions to prevent infection/exacerbation;Education   PT Discharge Planning   PT Rationale for DC Rec Pt reports spouse assists her at home with lymph wraps.   Plan of Care Review   Plan of Care Reviewed With patient;daughter   Physical Therapy Goals   PT Frequency Daily   PT Predicted Duration/Target Date for Goal Attainment 05/25/22   PT: Edema education to increase ability to manage edema after discharge from the hospital Patient;Verbalize;Skin care routine;signs/symptoms of intolerance;wear schedule;limb positioning;garment/bandage care;discharge recommendations   PT: Management of edema bandages Patient;Verbalize;garment(s)   PT: Functional edema exercise program to reduce limb volume, increase activity  tolerance and improve independence with ADL Patient;Verbalize;HEP;walking program   Psychosocial Support   Trust Relationship/Rapport care explained

## 2022-05-18 NOTE — CONSULTS
Invalid order. Please obtain valid [facility] eval and treat consult from provider.

## 2022-05-18 NOTE — PROGRESS NOTES
05/18/22 0440   Tech Time   $Tech Time (10 minute increments) 3   Vital Signs   Resp 23   Pulse 74   Pulse Rate Source Monitor   Oximeter Heart Rate 71 bpm   Oxygen Therapy   Oxygen Concentration (%) 60   Device (Oxygen Therapy) BiPAP   Oxygen Therapy   SpO2 92 %   O2 Device BiPAP/CPAP   FiO2 (%) 60 %   Breath Sounds   Breath Sounds All Fields   All Lung Fields Breath Sounds crackles, coarse;wheezes, expiratory   Nebulizer Assessment & Treatment   $RT Use ONLY Delivery Method Nebulizer - Additional   Nebulizer Device In line   Pretreatment Heart Rate (beats/min) 69   Pretreatment Resp Rate (breaths/min) 28   Pretreatment O2 sats - (TCU only) 90   Pretreat Breath Sounds - Bilat - All Lobes crackles, coarse;wheezes, expiratory   Patient Position HOB elevated   Respiratory Treatment Status (SVN) given   Breath Sounds Post-Respiratory Treatment   Posttreatment Heart Rate (beats/min) 101   Posttreatment Resp Rate (breaths/min) 27   Post treatment O2 Sats - (TCU only) 90   Posttreatment Assessment (SVN) breath sounds unchanged   Signs of Intolerance (SVN) none   Breath Sounds Posttreatment All Fields All Fields   Breath Sounds Posttreatment All Fields crackles, coarse;wheezes, expiratory   RT Device Skin Assessment   Oxygen Delivery Device CPAP/BiPAP Mask   Ventilator Arm In Place Yes   Site Appearance neck circumference Clean and dry   Site Appearance nares (inner) Clean and dry   Site Appearance bridge of nose Clean and dry   Site Appearance of ears Clean and dry   Site Appearance occiput Clean and dry   Strap Tightness Finger Allowance between head and device strap   Device Skin Interventions Taken Strap adjusted to allow 1 finger between skin and device strap     Patient on HFNC during the day at 60% and 40 liters, placed on BIPAP overnight with the above settings.  Duoneb, mucomyst being given q4 hours, patients home CPT vest at bedside CPT with vest being performed BID.

## 2022-05-19 NOTE — PLAN OF CARE
Pt A/Ox4. VSS. On HFNC 80/40. Afib. Bedrest, pt/ot consulted. Pt bp, urine output where a little low, Dr Voss and BFP notified, pt also has + BC, per providers hold lasix x1 and continue to monitor. Plans for care conference tomorrow at 11am. Pt resting in room. Continue to monitor.   Problem: Plan of Care - These are the overarching goals to be used throughout the patient stay.    Goal: Plan of Care Review/Shift Note  Description: The Plan of Care Review/Shift note should be completed every shift.  The Outcome Evaluation is a brief statement about your assessment that the patient is improving, declining, or no change.  This information will be displayed automatically on your shift note.  Outcome: Ongoing, Progressing     Problem: Plan of Care - These are the overarching goals to be used throughout the patient stay.    Goal: Optimal Comfort and Wellbeing  Outcome: Ongoing, Progressing  Intervention: Provide Person-Centered Care  Recent Flowsheet Documentation  Taken 5/19/2022 1800 by Paola Zendejas RN  Trust Relationship/Rapport:   care explained   choices provided   emotional support provided   empathic listening provided   questions answered   questions encouraged   reassurance provided   thoughts/feelings acknowledged  Taken 5/19/2022 1600 by Paola Zendejas RN  Trust Relationship/Rapport:   care explained   choices provided   emotional support provided   empathic listening provided   questions answered   questions encouraged   reassurance provided   thoughts/feelings acknowledged  Taken 5/19/2022 1400 by Paola Zendejas, BARON  Trust Relationship/Rapport:   care explained   choices provided   emotional support provided   empathic listening provided   questions answered   questions encouraged   reassurance provided   thoughts/feelings acknowledged  Taken 5/19/2022 1200 by Paola Zendejas RN  Trust Relationship/Rapport:   care explained   choices provided   emotional support provided   empathic  listening provided   questions answered   questions encouraged   reassurance provided   thoughts/feelings acknowledged  Taken 5/19/2022 1000 by Paola Zendejas RN  Trust Relationship/Rapport:   care explained   choices provided   emotional support provided   empathic listening provided   questions answered   questions encouraged   reassurance provided   thoughts/feelings acknowledged  Taken 5/19/2022 0800 by Paola Zendejas, RN  Trust Relationship/Rapport:   care explained   choices provided   emotional support provided   empathic listening provided   questions answered   questions encouraged   reassurance provided   thoughts/feelings acknowledged   Goal Outcome Evaluation:

## 2022-05-19 NOTE — PROGRESS NOTES
Pt remained on HFNC overnight, 80% and 40 L.  Attempted Bipap X2 however pt stated she was nauseated and wanted to be taken off bipap.  Sats remained stable overnight on HFNC between 90-94%.

## 2022-05-19 NOTE — CONSULTS
WO Nurse Inpatient Wound Assessment   Reason for consultation: Evaluate and treat perineal shingles wounds    Assessment  Sacrum/right buttock wounds due to Viral Lesions  Status: initial assessment and improved significantly from last admission   Much improved however  and needs topical treatment specific to virus  Treatment Plan  Sacrum and right buttock wounds: Leave open to air and as ordered by MD    Recommend topical antiviral   No brief in bed  Limit layers    Orders Written  Recommended provider order: topical antiviral   WOC Nurse follow-up plan:weekly  Nursing to notify the Provider(s) and re-consult the WOC Nurse if wound(s) deteriorates or new skin concern.    Patient History  According to provider note(s):  Adalgisa Solano is a 86 year old female admitted on 5/5/2022. She has a history of pulmonary disease including COPD with persistent bronchiectasis getting vest therapy at home for the last 2 years, non-insulin-dependent diabetes, chronic A. fib, chronic heart failure with preserved ejection fraction who has had several recent hospitalizations for COPD exacerbation with pneumonia. Admitted for recurrent COPD exacerbation without signs of infection.     Acute on chronic respiratory failure  COPD exacerbation  Hx of recent LLL opacity, resolved  Recently admitted 5/5-5/9 for COPD exacerbation with LLL opacity treated with cefepime and vanco x1 day then transitioned to azithromycin course per ID input. Baseline oxygen needs 3-5L. Presented satting 88% on 5L. CXR shows retrocardiac opacity, likely atelectasis and resolved RLL opacities; mild pulmonary engorgement, no pleural effusions. No comment regarding previously LLL opacities on imaging and CXR only one view. Recent PNA treated with cefepime end of April; also treated with ertapenem and azithromycin beginning of May. On [facility] outpatient. No leukocytosis, elevated procal, therefore infectious source unlikely.  Patient is  requesting palliative consult today    Objective Data  Containment of urine/stool: Incontinent pad in bed and Indwelling catheter    Active Diet Order  Orders Placed This Encounter      Combination Diet Regular Diet Adult      Output:   I/O last 3 completed shifts:  In: 670 [P.O.:530; I.V.:140]  Out: 1847 [Urine:1847]    Risk Assessment:   Sensory Perception: 3-->slightly limited  Moisture: 3-->occasionally moist  Activity: 3-->walks occasionally  Mobility: 3-->slightly limited  Nutrition: 3-->adequate  Friction and Shear: 2-->potential problem  Joshua Score: 17                          Labs: Recent Labs   Lab 05/19/22  0421 05/18/22  0527 05/17/22  0055 05/16/22  1254   ALBUMIN  --   --  2.5* 2.5*   HGB 10.3*   < > 10.8* 11.0*   INR  --   --   --  1.48*   WBC 13.8*   < > 6.7 10.5    < > = values in this interval not displayed.       Physical Exam  Areas of skin assessed: focused right buttock, sacrum    Wound Location:  Right buttock  Date of last photo 5/7 by MD and unchanged since this photo however significant improvement from 5/5/2022  Wound History: saw this patient in the ED last admission 5/5  Wound Base: 100 % dermis     Palpation of the wound bed: normal      Drainage: none     Description of drainage: none     Measurements (length x width x depth, in cm)   Sacrum not pictured) 0.5  x 0.5  x  0.1 cm, right upper buttock area with skin island in the middle 1cmx 1.5cm x 0.1cm right lower buttock 0.4cm x 0.5cm x 0.1cm    Right medial lower buttock 1cm x 0.5cm x 0.1cm   Periwound skin: intact      Color: normal and consistent with surrounding tissue      Temperature: normal   Odor: none  Pain: mild, tender      Interventions  Visual inspection and assessment completed   Wound Care Rationale Decrease bacterial load and decrease viral load and pain  Wound Care: completed by RN  Supplies: discussed with RN, discussed with family and discussed with patient  Current off-loading measures: Pillows  Current support  surface: Standard  ICU mattress   Education provided to: plan of care  Discussed plan of care with Patient, Family, Nurse and Physician    Padmini Almeida RN BS CWOCN

## 2022-05-19 NOTE — PROGRESS NOTES
[facility]    Progress Note - Hospitalist Service       Date of Admission:  5/16/2022    Assessment & Plan        Adalgisa Solano is a 86 year old female admitted on 5/5/2022. She has a history of pulmonary disease including COPD with persistent bronchiectasis getting vest therapy at home for the last 2 years, non-insulin-dependent diabetes, chronic A. fib, chronic heart failure with preserved ejection fraction who has had several recent hospitalizations for COPD exacerbation with pneumonia. Admitted for recurrent COPD exacerbation without signs of infection. Alternating between HFNC and Bipap. Planning for goals of care conference today.      Acute on chronic respiratory failure  COPD exacerbation  Hx of Pseudomonal pneumonia   Probable malignant neoplasms  Recently admitted 5/5-5/9 for COPD exacerbation with LLL opacity treated with cefepime and vanco x1 day then transitioned to azithromycin course per ID input. Baseline oxygen needs 3-5L. Presented satting 88% on 5L. Recent PNA treated with cefepime end of April; also treated with ertapenem and azithromycin beginning of May. On [facility] outpatient. No leukocytosis or elevated procal however CT chest with partial clearing of airspace opacities from the left lower lobe, Residual dense opacities are now along the central bronchovascular structures and new inflammatory nodules. Also with multiple solid nodules concerning for neoplasm.  Alternating between HFNC and BIPAP. VBG this morning with worsening CO2 retention.  - pulmonology consulted, appreciate recs   - IV methylpred q6h   - continue meropenem   - BIPAP PRN and at night given hypercapnia  - duonebs q4h   -albuterol nebs prn  - palliative consulted, appreciate recs  -Mucomyst   -vest therapy  -RCAT consult     Chronic diastolic CHF  Bilateral lymphedema  Pulmonary hypertension  Bilateral lower extremity edema L>R, however BNP relatively normal at 180 and patient  endorses improvement from baseline. Last echocardiogram in 4/2022 with an EF of 65%, with moderate mitral and tricuspid insufficiency. Home regimen includes Lasix (40mg daily) and Spironolactone (12.5 mg daily). LE swelling stable.  -Continue PTA spironolactone   - IV lasix 20 mg BID per pulm recs  -OT lymphedema consult     Herpes Zoster, (left buttock)  Improving, scabs present. No signs of secondary infection. Completed Valtrex.  - continue to monitor  - WOC consult      Hyponatremia, resolved  Sodium of 129 on admission. Multifactoral; could be secondary to lasix, recent bacterial infection, antipsychotic use.   - continue to monitor  - Fluid restriction  - Regular diet     Atrial fibrillation  HTN  On Xarelto 20 mg daily and diltiazem 240 mg daily prior to admission.   -Continue diltiazem 240 mg daily   -Continue daily Xarelto 20 mg     Anxiety disorder  -Continue PTA Abilify  - Ativan 0.25mg q4h prn for anxiety per palliative recs      T2DM  Steroid-induced hyperglycemia  Last Hgb A1C (3/21/22)- 6.2, suggesting well controlled diabetes. On metformin (500mg daily) prior to admission. Likely will be elevated in setting of steroids. Glucose in low 200s.   -4 times daily and AC glucose checks  -medium sliding scale insulin  - Add NPH 5u qAM     Chronic pain syndrome-chronic mid to low back pain  -Continue Tylenol as needed  -Continue gabapentin 600 mg at bedtime  -Avoid NSAIDs as on anticoagulant     Magnesium deficiency  Mg low at 1.2. Improving.  - Mg replacement protocol    Urinary retention  -Borjas in place    Constipation  Likely due to decreased ambulation  -Increased colace to BID  -suppository PRN  -Miralax        Diet: Fluid restriction 1200 ML FLUID  Combination Diet Regular Diet Adult    DVT Prophylaxis: DOAC  Borjas Catheter: PRESENT, indication: Strict 1-2 Hour I&O  Fluids: PO  Central Lines: PRESENT  PICC Triple Lumen 05/18/22 Right Basilic-Site Assessment: WDL  Cardiac Monitoring: None  Code Status:  "Full Code      Disposition Plan   Expected Discharge: 05/21/2022  Anticipated discharge location: home with family;home with help/services    Delays:    oxygen needs        The patient's care was discussed with the Attending Physician, Dr. Rolle.    Truong Tiwari MD PGY-1  Hospitalist Service  [facility]  Securely message with the Vocera Web Console (learn more here)  Text page via [employer] Paging/Directory         Clinically Significant Risk Factors Present on Admission              # Overweight: Estimated body mass index is 26.4 kg/m  as calculated from the following:    Height as of 5/10/22: 1.549 m (5' 1\").    Weight as of this encounter: 63.4 kg (139 lb 11.2 oz).      ______________________________________________________________________    Interval History   Overnight, patient on HFNC 40L 80%. Did not tolerate Bipap.     This morning, patient is feeling down. She has come to realize her health is declining. Working on getting \"affairs in order\". She is open to talking to palliative again today. Breathing and cough is about the same. She would like something for constipation. Patient states she would not want to live on a ventilator.  at bedside.    Data reviewed today: I reviewed all medications, new labs and imaging results over the last 24 hours.     Physical Exam   Vital Signs: Temp: 97.6  F (36.4  C) Temp src: Axillary BP: 114/84 Pulse: (!) 163   Resp: (!) 40 SpO2: 94 % O2 Device: High Flow Nasal Cannula (HFNC) Oxygen Delivery: 40 LPM  Weight: 139 lbs 11.2 oz  Constitutional: awake, alert, cooperative, no apparent distress, and appears stated age, diaphoresis present  Eyes: Lids and lashes normal, extra ocular muscles intact, sclera clear, conjunctiva normal  ENT: normocepalic, without obvious abnormality, atraumatic.  Neck: Supple, symmetric  Respiratory: On HFNC. Coarse diffuse breath sounds with expiratory wheezing.   Cardiovascular: irregularly irregular rhythm, no " murmur auscultated  GI:  normal bowel sounds, soft, non-distended, non-tender  Musculoskeletal: lymphedema wraps in place  Neurologic: Awake, alert, oriented to name, place and time.    Neuropsychiatric: General: normal, calm and normal eye contact     Data   Recent Labs   Lab 05/19/22  0752 05/19/22  0421 05/18/22  2148 05/18/22  0813 05/18/22  0527 05/17/22  0742 05/17/22  0055 05/16/22  2126 05/16/22  1254   WBC  --  13.8*  --   --  15.3*  --  6.7  --  10.5   HGB  --  10.3*  --   --  10.9*  --  10.8*  --  11.0*   MCV  --  95  --   --  93  --  89  --  89   PLT  --  287  --   --  295  --  265  --  281   INR  --   --   --   --   --   --   --   --  1.48*   NA  --  136  --   --  136  --  133*  --  129*   POTASSIUM  --  4.6  --   --  4.2  --  3.8  --  3.8   CHLORIDE  --  88*  --   --  85*  --  84*  --  84*   CO2  --  38*  --   --  39*  --  38*  --  36*   BUN  --  24  --   --  17  --  9  --  9   CR  --  0.64  --   --  0.63  --  0.55*  --  0.50*   ANIONGAP  --  10  --   --  12  --  11  --  9   DENISE  --  8.1*  --   --  8.3*  --  8.2*  --  8.2*   * 208* 157*   < > 227*   < > 223*   < > 158*   ALBUMIN  --   --   --   --   --   --  2.5*  --  2.5*   PROTTOTAL  --   --   --   --   --   --  5.4*  --  5.6*   BILITOTAL  --   --   --   --   --   --  0.5  --  0.7   ALKPHOS  --   --   --   --   --   --  56  --  58   ALT  --   --   --   --   --   --  15  --  22   AST  --   --   --   --   --   --  13  --  19    < > = values in this interval not displayed.     Recent Results (from the past 24 hour(s))   XR Chest Port 1 View    Narrative    EXAM: XR CHEST PORT 1 VIEW  LOCATION: [facility]  DATE/TIME: 5/18/2022 12:09 PM    INDICATION: RN placed PICC   verify tip placement  COMPARISON: 05/16/2022       Impression    IMPRESSION: A right PICC tip terminates in the region of the lower SVC. A small volume of left pleural fluid is present; this appears to have decreased in volume from the prior study. No  pneumothorax. Interstitial edema is unchanged. The   cardiomediastinal silhouette is enlarged. There is aortic calcification.

## 2022-05-19 NOTE — PROGRESS NOTES
BP 93/55   Pulse 88   Temp 97.6  F (36.4  C) (Axillary)   Resp (!) 32   Wt 63.4 kg (139 lb 11.2 oz)   SpO2 95%   BMI 26.40 kg/m      Pt remains on HFNC at this time. Current settings are 40 lpm @ 80% fio2. BS were coarse/crackles throughout the day. Pt refused her vest tx at 1600. I advised pt to use bipap tonight and she said she would try. RT will continue to follow.

## 2022-05-19 NOTE — PLAN OF CARE
Problem: Respiratory Compromise (Heart Failure)  Goal: Effective Oxygenation and Ventilation  Outcome: Ongoing, Progressing     Problem: Fluid Imbalance (Heart Failure)  Goal: Fluid Balance  Outcome: Ongoing, Progressing     Problem: Diabetes Comorbidity  Goal: Blood Glucose Level Within Targeted Range  Outcome: Ongoing, Progressing     Resting comfortably. Some c/o intermittent abdominal pain. Aox4. A-fib on telemetry with rate 70s-90s. Lung sounds coarse with expiratory wheezes. On HFNC, managed by RT. Remains on bedrest.

## 2022-05-19 NOTE — PROGRESS NOTES
SPIRITUAL HEALTH SERVICES Progress Note  I met with pt, spouse and daughter today. Pt shared that she asked for a  to come to discuss the concerns her family has for her spirit following her death in the future. She said she is not sure if her spirit will go anywhere following her death. She understands her family to hope that she holds a conviction of an afterlife so that she will be able to go there. When I asked them they both showed love and support for her without the concern that she voiced.     Following my conversation Palliative NP Eloisa updated me about the conversations around pt and family's decisions about her code status and Health Care Directive. I accompanied Eloisa as she spoke with them. All agreed that they would follow the wishes of Kim to not be intubated if she declines and is unable to be her own decision maker. Eloisa also clarified that they would also have a POLST filled out in addition to the HCD.       [place]  [employer]

## 2022-05-19 NOTE — PROGRESS NOTES
"Palliative Med    I did consult on this patient on 5/17, she declined Palliative Care services.  I spoke to Kenisha Tiwari Resident. She indicated patient has changed her mind and wants to talk to Palliative care again.    Patients  has left the hospital.  I reviewed the Advanced directive that  brought in after my consult.  This is dated April 27, 2009.  This forms indicated that if she is diagnosed with a terminal condition (which she has, emphysema [DLCO 39% predicted], COPD [FEV1 49% predicted without reversibility, and bronchiectasis], then she would not want medical treatment or procedures that would serve to prolong her life.   (Discussed with Dr. Voss and she believe that if we put patient on the ventilator she would not be able to be removed).  In review of the Advanced directive, Anabel  is primary health care agent and Veronika Lozanobears in alternative.  This document  Further states \"It is my intention that my agent has a personal obligation to me to make health care decisions for me consistent with my expressed wishes. I understand however, that my agent has no legal duty to act\"     I am concerned that, based on comments from the alternative agent made to  Me at the initial consult (dated 5/17) that alternative agent will not follow patients wishes.   It should also be noted that palliative care held a family meeting last hospitalization with these two agents and patient changed her mind regarding code status.    Discussed all of this with Dr. Baum and I will try to arrange a family meeting tomorrow at 11 am with Patient and both agents to discuss goals of care and code status.    MARLINE Sargent, NP-C, A[facility]PN    "

## 2022-05-19 NOTE — PROGRESS NOTES
PULMONARY / CRITICAL CARE PROGRESS NOTE    Date / Time of Admission:  5/16/2022 12:46 PM    Assessment:   Active Problems:    Hypomagnesemia    Hyponatremia    COPD exacerbation (H)    Acute respiratory failure with hypoxia (H)    Overnight Events:  She  Is having some conspation that bothers her  Did not tolerate bipap, but okay with vest and nebs  She has a good appetite  She does not want to spend the rest of her life in a hospital  She is a little down today    Code Status:  Full Code  86yoF with history of emphysema (DLCO 39% predicted), COPD (FEV1 49% predicted without reversibility) and bronchiectasis, upper lobe predominant who presents with hypoxia after multiple admissions for COPD exacerbations.       Plan:   Pulmonary  1) COPD  2) Emphysema  3) Bronchiectasis  4) Pseudomonal pneumonia  5) Compensated chronic hypercapneic respiratory failure  6) Probable malignant pulmonary nodules  -Continue vesting and mucomyst  -Bipap at night and PRN, careful balance of mobilizing secretions and aiding in ventilation  -Repeat VBG 5/19 am  -Continue Solumedrol  -Too hypoxic for bronchoscopy, Sputum sample   -PE unlikely given her anticoagulation status so will forgo repeat CTA since last was done 2 weeks ago.      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 - net -1.1L yesterday  -Continue home diltiazem for rate control  -Already anticoagulated for Afib, continue home xarelto     GI:  - Ogeneral diet  - GI ppx     Renal:  No issues     Endo:  1) Hyperglycemia  -Sliding scale increased to very high resistance.   - increase NPH to 12 from  "5        ICU DAILY CHECKLIST                           Can patient transfer out of MICU? no    FAST HUG:    Feeding:  Feeding: Yes.  Patient is receiving ORAL    Borjas:Yes  Analgesia/Sedation:No  Thromboembolic prophylaxis: Yes; Mode:  DOAC  HOB>30:  Yes  Stress Ulcer Protocol Active: Yes; Mode: PPI  Glycemic Control: Any glucose > 180 yes; Mode of Insulin Therapy: Sliding Scale Insulin    Clinically Significant Risk Factors Present on Admission             # Overweight: Estimated body mass index is 26.4 kg/m  as calculated from the following:    Height as of 5/10/22: 1.549 m (5' 1\").    Weight as of this encounter: 63.4 kg (139 lb 11.2 oz).        PHYSICAL THERAPY AND MOBILITY:  Can patient have PT and mobility trial: no  Activity: Bedrest    Critical Care Time: 40 minutes, this patient is critically ill requiring significant oxygen supplementation at high risk of being intubated.           Subjective:   HPI:  Adalgisa Solano is a 86 year old female with history of emphysema (DLCO 39% predicted), COPD (FEV1 49% predicted without reversibility) and bronchiectasis, upper lobe predominant who presents with hypoxia after multiple admissions for COPD exacerbations.     She was just discharged 5/9/22 for COPD exacerbation and LLL pneumonia where her sputum culture grew Pseudomonas. She was discharged on azithromycin and 5L O2 to TCU. She represented now with worsening hypoxia from TCU.    Active Problems:    Hypomagnesemia    Hyponatremia    COPD exacerbation (H)    Acute respiratory failure with hypoxia (H)      Allergies: Tramadol, Tuberculin tests, Amoxicillin, Levofloxacin, and Penicillins     MEDS:  Scheduled Meds:    acetaminophen  650 mg Oral Q6H    Or     acetaminophen  650 mg Rectal Q6H     acetylcysteine  4 mL Nebulization Q4H     ARIPiprazole  5 mg Oral At Bedtime     atorvastatin  20 mg Oral At Bedtime     calcium carbonate-vitamin D  1 tablet Oral QAM     cyanocobalamin  500 mcg Oral QAM     diltiazem " ER COATED BEADS  240 mg Oral Daily     docusate sodium  100 mg Oral Daily     furosemide  20 mg Intravenous Q12H     gabapentin  600 mg Oral At Bedtime     insulin aspart  1-22 Units Subcutaneous TID AC     insulin aspart  1-16 Units Subcutaneous At Bedtime     insulin NPH-Regular  5 Units Subcutaneous QAM     ipratropium - albuterol 0.5 mg/2.5 mg/3 mL  3 mL Nebulization Q4H     meropenem  1 g Intravenous Q8H     methylPREDNISolone  62.5 mg Intravenous Q6H     pantoprazole  40 mg Oral Daily     polyethylene glycol  17 g Oral Daily     potassium chloride ER  10 mEq Oral Daily     rivaroxaban ANTICOAGULANT  20 mg Oral Daily with supper     sodium chloride (PF)  3 mL Intracatheter Q8H     sodium chloride (PF)  3 mL Intracatheter Q8H     spironolactone  12.5 mg Oral Daily     Vitamin D3  25 mcg Oral QAM     Continuous Infusions:    - MEDICATION INSTRUCTIONS -           Objective:   VITALS:  /84   Pulse (!) 163   Temp 97.6  F (36.4  C) (Axillary)   Resp (!) 40   Wt 63.4 kg (139 lb 11.2 oz)   SpO2 93%   BMI 26.40 kg/m    EXAM:  General appearance: alert, appears stated age and cooperative  Neck: no adenopathy and supple, symmetrical, trachea midline  Lungs: rhonchorous bilaterally with squeaking and popping.   Heart: irregularly irregular, S1 and S2  Abdomen: soft, non-tender; bowel sounds normal; no masses,  no organomegaly  Extremities: minimal edema  Skin: Skin color, texture, turgor normal. No rashes or lesions  Neurologic: Grossly normal    I&O:     Intake/Output Summary (Last 24 hours) at 5/18/2022 0920  Last data filed at 5/18/2022 0600  Gross per 24 hour   Intake 1120 ml   Output 750 ml   Net 370 ml           Data Review:  Chest CT personally reviewed  Personally reviewed image/s and Personally reviewed impression/s  EXAM: CT CHEST W/O CONTRAST  LOCATION: [facility]  DATE/TIME: 5/17/2022 2:37 PM     INDICATION: COPD exacerbation  COMPARISON: CT pulmonary angiogram 5/5/2022,  3/11/2021  TECHNIQUE: CT chest without IV contrast. Multiplanar reformats were obtained. Dose reduction techniques were used.  CONTRAST: None.     FINDINGS:   LUNGS AND PLEURA: Background of fairly advanced emphysema mixed centrilobular and paraseptal distribution with an upper lung predominance. In the areas of greatest emphysema the intervening interstitium has mild thickening, unchanged. The multiple   segment posterior airspace and interstitial process in the left lower lobe has mildly improved from 12 days earlier, however there is no more conspicuous and bandlike opacity in the left upper lobe along the posterior pleura and along the central   bronchovascular structures in the left lower lobe. Increased subpleural opacity in the right posterior costophrenic sulcus as well as development of multiple patchy inflammatory nodules within the parenchyma of the right lower lobe.     Several lobulated solid nodules are present in the right upper lobe. One of the nodules measuring 11 x 12 mm (series 4, image 110) has substantially increased from 11/3/2021 previously measuring 7 mm long axis. A second larger lobulated solid 9 x 16 mm   (series 4, image 123) right upper lobe nodule is also slightly larger measuring 7 x 14 mm. A few smaller solid nodules are present in the right upper lobe closer to the hilum (series 4, image 140 and 143) and are also larger from March 2021.     MEDIASTINUM: Biatrial cardiac enlargement. Cardiac ventricles are normal in size. No pericardial effusion. Enlarged right lower paratracheal lymph node measures up to 17 mm short axis (series 4, image 121) and enlarged left lower paratracheal lymph nodes   are also unchanged. No definite hilar adenopathy. Esophagus is decompressed. Conspicuous cisterna chyli.      CORONARY ARTERY CALCIFICATION: Three-vessel, moderate.     UPPER ABDOMEN: No actionable findings in the imaged upper abdomen.     MUSCULOSKELETAL: Degenerative osteophytes of the  thoracic spine largest from T6 through the thoracolumbar junction. No aggressive or destructive bone lesions are present.                                                                      IMPRESSION:      1.  Partial clearing of airspace opacities from the left lower lobe compared to 5/5/2022. Residual dense opacities are now along the central bronchovascular structures. There are new inflammatory nodules in the right lower lobe and increased opacity in   the right posterior sulcus consistent with new atelectasis/inflammation.  2.  Multiple solid nodules with lobular borders are present in the right upper lobe 2 largest of which measure 11 x 12 and 9 x 16 mm. The 11 x 12 nodule and several other solid nodules are substantially larger from 3/11/2021 and is suspect for neoplasm.   Consider PET/CT after resolution of this acute respiratory illness for further evaluation.  3.  Advanced emphysema.  4.  Severe enlargement of both cardiac atria and moderate atheromatous coronary calcifications.    LABS  Glucose Values Latest Ref Rng & Units 5/4/2022 5/5/2022 5/5/2022 5/16/2022 5/17/2022 5/18/2022 5/19/2022   Bedside Glucose (mg/dl )  - -- -- -- -- -- -- --   GLUCOSE 70 - 125 mg/dL 117 137(H) 168(H) 158(H) 223(H) 227(H) 208(H)   Some recent data might be hidden       Results for orders placed or performed during the hospital encounter of 05/16/22   Basic metabolic panel   Result Value Ref Range    Sodium 136 136 - 145 mmol/L    Potassium 4.6 3.5 - 5.0 mmol/L    Chloride 88 (L) 98 - 107 mmol/L    Carbon Dioxide (CO2) 38 (H) 22 - 31 mmol/L    Anion Gap 10 5 - 18 mmol/L    Urea Nitrogen 24 8 - 28 mg/dL    Creatinine 0.64 0.60 - 1.10 mg/dL    Calcium 8.1 (L) 8.5 - 10.5 mg/dL    Glucose 208 (H) 70 - 125 mg/dL    GFR Estimate 87 >60 mL/min/1.73m2     Lab Results   Component Value Date    WBC 13.8 (H) 05/19/2022    HGB 10.3 (L) 05/19/2022    HCT 33.5 (L) 05/19/2022    MCV 95 05/19/2022     05/19/2022     Padmini  DO Shanika  Pulmonary and Critical Care Attending  pgr [phone]

## 2022-05-19 NOTE — PLAN OF CARE
Problem: COPD (Chronic Obstructive Pulmonary Disease) Comorbidity  Goal: Maintenance of COPD Symptom Control  Outcome: Ongoing, Progressing     Problem: Fluid Imbalance (Heart Failure)  Goal: Fluid Balance  Outcome: Ongoing, Progressing   Goal Outcome Evaluation:  Pt resting at intervals, irritable at times but overall cooperative. Awake & washed up this am. Tolerated activity & asking when she can exercise today. Hemodynamically stable, afebrile. Lungs coarse with posterior crackles. Using vibration to raise thick yellow secretions. Refusing BIPAP tonight & tolerating HFNC with sats 93-95%. Denies pain. Taking sips water. Borjas with adequate urine output.

## 2022-05-20 NOTE — PROGRESS NOTES
ICU Update Note    Spent 60 min on family meeting with family and pt face to face discussing goals of care and clinical disease course. 11:00 to 12:05pm.     Additional time billed     Padmini Voss DO  Pulmonary and Critical Care Attending  pgr [phone]

## 2022-05-20 NOTE — PROGRESS NOTES
PULMONARY / CRITICAL CARE PROGRESS NOTE    Date / Time of Admission:  5/16/2022 12:46 PM    Assessment:   Active Problems:    Hypomagnesemia    Hyponatremia    COPD exacerbation (H)    Acute respiratory failure with hypoxia (H)    Overnight Events:  She had an okay night  She was on the high flow 80%   We have a meeting at 11am to discuss goals of care    Code Status:  No CPR- Do NOT Intubate  86yoF with history of emphysema (DLCO 39% predicted), COPD (FEV1 49% predicted without reversibility) and bronchiectasis, upper lobe predominant who presents with hypoxia after multiple admissions for COPD exacerbations.       Plan:   Pulmonary  1) COPD  2) Emphysema  3) Bronchiectasis  4) Pseudomonal pneumonia  5) Compensated chronic hypercapneic respiratory failure  6) Probable malignant pulmonary nodules  -Continue vesting and mucomyst  -Bipap at night and PRN, careful balance of mobilizing secretions and aiding in ventilation  -Repeat VBG 5/19 am  -Continue Solumedrol  -Sputum Sample with Pseudomonas, pending sensitivities  - decrease solumedrol to 60q8 from 60q6  -PE unlikely given her anticoagulation status so will forgo repeat CTA since last was done 2 weeks ago.      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 - net -1.1L yesterday  -Continue home diltiazem for rate control  -Already anticoagulated for Afib, continue home xarelto     GI:  - Ogeneral diet  - GI ppx     Renal:  No issues     Endo:  1) Hyperglycemia  -Sliding scale increased to very high resistance.   - will switch to lantus 10u this am for  "hyperglycemia    Code Status: DNR/DNI, discussing palliative care      ICU DAILY CHECKLIST                           Can patient transfer out of MICU? no    FAST HUG:    Feeding:  Feeding: Yes.  Patient is receiving ORAL    Borjas:Yes  Analgesia/Sedation:No  Thromboembolic prophylaxis: Yes; Mode:  DOAC  HOB>30:  Yes  Stress Ulcer Protocol Active: Yes; Mode: PPI  Glycemic Control: Any glucose > 180 yes; Mode of Insulin Therapy: Sliding Scale Insulin    Clinically Significant Risk Factors Present on Admission             # Overweight: Estimated body mass index is 26.45 kg/m  as calculated from the following:    Height as of 5/10/22: 1.549 m (5' 1\").    Weight as of this encounter: 63.5 kg (140 lb).        PHYSICAL THERAPY AND MOBILITY:  Can patient have PT and mobility trial: no  Activity: Bedrest    Critical Care Time: 40 minutes, this patient is critically ill requiring significant oxygen supplementation at high risk of being intubated.           Subjective:   HPI:  Adalgisa Solano is a 86 year old female with history of emphysema (DLCO 39% predicted), COPD (FEV1 49% predicted without reversibility) and bronchiectasis, upper lobe predominant who presents with hypoxia after multiple admissions for COPD exacerbations.     She was just discharged 5/9/22 for COPD exacerbation and LLL pneumonia where her sputum culture grew Pseudomonas. She was discharged on azithromycin and 5L O2 to TCU. She represented now with worsening hypoxia from TCU.    Active Problems:    Hypomagnesemia    Hyponatremia    COPD exacerbation (H)    Acute respiratory failure with hypoxia (H)      Allergies: Tramadol, Tuberculin tests, Amoxicillin, Levofloxacin, and Penicillins     MEDS:  Scheduled Meds:    acetaminophen  650 mg Oral Q6H    Or     acetaminophen  650 mg Rectal Q6H     acetylcysteine  4 mL Nebulization Q4H     acyclovir   Topical Q3H     ARIPiprazole  5 mg Oral At Bedtime     atorvastatin  20 mg Oral At Bedtime     calcium " carbonate-vitamin D  1 tablet Oral QAM     cyanocobalamin  500 mcg Oral QAM     diltiazem ER COATED BEADS  240 mg Oral Daily     docusate sodium  100 mg Oral BID     furosemide  20 mg Intravenous Q12H     gabapentin  600 mg Oral At Bedtime     insulin aspart  1-22 Units Subcutaneous TID AC     insulin aspart  1-16 Units Subcutaneous At Bedtime     insulin NPH-Regular  10 Units Subcutaneous QAM     ipratropium - albuterol 0.5 mg/2.5 mg/3 mL  3 mL Nebulization Q4H     LORazepam         meropenem  1 g Intravenous Q8H     methylPREDNISolone  62.5 mg Intravenous Q6H     pantoprazole  40 mg Oral Daily     polyethylene glycol  17 g Oral Daily     potassium chloride ER  10 mEq Oral Daily     rivaroxaban ANTICOAGULANT  20 mg Oral Daily with supper     sodium chloride (PF)  3 mL Intracatheter Q8H     sodium chloride (PF)  3 mL Intracatheter Q8H     spironolactone  12.5 mg Oral Daily     Vitamin D3  25 mcg Oral QAM     Continuous Infusions:    - MEDICATION INSTRUCTIONS -           Objective:   VITALS:  /78 (BP Location: Left arm)   Pulse 101   Temp 98.9  F (37.2  C) (Oral)   Resp 29   Wt 63.5 kg (140 lb)   SpO2 91%   BMI 26.45 kg/m    EXAM:  General appearance: alert, appears stated age and cooperative  HEENT: MMM  Lungs: bilateral rhonchi, trace wheezing  Heart: irregularly irregular, S1 and S2  Abdomen: soft, nontender  Extremities: minimal edema  Skin: well perfused  Neurologic: Grossly normal    I&O:     Intake/Output Summary (Last 24 hours) at 5/18/2022 0920  Last data filed at 5/18/2022 0600  Gross per 24 hour   Intake 1120 ml   Output 750 ml   Net 370 ml           Data Review:  Chest CT personally reviewed  Personally reviewed image/s and Personally reviewed impression/s  EXAM: CT CHEST W/O CONTRAST  LOCATION: [facility]  DATE/TIME: 5/17/2022 2:37 PM     INDICATION: COPD exacerbation  COMPARISON: CT pulmonary angiogram 5/5/2022, 3/11/2021  TECHNIQUE: CT chest without IV contrast.  Multiplanar reformats were obtained. Dose reduction techniques were used.  CONTRAST: None.     FINDINGS:   LUNGS AND PLEURA: Background of fairly advanced emphysema mixed centrilobular and paraseptal distribution with an upper lung predominance. In the areas of greatest emphysema the intervening interstitium has mild thickening, unchanged. The multiple   segment posterior airspace and interstitial process in the left lower lobe has mildly improved from 12 days earlier, however there is no more conspicuous and bandlike opacity in the left upper lobe along the posterior pleura and along the central   bronchovascular structures in the left lower lobe. Increased subpleural opacity in the right posterior costophrenic sulcus as well as development of multiple patchy inflammatory nodules within the parenchyma of the right lower lobe.     Several lobulated solid nodules are present in the right upper lobe. One of the nodules measuring 11 x 12 mm (series 4, image 110) has substantially increased from 11/3/2021 previously measuring 7 mm long axis. A second larger lobulated solid 9 x 16 mm   (series 4, image 123) right upper lobe nodule is also slightly larger measuring 7 x 14 mm. A few smaller solid nodules are present in the right upper lobe closer to the hilum (series 4, image 140 and 143) and are also larger from March 2021.     MEDIASTINUM: Biatrial cardiac enlargement. Cardiac ventricles are normal in size. No pericardial effusion. Enlarged right lower paratracheal lymph node measures up to 17 mm short axis (series 4, image 121) and enlarged left lower paratracheal lymph nodes   are also unchanged. No definite hilar adenopathy. Esophagus is decompressed. Conspicuous cisterna chyli.      CORONARY ARTERY CALCIFICATION: Three-vessel, moderate.     UPPER ABDOMEN: No actionable findings in the imaged upper abdomen.     MUSCULOSKELETAL: Degenerative osteophytes of the thoracic spine largest from T6 through the thoracolumbar  junction. No aggressive or destructive bone lesions are present.                                                                      IMPRESSION:      1.  Partial clearing of airspace opacities from the left lower lobe compared to 5/5/2022. Residual dense opacities are now along the central bronchovascular structures. There are new inflammatory nodules in the right lower lobe and increased opacity in   the right posterior sulcus consistent with new atelectasis/inflammation.  2.  Multiple solid nodules with lobular borders are present in the right upper lobe 2 largest of which measure 11 x 12 and 9 x 16 mm. The 11 x 12 nodule and several other solid nodules are substantially larger from 3/11/2021 and is suspect for neoplasm.   Consider PET/CT after resolution of this acute respiratory illness for further evaluation.  3.  Advanced emphysema.  4.  Severe enlargement of both cardiac atria and moderate atheromatous coronary calcifications.    LABS  Glucose Values Latest Ref Rng & Units 5/5/2022 5/5/2022 5/16/2022 5/17/2022 5/18/2022 5/19/2022 5/20/2022   Bedside Glucose (mg/dl )  - -- -- -- -- -- -- --   GLUCOSE 70 - 125 mg/dL 137(H) 168(H) 158(H) 223(H) 227(H) 208(H) 258(H)   Some recent data might be hidden       Results for orders placed or performed during the hospital encounter of 05/16/22   Basic metabolic panel   Result Value Ref Range    Sodium 136 136 - 145 mmol/L    Potassium 5.2 (H) 3.5 - 5.0 mmol/L    Chloride 86 (L) 98 - 107 mmol/L    Carbon Dioxide (CO2) 42 (HH) 22 - 31 mmol/L    Anion Gap 8 5 - 18 mmol/L    Urea Nitrogen 31 (H) 8 - 28 mg/dL    Creatinine 0.65 0.60 - 1.10 mg/dL    Calcium 8.3 (L) 8.5 - 10.5 mg/dL    Glucose 258 (H) 70 - 125 mg/dL    GFR Estimate 86 >60 mL/min/1.73m2     Lab Results   Component Value Date    WBC 9.6 05/20/2022    HGB 10.3 (L) 05/20/2022    HCT 33.6 (L) 05/20/2022    MCV 96 05/20/2022     05/20/2022     Padmini Voss DO  Pulmonary and Critical Care  Attending  [facility] [phone]

## 2022-05-20 NOTE — PROGRESS NOTES
RT saw patient for Duoneb and mucomyst Q4, and vest treatment TID.   Tolerates vest for 30 mins and takes nebs well. Breath sounds coarse with expiratory wheezes.   She is currently on HFNC 40L 75%.   Rt will continue to monitor.     Magali Cifuentes, RT

## 2022-05-20 NOTE — PROGRESS NOTES
[facility] - Palliative Care Daily Progress Note      May 20, 2022      Today, the patient was seen for:Goals of care    Impressions and  Recommendations      1) Goals of Care per         Comfort vs life prolonging   [facility] today.  Pulmonary explained her significant lung issue Bronchiectasis/COPD/Emphysema/colonized cortezonas that is not responding to abx.  Family wants to bring her home on hospice.  Discussed comfort care as the method to achieve this but that is a process, we need to be able to get her down to   10 l (she is currently on 40Liters at 80%) . The alternative is dying here on HFNC but not being able to be with family.   I indicated with comfort care she can have 6 visitors a day.  If she continues on Restorataive Care, visitors will remain at 2 per day.  Family wants to discuss.  Recommended family visit her here and come in from out of state to say goodbye    Code Status DNR DNI        2)    ADVANCED CARE PLANNING  ? Patient has completed health care directive:  N  ? Surrogate health care agent:  Less, secondary is Daughter  ?                Code Status:Patient   DNR DNI,  will complete POLST     3)    SYMPTOM MANAGEMENT       1.  Shortness of breath secondary to advanced COPD and bronchiectasis exacerbation  -[facility] and [facility]monology services  -Recommend Lorazepam for dyspnea related to Anxiety   -Remains on 40 L at 80% O2,  Refused Vent. Encouraged to use BIPAP     2.    Fatigue and activity intolerance secondary to his COPD and bronchiectasis exacerbation causing exertional dyspnea  -Patient likely to benefit from Hospice, I discussed the possiblity of inpatient [facility] hospice at [facility] or [facility] checking on this if it is possible to transfer     3   Anxiety related to health, exacerbated with underlying schizoaffective, bipolar disorder and dyspnea  -Per medial team, could try  Abilify 5 mg nightly  -Lorazepam 0.25 mg p.o. every 4 hours as needed ordered today.   Discussed with patient that this is more for episodes or panic attacks than chronic use also discussed this can help ease air hunger.     4.  Chronic pain syndrome-chronic mid to low back pain  -Continue Tylenol as needed  -Continue gabapentin 600 mg at bedtime  -Would not recommend addition of opiate at this time due to restorative goals and fragile respiratory status.  Would use opiates sparingly for pain.  -Avoid NSAIDs as on anticoagulant.     IN April 2022 PC provided a   Detailed discussion with patient, , daughter and son regarding benefit and burden of chest compressions.  -               4)    PSYCHOSOCIAL/SPIRITUAL SUPPORT  ? Will request spiritual care support        5) Psychosocial/Spiritual support    Appreciate Palliative SW Assistance    Appreciate Palliative [place] Assistance    She receives family/Friend support         Assessments     History of Present Illness:  Adalgisa Solano is a 86 year old female admitted on 5/5/2022. She has a history of pulmonary disease including COPD with persistent bronchiectasis getting vest therapy at home for the last 2 years  She stopped using her vest out of concern for heart issues.  non-insulin-dependent diabetes, chronic A. fib, chronic heart failure with preserved ejection fraction who has had several recent hospitalizations for COPD exacerbation with pneumonia. Admitted for recurrent COPD exacerbation without signs of infection.     Quit smoking 2006                   Interval History:       Chart review/discussion with unit or clinical team members:   Refused vest yesterday  Current o2 is 40 Liter, 80%. RR 29           Palliative Overview:     Palliative Encounter Summary/Comments:  Lots of education today re: terminal lung disease, due to colonized bacteria, Abx not working    Prognosis, Goals, or Advance Care Planning was addressed today with: Yes.  Mood, coping, and/or meaning in the context of serious illness were addressed today:  Yes.    Key Palliative Symptoms:difficult to determine given encephalopathy   # Pain severity the last 12 hours: low  # Dyspnea severity the last 12 hours:moderate  # Nausea severity the last 12 hours: none  # Anxiety severity the last 12 hours: moderate           Review of Systems:           A full 14 point review of systems was obtained and is negative unless noted above:          Medications:     Current Facility-Administered Medications   Medication     acetaminophen (TYLENOL) tablet 650 mg    Or     acetaminophen (TYLENOL) Suppository 650 mg     acetylcysteine (MUCOMYST) 20 % nebulizer solution 4 mL     acyclovir (ZOVIRAX) 5 % ointment     albuterol (PROVENTIL) neb solution 2.5 mg     ARIPiprazole (ABILIFY) tablet 5 mg     atorvastatin (LIPITOR) tablet 20 mg     bisacodyl (DULCOLAX) Suppository 10 mg     calcium carbonate-vitamin D (OS-DENISE with D) per tablet 1 tablet     cyanocobalamin (VITAMIN B-12) tablet 500 mcg     glucose gel 15-30 g    Or     dextrose 50 % injection 25-50 mL    Or     glucagon injection 1 mg     diltiazem ER COATED BEADS (CARDIZEM CD/CARTIA XT) 24 hr capsule 240 mg     docusate sodium (COLACE) capsule 100 mg     furosemide (LASIX) injection 20 mg     gabapentin (NEURONTIN) capsule 600 mg     insulin aspart (NovoLOG) injection (RAPID ACTING)     insulin aspart (NovoLOG) injection (RAPID ACTING)     [START ON 5/21/2022] insulin glargine (LANTUS PEN) injection 10 Units     ipratropium - albuterol 0.5 mg/2.5 mg/3 mL (DUONEB) neb solution 3 mL     lidocaine (LMX4) cream     lidocaine (LMX4) cream     lidocaine (LMX4) cream     lidocaine 1 % 0.1-1 mL     lidocaine 1 % 0.1-1 mL     lidocaine 1 % 0.1-5 mL     LORazepam (ATIVAN) 2 MG/ML injection     melatonin tablet 1 mg     meropenem (MERREM) 1 g vial to attach to  mL bag     methylPREDNISolone sodium succinate (solu-MEDROL) injection 62.5 mg     ondansetron (ZOFRAN ODT) ODT tab 4 mg    Or     ondansetron (ZOFRAN) injection 4 mg      pantoprazole (PROTONIX) EC tablet 40 mg     Patient is already receiving anticoagulation with heparin, enoxaparin (LOVENOX), warfarin (COUMADIN)  or other anticoagulant medication     polyethylene glycol (MIRALAX) Packet 17 g     potassium chloride ER (KLOR-CON M) CR tablet 10 mEq     rivaroxaban ANTICOAGULANT (XARELTO) tablet 20 mg     sodium chloride (OCEAN) 0.65 % nasal spray 1 spray     sodium chloride (PF) 0.9% PF flush 10-40 mL     sodium chloride (PF) 0.9% PF flush 3 mL     sodium chloride (PF) 0.9% PF flush 3 mL     sodium chloride (PF) 0.9% PF flush 3 mL     sodium chloride (PF) 0.9% PF flush 3 mL     spironolactone (ALDACTONE) half-tab 12.5 mg     Vitamin D3 (CHOLECALCIFEROL) tablet 25 mcg        I have reviewed this patient's medication profile and medications during this hospitalization.               Physical Exam:   BP (!) 143/77   Pulse 100   Temp 98.9  F (37.2  C) (Oral)   Resp 29   Wt 63.5 kg (140 lb)   SpO2 92%   BMI 26.45 kg/m      GENERAL: laying in bed    SKIN: Warm and dry   HEENT: Normocephalic, anicteric sclera, moist mucous membranes  LUNGS: clear to auscultation anterolaterally;  labored HFNC  CARDIAC: RRR, No CARISA  ABDOMINAL: BS+, soft, non distended, non tender  EXTREMITIES: No edema or cyanosis, pulses 2+ and symmetrical  NEUROLOGIC: a and o x4  PSYCH: anxiety                Data Reviewed:      Reviewed recent pertinent imaging and labs    yes    TT: I have personally spent a total of 35 minutes on the unit in review of medical record, consultation with the medical providers and assessment of patient today, with more than 50% of this time spent in counseling, coordination of care, and discussion with patient and staff re: prognosis, symptom management, and development of plan of care as noted above        Total Visit Time: 35 + 60 min  o    o For Inpatient, 'Total Visit Time' = Unit Floor + Face-to-Face time.    Total Face-to-Face Prolonged Service Time: 11 am to 1215     Content of  the Prolonged Time: FC  Dying     MARLINE Sargent, NP-C, ACHPN  [facility]  Palliative Medicine  Office: [phone]

## 2022-05-20 NOTE — PLAN OF CARE
Problem: Risk for Delirium  Goal: Improved Sleep  Outcome: Ongoing, Progressing     Problem: Gas Exchange Impaired  Goal: Optimal Gas Exchange  Outcome: Ongoing, Progressing     Problem: COPD (Chronic Obstructive Pulmonary Disease) Comorbidity  Goal: Maintenance of COPD Symptom Control  Outcome: Ongoing, Progressing     Problem: Respiratory Compromise (Heart Failure)  Goal: Effective Oxygenation and Ventilation  Outcome: Ongoing, Progressing   Goal Outcome Evaluation:        Pt wore her bipa from [id number]

## 2022-05-20 NOTE — PROGRESS NOTES
05/20/22 1400   Quick Adds   Type of Visit Initial PT Evaluation   Living Environment   People in Home spouse   Current Living Arrangements house   Home Accessibility stairs to enter home;stairs within home   Number of Stairs, Main Entrance 1   Stair Railings, Main Entrance railings safe and in good condition   Number of Stairs, Within Home, Primary ten   Stair Railings, Within Home, Primary railings safe and in good condition   Self-Care   Equipment Currently Used at Home walker, rolling;cane, straight   General Information   Onset of Illness/Injury or Date of Surgery 05/16/22   Referring Physician Yesenia Rolle MD   Patient/Family Therapy Goals Statement (PT) to get better   Pertinent History of Current Problem (include personal factors and/or comorbidities that impact the POC) COPD   Bed Mobility   Bed Mobility rolling left;rolling right;scooting/bridging;supine-sit;sit-supine   Rolling Left [place] (Bed Mobility) verbal cues;minimum assist (75% patient effort)   Rolling Right [place] (Bed Mobility) verbal cues;minimum assist (75% patient effort)   Scooting/Bridging [place] (Bed Mobility) verbal cues;maximum assist (25% patient effort)   Supine-Sit [place] (Bed Mobility) verbal cues;moderate assist (50% patient effort)   Sit-Supine [place] (Bed Mobility) verbal cues;moderate assist (50% patient effort)   Assistive Device (Bed Mobility) bed rails;draw sheet   Comment, (Bed Mobility) Pt able to roll in bed with min assist and verbal cues for safety. Pt requires max assist x 2 to scoot up in bed. Mod assist x 1 with supine<>sit transfers with BLE. Verbal cues for hand placement and how to assist with transfers. Pt up in bed at end of session with call light within reach.   Transfers   Transfers sit-stand transfer   Maintains Weight-bearing Status (Transfers) able to maintain   Comment, (Transfers) CGA with sit<>stand transfer with FWW. Verbal cues for safety and safe hand placement.    Sit-Stand Transfer   Sit-Stand [place] (Transfers) verbal cues;contact guard   Assistive Device (Sit-Stand Transfers) walker, front-wheeled   Comment, (Sit-Stand Transfer) CGA with FWW for sit<>stand transfers. Verbal cues for safety and safe hand placement.   Gait/Stairs (Locomotion)   [place] Level (Gait) verbal cues;contact guard   Assistive Device (Gait) walker, front-wheeled   Distance in Feet (Required for LE Total Joints) 5'   Pattern (Gait) step-through   Deviations/Abnormal Patterns (Gait) base of support, wide;carola decreased;gait speed decreased   Maintains Weight-bearing Status (Gait) able to maintain   Comment, (Gait/Stairs) Pt ambulates with CGA and FWW. Verbal cues for safety due to tubing and lines. Pt on 40L O2 and 60% FiO2. Pt's SpO2 throughout session varies from 70's-80's. Verbal cues for breathing techniques.   Clinical Impression   Criteria for Skilled Therapeutic Intervention Yes, treatment indicated   PT Diagnosis (PT) impaired functional mobility   Influenced by the following impairments weakness, shortness of breath   Functional limitations due to impairments bed mobility, transfers, ambulation   Clinical Presentation (PT Evaluation Complexity) Evolving/Changing   Clinical Presentation Rationale Pt presents as medically diagnosed   Clinical Decision Making (Complexity) moderate complexity   Planned Therapy Interventions (PT) bed mobility training;gait training;home exercise program;patient/family education;ROM (range of motion);stair training;strengthening;stretching;transfer training   Anticipated Equipment Needs at Discharge (PT) walker, rolling   Risk & Benefits of therapy have been explained patient;spouse/significant other;daughter   PT Discharge Planning   PT Discharge Recommendation (DC Rec) (S)  Transitional Care Facility   PT Rationale for DC Rec Pt requires assist with transfers and ambulation for safety.   Plan of Care Review   Plan of Care Reviewed With  patient;spouse;daughter   Total Evaluation Time   Total Evaluation Time (Minutes) 10   Physical Therapy Goals   PT Frequency Daily   PT Predicted Duration/Target Date for Goal Attainment 05/25/22   PT Goals Transfers;Gait   PT: Transfers Supervision/stand-by assist;Sit to/from stand;Assistive device  (FWW)   PT: Gait Supervision/stand-by assist;Rolling walker;10 feet  (FWW)       Mandy Tabares, PT, DPT

## 2022-05-20 NOTE — PROGRESS NOTES
[facility]    Progress Note - Hospitalist Service       Date of Admission:  5/16/2022    Assessment & Plan        Adalgisa Solano is a 86 year old female admitted on 5/5/2022. She has a history of pulmonary disease including COPD with persistent bronchiectasis getting vest therapy at home for the last 2 years, non-insulin-dependent diabetes, chronic A. fib, chronic heart failure with preserved ejection fraction who has had several recent hospitalizations for COPD exacerbation with pneumonia. Admitted for recurrent COPD exacerbation without signs of infection. Alternating between HFNC and Bipap. Goals of care conference today, family considering comfort cares.      Acute on chronic respiratory failure  COPD exacerbation  Pseudomonal pneumonia   Probable malignant neoplasms  Recently admitted 5/5-5/9 for COPD exacerbation with LLL opacity treated with cefepime and vanco x1 day then transitioned to azithromycin course per ID input. Baseline oxygen needs 3-5L. Presented satting 88% on 5L. Recent PNA treated with cefepime end of April; also treated with ertapenem and azithromycin beginning of May. On [facility] outpatient. No leukocytosis or elevated procal however CT chest with partial clearing of airspace opacities from the left lower lobe, Residual dense opacities are now along the central bronchovascular structures and new inflammatory nodules. Also with multiple solid nodules concerning for neoplasm.  Alternating between HFNC and BIPAP. VBG with worsening CO2 retention. Sputum cx with pseudomonas, pending sensitivities.  - pulmonology consulted, appreciate recs   - IV methylpred switch to q8h   - continue meropenem   - BIPAP PRN and at night given hypercapnia  - duonebs q4h   -albuterol nebs prn  -palliative consulted, appreciate recs  -Mucomyst   -vest therapy  -RCAT consult     Chronic diastolic CHF  Bilateral lymphedema  Pulmonary hypertension  Bilateral lower extremity  edema L>R, however BNP relatively normal at 180 and patient endorses improvement from baseline. Last echocardiogram in 4/2022 with an EF of 65%, with moderate mitral and tricuspid insufficiency. Home regimen includes Lasix (40mg daily) and Spironolactone (12.5 mg daily). LE swelling stable.  -Continue PTA spironolactone   - IV lasix 20 mg BID per pulm recs  -OT lymphedema consult     Herpes Zoster, (left buttock)  Improving, scabs present. No signs of secondary infection. Completed Valtrex.  - continue to monitor  - WOC consult      Hyponatremia, resolved  Sodium of 129 on admission. Multifactoral; could be secondary to lasix, recent bacterial infection, antipsychotic use.   - continue to monitor  - Regular diet    Hyperkalemia  K 5.2.   -Repeat K in AM  - Discontinue scheduled potassium      Atrial fibrillation  HTN  On Xarelto 20 mg daily and diltiazem 240 mg daily prior to admission.   -Continue diltiazem 240 mg daily   -Continue daily Xarelto 20 mg     Anxiety disorder  -Continue PTA Abilify  - Ativan 0.25mg q4h prn for anxiety per palliative recs      T2DM  Steroid-induced hyperglycemia  Last Hgb A1C (3/21/22)- 6.2, suggesting well controlled diabetes. On metformin (500mg daily) prior to admission. Likely will be elevated in setting of steroids. Glucose 200s-300s.  -4 times daily and AC glucose checks  -very high sliding scale insulin  - Increase NPH to 10u qAM  - Switch to lantus 10u tomorrow      Chronic pain syndrome-chronic mid to low back pain  -Continue Tylenol as needed  -Continue gabapentin 600 mg at bedtime  -Avoid NSAIDs as on anticoagulant     Magnesium deficiency  Mg low at 1.2. Improving.  - Mg replacement protocol    Urinary retention  -Borjas in place    Constipation  Likely due to decreased ambulation  -Increased colace to BID  -suppository PRN  -Miralax        Diet: Combination Diet Regular Diet Adult    DVT Prophylaxis: DOAC  Borjas Catheter: PRESENT, indication: Strict 1-2 Hour I&O  Fluids:  PO  Central Lines: PRESENT  PICC Triple Lumen 05/18/22 Right Basilic-Site Assessment: WDL  Cardiac Monitoring: None  Code Status: No CPR- Do NOT Intubate      Disposition Plan   Expected Discharge: 05/21/2022  Anticipated discharge location: home with family;home with help/services    Delays:    oxygen needs        The patient's care was discussed with the Attending Physician, Dr. Paulino.    Truong Tiwari MD PGY-1  Hospitalist Service  [facility]  Securely message with the Vocera Web Console (learn more here)  Text page via [email or web address] Paging/Directory         Clinically Significant Risk Factors Present on Admission                    ______________________________________________________________________    Interval History   Overnight, patient on HFNC 40L 80%.     Care conference today with family, see palliative note. Family and patient will continue current therapies until out of town family able to come to hospital. Will consider comfort cares at that time with goal to get patient home with hospice. Edema improved today. Patient feels comfortable but anxious. No increased work of breathing or increased cough per patient.     Data reviewed today: I reviewed all medications, new labs and imaging results over the last 24 hours.     Physical Exam   Vital Signs: Temp: 99  F (37.2  C) Temp src: Oral BP: 108/61 Pulse: 92   Resp: (!) 33 SpO2: (!) 88 % O2 Device: High Flow Nasal Cannula (HFNC) Oxygen Delivery: 40 LPM  Weight: 140 lbs 0 oz  Constitutional: awake, alert, cooperative, no apparent distress, and appears stated age, diaphoresis present  Eyes: Lids and lashes normal, extra ocular muscles intact, sclera clear, conjunctiva normal  ENT: normocepalic, without obvious abnormality, atraumatic.  Neck: Supple, symmetric  Respiratory: On HFNC. Coarse diffuse breath sounds.   Cardiovascular: irregularly irregular rhythm, no murmur auscultated  GI:  normal bowel sounds, soft, non-distended,  non-tender  Musculoskeletal: compression stockings in place  Neurologic: Awake, alert, oriented to name, place and time.    Neuropsychiatric: General: normal, calm and normal eye contact     Data   Recent Labs   Lab 05/20/22  1219 05/20/22  0752 05/20/22  0624 05/19/22  0752 05/19/22  0421 05/18/22  0813 05/18/22  0527 05/17/22  0742 05/17/22  0055 05/16/22  2126 05/16/22  1254   WBC  --   --  9.6  --  13.8*  --  15.3*  --  6.7  --  10.5   HGB  --   --  10.3*  --  10.3*  --  10.9*  --  10.8*  --  11.0*   MCV  --   --  96  --  95  --  93  --  89  --  89   PLT  --   --  268  --  287  --  295  --  265  --  281   INR  --   --   --   --   --   --   --   --   --   --  1.48*   NA  --   --  136  --  136  --  136  --  133*  --  129*   POTASSIUM  --   --  5.2*  --  4.6  --  4.2  --  3.8  --  3.8   CHLORIDE  --   --  86*  --  88*  --  85*  --  84*  --  84*   CO2  --   --  42*  --  38*  --  39*  --  38*  --  36*   BUN  --   --  31*  --  24  --  17  --  9  --  9   CR  --   --  0.65  --  0.64  --  0.63  --  0.55*  --  0.50*   ANIONGAP  --   --  8  --  10  --  12  --  11  --  9   DENISE  --   --  8.3*  --  8.1*  --  8.3*  --  8.2*  --  8.2*   * 247* 258*   < > 208*   < > 227*   < > 223*   < > 158*   ALBUMIN  --   --   --   --   --   --   --   --  2.5*  --  2.5*   PROTTOTAL  --   --   --   --   --   --   --   --  5.4*  --  5.6*   BILITOTAL  --   --   --   --   --   --   --   --  0.5  --  0.7   ALKPHOS  --   --   --   --   --   --   --   --  56  --  58   ALT  --   --   --   --   --   --   --   --  15  --  22   AST  --   --   --   --   --   --   --   --  13  --  19    < > = values in this interval not displayed.     No results found for this or any previous visit (from the past 24 hour(s)).

## 2022-05-20 NOTE — PROGRESS NOTES
PALLIATIVE CARE PROGRESS NOTE     Patient Name: Adalgisa Solano  Date of Visit:  5/19         Impressions and Recommendations     1)   GOALS OF CARE      Family is struggling between Life Prolonging treatment vs comfort care  Daughter is worried about her mom's mental health  Agree to DNR DNI     2)    ADVANCED CARE PLANNING  ? Patient has completed health care directive:  N  ? Surrogate health care agent:  Less, secondary is Daughter  ?    Code Status:Patient   DNR DNI,  will complete POLST    3)    SYMPTOM MANAGEMENT      1.  Shortness of breath secondary to advanced COPD and bronchiectasis exacerbation  -Management per [facility] and pulmonology services  -Recommend Lorazepam for dyspnea related to Anxiety   -Remains on 40 L at 80% O2,  Refused Vent. Encouraged to use BIPAP    2.    Fatigue and activity intolerance secondary to his COPD and bronchiectasis exacerbation causing exertional dyspnea  -Patient likely to benefit from Hospice, I discussed the possiblity of inpatient [facility] hospice at [facility] or [facility] checking on this if it is possible to transfer     3   Anxiety related to health, exacerbated with underlying schizoaffective, bipolar disorder and dyspnea  -Per medial team, could try  Abilify 5 mg nightly  -Lorazepam 0.25 mg p.o. every 4 hours as needed ordered today.  Discussed with patient that this is more for episodes or panic attacks than chronic use also discussed this can help ease air hunger.     4.  Chronic pain syndrome-chronic mid to low back pain  -Continue Tylenol as needed  -Continue gabapentin 600 mg at bedtime  -Would not recommend addition of opiate at this time due to restorative goals and fragile respiratory status.  Would use opiates sparingly for pain.  -Avoid NSAIDs as on anticoagulant.     IN April 2022 PC provided a   Detailed discussion with patient, , daughter and son regarding benefit and burden of chest compressions.  -             4)     PSYCHOSOCIAL/SPIRITUAL SUPPORT  ? Will request spiritual care support      Discussed this with Medical Resident Pulmonary, Hospice, , RN         Admission Info          History of Present Illness:  Adalgisa Solano is a 86 year old female admitted on 5/5/2022. She has a history of pulmonary disease including COPD with persistent bronchiectasis getting vest therapy at home for the last 2 years  She stopped using her vest out of concern for heart issues.  non-insulin-dependent diabetes, chronic A. fib, chronic heart failure with preserved ejection fraction who has had several recent hospitalizations for COPD exacerbation with pneumonia. Admitted for recurrent COPD exacerbation without signs of infection.    Quit smoking 2006    Recent Inpatient Admissions (last 12 mo)    5/5 to 5/10  Herpes, pneumonia  4/13 Bronchiectasis   Seen by PC this admission  3/23 COPD           Interval History:     Currently on HFNC, 40 Liters at 80%, up form 72%  High risk of decompensation       Palliative Assessment/discussion   5/19 Family requested for Palliatve  I met with patient, daughter and son.  I asked  to join us. They brought it the advanced directive and I reviewed it.  Francis is the Primary HCA, Daughter Veronika is the alternative.   The advanced directive jerrell indicates that if patient has a terminal condition, she would not want treatment that would prolong her life.  When I started to explain that I agree with Dr Baum that a ventilator would prolong....at that point daughter became upset and indicate she didn't want my  Opinion and turned on her phone to record our conversation.  The RN in the room asked her to stop the tape and  Daughter did. The patient and  asked daughter to calm down.  I did indicate that the role of the HCA is to honor the patient's wishes.  Daughter Veronika indicated that when they completed this form in 2009 she has recommend her brother be the alternate instead of  her but her mom wanted her. She indicated that she will honor her mom's decision despite not believing in it.    I did start to review the POLST form and we did not get any further than box B. I explained the difference between selective treatment vs Comfort care and they asked several questions regarding hospice.  When I stated that I believe Hospice would be a wonderful option for them since they want to bring her home.  Daughter again states she didn't want my opinion.   After I reviewed how hospice works and the role of  The hospice care givers and the objective of hospice trying to treat the patient at home rather in the hospital, with no IV but oral meds are continued.  That Hospice is not 24/7 in person, but is by phone and the need for additional help.  They sounded interested.  However, at this point the   States he would want her to have  IV meds and antibiotics and return to the hospital.  He says he would not  Take time to call hospice back    I recommend further discussion tomorrow at 11 am.      5/17  I discussed the reason for Palliative Care Referral and our role in symptom management, patient family communication, understanding their choices for medical treatment, and providing  guidance in making difficult decisions in the framework of focusing on patient comfort and quality of life.  Initial [facility] discussion  5/17   Patient had made decision for DNR DNI after a  meeting with Palliative care in  April 2022. She indicates, however, that  she never had the paperwork finalized (assuming this to be the Honoring choices  Advanced Directive) and she tore up her paperwork  when she got home.  When I introduced my self as Palliative Care she said she wants nothing to do with our program and she won't sign anything.  She wont make decisions with out her  here.     Daughter stated that her Dad, patients , Francis, is the decision maker. Daughter Veronika indicated that if it was her choice, she  would want  Every treatment for her mom to keep her alive no matter what. When I discussed the suffering that can occur after CPR (Broken ribs, pain causing difficulty with breathing or not being able to be weaned off the vent, would she find that quality of life. Daughter indicated  Her mom is  suffering now so what does it matter if she suffers with living on a vent, she is my mom, I want her alive    I indicated that we will honor what ever her mother would want.  I indicated that a few years ago her mom didn't want the vent or tube and I explained that her high C02 levels could be impacting her decision making and that it may be beneficial to have her  here.    Daughter looked at the paperwork and asked what we want.    I explained that the default is to do everything if the patient has not make decisions re: code status.  At that point it is an emergency and we would call after the event, not before or during it.  Patient does not want our service to follow her     Code status Discussion    Explained purpose of code discussion, making certain choices regarding future procedures as this is not well done in an emergency situation.  I explained the Pros and Cons of CPR (may cause more harm than good  based on patients skeletal frame and comorbid diagnoses and will not change underlying medical problem and outcome may end up with worsened medical diagnoses due to lack of blood flow to vital organs during CPR).             Prognosis, goals and planning        Patient's decision making preferences:     I have concerns about the patient/family's health literacy today: yes     Prognosis, Goals, and/or Advance Care Planning were addressed today: N     Mood, coping, and/or meaning in the context of serious illness were addressed today: I did state that this is a very scary time for the patient     Key Palliative Symptom data:   Pain: yes, back pain  Dyspnea: Yes, severe dyspnea  Nausea: Mild   Anxiety:  Moderate    Functional Status:  Current PPS% (100% normal, 0% death): 30   Baseline PPS% (2 weeks prior to admit): 40     Capacity evaluation:    Patient does  not have decision making capacity based on the following:     Ability to understand relevant information about current  condition? N*    Ability to demonstrate understanding of  current  illness and care needs? No  Ability to communicate  options for treatment? N    Social:   Living situation: lives in homew with spouse   Baseline function: walker , 02 5 L at home  . Has nebs  Home support: Had interim HC PTA  Marital status: Les  Number of children:  has 2 from prior marriage, patient has 2, aram and marlon                   Review of Systems:     A full 14 point review of systems was otherwise completed and is negative aside from that mentioned above    -----------------------------------------------------------------------------------------------------------------  I have reviewed and supplemented the documentation in this patient's medical record listed below regarding past medical history, social history, active medical problems, allergies and medications.     Current Problem List:   Active Problems:    Hypomagnesemia    Hyponatremia    COPD exacerbation (H)    Acute respiratory failure with hypoxia (H)    Medication  :  Current Facility-Administered Medications   Medication     acetaminophen (TYLENOL) tablet 650 mg    Or     acetaminophen (TYLENOL) Suppository 650 mg     acetylcysteine (MUCOMYST) 20 % nebulizer solution 4 mL     albuterol (PROVENTIL) neb solution 2.5 mg     ARIPiprazole (ABILIFY) tablet 5 mg     atorvastatin (LIPITOR) tablet 20 mg     bisacodyl (DULCOLAX) Suppository 10 mg     calcium carbonate-vitamin D (OS-DENISE with D) per tablet 1 tablet     cyanocobalamin (VITAMIN B-12) tablet 500 mcg     glucose gel 15-30 g    Or     dextrose 50 % injection 25-50 mL    Or     glucagon injection 1 mg     diltiazem ER COATED BEADS  (CARDIZEM CD/CARTIA XT) 24 hr capsule 240 mg     docusate sodium (COLACE) capsule 100 mg     furosemide (LASIX) injection 20 mg     gabapentin (NEURONTIN) capsule 600 mg     insulin aspart (NovoLOG) injection (RAPID ACTING)     insulin aspart (NovoLOG) injection (RAPID ACTING)     insulin NPH-Regular (HUMULIN 70/30;NOVOLIN 70/30) injection 10 Units     ipratropium - albuterol 0.5 mg/2.5 mg/3 mL (DUONEB) neb solution 3 mL     lidocaine (LMX4) cream     lidocaine (LMX4) cream     lidocaine (LMX4) cream     lidocaine 1 % 0.1-1 mL     lidocaine 1 % 0.1-1 mL     lidocaine 1 % 0.1-5 mL     LORazepam (ATIVAN) 2 MG/ML injection     melatonin tablet 1 mg     meropenem (MERREM) 1 g vial to attach to  mL bag     methylPREDNISolone sodium succinate (solu-MEDROL) injection 62.5 mg     ondansetron (ZOFRAN ODT) ODT tab 4 mg    Or     ondansetron (ZOFRAN) injection 4 mg     pantoprazole (PROTONIX) EC tablet 40 mg     Patient is already receiving anticoagulation with heparin, enoxaparin (LOVENOX), warfarin (COUMADIN)  or other anticoagulant medication     polyethylene glycol (MIRALAX) Packet 17 g     potassium chloride ER (KLOR-CON M) CR tablet 10 mEq     rivaroxaban ANTICOAGULANT (XARELTO) tablet 20 mg     sodium chloride (OCEAN) 0.65 % nasal spray 1 spray     sodium chloride (PF) 0.9% PF flush 10-40 mL     sodium chloride (PF) 0.9% PF flush 3 mL     sodium chloride (PF) 0.9% PF flush 3 mL     sodium chloride (PF) 0.9% PF flush 3 mL     sodium chloride (PF) 0.9% PF flush 3 mL     spironolactone (ALDACTONE) half-tab 12.5 mg     Vitamin D3 (CHOLECALCIFEROL) tablet 25 mcg               Evaluation             PERTINENT PHYSICAL EXAMINATION:  Vital Signs: Blood pressure 139/78, pulse 107, temperature 98.9  F (37.2  C), temperature source Oral, resp. rate (!) 37, weight 63.5 kg (140 lb), SpO2 92 %, not currently breastfeeding.   GENERAL: laying in bed, anxious    SKIN: Warm and dry   HEENT: Normocephalic, anicteric sclera,     LUNGS:  labored on HFNC  CARDIAC: Afib   ABDOMINAL: BS (+), soft, non distended, non tender  NEUROLOGIC: alert and oriented x3  PSYCH: anxious         Data Reviewed:     All labs/imaging reviewed in Epic   GFR 86, albumin 2.5. PCO2 87  5/18, 71  5/17  ====================================================  TT: I have personally spent a total of 35 minutes on the unit in review of medical record, consultation with the medical providers and assessment of patient today, with more than 50% of this time spent in counseling, coordination of care, and discussion with patient and daughter and MD re: diagnostic results, prognosis, symptom management, risks and benefits of management options, and development of plan of care as noted above  . Prolonged Service time 16639 (does not include E/M time, patient present ) beyond 35 minutes was  55 minutes from 13 to 3:55  .  Prolonged time was need to have C discussion and details above    ====================================================    MARLINE Sargent, NP-C, [facility]   [facility]  Palliative Medicine  [phone]

## 2022-05-20 NOTE — PROVIDER NOTIFICATION
05/19/22 2011   Nebulizer Assessment & Treatment   $RT Use ONLY Delivery Method Nebulizer - Additional   Nebulizer Device Mask   Pretreatment Heart Rate (beats/min) 104   Pretreatment Resp Rate (breaths/min) 21   Pretreatment O2 sats - (TCU only) 92   Pretreat Breath Sounds - Bilat - All Lobes crackles, coarse   Patient Position sitting in chair   Breath Sounds Post-Respiratory Treatment   Posttreatment Heart Rate (beats/min) 94   Posttreatment Resp Rate (breaths/min) 31   Post treatment O2 Sats - (TCU only) 90   Posttreatment Assessment (SVN) increased aeration   Signs of Intolerance (SVN) none   Chest Physiotherapy (CPT)   Type (CPT) vibration   Method (CPT)   (vest)   Patient Position sitting in chair   Lung Fields (CPT) all lung fields   Duration per Field (CPT) 30 mins   Posttreatment Assessment (CPT) congestion decreased   Signs of Intolerance (CPT) none   High Freq Chest Compression Vest (HFCCV)   $Chest Compression Vest Treatment   Vest Type (HFCCV) full vest   Frequency Type (HFCCV) Multiple Frequencies

## 2022-05-20 NOTE — PLAN OF CARE
Lymphedema Discharge Summary    Reason for therapy discharge:    All goals and outcomes met, no further needs identified.    Progress towards therapy goal(s). See goals on Care Plan in [facility] electronic health record for goal details.  Goals met    Therapy recommendation(s):    No further therapy is recommended. Pt to wear personal above knee compression nylons and continue with elevation, good skin care, and activity when able.

## 2022-05-21 NOTE — PLAN OF CARE
Family meeting completed this AM.  End goal is comfort care once all family is able to be here (later this weekend or Monday).  Remains on HFNC 40L/75-80%.  Pain minimal and well controlled with scheduled tylenol.  UA completed, UC ordered.  VSS. Affect bright.  Family at bedside all day and appropriate.    Problem: Plan of Care - These are the overarching goals to be used throughout the patient stay.    Goal: Plan of Care Review/Shift Note  Description: The Plan of Care Review/Shift note should be completed every shift.  The Outcome Evaluation is a brief statement about your assessment that the patient is improving, declining, or no change.  This information will be displayed automatically on your shift note.  Outcome: Ongoing, Progressing  Flowsheets (Taken 5/20/2022 1928)  Plan of Care Reviewed With:   patient   spouse   daughter  Outcome Evaluation: Remains on HFNC  Overall Patient Progress: no change  Goal: Absence of Hospital-Acquired Illness or Injury  Outcome: Ongoing, Progressing  Intervention: Identify and Manage Fall Risk  Recent Flowsheet Documentation  Taken 5/20/2022 1600 by Mandy Urena RN  Safety Promotion/Fall Prevention:   activity supervised   fall prevention program maintained   nonskid shoes/slippers when out of bed   room near nurse's station  Taken 5/20/2022 1200 by Mandy Urena RN  Safety Promotion/Fall Prevention:   activity supervised   fall prevention program maintained   nonskid shoes/slippers when out of bed   room near nurse's station  Taken 5/20/2022 0800 by Mandy Urena RN  Safety Promotion/Fall Prevention:   activity supervised   fall prevention program maintained   nonskid shoes/slippers when out of bed   room near nurse's station  Intervention: Prevent Skin Injury  Recent Flowsheet Documentation  Taken 5/20/2022 1600 by Mandy Urena RN  Body Position: position changed independently  Taken 5/20/2022 1200 by Mandy Urena RN  Body Position: position changed  independently  Taken 5/20/2022 0800 by Mandy Urena RN  Body Position: position changed independently  Intervention: Prevent and Manage VTE (Venous Thromboembolism) Risk  Recent Flowsheet Documentation  Taken 5/20/2022 1716 by Mandy Urena RN  Activity Management: up in chair  Taken 5/20/2022 1600 by Mandy Urena RN  Activity Management: bedrest  Taken 5/20/2022 1200 by Mandy Urena RN  Activity Management: bedrest  Taken 5/20/2022 0800 by Mandy Urena RN  Activity Management: up to commode  Goal: Optimal Comfort and Wellbeing  Outcome: Ongoing, Progressing  Intervention: Monitor Pain and Promote Comfort  Recent Flowsheet Documentation  Taken 5/20/2022 1716 by Mandy Urena RN  Pain Management Interventions: (sched tylenol) medication (see MAR)  Taken 5/20/2022 1200 by Mandy Urena RN  Pain Management Interventions: (sched tylenol) medication (see MAR)  Taken 5/20/2022 0800 by Mandy Urena RN  Pain Management Interventions: declines  Intervention: Provide Person-Centered Care  Recent Flowsheet Documentation  Taken 5/20/2022 1600 by Mandy Urena RN  Trust Relationship/Rapport:   care explained   choices provided   questions answered  Taken 5/20/2022 1200 by Mandy Urena RN  Trust Relationship/Rapport:   care explained   choices provided   questions answered  Taken 5/20/2022 0800 by Mandy Urena RN  Trust Relationship/Rapport:   care explained   choices provided   questions answered     Problem: Risk for Delirium  Goal: Optimal Coping  Outcome: Ongoing, Progressing  Intervention: Optimize Psychosocial Adjustment to Delirium  Recent Flowsheet Documentation  Taken 5/20/2022 1200 by Mandy Urena RN  Family/Support System Care: family care conference arranged  Taken 5/20/2022 0800 by Mandy Urena RN  Family/Support System Care: family care conference arranged  Goal: Improved Behavioral Control  Outcome: Ongoing, Progressing  Goal: Improved Attention and  Thought Clarity  Outcome: Ongoing, Progressing  Goal: Improved Sleep  Outcome: Ongoing, Progressing     Problem: Gas Exchange Impaired  Goal: Optimal Gas Exchange  Outcome: Ongoing, Not Progressing  Intervention: Optimize Oxygenation and Ventilation  Recent Flowsheet Documentation  Taken 5/20/2022 1600 by Mandy Urena RN  Head of Bed (HOB) Positioning: HOB at 60-90 degrees  Taken 5/20/2022 1200 by Mandy Urena, RN  Head of Bed (HOB) Positioning: HOB at 60-90 degrees  Taken 5/20/2022 0800 by Mandy Urena, RN  Head of Bed (HOB) Positioning: HOB at 60-90 degrees   Goal Outcome Evaluation:    Plan of Care Reviewed With: patient, spouse, daughter     Overall Patient Progress: no change    Outcome Evaluation: Remains on HFNC

## 2022-05-21 NOTE — PROGRESS NOTES
Received pt on HFNC 45L/82%.  During this shift able to wean down to HFNC 40L/70%.  o2 sats have remained low to mid 90's.  Pt given Duoneb/MM as ordered.  Pt declined VEST Tx x 1.  BS diminished, course.  Pt has productive cough, small white yellow, thin.  Will continue to monitor pt.

## 2022-05-21 NOTE — PROGRESS NOTES
PULMONARY / CRITICAL CARE PROGRESS NOTE    Date / Time of Admission:  5/16/2022 12:46 PM    Assessment:   Active Problems:    Hypomagnesemia    Hyponatremia    COPD exacerbation (H)    Acute respiratory failure with hypoxia (H)    Overnight Events:  She was confused overnight  She is still on high flow O2  She denies any pain  She has some coughing    Code Status:  No CPR- Do NOT Intubate  86yoF with history of emphysema (DLCO 39% predicted), COPD (FEV1 49% predicted without reversibility) and bronchiectasis, upper lobe predominant who presents with hypoxia after multiple admissions for COPD exacerbations.       Plan:   Pulmonary  1) COPD  2) Emphysema  3) Bronchiectasis  4) Pseudomonal pneumonia  5) Compensated chronic hypercapneic respiratory failure  6) Probable malignant pulmonary nodules  -Continue vesting and mucomyst  -Bipap at night and PRN, careful balance of mobilizing secretions and aiding in ventilation  -Repeat VBG 5/19 am  -Continue Solumedrol  -Repeat Sputum culture-Pseudomonas, sensitive to meropenem  - decreased solumedrol to 60q8 from 60q6 5/20, will transition to prednisone 40mg daily  -PE unlikely given her anticoagulation status so will forgo repeat CTA since last was done 2 weeks ago.      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 - net -280L yesterday  -Continue home diltiazem for rate control  -Already anticoagulated for Afib, continue home xarelto     GI:  - general diet  - GI ppx     Renal:  No issues     Endo:  1) Hyperglycemia  -Sliding scale increased to very high resistance.   -  lantus 10u this for hyperglycemia, will increase to 15u    Code Status: DNR/DNI, discussing palliative care  ICU DAILY CHECKLIST                           Can patient transfer out of MICU? no    FAST HUG:    Feeding:  Feeding: Yes.  Patient is receiving ORAL    Borjas:Yes  Analgesia/Sedation:No  Thromboembolic prophylaxis: Yes; Mode:  DOAC  HOB>30:  Yes  Stress Ulcer Protocol Active: Yes; Mode: PPI  Glycemic Control: Any glucose > 180 yes; Mode of Insulin Therapy: Sliding Scale Insulin    Clinically Significant Risk Factors Present on Admission                     PHYSICAL THERAPY AND MOBILITY:  Can patient have PT and mobility trial: no  Activity: Bedrest    Critical Care Time: 50 minutes, this patient is critically ill requiring significant oxygen supplementation at high risk of being intubated.     Subjective:   HPI:  Adalgisa Solano is a 86 year old female with history of emphysema (DLCO 39% predicted), COPD (FEV1 49% predicted without reversibility) and bronchiectasis, upper lobe predominant who presents with hypoxia after multiple admissions for COPD exacerbations.     She was just discharged 5/9/22 for COPD exacerbation and LLL pneumonia where her sputum culture grew Pseudomonas. She was discharged on azithromycin and 5L O2 to TCU. She represented now with worsening hypoxia from TCU.    Active Problems:    Hypomagnesemia    Hyponatremia    COPD exacerbation (H)    Acute respiratory failure with hypoxia (H)      Allergies: Tramadol, Tuberculin tests, Amoxicillin, Levofloxacin, and Penicillins     Cultures  05/20/2022 1723 05/20/2022 1751 Urine Culture [[id number]]   Urine, Borjas Catheter    In process Component Value   No component results             05/18/2022 0804 05/20/2022 2206 Respiratory Aerobic Bacterial Culture with Gram Stain [[id number]]    (Abnormal)   Sputum from Expectorate    Final result Component Value   Culture 2+ Pseudomonas aeruginosa Abnormal     1+ Normal marielena   Gram Stain Result  <10 Squamous epithelial cells/low power field Abnormal     >25 PMNs/low power field Abnormal     1+ Gram negative bacilli Abnormal          Susceptibility     Pseudomonas aeruginosa     REBECA     Amikacin <=2.0 ug/mL Susceptible     Cefepime 2.0 ug/mL Susceptible     Ceftazidime 4.0 ug/mL Susceptible     Ciprofloxacin <=0.25 ug/mL Susceptible     Gentamicin <=1.0 ug/mL Susceptible     Levofloxacin 0.5 ug/mL Susceptible     Meropenem 1.0 ug/mL Susceptible     Piperacillin/Tazobactam 8.0 ug/mL Susceptible     Tobramycin <=1.0 ug/mL Susceptible                  05/17/2022 1605 05/17/2022 2029 Respiratory Aerobic Bacterial Culture with Gram Stain [[id number]]   Sputum from Expectorate    Final result Component Value   Culture >10 Squamous epithelial cells/low power field indicates oral contamination. Please recollect.   Gram Stain Result >10 Squamous epithelial cells/low power field    <25 PMNs/low power field    3+ Mixed marielena             05/16/2022 1609 05/18/2022 0746 Urine Culture [[id number]]   Urine from Urinary Bladder    Final result Component Value   Culture No Growth             05/16/2022 1436 05/20/2022 2104 Blood Culture Peripheral Blood [[id number]]   Peripheral Blood    Preliminary result Component Value   Culture No growth after 4 days P             05/16/2022 1401 05/16/2022 1448 Symptomatic; Unknown Influenza A/B & SARS-CoV2 (COVID-19) Virus PCR Multiplex Nasopharyngeal [[id number]]    Swab from Nasopharyngeal    Final result Component Value   Influenza A PCR Negative   Influenza B PCR Negative   RSV PCR Negative   SARS CoV2 PCR Negative   NEGATIVE: SARS-CoV-2 (COVID-19) RNA not detected, presumed negative.          05/16/2022 1359 05/20/2022 2104 Blood Culture Peripheral Blood [[id number]]   Peripheral Blood    Preliminary result Component Value   Culture No growth after 4 days P                   MEDS:  Scheduled Meds:    acetaminophen  650 mg Oral Q6H    Or     acetaminophen  650 mg Rectal  Q6H     acetylcysteine  4 mL Nebulization Q4H     acyclovir   Topical Q3H     ARIPiprazole  5 mg Oral At Bedtime     atorvastatin  20 mg Oral At Bedtime     calcium carbonate-vitamin D  1 tablet Oral QAM     cyanocobalamin  500 mcg Oral QAM     diltiazem ER COATED BEADS  240 mg Oral Daily     docusate sodium  100 mg Oral BID     furosemide  20 mg Intravenous Q12H     gabapentin  600 mg Oral At Bedtime     insulin aspart  1-22 Units Subcutaneous TID AC     insulin aspart  1-16 Units Subcutaneous At Bedtime     insulin glargine  10 Units Subcutaneous QAM AC     ipratropium - albuterol 0.5 mg/2.5 mg/3 mL  3 mL Nebulization Q4H     meropenem  1 g Intravenous Q8H     methylPREDNISolone  62.5 mg Intravenous Q8H     pantoprazole  40 mg Oral Daily     polyethylene glycol  17 g Oral Daily     [Held by provider] potassium chloride ER  10 mEq Oral Daily     rivaroxaban ANTICOAGULANT  20 mg Oral Daily with supper     sodium chloride (PF)  3 mL Intracatheter Q8H     sodium chloride (PF)  3 mL Intracatheter Q8H     spironolactone  12.5 mg Oral Daily     Vitamin D3  25 mcg Oral QAM     Continuous Infusions:    - MEDICATION INSTRUCTIONS -           Objective:   VITALS:  /71   Pulse 111   Temp 97.9  F (36.6  C) (Axillary)   Resp (!) 46   Wt 65 kg (143 lb 3.2 oz)   SpO2 94%   BMI 27.06 kg/m    EXAM:  General appearance: alert, appears stated age and cooperative  HEENT: MMM  Lungs: bilateral rhonchi, trace wheezing  Heart: irregularly irregular, S1 and S2  Abdomen: soft, nontender  Extremities: minimal edema  Skin: well perfused  Neurologic: Grossly normal    I&O:     Intake/Output Summary (Last 24 hours) at 5/18/2022 0920  Last data filed at 5/18/2022 0600  Gross per 24 hour   Intake 1120 ml   Output 750 ml   Net 370 ml       Data Review:  Chest CT personally reviewed  Personally reviewed image/s and Personally reviewed impression/s  EXAM: CT CHEST W/O CONTRAST  LOCATION: [facility]  DATE/TIME:  5/17/2022 2:37 PM     INDICATION: COPD exacerbation  COMPARISON: CT pulmonary angiogram 5/5/2022, 3/11/2021  TECHNIQUE: CT chest without IV contrast. Multiplanar reformats were obtained. Dose reduction techniques were used.  CONTRAST: None.     FINDINGS:   LUNGS AND PLEURA: Background of fairly advanced emphysema mixed centrilobular and paraseptal distribution with an upper lung predominance. In the areas of greatest emphysema the intervening interstitium has mild thickening, unchanged. The multiple   segment posterior airspace and interstitial process in the left lower lobe has mildly improved from 12 days earlier, however there is no more conspicuous and bandlike opacity in the left upper lobe along the posterior pleura and along the central   bronchovascular structures in the left lower lobe. Increased subpleural opacity in the right posterior costophrenic sulcus as well as development of multiple patchy inflammatory nodules within the parenchyma of the right lower lobe.     Several lobulated solid nodules are present in the right upper lobe. One of the nodules measuring 11 x 12 mm (series 4, image 110) has substantially increased from 11/3/2021 previously measuring 7 mm long axis. A second larger lobulated solid 9 x 16 mm   (series 4, image 123) right upper lobe nodule is also slightly larger measuring 7 x 14 mm. A few smaller solid nodules are present in the right upper lobe closer to the hilum (series 4, image 140 and 143) and are also larger from March 2021.     MEDIASTINUM: Biatrial cardiac enlargement. Cardiac ventricles are normal in size. No pericardial effusion. Enlarged right lower paratracheal lymph node measures up to 17 mm short axis (series 4, image 121) and enlarged left lower paratracheal lymph nodes   are also unchanged. No definite hilar adenopathy. Esophagus is decompressed. Conspicuous cisterna chyli.      CORONARY ARTERY CALCIFICATION: Three-vessel, moderate.     UPPER ABDOMEN: No actionable  findings in the imaged upper abdomen.     MUSCULOSKELETAL: Degenerative osteophytes of the thoracic spine largest from T6 through the thoracolumbar junction. No aggressive or destructive bone lesions are present.                                                                      IMPRESSION:      1.  Partial clearing of airspace opacities from the left lower lobe compared to 5/5/2022. Residual dense opacities are now along the central bronchovascular structures. There are new inflammatory nodules in the right lower lobe and increased opacity in   the right posterior sulcus consistent with new atelectasis/inflammation.  2.  Multiple solid nodules with lobular borders are present in the right upper lobe 2 largest of which measure 11 x 12 and 9 x 16 mm. The 11 x 12 nodule and several other solid nodules are substantially larger from 3/11/2021 and is suspect for neoplasm.   Consider PET/CT after resolution of this acute respiratory illness for further evaluation.  3.  Advanced emphysema.  4.  Severe enlargement of both cardiac atria and moderate atheromatous coronary calcifications.    LABS  Glucose Values Latest Ref Rng & Units 5/5/2022 5/16/2022 5/17/2022 5/18/2022 5/19/2022 5/20/2022 5/21/2022   Bedside Glucose (mg/dl )  - -- -- -- -- -- 251 --   GLUCOSE 70 - 125 mg/dL 168(H) 158(H) 223(H) 227(H) 208(H) 258(H) 231(H)   Some recent data might be hidden       Results for orders placed or performed during the hospital encounter of 05/16/22   Basic metabolic panel   Result Value Ref Range    Sodium 137 136 - 145 mmol/L    Potassium 4.5 3.5 - 5.0 mmol/L    Chloride 87 (L) 98 - 107 mmol/L    Carbon Dioxide (CO2) 42 (HH) 22 - 31 mmol/L    Anion Gap 8 5 - 18 mmol/L    Urea Nitrogen 24 8 - 28 mg/dL    Creatinine 0.55 (L) 0.60 - 1.10 mg/dL    Calcium 8.2 (L) 8.5 - 10.5 mg/dL    Glucose 231 (H) 70 - 125 mg/dL    GFR Estimate 90 >60 mL/min/1.73m2     Lab Results   Component Value Date    WBC 9.9 05/21/2022    HGB 10.7 (L)  05/21/2022    HCT 34.3 (L) 05/21/2022    MCV 93 05/21/2022     05/21/2022     Padmini Voss DO  Pulmonary and Critical Care Attending  pgr [phone]

## 2022-05-21 NOTE — PROGRESS NOTES
[facility]    Progress Note - Hospitalist Service       Date of Admission:  5/16/2022    Assessment & Plan        Adalgisa Solano is a 86 year old female admitted on 5/5/2022. She has a history of pulmonary disease including COPD with persistent bronchiectasis getting vest therapy at home for the last 2 years, non-insulin-dependent diabetes, chronic atrial fibrillation, chronic heart failure with preserved ejection fraction who has had several recent hospitalizations for COPD exacerbation with pneumonia. Admitted for recurrent COPD exacerbation without signs of infection. Alternating between HFNC and BiPAP. Goals of care conference yesterday, plan for likely transition to comfort measures after family is able to visit from out of town.      Acute on chronic respiratory failure  COPD exacerbation  Pseudomonal pneumonia   Probable malignant neoplasms  Recently admitted 5/5-5/9 for COPD exacerbation with LLL opacity treated with cefepime and vanco x1 day then transitioned to azithromycin course per ID input. Baseline oxygen needs 3-5L. Presented satting 88% on 5L. Recent PNA treated with cefepime end of April; also treated with ertapenem and azithromycin beginning of May. On Trelegy Ellipta outpatient. No leukocytosis or elevated procal however CT chest with partial clearing of airspace opacities from the left lower lobe, Residual dense opacities are now along the central bronchovascular structures and new inflammatory nodules. Also with multiple solid nodules concerning for neoplasm.  Alternating between HFNC and BIPAP. VBG with worsening CO2 retention. Sputum cx with pseudomonas, pending sensitivities.  - Pulmonology consulted, appreciate recs   - Transition from IV methylpred to oral prednisone   - Continue meropenem   - BIPAP PRN and at night given hypercapnia  - Duonebs q4h   - Albuterol nebs prn  - Palliative consulted, appreciate recs   - Family to visit, Charge RN to look at  visitor policy  - Mucomyst   - Vest therapy  - RCAT consult     Chronic diastolic CHF  Bilateral lymphedema  Pulmonary hypertension  Bilateral lower extremity edema L>R, however BNP relatively normal at 180 and patient endorses improvement from baseline. Last echocardiogram in 4/2022 with an EF of 65%, with moderate mitral and tricuspid insufficiency. Home regimen includes Lasix (40mg daily) and Spironolactone (12.5 mg daily). LE swelling stable.  - Continue PTA spironolactone   - IV lasix 20 mg BID per pulm recs  - OT lymphedema consult     Herpes Zoster, (left buttock)  Improving, scabs present. No signs of secondary infection. Completed Valtrex.  - Continue to monitor  - WOC consult      Hyponatremia, resolved  Sodium of 129 on admission. Multifactoral; could be secondary to lasix, recent bacterial infection, antipsychotic use.   - Continue to monitor  - Regular diet    Hyperkalemia  - Resolved, continue to monitor     Atrial fibrillation  HTN  On Xarelto 20 mg daily and diltiazem 240 mg daily prior to admission.   - Continue diltiazem 240 mg daily   - Continue daily Xarelto 20 mg     Anxiety disorder  - Continue PTA Abilify  - Ativan 0.25mg q4h prn for anxiety per palliative recs      T2DM  Steroid-induced hyperglycemia  Last Hgb A1C (3/21/22)- 6.2, suggesting well controlled diabetes. On metformin (500mg daily) prior to admission. Likely will be elevated in setting of steroids. Glucose 200s-300s.  - 4 times daily and AC glucose checks  - very high sliding scale insulin  - Lantus 15 units starting tomorrow, increased from prior 10 units with additional 5 given at 12 pm     Chronic pain syndrome-chronic mid to low back pain  - Continue Tylenol as needed  - Continue gabapentin 600 mg at bedtime  - Avoid NSAIDs as on anticoagulant     Magnesium deficiency  - Mg replacement protocol    Urinary retention  - Borjas in place  - Follow UC, Emma should cover for any infection    Constipation  - Increased colace to BID  -  Suppository PRN  - Miralax      Diet: Combination Diet Regular Diet Adult    DVT Prophylaxis: DOAC  Borjas Catheter: PRESENT, indication: Strict 1-2 Hour I&O  Fluids: PO  Central Lines: PRESENT  PICC Triple Lumen 05/18/22 Right Basilic-Site Assessment: WDL  Cardiac Monitoring: None  Code Status: No CPR- Do NOT Intubate      Disposition Plan   Expected Discharge: 05/23/2022  Anticipated discharge location: home with family;home with help/services    Delays:    oxygen needs        The patient's care was discussed with the Attending Physician, Dr. Paulino.    Keo Cooper MD PGY3  Hospitalist Service  [facility]  Securely message with the Vocera Web Console (learn more here)  Text page via PR Slides Paging/Directory     Clinically Significant Risk Factors Present on Admission   ______________________________________________________________________    Interval History   On BiPAP overnight. Did have some intermittent confusion. Thought she transferred rooms at some point but then was able to realize she was in the same place as before.    Was pleased for GOC conference yesterday. Now DNR/DNI. Family flying in from [place], will likely convert to comfort measures at that time.    Some coughing but felt comfortable with HFNC and BiPAP. Urine symptoms improved. Has no nausea or vomiting.     No pain.    Data reviewed today: I reviewed all medications, new labs and imaging results over the last 24 hours.     Physical Exam   Vital Signs: Temp: 97.9  F (36.6  C) Temp src: Axillary BP: 124/71 Pulse: 100   Resp: (!) 31 SpO2: 96 % O2 Device: High Flow Nasal Cannula (HFNC) Oxygen Delivery: 40 LPM  Weight: 143 lbs 3.2 oz     Constitutional: awake, alert, cooperative, no apparent distress, but does appear to have some increased WOB  Eyes: Lids and lashes normal, extra ocular muscles intact, sclera clear, conjunctiva normal  ENT: normocepalic, without obvious abnormality, atraumatic.  Neck:  Supple, symmetric  Respiratory: On HFNC completing vest therapy. Coarse diffuse breath sounds.   Cardiovascular: irregularly irregular rhythm, no murmur auscultated  GI:  normal bowel sounds, soft, non-distended, non-tender  Musculoskeletal: compression stockings in place  Neurologic: Awake, alert, oriented to name, place and time.    Neuropsychiatric: General: normal, calm and normal eye contact     Data   Recent Labs   Lab 05/21/22  0730 05/21/22  0604 05/20/22  2158 05/20/22  0752 05/20/22  0624 05/19/22  0752 05/19/22  0421 05/17/22  0742 05/17/22  0055 05/16/22 2126 05/16/22  1254   WBC  --  9.9  --   --  9.6  --  13.8*   < > 6.7  --  10.5   HGB  --  10.7*  --   --  10.3*  --  10.3*   < > 10.8*  --  11.0*   MCV  --  93  --   --  96  --  95   < > 89  --  89   PLT  --  288  --   --  268  --  287   < > 265  --  281   INR  --   --   --   --   --   --   --   --   --   --  1.48*   NA  --  137  --   --  136  --  136   < > 133*  --  129*   POTASSIUM  --  4.5  --   --  5.2*  --  4.6   < > 3.8  --  3.8   CHLORIDE  --  87*  --   --  86*  --  88*   < > 84*  --  84*   CO2  --  42*  --   --  42*  --  38*   < > 38*  --  36*   BUN  --  24  --   --  31*  --  24   < > 9  --  9   CR  --  0.55*  --   --  0.65  --  0.64   < > 0.55*  --  0.50*   ANIONGAP  --  8  --   --  8  --  10   < > 11  --  9   DENISE  --  8.2*  --   --  8.3*  --  8.1*   < > 8.2*  --  8.2*   * 231* 246*   < > 258*   < > 208*   < > 223*   < > 158*   ALBUMIN  --   --   --   --   --   --   --   --  2.5*  --  2.5*   PROTTOTAL  --   --   --   --   --   --   --   --  5.4*  --  5.6*   BILITOTAL  --   --   --   --   --   --   --   --  0.5  --  0.7   ALKPHOS  --   --   --   --   --   --   --   --  56  --  58   ALT  --   --   --   --   --   --   --   --  15  --  22   AST  --   --   --   --   --   --   --   --  13  --  19    < > = values in this interval not displayed.     No results found for this or any previous visit (from the past 24 hour(s)).

## 2022-05-21 NOTE — PLAN OF CARE
Cares between 1198-5298  Pt A&O x4, VSS, afebrile. C/o pain to back, refused meds, will wait for scheduled Tylenol due at midnight. O2sats in low-mid 90s on HFNC at 40L with FiO2 at 70%. LS coarse, frequent productive cough. Borjas in place and draining well. Compression stockings remain on.

## 2022-05-21 NOTE — PROVIDER NOTIFICATION
05/21/22 0456   Oxygen Therapy   O2 Device High Flow Nasal Cannula (HFNC)   FiO2 (%) 75 %   Oxygen Delivery 45 LPM   Patient remains on HFNC. Wore BIPAP 10/5 ST12 65% most of the night. Scheduled nebs given.     Dexter Greer, RT

## 2022-05-22 NOTE — PLAN OF CARE
"Patient continues on HFNC.  Family present, some visitors came through.  Has denied pain most of the day, scheduled tylenol given.  Small,  hard bowel movement.  Patient may want suppository tomorrow.  Good appetite.  Patient did inquire about when treatment phase would stop and how long the death process takes.  She was very matter of fact and mentioned to family that she is appreciative of the chance to talk/say goodbye to loved ones.    Goal Outcome Evaluation:    Plan of Care Reviewed With: patient, spouse, daughter     Overall Patient Progress: no change    Outcome Evaluation: Remains on HFNC, family taking turns visiting      Problem: Plan of Care - These are the overarching goals to be used throughout the patient stay.    Goal: Plan of Care Review/Shift Note  Description: The Plan of Care Review/Shift note should be completed every shift.  The Outcome Evaluation is a brief statement about your assessment that the patient is improving, declining, or no change.  This information will be displayed automatically on your shift note.  Outcome: Ongoing, Progressing  Flowsheets (Taken 5/21/2022 1919)  Plan of Care Reviewed With:   patient   spouse   daughter  Outcome Evaluation: Remains on HFNC, family taking turns visiting  Overall Patient Progress: no change  Goal: Patient-Specific Goal (Individualized)  Description: You can add care plan individualizations to a care plan. Examples of Individualization might be:  \"Parent requests to be called daily at 9am for status\", \"I have a hard time hearing out of my right ear\", or \"Do not touch me to wake me up as it startles me\".  Recent Flowsheet Documentation  Taken 5/21/2022 1630 by Mandy Urena RN  Anxieties, Fears or Concerns: death process re: timeframe  Goal: Absence of Hospital-Acquired Illness or Injury  Outcome: Ongoing, Progressing  Intervention: Identify and Manage Fall Risk  Recent Flowsheet Documentation  Taken 5/21/2022 1630 by Mandy Urena, " RN  Safety Promotion/Fall Prevention:   activity supervised   assistive device/personal items within reach   room near nurse's station  Taken 5/21/2022 1200 by Mandy Urena RN  Safety Promotion/Fall Prevention:   activity supervised   assistive device/personal items within reach   room near nurse's station  Taken 5/21/2022 0900 by Mandy Urena RN  Safety Promotion/Fall Prevention:   activity supervised   assistive device/personal items within reach   room near nurse's station  Intervention: Prevent Skin Injury  Recent Flowsheet Documentation  Taken 5/21/2022 1630 by Mandy Urena RN  Body Position: sitting up in bed  Taken 5/21/2022 1200 by Mandy Urena RN  Body Position:   sitting up in bed   position changed independently  Taken 5/21/2022 0900 by Mandy Urena RN  Body Position:   sitting up in bed   position changed independently  Intervention: Prevent Infection  Recent Flowsheet Documentation  Taken 5/21/2022 1630 by Mandy Urena RN  Infection Prevention:   environmental surveillance performed   hand hygiene promoted   single patient room provided  Taken 5/21/2022 1200 by Mandy Urena RN  Infection Prevention:   environmental surveillance performed   hand hygiene promoted   single patient room provided  Taken 5/21/2022 0900 by Mandy Urena RN  Infection Prevention:   environmental surveillance performed   hand hygiene promoted   single patient room provided  Goal: Optimal Comfort and Wellbeing  Outcome: Ongoing, Progressing  Intervention: Provide Person-Centered Care  Recent Flowsheet Documentation  Taken 5/21/2022 1630 by Mandy Urena RN  Trust Relationship/Rapport:   care explained   choices provided  Taken 5/21/2022 1200 by Mandy Urena RN  Trust Relationship/Rapport:   care explained   choices provided  Taken 5/21/2022 0900 by Mandy Urena RN  Trust Relationship/Rapport:   care explained   choices provided     Problem: Risk for Delirium  Goal: Optimal  Coping  Outcome: Ongoing, Progressing  Intervention: Optimize Psychosocial Adjustment to Delirium  Recent Flowsheet Documentation  Taken 5/21/2022 1630 by Mandy Urena RN  Family/Support System Care:   involvement promoted   presence promoted  Goal: Improved Behavioral Control  Outcome: Ongoing, Progressing  Goal: Improved Attention and Thought Clarity  Outcome: Ongoing, Progressing     Problem: Gas Exchange Impaired  Goal: Optimal Gas Exchange  Outcome: Ongoing, Not Progressing  Intervention: Optimize Oxygenation and Ventilation  Recent Flowsheet Documentation  Taken 5/21/2022 1630 by Mandy Urena RN  Head of Bed (HOB) Positioning: HOB at 60-90 degrees  Taken 5/21/2022 1200 by Mandy Urena RN  Head of Bed (HOB) Positioning: HOB at 60-90 degrees  Taken 5/21/2022 0900 by Mandy Urena RN  Head of Bed (HOB) Positioning: HOB at 60-90 degrees     Problem: COPD (Chronic Obstructive Pulmonary Disease) Comorbidity  Goal: Maintenance of COPD Symptom Control  Outcome: Ongoing, Not Progressing  Intervention: Maintain COPD-Symptom Control  Recent Flowsheet Documentation  Taken 5/21/2022 1630 by Mandy Urena RN  Medication Review/Management: medications reviewed  Taken 5/21/2022 1200 by Mandy Urena RN  Medication Review/Management: medications reviewed  Taken 5/21/2022 0900 by Mandy Urena RN  Medication Review/Management: medications reviewed     Problem: Heart Failure Comorbidity  Goal: Maintenance of Heart Failure Symptom Control  Outcome: Ongoing, Progressing  Intervention: Maintain Heart Failure-Management  Recent Flowsheet Documentation  Taken 5/21/2022 1630 by Mandy Urena RN  Medication Review/Management: medications reviewed  Taken 5/21/2022 1200 by Mandy Urena RN  Medication Review/Management: medications reviewed  Taken 5/21/2022 0900 by Mandy Urena RN  Medication Review/Management: medications reviewed

## 2022-05-22 NOTE — PROGRESS NOTES
[facility]    Progress Note - Hospitalist Service       Date of Admission:  5/16/2022    Assessment & Plan        Adalgisa Solano is a 86 year old female admitted on 5/5/2022. She has a history of pulmonary disease including COPD with persistent bronchiectasis getting vest therapy at home for the last 2 years, non-insulin-dependent diabetes, chronic atrial fibrillation, chronic heart failure with preserved ejection fraction who has had several recent hospitalizations for COPD exacerbation with pneumonia. Admitted for recurrent COPD exacerbation without signs of infection. Alternating between HFNC and BiPAP. Goals of care conference 5/20, plan for likely transition to comfort measures today.     Acute on chronic respiratory failure  COPD exacerbation  Pseudomonal pneumonia   Probable malignant neoplasms  Recently admitted 5/5-5/9 for COPD exacerbation with LLL opacity treated with cefepime and vanco x1 day then transitioned to azithromycin course per ID input. Baseline oxygen needs 3-5L. Presented satting 88% on 5L. Recent PNA treated with cefepime end of April; also treated with ertapenem and azithromycin beginning of May. [facility] No leukocytosis or elevated procal however CT chest with partial clearing of airspace opacities from the left lower lobe, Residual dense opacities are now along the central bronchovascular structures and new inflammatory nodules. Also with multiple solid nodules concerning for neoplasm.  Alternating between HFNC and BIPAP. VBG with worsening CO2 retention. Sputum cx with pseudomonas, pending sensitivities. Work of breathing increasing. Patient fatigued.   - Pulmonology consulted, appreciate recs   - Continue oral prednisone   - Continue meropenem   - BIPAP PRN and at night given hypercapnia  - Duonebs q4h   - Albuterol nebs prn  - Palliative consulted, appreciate recs   - Family to visit today   - Anticipate transition to comfort  care today pending patient request  - Hospice consult placed for tomorrow if needed  - Mucomyst   - Vest therapy  - RCAT consult     Chronic diastolic CHF  Bilateral lymphedema  Pulmonary hypertension  Bilateral lower extremity edema L>R, however BNP relatively normal at 180 and patient endorses improvement from baseline. Last echocardiogram in 4/2022 with an EF of 65%, with moderate mitral and tricuspid insufficiency. Home regimen includes Lasix (40mg daily) and Spironolactone (12.5 mg daily). LE swelling stable.  - Continue PTA spironolactone   - IV lasix 40 mg BID per pulm recs  - OT lymphedema consult     Herpes Zoster, (left buttock)  Improving, scabs present. No signs of secondary infection. Completed Valtrex.  - Continue to monitor  - WOC consult      Hyponatremia, resolved  Sodium of 129 on admission. Multifactoral; could be secondary to lasix, recent bacterial infection, antipsychotic use.   - Continue to monitor  - Regular diet    Hyperkalemia  - Resolved, continue to monitor     Atrial fibrillation  HTN  On Xarelto 20 mg daily and diltiazem 240 mg daily prior to admission.   - Continue diltiazem 240 mg daily   - Continue daily Xarelto 20 mg     Anxiety disorder  - Continue PTA Abilify  - Ativan 0.25mg q4h prn for anxiety per palliative recs      T2DM  Steroid-induced hyperglycemia  Last Hgb A1C (3/21/22)- 6.2, suggesting well controlled diabetes. On metformin (500mg daily) prior to admission. Likely will be elevated in setting of steroids. Glucose 200s-300s.  - 4 times daily and AC glucose checks  - very high sliding scale insulin  - Lantus 15 units daily     Chronic pain syndrome-chronic mid to low back pain  - Continue Tylenol as needed  - Continue gabapentin 600 mg at bedtime  - Avoid NSAIDs as on anticoagulant     Magnesium deficiency  - Mg replacement protocol    Urinary retention  - Borjas in place  - Follow UC, Emma should cover for any infection    Constipation  - Increased colace to BID  -  Suppository PRN  - Miralax      Diet: Combination Diet Regular Diet Adult    DVT Prophylaxis: DOAC  Borjas Catheter: PRESENT, indication: Strict 1-2 Hour I&O  Fluids: PO  Central Lines: PRESENT  PICC Triple Lumen 05/18/22 Right Basilic-Site Assessment: WDL  Cardiac Monitoring: None  Code Status: No CPR- Do NOT Intubate      Disposition Plan   Expected Discharge: 05/24/2022  Anticipated discharge location: home with family;home with help/services    Delays:    oxygen needs        The patient's care was discussed with the Attending Physician, Dr. Champagne.    Truong Tiwari MD PGY1  Hospitalist Service  [facility]  Securely message with the Vocera Web Console (learn more here)  Text page via [employer] Paging/Directory     Clinically Significant Risk Factors Present on Admission   ______________________________________________________________________    Interval History   On BiPAP intermittently overnight. Otherwise on 40-45L HFNC.     Family coming in today, will likely convert to comfort measures at that time.    No pain. Feels fatigued.     Data reviewed today: I reviewed all medications, new labs and imaging results over the last 24 hours.     Physical Exam   Vital Signs: Temp: 98.7  F (37.1  C) Temp src: Oral BP: 137/73 Pulse: 107   Resp: 22 SpO2: 92 % O2 Device: High Flow Nasal Cannula (HFNC) Oxygen Delivery: 40 LPM  Weight: 148 lbs 14.4 oz     Constitutional: awake, alert, cooperative, no apparent distress, but does appear to have some increased WOB  Eyes: Lids and lashes normal, extra ocular muscles intact, sclera clear, conjunctiva normal  ENT: normocepalic, without obvious abnormality, atraumatic.  Neck: Supple, symmetric  Respiratory: On HFNC. Coarse diffuse breath sounds.   Cardiovascular: irregularly irregular rhythm, no murmur auscultated  GI:  normal bowel sounds, soft, non-distended, non-tender  Musculoskeletal: bilateral edema Left>right  Neurologic: Awake, alert, oriented to  name, place and time.    Neuropsychiatric: General: normal, calm and normal eye contact     Data   Recent Labs   Lab 05/22/22  0742 05/22/22  0555 05/21/22 2000 05/21/22 0730 05/21/22  0604 05/20/22 0752 05/20/22 0624 05/19/22 0752 05/19/22  0421 05/17/22  0742 05/17/22  0055 05/16/22  2126 05/16/22  1254   WBC  --   --   --   --  9.9  --  9.6  --  13.8*   < > 6.7  --  10.5   HGB  --   --   --   --  10.7*  --  10.3*  --  10.3*   < > 10.8*  --  11.0*   MCV  --   --   --   --  93  --  96  --  95   < > 89  --  89   PLT  --   --   --   --  288  --  268  --  287   < > 265  --  281   INR  --   --   --   --   --   --   --   --   --   --   --   --  1.48*   NA  --   --   --   --  137  --  136  --  136   < > 133*  --  129*   POTASSIUM  --   --   --   --  4.5  --  5.2*  --  4.6   < > 3.8  --  3.8   CHLORIDE  --   --   --   --  87*  --  86*  --  88*   < > 84*  --  84*   CO2  --   --   --   --  42*  --  42*  --  38*   < > 38*  --  36*   BUN  --   --   --   --  24  --  31*  --  24   < > 9  --  9   CR  --   --   --   --  0.55*  --  0.65  --  0.64   < > 0.55*  --  0.50*   ANIONGAP  --   --   --   --  8  --  8  --  10   < > 11  --  9   DENISE  --   --   --   --  8.2*  --  8.3*  --  8.1*   < > 8.2*  --  8.2*   * 173* 316*   < > 231*   < > 258*   < > 208*   < > 223*   < > 158*   ALBUMIN  --   --   --   --   --   --   --   --   --   --  2.5*  --  2.5*   PROTTOTAL  --   --   --   --   --   --   --   --   --   --  5.4*  --  5.6*   BILITOTAL  --   --   --   --   --   --   --   --   --   --  0.5  --  0.7   ALKPHOS  --   --   --   --   --   --   --   --   --   --  56  --  58   ALT  --   --   --   --   --   --   --   --   --   --  15  --  22   AST  --   --   --   --   --   --   --   --   --   --  13  --  19    < > = values in this interval not displayed.     No results found for this or any previous visit (from the past 24 hour(s)).

## 2022-05-22 NOTE — PROVIDER NOTIFICATION
05/22/22 0446   Oxygen Therapy   SpO2 93 %   O2 Device High Flow Nasal Cannula (HFNC)   FiO2 (%) 70 %   Oxygen Delivery 45 LPM   Patient wore BIPAP for a few hours this evening then went back on HFNC. HFNC increased to 45L from 40L and 70% from 50%. Will continue to wean as tolerated.    Dexter Greer, RT

## 2022-05-22 NOTE — PROGRESS NOTES
Chart reviewed and plan of care discussed with Provider.  Anticipating transition to comfort care.  Patient had visit from [facility].  Family visiting with patient, special exception made for 6 family members to be present.    Care Management following care progression and is available to assist as needed.    Shyla Mccollum RN, Care Manager

## 2022-05-22 NOTE — PROGRESS NOTES
Pt given Duoneb/MM as ordered.  VEST was held today due to pt's increase WOB.  Pt remains on HFNC 40LPM, 80%, O2 sats low to mid 90's, RR 22-26,  BS diminished, cs, crackles at times, exp wheeze at times.  Will continue to monitor pt.

## 2022-05-22 NOTE — PROGRESS NOTES
PULMONARY / CRITICAL CARE PROGRESS NOTE    Date / Time of Admission:  5/16/2022 12:46 PM    Assessment:   Active Problems:    Hypomagnesemia    Hyponatremia    COPD exacerbation (H)    Acute respiratory failure with hypoxia (H)    Overnight Events:  She is more tired today  Family at bedside  She is more congested  She is still on high flow O2 40L 80%    Code Status:  No CPR- Do NOT Intubate  86yoF with history of emphysema (DLCO 39% predicted), COPD (FEV1 49% predicted without reversibility) and bronchiectasis, upper lobe predominant who presents with hypoxia after multiple admissions for COPD exacerbations.       Plan:   Pulmonary  1) COPD  2) Emphysema  3) Bronchiectasis  4) Pseudomonal pneumonia  5) Compensated chronic hypercapneic respiratory failure  6) Probable malignant pulmonary nodules  -Continue vesting and mucomyst  -Bipap at night and PRN, careful balance of mobilizing secretions and aiding in ventilation  -Repeat VBG 5/19 am  -Continue Solumedrol  -Repeat Sputum culture-Pseudomonas, sensitive to meropenem  - decreased solumedrol to 60q8 from 60q6 5/20, transitioned to prednisone 40mg daily 5/21  -PE unlikely given her anticoagulation status so will forgo repeat CTA since last was done 2 weeks ago.      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 - will increase to 40mg bid   -Continue home diltiazem for rate control  -Already anticoagulated for Afib, continue home xarelto     GI:  - general diet  - GI ppx     Renal:  No issues     Endo:  1) Hyperglycemia  -Sliding scale increased to very high  resistance.   - lantus increased to 15u 5/21    Code Status: DNR/DNI, will transition to comfort care when family is ready  ICU DAILY CHECKLIST                           Can patient transfer out of MICU? no    FAST HUG:    Feeding:  Feeding: Yes.  Patient is receiving ORAL    Borjas:Yes  Analgesia/Sedation:No  Thromboembolic prophylaxis: Yes; Mode:  DOAC  HOB>30:  Yes  Stress Ulcer Protocol Active: Yes; Mode: PPI  Glycemic Control: Any glucose > 180 yes; Mode of Insulin Therapy: Sliding Scale Insulin    Clinically Significant Risk Factors Present on Admission                     PHYSICAL THERAPY AND MOBILITY:  Can patient have PT and mobility trial: no  Activity: Bedrest    Critical Care Time: 50 minutes, this patient is critically ill requiring significant oxygen supplementation at high risk of being intubated.     Subjective:   HPI:  Adalgisa Solano is a 86 year old female with history of emphysema (DLCO 39% predicted), COPD (FEV1 49% predicted without reversibility) and bronchiectasis, upper lobe predominant who presents with hypoxia after multiple admissions for COPD exacerbations.     She was just discharged 5/9/22 for COPD exacerbation and LLL pneumonia where her sputum culture grew Pseudomonas. She was discharged on azithromycin and 5L O2 to TCU. She represented now with worsening hypoxia from TCU.    Active Problems:    Hypomagnesemia    Hyponatremia    COPD exacerbation (H)    Acute respiratory failure with hypoxia (H)      Allergies: Tramadol, Tuberculin tests, Amoxicillin, Levofloxacin, and Penicillins     Cultures  05/20/2022 1723 05/20/2022 1751 Urine Culture [[id number]]   Urine, Borjas Catheter    In process Component Value   No component results             05/18/2022 0804 05/20/2022 2206 Respiratory Aerobic Bacterial Culture with Gram Stain [[id number]]    (Abnormal)   Sputum from Expectorate    Final result Component Value   Culture 2+ Pseudomonas aeruginosa Abnormal     1+ Normal marielena    Gram Stain Result <10 Squamous epithelial cells/low power field Abnormal     >25 PMNs/low power field Abnormal     1+ Gram negative bacilli Abnormal          Susceptibility     Pseudomonas aeruginosa     REBECA     Amikacin <=2.0 ug/mL Susceptible     Cefepime 2.0 ug/mL Susceptible     Ceftazidime 4.0 ug/mL Susceptible     Ciprofloxacin <=0.25 ug/mL Susceptible     Gentamicin <=1.0 ug/mL Susceptible     Levofloxacin 0.5 ug/mL Susceptible     Meropenem 1.0 ug/mL Susceptible     Piperacillin/Tazobactam 8.0 ug/mL Susceptible     Tobramycin <=1.0 ug/mL Susceptible                  05/17/2022 1605 05/17/2022 2029 Respiratory Aerobic Bacterial Culture with Gram Stain [[id number]]   Sputum from Expectorate    Final result Component Value   Culture >10 Squamous epithelial cells/low power field indicates oral contamination. Please recollect.   Gram Stain Result >10 Squamous epithelial cells/low power field    <25 PMNs/low power field    3+ Mixed marielena             05/16/2022 1609 05/18/2022 0746 Urine Culture [[id number]]   Urine from Urinary Bladder    Final result Component Value   Culture No Growth             05/16/2022 1436 05/20/2022 2104 Blood Culture Peripheral Blood [[id number]]   Peripheral Blood    Preliminary result Component Value   Culture No growth after 4 days P             05/16/2022 1401 05/16/2022 1448 Symptomatic; Unknown Influenza A/B & SARS-CoV2 (COVID-19) Virus PCR Multiplex Nasopharyngeal [[id number]]    Swab from Nasopharyngeal    Final result Component Value   Influenza A PCR Negative   Influenza B PCR Negative   RSV PCR Negative   SARS CoV2 PCR Negative   NEGATIVE: SARS-CoV-2 (COVID-19) RNA not detected, presumed negative.          05/16/2022 1359 05/20/2022 2104 Blood Culture Peripheral Blood [[id number]]   Peripheral Blood    Preliminary result Component Value   Culture No growth after 4 days P                   MEDS:  Scheduled Meds:    acetaminophen  650 mg Oral Q6H    Or      acetaminophen  650 mg Rectal Q6H     acetylcysteine  4 mL Nebulization Q4H     acyclovir   Topical Q3H     ARIPiprazole  5 mg Oral At Bedtime     atorvastatin  20 mg Oral At Bedtime     calcium carbonate-vitamin D  1 tablet Oral QAM     cyanocobalamin  500 mcg Oral QAM     diltiazem ER COATED BEADS  240 mg Oral Daily     docusate sodium  100 mg Oral BID     furosemide  20 mg Intravenous Q12H     gabapentin  600 mg Oral At Bedtime     insulin aspart  1-22 Units Subcutaneous TID AC     insulin aspart  1-16 Units Subcutaneous At Bedtime     insulin glargine  15 Units Subcutaneous QAM AC     ipratropium - albuterol 0.5 mg/2.5 mg/3 mL  3 mL Nebulization Q4H     meropenem  1 g Intravenous Q8H     pantoprazole  40 mg Oral Daily     polyethylene glycol  17 g Oral Daily     [Held by provider] potassium chloride ER  10 mEq Oral Daily     predniSONE  40 mg Oral Daily     rivaroxaban ANTICOAGULANT  20 mg Oral Daily with supper     sodium chloride (PF)  3 mL Intracatheter Q8H     sodium chloride (PF)  3 mL Intracatheter Q8H     spironolactone  12.5 mg Oral Daily     Vitamin D3  25 mcg Oral QAM     Continuous Infusions:    - MEDICATION INSTRUCTIONS -           Objective:   VITALS:  /77 (BP Location: Left arm)   Pulse 99   Temp 98.9  F (37.2  C) (Oral)   Resp 20   Wt 67.5 kg (148 lb 14.4 oz)   SpO2 94%   BMI 28.13 kg/m    EXAM:  General appearance: alert, appears stated age and cooperative  HEENT: MMM  Lungs: bilateral rhonchi, trace wheezing  Heart: irregularly irregular, S1 and S2  Abdomen: soft, nontender  Extremities: minimal edema, nylons in place  Skin: well perfused  Neurologic: Grossly normal    I&O:     Intake/Output Summary (Last 24 hours) at 5/18/2022 0920  Last data filed at 5/18/2022 0600  Gross per 24 hour   Intake 1120 ml   Output 750 ml   Net 370 ml       Data Review:  Chest CT personally reviewed  Personally reviewed image/s and Personally reviewed impression/s  EXAM: CT CHEST W/O CONTRAST  LOCATION: [facility]  DATE/TIME: 5/17/2022 2:37 PM     INDICATION: COPD exacerbation  COMPARISON: CT pulmonary angiogram 5/5/2022, 3/11/2021  TECHNIQUE: CT chest without IV contrast. Multiplanar reformats were obtained. Dose reduction techniques were used.  CONTRAST: None.     FINDINGS:   LUNGS AND PLEURA: Background of fairly advanced emphysema mixed centrilobular and paraseptal distribution with an upper lung predominance. In the areas of greatest emphysema the intervening interstitium has mild thickening, unchanged. The multiple   segment posterior airspace and interstitial process in the left lower lobe has mildly improved from 12 days earlier, however there is no more conspicuous and bandlike opacity in the left upper lobe along the posterior pleura and along the central   bronchovascular structures in the left lower lobe. Increased subpleural opacity in the right posterior costophrenic sulcus as well as development of multiple patchy inflammatory nodules within the parenchyma of the right lower lobe.     Several lobulated solid nodules are present in the right upper lobe. One of the nodules measuring 11 x 12 mm (series 4, image 110) has substantially increased from 11/3/2021 previously measuring 7 mm long axis. A second larger lobulated solid 9 x 16 mm   (series 4, image 123) right upper lobe nodule is also slightly larger measuring 7 x 14 mm. A few smaller solid nodules are present in the right upper lobe closer to the hilum (series 4, image 140 and 143) and are also larger from March 2021.     MEDIASTINUM: Biatrial cardiac enlargement. Cardiac ventricles are normal in size. No pericardial effusion. Enlarged right lower paratracheal lymph node measures up to 17 mm short axis (series 4, image 121) and enlarged left lower paratracheal lymph nodes   are also unchanged. No definite hilar adenopathy. Esophagus is decompressed. Conspicuous cisterna chyli.      CORONARY ARTERY CALCIFICATION:  Three-vessel, moderate.     UPPER ABDOMEN: No actionable findings in the imaged upper abdomen.     MUSCULOSKELETAL: Degenerative osteophytes of the thoracic spine largest from T6 through the thoracolumbar junction. No aggressive or destructive bone lesions are present.                                                                      IMPRESSION:      1.  Partial clearing of airspace opacities from the left lower lobe compared to 5/5/2022. Residual dense opacities are now along the central bronchovascular structures. There are new inflammatory nodules in the right lower lobe and increased opacity in   the right posterior sulcus consistent with new atelectasis/inflammation.  2.  Multiple solid nodules with lobular borders are present in the right upper lobe 2 largest of which measure 11 x 12 and 9 x 16 mm. The 11 x 12 nodule and several other solid nodules are substantially larger from 3/11/2021 and is suspect for neoplasm.   Consider PET/CT after resolution of this acute respiratory illness for further evaluation.  3.  Advanced emphysema.  4.  Severe enlargement of both cardiac atria and moderate atheromatous coronary calcifications.    LABS  Glucose Values Latest Ref Rng & Units 5/5/2022 5/16/2022 5/17/2022 5/18/2022 5/19/2022 5/20/2022 5/21/2022   Bedside Glucose (mg/dl )  - -- -- -- -- -- 251 --   GLUCOSE 70 - 125 mg/dL 168(H) 158(H) 223(H) 227(H) 208(H) 258(H) 231(H)   Some recent data might be hidden       Results for orders placed or performed during the hospital encounter of 05/16/22   Basic metabolic panel   Result Value Ref Range    Sodium 137 136 - 145 mmol/L    Potassium 4.5 3.5 - 5.0 mmol/L    Chloride 87 (L) 98 - 107 mmol/L    Carbon Dioxide (CO2) 42 (HH) 22 - 31 mmol/L    Anion Gap 8 5 - 18 mmol/L    Urea Nitrogen 24 8 - 28 mg/dL    Creatinine 0.55 (L) 0.60 - 1.10 mg/dL    Calcium 8.2 (L) 8.5 - 10.5 mg/dL    Glucose 231 (H) 70 - 125 mg/dL    GFR Estimate 90 >60 mL/min/1.73m2     Lab Results    Component Value Date    WBC 9.9 05/21/2022    HGB 10.7 (L) 05/21/2022    HCT 34.3 (L) 05/21/2022    MCV 93 05/21/2022     05/21/2022     Padmini Voss DO  Pulmonary and Critical Care Attending  pgr [phone]

## 2022-05-22 NOTE — PROGRESS NOTES
"On Call  received page to come in and visit with pt and family re: her comfort care decision  making.    Pt is Nondenominational with many questions concerning what happens when we die. Her  is Confucianism and conveys confidence that by believing in Rbian and living a good enough life, one is saved; heaven awaits. Pt's room had six family mbrs present, a special exception to assist pt in decision making.    Pt perhaps wants assurance of what awaits or perhaps assurance that it is all a mystery. Pt is afraid of this unknown. Those are writer's words, not hers. Pt verbalized that ultimately she needs to accept \"what is, is.\"  affirmed this.  focused on mystery being the way of the higher power. Tamiko asks that we believe in what we cannot see. This is easier said than done.     sought to bring assurance that midst no definitive answers, there is a promise of peace, yaniv and eternal love, however that unfolds.  also addressed the [place] of life, that no one lives on earth forever. Once our body is no longer able to sustain life on its own, it is natural to die. To employ aggressive tx procedures prolongs death, not life. The pt seemed to take everything  said along with her own thoughts under consideration. There was no mention by pt of her knowing what next steps might be.    Spiritual Care is available as requested or needed.    TRACY Cruz Div.    "

## 2022-05-23 NOTE — PROGRESS NOTES
05/23/22 1317   Quick Adds   Type of Visit Initial Occupational Therapy Evaluation   Living Environment   People in Home spouse   Current Living Arrangements house   Home Accessibility stairs to enter home;stairs within home   Transportation Anticipated family or friend will provide   Living Environment Comments All needs on 1 level. Pt hsa 4WW and FWW. Tub shoewr w/ shower chair and grab bars.   Self-Care   Usual Activity Tolerance moderate   Current Activity Tolerance fair   Equipment Currently Used at Home cane, straight;grab bar, tub/shower;shower chair;walker, rolling   Fall history within last six months no   Activity/Exercise/Self-Care Comment Pt IND w/ ADLs and recieves assistance w/ IADLs at baseline   General Information   Onset of Illness/Injury or Date of Surgery 05/16/22   Referring Physician Lorin Champagne MD   Additional Occupational Profile Info/Pertinent History of Current Problem COPD exacerbation   Existing Precautions/Restrictions oxygen therapy device and L/min   Cognitive Status Examination   Orientation Status orientation to person, place and time   Visual Perception   Visual Impairment/Limitations WFL   Sensory   Sensory Quick Adds No deficits were identified   Pain Assessment   Patient Currently in Pain No   Integumentary/Edema   Integumentary/Edema no deficits were identifed   Posture   Posture forward head position;protracted shoulders   Range of Motion Comprehensive   General Range of Motion bilateral upper extremity ROM WFL   Strength Comprehensive (MMT)   General Manual Muscle Testing (MMT) Assessment upper extremity strength deficits identified   Upper Extremity (Manual Muscle Testing)   Upper Extremity: Manual Muscle Testing (MMT) left shoulder strength deficit;right shoulder strength deficit;left elbow/forearm strength deficit;right elbow/forearm strength deficit   Muscle Tone Assessment   Muscle Tone Quick Adds No deficits were identified   Coordination   Upper Extremity  Coordination No deficits were identified   Bed Mobility   Bed Mobility scooting/bridging;supine-sit;sit-supine;rolling left;rolling right   Comment (Bed Mobility) Min A for bed mobility   Transfers   Transfers bed-chair transfer;sit-stand transfer   Transfer Comments CGA for all transfers   Activities of Daily Living   BADL Assessment/Intervention lower body dressing;grooming;toileting   Lower Body Dressing Assessment/Training   [place] Level (Lower Body Dressing) maximum assist (25% patient effort)   Grooming Assessment/Training   [place] Level (Grooming) minimum assist (75% patient effort)   Toileting   [place] Level (Toileting) moderate assist (50% patient effort)   Clinical Impression   Criteria for Skilled Therapeutic Interventions Met (OT) Yes, treatment indicated   OT Diagnosis decreased ADLs   Influenced by the following impairments COPD   OT Problem List-Impairments impacting ADL activity tolerance impaired;mobility;strength   Assessment of Occupational Performance 3-5 Performance Deficits   Identified Performance Deficits all ADLS   Planned Therapy Interventions (OT) ADL retraining;bed mobility training;strengthening;transfer training   Clinical Decision Making Complexity (OT) moderate complexity   Risk & Benefits of therapy have been explained evaluation/treatment results reviewed;patient;spouse/significant other;daughter   OT Discharge Planning   OT Discharge Recommendation (DC Rec) (S)  Transitional Care Facility;Long term care facility   OT Rationale for DC Rec Pt below baseline - on 5L O2. Pt fatigues quickly and requires 24 hr supervision at this time due to weakness and decreased activity tolerance.   Total Evaluation Time (Minutes)   Total Evaluation Time (Minutes) 10   OT Goals   Therapy Frequency (OT) Daily   OT Predicted Duration/Target Date for Goal Attainment 05/28/22   OT Goals Lower Body Dressing;Bed Mobility;Toilet Transfer/Toileting   OT: Lower Body Dressing  Supervision/stand-by assist;using adaptive equipment   OT: Bed Mobility Supervision/stand-by assist;supine to/from sitting;rolling;bridging   OT: Toilet Transfer/Toileting Supervision/stand-by assist;toilet transfer;cleaning and garment management

## 2022-05-23 NOTE — PLAN OF CARE
VSS on HFNC, currently 40L 55%. Collaborated with RT to maintain sats >88%.     Denies pain. No new skin issues.     Much confusion between patient and family members regarding goals of care. Right now seems to be restorative. Educated that only 2 family members can visit during visiting hours.     Borjas catheter patent. Diuresing well. LS remain coarse, expiratory wheezes present.     Worked with therapy in the chair.       Problem: Plan of Care - These are the overarching goals to be used throughout the patient stay.    Goal: Plan of Care Review/Shift Note  Description: The Plan of Care Review/Shift note should be completed every shift.  The Outcome Evaluation is a brief statement about your assessment that the patient is improving, declining, or no change.  This information will be displayed automatically on your shift note.  Outcome: Ongoing, Not Progressing     Problem: Gas Exchange Impaired  Goal: Optimal Gas Exchange  Outcome: Ongoing, Not Progressing  Intervention: Optimize Oxygenation and Ventilation  Recent Flowsheet Documentation  Taken 5/23/2022 1600 by Sujey Rivera RN  Airway/Ventilation Management: airway patency maintained  Head of Bed (HOB) Positioning: HOB at 60-90 degrees  Taken 5/23/2022 1230 by Sujey Rivera RN  Airway/Ventilation Management: airway patency maintained  Head of Bed (HOB) Positioning: HOB at 60-90 degrees  Taken 5/23/2022 0834 by Sujey Rivera RN  Airway/Ventilation Management: airway patency maintained  Head of Bed (HOB) Positioning: HOB at 60-90 degrees     Problem: Heart Failure Comorbidity  Goal: Maintenance of Heart Failure Symptom Control  Outcome: Ongoing, Not Progressing  Intervention: Maintain Heart Failure-Management  Recent Flowsheet Documentation  Taken 5/23/2022 1600 by Sujey Rivera RN  Medication Review/Management: medications reviewed  Taken 5/23/2022 1230 by Sujey Rivera RN  Medication Review/Management: medications  reviewed  Taken 5/23/2022 0834 by Sujey Rivera, RN  Medication Review/Management: medications reviewed   Goal Outcome Evaluation:

## 2022-05-23 NOTE — PROGRESS NOTES
Clinical Nutrition Services- Brief Note    Reviewed nutrition risk factors due to LOS. Pt is tolerating a Regular diet, eating 100% of adequate meal trays per nursing documentation and review of meal ordering system. No nutrition issues identified at this time. RD will continue to follow per nutrition protocol.  Calvin Escalante RDN, LD  Clinical Dietitian   Office: [phone]  Weekend Pager: [phone]

## 2022-05-23 NOTE — PROGRESS NOTES
Care Management Follow Up    Length of Stay (days): 7    Expected Discharge Date: TBD   Concerns to be Addressed: Medical ICU status. IV antibiotics. HFNC.        Patient plan of care discussed at interdisciplinary rounds: Yes    Anticipated Discharge Disposition: TBD      Anticipated Discharge Services: TBD   Anticipated Discharge DME: TBD     Referrals Placed by CM/SW:  None at this time.   Private pay costs discussed: None at this time.   Additional Information:  Chart reviewed. Hospice consult pending- palliative care met with patient/family this afternoon. Has [facility] prior to hospital admit. CM will continue to follow.       Aleja Pride RN

## 2022-05-23 NOTE — PROGRESS NOTES
CRITICAL CARE PROGRESS NOTE:    Assessment/Plan:  86yoF with history of emphysema (DLCO 39% predicted), COPD (FEV1 49% predicted without reversibility) and bronchiectasis, upper lobe predominant who presents with hypoxia after multiple admissions for COPD exacerbations.     RESP:  Acute on chronic respiratory failure with hypoxemia. Home O2 requirement 3-5Lpm. Severe underlying emphysema, bronchiectasis, COPD.     Cont HFNC and titrate for goal SpO2 88-92%. Avoid hyperoxia.    Cont BiPAP overnight and prn as needed.     Encourage OOB, PT/OT, push IS today    Cont duonebs qid, mucomyst nebs, flutter valve use    Resume home 3% sodium chloride nebs qid    Abx per ID section below.    Cont prednisone 40mg daily, will need prolonged taper. (rec'd 6 days IV steroids earlier)    She has several pulmonary nodules on CT that were concerning for malignancy. Given advanced age, severe underlying lung disease and goals of care, would not pursue any further workup for these such as PET/CT. Can discuss with Dr. Juarez, her outpatient pulmonologist if she sees him again in clinic.     CV:  pulm HTN likely WHO group II and III, CHF with evidence of elevated filling pressures on echo, likely HFpEF with exacerbation. Echo from last month: EF 65%, normal LV size/function, mod MR, mod TR, mild Rv enlargement with normal function. Severe Nataliia enlargement. RVS 35-40 mm Hg + RAP. IVC dilated. BNP mildly elevaed at 189    MAP >65, not requiring pressors    Cont Lasix 40mg IV bid to keep dry    1 dose of diamoxx 500mg IV today, given rising serum bicarb and need for ongoing diuresis.     Cont PTA statin, aldactone, diltiazem    Cont PTA xarelto for afib a/c    NEURO:  No issues, normal mental status. Hx depression/anxiety    Tylenol prn pain    Avoid benzo's    Cautious use of narcotics.    Cont PTA abilify, gabapentin    GI:  No issues, tolerating diet    Cont regular diet    Bowel regimen prn     Cont PPI (home med)    RENAL:  Chronic  compensated respiratory acidosis with rising CO2 due to diuresis (contraction alkalosis)    Maintain landaverde    Avoid nephrotoxins    Cont Lasix as above    diamoxx x1 as above    Follow BUN/SCr    ID:  Severe pseudomonal pneumonia in the setting of chronic lung disease, bronchiectasis.     Cont IV meropenem for now, consider transition to po abx once improved. Today is day 7 of abx. Will need 2-3 weeks for pseudomonas treatment.     Follow up culture data    HEMATOLOGIC:  Stable mild anemia. Leukocytosis resolved.     Follow counts    hgb >7    ENDOCRINE:  Hx DM with A1c 6.2 on 3/21/22.     FSBG checks, insulin sliding scale/drip per ICU protocol    incr Lantus to 15 bid     Hold PTA metformin.    ICU PROPHYLAXIS:    Home Xarelto    Home PPI    CODE STATUS, DISPOSITION, FAMILY COMMUNICATION: DNR/DNI  Updated pt and dtr at bedside.  Palliative care following, appreciate input    Lines/Drains/Tubes:  PICC 5/18  Landaverde 5/18    Restraints  Not needed    Tim (Jose Maria) MD [place]  [facility]  Pager [phone]  Clinic [phone]  Fax [phone]     Overnight events:  She feels about the same  Still on HFNC, 0.8, 40Lpm  Coughing up phlegm  No hemoptysis.     Subjective:  Denies pain.     Objective:  Physical Exam:  Vent settings for last 24 hours:  FiO2 (%): 75 %  Resp: 22      /69   Pulse 113   Temp 98.7  F (37.1  C) (Oral)   Resp 22   Wt 64.2 kg (141 lb 9.6 oz)   SpO2 90%   BMI 26.76 kg/m      Intake/Output last 3 shifts:  I/O last 3 completed shifts:  In: 380 [P.O.:280; I.V.:100]  Out: 4575 [Urine:4575]  Intake/Output this shift:  I/O this shift:  In: 30 [I.V.:30]  Out: 250 [Urine:250]    Physical Exam  Gen: awake, alert, oriented, no distress  HEENT: no OP lesions, no MAYE  CV: RRR, no m/g/r  Resp: coarse BS bilaterally, exp wheezing. No rhonchi. Bibasilar crackles.   Abd: soft, nontender, BS+  Neuro: PERRL, nonfocal  Ext: no edema    LAB:  Recent Labs   Lab  05/21/22  0604   WBC 9.9   HGB 10.7*   HCT 34.3*        Recent Labs   Lab 05/23/22  0536 05/21/22  0604 05/20/22  0624 05/18/22  0527 05/17/22  0055 05/16/22  1254    137 136   < > 133* 129*   CO2 45* 42* 42*   < > 38* 36*   BUN 16 24 31*   < > 9 9   ALKPHOS  --   --   --   --  56 58   ALT  --   --   --   --  15 22   AST  --   --   --   --  13 19    < > = values in this interval not displayed.       No current outpatient medications on file.       Micro  PCT normal on 5/17  Sputum 5/18 pan-sensitive pseudomonas    Critical care attestation: 40 minutes spent managing the following issues: acute respiratory failure requiring NIPPV vs. HHFNC, severe pseudomonal pneumonia, goals of care discussions with familiy and palliative care. High risk for organ deterioration and death requiring ICU level care.

## 2022-05-23 NOTE — PLAN OF CARE
Patient had multiple family members visit today.  It was this writer's understanding that once family members had a chance to visit, patient would progress to comfort care status. Patient began the day very tired and was struggling to breathe.  She did perk up a bit once family began to arrive.    Patient did request to meet with .  Family indicated this was very helpful to the patient.    When asking the patient what her plan is regarding care and transitioning to comfort, she defers to her daughter.  The patient's daughter does not seem to be helpful in pushing her mom towards making the decision.  According to the daughter, it is because of the patient's mental illness that she is sometimes ready for comfort care and sometimes not.  The daughter also indicated that the patient's , as of this morning, was expecting Kim to come home (not hospice care).    The residents came and spoke with patient and family members and indicated to them that Kim's condition is terminal and that she will die.  Family indicated to them that they would discuss what was said to them.  No decisions have been made and no timeline has been given.  At one point today patient did decline some medications and blood sugar checks as they seemed futile, but a couple hours later requested to have all meds and insulin.      Goal Outcome Evaluation:    Plan of Care Reviewed With: patient, daughter          Outcome Evaluation: Expected to transition to comfort cares today, patient continues to want full treatment      Problem: Plan of Care - These are the overarching goals to be used throughout the patient stay.    Goal: Plan of Care Review/Shift Note  Description: The Plan of Care Review/Shift note should be completed every shift.  The Outcome Evaluation is a brief statement about your assessment that the patient is improving, declining, or no change.  This information will be displayed automatically on your shift note.  Outcome:  Ongoing, Not Progressing  Flowsheets (Taken 5/22/2022 2204)  Plan of Care Reviewed With:   patient   daughter  Outcome Evaluation: Expected to transition to comfort cares today, patient continues to want full treatment  Goal: Absence of Hospital-Acquired Illness or Injury  Intervention: Identify and Manage Fall Risk  Recent Flowsheet Documentation  Taken 5/22/2022 2100 by Mandy Urena RN  Safety Promotion/Fall Prevention:   activity supervised   assistive device/personal items within reach   clutter free environment maintained   safety round/check completed  Taken 5/22/2022 1600 by Mandy Urena RN  Safety Promotion/Fall Prevention:   activity supervised   assistive device/personal items within reach   clutter free environment maintained   safety round/check completed  Taken 5/22/2022 1200 by Mandy Urena RN  Safety Promotion/Fall Prevention:   activity supervised   assistive device/personal items within reach   clutter free environment maintained   safety round/check completed  Intervention: Prevent Skin Injury  Recent Flowsheet Documentation  Taken 5/22/2022 2100 by Mandy Urena RN  Body Position:   position changed independently   sitting up in bed  Taken 5/22/2022 1600 by Mandy Urena RN  Body Position:   position changed independently   sitting up in bed  Taken 5/22/2022 1200 by Mandy Urena RN  Body Position:   position changed independently   sitting up in bed  Intervention: Prevent Infection  Recent Flowsheet Documentation  Taken 5/22/2022 2100 by Mandy Urena RN  Infection Prevention:   hand hygiene promoted   single patient room provided  Taken 5/22/2022 1600 by Mandy Urena RN  Infection Prevention:   hand hygiene promoted   single patient room provided  Taken 5/22/2022 1200 by Mandy Urena RN  Infection Prevention:   hand hygiene promoted   single patient room provided  Goal: Optimal Comfort and Wellbeing  Outcome: Ongoing, Progressing  Intervention: Provide  Person-Centered Care  Recent Flowsheet Documentation  Taken 5/22/2022 2100 by Mandy Urena RN  Trust Relationship/Rapport:   care explained   choices provided   questions answered   questions encouraged  Taken 5/22/2022 1600 by Mandy Urena RN  Trust Relationship/Rapport:   care explained   choices provided   questions answered   questions encouraged  Taken 5/22/2022 1200 by Mandy Urena RN  Trust Relationship/Rapport:   care explained   choices provided     Problem: Risk for Delirium  Goal: Optimal Coping  Outcome: Ongoing, Progressing  Intervention: Optimize Psychosocial Adjustment to Delirium  Recent Flowsheet Documentation  Taken 5/22/2022 2100 by Mandy Urena, RN  Supportive Measures:   active listening utilized   counseling provided   decision-making supported   verbalization of feelings encouraged  Family/Support System Care: involvement promoted  Taken 5/22/2022 1600 by Mandy Urena RN  Supportive Measures:   active listening utilized   counseling provided   decision-making supported   verbalization of feelings encouraged  Family/Support System Care: involvement promoted  Taken 5/22/2022 1200 by Mandy Urena RN  Supportive Measures:   active listening utilized   counseling provided   decision-making supported   verbalization of feelings encouraged  Family/Support System Care: involvement promoted  Goal: Improved Behavioral Control  Outcome: Ongoing, Progressing  Goal: Improved Attention and Thought Clarity  Outcome: Ongoing, Progressing  Goal: Improved Sleep  Outcome: Ongoing, Progressing

## 2022-05-23 NOTE — PROGRESS NOTES
[facility]    Progress Note - Hospitalist Service       Date of Admission:  5/16/2022    Assessment & Plan        Adalgisa Solano is a 86 year old female admitted on 5/5/2022. She has a history of pulmonary disease including COPD with persistent bronchiectasis getting vest therapy at home for the last 2 years, non-insulin-dependent diabetes, chronic atrial fibrillation, chronic heart failure with preserved ejection fraction who has had several recent hospitalizations for COPD exacerbation with pneumonia. Admitted for recurrent COPD exacerbation without signs of infection. Alternating between HFNC and BiPAP. Multiple goals of care meetings held, family and patient remain undecided; not currently wanting comfort cares.      Acute on chronic respiratory failure  COPD exacerbation  Pseudomonal pneumonia   Probable malignant neoplasms  Recently admitted 5/5-5/9 for COPD exacerbation with LLL opacity treated with cefepime and vanco x1 day then transitioned to azithromycin course per ID input. Baseline oxygen needs 3-5L. Presented satting 88% on 5L. Recent PNA treated with cefepime end of April; also treated with ertapenem and azithromycin beginning of May. On Trelegy Ellipta outpatient. No leukocytosis or elevated procal however CT chest with partial clearing of airspace opacities from the left lower lobe, Residual dense opacities are now along the central bronchovascular structures and new inflammatory nodules. Also with multiple solid nodules concerning for neoplasm.  Alternating between HFNC and BIPAP. VBG with worsening CO2 retention. Sputum cx with pseudomonas, pending sensitivities. Work of breathing increasing. Patient fatigued.   - Pulmonology consulted, appreciate recs   - Continue oral prednisone, will need prolonged taper   - Continue meropenem, will need 2-3 weeks of abx treatment   - BIPAP PRN and at night given hypercapnia   - Outpatient discussion of pulmonary nodules  concerning for malignancy  - Duonebs, mucomyst, sodium chloride nebs  - Palliative consulted, appreciate recs  - Hospice consulted, appreciate recs  - Vest therapy  - RCAT consult     Chronic diastolic CHF  Bilateral lymphedema  Pulmonary hypertension  Bilateral lower extremity edema L>R, however BNP relatively normal at 180 and patient endorses improvement from baseline. Last echocardiogram in 4/2022 with an EF of 65%, with moderate mitral and tricuspid insufficiency. Home regimen includes Lasix (40mg daily) and Spironolactone (12.5 mg daily). LE swelling stable.  - Continue PTA spironolactone   - IV lasix 40 mg BID per pulm recs  - 1 dose of diamoxx 500mg IV today  - OT lymphedema consult     Herpes Zoster, (left buttock)  Improving, scabs present. No signs of secondary infection. Completed Valtrex.  - Continue to monitor  - WOC consult      Hyponatremia, resolved  Sodium of 129 on admission.  - Continue to monitor  - Regular diet    Hyperkalemia  - Resolved, continue to monitor     Atrial fibrillation  HTN  On Xarelto 20 mg daily and diltiazem 240 mg daily prior to admission.   - Continue diltiazem 240 mg daily   - Continue daily Xarelto 20 mg     Anxiety disorder  - Continue PTA Abilify  - Ativan 0.25mg q4h prn for anxiety per palliative recs      T2DM  Steroid-induced hyperglycemia  Last Hgb A1C (3/21/22)- 6.2, suggesting well controlled diabetes. On metformin (500mg daily) prior to admission. Likely will be elevated in setting of steroids. Glucose 200s-300s.  - 4 times daily and AC glucose checks  - very high sliding scale insulin  - Lantus 15 units BID     Chronic pain syndrome-chronic mid to low back pain  - Continue Tylenol as needed  - Continue gabapentin 600 mg at bedtime  - Avoid NSAIDs as on anticoagulant     Magnesium deficiency  - Mg replacement protocol    Urinary retention  Urine culture neg.   - Borjas in place    Constipation  - Increased colace to BID  - Suppository PRN  - Miralax      Diet:  Combination Diet Regular Diet Adult    DVT Prophylaxis: DOAC  Borjas Catheter: PRESENT, indication: Strict 1-2 Hour I&O  Fluids: PO  Central Lines: PRESENT  PICC Triple Lumen 05/18/22 Right Basilic-Site Assessment: WDL  Cardiac Monitoring: None  Code Status: No CPR- Do NOT Intubate      Disposition Plan   Expected Discharge: 05/24/2022  Anticipated discharge location: home with family;home with help/services    Delays:    oxygen needs        The patient's care was discussed with the Attending Physician, Dr. Champagne.    Truong Tiwari MD PGY1  Hospitalist Service  [facility]  Securely message with the Vocera Web Console (learn more here)  Text page via [employer] Paging/Directory     Clinically Significant Risk Factors Present on Admission   ______________________________________________________________________    Interval History   On BiPAP overnight. Otherwise on 40L HFNC.     Family wants to continue trying to wean down on oxygen today. Family/patient not planning on comfort cares currently, but open to hospice consult as a plan for when/if she is able to be weaned down to 10L. Has family coming from out of town. No pain. Feels fatigued. Patient is glad her family has been able to visit.     Data reviewed today: I reviewed all medications, new labs and imaging results over the last 24 hours.     Physical Exam   Vital Signs: Temp: 98.3  F (36.8  C) Temp src: Oral BP: (!) 161/80 Pulse: 102   Resp: 22 SpO2: (!) 87 % O2 Device: High Flow Nasal Cannula (HFNC) Oxygen Delivery: 50 LPM  Weight: 141 lbs 9.6 oz     Constitutional: awake, alert, cooperative, appears fatigued  Eyes: Lids and lashes normal, extra ocular muscles intact, sclera clear, conjunctiva normal  ENT: normocepalic, without obvious abnormality, atraumatic.  Neck: Supple, symmetric  Respiratory: On HFNC. Coarse diffuse breath sounds.   Cardiovascular: irregularly irregular rhythm, no murmur auscultated  GI:  normal bowel sounds, soft,  non-distended, non-tender  Musculoskeletal: bilateral edema Left>right  Neurologic: Awake, alert, oriented to name, place and time.    Neuropsychiatric: General: normal, calm and normal eye contact     Data   Recent Labs   Lab 05/23/22  1351 05/23/22  1210 05/23/22  0817 05/23/22  0536 05/21/22  0730 05/21/22  0604 05/20/22  0752 05/20/22  0624 05/19/22  0752 05/19/22  0421 05/17/22  0742 05/17/22  0055   WBC  --   --   --   --   --  9.9  --  9.6  --  13.8*   < > 6.7   HGB  --   --   --   --   --  10.7*  --  10.3*  --  10.3*   < > 10.8*   MCV  --   --   --   --   --  93  --  96  --  95   < > 89   PLT  --   --   --   --   --  288  --  268  --  287   < > 265   NA  --   --   --  139  --  137  --  136  --  136   < > 133*   POTASSIUM  --   --   --  3.8  --  4.5  --  5.2*  --  4.6   < > 3.8   CHLORIDE  --   --   --  84*  --  87*  --  86*  --  88*   < > 84*   CO2  --   --   --  45*  --  42*  --  42*  --  38*   < > 38*   BUN  --   --   --  16  --  24  --  31*  --  24   < > 9   CR  --   --   --  0.54*  --  0.55*  --  0.65  --  0.64   < > 0.55*   ANIONGAP  --   --   --  10  --  8  --  8  --  10   < > 11   DENISE  --   --   --  7.7*  --  8.2*  --  8.3*  --  8.1*   < > 8.2*   * 140* 173* 191*   < > 231*   < > 258*   < > 208*   < > 223*   ALBUMIN  --   --   --   --   --   --   --   --   --   --   --  2.5*   PROTTOTAL  --   --   --   --   --   --   --   --   --   --   --  5.4*   BILITOTAL  --   --   --   --   --   --   --   --   --   --   --  0.5   ALKPHOS  --   --   --   --   --   --   --   --   --   --   --  56   ALT  --   --   --   --   --   --   --   --   --   --   --  15   AST  --   --   --   --   --   --   --   --   --   --   --  13    < > = values in this interval not displayed.     No results found for this or any previous visit (from the past 24 hour(s)).

## 2022-05-23 NOTE — PROGRESS NOTES
[facility] - Palliative Care Daily Progress Note      May 23, 2022      Today, the patient was seen for:Goals of care    Impressions and  Recommendations      1) Goals of Care per         Restorative: continue current   treatments/Therapies    Code Status DNR DNI    Met with daughter and Attending, 2 sons.     GOC: last night the family wanted comfort care and Nurse sup changed visitation to 6, now they are interested in going home on regular home care again.    I Discussed hospice again as we discussed Friday. Currently on 80% and 40 liters.   Would need to be on 10 liters to go home    Placed informational consult to hospice      2). Advanced Care Planning   Health Care Directive:  N   Documented Health Care Agent:Les and daughter    3) Symptom Management           Dyspnea in setting of Bronchiectaisis, COPD and Emyphsema.   Comment:    Recommendations  Pulmonary and  RT.         4) Psychosocial/Spiritual support         Appreciate Palliative Jack Assistance    She receives family/Friend support         Assessments            HPI    Adalgisa Solano is a 86 year old female admitted on 5/5/2022. She has a history of pulmonary disease including COPD with persistent bronchiectasis getting vest therapy at home for the last 2 years, non-insulin-dependent diabetes, chronic atrial fibrillation, chronic heart failure with preserved ejection fraction who has had several recent hospitalizations for COPD exacerbation with pneumonia. Admitted for recurrent COPD exacerbation without signs of infection. Alternating between HFNC and BiPAP. Multiple goals of care meetings held, family and patient remain undecided; not currently wanting comfort cares.                  Interval History:       Chart review/discussion with unit or clinical team members:   Most of family came this weekend to say goodbye.   wants a miracle.  Daughter wants regular home care          Palliative Overview:     Palliative Encounter  Summary/Comments:  Met with daughter, her 2 brothers and patient.  She was in chair. Sats 90%  Discussed with Dr. Neumann. He is having her use chair and Incentive eugene.  Daughter had notes she about RT rewean, she has other notes that were private.   Did not want me to see    Prognosis, Goals, or Advance Care Planning was addressed today with: Yes.  Mood, coping, and/or meaning in the context of serious illness were addressed today: Yes.    Key Palliative Symptoms:difficult to determine given encephalopathy   # Pain severity the last 12 hours: low  # Dyspnea severity the last 12 hours: low  # Nausea severity the last 12 hours: none  # Anxiety severity the last 12 hours: none             Review of Systems:           A full 14 point review of systems was obtained and is negative unless noted above:          Medications:     Current Facility-Administered Medications   Medication     acetaminophen (TYLENOL) tablet 650 mg    Or     acetaminophen (TYLENOL) Suppository 650 mg     acetylcysteine (MUCOMYST) 20 % nebulizer solution 4 mL     acyclovir (ZOVIRAX) 5 % ointment     albuterol (PROVENTIL) neb solution 2.5 mg     ARIPiprazole (ABILIFY) tablet 5 mg     atorvastatin (LIPITOR) tablet 20 mg     bisacodyl (DULCOLAX) Suppository 10 mg     calcium carbonate-vitamin D (OS-DENISE with D) per tablet 1 tablet     cyanocobalamin (VITAMIN B-12) tablet 500 mcg     glucose gel 15-30 g    Or     dextrose 50 % injection 25-50 mL    Or     glucagon injection 1 mg     diltiazem ER COATED BEADS (CARDIZEM CD/CARTIA XT) 24 hr capsule 240 mg     docusate sodium (COLACE) capsule 100 mg     furosemide (LASIX) injection 40 mg     gabapentin (NEURONTIN) capsule 600 mg     insulin aspart (NovoLOG) injection (RAPID ACTING)     insulin aspart (NovoLOG) injection (RAPID ACTING)     insulin glargine (LANTUS PEN) injection 15 Units     ipratropium - albuterol 0.5 mg/2.5 mg/3 mL (DUONEB) neb solution 3 mL     lidocaine (LMX4) cream     lidocaine (LMX4)  cream     lidocaine 1 % 0.1-1 mL     lidocaine 1 % 0.1-1 mL     LORazepam (ATIVAN) half-tab 0.25 mg     melatonin tablet 1 mg     meropenem (MERREM) 1 g vial to attach to  mL bag     morphine (PF) injection 2-4 mg     ondansetron (ZOFRAN ODT) ODT tab 4 mg    Or     ondansetron (ZOFRAN) injection 4 mg     pantoprazole (PROTONIX) EC tablet 40 mg     Patient is already receiving anticoagulation with heparin, enoxaparin (LOVENOX), warfarin (COUMADIN)  or other anticoagulant medication     polyethylene glycol (MIRALAX) Packet 17 g     [Held by provider] potassium chloride ER (KLOR-CON M) CR tablet 10 mEq     predniSONE (DELTASONE) tablet 40 mg     rivaroxaban ANTICOAGULANT (XARELTO) tablet 20 mg     sodium chloride (NEBUSAL) 3 % neb solution 3 mL     sodium chloride (OCEAN) 0.65 % nasal spray 1 spray     sodium chloride (PF) 0.9% PF flush 3 mL     sodium chloride (PF) 0.9% PF flush 3 mL     sodium chloride (PF) 0.9% PF flush 3 mL     sodium chloride (PF) 0.9% PF flush 3 mL     spironolactone (ALDACTONE) half-tab 12.5 mg     Vitamin D3 (CHOLECALCIFEROL) tablet 25 mcg        I have reviewed this patient's medication profile and medications during this hospitalization.               Physical Exam:   BP (!) 161/80   Pulse 101   Temp (!) 96.5  F (35.8  C) (Axillary)   Resp 22   Wt 64.2 kg (141 lb 9.6 oz)   SpO2 94%   BMI 26.76 kg/m      GENERAL: sitting in chair,  Weak. Appeared very dyspnec on HFNC    SKIN: Warm and dry   HEENT: Normocephalic, anicteric sclera, moist mucous membranes  LUNGS: clear to auscultation anterolaterally;  labored   CARDIAC: RRR, No CARISA  ABDOMINAL: BS+, soft, non distended, non tender  EXTREMITIES: No edema or cyanosis, pulses 2+ and symmetrical  NEUROLOGIC: alert  PSYCH: anxious                Data Reviewed:      Reviewed recent pertinent imaging and labs            TT: I have personally spent a total of 25 minutes on the unit in review of medical record, consultation with the medical  providers and assessment of patient today, with more than 50% of this time spent in counseling, coordination of care, and discussion with patient and staff re: prognosis, symptom management, and development of plan of care as noted above    MARLINE Sargent, NP-C, [facility]  Palliative Medicine  Office: [phone]

## 2022-05-23 NOTE — PROVIDER NOTIFICATION
05/23/22 0524   Oxygen Therapy   O2 Device High Flow Nasal Cannula (HFNC)   FiO2 (%) 80 %   Oxygen Delivery 40 LPM   Breath Sounds   Breath Sounds All Fields   All Lung Fields Breath Sounds coarse   Patient History: emphysema (DLCO 39% predicted), COPD (FEV1 49% predicted without reversibility) and bronchiectasis, upper lobe predominant who presents with hypoxia after multiple admissions for COPD exacerbations.    Patient was on BIPAP 10/5 ST 12 60% for 4 hours this evening. Then switched back to HFNC 40L/80% , which patient is currently on. No major changes overnight, will continue to work with providers on patient wishes with comfort cares in the near future. Continue with Mucomyst/ Duonebs and Vest therapy. All treatments were completed las t evening with no complications.     Dexter Greer, RT

## 2022-05-24 NOTE — PROGRESS NOTES
[facility] - Palliative Care Daily Progress Note      May 24, 2022      Today, the patient was seen for:Goals of care    Impressions and  Recommendations      1) Goals of Care per         Restorative: continue current  Treatments, moving toward comfort cares. Downgraded today from ICU to Telemetrys    Code Status DNR DNI (still need POLST, they are reluctant to check the comfort box). They had completed  One with Pall care last admission but tore it up    Met with daughter and      GOC: to discharge home on hospice   Current status: on HFNC 40L  65% FIO2. Was on 80% yesterday   [facility] to see today but they may want [facility], finally agree hospice care at home  instead of regular home care    5/24 Discussed with NADEEM try to decrease o2 tomorrow      2). Advanced Care Planning   Health Care Directive:  Yes,  completed    Documented Health Care Agent:Les is primary  and daughter is back up    3) Symptom Management     -Dyspnea in setting of  Severe pseudomonal pneumonia in the setting of chronic lung disease, bronchiectasis.  Bronchiectaisis, COPD and Emyphsema. ?Lung nodules  Comment:  Continues on HFNC daytime and BIPAP at night  Recommendations  Pulmonary and  RT.      -Anxiety disorder   Continue PTA Abilify     Ativan 0.25mg q4h prn for anxiety      Chronic pain syndrome-chronic mid to low back pain   - Continue Tylenol as needed   - Continue gabapentin 600 mg at bedtime   - Avoid NSAIDs as on anticoagulant     Constipation   - Increased colace to BID   - Suppository PRN   - Miralax        4) Psychosocial/Spiritual support      She receives family/Friend support         Assessments      [facility]    Adalgisa Solano is a 86 year old female admitted on 5/5/2022. She has a history of pulmonary disease including COPD with persistent bronchiectasis getting vest therapy at home for the last 2 years, non-insulin-dependent diabetes, chronic atrial fibrillation, chronic heart  failure with preserved ejection fraction who has had several recent hospitalizations for COPD exacerbation with pneumonia. Admitted for recurrent COPD exacerbation without signs of infection. Alternating between HFNC and BiPAP. Multiple goals of care meetings held, family and patient remain undecided; not currently wanting comfort cares.                  Interval History:       Chart review/discussion with unit or clinical team members:   Discussed with . He appreciates family dynamics issue     She has several pulmonary nodules on CT that were concerning for malignancy. Given advanced age, severe underlying lung disease and goals of care, would not pursue any further workup for these such as PET/CT. Can discuss with Dr. Juarez, her outpatient pulmonologist if she sees him again in clinic.        Palliative Overview:     Discussed hospice in detail, if she is able to wean down to 10 liters and  Get home, they want Oximyzer NC rather than regular mask as this is constricting to her. Had RT see them today      Prognosis, Goals, or Advance Care Planning was addressed today with: Yes.  Mood, coping, and/or meaning in the context of serious illness were addressed today: Yes.    Key Palliative Symptoms:difficult to determine given encephalopathy   # Pain severity the last 12 hours: low  # Dyspnea severity the last 12 hours: better today  # Nausea severity the last 12 hours: none  # Anxiety severity the last 12 hours: none             Review of Systems:       A full 14 point review of systems was obtained and is negative unless noted above:          Medications:     Current Facility-Administered Medications   Medication     acetaminophen (TYLENOL) tablet 650 mg    Or     acetaminophen (TYLENOL) Suppository 650 mg     acetylcysteine (MUCOMYST) 20 % nebulizer solution 4 mL     acyclovir (ZOVIRAX) 5 % ointment     albuterol (PROVENTIL) neb solution 2.5 mg     ARIPiprazole (ABILIFY) tablet 5 mg     atorvastatin  (LIPITOR) tablet 20 mg     bisacodyl (DULCOLAX) Suppository 10 mg     calcium carbonate-vitamin D (OS-DENISE with D) per tablet 1 tablet     cyanocobalamin (VITAMIN B-12) tablet 500 mcg     glucose gel 15-30 g    Or     dextrose 50 % injection 25-50 mL    Or     glucagon injection 1 mg     diltiazem ER COATED BEADS (CARDIZEM CD/CARTIA XT) 24 hr capsule 240 mg     docusate sodium (COLACE) capsule 100 mg     furosemide (LASIX) injection 40 mg     gabapentin (NEURONTIN) capsule 600 mg     insulin aspart (NovoLOG) injection (RAPID ACTING)     insulin aspart (NovoLOG) injection (RAPID ACTING)     insulin glargine (LANTUS PEN) injection 15 Units     ipratropium - albuterol 0.5 mg/2.5 mg/3 mL (DUONEB) neb solution 3 mL     lidocaine (LMX4) cream     lidocaine (LMX4) cream     lidocaine 1 % 0.1-1 mL     lidocaine 1 % 0.1-1 mL     LORazepam (ATIVAN) half-tab 0.25 mg     melatonin tablet 1 mg     meropenem (MERREM) 1 g vial to attach to  mL bag     morphine (PF) injection 2-4 mg     ondansetron (ZOFRAN ODT) ODT tab 4 mg    Or     ondansetron (ZOFRAN) injection 4 mg     pantoprazole (PROTONIX) EC tablet 40 mg     Patient is already receiving anticoagulation with heparin, enoxaparin (LOVENOX), warfarin (COUMADIN)  or other anticoagulant medication     polyethylene glycol (MIRALAX) Packet 17 g     [Held by provider] potassium chloride ER (KLOR-CON M) CR tablet 10 mEq     predniSONE (DELTASONE) tablet 30 mg    Followed by     [START ON 5/27/2022] predniSONE (DELTASONE) tablet 20 mg    Followed by     [START ON 5/30/2022] predniSONE (DELTASONE) tablet 10 mg     rivaroxaban ANTICOAGULANT (XARELTO) tablet 20 mg     sodium chloride (NEBUSAL) 3 % neb solution 3 mL     sodium chloride (OCEAN) 0.65 % nasal spray 1 spray     sodium chloride (PF) 0.9% PF flush 3 mL     sodium chloride (PF) 0.9% PF flush 3 mL     sodium chloride (PF) 0.9% PF flush 3 mL     sodium chloride (PF) 0.9% PF flush 3 mL     spironolactone (ALDACTONE) half-tab  12.5 mg     Vitamin D3 (CHOLECALCIFEROL) tablet 25 mcg        I have reviewed this patient's medication profile and medications during this hospitalization.               Physical Exam:   /70 (BP Location: Left arm)   Pulse 91   Temp 98.5  F (36.9  C) (Oral)   Resp 24   Wt 64.6 kg (142 lb 8 oz)   SpO2 91%   BMI 26.93 kg/m      GENERAL: sitting in bed, appears less Weak and improved color as compared to yesterday    SKIN: Warm and dry   HEENT: Normocephalic, exp wheeze, anicteric sclera, moist mucous membranes  LUNGS: coarse to auscultation anterolaterally; HFNC, not labored,  sats 92% labored   CARDIAC: RRR, No CARISA  ABDOMINAL: BS+, soft, non distended, non tender  EXTREMITIES: No edema or cyanosis, pulses 2+ and symmetrical  NEUROLOGIC: alert  PSYCH: less anxious                Data Reviewed:      Reviewed recent pertinent imaging and labs         April 2022  Echo from last month: EF 65%, normal LV size/function, mod MR, mod TR, mild Rv enlargement with normal function. Severe Nataliia enlargement.     TT: I have personally spent a total of 36 minutes on the unit in review of medical record, consultation with the medical providers and assessment of patient, daughter and husbad and RT and MD today, with more than 50% of this time spent in counseling, coordination of care, and discussion with patient and staff re: prognosis, symptom management, and development of plan of care as noted above    MARLINE Sargent, NP-C, ACHPN  [facility]  Palliative Medicine  Office: [phone]

## 2022-05-24 NOTE — PROGRESS NOTES
[facility]    Progress Note - Hospitalist Service       Date of Admission:  5/16/2022    Assessment & Plan        Adalgisa Solano is a 86 year old female admitted on 5/5/2022. She has a history of pulmonary disease including COPD with persistent bronchiectasis getting vest therapy at home for the last 2 years, non-insulin-dependent diabetes, chronic atrial fibrillation, chronic heart failure with preserved ejection fraction who has had several recent hospitalizations for COPD exacerbation with pneumonia. Admitted for recurrent COPD exacerbation without signs of infection. Alternating between HFNC and BiPAP. Multiple goals of care meetings held, family and patient remain undecided; not currently wanting comfort cares. Open to informational hospice consult.     Acute on chronic respiratory failure  COPD exacerbation  Pseudomonal pneumonia   Probable malignant neoplasms  Recently admitted 5/5-5/9 for COPD exacerbation with LLL opacity treated with cefepime and vanco x1 day then transitioned to azithromycin course per ID input. Baseline oxygen needs 3-5L. Presented satting 88% on 5L. Recent PNA treated with cefepime end of April; also treated with ertapenem and azithromycin beginning of May. On Trelegy Ellipta outpatient. No leukocytosis or elevated procal however CT chest with partial clearing of airspace opacities from the left lower lobe, Residual dense opacities are now along the central bronchovascular structures and new inflammatory nodules. Also with multiple solid nodules concerning for neoplasm.  Alternating between HFNC and BIPAP. Sputum cx with pseudomonas. Work of breathing increasing. Patient fatigued.   - Pulmonology consulted, appreciate recs   - Continue oral prednisone, will need prolonged taper   - Continue meropenem, will need 2-3 weeks of abx treatment   - BIPAP PRN and at night given hypercapnia   - Outpatient discussion of pulmonary nodules concerning for  malignancy  - Duonebs, mucomyst, sodium chloride nebs  - Palliative consulted, appreciate recs  - Hospice consulted, appreciate recs  - Vest therapy  - RCAT consult     Chronic diastolic CHF  Bilateral lymphedema  Pulmonary hypertension  Bilateral lower extremity edema L>R, however BNP relatively normal at 180 and patient endorses improvement from baseline. Last echocardiogram in 4/2022 with an EF of 65%, with moderate mitral and tricuspid insufficiency. Home regimen includes Lasix (40mg daily) and Spironolactone (12.5 mg daily). LE swelling improved. Good UOP with lasix.   - Continue PTA spironolactone   - IV lasix 40 mg BID per pulm recs  - 1 dose of diamoxx 500mg IV today  - OT lymphedema consult     Herpes Zoster, (left buttock)  Improving, scabs present. No signs of secondary infection. Completed Valtrex.  - Continue to monitor  - WOC consult      Hyponatremia, resolved  Sodium of 129 on admission.  - Continue to monitor  - Regular diet    Hypokalemia  Likely due to increased lasix  - potassium replacement protocol     Atrial fibrillation  HTN  On Xarelto 20 mg daily and diltiazem 240 mg daily prior to admission.   - Continue diltiazem 240 mg daily   - Continue daily Xarelto 20 mg     Anxiety disorder  - Continue PTA Abilify  - Ativan 0.25mg q4h prn for anxiety per palliative recs      T2DM  Steroid-induced hyperglycemia  Last Hgb A1C (3/21/22)- 6.2, suggesting well controlled diabetes. On metformin (500mg daily) prior to admission. Likely will be elevated in setting of steroids. Glucose 80s-160s.  - 4 times daily and AC glucose checks  - very high sliding scale insulin  - Lantus 15 units BID     Chronic pain syndrome-chronic mid to low back pain  - Continue Tylenol as needed  - Continue gabapentin 600 mg at bedtime  - Avoid NSAIDs as on anticoagulant     Magnesium deficiency, resolved  - Mg replacement protocol    Urinary retention  Urine culture neg.   - Borjas in place    Constipation  - Increased colace to  BID  - Suppository PRN  - Miralax      Diet: Combination Diet Regular Diet Adult    DVT Prophylaxis: DOAC  Borjas Catheter: PRESENT, indication: Strict 1-2 Hour I&O  Fluids: PO  Central Lines: PRESENT  PICC Triple Lumen 05/18/22 Right Basilic-Site Assessment: WDL  Cardiac Monitoring: None  Code Status: No CPR- Do NOT Intubate      Disposition Plan   Expected Discharge: 05/25/2022  Anticipated discharge location: home with family;home with help/services    Delays:    oxygen needs        The patient's care was discussed with the Attending Physician, Dr. Champagne.    Truong Tiwari MD PGY1  Hospitalist Service  [facility]  Securely message with the Vocera Web Console (learn more here)  Text page via [employer] Paging/Directory     Clinically Significant Risk Factors Present on Admission   ______________________________________________________________________    Interval History   On BiPAP overnight. Otherwise on 40L 65% HFNC.     Family wants to continue trying to wean down on oxygen. They are undecided on hospice outpatient if she is able to go home.  No pain. Feels fatigued but mood is ok. Peripheral edema much improved.       Data reviewed today: I reviewed all medications, new labs and imaging results over the last 24 hours.     Physical Exam   Vital Signs: Temp: 98.5  F (36.9  C) Temp src: Oral BP: 109/70 Pulse: 93   Resp: 22 SpO2: 90 % O2 Device: High Flow Nasal Cannula (HFNC) Oxygen Delivery: 40 LPM  Weight: 142 lbs 8 oz     Constitutional: awake, alert, cooperative, appears fatigued  Eyes: Lids and lashes normal, extra ocular muscles intact, sclera clear, conjunctiva normal  ENT: normocepalic, without obvious abnormality, atraumatic.  Respiratory: On HFNC. Coarse diffuse breath sounds. Mild respiratory distress.  Cardiovascular: irregularly irregular rhythm, no murmur auscultated  GI:  normal bowel sounds, soft, non-distended, non-tender  Musculoskeletal: compression stockings in place,  peripheral edema improved, symmetric.  Neurologic: Awake, alert, oriented to name, place and time.    Neuropsychiatric: General: normal, calm and normal eye contact     Data   Recent Labs   Lab 05/24/22  0742 05/24/22  0451 05/24/22  0112 05/23/22  0817 05/23/22  0536 05/21/22  0730 05/21/22  0604 05/20/22  0752 05/20/22  0624   WBC  --  12.6*  --   --   --   --  9.9  --  9.6   HGB  --  12.2  --   --   --   --  10.7*  --  10.3*   MCV  --  92  --   --   --   --  93  --  96   PLT  --  269  --   --   --   --  288  --  268   NA  --  139  --   --  139  --  137  --  136   POTASSIUM  --  3.0*  --   --  3.8  --  4.5  --  5.2*   CHLORIDE  --  89*  --   --  84*  --  87*  --  86*   CO2  --  41*  --   --  45*  --  42*  --  42*   BUN  --  17  --   --  16  --  24  --  31*   CR  --  0.50*  --   --  0.54*  --  0.55*  --  0.65   ANIONGAP  --  9  --   --  10  --  8  --  8   DENISE  --  8.0*  --   --  7.7*  --  8.2*  --  8.3*   GLC 83 109 167*   < > 191*   < > 231*   < > 258*    < > = values in this interval not displayed.     No results found for this or any previous visit (from the past 24 hour(s)).

## 2022-05-24 NOTE — PLAN OF CARE
No acute changes today. Worked with therapy. Deferred for lymphedema.     Blood glucose better controlled today.     Met with palliative and hospice. Still plan to return home. Not ready for hospice at this time. Goals remain restorative. Productive cough, coarse lung sounds. Currently on HFNC 40L 65%.       Problem: Gas Exchange Impaired  Goal: Optimal Gas Exchange  Outcome: Ongoing, Not Progressing     Problem: COPD (Chronic Obstructive Pulmonary Disease) Comorbidity  Goal: Maintenance of COPD Symptom Control  Outcome: Ongoing, Not Progressing  Intervention: Maintain COPD-Symptom Control  Recent Flowsheet Documentation  Taken 5/24/2022 1604 by Sujey Rivera RN  Supportive Measures:   active listening utilized   counseling provided   decision-making supported   verbalization of feelings encouraged  Medication Review/Management: medications reviewed  Taken 5/24/2022 1300 by Sujey Rivera RN  Supportive Measures:   active listening utilized   counseling provided   decision-making supported   verbalization of feelings encouraged  Medication Review/Management: medications reviewed  Taken 5/24/2022 0730 by Sujey Rivera RN  Medication Review/Management: medications reviewed     Problem: Diabetes Comorbidity  Goal: Blood Glucose Level Within Targeted Range  Outcome: Ongoing, Not Progressing  Intervention: Monitor and Manage Glycemia  Recent Flowsheet Documentation  Taken 5/24/2022 1604 by Sujey Rivera RN  Glycemic Management: blood glucose monitored  Taken 5/24/2022 1300 by Sujey Rivera RN  Glycemic Management: blood glucose monitored  Taken 5/24/2022 0730 by Sujey Rivera RN  Glycemic Management: blood glucose monitored   Goal Outcome Evaluation:

## 2022-05-24 NOTE — PLAN OF CARE
Problem: Fluid Imbalance (Heart Failure)  Goal: Fluid Balance  Outcome: Ongoing, Progressing     Problem: Diabetes Comorbidity  Goal: Blood Glucose Level Within Targeted Range  Outcome: Ongoing, Progressing     Problem: COPD (Chronic Obstructive Pulmonary Disease) Comorbidity  Goal: Maintenance of COPD Symptom Control  Outcome: Ongoing, Progressing  Intervention: Maintain COPD-Symptom Control    Problem: Gas Exchange Impaired  Goal: Optimal Gas Exchange  Outcome: Ongoing, Progressing  Intervention: Optimize Oxygenation and Ventilation    Pt remained on Bipap; 60% FIO2 from 2130 until 0445; tolerated/slept well.  Pt was given 15 units Lantus at 2100 as ordered.   Pt had 2500 mL urine output after Lasix dose.  Pt's daughter was unhappy with having to leave when visiting hours were over last evening.  Daughter was directed to charge nurse for further explanation of policy.  I reassured daughter that I would notify her if necessary and I would help pt call her if pt needed to speak with daughter.

## 2022-05-24 NOTE — CONSULTS
"Met with Kim,  and daughter, reviewed hospice program, services, philosophy, where hospice can take place and choice of agency. Family and pt had many questions about hospice. After conversation  said if Kim was having trouble breathing he would call 911. Explained that we would do teaching with him on what to do to ease her breathing ahead of time and he would call hospice if these didn't work he wasn't sure he was comfortable with this. Daughter says right now likely they would go home with homecare and seek hospice once pt has reached optimal goals. Talked about how sometimes getting oxygen needs down may not be possible to get pt home with current cares and treatment and that sometimes starting comfort care in the hospital can treat symptoms to help ease oxygen demand. Right now family is wanting to continue IV abx to \"clear up what is causing this\". We talked about at sometime the MD may say that the abx have done their job and further use may not be effective. Talked about focusing on what Kim wants and what her goals for treatment are as they may not be the same as the families. They verbalize understanding of this. Gave Kim hospice brochure and phone number to call with further questions.Hospice will continue to be available as needed. Updated Karen DRAPER and Silvia BA.     Thank you for this referral and opportunity to work with this family and team.     Gwendolyn Mckeon RN BSN PHN PN  Hospice Referral Specialist  [facility]    [phone]  [email or web address]       "

## 2022-05-24 NOTE — PROGRESS NOTES
Patient on O2 HFNC 40L/65%. Sats > 90%. Patient received neb as order. Patient tolerated treatment well. BS coarse, diminished, and accessory muscle use. Will continue to follow.    Roderick Chow, RT

## 2022-05-24 NOTE — PROGRESS NOTES
Patient received scheduled nebs and vest therapy.  Pt started the evening on HFNC 40L 55% and has been bipap throughout the night on 10/5,12, 60%. No major changes, RT will continue to follow.        Mahdi Cerda RT

## 2022-05-24 NOTE — PROGRESS NOTES
CRITICAL CARE PROGRESS NOTE:    Assessment/Plan:  86yoF with history of emphysema (DLCO 39% predicted), COPD (FEV1 49% predicted without reversibility) and bronchiectasis, upper lobe predominant who presents with hypoxia after multiple admissions for COPD exacerbations.     RESP:  Acute on chronic respiratory failure with hypoxemia. Home O2 requirement 3-5Lpm. Severe underlying emphysema, bronchiectasis, COPD.     Cont HFNC and titrate for goal SpO2 88-92%. Avoid hyperoxia.    Cont BiPAP overnight and prn as needed.     Encourage OOB, PT/OT, push IS today    Cont duonebs qid, mucomyst nebs, flutter valve use    Resume home 3% sodium chloride nebs qid    Abx per ID section below.    Cont prednisone 40mg daily, will order taper as well    She has several pulmonary nodules on CT that were concerning for malignancy. Given advanced age, severe underlying lung disease and goals of care, would not pursue any further workup for these such as PET/CT. Can discuss with Dr. Juarez, her outpatient pulmonologist if she sees him again in clinic.     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 Echo from last month: EF 65%, normal LV size/function, mod MR, mod TR, mild Rv enlargement with normal function. Severe Nataliia enlargement. RVS 35-40 mm Hg + RAP. IVC dilated. BNP mildly elevaed at 189    MAP >65, not requiring pressors    Cont Lasix 40mg IV bid to keep on dry    1 dose of diamoxx 500mg IV today, given rising serum bicarb and need for ongoing diuresis.     Cont PTA statin, aldactone, diltiazem    Cont PTA xarelto for afib a/c    Watch K+ with aggressive diuresis (see renal section below)    NEURO:  No issues, normal mental status. Hx depression/anxiety    Tylenol prn pain    Avoid benzo's    Cautious use of narcotics.    Cont PTA abilify, gabapentin    GI:  No issues, tolerating diet    Cont regular  diet    Bowel regimen prn     Cont PPI (home med)    RENAL:  Chronic compensated respiratory acidosis with rising CO2 due to diuresis (contraction alkalosis). hypoK this AM to 3.0 likely 2/2 aggressive diuresis with diamoxx + Lasi.    Maintain landaverde    Avoid nephrotoxins    Cont Lasix as above    Add K+ replacement protocol    Follow BUN/SCr    ID:  Severe pseudomonal pneumonia in the setting of chronic lung disease, bronchiectasis.     Cont IV meropenem for now, consider transition to po abx once improved. Today is day 8 of abx. Will need 2-3 weeks for pseudomonas treatment.     Follow up culture data    HEMATOLOGIC:  Stable mild anemia. Leukocytosis, slight incr in wbc today without fevers. Could be due to steroids, stress. Etc.      Follow counts    hgb >7    ENDOCRINE:  Hx DM with A1c 6.2 on 3/21/22.     FSBG checks, insulin sliding scale/drip per ICU protocol    Cont Lantus 15 bid     Hold PTA metformin.    ICU PROPHYLAXIS:    Home Xarelto    Home PPI    CODE STATUS, DISPOSITION, FAMILY COMMUNICATION: DNR/DNI  Updated pt and dtr at bedside.  Palliative care following, appreciate input  She can downgrade to cardiac tele at this point since her FiO2 requirements have been stable and she is DNR/DNI. Pulmonary service will continue to follow along.    Lines/Drains/Tubes:  PICC 5/18  Landaverde 5/18    Restraints  Not needed    Tim (Jose Maria) MD Colette  [facility]  Pager [phone]  Clinic [phone]  Fax [phone]     Overnight events:  She feels about the same  Slept with bipap on  HFN 0.65, 45Lpm with SpO2 low 90s.  No hemoptysis or sputum production.   Excellent UOP with diamoxx yesterday    Subjective:  Denies pain.     Objective:  Physical Exam:  Vent settings for last 24 hours:  FiO2 (%): 65 %  Resp: 22      BP (!) 144/71 (BP Location: Left arm)   Pulse 97   Temp 97.6  F (36.4  C) (Axillary)   Resp 22   Wt 64.6 kg (142 lb 8 oz)   SpO2 91%   BMI 26.93  kg/m      Intake/Output last 3 shifts:  I/O last 3 completed shifts:  In: 940 [P.O.:680; I.V.:260]  Out: 5970 [Urine:5970]  Intake/Output this shift:  I/O this shift:  In: 30 [I.V.:30]  Out: -     Physical Exam  Gen: awake, alert, oriented, no distress  HEENT: no OP lesions, no MAYE  CV: RRR, no m/g/r  Resp: coarse BS bilaterally, exp wheezing. No rhonchi. Bibasilar crackles.   Abd: soft, nontender, BS+  Neuro: PERRL, nonfocal  Ext: no edema    LAB:  Recent Labs   Lab 05/24/22  0451   WBC 12.6*   HGB 12.2   HCT 38.8        Recent Labs   Lab 05/24/22  0451 05/23/22  0536 05/21/22  0604    139 137   CO2 41* 45* 42*   BUN 17 16 24       No current outpatient medications on file.       Micro  PCT normal on 5/17  Sputum 5/18 pan-sensitive pseudomonas    Critical care attestation: 40 minutes spent managing the following issues: acute respiratory failure requiring NIPPV vs. HHFNC, severe pseudomonal pneumonia, goals of care discussions with familiy and palliative care. High risk for organ deterioration and death requiring ICU level care.

## 2022-05-24 NOTE — PROGRESS NOTES
"Pt is not appropriate for skilled lymphedema services at this time due to pt appears to have decreased swelling from previous baseline.  Per pt and spouse, \"It's the best her legs have ever looked.\"  Will defer to RN for plan of care.  Plan was discussed with RN.      Pt can continue to wear thigh-high compression socks as tolerated and remove at night or as needed.     Will D/C current lymphedema orders. Thank you.    5/24/2022 by Tiffany Meredith, OT, OTR/L    "

## 2022-05-25 NOTE — PLAN OF CARE
"Pt A&Ox4, VSS, High-tere 40L/55%. BiPAP 50% overnight. Able to maintain Sat's >90. Diuresing well. Urine has been more blood tinged w/sm clots. SM formed brown BM this am. Blood glucose from lab draw resulted in 75 this am. Pt was eating a banana. Slept well overnight. Will continue to monitor and carry out plan of care.Swati Johnson RN                      Problem: Plan of Care - These are the overarching goals to be used throughout the patient stay.    Goal: Plan of Care Review/Shift Note  Description: The Plan of Care Review/Shift note should be completed every shift.  The Outcome Evaluation is a brief statement about your assessment that the patient is improving, declining, or no change.  This information will be displayed automatically on your shift note.  Outcome: Ongoing, Progressing  Goal: Patient-Specific Goal (Individualized)  Description: You can add care plan individualizations to a care plan. Examples of Individualization might be:  \"Parent requests to be called daily at 9am for status\", \"I have a hard time hearing out of my right ear\", or \"Do not touch me to wake me up as it startles me\".  Outcome: Ongoing, Progressing  Goal: Absence of Hospital-Acquired Illness or Injury  Outcome: Ongoing, Progressing  Intervention: Identify and Manage Fall Risk  Recent Flowsheet Documentation  Taken 5/25/2022 0400 by Swati Johnson, RN  Safety Promotion/Fall Prevention:   clutter free environment maintained   fall prevention program maintained   lighting adjusted  Taken 5/25/2022 0000 by Swati Johnson, RN  Safety Promotion/Fall Prevention:   clutter free environment maintained   fall prevention program maintained   lighting adjusted  Taken 5/24/2022 2000 by Swati Johnson, RN  Safety Promotion/Fall Prevention:   clutter free environment maintained   fall prevention program maintained   lighting adjusted  Intervention: Prevent Skin Injury  Recent Flowsheet Documentation  Taken 5/25/2022 0400 by " Swati Johnson RN  Body Position: position changed independently  Taken 5/25/2022 0200 by Swati Johnson RN  Body Position: position changed independently  Taken 5/25/2022 0000 by Swati Johnson RN  Body Position: position changed independently  Taken 5/24/2022 2200 by Swati Johnson RN  Body Position: position changed independently  Taken 5/24/2022 2000 by Swati Johnson RN  Body Position: position changed independently  Intervention: Prevent and Manage VTE (Venous Thromboembolism) Risk  Recent Flowsheet Documentation  Taken 5/25/2022 0400 by Swati Johnson RN  Range of Motion: active ROM (range of motion) encouraged  VTE Prevention/Management: compression stockings on  Activity Management: activity adjusted per tolerance  Taken 5/25/2022 0000 by Swati Johnson RN  Range of Motion: active ROM (range of motion) encouraged  VTE Prevention/Management: compression stockings on  Activity Management: activity adjusted per tolerance  Taken 5/24/2022 2000 by Swati Johnson RN  Range of Motion: active ROM (range of motion) encouraged  VTE Prevention/Management: compression stockings on  Activity Management: activity adjusted per tolerance  Intervention: Prevent Infection  Recent Flowsheet Documentation  Taken 5/25/2022 0400 by Swati Johnson RN  Infection Prevention: hand hygiene promoted  Taken 5/25/2022 0000 by Swati Johnson RN  Infection Prevention: hand hygiene promoted  Taken 5/24/2022 2000 by Swati Johnson RN  Infection Prevention: hand hygiene promoted  Goal: Optimal Comfort and Wellbeing  Outcome: Ongoing, Progressing  Intervention: Provide Person-Centered Care  Recent Flowsheet Documentation  Taken 5/25/2022 0400 by Swati Johnson RN  Trust Relationship/Rapport:   care explained   choices provided   emotional support provided   thoughts/feelings acknowledged  Taken 5/25/2022 0000 by Swati Johnson RN  Trust Relationship/Rapport:   care explained    choices provided   emotional support provided   thoughts/feelings acknowledged  Taken 5/24/2022 2000 by Swati Johnson, RN  Trust Relationship/Rapport:   care explained   choices provided   emotional support provided   thoughts/feelings acknowledged  Goal: Readiness for Transition of Care  Outcome: Ongoing, Progressing   Goal Outcome Evaluation:

## 2022-05-25 NOTE — PLAN OF CARE
Alert and oriented. Forgetful.  VSS. RT assisted to wean O2 to 15L via oxymizer.     Trialed PRN PO ativan for breathing comfort. Effective per patient.     Borjas with concentrated, cloudy tea-colored urine. Diuresing well.     Worked with therapy, excellent appetite.     Met with palliative. Remain with restorative cares, goal to return home.     Spouse at bedside, supportive.        Problem: Plan of Care - These are the overarching goals to be used throughout the patient stay.    Goal: Optimal Comfort and Wellbeing  Intervention: Provide Person-Centered Care  Recent Flowsheet Documentation  Taken 5/25/2022 1515 by Sujey Rivera RN  Trust Relationship/Rapport:   care explained   choices provided   emotional support provided   thoughts/feelings acknowledged  Taken 5/25/2022 1115 by Sujey Rivera RN  Trust Relationship/Rapport:   care explained   choices provided   emotional support provided   thoughts/feelings acknowledged  Taken 5/25/2022 0745 by Sujey Rivera RN  Trust Relationship/Rapport:   care explained   choices provided   emotional support provided   thoughts/feelings acknowledged     Problem: Risk for Delirium  Goal: Improved Behavioral Control  Outcome: Ongoing, Progressing     Problem: Gas Exchange Impaired  Goal: Optimal Gas Exchange  Outcome: Ongoing, Progressing  Intervention: Optimize Oxygenation and Ventilation  Recent Flowsheet Documentation  Taken 5/25/2022 1515 by Sujey Rivera RN  Head of Bed (HOB) Positioning: HOB at 30-45 degrees  Taken 5/25/2022 1206 by Sujey Rivera RN  Head of Bed (HOB) Positioning: HOB at 30-45 degrees  Taken 5/25/2022 1115 by Sujey Rivera RN  Head of Bed (HOB) Positioning: HOB at 30-45 degrees  Taken 5/25/2022 0745 by Sujey Rivera RN  Head of Bed (HOB) Positioning: HOB at 30-45 degrees   Goal Outcome Evaluation:    Plan of Care Reviewed With: patient, spouse     Overall Patient Progress: no change

## 2022-05-25 NOTE — PROGRESS NOTES
PULMONARY MEDICINE PROGRESS NOTE  5/25/2022    Admit Date: 5/16/2022  CODE: No CPR- Do NOT Intubate    Reason for Consult: acute on chronic hypoxic respiratory failure, severe COPD with bronchiectasis    Assessment/Plan:   86 year old female with a history of emphysema (DLCO 39% predicted), COPD (FEV1 49% predicted without reversibility) and extensive fibrotic bronchiectasis, upper lobe predominant who presents with hypoxia after multiple admissions for COPD exacerbations.    Acute on chronic respiratory failure with hypoxia, severe COPD with bronchiectasis: Poor prognosis. Not responding to steroids and bronchodilators. Received empiric antibiotics.    Plan:  - titrate oxygen as able, currently 40 L/min 55%  - BiPAP as needed  - prednisone taper  - scheduled bronchodilators  - no work-up of pulmonary nodules; not a candidate for invasive diagnostic procedures and would not tolerate radiation or chemotherapy if malignancy diagnosed  - vest therapy  - nebulized ipratropium-albuterol, acetylcysteine, 3% saline, flutter valve  - DNR/DNI  - poor prognosis  - patient considering a comfort focus and possible hospice but not yet decided; would need lower oxygen flow rate to safely discharge home with hospice services    Ronnie Carney MD  Pulmonary and Critical Care Medicine  [facility]  Office [phone]  Pager [phone]  (he/him/his)                                                                                                                                                        SUBJECTIVE/INTERVAL HISTORY   No significant change. Breathing stable. On 40 L/min 55%.                                                                                                                                                      Exam/Data:     Vitals  /66 (BP Location: Left arm)   Pulse 102   Temp 98.7  F (37.1  C) (Oral)   Resp 22   Wt 60.3 kg (133 lb)   SpO2 (!) 88%   BMI 25.13 kg/m    BP - Mean:   [82-98] 98  I/O last 3 completed shifts:  In: 1250 [P.O.:1110; I.V.:140]  Out: 3075 [Urine:3075]  Weight change: -4.309 kg (-9 lb 8 oz)  [unfilled]  EXAM:  /66 (BP Location: Left arm)   Pulse 102   Temp 98.7  F (37.1  C) (Oral)   Resp 22   Wt 60.3 kg (133 lb)   SpO2 (!) 88%   BMI 25.13 kg/m      Intake/Output last 3 shifts:  I/O last 3 completed shifts:  In: 1250 [P.O.:1110; I.V.:140]  Out: 3075 [Urine:3075]  Intake/Output this shift:  I/O this shift:  In: 390 [P.O.:360; I.V.:30]  Out: 950 [Urine:950]    Physical Exam  Gen: alert, oriented, no distress  HEENT: NT, no MAYE  CV: tachy, regular, no m/g/r  Resp: diminished bilaterally with rhonchi most prominent at left base  Abd: soft, nontender, BS+  Skin: no rashes or lesions  Ext: no edema  Neuro: PERRL, nonfocal exam    ROS: A complete 10-system review of systems was obtained and is negative with the exception of what is noted in the history of present illness.    Medications:       - MEDICATION INSTRUCTIONS -         acetaminophen  650 mg Oral Q6H    Or     acetaminophen  650 mg Rectal Q6H     acetylcysteine  4 mL Nebulization 4x Daily     acyclovir   Topical Q3H     ARIPiprazole  5 mg Oral At Bedtime     atorvastatin  20 mg Oral At Bedtime     calcium carbonate-vitamin D  1 tablet Oral QAM     cyanocobalamin  500 mcg Oral QAM     diltiazem ER COATED BEADS  240 mg Oral Daily     docusate sodium  100 mg Oral BID     furosemide  40 mg Intravenous Q12H     gabapentin  600 mg Oral At Bedtime     insulin aspart  1-22 Units Subcutaneous TID AC     insulin aspart  1-16 Units Subcutaneous At Bedtime     insulin glargine  15 Units Subcutaneous BID     ipratropium - albuterol 0.5 mg/2.5 mg/3 mL  3 mL Nebulization 4x daily     meropenem  1 g Intravenous Q8H     pantoprazole  40 mg Oral Daily     polyethylene glycol  17 g Oral Daily     [Held by provider] potassium chloride ER  10 mEq Oral Daily     predniSONE  30 mg Oral Daily    Followed by     [START ON  5/27/2022] predniSONE  20 mg Oral Daily    Followed by     [START ON 5/30/2022] predniSONE  10 mg Oral Daily     rivaroxaban ANTICOAGULANT  20 mg Oral Daily with supper     sodium chloride  3 mL Nebulization 4x Daily     sodium chloride (PF)  3 mL Intracatheter Q8H     sodium chloride (PF)  3 mL Intracatheter Q8H     spironolactone  12.5 mg Oral Daily     Vitamin D3  25 mcg Oral QAM         DATA  All laboratory and radiology has been personally reviewed by myself today.  Recent Labs   Lab 05/25/22  0452   WBC 9.7   HGB 11.9   HCT 38.2        Recent Labs   Lab 05/25/22  0452 05/24/22  0451 05/23/22  0536    139 139   CO2 44* 41* 45*   BUN 16 17 16       PFT DATA (June 2019):  FEV1/FVC is 48 and is reduced.  FEV1 is 49% predicted and is normal.  FVC is 78% predicted and is reduced.  There was no improvement in spirometry after a single inhaled dose of bronchodilator.  TLC is 103% predicted and is normal.  RV is 119% predicted and is normal.  DLCO is 39% predicted and is reduced when it   is corrected for hemoglobin.    IMAGING:   CT chest (5/17/22):  - images directly reviewed, formal interpretation follows:  FINDINGS:   LUNGS AND PLEURA: Background of fairly advanced emphysema mixed centrilobular and paraseptal distribution with an upper lung predominance. In the areas of greatest emphysema the intervening interstitium has mild thickening, unchanged. The multiple   segment posterior airspace and interstitial process in the left lower lobe has mildly improved from 12 days earlier, however there is no more conspicuous and bandlike opacity in the left upper lobe along the posterior pleura and along the central   bronchovascular structures in the left lower lobe. Increased subpleural opacity in the right posterior costophrenic sulcus as well as development of multiple patchy inflammatory nodules within the parenchyma of the right lower lobe.     Several lobulated solid nodules are present in the right upper  lobe. One of the nodules measuring 11 x 12 mm (series 4, image 110) has substantially increased from 11/3/2021 previously measuring 7 mm long axis. A second larger lobulated solid 9 x 16 mm   (series 4, image 123) right upper lobe nodule is also slightly larger measuring 7 x 14 mm. A few smaller solid nodules are present in the right upper lobe closer to the hilum (series 4, image 140 and 143) and are also larger from March 2021.     MEDIASTINUM: Biatrial cardiac enlargement. Cardiac ventricles are normal in size. No pericardial effusion. Enlarged right lower paratracheal lymph node measures up to 17 mm short axis (series 4, image 121) and enlarged left lower paratracheal lymph nodes   are also unchanged. No definite hilar adenopathy. Esophagus is decompressed. Conspicuous cisterna chyli.      CORONARY ARTERY CALCIFICATION: Three-vessel, moderate.     UPPER ABDOMEN: No actionable findings in the imaged upper abdomen.     MUSCULOSKELETAL: Degenerative osteophytes of the thoracic spine largest from T6 through the thoracolumbar junction. No aggressive or destructive bone lesions are present.                                                                      IMPRESSION:      1.  Partial clearing of airspace opacities from the left lower lobe compared to 5/5/2022. Residual dense opacities are now along the central bronchovascular structures. There are new inflammatory nodules in the right lower lobe and increased opacity in   the right posterior sulcus consistent with new atelectasis/inflammation.  2.  Multiple solid nodules with lobular borders are present in the right upper lobe 2 largest of which measure 11 x 12 and 9 x 16 mm. The 11 x 12 nodule and several other solid nodules are substantially larger from 3/11/2021 and is suspect for neoplasm.   Consider PET/CT after resolution of this acute respiratory illness for further evaluation.  3.  Advanced emphysema.  4.  Severe enlargement of both cardiac atria and  moderate atheromatous coronary calcifications.

## 2022-05-25 NOTE — PROGRESS NOTES
Patient on bipap for majority of the night, placed back on HFNC 40L 60% around 4am.  Patient received scheduled nebs and vest treatment.  No major changes, RT will follow.         Mahdi SHAYLEE Cerda, RT

## 2022-05-25 NOTE — PROGRESS NOTES
[facility]    Progress Note - Hospitalist Service       Date of Admission:  5/16/2022    Assessment & Plan        Adalgisa Solano is a 86 year old female admitted on 5/5/2022. She has a history of pulmonary disease including COPD with persistent bronchiectasis getting vest therapy at home for the last 2 years, non-insulin-dependent diabetes, chronic atrial fibrillation, chronic heart failure with preserved ejection fraction who has had several recent hospitalizations for COPD exacerbation with pneumonia. Admitted for recurrent COPD exacerbation without signs of infection. Alternating between HFNC and BiPAP. Multiple goals of care meetings held, family and patient remain undecided; not currently wanting comfort cares.      Acute on chronic respiratory failure  COPD exacerbation  Pseudomonal pneumonia   Probable malignant neoplasms  Recently admitted 5/5-5/9 for COPD exacerbation with LLL opacity treated with cefepime and vanco x1 day then transitioned to azithromycin course per ID input. Baseline oxygen needs 3-5L. Presented satting 88% on 5L. Recent PNA treated with cefepime end of April; also treated with ertapenem and azithromycin beginning of May. On Trelegy Ellipta outpatient. No leukocytosis or elevated procal however CT chest with partial clearing of airspace opacities from the left lower lobe, Residual dense opacities are now along the central bronchovascular structures and new inflammatory nodules. Also with multiple solid nodules concerning for neoplasm.  Alternating between HFNC and BIPAP. Sputum cx with pseudomonas. Work of breathing increasing. Patient fatigued.   - Pulmonology consulted, appreciate recs   - Continue oral prednisone, will need prolonged taper (30mg x3d, 20mg x3d, 10mg x3d)   - Continue meropenem, will need 2-3 weeks of abx treatment   - BIPAP PRN and at night given hypercapnia   - Outpatient discussion of pulmonary nodules concerning for malignancy  -  Duonebs, mucomyst, sodium chloride nebs  - Palliative consulted, appreciate recs  - Hospice consulted, appreciate recs  - Vest therapy  - RCAT consult     Chronic diastolic CHF  Bilateral lymphedema  Pulmonary hypertension  Bilateral lower extremity edema L>R, however BNP relatively normal at 180 and patient endorses improvement from baseline. Last echocardiogram in 4/2022 with an EF of 65%, with moderate mitral and tricuspid insufficiency. Home regimen includes Lasix (40mg daily) and Spironolactone (12.5 mg daily). LE swelling improved. Good UOP with lasix.   - Continue PTA spironolactone   - IV lasix 40 mg BID per pulm recs  - OT lymphedema consult     Herpes Zoster, (left buttock)  Improving, scabs present. No signs of secondary infection. Completed Valtrex.  - Continue to monitor  - WOC consult      Hyponatremia, resolved  Sodium of 129 on admission.  - Continue to monitor  - Regular diet    Hypokalemia  Likely due to increased lasix  - potassium replacement protocol     Atrial fibrillation  HTN  On Xarelto 20 mg daily and diltiazem 240 mg daily prior to admission.   - Continue diltiazem 240 mg daily   - Continue daily Xarelto 20 mg     Anxiety disorder  - Continue PTA Abilify  - Ativan 0.25mg q4h prn for anxiety per palliative recs      T2DM  Steroid-induced hyperglycemia  Last Hgb A1C (3/21/22)- 6.2, suggesting well controlled diabetes. On metformin (500mg daily) prior to admission. Likely will be elevated in setting of steroids. Glucoses variable.   - 4 times daily and AC glucose checks  - very high sliding scale insulin  - Lantus 15 units BID     Chronic pain syndrome-chronic mid to low back pain  - Continue Tylenol as needed  - Continue gabapentin 600 mg at bedtime  - Avoid NSAIDs as on anticoagulant     Magnesium deficiency, resolved  - Mg replacement protocol    Urinary retention  Urine culture neg.   - Borjas in place    Constipation  - Increased colace to BID  - Suppository PRN  - Miralax      Diet:  "Combination Diet Regular Diet Adult    DVT Prophylaxis: DOAC  Borjas Catheter: PRESENT, indication: Wound Healing  Fluids: PO  Central Lines: PRESENT  PICC Triple Lumen 05/18/22 Right Basilic-Site Assessment: WDL  Cardiac Monitoring: None  Code Status: No CPR- Do NOT Intubate      Disposition Plan   Expected Discharge: 05/26/2022  Anticipated discharge location: home with family;home with help/services    Delays:    oxygen needs        The patient's care was discussed with the Attending Physician, Dr. Champagne.    Truong Tiwari MD PGY1  Hospitalist Service  [facility]  Securely message with the Vocera Web Console (learn more here)  Text page via [employer] Paging/Directory     Clinically Significant Risk Factors Present on Admission   ______________________________________________________________________    Interval History   On BiPAP overnight. Otherwise on 40L 55-60% HFNC this morning.     Patient wants to continue with all cares. Still feels tired but mood is \"ok\".  and son plan to visit today. Peripheral edema continues to be improved. No chest pain.       Data reviewed today: I reviewed all medications, new labs and imaging results over the last 24 hours.     Physical Exam   Vital Signs: Temp: 98.7  F (37.1  C) Temp src: Oral BP: 120/66 Pulse: 102   Resp: 22 SpO2: (!) 88 % O2 Device: High Flow Nasal Cannula (HFNC) Oxygen Delivery: 40 LPM  Weight: 133 lbs 0 oz     Constitutional: awake, alert, cooperative, appears fatigued  Eyes: Lids and lashes normal, extra ocular muscles intact, sclera clear, conjunctiva normal  ENT: normocepalic, without obvious abnormality, atraumatic.  Respiratory: On HFNC. Coarse diffuse breath sounds. Mild respiratory distress.  Cardiovascular: irregularly irregular rhythm, no murmur auscultated  GI:  normal bowel sounds, soft, non-distended, non-tender  Musculoskeletal: compression stockings in place, peripheral edema improved, symmetric.  Neurologic: Awake, " alert, oriented to name, place and time.    Neuropsychiatric: General: normal, calm and normal eye contact     Data   Recent Labs   Lab 05/25/22  1058 05/25/22  0741 05/25/22  0452 05/24/22  1633 05/24/22  1236 05/24/22  0742 05/24/22  0451 05/23/22  0817 05/23/22  0536 05/21/22  0730 05/21/22  0604   WBC  --   --  9.7  --   --   --  12.6*  --   --   --  9.9   HGB  --   --  11.9  --   --   --  12.2  --   --   --  10.7*   MCV  --   --  93  --   --   --  92  --   --   --  93   PLT  --   --  270  --   --   --  269  --   --   --  288   NA  --   --  140  --   --   --  139  --  139  --  137   POTASSIUM  --   --  3.5  --  3.5  --  3.0*  --  3.8  --  4.5   CHLORIDE  --   --  90*  --   --   --  89*  --  84*  --  87*   CO2  --   --  44*  --   --   --  41*  --  45*  --  42*   BUN  --   --  16  --   --   --  17  --  16  --  24   CR  --   --  0.46*  --   --   --  0.50*  --  0.54*  --  0.55*   ANIONGAP  --   --  6  --   --   --  9  --  10  --  8   DENISE  --   --  7.9*  --   --   --  8.0*  --  7.7*  --  8.2*   * 125* 75   < >  --    < > 109   < > 191*   < > 231*    < > = values in this interval not displayed.     No results found for this or any previous visit (from the past 24 hour(s)).

## 2022-05-25 NOTE — CONSULTS
COPD Initial Interview, Education, and Consult  5/25/2022, 4:39 PM    Reason for Consult: COPD education per protocol due to admission diagnosis  Patient Admitted for: Hypomagnesemia [E83.42]  Hyponatremia [E87.1]  COPD exacerbation (H) [J44.1]  Acute respiratory failure with hypoxia (H) [J96.01] on 5/16/2022     History of Present Illness: Kim is a 86 year female with a history of depression, anxiety, hypertension, heart failure with preserved ejection fraction, schizoaffective disorder, artrial fib, DM2, lung nodules that are probable for malignancy, emphysema, pulmonary hypertension, bronchiectasis, former smoker, and oxygen dependent COPD.  She has had 4 admissions related to her breathing since 3/23/2022 with a couple of discharges to TCU and the period of time between admissions is decreasing.  She appears to have an on going pneumonia, questionable on how compliant she is with her home respiratory hygiene routine as she and her family have made statements of not doing treatments for various reasons.  She follows with Dr. Jun Juarez and the [facility].  She is on 3-5L of oxygen via nasal cannula at baseline provided by [facility].    Last PFT:  Date: 6/14/2019  Post-Spirometry:  FVC: 1.75, 78%  FEV1: 0.84, 49%  FEV1/FVC: 48%    Home Respiratory Medications:  Bronchodilators:   -albuterol neb/inhaler as needed  Combination Therapy:   -Trelegy daily    Assessment: Patient currently is on 12L oxymizer, she is up in the chair playing cards with her  and son, introduced myself and role.  They had a few questions regarding therapy as far as BiPAP therapy and how much oxygen she can go home on.  The impression was she only needed to be on 10L whether she was on hospice or not.  They were unclear of the difference between hospice and comfort care.  Her  was still not fully understanding that he would not have to call EMS if she was on hospice care as they would be given things at home to do  in order to make her feel more comfortable with her breathing and make her less short of breath so she could stay with him there. I stressed also to Kim that she needs to make the decision to go hospice for herself and not let others do it for her and that her thoughts and opinions matter too. Let Kim know that it is wonderful to talk with her family about it and get their perspective as it is a lot to think about.  Discussed that there is a high possibility of her being readmitted and that is something to consider too for Kim is does she want to still keep coming into the hospital. During our discussion it appears as the family was not asking her what she wanted always and her opinion as she seemed to look to them to decide for her even though she appears to understand what is going on and the implications of it. Let them know that we will remain available to them both in the hospital and when she is home.  Discussed conversation with bedside nurse.    Recommendations:  -Continue current inpatient therapy, per RCAT protocols  -Care conference with her, her children and  with blunt, honest answers and information on her prognosis.  There appears to be miss understanding on hospice/comfort care boundaries and services and restorative boundaries and services along with what she would go home with on both.  -Medications patient is currently taking at home appear to be as adequate as possible, recommend no changes at this time just encouragement to follow prescribed regimen if she does not go onto hospice care.    Will continue to follow patient throughout hospital stay along with helping with educating patient and family please contact us for any issues, concerns or questions.    Total time spend with patient 60 minutes and 45 minutes spent in chart review, care coordination, and documentation.    Eloisa Pan RT, Chronic Pulmonary Disease Specialist  Phone [phone]

## 2022-05-25 NOTE — PROGRESS NOTES
WOC Nurse Inpatient Wound Assessment   Reason for consultation: Evaluate and treat perineal shingles wounds    Assessment  Sacrum/right buttock wounds due to Viral Lesions  Status: Revisit  Healed  Treatment Plan  Sacrum and right buttock wounds: Leave open to air and as ordered by MD    Recommend topical antiviral   No brief in bed  Limit layers    Orders Written  Recommended provider order: topical antiviral   WOC Nurse follow-up plan:signing off  Nursing to notify the Provider(s) and re-consult the WOC Nurse if wound(s) deteriorates or new skin concern.    Patient History  According to provider note(s):  Adalgisa Solano is a 86 year old female admitted on 5/5/2022. She has a history of pulmonary disease including COPD with persistent bronchiectasis getting vest therapy at home for the last 2 years, non-insulin-dependent diabetes, chronic A. fib, chronic heart failure with preserved ejection fraction who has had several recent hospitalizations for COPD exacerbation with pneumonia. Admitted for recurrent COPD exacerbation without signs of infection.     Acute on chronic respiratory failure  COPD exacerbation  Hx of recent LLL opacity, resolved  Recently admitted 5/5-5/9 for COPD exacerbation with LLL opacity treated with cefepime and vanco x1 day then transitioned to azithromycin course per ID input. Baseline oxygen needs 3-5L. Presented satting 88% on 5L. CXR shows retrocardiac opacity, likely atelectasis and resolved RLL opacities; mild pulmonary engorgement, no pleural effusions. No comment regarding previously LLL opacities on imaging and CXR only one view. Recent PNA treated with cefepime end of April; also treated with ertapenem and azithromycin beginning of May. On [facility] outpatient. No leukocytosis, elevated procal, therefore infectious source unlikely.  Patient is requesting palliative consult today    Objective Data  Containment of urine/stool: Incontinent pad in bed and Indwelling  catheter    Active Diet Order  Orders Placed This Encounter      Combination Diet Regular Diet Adult      Output:   I/O last 3 completed shifts:  In: 990 [P.O.:750; I.V.:240]  Out: 3075 [Urine:3075]    Risk Assessment:   Sensory Perception: 4-->no impairment  Moisture: 4-->rarely moist  Activity: 2-->chairfast  Mobility: 3-->slightly limited  Nutrition: 4-->excellent  Friction and Shear: 1-->problem  Joshua Score: 18                          Labs:   Recent Labs   Lab 05/25/22  0452   HGB 11.9   WBC 9.7       Physical Exam  Areas of skin assessed: focused right buttock, sacrum    Wound Location:  Right buttock  Healed    Interventions  Visual inspection and assessment completed   Wound Care Rationale Decrease bacterial load and decrease viral load and pain  Wound Care: completed by RN  Supplies: discussed with RN, discussed with family and discussed with patient  Current off-loading measures: Pillows  Current support surface: Standard  ICU mattress   Education provided to: plan of care  Discussed plan of care with Patient, Family, Nurse and Physician    Debbie Leavitt RN MSN CWOCN

## 2022-05-25 NOTE — PROGRESS NOTES
Care Management Follow Up    Length of Stay (days): 9    Expected Discharge Date: TBD   Concerns to be Addressed: HF 02, palliative and pulmonary following        Patient plan of care discussed at interdisciplinary rounds: Yes    Anticipated Discharge Disposition:  TBD- goal is to return home with family and resumption of Interim HC (RN/PT/OT/HHA)     Anticipated Discharge Services:    Anticipated Discharge DME: TBD- on 02 at baseline and has supply at home     Referrals Placed by CM/SW:  Home Care   Private pay costs discussed: None at this time    Additional Information:  Chart reviewed. CM met with patient,  Francis and son Shai (at bedside). All adamant of goal to return home with HC services and not interested in discussing hospice.  hopeful to transport home.       Aleja Pride RN

## 2022-05-25 NOTE — PROGRESS NOTES
HOSPICE - [facility] hospice following along. Pt continue with [employer] O2. Unable to accept as outpatient until O2 sats are stable.   Mora AGUIAR  [facility]  [phone]

## 2022-05-25 NOTE — PROGRESS NOTES
Patient currently on O2 12LPM Oxymizer cannula. Saturation sitting betweening 90-94%. Earlier today, patient on HFNC 40L/ 65 to 55% FiO2. Patient received neb as order. Patient refused her vest therapy this morning. BS coarse and diminished. Will continue to follow.    Roderick Chow, RT

## 2022-05-25 NOTE — PROGRESS NOTES
[facility]  Palliative Care Daily Progress Note      Code status: No CPR- Do NOT Intubate     Impressions, Recommendations & Counseling     SYMPTOM ASSESSMENT:  1. Shortness of breath in setting of severe pseudomonal pneumonia and underlying COPD  - Management per [facility] and pulmonology services  -Lorazepam 0.25 mg by mouth every 4 hours as needed  - Management per pulmonology.  - RT following  - Duonebs, mucomyst, albuterol nebs  - Lasix 40 mg IV q12h  -Patient adamant that she does not receive morphine.  She does not want to have altered mentation-fearful of not being aware and or able to make decisions.    2. Anxiety related to health, exacerbated with underlying schizoaffective, bipolar disorder and dyspnea  - Continue home dosing of abilify 5 mg po qhs  - continue lorazepam 0.25 mg q4h prn.  Reviewed role of lorazepam to ease anxiety.    3. Chronic pain syndrome-chronic mid to low back pain  -Continue Tylenol 650 mg po q6h  -Continue gabapentin 600 mg at bedtime  -Would not recommend addition of opiate at this time due to restorative goals and fragile respiratory status.  Would use opiates sparingly for pain.  -Avoid NSAIDs as on anticoagulant.    4. Bowel regimen - constipation  - Colace 100 mg bid  - Senna added 1 tab daily prn    ADVANCED CARE PLANNING/GOALS OF CARE DISCUSSION  - DNR/I  - Met with Hospice yesterday and not ready at this time.  - Goals are life prolonging. Continue current cares and treatments.  - , Francis and son, Shai at bedside. Patient and family without questions. Please see discussion below.    Palliative care will continue to follow along and assist with symptom management and goals of care discussion.  Please call [phone] with questions or needs.        HPI          Adalgisa Solano is a 86 year old female admitted to [facility] on 4/13/2022 with worsening shortness of breath and being treated for COPD and bronchiectasis exacerbation and  pneumonia. Patient being treated with steroids and IV antibiotics. Recent hospitalization at [facility] 3/23 - 3/25 for SOB and COPD exacerbation. Chest CT scan showed no pulmonary embolism, irregular nodular opacitities in the RUL, bronchiectasis, emphysema, peribronchial thickening with retained secretions both lower lobes.Sputum Culture positive for pseudomonas and enterococcus. Pulomonology consulted and following.  Viral panel was positive for coronavirus (not COVID19). ID service consulted and recommending ten days of cefepime IV and to obtain sputum to test for AFB.     Previous hospitalizations:  4/13/2022 [facility] admission for SOB, COPD and bronchiectasis exacerbation    Today, the patient was seen for:  Shortness of breath, fatigue, patient and family support and goals of care discussion    Prognosis, Goals, or Advance Care Planning was addressed today with: Yes.  Mood, coping, and/or meaning in the context of serious illness were addressed today: Yes.  Summary/Comments: Met in patient room with patient, , daina and son, Shai.  Reviewed role of lorazepam assisting to ease anxiety and air hunger.   Patient is very clear that she would not want to take any morphine as it alters her mentation and ability to make decisions.  Patient open to taking lorazepam.  Very clear that she does not wish to pursue comfort focused treatments nor is interested in hospice at this time.  Wishes for continued life-prolonging treatments to see how she improves.              Interval History:     Chart review/discussion with unit or clinical team members:   Patient utilizing BiPAP at night.  Currently on FiO2 60% with 40 L high flow nasal cannula.  Visibly dyspneic during visit with speaking.  Fatigued.    Per patient or family/caregivers today:  Family support patient's wishes for ongoing treatments to see how her condition will improve.    Key Palliative Symptoms:  # Pain severity the last 12 hours: none  # Dyspnea  severity the last 12 hours: moderate  # Nausea severity the last 12 hours: none  # Anxiety severity the last 12 hours: low  Anxiety worsens with dyspnea.   Fatigue: Severe.  Bedbound at this time due to activity intolerance.  Appetite: Reduced           Review of Systems:     Besides above, an additional system ROS was reviewed and is unremarkable          Medications:     I have reviewed this patient's medication profile and medications during this hospitalization.           Physical Exam:   Temp:  [97.1  F (36.2  C)-98.5  F (36.9  C)] 97.4  F (36.3  C)  Pulse:  [] 91  Resp:  [16-26] 26  BP: (106-150)/(61-74) 150/68  FiO2 (%):  [55 %-65 %] 60 %  SpO2:  [89 %-97 %] 90 %    General appearance: alert, cachectic, fatigued and mild distress  Head: Normocephalic, without obvious abnormality, atraumatic  Eyes: Lids and lashes normal.  Sclera anicteric.  Pupils round.  Nose: No discharge.  High flow nasal cannula in place.  Throat: Lips without lesions.  Oral mucosa pink and moist.  Lungs: Labored with speaking.  Decreased bases bilaterally  Abdomen: Soft, nontender  Extremities: No cyanosis or nail clubbing noted  Neurologic: Alert.  Oriented x3.             Data Reviewed:     Pertinent Labs  Lab Results: personally reviewed.   Lab Results   Component Value Date     05/25/2022    CO2 44 05/25/2022    BUN 16 05/25/2022     Lab Results   Component Value Date    WBC 9.7 05/25/2022    HGB 11.9 05/25/2022    HCT 38.2 05/25/2022    MCV 93 05/25/2022     05/25/2022     AST   Date Value Ref Range Status   05/17/2022 13 0 - 40 U/L Final     ALT   Date Value Ref Range Status   05/17/2022 15 0 - 45 U/L Final     Alkaline Phosphatase   Date Value Ref Range Status   05/17/2022 56 45 - 120 U/L Final     Albumin   Date Value Ref Range Status   05/17/2022 2.5 (L) 3.5 - 5.0 g/dL Final         Radiology Results  XR Chest Port 1 View    Result Date: 5/18/2022  EXAM: XR CHEST PORT 1 VIEW LOCATION: [facility]  [facility] DATE/TIME: 5/18/2022 12:09 PM INDICATION: RN placed PICC   verify tip placement COMPARISON: 05/16/2022     IMPRESSION: A right PICC tip terminates in the region of the lower SVC. A small volume of left pleural fluid is present; this appears to have decreased in volume from the prior study. No pneumothorax. Interstitial edema is unchanged. The cardiomediastinal silhouette is enlarged. There is aortic calcification.     XR Chest Port 1 View    Result Date: 5/16/2022  EXAM: XR CHEST PORT 1 VIEW LOCATION: [facility] DATE/TIME: 5/16/2022 2:21 PM INDICATION: SOB COMPARISON: 04/13/2022     IMPRESSION: Moderately enlarged heart. Mildly increased retrocardiac opacity, favored to represent atelectasis. Resolved right lower lobe opacities. Mild pulmonary vascular engorgement with increased interstitial markings, not significantly changed.    CT Chest Pulmonary Embolism w Contrast    Result Date: 5/5/2022  EXAM: CT CHEST PULMONARY EMBOLISM W CONTRAST LOCATION: [facility] DATE/TIME: 5/5/2022 3:01 AM INDICATION: Hypoxia, coughing COMPARISON: 03/23/2022 TECHNIQUE: CT chest pulmonary angiogram during arterial phase injection of IV contrast. Multiplanar reformats and MIP reconstructions were performed. Dose reduction techniques were used. CONTRAST: [id number] 75ml FINDINGS: ANGIOGRAM CHEST: Pulmonary arteries are normal caliber and negative for pulmonary emboli. Thoracic aorta is not well opacified and is  indeterminate for dissection. No CT evidence of right heart strain. LUNGS AND PLEURA: Centrilobular and paraseptal emphysematous change. Associated left upper lobe predominance bronchiectasis. New bibasilar airspace disease, left greater than right scattered right upper lobe nodules, largest on image 95 reaching 11 mm, likely unchanged. MEDIASTINUM/AXILLAE: AP window borderline enlarged nodes, may be reactive in the setting of infection. Left thyroid nodule  redemonstrated. Global cardiomegaly. CORONARY ARTERY CALCIFICATION: Moderate. UPPER ABDOMEN: No acute abnormalities. MUSCULOSKELETAL: No acute bony abnormalities.     IMPRESSION: 1.  No evidence of pulmonary embolus. 2.  Left lower lobe predominant new airspace disease compatible with pneumonia or aspiration. Follow-up to resolution recommended. 3.  Likely stable right upper lobe pulmonary nodules measuring up to 11 mm in size. Continued short-term follow-up CT versus PET CT recommended per Fleischner Society guidelines. REFERENCE: Guidelines for Management of Incidental Pulmonary Nodules Detected on CT Images: From the Fleischner Society 2017. Guidelines apply to incidental nodules in patients who are 35 years or older. Guidelines do not apply to lung cancer screening, patients with immunosuppression, or patients with known primary cancer. Nodule size 6 mm or larger Low-risk patients: Follow-up CT at 3-6 months, then consider CT at 18-24 months. High-risk patients: Follow-up CT at 3-6 months, then at 18-24 months if no change. -Use most suspicious nodule as guide to management. Consider referral to lung nodule clinic.    CT Chest w/o Contrast    Result Date: 5/17/2022  EXAM: CT CHEST W/O CONTRAST LOCATION: [facility] DATE/TIME: 5/17/2022 2:37 PM INDICATION: COPD exacerbation COMPARISON: CT pulmonary angiogram 5/5/2022, 3/11/2021 TECHNIQUE: CT chest without IV contrast. Multiplanar reformats were obtained. Dose reduction techniques were used. CONTRAST: None. FINDINGS: LUNGS AND PLEURA: Background of fairly advanced emphysema mixed centrilobular and paraseptal distribution with an upper lung predominance. In the areas of greatest emphysema the intervening interstitium has mild thickening, unchanged. The multiple segment posterior airspace and interstitial process in the left lower lobe has mildly improved from 12 days earlier, however there is no more conspicuous and bandlike opacity in the  left upper lobe along the posterior pleura and along the central bronchovascular structures in the left lower lobe. Increased subpleural opacity in the right posterior costophrenic sulcus as well as development of multiple patchy inflammatory nodules within the parenchyma of the right lower lobe. Several lobulated solid nodules are present in the right upper lobe. One of the nodules measuring 11 x 12 mm (series 4, image 110) has substantially increased from 11/3/2021 previously measuring 7 mm long axis. A second larger lobulated solid 9 x 16 mm (series 4, image 123) right upper lobe nodule is also slightly larger measuring 7 x 14 mm. A few smaller solid nodules are present in the right upper lobe closer to the hilum (series 4, image 140 and 143) and are also larger from March 2021. MEDIASTINUM: Biatrial cardiac enlargement. Cardiac ventricles are normal in size. No pericardial effusion. Enlarged right lower paratracheal lymph node measures up to 17 mm short axis (series 4, image 121) and enlarged left lower paratracheal lymph nodes  are also unchanged. No definite hilar adenopathy. Esophagus is decompressed. Conspicuous cisterna chyli. CORONARY ARTERY CALCIFICATION: Three-vessel, moderate. UPPER ABDOMEN: No actionable findings in the imaged upper abdomen. MUSCULOSKELETAL: Degenerative osteophytes of the thoracic spine largest from T6 through the thoracolumbar junction. No aggressive or destructive bone lesions are present.     IMPRESSION: 1.  Partial clearing of airspace opacities from the left lower lobe compared to 5/5/2022. Residual dense opacities are now along the central bronchovascular structures. There are new inflammatory nodules in the right lower lobe and increased opacity in the right posterior sulcus consistent with new atelectasis/inflammation. 2.  Multiple solid nodules with lobular borders are present in the right upper lobe 2 largest of which measure 11 x 12 and 9 x 16 mm. The 11 x 12 nodule and  several other solid nodules are substantially larger from 3/11/2021 and is suspect for neoplasm. Consider PET/CT after resolution of this acute respiratory illness for further evaluation. 3.  Advanced emphysema. 4.  Severe enlargement of both cardiac atria and moderate atheromatous coronary calcifications.          TTS: I have personally spent a total of 25 minutes today reviewing patient's medical record, consultation with the medical providers, assessing patient and symptoms and providing emotional support with more than 50% of this time spent reviewing symptoms and educating on role of lorazepam and briefly discussing goals of care.    YASMEEN Grajeda, APRN, CNS  Palliative Care  [phone]

## 2022-05-26 NOTE — PROGRESS NOTES
Patient started the evening on oxymizer 13LPM and was placed on Bipap 10/5,12, 50% with sats of ~93%.  Patient received scheduled nebs and vest therapy, no major changes.  RT will follow.        Mahdi SHAYLEE Cerda, RT

## 2022-05-26 NOTE — PROGRESS NOTES
Patient wean down O2 form 15L to 8L on Oxymizer cannula today. Pt maintains her sats 92-95%, RR16-24, HR 95. BS coarse and diminished. Pt has thick small secretion. Pt received neb and vest treatment as order. Will continue to follow.     Roderick Chow, RT

## 2022-05-26 NOTE — PROGRESS NOTES
Pt between 10-12L O2 via oxymizer this shift. Pt up to commode with 1 assist for BM, pt up to chair for a few hours this shift as well. MD asked RN to remove indwelling landaverde catheter today, pt refused to have it removed. RN requested MD to speak with pt, per MD, pt adamantly refused to have the landaverde removed today. RN educated pt on risks (predominently infection) of leaving catheter in for longer, pt continues to refuse to have it removed. Pt removed from contact precautions as her shingles lesions are dried and intact.

## 2022-05-26 NOTE — PROGRESS NOTES
PULMONARY MEDICINE PROGRESS NOTE  5/25/2022    Admit Date: 5/16/2022  CODE: No CPR- Do NOT Intubate    Reason for Consult: acute on chronic hypoxic respiratory failure, severe COPD with bronchiectasis    Assessment/Plan:   86 year old female with a history of emphysema (DLCO 39% predicted), COPD (FEV1 49% predicted without reversibility) and extensive fibrotic bronchiectasis, upper lobe predominant who presents with hypoxia after multiple admissions for COPD exacerbations.    Acute on chronic respiratory failure with hypoxia, severe COPD with bronchiectasis: Poor prognosis. Not responding to steroids and bronchodilators. Received empiric antibiotics. I discussed with her that she will likely not be supportable at home without hospice services, and that morphine will help reduce her air hunger, is unlikely to be sedating, and that she is unlikely to become addicted to it. She is open to reconsidering hospice enrollment.    Plan:  - titrate oxygen as able, currently 10 L/min oxymizer, but she will definitely desaturate with movement  - prednisone taper  - scheduled bronchodilators  - no work-up of pulmonary nodules; not a candidate for invasive diagnostic procedures and would not tolerate radiation or chemotherapy if malignancy diagnosed  - vest therapy  - nebulized ipratropium-albuterol, acetylcysteine, 3% saline, flutter valve  - she is adamant about not going to a skilled nursing facility; she is unlikely to be supportable at home without hospice services  - she is open to reconsidering home hospice enrollment; will reconsult hospice  - appreciate palliative care involvement  - DNR/DNI    Ronnie Carney MD  Pulmonary and Critical Care Medicine  [facility]  Office [phone]  Pager [phone]  (he/him/his)                                                                                                                                                        SUBJECTIVE/INTERVAL HISTORY   Dyspnea  with any activity. On 10 L/min oxymizer but desaturates with any movement.                                                                                                                                                      Exam/Data:     Vitals  /70 (BP Location: Left arm)   Pulse 95   Temp 97.8  F (36.6  C) (Oral)   Resp 20   Wt 60.3 kg (132 lb 15 oz)   SpO2 94%   BMI 25.12 kg/m    BP - Mean:  [84-97] 92  I/O last 3 completed shifts:  In: 1460 [P.O.:1320; I.V.:140]  Out: 3125 [Urine:3125]  Weight change: -0.028 kg (-1 oz)  [unfilled]  EXAM:  /70 (BP Location: Left arm)   Pulse 95   Temp 97.8  F (36.6  C) (Oral)   Resp 20   Wt 60.3 kg (132 lb 15 oz)   SpO2 94%   BMI 25.12 kg/m      Intake/Output last 3 shifts:  I/O last 3 completed shifts:  In: 1460 [P.O.:1320; I.V.:140]  Out: 3125 [Urine:3125]  Intake/Output this shift:  I/O this shift:  In: 236 [P.O.:236]  Out: 300 [Urine:300]    Physical Exam  Gen: alert, oriented, increased work of breathing with talking and SpO2 drops to low 90s  HEENT: NT, no MAYE  CV: tachy, regular, no m/g/r  Resp: diminished alveolar breath sounds bilaterally with bilateral crackles, sqeaks, rhonchi  Abd: soft, nontender, BS+  Skin: no rashes or lesions  Ext: no edema, compression stockings in place  Neuro: PERRL, nonfocal exam    ROS: A complete 10-system review of systems was obtained and is negative with the exception of what is noted in the history of present illness.    Medications:       - MEDICATION INSTRUCTIONS -         acetaminophen  650 mg Oral Q6H    Or     acetaminophen  650 mg Rectal Q6H     acetylcysteine  4 mL Nebulization 4x Daily     acyclovir   Topical Q3H     ARIPiprazole  5 mg Oral At Bedtime     atorvastatin  20 mg Oral At Bedtime     calcium carbonate-vitamin D  1 tablet Oral QAM     cyanocobalamin  500 mcg Oral QAM     diltiazem ER COATED BEADS  240 mg Oral Daily     docusate sodium  100 mg Oral BID     furosemide  40 mg Intravenous Q12H      gabapentin  600 mg Oral At Bedtime     insulin aspart  1-7 Units Subcutaneous TID AC     insulin aspart  1-5 Units Subcutaneous At Bedtime     [START ON 5/27/2022] insulin aspart   Subcutaneous Daily with breakfast     [START ON 5/27/2022] insulin aspart   Subcutaneous Daily with lunch     insulin aspart   Subcutaneous Daily with supper     [START ON 5/27/2022] insulin glargine  20 Units Subcutaneous QAM AC     ipratropium - albuterol 0.5 mg/2.5 mg/3 mL  3 mL Nebulization 4x daily     meropenem  1 g Intravenous Q8H     pantoprazole  40 mg Oral Daily     polyethylene glycol  17 g Oral Daily     [Held by provider] potassium chloride ER  10 mEq Oral Daily     [START ON 5/27/2022] predniSONE  20 mg Oral Daily    Followed by     [START ON 5/30/2022] predniSONE  10 mg Oral Daily     rivaroxaban ANTICOAGULANT  20 mg Oral Daily with supper     sodium chloride  3 mL Nebulization 4x Daily     sodium chloride (PF)  3 mL Intracatheter Q8H     sodium chloride (PF)  3 mL Intracatheter Q8H     spironolactone  12.5 mg Oral Daily     Vitamin D3  25 mcg Oral QAM         DATA  All laboratory and radiology has been personally reviewed by myself today.  Recent Labs   Lab 05/26/22  0514   WBC 11.5*   HGB 11.9   HCT 37.7        Recent Labs   Lab 05/26/22  0514 05/25/22  0452 05/24/22  0451    140 139   CO2 43* 44* 41*   BUN 15 16 17       PFT DATA (June 2019):  FEV1/FVC is 48 and is reduced.  FEV1 is 49% predicted and is normal.  FVC is 78% predicted and is reduced.  There was no improvement in spirometry after a single inhaled dose of bronchodilator.  TLC is 103% predicted and is normal.  RV is 119% predicted and is normal.  DLCO is 39% predicted and is reduced when it   is corrected for hemoglobin.    IMAGING:   CT chest (5/17/22):  - images directly reviewed, formal interpretation follows:  FINDINGS:   LUNGS AND PLEURA: Background of fairly advanced emphysema mixed centrilobular and paraseptal distribution with an upper  lung predominance. In the areas of greatest emphysema the intervening interstitium has mild thickening, unchanged. The multiple   segment posterior airspace and interstitial process in the left lower lobe has mildly improved from 12 days earlier, however there is no more conspicuous and bandlike opacity in the left upper lobe along the posterior pleura and along the central   bronchovascular structures in the left lower lobe. Increased subpleural opacity in the right posterior costophrenic sulcus as well as development of multiple patchy inflammatory nodules within the parenchyma of the right lower lobe.     Several lobulated solid nodules are present in the right upper lobe. One of the nodules measuring 11 x 12 mm (series 4, image 110) has substantially increased from 11/3/2021 previously measuring 7 mm long axis. A second larger lobulated solid 9 x 16 mm   (series 4, image 123) right upper lobe nodule is also slightly larger measuring 7 x 14 mm. A few smaller solid nodules are present in the right upper lobe closer to the hilum (series 4, image 140 and 143) and are also larger from March 2021.     MEDIASTINUM: Biatrial cardiac enlargement. Cardiac ventricles are normal in size. No pericardial effusion. Enlarged right lower paratracheal lymph node measures up to 17 mm short axis (series 4, image 121) and enlarged left lower paratracheal lymph nodes   are also unchanged. No definite hilar adenopathy. Esophagus is decompressed. Conspicuous cisterna chyli.      CORONARY ARTERY CALCIFICATION: Three-vessel, moderate.     UPPER ABDOMEN: No actionable findings in the imaged upper abdomen.     MUSCULOSKELETAL: Degenerative osteophytes of the thoracic spine largest from T6 through the thoracolumbar junction. No aggressive or destructive bone lesions are present.                                                                      IMPRESSION:      1.  Partial clearing of airspace opacities from the left lower lobe compared  to 5/5/2022. Residual dense opacities are now along the central bronchovascular structures. There are new inflammatory nodules in the right lower lobe and increased opacity in   the right posterior sulcus consistent with new atelectasis/inflammation.  2.  Multiple solid nodules with lobular borders are present in the right upper lobe 2 largest of which measure 11 x 12 and 9 x 16 mm. The 11 x 12 nodule and several other solid nodules are substantially larger from 3/11/2021 and is suspect for neoplasm.   Consider PET/CT after resolution of this acute respiratory illness for further evaluation.  3.  Advanced emphysema.  4.  Severe enlargement of both cardiac atria and moderate atheromatous coronary calcifications.

## 2022-05-26 NOTE — PLAN OF CARE
A/ox4, forgetful at times. FC x4. Denied pain. Tolerated Bipap at 50% overnight. OVSS, afebrile. LS coarse. Infrequent productive cough. Borjas intermittent pink tinged urine, otherwise yellow/cloudy output- diuresing. Slept comfortably overnight. Had low blood sugar this AM, pt refused prn glucose gel- gave apple juice and applesauce- recheck 93, pt assisted with ordering food this AM.     Problem: Gas Exchange Impaired  Goal: Optimal Gas Exchange  Outcome: Ongoing, Progressing     Problem: COPD (Chronic Obstructive Pulmonary Disease) Comorbidity  Goal: Maintenance of COPD Symptom Control  Outcome: Ongoing, Progressing     Problem: Heart Failure Comorbidity  Goal: Maintenance of Heart Failure Symptom Control  Outcome: Ongoing, Progressing     Problem: Respiratory Compromise (Heart Failure)  Goal: Effective Oxygenation and Ventilation  Outcome: Ongoing, Progressing   Goal Outcome Evaluation:

## 2022-05-26 NOTE — DISCHARGE INSTRUCTIONS
COPD Education Reminders:  *Avoid all irritants such as dust, smoke, harsh smells, cold air, or humidity  *Try to avoid contact with individuals who are sick with symptoms such as cough, fever, sneezing, ect.  *Feel free to call COPD Educators if you have any questions about managing your COPD at [phone].      Home Care Services have been arranged.  Home Care Agency: [facility]   Home Care Agency Telephone: [phone]  Services: resumption of HC services for RN, PT, OT and HHA  Instructions: Agency will call to schedule resumption of care visit. Agency anticipates resumption of care visit to be Saturday 6/3 or Sunday 6/4.    [facility] will be delivering portable and home 02 to the hospital prior to discharge.   [facility] will be delivering home BiPAP to the house this evening and will do teaching and set up in the home.

## 2022-05-26 NOTE — PROGRESS NOTES
[facility]  Palliative Care Daily Progress Note      Code status: No CPR- Do NOT Intubate     Impressions, Recommendations & Counseling     SYMPTOM ASSESSMENT:  1. Shortness of breath in setting of severe pseudomonal pneumonia and underlying COPD -continues.  - [place] [facility] [place]  -Lorazepam 0.25 mg by mouth every 4 hours as needed  - Management per pulmonology.  - RT following  - Duonebs, mucomyst, albuterol nebs  - Lasix 40 mg IV q12h  -Patient adamant that she does not receive morphine.  She does not want to have altered mentation-fearful of not being aware and or able to make decisions.     2. Anxiety related to health, exacerbated with underlying schizoaffective, bipolar disorder and dyspnea  - Continue home dosing of abilify 5 mg po qhs  - continue lorazepam 0.25 mg q4h prn.    Found use of as needed lorazepam helpful yesterday.  Aware that it is available should she need it.    3. Chronic pain syndrome-chronic mid to low back pain  -Continue Tylenol 650 mg po q6h  -Continue gabapentin 600 mg at bedtime  -Would not recommend addition of opiate at this time due to restorative goals and fragile respiratory status.  Would use opiates sparingly for pain.  -Avoid NSAIDs as on anticoagulant.     4. Bowel regimen - constipation  - Colace 100 mg bid  - Senna added 1 tab daily prn     ADVANCED CARE PLANNING/GOALS OF CARE DISCUSSION  - DNR/I  - Met with Hospice yesterday and not ready at this time.  Declines further discussion regarding hospice.  - Goals are life prolonging. Continue current cares and treatments.  -Family and patient wished to discharge home with home care services.  Declined TCU.     Palliative care will sign off at this time as goals currently established and symptoms managed. Please reconsult palliative care if services needed.      HPI          Adalgisa Solano is a 86 year old female admitted to [facility] on 4/13/2022 with worsening  shortness of breath and being treated for COPD and bronchiectasis exacerbation and pneumonia. Patient being treated with steroids and IV antibiotics. Recent hospitalization at [facility] 3/23 - 3/25 for SOB and COPD exacerbation. Chest CT scan showed no pulmonary embolism, irregular nodular opacitities in the RUL, bronchiectasis, emphysema, peribronchial thickening with retained secretions both lower lobes.Sputum Culture positive for pseudomonas and enterococcus. Pulomonology consulted and following.  Viral panel was positive for coronavirus (not COVID19). ID service consulted and recommending ten days of cefepime IV and to obtain sputum to test for AFB.     Previous hospitalizations:  4/13/2022 [facility] admission for SOB, COPD and bronchiectasis exacerbation     Today, the patient was seen for:  Shortness of breath, fatigue, patient and family support and goals of care discussion    Prognosis, Goals, or Advance Care Planning was addressed today with: Yes.  Mood, coping, and/or meaning in the context of serious illness were addressed today: Yes.  Summary/Comments: Met in room briefly with patient and her .  Patient's goals are life-prolonging.  She wishes to continue current cares and treatments.  Declines wanting transitional care at discharge.  Wishes to discharge home with home care services and  agrees with this.  Decline hospice at this time and any further discussion about transition to comfort.            Interval History:     Key Palliative Symptoms:  # Pain severity the last 12 hours: low  # Dyspnea severity the last 12 hours: low  # Nausea severity the last 12 hours: none  # Anxiety severity the last 12 hours: low  Fatigue: Severe  Dyspnea worsens with exertion           Review of Systems:     Besides above, an additional system ROS was reviewed and is unremarkable          Medications:     I have reviewed this patient's medication profile and medications during this hospitalization.            Physical Exam:   Temp:  [97  F (36.1  C)-98.3  F (36.8  C)] 97.4  F (36.3  C)  Pulse:  [] 108  Resp:  [14-28] 16  BP: (116-133)/(59-72) 123/72  FiO2 (%):  [50 %-93 %] 50 %  SpO2:  [85 %-99 %] 93 %    General appearance: alert, cachectic, fatigued and mild distress  Head: Normocephalic, without obvious abnormality, atraumatic  Eyes: Lids and lashes normal.  Sclera anicteric.  Pupils round.  Nose: No discharge.  High flow nasal cannula in place.  Throat: Lips without lesions.  Oral mucosa pink and moist.  Lungs: Labored with speaking.  Decreased bases bilaterally  Abdomen: Soft, nontender  Extremities: No cyanosis or nail clubbing noted  Neurologic: Alert.  Oriented x3.           Data Reviewed:     Pertinent Labs  Lab Results: personally reviewed.   Lab Results   Component Value Date     05/26/2022    CO2 43 05/26/2022    BUN 15 05/26/2022     Lab Results   Component Value Date    WBC 11.5 05/26/2022    HGB 11.9 05/26/2022    HCT 37.7 05/26/2022    MCV 93 05/26/2022     05/26/2022     AST   Date Value Ref Range Status   05/17/2022 13 0 - 40 U/L Final     ALT   Date Value Ref Range Status   05/17/2022 15 0 - 45 U/L Final     Alkaline Phosphatase   Date Value Ref Range Status   05/17/2022 56 45 - 120 U/L Final     Albumin   Date Value Ref Range Status   05/17/2022 2.5 (L) 3.5 - 5.0 g/dL Final         Radiology Results  XR Chest Port 1 View    Result Date: 5/18/2022  EXAM: XR CHEST PORT 1 VIEW LOCATION: [facility] DATE/TIME: 5/18/2022 12:09 PM INDICATION: RN placed PICC   verify tip placement COMPARISON: 05/16/2022     IMPRESSION: A right PICC tip terminates in the region of the lower SVC. A small volume of left pleural fluid is present; this appears to have decreased in volume from the prior study. No pneumothorax. Interstitial edema is unchanged. The cardiomediastinal silhouette is enlarged. There is aortic calcification.     XR Chest Port 1 View    Result Date:  5/16/2022  EXAM: XR CHEST PORT 1 VIEW LOCATION: [facility] DATE/TIME: 5/16/2022 2:21 PM INDICATION: SOB COMPARISON: 04/13/2022     IMPRESSION: Moderately enlarged heart. Mildly increased retrocardiac opacity, favored to represent atelectasis. Resolved right lower lobe opacities. Mild pulmonary vascular engorgement with increased interstitial markings, not significantly changed.    CT Chest Pulmonary Embolism w Contrast    Result Date: 5/5/2022  EXAM: CT CHEST PULMONARY EMBOLISM W CONTRAST LOCATION: [facility] DATE/TIME: 5/5/2022 3:01 AM INDICATION: Hypoxia, coughing COMPARISON: 03/23/2022 TECHNIQUE: CT chest pulmonary angiogram during arterial phase injection of IV contrast. Multiplanar reformats and MIP reconstructions were performed. Dose reduction techniques were used. CONTRAST: [id number] 75ml FINDINGS: ANGIOGRAM CHEST: Pulmonary arteries are normal caliber and negative for pulmonary emboli. Thoracic aorta is not well opacified and is  indeterminate for dissection. No CT evidence of right heart strain. LUNGS AND PLEURA: Centrilobular and paraseptal emphysematous change. Associated left upper lobe predominance bronchiectasis. New bibasilar airspace disease, left greater than right scattered right upper lobe nodules, largest on image 95 reaching 11 mm, likely unchanged. MEDIASTINUM/AXILLAE: AP window borderline enlarged nodes, may be reactive in the setting of infection. Left thyroid nodule redemonstrated. Global cardiomegaly. CORONARY ARTERY CALCIFICATION: Moderate. UPPER ABDOMEN: No acute abnormalities. MUSCULOSKELETAL: No acute bony abnormalities.     IMPRESSION: 1.  No evidence of pulmonary embolus. 2.  Left lower lobe predominant new airspace disease compatible with pneumonia or aspiration. Follow-up to resolution recommended. 3.  Likely stable right upper lobe pulmonary nodules measuring up to 11 mm in size. Continued short-term follow-up CT versus PET CT  recommended per Fleischner Society guidelines. REFERENCE: Guidelines for Management of Incidental Pulmonary Nodules Detected on CT Images: From the Fleischner Society 2017. Guidelines apply to incidental nodules in patients who are 35 years or older. Guidelines do not apply to lung cancer screening, patients with immunosuppression, or patients with known primary cancer. Nodule size 6 mm or larger Low-risk patients: Follow-up CT at 3-6 months, then consider CT at 18-24 months. High-risk patients: Follow-up CT at 3-6 months, then at 18-24 months if no change. -Use most suspicious nodule as guide to management. Consider referral to lung nodule clinic.    CT Chest w/o Contrast    Result Date: 5/17/2022  EXAM: CT CHEST W/O CONTRAST LOCATION: [facility] DATE/TIME: 5/17/2022 2:37 PM INDICATION: COPD exacerbation COMPARISON: CT pulmonary angiogram 5/5/2022, 3/11/2021 TECHNIQUE: CT chest without IV contrast. Multiplanar reformats were obtained. Dose reduction techniques were used. CONTRAST: None. FINDINGS: LUNGS AND PLEURA: Background of fairly advanced emphysema mixed centrilobular and paraseptal distribution with an upper lung predominance. In the areas of greatest emphysema the intervening interstitium has mild thickening, unchanged. The multiple segment posterior airspace and interstitial process in the left lower lobe has mildly improved from 12 days earlier, however there is no more conspicuous and bandlike opacity in the left upper lobe along the posterior pleura and along the central bronchovascular structures in the left lower lobe. Increased subpleural opacity in the right posterior costophrenic sulcus as well as development of multiple patchy inflammatory nodules within the parenchyma of the right lower lobe. Several lobulated solid nodules are present in the right upper lobe. One of the nodules measuring 11 x 12 mm (series 4, image 110) has substantially increased from 11/3/2021  previously measuring 7 mm long axis. A second larger lobulated solid 9 x 16 mm (series 4, image 123) right upper lobe nodule is also slightly larger measuring 7 x 14 mm. A few smaller solid nodules are present in the right upper lobe closer to the hilum (series 4, image 140 and 143) and are also larger from March 2021. MEDIASTINUM: Biatrial cardiac enlargement. Cardiac ventricles are normal in size. No pericardial effusion. Enlarged right lower paratracheal lymph node measures up to 17 mm short axis (series 4, image 121) and enlarged left lower paratracheal lymph nodes  are also unchanged. No definite hilar adenopathy. Esophagus is decompressed. Conspicuous cisterna chyli. CORONARY ARTERY CALCIFICATION: Three-vessel, moderate. UPPER ABDOMEN: No actionable findings in the imaged upper abdomen. MUSCULOSKELETAL: Degenerative osteophytes of the thoracic spine largest from T6 through the thoracolumbar junction. No aggressive or destructive bone lesions are present.     IMPRESSION: 1.  Partial clearing of airspace opacities from the left lower lobe compared to 5/5/2022. Residual dense opacities are now along the central bronchovascular structures. There are new inflammatory nodules in the right lower lobe and increased opacity in the right posterior sulcus consistent with new atelectasis/inflammation. 2.  Multiple solid nodules with lobular borders are present in the right upper lobe 2 largest of which measure 11 x 12 and 9 x 16 mm. The 11 x 12 nodule and several other solid nodules are substantially larger from 3/11/2021 and is suspect for neoplasm. Consider PET/CT after resolution of this acute respiratory illness for further evaluation. 3.  Advanced emphysema. 4.  Severe enlargement of both cardiac atria and moderate atheromatous coronary calcifications.        TTS: I have personally spent a total of 15 minutes today reviewing patient's medical record, consultation with the medical providers, assessing patient and  symptoms and providing emotional support with more than 50% of this time spent face-to-face with patient and , Jaimee Grajeda, APRN, CNS  Palliative Care  [phone]

## 2022-05-26 NOTE — PROGRESS NOTES
[facility]    Progress Note - Hospitalist Service       Date of Admission:  5/16/2022    Assessment & Plan        Adalgisa Solano is a 86 year old female admitted on 5/5/2022. She has a history of pulmonary disease including COPD with persistent bronchiectasis getting vest therapy at home for the last 2 years, non-insulin-dependent diabetes, chronic atrial fibrillation, chronic heart failure with preserved ejection fraction who has had several recent hospitalizations for COPD exacerbation with pneumonia. Admitted for recurrent COPD exacerbation without signs of infection. Alternating between oxymask and BiPAP at night. Multiple goals of care meetings held, patient and family with restorative goals at this time.       Acute on chronic respiratory failure  COPD exacerbation  Pseudomonal pneumonia   Probable malignant neoplasms  Recently admitted 5/5-5/9 for COPD exacerbation with LLL opacity treated with cefepime and vanco x1 day then transitioned to azithromycin course per ID input. Baseline oxygen needs 3-5L. Presented satting 88% on 5L. Recent PNA treated with cefepime end of April; also treated with ertapenem and azithromycin beginning of May. On Trelegy Ellipta outpatient. No leukocytosis or elevated procal however CT chest with partial clearing of airspace opacities from the left lower lobe, Residual dense opacities are now along the central bronchovascular structures and new inflammatory nodules. Also with multiple solid nodules concerning for neoplasm.  Alternating between HFNC and BIPAP. Sputum cx with pseudomonas. Patient fatigued. Supplemental oxygen needs improving.  - Pulmonology consulted, appreciate recs   - Continue oral prednisone, will need prolonged taper (30mg x3d, 20mg x3d, 10mg x3d)   - Continue meropenem, will need 2-3 weeks of abx treatment   - BIPAP PRN and at night given hypercapnia   - Outpatient discussion of pulmonary nodules concerning for malignancy  -  Duonebs, mucomyst, sodium chloride nebs  - Palliative consulted, appreciate recs, signed off 5/26  - Vest therapy  - RCAT consult     Chronic diastolic CHF  Bilateral lymphedema  Pulmonary hypertension  Bilateral lower extremity edema L>R, however BNP relatively normal at 180 and patient endorses improvement from baseline. Last echocardiogram in 4/2022 with an EF of 65%, with moderate mitral and tricuspid insufficiency. Home regimen includes Lasix (40mg daily) and Spironolactone (12.5 mg daily). LE swelling improved. Good UOP with lasix.   - Continue PTA spironolactone   - IV lasix 40 mg BID per pulm recs  - OT lymphedema consult     Herpes Zoster, (left buttock)  Improving, scabs present. No signs of secondary infection. Completed Valtrex.  - Continue to monitor  - WOC consult   -Removed contact precautions     Hyponatremia, resolved  Sodium of 129 on admission.  - Continue to monitor  - Regular diet    Hypokalemia  Likely due to increased lasix  - potassium replacement protocol     Atrial fibrillation  HTN  On Xarelto 20 mg daily and diltiazem 240 mg daily prior to admission.   - Continue diltiazem 240 mg daily   - Continue daily Xarelto 20 mg     Anxiety disorder  - Continue PTA Abilify  - Ativan 0.25mg q4h prn for anxiety per palliative recs      T2DM  Steroid-induced hyperglycemia  Last Hgb A1C (3/21/22)- 6.2, suggesting well controlled diabetes. On metformin (500mg daily) prior to admission. Likely will be elevated in setting of steroids. Glucoses variable. Anticipate decreasing glucose levels with steroid taper.  - 4 times daily and AC glucose checks  - medium sliding scale insulin  - 1u:15carbs with meals  - Lantus 20u qAM     Chronic pain syndrome-chronic mid to low back pain  - Continue Tylenol as needed  - Continue gabapentin 600 mg at bedtime  - Avoid NSAIDs as on anticoagulant     Magnesium deficiency, resolved  - Mg replacement protocol    Urinary retention  Urine culture neg. Borjas present for 7 days.    - Remove landaverde today    Constipation  - Increased colace to BID  - Suppository PRN  - Miralax      Diet: Combination Diet Regular Diet Adult    DVT Prophylaxis: DOAC  Landaverde Catheter: PRESENT, indication: Retention  Fluids: PO  Central Lines: PRESENT  PICC Triple Lumen 05/18/22 Right Basilic-Site Assessment: WDL  Cardiac Monitoring: None  Code Status: No CPR- Do NOT Intubate      Disposition Plan   Expected Discharge: 05/28/2022  Anticipated discharge location: home with family;home with help/services    Delays:    oxygen needs        The patient's care was discussed with the Attending Physician, Dr. Champagne.    Truong Tiwari MD PGY1  Hospitalist Service  [facility]  Securely message with the Vocera Web Console (learn more here)  Text page via [employer] Paging/Directory     Clinically Significant Risk Factors Present on Admission   ______________________________________________________________________    Interval History   On BiPAP overnight. Otherwise on 10-15L oxymask.     Patient wants to continue with all cares.  and daughter at bedside. Wants to keep landaverde in place until she works with PT to make sure she can transfer. Peripheral edema continues to be improved. No chest pain. Cough is improving, still productive at times.      Data reviewed today: I reviewed all medications, new labs and imaging results over the last 24 hours.     Physical Exam   Vital Signs: Temp: 97.8  F (36.6  C) Temp src: Oral BP: 125/70 Pulse: 95   Resp: 20 SpO2: 94 % O2 Device: Oxymizer cannula Oxygen Delivery: 10 LPM  Weight: 132 lbs 15 oz     Constitutional: awake, alert, cooperative, appears fatigued  Eyes: Lids and lashes normal, extra ocular muscles intact, sclera clear, conjunctiva normal  ENT: normocepalic, without obvious abnormality, atraumatic.  Respiratory: On 12L oxymask. Coarse diffuse breath sounds. Mild respiratory distress.  Cardiovascular: irregularly irregular rhythm, no murmur  auscultated  GI:  normal bowel sounds, soft, non-distended, non-tender  Musculoskeletal: compression stockings in place, peripheral edema improved, symmetric.  Neurologic: Awake, alert, oriented to name, place and time.    Neuropsychiatric: General: normal, calm and normal eye contact     Data   Recent Labs   Lab 05/26/22  1202 05/26/22  1150 05/26/22  0814 05/26/22  0714 05/26/22  0649 05/26/22  0514 05/25/22  0741 05/25/22  0452 05/24/22  0742 05/24/22  0451   WBC  --   --   --   --   --  11.5*  --  9.7  --  12.6*   HGB  --   --   --   --   --  11.9  --  11.9  --  12.2   MCV  --   --   --   --   --  93  --  93  --  92   PLT  --   --   --   --   --  267  --  270  --  269   NA  --   --   --   --   --  141  --  140  --  139   POTASSIUM 4.6  --   --   --   --  3.4*  --  3.5   < > 3.0*   CHLORIDE  --   --   --   --   --  88*  --  90*  --  89*   CO2  --   --   --   --   --  43*  --  44*  --  41*   BUN  --   --   --   --   --  15  --  16  --  17   CR  --   --   --   --   --  0.48*  --  0.46*  --  0.50*   ANIONGAP  --   --   --   --   --  10  --  6  --  9   DENISE  --   --   --   --   --  7.8*  --  7.9*  --  8.0*   GLC  --  230* 125* 93   < > 62*   < > 75   < > 109    < > = values in this interval not displayed.     No results found for this or any previous visit (from the past 24 hour(s)).

## 2022-05-26 NOTE — PROGRESS NOTES
Care Management Follow Up    Length of Stay (days): 10    Expected Discharge Date: 05/28/2022     Concerns to be Addressed:     Medical progression-Oxygen needs 13 Liters at this time.   Patient plan of care discussed at interdisciplinary rounds: Yes    Anticipated Discharge Disposition:  Home with Home Care serivces     Anticipated Discharge Services:  Home Care-open to Interim  Anticipated Discharge DME:  To be determined. Has baseline home oxygen.    Patient/family educated on Medicare website which has current facility and service quality ratings:  Not at this time  Education Provided on the Discharge Plan:  Yes, per team  Patient/Family in Agreement with the Plan:  Yes    Referrals Placed by CM/SW:  Home Care   Private pay costs discussed: Not applicable    Additional Information:  Chart Reviewed. Per discussion with doctor patient and family do not want hospice at this time; plan to get patient back home once oxygen is weaned down. Patient is improving, now need 13 Liters of oxygen. Goal to return home with resumption of Interim home care RN, PT, OT, and HHA services. Spouse can potentially transport home. Care manager to follow.       Luly Wade RN

## 2022-05-27 NOTE — PLAN OF CARE
Goal Outcome Evaluation: ongoing.     Pt on 8L via oxymizer this AM, was on BiPaP overnight. Borjas in place- with adequate urine output. Tylenol given x1. Pt declines scheduled cream. Mg and K are rechecks in the AM. TL PICC SL with good blood return. Ax1 when up. Will continue to monitor.

## 2022-05-27 NOTE — PLAN OF CARE
Problem: Gas Exchange Impaired  Goal: Optimal Gas Exchange  Outcome: Ongoing, Progressing       Problem: Diabetes Comorbidity  Goal: Blood Glucose Level Within Targeted Range  Outcome: Ongoing, Progressing       Patient is alert and oriented. Pt O2 saturations dropped to mid 80's on 6L. Pt is currently on 8L oxymizer. Pt still MEYER. Switched to PO lasix. Pt and family had questions about insulin and diet. Writer provided education about insulin and carb coverage. Pt has no complaints of pain.    Khadijah English RN

## 2022-05-27 NOTE — PROGRESS NOTES
Patient given nebulizer's and vest therapy as ordered. Patient was SOB today and needed increased liter flow, increased from 6 LPM to 8 LPM oxymizer. Patient has congested productive cough. Will continue to follow,    Ciara Alatorre, RT

## 2022-05-27 NOTE — PROGRESS NOTES
[facility]    Progress Note - Hospitalist Service       Date of Admission:  5/16/2022    Assessment & Plan        Adalgisa Solano is a 86 year old female admitted on 5/5/2022. She has a history of pulmonary disease including COPD with persistent bronchiectasis getting vest therapy at home for the last 2 years, non-insulin-dependent diabetes, chronic atrial fibrillation, chronic heart failure with preserved ejection fraction who has had several recent hospitalizations for COPD exacerbation with pneumonia. Admitted for recurrent COPD exacerbation without signs of infection. Alternating between oxymask and BiPAP at night. Multiple goals of care meetings held, patient and family with restorative goals at this time but still open to hospice.       Acute on chronic respiratory failure  COPD exacerbation  Pseudomonal pneumonia   Probable malignant neoplasms  Recently admitted 5/5-5/9 for COPD exacerbation with LLL opacity treated with cefepime and vanco x1 day then transitioned to azithromycin course per ID input. Baseline oxygen needs 3-5L. Presented satting 88% on 5L. Recent PNA treated with cefepime end of April; also treated with ertapenem and azithromycin beginning of May. On Trelegy Ellipta outpatient. No leukocytosis or elevated procal however CT chest with partial clearing of airspace opacities from the left lower lobe, Residual dense opacities are now along the central bronchovascular structures and new inflammatory nodules. Also with multiple solid nodules concerning for neoplasm.  Alternating between HFNC and BIPAP. Sputum cx with pseudomonas. Patient fatigued. Supplemental oxygen needs improving.  - Pulmonology consulted, appreciate recs   - Continue oral prednisone taper   - Discontinue meropenem   - Start PO ciprofloxacin to complete 2 week abx course, monitor for rash or itching  - Duonebs, mucomyst, sodium chloride nebs  - Palliative signed off 5/26  - Hospice reconsulted  5/26  - Vest therapy  - RCAT consult     Chronic diastolic CHF  Bilateral lymphedema  Pulmonary hypertension  Bilateral lower extremity edema L>R, however BNP relatively normal at 180 and patient endorses improvement from baseline. Last echocardiogram in 4/2022 with an EF of 65%, with moderate mitral and tricuspid insufficiency. Home regimen includes Lasix (40mg daily) and Spironolactone (12.5 mg daily). LE swelling improved. Good UOP with lasix.   - Continue PTA spironolactone   - PO lasix 40 mg BID      Herpes Zoster, (left buttock) resolved  Improving, scabbed over. No signs of secondary infection. Completed Valtrex, topical acyclovir.  - Continue to monitor  -Removed contact precautions     Hyponatremia, resolved  Sodium of 129 on admission.  - Continue to monitor  - Regular diet    Hypokalemia, resolved  Likely due to increased lasix  - potassium replacement protocol     Atrial fibrillation  HTN  On Xarelto 20 mg daily and diltiazem 240 mg daily prior to admission.   - Continue diltiazem 240 mg daily   - Continue daily Xarelto 20 mg     Anxiety disorder  - Continue PTA Abilify  - Ativan 0.25mg q4h prn for anxiety per palliative recs      T2DM  Steroid-induced hyperglycemia  Last Hgb A1C (3/21/22)- 6.2, suggesting well controlled diabetes. On metformin (500mg daily) prior to admission. Likely will be elevated in setting of steroids. Glucoses variable. Anticipate decreasing glucose levels with steroid taper.  - 4 times daily and AC glucose checks  - medium sliding scale insulin  - 1u:15carbs with meals  - Lantus 20u qAM     Chronic pain syndrome-chronic mid to low back pain  - Continue Tylenol as needed  - Continue gabapentin 600 mg at bedtime  - Avoid NSAIDs as on anticoagulant     Magnesium deficiency, resolved  - Mg replacement protocol    Urinary retention  Urine culture neg. Landaverde present for 7 days. Patient declined landaverde removal yesterday  - Remove landaverde today    Constipation  - Increased colace to BID  -  Suppository PRN  - Miralax      Diet: Combination Diet Regular Diet Adult    DVT Prophylaxis: DOAC  Borjas Catheter: PRESENT, indication: Retention  Fluids: PO  Central Lines: PRESENT  PICC Triple Lumen 05/18/22 Right Basilic-Site Assessment: WDL  Cardiac Monitoring: None  Code Status: No CPR- Do NOT Intubate      Disposition Plan   Expected Discharge: 05/28/2022  Anticipated discharge location: home with family;home with help/services    Delays:    oxygen needs        The patient's care was discussed with the Attending Physician, Dr. Champagne.    Truong Tiwari MD PGY1  Hospitalist Service  [facility]  Securely message with the Vocera Web Console (learn more here)  Text page via [employer] Paging/Directory     Clinically Significant Risk Factors Present on Admission   ______________________________________________________________________    Interval History   On BiPAP overnight. Otherwise on 8-10L oxymask.     Patient and  open to hospice consult today. Patient afraid of being incontinent if she takes out Borjas. Discussed risk of infection, purewick. No chest pain, mild cough.      Data reviewed today: I reviewed all medications, new labs and imaging results over the last 24 hours.     Physical Exam   Vital Signs: Temp: 97.5  F (36.4  C) Temp src: Oral BP: 121/58 Pulse: 103   Resp: 18 SpO2: 96 % O2 Device: Oxymizer cannula Oxygen Delivery: 7 LPM  Weight: 132 lbs 15 oz     Constitutional: awake, alert, cooperative, appears fatigued  Eyes: Lids and lashes normal, extra ocular muscles intact, sclera clear, conjunctiva normal  ENT: normocepalic, without obvious abnormality, atraumatic.  Respiratory: On 10L oxymask. Coarse diffuse breath sounds. Mild respiratory distress.  Cardiovascular: irregularly irregular rhythm, no murmur auscultated  GI:  normal bowel sounds, soft, non-distended, non-tender  Musculoskeletal: compression stockings in place, peripheral edema stable,  symmetric.  Neurologic: Awake, alert, oriented to name, place and time.    Neuropsychiatric: General: normal, calm and normal eye contact     Data   Recent Labs   Lab 05/27/22  1207 05/27/22  0736 05/27/22  0446 05/26/22  1725 05/26/22  1202 05/26/22  0649 05/26/22  0514 05/25/22  0741 05/25/22  0452   WBC  --   --  11.1*  --   --   --  11.5*  --  9.7   HGB  --   --  11.2*  --   --   --  11.9  --  11.9   MCV  --   --  93  --   --   --  93  --  93   PLT  --   --  259  --   --   --  267  --  270   NA  --   --  142  --   --   --  141  --  140   POTASSIUM  --   --  3.6  --  4.6  --  3.4*  --  3.5   CHLORIDE  --   --  87*  --   --   --  88*  --  90*   CO2  --   --  44*  --   --   --  43*  --  44*   BUN  --   --  14  --   --   --  15  --  16   CR  --   --  0.47*  --   --   --  0.48*  --  0.46*   ANIONGAP  --   --  11  --   --   --  10  --  6   DENISE  --   --  7.7*  --   --   --  7.8*  --  7.9*   * 80 113   < >  --    < > 62*   < > 75    < > = values in this interval not displayed.     No results found for this or any previous visit (from the past 24 hour(s)).

## 2022-05-27 NOTE — PROGRESS NOTES
Pt seen on 8L oxymask.   She gets Duoneb, mucomyst, and sodium chloride QID and vest treatment BID. Pt tolerates well and has a lot of secretions when she coughs. BS coarse pre and post neb.   She wore her BiPAP overnight 10/5 , and between 40-50% O2.    RT will continue to follow    Magali Cifuentes, RT

## 2022-05-27 NOTE — PROGRESS NOTES
PULMONARY MEDICINE PROGRESS NOTE  5/25/2022    Admit Date: 5/16/2022  CODE: No CPR- Do NOT Intubate    Reason for Consult: acute on chronic hypoxic respiratory failure, severe COPD with bronchiectasis    Assessment/Plan:   86 year old female with a history of emphysema (DLCO 39% predicted), COPD (FEV1 49% predicted without reversibility) and extensive fibrotic bronchiectasis, upper lobe predominant who presents with acute on chronic respiratory failure with hypoxemia after multiple admissions for COPD exacerbations.  Very slow to improve and still requiring a significant amount of O2, although she is now off HFNC.   Palliative care and hospice discussions ongoing.  She adamantly refuses TCU placement.     Plan:  - goal SpO2 88-92%, avoid hyperoxia. Titrate FiO2 as able  - continue prednisone taper as ordered  - narrow meropenem to PO ciprofloxacin for 4 more days. Would do 2 weeks total abx, stop date placed. Has allergy listed to levaquin - itching, rash. Low risk. Monitor for any adverse effects from cipro.   - cont scheduled duonebs qid, mucomyst nebs qid, 3% sodium chloride nebs qid, albuterol nebs prn  - no work-up of pulmonary nodules; not a candidate for invasive diagnostic procedures and would not tolerate radiation or chemotherapy if malignancy diagnosed  - encourage OOB, PT/OT, push IS  - cont vest therapy bid (per home regimen)  - appreciate palliative care, hospice involvement.  - DNR/DNI code status noted.   - strongly recommended she have the landaverde removed - she is hesitant, for unclear reasons. Defer to [facility].     Will continue to follow.     Tim (Prasad Alvarenga MD  [facility]  Clinic [phone]  Fax [phone]                                                                                                                                                          SUBJECTIVE/INTERVAL HISTORY   Dyspnea with any activity. Remains on  10Lpm.  Considering hospice  Doesn't want landaverde out.   Not really using IS.                                                                                                                                                       Exam/Data:     Vitals  /64 (BP Location: Left arm)   Pulse 93   Temp 97.6  F (36.4  C) (Oral)   Resp 18   Wt 60.3 kg (132 lb 15 oz)   SpO2 94%   BMI 25.12 kg/m    BP - Mean:  [78-92] 78  I/O last 3 completed shifts:  In: 236 [P.O.:236]  Out: 2950 [Urine:2950]  Weight change:   [unfilled]  EXAM:  /64 (BP Location: Left arm)   Pulse 93   Temp 97.6  F (36.4  C) (Oral)   Resp 18   Wt 60.3 kg (132 lb 15 oz)   SpO2 94%   BMI 25.12 kg/m      Intake/Output last 3 shifts:  I/O last 3 completed shifts:  In: 236 [P.O.:236]  Out: 2950 [Urine:2950]  Intake/Output this shift:  I/O this shift:  In: 0   Out: 150 [Urine:150]    Physical Exam  Gen: alert, oriented, increased work of breathing with talking and SpO2 drops to low 90s  HEENT: NT, no MAYE  CV: tachy, regular, no m/g/r  Resp: diminished bilaterally with bibasilar crackles and inspiratory squeaks, no rhonchi, few scattered wheezes bilaterally.   Abd: soft, nontender, BS+  Skin: no rashes or lesions  Ext: no edema, compression stockings in place  Neuro: PERRL, nonfocal exam    ROS: A complete 10-system review of systems was obtained and is negative with the exception of what is noted in the history of present illness.    Medications:       - MEDICATION INSTRUCTIONS -         acetaminophen  650 mg Oral Q6H    Or     acetaminophen  650 mg Rectal Q6H     acetylcysteine  4 mL Nebulization 4x Daily     acyclovir   Topical Q3H     ARIPiprazole  5 mg Oral At Bedtime     atorvastatin  20 mg Oral At Bedtime     calcium carbonate-vitamin D  1 tablet Oral QAM     cyanocobalamin  500 mcg Oral QAM     diltiazem ER COATED BEADS  240 mg Oral Daily     docusate sodium  100 mg Oral BID     furosemide  40 mg Intravenous Q12H     gabapentin  600  mg Oral At Bedtime     insulin aspart  1-7 Units Subcutaneous TID AC     insulin aspart  1-5 Units Subcutaneous At Bedtime     insulin aspart   Subcutaneous Daily with breakfast     insulin aspart   Subcutaneous Daily with lunch     insulin aspart   Subcutaneous Daily with supper     insulin glargine  20 Units Subcutaneous QAM AC     ipratropium - albuterol 0.5 mg/2.5 mg/3 mL  3 mL Nebulization 4x daily     meropenem  1 g Intravenous Q8H     pantoprazole  40 mg Oral Daily     polyethylene glycol  17 g Oral Daily     [Held by provider] potassium chloride ER  10 mEq Oral Daily     predniSONE  20 mg Oral Daily    Followed by     [START ON 5/30/2022] predniSONE  10 mg Oral Daily     rivaroxaban ANTICOAGULANT  20 mg Oral Daily with supper     sodium chloride  3 mL Nebulization 4x Daily     sodium chloride (PF)  3 mL Intracatheter Q8H     sodium chloride (PF)  3 mL Intracatheter Q8H     spironolactone  12.5 mg Oral Daily     Vitamin D3  25 mcg Oral QAM         DATA  All laboratory and radiology has been personally reviewed by myself today.  Recent Labs   Lab 05/27/22  0446   WBC 11.1*   HGB 11.2*   HCT 35.5        Recent Labs   Lab 05/27/22  0446 05/26/22  0514 05/25/22  0452    141 140   CO2 44* 43* 44*   BUN 14 15 16     Micro  Sputum 5/18 2+ pseudomonas, pan-sensitive  PCT normal on 5/17  Urine, blood cultures neg    PFT DATA (June 2019):  FEV1/FVC is 48 and is reduced.  FEV1 is 49% predicted and is normal.  FVC is 78% predicted and is reduced.  There was no improvement in spirometry after a single inhaled dose of bronchodilator.  TLC is 103% predicted and is normal.  RV is 119% predicted and is normal.  DLCO is 39% predicted and is reduced when it   is corrected for hemoglobin.    IMAGING:   CT chest (5/17/22):  - images directly reviewed, formal interpretation follows:  FINDINGS:   LUNGS AND PLEURA: Background of fairly advanced emphysema mixed centrilobular and paraseptal distribution with an upper lung  predominance. In the areas of greatest emphysema the intervening interstitium has mild thickening, unchanged. The multiple   segment posterior airspace and interstitial process in the left lower lobe has mildly improved from 12 days earlier, however there is no more conspicuous and bandlike opacity in the left upper lobe along the posterior pleura and along the central   bronchovascular structures in the left lower lobe. Increased subpleural opacity in the right posterior costophrenic sulcus as well as development of multiple patchy inflammatory nodules within the parenchyma of the right lower lobe.     Several lobulated solid nodules are present in the right upper lobe. One of the nodules measuring 11 x 12 mm (series 4, image 110) has substantially increased from 11/3/2021 previously measuring 7 mm long axis. A second larger lobulated solid 9 x 16 mm   (series 4, image 123) right upper lobe nodule is also slightly larger measuring 7 x 14 mm. A few smaller solid nodules are present in the right upper lobe closer to the hilum (series 4, image 140 and 143) and are also larger from March 2021.     MEDIASTINUM: Biatrial cardiac enlargement. Cardiac ventricles are normal in size. No pericardial effusion. Enlarged right lower paratracheal lymph node measures up to 17 mm short axis (series 4, image 121) and enlarged left lower paratracheal lymph nodes   are also unchanged. No definite hilar adenopathy. Esophagus is decompressed. Conspicuous cisterna chyli.      CORONARY ARTERY CALCIFICATION: Three-vessel, moderate.     UPPER ABDOMEN: No actionable findings in the imaged upper abdomen.     MUSCULOSKELETAL: Degenerative osteophytes of the thoracic spine largest from T6 through the thoracolumbar junction. No aggressive or destructive bone lesions are present.                                                                      IMPRESSION:      1.  Partial clearing of airspace opacities from the left lower lobe compared to  5/5/2022. Residual dense opacities are now along the central bronchovascular structures. There are new inflammatory nodules in the right lower lobe and increased opacity in   the right posterior sulcus consistent with new atelectasis/inflammation.  2.  Multiple solid nodules with lobular borders are present in the right upper lobe 2 largest of which measure 11 x 12 and 9 x 16 mm. The 11 x 12 nodule and several other solid nodules are substantially larger from 3/11/2021 and is suspect for neoplasm.   Consider PET/CT after resolution of this acute respiratory illness for further evaluation.  3.  Advanced emphysema.  4.  Severe enlargement of both cardiac atria and moderate atheromatous coronary calcifications.

## 2022-05-27 NOTE — CONSULTS
Spoke with pt and daughter about the possibility of reconsidering hospice. Their main question was what liter flow could pt go home on with hospice and homecare. Informed on hospice it is typically 10L and I do not know the answer to what the homecare requirements would be. They had no further questions and Kim states that they have not made any decisions about hospice vs homecare at this time. They do have 24 hour number to contact hospice with any further questions or concerns.     Thank you for your care of this pt.     Gwendolyn Mckeon RN BSN PHN PN  Hospice Referral Specialist  [facility]    [phone]  [email or web address]

## 2022-05-27 NOTE — PROGRESS NOTES
Care Management Follow Up      Additional Information:  Yakelin with [facility] confirms agency is able to accept back for services RN/PT/OT/HHA- will just need to fax discharge summary and orders.  CM met briefly with  and daughter outside of room. Aware CM will be available through the weekend to assist with discharge planning as indicated.     Aleja Pride RN

## 2022-05-28 NOTE — PROGRESS NOTES
[facility]    Progress Note - Hospitalist Service       Date of Admission:  5/16/2022    Assessment & Plan        Adalgisa Solano is a 86 year old female admitted on 5/5/2022. She has a history of pulmonary disease including COPD with persistent bronchiectasis getting vest therapy at home for the last 2 years, non-insulin-dependent diabetes, chronic atrial fibrillation, chronic heart failure with preserved ejection fraction who has had several recent hospitalizations for COPD exacerbation with pneumonia. Admitted for recurrent COPD exacerbation without signs of infection. Alternating between oxymask and BiPAP at night. Multiple goals of care meetings held, patient and family with restorative goals at this time but still open to hospice.       Acute on chronic respiratory failure  COPD exacerbation  Pseudomonal pneumonia   Probable malignant neoplasms  Recently admitted 5/5-5/9 for COPD exacerbation with LLL opacity treated with cefepime and vanco x1 day then transitioned to azithromycin course per ID input. Baseline oxygen needs 3-5L. Presented satting 88% on 5L. Recent PNA treated with cefepime end of April; also treated with ertapenem and azithromycin beginning of May. On Trelegy Ellipta outpatient. No leukocytosis or elevated procal however CT chest with partial clearing of airspace opacities from the left lower lobe, Residual dense opacities are now along the central bronchovascular structures and new inflammatory nodules. Also with multiple solid nodules concerning for neoplasm.  Alternating between Oxymask/oxymizer and BIPAP. Sputum cx with pseudomonas, s/p meropenum and now on PO ciprofloxacin (plan for 14 day course). Supplemental oxygen needs improving.  - Pulmonology consulted, appreciate recs   - Continue oral prednisone taper   - Meropenum transition to PO ciprofloxacin on 5/27  to complete 2 week abx course (last dose 5/30)  - Duonebs, mucomyst, sodium chloride nebs  -  Palliative signed off 5/26, hospice reconsulted 5/26 - family still considering  - Vest therapy  - RCAT consult     Chronic diastolic CHF  Bilateral lymphedema  Pulmonary hypertension  Bilateral lower extremity edema L>R, however BNP relatively normal at 180 and patient endorses improvement from baseline. Last echocardiogram in 4/2022 with an EF of 65%, with moderate mitral and tricuspid insufficiency. Home regimen includes Lasix (40mg daily) and Spironolactone (12.5 mg daily). LE swelling improved. Good UOP with lasix.   - Continue PTA spironolactone   - PO lasix 40 mg BID      Herpes Zoster, (left buttock) resolved  Improving, scabbed over. No signs of secondary infection. Completed Valtrex, topical acyclovir.  - Continue to monitor, no need for contact precautions     Hyponatremia, resolved  Sodium of 129 on admission.  - Continue to monitor  - Regular diet    Hypokalemia, resolved  Likely due to increased lasix  - potassium replacement protocol     Atrial fibrillation  HTN  On Xarelto 20 mg daily and diltiazem 240 mg daily prior to admission.   - Continue diltiazem 240 mg daily   - Continue daily Xarelto 20 mg     Anxiety disorder  - Continue PTA Abilify  - Ativan 0.25mg q4h prn for anxiety per palliative recs      T2DM  Steroid-induced hyperglycemia  Last Hgb A1C (3/21/22)- 6.2, suggesting well controlled diabetes. On metformin (500mg daily) prior to admission. Likely will be elevated in setting of steroids. Glucoses variable. Anticipate decreasing glucose levels with steroid taper. Fasting glucose 87 this AM.  - 4 times daily and AC glucose checks  - medium sliding scale insulin  - 1u:15carbs with meals, schedule 3 units with meals (breakfast and lunch only)  - Lantus 20u qAM     Chronic pain syndrome-chronic mid to low back pain  - Continue Tylenol as needed  - Continue gabapentin 600 mg at bedtime  - Avoid NSAIDs as on anticoagulant     Magnesium deficiency, resolved  - Mg replacement protocol    Urinary  retention  Borjas removed 5/27. Patient reports good UOP.  - Monitor closely, bladder scan PRN  - Bladder protocol if signs of retention    Constipation  - Increased colace to BID  - Suppository PRN  - Miralax      Diet: Combination Diet Regular Diet Adult    DVT Prophylaxis: DOAC  Borjas Catheter: Not present  Fluids: PO  Central Lines: PRESENT  PICC Triple Lumen 05/18/22 Right Basilic-Site Assessment: WDL  Cardiac Monitoring: None  Code Status: No CPR- Do NOT Intubate      Disposition Plan   Expected Discharge: 05/30/2022  Anticipated discharge location: home with family;home with help/services    Delays:    oxygen needs        The patient's care was discussed with the Attending Physician, Dr. Leslee Cooper MD PGY3  Hospitalist Service  [facility]  Securely message with the Vocera Web Console (learn more here)  Text page via [employer] Paging/Directory     Clinically Significant Risk Factors Present on Admission   ______________________________________________________________________    Interval History   On BiPAP overnight. O2 needs between 8-10 L during day. Feels SOB after getting up and moving from bed to chair. Borjas removed yesterday, reports going to commode twice and having successful output. Swelling in legs much improved. Intermittent cough, mild. No chest pain or palpitations. Tolerated breakfast.  Les at bedside.    Data reviewed today: I reviewed all medications, new labs and imaging results over the last 24 hours.     Physical Exam   Vital Signs: Temp: 98.1  F (36.7  C) Temp src: Oral BP: 125/60 Pulse: 108   Resp: 22 SpO2: 92 % O2 Device: Oxymizer cannula Oxygen Delivery: 8 LPM  Weight: 138 lbs .13 oz     Constitutional: awake, alert, cooperative, appears fatigued - Patient in upper 80s-low 90s SpO2 upon exam (had just moved)  Eyes: Lids and lashes normal, extra ocular muscles intact, sclera clear, conjunctiva normal  ENT: normocepalic,  without obvious abnormality, atraumatic.  Respiratory: On 10L oxymask. Coarse diffuse breath sounds.   Cardiovascular: irregularly irregular rhythm, no murmur auscultated  GI:  normal bowel sounds, soft, non-distended, non-tender  Musculoskeletal: compression stockings in place, peripheral edema stable, symmetric.  Neurologic: Awake, alert, oriented to name, place and time.    Neuropsychiatric: General: normal, calm and normal eye contact     Data   Recent Labs   Lab 05/28/22  0747 05/28/22  0530 05/27/22  2104 05/27/22  0736 05/27/22  0446 05/26/22  1725 05/26/22  1202 05/26/22  0649 05/26/22  0514   WBC  --  14.3*  --   --  11.1*  --   --   --  11.5*   HGB  --  10.9*  --   --  11.2*  --   --   --  11.9   MCV  --  92  --   --  93  --   --   --  93   PLT  --  245  --   --  259  --   --   --  267   NA  --  139  --   --  142  --   --   --  141   POTASSIUM  --  3.9  --   --  3.6  --  4.6  --  3.4*   CHLORIDE  --  86*  --   --  87*  --   --   --  88*   CO2  --  44*  --   --  44*  --   --   --  43*   BUN  --  14  --   --  14  --   --   --  15   CR  --  0.55*  --   --  0.47*  --   --   --  0.48*   ANIONGAP  --  9  --   --  11  --   --   --  10   DENISE  --  7.9*  --   --  7.7*  --   --   --  7.8*   * 87 168*   < > 113   < >  --    < > 62*    < > = values in this interval not displayed.     No results found for this or any previous visit (from the past 24 hour(s)).

## 2022-05-28 NOTE — PROGRESS NOTES
PULMONARY MEDICINE PROGRESS NOTE  5/25/2022    Admit Date: 5/16/2022  CODE: No CPR- Do NOT Intubate    Reason for Consult: acute on chronic hypoxic respiratory failure, severe COPD with bronchiectasis    Assessment/Plan:   86 year old female with a history of emphysema (DLCO 39% predicted), COPD (FEV1 49% predicted without reversibility) and extensive fibrotic bronchiectasis, upper lobe predominant who presents with acute on chronic respiratory failure with hypoxemia after multiple admissions for COPD exacerbations.  Very slow to improve and still requiring a significant amount of O2, although she is now off HFNC.   Palliative care and hospice discussions ongoing.  She adamantly refuses TCU placement.     Plan:  - goal SpO2 88-92%, avoid hyperoxia. Titrate FiO2 as able  - continue prednisone taper as ordered  - cont cipro to complete 14 day course of abx for pseudomonal pneumonia/tracheobronchitis  - cont scheduled duonebs qid, mucomyst nebs qid, 3% sodium chloride nebs qid, albuterol nebs prn  - no work-up of pulmonary nodules; not a candidate for invasive diagnostic procedures and would not tolerate radiation or chemotherapy if malignancy diagnosed  - encourage OOB, PT/OT, push IS  - cont vest therapy bid (per home regimen)  - appreciate palliative care, hospice involvement.  - DNR/DNI code status noted.     Will continue to follow.     Tim (Prasad Alvarenga MD  [facility]  Clinic [phone]  Fax [phone]                                                                                                                                                          SUBJECTIVE/INTERVAL HISTORY   Slept OK  Remains very dypneic with any activity.  Up in chair this morning  On 8Lpm O2 via oxymizer, SpO2 87-92%                                                                                                                                                      Exam/Data:      Vitals  /80 (BP Location: Left arm)   Pulse 81   Temp 97.6  F (36.4  C) (Axillary)   Resp 22   Wt 62.6 kg (138 lb 0.1 oz)   SpO2 96%   BMI 26.08 kg/m    BP - Mean:  [90] 90  I/O last 3 completed shifts:  In: 1072 [P.O.:1072]  Out: 2250 [Urine:2250]  Weight change:   [unfilled]  EXAM:  /80 (BP Location: Left arm)   Pulse 81   Temp 97.6  F (36.4  C) (Axillary)   Resp 22   Wt 62.6 kg (138 lb 0.1 oz)   SpO2 96%   BMI 26.08 kg/m      Intake/Output last 3 shifts:  I/O last 3 completed shifts:  In: 1072 [P.O.:1072]  Out: 2250 [Urine:2250]  Intake/Output this shift:  I/O this shift:  In: 180 [P.O.:180]  Out: -     Physical Exam  Gen: alert, oriented, increased work of breathing with talking and SpO2 drops to low 90s  HEENT: NT, no MAYE  CV: tachy, regular, no m/g/r  Resp: diminished bilaterally with bibasilar crackles and inspiratory squeaks, no rhonchi, few scattered wheezes bilaterally.   Abd: soft, nontender, BS+  Skin: no rashes or lesions  Ext: no edema, compression stockings in place  Neuro: PERRL, nonfocal exam    ROS: A complete 10-system review of systems was obtained and is negative with the exception of what is noted in the history of present illness.    Medications:       - MEDICATION INSTRUCTIONS -         acetaminophen  650 mg Oral Q6H    Or     acetaminophen  650 mg Rectal Q6H     acetylcysteine  4 mL Nebulization 4x Daily     ARIPiprazole  5 mg Oral At Bedtime     atorvastatin  20 mg Oral At Bedtime     calcium carbonate-vitamin D  1 tablet Oral QAM     ciprofloxacin  750 mg Oral Q12H KEISHA     cyanocobalamin  500 mcg Oral QAM     diltiazem ER COATED BEADS  240 mg Oral Daily     docusate sodium  100 mg Oral BID     furosemide  40 mg Oral BID     gabapentin  600 mg Oral At Bedtime     insulin aspart  1-7 Units Subcutaneous TID AC     insulin aspart  1-5 Units Subcutaneous At Bedtime     insulin aspart   Subcutaneous Daily with breakfast     insulin aspart   Subcutaneous Daily with  lunch     insulin aspart   Subcutaneous Daily with supper     insulin glargine  20 Units Subcutaneous QAM AC     ipratropium - albuterol 0.5 mg/2.5 mg/3 mL  3 mL Nebulization 4x daily     pantoprazole  40 mg Oral Daily     polyethylene glycol  17 g Oral Daily     [Held by provider] potassium chloride ER  10 mEq Oral Daily     predniSONE  20 mg Oral Daily    Followed by     [START ON 5/30/2022] predniSONE  10 mg Oral Daily     rivaroxaban ANTICOAGULANT  20 mg Oral Daily with supper     sodium chloride  3 mL Nebulization 4x Daily     sodium chloride (PF)  3 mL Intracatheter Q8H     sodium chloride (PF)  3 mL Intracatheter Q8H     spironolactone  12.5 mg Oral Daily     Vitamin D3  25 mcg Oral QAM         DATA  All laboratory and radiology has been personally reviewed by myself today.  Recent Labs   Lab 05/28/22  0530   WBC 14.3*   HGB 10.9*   HCT 34.2*        Recent Labs   Lab 05/28/22  0530 05/27/22  0446 05/26/22  0514    142 141   CO2 44* 44* 43*   BUN 14 14 15     Micro  Sputum 5/18 2+ pseudomonas, pan-sensitive  PCT normal on 5/17  Urine, blood cultures neg    PFT DATA (June 2019):  FEV1/FVC is 48 and is reduced.  FEV1 is 49% predicted and is normal.  FVC is 78% predicted and is reduced.  There was no improvement in spirometry after a single inhaled dose of bronchodilator.  TLC is 103% predicted and is normal.  RV is 119% predicted and is normal.  DLCO is 39% predicted and is reduced when it   is corrected for hemoglobin.    IMAGING:   CT chest (5/17/22):  - images directly reviewed, formal interpretation follows:  FINDINGS:   LUNGS AND PLEURA: Background of fairly advanced emphysema mixed centrilobular and paraseptal distribution with an upper lung predominance. In the areas of greatest emphysema the intervening interstitium has mild thickening, unchanged. The multiple   segment posterior airspace and interstitial process in the left lower lobe has mildly improved from 12 days earlier, however there  is no more conspicuous and bandlike opacity in the left upper lobe along the posterior pleura and along the central   bronchovascular structures in the left lower lobe. Increased subpleural opacity in the right posterior costophrenic sulcus as well as development of multiple patchy inflammatory nodules within the parenchyma of the right lower lobe.     Several lobulated solid nodules are present in the right upper lobe. One of the nodules measuring 11 x 12 mm (series 4, image 110) has substantially increased from 11/3/2021 previously measuring 7 mm long axis. A second larger lobulated solid 9 x 16 mm   (series 4, image 123) right upper lobe nodule is also slightly larger measuring 7 x 14 mm. A few smaller solid nodules are present in the right upper lobe closer to the hilum (series 4, image 140 and 143) and are also larger from March 2021.     MEDIASTINUM: Biatrial cardiac enlargement. Cardiac ventricles are normal in size. No pericardial effusion. Enlarged right lower paratracheal lymph node measures up to 17 mm short axis (series 4, image 121) and enlarged left lower paratracheal lymph nodes   are also unchanged. No definite hilar adenopathy. Esophagus is decompressed. Conspicuous cisterna chyli.      CORONARY ARTERY CALCIFICATION: Three-vessel, moderate.     UPPER ABDOMEN: No actionable findings in the imaged upper abdomen.     MUSCULOSKELETAL: Degenerative osteophytes of the thoracic spine largest from T6 through the thoracolumbar junction. No aggressive or destructive bone lesions are present.                                                                      IMPRESSION:      1.  Partial clearing of airspace opacities from the left lower lobe compared to 5/5/2022. Residual dense opacities are now along the central bronchovascular structures. There are new inflammatory nodules in the right lower lobe and increased opacity in   the right posterior sulcus consistent with new atelectasis/inflammation.  2.   Multiple solid nodules with lobular borders are present in the right upper lobe 2 largest of which measure 11 x 12 and 9 x 16 mm. The 11 x 12 nodule and several other solid nodules are substantially larger from 3/11/2021 and is suspect for neoplasm.   Consider PET/CT after resolution of this acute respiratory illness for further evaluation.  3.  Advanced emphysema.  4.  Severe enlargement of both cardiac atria and moderate atheromatous coronary calcifications.

## 2022-05-28 NOTE — PROGRESS NOTES
05/28/22 0434   Tech Time   $Tech Time (10 minute increments) 3   Vital Signs   Pulse 81   Pulse Rate Source Monitor   Oximeter Heart Rate 83 bpm   Oxygen Therapy   Flow (L/min) 8   Device (Oxygen Therapy) oxymizer cannula   Oxygen Therapy   SpO2 96 %   O2 Device Oxymizer cannula   Oxygen Delivery 8 LPM   Breath Sounds   Breath Sounds All Fields   All Lung Fields Breath Sounds coarse;diminished     Patient stayed on Bipap overnight tolerated well, placed on 8 L oxymizer NC at ~ 440.  Patient receiving vest therapy with home vest, duoneb Q4, mucomyst and hypertonic.

## 2022-05-28 NOTE — PROGRESS NOTES
Pt currently on 6L oxymizer nasal cannula with O2 sats 88-93%. Pt does frequently drop O2 sats with any movement or activity. Will continue to titrate O2 as needed. Lung sounds are coarse with exp wheezes and fine crackles heard. Aeration is tight.  Pt received vest therapy BID and nebulizers as ordered. Neither treatment make any difference with breath sounds or WOB. RT to follow.    Gricelda Reyes, RT

## 2022-05-29 NOTE — PLAN OF CARE
Improved sleep d/t melatonin. Pt @ 6 Liters via oximask n/c. Desats with activity. SBA with gaitbelt. Moves well. No complaitns of pain. Productive cough. Voiding well.   Pt took off BiPAP mask approx 0230, stating it hurt her nose and that she wanted to use her oximizer n/c. Used oximizer rest of night.     Problem: Risk for Delirium  Goal: Improved Sleep  Outcome: Ongoing, Progressing

## 2022-05-29 NOTE — PLAN OF CARE
Alert and oriented x4. Able to make needs known via call light. Oximask n/c at 6 liters when arriving on unit. NO complaints of pain. O2 sat @ approx 90 - 93%. Dyspnea with activity. O2 sat drops down to low 80's. Melatonin requested before using BiPAP for the night. Sleeping well with BiPAP. Borjas taken out previous shift. Voided 750 mL with 1A pivot to bedside commode. VSS, Bps soft.   Productive congested cough.     Problem: COPD (Chronic Obstructive Pulmonary Disease) Comorbidity  Goal: Maintenance of COPD Symptom Control  Outcome: Ongoing, Progressing  Intervention: Maintain COPD-Symptom Control  Recent Flowsheet Documentation  Taken 5/28/2022 2200 by Constantine Ulloa, RN  Medication Review/Management: medications reviewed     Problem: Risk for Delirium  Goal: Improved Sleep  Outcome: Ongoing, Progressing     Problem: Plan of Care - These are the overarching goals to be used throughout the patient stay.    Goal: Optimal Comfort and Wellbeing  Outcome: Ongoing, Progressing

## 2022-05-29 NOTE — PROGRESS NOTES
[facility]    Progress Note - Hospitalist Service       Date of Admission:  5/16/2022    Assessment & Plan        Adalgisa Solano is a 86 year old female admitted on 5/5/2022. She has a history of pulmonary disease including COPD with persistent bronchiectasis getting vest therapy at home for the last 2 years, non-insulin-dependent diabetes, chronic atrial fibrillation, chronic heart failure with preserved ejection fraction who has had several recent hospitalizations for COPD exacerbation with pneumonia. Admitted for recurrent COPD exacerbation without signs of infection. Alternating between oxymask and BiPAP at night. Multiple goals of care meetings held, patient and family with restorative goals at this time but still open to hospice.       Acute on chronic respiratory failure  COPD exacerbation  Pseudomonal pneumonia   Probable malignant neoplasms  Recently admitted 5/5-5/9 for COPD exacerbation with LLL opacity treated with cefepime and vanco x1 day then transitioned to azithromycin course per ID input. Baseline oxygen needs 3-5L. Presented satting 88% on 5L. Recent PNA treated with cefepime end of April; also treated with ertapenem and azithromycin beginning of May. On Trelegy Ellipta outpatient. No leukocytosis or elevated procal however CT chest with partial clearing of airspace opacities from the left lower lobe, Residual dense opacities are now along the central bronchovascular structures and new inflammatory nodules. Also with multiple solid nodules concerning for neoplasm.  Alternating between Oxymask/oxymizer and BIPAP. Sputum cx with pseudomonas, s/p meropenum and now on PO ciprofloxacin (plan for 14 day course). Supplemental oxygen needs improving.  - Pulmonology consulted, appreciate recs   - Continue oral prednisone taper (will complete 6/1)   - Continue PO ciprofloxacin (last dose 5/30)  - Duonebs, mucomyst, sodium chloride nebs  - BiPAP at night, consider home  with BiPAP machine per pulm recs  - Palliative signed off 5/26, hospice reconsulted 5/26 - family still considering  - Vest therapy  - RCAT consult     Chronic diastolic CHF  Bilateral lymphedema  Pulmonary hypertension  Bilateral lower extremity edema L>R, however BNP relatively normal at 180 and patient endorses improvement from baseline. Last echocardiogram in 4/2022 with an EF of 65%, with moderate mitral and tricuspid insufficiency. Home regimen includes Lasix (40mg daily) and Spironolactone (12.5 mg daily). LE swelling improved. Good UOP with lasix.   - Continue PTA spironolactone   - PO lasix 40 mg BID      Herpes Zoster, (left buttock) resolved  Improving, scabbed over. No signs of secondary infection. Completed Valtrex, topical acyclovir.  - Continue to monitor, no need for contact precautions     Hyponatremia, resolved  Sodium of 129 on admission.  - Continue to monitor  - Regular diet    Hypokalemia, resolved  Likely due to increased lasix  - potassium replacement protocol     Atrial fibrillation  HTN  On Xarelto 20 mg daily and diltiazem 240 mg daily prior to admission.   - Continue diltiazem 240 mg daily   - Continue daily Xarelto 20 mg     Anxiety disorder  - Continue PTA Abilify  - Ativan 0.25mg q4h prn for anxiety per palliative recs      T2DM  Steroid-induced hyperglycemia  Last Hgb A1C (3/21/22)- 6.2, suggesting well controlled diabetes. On metformin (500mg daily) prior to admission. Likely will be elevated in setting of steroids. Glucoses variable. Anticipate decreasing glucose levels with steroid taper.   - 4 times daily and AC glucose checks  - medium sliding scale insulin  - 1u:15carbs with meals, schedule 3 units with meals (breakfast and lunch only)  - Lantus 20u qAM     Chronic pain syndrome-chronic mid to low back pain  - Continue Tylenol as needed  - Continue gabapentin 600 mg at bedtime  - Avoid NSAIDs as on anticoagulant     Magnesium deficiency, resolved  - Mg replacement  protocol    Urinary retention  Landaverde removed 5/27. Patient reports good UOP.  - Monitor closely, bladder scan PRN  - Bladder protocol if signs of retention    Constipation  - Increased colace to BID  - Suppository PRN  - Miralax      Diet: Combination Diet Regular Diet Adult    DVT Prophylaxis: DOAC  Landaverde Catheter: Not present  Fluids: PO  Central Lines: PRESENT       Cardiac Monitoring: None  Code Status: No CPR- Do NOT Intubate      Disposition Plan   Expected Discharge: 06/01/2022  Anticipated discharge location: home with family;home with help/services    Delays:    oxygen needs        The patient's care was discussed with the Attending Physician, Dr. Leslee Tiwari MD PGY1  Hospitalist Service  [facility]  Securely message with the Vocera Web Console (learn more here)  Text page via [employer] Paging/Directory     Clinically Significant Risk Factors Present on Admission   ______________________________________________________________________    Interval History   On BiPAP overnight. O2 needs between 6-7 L this morning. Some dyspnea with ambulation. Urinating well without landaverde. Mild intermittent cough. No chest pain or palpitations.  Les at bedside.    Data reviewed today: I reviewed all medications, new labs and imaging results over the last 24 hours.     Physical Exam   Vital Signs: Temp: 98  F (36.7  C) Temp src: Oral BP: 114/68 Pulse: 109   Resp: 17 SpO2: 91 % O2 Device: Oxymizer cannula Oxygen Delivery: 6 LPM  Weight: 129 lbs 4.8 oz     Constitutional: awake, alert, cooperative, appears fatigued   Eyes: Lids and lashes normal, extra ocular muscles intact, sclera clear, conjunctiva normal  ENT: normocepalic, without obvious abnormality, atraumatic.  Respiratory: On oxymask. Coarse diffuse breath sounds.   Cardiovascular: irregularly irregular rhythm, no murmur auscultated  GI:  normal bowel sounds, soft, non-distended, non-tender  Musculoskeletal: compression  stockings in place, peripheral edema stable, symmetric.  Neurologic: Awake, alert, oriented to name, place and time.    Neuropsychiatric: General: normal, calm and normal eye contact     Data   Recent Labs   Lab 05/29/22  1206 05/29/22  0858 05/29/22  0741 05/28/22  2146 05/28/22  0747 05/28/22  0530 05/27/22  0736 05/27/22  0446 05/26/22  0649 05/26/22  0514   WBC  --   --   --   --   --  14.3*  --  11.1*  --  11.5*   HGB  --   --   --   --   --  10.9*  --  11.2*  --  11.9   MCV  --   --   --   --   --  92  --  93  --  93   PLT  --   --   --   --   --  245  --  259  --  267   NA  --   --   --   --   --  139  --  142  --  141   POTASSIUM  --   --  3.7  --   --  3.9  --  3.6   < > 3.4*   CHLORIDE  --   --   --   --   --  86*  --  87*  --  88*   CO2  --   --   --   --   --  44*  --  44*  --  43*   BUN  --   --   --   --   --  14  --  14  --  15   CR  --   --   --   --   --  0.55*  --  0.47*  --  0.48*   ANIONGAP  --   --   --   --   --  9  --  11  --  10   DENISE  --   --   --   --   --  7.9*  --  7.7*  --  7.8*   * 109*  --  198*   < > 87   < > 113   < > 62*    < > = values in this interval not displayed.     No results found for this or any previous visit (from the past 24 hour(s)).

## 2022-05-29 NOTE — PLAN OF CARE
Goal Outcome Evaluation:    Patient alert and oriented. Pt is on 6L oxymizer at O2 saturations are low to mid 90's. Pt has no complaints of pain. Pt is to transfer to [address] [place] [address]. Report was given to receiving RN.     Khadijah English RN

## 2022-05-29 NOTE — PROGRESS NOTES
PULMONARY MEDICINE PROGRESS NOTE  5/25/2022    Admit Date: 5/16/2022  CODE: No CPR- Do NOT Intubate    Reason for Consult: acute on chronic hypoxic respiratory failure, severe COPD with bronchiectasis    Assessment/Plan:   86 year old female with a history of emphysema (DLCO 39% predicted), COPD (FEV1 49% predicted without reversibility) and extensive fibrotic bronchiectasis, upper lobe predominant who presents with acute on chronic respiratory failure with hypoxemia after multiple admissions for COPD exacerbations.  Very slow to improve and requiring 5Lpm at rest; desaturates easily with any activity and even with coughing.  Tolerating nocturnal BiPAP.   Palliative care and hospice discussions ongoing.  She adamantly refuses TCU placement.     Plan:  - goal SpO2 88-92%, avoid hyperoxia. Titrate FiO2 as able  - continue BiPAP at night. Given frailty, markedly elevated PaCO2, bicarb and high risk for recurrent respiratory failure, I discussed with her the option of getting her home BiPAP to use at night. We spent some time discussing the indications for this as pertains to her goals of care. She does want to go home and prevent readmission and so I think it would be worthwhile to get her set up with this. Will ask RT to start looking into this.  - continue prednisone taper as ordered  - cont cipro to complete 14 day course of abx for pseudomonal pneumonia/tracheobronchitis  - cont scheduled duonebs qid, mucomyst nebs qid, 3% sodium chloride nebs qid, albuterol nebs prn  - no work-up of pulmonary nodules; not a candidate for invasive diagnostic procedures and would not tolerate radiation or chemotherapy if malignancy diagnosed  - encourage OOB, PT/OT, push IS  - cont vest therapy bid (per home regimen)  - appreciate palliative care, hospice involvement.  - DNR/DNI code status noted.   - Will need follow up arranged with Dr. Juarez in pulmonary clinic once closer to discharge.    Will continue to follow.     Tim  (Jose Maria) MD [place]  [facility]  Cl[facility] [phone]  Fax [phone]                                                                                                                                                          SUBJECTIVE/INTERVAL HISTORY   She feels better today.  Slept with BiPAP on.  SpO2 high 80s on 5Lpm, drops to 85% with coughing or activity.                                                                                                                                                      Exam/Data:     Vitals  /68 (BP Location: Left arm)   Pulse 109   Temp 98  F (36.7  C) (Oral)   Resp 17   Wt 58.7 kg (129 lb 4.8 oz)   SpO2 92%   BMI 24.43 kg/m       I/O last 3 completed shifts:  In: 500 [P.O.:480; I.V.:20]  Out: 3050 [Urine:3050]  Weight change: -3.95 kg (-8 lb 11.3 oz)  [unfilled]  EXAM:  /68 (BP Location: Left arm)   Pulse 109   Temp 98  F (36.7  C) (Oral)   Resp 17   Wt 58.7 kg (129 lb 4.8 oz)   SpO2 92%   BMI 24.43 kg/m      Intake/Output last 3 shifts:  I/O last 3 completed shifts:  In: 500 [P.O.:480; I.V.:20]  Out: 3050 [Urine:3050]  Intake/Output this shift:  No intake/output data recorded.    Physical Exam  Gen: alert, oriented, increased work of breathing with talking and SpO2 drops to low 90s  HEENT: NT, no MAYE  CV: tachy, regular, no m/g/r  Resp: diminished bilaterally with bibasilar crackles and inspiratory squeaks, no rhonchi, few scattered wheezes bilaterally.   Abd: soft, nontender, BS+  Skin: no rashes or lesions  Ext: no edema, compression stockings in place  Neuro: PERRL, nonfocal exam    ROS: A complete 10-system review of systems was obtained and is negative with the exception of what is noted in the history of present illness.    Medications:       - MEDICATION INSTRUCTIONS -         acetaminophen  650 mg Oral Q6H    Or     acetaminophen  650 mg Rectal Q6H     acetylcysteine  4 mL Nebulization 4x  Daily     ARIPiprazole  5 mg Oral At Bedtime     atorvastatin  20 mg Oral At Bedtime     calcium carbonate-vitamin D  1 tablet Oral QAM     ciprofloxacin  750 mg Oral Q12H KEISHA     cyanocobalamin  500 mcg Oral QAM     diltiazem ER COATED BEADS  240 mg Oral Daily     docusate sodium  100 mg Oral BID     furosemide  40 mg Oral BID     gabapentin  600 mg Oral At Bedtime     insulin aspart  3 Units Subcutaneous Daily with lunch     insulin aspart  3 Units Subcutaneous Daily with breakfast     insulin aspart  1-7 Units Subcutaneous TID AC     insulin aspart  1-5 Units Subcutaneous At Bedtime     insulin glargine  20 Units Subcutaneous QAM AC     ipratropium - albuterol 0.5 mg/2.5 mg/3 mL  3 mL Nebulization 4x daily     pantoprazole  40 mg Oral Daily     polyethylene glycol  17 g Oral Daily     [Held by provider] potassium chloride ER  10 mEq Oral Daily     [START ON 5/30/2022] predniSONE  10 mg Oral Daily     rivaroxaban ANTICOAGULANT  20 mg Oral Daily with supper     sodium chloride  3 mL Nebulization 4x Daily     sodium chloride (PF)  3 mL Intracatheter Q8H     sodium chloride (PF)  3 mL Intracatheter Q8H     spironolactone  12.5 mg Oral Daily     Vitamin D3  25 mcg Oral QAM         DATA  All laboratory and radiology has been personally reviewed by myself today.  Recent Labs   Lab 05/28/22  0530   WBC 14.3*   HGB 10.9*   HCT 34.2*        Recent Labs   Lab 05/28/22  0530 05/27/22  0446 05/26/22  0514    142 141   CO2 44* 44* 43*   BUN 14 14 15     Micro  Sputum 5/18 2+ pseudomonas, pan-sensitive  PCT normal on 5/17  Urine, blood cultures neg    PFT DATA (June 2019):  FEV1/FVC is 48 and is reduced.  FEV1 is 49% predicted and is normal.  FVC is 78% predicted and is reduced.  There was no improvement in spirometry after a single inhaled dose of bronchodilator.  TLC is 103% predicted and is normal.  RV is 119% predicted and is normal.  DLCO is 39% predicted and is reduced when it   is corrected for  hemoglobin.    IMAGING:   CT chest (5/17/22):  - images directly reviewed, formal interpretation follows:  FINDINGS:   LUNGS AND PLEURA: Background of fairly advanced emphysema mixed centrilobular and paraseptal distribution with an upper lung predominance. In the areas of greatest emphysema the intervening interstitium has mild thickening, unchanged. The multiple   segment posterior airspace and interstitial process in the left lower lobe has mildly improved from 12 days earlier, however there is no more conspicuous and bandlike opacity in the left upper lobe along the posterior pleura and along the central   bronchovascular structures in the left lower lobe. Increased subpleural opacity in the right posterior costophrenic sulcus as well as development of multiple patchy inflammatory nodules within the parenchyma of the right lower lobe.     Several lobulated solid nodules are present in the right upper lobe. One of the nodules measuring 11 x 12 mm (series 4, image 110) has substantially increased from 11/3/2021 previously measuring 7 mm long axis. A second larger lobulated solid 9 x 16 mm   (series 4, image 123) right upper lobe nodule is also slightly larger measuring 7 x 14 mm. A few smaller solid nodules are present in the right upper lobe closer to the hilum (series 4, image 140 and 143) and are also larger from March 2021.     MEDIASTINUM: Biatrial cardiac enlargement. Cardiac ventricles are normal in size. No pericardial effusion. Enlarged right lower paratracheal lymph node measures up to 17 mm short axis (series 4, image 121) and enlarged left lower paratracheal lymph nodes   are also unchanged. No definite hilar adenopathy. Esophagus is decompressed. Conspicuous cisterna chyli.      CORONARY ARTERY CALCIFICATION: Three-vessel, moderate.     UPPER ABDOMEN: No actionable findings in the imaged upper abdomen.     MUSCULOSKELETAL: Degenerative osteophytes of the thoracic spine largest from T6 through the  thoracolumbar junction. No aggressive or destructive bone lesions are present.                                                                      IMPRESSION:      1.  Partial clearing of airspace opacities from the left lower lobe compared to 5/5/2022. Residual dense opacities are now along the central bronchovascular structures. There are new inflammatory nodules in the right lower lobe and increased opacity in   the right posterior sulcus consistent with new atelectasis/inflammation.  2.  Multiple solid nodules with lobular borders are present in the right upper lobe 2 largest of which measure 11 x 12 and 9 x 16 mm. The 11 x 12 nodule and several other solid nodules are substantially larger from 3/11/2021 and is suspect for neoplasm.   Consider PET/CT after resolution of this acute respiratory illness for further evaluation.  3.  Advanced emphysema.  4.  Severe enlargement of both cardiac atria and moderate atheromatous coronary calcifications.

## 2022-05-29 NOTE — PROGRESS NOTES
Pt received vest treatment and nebulizers as ordered. Pt remained on 5-7L NC with O2 sats 88-97%. Process started to qualify home BIPAP. Pt will not be placed on BIPAP this evening in order to get PaCO2 without support. RT to follow.     ,Gricelda Reyes, RT

## 2022-05-29 NOTE — PLAN OF CARE
Goal: Optimal Comfort and Wellbeing  Outcome: Ongoing, Progressing    Patient continues to require 6 L of supplemental oxygen to keep saturations > 88%. Lung sounds are coarse. Denies pain. New dressings applied to sacrum and left elbow. C/o constipation, suppository administered without success.  Provider paged about possible enema. Family at bedside visiting. VSS.

## 2022-05-30 NOTE — PROGRESS NOTES
Patient alert and oriented. VSS. Remains on 6 L oximyzer to keep sats between 88-92%. Oxygen titrated up to 10 L with activity. Denies any pain. Ambulated to bathroom with SBA, walker and gait belt. Overnight oximetry study ongoing. Patient encouraged to keep doing purse lip breathing, cough and deep breathing exercises.

## 2022-05-30 NOTE — PROGRESS NOTES
[facility]    Progress Note - Hospitalist Service       Date of Admission:  5/16/2022    Assessment & Plan        Adalgisa Solano is a 86 year old female admitted on 5/5/2022. She has a history of pulmonary disease including COPD with persistent bronchiectasis getting vest therapy at home for the last 2 years, non-insulin-dependent diabetes, chronic atrial fibrillation, chronic heart failure with preserved ejection fraction who has had several recent hospitalizations for COPD exacerbation with pneumonia.  Patient's baseline oxygen needs 3-5 L.  Admitted for recurrent COPD exacerbation without signs of infection. Alternating between oxymask and BiPAP at night. Multiple goals of care meetings held, patient and family with restorative goals at this time not wanting to pursue hospice at this time.  Would like to go home as soon as able, working on getting BiPAP device to prevent readmission given high oxygen needs, possible new baseline.     Acute on chronic respiratory failure  COPD exacerbation  Pseudomonal pneumonia   Probable malignant neoplasms  Oxygen needs had been stable around 6 L though increased today to 11 L.  No leukocytosis or otherwise significant changes in exam.  Patient will be completing antibiotics 4/30, no concerns for continued infection at this time though can consider if oxygen needs continue to increase. Alternating between Oxymask/oxymizer and BIPAP, patient is approved for a BiPAP device at home and pending arrival of device to prevent readmission.  Goals of care are aligned with symptom management and quality of life though patient and family do not want to pursue hospice at this time.  Aggressive measures not desired, goal to be at home as soon as possible.   - Pulmonology consulted, appreciate recs   - Continue oral prednisone taper (will complete 6/1)   - Continue PO ciprofloxacin (last dose 5/30)   - Waiting on BiPAP device, patient has been approved for  device  - Duonebs, mucomyst, sodium chloride nebs  - BiPAP at night  - Palliative signed off 5/26, hospice reconsulted 5/26 - family does not want to pursue hospice at this time  - Vest therapy  - RCAT consult     Chronic diastolic CHF  Bilateral lymphedema, improved  Pulmonary hypertension  Significant improvement in lower extremity edema from baseline,  admission , BUN and creatinine are normal. Home regimen includes Lasix (40mg daily) and Spironolactone (12.5 mg daily).   - Continue PTA spironolactone   - Continue PO lasix 40 mg BID      Herpes Zoster, (left buttock) resolved  Improving, scabbed over. No signs of secondary infection. Completed Valtrex, topical acyclovir.  - Continue to monitor, no need for contact precautions     Atrial fibrillation  HTN  On Xarelto 20 mg daily and diltiazem 240 mg daily prior to admission.  Blood pressures have been normal.  - Continue diltiazem 240 mg daily   - Continue daily Xarelto 20 mg     Anxiety disorder  - Continue PTA Abilify  - Ativan 0.25mg q4h prn for anxiety per palliative recs      T2DM  Steroid-induced hyperglycemia  Last Hgb A1C (3/21/22)- 6.2, suggesting well controlled diabetes. On metformin (500mg daily) prior to admission. Elevated in setting of steroids, preference to avoid hypoglycemia given age and goals of care, no changes to diabetes management at this time. Anticipate decreasing glucose levels with steroid taper.   - 4 times daily and AC glucose checks  - medium sliding scale insulin  - 1u:15carbs with meals, schedule 3 units with meals (breakfast and lunch only)  - Lantus 20u qAM     Chronic pain syndrome-chronic mid to low back pain  - Continue Tylenol as needed  - Continue gabapentin 600 mg at bedtime  - Avoid NSAIDs as on anticoagulant     Magnesium deficiency, resolved  - Mg replacement protocol    Urinary retention  Borjas removed 5/27. Patient reports good UOP.  - Monitor closely, bladder scan PRN  - Bladder protocol if signs of  retention    Constipation, improved  - Increased colace to BID  - Suppository PRN  - Miralax      Diet: Combination Diet Regular Diet Adult    DVT Prophylaxis: DOAC  Landaverde Catheter: Not present  Fluids: PO  Central Lines: PRESENT  PICC Triple Lumen 05/18/22 Right Basilic-Site Assessment: WDL    Cardiac Monitoring: None  Code Status: No CPR- Do NOT Intubate      Disposition Plan   Expected Discharge: 5/31/2022  Anticipated discharge location: home with family;home with help/services    Delays:    oxygen needs        The patient's care was discussed with the Attending Physician, Dr. Joshua Biswas MD PGY1  Hospitalist Service  [facility]  Securely message with the Vocera Web Console (learn more here)  Text page via [facility] Paging/Directory     ______________________________________________________________________    Interval History   On BiPAP overnight. O2 needs increased to 11 L this morning. Urinating well without landaverde, large bowel movement yesterday, no abdominal discomfort. No chest pain or palpitations.  Using incentive spirometry well.   Les and daughter are attentive at bedside.     Data reviewed today: I reviewed all medications, new labs and imaging results over the last 24 hours.     Physical Exam   Vital Signs: Temp: 97.3  F (36.3  C) Temp src: Oral BP: 111/58 Pulse: 96   Resp: 19 SpO2: (!) 88 % O2 Device: Oxymizer cannula Oxygen Delivery: 6 LPM  Weight: 130 lbs 3.2 oz     Constitutional: awake, alert, cooperative, appears comfortable, speaking in full sentences  Eyes: Lids and lashes normal, extra ocular muscles intact, sclera clear, conjunctiva normal  ENT: normocepalic, without obvious abnormality, atraumatic.  Respiratory: On Oxymizer. Coarse crackles throughout all lung fields bilaterally.  Cardiovascular:  RRR, no murmur auscultated  GI:  normal bowel sounds, soft, non-distended, non-tender  Musculoskeletal: compression stockings in place, trace  peripheral edema bilaterally, symmetric.  Neurologic: Awake, alert, oriented x4  Neuropsychiatric: General: normal, calm and normal eye contact     Data   Recent Labs   Lab 05/30/22  1133 05/30/22  0804 05/30/22  0607 05/29/22  0858 05/29/22  0741 05/28/22  0747 05/28/22  0530 05/27/22  0736 05/27/22  0446   WBC  --   --  13.0*  --   --   --  14.3*  --  11.1*   HGB  --   --  11.3*  --   --   --  10.9*  --  11.2*   MCV  --   --  92  --   --   --  92  --  93   PLT  --   --  251  --   --   --  245  --  259   NA  --   --  139  --   --   --  139  --  142   POTASSIUM  --   --  3.6  --  3.7  --  3.9  --  3.6   CHLORIDE  --   --  88*  --   --   --  86*  --  87*   CO2  --   --  39*  --   --   --  44*  --  44*   BUN  --   --  12  --   --   --  14  --  14   CR  --   --  0.57*  --   --   --  0.55*  --  0.47*   ANIONGAP  --   --  12  --   --   --  9  --  11   DENISE  --   --  8.3*  --   --   --  7.9*  --  7.7*   * 138* 180*   < >  --    < > 87   < > 113    < > = values in this interval not displayed.     No results found for this or any previous visit (from the past 24 hour(s)).

## 2022-05-30 NOTE — PLAN OF CARE
Problem: Plan of Care - These are the overarching goals to be used throughout the patient stay.    Goal: Absence of Hospital-Acquired Illness or Injury  Intervention: Prevent Skin Injury  Recent Flowsheet Documentation  Taken 5/30/2022 0744 by Tia Curran RN  Body Position: position changed independently     Problem: Gas Exchange Impaired  Goal: Optimal Gas Exchange  Outcome: Ongoing, Progressing  Intervention: Optimize Oxygenation and Ventilation  Recent Flowsheet Documentation  Taken 5/30/2022 0744 by Tia Curran RN  Head of Bed (HOB) Positioning: HOB at 60-90 degrees   Goal Outcome Evaluation:    Pt was having pain on her bottom.  She was given Tylenol with some relief.  Her O2 needs have fluctuated throughout the day.  She's been on anywhere from   6-11L at different times.  Switched from her oxymizer to the face mask and she is now on 8L.  PICC line dressing was changed today along with the mepilex on her bottom.  Pt daughter and  here throughout the day.                 Slit Excision Additional Text (Leave Blank If You Do Not Want): A linear line was drawn on the skin overlying the lesion. An incision was made slowly until the lesion was visualized.  Once visualized, the lesion was removed with blunt dissection.

## 2022-05-30 NOTE — PROGRESS NOTES
Pt required 9-11L O2 to keep O2 sats 87-91%. [facility] home oxygen contacted today by RT and verified that pt can go home on 10L (or any amount), high-flow if ordered by physician for home. [facility] will come 5/31 to set-up home Bipap. Nebulizer and vest treatments done as ordered. RT to follow.     Gricelda Reyes, RT

## 2022-05-30 NOTE — PLAN OF CARE
Goal Outcome Evaluation:      Patient is alert and oriented. Able to make needs known. SATs in the low 80's at 6L via Oximyzer at 0512. Patient encouraged slow purse lip breathing. RT updated. 02 up to 8L via Oximyzer NC per RT to keep SATs between 88%- 92%. Pain controlled with scheduled Tylenol. Up with SBA to bedside commode. Voiding adequately. PICC line patent. Call light within reach.

## 2022-05-30 NOTE — PROGRESS NOTES
CLINICAL NUTRITION SERVICES - REASSESSMENT NOTE     Nutrition Prescription    RECOMMENDATIONS FOR MDs/PROVIDERS TO ORDER:  Could give a daily MVI     Malnutrition Status:    Does not meet 2 criteria    Recommendations already ordered by Registered Dietitian (RD):  If po is <50% at meal may want to offer Ensure daily       Future/Additional Recommendations:  none     EVALUATION OF THE PROGRESS TOWARD GOALS   Diet: Regular  Intake: 75%         ANTHROPOMETRICS  Admission wt: 64kg  Most Recent Weight: 59kg getting lasix        PHYSICAL FINDINGS  Edema-nothing new charted  GI-WDL  Skin-stage 1 sacral wound    LABS  Reviewed, Cr-.57, mg-1.6    MEDICATIONS  Reviewed, lasix, novolog, lantus      NUTRITION DIAGNOSIS  Increased nutrient needs r/t new wound AEB stage 1 sacral wound    Goals:  Pt to consume >75% of meals to promote healing-met    INTERVENTIONS  Implementation  If po is <50% at meal may want to offer Ensure daily   Start a daily MVI       Monitoring/Evaluation  Progress toward goals will be monitored and evaluated per protocol.

## 2022-05-30 NOTE — PROGRESS NOTES
PULMONARY MEDICINE PROGRESS NOTE  5/25/2022    Admit Date: 5/16/2022  CODE: No CPR- Do NOT Intubate    Reason for Consult: acute on chronic hypoxic respiratory failure, severe COPD with bronchiectasis    Assessment/Plan:   86 year old female with a history of emphysema (DLCO 39% predicted), COPD (FEV1 49% predicted without reversibility) and extensive fibrotic bronchiectasis, who presents with acute on chronic respiratory failure with hypoxemia after multiple admissions for COPD exacerbations.    Acute on chronic respiratory failure with hypoxia, severe COPD with bronchiectasis: Poor prognosis. Extensive biapical emphysema and bibasilar fibrotic bronchiectasis. Back to 11 L/min oxymizer. Patient does not want hospice enrollment and refuses discharge to SNF. It will be quite difficult for her to remain stable in a home setting, but this is our only option given patient preferences.    Plan:  - goal SpO2 88-92%, avoid hyperoxia; currently on 11 L/min oxymizer, SpO2 92%, may be able to sneak it down  - can work with [employer] (her current oxygen DME) and [facility] to get her a home concentrator that can deliver 10 L/min  - continue BiPAP (10/5, backup rate 14) at night and as needed during the day; RT qualified her for home BiPAP and [facility] working to try to get her a device for home use to prevent rehospitalization  - continue prednisone taper as ordered  - cont cipro to complete 14 day course of abx for pseudomonal pneumonia/tracheobronchitis  - cont scheduled duonebs qid, mucomyst nebs qid, 3% sodium chloride nebs qid, albuterol nebs prn  - no work-up of pulmonary nodules; not a candidate for invasive diagnostic procedures and would not tolerate radiation or chemotherapy if malignancy diagnosed  - encourage OOB, PT/OT, push IS  - cont vest therapy bid (per home regimen)  - appreciate palliative care, hospice involvement; patient declines hospice enrollment  - DNR/DNI  - pulmonary clinic  notified to schedule patient for follow-up visit with Dr. Juarez at next available  - will follow with you    Ronnie Carney MD  Pulmonary and Critical Care Medicine  [facility]  Pager [phone]  Office [phone]  (he/him/his)                                                                                                                                                          SUBJECTIVE/INTERVAL HISTORY   Back to 11 L/min oxymizer. Was off BiPAP overnight to qualify for home device; PaCO2 increased 7 cm H2O so does qualify.                                                                                                                                                 Exam/Data:     Vitals  /58 (BP Location: Left arm)   Pulse 96   Temp 97.3  F (36.3  C)   Resp 19   Wt 59.1 kg (130 lb 3.2 oz)   SpO2 (!) 88%   BMI 24.60 kg/m       I/O last 3 completed shifts:  In: 10 [I.V.:10]  Out: 1400 [Urine:1400]  Weight change: 0.408 kg (14.4 oz)  [unfilled]  EXAM:  /58 (BP Location: Left arm)   Pulse 96   Temp 97.3  F (36.3  C)   Resp 19   Wt 59.1 kg (130 lb 3.2 oz)   SpO2 (!) 88%   BMI 24.60 kg/m      Intake/Output last 3 shifts:  I/O last 3 completed shifts:  In: 10 [I.V.:10]  Out: 1400 [Urine:1400]  Intake/Output this shift:  I/O this shift:  In: -   Out: 300 [Urine:300]    Physical Exam  Gen: alert, oriented, appears chronically ill  HEENT: NT, no MAYE  CV: tachy, regular, no m/g/r  Resp: diminished bilaterally with bibasilar coarse crackles and inspiratory squeaks  Abd: soft, nontender, BS+  Skin: no rashes or lesions  Ext: no edema, compression stockings in place  Neuro: PERRL, nonfocal exam    ROS: A complete 10-system review of systems was obtained and is negative with the exception of what is noted in the history of present illness.    Medications:       - MEDICATION INSTRUCTIONS -         acetaminophen  650 mg Oral Q6H    Or     acetaminophen  650 mg Rectal Q6H     acetylcysteine  4  mL Nebulization 4x Daily     ARIPiprazole  5 mg Oral At Bedtime     atorvastatin  20 mg Oral At Bedtime     calcium carbonate-vitamin D  1 tablet Oral QAM     ciprofloxacin  750 mg Oral Q12H KEISHA     cyanocobalamin  500 mcg Oral QAM     diltiazem ER COATED BEADS  240 mg Oral Daily     docusate sodium  100 mg Oral BID     docusate sodium  1 enema Rectal Once     furosemide  40 mg Oral BID     gabapentin  600 mg Oral At Bedtime     insulin aspart  3 Units Subcutaneous Daily with lunch     insulin aspart  3 Units Subcutaneous Daily with breakfast     insulin aspart  1-7 Units Subcutaneous TID AC     insulin aspart  1-5 Units Subcutaneous At Bedtime     insulin glargine  20 Units Subcutaneous QAM AC     ipratropium - albuterol 0.5 mg/2.5 mg/3 mL  3 mL Nebulization 4x daily     pantoprazole  40 mg Oral Daily     polyethylene glycol  17 g Oral Daily     [Held by provider] potassium chloride ER  10 mEq Oral Daily     predniSONE  10 mg Oral Daily     rivaroxaban ANTICOAGULANT  20 mg Oral Daily with supper     sodium chloride  3 mL Nebulization 4x Daily     sodium chloride (PF)  3 mL Intracatheter Q8H     sodium chloride (PF)  3 mL Intracatheter Q8H     spironolactone  12.5 mg Oral Daily     Vitamin D3  25 mcg Oral QAM         DATA  All laboratory and radiology has been personally reviewed by myself today.  Recent Labs   Lab 05/30/22  0607   WBC 13.0*   HGB 11.3*   HCT 35.8        Recent Labs   Lab 05/30/22  0607 05/28/22  0530 05/27/22  0446    139 142   CO2 39* 44* 44*   BUN 12 14 14     Micro  Sputum 5/18 2+ pseudomonas, pan-sensitive  PCT normal on 5/17  Urine, blood cultures neg    PFT DATA (June 2019):  FEV1/FVC is 48 and is reduced.  FEV1 is 49% predicted and is normal.  FVC is 78% predicted and is reduced.  There was no improvement in spirometry after a single inhaled dose of bronchodilator.  TLC is 103% predicted and is normal.  RV is 119% predicted and is normal.  DLCO is 39% predicted and is reduced  when it   is corrected for hemoglobin.    IMAGING:   CT chest (5/17/22):  - images directly reviewed, formal interpretation follows:  FINDINGS:   LUNGS AND PLEURA: Background of fairly advanced emphysema mixed centrilobular and paraseptal distribution with an upper lung predominance. In the areas of greatest emphysema the intervening interstitium has mild thickening, unchanged. The multiple   segment posterior airspace and interstitial process in the left lower lobe has mildly improved from 12 days earlier, however there is no more conspicuous and bandlike opacity in the left upper lobe along the posterior pleura and along the central   bronchovascular structures in the left lower lobe. Increased subpleural opacity in the right posterior costophrenic sulcus as well as development of multiple patchy inflammatory nodules within the parenchyma of the right lower lobe.     Several lobulated solid nodules are present in the right upper lobe. One of the nodules measuring 11 x 12 mm (series 4, image 110) has substantially increased from 11/3/2021 previously measuring 7 mm long axis. A second larger lobulated solid 9 x 16 mm   (series 4, image 123) right upper lobe nodule is also slightly larger measuring 7 x 14 mm. A few smaller solid nodules are present in the right upper lobe closer to the hilum (series 4, image 140 and 143) and are also larger from March 2021.     MEDIASTINUM: Biatrial cardiac enlargement. Cardiac ventricles are normal in size. No pericardial effusion. Enlarged right lower paratracheal lymph node measures up to 17 mm short axis (series 4, image 121) and enlarged left lower paratracheal lymph nodes   are also unchanged. No definite hilar adenopathy. Esophagus is decompressed. Conspicuous cisterna chyli.      CORONARY ARTERY CALCIFICATION: Three-vessel, moderate.     UPPER ABDOMEN: No actionable findings in the imaged upper abdomen.     MUSCULOSKELETAL: Degenerative osteophytes of the thoracic spine  largest from T6 through the thoracolumbar junction. No aggressive or destructive bone lesions are present.                                                                      IMPRESSION:      1.  Partial clearing of airspace opacities from the left lower lobe compared to 5/5/2022. Residual dense opacities are now along the central bronchovascular structures. There are new inflammatory nodules in the right lower lobe and increased opacity in   the right posterior sulcus consistent with new atelectasis/inflammation.  2.  Multiple solid nodules with lobular borders are present in the right upper lobe 2 largest of which measure 11 x 12 and 9 x 16 mm. The 11 x 12 nodule and several other solid nodules are substantially larger from 3/11/2021 and is suspect for neoplasm.   Consider PET/CT after resolution of this acute respiratory illness for further evaluation.  3.  Advanced emphysema.  4.  Severe enlargement of both cardiac atria and moderate atheromatous coronary calcifications.

## 2022-05-31 NOTE — PLAN OF CARE
Problem: Respiratory Compromise (Heart Failure)  Goal: Effective Oxygenation and Ventilation  Outcome: Ongoing, Not Progressing   Goal Outcome Evaluation:        BiPAP in place until 0330. Patient stated she was done with it for the night RT aware. Switched to 8L Oxymask. O2 satting 91-92%. Desats with activity. Ax1 to bedside commode. Slept well overnight.

## 2022-05-31 NOTE — PROGRESS NOTES
Received pt on 10 LPM oxymizer.  Pt given Duoneb/MM/Nacl @ 0800 and 1600, Pt declined 1200 neb.  Home VEST done per home settings for 30 minutes this AM as ordered.  O2 sats remain upper 80's to lower 90's.  BS diminished and cs.  Will continue to monitor pt.

## 2022-05-31 NOTE — PROGRESS NOTES
Care Management Follow Up    Length of Stay (days): 15    Expected Discharge Date: 06/01/2022     Concerns to be Addressed:  oxygen needs        Patient plan of care discussed at interdisciplinary rounds: Yes    Anticipated Discharge Disposition: Home with family and [facility]      Anticipated Discharge Services: [facility]     Anticipated Discharge DME:  (02 and BiPAP)    Education Provided on the Discharge Plan:  CM met with patient and daughter bedside. AVS will be per bedside RN.    Patient/Family in Agreement with the Plan: yes      Additional Information:  Chart reviewed. CM met with patient beside. Patient and daughter discussed that anticipate discharge home with increased 02 needs and new BiPAP. Anticipate patient will discharge home with  [facility].  Anticipate family will transport home at time of hospital discharge.         Anna Rodriguez RN

## 2022-05-31 NOTE — PROGRESS NOTES
[facility]    Progress Note - Hospitalist Service       Date of Admission:  5/16/2022    Assessment & Plan        Adalgisa Solano is a 86 year old female admitted on 5/5/2022. She has a history of pulmonary disease including COPD with persistent bronchiectasis getting vest therapy at home for the last 2 years, non-insulin-dependent diabetes, chronic atrial fibrillation, chronic heart failure with preserved ejection fraction who has had several recent hospitalizations for COPD exacerbation with pneumonia.  Patient's baseline oxygen needs 3-5 L.  Admitted for recurrent COPD exacerbation without signs of infection. Alternating between oxymask and BiPAP at night. Multiple goals of care meetings held, patient and family with restorative goals at this time not wanting to pursue hospice at this time.  Would like to go home as soon as able, working on getting BiPAP device to prevent readmission given high oxygen needs, possible new baseline.     Acute on chronic respiratory failure  COPD exacerbation  Pseudomonal pneumonia   Probable malignant neoplasms  Oxygen needs have remained high at 10 to 11 L.  No significant changes on exam though increased WBC.   Alternating between Oxymask/oxymizer and BIPAP, patient is approved for a BiPAP device at home and pending arrival of device to prevent readmission.  Goals of care are aligned with symptom management and quality of life though patient and family do not want to pursue hospice at this time.  Aggressive measures not desired, goal to be at home as soon as possible.  Given high oxygen requirements appropriate to watch overnight and if stable and patient has BiPAP device at home we will plan to discharge 6/1  - Pulmonology consulted, appreciate recs   - Continue oral prednisone taper (will complete 6/1)   - Continue PO ciprofloxacin for 14-day course   - Waiting on BiPAP device, patient has been approved for device  - Duonebs, mucomyst, sodium  chloride nebs  - BiPAP at night  - Palliative signed off 5/26, hospice reconsulted 5/26 - family does not want to pursue hospice at this time  - Vest therapy  - RCAT consult  -Intensivist signed off  -Patient scheduled to see Dr. Juarez in pulmonary clinic on August 2 at 1 PM     Chronic diastolic CHF  Bilateral lymphedema, improved  Pulmonary hypertension  Significant improvement in lower extremity edema from baseline,  admission , BUN and creatinine are normal. Home regimen includes Lasix (40mg daily) and Spironolactone (12.5 mg daily).   - Continue PTA spironolactone   - Continue PO lasix 40 mg BID      Herpes Zoster, (left buttock) resolved  Improving, scabbed over. No signs of secondary infection. Completed Valtrex, topical acyclovir.  - Continue to monitor, no need for contact precautions     Atrial fibrillation  HTN  On Xarelto 20 mg daily and diltiazem 240 mg daily prior to admission.  Blood pressures have been normal.  - Continue diltiazem 240 mg daily   - Continue daily Xarelto 20 mg     Anxiety disorder  - Continue PTA Abilify  - Ativan 0.25mg q4h prn for anxiety per palliative recs      T2DM  Steroid-induced hyperglycemia  Last Hgb A1C (3/21/22)- 6.2, suggesting well controlled diabetes. On metformin (500mg daily) prior to admission. Elevated in setting of steroids, preference to avoid hypoglycemia given age and goals of care, no changes to diabetes management at this time. Anticipate decreasing glucose levels with steroid taper.   - 4 times daily and AC glucose checks  - medium sliding scale insulin  - 1u:15carbs with meals, schedule 3 units with meals (breakfast and lunch only)  - Lantus 20u qAM     Chronic pain syndrome-chronic mid to low back pain  - Continue Tylenol as needed  - Continue gabapentin 600 mg at bedtime  - Avoid NSAIDs as on anticoagulant     Magnesium deficiency, resolved  - Mg replacement protocol    Urinary retention  Borjas removed 5/27. Patient reports good UOP.  - Monitor closely,  bladder scan PRN  - Bladder protocol if signs of retention    Constipation, improved  - Increased colace to BID  - Suppository PRN  - Miralax      Diet: Combination Diet Regular Diet Adult    DVT Prophylaxis: DOAC  Landaverde Catheter: Not present  Fluids: PO  Central Lines: PRESENT  PICC Triple Lumen 05/18/22 Right Basilic-Site Assessment: WDL    Cardiac Monitoring: None  Code Status: No CPR- Do NOT Intubate      Disposition Plan   Expected Discharge: 6/1/2022  Anticipated discharge location: home with family;home with help/services    Delays:    oxygen needs        The patient's care was discussed with the Attending Physician, Dr. Joshua Biswas MD PGY1  Hospitalist Service  [facility]  Securely message with the Vocera Web Console (learn more here)  Text page via Active Media Paging/Directory     ______________________________________________________________________    Interval History   No acute overnight events, patient has been afebrile.  O2 needs increased to 10 L this morning. Urinating well without landaverde, no BM today, no abdominal discomfort. No chest pain or palpitations.  Using incentive spirometry well.   Les attentive at bedside.  White blood cell count, patient and  discussed concerns about going home if oxygen needs increase in hoping that things go smoothly and patient would not have to return to the hospital.  Asking about imaging or if anything else can happen for work-up and management.  Discussed that this is likely new baseline for patient and appropriate to watch throughout the day to see if oxygen needs are stable or increasing.     Data reviewed today: I reviewed all medications, new labs and imaging results over the last 24 hours.     Physical Exam   Vital Signs: Temp: 97.3  F (36.3  C) Temp src: Oral BP: 108/62 Pulse: 95   Resp: 18 SpO2: 98 % O2 Device: Oxymizer cannula Oxygen Delivery: 10 LPM  Weight: 129 lbs 11.2 oz     Constitutional: awake,  alert, cooperative, appears comfortable sitting up in chair, speaking in full sentences  Eyes: Lids and lashes normal, extra ocular muscles intact, sclera clear, conjunctiva normal  ENT: normocepalic, without obvious abnormality, atraumatic.  Respiratory: On Oxymizer. Coarse crackles throughout all lung fields bilaterally.  Unchanged from previous  Cardiovascular:  RRR, no murmur auscultated  GI:  normal bowel sounds, soft, non-distended, non-tender  Musculoskeletal: compression stockings in place, trace peripheral edema bilaterally, symmetric.  Neurologic: Awake, alert, oriented x4  Neuropsychiatric: General: normal, calm and normal eye contact     Data   Recent Labs   Lab 05/31/22  1647 05/31/22  1211 05/31/22  0808 05/31/22  0556 05/30/22  0804 05/30/22  0607 05/29/22  0858 05/29/22  0741 05/28/22  0747 05/28/22  0530   WBC  --   --   --  14.2*  --  13.0*  --   --   --  14.3*   HGB  --   --   --  11.1*  --  11.3*  --   --   --  10.9*   MCV  --   --   --  96  --  92  --   --   --  92   PLT  --   --   --  241  --  251  --   --   --  245   NA  --   --   --  140  --  139  --   --   --  139   POTASSIUM  --   --   --  3.7  --  3.6  --  3.7  --  3.9   CHLORIDE  --   --   --  92*  --  88*  --   --   --  86*   CO2  --   --   --  40*  --  39*  --   --   --  44*   BUN  --   --   --  12  --  12  --   --   --  14   CR  --   --   --  0.50*  --  0.57*  --   --   --  0.55*   ANIONGAP  --   --   --  8  --  12  --   --   --  9   DENISE  --   --   --  8.5  --  8.3*  --   --   --  7.9*   * 127* 166* 107   < > 180*   < >  --    < > 87    < > = values in this interval not displayed.     No results found for this or any previous visit (from the past 24 hour(s)).

## 2022-05-31 NOTE — PROGRESS NOTES
Pt received breathing tx as ordered, Duoneb, Mucomyst with 3% Sodium Chloride via neb as well as scheduled vest therapy for 30min. Pt tolerated well has loose weak cough, breath sounds coarse with expiratory wheezing. Pt returned to 8L oxymask SAT 94%. At 1030pm pt was placed on Bipap 10/5 at 40% with a medium mask until around 0330am. Pt was awake and wanted to be on oxymask for remainder of the night. RT to follow.

## 2022-05-31 NOTE — PLAN OF CARE
Problem: Gas Exchange Impaired  Goal: Optimal Gas Exchange  Outcome: Ongoing, Progressing   Goal Outcome Evaluation:    Patient on 10L via oximyzer cannula today sating 88-91%. Patient remains dyspneic with activity. Up with assist of one to bedside commode. Patient attempt to have bowel movement, unsuccessful, given PRN Sennokot. RT administering neb treatments, awaiting BiPap how home use prior to discharging to home with family assist.

## 2022-05-31 NOTE — PROGRESS NOTES
PULMONARY MEDICINE PROGRESS NOTE  5/25/2022    Admit Date: 5/16/2022  CODE: No CPR- Do NOT Intubate    Reason for Consult: acute on chronic hypoxic respiratory failure, severe COPD with bronchiectasis    Assessment/Plan:   86 year old female with a history of emphysema (DLCO 39% predicted), COPD (FEV1 49% predicted without reversibility) and extensive fibrotic bronchiectasis, who presents with acute on chronic respiratory failure with hypoxemia after multiple admissions for COPD exacerbations.    Acute on chronic respiratory failure with hypoxia, severe COPD with bronchiectasis. Pseudomonas pneumonia: Poor prognosis. Extensive biapical emphysema and bibasilar fibrotic bronchiectasis. On 10 L/min oxymizer, which is likely her new baseline given the extent of her end-stage lung disease. Patient does not want hospice enrollment and refuses discharge to SNF. It will be quite difficult for her to remain stable in a home setting, but this is our only option given patient preferences.    Plan:  - goal SpO2 88-92%, avoid hyperoxia; currently on 10 L/min oxymizer which is likely new baseline  - can work with [employer] (her current oxygen DME) and [facility] to get her a home concentrator that can deliver 10 L/min; order placed in discharge navigator  - continue BiPAP (10/5, backup rate 14) at night and as needed during the day; RT qualified her for home BiPAP and [employer] can provide this for home use; placed discharge order for this  - continue prednisone taper as ordered  - cont cipro to complete 14 day course of abx for pseudomonal pneumonia/tracheobronchitis  - cont scheduled duonebs qid, mucomyst nebs qid, 3% sodium chloride nebs qid, albuterol nebs prn with in-line Aerobika flutter valve; would continue these on discharge  - no work-up of pulmonary nodules; not a candidate for invasive diagnostic procedures and would not tolerate radiation or chemotherapy if malignancy diagnosed  - encourage OOB, PT/OT, IS  -  cont vest therapy bid (per home regimen)  - appreciate palliative care, hospice involvement; patient declines hospice enrollment  - DNR/DNI  - scheduled to see Dr. Juarez in pulmonary clinic on August 2 at 1300  - okay to discharge from pulmonary standpoint  - discussed with the resident service    Ronnie Carney MD  Pulmonary and Critical Care Medicine  [facility]  Pager [phone]  Office [phone]  (he/him/his)                                                                                                                                                          SUBJECTIVE/INTERVAL HISTORY   At baseline, on 10 L/min. No fevers, no new cough. WBC elevation expected on prednisone.                                                                                                                                                 Exam/Data:     Vitals  /56 (BP Location: Right arm)   Pulse 92   Temp 97.4  F (36.3  C) (Oral)   Resp 19   Wt 58.8 kg (129 lb 11.2 oz)   SpO2 90%   BMI 24.51 kg/m       I/O last 3 completed shifts:  In: 1440 [P.O.:1440]  Out: 2300 [Urine:2300]  Weight change: -0.227 kg (-8 oz)  [unfilled]  EXAM:  /56 (BP Location: Right arm)   Pulse 92   Temp 97.4  F (36.3  C) (Oral)   Resp 19   Wt 58.8 kg (129 lb 11.2 oz)   SpO2 90%   BMI 24.51 kg/m      Intake/Output last 3 shifts:  I/O last 3 completed shifts:  In: 1440 [P.O.:1440]  Out: 2300 [Urine:2300]  Intake/Output this shift:  I/O this shift:  In: 240 [P.O.:240]  Out: 400 [Urine:400]    Physical Exam  Gen: alert, oriented, appears chronically ill  HEENT: NT, no MAYE  CV: tachy, regular, no m/g/r  Resp: diminished bilaterally with bibasilar coarse crackles and inspiratory squeaks, no normal alveolar sounds  Abd: soft, nontender, BS+  Skin: no rashes or lesions  Ext: no edema, compression stockings in place  Neuro: PERRL, nonfocal exam    ROS: A complete 10-system review of systems was obtained and is negative with the  exception of what is noted in the history of present illness.    Medications:       - MEDICATION INSTRUCTIONS -         acetaminophen  650 mg Oral Q6H    Or     acetaminophen  650 mg Rectal Q6H     acetylcysteine  4 mL Nebulization 4x Daily     ARIPiprazole  5 mg Oral At Bedtime     atorvastatin  20 mg Oral At Bedtime     calcium carbonate-vitamin D  1 tablet Oral QAM     ciprofloxacin  750 mg Oral BID     cyanocobalamin  500 mcg Oral QAM     diltiazem ER COATED BEADS  240 mg Oral Daily     docusate sodium  100 mg Oral BID     docusate sodium  1 enema Rectal Once     furosemide  40 mg Oral BID     gabapentin  600 mg Oral At Bedtime     insulin aspart  3 Units Subcutaneous Daily with lunch     insulin aspart  3 Units Subcutaneous Daily with breakfast     insulin aspart  1-7 Units Subcutaneous TID AC     insulin aspart  1-5 Units Subcutaneous At Bedtime     insulin glargine  20 Units Subcutaneous QAM AC     ipratropium - albuterol 0.5 mg/2.5 mg/3 mL  3 mL Nebulization 4x daily     pantoprazole  40 mg Oral Daily     polyethylene glycol  17 g Oral Daily     [Held by provider] potassium chloride ER  10 mEq Oral Daily     predniSONE  10 mg Oral Daily     rivaroxaban ANTICOAGULANT  20 mg Oral Daily with supper     sodium chloride  3 mL Nebulization 4x Daily     sodium chloride (PF)  3 mL Intracatheter Q8H     sodium chloride (PF)  3 mL Intracatheter Q8H     spironolactone  12.5 mg Oral Daily     Vitamin D3  25 mcg Oral QAM         DATA  All laboratory and radiology has been personally reviewed by myself today.  Recent Labs   Lab 05/31/22  0556   WBC 14.2*   HGB 11.1*   HCT 36.4        Recent Labs   Lab 05/31/22  0556 05/30/22  0607 05/28/22  0530    139 139   CO2 40* 39* 44*   BUN 12 12 14     Micro  Sputum 5/18 2+ pseudomonas, pan-sensitive  PCT normal on 5/17  Urine, blood cultures neg    PFT DATA (June 2019):  FEV1/FVC is 48 and is reduced.  FEV1 is 49% predicted and is normal.  FVC is 78% predicted and is  reduced.  There was no improvement in spirometry after a single inhaled dose of bronchodilator.  TLC is 103% predicted and is normal.  RV is 119% predicted and is normal.  DLCO is 39% predicted and is reduced when it   is corrected for hemoglobin.    IMAGING:   CT chest (5/17/22):  - images directly reviewed, formal interpretation follows:  FINDINGS:   LUNGS AND PLEURA: Background of fairly advanced emphysema mixed centrilobular and paraseptal distribution with an upper lung predominance. In the areas of greatest emphysema the intervening interstitium has mild thickening, unchanged. The multiple   segment posterior airspace and interstitial process in the left lower lobe has mildly improved from 12 days earlier, however there is no more conspicuous and bandlike opacity in the left upper lobe along the posterior pleura and along the central   bronchovascular structures in the left lower lobe. Increased subpleural opacity in the right posterior costophrenic sulcus as well as development of multiple patchy inflammatory nodules within the parenchyma of the right lower lobe.     Several lobulated solid nodules are present in the right upper lobe. One of the nodules measuring 11 x 12 mm (series 4, image 110) has substantially increased from 11/3/2021 previously measuring 7 mm long axis. A second larger lobulated solid 9 x 16 mm   (series 4, image 123) right upper lobe nodule is also slightly larger measuring 7 x 14 mm. A few smaller solid nodules are present in the right upper lobe closer to the hilum (series 4, image 140 and 143) and are also larger from March 2021.     MEDIASTINUM: Biatrial cardiac enlargement. Cardiac ventricles are normal in size. No pericardial effusion. Enlarged right lower paratracheal lymph node measures up to 17 mm short axis (series 4, image 121) and enlarged left lower paratracheal lymph nodes   are also unchanged. No definite hilar adenopathy. Esophagus is decompressed. Conspicuous cisterna  chyli.      CORONARY ARTERY CALCIFICATION: Three-vessel, moderate.     UPPER ABDOMEN: No actionable findings in the imaged upper abdomen.     MUSCULOSKELETAL: Degenerative osteophytes of the thoracic spine largest from T6 through the thoracolumbar junction. No aggressive or destructive bone lesions are present.                                                                      IMPRESSION:      1.  Partial clearing of airspace opacities from the left lower lobe compared to 5/5/2022. Residual dense opacities are now along the central bronchovascular structures. There are new inflammatory nodules in the right lower lobe and increased opacity in   the right posterior sulcus consistent with new atelectasis/inflammation.  2.  Multiple solid nodules with lobular borders are present in the right upper lobe 2 largest of which measure 11 x 12 and 9 x 16 mm. The 11 x 12 nodule and several other solid nodules are substantially larger from 3/11/2021 and is suspect for neoplasm.   Consider PET/CT after resolution of this acute respiratory illness for further evaluation.  3.  Advanced emphysema.  4.  Severe enlargement of both cardiac atria and moderate atheromatous coronary calcifications.

## 2022-06-01 NOTE — PLAN OF CARE
Problem: Heart Failure Comorbidity  Goal: Maintenance of Heart Failure Symptom Control  Outcome: Ongoing, Progressing     Problem: Respiratory Compromise (Heart Failure)  Goal: Effective Oxygenation and Ventilation  Outcome: Ongoing, Progressing    Patient remains on 8-10L via oximyzer this shift. Scheduled Nebs by RT. Up with SBA with bedside commode, dyspnea with activity. PRN Senokot given per patient request of c/o constipation, however BM charted 5/31. Awaiting home care and BiPaP availability for home prior to discharge.

## 2022-06-01 NOTE — PLAN OF CARE
Problem: Gas Exchange Impaired  Goal: Optimal Gas Exchange  Outcome: Ongoing, Progressing     Problem: Respiratory Compromise (Heart Failure)  Goal: Effective Oxygenation and Ventilation  Outcome: Ongoing, Progressing   Goal Outcome Evaluation:    Patient on BiPap 40% FI02 overnight. Tolerated well. On Oxymizer 9-10 liters while awake. Oxygen sats in 88-92%. Vitals stable. Denies pain. Lung sounds coarse. Dyspnea with exertion. Alert and oriented. Bed alarm on.

## 2022-06-01 NOTE — PROGRESS NOTES
Spoke with [facility] regarding O2 capable of delivering 10 lpm O2 flow and BiPAP, evidently [facility] had not been previously contacted nor have they received any supporting documentation or new MD orders. [facility] also does not keep BiPAP in stock and estimates that it will take 2-4 weeks to get a BiPAP unit once all the supporting documentation is received and verified. What they require to move forward is     1 : MD chart notes explaining the reason for the new equipment   2 : Test results documenting the need (Sleep Study, ABG before/after)  3 : New Rx from MD    fax records ([phone])  Phone # ([phone])    Information relayed to MD resident, spoke with pt and spouse regarding the BiPAP unavailability from [facility], pt expressed that they had no issue switching to a different company that could provide both a higher litre flow O2 system with portable capability and BiPAP. Insurance verification needs to be confirmed. Suggested to MD resident that Case Management needs to be involved at this point. RT available as needed.

## 2022-06-01 NOTE — PROGRESS NOTES
1252 O2 8 lpm/Oxymizer, SpO2 93 %, BS diffuse crackles, coarse bilat. Duoneb/Nebusal/Mucomyst tx given/mask. BS after unchanged, frequent loose productive coughs into kleenex or swallowed, no sputum assessment, RT to follow. Titrate O2 as tolerated.    /57 (BP Location: Left arm)   Pulse 100   Temp 97.2  F (36.2  C) (Oral)   Resp 22   Wt 58.9 kg (129 lb 12.8 oz)   SpO2 93%   BMI 24.53 kg/m

## 2022-06-01 NOTE — PROGRESS NOTES
Spoke with Anna, , regarding NIV for this patient at discharge. Let her know order for NIV with settings is needed and documentation of medical necessity. [facility] can move forward with setup after documentation has been received. Unsure of discharge at this time.     Sent pending order for Non-Invasive ventilator to Dr. Kowalski for review and signature.     Evie Chahal, RRT  [facility]  [phone]

## 2022-06-01 NOTE — PROGRESS NOTES
Started Vest therapy (home machine x 30 minutes medium frequency), O2 at 10 lpm/Oxymask, SpO2 95%, decreased O2 to 8 lpm. BS diminished bilat/crackles, Duoneb/Nebusal/Mucomyst tx given, BS after unchanged. SpO2 92 % after tx. Reported to RN/MD. RT to [facility]    /57 (BP Location: Left arm)   Pulse 100   Temp 97.2  F (36.2  C) (Oral)   Resp 22   Wt 58.9 kg (129 lb 12.8 oz)   SpO2 95%   BMI 24.53 kg/m

## 2022-06-01 NOTE — PROGRESS NOTES
Care Management Follow Up    Length of Stay (days): 16    Expected Discharge Date: 06/02/2022     Concerns to be Addressed:  Home 02 and home BiPAP       Patient plan of care discussed at interdisciplinary rounds: Yes    Anticipated Discharge Disposition: Home Care, Home with family     Anticipated Discharge Services:  INTERIM HOME HEALTH CARE    Anticipated Discharge DME:  Increased 02 and BiPAP    Education Provided on the Discharge Plan: PRICILA met with patient, daughter and  (bedside).      Patient/Family in Agreement with the Plan: yes    Referrals Placed by CM/SW:          Additional Information:  Chart reviewed. PRICILA met with patient and family bedsideCM has updated patient and family. PRICILA paged Dr Kowalski to get orders for home BIPAP. Needing  orders for home BiPAP and documentation of medical necessity. And orders for home 02. Per note by Ronnie Carney MD can work with [employer] (her current oxygen DME) and [facility] to get her a home concentrator that can deliver 10 L/min; order placed in discharge navigator. PRICILA has been working with    Evie Chahal at [facility] ([phone]). Contact # for [employer] [phone].  PRICILA spoke with [facility] stating that [facility] will need a new prescription faxed to [phone]. Once they have the new prescription [facility] will get the patient a new concentrator.  PRICILA updated Resident team.                   Anna Rodriguez, BARON

## 2022-06-01 NOTE — PLAN OF CARE
Problem: Plan of Care - These are the overarching goals to be used throughout the patient stay.    Goal: Optimal Comfort and Wellbeing  Outcome: Ongoing, Progressing  Intervention: Provide Person-Centered Care  Recent Flowsheet Documentation  Taken 5/31/2022 1600 by Meaghan Steen RN  Trust Relationship/Rapport:    care explained    questions answered    Pt alert and oriented x 4. VSS. Denies chest pain and N/V. Up with assist 1. Alarms on for safety.     Problem: Gas Exchange Impaired  Goal: Optimal Gas Exchange  Outcome: Ongoing, Progressing    Lung sounds coarse with crackles. On 10 L/min oxymiser; sat @ 95%. Dyspnea on exertion. Bipap don @ HS.

## 2022-06-01 NOTE — PROGRESS NOTES
Attempted to give Vest with Respiratory medications, pt declined at this time and wanted to wait until after breakfast. O2 at 10 lpm/Oxymizer, RT to monitor, will re-approach for tx after meal.    BP 92/54 (BP Location: Left arm)   Pulse 92   Temp 97.2  F (36.2  C) (Axillary)   Resp 28   Wt 58.9 kg (129 lb 12.8 oz)   SpO2 94%   BMI 24.53 kg/m

## 2022-06-01 NOTE — PROGRESS NOTES
Received call from RT Gricelda with referral for bipap/non-invasive ventilator for this patient this morning. Due to the severity of patient's COPD and chronic respiratory failure, recommend patient be setup with non-invasive ventilator (such as Trilogy or Astral). This will provide pt will additional support of settings if needed, and provided pt with RT that will follow patient along with minimum of monthly home visits.   Pt currently qualifies for NIV without further testing needed. ABG drawn 5/31/22 PaCO2 66mmHg qualifies and patient has had multiple hospitalizations.  Order for NIV with settings is needed along with documentation of medical necessity. An example of documentation:  The patient suffers from chronic respiratory failure due to COPD and respiratory home ventilation is required. A mom-invasive ventilator is required to prevent future hospital admissions, improve pulmonary status, and decrease work of breathing. Without a non-invasive ventilator for this patient, it could lead to more serious respiratory issues or death.     Evie Chahal, RRT  [facility]  [phone]

## 2022-06-01 NOTE — PROGRESS NOTES
[facility]    Progress Note - Hospitalist Service       Date of Admission:  5/16/2022    Assessment & Plan        Adalgisa Solano is a 86 year old female admitted on 5/5/2022. She has a history of pulmonary disease including COPD with persistent bronchiectasis getting vest therapy at home for the last 2 years, non-insulin-dependent diabetes, chronic atrial fibrillation, chronic heart failure with preserved ejection fraction who has had several recent hospitalizations for COPD exacerbation with pneumonia.  Patient's baseline oxygen needs 3-5 L.  Admitted for recurrent COPD exacerbation without signs of infection. Alternating between oxymask and BiPAP at night. Multiple goals of care meetings held, patient and family with restorative goals at this time not wanting to pursue hospice at this time.  Stable oxygen needs, given stability of course and chronic nature appropriate to go home from medical perspective pending BiPAP device and O2 available at home.     Acute on chronic respiratory failure  COPD exacerbation  Pseudomonal pneumonia   Probable malignant neoplasms  Oxygen needs slightly improved now on 8 L from 10 L.  Down trended WBC. Goals of care are aligned with symptom management and quality of life though patient and family do not want to pursue hospice at this time.  Discharge pending BiPAP device and O2 availability at home given high oxygen needs, device company needs new prescription to be sent which is being sent by intensivist.  Initially plan to complete prednisone taper 6/1 though given chronicity of course and concerns about immediate worsening of symptoms upon discharge to home appropriate to extend prednisone taper.  Face-to-face evaluation: Patient suffers from chronic respiratory failure due to COPD and respiratory home ventilation is required.  A noninvasive ventilator is required to prevent future hospital admissions, improved pulmonary status, and decreased  work of breathing.  Without a noninvasive ventilator for this patient, it could lead to more serious respiratory issues or death.  - Pulmonology consulted, appreciate recs  -We will extend prednisone taper, 10 mg for 5 days, 5 mg for 5 days  -P.o. ciprofloxacin for 14-day course  - Waiting on BiPAP device and O2 given oxygen needs of up to 10 L  - Duonebs, mucomyst, sodium chloride nebs  - BiPAP at night  - Palliative signed off 5/26, hospice reconsulted 5/26 - family does not want to pursue hospice at this time  - Vest therapy  - RCAT consult  -Intensivist signed off  -Patient scheduled to see Dr. Juarez in pulmonary clinic on August 2 at 1 PM     Chronic diastolic CHF  Bilateral lymphedema, improved  Pulmonary hypertension  Significant improvement in lower extremity edema from baseline,  admission , BUN and creatinine are normal. Home regimen includes Lasix (40mg daily) and Spironolactone (12.5 mg daily).   - Continue PTA spironolactone   - Continue PO lasix 40 mg BID      Herpes Zoster, (left buttock) resolved  Improving, scabbed over. No signs of secondary infection. Completed Valtrex, topical acyclovir.  - Continue to monitor, no need for contact precautions     Atrial fibrillation  HTN  On Xarelto 20 mg daily and diltiazem 240 mg daily prior to admission.  Blood pressures have been normal.  - Continue diltiazem 240 mg daily   - Continue daily Xarelto 20 mg     Anxiety disorder  - Continue PTA Abilify  - Ativan 0.25mg q4h prn for anxiety per palliative recs      T2DM  Steroid-induced hyperglycemia  Last Hgb A1C (3/21/22)- 6.2, suggesting well controlled diabetes. On metformin (500mg daily) prior to admission. Elevated in setting of steroids, preference to avoid hypoglycemia given age and goals of care, no changes to diabetes management at this time. Anticipate decreasing glucose levels with steroid taper.   - 4 times daily and AC glucose checks  - medium sliding scale insulin  - 1u:15carbs with meals, schedule  3 units with meals (breakfast and lunch only)  - Lantus 20u qAM     Chronic pain syndrome-chronic mid to low back pain  - Continue Tylenol as needed  - Continue gabapentin 600 mg at bedtime  - Avoid NSAIDs as on anticoagulant     Magnesium deficiency, resolved  - Mg replacement protocol    Urinary retention  Landaverde removed 5/27. Patient reports good UOP.  - Monitor closely, bladder scan PRN  - Bladder protocol if signs of retention    Constipation, improved  - Increased colace to BID  - Suppository PRN  - Miralax      Diet: Combination Diet Regular Diet Adult    DVT Prophylaxis: DOAC  Landaverde Catheter: Not present  Fluids: PO  Central Lines: PRESENT  PICC Triple Lumen 05/18/22 Right Basilic-Site Assessment: WDL    Cardiac Monitoring: None  Code Status: No CPR- Do NOT Intubate      Disposition Plan   Expected Discharge: 6/2/22  Anticipated discharge location: home with family;home with help/services    Delays:    BiPAP device and O2 at home       The patient's care was discussed with the Attending Physician, Dr. Joshua Biswas MD PGY1  Hospitalist Service  [facility]  Securely message with the Vocera Web Console (learn more here)  Text page via [facility] Paging/Directory     ______________________________________________________________________    Interval History   No acute overnight events, patient has been afebrile.  O2 needs decreased to 8 L from 10. Urinating well without landaverde, no BM today, no abdominal discomfort. No chest pain or palpitations.   Les attentive at bedside.  Discussed stability of course and that delays to discharge to home is BiPAP device at home and oxygen at home.  Patient discusses concerns about finishing prednisone taper in hospital and would like an extension given worries about immediate worsening of symptoms upon discharge.  Discussed overall course, all questions answered.    Data reviewed today: I reviewed all medications, new labs and imaging  results over the last 24 hours.     Physical Exam   Vital Signs: Temp: 97.8  F (36.6  C) Temp src: Oral BP: 107/55 Pulse: 102   Resp: 24 SpO2: 96 % O2 Device: Oxymizer cannula Oxygen Delivery: 8 LPM  Weight: 129 lbs 12.8 oz     Constitutional: awake, alert, cooperative, appears comfortable sitting up in chair, speaking in full sentences  Eyes: Lids and lashes normal, extra ocular muscles intact, sclera clear, conjunctiva normal  ENT: normocepalic, without obvious abnormality, atraumatic.  Respiratory: On Oxymizer. Coarse crackles throughout all lung fields bilaterally.  Unchanged from previous  Cardiovascular:  RRR, no murmur auscultated  GI:  normal bowel sounds, soft, non-distended, non-tender  Musculoskeletal: compression stockings in place, trace peripheral edema bilaterally, symmetric.  Neurologic: Awake, alert, oriented x4  Neuropsychiatric: General: normal, calm and normal eye contact     Data   Recent Labs   Lab 06/01/22  1157 06/01/22  0739 06/01/22  0546 05/31/22 2007 05/31/22  0808 05/31/22  0556 05/30/22  0804 05/30/22  0607 05/28/22  0747 05/28/22  0530   WBC  --   --  9.5  --   --  14.2*  --  13.0*  --  14.3*   HGB  --   --  10.5*  --   --  11.1*  --  11.3*  --  10.9*   MCV  --   --  95  --   --  96  --  92  --  92   PLT  --   --  224  --   --  241  --  251  --  245   NA  --   --   --   --   --  140  --  139  --  139   POTASSIUM  --   --  3.5  --   --  3.7  --  3.6   < > 3.9   CHLORIDE  --   --   --   --   --  92*  --  88*  --  86*   CO2  --   --   --   --   --  40*  --  39*  --  44*   BUN  --   --   --   --   --  12  --  12  --  14   CR  --   --   --   --   --  0.50*  --  0.57*  --  0.55*   ANIONGAP  --   --   --   --   --  8  --  12  --  9   DENISE  --   --   --   --   --  8.5  --  8.3*  --  7.9*   * 109*  --  311*   < > 107   < > 180*   < > 87    < > = values in this interval not displayed.     No results found for this or any previous visit (from the past 24 hour(s)).

## 2022-06-01 NOTE — PROGRESS NOTES
RCAT Treatment Plan    Patient Score: 19  Patient Acuity: 2    Clinical Indication for Therapy: COPD exacerbation, Bronchiectasis    Therapy Ordered: Duoneb/Nebusal/Mucomyst Q!D, Vest therapy BID    Assessment Summary: Severe COPD with exacerbation, Bronchiectasis, biapical emphysema, bibasilar fibrotic bronchiectasis, O2 at 8 lpm/Oxymizer with BiPAP at night 10/5 .40. Pt uses Duoneb QID with Vest therapy BID, will continue with current medications and Vest therapy. Pt has been screened for BiPAP at home and O2 10 lpm/NC. Awaiting approval and delivery of equipment for home use.     5/17/22 CT scan  1.  Partial clearing of airspace opacities from the left lower lobe compared to 5/5/2022. Residual dense opacities are now along the central bronchovascular structures. There are new inflammatory nodules in the right lower lobe and increased opacity in   the right posterior sulcus consistent with new atelectasis/inflammation.  2.  Multiple solid nodules with lobular borders are present in the right upper lobe 2 largest of which measure 11 x 12 and 9 x 16 mm. The 11 x 12 nodule and several other solid nodules are substantially larger from 3/11/2021 and is suspect for neoplasm.   Consider PET/CT after resolution of this acute respiratory illness for further evaluation.  3.  Advanced emphysema.  4.  Severe enlargement of both cardiac atria and moderate atheromatous coronary calcifications.    Geoff Escobar, RT  6/1/2022

## 2022-06-02 NOTE — PROGRESS NOTES
Spoke with  regarding bipap/NIV at discharge for this patient. Let her know [facility] is ready for setup. Just need the progress note with medical necessity signed. [facility] prefers a home setup after discharge to provide education on equipment with pt/caregiver/family member and how to connect oxygen.     Evie Chahal, RRT  [facility]  [phone]

## 2022-06-02 NOTE — PLAN OF CARE
Alert and oriented x4. Able ot make needs known via call light. No complaints of pain. 8L on oxymask with n/c. Used Bipap until approx 3am. In AM O2 sats around 89-91% via oxymask 8L. PICC lines, triple lumens patent good blood return. Mag and K protocol rechecks tomorrow AM.   Problem: Risk for Delirium  Goal: Optimal Coping  Outcome: Ongoing, Progressing     Problem: Risk for Delirium  Goal: Improved Sleep  Outcome: Ongoing, Progressing     Problem: COPD (Chronic Obstructive Pulmonary Disease) Comorbidity  Goal: Maintenance of COPD Symptom Control  Outcome: Ongoing, Progressing  Intervention: Maintain COPD-Symptom Control  Recent Flowsheet Documentation  Taken 6/1/2022 2300 by Constantine Ulloa, RN  Medication Review/Management: medications reviewed

## 2022-06-02 NOTE — PROGRESS NOTES
Patient has been assessed for Home Oxygen needs.     Pulse oximetry (SpO2) and Oxygen flow readings:    SpO2 = 84% on room air at rest while awake.    SpO2 improved to 94% on 8 liters/minute at rest.    SpO2 = 78% on room air during activity/with exercise.    *SpO2 improved to 91% on 10 liters/minute during activity/with exercise.

## 2022-06-02 NOTE — PLAN OF CARE
Problem: Plan of Care - These are the overarching goals to be used throughout the patient stay.    Goal: Absence of Hospital-Acquired Illness or Injury  Outcome: Ongoing, Progressing  Intervention: Identify and Manage Fall Risk  Recent Flowsheet Documentation  Taken 6/1/2022 1620 by Tammy Okeefe RN  Safety Promotion/Fall Prevention:   chair alarm on   clutter free environment maintained   fall prevention program maintained   nonskid shoes/slippers when out of bed   room door open   room near nurse's station   room organization consistent   safety round/check completed  Intervention: Prevent and Manage VTE (Venous Thromboembolism) Risk  Recent Flowsheet Documentation  Taken 6/1/2022 1620 by Tammy Okeefe RN  Range of Motion: active ROM (range of motion) encouraged  VTE Prevention/Management:   foot pump device off   SCDs (sequential compression devices) off  Intervention: Prevent Infection  Recent Flowsheet Documentation  Taken 6/1/2022 1620 by Tammy Okeefe RN  Infection Prevention: hand hygiene promoted     Problem: COPD (Chronic Obstructive Pulmonary Disease) Comorbidity  Goal: Maintenance of COPD Symptom Control  Outcome: Ongoing, Progressing  Intervention: Maintain COPD-Symptom Control  Recent Flowsheet Documentation  Taken 6/1/2022 1620 by Tammy Okeefe RN  Medication Review/Management: medications reviewed     Problem: Diabetes Comorbidity  Goal: Blood Glucose Level Within Targeted Range  Outcome: Ongoing, Progressing     Problem: Respiratory Compromise (Heart Failure)  Goal: Effective Oxygenation and Ventilation  Outcome: Ongoing, Progressing  Intervention: Promote Airway Secretion Clearance  Recent Flowsheet Documentation  Taken 6/1/2022 1620 by Tammy Okeefe RN  Cough And Deep Breathing: done independently per patient       Pt. A&O x4. Denies pain. Continues to be on 8L of O2 via the oxymizer cannula.  Sliding scale insulin administered as per the orders. Family at  bedside.    Tammy Okeefe RN on 6/1/2022 at 7:37 PM

## 2022-06-02 NOTE — DISCHARGE SUMMARY
[facility]  Discharge Summary - Medicine & Pediatrics       Date of Admission:  5/16/2022  Date of Discharge:  6/2/2022  Discharging Provider: Dr. Joshua Mcdonald  Discharge Service: Hospitalist Service    Discharge Diagnoses   Principal Problem:    Acute respiratory failure with hypoxia (H)  Active Problems:    Hypomagnesemia    Hyponatremia    COPD exacerbation (H)    Follow-ups Needed After Discharge   Follow-up Appointments     Follow-up and recommended labs and tests       Follow-up with PCP within 1 week for hospital follow-up  Follow-up with Dr. Juarez scheduled for August 2nd, contact sooner if   needed           Discharge Disposition   Discharged to home  Condition at discharge: Stable    Hospital Course   Adalgisa Solano with end-stage COPD and bronchiectasis previously requiring 3-5L O2 at home with vest therapy admitted for bronchiectasis exacerbation causing new persistent severe hypoxia requiring 8-10L O2.     Presented satting 88% on 5L. CXR shows retrocardiac opacity, likely atelectasis and resolved RLL opacities; mild pulmonary engorgement, no pleural effusions. BNP at 180 without increase in bilateral lower extremity edema. No leukocytosis or elevated procal however CT chest with partial clearing of airspace opacities from the left lower lobe, Residual dense opacities are now along the central bronchovascular structures and new inflammatory nodules. Also with multiple solid nodules concerning for neoplasm.  Alternating between HFNC and BIPAP. Sputum cx with pseudomonas. Patient was treated with prolonged prednisone taper, meropenem transitioned to ciprofloxacin, as well as continued pulmonary support and PTA medications.     This has been a long hospital stay with bronchial hygiene therapy, antibiotics and diuresis and has not been able to wean down lower than 8-10L oxygen. Palliative and hospice consulted and aided in discussions and support. She has declined hospice  despite discussion of inability to do anything more to help her lungs.     Patient has been stable at these higher oxygen supports, likely a new baseline. Given stability and patient preference after discussions of course and prognosis, discharging to home at this time with resuming home cares including PT, OT, and RN. BiPAP device ordered at home and oxygen delivered at 10L (see oxygen face to face evaluation below). Patient will complete 14 day antibiotic course at home as well as continue prednisone taper.     Face-to-face evaluation for oxygen needs: Patient suffers from chronic respiratory failure due to COPD and respiratory home ventilation is required.  A noninvasive ventilator is required to prevent future hospital admissions, improved pulmonary status, and decreased work of breathing.  Without a noninvasive ventilator for this patient, it could lead to more serious respiratory issues or death.    Consultations This Hospital Stay   IP RESPIRATORY CARE CHRONIC PULMONARY DISEASE SPECIALIST  SOCIAL WORK IP CONSULT  LYMPHEDEMA THERAPY IP CONSULT  PULMONARY IP CONSULT  PALLIATIVE CARE ADULT IP CONSULT  WOUND OSTOMY CONTINENCE NURSE  IP CONSULT  CORE CLINIC EVALUATION IP CONSULT  VASCULAR ACCESS ADULT IP CONSULT  WOUND OSTOMY CONTINENCE NURSE  IP CONSULT  PHYSICAL THERAPY ADULT IP CONSULT  SPIRITUAL HEALTH SERVICES IP CONSULT  SPIRITUAL HEALTH SERVICES IP CONSULT  INPATIENT HOSPICE ADULT CONSULT  PHYSICAL THERAPY ADULT IP CONSULT  OCCUPATIONAL THERAPY ADULT IP CONSULT  INPATIENT HOSPICE ADULT CONSULT  IP RESPIRATORY CARE CHRONIC PULMONARY DISEASE SPECIALIST  INPATIENT HOSPICE ADULT CONSULT  IP RESPIRATORY CARE CHRONIC PULMONARY DISEASE SPECIALIST    Code Status   No CPR- Do NOT Intubate       The patient was discussed with Dr. Joshua Biswas MD  [address] [address]  Phone: [phone]  Fax:  [phone]  ______________________________________________________________________    Physical Exam   Vital Signs: Temp: 97.3  F (36.3  C) Temp src: Oral BP: 122/58 Pulse: 93   Resp: 18 SpO2: 91 % O2 Device: Oxymizer cannula Oxygen Delivery: 10 LPM  Weight: 129 lbs 12.8 oz    Constitutional: awake, alert, cooperative, appears comfortable sitting up in chair, speaking in full sentences  Eyes: Lids and lashes normal, extra ocular muscles intact, sclera clear, conjunctiva normal  ENT: normocepalic, without obvious abnormality, atraumatic.  Respiratory: On Oxymizer. Coarse crackles throughout all lung fields bilaterally.  Unchanged from previous  Cardiovascular:  RRR, no murmur auscultated  GI:  normal bowel sounds, soft, non-distended, non-tender  Musculoskeletal: compression stockings in place, trace peripheral edema bilaterally, symmetric.  Neurologic: Awake, alert, oriented x4  Neuropsychiatric: General: normal, calm and normal eye contact      Primary Care Physician   Shai Ellington    Discharge Orders      Medication Therapy Management Referral      Home Care Referral      Follow-up and recommended labs and tests     Follow-up with PCP within 1 week for hospital follow-up  Follow-up with Dr. Juarez scheduled for August 2nd, contact sooner if needed     Activity    Your activity upon discharge: As tolerated, limited by oxygen needs     Reason for your hospital stay    Increased oxygen needs, pneumonia, COPD exacerbation     Resume Home Care Services     Miscellaneous Order for DME - ONLY FOR DME    I, the undersigned, certify that the above prescribed supplies are medically necessary for this patient and is both reasonable and necessary in reference to accepted standards of medical and necessary in reference to accepted standards of medical practice in the treatment of this patient's condition and is not prescribed as a convenience.      Miscellaneous Order for DME - ONLY FOR DME    I, the undersigned,  certify that the above prescribed supplies are medically necessary for this patient and is both reasonable and necessary in reference to accepted standards of medical and necessary in reference to accepted standards of medical practice in the treatment of this patient's condition and is not prescribed as a convenience.      Diet    Follow this diet upon discharge: Diabetic diet       Significant Results and Procedures   Most Recent 3 CBC's:Recent Labs   Lab Test 06/01/22  0546 05/31/22  0556 05/30/22  0607   WBC 9.5 14.2* 13.0*   HGB 10.5* 11.1* 11.3*   MCV 95 96 92    241 251   ,   Results for orders placed or performed during the hospital encounter of 05/16/22   XR Chest Port 1 View    Narrative    EXAM: XR CHEST PORT 1 VIEW  LOCATION: [facility]  DATE/TIME: 5/16/2022 2:21 PM    INDICATION: SOB  COMPARISON: 04/13/2022      Impression    IMPRESSION: Moderately enlarged heart. Mildly increased retrocardiac opacity, favored to represent atelectasis. Resolved right lower lobe opacities. Mild pulmonary vascular engorgement with increased interstitial markings, not significantly changed.   CT Chest w/o Contrast    Narrative    EXAM: CT CHEST W/O CONTRAST  LOCATION: [facility]  DATE/TIME: 5/17/2022 2:37 PM    INDICATION: COPD exacerbation  COMPARISON: CT pulmonary angiogram 5/5/2022, 3/11/2021  TECHNIQUE: CT chest without IV contrast. Multiplanar reformats were obtained. Dose reduction techniques were used.  CONTRAST: None.    FINDINGS:   LUNGS AND PLEURA: Background of fairly advanced emphysema mixed centrilobular and paraseptal distribution with an upper lung predominance. In the areas of greatest emphysema the intervening interstitium has mild thickening, unchanged. The multiple   segment posterior airspace and interstitial process in the left lower lobe has mildly improved from 12 days earlier, however there is no more conspicuous and bandlike opacity in the  left upper lobe along the posterior pleura and along the central   bronchovascular structures in the left lower lobe. Increased subpleural opacity in the right posterior costophrenic sulcus as well as development of multiple patchy inflammatory nodules within the parenchyma of the right lower lobe.    Several lobulated solid nodules are present in the right upper lobe. One of the nodules measuring 11 x 12 mm (series 4, image 110) has substantially increased from 11/3/2021 previously measuring 7 mm long axis. A second larger lobulated solid 9 x 16 mm   (series 4, image 123) right upper lobe nodule is also slightly larger measuring 7 x 14 mm. A few smaller solid nodules are present in the right upper lobe closer to the hilum (series 4, image 140 and 143) and are also larger from March 2021.    MEDIASTINUM: Biatrial cardiac enlargement. Cardiac ventricles are normal in size. No pericardial effusion. Enlarged right lower paratracheal lymph node measures up to 17 mm short axis (series 4, image 121) and enlarged left lower paratracheal lymph nodes   are also unchanged. No definite hilar adenopathy. Esophagus is decompressed. Conspicuous cisterna chyli.     CORONARY ARTERY CALCIFICATION: Three-vessel, moderate.    UPPER ABDOMEN: No actionable findings in the imaged upper abdomen.    MUSCULOSKELETAL: Degenerative osteophytes of the thoracic spine largest from T6 through the thoracolumbar junction. No aggressive or destructive bone lesions are present.      Impression    IMPRESSION:     1.  Partial clearing of airspace opacities from the left lower lobe compared to 5/5/2022. Residual dense opacities are now along the central bronchovascular structures. There are new inflammatory nodules in the right lower lobe and increased opacity in   the right posterior sulcus consistent with new atelectasis/inflammation.  2.  Multiple solid nodules with lobular borders are present in the right upper lobe 2 largest of which measure 11 x  12 and 9 x 16 mm. The 11 x 12 nodule and several other solid nodules are substantially larger from 3/11/2021 and is suspect for neoplasm.   Consider PET/CT after resolution of this acute respiratory illness for further evaluation.  3.  Advanced emphysema.  4.  Severe enlargement of both cardiac atria and moderate atheromatous coronary calcifications.     XR Chest Port 1 View    Narrative    EXAM: XR CHEST PORT 1 VIEW  LOCATION: [facility]  DATE/TIME: 5/18/2022 12:09 PM    INDICATION: RN placed PICC   verify tip placement  COMPARISON: 05/16/2022       Impression    IMPRESSION: A right PICC tip terminates in the region of the lower SVC. A small volume of left pleural fluid is present; this appears to have decreased in volume from the prior study. No pneumothorax. Interstitial edema is unchanged. The   cardiomediastinal silhouette is enlarged. There is aortic calcification.        Discharge Medications   Current Discharge Medication List      START taking these medications    Details   acetylcysteine (MUCOMYST) 20 % neb solution Take 4 mLs by nebulization 4 times daily  Qty: 30 mL, Refills: 0    Associated Diagnoses: COPD with acute exacerbation (H); Acute on chronic respiratory failure with hypoxia and hypercapnia (H)      ciprofloxacin (CIPRO) 750 MG tablet Take 1 tablet (750 mg) by mouth 2 times daily for 7 days  Qty: 14 tablet, Refills: 0    Associated Diagnoses: COPD with acute exacerbation (H); Acute on chronic respiratory failure with hypoxia and hypercapnia (H)      !! predniSONE (DELTASONE) 10 MG tablet Take 1 tablet (10 mg) by mouth daily  Qty: 3 tablet, Refills: 0    Associated Diagnoses: COPD with acute exacerbation (H); Acute on chronic respiratory failure with hypoxia and hypercapnia (H)      !! predniSONE (DELTASONE) 5 MG tablet Take 1 tablet (5 mg) by mouth daily 10mg until 6/4, then switch to 5mg 6/5 through 6/10  Qty: 5 tablet, Refills: 0    Associated Diagnoses: COPD with  acute exacerbation (H); Acute on chronic respiratory failure with hypoxia and hypercapnia (H)      terbinafine (LAMISIL) 250 MG tablet Take 1 tablet (250 mg) by mouth daily for 14 days  Qty: 14 tablet, Refills: 0    Comments: For tinea pedis  Associated Diagnoses: Tinea pedis, unspecified laterality       !! - Potential duplicate medications found. Please discuss with provider.      CONTINUE these medications which have NOT CHANGED    Details   acetaminophen (TYLENOL) 500 MG tablet Take 500-1,000 mg by mouth every 6 hours as needed for mild pain      albuterol (PROAIR HFA/PROVENTIL HFA/VENTOLIN HFA) 108 (90 Base) MCG/ACT inhaler Inhale 2 puffs into the lungs every 4 hours as needed for shortness of breath / dyspnea or wheezing      albuterol (PROVENTIL) (2.5 MG/3ML) 0.083% neb solution Take 2.5 mg by nebulization every 2 hours as needed for shortness of breath / dyspnea or wheezing  Qty: 600 mL, Refills: 3    Associated Diagnoses: Essential hypertension      ARIPiprazole (ABILIFY) 5 MG tablet TAKE 1 TABLET AT BEDTIME  Qty: 90 tablet, Refills: 3    Associated Diagnoses: Essential hypertension      atorvastatin (LIPITOR) 20 MG tablet TAKE 1 TABLET AT BEDTIME  Qty: 90 tablet, Refills: 3    Associated Diagnoses: Essential hypertension      calcium carbonate 600 mg-vitamin D 400 units (CALTRATE) 600-400 MG-UNIT per tablet Take 1 tablet by mouth every morning       cyanocobalamin (VITAMIN B-12) 500 MCG tablet Take 500 mcg by mouth every morning       diltiazem ER COATED BEADS (CARDIZEM CD/CARTIA XT) 120 MG 24 hr capsule Take 240 mg by mouth daily      docusate sodium (COLACE) 100 MG capsule Take 100 mg by mouth daily      Fluticasone-Umeclidin-Vilanterol (TRELEGY ELLIPTA) 100-62.5-25 MCG/INH oral inhaler Inhale 1 puff into the lungs daily      furosemide (LASIX) 20 MG tablet Take 40 mg by mouth daily      gabapentin (NEURONTIN) 300 MG capsule Take 600 mg by mouth At Bedtime      guaiFENesin (MUCINEX) 600 MG 12 hr tablet  Take 2 tablets (1,200 mg) by mouth 2 times daily  Qty: 60 tablet, Refills: 0    Associated Diagnoses: COPD with exacerbation (H)      ipratropium - albuterol 0.5 mg/2.5 mg/3 mL (DUONEB) 0.5-2.5 (3) MG/3ML neb solution Take 1 vial (3 mLs) by nebulization 4 times daily Once breathing is better, then change to every 6 hours as needed for shortness of breath  Qty: 360 mL, Refills: 11    Associated Diagnoses: COPD with acute exacerbation (H)      metFORMIN (GLUCOPHAGE) 500 MG tablet Take 1 tablet (500 mg) by mouth 2 times daily (with meals)  Qty: 60 tablet, Refills: 11    Associated Diagnoses: Essential hypertension      omeprazole (PRILOSEC) 20 MG DR capsule TAKE 1 CAPSULE DAILY  Qty: 90 capsule, Refills: 3    Associated Diagnoses: Essential hypertension      polyethylene glycol (MIRALAX) 17 GM/Dose powder Take 17 g by mouth daily  Qty: 510 g      potassium chloride ER (KLOR-CON M) 10 MEQ CR tablet Take 1 tablet (10 mEq) by mouth daily  Qty: 90 tablet, Refills: 3    Associated Diagnoses: Essential hypertension      spironolactone (ALDACTONE) 25 MG tablet TAKE ONE-HALF (1/2) TABLET DAILY  Qty: 45 tablet, Refills: 3    Associated Diagnoses: Essential hypertension      Vitamin D3 (CHOLECALCIFEROL) 25 mcg (1000 units) tablet Take 1 capsule by mouth every morning       XARELTO ANTICOAGULANT 20 MG TABS tablet TAKE 1 TABLET DAILY WITH DINNER  Qty: 90 tablet, Refills: 1    Associated Diagnoses: Atrial fibrillation with RVR (H)      sodium chloride (NEBUSAL) 3 % neb solution Take 3 mLs by nebulization 4 times daily           Allergies   Allergies   Allergen Reactions     Tramadol Nausea     Tuberculin Tests      Amoxicillin Itching and Rash     Levofloxacin Itching and Rash     Penicillins Itching and Rash     Has tolerated ceftriaxone.

## 2022-06-02 NOTE — PROGRESS NOTES
PULMONARY PROGRESS NOTE    Date / Time of Admission:  5/16/2022 12:46 PM  Assessment:   86yoF with end-stage COPD and bronchiectasis here with bronchiectasis exacerbation causing new persistent severe hypoxia requiring 8-10L O2. She has been here 2 weeks with bronchial hygiene therapy, antibiotics and diuresis and has not been able to wean down lower than this. She has declined hospice despite honest disclosure of inability to do anything more to help her lungs.     Principal Problem:    Acute respiratory failure with hypoxia (H)  Active Problems:    Hypomagnesemia    Hyponatremia    COPD exacerbation (H)      Advance Directives:  DNR/DNI    Plan:   10L O2 continuous for home, I ordered this yesterday  Bipap 10/5 at night for CO2, has hypercapnea and would benefit from this. I ordered this yesterday as well.  Provider justification for home NIPPV use:     The patient suffers from chronic respiratory failure due to COPD and bipap  is required. A bipap is required to prevent future hospital admissions, improve pulmonary status, and decrease workof breathing. Without a bipap for this patient, it could lead to more serious respiratory issues or death.     The patient would benefit from the continued support of a bipap for use at night and during the daytime with portability. This will help prevent hospital readmission.  Mera Kowalski MD      Subjective:   HPI:  Adalgisa Solano is a 86 year old female with history of emphysema DLCO 39% predicted, COPE FEV1 49% predicted as well as extensive bronchiectasis who presented 5/16 with hypoxia and found to have bronchiectasis exacerbation. She has been here 2 weeks, weaned off of high-flow oxygen and was treated for pseudomonas with 14 day course of cipro. She has continued on hypertonic saline nebs and albuterol and continues to be rhoncorous and hypoxic.    Overnight had humidity added to O2 and was hypoxic into 70s, resolved with removal of humidity.         Allergies:    Allergies   Allergen Reactions     Tramadol Nausea     Tuberculin Tests      Amoxicillin Itching and Rash     Levofloxacin Itching and Rash     Penicillins Itching and Rash     Has tolerated ceftriaxone.        MEDS:  Scheduled Meds:    acetaminophen  650 mg Oral Q6H    Or     acetaminophen  650 mg Rectal Q6H     acetylcysteine  4 mL Nebulization 4x Daily     ARIPiprazole  5 mg Oral At Bedtime     atorvastatin  20 mg Oral At Bedtime     calcium carbonate-vitamin D  1 tablet Oral QAM     ciprofloxacin  750 mg Oral BID     cyanocobalamin  500 mcg Oral QAM     diltiazem ER COATED BEADS  240 mg Oral Daily     docusate sodium  100 mg Oral BID     docusate sodium  1 enema Rectal Once     furosemide  40 mg Oral BID     gabapentin  600 mg Oral At Bedtime     insulin aspart  3 Units Subcutaneous Daily with lunch     insulin aspart  3 Units Subcutaneous Daily with breakfast     insulin aspart  1-7 Units Subcutaneous TID AC     insulin aspart  1-5 Units Subcutaneous At Bedtime     insulin glargine  20 Units Subcutaneous QAM AC     ipratropium - albuterol 0.5 mg/2.5 mg/3 mL  3 mL Nebulization 4x daily     pantoprazole  40 mg Oral Daily     polyethylene glycol  17 g Oral Daily     [Held by provider] potassium chloride ER  10 mEq Oral Daily     predniSONE  10 mg Oral Daily    Followed by     [START ON 6/6/2022] predniSONE  5 mg Oral Daily     rivaroxaban ANTICOAGULANT  20 mg Oral Daily with supper     sodium chloride  3 mL Nebulization 4x Daily     sodium chloride (PF)  3 mL Intracatheter Q8H     spironolactone  12.5 mg Oral Daily     Vitamin D3  25 mcg Oral QAM     Continuous Infusions:    - MEDICATION INSTRUCTIONS -       PRN Meds:.albuterol, bisacodyl, glucose **OR** dextrose **OR** glucagon, lidocaine 4%, lidocaine (buffered or not buffered), LORazepam, melatonin, morphine, ondansetron **OR** ondansetron, - MEDICATION INSTRUCTIONS -, sennosides, sodium chloride, sodium chloride (PF), sodium chloride (PF)    Objective:    VITALS:  /58 (BP Location: Left arm)   Pulse 93   Temp 97.3  F (36.3  C) (Oral)   Resp 18   Wt 58.9 kg (129 lb 12.8 oz)   SpO2 91%   BMI 24.53 kg/m    EXAM:    GENERAL APPEARANCE: Alert, no acute distress  HENT: Oral mucosa and posterior oropharynx normal, moist mucous membranes  NECK: No adenopathy,masses or thyromegaly  RESP: mucus squeaking and rhonchorous bilaterally  CV: regular rate and rhythm, no murmur, rub or gallop  ABDOMEN: normal bowel sounds, soft, nontender, no hepatosplenomegaly or other masses  LYMPHATICS: No cervical, or supraclavicular adenopathy  SKIN: no suspicious lesions or rashes  NEURO: Alert, oriented x 3, speech and mentation normal        I&O:    Date 06/02/22 0700 - 06/03/22 0659   Shift 4426-5630 [id number] 9781-4249 24 Hour Total   INTAKE   P.O. 240   240   Shift Total(mL/kg) 240(4.08)   240(4.08)   OUTPUT   Shift Total(mL/kg)       Weight (kg) 58.88 58.88 58.88 58.88       Data Review:    Imaging: personally reviewed  Chest Xray  EXAM: XR CHEST PORT 1 VIEW  LOCATION: [facility]  DATE/TIME: 5/18/2022 12:09 PM     INDICATION: RN placed PICC   verify tip placement  COMPARISON: 05/16/2022                                                                       IMPRESSION: A right PICC tip terminates in the region of the lower SVC. A small volume of left pleural fluid is present; this appears to have decreased in volume from the prior study. No pneumothorax. Interstitial edema is unchanged. The   cardiomediastinal silhouette is enlarged. There is aortic calcification.     Results for orders placed or performed during the hospital encounter of 05/16/22   Basic metabolic panel   Result Value Ref Range    Sodium 140 136 - 145 mmol/L    Potassium 3.7 3.5 - 5.0 mmol/L    Chloride 92 (L) 98 - 107 mmol/L    Carbon Dioxide (CO2) 40 (H) 22 - 31 mmol/L    Anion Gap 8 5 - 18 mmol/L    Urea Nitrogen 12 8 - 28 mg/dL    Creatinine 0.50 (L) 0.60 - 1.10 mg/dL    Calcium  8.5 8.5 - 10.5 mg/dL    Glucose 107 70 - 125 mg/dL    GFR Estimate >90 >60 mL/min/1.73m2     Lab Results   Component Value Date    WBC 9.5 06/01/2022    HGB 10.5 (L) 06/01/2022    HCT 33.6 (L) 06/01/2022    MCV 95 06/01/2022     06/01/2022     Last Arterial Blood Gas:  Recent Labs   Lab 05/30/22  0521 05/29/22  1744   PH 7.43 7.44   PCO2 66* 59*   PO2 63* 75   HCO3 40* 37*

## 2022-06-02 NOTE — PROGRESS NOTES
/58 (BP Location: Left arm)   Pulse 93   Temp 97.3  F (36.3  C) (Oral)   Resp 18   Wt 58.9 kg (129 lb 12.8 oz)   SpO2 91%   BMI 24.53 kg/m      Patient on duoneb 4 times a day, mucomyst 4 times a day, sodium chloride 4 times a day. Patient has own vest BID. Breath sounds are course/ex. Wheeze. Strong productive cough. Patient is being set up with home bipap with the homecare company. RT continue to follow.     Miriam Peterson, RT

## 2022-06-02 NOTE — PROGRESS NOTES
Care Management Discharge Note    Discharge Date: 06/02/2022       Discharge Disposition: Home with family, Home Care (home with family and [facility] services for RN/PT/OT/HHA)    Discharge Services:  (home with family and [facility] services for RN/PT/OT/HHA)    Discharge DME: Oxygen, Other (see comment) ([employer] delivering portable 02 and home to hospital, Count includes the [facility] delivering BiPAP to the home this evening and will do teach and set up)    Discharge Transportation: family or friend will provide ( and daughter to transport)    Private pay costs discussed: None indicated.   PAS Confirmation Code:  N/A  Patient/family educated on Medicare website which has current facility and service quality ratings:  (resuming services)    Education Provided on the Discharge Plan: Patient/family decline TCU; confirm plan for home with family and resume [facility] services for RN/PT/OT/HHA. AVS per bedside RN.   Persons Notified of Discharge Plans: patient,  and daughter   Patient/Family in Agreement with the Plan: yes    Handoff Referral Completed: CM coordinated with patient and family ( and daughter at bedside). CM coordinated with [facility]; CM faxed discharge orders.  CM coordinated with [facility] and [facility]; CM faxed requested documentation. CM coordinated with bedside RN, Charge RN and MD.      Additional Information:  CM spoke to Evie with [facility] [employer] ([phone]) who confirms home BiPAP can be delivered/set up/teaching done in the home pending Resident note from 6/1 is cosigned. CM updated Deja with [facility] ([phone]) and she will arrange delivery and teaching in the home for about 5pm- they will call daughter Veronika to confirm time.   CM spoke to Lawrence with [facility] ([phone]) who indicates still waiting for new order for 02- needs home 02 evaluation done documenting increased needs faxed to [phone] PRICILA updated MD and bedside RN. 11:14 AM Home 02 evaluation  completed by bedside RN and CM faxed to [facility] per request. 12:40 PM Per Lawrence, issues with available drivers for today (in patient home area) and will need to delay, otherwise option to deliver both portable and home 02 to the hospital. CM confirmed this plan is OK. Family will walk out with  after portable 02 is delivered to put home supply in vehicle. Should be to hospital within the next hour. CM updated patient, family, bedside RN and Charge RN.   CM spoke to [place] in Intake with [facility] and they confirm able to resume HC services for RN, PT, OT and HHA. Anticipate resumption of care visit can happen on Saturday 6/4. CM faxed discharge orders.     [facility] referral sent per protocol.   Aleja Pride, RN

## 2022-06-02 NOTE — PROGRESS NOTES
Occupational Therapy Discharge Summary    Reason for therapy discharge:    Discharged to home with home therapy.    Progress towards therapy goal(s). See goals on Care Plan in [facility] electronic health record for goal details.  Goals not met.  Barriers to achieving goals:   discharge from facility.    Therapy recommendation(s):    Continued therapy is recommended.  Rationale/Recommendations:  to increase independence in self cares.

## 2022-06-02 NOTE — PROGRESS NOTES
Physical Therapy Discharge Summary    Reason for therapy discharge:    Discharged to home with home therapy.    Progress towards therapy goal(s). See goals on Care Plan in [facility] electronic health record for goal details.  Goals partially met.  Barriers to achieving goals:   limited tolerance for therapy.    Therapy recommendation(s):    Continued therapy is recommended.  Rationale/Recommendations:  Paitent not at PLOF.

## 2022-06-03 NOTE — PROGRESS NOTES
Clinic Care Coordination Contact  [facility]: Post-Discharge Note  SITUATION                                                      Admission:    Admission Date: 05/16/22   Reason for Admission: severe hypoxia  Discharge:   Discharge Date: 06/02/22  Discharge Diagnosis: acute respiratory failure with hypoxia, COPD exacerbation    BACKGROUND                                                      Per hospital discharge summary and inpatient provider notes:  Adalgisa Solano with end-stage COPD and bronchiectasis previously requiring 3-5L O2 at home with vest therapy admitted for bronchiectasis exacerbation causing new persistent severe hypoxia requiring 8-10L O2.     Presented satting 88% on 5L. CXR shows retrocardiac opacity, likely atelectasis and resolved RLL opacities; mild pulmonary engorgement, no pleural effusions. BNP at 180 without increase in bilateral lower extremity edema. No leukocytosis or elevated procal however CT chest with partial clearing of airspace opacities from the left lower lobe, Residual dense opacities are now along the central bronchovascular structures and new inflammatory nodules. Also with multiple solid nodules concerning for neoplasm.  Alternating between HFNC and BIPAP. Sputum cx with pseudomonas. Patient was treated with prolonged prednisone taper, meropenem transitioned to ciprofloxacin, as well as continued pulmonary support and PTA medications.     This has been a long hospital stay with bronchial hygiene therapy, antibiotics and diuresis and has not been able to wean down lower than 8-10L oxygen. Palliative and hospice consulted and aided in discussions and support. She has declined hospice despite discussion of inability to do anything more to help her lungs.      Patient has been stable at these higher oxygen supports, likely a new baseline. Given stability and patient preference after discussions of course and prognosis, discharging to home at this time with resuming  home cares including PT, OT, and RN. BiPAP device ordered at home and oxygen delivered at 10L (see oxygen face to face evaluation below). Patient will complete 14 day antibiotic course at home as well as continue prednisone taper.     Face-to-face evaluation for oxygen needs: Patient suffers from chronic respiratory failure due to COPD and respiratory home ventilation is required.  A noninvasive ventilator is required to prevent future hospital admissions, improved pulmonary status, and decreased work of breathing.  Without a noninvasive ventilator for this patient, it could lead to more serious respiratory issues or death.    ASSESSMENT      Enrollment  Primary Care Care Coordination Status: Declined    Discharge Assessment  How are you doing now that you are home?: I am doing fine. I am just a little tired from being in the hospital so long.  How are your symptoms? (Red Flag symptoms escalate to triage hotline per guidelines): Improved  Do you feel your condition is stable enough to be safe at home until your provider visit?: Yes  Does the patient have their discharge instructions? : Yes  Were you started on any new medications or were there changes to any of your previous medications? : Yes  Does the patient have all of their medications?: Yes  Do you have questions regarding any of your medications? : No  Do you have all of your needed medical supplies or equipment (DME)?  (i.e. oxygen tank, CPAP, cane, etc.): Yes  Discharge follow-up appointment scheduled within 14 calendar days? : Yes  Discharge Follow Up Appointment Date: 06/06/22  Discharge Follow Up Appointment Scheduled with?: Primary Care Provider         Post-op (Clinicians Only)  Did the patient have surgery or a procedure: No  Fever: No  Chills: No  Eating & Drinking: eating and drinking without complaints/concerns  PO Intake: regular diet  Bowel Function: normal  Date of last BM: 06/02/22  Urinary Status: voiding without complaint/concerns    Care  Management       Care Mgmt General Assessment      [facility] RN spoke with bianka and her  Anabel this morning. Patient stated she was feeling better and was glad to be home. She stated she is feeling a little weak and tired from being in the hospital for such long period of time. She and her  are aware home care will resume on 6/4/22 through [facility]. She reported she is taking her medications as directed. She is wearing her home oxygen at all times. Patient said she has good support at home from her daughter and her . They denied any needs or concerns presently, and declined enrollment in [facility] at this time. They are aware they can be referred back to [facility] by her PCP if needs or concerns should arise.                    PLAN                                                      Outpatient Plan:  Home with home care from [facility].     Future Appointments   Date Time Provider Department Center   6/3/2022 11:00 AM AURA [facility] RN [facility]   6/6/2022  5:00 PM [facility]   6/10/2022  2:10 PM [facility]   8/2/2022  1:00 PM Jun Juarez MD [facility]         For any urgent concerns, please contact our 24 hour nurse triage line: [phone] ([phone])         David J Myhre, RN

## 2022-06-06 NOTE — PROGRESS NOTES
"Kim is a 86 year old who is being evaluated via a billable telephone visit.      What phone number would you like to be contacted at? [phone]  How would you like to obtain your AVS? MyChart    {PROVIDER CHARTING PREFERENCE:270868}    Subjective   Kim is a 86 year old who presents for the following health issues {ACCOMPANIED BY STATEMENT (Optional):890590}    HPI     {SUPERLIST (Optional):691977}  {additonal problems for provider to add (Optional):517553}    Review of Systems   {ROS COMP (Optional):012968}      Objective           Vitals:  No vitals were obtained today due to virtual visit.    Physical Exam   {GENERAL APPEARANCE:50::\"healthy\",\"alert\",\"no distress\"}  PSYCH: Alert and oriented times 3; coherent speech, normal   rate and volume, able to articulate logical thoughts, able   to abstract reason, no tangential thoughts, no hallucinations   or delusions  Her affect is { :0836211::\"normal\"}  RESP: No cough, no audible wheezing, able to talk in full sentences  Remainder of exam unable to be completed due to telephone visits    {Diagnostic Test Results (Optional):638448}    {AMBULATORY ATTESTATION (Optional):616423}        Phone call duration: *** minutes  "

## 2022-06-06 NOTE — PROGRESS NOTES
Adalgisa Solano is a 86 year oldwho is being evaluated via a billable telephone visit.       What phone number would you like to be contacted at? [phone]      Assessment & Plan  Hospital follow-up for acute respiratory failure with hypoxemia with lengthy hospitalization May 16 through June 2, 2022 mariella.  Suited Monus aeruginosa pneumonia on ciprofloxacin with Soreness.  Discussed the relationship between tendinopathy and quinolone therapy.    Hospitalization complicated by hypomagnesemia hyponatremia.  Underlying COPD from years of smoking.  Long past health history of asthmatic bronchitis with respiratory failure.  Decreasing O2 sats mandate higher O2 supplement.  [employer] okay.    Onychomycosis discussed fingers and toes keep trimmed.  Continue ciprofloxacin as is 6 more tablets left 3 more days.  Priority is to treat Pseudomonas aeruginosa pneumonia.  We had a good discussion.     15 minutes spent on the date of the encounter doing chart review, patient visit and documentation  and I spent 5 minutes talking with patient on the phone.       Subjective   121/64 weight 142 increasing oxygen supplemental requirements for underlying hypoxemia secondary to acute respiratory failure and underlying COPD.  Recent Pseudomonas aeruginosa pneumonia.  Discussed the fact that oral ciprofloxacin or oral levofloxacin as quinolones are effective against the offending organism Pseudomonas.  Other antibiotics that are effective against this organism are given parenterally usually.  The patient is at home and has only had access to oral medications.  There is no midline placed for IV administration of antibiotics.     Review of Systems   No blood in stool urine sputum medication list reviewed reconciled.       Shai Ellington MD

## 2022-06-06 NOTE — PROGRESS NOTES
Medication Therapy Management (MTM) Encounter    ASSESSMENT:                            Medication Adherence/Access: No issues identified    1. Bronchiectasis with acute exacerbation (H)  Symptomatically improved post discharge, requiring 10 L of oxygen at rest and not using BiPAP at night.  She is tolerating BiPAP relatively well per her report.  She is appropriately treating the Pseudomonas in her sputum with ciprofloxacin, she has several days left of her 7-day course of treatment.  Of note she developed a pain in her right calf, I encouraged her to discuss further with her PCP during their appointment later this afternoon, out of concern for tendon rupture as the risk is higher when on combination of ciprofloxacin and prednisone together.  Her disease is considered end-stage, however she has declined hospice as offered during hospitalization.  Acknowledge that she has not yet been able to get Mucomyst but anticipates that she will be getting this today.  Follow-up with her pulmonologist scheduled for August.    2. Chronic heart failure with preserved ejection fraction (H)  Per chart review stable during hospitalization and continues on home furosemide, potassium, spironolactone.  Recommend to continue current regimen.    3. Chronic atrial fibrillation (H)  During hospitalization dose of diltiazem was increased, she is tolerating without adverse effects.  Follow-up with PCP later this afternoon.  Appropriately on chronic anticoagulation with Xarelto, tolerating without adverse effects, recommend to continue current regimen.    4. Recurrent major depressive episodes (H)  Stable per patient report, tolerating Abilify without adverse effects.  Due to difficulty sleeping with addition of steroid taper recommend to initiate over-the-counter melatonin which has previously been effective.  Recommend to continue current regimen.    5. Tinea pedis, unspecified laterality  Currently treated with terbinafine, tolerating  without adverse effects, will complete current course of treatment as directed.    6. Gastroesophageal reflux disease, esophagitis presence not specified  Stable, recommend to continue current regimen.    7. Neuropathy  Stable, recommend to continue current regimen.    8. Vitamin D deficiency  Vitamin D level previously therapeutic, recommend to continue current regimen.    9. Type 2 diabetes mellitus without complication, without long-term current use of insulin (H)  At goal A1c less than 8% per ADA guidelines, recommend to continue current regimen.  Although not discussed in depth today, her blood sugars are likely elevated due to steroids, but should return to baseline once steroid taper has been completed.    PLAN:                            1.  Purchase melatonin 5 mg to be taken as needed at bedtime for sleep while on prednisone.    2.  Educate to hold calcium supplement for the next several days while taking ciprofloxacin to minimize risk of decrease to ciprofloxacin absorption.    Follow-up: Return in about 6 months (around 12/6/2022) for Follow up, with me, using a phone visit.    SUBJECTIVE/OBJECTIVE:                          Adalgisa Solano is a 86 year old female called for a transitions of care visit. She was discharged from [facility] on June 2, 2022 for acute respiratory failure with hypoxia, initially admitted on May 16, 2022.    Reason for visit: Hospital follow-up.    Allergies/ADRs: Reviewed in chart  Past Medical History: Reviewed in chart  Tobacco: She reports that she quit smoking about 16 years ago. She has never used smokeless tobacco.  Alcohol: none    Medication Adherence/Access: no issues reported    Bronchiectasis exacerbation: This is a lengthy hospitalization and ultimately was not able to taper down further on her oxygen.  She continues on 10 L of oxygen at baseline at home, and has been able to maintain oxygen saturations at rest of 92 to 93%.  When she has any type of  activity her oxygen saturations decreased down to 77 to 80%.  She has to rest to be able to get these back down.  She is using BiPAP at night.  She is appropriately taking an additional 7 days of ciprofloxacin.  Of note she has noticed tightness in her right calf muscle.  She does not note redness or swelling.  Notes that she does stretch her muscles a lot so she has not sure if this is something to be worried about.  She does have a follow-up with PCP later this afternoon and will mention it at that time.  Notes that her breathing is better than going into the hospital.  She appropriately continues on her prednisone taper, she has successfully completed her 10 mg dose and is now taking 5 mg of prednisone every morning.  Tolerating prednisone without adverse effects.  However when on prednisone she has a hard time sleeping.  She would like to take melatonin as needed while on prednisone, she did not realize she could just get this over-the-counter.  She continues on her home regimen of DuoNeb 3 times daily, Trelegy Ellipta once daily, and vest therapy twice daily.  Her daughter is going to  her Mucomyst and she will start this today.  She is not using Mucinex, per chart review this had been ordered for her when she was in the TCU post discharge earlier this year however did not find this to be helpful.  She is using MiraLAX daily which has been very helpful to control her bowel movements.  She is only using docusate as needed.  Notes that she has a pressure sore on her buttocks she continues to put Vaseline on this and changing positions.    Chronic diastolic CHF: continues on furosemide and spironolactone daily in addition to potassium supplement.    Atrial fibrillation: Dose of diltiazem was increased to during hospitalization, tolerating without adverse effects.  Chronic anticoagulation with Xarelto.    Depression: Notes that she has been on Abilify for a long time and this is continued to be effective for  her.  She takes this at bedtime and does not acknowledge any difficulties with sleep or additional adverse effects.  PHQ-9 score:    PHQ 2/8/2022   PHQ-9 Total Score 0   Q9: Thoughts of better off dead/self-harm past 2 weeks Not at all     Tinea Pedis: Identified at discharge, placed on 14 days of terbinafine.    GERD: Continues on omeprazole daily, notes this is effective for acid reflux.      Neuropathy: This was initially noted by her dermatologist due to history of shingles.  He finds that gabapentin is very effective for her nerve pain, tolerating without adverse effects.  She continues on B12 supplement as well.    Vitamin D deficiency: Continues on vitamin D daily.  Component      Latest Ref Rng & Units 1/4/2019   Vitamin D, Total (25-Hydroxy)      30.0 - 80.0 ng/mL 39.6     Type 2 diabetes: She continues on PTA metformin 500 mg once daily.  Blood sugars were not discussed in depth today, however likely elevated due to steroids.  Lab Results   Component Value Date    A1C 6.2 03/21/2022    A1C 6.7 12/21/2021     Today's Vitals: There were no vitals taken for this visit.  ----------------  Post Discharge Medication Reconciliation Status: discharge medications reconciled, continue medications without change.    I spent 30 minutes with this patient today. All changes were made via collaborative practice agreement with Shai Ellington MD. A copy of the visit note was provided to the patient's provider(s).    The patient was sent via [employer] a summary of these recommendations.     Eliud Lovelace, PharmD, BCACP  Medication Management (MTM) Pharmacist  [facility]    Telemedicine Visit Details  Type of service:  Telephone visit  Start Time: 10:30AM  End Time: 11AM  Originating Location (patient location): Home  Distant Location (provider location):  [facility]     Medication Therapy Recommendations  Bronchiectasis with acute exacerbation (H)    Current Medication:  ciprofloxacin (CIPRO) 500 MG tablet   Rationale: Does not understand instructions - Adherence - Adherence   Recommendation: Provide Education   Status: Patient Agreed - Adherence/Education         Recurrent major depressive episodes (H)    Rationale: Untreated condition - Needs additional medication therapy - Indication   Recommendation: Start Medication - melatonin 5 MG tablet   Status: Accepted - no CPA Needed

## 2022-06-06 NOTE — PATIENT INSTRUCTIONS
PLAN:                            1.  Purchase melatonin 5 mg to be taken as needed at bedtime for sleep while on prednisone.    2.  Educate to hold calcium supplement for the next several days while taking ciprofloxacin to minimize risk of decrease to ciprofloxacin absorption.    Follow-up: Return in about 6 months (around 12/6/2022) for Follow up, with me, using a phone visit.

## 2022-06-06 NOTE — LETTER
_  Medication List        Prepared on: 6/6/2022     Bring your Medication List when you go to the doctor, hospital, or   emergency room. And, share it with your family or caregivers.     Note any changes to how you take your medications.  Cross out medications when you no longer use them.    Medication How I take it Why I use it Prescriber   acetaminophen (TYLENOL) 500 MG tablet Take 500-1,000 mg by mouth every 6 hours as needed for mild pain  pain  self   acetylcysteine (MUCOMYST) 20 % neb solution Take 4 mLs by nebulization 4 times daily COPD with acute exacerbation (H); Acute on chronic respiratory failure with hypoxia and hypercapnia (H) Jun Juarez MD   albuterol (PROAIR HFA/PROVENTIL HFA/VENTOLIN HFA) 108 (90 Base) MCG/ACT inhaler Inhale 2 puffs into the lungs every 4 hours as needed for shortness of breath / dyspnea or wheezing COPD with acute exacerbation (H); Acute on chronic respiratory failure with hypoxia and hypercapnia (H) Jun Juarez MD   albuterol (PROVENTIL) (2.5 MG/3ML) 0.083% neb solution Take 2.5 mg by nebulization every 2 hours as needed for shortness of breath / dyspnea or wheezing COPD with acute exacerbation (H); Acute on chronic respiratory failure with hypoxia and hypercapnia (H) Jun Juarez MD   ARIPiprazole (ABILIFY) 5 MG tablet TAKE 1 TABLET AT BEDTIME  recurrent major depressive episodes Shai Ellington MD   atorvastatin (LIPITOR) 20 MG tablet TAKE 1 TABLET AT BEDTIME  hypercholesterolemia Shai Ellington MD   calcium carbonate 600 mg-vitamin D 400 units (CALTRATE) 600-400 MG-UNIT per tablet Take 1 tablet by mouth 2 times daily  osteoporosis Shai Ellington MD   ciprofloxacin (CIPRO) 500 MG tablet Take 750 mg by mouth 2 times daily  Pseudomonas Jun Juarez MD   cyanocobalamin (VITAMIN B-12) 500 MCG tablet Take 500 mcg by mouth every morning   neuropathy Shai Ellington MD   diltiazem ER COATED BEADS (CARDIZEM CD/CARTIA XT) 120 MG 24 hr capsule Take  240 mg by mouth daily  atrial fibrillation Shai Ellington MD   docusate sodium (COLACE) 100 MG capsule Take 100 mg by mouth daily as needed  constipation Gracy Prado MD   Fluticasone-Umeclidin-Vilanterol (TRELEGY ELLIPTA) 100-62.5-25 MCG/INH oral inhaler Inhale 1 puff into the lungs daily COPD with acute exacerbation (H); Acute on chronic respiratory failure with hypoxia and hypercapnia (H) Jun Juarez MD   furosemide (LASIX) 20 MG tablet Take 40 mg by mouth daily Congestive heart failure with preserved ejection fraction Shai Ellington MD   gabapentin (NEURONTIN) 300 MG capsule Take 600 mg by mouth At Bedtime  neuropathy Shai Ellington MD   ipratropium - albuterol 0.5 mg/2.5 mg/3 mL (DUONEB) 0.5-2.5 (3) MG/3ML neb solution Take 1 vial (3 mLs) by nebulization 4 times daily Once breathing is better, then change to every 6 hours as needed for shortness of breath COPD with acute exacerbation (H) Jun Juarez MD   melatonin 5 MG tablet Take 5 mg by mouth nightly as needed  insomnia  self   metFORMIN (GLUCOPHAGE) 500 MG tablet Take 1 tablet (500 mg) by mouth 2 times daily (with meals)  type 2 diabetes Shai Ellington MD   omeprazole (PRILOSEC) 20 MG DR capsule TAKE 1 CAPSULE DAILY  GERD Shai Ellington MD   polyethylene glycol (MIRALAX) 17 GM/Dose powder Take 17 g by mouth daily  constipation Gracy Prado MD   potassium chloride ER (KLOR-CON M) 10 MEQ CR tablet Take 1 tablet (10 mEq) by mouth daily congestive heart failure with preserved ejection fraction Shai Ellington MD   predniSONE (DELTASONE) 5 MG tablet Take 1 tablet (5 mg) by mouth daily 10mg until 6/4, then switch to 5mg 6/5 through 6/10 COPD with acute exacerbation (H); Acute on chronic respiratory failure with hypoxia and hypercapnia (H) Joshua Mcdonald MD   spironolactone (ALDACTONE) 25 MG tablet TAKE ONE-HALF (1/2) TABLET DAILY congestive heart failure with preserved ejection fraction Shai Ellington MD    terbinafine (LAMISIL) 250 MG tablet Take 1 tablet (250 mg) by mouth daily for 14 days Tinea pedis, unspecified laterality Joshua Mcdonald MD   Vitamin D3 (CHOLECALCIFEROL) 25 mcg (1000 units) tablet Take 1 capsule by mouth every morning   osteoporosis Shai Ellington MD   XARELTO ANTICOAGULANT 20 MG TABS tablet TAKE 1 TABLET DAILY WITH DINNER Atrial fibrillation with RVR (H) Francis Marquez MD         Add new medications, over-the-counter drugs, herbals, vitamins, or  minerals in the blank rows below.    Medication How I take it Why I use it Prescriber                          Allergies:      tramadol; tuberculin tests; amoxicillin; levofloxacin; penicillins        Side effects I have had:               Other Information:              My notes and questions:

## 2022-06-06 NOTE — LETTER
June 6, 2022  Adalgisa Solano  [address] [facility] [address]    Dear Ms. Solano, [facility]        Thank you for talking with me on Jun 6, 2022 about your health and medications. As a follow-up to our conversation, I have included two documents:      1. Your Recommended To-Do List has steps you should take to get the best results from your medications.  2. Your Medication List will help you keep track of your medications and how to take them.    If you want to talk about these documents, please call Eliud Lovelace PharmD at phone: [phone], Monday-Friday 8-4:30pm.    I look forward to working with you and your doctors to make sure your medications work well for you.    Sincerely,    Eliud Lovelace PharmD  [facility] Pharmacist, [facility]

## 2022-06-06 NOTE — LETTER
"Recommended To-Do List      Prepared on: 6/6/2022     You can get the best results from your medications by completing the items on this \"To-Do List.\"      Bring your To-Do List when you go to your doctor. And, share it with your family or caregivers.    My To-Do List:  What we talked about: What I should do:   The importance of taking your medication as intended    Education: Stop taking your calcium supplement while you are taking ciprofloxacin to ensure that there is not a drug interaction          What we talked about: What I should do:   A new medication that may be of benefit to you    Start taking melatonin 5 mg at bedtime as needed while taking prednisone          What we talked about: What I should do:                       "

## 2022-06-08 PROBLEM — I50.9 CHF (CONGESTIVE HEART FAILURE) (H): Status: ACTIVE | Noted: 2022-01-01

## 2022-06-08 PROBLEM — E87.5 HYPERKALEMIA: Status: ACTIVE | Noted: 2022-01-01

## 2022-06-08 PROBLEM — J96.21 ACUTE AND CHRONIC RESPIRATORY FAILURE WITH HYPOXIA (H): Status: ACTIVE | Noted: 2022-01-01

## 2022-06-08 NOTE — PHARMACY-ADMISSION MEDICATION HISTORY
Pharmacy Note - Admission Medication History    Pertinent Provider Information:   - Prednisone 5mg 6/5 through 6/10  - Lamisil tablets x 14 days  6/2 through 6/16   - Cipro tablets x 7 days 6/2 through 6/10 ______________________________________________________________________    Prior To Admission (PTA) med list completed and updated in EMR.       PTA Med List   Medication Sig Last Dose     acetaminophen (TYLENOL) 500 MG tablet Take 500-1,000 mg by mouth every 6 hours as needed for mild pain Past Week at Unknown time     acetylcysteine (MUCOMYST) 20 % neb solution Take 4 mLs by nebulization 4 times daily 6/8/2022 at Unknown time     albuterol (PROAIR HFA/PROVENTIL HFA/VENTOLIN HFA) 108 (90 Base) MCG/ACT inhaler Inhale 2 puffs into the lungs every 4 hours as needed for shortness of breath / dyspnea or wheezing 6/8/2022 at Unknown time     albuterol (PROVENTIL) (2.5 MG/3ML) 0.083% neb solution Take 2.5 mg by nebulization every 2 hours as needed for shortness of breath / dyspnea or wheezing 6/8/2022 at Unknown time     ARIPiprazole (ABILIFY) 5 MG tablet TAKE 1 TABLET AT BEDTIME 6/7/2022 at Unknown time     atorvastatin (LIPITOR) 20 MG tablet TAKE 1 TABLET AT BEDTIME 6/7/2022 at Unknown time     calcium carbonate 600 mg-vitamin D 400 units (CALTRATE) 600-400 MG-UNIT per tablet Take 1 tablet by mouth 2 times daily 6/8/2022 at Unknown time     ciprofloxacin (CIPRO) 500 MG tablet Take 750 mg by mouth 2 times daily 6/8/2022 at Unknown time     cyanocobalamin (VITAMIN B-12) 500 MCG tablet Take 500 mcg by mouth every morning  6/8/2022 at Unknown time     diltiazem ER COATED BEADS (CARDIZEM CD/CARTIA XT) 120 MG 24 hr capsule Take 240 mg by mouth daily 6/8/2022 at Unknown time     docusate sodium (COLACE) 100 MG capsule Take 100 mg by mouth daily as needed 6/8/2022 at Unknown time     Fluticasone-Umeclidin-Vilanterol (TRELEGY ELLIPTA) 100-62.5-25 MCG/INH oral inhaler Inhale 1 puff into the lungs daily 6/8/2022 at Unknown time      furosemide (LASIX) 20 MG tablet Take 40 mg by mouth daily 6/8/2022 at Unknown time     gabapentin (NEURONTIN) 300 MG capsule Take 600 mg by mouth At Bedtime 6/7/2022 at Unknown time     ipratropium - albuterol 0.5 mg/2.5 mg/3 mL (DUONEB) 0.5-2.5 (3) MG/3ML neb solution Take 1 vial (3 mLs) by nebulization 4 times daily Once breathing is better, then change to every 6 hours as needed for shortness of breath 6/8/2022 at Unknown time     melatonin 5 MG tablet Take 5 mg by mouth nightly as needed 6/7/2022 at Unknown time     metFORMIN (GLUCOPHAGE) 500 MG tablet Take 1 tablet (500 mg) by mouth 2 times daily (with meals) 6/8/2022 at am     omeprazole (PRILOSEC) 20 MG DR capsule TAKE 1 CAPSULE DAILY (Patient taking differently: Take 20 mg by mouth daily) 6/8/2022 at Unknown time     polyethylene glycol (MIRALAX) 17 GM/Dose powder Take 17 g by mouth daily 6/8/2022 at Unknown time     potassium chloride ER (KLOR-CON M) 10 MEQ CR tablet Take 1 tablet (10 mEq) by mouth daily 6/8/2022 at Unknown time     predniSONE (DELTASONE) 5 MG tablet Take 1 tablet (5 mg) by mouth daily 10mg until 6/4, then switch to 5mg 6/5 through 6/10 (Patient taking differently: Take 5 mg by mouth daily 5mg 6/5 through 6/10) 6/8/2022 at Unknown time     spironolactone (ALDACTONE) 25 MG tablet TAKE ONE-HALF (1/2) TABLET DAILY 6/8/2022 at Unknown time     terbinafine (LAMISIL) 250 MG tablet Take 1 tablet (250 mg) by mouth daily for 14 days 6/8/2022 at Unknown time     Vitamin D3 (CHOLECALCIFEROL) 25 mcg (1000 units) tablet Take 1 capsule by mouth every morning  6/8/2022 at Unknown time     XARELTO ANTICOAGULANT 20 MG TABS tablet TAKE 1 TABLET DAILY WITH DINNER 6/8/2022 at Unknown time       Information source(s): Patient  Method of interview communication: in-person    Summary of Changes to PTA Med List  New  Discontinue:  Change    Patient was asked about OTC/herbal products specifically.  PTA med list reflects this.    In the past week, patient  estimated taking medication this percent of the time:  greater than 90%.    Allergies were reviewed, assessed, and updated with the patient.      Patient does not use any multi-dose medications prior to admission.    The information provided in this note is only as accurate as the sources available at the time of the update(s).    Thank you for the opportunity to participate in the care of this patient.    Gaby Hilton, [place]  6/8/2022 6:13 PM

## 2022-06-08 NOTE — ED TRIAGE NOTES
Pt diagnosed pneumonia 5 days ago at hospital. Pt states she is on 5 L and has been getting worse since discharge.

## 2022-06-08 NOTE — PROGRESS NOTES
Patient came in with respiratory distress, tachypnea, and placed on BiPAP. Pt received neb as order and tolerated well. Pt on 40% FiO2 and sats was 93%. HR 97, RR 24.     Vent Setting,  Mode: S/T  IPAP/EPAP: 10/5  Rate: 14  FiO2: 40    Will continue to follow.     Roderick Chow, RT

## 2022-06-08 NOTE — TELEPHONE ENCOUNTER
Spouse is calling for RX/Order to be sent to [facility] for mattress.    [facility] phone number is .  Per  patient does not have hospital bed.  He was not able to provide name of what exactly was being requested.  He states [facility] told him it would depend on what provider ordered.

## 2022-06-08 NOTE — ED PROVIDER NOTES
Emergency Department Encounter   NAME: Adalgisa Solano ; AGE: 86 year old female ; YOB: 1935 ; MRN: [id number] ; PCP: Shai Ellington   ED PROVIDER: Deana Fleming PA-C    Evaluation Date & Time:   6/8/2022  4:35 PM    CHIEF COMPLAINT:  Shortness of Breath      Impression and Plan   MDM: Adalgisa Solano is a 86 year old female with a pertinent history of chronic respiratory failure with hypoxia, bronchiectasis, COPD, CHF, pneumonia, atrial fibrillation, anxiety, anemia, who presents to the ED by EMS for evaluation of shortness of breath.  Patient is chronically ill and has end-stage chronic respiratory failure.  She was just discharged from a long hospital course for acute exacerbation of her underlying bronchiectasis as well as Pseudomonas pneumonia.  She was discharged on 10 L of supplemental oxygen and with plan to use BiPAP at home as needed as well as on prednisone taper and oral ciprofloxacin.  Patient states her breathing has been worse again over the past several days.  Her home health RN called EMS today when her oxygen saturations remained at 87% on 10 L despite using home nebulizers.  Upon arrival to the emergency department, EMS has patient on 15 L of high flow nasal cannula and she is currently 93%.  She is speaking in full sentences though winded at the end.  Good color.  Otherwise hemodynamically stable.  Respiratory therapy was called and they are quite familiar with the patient.  We will trial a course of BiPAP at this time.  She has rhonchorous breath sounds throughout as well as an expiratory wheeze -respiratory therapy feels that this is consistent with her baseline pulmonary exam. She does have bilateral pitting edema.  Plan to further assess with EKG, blood work, and chest x-ray.  She has refused hospice cares in the past and is DNR/DNR.  Plan at this time is for admission for continued respiratory support.    EKG atrial fibrillation with PVCs, rightward axis and  low voltage QRS with nonspecific ST changes.  Troponin returned negative.  Low suspicion for ACS.  COVID-19 and influenza swabs negative.  Mild leukocytosis present.  Stable anemia with hemoglobin of 10.  She does have a sodium of 130, potassium of 5.4, and magnesium of 1.2.  IV magnesium repletion ordered.  No concerning EKG changes with the mild hyperkalemia.  We will treat this with Lasix.  Renal function normal.  Her BNP returned elevated at 259, she does have peripheral edema, rhonchorous breath sounds, and her chest x-ray showing some evidence of pulmonary congestion.  Her presentation today is likely compounded by CHF exacerbation.  40 mg of IV Lasix ordered.  Venous blood gases with a pH of 7.34, PCO2 of 63, and PO2 of 51.  Patient did well on BiPAP, maintaining saturations at 93%.  She is currently on an FiO2 of 45.  Symptoms today are likely due to COPD exacerbation as well as CHF component.  Given her BiPAP and oxygen requirement, will admit to cardiac telemetry for further management.  Her previously seen pneumonia has resolved on chest x-ray, and she has not had any return of fevers or infectious symptoms.  [facility] resident has accepted the patient for admission and she will board in our emergency department until a cardiac telemetry bed opens up.  Patient has remained stable and patient has been are comfortable with the plan.    Critical Care  Performed by: Deana Fleming PA-C  Authorized by: Deana Fleming PA-C    Total critical care time: 25 minutes  Critical care time was exclusive of separately billable procedures and treating other patients.  Critical care was necessary to treat or prevent imminent or life-threatening deterioration of the following conditions: COPD exacerbation, CHF, acute on chronic respiratory failure with hypoxia requiring BIPAP.   Critical care was time spent personally by me on the following activities: development of treatment plan with patient or surrogate, discussions  with consultants, examination of patient, evaluation of patient's response to treatment, obtaining history from patient or surrogate, ordering and performing treatments and interventions, ordering and review of laboratory studies, ordering and review of radiographic studies and re-evaluation of patient's condition, this excludes any separately billable procedures.    ED COURSE:  4:40 PM I met and introduced myself to the patient. I gathered initial history and performed my physical exam. We discussed plan for initial workup.   4:50 PM I have staffed the patient with Dr. Almeida, ED MD, who has evaluated the patient and agrees with all aspects of today's care.   4:52 PM RT already has patient down to 8L. Trialing a course of bipap  At 40.   5:40 PM rechecked the patient and she is still on BiPAP.  FiO2 of 45.  Plan at this time is for admission.  5:59 PM paged for hospitalist in place been ordered.  6:06 PM rechecked the patient.  She remains unchanged.  6:12 PM [facility] resident, has accepted the patient for cardiac tele admission.   6:38 PM unfortunately, there are not any cardiac telemetry beds here at [facility].  She will board in the emergency department until a bed opens up.    At the conclusion of the encounter I discussed the results of all the tests and the disposition. The questions were answered. The patient or family acknowledged understanding and was agreeable with the care plan.    FINAL IMPRESSION:    ICD-10-CM    1. Acute and chronic respiratory failure with hypoxia (H)  J96.21    2. COPD exacerbation (H)  J44.1    3. CHF (congestive heart failure) (H)  I50.9    4. Hyperkalemia  E87.5    5. Hypomagnesemia  E83.42        MEDICATIONS GIVEN IN THE EMERGENCY DEPARTMENT:  Medications   magnesium sulfate 2 g in water intermittent infusion (2 g Intravenous New Bag 6/8/22 1806)   methylPREDNISolone sodium succinate (solu-MEDROL) injection 125 mg (125 mg Intravenous Given 6/8/22 1707)   ipratropium -  albuterol 0.5 mg/2.5 mg/3 mL (DUONEB) neb solution 3 mL (3 mLs Nebulization Given 6/8/22 1659)   ipratropium - albuterol 0.5 mg/2.5 mg/3 mL (DUONEB) neb solution 3 mL (3 mLs Nebulization Given 6/8/22 1659)   furosemide (LASIX) injection 40 mg (40 mg Intravenous Given 6/8/22 1800)         NEW PRESCRIPTIONS STARTED AT TODAY'S ED VISIT:  New Prescriptions    No medications on file         HPI   Patient information was obtained from: Patient and EMS    Use of Intrepreter: N/A    Adalgisa Solano is a 86 year old female with a pertinent history of chronic respiratory failure with hypoxia, bronchiectasis, COPD, CHF, pneumonia, atrial fibrillation, anxiety, anemia, who presents to the ED by EMS for evaluation of shortness of breath.     Per chart review, patient was hospitalized from 5/16-6/2 for acute exacerbation of bronchiectasis and Pseudomonas pneumonia.  She alternated between high flow nasal cannula and BiPAP throughout her hospital stay, and they were able to wean her down to a base of 8 to 10 L of supplemental oxygen.  Hospice was recommended the patient declined.  She was transitioned from meropenem to ciprofloxacin and discharged home with prednisone taper, nebs, inhaler, and BiPAP for home.    Per patient, she has continued to feel short of breath since discharge from the hospital though it has become worse over the last several days.  She denies any fever, chest pain, or change in cough or other chronic symptoms.    Per EMS, patient's home health care RN called after patient remained at 87% on her baseline 10 L of supplemental oxygen despite nebulizer and inhaler use.      REVIEW OF SYSTEMS:  Review of Systems   Constitutional: Negative for chills, diaphoresis and fever.   Respiratory: Positive for shortness of breath and wheezing. Negative for cough.    Cardiovascular: Negative for chest pain.   Gastrointestinal: Negative for abdominal pain, diarrhea and vomiting.   Musculoskeletal: Negative for myalgias.    Neurological: Negative for dizziness, syncope and headaches.   All other systems reviewed and are negative.        Medical History     Past Medical History:   Diagnosis Date     Acute and chronic respiratory failure with hypoxia (H)      Acute blood loss anemia 1/15/2019     Acute bronchitis 1/15/2019     Anemia      Anticoagulated 3/23/2022     Anxiety      Arm skin lesion, right      Arnold-Chiari malformation (H) 1998     Arthritis      Atrial fibrillation (H) 02/2017     Avascular necrosis of bone (H)      Breast cyst      Breast lump      Candidal skin infection      Cellulitis of left lower extremity 1/28/2019     CHF (congestive heart failure) (H)      Chronic bronchitis (H)      Chronic diastolic heart failure (H)      Chronic pain syndrome      Chronic respiratory failure with hypoxia (H) 3/13/2017     COPD (chronic obstructive pulmonary disease) (H)      COPD exacerbation (H)      Depression      Diet-controlled type 2 diabetes mellitus (H)      Drug induced constipation      DVT of axillary vein, acute (H)      DVT of axillary vein, acute left (H)      Edema      Encounter for palliative care      Erysipelas      Fracture of femoral neck, left (H)      Generalized muscle weakness      GERD (gastroesophageal reflux disease)      History of blood clots      Hyperlipidemia      Hypertension      Left hip pain      Lumbar spinal stenosis      PAD (peripheral artery disease) (H)      Peripheral arterial disease (H) 10/28/2015     Psoriasis      Pulmonary hypertension (H) 3/5/2018     Recurrent major depressive episodes (H)      Schizoaffective disorder, bipolar type (H) 6/11/2015     Slow transit constipation      Stasis dermatitis of both legs      Status post total hip replacement, 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 [place];  Service: General     EYE SURGERY      bilateral cataracts     HAND SURGERY       HERNIORRHAPHY UMBILICAL N/A 2021    Procedure: INCARCERATED UMBILICAL AND;  Surgeon: Andreas Holly MD;  Location: [facility]     JOINT REPLACEMENT Right     knee - partial     LAPAROSCOPIC HERNIORRHAPHY INCISIONAL Left 3/27/2015    Procedure: ATTEMPTED LAPAROSCOPIC ABDOMINA/FLANK INCISION REPAIR;  Surgeon: Jose Lakhani MD;  Location: [facility] OR;  Service:      LUMBAR FUSION N/A 2014    Procedure: POSTERIOR FUSION / DECOMPRESSION L3-S1 WITH PELVIC FIXATION BILATERAL ;  Surgeon: Rajan Mares MD;  Location: [facility] OR;  Service:      OTHER SURGICAL HISTORY      IR for PAD LOWER EXTREMITY     PICC TRIPLE LUMEN PLACEMENT  2022          REPAIR SPIGELIAN HERNIA N/A 2021    Procedure: SPIGELIAN HERNIA REPAIR;  Surgeon: Andreas Holly MD;  Location: [facility]     US ASPIRATION HEMATOMA SEROMA OR FLUID COLLECTION  2020     [facility] OPEN FIXATN PROX END/NECK FEMUR FX Left 2019    Procedure: OPEN REDUCTION INTERNAL FIXATION, FRACTURE LEFT HIP, PERIPROSTHETIC FRACTURE;  Surgeon: Kevin Lackey MD;  Location: [facility] OR;  Service: Orthopedics     [facility] TOTAL HIP ARTHROPLASTY Left 2019    Procedure: LEFT TOTAL HIP ARTHROPLASTY;  Surgeon: Kevin Lackey MD;  Location: [facility] OR;  Service: Orthopedics     [facility] TOTAL HIP ARTHROPLASTY Right 2019    Procedure: RIGHT TOTAL HIP ARTHROPLASTY;  Surgeon: Kan Quesada MD;  Location: [facility] OR;  Service: Orthopedics     [facility] TOTAL KNEE ARTHROPLASTY Left 10/7/2016    Procedure: TOTAL KNEE ARTHROPLASTY, LEFT;  Surgeon: Kan  "MARCO Quesada MD;  Location: [facility] OR;  Service: Orthopedics       Family History   Problem Relation Age of Onset     Coronary Artery Disease Mother      Coronary Artery Disease Father        Social History     Tobacco Use     Smoking status: Former Smoker     Quit date: 2006     Years since quittin.4     Smokeless tobacco: Never Used     Tobacco comment: quit 2006   Vaping Use     Vaping Use: Never used   Substance Use Topics     Alcohol use: No     Drug use: No       acetaminophen (TYLENOL) 500 MG tablet  acetylcysteine (MUCOMYST) 20 % neb solution  albuterol (PROAIR HFA/PROVENTIL HFA/VENTOLIN HFA) 108 (90 Base) MCG/ACT inhaler  albuterol (PROVENTIL) (2.5 MG/3ML) 0.083% neb solution  ARIPiprazole (ABILIFY) 5 MG tablet  atorvastatin (LIPITOR) 20 MG tablet  calcium carbonate 600 mg-vitamin D 400 units (CALTRATE) 600-400 MG-UNIT per tablet  ciprofloxacin (CIPRO) 500 MG tablet  cyanocobalamin (VITAMIN B-12) 500 MCG tablet  diltiazem ER COATED BEADS (CARDIZEM CD/CARTIA XT) 120 MG 24 hr capsule  docusate sodium (COLACE) 100 MG capsule  Fluticasone-Umeclidin-Vilanterol (TRELEGY ELLIPTA) 100-62.5-25 MCG/INH oral inhaler  furosemide (LASIX) 20 MG tablet  gabapentin (NEURONTIN) 300 MG capsule  ipratropium - albuterol 0.5 mg/2.5 mg/3 mL (DUONEB) 0.5-2.5 (3) MG/3ML neb solution  melatonin 5 MG tablet  metFORMIN (GLUCOPHAGE) 500 MG tablet  omeprazole (PRILOSEC) 20 MG DR capsule  polyethylene glycol (MIRALAX) 17 GM/Dose powder  potassium chloride ER (KLOR-CON M) 10 MEQ CR tablet  predniSONE (DELTASONE) 5 MG tablet  spironolactone (ALDACTONE) 25 MG tablet  terbinafine (LAMISIL) 250 MG tablet  Vitamin D3 (CHOLECALCIFEROL) 25 mcg (1000 units) tablet  XARELTO ANTICOAGULANT 20 MG TABS tablet          Physical Exam     First Vitals:  Patient Vitals for the past 24 hrs:   BP Temp Pulse Resp SpO2 Height Weight   22 1655 -- -- 97 24 97 % -- --   22 1638 122/59 98.1  F (36.7  C) 91 -- -- 1.549 m (5' 1\") 64.4 kg " (142 lb)         PHYSICAL EXAM:   Physical Exam  Vitals and nursing note reviewed.   Constitutional:       Comments: Chronically ill-appearing.    HENT:      Head: Normocephalic.   Eyes:      Pupils: Pupils are equal, round, and reactive to light.   Cardiovascular:      Rate and Rhythm: Normal rate. Rhythm irregular.   Pulmonary:      Comments: Speaking in full sentences though winded at the end of sentence.  Mildly tachypneic.  Rhonchorous breath sounds throughout with expiratory wheeze.  No chest tenderness or crepitus.  +2 bilateral peripheral edema.  Musculoskeletal:      Cervical back: Normal range of motion and neck supple.   Skin:     General: Skin is warm and dry.      Capillary Refill: Capillary refill takes less than 2 seconds.      Coloration: Skin is not cyanotic or pale.   Neurological:      Mental Status: She is alert and oriented to person, place, and time.             Results     LAB:  All pertinent labs reviewed and interpreted  Labs Ordered and Resulted from Time of ED Arrival to Time of ED Departure   CBC WITH PLATELETS - Abnormal       Result Value    WBC Count 11.2 (*)     RBC Count 3.42 (*)     Hemoglobin 10.0 (*)     Hematocrit 31.2 (*)     MCV 91      MCH 29.2      MCHC 32.1      RDW 16.6 (*)     Platelet Count 248     BASIC METABOLIC PANEL - Abnormal    Sodium 130 (*)     Potassium 5.4 (*)     Chloride 89 (*)     Carbon Dioxide (CO2) 32 (*)     Anion Gap 9      Urea Nitrogen 16      Creatinine 0.71      Calcium 8.9      Glucose 233 (*)     GFR Estimate 83     MAGNESIUM - Abnormal    Magnesium 1.2 (*)    B-TYPE NATRIURETIC PEPTIDE ([facility] ONLY) - Abnormal     (*)    BLOOD GAS VENOUS - Abnormal    pH Venous 7.34 (*)     pCO2 Venous 63 (*)     pO2 Venous 51 (*)     Bicarbonate Venous 30      Base Excess/Deficit (+/-) 8.3      Oxyhemoglobin Venous 80.1 (*)     O2 Sat, Venous 82.4 (*)    TROPONIN I - Normal    Troponin I 0.01     INFLUENZA A/B & SARS-COV2 PCR MULTIPLEX - Normal     Influenza A PCR Negative      Influenza B PCR Negative      RSV PCR Negative      SARS CoV2 PCR Negative         RADIOLOGY:  XR Chest Port 1 View   Final Result   IMPRESSION: PICC line has been removed. Emphysematous changes are better appreciated on the CT.      Interval clearing of the left lower lobe consolidation. Fine reticular opacities have increased bilaterally. This may reflect edema superimposed over emphysematous changes or a viral pneumonia.      Heart is enlarged but unchanged. No clear effusions.        ECG:  Performed at: 16:42:54    Impression: Atrial fibrillation with PVCs.  Rightward axis and low voltage QRS.    Rate: 100 BPM  Rhythm: A. Fib. With PVCs.   Axis: 100 67  VA Interval: *  QRS Interval: 62  QTc Interval: 405  ST Changes: Nonspecific   Comparison: 5/16/2022    EKG results reviewed and interpreted by Dr. Almeida, ED MD.       Deana Fleming PA-C   Emergency Medicine   [facility] EMERGENCY ROOM       Deana Fleming PA-C  06/08/22 3675

## 2022-06-08 NOTE — ED PROVIDER NOTES
ED CONSULTATION  Date/Time:6/8/2022 5:22 PM    I am seeing this patient along with DAX Noel.  I, Ryan Almeida M.D. have reviewed the documentation, personally taken the patient's history, performed an exam and agree with the physical finds, diagnosis and management plan.        5:12 PM Prior records were reviewed.  I personally performed history and physical.  I personally reviewed lab, EKG, and radiology results as indicated.  Care was discussed with mid-level provider.  Diagnosis and disposition were discussed.  Final disposition will be per the [facility] depending diagnostic studies and patient's clinical trajectory.  5:53 PM patient remained stable on BiPAP in the emergency department.  Labs demonstrate hypomagnesemia, slightly elevated BNP and x-ray shows possible mild fluid overload so Lasix was ordered.  Plan for admission for further evaluation and treatment.    Critical care (respiratory failure on BiPAP) 40 minutes excluding separately billable procedures.  Includes bedside management, time reviewing test results, review of records, case discussion with other providers, documentation in the medical record, time spent with patient, family members or surrogate decision-makers.    I personally saw the patient and performed a substantive portion of the visit including all aspects of the medical decision making.    DIAGNOSIS:  1. Acute and chronic respiratory failure with hypoxia (H)    2. COPD exacerbation (H)    3. CHF (congestive heart failure) (H)    4. Hyperkalemia    5. Hypomagnesemia        New Prescriptions    No medications on file       HPI: Adalgisa Solano is a 86 year old female with a pertinent history of chronic respiratory failure with hypoxia, bronchiectasis, COPD, CHF, pneumonia, atrial fibrillation, anxiety, anemia, who presents to the ED by EMS for evaluation of shortness of breath.      Per chart review, patient was hospitalized from 5/16-6/2 for acute exacerbation of  "bronchiectasis and Pseudomonas pneumonia.  She alternated between high flow nasal cannula and BiPAP throughout her hospital stay, and they were able to wean her down to a base of 8 to 10 L of supplemental oxygen.  Hospice was recommended the patient declined.  She was transitioned from meropenem to ciprofloxacin and discharged home with prednisone taper, nebs, inhaler, and BiPAP for home.     Patient reports continued, worsening shortness of breath since discharge from the hospital though it has become worse over the last several days. She denies any fever, chest pain, or change in cough or other chronic symptoms.     Per EMS, patient's home health care RN called after patient remained at 87% on her baseline 10 L of supplemental oxygen despite nebulizer and inhaler use.    Physical Exam:/55   Pulse 95   Temp 98.1  F (36.7  C)   Resp (!) 31   Ht 1.549 m (5' 1\")   Wt 64.4 kg (142 lb)   SpO2 91%   BMI 26.83 kg/m    Physical Exam  Vitals and nursing note reviewed.   Constitutional:       Appearance: Normal appearance.   HENT:      Head: Normocephalic and atraumatic.      Right Ear: External ear normal.      Left Ear: External ear normal.      Nose: Nose normal.      Mouth/Throat:      Mouth: Mucous membranes are moist.   Eyes:      Extraocular Movements: Extraocular movements intact.      Conjunctiva/sclera: Conjunctivae normal.      Pupils: Pupils are equal, round, and reactive to light.   Cardiovascular:      Rate and Rhythm: Normal rate and regular rhythm.   Pulmonary:      Breath sounds: Rales present. No wheezing.      Comments: Mild increased work of breathing although speaking in full sentences, bilateral crackles  Abdominal:      General: Abdomen is flat. There is no distension.      Palpations: Abdomen is soft.      Tenderness: There is no abdominal tenderness. There is no guarding.   Musculoskeletal:         General: Normal range of motion.      Cervical back: Normal range of motion and neck " supple.      Right lower leg: Edema present.      Left lower leg: Edema present.   Lymphadenopathy:      Cervical: No cervical adenopathy.   Skin:     General: Skin is warm and dry.   Neurological:      General: No focal deficit present.      Mental Status: She is alert and oriented to person, place, and time. Mental status is at baseline.      Comments: No gross focal neurologic deficits   Psychiatric:         Mood and Affect: Mood normal.         Behavior: Behavior normal.         Thought Content: Thought content normal.         Results for orders placed or performed during the hospital encounter of 06/08/22   XR Chest Port 1 View    Impression    IMPRESSION: PICC line has been removed. Emphysematous changes are better appreciated on the CT.    Interval clearing of the left lower lobe consolidation. Fine reticular opacities have increased bilaterally. This may reflect edema superimposed over emphysematous changes or a viral pneumonia.    Heart is enlarged but unchanged. No clear effusions.   CBC (+ platelets, no diff)   Result Value Ref Range    WBC Count 11.2 (H) 4.0 - 11.0 10e3/uL    RBC Count 3.42 (L) 3.80 - 5.20 10e6/uL    Hemoglobin 10.0 (L) 11.7 - 15.7 g/dL    Hematocrit 31.2 (L) 35.0 - 47.0 %    MCV 91 78 - 100 fL    MCH 29.2 26.5 - 33.0 pg    MCHC 32.1 31.5 - 36.5 g/dL    RDW 16.6 (H) 10.0 - 15.0 %    Platelet Count 248 150 - 450 10e3/uL   Basic metabolic panel   Result Value Ref Range    Sodium 130 (L) 136 - 145 mmol/L    Potassium 5.4 (H) 3.5 - 5.0 mmol/L    Chloride 89 (L) 98 - 107 mmol/L    Carbon Dioxide (CO2) 32 (H) 22 - 31 mmol/L    Anion Gap 9 5 - 18 mmol/L    Urea Nitrogen 16 8 - 28 mg/dL    Creatinine 0.71 0.60 - 1.10 mg/dL    Calcium 8.9 8.5 - 10.5 mg/dL    Glucose 233 (H) 70 - 125 mg/dL    GFR Estimate 83 >60 mL/min/1.73m2   Result Value Ref Range    Magnesium 1.2 (L) 1.8 - 2.6 mg/dL   B-Type Natriuretic Peptide ([facility] Only)   Result Value Ref Range     (H) 0 - 167 pg/mL   Troponin I  (now)   Result Value Ref Range    Troponin I 0.01 0.00 - 0.29 ng/mL   Symptomatic; Unknown Influenza A/B & SARS-CoV2 (COVID-19) Virus PCR Multiplex Nasopharyngeal    Specimen: Nasopharyngeal; Swab   Result Value Ref Range    Influenza A PCR Negative Negative    Influenza B PCR Negative Negative    RSV PCR Negative Negative    SARS CoV2 PCR Negative Negative   Extra Blood Culture Bottle   Result Value Ref Range    Hold Specimen JIC    Extra Blue Top Tube   Result Value Ref Range    Hold Specimen JIC    Extra Red Top Tube   Result Value Ref Range    Hold Specimen JIC    Extra Green Top (Lithium Heparin) Tube   Result Value Ref Range    Hold Specimen JIC    Extra Purple Top Tube   Result Value Ref Range    Hold Specimen JIC    Extra Heparinized Syringe   Result Value Ref Range    Hold Specimen JIC    Blood gas venous   Result Value Ref Range    pH Venous 7.34 (L) 7.35 - 7.45    pCO2 Venous 63 (H) 35 - 50 mm Hg    pO2 Venous 51 (H) 25 - 47 mm Hg    Bicarbonate Venous 30 24 - 30 mmol/L    Base Excess/Deficit (+/-) 8.3   mmol/L    Oxyhemoglobin Venous 80.1 (H) 70.0 - 75.0 %    O2 Sat, Venous 82.4 (H) 70.0 - 75.0 %   CRP inflammation   Result Value Ref Range    CRP 2.0 (H) 0.0 - 0.8 mg/dL   Result Value Ref Range    Procalcitonin 0.09 0.00 - 0.49 ng/mL   Glucose by meter   Result Value Ref Range    GLUCOSE BY METER POCT 201 (H) 70 - 99 mg/dL   ECG 12-LEAD WITH MUSE ([facility])   Result Value Ref Range    Systolic Blood Pressure 122 mmHg    Diastolic Blood Pressure 59 mmHg    Ventricular Rate 100 BPM    Atrial Rate 174 BPM    CT Interval  ms    QRS Duration 62 ms     ms    QTc 405 ms    P Axis  degrees    R AXIS 100 degrees    T Axis 67 degrees    Interpretation ECG       Atrial fibrillation with premature ventricular or aberrantly conducted complexes  Rightward axis  Low voltage QRS  Nonspecific ST and T wave abnormality  Abnormal ECG  When compared with ECG of 16-MAY-2022 12:48,  Criteria for Septal infarct are no longer  Present  Nonspecific T wave abnormality no longer evident in Inferior leads  Confirmed by SEE ED PROVIDER NOTE FOR, ECG INTERPRETATION ([id number]),  [address] ([id number]) on 6/8/2022 5:06:42 PM         Labs Ordered and Resulted from Time of ED Arrival to Time of ED Departure   CBC WITH PLATELETS - Abnormal       Result Value    WBC Count 11.2 (*)     RBC Count 3.42 (*)     Hemoglobin 10.0 (*)     Hematocrit 31.2 (*)     MCV 91      MCH 29.2      MCHC 32.1      RDW 16.6 (*)     Platelet Count 248     BASIC METABOLIC PANEL - Abnormal    Sodium 130 (*)     Potassium 5.4 (*)     Chloride 89 (*)     Carbon Dioxide (CO2) 32 (*)     Anion Gap 9      Urea Nitrogen 16      Creatinine 0.71      Calcium 8.9      Glucose 233 (*)     GFR Estimate 83     MAGNESIUM - Abnormal    Magnesium 1.2 (*)    B-TYPE NATRIURETIC PEPTIDE ([facility] ONLY) - Abnormal     (*)    BLOOD GAS VENOUS - Abnormal    pH Venous 7.34 (*)     pCO2 Venous 63 (*)     pO2 Venous 51 (*)     Bicarbonate Venous 30      Base Excess/Deficit (+/-) 8.3      Oxyhemoglobin Venous 80.1 (*)     O2 Sat, Venous 82.4 (*)    CRP INFLAMMATION - Abnormal    CRP 2.0 (*)    GLUCOSE BY METER - Abnormal    GLUCOSE BY METER POCT 201 (*)    TROPONIN I - Normal    Troponin I 0.01     INFLUENZA A/B & SARS-COV2 PCR MULTIPLEX - Normal    Influenza A PCR Negative      Influenza B PCR Negative      RSV PCR Negative      SARS CoV2 PCR Negative     PROCALCITONIN - Normal    Procalcitonin 0.09     GLUCOSE MONITOR NURSING POCT   GLUCOSE MONITOR NURSING POCT   CBC WITH PLATELETS AND DIFFERENTIAL   RESPIRATORY AEROBIC BACTERIAL CULTURE   LEGIONELLA PNEUMOPHILA URINARY ANTIGEN     I, Samm Jones, am serving as a scribe to documentservices personally performed by Dr. Almeida, based on my observation and the provider's statements to me. I, Ryan Almeida MD attest that Samm Jones is acting in a scribe capacity, has observed my performance of the services and has documented them in  accordance with my direction.     Ryan Almeida M.D.  Emergency Medicine  [facility] EMERGENCY ROOM  [facility] [facility] [id number] [phone]  Dept: [phone]          Ryan Almeida MD  06/08/22 [id number]

## 2022-06-08 NOTE — H&P
[facility]    History and Physical - Teaching Service       Date of Admission:  6/8/2022    Assessment & Plan      Adalgisa Solano is a 86 year old female with a history of chronic respiratory failure, emphysema, COPD, bronchiectasis, pseudomonal LLL pneumonia, and multiple recent admissions for COPD exacerbations who was admitted on 6/8/2022 with hypoxia and dyspnea most likely multifactorial from COPD exacerbation and fluid overload.  Chest x-ray showed improvement of her pneumonia and mild leukocytosis and the only slight elevation in CRP suggest against severe infection.  Will treat for COPD exacerbation with steroids and nebs but hold off on antibiotics for now.  Elevated BNP with reticular opacities on CXR and clinical finding of bilateral lower extremity swelling are consistent with CHF exacerbation.  Continue IV Lasix, which should also improve her hyponatremia and hyperkalemia.  She is already doing better with RT support.  She is DNR/DNI with end-stage chronic respiratory failure but has recently declined hospice.      Acute on chronic hypoxic and hypercapnic respiratory failure  Emphysema (DLCO 39% predicted)  COPD (FEV1 49% predicted w/o reversibility)  Bronchiectasis, UL dominant  Came in with coarse crackles and expiratory wheezes needing 15 L by high flow nasal cannula.  Currently getting BiPAP , 45% FiO2.  Assessment palliative last admission, see note 5/17.    -NPO for now until breathing stabilizes  -RT consulted  -IV methylprednisolone 40 mg Q12H; give with NPH  -DuoNebs Q4H, albuterol nebs PRN  -Mucomyst  -Home CPT vest twice daily  -BiPAP PRN and at night  -On Trerenny Pickensta outpatient  -Has follow-up with pulmonology in August    Recent pseudomonas pneumonia  Recent antimicrobials: Cefepime 04/2022, ertapenem + azithromycin 05/2022, ciprofloxacin 05-06/2022  -Sputum culture ordered  -CRP only slightly elevated.  CXR shows improvement.  Hold off on antibiotics  for now  -If needing abx, would likely need meropenem due to recent exposure to cefepime    Acute on chronic diastolic HFpEF  Home meds are Lasix 40 mg daily and spironolactone 12.5 mg daily.  Came in with bilateral LL edema and .  Last echo 4/15/2022 showed EF 65%  -Received IV Lasix in ED  -I/O  -IV Lasix 40 mg BID   -Goal net negative 1-2L/day  -Monitor Na, K, Cr  -Keep K>4, Mg>2  -Holding spironolactone for now given hyperkalemia  -Consider cardiology consult for assistance with optimizing guideline directed therapy    T2DM  At risk for steroid-induced hyperglycemia.  Last A1c 6.2  -Check A1c  -NPH 12U to be given with steroid  -4 times daily glucose checks  -Medium resistance sliding scale for correction  -Monitor for hypoglycemia    Hyponatremia Na 130  -Likely from poor oral intake, but will check urine for Legionella  -Could be from neoplasm  -Getting diuresis with IV Lasix  -May need to reassess for adrenal fatigue but she's getting steroids now    Hyperkalemia K 5.2  -Hold spironolactone  -Hold outpatient potassium supplement    Hypomagnesemia Mg 1.2  -Replacement protocol ordered  -May need additional doses to keep Mg > 2    Bilateral lymphedema  Secondary to above  -OT lymphedema consult    Urinary retention  Bladder scanned in ED for 1100 ml.    -Borjas    Sacral pressure injury  -Mepilex  -PureWick  -WOC consult     Pulmonary HTN    A. fib  -Continue Xarelto and diltiazem    Chronic pain syndrome-chronic mid to low back pain  -Continue Tylenol and gabapentin  -Ice and heat  -Judicious use of NSAID    Tinea pedis  No recent LFTs.  -Hold terbinafine  -CMP     Anxiety  -Continue Abilify  -PRN 0.25 Ativan Q4H  -Melatonin 3 mg scheduled at bedtime  -Discussed difficulty sleeping on prednisone       Diet:   NPO until clinically stable; then 60 g consistent carb, <2g Na, 1500cc fluid restriction  DVT Prophylaxis: DOAC  Borjas Catheter: Not present  Fluids: IV LR @ 25 ml/hr TKO  Central Lines:  "None  Cardiac Monitoring: ACTIVE order. Indication: Electrolyte Imbalance (24 hours)- Magnesium <1.3 mg/ml; Potassium < =2.8 or > 5.5 mg/ml  Code Status:  DNR/DNI --see palliative care consult note 5/17/2020        # Hyperkalemia: K = 5.4 mmol/L (Ref range: 3.5 - 5.0 mmol/L) on admission, will monitor as appropriate  # Hyponatremia: Na = 130 mmol/L (Ref range: 136 - 145 mmol/L) on admission, will monitor as appropriate   # Hypomagnesemia: Mg = 1.2 mg/dL (Ref range: 1.8 - 2.6 mg/dL) on admission, will replace as needed     # Coagulation Defect: home medication list includes an anticoagulant medication    # Overweight: Estimated body mass index is 26.83 kg/m  as calculated from the following:    Height as of this encounter: 1.549 m (5' 1\").    Weight as of this encounter: 64.4 kg (142 lb).      Disposition Plan   Expected Discharge: 06/13/2022   Anticipated discharge location: Prior living arrangement with home care  From  note 5/16/2022:  Pt and Pts spouse Anabel \"Les\" [phone], cell: [phone]. Pt lives with spouse in a private residence. She had been in hospital 5/5- 5/10/ and discharged with [facility]; RN, PT, OT, HHA.  will call to cancel. Patient would like that set up again at discharge. She uses 02 at 3 L, but recently has increased it to 5 L. She has a nebulizer, uses a walker.  here and will transport at discharge.     Delays: Stable O2 needs, euvolemic, safe dispo       The patient's care was discussed with the Attending Physician, Dr. Champagne.  Dr. Rolle will formally assess patient in the morning..    Hilaria Reza MD   PGY-2  [facility]  Securely message with the Vocera Web Console (learn more here)  Text page via [facility] Paging/Directory       This note has been dictated using voice recognition software. Any grammatical or context distortions are unintentional and inherent to the the software. "         ______________________________________________________________________    Chief Complaint   Shortness of breath    History is obtained from the patient and her     History of Present Illness   Adalgisa Solano is a 86 year old female with chronic hypoxic and hypercapnic respiratory failure, COPD, emphysema, bronchiectasis, CHF, recent admission for pseudomonal pneumonia, pulmonary HTN, A. fib, anemia, who presented to ED by EMS with shortness of breath.  She was recently discharged from our service after a prolonged admission (5/16-6/2) for COPD exacerbation and Pseudomonas PNA and was discharged on prednisone taper, ciprofloxacin, 10 L supplemental oxygen and BiPAP to use as needed.    She has been doing okay on prednisone taper but has had ongoing dyspnea on exertion.  She continued to feel progressively more short of breath and was on 8 to 10 L of supplemental O2 at home.  She also noticed worsening lower extremity swelling.  She takes all of her medications as prescribed.  She does not think that she has been eating more salt.  Denies fever, chest pain, diarrhea, vomiting, or URI symptoms.    Her home health care RN called EMS after patient continued to have SPO2 87% on 10L despite nebulizers.  She arrived to the ED on 15 L high flow nasal cannula satting low 90%'s.  Chest x-ray showed interval improvement in pneumonia but some evidence of pulmonary congestion and BNP elevated from last admission to 259.     She received 1 dose of IV Lasix and a trial of BiPAP and feels a little better.  She is able to speak in full sentences.    Review of Systems    Review of Systems   Constitutional: Negative for appetite change and fever.   HENT: Negative for sore throat and trouble swallowing.    Eyes: Negative.    Respiratory: Positive for shortness of breath and wheezing. Negative for cough and choking.    Cardiovascular: Positive for leg swelling. Negative for chest pain.   Gastrointestinal: Positive  for abdominal pain. Negative for constipation, diarrhea, nausea and vomiting.   Endocrine: Negative.    Genitourinary: Positive for difficulty urinating. Negative for dysuria, hematuria and urgency.   Musculoskeletal: Positive for arthralgias and gait problem.   Skin: Positive for rash (resolving shingles) and wound (sacrum).   Allergic/Immunologic: Negative.    Neurological: Negative for dizziness, light-headedness and headaches.   Psychiatric/Behavioral: Negative.  Negative for sleep disturbance.         Past Medical History    I have reviewed this patient's medical history and updated it with pertinent information if needed.   Past Medical History:   Diagnosis Date     Acute and chronic respiratory failure with hypoxia (H)      Acute blood loss anemia 1/15/2019     Acute bronchitis 1/15/2019     Anemia     pernicious anemia     Anticoagulated 3/23/2022     Anxiety      Arm skin lesion, right      Arnold-Chiari malformation (H) 1998     Arthritis      Atrial fibrillation (H) 02/2017     Avascular necrosis of bone (H)      Breast cyst      Breast lump      Candidal skin infection      Cellulitis of left lower extremity 1/28/2019     CHF (congestive heart failure) (H)     diastolic and systolic     Chronic bronchitis (H)     & acute     Chronic diastolic heart failure (H)     diastolic chf     Chronic pain syndrome      Chronic respiratory failure with hypoxia (H) 3/13/2017     COPD (chronic obstructive pulmonary disease) (H)      COPD exacerbation (H)      Depression      Diet-controlled type 2 diabetes mellitus (H)      Drug induced constipation      DVT of axillary vein, acute (H)     left     DVT of axillary vein, acute left (H)      Edema      Encounter for palliative care      Erysipelas      Fracture of femoral neck, left (H)      Generalized muscle weakness      GERD (gastroesophageal reflux disease)      History of blood clots      Hyperlipidemia      Hypertension      Left hip pain      Lumbar spinal  stenosis      PAD (peripheral artery disease) (H)      Peripheral arterial disease (H) 10/28/2015     Psoriasis     arms      Pulmonary hypertension (H) 3/5/2018     Recurrent major depressive episodes (H)     Created by Conversion  Replacement Utility updated for latest IMO load     Schizoaffective disorder, bipolar type (H) 2015     Slow transit constipation      Stasis dermatitis of both legs      Status post total hip replacement, 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  Past Surgical History:   Procedure Laterality Date     BACK SURGERY       BREAST CYST ASPIRATION Left      CERVICAL SPINE SURGERY      for arnold chiari malformation      SECTION  X3     CHOLECYSTECTOMY       COLONOSCOPY       EXCISE LESION UPPER EXTREMITY Right 2019    Procedure: EXCISION RIGHT ARM LIPOMA;  Surgeon: Andreas Holly MD;  Location: [facility];  Service: General     EYE SURGERY      bilateral cataracts     HAND SURGERY       HERNIORRHAPHY UMBILICAL N/A 2021    Procedure: INCARCERATED UMBILICAL AND;  Surgeon: Andreas Holly MD;  [place] [facility]     JOINT REPLACEMENT Right     knee - partial     LAPAROSCOPIC HERNIORRHAPHY INCISIONAL Left 3/27/2015    Procedure: ATTEMPTED LAPAROSCOPIC ABDOMINA/FLANK INCISION REPAIR;  Surgeon: Jose Lakhani MD;  Location: [facility];  Service:      LUMBAR FUSION N/A 2014    Procedure: POSTERIOR FUSION / DECOMPRESSION L3-S1 WITH PELVIC FIXATION BILATERAL ;  Surgeon: Rajan Mares MD;  Location: [facility];  Service:      OTHER SURGICAL HISTORY      IR for PAD LOWER EXTREMITY     PICC TRIPLE LUMEN PLACEMENT  2022          REPAIR SPIGELIAN HERNIA N/A 2021    Procedure: SPIGELIAN HERNIA REPAIR;  Surgeon:  Andreas Holly MD;  Location: [facility] ASPIRATION HEMATOMA SEROMA OR FLUID COLLECTION  2/6/2020     [facility] OPEN FIXATN PROX END/NECK FEMUR FX Left 1/2/2019    Procedure: OPEN REDUCTION INTERNAL FIXATION, FRACTURE LEFT HIP, PERIPROSTHETIC FRACTURE;  Surgeon: Kevin Lackey MD;  Location: [facility];  Service: Orthopedics     [facility] TOTAL HIP ARTHROPLASTY Left 1/2/2019    Procedure: LEFT TOTAL HIP ARTHROPLASTY;  Surgeon: Kevin Lackey MD;  Location: [facility];  Service: Orthopedics     [facility] TOTAL HIP ARTHROPLASTY Right 9/16/2019    Procedure: RIGHT TOTAL HIP ARTHROPLASTY;  Surgeon: Kan Quesada MD;  Location: [facility];  Service: Orthopedics     [facility] TOTAL KNEE ARTHROPLASTY Left 10/7/2016    Procedure: TOTAL KNEE ARTHROPLASTY, LEFT;  Surgeon: Kan Quesada MD;  Location: [facility];  Service: Orthopedics        Social History   I have reviewed this patient's social history and updated it with pertinent information if needed. Adalgisa Solano  reports that she quit smoking about 16 years ago. She has never used smokeless tobacco. She reports that she does not drink alcohol and does not use drugs.    Family History   I have reviewed this patient's family history and updated it with pertinent information if needed.  Family History   Problem Relation Age of Onset     Coronary Artery Disease Mother      Coronary Artery Disease Father        Prior to Admission Medications   Prior to Admission Medications   Prescriptions Last Dose Informant Patient Reported? Taking?   ARIPiprazole (ABILIFY) 5 MG tablet 6/7/2022 at Unknown time  No Yes   Sig: TAKE 1 TABLET AT BEDTIME   Fluticasone-Umeclidin-Vilanterol (TRELEGY ELLIPTA) 100-62.5-25 MCG/INH oral inhaler 6/8/2022 at Unknown time  Yes Yes   Sig: Inhale 1 puff into the lungs daily   Vitamin D3 (CHOLECALCIFEROL) 25 mcg (1000 units) tablet   Yes No   Sig: Take 1 capsule by mouth every morning    XARELTO ANTICOAGULANT 20 MG TABS  tablet   No No   Sig: TAKE 1 TABLET DAILY WITH DINNER   acetaminophen (TYLENOL) 500 MG tablet Past Week at Unknown time  Yes Yes   Sig: Take 500-1,000 mg by mouth every 6 hours as needed for mild pain   acetylcysteine (MUCOMYST) 20 % neb solution 6/8/2022 at Unknown time  No Yes   Sig: Take 4 mLs by nebulization 4 times daily   albuterol (PROAIR HFA/PROVENTIL HFA/VENTOLIN HFA) 108 (90 Base) MCG/ACT inhaler 6/8/2022 at Unknown time  Yes Yes   Sig: Inhale 2 puffs into the lungs every 4 hours as needed for shortness of breath / dyspnea or wheezing   albuterol (PROVENTIL) (2.5 MG/3ML) 0.083% neb solution 6/8/2022 at Unknown time  Yes Yes   Sig: Take 2.5 mg by nebulization every 2 hours as needed for shortness of breath / dyspnea or wheezing   atorvastatin (LIPITOR) 20 MG tablet 6/7/2022 at Unknown time  No Yes   Sig: TAKE 1 TABLET AT BEDTIME   calcium carbonate 600 mg-vitamin D 400 units (CALTRATE) 600-400 MG-UNIT per tablet 6/8/2022 at Unknown time  Yes Yes   Sig: Take 1 tablet by mouth 2 times daily   ciprofloxacin (CIPRO) 500 MG tablet 6/8/2022 at Unknown time  Yes Yes   Sig: Take 750 mg by mouth 2 times daily   cyanocobalamin (VITAMIN B-12) 500 MCG tablet 6/8/2022 at Unknown time  Yes Yes   Sig: Take 500 mcg by mouth every morning    diltiazem ER COATED BEADS (CARDIZEM CD/CARTIA XT) 120 MG 24 hr capsule 6/8/2022 at Unknown time  Yes Yes   Sig: Take 240 mg by mouth daily   docusate sodium (COLACE) 100 MG capsule 6/8/2022 at Unknown time  Yes Yes   Sig: Take 100 mg by mouth daily as needed   furosemide (LASIX) 20 MG tablet 6/8/2022 at Unknown time  Yes Yes   Sig: Take 40 mg by mouth daily   gabapentin (NEURONTIN) 300 MG capsule 6/7/2022 at Unknown time  Yes Yes   Sig: Take 600 mg by mouth At Bedtime   ipratropium - albuterol 0.5 mg/2.5 mg/3 mL (DUONEB) 0.5-2.5 (3) MG/3ML neb solution 6/8/2022 at Unknown time  No Yes   Sig: Take 1 vial (3 mLs) by nebulization 4 times daily Once breathing is better, then change to every  6 hours as needed for shortness of breath   melatonin 5 MG tablet 6/7/2022 at Unknown time  Yes Yes   Sig: Take 5 mg by mouth nightly as needed   metFORMIN (GLUCOPHAGE) 500 MG tablet 6/8/2022 at am  No Yes   Sig: Take 1 tablet (500 mg) by mouth 2 times daily (with meals)   omeprazole (PRILOSEC) 20 MG DR capsule 6/8/2022 at Unknown time  No Yes   Sig: TAKE 1 CAPSULE DAILY   Patient taking differently: Take 20 mg by mouth daily   polyethylene glycol (MIRALAX) 17 GM/Dose powder   Yes No   Sig: Take 17 g by mouth daily   potassium chloride ER (KLOR-CON M) 10 MEQ CR tablet   No No   Sig: Take 1 tablet (10 mEq) by mouth daily   predniSONE (DELTASONE) 5 MG tablet   No No   Sig: Take 1 tablet (5 mg) by mouth daily 10mg until 6/4, then switch to 5mg 6/5 through 6/10   spironolactone (ALDACTONE) 25 MG tablet   No No   Sig: TAKE ONE-HALF (1/2) TABLET DAILY   terbinafine (LAMISIL) 250 MG tablet   No No   Sig: Take 1 tablet (250 mg) by mouth daily for 14 days      Facility-Administered Medications: None     Allergies   Allergies   Allergen Reactions     Tramadol Nausea     Tuberculin Tests      Amoxicillin Itching and Rash     Levofloxacin Itching and Rash     Penicillins Itching and Rash     Has tolerated ceftriaxone.       Physical Exam   Vital Signs: Temp: 98.1  F (36.7  C)   BP: 122/59 Pulse: 97   Resp: 24 SpO2: 97 % O2 Device: BiPAP/CPAP Oxygen Delivery: 10 LPM  Weight: 142 lbs 0 oz    Constitutional: Chronically ill-appearing.  Nontoxic.  No acute distress.  Appears stated age.       EYES:  Conjunctivae clear, EOMI     HENT:  Atraumatic, external ears normal, nose normal.  BiPAP mask in place.     Respiratory: Speaking in full sentences on BiPAP.  Tachypneic.  Diffuse rhonchi with expiratory wheezes most prominent at upper lobes.       Cardiovascular: Distant heart sounds.  No audible murmurs.  Regular rate on telemetry.  2+ bilateral pitting edema.  DP pulses 2+ bilaterally     GI:  Soft, nondistended, slightly tender. No  rebound or guarding.      :  No CVA tenderness     Musculoskeletal:  Range of motion major extremities intact. No major deformities noted.  RLE in compression stocking.  LLE wrapped in lymphedema wrap.     Integument: Sacral redness and swelling.  No significant rash intermittent abdominal distribution.  Dystrophic nails.  No significant bruising.     Neurologic:  Alert & oriented, no focal deficits noted.  Moves all extremities.     Psych:  Affect normal, Mood normal      Data     Latest Reference Range & Units 06/08/22 16:50   Sodium 136 - 145 mmol/L 130 (L)   Potassium 3.5 - 5.0 mmol/L 5.4 (H)   Chloride 98 - 107 mmol/L 89 (L)   Carbon Dioxide 22 - 31 mmol/L 32 (H)   Urea Nitrogen 8 - 28 mg/dL 16   Creatinine 0.60 - 1.10 mg/dL 0.71   GFR Estimate >60 mL/min/1.73m2 83 [1]   Calcium 8.5 - 10.5 mg/dL 8.9   Anion Gap 5 - 18 mmol/L 9   Magnesium 1.8 - 2.6 mg/dL 1.2 (L)   BNP 0 - 167 pg/mL 259 (H)   CRP 0.0 - 0.8 mg/dL 2.0 (H)   Troponin I 0.00 - 0.29 ng/mL 0.01   Glucose 70 - 125 mg/dL 233 (H)   Ph Venous 7.35 - 7.45  7.34 (L)   PCO2 Venous 35 - 50 mm Hg 63 (H)   PO2 Venous 25 - 47 mm Hg 51 (H)   O2 Sat, Venous 70.0 - 75.0 % 82.4 (H)   Bicarbonate Venous 24 - 30 mmol/L 30   Base Excess Venous   mmol/L 8.3   Oxyhemoglobin Venous 70.0 - 75.0 % 80.1 (H)   WBC 4.0 - 11.0 10e3/uL 11.2 (H)   Hemoglobin 11.7 - 15.7 g/dL 10.0 (L)   Hematocrit 35.0 - 47.0 % 31.2 (L)   Platelet Count 150 - 450 10e3/uL 248   RBC Count 3.80 - 5.20 10e6/uL 3.42 (L)   MCV 78 - 100 fL 91   MCH 26.5 - 33.0 pg 29.2   MCHC 31.5 - 36.5 g/dL 32.1   RDW 10.0 - 15.0 % 16.6 (H)      Latest Reference Range & Units 06/08/22 16:48   SARS CoV2 PCR Negative  Negative [1]   Influenza A Negative  Negative   Influenza B Negative  Negative   Resp Syncytial Virus Negative  Negative        Latest Reference Range & Units 06/08/22 16:50   CRP 0.0 - 0.8 mg/dL 2.0 (H)       Data reviewed today: I reviewed all medications, new labs and imaging results over the last 24  hours. I personally reviewed EKG showing A. fib, low voltage, right axis deviation, no ischemic changes.  Impression: Atrial fibrillation with PVCs.  Rightward axis and low voltage QRS.     Rate: 100 BPM  Rhythm: A. Fib. With PVCs.   Axis: 100 67  MI Interval: *  QRS Interval: 62  QTc Interval: 405  ST Changes: Nonspecific     Recent Results (from the past 24 hour(s))   XR Chest Port 1 View    Narrative    EXAM: XR CHEST PORT 1 VIEW  LOCATION: [facility]  DATE/TIME: 6/8/2022 4:52 PM    INDICATION: SOB  COMPARISON: 05/18/2022 and older studies, chest CT 05/17/2022 and older studies      Impression    IMPRESSION: PICC line has been removed. Emphysematous changes are better appreciated on the CT.    Interval clearing of the left lower lobe consolidation. Fine reticular opacities have increased bilaterally. This may reflect edema superimposed over emphysematous changes or a viral pneumonia.    Heart is enlarged but unchanged. No clear effusions.

## 2022-06-09 NOTE — CONSULTS
Care Management Initial Consult    General Information  Assessment completed with: Patient,    Type of CM/SW Visit: Initial Assessment  Primary Care Provider verified and updated as needed: Yes   Readmission within the last 30 days: previous discharge plan unsuccessful   Return Category: Exacerbation of disease  Reason for Consult: discharge planning, health care directive, transportation  Advance Care Planning: Advance Care Planning Reviewed: no concerns identified        Communication Assessment  Patient's communication style: spoken language (English or Bilingual)        Cognitive  Cognitive/Neuro/Behavioral: WDL                      Living Environment:   People in home: spouse     Current living Arrangements: house      Able to return to prior arrangements: yes     Family/Social Support:  Care provided by: homecare agency, self, spouse/significant other  Provides care for: no one, unable/limited ability to care for self  Marital Status:   , Children  Anabel JACINTO       Description of Support System: Supportive, Involved    Support Assessment: Caregiver experiencing high stress, Limited social contact and support    Current Resources:   Patient receiving home care services: No  Community Resources: None  Equipment currently used at home: cane, straight, grab bar, tub/shower, shower chair, walker, rolling  Supplies currently used at home: Oxygen Tubing/Supplies, Nebulizer tubing, None    Employment/Financial:  Employment Status: retired     Financial Concerns: No concerns identified (Pt denies any concerns.)   Referral to Financial Worker: No     Lifestyle & Psychosocial Needs:  Social Determinants of Health     Tobacco Use: Medium Risk     Smoking Tobacco Use: Former Smoker     Smokeless Tobacco Use: Never Used   Alcohol Use: Not on file   Financial Resource Strain: Not on file   Food Insecurity: Not on file   Transportation Needs: Not on file   Physical Activity: Not on file   Stress: Not on file  "  Social Connections: Not on file   Intimate Partner Violence: Not on file   Depression: Not at risk     PHQ-2 Score: 0   Housing Stability: Not on file     Functional Status:  Prior to admission patient needed assistance:   Dependent ADLs:: Ambulation-walker, Bathing  Dependent IADLs:: Cleaning, Cooking, Laundry, Shopping, Meal Preparation, Medication Management, Transportation ( and pt complete these tasks together.)  Assessment of Functional Status: Not at  functional baseline    Mental Health Status:  Mental Health Status: No Current Concerns       Chemical Dependency Status:  Chemical Dependency Status: No Current Concerns           Values/Beliefs:  Spiritual, Cultural Beliefs, Catholic Practices, Values that affect care:  (None stated)       Cultural/Catholic Practices Patient Routinely Participates In:  (Pt did not express any preferences)       Additional Information:  Writer met with pt and  to introduce Care Management service(CM) and obtain an initial assessment. Pt shares a house with her  and states usual ability to manage at home with in-home services and 's assistance. Walker use as needed. No concerns with current level of assistance at home expressed.    [facility] has confirmed open service with pt for PT, OT, RN, and HHA visits. No concerns expressed by contacted HC personnel about pt's plan for return home with resumption of services. Fax notification with CM contact info has been sent to the vendor.     6/8 per SHAYLEE Huang -\"86 year old female with a history of chronic respiratory failure, emphysema, COPD, bronchiectasis, pseudomonal LLL pneumonia, and multiple recent admissions for COPD exacerbations who was admitted on 6/8/2022 with hypoxia and dyspnea most likely multifactorial from COPD exacerbation and fluid overload.  Chest x-ray showed improvement of her pneumonia and mild leukocytosis and the only slight elevation in CRP suggest against severe " "infection.  Will treat for COPD exacerbation with steroids and nebs but hold off on antibiotics for now.  Elevated BNP with reticular opacities on CXR and clinical finding of bilateral lower extremity swelling are consistent with CHF exacerbation.  Continue IV Lasix, which should also improve her hyponatremia and hyperkalemia.  She is already doing better with RT support.  She is DNR/DNI with end-stage chronic respiratory failure but has recently declined hospice.\"    Pt has a Hospice consult ordered. Pt refused even considering Hospice care at this time when asked by writer.    Consults pending. Pt refuses TCU placement, adamantly. Anticipate return home via  and continued, if not increased home care services. CM to follow-up on consult recs towards discharge planning and assist with service coordination accordingly.    Prasanna Chambers RN        "

## 2022-06-09 NOTE — PROGRESS NOTES
06/09/22 1100   Quick Adds   Quick Adds Edema Eval   General Information   Onset of Illness/Injury or Date of Surgery 06/08/22   Referring Physician Yesenia Rolle MD   Patient/Family Therapy Goals Statement (PT) fit in my home socks   Pertinent History of Current Problem (include personal factors and/or comorbidities that impact the POC) COPD, CHF, acute and chronic resp failure   Existing Precautions/Restrictions no known precautions/restrictions   Weight-Bearing Status - LLE full weight-bearing   Weight-Bearing Status - RLE full weight-bearing   Edema General Information   Onset of Edema   (chronic)   Affected Body Part(s) Right LE;Left LE   Edema Etiology Other (see comments)  (PAD)   Etiology Comments COPD, ARF   Edema Precautions Cardiac Edema/CHF   General Comments/Previous Edema Treatment/Edema Equipment reports swelling is improving, has been having home lymphedema for L LE   Edema Examination/Assessment   Skin Condition Pitting;Dryness;Hemosiderin deposits   Skin Condition Comments L Lower leg 1+, foot 2+   Skin Integrity Skin intact.   Edema Assessment Comments R LE not assessed due to having home sock on, fitting well, pt requests trial of home sock on L LE   Clinical Impression   Criteria for Skilled Therapeutic Intervention Yes, treatment indicated   Edema: Patient Presentation Edema   Clinical Presentation (PT Evaluation Complexity) Stable/Uncomplicated   Clinical Presentation Rationale Pt presents as medically diagnosed   Clinical Decision Making (Complexity) moderate complexity   Edema: Planned Interventions Fit for compression garment;Education   Risk & Benefits of therapy have been explained care plan/treatment goals reviewed;patient   PT Discharge Planning   PT Brief overview of current status d/c Lymph   Plan of Care Review   Plan of Care Reviewed With patient   Physical Therapy Goals   PT: Edema education to increase ability to manage edema after discharge from the hospital  Patient;Verbalize;signs/symptoms of intolerance;Goal Met   PT: Management of edema bandages Patient;Max assist;garment(s);Goal Met

## 2022-06-09 NOTE — ED NOTES
Writer spoke with Dr Reza    -Hold NPH order at 2000 6/8/22. Give with next solumedrol dose.   -Awaiting for novolog from pharmacy   -Continue landaverde order. Order set placed for landaverde discontinuation per algorithm

## 2022-06-09 NOTE — ED NOTES
Report given to Leslee RN    Writer spoke with Dr Reza. Aware of patients request for atorvastatin.   Notified MD of left IV infiltrate of Mg. Coban applied.   Patient requesting food, MD notified, see orders.

## 2022-06-09 NOTE — PROGRESS NOTES
Patient on O2 7-11 L on Oxymask most of the day. Pt has a respiratory distress started at 1500 but refuse to put on BiPAP until 1641 by a nurse. Pt desaturated to 70% on Oxymask. Patient's getting better after BiPAP was initiated. BS coarse. Pt has thick yellow secretion.     BiPAP setting,  Mode: S/T  IPAP/EPAP: 10/5  Rate: 14  FiO2: 40%      Will continue to follow    Roderick Chow, RT

## 2022-06-09 NOTE — PROGRESS NOTES
06/09/22 1130   Quick Adds   Type of Visit Initial PT Evaluation   Living Environment   Living Environment Comments see prior notes this section   Self-Care   Equipment Currently Used at Home   (4ww most recently; using it as a w/c mostly)   Activity/Exercise/Self-Care Comment see prior notes this section   General Information   Onset of Illness/Injury or Date of Surgery 06/08/22   Referring Physician nasir george   Patient/Family Therapy Goals Statement (PT) home   Existing Precautions/Restrictions   (IV, 9L O2 via mask, tele. pressure sores)   Edema General Information   General Comments/Previous Edema Treatment/Edema Equipment   (wearing compression stockings)   Cognition   Orientation Status (Cognition) oriented x 4   Pain Assessment   Patient Currently in Pain   (buttocks (pressure sores))   Integumentary/Edema   Integumentary/Edema Comments   (bandage over buttocks pressure sores)   Strength (Manual Muscle Testing)   Strength (Manual Muscle Testing) Able to perform R SLR;Able to perform L SLR  (MMT deferred due to limited activity tolerance)   Bed Mobility   Supine-Sit [place] (Bed Mobility) modified independence   Sit-Supine [place] (Bed Mobility) modified independence   Bed Mobility Limitations   (SOB with minimal effort)   Transfers   Comment, (Transfers) sit<>stand from [facility] with stool due to short stature; cga with fww See treatment sheet   Gait/Stairs (Locomotion)   Distance in Feet (Required for LE Total Joints)   (stood in place:40 and 1:00)   Comment, (Gait/Stairs) see treatment sheet   Sensory Examination   Sensory Perception patient reports no sensory changes   Clinical Impression   Criteria for Skilled Therapeutic Intervention Yes, treatment indicated   PT Diagnosis (PT) impaired functional mobility   Influenced by the following impairments weakness, SOB   Functional limitations due to impairments amb.   Clinical Presentation (PT Evaluation Complexity) Stable/Uncomplicated    Clinical Presentation Rationale presents as medically diagnosed   Clinical Decision Making (Complexity) moderate complexity   Planned Therapy Interventions (PT) gait training;patient/family education;strengthening;progressive activity/exercise   Anticipated Equipment Needs at Discharge (PT) wheelchair  (4ww)   Risk & Benefits of therapy have been explained evaluation/treatment results reviewed   PT Discharge Planning   PT Discharge Recommendation (DC Rec) home with home care physical therapy  ((refuses any other option))   PT Rationale for DC Rec spouse and much home care helping was working OK   Plan of Care Review   Plan of Care Reviewed With patient   Total Evaluation Time   Total Evaluation Time (Minutes) 25   Physical Therapy Goals   PT Frequency Daily   PT Predicted Duration/Target Date for Goal Attainment 06/16/22   PT Goals Gait;Aerobic Activity;Stairs   PT: Gait Supervision/stand-by assist;25 feet;Rolling walker   PT: Stairs 1 stair;Rail on both sides;Supervision/stand-by assist   PT: Perform aerobic activity with stable cardiovascular response intermittent activity;5 minutes;ambulation

## 2022-06-09 NOTE — PROGRESS NOTES
Brief Progress Note    Assessment and Plan  Give lasix 40 mg  Have RT check patient - low threshold for BiPAP  EKG  Chest x-ray  Troponin  Electrolytes  Tums    Subjective  Adalgisa Solano is a 86 year old female with chronic respiratory failure, emphysema, COPD admitted on 6/8/2022 for CHF exacerbation.      Patient endorses feeling more short of breath while laying down. She states this is usually more when she is standing and not sitting. Spontaneously, she sat up in bed, put a hand over her heart, and said she didn't feel good. She felt light headed and short of breath. She denied chest pain.    Objective  B/P: 113/65, T: 98.2, P: 99, R: 20 - oxymask at 11L (7L this AM)    Cardiac: irregularly irregular, slightly tachycardic  Pulmonary: course rhonchi auscultated throughout  Extremities: edema bilaterally of lower extremities

## 2022-06-09 NOTE — ED NOTES
Pt came in and was placed on BiPAP 10/5, rate 14 and 40% O2.  At 0000 RT took off BiPAP to give a break and she is currently between 6-8L via oxymask and is comfortable with no sob. Does have a productive cough with some thick phlegm.  RT will continue to monitor    RT Malathi

## 2022-06-09 NOTE — PLAN OF CARE
Problem: Respiratory Compromise COPD (Chronic Obstructive Pulmonary Disease)  Goal: Effective Oxygenation and Ventilation  Outcome: Ongoing, Progressing  Intervention: Promote Airway Secretion Clearance  Recent Flowsheet Documentation  Taken 6/9/2022 0018 by Vibha Sarmiento, RN  Cough And Deep Breathing: done with encouragement  Activity Management: bedrest  Intervention: Optimize Oxygenation and Ventilation  Recent Flowsheet Documentation  Taken 6/9/2022 0040 by Vibha Sarmiento, RN  Head of Bed (HOB) Positioning: HOB at 30-45 degrees     Patient boarding down in ED. A&Ox4. VSS ex soft BP's. Asymptomatic. Was on Bipap @ start of shift @ 40%FIO2. Able to switch to oxymask on shift and have been titrating between 4-10L to keep sats between 88-92%. LS coarse throughout. MEYER  No c/o being SOB. Frequent productive cough; thick white sputum. Respiratory culture collected on shift. BG checks q4 hours. Mag replacement overnight;  recheck this AM. Tele: Afib controlled. Wound on bottom; mepilex applied and WOC consult ordered. T&R t/o night. PIV infusing LR @ 10 mL/hr. Borjas in place, monitoring UOP closely. Good UOP overnight. CLD. Lymphedema, PT, OT to be consulted.

## 2022-06-09 NOTE — CONSULTS
[facility]  WO Nurse Inpatient Assessment     Consulted for: Erythema on sacrum    Patient History (according to provider note(s):      Patient is known to this [facility] team - seen previously 5/25/2022.    Areas Assessed:      Areas visualized during today's visit: Sacrum/coccyx    Wound location: Buttocks/sacrum/gluteal cleft    Last photo: Previous encounter - no photo taken today  Wound due to: Incontinence Associated Dermatitis (IAD) and Viral Lesions  Wound history/plan of care: Leave open to air, JESUS mattress  Wound base:  Sacrum 5x5cm area of blanchable erythema  Sacrum areas of ecchymosis   Bilateral buttocks healed open areas from previous viral lesions  Patient lying on a donut pillow - provided education to explain why this is not recommended. If patient is admitted, will order JESUS mattress.  In the meantime, recommend repositioning with pillows on ED cart and no donut pillow.    Treatment goal: Protection  STATUS: initial assessment  Supplies ordered: supplies stored on unit       Treatment Plan:     JESUS mattress, Sacral Mepilex    Pressure Injury Prevention (PIP) Plan:      Moisture management: Perineal cleansing /protection: Follow Incontinence Protocol and Avoid brief in bed      Mattress: Follow bed algorithm, reassess daily and order specialty mattress, if indicated.      Repositioning in bed: Every 1-2 hours       Heels: Keep elevated off mattress      Protective dressing: Sacral Mepilex for prevention (#[id number]),  especially for the agitated patient       Positioning equipment: Pillows      Chair positioning: Assist patient to reposition hourly              If patient has a buttock pressure injury, or high risk for PI use chair cushion or SPS.      Under devices: Inspect skin under all medical devices during skin inspection , Ensure tubes are stabilized without tension and Ensure patient is not lying on medical devices or equipment when repositioned             Ask provider  "to discontinue device when no longer needed.      If patient is declining pressure injury prevention interventions: Explore reason why and address patient's concerns, Educate on pressure injury risk and prevention intervention(s), If patient is still declining, document \"informed refusal\"  and Ensure Care team is aware ( provider, charge nurse, etc)    Orders: Written    RECOMMEND PRIMARY TEAM ORDER: None, at this time  Education provided: plan of care and pressure prevention  Discussed plan of care with: Patient, Family and Nurse  WOC nurse follow-up plan: weekly  Notify WOC if wound(s) deteriorate.  Nursing to notify the Provider(s) and re-consult the WOC Nurse if new skin concern.    DATA:     Current support surface: ED cart, but till order JESUS mattress if admitted  Containment of urine/stool: Indwelling catheter  BMI: Body mass index is 26.83 kg/m .   Active diet order: Orders Placed This Encounter      Regular Diet Adult     Output: I/O last 3 completed shifts:  In: 549 [P.O.:360; I.V.:189]  Out: 1675 [Urine:1675]     Labs: Recent Labs   Lab 06/09/22  0709   ALBUMIN 2.6*   HGB 9.9*   WBC 7.0   CRP 16.2*     Pressure injury risk assessment:   Sensory Perception: 4-->no impairment  Moisture: 3-->occasionally moist  Activity: 3-->walks occasionally  Mobility: 3-->slightly limited  Nutrition: 3-->adequate  Friction and Shear: 2-->potential problem  Joshua Score: 18    Lorin Chauhan, BETZAIDAN, RN, PHN, HNB-BC, CWOCN     "

## 2022-06-09 NOTE — PROGRESS NOTES
Lymphedema Discharge Summary    Reason for therapy discharge:    All goals and outcomes met, no further needs identified.    Progress towards therapy goal(s). See goals on Care Plan in [facility] electronic health record for goal details.  Goals met    Therapy recommendation(s):    Continue with home compression garments

## 2022-06-09 NOTE — PROGRESS NOTES
Resident/Fellow Attestation   I, Kamryn Begum MD, was present with the medical/ASTRID student who participated in the service and in the documentation of the note.  I have verified the history and personally performed the physical exam and medical decision making.  I agree with the assessment and plan of care as documented in the note.      Kamryn Begum MD PGY1  [facility] Resident  Date of Service (when I saw the patient): 06/09/22    [facility]    Progress Note - Hospitalist Service       Date of Admission:  6/8/2022    Assessment & Plan   Adalgisa Solano is a 86 year old female with chronic respiratory failure, emphysema, COPD admitted on 6/8/2022 for CHF exacerbation.      Acute on chronic diastolic HFpEF  Home meds are Lasix 40 mg daily and spironolactone 12.5 mg daily.  Came in with bilateral LL edema and .  Last echo 4/15/2022 showed EF 65%. Our goal net negative is 1.5L/day.   -Received IV Lasix in ED  -I/O  -IV Lasix 40 mg BID   -Goal net negative 1-2L/day  -Monitor Na, K, Cr  -Keep K>4, Mg>2  -Holding spironolactone for now given hyperkalemia  -Consider cardiology consult for assistance with optimizing guideline directed therapy    Acute on chronic hypoxic and hypercapnic respiratory failure  Emphysema (DLCO 39% predicted)  COPD (FEV1 49% predicted w/o reversibility)  Bronchiectasis, UL dominant  Came in with coarse crackles and expiratory wheezes needing 15 L by high flow nasal cannula. Currently on 7 L via face mask.  -IV methylprednisolone 40 mg Q12H; give with NPH  -DuoNebs Q4H, albuterol nebs PRN  -Mucomyst  -Home CPT vest twice daily  -BiPAP PRN and at night  -On Trelegy Ellipta outpatient  -Has follow-up with pulmonology in August     Recent pseudomonas pneumonia  Recent antimicrobials: Cefepime 04/2022, ertapenem + azithromycin 05/2022, ciprofloxacin 05-06/2022. CXR showing improvement of consolidations. CRP elevated at 16.2, up from 2.0 on  6/8/2022.  -Follow CRP on 6/10/2022  -Sputum culture negative   -Currently holding ciprofloxacin.      T2DM  At risk for steroid-induced hyperglycemia. Due to the desire to initiate hospice care, we are only concerned about hypoglycemia.   -NPH 12U to be given with steroid  -4 times daily glucose checks  -Medium resistance sliding scale for correction  -Monitor for hypoglycemia     Hyponatremia Na 130  -Likely from poor oral intake.  -Urine antigen was negative for Legionella   -Could be from neoplasm  -Getting diuresis with IV Lasix  -May need to reassess for adrenal fatigue but she's getting steroids now     Hyperkalemia K 5.2  -Hold spironolactone  -Hold outpatient potassium supplement     Hypomagnesemia Mg 1.2  -Replacement protocol ordered  -May need additional doses to keep Mg > 2     Bilateral lymphedema  Secondary to above  -OT lymphedema consult     Urinary retention  Bladder scanned in ED for 1100 ml.    -Borjas     Sacral pressure injury  -Mepilex  -PureWick  -WOC consult      Pulmonary HTN     A. fib  -Continue Xarelto and diltiazem     Chronic pain syndrome-chronic mid to low back pain  -Continue Tylenol and gabapentin  -Ice and heat     Tinea pedis  -On Terbinafine tablet 250 mg on 6/9 for 7 days.   -CMP      Anxiety  -Continue Abilify  -PRN 0.25 Ativan Q4H  -Melatonin 3 mg scheduled at bedtime  -Discussed difficulty sleeping on prednisone       Diet: Regular Diet Adult    DVT Prophylaxis: DOAC  Borjas Catheter: PRESENT, indication: Retention  Fluids: PO  Central Lines: None  Cardiac Monitoring: ACTIVE order. Indication: Electrolyte Imbalance (24 hours)- Magnesium <1.3 mg/ml; Potassium < =2.8 or > 5.5 mg/ml  Code Status: No CPR- Do NOT Intubate   DNR/DNI --see palliative care consult note 5/17/2020    Disposition Plan   Expected Discharge: 06/13/2022     Anticipated discharge location:  Awaiting care coordination huddle  Delays: Still some debate between hospice and inpatient management.      The  patient's care was discussed with the Attending Physician, Dr. Rolle.    SAGRARIO HERMOSILLO  Medical Student  Hospitalist Service  [facility]  Securely message with the Vocera Web Console (learn more here)  Text page via [employer] Paging/Directory   ______________________________________________________________________    Interval History   Patient was resting in her ED bed when seen this morning with her  near her side. She is able to talk to me although after a sentence or two, she is out of breath. She has no cough. She has no difficulties with bowel movements. Endorses lower extremity edema. She feels short of breath that is worse than her baseline. There were no overnight events. Patient endorses difficulty urinating over the past week but is feeling much relief with her landaverde.       Data reviewed today: I reviewed all medications, new labs and imaging results over the last 24 hours.    Physical Exam   Vital Signs: Temp: 98.2  F (36.8  C) Temp src: Oral BP: 113/65 Pulse: 99   Resp: (!) 31 SpO2: 92 % O2 Device: Oxymask Oxygen Delivery: 9 LPM  Weight: 142 lbs 0 oz    PHYSICAL EXAM:  GENERAL: Awake, alert, lying in hospital bed, appears comfortable, some respiratory distress noticed during our conversations.   HEENT: No scleral icterus or conjunctival injection. Oral cavity moist and pink.  SKIN: Warm and dry. Bruises throughout her arms.   LUNGS: Increased work of breathing with use of accessory muscles. Expiratory wheezes and loud breath sounds.   CARDIAC: Irregularly irregular heart rate. Normal S1 and S2. No murmurs, clicks, or rubs appreciated. Some JVD noted. Bilateral peripheral edema noted.   ABDOMEN: Non-distended. Bowel sounds present in 4 quadrants. Soft and non-tender throughout with no masses or organomegaly.  EXTREMITIES: No gross deformity. Peripheral edema as stated.    Data   Recent Labs   Lab 06/09/22  1241 06/09/22  0709 06/09/22  0601 06/08/22  2058 06/08/22  1650    WBC  --  7.0  --   --  11.2*   HGB  --  9.9*  --   --  10.0*   MCV  --  94  --   --  91   PLT  --  233  --   --  248   NA  --  133*  --   --  130*   POTASSIUM  --  4.8  --   --  5.4*   CHLORIDE  --  90*  --   --  89*   CO2  --  33*  --   --  32*   BUN  --  16  --   --  16   CR  --  0.61  --   --  0.71   ANIONGAP  --  10  --   --  9   DENISE  --  8.6  --   --  8.9   * 193* 197*   < > 233*   ALBUMIN  --  2.6*  --   --   --    PROTTOTAL  --  5.9*  --   --   --    BILITOTAL  --  0.6  --   --   --    ALKPHOS  --  75  --   --   --    ALT  --  23  --   --   --    AST  --  15  --   --   --     < > = values in this interval not displayed.     Recent Results (from the past 24 hour(s))   XR Chest Port 1 View    Narrative    EXAM: XR CHEST PORT 1 VIEW  LOCATION: [facility]  DATE/TIME: 6/8/2022 4:52 PM    INDICATION: SOB  COMPARISON: 05/18/2022 and older studies, chest CT 05/17/2022 and older studies      Impression    IMPRESSION: PICC line has been removed. Emphysematous changes are better appreciated on the CT.    Interval clearing of the left lower lobe consolidation. Fine reticular opacities have increased bilaterally. This may reflect edema superimposed over emphysematous changes or a viral pneumonia.    Heart is enlarged but unchanged. No clear effusions.

## 2022-06-09 NOTE — CONSULTS
COPD Initial Interview, Education, and Consult  6/9/2022, 4:25 PM    Reason for Consult: COPD exacerbation with pneumonia  Patient Admitted for: Hyperkalemia [E87.5]  Hypomagnesemia [E83.42]  CHF (congestive heart failure) (H) [I50.9]  COPD exacerbation (H) [J44.1]  Acute and chronic respiratory failure with hypoxia (H) [J96.21] on 6/8/2022     History of Present Illness: Kim is a 86 year female with a history of depression, anxiety, hypertension, pulmonary hypertension heart failure with preserved ejection fraction, schizoaffective disorder, artrial fib., DM2, lung nodules that are probable for malignancy per pulmonary, emphysema, bronchiectasis, former smoker, and oxygen dependent COPD.  This is her 5th admission since 3/23/2022 with a couple of discharges to TCU.  The amount of time between discharge and re-admission continues on decreasing over time.  Kim and her family have had extensive discussions wit palliative care and hospice with a decision on the last admission to not go forward with hospice care but continue restorative care.  Her baseline oxygen was 3-4L via nasal cannula provided by [facility] prior to her 5/17 admission where she was discharged on 10L of oxygen. She also was discharged on a home BiPAP. For pulmonary she follows with Dr. Jun Juarez at the [facility].     Last PFT:  Date: 6/14/2019  Post-Spirometry:  FVC: 1.75, 78%  FEV1: 0.84, 49%  FEV1/FVC: 48%    Home Respiratory Medications:  Bronchodilators:   -albuterol neb/inhaler as needed  Combination Therapy:   -Trelegy    Assessment: Patient has been seen for education 2 weeks ago.  At this time there is no other education our service can provide although we will remain available if questions or concerns regarding her COPD or inhaled medications arise from Kim, her family or staff. It appears also that Kim will continue to have admissions as long as she is not on hospice care as her breathing situation continues to decline  "over time as her home oxygen has increase and she now requires a BiPAP for her chronic respiratory failure.  /70 (BP Location: Right arm)   Pulse 89   Temp 97.9  F (36.6  C) (Oral)   Resp 18   Ht 1.549 m (5' 1\")   Wt 64.4 kg (142 lb)   SpO2 98%   BMI 26.83 kg/m      Recommendations:  -Continue current inpatient therapy, per [facility] protocols  -Consult(s) to Palliative Care for on-going goals of care discussion with multiple admissions.  -Medications patient is currently taking at home appear to be optimized, recommend no changes at this time.  -Recommend Kim and her family reconsider Hospice care and its benefits to her quality of life.    Will continue to follow patient throughout hospital stay along with educating patient and family.    Total 30 minutes spent in chart review, care coordination, and documentation.    Eloisa Pan RT, Chronic Pulmonary Disease Specialist  Phone [phone]    "

## 2022-06-09 NOTE — PROGRESS NOTES
"CLINICAL NUTRITION SERVICES - ASSESSMENT NOTE     Nutrition Prescription    RECOMMENDATIONS FOR MDs/PROVIDERS TO ORDER:  Advance diet as able    Malnutrition Status:    Patient does not meet two of the established criteria necessary for diagnosing malnutrition    Recommendations already ordered by Registered Dietitian (RD):  Pablo ensure clear at lunch and dinner    Future/Additional Recommendations:  Monitor diet advancement and intake     REASON FOR ASSESSMENT  Adalgisa Solano is a/an 86 year old female assessed by the dietitian for Provider Order - \"persistent hypoxic respiratory failure and pneumonia; currenlty npo. please assess for nutrition support to aid in recovery from infection\"    PMH: chronic respiratory failure, emphysema, COPD, bronchiectasis, pseudomonal LLL pneumonia, and multiple recent admissions for COPD exacerbations who was admitted on 6/8/2022 with hypoxia and dyspnea most likely multifactorial from COPD exacerbation and fluid overload  On BiPAP    NUTRITION HISTORY  Patient reported eating 3 meals/day at home normally. The day she came into the hospital she reported only having cheerios that morning. She has home care and has seen a dietitian for her diabetes in the past. She said that she is tolerating clear liquids and she has only had some juice. She was open to trying ensure clear berry. She also asked why she needs to limit fruits and vegetables with her diabetes. I explained the carb content of these foods and gave her some recommendations for portion sizes and other foods to eat with the carbs.     CURRENT NUTRITION ORDERS  Diet: Clear Liquid, 1500mL fluid restriction  Intake/Tolerance: 0% intake noted in flowsheet    LABS  Na 133 (L), -232 (6/8-6/9)    MEDICATIONS  Lasix, novolog, insulin NPH, protonix, miralax, lactated ringers    ANTHROPOMETRICS  Height: 154.9 cm (5' 1\")  Most Recent Weight: 64.4 kg (142 lb)    IBW: 48 kg  BMI: Overweight BMI 25-29.9  Weight History:   Wt " Readings from Last 10 Encounters:   06/08/22 64.4 kg (142 lb)   06/01/22 58.9 kg (129 lb 12.8 oz)   05/10/22 63.5 kg (140 lb)   05/05/22 62.8 kg (138 lb 8 oz)   04/28/22 62.7 kg (138 lb 4.8 oz)   04/25/22 62.5 kg (137 lb 11.2 oz)   04/20/22 62.8 kg (138 lb 6.4 oz)   03/23/22 63 kg (139 lb)   03/21/22 63 kg (139 lb)   02/06/22 64.4 kg (142 lb)   Dosing Weight: 52 kg adjusted weight    ASSESSED NUTRITION NEEDS  Estimated Energy Needs: 1213-2157 kcals/day (25 - 30 kcals/kg)  Justification: Increased needs  Estimated Protein Needs: 52-62 grams protein/day (1 - 1.2 grams of pro/kg)  Justification: Increased needs  Estimated Fluid Needs: (1 mL/kcal)   Justification: Maintenance    MALNUTRITION  % Intake: No decreased intake noted  % Weight Loss: None noted  Subcutaneous Fat Loss: None observed  Muscle Loss: None observed  Fluid Accumulation/Edema: None noted  Malnutrition Diagnosis: Patient does not meet two of the established criteria necessary for diagnosing malnutrition    NUTRITION DIAGNOSIS  Inadequate oral intake related to respiratory failure as evidenced by clear liquid diet and 0% intake reported    INTERVENTIONS  Implementation  Medical food supplement therapy - berry ensure clear at lunch and dinner    Goals  Patient to consume % of nutritionally adequate meal trays TID, or the equivalent with supplements/snacks once diet advances     Monitoring/Evaluation  Progress toward goals will be monitored and evaluated per protocol.  April Hitchcock

## 2022-06-09 NOTE — PROVIDER NOTIFICATION
Shad WONG regarding IV Laxis order and BP. MD okay with holding this AM dose @ 0600 with BP of 109/55.

## 2022-06-09 NOTE — CONSULTS
Spoke with Chayito MCNULTY and pt is not interested in hospice at this time. Did meet with pt to discuss hospice during previous hospitalization.     Thank you,     Gwendolyn Mckeon RN BSN PHN CHPN  Hospice Referral Specialist  [facility]    [phone]  [email or web address]

## 2022-06-09 NOTE — PROGRESS NOTES
Brief Progress Note:    Patient tolerating BiPap. Still feeling short of breath. Sats have improved. EKG shows a fib with no signs of acute ischemia. CXR shows increasing pulmonary edema and RUL consolidation vs edema, likely edema. Previous CT shows mass in RUL, so likely this is fluid backed up behind mass vs growing mass. Labs notable for increased potassium, and decreased sodium, which should improve with lasix. Patient just received lasix. Ordered morphine 1 mg IV once for air hunger. Patient ok to remain on med/surg floor on bipap as she is DNR/DNI. Will continue to monitor.     Patient discussed with Dr. Rolle.     Mora Varela MD, PGY-2  [facility]  June 9, 2022

## 2022-06-09 NOTE — ED NOTES
Writer in to see patient. Oxygen sats down to 85-86% on 40FiO2. Increased FiO2 to 45%. Admitting MD now at bedside. RT present as well.

## 2022-06-10 NOTE — PROGRESS NOTES
Attempted to reach pt and . No answer. Left  with 24hour number to call back to.     Thank you for your care of this pt,     Gwendolyn Mckeon RN BSN PHN CHPN  Hospice Referral Specialist  [facility]    [phone]  [email or web address]

## 2022-06-10 NOTE — PROGRESS NOTES
06/06/2022    MET PT AND HER FAMILY AT THEN HOME FO THE SETUP. PT HAS A LOT OF SUPPORT AS 5 OTHER FAMILY MEMBERS WERE THERE. I PUT THE EQUIPMENT TOGETHER THEN GAVE INSTRUCTION. PT'S FAMILY HAD QUESTIONS ABOUT CONNECTING THE O2 TUBIONG, SO I SUGGESTED LEAVING THE GREEN O2 TUBING CONNECTED TO THE VENT, AND USING THE CLEAR ONE WHEN SHE'S NOT ON THE VENT. THEN THEY ONLY NEED TO DISCONNECT/CONNECT AT THE CONCENTRATOR. PT'S  IS A  SO HE WAS INTERESTED IN HOW EVERYTHING CONNECTED, AND HE HELPED ME FIX THE STAND (PLATE AND HUMIDIFIER CONNECTION). WE WENT OVER THE BASIC OPERATION OF THER NIV A FEW TIMES (ON/OFF), AND I LET THEN KNOW THEY SHOULDN'T NEED TO USE THE MAIN POWER BUTTON UNLESS THEY WERE GOING TO TO BE MOVING IT (FOR TRAVEL OR TO ANOTHER ROOM). WE WENT OVER HOW TO SILENCE THE ALARM, AND HOW TO READ THE ALARM NOTIFICATION IF IT DOES GO OFF. PT AND FAMILY HAD NO MORE QUESTIONS.    NIV DEVICE: ASTRAL 150  NIV MODE: ST  NIV SETTINGS: PIP 10, EPAP 5, RR 14  COMFORT SETTINGS: RISE TIME 450 MS, TI MIN-MAX 1-2, CYCLE 25%

## 2022-06-10 NOTE — PROGRESS NOTES
CLINICAL NUTRITION SERVICES - REASSESSMENT NOTE     Nutrition Prescription    RECOMMENDATIONS FOR MDs/PROVIDERS TO ORDER:  None at this time.    Malnutrition Status:    Pt does not meet 2 or more criteria for malnutrition    Recommendations already ordered by Registered Dietitian (RD):  Glucerna Chocolate 1x/d with breakfast     Future/Additional Recommendations:  Check need for supplement at follow up.     EVALUATION OF THE PROGRESS TOWARD GOALS   Diet: Regular  Nutrition Supplement/Support: none  Intake: 100% as of 6/10 AM, 0% previously; ordered adequate meal this AM       NEW FINDINGS   Pt diet was advanced from clear liquid diet to regular diet. Intake and appetite has increased significantly. Pt did not like the Glucerna Clear Pablo but drank 75% of it yesterday. That was the only PO yesterday. Today, pt ordered breakfast and intake was 100%.    ANTHROPOMETRICS  Admission wt: 64.4 kg (142 lbs)  Most Recent Weight: 64.7 kg (142 lbs 9.6 oz)    No wt change.    PHYSICAL FINDINGS  Edema: dependent edema +3 (moderate)  GI: WDL; last BM 6/8  Skin: redness blanchable color; poor skin turgor; scattered bruising    LABS  Reviewed; Na-133, Cl-90, Ca-8.1, Mg-1.6, Glucose+166    MEDICATIONS  Reviewed; Lasix, gabapentin, novolog, protonix, miralax    NUTRITION DIAGNOSIS  Inadequate oral intake related to respiratory failure as evidenced by clear liquid diet and 0% intake reported  -- Evaluation: Resolved, intake 100% 6/10 AM    Goals:  Patient to consume % of nutritionally adequate meal trays TID, or the equivalent with supplements/snacks once diet advances   -- Evaluation: met, pt intake at 100% after diet advancement    INTERVENTIONS  Implementation  Medical food supplement therapy - berry ensure clear at lunch and dinner  -- Evaluation: Changed order to Glucerna Chocolate 1x/d w breakfast    Monitoring/Evaluation  Progress toward goals will be monitored and evaluated per protocol.

## 2022-06-10 NOTE — DISCHARGE SUMMARY
[facility]  Discharge Summary - Medicine & Pediatrics       Date of Admission:  5/16/2022  Date of Discharge:  6/2/2022  Discharging Provider: Dr. Joshua Mcdonald  Discharge Service: Hospitalist Service    Discharge Diagnoses   Principal Problem:    Acute respiratory failure with hypoxia (H)  Active Problems:    Hypomagnesemia    Hyponatremia    COPD exacerbation (H)  Pseudomonal pneumonia    Follow-ups Needed After Discharge   Follow-up Appointments     Follow-up and recommended labs and tests       Follow-up with PCP within 1 week for hospital follow-up  Follow-up with Dr. Juarez scheduled for August 2nd, contact sooner if   needed           Discharge Disposition   Discharged to home  Condition at discharge: Stable    Hospital Course   Adalgisa Solano with end-stage COPD and bronchiectasis previously requiring 3-5L O2 at home with vest therapy admitted for bronchiectasis exacerbation causing new persistent severe hypoxia requiring 8-10L O2.  Found to have pseudomonal pneumonia.    Presented satting 88% on 5L. CXR shows retrocardiac opacity, likely atelectasis and resolved RLL opacities; mild pulmonary engorgement, no pleural effusions. BNP at 180 without increase in bilateral lower extremity edema. No leukocytosis or elevated procal however CT chest with partial clearing of airspace opacities from the left lower lobe, Residual dense opacities are now along the central bronchovascular structures and new inflammatory nodules. Also with multiple solid nodules concerning for neoplasm.  Alternating between HFNC and BIPAP. Sputum cx with pseudomonas. Patient was treated with prolonged prednisone taper, meropenem transitioned to ciprofloxacin, as well as continued pulmonary support and PTA medications.     This has been a long hospital stay with bronchial hygiene therapy, antibiotics and diuresis and has not been able to wean down lower than 8-10L oxygen. Palliative and hospice consulted and  aided in discussions and support. She has declined hospice despite discussion of inability to do anything more to help her lungs.     Patient has been stable at these higher oxygen supports, likely a new baseline. Given stability and patient preference after discussions of course and prognosis, discharging to home at this time with resuming home cares including PT, OT, and RN. BiPAP device ordered at home and oxygen delivered at 10L (see oxygen face to face evaluation below). Patient will complete 14 day antibiotic course at home as well as continue prednisone taper.     Face-to-face evaluation for oxygen needs: Patient suffers from chronic respiratory failure due to COPD and respiratory home ventilation is required.  A noninvasive ventilator is required to prevent future hospital admissions, improved pulmonary status, and decreased work of breathing.  Without a noninvasive ventilator for this patient, it could lead to more serious respiratory issues or death.    Consultations This Hospital Stay   IP RESPIRATORY CARE CHRONIC PULMONARY DISEASE SPECIALIST  SOCIAL WORK IP CONSULT  LYMPHEDEMA THERAPY IP CONSULT  PULMONARY IP CONSULT  PALLIATIVE CARE ADULT IP CONSULT  WOUND OSTOMY CONTINENCE NURSE  IP CONSULT  CORE CLINIC EVALUATION IP CONSULT  VASCULAR ACCESS ADULT IP CONSULT  WOUND OSTOMY CONTINENCE NURSE  IP CONSULT  PHYSICAL THERAPY ADULT IP CONSULT  SPIRITUAL HEALTH SERVICES IP CONSULT  SPIRITUAL HEALTH SERVICES IP CONSULT  INPATIENT HOSPICE ADULT CONSULT  PHYSICAL THERAPY ADULT IP CONSULT  OCCUPATIONAL THERAPY ADULT IP CONSULT  INPATIENT HOSPICE ADULT CONSULT  IP RESPIRATORY CARE CHRONIC PULMONARY DISEASE SPECIALIST  INPATIENT HOSPICE ADULT CONSULT  IP RESPIRATORY CARE CHRONIC PULMONARY DISEASE SPECIALIST    Code Status   No CPR- Do NOT Intubate       The patient was discussed with Dr. Joshua Biswas MD  [facility]  Critical access hospital[address] [facility]  [address]  Phone: [phone]  Fax: [phone]  ______________________________________________________________________    Physical Exam   Vital Signs:                    Weight: 129 lbs 12.8 oz    Constitutional: awake, alert, cooperative, appears comfortable sitting up in chair, speaking in full sentences  Eyes: Lids and lashes normal, extra ocular muscles intact, sclera clear, conjunctiva normal  ENT: normocepalic, without obvious abnormality, atraumatic.  Respiratory: On Oxymizer. Coarse crackles throughout all lung fields bilaterally.  Unchanged from previous  Cardiovascular:  RRR, no murmur auscultated  GI:  normal bowel sounds, soft, non-distended, non-tender  Musculoskeletal: compression stockings in place, trace peripheral edema bilaterally, symmetric.  Neurologic: Awake, alert, oriented x4  Neuropsychiatric: General: normal, calm and normal eye contact      Primary Care Physician   Shai Ellington    Discharge Orders      Medication Therapy Management Referral      Home Care Referral      Primary Care - Care Coordination Referral      Follow-up and recommended labs and tests     Follow-up with PCP within 1 week for hospital follow-up  Follow-up with Dr. Juarez scheduled for August 2nd, contact sooner if needed     Activity    Your activity upon discharge: As tolerated, limited by oxygen needs     Reason for your hospital stay    Increased oxygen needs, pneumonia, COPD exacerbation     Resume Home Care Services     Miscellaneous Order for DME - ONLY FOR DME    I, the undersigned, certify that the above prescribed supplies are medically necessary for this patient and is both reasonable and necessary in reference to accepted standards of medical and necessary in reference to accepted standards of medical practice in the treatment of this patient's condition and is not prescribed as a convenience.      Miscellaneous Order for DME - ONLY FOR DME    I, the undersigned, certify that the above prescribed  supplies are medically necessary for this patient and is both reasonable and necessary in reference to accepted standards of medical and necessary in reference to accepted standards of medical practice in the treatment of this patient's condition and is not prescribed as a convenience.      Diet    Follow this diet upon discharge: Diabetic diet       Significant Results and Procedures   Most Recent 3 CBC's:  Recent Labs   Lab Test 06/10/22  0517 06/09/22  0709 06/08/22  1650   WBC 10.1 7.0 11.2*   HGB 9.1* 9.9* 10.0*   MCV 94 94 91    233 248   ,   Results for orders placed or performed during the hospital encounter of 05/16/22   XR Chest Port 1 View    Narrative    EXAM: XR CHEST PORT 1 VIEW  LOCATION: [facility]  DATE/TIME: 5/16/2022 2:21 PM    INDICATION: SOB  COMPARISON: 04/13/2022      Impression    IMPRESSION: Moderately enlarged heart. Mildly increased retrocardiac opacity, favored to represent atelectasis. Resolved right lower lobe opacities. Mild pulmonary vascular engorgement with increased interstitial markings, not significantly changed.   CT Chest w/o Contrast    Narrative    EXAM: CT CHEST W/O CONTRAST  LOCATION: [facility]  DATE/TIME: 5/17/2022 2:37 PM    INDICATION: COPD exacerbation  COMPARISON: CT pulmonary angiogram 5/5/2022, 3/11/2021  TECHNIQUE: CT chest without IV contrast. Multiplanar reformats were obtained. Dose reduction techniques were used.  CONTRAST: None.    FINDINGS:   LUNGS AND PLEURA: Background of fairly advanced emphysema mixed centrilobular and paraseptal distribution with an upper lung predominance. In the areas of greatest emphysema the intervening interstitium has mild thickening, unchanged. The multiple   segment posterior airspace and interstitial process in the left lower lobe has mildly improved from 12 days earlier, however there is no more conspicuous and bandlike opacity in the left upper lobe along the posterior  pleura and along the central   bronchovascular structures in the left lower lobe. Increased subpleural opacity in the right posterior costophrenic sulcus as well as development of multiple patchy inflammatory nodules within the parenchyma of the right lower lobe.    Several lobulated solid nodules are present in the right upper lobe. One of the nodules measuring 11 x 12 mm (series 4, image 110) has substantially increased from 11/3/2021 previously measuring 7 mm long axis. A second larger lobulated solid 9 x 16 mm   (series 4, image 123) right upper lobe nodule is also slightly larger measuring 7 x 14 mm. A few smaller solid nodules are present in the right upper lobe closer to the hilum (series 4, image 140 and 143) and are also larger from March 2021.    MEDIASTINUM: Biatrial cardiac enlargement. Cardiac ventricles are normal in size. No pericardial effusion. Enlarged right lower paratracheal lymph node measures up to 17 mm short axis (series 4, image 121) and enlarged left lower paratracheal lymph nodes   are also unchanged. No definite hilar adenopathy. Esophagus is decompressed. Conspicuous cisterna chyli.     CORONARY ARTERY CALCIFICATION: Three-vessel, moderate.    UPPER ABDOMEN: No actionable findings in the imaged upper abdomen.    MUSCULOSKELETAL: Degenerative osteophytes of the thoracic spine largest from T6 through the thoracolumbar junction. No aggressive or destructive bone lesions are present.      Impression    IMPRESSION:     1.  Partial clearing of airspace opacities from the left lower lobe compared to 5/5/2022. Residual dense opacities are now along the central bronchovascular structures. There are new inflammatory nodules in the right lower lobe and increased opacity in   the right posterior sulcus consistent with new atelectasis/inflammation.  2.  Multiple solid nodules with lobular borders are present in the right upper lobe 2 largest of which measure 11 x 12 and 9 x 16 mm. The 11 x 12 nodule  and several other solid nodules are substantially larger from 3/11/2021 and is suspect for neoplasm.   Consider PET/CT after resolution of this acute respiratory illness for further evaluation.  3.  Advanced emphysema.  4.  Severe enlargement of both cardiac atria and moderate atheromatous coronary calcifications.     XR Chest Port 1 View    Narrative    EXAM: XR CHEST PORT 1 VIEW  LOCATION: [facility]  DATE/TIME: 5/18/2022 12:09 PM    INDICATION: RN placed PICC   verify tip placement  COMPARISON: 05/16/2022       Impression    IMPRESSION: A right PICC tip terminates in the region of the lower SVC. A small volume of left pleural fluid is present; this appears to have decreased in volume from the prior study. No pneumothorax. Interstitial edema is unchanged. The   cardiomediastinal silhouette is enlarged. There is aortic calcification.        Discharge Medications   Discharge Medication List as of 6/2/2022  3:56 PM      START taking these medications    Details   !! predniSONE (DELTASONE) 5 MG tablet Take 1 tablet (5 mg) by mouth daily 10mg until 6/4, then switch to 5mg 6/5 through 6/10, Disp-5 tablet, R-0, E-Prescribe      terbinafine (LAMISIL) 250 MG tablet Take 1 tablet (250 mg) by mouth daily for 14 days, Disp-14 tablet, R-0, E-PrescribeFor tinea pedis      acetylcysteine (MUCOMYST) 20 % neb solution Take 4 mLs by nebulization 4 times daily, Disp-30 mL, R-0, E-Prescribe      ciprofloxacin (CIPRO) 750 MG tablet Take 1 tablet (750 mg) by mouth 2 times daily for 7 days, Disp-14 tablet, R-0, E-Prescribe      !! predniSONE (DELTASONE) 10 MG tablet Take 1 tablet (10 mg) by mouth daily, Disp-3 tablet, R-0, E-Prescribe       !! - Potential duplicate medications found. Please discuss with provider.      CONTINUE these medications which have NOT CHANGED    Details   acetaminophen (TYLENOL) 500 MG tablet Take 500-1,000 mg by mouth every 6 hours as needed for mild pain, Historical      albuterol  (PROAIR HFA/PROVENTIL HFA/VENTOLIN HFA) 108 (90 Base) MCG/ACT inhaler Inhale 2 puffs into the lungs every 4 hours as needed for shortness of breath / dyspnea or wheezing, Historical      albuterol (PROVENTIL) (2.5 MG/3ML) 0.083% neb solution Take 2.5 mg by nebulization every 2 hours as needed for shortness of breath / dyspnea or wheezing, Disp-600 mL, R-3, Historical      ARIPiprazole (ABILIFY) 5 MG tablet TAKE 1 TABLET AT BEDTIME, Disp-90 tablet, R-3, E-Prescribe      atorvastatin (LIPITOR) 20 MG tablet TAKE 1 TABLET AT BEDTIME, Disp-90 tablet, R-3, E-Prescribe      calcium carbonate 600 mg-vitamin D 400 units (CALTRATE) 600-400 MG-UNIT per tablet Take 1 tablet by mouth every morning , Historical      cyanocobalamin (VITAMIN B-12) 500 MCG tablet Take 500 mcg by mouth every morning , Historical      diltiazem ER COATED BEADS (CARDIZEM CD/CARTIA XT) 120 MG 24 hr capsule Take 240 mg by mouth daily, Historical      docusate sodium (COLACE) 100 MG capsule Take 100 mg by mouth daily, Historical      Fluticasone-Umeclidin-Vilanterol (TRELEGY ELLIPTA) 100-62.5-25 MCG/INH oral inhaler Inhale 1 puff into the lungs daily, Historical      furosemide (LASIX) 20 MG tablet Take 40 mg by mouth daily, Historical      gabapentin (NEURONTIN) 300 MG capsule Take 600 mg by mouth At Bedtime, Historical      metFORMIN (GLUCOPHAGE) 500 MG tablet Take 1 tablet (500 mg) by mouth 2 times daily (with meals), Disp-60 tablet, R-11, E-Prescribe      omeprazole (PRILOSEC) 20 MG DR capsule TAKE 1 CAPSULE DAILY, Disp-90 capsule, R-3, E-Prescribe      polyethylene glycol (MIRALAX) 17 GM/Dose powder Take 17 g by mouth daily, Disp-510 g, Historical      potassium chloride ER (KLOR-CON M) 10 MEQ CR tablet Take 1 tablet (10 mEq) by mouth daily, Disp-90 tablet, R-3, E-Prescribe      spironolactone (ALDACTONE) 25 MG tablet TAKE ONE-HALF (1/2) TABLET DAILY, Disp-45 tablet, R-3, E-Prescribe      Vitamin D3 (CHOLECALCIFEROL) 25 mcg (1000 units) tablet Take 1  capsule by mouth every morning , Historical      XARELTO ANTICOAGULANT 20 MG TABS tablet TAKE 1 TABLET DAILY WITH DINNER, Disp-90 tablet, R-1, E-Prescribe      guaiFENesin (MUCINEX) 600 MG 12 hr tablet Take 2 tablets (1,200 mg) by mouth 2 times daily, Disp-60 tablet, R-0, No Print Out      ipratropium - albuterol 0.5 mg/2.5 mg/3 mL (DUONEB) 0.5-2.5 (3) MG/3ML neb solution Take 1 vial (3 mLs) by nebulization 4 times daily Once breathing is better, then change to every 6 hours as needed for shortness of breath, Disp-360 mL, R-11, E-Prescribe      sodium chloride (NEBUSAL) 3 % neb solution Take 3 mLs by nebulization 4 times daily, Historical           Allergies   Allergies   Allergen Reactions     Tramadol Nausea     Tuberculin Tests      Amoxicillin Itching and Rash     Levofloxacin Itching and Rash     Penicillins Itching and Rash     Has tolerated ceftriaxone.

## 2022-06-10 NOTE — PROGRESS NOTES
Pt seen for vest and nebulizer treatments. Pt compliant with therapies. Lungs are coarse with exp wheezes and no change post treatments. Pt mentioned that home bipap was not working properly. Aleja Pride from care management was notified, so she could touch base with home care company to address issues (bipap vs home O2 machine), before discharge. Pt appears to be at her respiratory baseline. RT to follow.    Gricelda Reyes, RT

## 2022-06-10 NOTE — PROGRESS NOTES
Resident/Fellow Attestation   I, Kamryn Begum MD, was present with the medical/ASTR[employer] student who participated in the service and in the documentation of the note.  I have verified the history and personally performed the physical exam and medical decision making.  I agree with the assessment and plan of care as documented in the note. Changes were made in dark blue.    Kamryn Begum MD PGY1  [facility] Family Medicine Resident  Date of Service (when I saw the patient): 06/10/22    [facility]    Progress Note - Hospitalist Service       Date of Admission:  6/8/2022    Assessment & Plan   Adalgisa Solano is a 86 year old female with chronic respiratory failure, emphysema, COPD, 1 week old history of pseudomonal pneumonia admitted on 6/8/2022 for CHF exacerbation. Patient is currently on 10L mustache mask and IV lasix 40 mg BID.    Acute on chronic diastolic HFpEF  Home meds are Lasix 40 mg daily and spironolactone 12.5 mg daily.  Came in with bilateral LL edema and .  Last echo 4/15/2022 showed EF 65%. Our goal net negative is 1.5L/day.   -Received IV Lasix in ED  -I/O  -IV Lasix 40 mg BID   -Goal net negative 1-2L/day   -Monitor Na, K, Cr  -Keep K>4, Mg>2  -Holding spironolactone for now given hyperkalemia  -Consider cardiology consult for assistance with optimizing guideline directed therapy    Acute on chronic hypoxic and hypercapnic respiratory failure  Emphysema (DLCO 39% predicted)  COPD (FEV1 49% predicted w/o reversibility)  Bronchiectasis, UL dominant  Came in with coarse crackles and expiratory wheezes needing 15 L by high flow nasal cannula. Currently on 10 L via mustache-mask.  -DuoNebs Q4H, albuterol nebs PRN  -Mucomyst  -Home CPT vest twice daily  -BiPAP PRN and at night  -On Trelegy Ellipta outpatient  -Has follow-up with pulmonology in August  -Hospice consult: family has questions about hospice cares at home. They are not sure this is the route they would like to  go. Patient would like , Adrian, and daughter, Veronika, to be present for the meeting.     Recent pseudomonas pneumonia  Recent antimicrobials: Cefepime 04/2022, ertapenem + azithromycin 05/2022, ciprofloxacin 05-06/2022. CXR showing improvement of consolidations. CRP elevated at 8.3, this is down from 16.2 on 6/9/2022. Pulmonology commented on the CXR suggesting that the pseudomonal pneumonia may have returned.   -Follow CRP. Has decreased  -Sputum culture negative   -Currently holding ciprofloxacin.     -Restarting Cefepime and doxy for pseudomonal coverage 6/10/2022.     T2DM  At risk for steroid-induced hyperglycemia. Due to the desire to initiate hospice care, we are only concerned about hypoglycemia.   -4 times daily glucose checks  -Medium resistance sliding scale for correction  -Monitor for hypoglycemia     Hyponatremia Na 133  -Likely from poor oral intake.  -Urine antigen was negative for Legionella   -Could be from neoplasm  -Getting diuresis with IV Lasix  -May need to reassess for adrenal fatigue but she's getting steroids now     Hyperkalemia K 5.2, resolved  -Hold spironolactone due to receiving IV Lasix  -Hold outpatient potassium supplement     Hypomagnesemia Mg 1.6  -Replacement protocol ordered  -May need additional doses to keep Mg > 2     Bilateral lymphedema  Secondary to above  -Lymphedema     Urinary retention  Bladder scanned in ED for 1100 ml.    -Borjas     Sacral pressure injury  -Mepilex  -PureWick  -[facility] consult      Pulmonary HTN     A. fib  -Continue Xarelto and diltiazem     Chronic pain syndrome-chronic mid to low back pain  -Continue Tylenol and gabapentin  -Ice and heat     Tinea pedis  -On Terbinafine tablet 250 mg on 6/9 for 7 days.   -CMP      Anxiety  -Continue Abilify  -PRN 0.25 Ativan Q4H  -Melatonin 3 mg scheduled at bedtime  -Discussed difficulty sleeping on prednisone       Diet: Regular Diet Adult    DVT Prophylaxis: DOAC  Borjas Catheter: PRESENT, indication: Strict  1-2 Hour I&O  Fluids: PO  Central Lines: None  Cardiac Monitoring: ACTIVE order. Indication: Electrolyte Imbalance (24 hours)- Magnesium <1.3 mg/ml; Potassium < =2.8 or > 5.5 mg/ml  Code Status: No CPR- Do NOT Intubate   DNR/DNI --see palliative care consult note 5/17/2020    Disposition Plan   Expected Discharge: 06/13/2022     Anticipated discharge location: home with help/services;home with family    Delays: Still some debate between hospice and inpatient management.      The patient's care was discussed with the Attending Physician, Dr. Andi Jones.    SAGRARIOUMESH HERMOSILLO  Medical Student  Hospitalist Service  [facility]  Securely message with the Vocera Web Console (learn more here)  Text page via [email or web address] Paging/Directory   ______________________________________________________________________    Interval History   Patient states she is feeling okay today and ate her breakfast. She was on BiPAP last night and states it was wonderful. She continues to cough and produce sputum.      Data reviewed today: I reviewed all medications, new labs and imaging results over the last 24 hours.    Physical Exam   Vital Signs: Temp: 97.3  F (36.3  C) Temp src: Oral BP: 126/65 Pulse: 72   Resp: 22 SpO2: 96 % O2 Device: Oxymask Oxygen Delivery: 10 LPM  Weight: 142 lbs 9.6 oz    PHYSICAL EXAM:  GENERAL: Awake, alert, lying in hospital bed, appears comfortable, some respiratory distress noticed during our conversations.   HEENT: No scleral icterus or conjunctival injection. Oral cavity moist and pink.  SKIN: Warm and dry. Bruises throughout her arms.   LUNGS: Increased work of breathing with use of accessory muscles. Expiratory wheezes and loud breath sounds.   CARDIAC: Irregularly irregular heart rate. Normal S1 and S2. No murmurs, clicks, or rubs appreciated. Some JVD noted. Bilateral peripheral edema present with much improvement from 6/9/2022.  ABDOMEN: Non-distended. Bowel sounds present in 4 quadrants.  Soft and non-tender throughout with no masses or organomegaly.  EXTREMITIES: No gross deformity. Peripheral edema as stated.    Data   Recent Labs   Lab 06/10/22  0756 06/10/22  0545 06/10/22  0517 06/09/22  2146 06/09/22  1629 06/09/22  1241 06/09/22  0709 06/08/22  2058 06/08/22  1650   WBC  --   --  10.1  --   --   --  7.0  --  11.2*   HGB  --   --  9.1*  --   --   --  9.9*  --  10.0*   MCV  --   --  94  --   --   --  94  --  91   PLT  --   --  261  --   --   --  233  --  248   NA  --   --  133*  --  128*  --  133*  --  130*   POTASSIUM  --   --  4.7  --  5.3*  --  4.8  --  5.4*   CHLORIDE  --   --  90*  --  87*  --  90*  --  89*   CO2  --   --  33*  --  31  --  33*  --  32*   BUN  --   --  23  --  19  --  16  --  16   CR  --   --  0.69  --  0.72  --  0.61  --  0.71   ANIONGAP  --   --  10  --  10  --  10  --  9   DENISE  --   --  8.1*  --  8.2*  --  8.6  --  8.9   * 164* 166*   < > 316*   < > 193*   < > 233*   ALBUMIN  --   --   --   --   --   --  2.6*  --   --    PROTTOTAL  --   --   --   --   --   --  5.9*  --   --    BILITOTAL  --   --   --   --   --   --  0.6  --   --    ALKPHOS  --   --   --   --   --   --  75  --   --    ALT  --   --   --   --   --   --  23  --   --    AST  --   --   --   --   --   --  15  --   --     < > = values in this interval not displayed.     Recent Results (from the past 24 hour(s))   XR Chest Port 1 View    Narrative    EXAM: XR CHEST PORT 1 VIEW  LOCATION: [facility]  DATE/TIME: 6/9/2022 4:14 PM    INDICATION: Worsening shortness of breath  COMPARISON: Portable AP view of the chest 06/08/2022      Impression    IMPRESSION:     Bilateral airspace opacities are present in the mid/upper lungs greatest lateral right upper lobe abutting the lateral pleura and minor fissure consistent with lung edema/inflammation. The background of emphysema results in lucent apices with visible   bullae on the left. Patchy left lower lobe opacities and more focal  opacities in the medial right lower lobe are unchanged.    There is a small left pleural effusion visible on the lateral sulcus. No pneumothorax.    Unchanged enlargement of the cardiac silhouette. Arch and descending atheromatous calcifications are moderate and unchanged.

## 2022-06-10 NOTE — PLAN OF CARE
A/ox4. VSS 40% Fio2 on shift. Intermittently on oxymask at 10L O2, desats quickly to low 80s with activity. Denies pain. Tele: Afib CVR. LS afshan. Borjas in place- adequate UOP. Continue to monitor.     Problem: Plan of Care - These are the overarching goals to be used throughout the patient stay.    Goal: Optimal Comfort and Wellbeing  Intervention: Provide Person-Centered Care  Recent Flowsheet Documentation  Taken 6/9/2022 2100 by Alexus Malcolm RN  Trust Relationship/Rapport:    care explained    choices provided    questions answered    questions encouraged     Problem: Adjustment to Illness COPD (Chronic Obstructive Pulmonary Disease)  Goal: Optimal Chronic Illness Coping  Outcome: Unable to Meet, Plan Revised     Problem: Respiratory Compromise COPD (Chronic Obstructive Pulmonary Disease)  Goal: Effective Oxygenation and Ventilation  Outcome: Unable to Meet, Plan Revised

## 2022-06-10 NOTE — PROGRESS NOTES
Pt wore BIPAP during the night, ST 10/5 RR14 30%02, BS coarse. Pt takes minimal breaks off BIPAP using a 10L oxymask, pt does drops sats and becomes SOB when off BIPAP and after coughing. Pt attempted vest tx with neb but became SOB and was placed back on BIPAP. Will attempt vest tx today if tolerated. RT following.

## 2022-06-10 NOTE — TELEPHONE ENCOUNTER
Pt currently admitted as inpatient- will hold message until patient is discharged to see if bed is ordered by hospitalist

## 2022-06-10 NOTE — CONSULTS
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    Reason for Consult:  Respiratory failure, recurrent admissions         Assessment: 1.   Acute on chronic respiratory failure with hypoxia and hypercapnia.  Following last discharge on 6/2, patient was requiring 8 LPM of oxygen for support, home device goes up to 10 LPM.  Concern component of pulmonary edema, infection to the right upper lobe, and acute COPD exacerbation resulting in persistent decline.  Patient has AVAPS device at home, uses nightly.  Following ED presentation, required NIPPV, up to 15 LPM oxygen, now down to 12 LPM oxymask this morning.  2. End-stage COPD/emphysema and extensive fibrotic bronchiectasis with acute exacerbation.  PFTs 6/14/2019 showing FEV1 49%, no improvement with bronchodilators, DLCO 39%; this consistent with severe OVD and DLCO severely reduced.  Patient follows with Dr. Juarez in pulmonary clinic.  Has AVAPS device.  3. Suspected right upper lobe pneumonia.  New right UL infiltrate noted on chest x-ray, has not been present on prior imaging this year.  Suspect infectious etiology.  She is colonized with Pseudomonas aeruginosa, unclear if this is a flare of her Pseudomonas or a secondary infection.  She is at risk of healthcare associated infections due to her recurrent hospitalizations.  4. History of asymmetric lower extremity edema, left > right.  Duplex ultrasound 4/13/2022 and 2/6/2022 with  no DVT.        Plan:     Wean supplemental O2 as tolerates, goal O2 sat 88 to 92%.    Reviewed imaging in detail with the patient, , and primary team.  Outlined the evolution of disease and RUL changes.    Reviewed concerns of Pseudomonas aeruginosa colonization given repeated hospitalizations with sputum growing Pseudomonas.      This has been ongoing since at least 2018.    We discussed Pseudomonas colonization and more rapid reduction in lung function compared to noncolonizers.    We discussed difficulty with eradication.    Continue NIPPV nightly and with naps - currently has used our inhouse device.     Hopefully can transition to home device tonight.      Continue COPD exacerbation therapy, start Solumedrol 20 mg IV daily.  (Previously had been on Solu-Medrol 40 mg IV)    Continue DuoNebs + mucomyst nebs 4x/day + home CPT vest twice daily.     Holding home Trelegy Ellipta inhaler.  If stable, resume tomorrow.    Pulmonary hygiene: OOB to chair, IS. Aerobika with neb therapy.    Check sputum culture.     Initiate empiric Abx with cefepime IV given history of Pseudomonas (colonizer) and also for HCAP.     Initiate empiric Abx with doxycycline IV x 5 days for atypical coverage.    Follow-up repeat CXR in 3 days to assess RUL abnormalities.     Diuresis per primary team.    PPI for GI prophylaxis, Xarelto for DVT prophylaxis.    Outlined concerns for higher O2 needs over the last few hospitalizations, limitations and home O2 availability, and personal goals of the patient.  I asked patient and her spouse to consider her long-term goals and realistic expectations, particularly given the outpatient limitations with oxygen support.  Patient had difficulties with this conversation but appreciated the knowledge and discussion.    Patient and/or family were educated on the above recommendations.   Thank you for allowing our service to participate in the care of this patient.  Please call with any questions or  concerns.    Total patient care time: 100 minutes, with over 50% spent in counseling/coordination of care.      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  History is provided by the patient, , and review of medical records.    In review, patient was hospitalized recently for respiratory failure, Pseudomonas grew from sputum, and was discharged to home with ciprofloxacin.  She had required 8 LPM at the time of discharge.  States that 2 days prior to presentation, started having increased work of breathing, shortness of breath with any exertion but okay at rest.  Patient's  reports that they had essentially gone up to 10 LPM at home for the last 2 days before admission.  O2 sats would occasionally go down to 60s to 70s percent, would take up to 20 minutes to recover per .  Patient was reticent to come to the hospital, but was urged for evaluation by her home attendant due to O2 sat 87%.  Came to the ED on 6/8, placed on BiPAP in the ED.  Admitted for COPD exacerbation and acute on chronic diastolic HFpEF.  Started on steroids, nebs therapy, and IV Lasix.        Review of Systems:   Review of Systems:  Pertinent items are noted in HPI.  The Review of Systems is negative other than noted in the HPI        Medical/Surgical History:     Past Medical History:   Diagnosis Date     Acute and  chronic respiratory failure with hypoxia (H)      Acute blood loss anemia 1/15/2019     Acute bronchitis 1/15/2019     Anemia     pernicious anemia     Anticoagulated 3/23/2022     Anxiety      Arm skin lesion, right      Arnold-Chiari malformation (H) 1998     Arthritis      Atrial fibrillation (H) 02/2017     Avascular necrosis of bone (H)      Breast cyst      Breast lump      Candidal skin infection      Cellulitis of left lower extremity 1/28/2019     CHF (congestive heart failure) (H)     diastolic and systolic     Chronic bronchitis (H)     & acute     Chronic diastolic heart failure (H)     diastolic chf     Chronic pain syndrome      Chronic respiratory failure with hypoxia (H) 3/13/2017     COPD (chronic obstructive pulmonary disease) (H)      COPD exacerbation (H)      Depression      Diet-controlled type 2 diabetes mellitus (H)      Drug induced constipation      DVT of axillary vein, acute (H)     left     DVT of axillary vein, acute left (H)      Edema      Encounter for palliative care      Erysipelas      Fracture of femoral neck, left (H)      Generalized muscle weakness      GERD (gastroesophageal reflux disease)      History of blood clots      Hyperlipidemia      Hypertension      Left hip pain      Lumbar spinal stenosis      PAD (peripheral artery disease) (H)      Peripheral arterial disease (H) 10/28/2015     Psoriasis     arms      Pulmonary hypertension (H) 3/5/2018     Recurrent major depressive episodes (H)     Created by Conversion  Replacement Utility updated for latest IMO load     Schizoaffective disorder, bipolar type (H) 6/11/2015     Slow transit constipation      Stasis dermatitis of both legs      Status post total hip replacement, 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     Type 2 diabetes mellitus without complication (H) 8/2/2016     Vitamin B12 deficiency      Volume overload       Past Surgical History:   Procedure Laterality Date     BACK SURGERY       BREAST CYST ASPIRATION Left 1994     CERVICAL  SPINE SURGERY      for arnold chiari malformation      SECTION  X3     CHOLECYSTECTOMY       COLONOSCOPY       EXCISE LESION UPPER EXTREMITY Right 2019    Procedure: EXCISION RIGHT ARM LIPOMA;  Surgeon: Andreas Holly MD;  Location: [facility];  Service: General     EYE SURGERY      bilateral cataracts     HAND SURGERY       HERNIORRHAPHY UMBILICAL N/A 2021    Procedure: INCARCERATED UMBILICAL AND;  Surgeon: Andreas Holly MD;  Location: [facility]     JOINT REPLACEMENT Right     knee - partial     LAPAROSCOPIC HERNIORRHAPHY INCISIONAL Left 3/27/2015    Procedure: ATTEMPTED LAPAROSCOPIC ABDOMINA/FLANK INCISION REPAIR;  Surgeon: Jose Lakhani MD;  Location: [facility] OR;  Service:      LUMBAR FUSION N/A 2014    Procedure: POSTERIOR FUSION / DECOMPRESSION L3-S1 WITH PELVIC FIXATION BILATERAL ;  Surgeon: Rajan Mares MD;  Location: [facility];  Service:      OTHER SURGICAL HISTORY      IR for PAD LOWER EXTREMITY     PICC TRIPLE LUMEN PLACEMENT  2022          REPAIR SPIGELIAN HERNIA N/A 2021    Procedure: SPIGELIAN HERNIA REPAIR;  Surgeon: Andreas Holly MD;  Location: [facility]     US ASPIRATION HEMATOMA SEROMA OR FLUID COLLECTION  2020     [facility] OPEN FIXATN PROX END/NECK FEMUR FX Left 2019    Procedure: OPEN REDUCTION INTERNAL FIXATION, FRACTURE LEFT HIP, PERIPROSTHETIC FRACTURE;  Surgeon: Kevin Lackey MD;  Location: [facility];  Service: Orthopedics     [facility] TOTAL HIP ARTHROPLASTY Left 2019    Procedure: LEFT TOTAL HIP ARTHROPLASTY;  Surgeon: Kevin Lackey MD;  Location: [facility] OR;  Service: Orthopedics     [facility] TOTAL HIP ARTHROPLASTY Right 2019    Procedure: RIGHT TOTAL HIP ARTHROPLASTY;  Surgeon: Kan Quesada MD;  Location: [facility] OR;  [facility]LAST[facility] Left 10/7/2016    Procedure: TOTAL KNEE ARTHROPLASTY, LEFT;  Surgeon: Kan Quesada MD;  Location:  [facility];  Service: Orthopedics            Allergies/Medications:   Allergies:     Allergies   Allergen Reactions     Tramadol Nausea     Tuberculin Tests      Amoxicillin Itching and Rash     Levofloxacin Itching and Rash     Penicillins Itching and Rash     Has tolerated ceftriaxone.       OUTPATIENT Medications:  Medications Prior to Admission   Medication Sig Dispense Refill Last Dose     acetaminophen (TYLENOL) 500 MG tablet Take 500-1,000 mg by mouth every 6 hours as needed for mild pain   Past Week at Unknown time     acetylcysteine (MUCOMYST) 20 % neb solution Take 4 mLs by nebulization 4 times daily 480 mL 11 6/8/2022 at Unknown time     albuterol (PROAIR HFA/PROVENTIL HFA/VENTOLIN HFA) 108 (90 Base) MCG/ACT inhaler Inhale 2 puffs into the lungs every 4 hours as needed for shortness of breath / dyspnea or wheezing   6/8/2022 at Unknown time     albuterol (PROVENTIL) (2.5 MG/3ML) 0.083% neb solution Take 2.5 mg by nebulization every 2 hours as needed for shortness of breath / dyspnea or wheezing 600 mL 3 6/8/2022 at Unknown time     ARIPiprazole (ABILIFY) 5 MG tablet TAKE 1 TABLET AT BEDTIME 90 tablet 3 6/7/2022 at Unknown time     atorvastatin (LIPITOR) 20 MG tablet TAKE 1 TABLET AT BEDTIME 90 tablet 3 6/7/2022 at Unknown time     calcium carbonate 600 mg-vitamin D 400 units (CALTRATE) 600-400 MG-UNIT per tablet Take 1 tablet by mouth 2 times daily   6/8/2022 at Unknown time     ciprofloxacin (CIPRO) 500 MG tablet Take 750 mg by mouth 2 times daily   6/8/2022 at Unknown time     cyanocobalamin (VITAMIN B-12) 500 MCG tablet Take 500 mcg by mouth every morning    6/8/2022 at Unknown time     diltiazem ER COATED BEADS (CARDIZEM CD/CARTIA XT) 120 MG 24 hr capsule Take 240 mg by mouth daily   6/8/2022 at Unknown time     docusate sodium (COLACE) 100 MG capsule Take 100 mg by mouth daily as needed   6/8/2022 at Unknown time     Fluticasone-Umeclidin-Vilanterol (TRELEGY ELLIPTA) 100-62.5-25 MCG/INH oral  inhaler Inhale 1 puff into the lungs daily   6/8/2022 at Unknown time     furosemide (LASIX) 20 MG tablet Take 40 mg by mouth daily   6/8/2022 at Unknown time     gabapentin (NEURONTIN) 300 MG capsule Take 600 mg by mouth At Bedtime   6/7/2022 at Unknown time     ipratropium - albuterol 0.5 mg/2.5 mg/3 mL (DUONEB) 0.5-2.5 (3) MG/3ML neb solution Take 1 vial (3 mLs) by nebulization 4 times daily Once breathing is better, then change to every 6 hours as needed for shortness of breath 360 mL 11 6/8/2022 at Unknown time     melatonin 5 MG tablet Take 5 mg by mouth nightly as needed   6/7/2022 at Unknown time     metFORMIN (GLUCOPHAGE) 500 MG tablet Take 1 tablet (500 mg) by mouth 2 times daily (with meals) 60 tablet 11 6/8/2022 at am     omeprazole (PRILOSEC) 20 MG DR capsule TAKE 1 CAPSULE DAILY (Patient taking differently: Take 20 mg by mouth daily) 90 capsule 3 6/8/2022 at Unknown time     polyethylene glycol (MIRALAX) 17 GM/Dose powder Take 17 g by mouth daily 510 g  6/8/2022 at Unknown time     potassium chloride ER (KLOR-CON M) 10 MEQ CR tablet Take 1 tablet (10 mEq) by mouth daily 90 tablet 3 6/8/2022 at Unknown time     predniSONE (DELTASONE) 5 MG tablet Take 1 tablet (5 mg) by mouth daily 10mg until 6/4, then switch to 5mg 6/5 through 6/10 (Patient taking differently: Take 5 mg by mouth daily 5mg 6/5 through 6/10) 5 tablet 0 6/8/2022 at Unknown time     spironolactone (ALDACTONE) 25 MG tablet TAKE ONE-HALF (1/2) TABLET DAILY 45 tablet 3 6/8/2022 at Unknown time     terbinafine (LAMISIL) 250 MG tablet Take 1 tablet (250 mg) by mouth daily for 14 days 14 tablet 0 6/8/2022 at Unknown time     Vitamin D3 (CHOLECALCIFEROL) 25 mcg (1000 units) tablet Take 1 capsule by mouth every morning    6/8/2022 at Unknown time     XARELTO ANTICOAGULANT 20 MG TABS tablet TAKE 1 TABLET DAILY WITH DINNER 90 tablet 1 6/8/2022 at Unknown time       INPATIENT Medications: Continuous Infusions:    - MEDICATION INSTRUCTIONS -        INPATIENT Medications: Scheduled Medications:    acetylcysteine  4 mL Nebulization 4x Daily     ARIPiprazole  5 mg Oral At Bedtime     cefTRIAXone  1 g Intravenous Q24H     [Held by provider] ciprofloxacin  750 mg Oral BID     diltiazem ER COATED BEADS  240 mg Oral Daily     doxycycline (VIBRAMYCIN) IV  100 mg Intravenous Q12H     furosemide  40 mg Intravenous Q12H     gabapentin  600 mg Oral At Bedtime     insulin aspart  1-6 Units Subcutaneous Q4H KEISHA     ipratropium - albuterol 0.5 mg/2.5 mg/3 mL  3 mL Nebulization 4x daily     melatonin  3 mg Oral At Bedtime     [Held by provider] pantoprazole  40 mg Oral Daily     pantoprazole (PROTONIX) IV  40 mg Intravenous Daily with breakfast     polyethylene glycol  17 g Oral Daily     predniSONE  5 mg Oral Daily     rivaroxaban ANTICOAGULANT  20 mg Oral Daily with supper     sodium chloride (PF)  3 mL Intracatheter Q8H     [Held by provider] spironolactone  12.5 mg Oral Daily     terbinafine  250 mg Oral Daily             Family History:        Social History:      Reviewed:  Family History   Problem Relation Age of Onset     Coronary Artery Disease Mother      Coronary Artery Disease Father     Reviewed:    Tobacco: Prior smoker, over 10 years ago.  Social History     Socioeconomic History     Marital status:      Spouse name: Not on file     Number of children: Not on file     Years of education: Not on file     Highest education level: Not on file   Occupational History     Not on file   Tobacco Use     Smoking status: Former Smoker     Quit date: 2006     Years since quittin.4     Smokeless tobacco: Never Used     Tobacco comment: quit 2006   Vaping Use     Vaping Use: Never used   Substance and Sexual Activity     Alcohol use: No     Drug use: No     Sexual activity: Not on file   Other Topics Concern     Parent/sibling w/ CABG, MI or angioplasty before 65F 55M? Not Asked   Social History Narrative         Social Determinants of Health  "    Financial Resource Strain: Not on file   Food Insecurity: Not on file   Transportation Needs: Not on file   Physical Activity: Not on file   Stress: Not on file   Social Connections: Not on file   Intimate Partner Violence: Not on file   Housing Stability: Not on file              Objective:   Vitals:  /65 (BP Location: Right arm)   Pulse 72   Temp 97.3  F (36.3  C) (Oral)   Resp 22   Ht 1.549 m (5' 1\")   Wt 64.7 kg (142 lb 9.6 oz)   SpO2 96%   BMI 26.94 kg/m    GEN: Pleasant female, slightly anxious, sitting up in a chair.  HEENT: Normocephalic, atraumatic.  Extraoccular eye movements intact, anicteric sclera.   Moist mucous membranes.  Oxygen mask.  NECK: Supple.    PULM: Non-labored breathing.  No use of accessory muscles.  Coarse and story rales bilateral bases, right > left. No wheezing.   CVS: Regular rate and rhythm.  Normal S1, S2.  No rubs, murmurs, or gallops.    ABDOMEN: Normoactive bowel sounds.  Non-tender to palpation.  Non-distended.    EXTREMITES:  No clubbing, cyanosis.  Trace pitting edema right lower extremity.  1+ pitting edema left lower extremity.  NEURO:  Awake.  Oriented to person, place, time and situation.  Cranial nerves 2-12 grossly intact.  Moving all extremities.      Intake/Output:  I/O last 3 completed shifts:  In: 360 [P.O.:360]  Out: 1350 [Urine:1350]        Pertinent Studies:   All laboratory data reviewed  Serum Glucose range:   Recent Labs   Lab 06/10/22  0756 06/10/22  0545 06/10/22  0517 06/10/22  0159   * 164* 166* 288*     ABG: No lab results found in last 7 days.  CBC:   Recent Labs   Lab 06/10/22  0517 06/09/22  0709 06/08/22  1650   WBC 10.1 7.0 11.2*   HGB 9.1* 9.9* 10.0*   HCT 29.7* 32.3* 31.2*   MCV 94 94 91    233 248   NEUTROPHIL  --  94  --    LYMPH  --  3  --    MONOCYTE  --  2  --    EOSINOPHIL  --  0  --      Chemistry:   Recent Labs   Lab 06/10/22  0517 06/09/22  1629 06/09/22  0709 06/08/22  1650   * 128* 133* 130* "   POTASSIUM 4.7 5.3* 4.8 5.4*   CHLORIDE 90* 87* 90* 89*   CO2 33* 31 33* 32*   BUN 23 19 16 16   CR 0.69 0.72 0.61 0.71   GFRESTIMATED 85 82 88 83   DENISE 8.1* 8.2* 8.6 8.9   MAG 1.6*  --  1.9 1.2*   PROTTOTAL  --   --  5.9*  --    ALBUMIN  --   --  2.6*  --    AST  --   --  15  --    ALT  --   --  23  --    ALKPHOS  --   --  75  --    BILITOTAL  --   --  0.6  --      Coags:  No results for input(s): INR, PROTIME, PTT in the last 168 hours.    Invalid input(s): APTT  Cardiac Markers:  Recent Labs   Lab 06/09/22  1629   TROPONINI 0.01      Microbiology:  Urine culture, 6/8: Pending  Legionella urine Ag, 6/8: Negative  COVID19 PCR, 6/8: Negative  Influenza A/B swab, 6/8: Negative  RSV swab, 6/8: Negative        Cardiology/Radiology:   Cardiac: All cardiac studies reviewed by me.    EKG:  Reviewed    TTE, 4/15/22:  Summary:  1. Normal left ventricular size and systolic performance with a visually estimated ejection fraction of 65%. 2. There is moderate mitral insufficiency. 3. There is moderate tricuspid insufficiency. 4. Mild right ventricular enlargement with normal right ventricular systolic Performance. 5. There is severe biatrial enlargement. 6. Right ventricular systolic pressure relative to right atrial pressure is mildly increased. The pulmonary artery pressure is estimated to be 35-40 mmHg plus right atrial pressure (the IVC is moderately dilated).  When compared to the prior real-time echocardiogram dated 7 December 2020, there has been a mild decrease in the pulmonary artery pressure estimate. The findings are otherwise felt to be fairly similar on both examinations.    Radiology: All imaging studies reviewed by me.    Chest X-Ray, 6/9:  Bilateral airspace opacities are present in the mid/upper lungs greatest lateral right upper lobe abutting the lateral pleura and minor fissure consistent with lung edema/inflammation. The background of emphysema results in lucent apices with visible  bullae on the left.  Patchy left lower lobe opacities and more focal opacities in the medial right lower lobe are unchanged.  There is a small left pleural effusion visible on the lateral sulcus. No pneumothorax.  Unchanged enlargement of the cardiac silhouette. Arch and descending atheromatous calcifications are moderate and unchanged.    Chest X-Ray, 6/8:   PICC line has been removed. Emphysematous changes are better appreciated on the CT.  Interval clearing of the left lower lobe consolidation. Fine reticular opacities have increased bilaterally. This may reflect edema superimposed over emphysematous changes or a viral pneumonia.  Heart is enlarged but unchanged. No clear effusions.

## 2022-06-10 NOTE — PROGRESS NOTES
06/10/22 1500   Quick Adds   Type of Visit Initial Occupational Therapy Evaluation   Living Environment   People in Home spouse   Current Living Arrangements house   Living Environment Comments has rts with grabbars   Self-Care   Usual Activity Tolerance fair   Current Activity Tolerance poor   Instrumental Activities of Daily Living (IADL)   IADL Comments family does   General Information   Onset of Illness/Injury or Date of Surgery 06/08/22   Referring Physician Andi Jones   Patient/Family Therapy Goal Statement (OT) get stronger to go home   Additional Occupational Profile Info/Pertinent History of Current Problem copd   Existing Precautions/Restrictions oxygen therapy device and L/min;fall   Cognitive Status Examination   Orientation Status orientation to person, place and time   Range of Motion Comprehensive   General Range of Motion no range of motion deficits identified   Strength Comprehensive (MMT)   General Manual Muscle Testing (MMT) Assessment no strength deficits identified   Bed Mobility   Comment (Bed Mobility) min   Transfers   Transfer Comments min   Lower Body Dressing Assessment/Training   Comment, (Lower Body Dressing) max-no sock aide available   Toileting   Comment, (Toileting) has catheter   Clinical Impression   Criteria for Skilled Therapeutic Interventions Met (OT) Yes, treatment indicated   OT Diagnosis decreased ind with adls   Influenced by the following impairments copd   OT Problem List-Impairments impacting ADL activity tolerance impaired;mobility;strength   Assessment of Occupational Performance 3-5 Performance Deficits   Identified Performance Deficits ec/ws, bed mob, le dressing ,toileting   Planned Therapy Interventions (OT) ADL retraining;bed mobility training;strengthening;transfer training;progressive activity/exercise   Clinical Decision Making Complexity (OT) moderate complexity   Risk & Benefits of therapy have been explained evaluation/treatment results  reviewed;patient;spouse/significant other;daughter   OT Discharge Planning   OT Discharge Recommendation (DC Rec) (S)  home with assist   OT Rationale for DC Rec has good support at home   Total Evaluation Time (Minutes)   Total Evaluation Time (Minutes) 15   OT Goals   Therapy Frequency (OT) Daily   OT Predicted Duration/Target Date for Goal Attainment 06/14/22   OT: Lower Body Dressing Minimal assist   OT: Bed Mobility Supervision/stand-by assist   OT: Toilet Transfer/Toileting Minimal assist

## 2022-06-10 NOTE — PROGRESS NOTES
CM updated Aretha with [facility] ([phone]) and RT Geoff with [facility] ([phone]) that family is reporting BiPAP not working properly in the home. They will reach out to daughter Veronika.     Hospice Consult placed (information only)- CM alerted Gwendolyn with [facility].

## 2022-06-11 NOTE — PLAN OF CARE
Problem: Risk for Delirium  Goal: Improved Sleep  Outcome: Ongoing, Progressing     Problem: Infection COPD (Chronic Obstructive Pulmonary Disease)  Goal: Absence of Infection Signs and Symptoms  Outcome: Ongoing, Progressing     Problem: Infection COPD (Chronic Obstructive Pulmonary Disease)  Goal: Absence of Infection Signs and Symptoms  Outcome: Ongoing, Progressing     Pt was A&Ox4 at beginning of shift, but became more confused and irritable around 0000. Went between BiPAP and Oxymizer nasal cannula. At 0000 Pt was found trying to get out of bed, pulling at IV and cords, demanding she needed to get her BiPAP mask off immediately. When staff tried to assist patient, she started pulling away and became more agitated, insisting only RT could take off BiPAP mask. Pt very anxious and mistrustful of staff, refused to take any medication or allow staff to touch her. Pt was also disoriented to place, asking staff to go to the dining room to grab her check book. Currently on oxymizer NC. Pt became more trustful of staff as morning continued. Denied pain this shift. HS blood sugar high, MD notified and HS sliding scale insulin ordered and given. IV abx given per MAR. Catheter cares performed, good UOP this shift. Pt is currently up in the chair, alarms on for safety. Call light within reach.      Galina Nesbitt, RN  [phone]

## 2022-06-11 NOTE — PROGRESS NOTES
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     Reason for Consult:  Respiratory failure, recurrent admissions         Assessment: 1.   Acute on chronic respiratory failure with hypoxia and hypercapnia.  Following last discharge on 6/2, patient was requiring 8 LPM of oxygen for support, home device goes up to 10 LPM.  Concern component of pulmonary edema, infection to the right upper lobe, and acute COPD exacerbation resulting in persistent decline.  Patient has AVAPS device at home, uses nightly.  Following ED presentation, required NIPPV, up to 15 LPM oxygen upon ED arrival.  Currently at 10 LPM Oxymizer today  2. End-stage COPD/emphysema and extensive fibrotic bronchiectasis with acute exacerbation.  PFTs 6/14/2019 showing FEV1 49%, no improvement with bronchodilators, DLCO 39%; this consistent with severe OVD and DLCO severely reduced.  Patient follows with Dr. Juarez in pulmonary clinic.  Has AVAPS device.  3. Suspected right upper lobe pneumonia.  New right UL infiltrate noted on chest x-ray, has not been present on prior imaging this year.  Suspect infectious etiology.  She is colonized with Pseudomonas aeruginosa, unclear if this is a flare of her Pseudomonas or a secondary infection.  She is at risk of healthcare associated infections due to her recurrent hospitalizations.  4. History of asymmetric lower extremity edema, left > right.  Duplex ultrasound 4/13/2022 and  2/6/2022 with no DVT.     Code Status: No CPR- Do NOT Intubate.  On 6/10 I outlined concerns for higher O2 needs over the last few hospitalizations, limitations and home O2 availability, and personal goals of the patient.  I asked patient and her spouse to consider her long-term goals and realistic expectations, particularly given the outpatient limitations with oxygen support.  Patient had difficulties with this conversation but appreciated the knowledge and discussion.         Plan:     Wean supplemental O2 as tolerates, goal O2 sat 88 to 92%.    Family had discussion with respiratory therapy about Y-connection with concentrators at home.  I will defer to their service and case management for arrangement of this and to determine maximal O2 support available at home.    Daughter here at bedside.  Reviewed concerns of Pseudomonas aeruginosa colonization given repeated hospitalizations with sputum growing Pseudomonas.      This has been ongoing since at least 2018.    We discussed Pseudomonas colonization and more rapid reduction in lung function compared to noncolonizers.    We discussed difficulty with eradication.    Continue NIPPV nightly and with naps - currently has used our inhouse device.     Family to bring home NIPPV device to the hospital today.      Preferentially will transition to home device tonight.      Continue Solumedrol 20 mg IV daily.  Transition to oral prednisone taper tomorrow.    Continue DuoNebs + mucomyst nebs 4x/day + home CPT vest twice daily.     Resume home Trelegy Ellipta inhaler.     Pulmonary hygiene: OOB to chair, IS. Aerobika with neb therapy.    Follow-up sputum culture.  Once returns, can transition to oral Abx therapy.    Continue empiric Abx with cefepime IV (start 6/10) given history of Pseudomonas (colonizer) and also for HCAP.     Continue empiric Abx with doxycycline IV (start 6/10)  x 5 days for atypical coverage.    Follow-up repeat CXR tomorrow to assess RUL  abnormalities.     Diuresis per primary team.    PPI for GI prophylaxis, Xarelto for DVT prophylaxis.    Patient and/or family were educated on the above recommendations.   Thank you for allowing our service to participate in the care of this patient.  Please call with any questions or concerns.    Total patient care time: 35 minutes, with over 50% spent in counseling/coordination of care.      Shira Cardenas MD, MPH  Pulmonary/Critical Care Medicine  06/11/2022  1:47 PM        Subjective/Interval History:   Reports doing better today, less anxious and breath at rest.  Does have shortness of breath when she moves from bed to chair, feels that it is stable  Denies fevers, chills, wheezing.  Occasional cough.    Family at bedside, mentioned plans/discussion with respiratory therapy about having concentrators merged with a Y connector at home to increase maximal oxygen support.  They would like this to be organized before discharge.    Review of Systems:  Pertinent items are noted in HPI.  The Review of Systems is negative other than noted in the HPI        Allergies/Medications:   Allergies:     Allergies   Allergen Reactions     Tramadol Nausea     Tuberculin Tests      Amoxicillin Itching and Rash     Levofloxacin Itching and Rash     Penicillins Itching and Rash     Has tolerated ceftriaxone.       Continuous Infusions:    - MEDICATION INSTRUCTIONS -       Scheduled Medications:    acetylcysteine  4 mL Nebulization 4x Daily     ARIPiprazole  5 mg Oral At Bedtime     atorvastatin  20 mg Oral At Bedtime     ceFEPIme (MAXIPIME) IV  2 g Intravenous Q12H     [Held by provider] ciprofloxacin  750 mg Oral BID     diltiazem ER COATED BEADS  240 mg Oral Daily     doxycycline (VIBRAMYCIN) IV  100 mg Intravenous Q12H     furosemide  40 mg Intravenous Q12H     gabapentin  600 mg Oral At Bedtime     insulin aspart  1-7 Units Subcutaneous TID AC     insulin aspart  1-5 Units Subcutaneous At Bedtime     ipratropium - albuterol  "0.5 mg/2.5 mg/3 mL  3 mL Nebulization 4x daily     magnesium sulfate  2 g Intravenous Once     melatonin  3 mg Oral At Bedtime     methylPREDNISolone  20 mg Intravenous Q24H     pantoprazole  40 mg Oral Daily     polyethylene glycol  17 g Oral Daily     rivaroxaban ANTICOAGULANT  20 mg Oral Daily with supper     sodium chloride (PF)  3 mL Intracatheter Q8H     [Held by provider] spironolactone  12.5 mg Oral Daily     terbinafine  250 mg Oral Daily            Objective:   Vitals:  /62   Pulse 92   Temp 98.2  F (36.8  C) (Oral)   Resp 20   Ht 1.549 m (5' 1\")   Wt 62 kg (136 lb 11.2 oz)   SpO2 94%   BMI 25.83 kg/m    GEN: Pleasant female, sitting up in a chair, with family.  HEENT: Normocephalic, atraumatic.  Extraoccular eye movements intact, anicteric sclera.   Moist mucous membranes.    Oxymizer, 10 L/min.  NECK: Supple.    PULM: Non-labored breathing.  No use of accessory muscles.  Coarse breath sounds, inspiratory rales bilateral bases, no wheezing.  CVS: Regular rate and rhythm.  Normal S1, S2.  No rubs, murmurs, or gallops.    ABDOMEN: Normoactive bowel sounds.  Non-tender to palpation.  Non-distended.    EXTREMITES:  No clubbing, cyanosis.  Trace pitting edema right lower extremity.  1+ pitting edema left lower extremity.  NEURO:  Awake.  Oriented to person, place, time and situation.  Cranial nerves 2-12 grossly intact.  Moving all extremities.      Intake/Output:  I/O last 3 completed shifts:  In: 1680 [P.O.:1480; I.V.:200]  Out: 4075 [Urine:4075]        Pertinent Studies:   All laboratory data reviewed  Serum Glucose range:   Recent Labs   Lab 06/11/22  1156 06/11/22  0737 06/11/22  0527 06/10/22  2115 06/10/22  1652 06/10/22  1205   * 238* 319* 338* 252* 113*     ABG: No lab results found in last 7 days.  CBC:   Recent Labs   Lab 06/11/22  0527 06/10/22  0517 06/09/22  0709   WBC 8.1 10.1 7.0   HGB 9.2* 9.1* 9.9*   HCT 29.4* 29.7* 32.3*   MCV 94 94 94    261 233   NEUTROPHIL  --   " --  94   LYMPH  --   --  3   MONOCYTE  --   --  2   EOSINOPHIL  --   --  0     Chemistry:   Recent Labs   Lab 06/11/22  0527 06/10/22  0517 06/09/22  1629 06/09/22  0709    133* 128* 133*   POTASSIUM 4.0 4.7 5.3* 4.8   CHLORIDE 90* 90* 87* 90*   CO2 36* 33* 31 33*   BUN 21 23 19 16   CR 0.63 0.69 0.72 0.61   GFRESTIMATED 87 85 82 88   DENISE 7.8* 8.1* 8.2* 8.6   MAG 1.6* 1.6*  --  1.9   PROTTOTAL  --   --   --  5.9*   ALBUMIN  --   --   --  2.6*   AST  --   --   --  15   ALT  --   --   --  23   ALKPHOS  --   --   --  75   BILITOTAL  --   --   --  0.6     Coags:  No results for input(s): INR, PROTIME, PTT in the last 168 hours.    Invalid input(s): APTT  Cardiac Markers:  Recent Labs   Lab 06/09/22  1629   TROPONINI 0.01        Microbiology:  Sputum culture, 6/10: GS: 3+ GNR, > 25 PMN/LPF, < 10 squamous epithelial cells.  Culture pending.  Urine culture, 6/8: > 100K CFU/mL mixed marielena  Legionella urine Ag, 6/8: Negative  COVID19 PCR, 6/8: Negative  Influenza A/B swab, 6/8: Negative  RSV swab, 6/8: Negative        Cardiology/Radiology:   Cardiac: All cardiac studies reviewed by me.    EKG:  Reviewed    TTE, 4/15/22:  Summary:  1. Normal left ventricular size and systolic performance with a visually estimated ejection fraction of 65%. 2. There is moderate mitral insufficiency. 3. There is moderate tricuspid insufficiency. 4. Mild right ventricular enlargement with normal right ventricular systolic Performance. 5. There is severe biatrial enlargement. 6. Right ventricular systolic pressure relative to right atrial pressure is mildly increased. The pulmonary artery pressure is estimated to be 35-40 mmHg plus right atrial pressure (the IVC is moderately dilated).  When compared to the prior real-time echocardiogram dated 7 December 2020, there has been a mild decrease in the pulmonary artery pressure estimate. The findings are otherwise felt to be fairly similar on both examinations.    Radiology: All imaging studies  reviewed by me.    Chest X-Ray, 6/9:  Bilateral airspace opacities are present in the mid/upper lungs greatest lateral right upper lobe abutting the lateral pleura and minor fissure consistent with lung edema/inflammation. The background of emphysema results in lucent apices with visible  bullae on the left. Patchy left lower lobe opacities and more focal opacities in the medial right lower lobe are unchanged.  There is a small left pleural effusion visible on the lateral sulcus. No pneumothorax.  Unchanged enlargement of the cardiac silhouette. Arch and descending atheromatous calcifications are moderate and unchanged.    Chest X-Ray, 6/8:   PICC line has been removed. Emphysematous changes are better appreciated on the CT.  Interval clearing of the left lower lobe consolidation. Fine reticular opacities have increased bilaterally. This may reflect edema superimposed over emphysematous changes or a viral pneumonia.  Heart is enlarged but unchanged. No clear effusions.

## 2022-06-11 NOTE — PROGRESS NOTES
Pt received neb treatment with vest therapy and tolerated well.   Pt placed on BiPAP and tolerating.      06/11/22 0434   Tech Time   $Tech Time (10 minute increments) 4   Mode: CPAP/ BiPAP/ AVAPS/ AVAPS AE   CPAP/BiPAP/ AVAPS/ AVAPS AE Mode BiPAP S/T   CPAP/BiPAP/Settings   $CPAP/BiPAP Subsequent completed   BIPAP/CPAP On Standby On   IPAP/EPAP (cmH2O) 10/5   Rate (breaths/min) 14   Oxygen (%) 40   Timed Inspiration (sec) 1   IPAP RISE  Settings (V60) 2   CPAP/BiPAP Patient Parameters   IPAP (cm H2O) 10 cmH2O   EPAP (cm H2O) 5 cmH2O   Pressure Support (cm H2O) 5 cmH2O   RR Total (breaths/min) 24 breaths/min   Vt (mL) 571 mL   Minute Ventilation (L/min) 11.8 L/min   Peak Inspiratory Pressure (cm H2O) 11 cmH2O   Pt.  Leak (L/min) 8 L/min   CPAP/BiPAP/AVAPS/AVAPS AE Alarms   High Pressure (cm H2O) 20 cmH2O   Low Pressure (cm H2O) 5   Low Pressure Delay (sec) 30 sec   Lo Min Vent 3   High Rate (breaths/min) 45 breaths/min   Low Rate (breaths/min) 10   Audible Alarm set at (Volume of Alarm) 7   CPAP/BiPAP/AVAPS/AVAPS AE Patient Assessment   Interface Face Mask - Medium   Skin Integrity No breakdown   Humidifier Checked N/A     Will continue to monitor and assess pt.    Yelitza Quevedo, RT

## 2022-06-11 NOTE — PLAN OF CARE
Goal Outcome Evaluation:    Plan of Care Reviewed With: patient, family      Pt continues to require 9-10L oxymizer/oxymask (baseline). Desats with activity (commode, chair transfer) at 10L but tolerates activity well if using 13 L oxymizer/oxymask.Receiving IV lasix with adequate urine output. Urine continues to be intermittently dilshad/pink tinged, MDs aware. Plan to remove [place]de when finished receiving IV diuresis.  brought in home BiPAP machine and advanced health care directives. Given to MT to scan to honoring choices. MD notified of Mg 1.6 with no replacement with the standard protocol but changed to high replacement protocol, received 2g Mg and will have recheck in AM.  and daughter at bedside for support. Mepilex to bottom CDI, frequent repo to promote skin integrity. Paged MD to request calamine lotion per pt request.     Mariam Burks RN       Problem: Plan of Care - These are the overarching goals to be used throughout the patient stay.    Goal: Plan of Care Review/Shift Note  Description: The Plan of Care Review/Shift note should be completed every shift.  The Outcome Evaluation is a brief statement about your assessment that the patient is improving, declining, or no change.  This information will be displayed automatically on your shift note.  Outcome: Ongoing, Progressing  Flowsheets (Taken 6/11/2022 1806)  Plan of Care Reviewed With:   patient   family     Problem: Respiratory Compromise COPD (Chronic Obstructive Pulmonary Disease)  Goal: Effective Oxygenation and Ventilation  Outcome: Ongoing, Progressing  Intervention: Promote Airway Secretion Clearance  Recent Flowsheet Documentation  Taken 6/11/2022 1604 by Mariam Burks, RN  Cough And Deep Breathing: done with encouragement  Activity Management: activity adjusted per tolerance  Taken 6/11/2022 1158 by Mariam Burks, RN  Cough And Deep Breathing: done with encouragement  Activity Management: activity adjusted per  tolerance  Taken 6/11/2022 0825 by Mariam Burks, RN  Cough And Deep Breathing: done with encouragement  Activity Management:   activity adjusted per tolerance   up to bedside commode   up in chair  Intervention: Optimize Oxygenation and Ventilation  Recent Flowsheet Documentation  Taken 6/11/2022 1604 by Mariam Burks, RN  Fluid/Electrolyte Management: electrolyte supplement adjusted  Taken 6/11/2022 1158 by Mariam Burks, RN  Fluid/Electrolyte Management: electrolyte supplement adjusted  Taken 6/11/2022 0825 by Mariam Burks, RN  Fluid/Electrolyte Management: electrolyte supplement adjusted

## 2022-06-11 NOTE — PLAN OF CARE
Problem: Plan of Care - These are the overarching goals to be used throughout the patient stay.    Goal: Plan of Care Review/Shift Note  Description: The Plan of Care Review/Shift note should be completed every shift.  The Outcome Evaluation is a brief statement about your assessment that the patient is improving, declining, or no change.  This information will be displayed automatically on your shift note.  Outcome: Ongoing, Progressing  Flowsheets (Taken 6/10/2022 1934)  Plan of Care Reviewed With: patient     Problem: Respiratory Compromise COPD (Chronic Obstructive Pulmonary Disease)  Goal: Effective Oxygenation and Ventilation  Outcome: Ongoing, Progressing   Goal Outcome Evaluation:    Plan of Care Reviewed With: patient           Pt continues to require 10L oxymizer/oxymask (baseline). Desats with activity (commode, chair transfer) at 10L but tolerates activity well if using 15L oxymizer/oxymask.Receiving IV lasix with adequate urine output. Sputum and urine samples sent. Urine continues to be intermittently dilshad/pink tinged, which also occurred lasts admission, reposted to MD. Patient given POLST form per request. Also requested advanced healthcare directive as there are no scanned documents, and patient states she has some. MD notified of Mg 1.6 with no replacement with the standard protocol, 1x dose ordered.  and daughter at bedside for support.    Mariam Burks RN

## 2022-06-11 NOTE — PROGRESS NOTES
[facility]    Progress Note - Hospitalist Service       Date of Admission:  6/8/2022    Assessment & Plan   Adalgisa Solano is a 86 year old female with chronic respiratory failure, emphysema, COPD, 1 week old history of pseudomonal pneumonia admitted on 6/8/2022 for CHF exacerbation. Patient is currently on 10L oxygen mask and IV lasix 40 mg BID.     Acute on chronic diastolic HFpEF  Home meds are Lasix 40 mg daily and spironolactone 12.5 mg daily.  Came in with bilateral LL edema and .  Last echo 4/15/2022 showed EF 65%. Our goal net negative is 1.5L/day.  Patient's weight has decreased though is still up from baseline, appropriate urine output.  -I/O  -Continue IV Lasix 40 mg BID   -Goal net negative 1-2L/day   -Monitor Na, K, Cr  -Keep K>4, Mg>2  -Holding spironolactone for now given hyperkalemia  -Consider cardiology consult for assistance with optimizing guideline directed therapy    Acute on chronic hypoxic and hypercapnic respiratory failure, stable  Emphysema (DLCO 39% predicted)  COPD (FEV1 49% predicted w/o reversibility)  Bronchiectasis, UL dominant  Came in with coarse crackles and expiratory wheezes needing 15 L by high flow nasal cannula. Currently stable on 10 L and satting low to mid 90%.  Per patient and RT patient was not using BiPAP at home because she and  were not sure how to use it  -DuoNebs Q4H, albuterol nebs PRN  -Mucomyst  -Home CPT vest twice daily  -BiPAP PRN and at night  [facility]  -Has follow-up with pulmonology in August  -Plan to discuss with care manager to coordinate BiPAP teaching prior to discharge to decrease hospitalizations  -Hospice consult: family has questions about hospice cares at home. They are not sure this is the route they would like to go. Patient would like , Adrian, and daughter, Veronika, to be present for the meeting.     Recent pseudomonas pneumonia  Recent antimicrobials: Cefepime 04/2022,  ertapenem + azithromycin 05/2022, ciprofloxacin 05-06/2022. CXR showing improvement of consolidations. CRP elevated though continues to downtrend. Pulmonology commented on the CXR suggesting that the pseudomonal pneumonia may have returned.   -Trend CRP  -Sputum culture negative   -Currently holding ciprofloxacin.     -Continue cefepime and doxy for pseudomonal coverage (started 6/10 - )  -Can consider switch to orals 6/12     T2DM  At risk for steroid-induced hyperglycemia. Due to the desire to initiate hospice care, we are only concerned about hypoglycemia.   -4 times daily glucose checks   -Medium resistance sliding scale for correction  -Monitor for hypoglycemia     Hyponatremia, resolved  Initially sodium 133 likely from poor oral intake.  Urine antigen negative for Legionella.  -May need to reassess for adrenal fatigue but she's getting steroids now     Hyperkalemia K 5.2, resolved  -Hold spironolactone due to receiving IV Lasix  -Hold outpatient potassium supplement     Hypomagnesemia Mg 1.6  -Replacement protocol ordered  -May need additional doses to keep Mg > 2     Bilateral lymphedema  Secondary to above  -Lymphedema     Urinary retention  Bladder scanned in ED for 1100 ml.    -Borjas     Sacral pressure injury  -Mepilex  -PureWick  -WOC consult      Pulmonary HTN     A. fib  -Continue Xarelto and diltiazem     Chronic pain syndrome-chronic mid to low back pain  -Continue Tylenol and gabapentin  -Ice and heat     Tinea pedis  -On Terbinafine tablet 250 mg on 6/9 for 7 days.   -CMP     GERD  -Discontinued IV Protonix  -Restarted PTA omeprazole     Anxiety  -Continue Abilify  -PRN 0.25 Ativan Q4H  -Melatonin 3 mg scheduled at bedtime  -Discussed difficulty sleeping on prednisone       Diet: Regular Diet Adult  Snacks/Supplements Adult: Glucerna; With Meals    DVT Prophylaxis: DOAC  Borjas Catheter: PRESENT, indication: Strict 1-2 Hour I&O  Fluids: PO  Central Lines: None  Cardiac Monitoring: ACTIVE order.  Indication: Electrolyte Imbalance (24 hours)- Magnesium <1.3 mg/ml; Potassium < =2.8 or > 5.5 mg/ml  Code Status: No CPR- Do NOT Intubate   DNR/DNI --see palliative care consult note 5/17/2020    Disposition Plan   Expected Discharge: 06/13/2022     Anticipated discharge location: home with help/services;home with family    Delays: Still some debate between hospice and inpatient management.      The patient's care was discussed with the Attending Physician, Dr. Andi Jones.    Catherine Biswas MD  Hospitalist Service  [facility]  Securely message with the Vocera Web Console (learn more here)  Text page via [facility] Paging/Directory   ______________________________________________________________________    Interval History   Per patient she is feeling much better in regards to breathing.  No chest pain, no abdominal discomfort.  Thinks that her lower extremity swelling is near baseline.  She is hesitant about hospice, reports her  as adamant to bring her to the hospital when she is sick.  She reports that she wants to discuss this with her family more that she is scared.  She says a benefit to hospice would be being able to stay home with her family rather than being in the hospital.  Says she needs time to think about it.  Part of the reason she came in and was she was not using her BiPAP at home, she and her  did not know how to use it and would like further teaching in the hospital to feel safe for discharge.      Data reviewed today: I reviewed all medications, new labs and imaging results over the last 24 hours.    Physical Exam   Vital Signs: Temp: 97.7  F (36.5  C) Temp src: Oral BP: 127/64 Pulse: 84   Resp: 20 SpO2: 95 % O2 Device: Oxymask Oxygen Delivery: 10 LPM  Weight: 136 lbs 11.2 oz    PHYSICAL EXAM:  GENERAL: Awake, alert, sitting up in chair undergoing vest treatment, speaking comfortably   HEENT: No scleral icterus or conjunctival injection.  SKIN: Warm and dry.  Bruises throughout her arms.   LUNGS: Lung exam limited by vest treatment  ABDOMEN: Non-distended. Bowel sounds present in 4 quadrants. Soft and non-tender throughout with no masses or organomegaly.  EXTREMITIES: No gross deformity.  +1 peripheral edema bilaterally, symmetric, NTTP    Data   Recent Labs   Lab 06/11/22  0737 06/11/22  0527 06/10/22  2115 06/10/22  0545 06/10/22  0517 06/09/22  2146 06/09/22  1629 06/09/22  1241 06/09/22  0709   WBC  --  8.1  --   --  10.1  --   --   --  7.0   HGB  --  9.2*  --   --  9.1*  --   --   --  9.9*   MCV  --  94  --   --  94  --   --   --  94   PLT  --  270  --   --  261  --   --   --  233   NA  --  136  --   --  133*  --  128*  --  133*   POTASSIUM  --  4.0  --   --  4.7  --  5.3*  --  4.8   CHLORIDE  --  90*  --   --  90*  --  87*  --  90*   CO2  --  36*  --   --  33*  --  31  --  33*   BUN  --  21  --   --  23  --  19  --  16   CR  --  0.63  --   --  0.69  --  0.72  --  0.61   ANIONGAP  --  10  --   --  10  --  10  --  10   DENISE  --  7.8*  --   --  8.1*  --  8.2*  --  8.6   * 319* 338*   < > 166*   < > 316*   < > 193*   ALBUMIN  --   --   --   --   --   --   --   --  2.6*   PROTTOTAL  --   --   --   --   --   --   --   --  5.9*   BILITOTAL  --   --   --   --   --   --   --   --  0.6   ALKPHOS  --   --   --   --   --   --   --   --  75   ALT  --   --   --   --   --   --   --   --  23   AST  --   --   --   --   --   --   --   --  15    < > = values in this interval not displayed.     No results found for this or any previous visit (from the past 24 hour(s)).

## 2022-06-12 NOTE — PROGRESS NOTES
[facility]:  PULMONARY PROGRESS NOTE     Date / Time of Admission:  6/8/2022  4:35 PM    ID: Adalgisa Solano is a 86 year old female with chronic respiratory failure on home O2/AVAPS device, end-stage COPD/emphysema and extensive fibrotic bronchiectasis with severe obstructive ventilatory defect (PFTs 06/2019, FEV1 49%), Pseudomonas aeruginosa airway colonization, and recurrent hospitalizations for respiratory failure (last discharged 6/2/22) who was admitted to [facility] on 6/8/2022 with recurrent acute on chronic respiratory failure requiring NIPPV.     Reason for Consult:  Respiratory failure, recurrent admissions         Assessment: 1.   Acute on chronic respiratory failure with hypoxia and hypercapnia.  Following last discharge on 6/2, patient was requiring 8 LPM of oxygen for support, home device goes up to 10 LPM.  Concern component of pulmonary edema, infection to the right upper lobe, and acute COPD exacerbation resulting in persistent decline.  Patient has AVAPS device at home, uses nightly.  Following ED presentation, required NIPPV, up to 15 LPM oxygen upon ED arrival.  Currently at 8 LPM Oxymizer today.  2. End-stage COPD/emphysema and extensive fibrotic bronchiectasis with acute exacerbation.  PFTs 6/14/2019 showing FEV1 49%, no improvement with bronchodilators, DLCO 39%; this consistent with severe OVD and DLCO severely reduced.  Patient follows with Dr. Juarez in pulmonary clinic.  Has AVAPS device.  3. Suspected right upper lobe pneumonia.  New right UL infiltrate noted on chest x-ray, has not been present on prior imaging this year.  Suspect infectious etiology although component of pulm edema also possible with enhanced fissure markings.  She is colonized with Pseudomonas aeruginosa, unclear if this is a flare of her Pseudomonas or a secondary infection.  She is at risk of healthcare associated infections due to her recurrent hospitalizations.  Sputum culture thus  far growing 2+ Pseudomonas and normal marielena.  4. History of asymmetric lower extremity edema, left > right.  Duplex ultrasound 4/13/2022 and 2/6/2022 with no DVT.     Code Status: No CPR- Do NOT Intubate.  On 6/10 I outlined concerns for higher O2 needs over the last few hospitalizations, limitations and home O2 availability, and personal goals of the patient.  I asked patient and her spouse to consider her long-term goals and realistic expectations, particularly given the outpatient limitations with oxygen support.  Patient had difficulties with this conversation but appreciated the knowledge and discussion.         Plan:     Wean supplemental O2 as tolerates, goal O2 sat 88 to 92%.    Family had discussion with respiratory therapy about Y-connection with concentrators at home.  I will defer to their service and case management for arrangement of this and to determine maximal O2 support available at home.    I have reviewed concerns of Pseudomonas aeruginosa colonization given repeated hospitalizations with sputum growing Pseudomonas.      This has been ongoing since at least 2018.    We discussed Pseudomonas colonization and more rapid reduction in lung function compared to noncolonizers.    We discussed difficulty with eradication.    Continue NIPPV nightly and with naps - currently has used our inhouse device.     Patient was intolerant of home device last night.  However, she understands she needs to use for success at home.    She will work on using this during the day and also overnight.    Preferentially we do not want to use our in-hospital device.    Stop Solumedrol 20 mg IV daily.  Transition to oral prednisone taper: 10 mg daily x 3 days then 5 mg x 3 days then stop.    Continue home Trelegy Ellipta inhaler.     Continue DuoNebs + mucomyst nebs 4x/day + home CPT vest twice daily.     Pulmonary hygiene: OOB to chair, IS. Aerobika with neb therapy.    Repeat CXR showed some improvement including in RUL  "area    Follow-up sputum culture.      Once sensitivity returns, can transition to oral Abx therapy.     If remains pan-sensitive, switch to ciprofloxacin to complete a 7-day course.    Continue empiric Abx with cefepime IV (start 6/10) and doxycycline IV (start 6/10).    Diuresis per primary team.    PPI for GI prophylaxis, Xarelto for DVT prophylaxis.    Patient and/or family were educated on the above recommendations.  Hoping she will be ready for discharge from a Pulmonary standpoint by today or tomorrow, pending culture sensitivities and home health care needs (including oxygen support). Has a high potential for bounce-back based on compliance with NIPPV at home.    Thank you for allowing our service to participate in the care of this patient.  Please call with any questions or concerns.    Total patient care time: 35 minutes, with over 50% spent in counseling/coordination of care.      Shira Cardenas MD, MPH  Pulmonary/Critical Care Medicine  06/12/2022  12:30 PM        Subjective/Interval History:   Reports doing better okay but had a \"rough night\" working with the NIPPV.    Believes her home machine is giving her too much pressure.   We tried same pressure on hospital device and it was same concern.   Does have shortness of breath when she moves from bed to chair, feels that it is stable  Denies fevers, chills, wheezing.  Occasional cough.    Review of Systems:  Pertinent items are noted in HPI.  The Review of Systems is negative other than noted in the HPI        Allergies/Medications:   Allergies:     Allergies   Allergen Reactions     Tramadol Nausea     Tuberculin Tests      Amoxicillin Itching and Rash     Levofloxacin Itching and Rash     Penicillins Itching and Rash     Has tolerated ceftriaxone.       Continuous Infusions:    - MEDICATION INSTRUCTIONS -       Scheduled Medications:    acetylcysteine  4 mL Nebulization 4x Daily     ARIPiprazole  5 mg Oral At Bedtime     atorvastatin  20 mg Oral At " "Bedtime     ceFEPIme (MAXIPIME) IV  2 g Intravenous Q12H     [Held by provider] ciprofloxacin  750 mg Oral BID     diltiazem ER COATED BEADS  240 mg Oral Daily     doxycycline (VIBRAMYCIN) IV  100 mg Intravenous Q12H     Fluticasone-Umeclidin-Vilanterol  1 puff Inhalation Daily     furosemide  40 mg Intravenous Q12H     gabapentin  600 mg Oral At Bedtime     insulin aspart  1-7 Units Subcutaneous TID AC     insulin aspart  1-5 Units Subcutaneous At Bedtime     ipratropium - albuterol 0.5 mg/2.5 mg/3 mL  3 mL Nebulization 4x daily     melatonin  3 mg Oral At Bedtime     pantoprazole  40 mg Oral Daily     polyethylene glycol  17 g Oral Daily     predniSONE  10 mg Oral Daily    Followed by     [START ON 6/15/2022] predniSONE  5 mg Oral Daily     rivaroxaban ANTICOAGULANT  20 mg Oral Daily with supper     sodium chloride (PF)  3 mL Intracatheter Q8H     [Held by provider] spironolactone  12.5 mg Oral Daily     terbinafine  250 mg Oral Daily            Objective:   Vitals:  /68 (BP Location: Left arm)   Pulse 94   Temp 97.5  F (36.4  C) (Axillary)   Resp 22   Ht 1.549 m (5' 1\")   Wt 63.5 kg (140 lb 1.6 oz)   SpO2 92%   BMI 26.47 kg/m    GEN: Pleasant female, sitting up in a chair, with family.  HEENT: Normocephalic, atraumatic.  Extraoccular eye movements intact, anicteric sclera.   Moist mucous membranes.    Oxymizer, 10 L/min.  NECK: Supple.    PULM: Non-labored breathing.  No use of accessory muscles.  Coarse breath sounds, inspiratory rales bilateral bases, no wheezing.  CVS: Regular rate and rhythm.  Normal S1, S2.  No rubs, murmurs, or gallops.    ABDOMEN: Normoactive bowel sounds.  Non-tender to palpation.  Non-distended.    EXTREMITES:  No clubbing, cyanosis.  Trace pitting edema right lower extremity.  1+ pitting edema left lower extremity.  NEURO:  Awake.  Oriented to person, place, time and situation.  Cranial nerves 2-12 grossly intact.  Moving all extremities.      Intake/Output:  I/O last 3 " completed shifts:  In: 1620 [P.O.:1220; I.V.:400]  Out: 3575 [Urine:3575]        Pertinent Studies:   All laboratory data reviewed  Serum Glucose range:   Recent Labs   Lab 06/12/22  1205 06/12/22  0825 06/12/22  0437 06/11/22  2124 06/11/22  1648 06/11/22  1156   * 215* 294* 355* 270* 157*     ABG: No lab results found in last 7 days.  CBC:   Recent Labs   Lab 06/11/22  0527 06/10/22  0517 06/09/22  0709   WBC 8.1 10.1 7.0   HGB 9.2* 9.1* 9.9*   HCT 29.4* 29.7* 32.3*   MCV 94 94 94    261 233   NEUTROPHIL  --   --  94   LYMPH  --   --  3   MONOCYTE  --   --  2   EOSINOPHIL  --   --  0     Chemistry:   Recent Labs   Lab 06/12/22  0437 06/11/22  0527 06/10/22  0517 06/09/22  1629 06/09/22  0709    136 133*   < > 133*   POTASSIUM 4.0 4.0 4.7   < > 4.8   CHLORIDE 89* 90* 90*   < > 90*   CO2 37* 36* 33*   < > 33*   BUN 17 21 23   < > 16   CR 0.61 0.63 0.69   < > 0.61   GFRESTIMATED 88 87 85   < > 88   DENISE 7.9* 7.8* 8.1*   < > 8.6   MAG 1.7* 1.6* 1.6*  --  1.9   PROTTOTAL  --   --   --   --  5.9*   ALBUMIN  --   --   --   --  2.6*   AST  --   --   --   --  15   ALT  --   --   --   --  23   ALKPHOS  --   --   --   --  75   BILITOTAL  --   --   --   --  0.6    < > = values in this interval not displayed.     Coags:  No results for input(s): INR, PROTIME, PTT in the last 168 hours.    Invalid input(s): APTT  Cardiac Markers:  Recent Labs   Lab 06/09/22 1629   TROPONINI 0.01        Microbiology:  Sputum culture, 6/10: GS: 3+ GNR, > 25 PMN/LPF, < 10 squamous epithelial cells.  Culture 2+ pseudomonas aeruginosa, 1+ normal marielena.  Urine culture, 6/8: > 100K CFU/mL mixed marielena  Legionella urine Ag, 6/8: Negative  COVID19 PCR, 6/8: Negative  Influenza A/B swab, 6/8: Negative  RSV swab, 6/8: Negative        Cardiology/Radiology:   Cardiac: All cardiac studies reviewed by me.    EKG:  Reviewed    TTE, 4/15/22:  Summary:  1. Normal left ventricular size and systolic performance with a visually estimated ejection  fraction of 65%. 2. There is moderate mitral insufficiency. 3. There is moderate tricuspid insufficiency. 4. Mild right ventricular enlargement with normal right ventricular systolic Performance. 5. There is severe biatrial enlargement. 6. Right ventricular systolic pressure relative to right atrial pressure is mildly increased. The pulmonary artery pressure is estimated to be 35-40 mmHg plus right atrial pressure (the IVC is moderately dilated).  When compared to the prior real-time echocardiogram dated 7 December 2020, there has been a mild decrease in the pulmonary artery pressure estimate. The findings are otherwise felt to be fairly similar on both examinations.    Radiology: All imaging studies reviewed by me.    Chest X-Ray, 6/12:  Cardiac enlargement. Indistinct interstitial infiltrates bilaterally, more apparent in the upper lungs and the left base, slightly improved. No new infiltrate, consolidation, cavitation or pleural effusion on either side. Atherosclerotic aorta. Degenerative changes both shoulders and the spine. Monitoring leads overlying the chest.    Chest X-Ray, 6/8:   PICC line has been removed. Emphysematous changes are better appreciated on the CT.  Interval clearing of the left lower lobe consolidation. Fine reticular opacities have increased bilaterally. This may reflect edema superimposed over emphysematous changes or a viral pneumonia.  Heart is enlarged but unchanged. No clear effusions.

## 2022-06-12 NOTE — PROGRESS NOTES
[facility]    Progress Note - Hospitalist Service       Date of Admission:  6/8/2022    Assessment & Plan   Adalgisa Solano is a 86 year old female with chronic respiratory failure, emphysema, COPD, 1 week old history of pseudomonal pneumonia admitted on 6/8/2022 for CHF exacerbation. Patient is currently on 10L oxygen mask and IV lasix 40 mg BID.     Acute on chronic diastolic HFpEF  Home meds are Lasix 40 mg daily and spironolactone 12.5 mg daily.  Came in with bilateral LL edema and .  Last echo 4/15/2022 showed EF 65%. Our goal net negative is 1.5L/day.  Patient's weight has decreased though is still up from baseline, appropriate urine output.  -I/O  -Continue IV Lasix 40 mg BID   -Goal net negative 1-2L/day   -Monitor Na, K, Cr  -Keep K>4, Mg>2  -Holding spironolactone for now given hyperkalemia on admission and increasing lasix doses    Acute on chronic respiratory failure with hypoxic and hypercapnic, stable  Emphysema (DLCO 39% predicted)  COPD (FEV1 49% predicted w/o reversibility)  Bronchiectasis, UL dominant  Came in with coarse crackles and expiratory wheezes needing 15 L by high flow nasal cannula. Currently stable on 10 L and satting low to mid 90%.  Per patient and RT patient was not using BiPAP at home because she and  were not sure how to use it  -BiPAP education with patient and  before discharge  -DuoNebs Q4H, albuterol nebs PRN  -Mucomyst  -Home CPT vest twice daily  -BiPAP PRN and at night  -On [facility] outpatient  -Has follow-up with pulmonology in August  -Hospice consult: family has questions about hospice cares at home. They are not sure this is the route they would like to go. Patient would like , Adrian, and daughter, Veronika, to be present for the meeting.     Recent pseudomonas pneumonia  Pseudomonas colonization  Recent antimicrobials: Cefepime 04/2022, ertapenem + azithromycin 05/2022, ciprofloxacin 05-06/2022. CXR showing  improvement of consolidations. CRP elevated though continues to downtrend. Pulmonology commented on the CXR suggesting that the pseudomonal pneumonia may have returned.   -Trend CRP  -Sputum culture 2+ pseudomonas     Transition to oral antibiotics when sensitivities return. Per pulmonology, switch to ciprofloxacin to complete a 7-day course if pan-sensitive.  -Continue cefepime and doxy for pseudomonal coverage (started 6/10)     T2DM  At risk for steroid-induced hyperglycemia. Due to the desire to initiate hospice care, we are only concerned about hypoglycemia.   -4 times daily glucose checks   -Medium resistance sliding scale for correction  -Monitor for hypoglycemia     Hyponatremia, resolved  Initially sodium 133 likely from poor oral intake.  Urine antigen negative for Legionella.  -May need to reassess for adrenal fatigue but she's getting steroids now     Hyperkalemia K 5.2, resolved  -Hold spironolactone due to receiving IV Lasix  -Hold outpatient potassium supplement     Hypomagnesemia Mg 1.6  -Replacement protocol ordered  -May need additional doses to keep Mg > 2     Bilateral lymphedema  Secondary to above  -Lymphedema     Urinary retention  Bladder scanned in ED for 1100 ml.    -Borjas     Sacral pressure injury  -Mepilex  -PureWick  -WOC consult      Pulmonary HTN     A. fib  -Continue Xarelto and diltiazem     Chronic pain syndrome-chronic mid to low back pain  -Continue Tylenol and gabapentin  -Ice and heat     Tinea pedis  -On Terbinafine tablet 250 mg on 6/9 for 7 days.   -CMP     GERD  -Discontinued IV Protonix  -Restarted PTA omeprazole     Anxiety  -Continue Abilify  -PRN 0.25 Ativan Q4H  -Melatonin 3 mg scheduled at bedtime  -Discussed difficulty sleeping on prednisone       Diet: Regular Diet Adult  Snacks/Supplements Adult: Glucerna; With Meals    DVT Prophylaxis: DOAC  Borjas Catheter: PRESENT, indication: Strict 1-2 Hour I&O  Fluids: PO  Central Lines: None  Cardiac Monitoring: ACTIVE order.  Indication: Electrolyte Imbalance (24 hours)- Magnesium <1.3 mg/ml; Potassium < =2.8 or > 5.5 mg/ml  Code Status: No CPR- Do NOT Intubate   DNR/DNI --see palliative care consult note 5/17/2020    Disposition Plan   Expected Discharge: 06/13/2022     Anticipated discharge location: home with help/services;home with family    Delays: Still some debate between hospice and inpatient management.      The patient's care was discussed with the Attending Physician, Dr. Andi Jones.    Kamryn Begum MD PGY1  Hospitalist Service  [facility]  Text page via [facility] Paging/Directory   ______________________________________________________________________    Interval History   Patient states she is feeling well. She has a good appetite without nausea or vomiting. Urinating and bowel movements are appropriate. She states she used her home BiPAP machine last night, but she believes it was forcing too much air out. She was transitioned to the hospital BiPAP machine with improvement. However, it was set to the same settings.      Per nursing, the patient will remove her mask or nasal cannula once in a while and have desaturations into the 60s-80s. It will take a while for her to reach the 90s again.    Data reviewed today: I reviewed all medications, new labs and imaging results over the last 24 hours.    Physical Exam   Vital Signs: Temp: 97.5  F (36.4  C) Temp src: Axillary BP: 119/68 Pulse: 94   Resp: 22 SpO2: 92 % O2 Device: Oxymizer cannula Oxygen Delivery: 8 LPM  Weight: 140 lbs 1.6 oz    PHYSICAL EXAM:  GENERAL: Awake, alert, sitting up in chair completing vest treatment, speaking comfortably   HEENT: No scleral icterus or conjunctival injection.  SKIN: Warm and dry. Bruises throughout her arms.   LUNGS: musical lungs diffusely, course rhonchi diffusely  ABDOMEN: Non-distended. Soft and non-tender throughout with no masses or organomegaly.  EXTREMITIES: No gross deformity.  +1 peripheral edema  bilaterally, symmetric, non-tender to palpation    Data   Recent Labs   Lab 06/12/22  1205 06/12/22  0825 06/12/22  0437 06/11/22  0737 06/11/22  0527 06/10/22  0545 06/10/22  0517 06/09/22  1241 06/09/22  0709   WBC  --   --   --   --  8.1  --  10.1  --  7.0   HGB  --   --   --   --  9.2*  --  9.1*  --  9.9*   MCV  --   --   --   --  94  --  94  --  94   PLT  --   --   --   --  270  --  261  --  233   NA  --   --  136  --  136  --  133*   < > 133*   POTASSIUM  --   --  4.0  --  4.0  --  4.7   < > 4.8   CHLORIDE  --   --  89*  --  90*  --  90*   < > 90*   CO2  --   --  37*  --  36*  --  33*   < > 33*   BUN  --   --  17  --  21  --  23   < > 16   CR  --   --  0.61  --  0.63  --  0.69   < > 0.61   ANIONGAP  --   --  10  --  10  --  10   < > 10   DENISE  --   --  7.9*  --  7.8*  --  8.1*   < > 8.6   * 215* 294*   < > 319*   < > 166*   < > 193*   ALBUMIN  --   --   --   --   --   --   --   --  2.6*   PROTTOTAL  --   --   --   --   --   --   --   --  5.9*   BILITOTAL  --   --   --   --   --   --   --   --  0.6   ALKPHOS  --   --   --   --   --   --   --   --  75   ALT  --   --   --   --   --   --   --   --  23   AST  --   --   --   --   --   --   --   --  15    < > = values in this interval not displayed.     Recent Results (from the past 24 hour(s))   XR Chest Port 1 View    Narrative    EXAM: XR CHEST PORTABLE 1 VIEW  LOCATION: [facility]  DATE/TIME: 06/12/2022, 6:13 AM    INDICATION: Shortness of breath. Pneumonia and pulmonary edema?  COMPARISON: Portable chest single view 06/09/2022 at 1631 hours.      Impression    IMPRESSION: Cardiac enlargement. Indistinct interstitial infiltrates bilaterally, more apparent in the upper lungs and the left base, slightly improved. No new infiltrate, consolidation, cavitation or pleural effusion on either side. Atherosclerotic   aorta. Degenerative changes both shoulders and the spine. Monitoring leads overlying the chest.

## 2022-06-12 NOTE — PROGRESS NOTES
Pt received neb treatment with vest and tolerated well. Pt wore BiPAP for a few hour, removed it off her face sometime after midnight and  was placed on 9L oxymizer.     Yelitza Quevedo, RT

## 2022-06-12 NOTE — PROGRESS NOTES
Pt given Duoneb/ MM as ordered.  1200 tx was not done due to pt not available then.  VEST tx done this AM as ordered.  Pt on 8L Oxymizer, o2 sats low to mid 90's, BS cs cr, exp wheeze.  Was able to place pt on Home BIPAP for approx 1 hour this afternoon.  Pt had no c/o and marisa very well with 10 L bleed in.  Pt came off home BIPAP, as  here to visit and wanted it off.  Will continue to encourage use of Home BIPAP and continue to monitor pt.

## 2022-06-12 NOTE — PLAN OF CARE
Goal Outcome Evaluation:   Pt continues to require 8-10L oxymizer/oxymask (baseline). Receiving IV lasix with adequate urine output. Urine continues to be intermittently dilshad/pink tinged, MDs aware. Plan to remove landaverde when finished receiving IV diuresis. Patient wore home BiPAP for about an hour. Pt downgraded to gen med status.  and daughter at bedside for support. Mepilex to bottom CDI, frequent repo to promote skin integrity.     Plan of Care Reviewed With: patient      Mariam Burks RN      Problem: Functional Ability Impaired COPD (Chronic Obstructive Pulmonary Disease)  Goal: Optimal Level of Functional Andrews  Outcome: Ongoing, Progressing  Intervention: Optimize Functional Ability  Recent Flowsheet Documentation  Taken 6/12/2022 1607 by Mariam Burks, RN  Activity Management: activity adjusted per tolerance  Taken 6/12/2022 1548 by Mariam Burks, RN  Activity Management: activity adjusted per tolerance

## 2022-06-12 NOTE — PLAN OF CARE
Problem: Risk for Delirium  Goal: Optimal Coping  Outcome: Ongoing, Progressing    Problem: Risk for Delirium  Goal: Improved Behavioral Control  Outcome: Ongoing, Progressing     Problem: Risk for Delirium  Goal: Improved Sleep  Outcome: Ongoing, Progressing     Pt less confused tonight and more cooperative with cares.  stayed late to help pt with BiPAP from home, pt did not want to use BiPAP from home, RT set up hospital BiPAP. Pt was anxious about BiPAP, received PRN ativan with some relief. Pt tolerated BiPAP for roughly 1 hour, sleeping soundly until she woke up and tried to rip off BiPAP claiming the settings were all wrong. Pt maintained on oxymizer the rest of the shift. [id number] performed. IV abx given per MAR. Blood sugar covered per sliding scale insulin. Borjas patent, good UOP this shift. Call light within reach. Pt reported 4/10 pain, PRN tylenol given with some effect. Pt sitting upright in chair.    Galina Nesbitt, RN  [phone]

## 2022-06-12 NOTE — PROGRESS NOTES
Care Management Follow Up    Length of Stay (days): 4    Expected Discharge Date: 06/13/2022     Concerns to be Addressed:  IV medications    Patient plan of care discussed at interdisciplinary rounds: Yes    Anticipated Discharge Disposition: Home Care     Anticipated Discharge Services: TBD    Anticipated Discharge DME: TBD    Education Provided on the Discharge Plan:  AVS will be per bedside RN.    Patient/Family in Agreement with the Plan: yes    Referrals Placed by CM/SW: home care        Additional Information:  Chart reviewed. CM attempted to visit patient but patient was sleeping. CM will continue to follow.       Anna Rodriguez RN

## 2022-06-13 NOTE — PROGRESS NOTES
Care Management Follow Up    Length of Stay (days): 5    Expected Discharge Date: 06/13/2022     Concerns to be Addressed: denies needs/concerns at this time, discharge planning     Patient plan of care discussed at interdisciplinary rounds: Yes    Anticipated Discharge Disposition: Home Care     Anticipated Discharge Services: None  Anticipated Discharge DME: None    Patient/family educated on Medicare website which has current facility and service quality ratings: no (Pt is currently open with a vendor and does not wish to change companies.)  Education Provided on the Discharge Plan:    Patient/Family in Agreement with the Plan: yes    Referrals Placed by CM/SW: Homecare  Private pay costs discussed: Not applicable    Additional Information:  Met with pt,  and daughter to discuss discharge plans.  Anticipate pt will be able to discharge home with family today.  Contacted [facility] [place] [phone] to arrange for them to go to pt's home to set up home O2 at current need of 11Liters.  [employer] requesting orders and plan to go to pt's home today between 1-3pm.  Updated pt and family and  will be at home during that time.  Home Care to be resumed with [facility].    Transport arranged for 5pm with [employer] due to O2 needs.  2:47 PM        Transport changed to 6:30pm with [employer] stretcher.  Spoke to [facility] [place] and they have received order for O2 increase and are reaching out to family to go out to pt's home yet today.  Updated pt's daughter Ratna.  4:30 PM      BETTY Elizalde

## 2022-06-13 NOTE — TELEPHONE ENCOUNTER
Pt is requesting a refill on sodium chloride 3% nebs to be sent to the [facility] pharmacy.  I did not see this on her active med list.  Please send new rx if appropriate.    Thank you,  Magali Agustin, PharmD  [facility] [place]  P: [phone]

## 2022-06-13 NOTE — PROGRESS NOTES
PULMONARY MEDICINE PROGRESS NOTE  6/13/2022    Admit Date: 6/8/2022  CODE: No CPR- Do NOT Intubate    Reason for Consult: acute on chronic respiratory failure with hypoxia, severe COPD with bronchiectasis, Pseudomonas respiratory colonization    Assessment/Plan:   86 year old female with a history of emphysema (DLCO 39% predicted), COPD (FEV1 49% predicted without reversibility) and extensive fibrotic bronchiectasis, who presents with acute on chronic respiratory failure with hypoxemia after multiple admissions for COPD exacerbations.    Acute on chronic respiratory failure with hypoxia, severe COPD with bronchiectasis, Pseudomonas respiratory colonization: Clinically near baseline. Requires baseline high oxygen flow rate given extent of pulmonary disease, which is largely irreversible. Needs better nocturnal BiPAP adherence if she is to continue to pursue restorative health goals rather than a comfort focus. She also notes use of nebulized therapies TID at home rather than QID; emphasized the need to consistently take nebulized ipratropium-albuterol and acetylcysteine QID. 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    Plan:  - goal SpO2 88-92%, avoid hyperoxia; currently on 10 L/min oxymizer which is likely new baseline  - has two home concentrators which can be connected in series to provide greater than 10 L/min flow at home if needed; I attempted to call [facility] to assist with this process but was put on indefinite hold; may need care management assistance  - continue AVAPS at night and as needed during the day; emphasized the need to wear this all night  - prednisone taper  - percussive vest therapy BID  - nebulized ipratropium-albuterol and acetylcysteine QID  - start nebulized tobramycin 300 mg BID on alternate months; ordered to start today and sent script to home pharmacy in discharge navigator  - fine to continue  "low-dose fluticasone-umeclidinium-vilanterol, though some redundancy with nebulized CECY-DIANA; can readdress outpatient as risk is overall low and her disease severity is high  - no work-up of pulmonary nodules; not a candidate for invasive diagnostic procedures and would not tolerate radiation or chemotherapy if malignancy diagnosed  - encourage OOB, PT/OT, IS  - appreciate palliative care, hospice involvement on previous admissions  - DNR/DNI  - scheduled to see Dr. Juarez in pulmonary clinic on August 2 at 1300  - okay to discharge from pulmonary standpoint; high risk of readmission; has previously declined hospice enrollment    Ronnie Carney MD  Pulmonary and Critical Care Medicine  [facility]  Office [phone]  Pager [phone]  (he/him/his)                                                                                                                                                        SUBJECTIVE/INTERVAL HISTORY   Feels stable, close to baseline. Hard to tolerate BiPAP for more than a few hours. Want to avoid benzos. Minimal cough, stable. On 10 L/min oxymizer.                                                                                                                                                      Exam/Data:     Vitals  /59 (BP Location: Left arm)   Pulse 102   Temp 97.8  F (36.6  C) (Oral)   Resp 22   Ht 1.549 m (5' 1\")   Wt 63.7 kg (140 lb 6.4 oz)   SpO2 92%   BMI 26.53 kg/m    BP - Mean:  [73-88] 78  I/O last 3 completed shifts:  In: 1430 [P.O.:980; I.V.:450]  Out: 3975 [Urine:3975]  Weight change: 0.136 kg (4.8 oz)  [unfilled]  EXAM:  /59 (BP Location: Left arm)   Pulse 102   Temp 97.8  F (36.6  C) (Oral)   Resp 22   Ht 1.549 m (5' 1\")   Wt 63.7 kg (140 lb 6.4 oz)   SpO2 92%   BMI 26.53 kg/m      Intake/Output last 3 shifts:  I/O last 3 completed shifts:  In: 1430 [P.O.:980; I.V.:450]  Out: 3975 [Urine:3975]  Intake/Output this shift:  No " intake/output data recorded.    Physical Exam  Gen: alert, oriented, no distress  HEENT: NT, no MAYE  CV: RRR, no m/g/r  Resp: CTAB  Abd: soft, nontender, BS+  Skin: no rashes or lesions  Ext: no edema  Neuro: PERRL, nonfocal exam    ROS: A complete 10-system review of systems was obtained and is negative with the exception of what is noted in the history of present illness.    Medications:       - MEDICATION INSTRUCTIONS -         acetylcysteine  4 mL Nebulization 4x Daily     ARIPiprazole  5 mg Oral At Bedtime     atorvastatin  20 mg Oral At Bedtime     ceFEPIme (MAXIPIME) IV  2 g Intravenous Q12H     [Held by provider] ciprofloxacin  750 mg Oral BID     diltiazem ER COATED BEADS  240 mg Oral Daily     doxycycline (VIBRAMYCIN) IV  100 mg Intravenous Q12H     Fluticasone-Umeclidin-Vilanterol  1 puff Inhalation Daily     furosemide  40 mg Intravenous Q12H     gabapentin  600 mg Oral At Bedtime     insulin aspart  1-7 Units Subcutaneous TID AC     insulin aspart  1-5 Units Subcutaneous At Bedtime     ipratropium - albuterol 0.5 mg/2.5 mg/3 mL  3 mL Nebulization 4x daily     magnesium sulfate  2 g Intravenous Once     melatonin  3 mg Oral At Bedtime     pantoprazole  40 mg Oral Daily     polyethylene glycol  17 g Oral Daily     predniSONE  10 mg Oral Daily    Followed by     [START ON 6/15/2022] predniSONE  5 mg Oral Daily     rivaroxaban ANTICOAGULANT  20 mg Oral Daily with supper     sodium chloride (PF)  3 mL Intracatheter Q8H     [Held by provider] spironolactone  12.5 mg Oral Daily     terbinafine  250 mg Oral Daily     tobramycin (PF)  300 mg Nebulization 2 times daily         DATA  All laboratory and radiology has been personally reviewed by myself today.  Recent Labs   Lab 06/13/22  0558   WBC 9.1   HGB 9.6*   HCT 30.3*        Recent Labs   Lab 06/13/22  0558 06/12/22  0437 06/11/22  0527 06/09/22  1629 06/09/22  0709    136 136   < > 133*   CO2 41* 37* 36*   < > 33*   BUN 15 17 21   < > 16    ALKPHOS  --   --   --   --  75   ALT  --   --   --   --  23   AST  --   --   --   --  15    < > = values in this interval not displayed.       PFT DATA (June 2019):  FEV1/FVC is 48 and is reduced.  FEV1 is 49% predicted and is normal.  FVC is 78% predicted and is reduced.  There was no improvement in spirometry after a single inhaled dose of bronchodilator.  TLC is 103% predicted and is normal.  RV is 119% predicted and is normal.  DLCO is 39% predicted and is reduced when it   is corrected for hemoglobin.     IMAGING:   CT chest (May 2022):  - images directly reviewed, formal interpretation follows:  FINDINGS:   LUNGS AND PLEURA: Background of fairly advanced emphysema mixed centrilobular and paraseptal distribution with an upper lung predominance. In the areas of greatest emphysema the intervening interstitium has mild thickening, unchanged. The multiple   segment posterior airspace and interstitial process in the left lower lobe has mildly improved from 12 days earlier, however there is no more conspicuous and bandlike opacity in the left upper lobe along the posterior pleura and along the central   bronchovascular structures in the left lower lobe. Increased subpleural opacity in the right posterior costophrenic sulcus as well as development of multiple patchy inflammatory nodules within the parenchyma of the right lower lobe.     Several lobulated solid nodules are present in the right upper lobe. One of the nodules measuring 11 x 12 mm (series 4, image 110) has substantially increased from 11/3/2021 previously measuring 7 mm long axis. A second larger lobulated solid 9 x 16 mm   (series 4, image 123) right upper lobe nodule is also slightly larger measuring 7 x 14 mm. A few smaller solid nodules are present in the right upper lobe closer to the hilum (series 4, image 140 and 143) and are also larger from March 2021.     MEDIASTINUM: Biatrial cardiac enlargement. Cardiac ventricles are normal in size. No  pericardial effusion. Enlarged right lower paratracheal lymph node measures up to 17 mm short axis (series 4, image 121) and enlarged left lower paratracheal lymph nodes   are also unchanged. No definite hilar adenopathy. Esophagus is decompressed. Conspicuous cisterna chyli.      CORONARY ARTERY CALCIFICATION: Three-vessel, moderate.     UPPER ABDOMEN: No actionable findings in the imaged upper abdomen.     MUSCULOSKELETAL: Degenerative osteophytes of the thoracic spine largest from T6 through the thoracolumbar junction. No aggressive or destructive bone lesions are present.                                                                      IMPRESSION:      1.  Partial clearing of airspace opacities from the left lower lobe compared to 5/5/2022. Residual dense opacities are now along the central bronchovascular structures. There are new inflammatory nodules in the right lower lobe and increased opacity in   the right posterior sulcus consistent with new atelectasis/inflammation.  2.  Multiple solid nodules with lobular borders are present in the right upper lobe 2 largest of which measure 11 x 12 and 9 x 16 mm. The 11 x 12 nodule and several other solid nodules are substantially larger from 3/11/2021 and is suspect for neoplasm.   Consider PET/CT after resolution of this acute respiratory illness for further evaluation.  3.  Advanced emphysema.  4.  Severe enlargement of both cardiac atria and moderate atheromatous coronary calcifications.    CXR (June 2022):  - images directly reviewed, formal interpretation follows:  IMPRESSION: Cardiac enlargement. Indistinct interstitial infiltrates bilaterally, more apparent in the upper lungs and the left base, slightly improved. No new infiltrate, consolidation, cavitation or pleural effusion on either side. Atherosclerotic   aorta. Degenerative changes both shoulders and the spine. Monitoring leads overlying the chest.

## 2022-06-13 NOTE — PROGRESS NOTES
Pt received neb with vest therapy and tolerated well. Pt later placed on Home BiPAP and tolerated for few hours during the night before later getting placed on 10L oxymizer.   RTs will continue to monitor and assess pt.    Yelitza Quevedo, RT

## 2022-06-13 NOTE — PROGRESS NOTES
SPIRITUAL HEALTH SERVICES Progress Note     visited patient due to length of stay. Kim shared about her hospital course and was pleased to be going home later today. Her family is source of support. She expressed appreciation for the visit and stated that she doesn't have any needs at this time.     Ronald Gaona MDiv, [facility]  Lead Staff , [facility]  [phone]

## 2022-06-13 NOTE — PLAN OF CARE
Problem: Plan of Care - These are the overarching goals to be used throughout the patient stay.    Goal: Optimal Comfort and Wellbeing  Outcome: Ongoing, Progressing      Problem: Risk for Delirium  Goal: Improved Behavioral Control  Outcome: Ongoing, Progressing     Problem: Risk for Delirium  Goal: Improved Sleep  Outcome: Ongoing, Progressing     Problem: Respiratory Compromise COPD (Chronic Obstructive Pulmonary Disease)  Goal: Effective Oxygenation and Ventilation  Outcome: Ongoing, Progressing     Pt A&Ox3-4, intermittent confusion requiring reorientation. Pt had BiPAP on from 11PM-3AM, oxygen ranging from 10-15L depending on sat levels, critical CO2 of 41 this morning, MD notified. Pt on Oximizer NC at 10L at 3AM. Q2 turns performed. Borjas in place and patent, good UOP this shift. Bed alarm on for safety overnight. Pt currently up in chair, denies pain this shift. No other acute changes this shift.    Galina Nesbitt, RN  [phone]

## 2022-06-13 NOTE — PROGRESS NOTES
RESPIRATORY CARE NOTE     Patient Name: Adalgisa Solano  Today's Date: 6/13/2022       Pt continues to receive duoneb, mucomyst and CALLUM with vest treatment BID. BS are coarse diminished. Pt is on 10 lpm of oxygen via oxymizer, SpO2 is 93%. Post treatment there is increased aeration. Pt also perceives improvement.  RT encouraged deep breathing and coughing techniques .  RT will continue to monitor and assess.     Gisselle Tyson, RT

## 2022-06-13 NOTE — DISCHARGE SUMMARY
[facility]  Discharge Summary - Medicine & Pediatrics       Date of Admission:  6/8/2022  Date of Discharge:  6/13/2022  Discharging Provider: Zeb Mercado DO  Discharge Service: Hospitalist Service    Discharge Diagnoses   Patient Active Problem List   Diagnosis     Recurrent major depressive episodes (H)     Chronic obstructive pulmonary disease (H)     Peripheral arterial disease (H)     Type 2 diabetes mellitus without complication (H)     Hypertension     Schizoaffective disorder, bipolar type (H)     Arnold-Chiari malformation (H)     Chronic respiratory failure with hypoxia (H)     Pulmonary hypertension (H)     Primary osteoarthritis of both knees     Chronic atrial fibrillation (H)     Gastroesophageal reflux disease, esophagitis presence not specified     Stasis dermatitis of both legs     Generalized muscle weakness     Drug-induced constipation     Chronic pain syndrome     Slow transit constipation     Cellulitis of left lower extremity     Bronchiectasis without complication (H)     Traumatic hematoma     Dyslipidemia     Hypomagnesemia     Osteoporosis     Hyponatremia     Dyspnea, unspecified type     Chronic heart failure with preserved ejection fraction (H)     Anticoagulated     COPD with acute exacerbation (H)     Hospital-acquired pneumonia     Hypoxia     COPD exacerbation (H)     Pneumonia of left lower lobe due to infectious organism     Herpes zoster with complication     Acute respiratory failure with hypoxia (H)     Hyperkalemia     CHF (congestive heart failure) (H)     Acute and chronic respiratory failure with hypoxia (H)     Follow-ups Needed After Discharge   Follow up with PCP. Recommend checking BMP, Cr. Consider medication reconciliation.     Unresulted Labs Ordered in the Past 30 Days of this Admission     Date and Time Order Name Status Description    6/10/2022  8:39 AM Respiratory Aerobic Bacterial Culture with Gram Stain Preliminary       These  results will be followed up by PCP    Discharge Disposition   Discharged to home  Condition at discharge: Stable    Hospital Course   Adalgisa Solano was admitted on 6/8/2022 for acute hypoxic respiratory failure.  The following problems were addressed during her hospitalization:    Acute on chronic diastolic HFpEF  Home meds are Lasix 40 mg daily and spironolactone 12.5 mg daily.  Came in with bilateral LL edema and .  Last echo 4/15/2022 showed EF 65%. Moderate response to IV lasix 40 mg IV BID, although not back to baseline. Goal net negative 1-2L/day. Patient discharged on 80 mg BID PO Lasix. Continue PTA spironolactone. Follow up with PCP for medications and post-hospital discharge.consider checking BMP, mag, phos, potasium.        Acute on chronic respiratory failure with hypoxic and hypercapnic, stable  Emphysema (DLCO 39% predicted)  COPD (FEV1 49% predicted w/o reversibility)  Bronchiectasis, UL dominant  Came in with coarse crackles and expiratory wheezes needing 15 L by high flow nasal cannula. Currently stable on 10 L and satting low to mid 90%.  Per patient and RT patient was not using BiPAP at home because she and  were not sure how to use it  Continue albuterol nebs PRN, Mucomyst, Home CPT vest twice daily, BiPAP PRN and at night, Gian Pickensta outpatient. follow-up with pulmonology in August 2nd as scheduled scheduled.        Recent pseudomonas pneumonia  Pseudomonas colonization  Recent antimicrobials: Cefepime 04/2022, ertapenem + azithromycin 05/2022, ciprofloxacin 05-06/2022. CXR showing improvement of consolidations. CRP initially elevated though. Normalized today. Pulmonology suspects pseudomonal pneumonia may have returned. Recommend continuing tobramycin 300 mg BID every other month. Follow up scheduled on 8/2/2022.       T2DM  At risk for steroid-induced hyperglycemia. Glu 199 this morning on steroid taper. Patient desires not to use insulin. recommend continuing PTA  metformin, at least through course of steroid and seen by PCP.         Hyponatremia  Initially sodium 133 likely from poor oral intake.  Urine antigen negative for Legionella. May need to reassess for adrenal fatigue once off steroids      Hyperkalemia K 5.2  Spironolactone was held due to hyperkalemia. Consider holding outpatient potassium supplement     Hypomagnesemia Mg 1.6  Received replacement per hospital protocol to maintain level > 2.         Consultations This Hospital Stay   CARE MANAGEMENT / SOCIAL WORK IP CONSULT  NUTRITION SERVICES ADULT IP CONSULT  RESPIRATORY CARE IP CONSULT  PHYSICAL THERAPY ADULT IP CONSULT  OCCUPATIONAL THERAPY ADULT IP CONSULT  WOUND OSTOMY CONTINENCE NURSE  IP CONSULT  LYMPHEDEMA THERAPY IP CONSULT  IP RESPIRATORY CARE CHRONIC PULMONARY DISEASE SPECIALIST  CARE MANAGEMENT / SOCIAL WORK IP CONSULT  CARE MANAGEMENT / SOCIAL WORK IP CONSULT  INPATIENT HOSPICE ADULT CONSULT  PULMONARY IP CONSULT  INPATIENT HOSPICE ADULT CONSULT    Code Status   No CPR- Do NOT Intubate       The patient was discussed with Dr. Jefferson Mercado, DO  Teaching Service  [facility] 3 ICU  [address] [place] [id number]  Phone: [phone]  Fax: [phone]  ______________________________________________________________________    Physical Exam   Vital Signs: Temp: 98  F (36.7  C) Temp src: Oral BP: 117/63 Pulse: 110   Resp: 18 SpO2: 91 % O2 Device: Nasal cannula Oxygen Delivery: 11 LPM  Weight: 140 lbs 6.4 oz  Physical Exam  Constitutional: awake, alert, cooperative, no apparent distress, and appears stated age  ENT: oxymask in place  Eyes: Lids and lashes normal, pupils equal, round and reactive to light, extra ocular muscles intact, sclera clear, conjunctiva normal  Respiratory: resprations unlabored. On 10L oxymask   Cardiovascular: intermittent Afib, regular rate, no murmur appreciated  GI: No scars, normal bowel sounds, soft, non-distended,  non-tender, no masses palpated, no hepatosplenomegally  Skin: no bruising or bleeding  Neurologic: Awake, alert, oriented to name, place and time.  Cranial nerves II-XII are grossly intact.  Motor is 5 out of 5 bilaterally.  Neuropsychiatric: General: normal, calm and normal eye contact  Level of consciousness: alert / normal  Orientation: oriented to self, place, time and situation        Primary Care Physician   Shai Ellington    Discharge Orders       Significant Results and Procedures   Results for orders placed or performed during the hospital encounter of 06/08/22   XR Chest Port 1 View    Narrative    EXAM: XR CHEST PORT 1 VIEW  LOCATION: [facility]  DATE/TIME: 6/8/2022 4:52 PM    INDICATION: SOB  COMPARISON: 05/18/2022 and older studies, chest CT 05/17/2022 and older studies      Impression    IMPRESSION: PICC line has been removed. Emphysematous changes are better appreciated on the CT.    Interval clearing of the left lower lobe consolidation. Fine reticular opacities have increased bilaterally. This may reflect edema superimposed over emphysematous changes or a viral pneumonia.    Heart is enlarged but unchanged. No clear effusions.   XR Chest Port 1 View    Narrative    EXAM: XR CHEST PORT 1 VIEW  LOCATION: [facility]  DATE/TIME: 6/9/2022 4:14 PM    INDICATION: Worsening shortness of breath  COMPARISON: Portable AP view of the chest 06/08/2022      Impression    IMPRESSION:     Bilateral airspace opacities are present in the mid/upper lungs greatest lateral right upper lobe abutting the lateral pleura and minor fissure consistent with lung edema/inflammation. The background of emphysema results in lucent apices with visible   bullae on the left. Patchy left lower lobe opacities and more focal opacities in the medial right lower lobe are unchanged.    There is a small left pleural effusion visible on the lateral sulcus. No pneumothorax.    Unchanged  enlargement of the cardiac silhouette. Arch and descending atheromatous calcifications are moderate and unchanged.   XR Chest Port 1 View    Narrative    EXAM: XR CHEST PORTABLE 1 VIEW  LOCATION: [facility]  DATE/TIME: 06/12/2022, 6:13 AM    INDICATION: Shortness of breath. Pneumonia and pulmonary edema?  COMPARISON: Portable chest single view 06/09/2022 at 1631 hours.      Impression    IMPRESSION: Cardiac enlargement. Indistinct interstitial infiltrates bilaterally, more apparent in the upper lungs and the left base, slightly improved. No new infiltrate, consolidation, cavitation or pleural effusion on either side. Atherosclerotic   aorta. Degenerative changes both shoulders and the spine. Monitoring leads overlying the chest.       *Note: Due to a large number of results and/or encounters for the requested time period, some results have not been displayed. A complete set of results can be found in Results Review.       Discharge Medications   Current Discharge Medication List      START taking these medications    Details   tobramycin, PF, (CALLUM) 300 MG/5ML neb solution Nebulize 5 mL twice daily. Take the medication the months of June, August, October, December, February, and April.  Qty: 300 mL, Refills: 5    Associated Diagnoses: Pseudomonas respiratory infection; Bronchiectasis with (acute) exacerbation (H)         CONTINUE these medications which have CHANGED    Details   furosemide (LASIX) 20 MG tablet Take 4 tablets (80 mg) by mouth 2 times daily for 30 days  Qty: 120 tablet, Refills: 0    Associated Diagnoses: Chronic heart failure with preserved ejection fraction (H)      predniSONE (DELTASONE) 5 MG tablet Take 1 tablet (5 mg) by mouth daily for 5 days  Qty: 5 tablet, Refills: 0    Associated Diagnoses: Pneumonia of right upper lobe due to Pseudomonas species (H)         CONTINUE these medications which have NOT CHANGED    Details   acetaminophen (TYLENOL) 500 MG tablet Take  500-1,000 mg by mouth every 6 hours as needed for mild pain      acetylcysteine (MUCOMYST) 20 % neb solution Take 4 mLs by nebulization 4 times daily  Qty: 480 mL, Refills: 11    Associated Diagnoses: COPD with acute exacerbation (H); Acute on chronic respiratory failure with hypoxia and hypercapnia (H)      albuterol (PROAIR HFA/PROVENTIL HFA/VENTOLIN HFA) 108 (90 Base) MCG/ACT inhaler Inhale 2 puffs into the lungs every 4 hours as needed for shortness of breath / dyspnea or wheezing      albuterol (PROVENTIL) (2.5 MG/3ML) 0.083% neb solution Take 2.5 mg by nebulization every 2 hours as needed for shortness of breath / dyspnea or wheezing  Qty: 600 mL, Refills: 3    Associated Diagnoses: Essential hypertension      ARIPiprazole (ABILIFY) 5 MG tablet TAKE 1 TABLET AT BEDTIME  Qty: 90 tablet, Refills: 3    Associated Diagnoses: Essential hypertension      atorvastatin (LIPITOR) 20 MG tablet TAKE 1 TABLET AT BEDTIME  Qty: 90 tablet, Refills: 3    Associated Diagnoses: Essential hypertension      calcium carbonate 600 mg-vitamin D 400 units (CALTRATE) 600-400 MG-UNIT per tablet Take 1 tablet by mouth 2 times daily      cyanocobalamin (VITAMIN B-12) 500 MCG tablet Take 500 mcg by mouth every morning       diltiazem ER COATED BEADS (CARDIZEM CD/CARTIA XT) 120 MG 24 hr capsule Take 240 mg by mouth daily      docusate sodium (COLACE) 100 MG capsule Take 100 mg by mouth daily as needed      Fluticasone-Umeclidin-Vilanterol (TRELEGY ELLIPTA) 100-62.5-25 MCG/INH oral inhaler Inhale 1 puff into the lungs daily      gabapentin (NEURONTIN) 300 MG capsule Take 600 mg by mouth At Bedtime      ipratropium - albuterol 0.5 mg/2.5 mg/3 mL (DUONEB) 0.5-2.5 (3) MG/3ML neb solution Take 1 vial (3 mLs) by nebulization 4 times daily Once breathing is better, then change to every 6 hours as needed for shortness of breath  Qty: 360 mL, Refills: 11    Associated Diagnoses: COPD with acute exacerbation (H)      melatonin 5 MG tablet Take 5 mg  by mouth nightly as needed      metFORMIN (GLUCOPHAGE) 500 MG tablet Take 1 tablet (500 mg) by mouth 2 times daily (with meals)  Qty: 60 tablet, Refills: 11    Associated Diagnoses: Essential hypertension      omeprazole (PRILOSEC) 20 MG DR capsule TAKE 1 CAPSULE DAILY  Qty: 90 capsule, Refills: 3    Associated Diagnoses: Essential hypertension      polyethylene glycol (MIRALAX) 17 GM/Dose powder Take 17 g by mouth daily  Qty: 510 g      potassium chloride ER (KLOR-CON M) 10 MEQ CR tablet Take 1 tablet (10 mEq) by mouth daily  Qty: 90 tablet, Refills: 3    Associated Diagnoses: Essential hypertension      spironolactone (ALDACTONE) 25 MG tablet TAKE ONE-HALF (1/2) TABLET DAILY  Qty: 45 tablet, Refills: 3    Associated Diagnoses: Essential hypertension      terbinafine (LAMISIL) 250 MG tablet Take 1 tablet (250 mg) by mouth daily for 14 days  Qty: 14 tablet, Refills: 0    Comments: For tinea pedis  Associated Diagnoses: Tinea pedis, unspecified laterality      Vitamin D3 (CHOLECALCIFEROL) 25 mcg (1000 units) tablet Take 1 capsule by mouth every morning       XARELTO ANTICOAGULANT 20 MG TABS tablet TAKE 1 TABLET DAILY WITH DINNER  Qty: 90 tablet, Refills: 1    Associated Diagnoses: Atrial fibrillation with RVR (H)         STOP taking these medications       ciprofloxacin (CIPRO) 500 MG tablet Comments:   Reason for Stopping:             Allergies   Allergies   Allergen Reactions     Tramadol Nausea     Tuberculin Tests      Amoxicillin Itching and Rash     Levofloxacin Itching and Rash     Penicillins Itching and Rash     Has tolerated ceftriaxone.

## 2022-06-14 NOTE — PROGRESS NOTES
Physical Therapy Discharge Summary    Reason for therapy discharge:    Discharged to home with home therapy.    Progress towards therapy goal(s). See goals on Care Plan in [facility] electronic health record for goal details.  Goals not met.  Barriers to achieving goals:   discharge from facility.    Therapy recommendation(s):    Continued therapy is recommended.  Rationale/Recommendations:  Pt not at baseline level of functional mobility.

## 2022-06-14 NOTE — PROGRESS NOTES
Occupational Therapy Discharge Summary    Reason for therapy discharge:    Discharged to home.  With continued home care.    Progress towards therapy goal(s). See goals on Care Plan in [facility] electronic health record for goal details.  Goals partially met.  Barriers to achieving goals:   discharge from facility.    Therapy recommendation(s):    No further therapy is recommended.

## 2022-06-14 NOTE — PROGRESS NOTES
Clinic Care Coordination Contact  [facility]/Voicemail       Clinical Data: Care Coordinator Outreach  Outreach attempted x 1.  Left message with a female who stated she was taking a nap right now Care Coordinator will try to reach patient again in 1-2 business days.      Chloé Torres  [phone]  Care

## 2022-06-14 NOTE — PROGRESS NOTES
Pt discharged via EMS at 2030. Per [facility] team, OK to discharge pt w/o completing void trial following landaverde removal & w/o administering pm IV lasix and abx.    AVS reviewed with pt. All questions answered. Belongings sent w/ pt.    Sunshine Olson RN

## 2022-06-15 NOTE — PROGRESS NOTES
Clinic Care Coordination Contact  [facility]       Clinical Data: Care Coordinator Outreach  Outreach attempted x 2.  Female answered the phone stated a nurse was there doing a evaluation right now.Care Coordinator will do no further outreaches at this time.      Chloé Torres  [phone]  Care

## 2022-06-15 NOTE — TELEPHONE ENCOUNTER
Karen calling to request verbal orders for the following:    Wound care: stage 2 wound left gluteal fold area.  Okay for exuderm dressing change as needed.  Apply bariocream PRN.    Skilled nursing 1x per week for 1 week, 2x per week for 3 weeks, 4 PRN.    Home health aide 1x per week for 1 week, 2x per week for 3 weeks.    PT, social work and [facility] therapy evals.    Please call Karen at [phone]. Secure VM if needed.

## 2022-06-15 NOTE — TELEPHONE ENCOUNTER
Talked to /pt- still need hospital bed.  Rolo'd up orders for DME    Once signed fax to [facility] at fax# [phone] as [facility] does not have hospital beds

## 2022-06-15 NOTE — TELEPHONE ENCOUNTER
Karen calling to add:      left lower calf area shows more redness and swelling per spouse, warm to the touch and pain senstive, wondering Cellulitis ?    signifcating crackle to all lobes in lungs, oxygen saturation remains stable, patient coughing clear sputum    vital signs with baseline for patient    home

## 2022-06-16 NOTE — PROGRESS NOTES
Assessment & Plan:      Problem List Items Addressed This Visit        Musculoskeletal and Integumentary    Cellulitis of left lower extremity - Primary    Relevant Medications    cephALEXin (KEFLEX) 500 MG capsule        Medical Decision Making  Patient currently on blood thinners presents with red erythematous left lower extremity.  Physical exam appears consistent with cellulitis.  Recommend oral antibiotics.  Marked wound for symptom monitoring.  Patient otherwise has no fevers.  Discussed signs of worsening symptoms and when to follow-up with PCP if no symptom improvement.     Subjective:      Adalgisa Solano is a 86 year old female here for evaluation of left leg redness and increased warmth to touch.  Patient is on Eliquis.  A nurse came to check on the patient today and noted redness and increased warmth to touch along the left lower extremity.  The caretaker recommended the patient be seen for suspected cellulitis.  Patient does feel little warm but no known fevers.     The following portions of the patient's history were reviewed and updated as appropriate: allergies, current medications, and problem list.     Review of Systems  Pertinent items are noted in HPI.    Allergies  Allergies   Allergen Reactions     Tramadol Nausea     Tuberculin Tests      Amoxicillin Itching and Rash     Levofloxacin Itching and Rash     Penicillins Itching and Rash     Has tolerated ceftriaxone.       Family History   Problem Relation Age of Onset     Coronary Artery Disease Mother      Coronary Artery Disease Father        Social History     Tobacco Use     Smoking status: Former Smoker     Quit date: 2006     Years since quittin.4     Smokeless tobacco: Never Used     Tobacco comment: quit 2006   Substance Use Topics     Alcohol use: No        Objective:      /65   Pulse 83   Temp 98  F (36.7  C) (Oral)   Resp 16   SpO2 99%   General appearance - alert, well appearing, and in no distress and  non-toxic  Extremities - Left lower extremity: No tenderness to palpation of the calf  Skin - Left lower extremity: Mostly violaceous tissue throughout the left lower extremity with some increased erythema and increased warmth to touch over the medial aspect of the lower leg     Lab & Imaging Results    No results found for any visits on 06/16/22.    I personally reviewed these results and discussed findings with the patient.    The use of Dragon/ALOSKO dictation services was used to construct the content of this note; any grammatical errors are non-intentional. Please contact the author directly if you are in need of any clarification.

## 2022-06-16 NOTE — TELEPHONE ENCOUNTER
Left detailed message identifying patient/, writer/title, and ok for requested orders.  Also that writer will contact patient regarding cellulitis concern.

## 2022-06-16 NOTE — TELEPHONE ENCOUNTER
"Contacted patient's  Anabel who has CTC on file.  Anabel explained patient's leg is much better today, she has been elevating it and redness and swelling has improved significantly per Anabel.  Further explained \"they figured it out\" and that patient had been sitting up for a prolonged period with family playing cards.  Anabel states \"we just forgot to keep leg elevated.\"  No concerns at this time, advised to contact clinic if any changes.  Patient is scheduled to see PCP on 6/21/22.  "

## 2022-06-16 NOTE — PATIENT INSTRUCTIONS
You were seen today for a skin infection known as cellulitis.    Symptom management:  - Take antibiotics as prescribed for the full course, even if symptoms improve  - Keep the affected area clean with gentle water and soap  - If possible, keep the area elevated above the chest to help with swelling  - May use tylenol or ibuprofen for pain or discomfort as needed    Reasons to return immediately for re-evaluation:  - Difficulty or pain with moving joints around the affected area  - Develop discharge or pus-like drainage  - Fever of 100.4 or higher  - Worsening pain/swelling or spreading redness outside the marked area after 24 hours of antibiotics  - Vomiting    Otherwise, follow-up as directed or as needed with your primary care provider

## 2022-06-20 NOTE — TELEPHONE ENCOUNTER
Reason for Call:  Other appointment    Detailed comments: Patient is scheduled for in person hospital follow up tomorrow with PCP.    Patient would like to know if the appt can be done by phone, she states she is home bound and it is difficult to travel.    Phone Number Patient can be reached at: Home number on file [phone] (home)    Best Time: Any    Can we leave a detailed message on this number? Not Applicable    Call taken on 6/20/2022 at 12:16 PM by Shreya Shore

## 2022-06-21 NOTE — PROGRESS NOTES
Adalgisa Solano is a 86 year oldwho is being evaluated via a billable telephone visit.       What phone number would you like to be contacted at? [phone]      Assessment & Plan  Acute respiratory failure secondary to underlying COPD with exacerbation and recent hospital follow-up June 8 through 13, 2022.  The patient was offered prednisone therapy for acute COPD with exacerbation and acute respiratory failure.  O2 sats 90% on 12 L nasal cannula.  Patient declined as she recently completed a course of same.    Cellulitis left lower extremity already on cephalexin prednisone may help quiet the inflammation patient declined.    Diabetes mellitus type 2 with next office visit we will check A1c blood sugar lipid panel.  Using prednisone now would complicate her blood sugar control.     11 minutes spent on the date of the encounter doing chart review, patient visit and documentation  and I spent 5 minutes talking with patient on the phone along with visiting nurse Loraine as well as her  less in the background.       Subjective   No blood in stool or urine the patient's symptoms include chronic respiratory distress and respiratory failure with hypoxia.  O2 sats 90% on 12 L nasal cannula.  Other vital signs from home care visiting nurse list include blood pressure 129/65 weight 136 pulse range from 95-1 05 temperature was 97.5 right foot was described by patient as being tender but according to the visiting nurse lives it appears normal.    Hospital stay June 8 through the 13th 2022 for acute respiratory failure with hypoxia.  Former smoker long history of asthmatic bronchitis related to COPD.     Review of Systems   No blood in stool urine or sputum med list reviewed reconciled in the chart.      Patient was invited to call back anytime if symptoms worsen.       Shai Ellington MD

## 2022-06-21 NOTE — PROGRESS NOTES
"Kim is a 86 year old who is being evaluated via a billable telephone visit.      What phone number would you like to be contacted at? [phone]  How would you like to obtain your AVS? {AVS Preference:694545}    {PROVIDER CHARTING PREFERENCE:990075}    Subjective   Kim is a 86 year old{ACCOMPANIED BY STATEMENT (Optional):546848}, presenting for the following health issues:  No chief complaint on file.      HPI     {SUPERLIST (Optional):511124}  {additonal problems for provider to add (Optional):374593}    Review of Systems   {ROS COMP (Optional):772438}      Objective           Vitals:  No vitals were obtained today due to virtual visit.    Physical Exam   {GENERAL APPEARANCE:50::\"healthy\",\"alert\",\"no distress\"}  PSYCH: Alert and oriented times 3; coherent speech, normal   rate and volume, able to articulate logical thoughts, able   to abstract reason, no tangential thoughts, no hallucinations   or delusions  Her affect is { :5208153::\"normal\"}  RESP: No cough, no audible wheezing, able to talk in full sentences  Remainder of exam unable to be completed due to telephone visits    {Diagnostic Test Results (Optional):266922}    {AMBULATORY ATTESTATION (Optional):513327}        Phone call duration: *** minutes    .  ..  "

## 2022-06-21 NOTE — TELEPHONE ENCOUNTER
Per Dr. Juarez:  3% can be added.  If she wants to stop one it would be mucomyst    Called and informed Adalgisa of Dr. Juarez's recommendations. She will let us know if she has any concerns.

## 2022-06-22 NOTE — PROGRESS NOTES
Medication Therapy Management (MTM) Encounter    ASSESSMENT:                            Medication Adherence/Access: No issues identified    1. Chronic respiratory failure with hypoxia (H)  Acute worsening of breathing today, with unknown cause.  She has increased concentration of her oxygen to 16 L.  She and her  are working to identify issue which they believe is related to her equipment.  I have encouraged her to call her pulmonologist if breathing does not improve back to baseline at discharge.  She has completed her prednisone course.  Continues on home and nebulizer regimen as previously directed by pulmonology.  Of note she is taking tobramycin for the month of June, and has started saline nebs.    2. Chronic heart failure with preserved ejection fraction (H)  Symptomatically stable, after dose increase in furosemide weight is down several pounds.  Recommend updating BMP, magnesium, phosphorus labs.  These may be able to be drawn by home care nursing, will discuss with PCP.    3. Chronic atrial fibrillation (H)  Stable per PCP, continues on diltiazem and chronic anticoagulation with Xarelto.    4. Type 2 diabetes mellitus without complication, without long-term current use of insulin (H)  At goal A1c less than 8% per ADA guidelines, continues on a dose adjusted metformin.  Recommend to continue current regimen as she is tolerating without adverse effects.    5. Cellulitis  Slow symptomatic improvement, encouraged her to have nursing look at this and to follow-up with PCP if not resolving.  Appropriately taking antibiotic as directed at discharge, with several days left to complete antibiotic course.    6. Recurrent major depressive episodes (H)  Stable per patient report, recommend to continue current regimen.    7. Hypercholesterolemia  Stable on daily statin, denies signs or symptoms of adverse effects.  LDL tightly controlled less than 70.    8. Gastroesophageal reflux disease, esophagitis presence  not specified  Stable, recommend to continue current regimen.    9. Neuropathy  Stable, recommend to continue current regimen.    10. Takes dietary supplements  Stable, recommend to continue current regimen.    PLAN:                            1. Discuss lab monitoring drawn by home care RN with PCP: BMP, mag, phos     Follow-up: Return in about 1 year (around 6/22/2023) for Follow up, with me, using a phone visit.    SUBJECTIVE/OBJECTIVE:                          Adalgisa Solano is a 86 year old female called for a transitions of care visit. She was discharged from [facility] on June 13, 2022 for acute on chronic respiratory failure. Patient was accompanied by Her .  She had a virtual hospital follow-up appointment with PCP yesterday.      Reason for visit: Hospital follow-up.    Allergies/ADRs: Reviewed in chart  Past Medical History: Reviewed in chart  Tobacco: She reports that she quit smoking about 16 years ago. She has never used smokeless tobacco.  Alcohol: none    Medication Adherence/Access: no issues reported    Acute hypoxic respiratory failure: Unfortunately today she has had acute worsening of her breathing.  She has increased her oxygen concentration to 16 L to maintain an oxygen saturation of 90-92%.  She does not know what happened however she and her  are diligently looking through her equipment to make sure everything is clean and functioning properly.  She continues on her current regimen of albuterol nebs if needed, DuoNebs, tobramycin nebs,  Mucomyst, CPT vest, BiPAP mainly at night and as needed during the day, Trelegy Ellipta, and has recently started a saline nebs.  Due to time she typically only uses her nebs 3 times daily rather than 4 times daily.  She does not feel she is able to get 4 times daily dosing into her schedule.      Home care as below:  [facility]: [phone]   Nurse: giovanni [phone]    Acute on chronic diastolic heart  failure: When admitted to the hospital she was 142 pounds, now after dose increase of Lasix to 80 mg twice daily she is down to 136 pounds.  She continues to monitor her weights daily.  She continues on 1/2 tablet of spironolactone and her dose of potassium daily.  She does not have any discharge labs scheduled.  She has noted to be homebound however she does have home care nursing coming to her house.  At discharge they had recommended checking: BMP, mag, phos, potasium  Potassium   Date Value Ref Range Status   06/13/2022 3.6 3.5 - 5.0 mmol/L Final     Chronic atrial fibrillation: Continues on diltiazem and chronic anticoagulation with Xarelto.  Denies signs or symptoms of excessive bruising or bleeding, hypotension.  They do monitor blood pressures regularly and typically her systolics are in the 120's mmHg.     Type 2 diabetes: She continues on PTA metformin twice daily.  Notes that she does not monitor her blood sugars.  She did have elevated blood sugars during hospital stay while on steroids however she has now since completed her prednisone.  Lab Results   Component Value Date    A1C 6.2 03/21/2022    A1C 6.7 12/21/2021     Cellulitis: She had an outpatient visit on June 16, 2022, diagnosed with left lower leg cellulitis after being identified by one of her home care nurses.  Notes that her leg is a bit less red now that she has been on Keflex, but her legs are little bit red on baseline due to her lymphedema.  She understands that she is to complete her Keflex antibiotic.      Recurrent major depression: Stable with use of Abilify, has found this to be very helpful.  At times that she has a difficult time sleeping when she was try to get used to her BiPAP, and while she was on prednisone.  She is currently not taking melatonin but she was going to try this if needed.  She does plan to still  some different melatonin as she does not like the Gummies that her  had previously purchased.  PHQ  2/8/2022   PHQ-9 Total Score 0   Q9: Thoughts of better off dead/self-harm past 2 weeks Not at all     Hyperlipidemia: Continues on atorvastatin daily, denies signs or symptoms of adverse effects.  LDL Cholesterol Calculated   Date Value Ref Range Status   03/21/2022 63 <=129 mg/dL Final     GERD: Continues on daily omeprazole which adequately controls symptoms.    Neuropathy: Gabapentin at bedtime which has been effective in controlling her neuropathy symptoms.    Supplements: Continues on potassium as prescribed due to loop diuretic, in addition to calcium and vitamin D, B12, and as discussed above melatonin as needed.    Today's Vitals: There were no vitals taken for this visit.  ----------------  Post Discharge Medication Reconciliation Status: discharge medications reconciled, continue medications without change.    I spent 25 minutes with this patient today. All changes were made via collaborative practice agreement with Shai Ellington MD. A copy of the visit note was provided to the patient's provider(s).    The patient was sent via [employer] a summary of these recommendations.     Eliud Lovelace, PharmD, BCACP  Medication Management (MTM) Pharmacist  [facility]    Telemedicine Visit Details  Type of service:  Telephone visit  Start Time: 2:30PM  End Time: 2:55PM  Originating Location (patient location): Home  Distant Location (provider location):  [facility]     Medication Therapy Recommendations  CHF (congestive heart failure) (H)    Current Medication: furosemide (LASIX) 20 MG tablet   Rationale: Medication requires monitoring - Needs additional monitoring   Recommendation: Order Lab   Status: Accepted per Provider

## 2022-06-23 NOTE — TELEPHONE ENCOUNTER
Per office notes and speaking with Dr.Bozivich Alvarez needs Blood sugar, A1C and lipid profile Asking for updated orders for home care to do patient lab work per patient request    Can you call giovanni with verbal orders  [phone]  Ok to leave voicemail

## 2022-06-24 NOTE — PROVIDER NOTIFICATION
06/24/22 1643   Mode: CPAP/ BiPAP/ AVAPS/ AVAPS AE   CPAP/BiPAP/ AVAPS/ AVAPS AE Mode BiPAP S/T   CPAP/BiPAP/Settings   BIPAP/CPAP On Standby On   IPAP/EPAP (cmH2O) 14/8   Rate (breaths/min) 18   Oxygen (%) 70   Timed Inspiration (sec) 0.8   IPAP Rise Time (sec) 1 sec   CPAP/BiPAP Patient Parameters   IPAP (cm H2O) 14 cmH2O   EPAP (cm H2O) 8 cmH2O   Pressure Support (cm H2O) 6 cmH2O   RR Total (breaths/min) 27 breaths/min   Vt (mL) 531 mL   Minute Ventilation (L/min) 16.3 L/min   Peak Inspiratory Pressure (cm H2O) 15 cmH2O   Pt.  Leak (L/min) 2 L/min   CPAP/BiPAP/AVAPS/AVAPS AE Alarms   High Pressure (cm H2O) 35 cmH2O   Low Pressure Delay (sec) 20 sec   Lo Min Vent 2   High Rate (breaths/min) 50 breaths/min   Low Rate (breaths/min) 10   Audible Alarm set at (Volume of Alarm) 7   CPAP/BiPAP/AVAPS/AVAPS AE Patient Assessment   Interface Face Mask - Medium   Patient remains on above BIPAP settings. Arrived with increased WOB and SOB. X2 Duonebs given with no change in breath sounds. BS are diminished with no wheezing. Home medications ordered with plan to do home vest BID when home vest arrives. Patient will remain in ED until ICU room opens up. RT will continue to follow and wean as tolerated.    Vest BID  Medications: Duoneb QID                       NaCl 3% BID                       Mucomyst QID                       CALLUM (filtered neb) BID    Dexter Greer, RT

## 2022-06-24 NOTE — TELEPHONE ENCOUNTER
Additionally per discharge consider adding: BMP, mag, phos     Lipids just checked in March, likely do not need to be drawn.

## 2022-06-24 NOTE — ED PROVIDER NOTES
EMERGENCY DEPARTMENT ENCOUNTER     NAME: Adalgisa Solano   AGE: 86 year old female   YOB: 1935   MRN: [id number]   EVALUATION DATE & TIME: 6/24/2022  2:13 PM   PCP: Shai Ellington     Chief Complaint   Patient presents with     Shortness of Breath   :    FINAL IMPRESSION       1. Acute respiratory failure with hypoxia (H)    2. COPD exacerbation (H)           ED COURSE & MEDICAL DECISION MAKING    2:12 PM I met the patient and performed my initial interview and exam.  2:16 PM RT placed patient on bipap.   2:44 PM I discussed the case with Dr. Juarez, intensivist, who recommends starting Lasix now rather than waiting for creat.   2:48 PM I discussed the case with resident service, who accepts the patient for admission.      Pertinent Labs & Imaging studies reviewed. (See chart for details)   86 year old female  presents to the Emergency Department for evaluation of difficulty breathing. Initial Vitals Reviewed. Initial exam notable for very ill-appearing patient who came in in florid respiratory distress even on EMS CPAP.  She had tachypnea, increased work of breathing, barely able to state one-word with retractions and accessory muscle usage.  On chart review patient does have end-stage COPD and CHF and just had an ICU admission earlier in June with a similar presentation.  I moved her immediately to BiPAP, and fortunately after several minutes on BiPAP her work of breathing and saturations have improved.  I am giving IV Solu-Medrol, several nebs inline through BiPAP therapy.  During her ED stay RT was able to wean her from 100 to 70% FiO2.  Her VBG shows no significant acidosis but an elevated CO2 which is pretty consistent with her previous admission.  I discussed her case with intensivist Dr. Juarez and he is actually her primary pulmonologist as well and recommended adding IV Lasix.  The patient is DNR/DNI and confirmed this with me verbally.  Ultimately were trying everything we can to  help her but this is her second admission in just a couple of weeks in extremis, and I am sure discussions of palliative care are being discussed.  She will need to be admitted to the ICU, and currently there are no beds available.  I discussed the case with the intensivist, admitting resident team, and she will board in the emergency department awaiting admission to the ICU on BiPAP.           At the conclusion of the encounter I discussed the results of all of the tests and the disposition. The questions were answered. The patient or family acknowledged understanding and was agreeable with the care plan.     50 minutes critical care time, see procedure note below for details if relevant    MEDICATIONS GIVEN IN THE EMERGENCY:   Medications   albuterol (PROVENTIL) neb solution 2.5 mg (has no administration in time range)   ipratropium - albuterol 0.5 mg/2.5 mg/3 mL (DUONEB) neb solution 3 mL (3 mLs Nebulization Given 6/24/22 1063)   methylPREDNISolone sodium succinate (solu-MEDROL) injection 125 mg (125 mg Intravenous Given 6/24/22 1440)   furosemide (LASIX) injection 40 mg (40 mg Intravenous Given 6/24/22 1355)      NEW PRESCRIPTIONS STARTED AT TODAY'S ER VISIT   New Prescriptions    No medications on file     ================================================================   HISTORY OF PRESENT ILLNESS       Patient information was obtained from: EMS  Use of Intrepreter: N/A   HPI limited by: respiratory distress    Adalgisa Solano is a 86 year old female with history of COPD, acute and chronic respiratory failure, chronic bronchitis, pseudomonas pneumonia, chronic diastolic HFpEF, PAD, atrial fibrillation, DVT, HTN, HLD, DM type II, chronic pain syndome, who presents for evaluation of shortness of breath.     Per chart review, patient was admitted to [facility] 6/8/2022-6/13/2022 for evaluation of acute hypoxic respiratory failure. Presented to ED with bilateral lower leg edema and coarse  crackles/expiratory wheezes. Required 15 L by high flow nasal cannula. . CXR showed improvement of pseudomonas pneumonia consolidations. CRP initially elevated, then normalized. Pulmonology suspected pseudomonal pneumonia may have returned. Recommend continuing tobramycin 300 mg BID every other month.  Moderate response to IV lasix 40 mg IV BID, although not back to baseline. Stablized on 10 L O2, satting low to mid 90%.  Increased home PO Lasix dose to 80 mg BID. Discharged home for follow up with PCP and pulmonology.     Per EMS, patient presents with progressively worsening shortness of breath over the past week. Today her oxygen saturation was 60% on chronic home 8L [place] oxygen. On her home cpap, saturation ranges 88-89%. EMS administered DuoNeb and placed cpap, bringing saturation to 88%. No cough, fever. Anticoagulated on Eliquis.    ================================================================    REVIEW OF SYSTEMS       Review of Systems   Reason unable to perform ROS: respiratory distress.         PAST HISTORY     PAST MEDICAL HISTORY:   Past Medical History:   Diagnosis Date     Acute and chronic respiratory failure with hypoxia (H)      Acute blood loss anemia 1/15/2019     Acute bronchitis 1/15/2019     Anemia     pernicious anemia     Anticoagulated 3/23/2022     Anxiety      Arm skin lesion, right      Arnold-Chiari malformation (H) 1998     Arthritis      Atrial fibrillation (H) 02/2017     Avascular necrosis of bone (H)      Breast cyst      Breast lump      Candidal skin infection      Cellulitis of left lower extremity 1/28/2019     CHF (congestive heart failure) (H)     diastolic and systolic     Chronic bronchitis (H)     & acute     Chronic diastolic heart failure (H)     diastolic chf     Chronic pain syndrome      Chronic respiratory failure with hypoxia (H) 3/13/2017     COPD (chronic obstructive pulmonary disease) (H)      COPD exacerbation (H)      Depression      Diet-controlled  type 2 diabetes mellitus (H)      Drug induced constipation      DVT of axillary vein, acute (H)     left     DVT of axillary vein, acute left (H)      Edema      Encounter for palliative care      Erysipelas      Fracture of femoral neck, left (H)      Generalized muscle weakness      GERD (gastroesophageal reflux disease)      History of blood clots      Hyperlipidemia      Hypertension      Left hip pain      Lumbar spinal stenosis      PAD (peripheral artery disease) (H)      Peripheral arterial disease (H) 10/28/2015     Psoriasis     arms      Pulmonary hypertension (H) 3/5/2018     Recurrent major depressive episodes (H)     Created by Conversion  Replacement Utility updated for latest IMO load     Schizoaffective disorder, bipolar type (H) 2015     Slow transit constipation      Stasis dermatitis of both legs      Status post total hip replacement, 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     Type 2 diabetes mellitus without complication (H) 2016     Vitamin B12 deficiency      Volume overload       PAST SURGICAL HISTORY:   Past Surgical History:   Procedure Laterality Date     BACK SURGERY       BREAST CYST ASPIRATION Left      CERVICAL SPINE SURGERY      for arnold chiari malformation      SECTION  X3     CHOLECYSTECTOMY       COLONOSCOPY       EXCISE LESION UPPER EXTREMITY Right 2019    Procedure: EXCISION RIGHT ARM LIPOMA;  Surgeon: Andreas Holly MD;  Location: [facility];  Service: General     EYE SURGERY      bilateral cataracts     HAND SURGERY       HERNIORRHAPHY UMBILICAL N/A 2021    Procedure: INCARCERATED UMBILICAL AND;  Surgeon: Andreas Holly MD;  Location: [facility]     JOINT REPLACEMENT Right     knee - partial     LAPAROSCOPIC HERNIORRHAPHY INCISIONAL Left 3/27/2015    Procedure: ATTEMPTED LAPAROSCOPIC ABDOMINA/FLANK INCISION REPAIR;  Surgeon: Jose Lakhani MD;  Location: [facility] OR;  Service:      LUMBAR FUSION N/A 2014    Procedure: POSTERIOR FUSION /  DECOMPRESSION L3-S1 WITH PELVIC FIXATION BILATERAL ;  Surgeon: Rajan Mares MD;  Location: [facility] OR;  Service:      OTHER SURGICAL HISTORY      IR for PAD LOWER EXTREMITY     PICC TRIPLE LUMEN PLACEMENT  5/18/2022          REPAIR SPIGELIAN HERNIA N/A 9/21/2021    Procedure: SPIGELIAN HERNIA REPAIR;  Surgeon: Andreas Holly MD;  Location: [facility] ASPIRATION HEMATOMA SEROMA OR FLUID COLLECTION  2/6/2020     [facility] OPEN FIXATN PROX END/NECK FEMUR FX Left 1/2/2019    Procedure: OPEN REDUCTION INTERNAL FIXATION, FRACTURE LEFT HIP, PERIPROSTHETIC FRACTURE;  Surgeon: Kevin Lackey MD;  Location: [facility] OR;  Service: Orthopedics     [facility] TOTAL HIP ARTHROPLASTY Left 1/2/2019    Procedure: LEFT TOTAL HIP ARTHROPLASTY;  Surgeon: Kevin Lackey MD;  Location: [facility] OR;  Service: Orthopedics     [facility] TOTAL HIP ARTHROPLASTY Right 9/16/2019    Procedure: RIGHT TOTAL HIP ARTHROPLASTY;  Surgeon: Kan Quesada MD;  Location: [facility] OR;  Service: Orthopedics     [facility] TOTAL KNEE ARTHROPLASTY Left 10/7/2016    Procedure: TOTAL KNEE ARTHROPLASTY, LEFT;  Surgeon: Kan Quesada MD;  Location: [facility] OR;  Service: Orthopedics      CURRENT MEDICATIONS:   acetaminophen (TYLENOL) 500 MG tablet  acetylcysteine (MUCOMYST) 20 % neb solution  albuterol (PROAIR HFA/PROVENTIL HFA/VENTOLIN HFA) 108 (90 Base) MCG/ACT inhaler  albuterol (PROVENTIL) (2.5 MG/3ML) 0.083% neb solution  ARIPiprazole (ABILIFY) 5 MG tablet  atorvastatin (LIPITOR) 20 MG tablet  calcium carbonate 600 mg-vitamin D 400 units (CALTRATE) 600-400 MG-UNIT per tablet  cephALEXin (KEFLEX) 500 MG capsule  cyanocobalamin (VITAMIN B-12) 500 MCG tablet  diltiazem ER COATED BEADS (CARDIZEM CD/CARTIA XT) 120 MG 24 hr capsule  docusate sodium (COLACE) 100 MG capsule  Fluticasone-Umeclidin-Vilanterol (TRELEGY ELLIPTA) 100-62.5-25 MCG/INH oral inhaler  furosemide (LASIX) 20 MG tablet  gabapentin (NEURONTIN) 300 MG  capsule  ipratropium - albuterol 0.5 mg/2.5 mg/3 mL (DUONEB) 0.5-2.5 (3) MG/3ML neb solution  melatonin 5 MG tablet  metFORMIN (GLUCOPHAGE) 500 MG tablet  omeprazole (PRILOSEC) 20 MG DR capsule  OXYGEN-HELIUM IN  polyethylene glycol (MIRALAX) 17 GM/Dose powder  potassium chloride ER (KLOR-CON M) 10 MEQ CR tablet  sodium chloride (NEBUSAL) 3 % neb solution  spironolactone (ALDACTONE) 25 MG tablet  tobramycin, PF, (CALLUM) 300 MG/5ML neb solution  Vitamin D3 (CHOLECALCIFEROL) 25 mcg (1000 units) tablet  XARELTO ANTICOAGULANT 20 MG TABS tablet      ALLERGIES:   Allergies   Allergen Reactions     Tramadol Nausea     Tuberculin Tests      Amoxicillin Itching and Rash     Levofloxacin Itching and Rash     Penicillins Itching and Rash     Has tolerated ceftriaxone.      FAMILY HISTORY:   Family History   Problem Relation Age of Onset     Coronary Artery Disease Mother      Coronary Artery Disease Father       SOCIAL HISTORY:   Social History     Socioeconomic History     Marital status:    Tobacco Use     Smoking status: Former Smoker     Quit date: 2006     Years since quittin.4     Smokeless tobacco: Never Used     Tobacco comment: quit 2006   Vaping Use     Vaping Use: Never used   Substance and Sexual Activity     Alcohol use: No     Drug use: No   Social History Narrative            VITALS  Patient Vitals for the past 24 hrs:   BP Temp Temp src Pulse SpO2 Weight   22 1434 126/61 -- -- -- -- --   22 1418 -- -- -- -- 94 % --   22 1417 -- 98.2  F (36.8  C) Temporal 98 (!) 84 % 63.5 kg (140 lb)        ================================================================    PHYSICAL EXAM     VITAL SIGNS: /61   Pulse 98   Temp 98.2  F (36.8  C) (Temporal)   Wt 63.5 kg (140 lb)   SpO2 94%   BMI 26.45 kg/m     Constitutional:  Diaphoretic. Awake, no acute distress   HENT:  Atraumatic, oropharynx without exudate or erythema, membranes moist  Lymph:  No adenopathy  Eyes: EOM intact,  PERRL, no injection  Neck: Supple  Respiratory: On EMS cpap.  In julio respiratory distress. Increased work of breathing with tachypnea. Inspiratory and expiratory wheezing.    Cardiovascular:  Regular rate and rhythm, single S1 and S2   GI:  Soft, nontender, nondistended, no rebound or guarding   Musculoskeletal:  Moves all extremities, no lower extremity edema, no deformities    Skin:  Warm, dry  Neurologic:  Alert and oriented x3, no focal deficits noted       ================================================================  LAB       All pertinent labs reviewed and interpreted.   Labs Ordered and Resulted from Time of ED Arrival to Time of ED Departure   BLOOD GAS VENOUS - Abnormal       Result Value    pH Venous 7.39      pCO2 Venous 62 (*)     pO2 Venous 87 (*)     Bicarbonate Venous 34 (*)     Base Excess/Deficit (+/-) 12.5      Oxyhemoglobin Venous 95.2 (*)     O2 Sat, Venous 97.1 (*)    CBC WITH PLATELETS AND DIFFERENTIAL - Abnormal    WBC Count 13.7 (*)     RBC Count 3.52 (*)     Hemoglobin 10.0 (*)     Hematocrit 32.0 (*)     MCV 91      MCH 28.4      MCHC 31.3 (*)     RDW 15.3 (*)     Platelet Count 261      % Neutrophils 90      % Lymphocytes 3      % Monocytes 6      % Eosinophils 0      % Basophils 0      % Immature Granulocytes 1      NRBCs per 100 WBC 0      Absolute Neutrophils 12.2 (*)     Absolute Lymphocytes 0.4 (*)     Absolute Monocytes 0.9      Absolute Eosinophils 0.0      Absolute Basophils 0.0      Absolute Immature Granulocytes 0.1      Absolute NRBCs 0.0     BASIC METABOLIC PANEL   TROPONIN I   COVID-19 VIRUS (CORONAVIRUS) BY PCR        ===============================================================  RADIOLOGY       Reviewed all pertinent imaging. Please see official radiology report.   XR Chest Port 1 View    (Results Pending)         ================================================================  EKG     Performed at 14:22  Ventricular rate: 114 bpm  QTc: 515  Tachycardic rhythm  motion artifact. No acute ST or T wave changes.     I have independently reviewed and interpreted the EKG(s) documented above.     ================================================================  PROCEDURES     Critical Care  Performed by: Reanna Sterling MD  Authorized by: Reanna Sterling MD  Total critical care time: 50 minutes  Critical care time was exclusive of separately billable procedures and treating other patients.  Critical care was necessary to treat or prevent imminent or life-threatening deterioration of the following conditions: Acute respiratory failure  Critical care was time spent personally by me on the following activities: development of treatment plan with patient or surrogate, discussions with consultants, examination of patient, evaluation of patient's response to treatment, obtaining history from patient or surrogate, ordering and performing treatments and interventions, ordering and review of laboratory studies, ordering and review of radiographic studies and re-evaluation of patient's condition, this excludes any separately billable procedures.          I, Rosa Soto, am serving as a scribe to document services personally performed by Dr. Sterling based on my observation and the provider's statements to me. I, Reanna Sterling MD attest that Rosa Soto is acting in a scribe capacity, has observed my performance of the services and has documented them in accordance with my direction.   Reanna Sterling M.D.   Emergency Medicine   [facility] EMERGENCY ROOM  [address] [place] [address]  [phone]  Dept: [phone]        Reanna Sterling MD  06/24/22 8595

## 2022-06-24 NOTE — ED NOTES
Pt appears to  Be improving at this time, called and talked to daughter on phone while RN in room. Speaking emerson sentences and breathing less labored.

## 2022-06-24 NOTE — PROGRESS NOTES
Brief Pulm/ICU Note    86F with frequent exacerbations here with worsened COPD.  I agree with her cares thus far.    -Steroids  -antibiotics  -Bipap - titrate down oxygen as tolerated  -test dose of diuretic    I would recommend that she get low dose morphine or similar for air hunger.    We will admit to ICU.    I will see Kim formally in the morning.    Discussed with Dr. Sterling.    Jun Juarez MD

## 2022-06-24 NOTE — ED NOTES
Pt resting on cart states feeling better, appears more comfortable, breathing less labored.  at bedside.

## 2022-06-24 NOTE — H&P
[facility]    History and Physical - Hospitalist Service       Date of Admission:  6/24/2022    Assessment & Plan      Adalgisa Solano is a 86 year old female with a history of chronic respiratory failure, emphysema, COPD, bronchiectasis, pseudomonal LLL pneumonia, and multiple recent admissions for COPD exacerbations who was recently hopsitalized 6/8/2022-6/13/2022. She is admitted for acute hypoxic respiratory failure 2/2 severe COPD exacerabtion.  She is DNI with end-stage chronic respiratory failure but has recently declined hospice.     Acute on chronic hypoxic respiratory failure   COPD exacerbation  Patient with recent hospitalization for COPD exacerbation 6/08-6/13. Developed worsening shortness of breath over the last week, hypoxia, Spo2 60% today. BIPAP use at night and up to 16L of oxygen during the day. Breathing better on hospital BIPAP. Goals of care discussed with patient and spouse. Would like chest compressions but no intubation.     -admit to ICU  -NPO for now until breathing stabilizes  -RT consulted  -IV methylprednisolone 40 mg Q6H  -DuoNebs Q 1hr x 3, then Q2H PRN  -PTA Mucomyst  -BiPAP  -PTA treligy   -Consult hospice; appreciate recommendations  -Assessment palliative last admission, see note 5/17  -Consult pulmonology; appreciate recommendations and cares  -Sputum culture ordered  -cefepime 2g Q8hrs   -azithromycin 500 mg daily x 5 days   -tobramycin 150 mg nebulized BID     Leukocytosis  Concern for CAP   Recent pseudomonal PNA  Pseudomonal  resistant strain cultured last hospitalization.  Was discharged on PTA tobramycin nebulized treatment every other month, per pulmonology. Chest xray in ED shows Increased consolidation in the right upper lobe laterally along the fissure consistent with a continued bronchopneumonia, underlying interstitial opacities and marked cardiomegaly unchanged. Significant emphysema better appreciated on the CT.   -antibiotics as  above; consider adding vancomycin if no improvement   -Sputum culture ordered  -AM CBC     CHF exacerbation  Hyponatremia   Fluid overloaded due to CHF.,   Takes home lasix 80 mg twice daily.Last echo 4/15/2022 showed EF 65%. received 40 mg Lasix IV once in the ED.   -Start lasix 80 mg IV BID, or consider 40 mg IV TID if no improvement  -Monitor Na, K, Cr  -Keep K>4, Mg>2  -Holding spironolactone for now given hyperkalemia  -Consider cardiology consult for assistance with optimizing guideline directed therapy    Hyperkalemia  Likely medication related.   -hold PTA spironolactone   -A.M BMP     Chronic medical condition:    T2DM  At risk for steroid-induced hyperglycemia  -4 times daily glucose checks   -Medium resistance sliding scale for correction  -Monitor for hypoglycemia    Bilateral lymphedema R>L  -Lymphedema wraps    A. fib  -Continue Xarelto and diltiazem    Chronic pain syndrome-chronic mid to low back pain  -Continue Tylenol and gabapentin    GERD  -PTA omeprazole     Anxiety  -Continue Abilify  -PTA Melatonin 3 mg scheduled at bedtime  -may consider PRN 0.25 Ativan Q4H      Diet:   NPO  DVT Prophylaxis: DOAC  Borjas Catheter: Not present  Fluids:   Central Lines: None  Cardiac Monitoring: None  Code Status:  CPR okay, DNI  POA:  Anabel (Less), and daughter Ratna Mcallister       Disposition Plan   Expected discharge date: TBD      The patient's care was discussed with the Attending Physician, Dr. Pitt.    Zeb Mercado DO  Hospitalist Service  [facility]  Securely message with the Vocera Web Console (learn more here)  Text page via [employer] Paging/Directory       __________________________________________________________________    Chief Complaint   Shortness of breath      History is obtained from the patient    History of Present Illness   Adalgisa Solano is a 86 year old female who presents with shortness of breath    Patient was just admitted to the ICU for  "acute hypoxic respiratory failure 6/8-6/13. She was discharged home on increased dose of lasix (80mg BID).    She used Bipap at home when she sleeps. When she came off of Bipap at home this morning, her oxygen decreased to 60%. She and her  called a healthcare line and was told to go into the ED. She normally uses 8L of O2 at home, but needed up to 16L today (using two oxygen concentrators). She reports her shortness of breath has worsened during the past week.  Today, she reports worsened swelling in her legs, R>L which is always the case. she has pre-existing lymphedema.Uses lymphedema wrap at home.  States that her  helps her with them however he may not be \"doing them right\" Patient reports she was 136lb yesterday, and 138lb today. Her BP had been in her normal range at home, too.     She denies weakness, falls.  reports no confusion - \"she is very sharp.\"    Patient denies chest pain.     Review of Systems    The 10 point Review of Systems is negative other than noted in the HPI.     Past Medical History    I have reviewed this patient's medical history and updated it with pertinent information if needed.   Past Medical History:   Diagnosis Date     Acute and chronic respiratory failure with hypoxia (H)      Acute blood loss anemia 1/15/2019     Acute bronchitis 1/15/2019     Anemia     pernicious anemia     Anticoagulated 3/23/2022     Anxiety      Arm skin lesion, right      Arnold-Chiari malformation (H) 1998     Arthritis      Atrial fibrillation (H) 02/2017     Avascular necrosis of bone (H)      Breast cyst      Breast lump      Candidal skin infection      Cellulitis of left lower extremity 1/28/2019     CHF (congestive heart failure) (H)     diastolic and systolic     Chronic bronchitis (H)     & acute     Chronic diastolic heart failure (H)     diastolic chf     Chronic pain syndrome      Chronic respiratory failure with hypoxia (H) 3/13/2017     COPD (chronic obstructive pulmonary " disease) (H)      COPD exacerbation (H)      Depression      Diet-controlled type 2 diabetes mellitus (H)      Drug induced constipation      DVT of axillary vein, acute (H)     left     DVT of axillary vein, acute left (H)      Edema      Encounter for palliative care      Erysipelas      Fracture of femoral neck, left (H)      Generalized muscle weakness      GERD (gastroesophageal reflux disease)      History of blood clots      Hyperlipidemia      Hypertension      Left hip pain      Lumbar spinal stenosis      PAD (peripheral artery disease) (H)      Peripheral arterial disease (H) 10/28/2015     Psoriasis     arms      Pulmonary hypertension (H) 3/5/2018     Recurrent major depressive episodes (H)     Created by Conversion  Replacement Utility updated for latest IMO load     Schizoaffective disorder, bipolar type (H) 2015     Slow transit constipation      Stasis dermatitis of both legs      Status post total hip replacement, 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  Past Surgical History:   Procedure Laterality Date     BACK SURGERY       BREAST CYST ASPIRATION Left      CERVICAL SPINE SURGERY      for arnold chiari malformation      SECTION  X3     CHOLECYSTECTOMY       COLONOSCOPY       EXCISE LESION UPPER EXTREMITY Right 2019    Procedure: EXCISION RIGHT ARM LIPOMA;  Surgeon: Andreas Holly MD;  Location: [facility];  Service: General     EYE SURGERY      bilateral cataracts     HAND SURGERY       HERNIORRHAPHY UMBILICAL N/A 2021    Procedure: INCARCERATED UMBILICAL AND;  Surgeon: Andreas Holly MD;  Location: [facility] [place]     JOINT REPLACEMENT Right     knee - partial     LAPAROSCOPIC HERNIORRHAPHY INCISIONAL Left 3/27/2015    Procedure: ATTEMPTED  LAPAROSCOPIC ABDOMINA/FLANK INCISION REPAIR;  Surgeon: Jose Lakhani MD;  Location: [facility];  Service:      LUMBAR FUSION N/A 11/17/2014    Procedure: POSTERIOR FUSION / DECOMPRESSION L3-S1 WITH PELVIC FIXATION BILATERAL ;  Surgeon: Rajan Mares MD;  Location: [facility] [place];  Service:      OTHER SURGICAL HISTORY      IR for PAD LOWER EXTREMITY     PICC TRIPLE LUMEN PLACEMENT  5/18/2022          REPAIR SPIGELIAN HERNIA N/A 9/21/2021    Procedure: SPIGELIAN HERNIA REPAIR;  Surgeon: Andreas Holly MD;  Location: [facility] ASPIRATION HEMATOMA SEROMA OR FLUID COLLECTION  2/6/2020     [facility] OPEN FIXATN PROX END/NECK FEMUR FX Left 1/2/2019    Procedure: OPEN REDUCTION INTERNAL FIXATION, FRACTURE LEFT HIP, PERIPROSTHETIC FRACTURE;  Surgeon: Kevin Lackey MD;  Location: [facility];  Service: Orthopedics     [facility] TOTAL HIP ARTHROPLASTY Left 1/2/2019    Procedure: LEFT TOTAL HIP ARTHROPLASTY;  Surgeon: Kevin Lackey MD;  Location: [facility] OR;  Service: Orthopedics     [facility] TOTAL HIP ARTHROPLASTY Right 9/16/2019    Procedure: RIGHT TOTAL HIP ARTHROPLASTY;  Surgeon: Kan Quesada MD;  Location: [facility];  Service: Orthopedics     [facility] TOTAL KNEE ARTHROPLASTY Left 10/7/2016    Procedure: TOTAL KNEE ARTHROPLASTY, LEFT;  Surgeon: Kan Quesada MD;  Location: [facility];  Service: Orthopedics      Social History   I have reviewed this patient's social history and updated it with pertinent information if needed. Adalgisa Solano  reports that she quit smoking about 16 years ago. She has never used smokeless tobacco. She reports that she does not drink alcohol and does not use drugs.    Family History   I have reviewed this patient's family history and updated it with pertinent information if needed.  Family History   Problem Relation Age of Onset     Coronary Artery Disease Mother      Coronary Artery Disease Father      Prior to Admission Medications    Prior to Admission Medications   Prescriptions Last Dose Informant Patient Reported? Taking?   ARIPiprazole (ABILIFY) 5 MG tablet   No No   Sig: TAKE 1 TABLET AT BEDTIME   Fluticasone-Umeclidin-Vilanterol (TRELEGY ELLIPTA) 100-62.5-25 MCG/INH oral inhaler   Yes No   Sig: Inhale 1 puff into the lungs daily   OXYGEN-HELIUM IN   Yes No   Si-16 L continuous   Vitamin D3 (CHOLECALCIFEROL) 25 mcg (1000 units) tablet   Yes No   Sig: Take 1 capsule by mouth every morning    XARELTO ANTICOAGULANT 20 MG TABS tablet   No No   Sig: TAKE 1 TABLET DAILY WITH DINNER   acetaminophen (TYLENOL) 500 MG tablet   Yes No   Sig: Take 500-1,000 mg by mouth every 6 hours as needed for mild pain   acetylcysteine (MUCOMYST) 20 % neb solution   No No   Sig: Take 4 mLs by nebulization 4 times daily   albuterol (PROAIR HFA/PROVENTIL HFA/VENTOLIN HFA) 108 (90 Base) MCG/ACT inhaler   Yes No   Sig: Inhale 2 puffs into the lungs every 4 hours as needed for shortness of breath / dyspnea or wheezing   albuterol (PROVENTIL) (2.5 MG/3ML) 0.083% neb solution   Yes No   Sig: Take 2.5 mg by nebulization every 2 hours as needed for shortness of breath / dyspnea or wheezing   atorvastatin (LIPITOR) 20 MG tablet   No No   Sig: TAKE 1 TABLET AT BEDTIME   calcium carbonate 600 mg-vitamin D 400 units (CALTRATE) 600-400 MG-UNIT per tablet   Yes No   Sig: Take 1 tablet by mouth 2 times daily   cephALEXin (KEFLEX) 500 MG capsule   No No   Sig: Take 1 capsule (500 mg) by mouth 3 times daily for 10 days   cyanocobalamin (VITAMIN B-12) 500 MCG tablet   Yes No   Sig: Take 500 mcg by mouth every morning    diltiazem ER COATED BEADS (CARDIZEM CD/CARTIA XT) 120 MG 24 hr capsule   Yes No   Sig: Take 240 mg by mouth daily   docusate sodium (COLACE) 100 MG capsule   Yes No   Sig: Take 100 mg by mouth daily as needed   furosemide (LASIX) 20 MG tablet   No No   Sig: Take 4 tablets (80 mg) by mouth 2 times daily for 30 days   gabapentin (NEURONTIN) 300 MG capsule   Yes No    Sig: Take 600 mg by mouth At Bedtime   ipratropium - albuterol 0.5 mg/2.5 mg/3 mL (DUONEB) 0.5-2.5 (3) MG/3ML neb solution   No No   Sig: Take 1 vial (3 mLs) by nebulization 4 times daily Once breathing is better, then change to every 6 hours as needed for shortness of breath   melatonin 5 MG tablet   Yes No   Sig: Take 5 mg by mouth nightly as needed   metFORMIN (GLUCOPHAGE) 500 MG tablet   No No   Sig: Take 1 tablet (500 mg) by mouth 2 times daily (with meals)   omeprazole (PRILOSEC) 20 MG DR capsule   No No   Sig: TAKE 1 CAPSULE DAILY   Patient taking differently: Take 20 mg by mouth daily   polyethylene glycol (MIRALAX) 17 GM/Dose powder   Yes No   Sig: Take 17 g by mouth daily   potassium chloride ER (KLOR-CON M) 10 MEQ CR tablet   No No   Sig: Take 1 tablet (10 mEq) by mouth daily   sodium chloride (NEBUSAL) 3 % neb solution   No No   Sig: Take 4 mLs by nebulization 2 times daily   spironolactone (ALDACTONE) 25 MG tablet   No No   Sig: TAKE ONE-HALF (1/2) TABLET DAILY   tobramycin, PF, (CALLUM) 300 MG/5ML neb solution   No No   Sig: Nebulize 5 mL twice daily. Take the medication the months of June, August, October, December, February, and April.      Facility-Administered Medications: None     Allergies   Allergies   Allergen Reactions     Tramadol Nausea     Tuberculin Tests      Amoxicillin Itching and Rash     Levofloxacin Itching and Rash     Penicillins Itching and Rash     Has tolerated ceftriaxone.       Physical Exam   Vital Signs: Temp: 98.2  F (36.8  C) Temp src: Temporal BP: 126/61 Pulse: 98     SpO2: 94 % O2 Device: BiPAP/CPAP    Weight: 140 lbs 0 oz  Constitutional: awake, alert, cooperative, no apparent distress, and appears stated age  ENT: BIPAP in place  Eyes: Lids and lashes normal, pupils equal, round and reactive to light, extra ocular muscles intact, sclera clear, conjunctiva normal  Respiratory: on BiPAP, rations unlabored, coarse breath sounds anteriorly bilaterally  Cardiovascular:  Afib, regular rate,   GI: No scars, normal bowel sounds, soft, non-distended, non-tender, no masses palpated, no hepatosplenomegally  Skin: no bruising or bleeding  Neurologic: Awake, alert, oriented to name, place and time.  Cranial nerves II-XII are grossly intact.  Motor is 5 out of 5 bilaterally.  Neuropsychiatric: General: normal, calm and normal eye contact  Level of consciousness: alert / normal  Orientation: oriented to self, place, time and situation    Data   Data reviewed today: I reviewed all medications, new labs and imaging results over the last 24 hours. I personally reviewed the chest x-ray image(s) showing worsening RUL consolidation .    Recent Labs   Lab 06/24/22  1449   WBC 13.7*   HGB 10.0*   MCV 91      *   POTASSIUM 5.5*   CHLORIDE 86*   CO2 33*   BUN 23   CR 0.71   ANIONGAP 12   DENISE 8.8   *

## 2022-06-24 NOTE — ED NOTES
Pt arrived by EMS, has been having respiratory problems all week and today was 60% on 8L of O2 at home. Pt also using CPAP at home and was 88-89% with this. PT was given 1 duo neb en route. Pt arrive don EMS CPAP, and sats 84% with this and breathing very labored and appears to be getting tired. RT at bedside and MD, pt placed on Bipap and seems to be improving sats 94% and work of breathing appears to be less. Pt able to nod head to give answer and give one word answers to questions on arrival.m RT remains at bedside, SWAT RN at bedside for IV.

## 2022-06-24 NOTE — CODE STATUS AND ADVANCE DIRECTIVES
Discussed code status with patient and her .     Patient has been DNR-DNI during last two admissions this past June. She verified with ED provider that she wanted to remain DNR-DNI. However, when discussing code status with admitting resident physicians, she stated multiple times that she wants chest compressions, but no intubations.     We informed her of the adverse outcomes chest compressions would bring such as broken ribs. She stated she understood this would likely happen and she is okay with that.     We discussed that we would likely only be able to do a couple of rounds of chest compressions in event of cardiac arrest before we would likely need to escalate to intubation for proper oxygenation and chance of ROSC, but in her case, we would not be able to do that and would likely not run a code blue on her as long as we would on someone who would be intubated.     She understands with her severity of COPD she likely would never be able to get off of a ventilator.     After discussion with patient on code status, she would like to remain CPR okay - DNI.     We also discussed she is nearing the end of her life and discussed potentially moving to hospice care this admission. She and her  have been having this discussion and agreed to consult hospice at this time. Patient and her  were aware she would need to change her code status to DNR-DNI if she moves forward with hospice. She was understanding of this.     All questions were answered.     Benita Behm, MD PGY2  [facility]  06/24/22

## 2022-06-24 NOTE — PATIENT INSTRUCTIONS
PLAN:                            1. Discuss lab monitoring drawn by home care RN with PCP: BMP, mag, phos     Follow-up: Return in about 1 year (around 6/22/2023) for Follow up, with me, using a phone visit.

## 2022-06-24 NOTE — ADDENDUM NOTE
Addended by: CARLOS MANUEL SZYMANSKI on: 6/24/2022 01:48 PM     Modules accepted: Orders     oriented to person, place, time and situation

## 2022-06-24 NOTE — PHARMACY-ADMISSION MEDICATION HISTORY
Pharmacy Note - Admission Medication History    Pertinent Provider Information:   - The patient was not able to talk much. She did state that the only change since she was list discharged on 6/13/22 was the initiation of cephalexin. She took all her medications that she was supposed to this morning (including nebs).  - I verified with the patient that she takes 2 caps of diltiazem every day for a total dose of 240 mg.     ______________________________________________________________________    Prior To Admission (PTA) med list completed and updated in EMR.       PTA Med List   Medication Sig Last Dose     acetaminophen (TYLENOL) 500 MG tablet Take 500-1,000 mg by mouth every 6 hours as needed for mild pain prn     acetylcysteine (MUCOMYST) 20 % neb solution Take 4 mLs by nebulization 4 times daily 6/24/2022 at Unknown time     albuterol (PROAIR HFA/PROVENTIL HFA/VENTOLIN HFA) 108 (90 Base) MCG/ACT inhaler Inhale 2 puffs into the lungs every 4 hours as needed for shortness of breath / dyspnea or wheezing prn at not with     albuterol (PROVENTIL) (2.5 MG/3ML) 0.083% neb solution Take 2.5 mg by nebulization every 2 hours as needed for shortness of breath / dyspnea or wheezing prn     ARIPiprazole (ABILIFY) 5 MG tablet TAKE 1 TABLET AT BEDTIME 6/23/2022 at Unknown time     atorvastatin (LIPITOR) 20 MG tablet TAKE 1 TABLET AT BEDTIME 6/23/2022 at Unknown time     calcium carbonate 600 mg-vitamin D 400 units (CALTRATE) 600-400 MG-UNIT per tablet Take 1 tablet by mouth 2 times daily 6/24/2022 at x1     cephALEXin (KEFLEX) 500 MG capsule Take 1 capsule (500 mg) by mouth 3 times daily for 10 days 6/24/2022 at Unknown time     cyanocobalamin (VITAMIN B-12) 500 MCG tablet Take 500 mcg by mouth every morning  6/24/2022 at Unknown time     diltiazem ER COATED BEADS (CARDIZEM CD/CARTIA XT) 120 MG 24 hr capsule Take 240 mg by mouth daily 6/24/2022 at Unknown time     docusate sodium (COLACE) 100 MG capsule Take 100 mg by mouth  daily as needed prn     Fluticasone-Umeclidin-Vilanterol (TRELEGY ELLIPTA) 100-62.5-25 MCG/INH oral inhaler Inhale 1 puff into the lungs daily 6/24/2022 at not with     furosemide (LASIX) 20 MG tablet Take 4 tablets (80 mg) by mouth 2 times daily for 30 days 6/24/2022 at x1     gabapentin (NEURONTIN) 300 MG capsule Take 600 mg by mouth At Bedtime 6/23/2022 at Unknown time     ipratropium - albuterol 0.5 mg/2.5 mg/3 mL (DUONEB) 0.5-2.5 (3) MG/3ML neb solution Take 1 vial (3 mLs) by nebulization 4 times daily Once breathing is better, then change to every 6 hours as needed for shortness of breath 6/24/2022 at Unknown time     melatonin 5 MG tablet Take 5 mg by mouth nightly as needed prn     metFORMIN (GLUCOPHAGE) 500 MG tablet Take 1 tablet (500 mg) by mouth 2 times daily (with meals) 6/24/2022 at x1     omeprazole (PRILOSEC) 20 MG DR capsule TAKE 1 CAPSULE DAILY 6/24/2022 at Unknown time     polyethylene glycol (MIRALAX) 17 GM/Dose powder Take 17 g by mouth daily 6/24/2022 at Unknown time     potassium chloride ER (KLOR-CON M) 10 MEQ CR tablet Take 1 tablet (10 mEq) by mouth daily 6/24/2022 at Unknown time     sodium chloride (NEBUSAL) 3 % neb solution Take 4 mLs by nebulization 2 times daily 6/24/2022 at x1     spironolactone (ALDACTONE) 25 MG tablet TAKE ONE-HALF (1/2) TABLET DAILY 6/24/2022 at Unknown time     tobramycin, PF, (CALLUM) 300 MG/5ML neb solution Nebulize 5 mL twice daily. Take the medication the months of June, August, October, December, February, and April. 6/24/2022 at x1     Vitamin D3 (CHOLECALCIFEROL) 25 mcg (1000 units) tablet Take 1 capsule by mouth every morning  6/24/2022 at Unknown time     XARELTO ANTICOAGULANT 20 MG TABS tablet TAKE 1 TABLET DAILY WITH DINNER 6/23/2022 at Unknown time       Information source(s): Patient and CareEverywhere/SureScripts  Method of interview communication: in-person    Summary of Changes to PTA Med List  New: none  Discontinued: none  Changed: none    Patient  was asked about OTC/herbal products specifically.  PTA med list reflects this.    In the past week, patient estimated taking medication this percent of the time:  greater than 90%.    Patient did not bring any medications to the hospital and can't retrieve from home. No multi-dose medications are available for use during hospital stay.     The information provided in this note is only as accurate as the sources available at the time of the update(s).    Thank you for the opportunity to participate in the care of this patient.    Casandra Everett RPH  6/24/2022 4:18 PM

## 2022-06-25 NOTE — PROGRESS NOTES
06/25/22 0424   Tech Time   $Tech Time (10 minute increments) 3   Mode: CPAP/ BiPAP/ AVAPS/ AVAPS AE   CPAP/BiPAP/ AVAPS/ AVAPS AE Mode BiPAP S/T   CPAP/BiPAP/Settings   BIPAP/CPAP On Standby On   IPAP/EPAP (cmH2O) 14/8   Rate (breaths/min) 18   Oxygen (%) 50   Timed Inspiration (sec) 0.8   IPAP RISE  Settings (V60) 2   CPAP/BiPAP Patient Parameters   IPAP (cm H2O) 14 cmH2O   EPAP (cm H2O) 8 cmH2O   RR Total (breaths/min) 29 breaths/min   Vt (mL) 531 mL   Minute Ventilation (L/min) 15.6 L/min   Peak Inspiratory Pressure (cm H2O) 15 cmH2O   Pt.  Leak (L/min) 10 L/min   CPAP/BiPAP/AVAPS/AVAPS AE Alarms   High Pressure (cm H2O) 35 cmH2O   Low Pressure (cm H2O) 3   Low Pressure Delay (sec) 20 sec   Lo Min Vent 2   High Rate (breaths/min) 50 breaths/min   Low Rate (breaths/min) 10   Audible Alarm set at (Volume of Alarm) 7   CPAP/BiPAP/AVAPS/AVAPS AE Patient Assessment   Interface Face Mask - Medium   Humidifier Checked N/A   RT Device Skin Assessment   Oxygen Delivery Device CPAP/BiPAP Mask   Ventilator Arm In Place Yes   Site Appearance neck circumference Clean and dry   Site Appearance nares (outer) Clean and dry   Site Appearance nares (inner) Clean and dry   Site Appearance bridge of nose Clean and dry   Site Appearance of ears Clean and dry   Site Appearance occiput Clean and dry   Strap Tightness Finger Allowance between head and device strap   Device Skin Interventions Taken Skin barrier applied     Patient on bipap overnight tolerated well.  Will continue to follow.

## 2022-06-25 NOTE — PROGRESS NOTES
[facility]    Progress Note - Hospitalist Service       Date of Admission:  6/24/2022    Assessment & Plan     Adalgisa Solano is a 86 year old female with a history of chronic respiratory failure, emphysema, COPD, bronchiectasis, pseudomonal LLL pneumonia, and multiple recent admissions for COPD exacerbations who was recently hospitalized 6/8/2022-6/13/2022. She is admitted for acute hypoxic respiratory failure 2/2 severe COPD exacerabtion.  She is DNI with end-stage chronic respiratory failure.       Acute on chronic hypoxic respiratory failure   COPD exacerbation  Patient with recent hospitalization for COPD exacerbation 6/08-6/13. Developed worsening shortness of breath over the last week, hypoxia, Spo2 60% today. BIPAP use at night and up to 16L of oxygen during the day. Breathing better on hospital BIPAP. Goals of care discussed with patient and spouse. Would like chest compressions but no intubation.   Ryleeley 8 on FiO2 70%, per RN down to 40%.  Lung exam notable for improved, although persistent scattered coarse breath sounds  -admit to ICU  -RT consulted  -IV methylprednisolone 40 mg Q6H switched to prednisone per pulm  -DuoNebs Q 1hr x 3, then Q2H PRN  -PTA Mucomyst  -BiPAP  -PTA treligy   -Consult hospice; appreciate recommendations  -Assessment palliative last admission, see note 5/17  -Consult pulmonology; appreciate recommendations and cares   -Neuro: Treat for air hunger and anxiety.    -Cardiovascular: Continue home rate control medications    -Pulmonary: Continue with BiPAP but proactively aim for high flow as this is much better tolerated by  the patient.  I have titrated her prednisone down to oral.  Continue home nebulizer regimen.    -Continue treating for healthcare associated pneumonia.  -Sputum culture ordered  -cefepime 2g Q8hrs, consider de-escalating after hospice evaluation  -azithromycin 500 mg daily x 5 days   -tobramycin 150 mg nebulized BID       Leukocytosis, resolved  HAP?  Recent pseudomonal PNA  Pseudomonal  resistant strain cultured last hospitalization.  Was discharged on PTA tobramycin nebulized treatment every other month, per pulmonology. Chest xray in ED shows Increased consolidation in the right upper lobe laterally along the fissure consistent with a continued bronchopneumonia, underlying interstitial opacities and marked cardiomegaly unchanged. Significant emphysema better appreciated on the CT. White blood cell count 7.3 this morning.  Suspect initial leukocytosis secondary to recent steroid use.  Less concern for community-acquired pneumonia at this time. Possible HAP given frequent hospitalization over the last few weeks  -antibiotics as above;  would de-escalate antibiotics given symptoms likely related to COPD exacerbation and less likely pneumonia   -Sputum culture ordered  -AM CBC      CHF exacerbation  Hyponatremia   Fluid overloaded due to CHF.  on admission, takes home lasix 80 mg twice daily. Last echo 4/15/2022 showed EF 65%. received 40 mg Lasix IV once in the ED.   -continue lasix 80 mg IV BID  -Monitor Na, K, Cr  -Keep K>4, Mg>2  -Holding spironolactone for now given hyperkalemia  -Consider cardiology consult for assistance with optimizing guideline directed therapy    Hyperkalemia  Likely medication related   -hold PTA spironolactone   -A.M BMP    Urinary retention  -landaverde ordered     Chronic medical condition:     T2DM  At risk for steroid-induced hyperglycemia, however patient likely hospice   - discontinue 4 times daily glucose checks   -Discontinue medium resistance sliding scale for correction  -Monitor for hypoglycemia     Bilateral lymphedema R>L  -Lymphedema wraps     A. fib  -Continue Xarelto and diltiazem     Chronic pain syndrome-chronic mid to low back pain  -Continue Tylenol and gabapentin     GERD  -PTA omeprazole      Anxiety  -Continue Abilify  -PTA Melatonin 3 mg scheduled at bedtime  -may consider  PRN 0.25 Ativan Q4H         Diet:   NPO  DVT Prophylaxis: DOAC  Borjas Catheter: Not present  Fluids:   Central Lines: None  Cardiac Monitoring: None  Code Status:  CPR okay, DNI  POA:  Anabel (Adrian), and daughter Ratna Mcallister     Diet: Regular Diet Adult  Room Service    DVT Prophylaxis: DOAC  Borjas Catheter: Not present  Fluids: NPO exxcept for meds  Central Lines: None  Cardiac Monitoring: ACTIVE order. Indication: ICU  Code Status:   DNI    Disposition Plan      Expected Discharge Date: 06/27/2022                The patient's care was discussed with the Attending Physician, Dr. Tsang.    Zeb Mercado DO  Hospitalist Service  [facility]  Securely message with the Vocera Web Console (learn more here)  Text page via [employer] Paging/Directory     ______________________________________________________________________    Interval History   No acute events overnight.  Patient on BiPAP.  No longer using accessory muscles for breathing.    States she feels a little hungry, thirsty.  She reports she was unable to void.     Hopes that her  will get here by the time that hospice team meets with them.    She currently denies any pain, including chest pain, abdominal pain, lower extremity weakness.  He states she feels a lot stronger than yesterday.    Data reviewed today: I reviewed all medications, new labs and imaging results over the last 24 hours. I personally reviewed no images or EKG's today.    Physical Exam   Vital Signs: Temp: 97.5  F (36.4  C) Temp src: Axillary BP: 116/61 Pulse: 99   Resp: 28 SpO2: 91 % O2 Device: High Flow Nasal Cannula (HFNC) Oxygen Delivery: 60 LPM  Weight: 138 lbs 8 oz  Constitutional: awake, alert, cooperative, no apparent distress, and appears stated age  ENT: BIPAP in place, no evidence of accessory muscle use  Eyes: Lids and lashes normal, pupils equal, round and reactive to light, extra ocular muscles intact, sclera clear, conjunctiva  normal  Respiratory: on BiPAP, rations unlabored, coarse breath sounds improved  Cardiovascular: Afib, regular rate,   GI: No scars, normal bowel sounds, soft, non-distended, non-tender, no masses palpated, no hepatosplenomegally  Skin: no bruising or bleeding  Neurologic: Awake, alert, oriented to name, place and time.  Cranial nerves II-XII are grossly intact.  Motor is 5 out of 5 bilaterally.  Neuropsychiatric: General: normal, calm and normal eye contact  Level of consciousness: alert / normal  Orientation: oriented to self, place, time and situation  Extremities: Bilateral lower extremity edema improved    Data   Recent Labs   Lab 06/25/22  0805 06/25/22  0610 06/25/22  0558 06/24/22  2214 06/24/22  1854 06/24/22  1841 06/24/22  1449   WBC  --  7.3  --   --   --   --  13.7*   HGB  --  10.4*  --   --   --   --  10.0*   MCV  --  95  --   --   --   --  91   PLT  --  233  --   --   --   --  261   NA  --  132*  --   --  133*  --  131*   POTASSIUM  --  5.5*  --   --  5.0  --  5.5*   CHLORIDE  --  91*  --   --  88*  --  86*   CO2  --  30  --   --  36*  --  33*   BUN  --  24  --   --  23  --  23   CR  --  0.65  --   --  0.65  --  0.71   ANIONGAP  --  11  --   --  9  --  12   DENISE  --  8.6  --   --  8.6  --  8.8   * 204* 203*   < > 212*   < > 278*    < > = values in this interval not displayed.      No

## 2022-06-25 NOTE — CONSULTS
Hospice referral received today 6/25.  Community RN liaison will f/u on Monday 6/27 to coordinate with pt and hospital care team re discharge plan w hospice services.  Thank you for this referral.

## 2022-06-25 NOTE — PLAN OF CARE
Pt a/ox4. On arrival to unit, pt on Bipap 70% Fio2. Pt weaned down to 50% fio2 overnight, tolerating. OVSS. Tele: afib with CVR. C/o headache pain overnight- gave prn tylenol x1. LS coarse throughout. Pt on scheduled lasix. Purwick in place. Bladder scanning q4hrs, straight cath x2 on shift. Currently NPO except meds/ice chips.     Problem: Plan of Care - These are the overarching goals to be used throughout the patient stay.    Goal: Optimal Comfort and Wellbeing  Intervention: Monitor Pain and Promote Comfort  Recent Flowsheet Documentation  Taken 6/25/2022 0129 by Alexus Malcolm, RN  Pain Management Interventions: (prn tylenol) medication (see MAR)     Problem: Fluid Imbalance (Heart Failure)  Goal: Fluid Balance  Outcome: Ongoing, Progressing     Problem: Gas Exchange Impaired  Goal: Optimal Gas Exchange  Outcome: Ongoing, Progressing

## 2022-06-25 NOTE — PROVIDER NOTIFICATION
Notified MD at 22:50 PM regarding lab results.      Spoke with: Dr. Brooks    Orders were obtained.    Comments: Magnesium 1.5, currently not on protocol.

## 2022-06-25 NOTE — CONSULTS
Intensive care consultation    Assessment and plan: 86-year-old woman with advanced COPD here with acute on chronic hypoxic respiratory failure due to COPD exacerbation.    Neuro: Treat for air hunger and anxiety.    Cardiovascular: Continue home rate control medications    Pulmonary: Continue with BiPAP but proactively aim for high flow as this is much better tolerated by the patient.  I have titrated her prednisone down to oral.  Continue home nebulizer regimen.    Renal: Presently within reasonable range.  Aim for fluid even.  Avoid nephrotoxic meds.    Elevated potassium.  Continue diuresis.    Hematology: Continue home anticoagulation    ID: Continue treating for healthcare associated pneumonia.    Endo: Monitor blood sugars    GI: Advance diet.    Goals of care.  Discussed in detail with the patient and her family.  I recommend that she do hospice.  She prefers to go home with hospice.  If we can get her down to 10 to 12 L/min at rest with a saturation of 85% or greater but that is fine.    Her CODE STATUS is not congruent with her stated goals of care.  I discussed this with Kim and her daughter.  Kim is hesitant to return her CODE STATUS to DNR/DNI without discussing with her  first.  In general, I recommend against offering CPR if the patient is DNI.     This patient is critically ill at high risk of decompensation and death.  Advancements with noninvasive ventilation and oxygenation are being used to prolong her life.  40 minutes of critical care time thus far today.    Plan of care discussed at the bedside with primary team attending, nursing, RT.    HPI: 86-year-old woman with unfortunate last 3 months with multiple exacerbations and hospitalizations now here after doing very poorly at home.  Requiring maximal support from multiple family members on high levels of oxygen.  Fortunately she is feeling a little bit better since coming into the hospital but still very weak and limited    Medications  "and history reviewed    On exam  /61   Pulse 99   Temp 97.5  F (36.4  C) (Axillary)   Resp 28   Ht 1.549 m (5' 1\")   Wt 62.8 kg (138 lb 8 oz)   SpO2 91%   BMI 26.17 kg/m    Frail and fatigued appearing  Neck supple  Chest bilateral crackles  Abdomen soft  Regular heart rate  No audible murmurs  Extremities warm    Labs reviewed: White count decreased.  Blood sugars above goal level.  Mild increase in potassium.  "

## 2022-06-25 NOTE — PLAN OF CARE
Pt A/Ox4, w intermittent confusion. Pt spent majority of shift on HFNC 60/60, back on bipap 1800, 45% FIo2. Pt stated she is feeling more SOB and tired. Pt also c/o RUQ abd pain, Dr Larry hodge, simethicone ordered and morphine gave. Pt stated it seem to help the pain. Borjas patent. Pt resting. Continue to monitor.   Problem: Plan of Care - These are the overarching goals to be used throughout the patient stay.    Goal: Plan of Care Review/Shift Note  Description: The Plan of Care Review/Shift note should be completed every shift.  The Outcome Evaluation is a brief statement about your assessment that the patient is improving, declining, or no change.  This information will be displayed automatically on your shift note.  Outcome: Ongoing, Progressing

## 2022-06-25 NOTE — CONSULTS
Chart reviewed. Readmit. Per MD, hospice consult pending. CM spoke to Ed with [facility]- he is not sure if anyone will be able to do a hospice consult at [facility] over the weekend. The hospice consult placed was for OP services. Will need order for IP Hospice Consult.

## 2022-06-25 NOTE — PROGRESS NOTES
"/58 (BP Location: Right arm)   Pulse 112   Temp 98  F (36.7  C) (Axillary)   Resp (!) 31   Ht 1.549 m (5' 1\")   Wt 62.8 kg (138 lb 8 oz)   SpO2 90%   BMI 26.17 kg/m      Pt was put on HFNC today, current settings are 60 lpm @ 60% fio2. Pt is tolerating it well. Pt BS were coarse when last seen. Pt will go on bipap tonight and those settings are 14/8, r 18 and 40% fio2.   "

## 2022-06-26 NOTE — PROGRESS NOTES
Care Management Follow Up    Length of Stay (days): 2    Expected Discharge Date: 06/27/2022     Concerns to be Addressed:  Not medically ready for discharge, hospice consult pending      Patient plan of care discussed at interdisciplinary rounds: Yes    Anticipated Discharge Disposition:  TBD     Anticipated Discharge Services:  TBD  Anticipated Discharge DME:  TBD    Referrals Placed by CM/SW:  hospice  Private pay costs discussed: None at this time.   Additional Information:    Chart reviewed.   [facility] will meet with patient and family (, daughter and two sons) at hospital tomorrow, Monday 6/27 at 11am for hospice consult. Family confirms. CM updated Charge RN.       Aleja Pride, RN

## 2022-06-26 NOTE — PROGRESS NOTES
Intensive care consultation    Assessment and plan: 86-year-old woman with advanced COPD here with acute on chronic hypoxic respiratory failure due to COPD exacerbation.    I recommend that she consult with hospice and strongly consider going home on hospice.  Unfortunately, her chronic lung disease has reached a point where if she were to continue life-prolonging cares she will probably have 1-2 admissions per month and be limited to near bedrest in terms of physical activity at home.  If she cannot reach stability where she is safe for discharged home then I would recommend considering inpatient hospice.    Neuro: She would benefit from treatment for air hunger and anxiety.  She is declining morphine at this time.  She is very concerned about medications altering her mental status and so we will respect her wishes.    Cardiovascular: Continue home rate control medications    Pulmonary: BiPAP at night and as needed during the day.  Continue high flow otherwise.  I would recommend trialing her on wall oxygen once a day to see if she is nearing home settings.    Continue prednisone and home nebulizer regimen.    Renal: Volume contraction in the setting of diuresis.  I have stopped her Lasix.  Continue with aim for even.    Elevated potassium.  In the setting of spironolactone previously.  Consider rechecking this afternoon.  No potassium containing medications given.    Hematology: Continue home anticoagulation    ID: Continue treating for healthcare associated pneumonia.    Endo: Monitor blood sugars.  Sliding scale started.    GI: Advance diet.    Goals of care.  Discussed in detail with the patient and her family.  I recommend that she do hospice.  She prefers to go home with hospice.  If we can get her down to 10 to 12 L/min at rest with a saturation of 85% or greater but that is fine.    35 minutes spent in the care of this patient today, greater than 50% counseling and coordination of care.  She will be  "transferred out of the intensive care unit but we will continue to follow as consult.    Subjective: She is able to get some sleep last night.  Having some back pain that she attributes to needing to have a bowel movement.  She is very hesitant to take pain medication although she is having quite a bit of pain and anxiety according to nursing evaluation.    Medications and history reviewed    On exam  /57 (BP Location: Right arm)   Pulse 86   Temp 98.5  F (36.9  C) (Oral)   Resp 27   Ht 1.549 m (5' 1\")   Wt 62.3 kg (137 lb 6.4 oz)   SpO2 97%   BMI 25.96 kg/m    Frail and fatigued appearing  Neck supple  Chest bilateral crackles  Abdomen soft  Regular heart rate  No audible murmurs  Extremities warm    Labs reviewed: Increase in creatinine, elevated potassium, elevated blood sugar.  "

## 2022-06-26 NOTE — PROGRESS NOTES
Pt wore BiPAP most of the night, 14/8, r 18, 40% O2. Tolerated well. Placed gecko skin protector on nose. No redness seen, she just stated her nose was sore.   Pt was given Duoneb, mucomyst, sodium chloride and rubia neb inline via BiPAP. Tolerated well.  RT will continue to monitor    Magali Cifuentes, RT

## 2022-06-26 NOTE — PROVIDER NOTIFICATION
Notified Intensivist at 22:49 PM regarding change in condition.      Paged: Dr. Ko    Orders were not obtained.    Comments: F.Y.I pts landaverde putting out red urine. Please advise.

## 2022-06-26 NOTE — PLAN OF CARE
A/ox4, can be intermittently confused. Denied pain. During the latricia, more irritable/agitated due to the bipap machine. Was on HFNC 60/60 until agreeable to be placed on Bipap for sleep. Tolerated Bipap overnight at 40% FiO2. LS coarse throughout. Landaverde- putting out 75mL q2. Light red output noted from landaverde on shift. No BM.     Problem: Plan of Care - These are the overarching goals to be used throughout the patient stay.    Goal: Optimal Comfort and Wellbeing  Outcome: Ongoing, Progressing     Problem: Respiratory Compromise (Heart Failure)  Goal: Effective Oxygenation and Ventilation  Outcome: Ongoing, Progressing     Problem: Gas Exchange Impaired  Goal: Optimal Gas Exchange  Outcome: Ongoing, Progressing

## 2022-06-26 NOTE — PLAN OF CARE
Lymphedema Therapy: Orders received. Chart reviewed and discussed with care team.? Lymphedema Therapy not indicated due to no pitting edema present at this time. Pt wearing home compression and reports comfort with fit. Pt's spouse and home care have been managing compression at home.?Will complete orders.

## 2022-06-26 NOTE — PROGRESS NOTES
"    [facility]    Progress Note - Hospitalist Service       Date of Admission:  6/24/2022    Assessment & Plan     Adalgisa Solano is a 86 year old female with a history of chronic respiratory failure, emphysema, COPD, bronchiectasis, pseudomonal LLL pneumonia, and multiple recent admissions for COPD exacerbations who was recently hospitalized 6/8/2022-6/13/2022. She is admitted for acute hypoxic respiratory failure 2/2 severe COPD exacerabtion.  She is DNR/DNI with end-stage chronic respiratory failure.       Acute on chronic hypoxic respiratory failure   COPD exacerbation  Resistant pseudomonal pulmonary colonization   Patient with recent hospitalization for COPD exacerbation 6/08-6/13. Developed worsening shortness of breath over the last week, hypoxia, Spo2 60% day of admission. BIPAP use at night and up to 16L of oxygen during the day. Patient confirmed DNR/DNI. Patient \"leaning towards\" hospice, with planned hospice consult 6/27/22.  -pulmonary consult, appreciate recs   - recommending home with hospice   - BiPAP at night and PRN during the day, HFNC otherwise   - trial on wall oxygen once per day to see if she is nearing home settings   - goal to wean to 10-12 LPM at rest with sat of 85% or higher for discharge  -palliative care consulted, appreciate recs  -hospice consulted, appreciate recs  -continue prednisone  -DuoNebs  Q2H PRN  -PTA Mucomyst  -PTA treligy   -cefepime 2g Q8hrs, consider de-escalating after hospice evaluation  -azithromycin 500 mg daily x 5 days   -tobramycin 150 mg nebulized BID   -morphine 1-2 mg PRN for air hunger  -ativan PRN for anxiety     CHF exacerbation  Hyponatremia   Fluid overloaded due to CHF.  on admission, takes home lasix 80 mg twice daily. Last echo 4/15/2022 showed EF 65%. Received 40 mg Lasix IV once in the ED.   -discontinue IV lasix  -consider restarting PTA lasix 6/27  -Monitor Na, K, Cr  -Keep K>4, Mg>2  -Holding spironolactone " for now given hyperkalemia    Hyperkalemia  Likely medication related   -hold PTA spironolactone   -A.M BMP    Urinary retention  -landaverde ordered     Chronic medical condition:     T2DM  At risk for steroid-induced hyperglycemia, however patient likely hospice   -restart 4 times daily glucose checks per pulm  -restart medium resistance sliding scale for correction per pulm  -Monitor for hypoglycemia     A. fib  -Continue Xarelto and diltiazem     Chronic pain syndrome-chronic mid to low back pain  -Continue Tylenol and gabapentin     GERD  -PTA omeprazole      Anxiety  -Continue Abilify  -PTA Melatonin 3 mg scheduled at bedtime  -PRN Ativan        Diet:   NPO  DVT Prophylaxis: DOAC  Landaverde Catheter: Not present  Fluids:   Central Lines: None  Cardiac Monitoring: None  Code Status:  CPR okay, DNI  POA:  Anabel (Adrian), and daughter Ratna Mcallister     Diet: Regular Diet Adult  Room Service    DVT Prophylaxis: DOAC  Landaverde Catheter: PRESENT, indication: Strict 1-2 Hour I&O;Retention  Fluids: NPO exxcept for meds  Central Lines: None  Cardiac Monitoring: ACTIVE order. Indication: ICU  Code Status: No CPR- Do NOT Intubate     Disposition Plan           The patient's care was discussed with the Attending Physician, Dr. Tsang.    Mora Varela MD  Hospitalist Service  [facility]  Securely message with the Vocera Web Console (learn more here)  Text page via [employer] Paging/Directory     ______________________________________________________________________    Interval History   No acute events overnight.  Feels her breathing is ok this morning. Wanting to know how much farther oxygen has to be weaned to discharge.  Thinking about hospice. Is leaning towards hospice, but needs to talk with her other daughter first.  Patient confirms she is DNR/DNI.  She does not like morphine. She does not like how it makes her feel, feeling very groggy this morning.     Data reviewed today: I reviewed all  medications, new labs and imaging results over the last 24 hours. I personally reviewed no images or EKG's today.    Physical Exam   Vital Signs: Temp: 97.9  F (36.6  C) Temp src: Axillary BP: 106/62 Pulse: 92   Resp: 28 SpO2: (!) 89 % O2 Device: BiPAP/CPAP Oxygen Delivery: 60 LPM  Weight: 137 lbs 6.4 oz  Constitutional: awake, alert, cooperative, no apparent distress, and appears stated age  ENT: HFNC in place, no evidence of accessory muscle use  Eyes: Lids and lashes normal, pupils equal, round and reactive to light, extra ocular muscles intact, sclera clear, conjunctiva normal  Respiratory: on HFNC, rations unlabored, coarse breath sounds improved  Cardiovascular: regular rate, irregularly irregular rhythm, no murmurs or extra sounds   GI: normal bowel sounds, soft, non-distended, non-tender  Skin: no bruising or bleeding  Neurologic: Awake, alert, oriented to name, place and time.  Cranial nerves II-XII are grossly intact.  Motor is 5 out of 5 bilaterally.  Neuropsychiatric: General: normal, calm and normal eye contact  Level of consciousness: alert / normal  Orientation: oriented to self, place, time and situation  Extremities: Bilateral lower extremity edema improved    Data   Recent Labs   Lab 06/26/22  0701 06/26/22  0555 06/25/22  1205 06/25/22  0805 06/25/22  0610 06/24/22  2214 06/24/22  1854 06/24/22  1841 06/24/22  1449   WBC  --   --   --   --  7.3  --   --   --  13.7*   HGB  --   --   --   --  10.4*  --   --   --  10.0*   MCV  --   --   --   --  95  --   --   --  91   PLT  --   --   --   --  233  --   --   --  261   NA  --  133*  --   --  132*  --  133*  --  131*   POTASSIUM  --  5.5*  --   --  5.5*  --  5.0  --  5.5*   CHLORIDE  --  88*  --   --  91*  --  88*  --  86*   CO2  --  34*  --   --  30  --  36*  --  33*   BUN  --  40*  --   --  24  --  23  --  23   CR  --  0.96  --   --  0.65  --  0.65  --  0.71   ANIONGAP  --  11  --   --  11  --  9  --  12   DENISE  --  8.4*  --   --  8.6  --  8.6  --  8.8    * 269* 263*   < > 204*   < > 212*   < > 278*    < > = values in this interval not displayed.

## 2022-06-27 NOTE — PROGRESS NOTES
Pt has been alternating between bipap, HFNC and oxymizer today. Pt was on oxymizer for a few hours and progressively became more worn out. Pt was then placed on Bipap per her request. Pt then told the nurse she couldn't breathe and then was placed on HFNC 60 lpm 67%. Pt received nebs as ordered. BS coarse with exp wheezes. Pt is confused at times. Saturations have been anywhere from 94%-77% depending on movement.     Gisselle Tyson, RT

## 2022-06-27 NOTE — PLAN OF CARE
Throughout shift patient became increasingly disoriented. Able to answer orientation questions but will ask for things/ask questions that are unrelated or inappropriate for the situation. Family conference had today with palliative and discussed home with hospice or possibility of patient going comfort cares while hospitalized. Later in shift  Les had questions regarding medications, treatments, what the trial today on the oximyzer means. Writer reiterated with  and patient that current treatments are not changing the patients outcomes and that there is not much more we can do to help the patient. RN informed  that patient did not do well during the trial and had to be placed back on high flow nasal cannula. At this time meaning that the patient would not be safe to transport home. Informed Les that we would attempt the trial again tomorrow and see if patient is able to tolerate better. Patient and  were agreeable to trying oral Zyprexa to help with comfort and anxiety.    Problem: Plan of Care - These are the overarching goals to be used throughout the patient stay.    Goal: Readiness for Transition of Care  Outcome: Ongoing, Not Progressing     Problem: Plan of Care - These are the overarching goals to be used throughout the patient stay.    Goal: Optimal Comfort and Wellbeing  Outcome: Ongoing, Progressing  Intervention: Monitor Pain and Promote Comfort  Recent Flowsheet Documentation  Taken 6/27/2022 1600 by Mar Tanner RN  Pain Management Interventions: declines  Taken 6/27/2022 1200 by Mar Tanner RN  Pain Management Interventions: declines  Taken 6/27/2022 0809 by Mar Tanner RN  Pain Management Interventions: declines  Intervention: Provide Person-Centered Care  Recent Flowsheet Documentation  Taken 6/27/2022 1600 by Mar Tanner RN  Trust Relationship/Rapport:    care explained    choices provided    emotional support provided    empathic listening provided     questions answered    questions encouraged    reassurance provided    thoughts/feelings acknowledged  Taken 6/27/2022 1200 by Mar Tanner RN  Trust Relationship/Rapport:    care explained    choices provided    emotional support provided    empathic listening provided    questions answered    questions encouraged    reassurance provided    thoughts/feelings acknowledged  Taken 6/27/2022 0809 by Mar Tanner, RN  Trust Relationship/Rapport:    care explained    choices provided    emotional support provided    empathic listening provided    questions answered    questions encouraged    reassurance provided    thoughts/feelings acknowledged

## 2022-06-27 NOTE — PROGRESS NOTES
Pt placed on BiPAP for the night and received nebs inline. Nebs include Duoneb, mucomyst, sodium chloride and tobramycin.   Current BiPAP settings are ST mode 14/8, R 18, 35%.   Pt tolerating well. Skin looks clean and dry. Gecko gel placed under mask for skin protection.  Goes on HFNC during the day 60L 60%.    RT will continue to monitor    RT Malathi

## 2022-06-27 NOTE — PROGRESS NOTES
Patient trialed on oxymizer cannula and oxymask. Oxygen saturations remains low 80s on 15L. Patient placed back on high flow nasal cannula. Patient more disoriented than this morning. Patient able to answer orientation questions but consistently asking to leave, fidgety in bed, attempting to get out of bed.

## 2022-06-27 NOTE — PROGRESS NOTES
PULMONARY PROGRESS NOTE    Date / Time of Admission:  6/24/2022  2:13 PM  Assessment:   86yoF with history of advanced COPD and bronchiectasis, pulmonary hypertension with progressive decline in oxygenation and functional status without further medical options remaining.    Our team has recommended Hospice which we continue to recommend. At this time there is not another discharge option for her given the severe hypoxia.     Active Problems:    COPD exacerbation (H)    Acute respiratory failure with hypoxia (H)    Plan:   Agree with Morphine over ativan. Significant confusion tonight.   Prednisone at 40mg  Bipap nights, high-flow days.   Already been diuresed. Goal Is/Os even.   Unfortunately O2 in low 80s on 10L O2. Not able to bring home safely with those settings.       Subjective:   HPI:  Adalgisa Solano is a 86 year old female with history of COPD, bronchiectasis and pulmonary hypertension who continues to return to the hospital with progressively worsening hypoxia. There remains nothing further treatable at this time unfortunately.    Met with Hospice today as well as palliative care. More confused this afternoon.         Allergies:   Allergies   Allergen Reactions     Tramadol Nausea     Tuberculin Tests      Amoxicillin Itching and Rash     Levofloxacin Itching and Rash     Penicillins Itching and Rash     Has tolerated ceftriaxone.        MEDS:  Scheduled Meds:    acetylcysteine  4 mL Nebulization 4x daily     ARIPiprazole  5 mg Oral At Bedtime     atorvastatin  20 mg Oral At Bedtime     azithromycin  500 mg Intravenous Q24H     ceFEPIme (MAXIPIME) IV  2 g Intravenous Q12H     diltiazem ER COATED BEADS  240 mg Oral Daily     docusate sodium  100 mg Oral BID     fluticasone-vilanterol  1 puff Inhalation Daily    And     umeclidinium  1 puff Inhalation Daily     gabapentin  600 mg Oral At Bedtime     insulin aspart  1-7 Units Subcutaneous 4x Daily w/meals     ipratropium - albuterol 0.5 mg/2.5 mg/3 mL  3  "mL Nebulization 4x daily     pantoprazole  40 mg Oral Daily     polyethylene glycol  17 g Oral Daily     predniSONE  40 mg Oral Daily     rivaroxaban ANTICOAGULANT  15 mg Oral Daily with supper     sodium chloride  4 mL Nebulization BID     tobramycin (PF)  150 mg Nebulization 2 times daily     Continuous Infusions:    - MEDICATION INSTRUCTIONS -       PRN Meds:.acetaminophen **OR** acetaminophen, glucose **OR** dextrose **OR** glucagon, glucose **OR** dextrose **OR** glucagon, docusate sodium, ipratropium - albuterol 0.5 mg/2.5 mg/3 mL, LORazepam, morphine, naloxone **OR** naloxone **OR** naloxone **OR** naloxone, - MEDICATION INSTRUCTIONS -, prochlorperazine **OR** prochlorperazine **OR** prochlorperazine, simethicone    Objective:   VITALS:  /67 (BP Location: Right arm)   Pulse 107   Temp 97.3  F (36.3  C) (Axillary)   Resp 27   Ht 1.549 m (5' 1\")   Wt 64.2 kg (141 lb 9.6 oz)   SpO2 91%   BMI 26.76 kg/m    EXAM:    GENERAL APPEARANCE ADULT: elderly, frail in moderate respiratory distress at rest, using accessory muscles  HENT: Oral mucosa and posterior oropharynx normal, moist mucous membranes  NECK: No adenopathy,masses or thyromegaly  RESP: expiratory wheezing bilaterally, rales  CV: regular rate and rhythm, no murmur, rub or gallop  ABDOMEN: normal bowel sounds, soft, nontender, no hepatosplenomegaly or other masses  LYMPHATICS: No cervical, or supraclavicular adenopathy  SKIN: no suspicious lesions or rashes  NEURO: oriented to person, wasn't sure she was at the hospital.         I&O:    Date 06/27/22 0700 - 06/28/22 0659   Shift [id number] 4530-7514 [id number] 24 Hour Total   INTAKE   P.O. 360   360   Shift Total(mL/kg) 360(5.6)   360(5.6)   OUTPUT   Urine 300   300   Shift Total(mL/kg) 300(4.67)   300(4.67)   Weight (kg) 64.23 64.23 64.23 64.23       Data Review:    Imaging: personally reviewed  EXAM: XR CHEST PORT 1 VIEW  LOCATION: [facility]  DATE/TIME: 6/24/2022 2:53 " PM     INDICATION: sob  COMPARISON: 06/12/2022 and older studies, chest CT 05/17/2022                                                                      IMPRESSION: Increased consolidation in the right upper lobe laterally along the fissure consistent with a continued bronchopneumonia.     Underlying interstitial opacities and marked cardiomegaly unchanged. Significant emphysema is better appreciated on the CT.  Results for orders placed or performed during the hospital encounter of 06/24/22   Basic metabolic panel   Result Value Ref Range    Sodium 129 (L) 136 - 145 mmol/L    Potassium 5.4 (H) 3.5 - 5.0 mmol/L    Chloride 88 (L) 98 - 107 mmol/L    Carbon Dioxide (CO2) 30 22 - 31 mmol/L    Anion Gap 11 5 - 18 mmol/L    Urea Nitrogen 50 (H) 8 - 28 mg/dL    Creatinine 1.06 0.60 - 1.10 mg/dL    Calcium 8.4 (L) 8.5 - 10.5 mg/dL    Glucose 263 (H) 70 - 125 mg/dL    GFR Estimate 52 (L) >60 mL/min/1.73m2     Lab Results   Component Value Date    WBC 14.7 (H) 06/27/2022    HGB 9.7 (L) 06/27/2022    HCT 31.3 (L) 06/27/2022    MCV 93 06/27/2022     06/27/2022

## 2022-06-27 NOTE — CONSULTS
"[facility]  Palliative Care Consultation Note    Patient: Adalgisa Solano  Date of Admission:  6/24/2022    Requesting Clinician / Team: Dr. Mercado  Reason for consult: End stage COPD    Impression & Recommendations:  GOALS OF CARE: Goals continue to be life prolonging with limitations (DNR/DNI).  Lengthy meeting with family and hospice today.  Patient states her hope is to be at home and have shortness of breath and anxiety managed without significant sedation.  Wants to be cognitively \"as clear as possible.\"  [facility] is exploring how much oxygen can be delivered at home and if BiPAP is OK to continue at home.  Plan is to trial patient on 10-12 liters via Oxymizer as inpatient.    We discussed that if she is not physiologically stable to discharge and not able to safely transport, then may consider comfort care in the hospital.     ADVANCE CARE PLANNING:   Advance directive: On file.  Primary health care agent is  Anabel  TERRLEL: Recommend completing prior to discharge.   Surrogate decision maker: Primary  Health care agent is  Anabel, secondary is daughter Veronika.  Patient also verbalized today that she wants her  Francis to help with decisions regarding her care.    Code status: DNR/DNI    SYMPTOM MANAGEMENT:   Shortness of breath, 2/2 advanced COPD  Patient very reluctant to take morphine for air hunger.  I reviewed that even with a very small dose of opioid medication, could help with her symptoms and not have significant changes in alertness/cognitive function.  Also that other medications such as dilaudid could be used.   -If willing, recommend Dilaudid oral solution 0.5-1 mg po every 3 hours PRN  -Appreciate assistance from pulmonary team  -continue current cares and treatments at this time and attempt to wean oxygen to 10-12 liters (which likely could be administered at home with hospice).  No intubation per conversations with pulmonary over the weekend. " "    Anxiety  Agree with continuing her regular psychiatric medications-Abilify 5 mg at HS  Avoid lorazepam if possible, as is it does cause drowsiness and some confusion for Kim.    Could consider zyprexa 2.5 mg TID PRN for anxiety  See above recommendations for shortness of breath    PSYCHOSOCIAL/SPIRITUAL:   Anglican  Extensive family support-6 children.  Lives with  Less    These recommendations have been discussed with Dr. Mercado, Mar DRAPER and Janay from [facility].      Thank you for the opportunity to participate in the care of this patient and family. Our team: will continue to follow.     During regular M-F work hours -- if you are not sure who specifically to contact -- please contact us by calling us directly at the Palliative Care Main Line [phone]    After regular work hours and on weekends/holidays, you can leave a message at [phone]      Summary/HPI:  Adalgisa \"Kim\" SHANNON Solano is a 86 year old female with PMH of chronic respiratory failure, emphysema, COPD, bronchiectasis, pseudomonal LLL pneumonia, lymphedema, a-fib, chronic back pain, GERD, DM2, anxiety and multiple recent admission for COPD exacerbations who presented to the ED on 6/42/22 with worsening shortness of breath.  Admitted to the hospital with acute hypoxic respiratory failure 2/2 severe COPD exacerbation, CHF exacerbation, hyponatremia, and hyperkalemia.    Patient was started on hospital BiPAP, IV steroids, IV antibiotics, IV diuretics, DuoNebs, Mucomyst and admitted to ICU.  Pulmonary was consulted and patient has had several discussions regarding code status, now DNR/DNI.  Hospice has been discussed in the past and patient now agreeable to hospice consult and considering hospice at home.   Was seen by her primary pulmonologist, Dr. Juarez over the weekend. Per note on 6/26/22, \"I recommend that she consult with hospice and strongly consider going home on hospice.  Unfortunately, her chronic lung disease " "has reached a point where if she were to continue life-prolonging cares she will probably have 1-2 admissions per month and be limited to near bedrest in terms of physical activity at home.  If she cannot reach stability where she is safe for discharged home then I would recommend considering inpatient hospice.\"    History gathered today from: patient, family/loved ones, medical chart, medical team members   Patient has been seen by our service during hospitalization in 5/2022.  During that time she requested no CPR and no intubation, not ready for hospice.  Her last hospital stay was 6/8/22-6/13/22.  Since her last hospitalization she developed worsening shortness of breath, hypoxia.  Was using up to 16 liters of oxygen during the day and BiPAP at night.    Previous hospitalizations:  4/13/2022 [facility] admission for SOB, COPD and bronchiectasis exacerbation  5/16/22 [facility] acute hypoxic respiratory and pseudomonal pneumonia  6/8/22 [facility] acute hypoxic respiratory failure    Today, the patient was seen for:  Goals of care and symptom management    Palliative Care Summary:   Met in the room with Janay from [facility], Mar DRAPER, patient,  Francis, several children.  Daughter Veronika was present via phone.   I introduced our role as an extra layer of support and how we help patients and families dealing with serious, potentially life-limiting illnesses. I explained the composition of the palliative care team.  Palliative care helps patients and families navigate their care while focusing on the whole person; providing emotional, social and spiritual support  Palliative care often assists with symptom management, information sharing about what to expect from the illness, available treatment options and what effect those options may have on the disease course, and provide effective communication and caring support.    Hospice philosophy and services were discussed by Janay.  Patient and family's " "questions answered.  Patient stating she does want to be at home and have good symptom control.  She and family clearly stated that being \"clear minded\" is very important to her and she is hesitant to take morphine for air hunger.  We did discuss that opioid medication may be more helpful than lorazepam (had 1 dose of ativan early this am and became more confused and drowsy) for her air hunger, which in turn would likely improve her anxiety symptoms.  Discussed that there would be alternative anxiety medications that could be tried to help anxiety, with fewer side effects than lorazepam.    We reviewed that with her goal being to discharge to home, likely with hospice, her oxygen needs would need to be at level that could allow for safe transport and discharge to home.  Currently on oxygen via HFNC at 60liters 60% FiO2.  Patient and family hoping to trial oxygen via Oxymizer to see how she can tolerate being off oxygen.  Janay plans to check with hospice team to determine how much oxygen can be delivered in home setting.    Janay reviewed that if discharging with hospice, family encouraged to call hospice prior to 911 if changes in condition.  Also that there are certain circumstances that hospice patients return to the hospital.   We also reviewed that if patient is not able to have oxygen decreased to a level that is safe to discharge, she could choose to transition to comfort care as an inpatient and potentially would pass in the hospital on comfort care.    Patient states she wants her  Les to be involved in medical decisions and appreciates support from her family.  She was falling asleep intermittently and confused at times, therefore supported decision making is necessary at this time.  Daughter Veronika did verbalized she hopes Kim can be \"clear\" enough to be able to communicate her wishes about care moving forward.    Ultimately, patient and family agreeable to see how she does on lower amount of " oxygen and discuss as a family regarding plans moving forward.  Discussions regarding care goals to be ongoing.      Prognosis, Goals, & Planning:      Functional Status just prior to hospitalization: 2 (Ambulatory and capable of all selfcare but unable to carry out any work activities; may need help with IADLs up and about > 50% of waking hours)      Prognosis, Goals, and/or Advance Care Planning were addressed today: Yes      Patient's decision making preferences: shared with support from loved ones          Patient has decision-making capacity today for complex decisions: Partial (needs assistance with complex decisions)    Patient received lorazepam this morning, and per family she is more cognitively altered than her baseline            I have concerns about the patient/family's health literacy today: No           Patient has a completed Health Care Directive: Yes, and on file.      Code status: No CPR / No Intubation    Coping, Meaning, & Spirituality:   Mood, coping, and/or meaning in the context of serious illness were addressed today: Yes    Social:     Living situation: lives with  Anabel in [facility]    [place] family / caregivers:  to Anabel 40+ years, 7 children between the 2 of them and one is      Enjoys [employer], gambling and playing cards    Key Palliative Symptom Data:  # Pain severity the last 12 hours: none  # Dyspnea severity the last 12 hours: low  # Nausea severity the last 12 hours: none  # Anxiety severity the last 12 hours: low    ROS:  Comprehensive ROS is reviewed and is negative except as here & per HPI     Past Medical History:  Past Medical History:   Diagnosis Date     Acute and chronic respiratory failure with hypoxia (H)      Acute blood loss anemia 1/15/2019     Acute bronchitis 1/15/2019     Anemia     pernicious anemia     Anticoagulated 3/23/2022     Anxiety      Arm skin lesion, right      Arnold-Chiari malformation (H) 1998     Arthritis      Atrial  fibrillation (H) 2017     Avascular necrosis of bone (H)      Breast cyst      Breast lump      Candidal skin infection      Cellulitis of left lower extremity 2019     CHF (congestive heart failure) (H)     diastolic and systolic     Chronic bronchitis (H)     & acute     Chronic diastolic heart failure (H)     diastolic chf     Chronic pain syndrome      Chronic respiratory failure with hypoxia (H) 3/13/2017     COPD (chronic obstructive pulmonary disease) (H)      COPD exacerbation (H)      Depression      Diet-controlled type 2 diabetes mellitus (H)      Drug induced constipation      DVT of axillary vein, acute (H)     left     DVT of axillary vein, acute left (H)      Edema      Encounter for palliative care      Erysipelas      Fracture of femoral neck, left (H)      Generalized muscle weakness      GERD (gastroesophageal reflux disease)      History of blood clots      Hyperlipidemia      Hypertension      Left hip pain      Lumbar spinal stenosis      PAD (peripheral artery disease) (H)      Peripheral arterial disease (H) 10/28/2015     Psoriasis     arms      Pulmonary hypertension (H) 3/5/2018     Recurrent major depressive episodes (H)     Created by Conversion  Replacement Utility updated for latest IMO load     Schizoaffective disorder, bipolar type (H) 2015     Slow transit constipation      Stasis dermatitis of both legs      Status post total hip replacement, 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     Type 2 diabetes mellitus without complication (H) 2016     Vitamin B12 deficiency      Volume overload         Past Surgical History:  Past Surgical History:   Procedure Laterality Date     BACK SURGERY       BREAST CYST ASPIRATION Left      CERVICAL SPINE SURGERY      for arnold chiari malformation      SECTION  X3     CHOLECYSTECTOMY       COLONOSCOPY       EXCISE LESION UPPER EXTREMITY Right 2019    Procedure: EXCISION RIGHT ARM LIPOMA;  Surgeon: Andreas Holly MD;  [facility];  Service: General     EYE SURGERY      bilateral cataracts     HAND SURGERY       HERNIORRHAPHY UMBILICAL N/A 9/21/2021    Procedure: INCARCERATED UMBILICAL AND;  Surgeon: Andreas Holly MD;  Location: [facility]     JOINT REPLACEMENT Right     knee - partial     LAPAROSCOPIC HERNIORRHAPHY INCISIONAL Left 3/27/2015    Procedure: ATTEMPTED LAPAROSCOPIC ABDOMINA/FLANK INCISION REPAIR;  Surgeon: Jose Lakhani MD;  Location: [facility] Main OR;  Service:      LUMBAR FUSION N/A 11/17/2014    Procedure: POSTERIOR FUSION / DECOMPRESSION L3-S1 WITH PELVIC FIXATION BILATERAL ;  Surgeon: Rajan Mares MD;  Location: [facility] Main OR;  Service:      OTHER SURGICAL HISTORY      IR for PAD LOWER EXTREMITY     PICC TRIPLE LUMEN PLACEMENT  5/18/2022          REPAIR SPIGELIAN HERNIA N/A 9/21/2021    Procedure: SPIGELIAN HERNIA REPAIR;  Surgeon: Andreas Holly MD;  Location: [facility]     US ASPIRATION HEMATOMA SEROMA OR FLUID COLLECTION  2/6/2020     [facility] OPEN FIXATN PROX END/NECK FEMUR FX Left 1/2/2019    Procedure: OPEN REDUCTION INTERNAL FIXATION, FRACTURE LEFT HIP, PERIPROSTHETIC FRACTURE;  Surgeon: Kevin Lackey MD;  Location: [facility] Main OR;  Service: Orthopedics     [facility] TOTAL HIP ARTHROPLASTY Left 1/2/2019    Procedure: LEFT TOTAL HIP ARTHROPLASTY;  Surgeon: Kevin Lackey MD;  Location: [facility] Main OR;  Service: Orthopedics     [facility] TOTAL HIP ARTHROPLASTY Right 9/16/2019    Procedure: RIGHT TOTAL HIP ARTHROPLASTY;  Surgeon: Kan Quesada MD;  Location: [facility] OR;  Service: Orthopedics     [facility] TOTAL KNEE ARTHROPLASTY Left 10/7/2016    Procedure: TOTAL KNEE ARTHROPLASTY, LEFT;  Surgeon: Kan Quesada MD;  Location: [facility] Main OR;  Service: Orthopedics         Family History:  Family History   Problem Relation Age of Onset     Coronary Artery Disease Mother      Coronary Artery Disease Father          Allergies:  Allergies   Allergen Reactions     Tramadol  "Nausea     Tuberculin Tests      Amoxicillin Itching and Rash     Levofloxacin Itching and Rash     Penicillins Itching and Rash     Has tolerated ceftriaxone.        Medications:  I have reviewed this patient's medication profile and medications from this hospitalization.     PERTINENT PHYSICAL EXAMINATION:  Vital Signs: Blood pressure 121/67, pulse 103, temperature 97.3  F (36.3  C), temperature source Axillary, resp. rate 25, height 1.549 m (5' 1\"), weight 64.2 kg (141 lb 9.6 oz), SpO2 90 %, not currently breastfeeding.   GENERAL: Fatigued, lying in bed    SKIN: Warm and dry   HEENT: Normocephalic, anicteric sclera, moist mucous membranes  LUNGS: On HFNC, breathing non-labored   : landaverde in place  MUSKL: no gross joint deformities   EXTREMITIES: Mild lower extremity edema  NEUROLOGIC: Oriented X 4 but forgetful and intermittent confusion    Data reviewed:    Results for orders placed or performed during the hospital encounter of 06/24/22   XR Chest Port 1 View    Impression    IMPRESSION: Increased consolidation in the right upper lobe laterally along the fissure consistent with a continued bronchopneumonia.    Underlying interstitial opacities and marked cardiomegaly unchanged. Significant emphysema is better appreciated on the CT.         Recent Labs   Lab 06/27/22  1225 06/27/22  0813 06/27/22  0451 06/26/22  1829 06/26/22  1452 06/26/22  0701 06/26/22  0555 06/25/22  0805 06/25/22  0610 06/24/22  1841 06/24/22  1449   WBC  --   --  14.7*  --   --   --   --   --  7.3  --  13.7*   HGB  --   --  9.7*  --   --   --   --   --  10.4*  --  10.0*   MCV  --   --  93  --   --   --   --   --  95  --  91   PLT  --   --  256  --   --   --   --   --  233  --  261   NA  --   --  129*  --   --   --  133*  --  132*   < > 131*   POTASSIUM  --   --  5.4*  --  5.6*  --  5.5*  --  5.5*   < > 5.5*   CHLORIDE  --   --  88*  --   --   --  88*  --  91*   < > 86*   CO2  --   --  30  --   --   --  34*  --  30   < > 33*   BUN  --   --  " 50*  --   --   --  40*  --  24   < > 23   CR  --   --  1.06  --   --   --  0.96  --  0.65   < > 0.71   ANIONGAP  --   --  11  --   --   --  11  --  11   < > 12   DENISE  --   --  8.4*  --   --   --  8.4*  --  8.6   < > 8.8   * 195* 263*   < >  --    < > 269*   < > 204*   < > 278*    < > = values in this interval not displayed.      Lab Results   Component Value Date    WBC 14.7 (H) 06/27/2022    HGB 9.7 (L) 06/27/2022    HCT 31.3 (L) 06/27/2022     06/27/2022     (L) 06/27/2022    POTASSIUM 5.4 (H) 06/27/2022    CHLORIDE 88 (L) 06/27/2022    CO2 30 06/27/2022    BUN 50 (H) 06/27/2022    CR 1.06 06/27/2022     (H) 06/27/2022    SED 30 (H) 02/01/2020    DD 0.77 (H) 07/09/2021    AST 15 06/09/2022    ALT 23 06/09/2022    ALKPHOS 75 06/09/2022    BILITOTAL 0.6 06/09/2022    INR 1.48 (H) 05/16/2022      Lab Results   Component Value Date    ALBUMIN 2.6 06/09/2022        ====================================================  TT: I have personally spent a total of 120 minutes on the unit in review of medical record, consultation with the medical providers and assessment of patient today, with more than 50% of this time spent in counseling, coordination of care, and discussion with patient and family re: diagnostic results, prognosis, symptom management, risks and benefits of management options, and development of plan of care as noted above.  ====================================================    MARLINE Junior, NATALIIA-BC  Clinical Nurse Specialist  [facility]  [phone]

## 2022-06-27 NOTE — PROGRESS NOTES
[facility] met with patient and family at hospital today for consult. Could possibly go home on hospice if 02 needs between 10-12L. CM will continue to follow.

## 2022-06-27 NOTE — PLAN OF CARE
A/o x4, can be intermittently confused. Had increased agitation towards end of shift- gave prn ativan-effective. Denied pain. Tolerated Bipap at 35% FiO2. Wore HFNC during the evening 60%/60L. Tele: Afib. LS coarse throughout. Intermittent dry cough. Borjas patent- minimal red output. Plan for hospice consult later today. Continue to monitor.     Problem: Plan of Care - These are the overarching goals to be used throughout the patient stay.    Goal: Optimal Comfort and Wellbeing  Outcome: Ongoing, Progressing     Problem: Risk for Delirium  Goal: Improved Sleep  Outcome: Ongoing, Progressing     Problem: Respiratory Compromise (Heart Failure)  Goal: Effective Oxygenation and Ventilation  Outcome: Ongoing, Progressing

## 2022-06-27 NOTE — PROGRESS NOTES
"[facility]    Progress Note - Hospitalist Service       Date of Admission:  6/24/2022    Assessment & Plan     Adalgisa Solano is a 86 year old female with a history of chronic respiratory failure, emphysema, COPD, bronchiectasis, pseudomonal LLL pneumonia, and multiple recent admissions for COPD exacerbations who was recently hospitalized 6/8/2022-6/13/2022. She is admitted for acute hypoxic respiratory failure 2/2 severe COPD exacerabtion.  She is DNR/DNI with end-stage chronic respiratory failure. Hospice evaluation pending     Acute on chronic hypoxic respiratory failure   COPD exacerbation  Resistant pseudomonal pulmonary colonization   Patient with recent hospitalization for COPD exacerbation 6/08-6/13. Developed worsening shortness of breath over the last week, hypoxia, Spo2 60% day of admission. BIPAP use at night and up to 16L of oxygen during the day. Patient confirmed DNR/DNI. Patient \"leaning towards\" hospice, with planned hospice consult 6/27/22.  -pulmonary consult, appreciate recs   - recommending home with hospice   - BiPAP at night and PRN during the day, HFNC otherwise   - trial on wall oxygen once per day to see if she is nearing home settings   - goal to wean to 10-12 LPM at rest with sat of 85% or higher for discharge  -hospice consulted, appreciate recs  -continue prednisone  -DuoNebs  Q2H PRN  -PTA Mucomyst  -PTA treligy   -cefepime 2g Q8hrs, consider de-escalating after hospice evaluation, stop date 6/29  -azithromycin 500 mg daily x 5 days, stop date 6/28  -tobramycin 150 mg nebulized BID   -morphine 1-2 mg PRN for air hunger, patient did not tolerate previously  -ativan PRN for anxiety    Leukocytosis  In the setting of steroid use  -Continue to monitor     CHF exacerbation, resolved  Hyponatremia, improved  Fluid overloaded due to CHF on admission.  on admission, takes home lasix 80 mg twice daily. Last echo 4/15/2022 showed EF 65%. Received 40 mg " Lasix IV once in the ED. euvolemic on today's exam.    -discontinue IV lasix  -restart PTA lasix if clinically volume up  -Monitor Na, K, Cr  -Keep K>4, Mg>2  -Holding spironolactone for now given hyperkalemia    Hyperkalemia, improved  Likely medication related. K 5.4 this morning   -hold PTA spironolactone   -A.M BMP    Hyperglycemia, steroid-induced  -continue to monitor  -SSI    Urinary retention  -landaverde      Chronic medical condition:     T2DM  At risk for steroid-induced hyperglycemia  -restart 4 times daily glucose checks per pulm  -restart medium resistance sliding scale for correction per pulm  -Monitor for hypoglycemia     A. fib  -Continue Xarelto and diltiazem     Chronic pain syndrome-chronic mid to low back pain  -Continue Tylenol and gabapentin     GERD  -PTA omeprazole      Anxiety  -Continue Abilify  -PTA Melatonin 3 mg scheduled at bedtime  -PRN Ativan        Diet:   NPO  DVT Prophylaxis: DOAC  Landaverde Catheter: Not present  Fluids:   Central Lines: None  Cardiac Monitoring: None  Code Status:  CPR okay, DNI  POA:  Anabel (Adrian), and daughter Ratna Mcallister     Diet: Regular Diet Adult  Room Service    DVT Prophylaxis: DOAC  Landaverde Catheter: PRESENT, indication: Retention  Fluids: NPO exxcept for meds  Central Lines: None  Cardiac Monitoring: ACTIVE order. Indication: ICU  Code Status: No CPR- Do NOT Intubate     Disposition Plan           The patient's care was discussed with the Attending Physician, Dr. Thompson Agarwal.    Zeb Mercado DO  Hospitalist Service  [facility]  Securely message with the [employer] Web Console (learn more here)  Text page via [employer] Paging/Directory     ______________________________________________________________________    Interval History     Acute events overnight.   Spouse, Adrian, at bedside.   Adalgisa endorses intermittent dry cough.  She had bouts of coughing after finishing her breakfast, Which resolved after round of suction.  Her  " states that she was a little shaky this morning.    Endorses abdominal distention.  However no tenderness, or abdominal pain.  She had started to have a bowel movement yesterday, however was unable to \"complete bowel movement \".  Is also a little concerned about her urine color, stating that there was blood in the urine after [facility] care.     They are waiting to meet with hospice at 11am.     Data reviewed today: I reviewed all medications, new labs and imaging results over the last 24 hours. I personally reviewed no images or EKG's today.    Physical Exam   Vital Signs: Temp: 97.5  F (36.4  C) Temp src: Axillary BP: 125/58 Pulse: 90   Resp: 22 SpO2: 90 % O2 Device: High Flow Nasal Cannula (HFNC) Oxygen Delivery: 60 LPM  Weight: 141 lbs 9.6 oz  Constitutional: awake, alert, cooperative, no apparent distress, and appears stated age  ENT: HFNC in place, no evidence of accessory muscle use  Eyes: Lids and lashes normal, pupils equal, round and reactive to light, extra ocular muscles intact, sclera clear, conjunctiva normal  Respiratory: on HFNC, rations unlabored, coarse breath sounds stable  Cardiovascular: regular rate, irregularly irregular rhythm, no murmurs or extra sounds   GI: normal bowel sounds, soft, non-distended, non-tender  Skin: no bruising or bleeding  Neurologic: Awake, alert, oriented to name, place and time.  Cranial nerves II-XII are grossly intact.  Motor is 5 out of 5 bilaterally.  Neuropsychiatric: General: normal, calm and normal eye contact  Level of consciousness: alert / normal  Orientation: oriented to self, place, time and situation  Extremities: Bilateral lower extremity edema improved    Data   Recent Labs   Lab 06/27/22  0813 06/27/22  0451 06/26/22  2213 06/26/22  1829 06/26/22  1452 06/26/22  0701 06/26/22  0555 06/25/22  0805 06/25/22  0610 06/24/22  1841 06/24/22  1449   WBC  --  14.7*  --   --   --   --   --   --  7.3  --  13.7*   HGB  --  9.7*  --   --   --   --   --   --  " 10.4*  --  10.0*   MCV  --  93  --   --   --   --   --   --  95  --  91   PLT  --  256  --   --   --   --   --   --  233  --  261   NA  --  129*  --   --   --   --  133*  --  132*   < > 131*   POTASSIUM  --  5.4*  --   --  5.6*  --  5.5*  --  5.5*   < > 5.5*   CHLORIDE  --  88*  --   --   --   --  88*  --  91*   < > 86*   CO2  --  30  --   --   --   --  34*  --  30   < > 33*   BUN  --  50*  --   --   --   --  40*  --  24   < > 23   CR  --  1.06  --   --   --   --  0.96  --  0.65   < > 0.71   ANIONGAP  --  11  --   --   --   --  11  --  11   < > 12   DENISE  --  8.4*  --   --   --   --  8.4*  --  8.6   < > 8.8   * 263* 335*   < >  --    < > 269*   < > 204*   < > 278*    < > = values in this interval not displayed.

## 2022-06-28 NOTE — PROGRESS NOTES
"[facility]  Palliative Care Daily Progress Note       Recommendations & Counseling     GOALS OF CARE:   Patient transitioned to comfort care.   at bedside with other family present.  He confirms comfort measures only at this point.  Likely will pass away in the hospital as she is not physiologically stable to discharge.  Comfort care orders have been placed by residency team.  Patient's  now supportive of using opioid medication for air hunger.      ADVANCE CARE PLANNING:   Advance directive: On file.  Primary health care agent is  Anabel  TERRELL: Recommend completing prior to discharge.   Surrogate decision maker: Primary  Health care agent is  Anabel, secondary is daughter Veronika.  Patient also verbalized today that she wants her  Francis to help with decisions regarding her care.    Code status: DNR/DNI      SYMPTOM MANAGEMENT:   Shortness of breath, 2/2 advanced COPD   now agreeable to utilize opioid medication for air hunger as she is actively dying.    -Appreciate assistance from primary team.  Agree with utilizing morphine PRN for air hunger.   -Oxygen for comfort.  Titrate down from HFNC to regular oxygen once family is ready.       Anxiety  Agree with continuing her regular psychiatric medications-Abilify 5 mg at HS  Avoid lorazepam if possible, as is it does cause drowsiness and some confusion for Kim.  Now that on comfort care, could be more liberal as her condition declines.   Could consider zyprexa 2.5 mg TID PRN for anxiety  See above recommendations for shortness of breath    Discussed with: Dr. Varela and Paola RN      Assessments          Adalgisa \"Kim\" SHANNON Solano is a 86 year old female with PMH of chronic respiratory failure, emphysema, COPD, bronchiectasis, pseudomonal LLL pneumonia, lymphedema, a-fib, chronic back pain, GERD, DM2, anxiety and multiple recent admission for COPD exacerbations who presented to the ED on 6/42/22 with " "worsening shortness of breath.  Admitted to the hospital with acute hypoxic respiratory failure 2/2 severe COPD exacerbation, CHF exacerbation, hyponatremia, and hyperkalemia.    Patient was started on hospital BiPAP, IV steroids, IV antibiotics, IV diuretics, DuoNebs, Mucomyst and admitted to ICU.  Pulmonary was consulted and patient has had several discussions regarding code status, now DNR/DNI.  Hospice has been discussed in the past and patient now agreeable to hospice consult and considering hospice at home.   Was seen by her primary pulmonologist, Dr. Juarez over the weekend. Per note on 6/26/22, \"I recommend that she consult with hospice and strongly consider going home on hospice.  Unfortunately, her chronic lung disease has reached a point where if she were to continue life-prolonging cares she will probably have 1-2 admissions per month and be limited to near bedrest in terms of physical activity at home.  If she cannot reach stability where she is safe for discharged home then I would recommend considering inpatient hospice.\"    Today, the patient was seen for:  Goals of care and symptom management    Prognosis, Goals, or Advance Care Planning was addressed today with: Yes.  Mood, coping, and/or meaning in the context of serious illness were addressed today: Yes.              Interval History:     Chart review/discussion with unit or clinical team members:   Met with patient, family and [facility] yesterday. Patient unable to tolerate oxygen saturation > 85 % on 10-15 liters via NC.  Restarted HFNC/BiPAP.  Now on comfort care.     Per patient or family/caregivers today:  See above impression and recommendations.     Key Palliative Symptoms:  # Pain severity the last 12 hours: none  # Dyspnea severity the last 12 hours: low  # Nausea severity the last 12 hours: none  # Anxiety severity the last 12 hours: none  Patient asking for a cookie and coffee.  States breathing is \"shallow\" but she is " "comfortable.             Review of Systems:     Besides above, an additional 4 point system ROS was reviewed and is unremarkable          Medications:     I have reviewed this patient's medication profile and medications during this hospitalization.           Physical Exam:   Vital Signs: Blood pressure 116/71, pulse 95, temperature 98.4  F (36.9  C), temperature source Oral, resp. rate 30, height 1.549 m (5' 1\"), weight 65.8 kg (145 lb 1.6 oz), SpO2 90 %, not currently breastfeeding.   GENERAL: Fatigued, lying in bed    SKIN: Warm and dry   HEENT: Normocephalic, anicteric sclera, moist mucous membranes  LUNGS: On HFNC, breathing non-labored   : landaverde in place  MUSKL: no gross joint deformities   EXTREMITIES: Mild lower extremity edema  NEUROLOGIC: Oriented X 4 but forgetful and intermittent confusion             Data Reviewed:         Results for orders placed or performed during the hospital encounter of 06/24/22   XR Chest Port 1 View    Impression    IMPRESSION: Increased consolidation in the right upper lobe laterally along the fissure consistent with a continued bronchopneumonia.    Underlying interstitial opacities and marked cardiomegaly unchanged. Significant emphysema is better appreciated on the CT.         Recent Labs   Lab 06/28/22  0729 06/28/22  0443 06/27/22  2108 06/27/22  0813 06/27/22  0451 06/26/22  1829 06/26/22  1452 06/26/22  0701 06/26/22  0555 06/25/22  0805 06/25/22  0610   WBC  --  12.1*  --   --  14.7*  --   --   --   --   --  7.3   HGB  --  9.6*  --   --  9.7*  --   --   --   --   --  10.4*   MCV  --  96  --   --  93  --   --   --   --   --  95   PLT  --  245  --   --  256  --   --   --   --   --  233   NA  --  129*  --   --  129*  --   --   --  133*  --  132*   POTASSIUM  --  5.7*  --   --  5.4*  --  5.6*  --  5.5*  --  5.5*   CHLORIDE  --  89*  --   --  88*  --   --   --  88*  --  91*   CO2  --  33*  --   --  30  --   --   --  34*  --  30   BUN  --  53*  --   --  50*  --   --   --  " 40*  --  24   CR  --  0.95  --   --  1.06  --   --   --  0.96  --  0.65   ANIONGAP  --  7  --   --  11  --   --   --  11  --  11   DENISE  --  8.2*  --   --  8.4*  --   --   --  8.4*  --  8.6   * 232* 293*   < > 263*   < >  --    < > 269*   < > 204*    < > = values in this interval not displayed.      Lab Results   Component Value Date    WBC 12.1 (H) 06/28/2022    HGB 9.6 (L) 06/28/2022    HCT 31.8 (L) 06/28/2022     06/28/2022     (L) 06/28/2022    POTASSIUM 5.7 (H) 06/28/2022    CHLORIDE 89 (L) 06/28/2022    CO2 33 (H) 06/28/2022    BUN 53 (H) 06/28/2022    CR 0.95 06/28/2022     (H) 06/28/2022    SED 30 (H) 02/01/2020    DD 0.77 (H) 07/09/2021    AST 15 06/09/2022    ALT 23 06/09/2022    ALKPHOS 75 06/09/2022    BILITOTAL 0.6 06/09/2022    INR 1.48 (H) 05/16/2022      Lab Results   Component Value Date    ALBUMIN 2.6 06/09/2022                ====================================================  TT: I have personally spent a total of 35 minutes on the unit in review of medical record, consultation with the medical providers and assessment of patient today, with more than 50% of this time spent in counseling, coordination of care, and discussion with patient and family re: diagnostic results, prognosis, symptom management, risks and benefits of management options, and development of plan of care as noted above.    ====================================================    MARLINE Junior, GCNS-BC  Clinical Nurse Specialist  [facility]  [phone]

## 2022-06-28 NOTE — DEATH PRONOUNCEMENT
House Officer Death Note     Time of Death: 6:12 PM   Date of Death: 2022      Subjective: Called to bedside for pronouncement of death.     Objective:   Gen: Unresponsive. Pale. Moribund.   HEENT: Pupils mid-fixed.   CV: Pulseless. No heart sounds.   Pulm: Apneic. No breath sounds.   Neuro: Pupils fixed. No spontaneous movement. No response to noxious stimuli.   Skin: Cold, pale.     After examining the patient and finding no signs of life, the patient was pronounced .     Death certificate and cremation request to be completed by the patient's attending physician.    Rest in peace, Adalgisa Gutierrez MD PGY2 2022  [facility]

## 2022-06-28 NOTE — PROGRESS NOTES
"Brief Progress Note:    Called to bedside due to worsened confusion, secretions, and coughing with attempts at taking pills. Kim is confused, asking for more soup. Also expressing love for her .    /58 (BP Location: Right arm)   Pulse 93   Temp 98.4  F (36.9  C) (Oral)   Resp 24   Ht 1.549 m (5' 1\")   Wt 65.8 kg (145 lb 1.6 oz)   SpO2 93%   BMI 27.42 kg/m    HFNC at 70% FIO2, 60 LPM    Discussed Kim's condition with her . She has decompensated in the last 24-48 hours. It does not appear she will be able to be weaned on oxygen enough to be transported home for hospice cares at home. Her worsening confusion is likely due to her worsened respiratory status. Recommended transitioning to comfort cares in the hospital. Recommended family come today. Francis agrees, he has seen her decline as well. He agrees with transitioning to comfort cares and using morphine for difficulty breathing and atropine for secretions. Will continue HFNC for now until family is able to come. Discussed with BARON Guevara, and attending Dr. Agarwal.    Mora Varela MD, PGY-2  [facility]  June 28, 2022    "

## 2022-06-28 NOTE — PROGRESS NOTES
Pt pronounced at 1812, family present at bedside.    home and life source notified. Pts body to be held for tissue and eye donation, per families wishes. Pts belongings, dentures, cat pillow, sent home w spouse.

## 2022-06-28 NOTE — PROGRESS NOTES
PULMONARY PROGRESS NOTE    Date / Time of Admission:  6/24/2022  2:13 PM  Assessment:   86yoF with history of advanced COPD and bronchiectasis, pulmonary hypertension with progressive decline in oxygenation and functional status without further medical options remaining. She has opted to transition to comfort care.        Active Problems:    COPD exacerbation (H)    Acute respiratory failure with hypoxia (H)    Plan:   We will sign off, I will notify her primary pulmonologist--Dr. Juarez.      Subjective:   HPI:  Adalgisa Solano is a 86 year old female with history of COPD, bronchiectasis and pulmonary hypertension who continues to return to the hospital with progressively worsening hypoxia. There remains nothing further treatable at this time unfortunately.    More work of breathing this morning.       Allergies:   Allergies   Allergen Reactions     Tramadol Nausea     Tuberculin Tests      Amoxicillin Itching and Rash     Levofloxacin Itching and Rash     Penicillins Itching and Rash     Has tolerated ceftriaxone.        MEDS:  Scheduled Meds:    acetylcysteine  4 mL Nebulization 4x daily     ARIPiprazole  5 mg Oral At Bedtime     atorvastatin  20 mg Oral At Bedtime     azithromycin  500 mg Intravenous Q24H     ceFEPIme (MAXIPIME) IV  2 g Intravenous Q12H     diltiazem ER COATED BEADS  240 mg Oral Daily     docusate sodium  100 mg Oral BID     fluticasone-vilanterol  1 puff Inhalation Daily    And     umeclidinium  1 puff Inhalation Daily     gabapentin  600 mg Oral At Bedtime     insulin aspart  1-7 Units Subcutaneous 4x Daily w/meals     ipratropium - albuterol 0.5 mg/2.5 mg/3 mL  3 mL Nebulization 4x daily     pantoprazole  40 mg Oral Daily     polyethylene glycol  17 g Oral Daily     predniSONE  40 mg Oral Daily     rivaroxaban ANTICOAGULANT  15 mg Oral Daily with supper     sodium chloride  4 mL Nebulization BID     tobramycin (PF)  150 mg Nebulization 2 times daily     Continuous Infusions:    -  MEDICATION INSTRUCTIONS -       PRN Meds:.acetaminophen **OR** acetaminophen, atropine, glucose **OR** dextrose **OR** glucagon, glucose **OR** dextrose **OR** glucagon, docusate sodium, ipratropium - albuterol 0.5 mg/2.5 mg/3 mL, morphine, morphine **OR** morphine sulfate, naloxone, naloxone, naloxone **OR** naloxone **OR** naloxone **OR** naloxone, OLANZapine, - MEDICATION INSTRUCTIONS -, prochlorperazine **OR** prochlorperazine **OR** prochlorperazine, simethicone        Data Review:    Imaging: personally reviewed  EXAM: XR CHEST PORT 1 VIEW  LOCATION: [facility]  DATE/TIME: 6/24/2022 2:53 PM     INDICATION: sob  COMPARISON: 06/12/2022 and older studies, chest CT 05/17/2022                                                                      IMPRESSION: Increased consolidation in the right upper lobe laterally along the fissure consistent with a continued bronchopneumonia.     Underlying interstitial opacities and marked cardiomegaly unchanged. Significant emphysema is better appreciated on the CT.  Results for orders placed or performed during the hospital encounter of 06/24/22   Basic metabolic panel   Result Value Ref Range    Sodium 129 (L) 136 - 145 mmol/L    Potassium 5.7 (H) 3.5 - 5.0 mmol/L    Chloride 89 (L) 98 - 107 mmol/L    Carbon Dioxide (CO2) 33 (H) 22 - 31 mmol/L    Anion Gap 7 5 - 18 mmol/L    Urea Nitrogen 53 (H) 8 - 28 mg/dL    Creatinine 0.95 0.60 - 1.10 mg/dL    Calcium 8.2 (L) 8.5 - 10.5 mg/dL    Glucose 232 (H) 70 - 125 mg/dL    GFR Estimate 59 (L) >60 mL/min/1.73m2     Lab Results   Component Value Date    WBC 12.1 (H) 06/28/2022    HGB 9.6 (L) 06/28/2022    HCT 31.8 (L) 06/28/2022    MCV 96 06/28/2022     06/28/2022

## 2022-06-28 NOTE — PROGRESS NOTES
[facility]    Progress Note - Hospitalist Service       Date of Admission:  6/24/2022    Assessment & Plan        Adalgisa Solano is a 86 year old female with a history of chronic respiratory failure, emphysema, COPD, bronchiectasis, pseudomonal LLL pneumonia, and multiple recent admissions for COPD exacerbations who was recently hospitalized 6/8/2022-6/13/2022. She is admitted for acute hypoxic respiratory failure 2/2 severe COPD exacerabtion.  She is DNR/DNI with end-stage chronic respiratory failure. Patient and family agreed to start comfort cares today. Patient not stable to discharge home on hopsice d/t inability to wean HFNC to 10-12L.  . patient clinically deterioted this morning and family notified, advised to be at bedside. , Less, at bedside. Planning to discontinue high flow nasal cannula and switching to oxime mask for comfort. Morphine for air hunger onboard.    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  Comfort Measures as below:  -mucomysts 20% nebulizer TID   -abilify 5 mg for anxiety  -morphine 1-4 mg IM, morphine 2.5 mg-10 mg Q 1 hr   -zyprexa 2.5 mg Q4hrs PRN for agitation, anxiety  -compazine  5 mg Q 6hrs   -mylicon chewable tablet 50 mg   -DuoNeb  -P.o. prednisone 40 mg daily  -Continue Xarelto 15 mg daily  -Continue CALLUM nebs  -Continue gabapentin 600 mg at bedtime  -Continue diltiazem 240 mg       Diet: Regular Diet Adult  Room Service    DVT Prophylaxis: DOAC  Borjas Catheter: PRESENT, indication: Strict 1-2 Hour I&O;Retention  Fluids: PO  Central Lines: None  Cardiac Monitoring: ACTIVE order. Indication: ICU  Code Status: No CPR- Do NOT Intubate      Disposition Plan      Expected Discharge Date: 06/30/2022        Discharge Comments: Hospice care conf with family        The patient's care was discussed with the Attending Physician, Dr. Agarwal.    Zeb Mercado DO  Hospitalist Service    [facility]  Securely message with the Vocera Web Console (learn more here)  Text page via [employer] Paging/Directory      ______________________________________________________________________    Interval History   Patient was seen and assessed at the bedside.  Denies pain.   Less at bedside.questions answered.  Discussed the need to come off of high flow nasal cannula given that patient is now comfort cares.  Advised family members gather at bedside at which point nursing staff will discontinue high flow nasal cannula.  Less, patient in agreement.    Patient was pleasantly confused.     Data reviewed today: I reviewed all medications, new labs and imaging results over the last 24 hours. I personally reviewed no images or EKG's today.    Physical Exam   Vital Signs: Temp: 98.4  F (36.9  C) Temp src: Oral BP: 116/71 Pulse: 95   Resp: 30 SpO2: 90 % O2 Device: High Flow Nasal Cannula (HFNC) Oxygen Delivery: 60 LPM  Weight: 145 lbs 1.6 oz      Physical Exam  Constitutional: awake, alert, cooperative, no apparent distress, and appears stated age  ENT:  Moist mucous membranes, oropharynx clear  Eyes: Lids and lashes normal, pupils equal, round and reactive to light, extra ocular muscles intact, sclera clear, conjunctiva normal  Respiratory:  Upper airway sounds, diffuse crackles.  Cardiovascular:  Regular rate and rhythm, normal S1-S2, no murmur.  GI: No scars, normal bowel sounds, soft, non-distended, non-tender, no masses palpated, no hepatosplenomegally  Skin: no bruising or bleeding  Neurologic: Awake, alert, oriented to name, place and time.  Cranial nerves II-XII are grossly intact.  Motor is 5 out of 5 bilaterally.  Neuropsychiatric: General: normal, calm and normal eye contact  Level of consciousness: alert / normal  Orientation: oriented to self, place, time       Data   Recent Labs   Lab 06/28/22  0729 06/28/22  0443 06/27/22  2108 06/27/22  0813 06/27/22  0451 06/26/22  1821  06/26/22  1452 06/26/22  0701 06/26/22  0555 06/25/22  0805 06/25/22  0610   WBC  --  12.1*  --   --  14.7*  --   --   --   --   --  7.3   HGB  --  9.6*  --   --  9.7*  --   --   --   --   --  10.4*   MCV  --  96  --   --  93  --   --   --   --   --  95   PLT  --  245  --   --  256  --   --   --   --   --  233   NA  --  129*  --   --  129*  --   --   --  133*  --  132*   POTASSIUM  --  5.7*  --   --  5.4*  --  5.6*  --  5.5*  --  5.5*   CHLORIDE  --  89*  --   --  88*  --   --   --  88*  --  91*   CO2  --  33*  --   --  30  --   --   --  34*  --  30   BUN  --  53*  --   --  50*  --   --   --  40*  --  24   CR  --  0.95  --   --  1.06  --   --   --  0.96  --  0.65   ANIONGAP  --  7  --   --  11  --   --   --  11  --  11   DENISE  --  8.2*  --   --  8.4*  --   --   --  8.4*  --  8.6   * 232* 293*   < > 263*   < >  --    < > 269*   < > 204*    < > = values in this interval not displayed.

## 2022-06-28 NOTE — PROGRESS NOTES
CM met with patient/multiple family members at bedside. Family confirm plan for transition to comfort cares. CM updated Janay with [facility].

## 2022-06-28 NOTE — PROGRESS NOTES
Family present at bedside. Pt awake, aware of EOL.  provided reassurance of ruthie, acknowledgment of fear and the unknown and facilitated hymn singing. Pt requested her spouse to sing which he lovingly did.    Shared prayer. Family expressed gratitude for visit.    TRACY Cruz.

## 2022-06-28 NOTE — PROGRESS NOTES
Patient received scheduled nebs.  Pt started shift of HHFNC 60L, 60% and was placed on BIPAP at noc, 14/8,16, 40%. Sats have consistently been in 90s and breath sounds have been coarse.  Remains confused at times.  RT will follow.        Mahdi Cerda RT

## 2022-06-30 ENCOUNTER — TELEPHONE (OUTPATIENT)
Dept: INTERNAL MEDICINE | Facility: CLINIC | Age: 87
End: 2022-06-30

## 2022-06-30 DIAGNOSIS — I48.91 ATRIAL FIBRILLATION WITH RVR (H): ICD-10-CM

## 2022-06-30 RX ORDER — RIVAROXABAN 20 MG/1
TABLET, FILM COATED ORAL
Qty: 90 TABLET | Refills: 3 | OUTPATIENT
Start: 2022-06-30

## 2022-06-30 NOTE — TELEPHONE ENCOUNTER
Reason for Call:  Other Death Certificate    Detailed comments: Needs Death Certificate signed. Please sign and complete death certificate    Phone Number Patient can be reached at: Other phone number:  [phone]*    Best Time: any    Can we leave a detailed message on this number? YES    Call taken on 6/30/2022 at 2:15 PM by Sheri Younger

## 2022-07-01 NOTE — TELEPHONE ENCOUNTER
Dr. Maya was having difficulty logging onto  The [facility] site to certify the cause of death. I was able to have the staff at [facility] re-route the request to Dr. Chamberlain, as she offered to help. Dr. Chamberlian just confirmed that she has completed the certification.

## 2022-07-01 NOTE — TELEPHONE ENCOUNTER
JOSEMANUEL Wheeler  Home following up on signature for Death Certificate.    Family has scheduled cremation tomorrow and they want to be in attendance of cremation.    Need death certificate completed.    Any questions Liam can be reached at .

## 2022-07-01 NOTE — TELEPHONE ENCOUNTER
"Called [facility] to see if the request for certification of death could be delegated to me, as I am a delegate for many providers. Unfortunately, I am not listed for Dr. Ellington, so I asked that they route the request to Dr. Maya, as he is covering for Dr. Ellington while he is out.     I called [facility] to update them.     I also reached back to Dr. Maya to let him know that it was routed to him via email or he could log onto the [facility] account and find the request in the \"work queue\".     Dr. Maya is taking care of it now.    Karuna Mauro RN Clinical Supervisor    "

## 2022-07-07 ENCOUNTER — MEDICAL CORRESPONDENCE (OUTPATIENT)
Dept: HEALTH INFORMATION MANAGEMENT | Facility: CLINIC | Age: 87
End: 2022-07-07

## 2022-07-07 NOTE — PROGRESS NOTES
Hospital discharge follow up call to pt, no answer. Left message asking pt to make appt with Dr Ellington for pre op update. RN will attempt call back at another time.   Pt was readmitted before second call completed.   Problem: Pain  Goal: Verbalizes/displays adequate comfort level or baseline comfort level  Outcome: Progressing  Flowsheets (Taken 7/6/2022 2103)  Verbalizes/displays adequate comfort level or baseline comfort level:   Encourage patient to monitor pain and request assistance   Administer analgesics based on type and severity of pain and evaluate response   Consider cultural and social influences on pain and pain management   Assess pain using appropriate pain scale   Implement non-pharmacological measures as appropriate and evaluate response   Notify Licensed Independent Practitioner if interventions unsuccessful or patient reports new pain     Problem: Infection - Adult  Goal: Absence of infection during hospitalization  Outcome: Progressing  Flowsheets (Taken 7/6/2022 2103)  Absence of infection during hospitalization:   Assess and monitor for signs and symptoms of infection   Monitor lab/diagnostic results   Monitor all insertion sites i.e., indwelling lines, tubes and drains   Administer medications as ordered   Instruct and encourage patient and family to use good hand hygiene technique     Problem: Safety - Adult  Goal: Free from fall injury  Outcome: Progressing  Flowsheets (Taken 7/6/2022 2103)  Free From Fall Injury: Instruct family/caregiver on patient safety     Problem: Discharge Planning  Goal: Discharge to home or other facility with appropriate resources  Outcome: Progressing  Flowsheets (Taken 7/6/2022 2103)  Discharge to home or other facility with appropriate resources:   Identify barriers to discharge with patient and caregiver   Identify discharge learning needs (meds, wound care, etc)   Arrange for needed discharge resources and transportation as appropriate     Problem: Chronic Conditions and Co-morbidities  Goal: Patient's chronic conditions and co-morbidity symptoms are monitored and maintained or improved  Outcome: Progressing  Flowsheets (Taken 7/6/2022 2103)  Care Plan - Patient's

## 2022-07-11 ENCOUNTER — MEDICAL CORRESPONDENCE (OUTPATIENT)
Dept: HEALTH INFORMATION MANAGEMENT | Facility: CLINIC | Age: 87
End: 2022-07-11

## 2022-07-24 DIAGNOSIS — I10 ESSENTIAL HYPERTENSION: ICD-10-CM

## 2022-07-24 RX ORDER — DILTIAZEM HYDROCHLORIDE 120 MG/1
CAPSULE, EXTENDED RELEASE ORAL
Qty: 90 CAPSULE | Refills: 3 | OUTPATIENT
Start: 2022-07-24

## 2022-07-27 ENCOUNTER — MEDICAL CORRESPONDENCE (OUTPATIENT)
Dept: HEALTH INFORMATION MANAGEMENT | Facility: CLINIC | Age: 87
End: 2022-07-27

## 2022-08-04 ENCOUNTER — MEDICAL CORRESPONDENCE (OUTPATIENT)
Dept: HEALTH INFORMATION MANAGEMENT | Facility: CLINIC | Age: 87
End: 2022-08-04

## 2022-09-22 NOTE — PLAN OF CARE
Problem: Respiratory Compromise (Heart Failure)  Goal: Effective Oxygenation and Ventilation  Outcome: Ongoing, Progressing   Goal Outcome Evaluation:    O2 delivered to hospital this afternoon. Plan for patient to discharge to home with home care completing deilvery and education on BiPap at home.    Photo Preface (Leave Blank If You Do Not Want): Photographs were obtained today Detail Level: Simple

## 2022-09-25 NOTE — TELEPHONE ENCOUNTER
"Last Written Prescription Date:  10/26/21  Last Fill Quantity: 600 ml,  # refills: 0   Last office visit provider:  1/13/22     Requested Prescriptions   Pending Prescriptions Disp Refills     albuterol (PROVENTIL) (2.5 MG/3ML) 0.083% neb solution [Pharmacy Med Name: *ALBUTEROL SULFATE (2.5 MG/3ML) 0.083%] 600 mL 0     Sig: 3 ML INHALATION FOUR TIMES A DAY AS NEEDED USE BEFORE NEBULIZED SALINE. FOR SHORTNESS OF BREATH.       Asthma Maintenance Inhalers - Anticholinergics Passed - 1/19/2022  8:32 AM        Passed - Patient is age 12 years or older        Passed - Recent (12 mo) or future (30 days) visit within the authorizing provider's specialty     Patient has had an office visit with the authorizing provider or a provider within the authorizing providers department within the previous 12 mos or has a future within next 30 days. See \"Patient Info\" tab in inbasket, or \"Choose Columns\" in Meds & Orders section of the refill encounter.              Passed - Medication is active on med list       Short-Acting Beta Agonist Inhalers Protocol  Passed - 1/19/2022  8:32 AM        Passed - Patient is age 12 or older        Passed - Recent (12 mo) or future (30 days) visit within the authorizing provider's specialty     Patient has had an office visit with the authorizing provider or a provider within the authorizing providers department within the previous 12 mos or has a future within next 30 days. See \"Patient Info\" tab in inbasket, or \"Choose Columns\" in Meds & Orders section of the refill encounter.              Passed - Medication is active on med list             Rob Cm RN 01/21/22 10:35 AM  " Yes - the patient is able to be screened

## 2024-07-23 NOTE — DISCHARGE SUMMARY
Death Summary    Primary Care Physician:  Shai Ellington   Consult/s: Pulmonology, Palliative care  Admission Date: 2022.   Admission Diagnoses:   COPD exacerbation (H) [J44.1]  Acute respiratory failure with hypoxia (H) [J96.01]   Date of Death: 2022  Condition at Discharge:    Code Status: No CPR- Do NOT Intubate  Principal Diagnosis:  <principal problem not specified>  Discharge Diagnoses:    Active Problems:    COPD exacerbation (H)    Acute respiratory failure with hypoxia (H)    Reason for Admission:   Adalgisa Solano is a 86 year old female who presented on the day of admission with COPD, bronchiectasis and pulmonary hypertension who continues to return to the hospital with progressively worsening hypoxic respiratory failure.     Hospital Summary:   Adalgisa Solano is a 86 year old female admitted on 2022     She is admitted for acute hypoxic respiratory failure 2/2 severe COPD exacerabtion.  She is DNR/DNI with end-stage chronic respiratory failure. Without further medical options or reversible respiratory pathology patient and family agreed to start comfort cares after discussions with day team and palliative care team. Patient not stable to discharge home on hopsice d/t inability to wean HFNC to 10-12L. Patient passed with her family and , Less, at bedside.    Significant Laboratory Studies:   Recent Labs   Lab 22  0443 22  0451 22  0555   * 129* 133*   CO2 33* 30 34*   BUN 53* 50* 40*     Recent Labs   Lab 22  0443 22  0451 22  0610   WBC 12.1* 14.7* 7.3   HGB 9.6* 9.7* 10.4*   HCT 31.8* 31.3* 34.7*    256 233     Significant Radiology Studies:    XR Chest Port 1 View    Result Date: 2022  EXAM: XR CHEST PORT 1 VIEW LOCATION: [facility] DATE/TIME: 2022 2:53 PM INDICATION: sob COMPARISON: 2022 and older studies, chest CT 2022     IMPRESSION: Increased consolidation  Price (Do Not Change): 0.00 in the right upper lobe laterally along the fissure consistent with a continued bronchopneumonia. Underlying interstitial opacities and marked cardiomegaly unchanged. Significant emphysema is better appreciated on the CT.     XR Chest Port 1 View    Result Date: 6/12/2022  EXAM: XR CHEST PORTABLE 1 VIEW LOCATION: [facility] DATE/TIME: 06/12/2022, 6:13 AM INDICATION: Shortness of breath. Pneumonia and pulmonary edema? COMPARISON: Portable chest single view 06/09/2022 at 1631 hours.     IMPRESSION: Cardiac enlargement. Indistinct interstitial infiltrates bilaterally, more apparent in the upper lungs and the left base, slightly improved. No new infiltrate, consolidation, cavitation or pleural effusion on either side. Atherosclerotic aorta. Degenerative changes both shoulders and the spine. Monitoring leads overlying the chest.     XR Chest Port 1 View    Result Date: 6/9/2022  EXAM: XR CHEST PORT 1 VIEW LOCATION: [facility] DATE/TIME: 6/9/2022 4:14 PM INDICATION: Worsening shortness of breath COMPARISON: Portable AP view of the chest 06/08/2022     IMPRESSION: Bilateral airspace opacities are present in the mid/upper lungs greatest lateral right upper lobe abutting the lateral pleura and minor fissure consistent with lung edema/inflammation. The background of emphysema results in lucent apices with visible bullae on the left. Patchy left lower lobe opacities and more focal opacities in the medial right lower lobe are unchanged. There is a small left pleural effusion visible on the lateral sulcus. No pneumothorax. Unchanged enlargement of the cardiac silhouette. Arch and descending atheromatous calcifications are moderate and unchanged.    XR Chest Port 1 View    Result Date: 6/8/2022  EXAM: XR CHEST PORT 1 VIEW LOCATION: [facility] DATE/TIME: 6/8/2022 4:52 PM INDICATION: SOB COMPARISON: 05/18/2022 and older studies, chest CT 05/17/2022 and  Detail Level: Simple older studies     IMPRESSION: PICC line has been removed. Emphysematous changes are better appreciated on the CT. Interval clearing of the left lower lobe consolidation. Fine reticular opacities have increased bilaterally. This may reflect edema superimposed over emphysematous changes or a viral pneumonia. Heart is enlarged but unchanged. No clear effusions.    The following tests have been done with results pending: None     This note was created with help of Dragon dictation system. Grammatical/typing errors are not intentional.    Patient discussed with attending physician, Dr. Thompson Agarwal , who agrees with the plan.      Mahesh Gutierrez MD PGY2 6/28/2022  [facility]       Instructions: This plan will send the code FBSE to the PM system.  DO NOT or CHANGE the price.

## 2025-01-10 NOTE — TELEPHONE ENCOUNTER
Order placed   Physical Therapy Visit    Visit Type: Physical Therapy- Daily Treatment Note  Visit: 3  Referring Provider: Zayda Marcano MD  Medical Diagnosis (from order): M25.552, G89.29 - Chronic left hip pain     SUBJECTIVE                                                                                                               Patient reports after the last session she had no radicular pain into the left leg and it lasted until she went to work. Patient reports stabbing pain in the buttock and nubmness in the left thigh.  11/10 for numbness and 10/10 for buttock pain.   Functional Change: Per above.    Pain / Symptoms  - Pain rating (out of 10): Current: 10       OBJECTIVE                                                                                                                                       Treatment     Therapeutic Exercise  - prone press-ups x10  - supine sciatic nerve glides x10 right/left  - sidelying clamshells, reverse clamshells x10 ea right/left  - sidelying hip abduction x5 left, x10 right   - supine transverse abdominis activation x5        Added march x5 right/left not alternating  - lumbar extension on wall x10 timed with breath  - seated sciatic nerve glides x10 right/left         Manual Therapy   - axial segmental traction, extension bias, prolonged hold  - hip traction, prolonged hold x 2          Skilled input: verbal instruction/cues    Writer verbally educated and received verbal consent for hand placement, positioning of patient, and techniques to be performed today from patient for therapist position for techniques as described above and how they are pertinent to the patient's plan of care.  Home Exercise Program  Access Code: [email or web address]  URL: [email or web address]  Date: 01/03/2025  Prepared by: Pool Strickland    Exercises  - Prone Press Up  - 4-6 x daily - 7 x weekly - 10 reps - 3 hold  - Supine Sciatic Nerve Glide  - 2-3 x daily - 7 x weekly - 10-20 reps  -  Standing Lumbar Extension at Wall - Forearms  - 4-6 x daily - 7 x weekly - 3-6 sets - 1 reps - 3-6 breath  - Seated Slump Nerve Glide  - 2-3 x daily - 7 x weekly - 10-20 reps      ASSESSMENT                                                                                                            At close of session significant decrease in numbness and pain. Addition of glut strength with good activation but fatigued quickly. Patient would benefit from continued skilled physical therapy to reach established goals.      Pain/symptoms after session (out of 10): 3  Education:   - Results of above outlined education: Verbalizes understanding    PLAN                                                                                                                           Suggestions for next session as indicated: Progress per plan of care       Therapy procedure time and total treatment time can be found documented on the Time Entry flowsheet

## (undated) DEVICE — DECANTER VIAL 2006S

## (undated) DEVICE — CUSTOM PACK GEN MAJOR SBA5BGMHEA

## (undated) DEVICE — GLOVE BIOGEL PI ULTRATOUCH G SZ 7.5 42175

## (undated) DEVICE — ESU ELEC BLADE 6" COATED E1450-6

## (undated) DEVICE — Device

## (undated) DEVICE — DRSG PRIMAPORE 04X11 3/4"

## (undated) DEVICE — SUCTION MANIFOLD NEPTUNE 2 SYS 1 PORT 702-025-000

## (undated) DEVICE — PLATE GROUNDING ADULT W/CORD 9165L

## (undated) DEVICE — SOL NACL 0.9% IRRIG 1000ML BOTTLE 2F7124

## (undated) DEVICE — DRESSING PRIMAPORE 4 X 3-1/8 66000317

## (undated) DEVICE — BLADE KNIFE SURG 15 371115

## (undated) DEVICE — SOL WATER IRRIG 1000ML BOTTLE 2F7114

## (undated) DEVICE — SUTURE VICRYL+ 2-0 27IN SH UND VCP417H

## (undated) DEVICE — SU ETHIBOND 0 CT-1 CR 8X18" CX21D

## (undated) DEVICE — NEEDLE HYPO 22X1-1/2 SAFETY 305900

## (undated) DEVICE — ESU PENCIL SMOKE EVAC W/ROCKER SWITCH 0703-047-000

## (undated) DEVICE — GOWN IMPERVIOUS BREATHABLE SMART LG 89015

## (undated) DEVICE — SU PROLENE 0 CT-1 30" 8424H

## (undated) DEVICE — PREP CHLORAPREP 26ML TINTED HI-LITE ORANGE 930815

## (undated) DEVICE — SUTURE VICRYL+ 4-0 UNDYED PS-2 VCP496H

## (undated) DEVICE — ADH SKIN CLOSURE PREMIERPRO EXOFIN 1.0ML 3470

## (undated) DEVICE — SYR 10ML LL W/O NDL 302995

## (undated) DEVICE — DRAPE OR LAPAROTOMY KC 89228*

## (undated) DEVICE — DRAPE IOBAN INCISE 23X17" 6650EZ

## (undated) RX ORDER — LIDOCAINE HYDROCHLORIDE AND EPINEPHRINE 10; 10 MG/ML; UG/ML
INJECTION, SOLUTION INFILTRATION; PERINEURAL
Status: DISPENSED
Start: 2021-01-01